# Patient Record
Sex: MALE | Race: WHITE | Employment: OTHER | URBAN - METROPOLITAN AREA
[De-identification: names, ages, dates, MRNs, and addresses within clinical notes are randomized per-mention and may not be internally consistent; named-entity substitution may affect disease eponyms.]

---

## 2019-01-29 ENCOUNTER — TELEPHONE (OUTPATIENT)
Dept: ORTHOPEDIC SURGERY | Age: 54
End: 2019-01-29

## 2019-07-23 RX ORDER — OMEPRAZOLE 40 MG/1
40 CAPSULE, DELAYED RELEASE ORAL DAILY
Qty: 30 CAPSULE | Refills: 2 | Status: SHIPPED | OUTPATIENT
Start: 2019-07-23 | End: 2019-08-23 | Stop reason: SDUPTHER

## 2019-07-23 RX ORDER — OMEPRAZOLE 40 MG/1
CAPSULE, DELAYED RELEASE ORAL
Refills: 0 | COMMUNITY
Start: 2019-06-22 | End: 2019-07-23 | Stop reason: SDUPTHER

## 2019-08-23 RX ORDER — ALBUTEROL SULFATE 90 UG/1
AEROSOL, METERED RESPIRATORY (INHALATION)
COMMUNITY
Start: 2016-12-08 | End: 2019-10-29 | Stop reason: SDUPTHER

## 2019-08-23 RX ORDER — BUDESONIDE AND FORMOTEROL FUMARATE DIHYDRATE 160; 4.5 UG/1; UG/1
AEROSOL RESPIRATORY (INHALATION)
Refills: 0 | COMMUNITY
Start: 2019-05-21 | End: 2019-10-29 | Stop reason: SDUPTHER

## 2019-08-23 RX ORDER — ARIPIPRAZOLE 5 MG/1
TABLET ORAL
Refills: 0 | Status: ON HOLD | COMMUNITY
Start: 2019-07-05

## 2019-08-23 RX ORDER — AMLODIPINE BESYLATE 5 MG/1
5 TABLET ORAL DAILY
Qty: 30 TABLET | Refills: 2 | Status: SHIPPED | OUTPATIENT
Start: 2019-08-23 | End: 2019-11-19 | Stop reason: SDUPTHER

## 2019-08-23 RX ORDER — AMLODIPINE BESYLATE 5 MG/1
TABLET ORAL
COMMUNITY
Start: 2016-12-29 | End: 2019-08-23 | Stop reason: SDUPTHER

## 2019-08-23 RX ORDER — HYDROXYZINE HYDROCHLORIDE 25 MG/1
TABLET, FILM COATED ORAL
Refills: 0 | COMMUNITY
Start: 2019-07-05 | End: 2021-08-06

## 2019-08-23 RX ORDER — DULOXETIN HYDROCHLORIDE 60 MG/1
CAPSULE, DELAYED RELEASE ORAL
Refills: 0 | COMMUNITY
Start: 2019-07-05 | End: 2022-02-02 | Stop reason: ALTCHOICE

## 2019-08-23 RX ORDER — OMEPRAZOLE 40 MG/1
40 CAPSULE, DELAYED RELEASE ORAL DAILY
Qty: 30 CAPSULE | Refills: 2 | Status: SHIPPED
Start: 2019-08-23 | End: 2020-08-27 | Stop reason: SDUPTHER

## 2019-08-23 RX ORDER — CLONIDINE HYDROCHLORIDE 0.1 MG/1
TABLET ORAL
Refills: 0 | COMMUNITY
Start: 2019-07-05 | End: 2021-01-12 | Stop reason: ALTCHOICE

## 2019-08-27 RX ORDER — CYCLOBENZAPRINE HCL 10 MG
10 TABLET ORAL 3 TIMES DAILY PRN
Qty: 90 TABLET | Refills: 0 | Status: SHIPPED | OUTPATIENT
Start: 2019-08-27 | End: 2019-09-26

## 2019-10-29 RX ORDER — ALBUTEROL SULFATE 90 UG/1
2 AEROSOL, METERED RESPIRATORY (INHALATION) EVERY 4 HOURS PRN
Qty: 1 INHALER | Refills: 2 | Status: SHIPPED
Start: 2019-10-29 | End: 2020-03-10 | Stop reason: SDUPTHER

## 2019-10-29 RX ORDER — BUDESONIDE AND FORMOTEROL FUMARATE DIHYDRATE 160; 4.5 UG/1; UG/1
2 AEROSOL RESPIRATORY (INHALATION) 2 TIMES DAILY
Qty: 1 INHALER | Refills: 2 | Status: SHIPPED
Start: 2019-10-29 | End: 2020-03-10 | Stop reason: SDUPTHER

## 2019-11-19 RX ORDER — AMLODIPINE BESYLATE 5 MG/1
5 TABLET ORAL DAILY
Qty: 30 TABLET | Refills: 2 | Status: SHIPPED
Start: 2019-11-19 | End: 2020-03-10 | Stop reason: SDUPTHER

## 2019-11-19 RX ORDER — OMEPRAZOLE 40 MG/1
40 CAPSULE, DELAYED RELEASE ORAL DAILY
Qty: 30 CAPSULE | Refills: 2 | Status: SHIPPED
Start: 2019-11-19 | End: 2020-03-10 | Stop reason: SDUPTHER

## 2019-11-19 RX ORDER — OMEPRAZOLE 40 MG/1
CAPSULE, DELAYED RELEASE ORAL
Refills: 0 | COMMUNITY
Start: 2019-10-18 | End: 2019-11-19 | Stop reason: SDUPTHER

## 2020-03-05 RX ORDER — IPRATROPIUM BROMIDE AND ALBUTEROL SULFATE 2.5; .5 MG/3ML; MG/3ML
1 SOLUTION RESPIRATORY (INHALATION) EVERY 6 HOURS
COMMUNITY
End: 2020-03-09 | Stop reason: SDUPTHER

## 2020-03-09 RX ORDER — IPRATROPIUM BROMIDE AND ALBUTEROL SULFATE 2.5; .5 MG/3ML; MG/3ML
1 SOLUTION RESPIRATORY (INHALATION) EVERY 4 HOURS
COMMUNITY
End: 2020-03-09 | Stop reason: CLARIF

## 2020-03-09 RX ORDER — IPRATROPIUM BROMIDE AND ALBUTEROL SULFATE 2.5; .5 MG/3ML; MG/3ML
1 SOLUTION RESPIRATORY (INHALATION) EVERY 4 HOURS PRN
Qty: 12 VIAL | Refills: 0 | Status: SHIPPED
Start: 2020-03-09 | End: 2020-03-10 | Stop reason: SDUPTHER

## 2020-03-10 ENCOUNTER — OFFICE VISIT (OUTPATIENT)
Dept: PRIMARY CARE CLINIC | Age: 55
End: 2020-03-10
Payer: MEDICAID

## 2020-03-10 VITALS
WEIGHT: 105 LBS | HEIGHT: 64 IN | RESPIRATION RATE: 16 BRPM | SYSTOLIC BLOOD PRESSURE: 132 MMHG | BODY MASS INDEX: 17.93 KG/M2 | DIASTOLIC BLOOD PRESSURE: 80 MMHG

## 2020-03-10 PROCEDURE — 1111F DSCHRG MED/CURRENT MED MERGE: CPT | Performed by: FAMILY MEDICINE

## 2020-03-10 PROCEDURE — 99215 OFFICE O/P EST HI 40 MIN: CPT | Performed by: FAMILY MEDICINE

## 2020-03-10 RX ORDER — OMEPRAZOLE 40 MG/1
40 CAPSULE, DELAYED RELEASE ORAL DAILY
Qty: 30 CAPSULE | Refills: 2 | Status: SHIPPED
Start: 2020-03-10 | End: 2020-06-08 | Stop reason: SDUPTHER

## 2020-03-10 RX ORDER — AMLODIPINE BESYLATE 5 MG/1
5 TABLET ORAL DAILY
Qty: 30 TABLET | Refills: 2 | Status: SHIPPED
Start: 2020-03-10 | End: 2020-06-08 | Stop reason: SDUPTHER

## 2020-03-10 RX ORDER — OMEPRAZOLE 40 MG/1
40 CAPSULE, DELAYED RELEASE ORAL DAILY
Qty: 30 CAPSULE | Refills: 2 | Status: CANCELLED | OUTPATIENT
Start: 2020-03-10 | End: 2020-04-09

## 2020-03-10 RX ORDER — ALBUTEROL SULFATE 90 UG/1
2 AEROSOL, METERED RESPIRATORY (INHALATION) EVERY 4 HOURS PRN
Qty: 1 INHALER | Refills: 2 | Status: SHIPPED
Start: 2020-03-10 | End: 2020-06-08 | Stop reason: SDUPTHER

## 2020-03-10 RX ORDER — BUDESONIDE AND FORMOTEROL FUMARATE DIHYDRATE 160; 4.5 UG/1; UG/1
2 AEROSOL RESPIRATORY (INHALATION) 2 TIMES DAILY
Qty: 1 INHALER | Refills: 2 | Status: SHIPPED
Start: 2020-03-10 | End: 2020-06-04 | Stop reason: SDUPTHER

## 2020-03-10 RX ORDER — IPRATROPIUM BROMIDE AND ALBUTEROL SULFATE 2.5; .5 MG/3ML; MG/3ML
1 SOLUTION RESPIRATORY (INHALATION) EVERY 4 HOURS PRN
Qty: 12 VIAL | Refills: 0 | Status: ON HOLD | OUTPATIENT
Start: 2020-03-10 | End: 2022-10-15

## 2020-03-10 ASSESSMENT — PATIENT HEALTH QUESTIONNAIRE - PHQ9
2. FEELING DOWN, DEPRESSED OR HOPELESS: 0
1. LITTLE INTEREST OR PLEASURE IN DOING THINGS: 0
SUM OF ALL RESPONSES TO PHQ QUESTIONS 1-9: 0
SUM OF ALL RESPONSES TO PHQ9 QUESTIONS 1 & 2: 0
SUM OF ALL RESPONSES TO PHQ QUESTIONS 1-9: 0

## 2020-03-10 NOTE — PROGRESS NOTES
Post-Discharge Transitional Care Management Services or Hospital Follow Up      Yadira Cano   YOB: 1965    Date of Office Visit:  3/10/2020  Date of Hospital Admission: February 26, 2020  Date of Hospital Discharge: March 6, 2020    Care management risk score Rising risk (score 2-5) and Complex Care (Scores >=6): 1     Non face to face  following discharge, date last encounter closed (first attempt may have been earlier): *No documented post hospital discharge outreach found in the last 14 days *No documented post hospital discharge outreach found in the last 14 days    Call initiated 2 business days of discharge: *No response recorded in the last 14 days    There is no problem list on file for this patient.       Allergies   Allergen Reactions    Aspirin     Levofloxacin Other (See Comments)    Lisinopril     Motrin [Ibuprofen]     Other     Sulfa Antibiotics     Tramadol        Medications listed as ordered at the time of discharge from hospital      Medications marked \"taking\" at this time  Outpatient Medications Marked as Taking for the 3/10/20 encounter (Office Visit) with Herberth Cruz MD   Medication Sig Dispense Refill    albuterol sulfate HFA (PROAIR HFA) 108 (90 Base) MCG/ACT inhaler Inhale 2 puffs into the lungs every 4 hours as needed for Wheezing 1 Inhaler 2    amLODIPine (NORVASC) 5 MG tablet Take 1 tablet by mouth daily 30 tablet 2    ipratropium-albuterol (DUONEB) 0.5-2.5 (3) MG/3ML SOLN nebulizer solution Inhale 3 mLs into the lungs every 4 hours as needed for Shortness of Breath 12 vial 0    omeprazole (PRILOSEC) 40 MG delayed release capsule Take 1 capsule by mouth daily 30 capsule 2    SYMBICORT 160-4.5 MCG/ACT AERO Inhale 2 puffs into the lungs 2 times daily 1 Inhaler 2    ARIPiprazole (ABILIFY) 5 MG tablet take 1 tablet by mouth once daily  0    DULoxetine (CYMBALTA) 60 MG extended release capsule take 1 capsule by mouth at bedtime  0    hydrOXYzine symptoms other than stated above. Denies mouth or throat symptoms. CV:  Denies chest pain, dyspnea on exertion, orthopnea, palpitations and PND  Resp: Denies chest pain, cough, SOB and wheezing. GI: Denies abdominal pain, constipation, diarrhea, heartburn, indigestion, nausea and vomiting. : Denies dysuria, frequency, hematuria, nocturia and urgency. Musculo: Denies arthralgias and myalgia  Skin:  Denies lesions, pruritus and rash. Neuro: Denies dizziness, lightheadedness, numbness, tingling and weakness. Psych:  Denies anxiety and depression  Endocrine: Denies anxiety and depression. Hema/Lymph: Denies hematologic symptoms  Allergy/Immuno:  Denies allergic/immunologic symptoms.   Pertinent positives reviewed and noted      Current Outpatient Medications:     albuterol sulfate HFA (PROAIR HFA) 108 (90 Base) MCG/ACT inhaler, Inhale 2 puffs into the lungs every 4 hours as needed for Wheezing, Disp: 1 Inhaler, Rfl: 2    amLODIPine (NORVASC) 5 MG tablet, Take 1 tablet by mouth daily, Disp: 30 tablet, Rfl: 2    ipratropium-albuterol (DUONEB) 0.5-2.5 (3) MG/3ML SOLN nebulizer solution, Inhale 3 mLs into the lungs every 4 hours as needed for Shortness of Breath, Disp: 12 vial, Rfl: 0    omeprazole (PRILOSEC) 40 MG delayed release capsule, Take 1 capsule by mouth daily, Disp: 30 capsule, Rfl: 2    SYMBICORT 160-4.5 MCG/ACT AERO, Inhale 2 puffs into the lungs 2 times daily, Disp: 1 Inhaler, Rfl: 2    ARIPiprazole (ABILIFY) 5 MG tablet, take 1 tablet by mouth once daily, Disp: , Rfl: 0    DULoxetine (CYMBALTA) 60 MG extended release capsule, take 1 capsule by mouth at bedtime, Disp: , Rfl: 0    hydrOXYzine (ATARAX) 25 MG tablet, take 1-2 tablets by mouth once daily if needed, Disp: , Rfl: 0    cloNIDine (CATAPRES) 0.1 MG tablet, take 1 tablet by mouth every morning, Disp: , Rfl: 0    omeprazole (PRILOSEC) 40 MG delayed release capsule, Take 1 capsule by mouth daily, Disp: 30 capsule, Rfl: 2   132/80   Resp: 16   Weight: 105 lb (47.6 kg)   Height: 5' 4\" (1.626 m)        Exam: Const: Appears healthy and well developed. No signs of acute distress present. Eyes: PERRL  ENMT: Tympanic membranes are intact. Nasal mucosa intact without noted erythema Septum is in the midline. Posterior pharynx shows no exudate, irritation or redness. Neck:  Supple without adenopathy. Adequate range of motion   Resp: No rales, rhonchi, wheezes appreciated over the lungs bilaterally. CV: S1, S2 within normal limits. Regular rate and rhythm noted. Without murmur, gallop or rub. Extremities:  Pulses intact. Without noted edema. Abdomen: Positive bowel sounds. Palpation reveals softness, with no distension, organomegaly or tenderness. No abdominal masses palpable. Skin: Skin is warm and dry. Musculo: Unchanged upon examination  Neuro: Alert and oriented X3. Cranial nerves grossly intact. Psych: Mood is normal.  Affect is normal.   Vital signs reviewed. Controlled Substances Monitoring:     No flowsheet data found. Plan Per Assessment:  Belén Brock was seen today for follow-up from hospital.    Diagnoses and all orders for this visit:    Pneumonia due to infectious organism, unspecified laterality, unspecified part of lung  -     MS DISCHARGE MEDS RECONCILED W/ CURRENT OUTPATIENT MED LIST    Other orders  -     albuterol sulfate HFA (PROAIR HFA) 108 (90 Base) MCG/ACT inhaler; Inhale 2 puffs into the lungs every 4 hours as needed for Wheezing  -     amLODIPine (NORVASC) 5 MG tablet; Take 1 tablet by mouth daily  -     ipratropium-albuterol (DUONEB) 0.5-2.5 (3) MG/3ML SOLN nebulizer solution; Inhale 3 mLs into the lungs every 4 hours as needed for Shortness of Breath  -     omeprazole (PRILOSEC) 40 MG delayed release capsule; Take 1 capsule by mouth daily  -     SYMBICORT 160-4.5 MCG/ACT AERO; Inhale 2 puffs into the lungs 2 times daily      Request and review discharge summary.   Return in 3 months (on 6/10/2020). Ruchi Rob MD    Note was generated with the assistance of voice recognition software. Document was reviewed however may contain grammatical errors.           Medical Decision Making: moderate complexity

## 2020-03-23 ENCOUNTER — TELEPHONE (OUTPATIENT)
Dept: PRIMARY CARE CLINIC | Age: 55
End: 2020-03-23

## 2020-03-23 NOTE — TELEPHONE ENCOUNTER
Pt said that he was supposed to have a referral to see Dr Enoc Garcia.   If you agree, please add the referral.

## 2020-03-24 RX ORDER — LACTULOSE 10 G/15ML
15 SOLUTION ORAL 2 TIMES DAILY
Qty: 473 ML | Refills: 2 | Status: SHIPPED
Start: 2020-03-24 | End: 2020-05-20 | Stop reason: SDUPTHER

## 2020-03-24 NOTE — TELEPHONE ENCOUNTER
Pt is out of his lactulose and I am not aware of the how much he was given in th nursing home and they don't know either. They can't get into the specialist so he needs more to get him by since this pandemic.

## 2020-05-19 ENCOUNTER — TELEPHONE (OUTPATIENT)
Dept: PRIMARY CARE CLINIC | Age: 55
End: 2020-05-19

## 2020-05-19 NOTE — TELEPHONE ENCOUNTER
Spoke to [place], the medications that Natacha Guerin is out of are not in the chart and are not prescribed by Dr. Danny Vasquez

## 2020-05-19 NOTE — TELEPHONE ENCOUNTER
Pt's sister Carson Goel called to report Pt is out of some med refills. Pt has appt sched 6/10 for 3 month check. Carson Goel can be reached at [phone].

## 2020-05-20 RX ORDER — BUMETANIDE 1 MG/1
1 TABLET ORAL DAILY
Qty: 30 TABLET | Refills: 3 | Status: SHIPPED
Start: 2020-05-20 | End: 2020-06-08 | Stop reason: SDUPTHER

## 2020-05-20 RX ORDER — LACTULOSE 10 G/15ML
15 SOLUTION ORAL 2 TIMES DAILY
Qty: 473 ML | Refills: 2 | Status: SHIPPED
Start: 2020-05-20 | End: 2021-01-12 | Stop reason: ALTCHOICE

## 2020-06-04 RX ORDER — BUDESONIDE AND FORMOTEROL FUMARATE DIHYDRATE 160; 4.5 UG/1; UG/1
2 AEROSOL RESPIRATORY (INHALATION) 2 TIMES DAILY
Qty: 1 INHALER | Refills: 2 | Status: SHIPPED
Start: 2020-06-04 | End: 2020-06-10 | Stop reason: SDUPTHER

## 2020-06-08 RX ORDER — OMEPRAZOLE 40 MG/1
40 CAPSULE, DELAYED RELEASE ORAL DAILY
Qty: 30 CAPSULE | Refills: 2 | Status: SHIPPED
Start: 2020-06-08 | End: 2020-08-14 | Stop reason: SDUPTHER

## 2020-06-08 RX ORDER — AMLODIPINE BESYLATE 5 MG/1
5 TABLET ORAL DAILY
Qty: 30 TABLET | Refills: 2 | Status: SHIPPED
Start: 2020-06-08 | End: 2020-09-14 | Stop reason: SDUPTHER

## 2020-06-08 RX ORDER — OMEPRAZOLE 40 MG/1
40 CAPSULE, DELAYED RELEASE ORAL DAILY
Qty: 30 CAPSULE | Refills: 2 | Status: SHIPPED
Start: 2020-06-08 | End: 2020-06-08 | Stop reason: SDUPTHER

## 2020-06-08 RX ORDER — ALBUTEROL SULFATE 90 UG/1
2 AEROSOL, METERED RESPIRATORY (INHALATION) EVERY 4 HOURS PRN
Qty: 1 INHALER | Refills: 2 | Status: SHIPPED
Start: 2020-06-08 | End: 2020-08-27 | Stop reason: SDUPTHER

## 2020-06-08 RX ORDER — BUMETANIDE 1 MG/1
1 TABLET ORAL DAILY
Qty: 30 TABLET | Refills: 3 | Status: SHIPPED
Start: 2020-06-08 | End: 2021-01-12 | Stop reason: ALTCHOICE

## 2020-06-10 ENCOUNTER — VIRTUAL VISIT (OUTPATIENT)
Dept: PRIMARY CARE CLINIC | Age: 55
End: 2020-06-10
Payer: MEDICAID

## 2020-06-10 PROBLEM — I10 ESSENTIAL HYPERTENSION: Status: ACTIVE | Noted: 2020-06-10

## 2020-06-10 PROCEDURE — 99213 OFFICE O/P EST LOW 20 MIN: CPT | Performed by: FAMILY MEDICINE

## 2020-06-10 RX ORDER — METHYLPREDNISOLONE 4 MG/1
TABLET ORAL
Qty: 21 TABLET | Refills: 0 | Status: SHIPPED
Start: 2020-06-10 | End: 2021-01-12 | Stop reason: ALTCHOICE

## 2020-06-10 RX ORDER — BUDESONIDE AND FORMOTEROL FUMARATE DIHYDRATE 160; 4.5 UG/1; UG/1
2 AEROSOL RESPIRATORY (INHALATION) 2 TIMES DAILY
Qty: 1 INHALER | Refills: 2 | Status: SHIPPED
Start: 2020-06-10 | End: 2020-08-27 | Stop reason: SDUPTHER

## 2020-06-10 RX ORDER — CEFDINIR 300 MG/1
300 CAPSULE ORAL 2 TIMES DAILY
Qty: 20 CAPSULE | Refills: 0 | Status: SHIPPED | OUTPATIENT
Start: 2020-06-10 | End: 2020-06-20

## 2020-06-10 NOTE — PROGRESS NOTES
[x] No significant exanthematous lesions or discoloration noted on facial skin         [] Abnormal-            Psychiatric:       [x] Normal Affect [x] No Hallucinations        [] Abnormal-     Other pertinent observable physical exam findings-     ASSESSMENT/PLAN: #1 hypertension #2 COPD plan #1 Omnicef 300 twice daily for 10 days #2 Medrol Dosepak No. 3 refill medications as requested #4 FIT package #5 schedule office visit follow-up in 3 months. Return in about 3 months (around 9/10/2020) for MEDICATION CHECK, FOLLOW  Kyle Cody is a 47 y.o. male being evaluated by a Virtual Visit (video visit) encounter to address concerns as mentioned above. A caregiver was present when appropriate. Due to this being a TeleHealth encounter (During [facility]-07 public health emergency), evaluation of the following organ systems was limited: Vitals/Constitutional/EENT/Resp/CV/GI//MS/Neuro/Skin/Heme-Lymph-Imm. Pursuant to the emergency declaration under the [address] [place] [address] and the [employer] and [employer] Act, this Virtual Visit was conducted with patient's (and/or legal guardian's) consent, to reduce the patient's risk of exposure to COVID-19 and provide necessary medical care. The patient (and/or legal guardian) has also been advised to contact this office for worsening conditions or problems, and seek emergency medical treatment and/or call 911 if deemed necessary. Encounter conducted via [employer]. Patient identification was verified at the start of the visit: Yes. Total time spent on this encounter: 15 minutes. Services were provided through a video synchronous discussion virtually to substitute for in-person clinic visit. Patient and provider were located at their individual homes.     --Anand Graham MD on 6/10/2020 at 1:22 PM    An electronic signature was used to authenticate

## 2020-08-10 ENCOUNTER — TELEPHONE (OUTPATIENT)
Dept: PRIMARY CARE CLINIC | Age: 55
End: 2020-08-10

## 2020-08-10 NOTE — TELEPHONE ENCOUNTER
Last Appointment:  6/10/2020  Future Appointments   Date Time Provider Mik Rosario   8/14/2020  8:10 AM Gustavo Cox MD [facility]      Patient's family reports patient has restless legs. Pain clinic doctor advised her to update PCP. Appointment scheduled for Friday.     Electronically signed by Marin Ruby LPN on 0/35/8754 at 2:07 PM

## 2020-08-14 ENCOUNTER — VIRTUAL VISIT (OUTPATIENT)
Dept: PRIMARY CARE CLINIC | Age: 55
End: 2020-08-14
Payer: MEDICAID

## 2020-08-14 PROCEDURE — 99214 OFFICE O/P EST MOD 30 MIN: CPT | Performed by: FAMILY MEDICINE

## 2020-08-14 RX ORDER — ROPINIROLE 0.25 MG/1
0.25 TABLET, FILM COATED ORAL 3 TIMES DAILY
Qty: 90 TABLET | Refills: 3 | Status: SHIPPED
Start: 2020-08-14 | End: 2020-10-20 | Stop reason: DRUGHIGH

## 2020-08-14 RX ORDER — OMEPRAZOLE 40 MG/1
40 CAPSULE, DELAYED RELEASE ORAL DAILY
Qty: 30 CAPSULE | Refills: 2 | Status: SHIPPED
Start: 2020-08-14 | End: 2021-01-26 | Stop reason: SDUPTHER

## 2020-08-14 NOTE — PROGRESS NOTES
2020    TELEHEALTH EVALUATION -- Audio/Visual (During [facility]-56 public health emergency)    HPI: This 51-year-old male presents today for a follow-up evaluation and management of chronic medical problems. The patient does present with symptoms consistent with restless leg syndrome. Current medication list reviewed. The patient is tolerating all medications well without adverse events or known side effects. The patient is not up-to-date on all age-appropriate wellness issues. The patient is having increased shortness of breath and does have a follow-up appoint with his pulmonologist next week. Shun Davalos (:  1965) has requested an audio/video evaluation for the following concern(s): Evaluation management of chronic medical problems. Review of Systems negative unless otherwise noted in HPI. Prior to Visit Medications    Medication Sig Taking? Authorizing Provider   omeprazole (PRILOSEC) 40 MG delayed release capsule Take 1 capsule by mouth daily Yes Lesley Boyd MD   rOPINIRole (REQUIP) 0.25 MG tablet Take 1 tablet by mouth 3 times daily Yes Lesley Boyd MD   SYMBICORT 160-4.5 MCG/ACT AERO Inhale 2 puffs into the lungs 2 times daily  SHREYA Hooker MD   methylPREDNISolone (MEDROL DOSEPACK) 4 MG tablet Take by mouth.   Lesley Boyd MD   bumetanide (BUMEX) 1 MG tablet Take 1 tablet by mouth daily  Lesley Boyd MD   albuterol sulfate HFA (PROAIR HFA) 108 (90 Base) MCG/ACT inhaler Inhale 2 puffs into the lungs every 4 hours as needed for Wheezing  SHREYA Hooker MD   amLODIPine (NORVASC) 5 MG tablet Take 1 tablet by mouth daily  Lesley Boyd MD   lactulose (CHRONULAC) 10 GM/15ML solution Take 15 mLs by mouth 2 times daily  Lesley Boyd MD   ipratropium-albuterol (DUONEB) 0.5-2.5 (3) MG/3ML SOLN nebulizer solution Inhale 3 mLs into the lungs every 4 hours as needed for Shortness of Breath  SHREYA Hooker MD   ARIPiprazole (ABILIFY) 5 MG tablet take 1 tablet by mouth once daily  Historical Provider, MD   cloNIDine (CATAPRES) 0.1 MG tablet take 1 tablet by mouth every morning  Historical Provider, MD   DULoxetine (CYMBALTA) 60 MG extended release capsule take 1 capsule by mouth at bedtime  Historical Provider, MD   hydrOXYzine (ATARAX) 25 MG tablet take 1-2 tablets by mouth once daily if needed  Historical Provider, MD   omeprazole (PRILOSEC) 40 MG delayed release capsule Take 1 capsule by mouth daily  Geraldine Trejo MD   Lansoprazole (PREVACID PO) Take by mouth  Historical Provider, MD       Social History     Tobacco Use    Smoking status: Current Every Day Smoker     Years: 25.00     Start date: 3/10/1995    Smokeless tobacco: Never Used   Substance Use Topics    Alcohol use: No    Drug use: No            PHYSICAL EXAMINATION:  [ INSTRUCTIONS:  \"[x]\" Indicates a positive item  \"[]\" Indicates a negative item  -- DELETE ALL ITEMS NOT EXAMINED]  Vital Signs: (As obtained by patient/caregiver or practitioner observation)    Blood pressure-  Heart rate-    Respiratory rate-    Temperature-  Pulse oximetry-     Constitutional: [x] Appears well-developed and well-nourished [x] No apparent distress      [] Abnormal-   Mental status  [x] Alert and awake  [x] Oriented to person/place/time [x]Able to follow commands      Eyes:  EOM    [x]  Normal  [] Abnormal-  Sclera  [x]  Normal  [] Abnormal -         Discharge [x]  None visible  [] Abnormal -    HENT:   [] Normocephalic, atraumatic.   [] Abnormal   [x] Mouth/Throat: Mucous membranes are moist.     External Ears [x] Normal  [] Abnormal-     Neck: [x] No visualized mass     Pulmonary/Chest: [x] Respiratory effort normal.  [x] No visualized signs of difficulty breathing or respiratory distress        [] Abnormal-      Musculoskeletal:   [] Normal gait with no signs of ataxia         [] Normal range of motion of neck        [] Abnormal-       Neurological:        [x] No Facial Asymmetry (Cranial nerve 7 motor function) (limited exam to video visit)          [x] No gaze palsy        [] Abnormal-         Skin:        [x] No significant exanthematous lesions or discoloration noted on facial skin         [] Abnormal-            Psychiatric:       [x] Normal Affect [x] No Hallucinations        [] Abnormal-     Other pertinent observable physical exam findings-     ASSESSMENT/PLAN: #1 hypertension #2 COPD #3 GERD #4 restless leg syndrome plan #1 refill requested medications #2      Return for MEDICATION CHECK, FOLLOW UP [address] Ector Echavarria is a 47 y.o. male being evaluated by a Virtual Visit (video visit) encounter to address concerns as mentioned above. A caregiver was present when appropriate. Due to this being a TeleHealth encounter (During [facility]-00 public health emergency), evaluation of the following organ systems was limited: Vitals/Constitutional/EENT/Resp/CV/GI//MS/Neuro/Skin/Heme-Lymph-Imm. Pursuant to the emergency declaration under the [address] [place] [address] and the [employer] and [employer] Act, this Virtual Visit was conducted with patient's (and/or legal guardian's) consent, to reduce the patient's risk of exposure to COVID-19 and provide necessary medical care. The patient (and/or legal guardian) has also been advised to contact this office for worsening conditions or problems, and seek emergency medical treatment and/or call 911 if deemed necessary. This encounter was conducted via [employer]. Patient identification was verified at the start of the visit: Yes      Total time spent on this encounter: 25 minutes. Services were provided through a video synchronous discussion virtually to substitute for in-person clinic visit. Patient and provider were located at their individual homes. --Irasema Briceno MD on 8/14/2020 at 8:58 AM    An electronic signature was used to authenticate this note.

## 2020-08-20 ENCOUNTER — TELEPHONE (OUTPATIENT)
Dept: PRIMARY CARE CLINIC | Age: 55
End: 2020-08-20

## 2020-08-27 RX ORDER — ALBUTEROL SULFATE 90 UG/1
2 AEROSOL, METERED RESPIRATORY (INHALATION) EVERY 4 HOURS PRN
Qty: 1 INHALER | Refills: 2 | Status: SHIPPED
Start: 2020-08-27 | End: 2020-09-22 | Stop reason: SDUPTHER

## 2020-08-27 RX ORDER — OMEPRAZOLE 40 MG/1
40 CAPSULE, DELAYED RELEASE ORAL DAILY
Qty: 30 CAPSULE | Refills: 2 | Status: SHIPPED
Start: 2020-08-27 | End: 2021-01-19

## 2020-08-27 RX ORDER — BUDESONIDE AND FORMOTEROL FUMARATE DIHYDRATE 160; 4.5 UG/1; UG/1
2 AEROSOL RESPIRATORY (INHALATION) 2 TIMES DAILY
Qty: 1 INHALER | Refills: 2 | Status: SHIPPED
Start: 2020-08-27 | End: 2020-11-16 | Stop reason: SDUPTHER

## 2020-09-14 RX ORDER — AMLODIPINE BESYLATE 5 MG/1
5 TABLET ORAL DAILY
Qty: 30 TABLET | Refills: 2 | Status: SHIPPED
Start: 2020-09-14 | End: 2020-11-16 | Stop reason: SDUPTHER

## 2020-09-22 RX ORDER — ALBUTEROL SULFATE 90 UG/1
2 AEROSOL, METERED RESPIRATORY (INHALATION) EVERY 4 HOURS PRN
Qty: 1 INHALER | Refills: 2 | Status: SHIPPED
Start: 2020-09-22 | End: 2020-11-16 | Stop reason: SDUPTHER

## 2020-10-13 ENCOUNTER — TELEPHONE (OUTPATIENT)
Dept: PRIMARY CARE CLINIC | Age: 55
End: 2020-10-13

## 2020-10-13 NOTE — TELEPHONE ENCOUNTER
Patients home health nurse Haresh Williamson called and stated that family member voiced concern over patient having side effects from the requip 0.25 mg. Side effects he is having is anxious and confusion and not sleeping.    She would like to know if you would try him on different medication or what you would like to do.      [phone]  Cata sherwood

## 2020-10-13 NOTE — TELEPHONE ENCOUNTER
Spoke to Edie and she states that Hollister will no longer take this requip. He has already stopped taking it.

## 2020-10-20 ENCOUNTER — OFFICE VISIT (OUTPATIENT)
Dept: PRIMARY CARE CLINIC | Age: 55
End: 2020-10-20
Payer: MEDICAID

## 2020-10-20 VITALS
SYSTOLIC BLOOD PRESSURE: 110 MMHG | HEIGHT: 64 IN | RESPIRATION RATE: 16 BRPM | DIASTOLIC BLOOD PRESSURE: 70 MMHG | WEIGHT: 125 LBS | HEART RATE: 98 BPM | BODY MASS INDEX: 21.34 KG/M2 | OXYGEN SATURATION: 99 % | TEMPERATURE: 97.5 F

## 2020-10-20 PROCEDURE — 1111F DSCHRG MED/CURRENT MED MERGE: CPT | Performed by: FAMILY MEDICINE

## 2020-10-20 PROCEDURE — 99215 OFFICE O/P EST HI 40 MIN: CPT | Performed by: FAMILY MEDICINE

## 2020-10-20 RX ORDER — HYDROCODONE BITARTRATE AND ACETAMINOPHEN 7.5; 325 MG/1; MG/1
1 TABLET ORAL 2 TIMES DAILY PRN
Status: ON HOLD | COMMUNITY
Start: 2020-10-06

## 2020-10-20 RX ORDER — ROPINIROLE 0.5 MG/1
0.5 TABLET, FILM COATED ORAL 3 TIMES DAILY
Qty: 90 TABLET | Refills: 3 | Status: SHIPPED
Start: 2020-10-20 | End: 2021-01-12 | Stop reason: ALTCHOICE

## 2020-10-20 RX ORDER — NICOTINE 21 MG/24HR
1 PATCH, TRANSDERMAL 24 HOURS TRANSDERMAL DAILY
Qty: 42 PATCH | Refills: 0 | Status: SHIPPED
Start: 2020-10-20 | End: 2021-01-12 | Stop reason: SDUPTHER

## 2020-10-20 RX ORDER — NICOTINE 21 MG/24HR
1 PATCH, TRANSDERMAL 24 HOURS TRANSDERMAL DAILY
Qty: 42 PATCH | Refills: 0 | Status: SHIPPED
Start: 2020-10-20 | End: 2020-10-20

## 2020-10-20 NOTE — PROGRESS NOTES
Post-Discharge Transitional Care Management Services or Hospital Follow Up      Kory Fernandez   YOB: 1965    Date of Office Visit:  10/20/2020  Date of Hospital Admission: 10/13/2020  Date of Hospital Discharge: 10/16/2020    Care management risk score Rising risk (score 2-5) and Complex Care (Scores >=6): 1     Non face to face  following discharge, date last encounter closed (first attempt may have been earlier): *No documented post hospital discharge outreach found in the last 14 days *No documented post hospital discharge outreach found in the last 14 days    Call initiated 2 business days of discharge: *No response recorded in the last 14 days    Patient Active Problem List   Diagnosis    Essential hypertension       Allergies   Allergen Reactions    Aspirin     Levofloxacin Other (See Comments)    Lisinopril     Other     Tramadol     Ibuprofen Nausea And Vomiting    Sulfa Antibiotics Nausea And Vomiting       Medications listed as ordered at the time of discharge from hospital      Medications marked \"taking\" at this time  Outpatient Medications Marked as Taking for the 10/20/20 encounter (Office Visit) with Arlene Babinski, MD   Medication Sig Dispense Refill    HYDROcodone-acetaminophen ([place]) 7.5-325 MG per tablet TAKE ONE TABLET BY MOUTH EVERY 12 HOURS AS NEEDED FOR PAIN      rOPINIRole (REQUIP) 0.5 MG tablet Take 1 tablet by mouth 3 times daily 90 tablet 3    nicotine (NICODERM CQ) 21 MG/24HR Place 1 patch onto the skin daily 42 patch 0    albuterol sulfate HFA (PROAIR HFA) 108 (90 Base) MCG/ACT inhaler Inhale 2 puffs into the lungs every 4 hours as needed for Wheezing 1 Inhaler 2    amLODIPine (NORVASC) 5 MG tablet Take 1 tablet by mouth daily 30 tablet 2    SYMBICORT 160-4.5 MCG/ACT AERO Inhale 2 puffs into the lungs 2 times daily 1 Inhaler 2    omeprazole (PRILOSEC) 40 MG delayed release capsule Take 1 capsule by mouth daily 30 capsule 2    ipratropium-albuterol (DUONEB) 0.5-2.5 (3) MG/3ML SOLN nebulizer solution Inhale 3 mLs into the lungs every 4 hours as needed for Shortness of Breath 12 vial 0    ARIPiprazole (ABILIFY) 5 MG tablet take 1 tablet by mouth once daily  0    cloNIDine (CATAPRES) 0.1 MG tablet take 1 tablet by mouth every morning  0    DULoxetine (CYMBALTA) 60 MG extended release capsule take 1 capsule by mouth at bedtime  0    hydrOXYzine (ATARAX) 25 MG tablet take 1-2 tablets by mouth once daily if needed  0    Lansoprazole (PREVACID PO) Take by mouth          Medications patient taking as of now reconciled against medications ordered at time of hospital discharge: Yes    Chief Complaint   Patient presents with    Follow-Up from [facility]     october 13 to october 16- [facility]       History of Present illness - Follow up of Hospital diagnosis(es): Pneumonia    Inpatient course: Discharge summary reviewed- see chart. Interval history/Current status: Baseline clinical status. Vitals:    10/20/20 0937   BP: 110/70   Pulse: 98   Resp: 16   Temp: 97.5 °F (36.4 °C)   TempSrc: Temporal   SpO2: 99%   Weight: 125 lb (56.7 kg)   Height: 5' 4\" (1.626 m)     Body mass index is 21.46 kg/m². Wt Readings from Last 3 Encounters:   10/20/20 125 lb (56.7 kg)   03/10/20 105 lb (47.6 kg)   11/19/15 130 lb (59 kg)     BP Readings from Last 3 Encounters:   10/20/20 110/70   03/10/20 132/80   11/19/15 144/64       A comprehensive review of systems was negative except for what was noted in the HPI. Physical Exam:  General Appearance: alert and oriented to person, place and time, well developed and well- nourished, in no acute distress. O2 per nasal cannula noted. Skin: warm and dry, no rash. Erythema of lower extremities noted.    Head: normocephalic and atraumatic  Eyes: pupils equal, round, and reactive to light, extraocular eye movements intact, conjunctivae normal  ENT: tympanic membrane, external ear and ear canal normal bilaterally, nose without deformity, nasal mucosa and turbinates normal without polyps  Neck: supple and non-tender without mass, no thyromegaly or thyroid nodules, no cervical lymphadenopathy  Pulmonary/Chest: clear to auscultation bilaterally- no wheezes, rales or rhonchi, normal air movement, no respiratory distress  Cardiovascular: normal rate, regular rhythm, normal S1 and S2, no murmurs, rubs, clicks, or gallops, distal pulses intact, no carotid bruits  Abdomen: soft, non-tender, non-distended, normal bowel sounds, no masses or organomegaly  Extremities: no cyanosis, clubbing or edema  Musculoskeletal: Unchanged upon examination. Neurologic: reflexes normal and symmetric, no cranial nerve deficit, gait, coordination and speech normal    Assessment/Plan: #1 pneumonia plan #1 continue Levaquin as prescribed. #2 nicotine patch 21 mg daily for 6 weeks. #3 increase Requip to 0.5 mg nightly #4 follow-up in 6 weeks. #5 order placed for wheeled walker due to chronic lower extremity weakness and deformity.         Medical Decision Making: high complexity

## 2020-11-04 ENCOUNTER — TELEPHONE (OUTPATIENT)
Dept: FAMILY MEDICINE CLINIC | Age: 55
End: 2020-11-04

## 2020-11-04 NOTE — TELEPHONE ENCOUNTER
Tom 36         She is calling about the Requip. The dose was doubled after it was not working for him, now it is causing severe leg cramps. They are very tight and hard at times. He wants to stop it and try something else for the RLS. Please advise.

## 2020-11-10 NOTE — TELEPHONE ENCOUNTER
Left detailed message with sister Mirna Brenner, about calling our office to schedule an appointment.

## 2020-11-16 RX ORDER — BUDESONIDE AND FORMOTEROL FUMARATE DIHYDRATE 160; 4.5 UG/1; UG/1
2 AEROSOL RESPIRATORY (INHALATION) 2 TIMES DAILY
Qty: 1 INHALER | Refills: 2 | Status: SHIPPED
Start: 2020-11-16 | End: 2021-01-26 | Stop reason: SDUPTHER

## 2020-11-16 RX ORDER — AMLODIPINE BESYLATE 5 MG/1
5 TABLET ORAL DAILY
Qty: 30 TABLET | Refills: 2 | Status: SHIPPED
Start: 2020-11-16 | End: 2021-01-26 | Stop reason: SDUPTHER

## 2020-11-16 RX ORDER — ALBUTEROL SULFATE 90 UG/1
2 AEROSOL, METERED RESPIRATORY (INHALATION) EVERY 4 HOURS PRN
Qty: 1 INHALER | Refills: 2 | Status: SHIPPED
Start: 2020-11-16 | End: 2021-01-26 | Stop reason: SDUPTHER

## 2020-11-24 ENCOUNTER — TELEPHONE (OUTPATIENT)
Dept: PRIMARY CARE CLINIC | Age: 55
End: 2020-11-24

## 2020-11-24 NOTE — TELEPHONE ENCOUNTER
Stalin Ced[facility] from [facility] called and patient needs new order for standard walker sent [facility] medical. Please advise

## 2021-01-04 ENCOUNTER — TELEPHONE (OUTPATIENT)
Dept: PRIMARY CARE CLINIC | Age: 56
End: 2021-01-04

## 2021-01-04 NOTE — TELEPHONE ENCOUNTER
Wife is calling in to say that while  was being discharged his medication was not called in, she is asking for prednisone to be called in

## 2021-01-05 NOTE — TELEPHONE ENCOUNTER
Patient wife states that this medication was supposed to be called into the pharmacy on discharge, however pharmacy states they never received it.

## 2021-01-12 ENCOUNTER — VIRTUAL VISIT (OUTPATIENT)
Dept: PRIMARY CARE CLINIC | Age: 56
End: 2021-01-12
Payer: MEDICAID

## 2021-01-12 DIAGNOSIS — J18.9 PNEUMONIA DUE TO INFECTIOUS ORGANISM, UNSPECIFIED LATERALITY, UNSPECIFIED PART OF LUNG: Primary | ICD-10-CM

## 2021-01-12 PROCEDURE — 1111F DSCHRG MED/CURRENT MED MERGE: CPT | Performed by: FAMILY MEDICINE

## 2021-01-12 PROCEDURE — 99214 OFFICE O/P EST MOD 30 MIN: CPT | Performed by: FAMILY MEDICINE

## 2021-01-12 RX ORDER — NICOTINE 21 MG/24HR
1 PATCH, TRANSDERMAL 24 HOURS TRANSDERMAL DAILY
Qty: 42 PATCH | Refills: 0 | Status: SHIPPED | OUTPATIENT
Start: 2021-01-12 | End: 2021-08-24

## 2021-01-12 ASSESSMENT — PATIENT HEALTH QUESTIONNAIRE - PHQ9
DEPRESSION UNABLE TO ASSESS: FUNCTIONAL CAPACITY MOTIVATION LIMITS ACCURACY
SUM OF ALL RESPONSES TO PHQ QUESTIONS 1-9: 2
SUM OF ALL RESPONSES TO PHQ9 QUESTIONS 1 & 2: 2

## 2021-01-12 NOTE — PROGRESS NOTES
 ipratropium-albuterol (DUONEB) 0.5-2.5 (3) MG/3ML SOLN nebulizer solution Inhale 3 mLs into the lungs every 4 hours as needed for Shortness of Breath 12 vial 0    ARIPiprazole (ABILIFY) 5 MG tablet take 1 tablet by mouth once daily  0    DULoxetine (CYMBALTA) 60 MG extended release capsule take 1 capsule by mouth at bedtime  0    hydrOXYzine (ATARAX) 25 MG tablet take 1-2 tablets by mouth once daily if needed  0    Lansoprazole (PREVACID PO) Take by mouth          Medications patient taking as of now reconciled against medications ordered at time of hospital discharge: Yes    Chief Complaint   Patient presents with    Follow-Up from Hospital       History of Present illness - Follow up of Hospital diagnosis(es): This 77-year-old male with a known history of COPD presented to the hospital with increasing shortness of breath. The patient was admitted with a diagnosis of pneumonia. The patient does have a pending follow-up appoint with his pulmonologist on the 19th of this month. Patient states he is clinically doing better. The patient remains on O2 therapy at 5 L. Current medication list reviewed. The patient is tolerating all medications well without adverse events or known side effects. The patient is not up-to-date on all age-appropriate wellness issues. Inpatient course: Discharge summary reviewed- see chart. Interval history/Current status: At clinical baseline. There were no vitals filed for this visit. There is no height or weight on file to calculate BMI. Wt Readings from Last 3 Encounters:   10/20/20 125 lb (56.7 kg)   03/10/20 105 lb (47.6 kg)   11/19/15 130 lb (59 kg)     BP Readings from Last 3 Encounters:   10/20/20 110/70   03/10/20 132/80   11/19/15 144/64       A comprehensive review of systems was negative except for what was noted in the HPI. Assessment/Plan: #1 pneumonia plan #1 follow-up with pulmonologist as scheduled #2 follow-up on 1/20/2021 for evaluation management of chronic medical problems. Medical Decision Making: Moderate complexity.

## 2021-01-13 RX ORDER — BUMETANIDE 1 MG/1
1 TABLET ORAL DAILY
Qty: 30 TABLET | Refills: 3 | Status: SHIPPED | OUTPATIENT
Start: 2021-01-13 | End: 2021-08-24

## 2021-01-13 NOTE — TELEPHONE ENCOUNTER
Wife called in wanting to know if he can restart the bumex 1 mg. Legs are swelling left is worse than right. Pt was in er [facility] and they had mentioned possible chf. No swelling yesterday just started today.  Has follow up on 19th

## 2021-01-15 RX ORDER — CEFDINIR 300 MG/1
300 CAPSULE ORAL 2 TIMES DAILY
Qty: 20 CAPSULE | Refills: 0 | Status: SHIPPED | OUTPATIENT
Start: 2021-01-15 | End: 2021-01-25

## 2021-01-15 NOTE — TELEPHONE ENCOUNTER
Wolfgang Harris from [employer] [facility] is calling to say that she feels that patient has cellulitis in his legs. She states that it is seeping, and red. I advised patient that he should go to the nearest urgent/express care for evaluation and treatment. Since patient does not drive, he would rather something be called in to the pharmacy for him.

## 2021-01-16 ENCOUNTER — OFFICE VISIT (OUTPATIENT)
Dept: FAMILY MEDICINE CLINIC | Age: 56
End: 2021-01-16
Payer: MEDICAID

## 2021-01-16 VITALS
TEMPERATURE: 98.7 F | WEIGHT: 115 LBS | DIASTOLIC BLOOD PRESSURE: 76 MMHG | HEART RATE: 104 BPM | BODY MASS INDEX: 21.71 KG/M2 | SYSTOLIC BLOOD PRESSURE: 132 MMHG | OXYGEN SATURATION: 98 % | HEIGHT: 61 IN

## 2021-01-16 DIAGNOSIS — L03.116 CELLULITIS OF LEFT LEG: Primary | ICD-10-CM

## 2021-01-16 PROCEDURE — 99213 OFFICE O/P EST LOW 20 MIN: CPT | Performed by: PHYSICIAN ASSISTANT

## 2021-01-16 RX ORDER — DOXYCYCLINE HYCLATE 100 MG
100 TABLET ORAL 2 TIMES DAILY
Qty: 14 TABLET | Refills: 0 | Status: SHIPPED | OUTPATIENT
Start: 2021-01-16 | End: 2021-01-23

## 2021-01-16 NOTE — PROGRESS NOTES
Chief Complaint   Patient presents with    Swelling     both legs, thinks its cellulutis        HPI: This is a 75-year-old male presents to the office for possible cellulitis of the left leg. It is swelling and reddened. He has a slight. He is had in the past.  Also his right leg is little bit swollen and red but not as bad as the left. He was recently in the hospital December 30. Was discharged on the fifth. PCP is in [facility]. Denies any chest pain or shortness of breath. No fever.         Current Outpatient Medications:     doxycycline hyclate (VIBRA-TABS) 100 MG tablet, Take 1 tablet by mouth 2 times daily for 7 days, Disp: 14 tablet, Rfl: 0    cefdinir (OMNICEF) 300 MG capsule, Take 1 capsule by mouth 2 times daily for 10 days, Disp: 20 capsule, Rfl: 0    bumetanide (BUMEX) 1 MG tablet, Take 1 tablet by mouth daily, Disp: 30 tablet, Rfl: 3    nicotine (NICODERM CQ) 21 MG/24HR, Place 1 patch onto the skin daily, Disp: 42 patch, Rfl: 0    SYMBICORT 160-4.5 MCG/ACT AERO, Inhale 2 puffs into the lungs 2 times daily, Disp: 1 Inhaler, Rfl: 2    albuterol sulfate HFA (PROAIR HFA) 108 (90 Base) MCG/ACT inhaler, Inhale 2 puffs into the lungs every 4 hours as needed for Wheezing, Disp: 1 Inhaler, Rfl: 2    amLODIPine (NORVASC) 5 MG tablet, Take 1 tablet by mouth daily, Disp: 30 tablet, Rfl: 2    HYDROcodone-acetaminophen (NORCO) 7.5-325 MG per tablet, TAKE ONE TABLET BY MOUTH EVERY 12 HOURS AS NEEDED FOR PAIN, Disp: , Rfl:     omeprazole (PRILOSEC) 40 MG delayed release capsule, Take 1 capsule by mouth daily, Disp: 30 capsule, Rfl: 2    ipratropium-albuterol (DUONEB) 0.5-2.5 (3) MG/3ML SOLN nebulizer solution, Inhale 3 mLs into the lungs every 4 hours as needed for Shortness of Breath, Disp: 12 vial, Rfl: 0    ARIPiprazole (ABILIFY) 5 MG tablet, take 1 tablet by mouth once daily, Disp: , Rfl: 0    DULoxetine (CYMBALTA) 60 MG extended release capsule, take 1 capsule by mouth at bedtime, Disp: , Rfl: 0   baseline. Psychiatric:         Mood and Affect: Mood normal.         Behavior: Behavior normal.         Thought Content: Thought content normal.         Judgment: Judgment normal.           Assessment/Plan:  Fran Rico was seen today for swelling. Diagnoses and all orders for this visit:    Cellulitis of left leg  -     doxycycline hyclate (VIBRA-TABS) 100 MG tablet; Take 1 tablet by mouth 2 times daily for 7 days        I explained to the patient and his wife that if this pain,redness or swelling worsens ,  he is to go directly to the ER carotid DVT. We do not have ultrasound here today in the office. He does have a strong history of lower extremity cellulitis in the past.  He feels that this is the case. He really does not want to go to the emergency room. We will see how he does on doxycycline since he is allergic to sulfa drugs. He is to follow-up with his PCP in 1 week sooner if needed. He will return to our walk-in clinics if needed. To limit his activity on his legs and keep his legs elevated while sitting and lying.     Semaj Brush PA-C

## 2021-01-20 ENCOUNTER — OFFICE VISIT (OUTPATIENT)
Dept: FAMILY MEDICINE CLINIC | Age: 56
End: 2021-01-20
Payer: MEDICAID

## 2021-01-20 VITALS
HEART RATE: 110 BPM | SYSTOLIC BLOOD PRESSURE: 138 MMHG | DIASTOLIC BLOOD PRESSURE: 82 MMHG | TEMPERATURE: 98.3 F | OXYGEN SATURATION: 91 %

## 2021-01-20 DIAGNOSIS — L03.116 CELLULITIS OF LEFT LOWER EXTREMITY: Primary | ICD-10-CM

## 2021-01-20 PROCEDURE — 99214 OFFICE O/P EST MOD 30 MIN: CPT | Performed by: PHYSICIAN ASSISTANT

## 2021-01-20 RX ORDER — BACLOFEN 10 MG/1
5 TABLET ORAL 2 TIMES DAILY
COMMUNITY
Start: 2020-12-29 | End: 2022-02-02

## 2021-01-20 NOTE — PROGRESS NOTES
Date: 21     Rigoberto Rios   : 1965 Sex: male  Age: 54 y.o. Subjective:  Chief Complaint   Patient presents with    Cellulitis     left        HPI:  The patient states that they have noticed erythema over the left lower leg for the past week. He was seen in walk in on Saturday and given doxycycline. He was told of his symptoms worsen in any way he was to go to the ED. Patient states on  he did go to [facility] ED as the pain was more painful and red appearing. At the ED he had a negative ultrasound and was given a prescription of Keflex and sent home. Patient states that he has not been taking the Keflex because he did not think he should be taking 2 antibiotics at the same time. He is continue taking the doxycycline. Patient comes in through express today because he states that the redness has worsened to the point where it is now up to his knee. He states it is very painful. Denies any trauma or injury. Denies fever of chills. Denies any drainage. Denies red streaking. Denies nausea, vomiting, malaise and or fatigue. Patient comes for evaluation. ROS:  Positive and pertinent negatives as per HPI. All other systems are reviewed and negative.        Current Outpatient Medications:     baclofen (LIORESAL) 10 MG tablet, Baclofen Baclofen (Lioresal) 10 MG Tablet Active 5 MG PO Twice A Day 2020 8:20pm 2020  [facility] ([id number]), Disp: , Rfl:     doxycycline hyclate (VIBRA-TABS) 100 MG tablet, Take 1 tablet by mouth 2 times daily for 7 days, Disp: 14 tablet, Rfl: 0    cefdinir (OMNICEF) 300 MG capsule, Take 1 capsule by mouth 2 times daily for 10 days, Disp: 20 capsule, Rfl: 0    bumetanide (BUMEX) 1 MG tablet, Take 1 tablet by mouth daily, Disp: 30 tablet, Rfl: 3    nicotine (NICODERM CQ) 21 MG/24HR, Place 1 patch onto the skin daily, Disp: 42 patch, Rfl: 0    SYMBICORT 160-4.5 MCG/ACT AERO, Inhale 2 puffs into the lungs 2 times daily, Disp: 1 Inhaler, Rfl: 2    albuterol sulfate HFA (PROAIR HFA) 108 (90 Base) MCG/ACT inhaler, Inhale 2 puffs into the lungs every 4 hours as needed for Wheezing, Disp: 1 Inhaler, Rfl: 2    amLODIPine (NORVASC) 5 MG tablet, Take 1 tablet by mouth daily, Disp: 30 tablet, Rfl: 2    HYDROcodone-acetaminophen (NORCO) 7.5-325 MG per tablet, TAKE ONE TABLET BY MOUTH EVERY 12 HOURS AS NEEDED FOR PAIN, Disp: , Rfl:     omeprazole (PRILOSEC) 40 MG delayed release capsule, Take 1 capsule by mouth daily, Disp: 30 capsule, Rfl: 2    ipratropium-albuterol (DUONEB) 0.5-2.5 (3) MG/3ML SOLN nebulizer solution, Inhale 3 mLs into the lungs every 4 hours as needed for Shortness of Breath, Disp: 12 vial, Rfl: 0    ARIPiprazole (ABILIFY) 5 MG tablet, take 1 tablet by mouth once daily, Disp: , Rfl: 0    DULoxetine (CYMBALTA) 60 MG extended release capsule, take 1 capsule by mouth at bedtime, Disp: , Rfl: 0    hydrOXYzine (ATARAX) 25 MG tablet, take 1-2 tablets by mouth once daily if needed, Disp: , Rfl: 0    Lansoprazole (PREVACID PO), Take by mouth, Disp: , Rfl:    Allergies   Allergen Reactions    Aspirin     Levofloxacin Other (See Comments)    Lisinopril     Other     Tramadol     Ibuprofen Nausea And Vomiting    Sulfa Antibiotics Nausea And Vomiting        Past Medical History:   Diagnosis Date    COPD [facility]chronic obstructive [facility] disease) [facility] [address]     Hypertension     Multiple gastric ulcers      No family history on file.    Past Surgical History:   Procedure Laterality Date    HERNIA REPAIR      HIP SURGERY      KNEE SURGERY        Social History     Socioeconomic History    Marital status:      Spouse name: Not on file    Number of children: Not on file    Years of education: Not on file    Highest education level: Not on file   Occupational History    Not on file   Social Needs    Financial resource strain: Not on file    Food insecurity     Worry: Not on file     Inability: Not on file  Transportation needs     Medical: Not on file     Non-medical: Not on file   Tobacco Use    Smoking status: Former Smoker     Years: 25.00     Types: Cigarettes     Start date: 3/10/1995     Quit date: 10/13/2020     Years since quittin.2    Smokeless tobacco: Never Used   Substance and Sexual Activity    Alcohol use: No    Drug use: No    Sexual activity: Not on file   Lifestyle    Physical activity     Days per week: Not on file     Minutes per session: Not on file    Stress: Not on file   Relationships    Social connections     Talks on phone: Not on file     Gets together: Not on file     Attends Restorationist service: Not on file     Active member of club or organization: Not on file     Attends meetings of clubs or organizations: Not on file     Relationship status: Not on file    Intimate partner violence     Fear of current or ex partner: Not on file     Emotionally abused: Not on file     Physically abused: Not on file     Forced sexual activity: Not on file   Other Topics Concern    Not on file   Social History Narrative    Not on file        Objective:  Vitals:    21 1025   BP: 138/82   Pulse: 110   Temp: 98.3 °F (36.8 °C)   TempSrc: Temporal   SpO2: 91%        Exam:  Const: Appears older than his stated age. no signs of acute distress present. Vitals reviewed per triage. Head/Face: Normocephalic, atraumatic. Facies is symmetric. ENMT:  Nares are patent. Buccal mucosa is moist.    Neck: Trachea midline. Cardiovascular exam reveals a regular rhythm but he is tachycardic. Resp: No respiratory distress. Lungs clear to auscultation bilaterally all lung fieds. Musculo: Patient moves extremities without pain or limitation. Skin: Skin is warm and dry. Entire left lower leg up to the knee is erythematous and tender to touch. Trace edema is noted. He does have a petechial rash noted over the medial thigh of the left leg without any signs of cellulitis of the thigh itself.   No other lesions are identified. Neuro: Alert and oriented x3. Speech is articulate and fluent. Psych: Mood/Affect: Patient's mood and affect is appropriate to situation. Archana Bernal was seen today for cellulitis. Diagnoses and all orders for this visit:    Cellulitis of left lower extremity      I did review past records of the walk-in visit on Saturday as well as the ER visit on Sunday. It does appear from the ER visit, his lactate was normal as was his ultrasound. At that time it was felt that he did not need to be admitted for IV antibiotics as they wanted to try him on a second antibiotic. However at this time patient has not been taking the 2 antibiotics he has remained on the doxycycline and the cellulitis has gotten significantly worse according to the 2 previous notes. With this I do feel he has failed outpatient therapy and believe that he needs to be reevaluated through the ED as his white count was slightly elevated at 13.4. Also of note patient was just discharged January 2 from the hospital with sepsis secondary to pneumonia. I do believe he needs to have a full evaluation and most likely admission for IV antibiotics. Patient is agreeable and will go by private vehicle to [place] ED. Report has been called to triage nurse.     Seen By:    Monet Sheikh PA-C

## 2021-01-26 ENCOUNTER — VIRTUAL VISIT (OUTPATIENT)
Dept: PRIMARY CARE CLINIC | Age: 56
End: 2021-01-26
Payer: MEDICAID

## 2021-01-26 DIAGNOSIS — L03.116 CELLULITIS OF LEFT LOWER EXTREMITY: Primary | ICD-10-CM

## 2021-01-26 PROBLEM — I73.9 PERIPHERAL VASCULAR DISEASE (HCC): Status: ACTIVE | Noted: 2021-01-26

## 2021-01-26 PROBLEM — I10 HYPERTENSION: Status: ACTIVE | Noted: 2021-01-26

## 2021-01-26 PROBLEM — J44.9 CHRONIC OBSTRUCTIVE PULMONARY DISEASE (HCC): Status: ACTIVE | Noted: 2021-01-26

## 2021-01-26 PROCEDURE — 99214 OFFICE O/P EST MOD 30 MIN: CPT | Performed by: FAMILY MEDICINE

## 2021-01-26 PROCEDURE — 1111F DSCHRG MED/CURRENT MED MERGE: CPT | Performed by: FAMILY MEDICINE

## 2021-01-26 RX ORDER — OMEPRAZOLE 40 MG/1
40 CAPSULE, DELAYED RELEASE ORAL DAILY
Qty: 30 CAPSULE | Refills: 2 | Status: SHIPPED
Start: 2021-01-26 | End: 2021-04-29 | Stop reason: SDUPTHER

## 2021-01-26 RX ORDER — AMLODIPINE BESYLATE 5 MG/1
5 TABLET ORAL DAILY
Qty: 30 TABLET | Refills: 2 | Status: SHIPPED
Start: 2021-01-26 | End: 2021-04-29 | Stop reason: SDUPTHER

## 2021-01-26 RX ORDER — ALBUTEROL SULFATE 90 UG/1
2 AEROSOL, METERED RESPIRATORY (INHALATION) EVERY 4 HOURS PRN
Qty: 1 INHALER | Refills: 2 | Status: SHIPPED | OUTPATIENT
Start: 2021-01-26 | Stop reason: SDUPTHER

## 2021-01-26 RX ORDER — BUDESONIDE AND FORMOTEROL FUMARATE DIHYDRATE 160; 4.5 UG/1; UG/1
2 AEROSOL RESPIRATORY (INHALATION) 2 TIMES DAILY
Qty: 1 INHALER | Refills: 2 | Status: SHIPPED | OUTPATIENT
Start: 2021-01-26 | End: 2021-08-24

## 2021-01-26 RX ORDER — DOXYCYCLINE HYCLATE 100 MG
TABLET ORAL
COMMUNITY
Start: 2021-01-23 | End: 2021-06-14 | Stop reason: ALTCHOICE

## 2021-01-26 RX ORDER — CEPHALEXIN 500 MG/1
CAPSULE ORAL
COMMUNITY
Start: 2021-01-23 | End: 2021-06-14 | Stop reason: ALTCHOICE

## 2021-01-26 NOTE — PROGRESS NOTES
Post-Discharge Transitional Care Management Services or Hospital Follow Up      Shree Joyce   YOB: 1965    Date of Office Visit:  1/26/2021  Date of Hospital Admission: 1/20/2021  Date of Hospital Discharge: 1/23/2021    Care management risk score Rising risk (score 2-5) and Complex Care (Scores >=6): 1     Non face to face  following discharge, date last encounter closed (first attempt may have been earlier): *No documented post hospital discharge outreach found in the last 14 days *No documented post hospital discharge outreach found in the last 14 days    Call initiated 2 business days of discharge: *No response recorded in the last 14 days    Patient Active Problem List   Diagnosis    Essential hypertension    Hypertension    Peripheral vascular disease ([facility] [address]    Chronic obstructive pulmonary disease (HCC)       Allergies   Allergen Reactions    Aspirin     Levofloxacin Other (See Comments)    Lisinopril     Other     Tramadol     Ibuprofen Nausea And Vomiting    Sulfa Antibiotics Nausea And Vomiting       Medications listed as ordered at the time of discharge from hospital      Medications marked \"taking\" at this time  Outpatient Medications Marked as Taking for the 1/26/21 encounter (Virtual Visit) with Anna West MD   Medication Sig Dispense Refill    doxycycline hyclate (VIBRA-TABS) 100 MG tablet Twice A Day      cephALEXin (KEFLEX) 500 MG capsule Every 6 Hours      albuterol sulfate HFA (PROAIR HFA) 108 (90 Base) MCG/ACT inhaler Inhale 2 puffs into the lungs every 4 hours as needed for Wheezing 1 Inhaler 2    SYMBICORT 160-4.5 MCG/ACT AERO Inhale 2 puffs into the lungs 2 times daily 1 Inhaler 2    omeprazole (PRILOSEC) 40 MG delayed release capsule Take 1 capsule by mouth daily 30 capsule 2    amLODIPine (NORVASC) 5 MG tablet Take 1 tablet by mouth daily 30 tablet 2 Medications patient taking as of now reconciled against medications ordered at time of hospital discharge: Yes    Chief Complaint   Patient presents with   [address] from Hospital       History of Present illness - Follow up of Hospital diagnosis(es): This 51-year-old male presents today for follow-up evaluation from a recent hospitalization. The patient was admitted to the hospital with left lower extremity cellulitis. The patient was seen in consultation by infectious disease specialist.  The patient was discharged on Keflex and doxycycline antibiotic therapy. The patient states the erythema has resolved. Current medication list reviewed. The patient is tolerating all medications well without adverse events or known side effects. The patient is not up-to-date on all age-appropriate wellness issues. Inpatient course: Discharge summary reviewed- see chart. Interval history/Current status clinical baseline. There were no vitals filed for this visit. There is no height or weight on file to calculate BMI. Wt Readings from Last 3 Encounters:   01/19/21 115 lb (52.2 kg)   01/16/21 115 lb (52.2 kg)   10/20/20 125 lb (56.7 kg)     BP Readings from Last 3 Encounters:   01/20/21 138/82   01/19/21 136/78   01/16/21 132/76       A comprehensive review of systems was negative except for what was noted in the HPI. Assessment/Plan: #1 cellulitis left lower extremity plan #1 continue antibiotic therapy as prescribed #2 follow-up in 1 month.         Medical Decision Making: moderate complexity

## 2021-03-09 ENCOUNTER — TELEPHONE (OUTPATIENT)
Dept: PRIMARY CARE CLINIC | Age: 56
End: 2021-03-09

## 2021-03-09 NOTE — TELEPHONE ENCOUNTER
The nurse from [facility] is requesting something for toenail fungus. Patient states that it is painful, one toe nail looks like it will fall off soon.  She is requesting pills as they already have the cream.         Please advise

## 2021-04-19 ENCOUNTER — TELEPHONE (OUTPATIENT)
Dept: PRIMARY CARE CLINIC | Age: 56
End: 2021-04-19

## 2021-04-19 RX ORDER — BUDESONIDE AND FORMOTEROL FUMARATE DIHYDRATE 160; 4.5 UG/1; UG/1
2 AEROSOL RESPIRATORY (INHALATION) 2 TIMES DAILY
Qty: 1 INHALER | Refills: 5 | Status: SHIPPED | OUTPATIENT
Start: 2021-04-19 | End: 2021-08-24

## 2021-04-19 NOTE — TELEPHONE ENCOUNTER
Last Appointment:  1/26/2021  Future Appointments   Date Time Provider Mik Rosario   6/16/2021  2:15 PM ARGELIA Vides - CNP [facility]      Insurance does not cover Pulmicort. They will cover Symbicort, Dulera or Flovent. Please advise.     Electronically signed by Andres Angulo LPN on 0/57/0655 at 5:96 PM

## 2021-04-29 ENCOUNTER — VIRTUAL VISIT (OUTPATIENT)
Dept: PRIMARY CARE CLINIC | Age: 56
End: 2021-04-29
Payer: MEDICAID

## 2021-04-29 DIAGNOSIS — I10 ESSENTIAL HYPERTENSION: Primary | ICD-10-CM

## 2021-04-29 DIAGNOSIS — I73.9 PERIPHERAL VASCULAR DISEASE (HCC): ICD-10-CM

## 2021-04-29 DIAGNOSIS — K21.9 GASTROESOPHAGEAL REFLUX DISEASE, UNSPECIFIED WHETHER ESOPHAGITIS PRESENT: ICD-10-CM

## 2021-04-29 PROBLEM — Z72.0 TOBACCO USE: Status: ACTIVE | Noted: 2021-04-29

## 2021-04-29 PROCEDURE — 99214 OFFICE O/P EST MOD 30 MIN: CPT | Performed by: FAMILY MEDICINE

## 2021-04-29 RX ORDER — OMEPRAZOLE 40 MG/1
40 CAPSULE, DELAYED RELEASE ORAL DAILY
Qty: 30 CAPSULE | Refills: 2 | Status: SHIPPED
Start: 2021-04-29 | End: 2021-06-14 | Stop reason: SDUPTHER

## 2021-04-29 RX ORDER — AMLODIPINE BESYLATE 5 MG/1
5 TABLET ORAL DAILY
Qty: 30 TABLET | Refills: 2 | Status: ON HOLD | OUTPATIENT
Start: 2021-04-29 | End: 2021-09-11 | Stop reason: HOSPADM

## 2021-04-29 NOTE — PROGRESS NOTES
2021    TELEHEALTH EVALUATION -- Audio/Visual (During  public health emergency)    HPI: This 26-year-old male presents today for follow-up. Current medication list reviewed. The patient is tolerating all medications well without adverse events or known side effects. The patient is not up-to-date on all age-appropriate wellness issues. The patient presents today with a complaint of color changes of the lower extremities with associated pain and swelling. Patient denies any current tobacco use. Nathan Corona (:  1965) has requested an audio/video evaluation for the following concern(s): Follow-up evaluation. Review of Systems negative unless otherwise noted in HPI. Prior to Visit Medications    Medication Sig Taking?  Authorizing Provider   amLODIPine (NORVASC) 5 MG tablet Take 1 tablet by mouth daily Yes Ai Figueroa MD   omeprazole (PRILOSEC) 40 MG delayed release capsule Take 1 capsule by mouth daily Yes Ai Figueroa MD   budesonide-formoterol (SYMBICORT) 160-4.5 MCG/ACT AERO Inhale 2 puffs into the lungs 2 times daily  Ai Figueroa MD   Revefenacin (YUPELRI) 175 MCG/3ML SOLN Inhale 1 vial into the lungs daily  ARGELIA Vides CNP   formoterol (PERFOROMIST) 20 MCG/2ML nebulizer solution Take 2 mLs by nebulization every 12 hours  ARGELIA Vides CNP   budesonide (PULMICORT) 0.5 MG/2ML nebulizer suspension Take 2 mLs by nebulization 2 times daily  ARGELIA Vides CNP   doxycycline hyclate (VIBRA-TABS) 100 MG tablet Twice A Day  Historical Provider, MD   cephALEXin (KEFLEX) 500 MG capsule Every 6 Hours  Historical Provider, MD   albuterol sulfate HFA (PROAIR HFA) 108 (90 Base) MCG/ACT inhaler Inhale 2 puffs into the lungs every 4 hours as needed for Wheezing  Ai Figueroa MD   SYMBICORT 160-4.5 MCG/ACT AERO Inhale 2 puffs into the lungs 2 times daily  Ai Figueroa MD   baclofen (LIORESAL) 10 MG tablet Baclofen Baclofen (Lioresal) 10 MG Tablet Active 5 MG PO Twice A Day 2020 8:20pm 2020  [facility] ([id number])  Historical Provider, MD   bumetanide (BUMEX) 1 MG tablet Take 1 tablet by mouth daily  Mariano Puente MD   nicotine (NICODERM CQ) 21 MG/24HR Place 1 patch onto the skin daily  Mariano Puente MD   HYDROcodone-acetaminophen (Dillan Alli) 7.5-325 MG per tablet TAKE ONE TABLET BY MOUTH EVERY 12 HOURS AS NEEDED FOR PAIN  Historical Provider, MD   ipratropium-albuterol (Sherryle Long) 0.5-2.5 (3) MG/3ML SOLN nebulizer solution Inhale 3 mLs into the lungs every 4 hours as needed for Shortness of Breath  Mariano Puente MD   ARIPiprazole (ABILIFY) 5 MG tablet take 1 tablet by mouth once daily  Historical Provider, MD   DULoxetine (CYMBALTA) 60 MG extended release capsule take 1 capsule by mouth at bedtime  Historical Provider, MD   hydrOXYzine (ATARAX) 25 MG tablet take 1-2 tablets by mouth once daily if needed  Historical Provider, MD   Lansoprazole (PREVACID PO) Take by mouth  Historical Provider, MD       Social History     Tobacco Use    Smoking status: Former Smoker     Packs/day: 4.00     Years: 25.00     Pack years: 100.00     Types: Cigarettes     Start date: 3/10/1995     Quit date: 10/13/2020     Years since quittin.5    Smokeless tobacco: Never Used   Substance Use Topics    Alcohol use: No    Drug use:  No            PHYSICAL EXAMINATION:  [ INSTRUCTIONS:  \"[x]\" Indicates a positive item  \"[]\" Indicates a negative item  -- DELETE ALL ITEMS NOT EXAMINED]  Vital Signs: (As obtained by patient/caregiver or practitioner observation)    Blood pressure-  Heart rate-    Respiratory rate-    Temperature-  Pulse oximetry-     Constitutional: [x] Appears well-developed and well-nourished [x] No apparent distress      [] Abnormal-   Mental status  [x] Alert and awake  [x] Oriented to person/place/time [x]Able to follow commands      Eyes:  EOM    [x]  Normal  [] Abnormal-  Sclera  [x]  Normal  [] Abnormal - 1:06 PM    An electronic signature was used to authenticate this note.

## 2021-05-06 ENCOUNTER — OFFICE VISIT (OUTPATIENT)
Dept: PRIMARY CARE CLINIC | Age: 56
End: 2021-05-06
Payer: MEDICAID

## 2021-05-06 VITALS — DIASTOLIC BLOOD PRESSURE: 86 MMHG | SYSTOLIC BLOOD PRESSURE: 130 MMHG

## 2021-05-06 DIAGNOSIS — I73.9 PERIPHERAL VASCULAR DISEASE (HCC): ICD-10-CM

## 2021-05-06 DIAGNOSIS — M79.89 SWELLING OF LEFT LOWER EXTREMITY: Primary | ICD-10-CM

## 2021-05-06 DIAGNOSIS — I10 ESSENTIAL HYPERTENSION: ICD-10-CM

## 2021-05-06 PROCEDURE — 99214 OFFICE O/P EST MOD 30 MIN: CPT | Performed by: FAMILY MEDICINE

## 2021-05-06 NOTE — PROGRESS NOTES
2021     Lidia Rogers    : 1965 Sex: male   Age: 54 y.o. Chief Complaint   Patient presents with    Foot Swelling     patient is noticing that he is having weight gain and legs and feet are turning purple    Leg Swelling       HPI: This 54y.o. -year-old male  presents today for evaluation and management of his  chronic medical problems. Current medication list reviewed. The patient is tolerating all medications well without adverse events or known side effects. The patient does understand the risk and benefits of the prescribed medications. The patient is not up-to-date on all age-appropriate wellness issues. Patient states he had a recent checkup with his pulmonologist and was prescribed 2 new medications. The patient presents today with swelling of the left lower extremity to the gluteal region. The patient also has vascular changes including discoloration. Patient with a known history of PAD. ROS:   Const: Denies changes in appetite, chills, fever, night sweats and weight loss. Eyes:  Denies discharge, a recent change in visual acuity, blurred vision and double vision. ENMT: Denies discharge of the ears, hearing loss, pain of the ears. Denies nasal or sinus symptoms other than stated above. Denies mouth or throat symptoms. CV:  Denies chest pain, dyspnea on exertion, orthopnea, palpitations and PND  Resp: Denies chest pain, cough, SOB and wheezing. GI: Denies abdominal pain, constipation, diarrhea, heartburn, indigestion, nausea and vomiting. : Denies dysuria, frequency, hematuria, nocturia and urgency. Musculo: Denies arthralgias and myalgia  Skin:  Denies lesions, pruritus and rash. Neuro: Denies dizziness, lightheadedness, numbness, tingling and weakness. Psych:  Denies anxiety and depression  Endocrine: Denies polyuria, polydipsia, polyphagia, weight gain, dry skin, constipation, fatigue, cold intolerance, heat intolerance or tremors.   Hema/Lymph: Denies hematologic symptoms  Allergy/Immuno:  Denies allergic/immunologic symptoms.   Pertinent positives reviewed and noted      Current Outpatient Medications:     amLODIPine (NORVASC) 5 MG tablet, Take 1 tablet by mouth daily, Disp: 30 tablet, Rfl: 2    omeprazole (PRILOSEC) 40 MG delayed release capsule, Take 1 capsule by mouth daily, Disp: 30 capsule, Rfl: 2    budesonide-formoterol (SYMBICORT) 160-4.5 MCG/ACT AERO, Inhale 2 puffs into the lungs 2 times daily, Disp: 1 Inhaler, Rfl: 5    Revefenacin (YUPELRI) 175 MCG/3ML SOLN, Inhale 1 vial into the lungs daily, Disp: 30 vial, Rfl: 5    formoterol (PERFOROMIST) 20 MCG/2ML nebulizer solution, Take 2 mLs by nebulization every 12 hours, Disp: 120 mL, Rfl: 5    budesonide (PULMICORT) 0.5 MG/2ML nebulizer suspension, Take 2 mLs by nebulization 2 times daily, Disp: 60 ampule, Rfl: 5    doxycycline hyclate (VIBRA-TABS) 100 MG tablet, Twice A Day, Disp: , Rfl:     cephALEXin (KEFLEX) 500 MG capsule, Every 6 Hours, Disp: , Rfl:     albuterol sulfate HFA (PROAIR HFA) 108 (90 Base) MCG/ACT inhaler, Inhale 2 puffs into the lungs every 4 hours as needed for Wheezing, Disp: 1 Inhaler, Rfl: 2    SYMBICORT 160-4.5 MCG/ACT AERO, Inhale 2 puffs into the lungs 2 times daily, Disp: 1 Inhaler, Rfl: 2    baclofen (LIORESAL) 10 MG tablet, Baclofen Baclofen (Lioresal) 10 MG Tablet Active 5 MG PO Twice A Day December 29th, 2020 8:20pm 12-  [facility] ([id number]), Disp: , Rfl:     bumetanide (BUMEX) 1 MG tablet, Take 1 tablet by mouth daily, Disp: 30 tablet, Rfl: 3    HYDROcodone-acetaminophen (NORCO) 7.5-325 MG per tablet, TAKE ONE TABLET BY MOUTH EVERY 12 HOURS AS NEEDED FOR PAIN, Disp: , Rfl:     ipratropium-albuterol (DUONEB) 0.5-2.5 (3) MG/3ML SOLN nebulizer solution, Inhale 3 mLs into the lungs every 4 hours as needed for Shortness of Breath, Disp: 12 vial, Rfl: 0    ARIPiprazole (ABILIFY) 5 MG tablet, take 1 tablet by mouth once daily, Disp: , Rfl: 0    DULoxetine (CYMBALTA) 60 MG extended release capsule, take 1 capsule by mouth at bedtime, Disp: , Rfl: 0    hydrOXYzine (ATARAX) 25 MG tablet, take 1-2 tablets by mouth once daily if needed, Disp: , Rfl: 0    Lansoprazole (PREVACID PO), Take by mouth, Disp: , Rfl:     nicotine (NICODERM CQ) 21 MG/24HR, Place 1 patch onto the skin daily, Disp: 42 patch, Rfl: 0    Allergies   Allergen Reactions    Aspirin     Levofloxacin Other (See Comments)    Lisinopril     Other      Other reaction(s): ABD PAIN    Tramadol     Ibuprofen Nausea And Vomiting    Sulfa Antibiotics Nausea And Vomiting       Past Medical History:   Diagnosis Date    COPD [facility]ch[facility] obstructive [facility] disease) ([facility]     Hypertension     Multiple gastric ulcers      Social History     Socioeconomic History    Marital status:      Spouse name: Not on file    Number of children: Not on file    Years of education: Not on file    Highest education level: Not on file   Occupational History    Not on file   Social Needs    Financial resource strain: Not on file    Food insecurity     Worry: Not on file     Inability: Not on file    Transportation needs     Medical: Not on file     Non-medical: Not on file   Tobacco Use    Smoking status: Former Smoker     Packs/day: 4.00     Years: 25.00     Pack years: 100.00     Types: Cigarettes     Start date: 3/10/1995     Quit date: 10/13/2020     Years since quittin.5    Smokeless tobacco: Never Used   Substance and Sexual Activity    Alcohol use: No    Drug use: No    Sexual activity: Not on file   Lifestyle    Physical activity     Days per week: Not on file     Minutes per session: Not on file    Stress: Not on file   Relationships    Social connections     Talks on phone: Not on file     Gets together: Not on file     Attends Alevism service: Not on file     Active member of club or organization: Not on file     Attends meetings of clubs or organizations: Not on file Relationship status: Not on file    Intimate partner violence     Fear of current or ex partner: Not on file     Emotionally abused: Not on file     Physically abused: Not on file     Forced sexual activity: Not on file   Other Topics Concern    Not on file   Social History Narrative    Not on file     Past Surgical History:   Procedure Laterality Date    [address]        History reviewed. No pertinent family history. Vitals:    05/06/21 0953   BP: 130/86        Exam: Const: Appears healthy and well developed. No signs of acute distress present. Eyes: PERRL  ENMT: Tympanic membranes are intact. Nasal mucosa intact without noted erythema Septum is in the midline. Posterior pharynx shows no exudate, irritation or redness. Neck:  Supple without adenopathy. Adequate range of motion   Resp: No rales, rhonchi, wheezes appreciated over the lungs bilaterally. CV: S1, S2 within normal limits. Regular rate and rhythm noted. Without murmur, gallop or rub. Extremities:  Pulses intact. With noted edema. Noted edema of left lower extremity to the gluteal region. Abdomen: Positive bowel sounds. Palpation reveals softness, with no distension, organomegaly or tenderness. No abdominal masses palpable. Skin: Skin is warm and dry. Musculo: Unchanged upon examination. Neuro: Alert and oriented X3. Cranial nerves grossly intact. Psych: Mood is normal.  Affect is normal.   Vital signs reviewed. Controlled Substances Monitoring:     No flowsheet data found. Plan Per Assessment:  Ozzie Saldivar was seen today for foot swelling and leg swelling. Diagnoses and all orders for this visit:    Swelling of left lower extremity  -     US DUP LOWER EXTREMITY LEFT LIBBY; Future    Essential hypertension    Peripheral vascular disease ([facility] [address]      Follow-up as scheduled. Return for [address] [place] [address].     Hanna Quiñones MD    Note was generated with the assistance of voice recognition software. Document was reviewed however may contain grammatical errors.

## 2021-06-14 ENCOUNTER — OFFICE VISIT (OUTPATIENT)
Dept: PRIMARY CARE CLINIC | Age: 56
End: 2021-06-14
Payer: MEDICAID

## 2021-06-14 VITALS
BODY MASS INDEX: 22.2 KG/M2 | WEIGHT: 130 LBS | TEMPERATURE: 97.8 F | OXYGEN SATURATION: 89 % | HEIGHT: 64 IN | DIASTOLIC BLOOD PRESSURE: 80 MMHG | SYSTOLIC BLOOD PRESSURE: 130 MMHG | RESPIRATION RATE: 18 BRPM | HEART RATE: 81 BPM

## 2021-06-14 DIAGNOSIS — Z11.59 NEED FOR HEPATITIS C SCREENING TEST: ICD-10-CM

## 2021-06-14 DIAGNOSIS — J44.9 CHRONIC OBSTRUCTIVE PULMONARY DISEASE, UNSPECIFIED COPD TYPE (HCC): Primary | ICD-10-CM

## 2021-06-14 DIAGNOSIS — R09.89 RESPIRATORY CRACKLES AT LEFT LUNG BASE: ICD-10-CM

## 2021-06-14 DIAGNOSIS — Z99.81 OXYGEN DEPENDENT: ICD-10-CM

## 2021-06-14 DIAGNOSIS — B35.1 ONYCHOMYCOSIS: ICD-10-CM

## 2021-06-14 DIAGNOSIS — I87.2 VENOUS INSUFFICIENCY OF BOTH LOWER EXTREMITIES: ICD-10-CM

## 2021-06-14 DIAGNOSIS — Z98.890 S/P GIRDLESTONE PROCEDURE: ICD-10-CM

## 2021-06-14 DIAGNOSIS — I10 ESSENTIAL HYPERTENSION: ICD-10-CM

## 2021-06-14 DIAGNOSIS — F32.A DEPRESSION, UNSPECIFIED DEPRESSION TYPE: ICD-10-CM

## 2021-06-14 DIAGNOSIS — K21.9 GASTROESOPHAGEAL REFLUX DISEASE, UNSPECIFIED WHETHER ESOPHAGITIS PRESENT: ICD-10-CM

## 2021-06-14 DIAGNOSIS — Z89.622: ICD-10-CM

## 2021-06-14 DIAGNOSIS — D53.9 MACROCYTIC ANEMIA: Primary | ICD-10-CM

## 2021-06-14 LAB
ALBUMIN SERPL-MCNC: 4.4 G/DL (ref 3.5–5.2)
ALP BLD-CCNC: 48 U/L (ref 40–129)
ALT SERPL-CCNC: 24 U/L (ref 0–40)
ANION GAP SERPL CALCULATED.3IONS-SCNC: 11 MMOL/L (ref 7–16)
AST SERPL-CCNC: 25 U/L (ref 0–39)
BASOPHILS ABSOLUTE: 0.04 E9/L (ref 0–0.2)
BASOPHILS RELATIVE PERCENT: 0.6 % (ref 0–2)
BILIRUB SERPL-MCNC: 0.2 MG/DL (ref 0–1.2)
BUN BLDV-MCNC: 9 MG/DL (ref 6–20)
CALCIUM SERPL-MCNC: 9.2 MG/DL (ref 8.6–10.2)
CHLORIDE BLD-SCNC: 94 MMOL/L (ref 98–107)
CHOLESTEROL, TOTAL: 178 MG/DL (ref 0–199)
CO2: 32 MMOL/L (ref 22–29)
CREAT SERPL-MCNC: 0.6 MG/DL (ref 0.7–1.2)
EOSINOPHILS ABSOLUTE: 0.19 E9/L (ref 0.05–0.5)
EOSINOPHILS RELATIVE PERCENT: 2.6 % (ref 0–6)
GFR AFRICAN AMERICAN: >60
GFR NON-AFRICAN AMERICAN: >60 ML/MIN/1.73
GLUCOSE BLD-MCNC: 97 MG/DL (ref 74–99)
HCT VFR BLD CALC: 38.4 % (ref 37–54)
HDLC SERPL-MCNC: 59 MG/DL
HEMOGLOBIN: 11.4 G/DL (ref 12.5–16.5)
IMMATURE GRANULOCYTES #: 0.02 E9/L
IMMATURE GRANULOCYTES %: 0.3 % (ref 0–5)
LDL CHOLESTEROL CALCULATED: 102 MG/DL (ref 0–99)
LYMPHOCYTES ABSOLUTE: 1.6 E9/L (ref 1.5–4)
LYMPHOCYTES RELATIVE PERCENT: 22.1 % (ref 20–42)
MCH RBC QN AUTO: 30.2 PG (ref 26–35)
MCHC RBC AUTO-ENTMCNC: 29.7 % (ref 32–34.5)
MCV RBC AUTO: 101.9 FL (ref 80–99.9)
MONOCYTES ABSOLUTE: 0.49 E9/L (ref 0.1–0.95)
MONOCYTES RELATIVE PERCENT: 6.8 % (ref 2–12)
NEUTROPHILS ABSOLUTE: 4.91 E9/L (ref 1.8–7.3)
NEUTROPHILS RELATIVE PERCENT: 67.6 % (ref 43–80)
PDW BLD-RTO: 13.9 FL (ref 11.5–15)
PLATELET # BLD: 184 E9/L (ref 130–450)
PMV BLD AUTO: 10.8 FL (ref 7–12)
POTASSIUM SERPL-SCNC: 4.3 MMOL/L (ref 3.5–5)
RBC # BLD: 3.77 E12/L (ref 3.8–5.8)
SODIUM BLD-SCNC: 137 MMOL/L (ref 132–146)
TOTAL PROTEIN: 8.1 G/DL (ref 6.4–8.3)
TRIGL SERPL-MCNC: 87 MG/DL (ref 0–149)
TSH SERPL DL<=0.05 MIU/L-ACNC: 0.61 UIU/ML (ref 0.27–4.2)
VITAMIN D 25-HYDROXY: 30 NG/ML (ref 30–100)
VLDLC SERPL CALC-MCNC: 17 MG/DL
WBC # BLD: 7.3 E9/L (ref 4.5–11.5)

## 2021-06-14 PROCEDURE — 99214 OFFICE O/P EST MOD 30 MIN: CPT | Performed by: NURSE PRACTITIONER

## 2021-06-14 RX ORDER — PREDNISONE 20 MG/1
40 TABLET ORAL DAILY
Qty: 10 TABLET | Refills: 0 | Status: SHIPPED | OUTPATIENT
Start: 2021-06-14 | End: 2021-06-19

## 2021-06-14 RX ORDER — OMEPRAZOLE 40 MG/1
40 CAPSULE, DELAYED RELEASE ORAL DAILY
Qty: 30 CAPSULE | Refills: 2 | Status: SHIPPED
Start: 2021-06-14 | End: 2021-09-29 | Stop reason: SDUPTHER

## 2021-06-14 RX ORDER — AMOXICILLIN AND CLAVULANATE POTASSIUM 875; 125 MG/1; MG/1
1 TABLET, FILM COATED ORAL 2 TIMES DAILY WITH MEALS
Qty: 14 TABLET | Refills: 0 | Status: SHIPPED | OUTPATIENT
Start: 2021-06-14 | End: 2021-06-21

## 2021-06-14 SDOH — ECONOMIC STABILITY: FOOD INSECURITY: WITHIN THE PAST 12 MONTHS, YOU WORRIED THAT YOUR FOOD WOULD RUN OUT BEFORE YOU GOT MONEY TO BUY MORE.: SOMETIMES TRUE

## 2021-06-14 SDOH — ECONOMIC STABILITY: FOOD INSECURITY: WITHIN THE PAST 12 MONTHS, THE FOOD YOU BOUGHT JUST DIDN'T LAST AND YOU DIDN'T HAVE MONEY TO GET MORE.: SOMETIMES TRUE

## 2021-06-14 ASSESSMENT — ENCOUNTER SYMPTOMS
COLOR CHANGE: 1
EYE REDNESS: 0
RECTAL PAIN: 0
BLOOD IN STOOL: 0
BACK PAIN: 0
WHEEZING: 0
TROUBLE SWALLOWING: 0
RHINORRHEA: 0
NAUSEA: 0
APNEA: 0
SHORTNESS OF BREATH: 1
CHEST TIGHTNESS: 0
DIARRHEA: 0
ABDOMINAL DISTENTION: 0
VOMITING: 0
ABDOMINAL PAIN: 0
CONSTIPATION: 0
SORE THROAT: 0
COUGH: 1

## 2021-06-14 ASSESSMENT — SOCIAL DETERMINANTS OF HEALTH (SDOH): HOW HARD IS IT FOR YOU TO PAY FOR THE VERY BASICS LIKE FOOD, HOUSING, MEDICAL CARE, AND HEATING?: NOT HARD AT ALL

## 2021-06-14 NOTE — PROGRESS NOTES
2021     Tona Chen 54 y.o. male    : 1965    Chief Complaint:   Establish Care and Other (toenail fungus)       History of Present Illness:   Tona Chen is a 54 y.o. male who presents to the office to establish care and follow-up on his chronic problems. He goes by [facility]One. \". Patient is currently being seen by Dr. Lanette Perez for bilateral lower extremity venous insufficiency. Arterial studies were normal.  He is getting a procedure done on  on the left lower extremity, he states a \"catheter placed. \"  He complains of onychomycosis. He has tried nothing OTC for symptomatic relief, he does not follow with podiatry. Patient has a pertinent past medical history of COPD and he follows with Dr. Ja Peterson every 3 months he is scheduled to see him this Wednesday. He currently is on 6 L nasal cannula in office but wears 8 L at home. He does have a hospital bed and sleeps with his head of the bed at least 30 degrees elevated. He complains of orthopnea, wheezing, and shortness of breath with minimal exertion. He is taking all of his inhalers as prescribed. He does use his albuterol inhaler at least 3 times a day for shortness of breath/wheezing. He reports a productive cough for yellow sputum. Hypertension is currently controlled on current medication. Blood pressure in office is 130/80. He does have visiting nurses come 2 days a week and blood pressures have been stable. He attempts to follow a low-salt diet. He does not get a lot of exercise status post Girdlestone procedure to his left hip. He states that he had a hunting injury which required him to have 3 hip replacements that his body rejected. He then had this procedure approximately 10 years ago, by Dr. Hettie Carrel. He does not follow-up with orthopedics. 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 He ambulates with a walker. Denies any falls.   He follows with [facility] on a monthly basis for bilateral lower extremity pain. Mood is stable on current medication. He follows with Eliazar Hitchcock on a monthly basis for depression. Denies any suicidal or homicidal ideation. He reports he had a colonoscopy at [facility], we will request records. He is not up to a date on his health maintenance screenings. No other complaints at this time. Past Medical History:     Past Medical History:   Diagnosis Date    COPD (chronic obstructive pulmonary disease) ([address]     Hypertension     Multiple gastric ulcers        Past Surgical History:   Procedure Laterality Date    HERNIA REPAIR      HIP SURGERY      KNEE SURGERY         History reviewed. No pertinent family history.     Social History     Tobacco Use    Smoking status: Former Smoker     Packs/day: 4.00     Years: 25.00     Pack years: 100.00     Types: Cigarettes     Start date: 3/10/1995     Quit date: 10/13/2020     Years since quittin.6    Smokeless tobacco: Never Used   Substance Use Topics    Alcohol use: No    Drug use: No       Medications:     Current Outpatient Medications:     omeprazole (PRILOSEC) 40 MG delayed release capsule, Take 1 capsule by mouth daily, Disp: 30 capsule, Rfl: 2    amoxicillin-clavulanate (AUGMENTIN) 875-125 MG per tablet, Take 1 tablet by mouth 2 times daily (with meals) for 7 days, Disp: 14 tablet, Rfl: 0    predniSONE (DELTASONE) 20 MG tablet, Take 2 tablets by mouth daily for 5 days, Disp: 10 tablet, Rfl: 0    amLODIPine (NORVASC) 5 MG tablet, Take 1 tablet by mouth daily, Disp: 30 tablet, Rfl: 2    budesonide-formoterol (SYMBICORT) 160-4.5 MCG/ACT AERO, Inhale 2 puffs into the lungs 2 times daily, Disp: 1 Inhaler, Rfl: 5    Revefenacin (YUPELRI) 175 MCG/3ML SOLN, Inhale 1 vial into the lungs daily, Disp: 30 vial, Rfl: 5    formoterol (PERFOROMIST) 20 MCG/2ML nebulizer solution, Take 2 mLs by nebulization every 12 hours, Disp: 120 mL, Rfl: 5    budesonide (PULMICORT) 0.5 MG/2ML nebulizer suspension, Take 2 mLs by nebulization 2 times daily, Disp: 60 ampule, Rfl: 5    albuterol sulfate HFA (PROAIR HFA) 108 (90 Base) MCG/ACT inhaler, Inhale 2 puffs into the lungs every 4 hours as needed for Wheezing, Disp: 1 Inhaler, Rfl: 2    SYMBICORT 160-4.5 MCG/ACT AERO, Inhale 2 puffs into the lungs 2 times daily, Disp: 1 Inhaler, Rfl: 2    baclofen (LIORESAL) 10 MG tablet, Baclofen Baclofen (Lioresal) 10 MG Tablet Active 5 MG PO Twice A Day December 29th, 2020 8:20pm 12-  [facility] ([id number]), Disp: , Rfl:     bumetanide (BUMEX) 1 MG tablet, Take 1 tablet by mouth daily, Disp: 30 tablet, Rfl: 3    HYDROcodone-acetaminophen (NORCO) 7.5-325 MG per tablet, TAKE ONE TABLET BY MOUTH EVERY 12 HOURS AS NEEDED FOR PAIN, Disp: , Rfl:     ipratropium-albuterol (DUONEB) 0.5-2.5 (3) MG/3ML SOLN nebulizer solution, Inhale 3 mLs into the lungs every 4 hours as needed for Shortness of Breath, Disp: 12 vial, Rfl: 0    ARIPiprazole (ABILIFY) 5 MG tablet, take 1 tablet by mouth once daily, Disp: , Rfl: 0    DULoxetine (CYMBALTA) 60 MG extended release capsule, take 1 capsule by mouth at bedtime, Disp: , Rfl: 0    hydrOXYzine (ATARAX) 25 MG tablet, take 1-2 tablets by mouth once daily if needed, Disp: , Rfl: 0    doxycycline hyclate (VIBRA-TABS) 100 MG tablet, Twice A Day, Disp: , Rfl:     cephALEXin (KEFLEX) 500 MG capsule, Every 6 Hours (Patient not taking: Reported on 6/14/2021), Disp: , Rfl:     nicotine (NICODERM CQ) 21 MG/24HR, Place 1 patch onto the skin daily, Disp: 42 patch, Rfl: 0    Allergies   Allergen Reactions    Aspirin     Levofloxacin Other (See Comments)    Lisinopril     Other      Other reaction(s): ABD PAIN    Tramadol     Ibuprofen Nausea And Vomiting    Sulfa Antibiotics Nausea And Vomiting       Review of Systems:   Review of Systems   Constitutional: Negative for activity change, appetite change, chills, diaphoresis, fatigue, fever and normal.      Left Ear: Tympanic membrane, ear canal and external ear normal.      Nose: Nose normal.      Mouth/Throat:      Mouth: Mucous membranes are moist.      Pharynx: Oropharynx is clear. Eyes:      Extraocular Movements: Extraocular movements intact. Conjunctiva/sclera: Conjunctivae normal.      Pupils: Pupils are equal, round, and reactive to light. Neck:      Thyroid: No thyroid mass, thyromegaly or thyroid tenderness. Trachea: Trachea normal.   Cardiovascular:      Rate and Rhythm: Normal rate and regular rhythm. Pulses:           Popliteal pulses are 2+ on the right side and 2+ on the left side. Dorsalis pedis pulses are 1+ on the right side and 1+ on the left side. Heart sounds: Normal heart sounds. Pulmonary:      Effort: Pulmonary effort is normal.      Breath sounds: Rales present. Comments: LLB  Abdominal:      General: Bowel sounds are normal.      Palpations: Abdomen is soft. Comments: scaring   Musculoskeletal:         General: Deformity present. Normal range of motion. Cervical back: Normal range of motion and neck supple. Right lower leg: No edema. Left lower leg: No edema. Comments: Left hip +1 edema, nonpitting. Left knee fully extended. No ROM. Lymphadenopathy:      Cervical: No cervical adenopathy. Skin:     General: Skin is warm and dry. Capillary Refill: Capillary refill takes less than 2 seconds. Comments: Vascular discoloration to BLE   Neurological:      General: No focal deficit present. Mental Status: He is alert and oriented to person, place, and time. Sensory: Sensation is intact. Motor: Motor function is intact. Coordination: Coordination is intact. Gait: Gait is intact.       Deep Tendon Reflexes: Reflexes normal.   Psychiatric:         Attention and Perception: Attention and perception normal.         Mood and Affect: Mood normal.         Speech: Speech normal.         Behavior: Behavior normal.         Thought Content: Thought content normal.         Cognition and Memory: Cognition and memory normal.         Judgment: Judgment normal.         Health Maintenance:     Health Maintenance   Topic Date Due    Hepatitis C screen  Never done    DTaP/Tdap/Td vaccine (1 - Tdap) Never done    Lipid screen  Never done    Shingles Vaccine (1 of 2) Never done    Colon cancer screen colonoscopy  Never done    Low dose CT lung screening  02/28/2021    Flu vaccine (Season Ended) 09/01/2021    Potassium monitoring  01/23/2022    Creatinine monitoring  01/23/2022    Pneumococcal 0-64 years Vaccine (2 of 2) 11/15/2030    COVID-19 Vaccine  Completed    HIV screen  Completed    Hepatitis A vaccine  Aged Out    Hepatitis B vaccine  Aged Out    Hib vaccine  Aged Out    Meningococcal (ACWY) vaccine  Aged Out          There is no immunization history on file for this patient. Testing: All laboratory and radiology results have been personally reviewed by myself. Labs:  No results found for this visit on 06/14/21. Imaging: All Radiology results interpreted by Radiologist unless otherwise noted. No results found. Assessment/Plan:   I personally reviewed the patient's allergies, past medical history, medications, and vitals sign. Roshni Law was seen today for establish care and other. Diagnoses and all orders for this visit:    Chronic obstructive pulmonary disease, unspecified COPD type ([facility]  -     amoxicillin-clavulanate (AUGMENTIN) 875-125 MG per tablet; Take 1 tablet by mouth 2 times daily (with meals) for 7 days  -     predniSONE (DELTASONE) 20 MG tablet; Take 2 tablets by mouth daily for 5 days  -     XR CHEST STANDARD (2 VW); Future    Essential hypertension  -     LIPID PANEL; Future  -     COMPREHENSIVE METABOLIC PANEL; Future  -     TSH; Future  -     CBC WITH AUTO DIFFERENTIAL; Future  -     Vitamin D 25 Hydroxy;  Future    Respiratory crackles at left lung base  - Cancel: XR CHEST STANDARD (2 VW); Future  -     XR CHEST STANDARD (2 VW); Future    Oxygen dependent    Venous insufficiency of both lower extremities    Depression, unspecified depression type    Onychomycosis    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 Prescription written for chest x-ray, keeping call will update with results. We will start Augmentin and prednisone for COPD exacerbation and rales noted to the left lung base. Patient does not tolerate fluoroquinolones. Keep follow-up appointment with pulmonology on Wednesday. Encouraged deep breathing exercises. Referral placed to orthopedics for follow-up status post Girdlestone procedure. Continue all other medications as prescribed. Discussed treatment options of onychomycosis, will hold off at this time. Patient declined podiatry referral.  Stefany Crane records from [facility] for colonoscopy. Call or go to ED immediately if symptoms worsen or persist.  Return in about 3 months for follow-up. Sooner if necessary. At that time we will further discuss the need for health maintenance screenings. Counseled regarding above diagnosis, including possible risks and complications,especially if left uncontrolled. Counseled regarding the possible side effects, risks, benefits and alternatives to treatment; patient and/or guardian verbalizes understanding. Advised patient to call with any new medication issues. All questions answered.   Reviewed age and gender appropriate health screening exams and vaccinations. Advised patient regarding importance of keeping up with recommended health maintenance and to schedule as soon as possible if overdue, as this is important in assessing for undiagnosed pathology, especially cancer. Patient verbalizes understanding and agrees.      SIGNATURE: Electronically signed by ARGELIA Mccarthy NP on 6/14/2021 at 9:45 AM

## 2021-06-14 NOTE — Clinical Note
Can we get the colonoscopy report from [facility] [place], upload it into media and update the care gap? Thank you.

## 2021-06-14 NOTE — RESULT ENCOUNTER NOTE
XR is negative. Keep the patient on the same medication as discussed in office. F/U with pulm as scheduled. Verbalized understanding and agreeable to the above plan.

## 2021-06-15 ENCOUNTER — TELEPHONE (OUTPATIENT)
Dept: PRIMARY CARE CLINIC | Age: 56
End: 2021-06-15

## 2021-06-15 DIAGNOSIS — J44.9 CHRONIC OBSTRUCTIVE PULMONARY DISEASE, UNSPECIFIED COPD TYPE (HCC): Primary | ICD-10-CM

## 2021-06-15 LAB
FOLATE: 15.6 NG/ML (ref 4.8–24.2)
HEPATITIS C ANTIBODY INTERPRETATION: NORMAL
VITAMIN B-12: 836 PG/ML (ref 211–946)

## 2021-06-15 RX ORDER — AZITHROMYCIN 250 MG/1
250 TABLET, FILM COATED ORAL SEE ADMIN INSTRUCTIONS
Qty: 6 TABLET | Refills: 0 | Status: SHIPPED | OUTPATIENT
Start: 2021-06-15 | End: 2021-06-20

## 2021-06-15 NOTE — TELEPHONE ENCOUNTER
I called and spoke to Edie. If you can call the patient and update him. I called in an additional script for azithromycin to  and start taking. If his breathing worsens or he has progressive SOB he needs to go to the ER for further evaluation. Keep his appointment with pulm tomorrow.

## 2021-07-14 DIAGNOSIS — J43.9 PULMONARY EMPHYSEMA, UNSPECIFIED EMPHYSEMA TYPE (HCC): Primary | ICD-10-CM

## 2021-07-14 RX ORDER — ALBUTEROL SULFATE 90 UG/1
2 AEROSOL, METERED RESPIRATORY (INHALATION) EVERY 4 HOURS PRN
Qty: 1 INHALER | Refills: 2 | Status: SHIPPED
Start: 2021-07-14 | End: 2021-11-15

## 2021-07-19 ENCOUNTER — OFFICE VISIT (OUTPATIENT)
Dept: PODIATRY | Age: 56
End: 2021-07-19
Payer: MEDICAID

## 2021-07-19 VITALS
TEMPERATURE: 98.2 F | SYSTOLIC BLOOD PRESSURE: 160 MMHG | DIASTOLIC BLOOD PRESSURE: 72 MMHG | BODY MASS INDEX: 22.2 KG/M2 | WEIGHT: 130 LBS | HEIGHT: 64 IN

## 2021-07-19 DIAGNOSIS — M79.674 PAIN IN TOE OF RIGHT FOOT: ICD-10-CM

## 2021-07-19 DIAGNOSIS — M79.675 PAIN IN LEFT TOE(S): ICD-10-CM

## 2021-07-19 DIAGNOSIS — B35.1 TINEA UNGUIUM: Primary | ICD-10-CM

## 2021-07-19 DIAGNOSIS — R26.2 DIFFICULTY WALKING: ICD-10-CM

## 2021-07-19 DIAGNOSIS — I73.9 PERIPHERAL VASCULAR DISEASE, UNSPECIFIED (HCC): ICD-10-CM

## 2021-07-19 PROCEDURE — 99203 OFFICE O/P NEW LOW 30 MIN: CPT | Performed by: PODIATRIST

## 2021-07-19 PROCEDURE — 11721 DEBRIDE NAIL 6 OR MORE: CPT | Performed by: PODIATRIST

## 2021-07-19 NOTE — PROGRESS NOTES
Paulo Rose  New  Patient    Chief Complaint   Patient presents with   Ana Cristina Jorgensen Doctor     saw pcp Dr. Dayan Montalvo on 7/13/2021     Nail Problem     referred by Dr. Jose Hector Toe Pain       Subjective: This Kasia Cerna comes to office for foot and nail care. Pt currently has complaint of thickened, painful, elongated nails that he/she cannot manage by themselves. Pt. Relates pain to nails with shoe gear. Pt's primary care physician is ARGELIA Mazariegos NP. Past Medical History:   Diagnosis Date    COPD (chronic obstructive pulmonary disease) (HCC)     Hypertension     Multiple gastric ulcers        Allergies   Allergen Reactions    Aspirin     Levofloxacin Other (See Comments)    Lisinopril     Other      Other reaction(s): ABD PAIN    Tramadol     Ibuprofen Nausea And Vomiting    Sulfa Antibiotics Nausea And Vomiting     Current Outpatient Medications on File Prior to Visit   Medication Sig Dispense Refill    albuterol sulfate HFA (PROAIR HFA) 108 (90 Base) MCG/ACT inhaler Inhale 2 puffs into the lungs every 4 hours as needed for Wheezing 1 Inhaler 2    predniSONE (DELTASONE) 10 MG tablet Take 4 tablets by mouth daily for 3 days, THEN 3 tablets daily for 3 days, THEN 2 tablets daily for 3 days, THEN 1 tablet daily for 3 days.  30 tablet 0    predniSONE (DELTASONE) 10 MG tablet Take 2 tablets by mouth daily 60 tablet 0    ipratropium-albuterol (DUONEB) 0.5-2.5 (3) MG/3ML SOLN nebulizer solution Inhale 3 mLs into the lungs every 4 hours 120 vial 5    budesonide (PULMICORT) 0.5 MG/2ML nebulizer suspension Take 2 mLs by nebulization 2 times daily 60 ampule 5    formoterol (PERFOROMIST) 20 MCG/2ML nebulizer solution Take 2 mLs by nebulization 2 times daily 60 mL 5    Revefenacin (YUPELRI) 175 MCG/3ML SOLN Inhale 1 ampule into the lungs daily 30 vial 5    omeprazole (PRILOSEC) 40 MG delayed release capsule Take 1 capsule by mouth daily 30 capsule 2    amLODIPine (NORVASC) 5 MG tablet Take 1 tablet by mouth daily 30 tablet 2    budesonide-formoterol (SYMBICORT) 160-4.5 MCG/ACT AERO Inhale 2 puffs into the lungs 2 times daily 1 Inhaler 5    Revefenacin (YUPELRI) 175 MCG/3ML SOLN Inhale 1 vial into the lungs daily 30 vial 5    formoterol (PERFOROMIST) 20 MCG/2ML nebulizer solution Take 2 mLs by nebulization every 12 hours 120 mL 5    budesonide (PULMICORT) 0.5 MG/2ML nebulizer suspension Take 2 mLs by nebulization 2 times daily 60 ampule 5    SYMBICORT 160-4.5 MCG/ACT AERO Inhale 2 puffs into the lungs 2 times daily 1 Inhaler 2    baclofen (LIORESAL) 10 MG tablet Baclofen Baclofen (Lioresal) 10 MG Tablet Active 5 MG PO Twice A Day December 29th, 2020 8:20pm 12-  [facility] ([id number])      bumetanide (BUMEX) 1 MG tablet Take 1 tablet by mouth daily 30 tablet 3    HYDROcodone-acetaminophen (NORCO) 7.5-325 MG per tablet TAKE ONE TABLET BY MOUTH EVERY 12 HOURS AS NEEDED FOR PAIN      ipratropium-albuterol (DUONEB) 0.5-2.5 (3) MG/3ML SOLN nebulizer solution Inhale 3 mLs into the lungs every 4 hours as needed for Shortness of Breath 12 vial 0    ARIPiprazole (ABILIFY) 5 MG tablet take 1 tablet by mouth once daily  0    DULoxetine (CYMBALTA) 60 MG extended release capsule take 1 capsule by mouth at bedtime  0    hydrOXYzine (ATARAX) 25 MG tablet take 1-2 tablets by mouth once daily if needed  0    [DISCONTINUED] albuterol sulfate HFA (PROAIR HFA) 108 (90 Base) MCG/ACT inhaler Inhale 2 puffs into the lungs every 4 hours as needed for Wheezing 1 Inhaler 2    nicotine (NICODERM CQ) 21 MG/24HR Place 1 patch onto the skin daily (Patient not taking: Reported on 6/16/2021) 42 patch 0     No current facility-administered medications on file prior to visit. Review of Systems  Objective:  General: AAO x 3 in NAD.     Derm  Toenail Description  Sites of Onychomycosis Involvement (Check affected area)  [x] [x] [x] [x] [x] [x] [x] [x] [x] [x]  5 4 3 2 1 1 2 3 4 5                          Right                                        Left    Thickness  [x] [x] [x] [x] [x] [x] [x] [x] [x] [x]  5 4 3 2 1 1 2 3 4 5                         Right                                        Left    Dystrophic Changes   [x] [x] [x] [x] [x] [x] [x] [x] [x] [x]  5 4 3 2 1 1 2 3 4 5                         Right                                        Left    Color  [x] [x] [x] [x] [x] [x] [x] [x] [x] [x]  5 4 3 2 1 1 2 3 4 5                          Right                                        Left    Incurvation/Ingrowin   [x] [x] [x] [x] [x] [x] [x] [x] [x] [x]  5 4 3 2 1 1 2 3 4 5                         Right                                        Left    Inflammation/Pain   [x] [x] [x] [x] [x] [x] [x] [x] [x] [x]  5 4 3  2 1 1 2 3 4 5                         Right                                        Left      Nails that are described above are all elongated thickened pitting mycotic yellowish incurvated causing pain with both shoe gear. Palpation nails greater then 3 mm thick painful       Dermatologic Exam:hair loss noted  lower extremity    Skin lesion/ulceration   Skin   Callus neg  Musculoskeletal:     1st MPJ ROM normal, Bilateral.  Muscle strength 5/5, Bilateral.  Pain present upon palpation of toenails 1-5    Bilateral., Bilateral.  Ankle ROM normal,Bilateral.    Dorsally contracted digits , Bilateral.  Bilateral hallux nails are loose raised thick pitting mycotic mild odor noted. Vascular:  Pulses   bilateral DP absent    PT absent    Neurological:  Sensation present to light touch to level of digits, Bilateral.  Decreased    Foot Exam     Ortho Exam  Q7   []Yes    []No                Q8   [x]Yes    []No                     Q9   []Yes    []No  Assessment:  54 y.o. male with:   1. Tinea unguium    2. Pain in left toe(s)    3. Pain in toe of right foot    4.  Peripheral vascular disease, unspecified ([facility] [address] 5. Difficulty walking     No orders of the defined types were placed in this encounter. Plan: Bilateral hallux nails were completely debrided back nailbed were healthy bacitracin and a dressing daily x7 days. Pt was evaluated and examined. Patient was given personalized discharge instructions. Nails 1-10 were debrided in length and thickness sharply with a nail nipper and  without incident. Pt will follow up in 9 weeks or sooner if any problems arise. Diagnosis was discussed with the pt and all of their questions were answered in detail. Proper foot hygiene and care was discussed with the pt. Patient to check feet daily and contact the office with any questions/problems/concerns. Other comorbidity noted and will be managed by PCP. Pain waiver discussed with patient and confirmed.    7/19/2021      Electronically signed by Cass Page DPM on 7/19/2021 at 3:43 PM  7/19/2021

## 2021-08-02 ENCOUNTER — TELEPHONE (OUTPATIENT)
Dept: PRIMARY CARE CLINIC | Age: 56
End: 2021-08-02

## 2021-08-02 NOTE — TELEPHONE ENCOUNTER
----- Message from [address] sent at 8/2/2021 12:25 PM EDT -----  Subject: Message to Provider    QUESTIONS  Information for Provider? pt is getting discharged from the hospital and   wondering if he can come in any sooner than what he has scheduled and if   so if you can give him a call back and let him know   ---------------------------------------------------------------------------  --------------  [address]  What is the best way for the office to contact you? OK to leave message on   voicemail  Preferred Call Back Phone Number? [phone]  ---------------------------------------------------------------------------  --------------  SCRIPT ANSWERS  Relationship to Patient?  Third Party  Representative Name? Mortimer Rich

## 2021-08-06 ENCOUNTER — VIRTUAL VISIT (OUTPATIENT)
Dept: PRIMARY CARE CLINIC | Age: 56
End: 2021-08-06
Payer: MEDICAID

## 2021-08-06 DIAGNOSIS — J44.1 COPD EXACERBATION (HCC): ICD-10-CM

## 2021-08-06 DIAGNOSIS — F41.9 ANXIETY: ICD-10-CM

## 2021-08-06 DIAGNOSIS — J18.9 COMMUNITY ACQUIRED PNEUMONIA, UNSPECIFIED LATERALITY: Primary | ICD-10-CM

## 2021-08-06 DIAGNOSIS — T17.500A MUCUS PLUGGING OF BRONCHI: ICD-10-CM

## 2021-08-06 PROCEDURE — 99214 OFFICE O/P EST MOD 30 MIN: CPT | Performed by: NURSE PRACTITIONER

## 2021-08-06 PROCEDURE — 1111F DSCHRG MED/CURRENT MED MERGE: CPT | Performed by: NURSE PRACTITIONER

## 2021-08-06 RX ORDER — HYDROXYZINE HYDROCHLORIDE 25 MG/1
25 TABLET, FILM COATED ORAL 2 TIMES DAILY PRN
Qty: 30 TABLET | Refills: 0 | Status: ON HOLD
Start: 2021-08-06 | End: 2021-09-21 | Stop reason: HOSPADM

## 2021-08-06 RX ORDER — DOXYCYCLINE HYCLATE 100 MG/1
100 CAPSULE ORAL 2 TIMES DAILY
Status: ON HOLD | COMMUNITY
Start: 2021-08-14 | End: 2021-08-26 | Stop reason: HOSPADM

## 2021-08-06 NOTE — PROGRESS NOTES
2021   TELEHEALTH EVALUATION -- Audio/Visual (During [facility] public health emergency)    Justina Hawk 54 y.o. male    : 1965    TeleMedicine Patient Consent    This visit was performed as a virtual video visit using a synchronous, two-way, audio-video telehealth technology platform. Patient identification was verified at the start of the visit, including the patient's telephone number and physical location. I discussed with the patient the nature of our telehealth visits, that:     1. Due to the nature of an audio- video modality, the only components of a physical exam that could be done are the elements supported by direct observation. 2. I would evaluate the patient and recommend diagnostics and treatments based on my assessment. 3. If it was felt that the patient should be evaluated in clinic or an emergency room setting, then they would be directed there. 4. Our sessions are not being recorded and that personal health information is protected. 5. Our team would provide follow up care in person if/when the patient needs it. Patient does agree to proceed with telemedicine consultation. Patient's location: home address in [facility]    Physician location: home office site  Other people involved in call: none    This visit was completed virtually using Doxy. me        Chief Complaint:   Follow-Up from Hospital ([facility] for breathing ) and Pneumonia       History of Present Illness:   Justina Hawk is a 54 y.o. male who has requested an audio/visual telehealth evaluation for hospital follow up. Patient was admitted to [facility] for community-acquired pneumonia, dyspnea, COPD exacerbation. COVID-19 testing was negative at that time. Patient was discharged on nebulizer treatments and doxycycline. He states that overall he is feeling much better.   He does check his pulse ox at home and he is currently on 5 to 7 L nasal cannula with his oxygen saturation between 95 to 96%.  He does follow with pulmonology and has an appointment with Pradeep Barber NP. Repeat chest x-ray was improving. Denies any fever, chills, productive sputum, wheezing, shortness of breath, nausea, vomiting, diarrhea or lethargy. .   Anxiety and insomnia-poorly controlled, will start start hydroxyzine. The patient is not up-to-date with health maintenance screenings. Previous lab work was reviewed. Past Medical History:     Past Medical History:   Diagnosis Date    COPD (chronic obstructive pulmonary disease) ([facility] [address]     Hypertension     Multiple gastric ulcers        Past Surgical History:   Procedure Laterality Date    HERNIA REPAIR      HIP SURGERY      KNEE SURGERY         History reviewed. No pertinent family history.     Social History     Tobacco Use    Smoking status: Former Smoker     Packs/day: 4.00     Years: 25.00     Pack years: 100.00     Types: Cigarettes     Start date: 3/10/1995     Quit date: 10/13/2020     Years since quittin.8    Smokeless tobacco: Never Used   Substance Use Topics    Alcohol use: No    Drug use: No       Medications:     Current Outpatient Medications:     budesonide (PULMICORT) 0.5 MG/2ML nebulizer suspension, Take 1 ampule by nebulization 2 times daily, Disp: , Rfl:     budesonide (PULMICORT) 0.5 MG/2ML nebulizer suspension, Take 2 mLs by nebulization 2 times daily, Disp: 60 ampule, Rfl: 5    doxycycline hyclate (VIBRAMYCIN) 100 MG capsule, Every 12 hours, Disp: , Rfl:     hydrOXYzine (ATARAX) 25 MG tablet, Take 1 tablet by mouth 2 times daily as needed for Anxiety, Disp: 30 tablet, Rfl: 0    albuterol sulfate HFA (PROAIR HFA) 108 (90 Base) MCG/ACT inhaler, Inhale 2 puffs into the lungs every 4 hours as needed for Wheezing, Disp: 1 Inhaler, Rfl: 2    predniSONE (DELTASONE) 10 MG tablet, Take 2 tablets by mouth daily, Disp: 60 tablet, Rfl: 0    ipratropium-albuterol (DUONEB) 0.5-2.5 (3) MG/3ML SOLN nebulizer solution, Inhale 3 mLs into the lungs every 4 hours, Disp: 120 vial, Rfl: 5    budesonide (PULMICORT) 0.5 MG/2ML nebulizer suspension, Take 2 mLs by nebulization 2 times daily, Disp: 60 ampule, Rfl: 5    formoterol (PERFOROMIST) 20 MCG/2ML nebulizer solution, Take 2 mLs by nebulization 2 times daily, Disp: 60 mL, Rfl: 5    omeprazole (PRILOSEC) 40 MG delayed release capsule, Take 1 capsule by mouth daily, Disp: 30 capsule, Rfl: 2    amLODIPine (NORVASC) 5 MG tablet, Take 1 tablet by mouth daily, Disp: 30 tablet, Rfl: 2    budesonide-formoterol (SYMBICORT) 160-4.5 MCG/ACT AERO, Inhale 2 puffs into the lungs 2 times daily, Disp: 1 Inhaler, Rfl: 5    Revefenacin (YUPELRI) 175 MCG/3ML SOLN, Inhale 1 vial into the lungs daily, Disp: 30 vial, Rfl: 5    formoterol (PERFOROMIST) 20 MCG/2ML nebulizer solution, Take 2 mLs by nebulization every 12 hours, Disp: 120 mL, Rfl: 5    SYMBICORT 160-4.5 MCG/ACT AERO, Inhale 2 puffs into the lungs 2 times daily, Disp: 1 Inhaler, Rfl: 2    baclofen (LIORESAL) 10 MG tablet, Baclofen Baclofen (Lioresal) 10 MG Tablet Active 5 MG PO Twice A Day December 29th, 2020 8:20pm 12-  [facility] ([id number]), Disp: , Rfl:     HYDROcodone-acetaminophen (NORCO) 7.5-325 MG per tablet, TAKE ONE TABLET BY MOUTH EVERY 12 HOURS AS NEEDED FOR PAIN, Disp: , Rfl:     ipratropium-albuterol (DUONEB) 0.5-2.5 (3) MG/3ML SOLN nebulizer solution, Inhale 3 mLs into the lungs every 4 hours as needed for Shortness of Breath, Disp: 12 vial, Rfl: 0    DULoxetine (CYMBALTA) 60 MG extended release capsule, take 1 capsule by mouth at bedtime, Disp: , Rfl: 0    Revefenacin (YUPELRI) 175 MCG/3ML SOLN, Inhale 1 ampule into the lungs daily, Disp: 30 vial, Rfl: 5    bumetanide (BUMEX) 1 MG tablet, Take 1 tablet by mouth daily (Patient not taking: Reported on 8/6/2021), Disp: 30 tablet, Rfl: 3    nicotine (NICODERM CQ) 21 MG/24HR, Place 1 patch onto the skin daily (Patient not taking: Reported on 6/16/2021), Disp: 42 patch, Rfl: 0    ARIPiprazole (ABILIFY) 5 MG tablet, take 1 tablet by mouth once daily, Disp: , Rfl: 0    Allergies   Allergen Reactions    Aspirin     Levofloxacin Other (See Comments)    Lisinopril     Other      Other reaction(s): ABD PAIN    Tramadol     Ibuprofen Nausea And Vomiting    Sulfa Antibiotics Nausea And Vomiting       Review of Systems:   Review of Systems   Constitutional: Negative for activity change, appetite change, chills, diaphoresis, fatigue, fever and unexpected weight change. HENT: Negative for congestion, ear pain, hearing loss, postnasal drip, rhinorrhea, sore throat, tinnitus and trouble swallowing. Eyes: Negative for redness and visual disturbance. Respiratory: Positive for shortness of breath. Negative for apnea, cough, chest tightness and wheezing. Cardiovascular: Negative for chest pain, palpitations and leg swelling. Gastrointestinal: Negative for abdominal distention, abdominal pain, blood in stool, constipation, diarrhea, nausea, rectal pain and vomiting. Endocrine: Negative for cold intolerance and heat intolerance. Genitourinary: Negative for decreased urine volume, difficulty urinating, dysuria, flank pain, frequency, hematuria and urgency. Musculoskeletal: Negative for arthralgias, back pain, gait problem, joint swelling, myalgias, neck pain and neck stiffness. Skin: Negative for color change, pallor, rash and wound. Allergic/Immunologic: Negative for environmental allergies, food allergies and immunocompromised state. Neurological: Negative for dizziness, tremors, seizures, syncope, facial asymmetry, speech difficulty, weakness, light-headedness, numbness and headaches. Hematological: Negative for adenopathy. Does not bruise/bleed easily. Psychiatric/Behavioral: Negative for agitation, behavioral problems, confusion, decreased concentration, sleep disturbance and suicidal ideas. The patient is not nervous/anxious.         Physical Exam:   PHYSICAL EXAMINATION:  [ INSTRUCTIONS:  \"[x]\" Indicates a positive item  \"[]\" Indicates a negative item  -- DELETE ALL ITEMS NOT EXAMINED]  Vital Signs: (As obtained by patient/caregiver or practitioner observation)    Blood pressure-  Heart rate-    Respiratory rate-    Temperature-  Pulse oximetry-     Constitutional: [x] Appears well-developed and well-nourished [x] No apparent distress      [] Abnormal-   Mental status  [x] Alert and awake  [x] Oriented to person/place/time [x]Able to follow commands      Eyes:  EOM    [x]  Normal  [] Abnormal-  Sclera  [x]  Normal  [] Abnormal -         Discharge [x]  None visible  [] Abnormal -    HENT:   [x] Normocephalic, atraumatic.   [] Abnormal   [x] Mouth/Throat: Mucous membranes are moist.     External Ears [x] Normal  [] Abnormal-     Neck: [x] No visualized mass     Pulmonary/Chest: [x] Respiratory effort normal.  [x] No visualized signs of difficulty breathing or respiratory distress        [] Abnormal-      Musculoskeletal:   [x] Normal gait with no signs of ataxia         [x] Normal range of motion of neck        [] Abnormal-       Neurological:        [x] No Facial Asymmetry (Cranial nerve 7 motor function) (limited exam to video visit)          [x] No gaze palsy        [] Abnormal-         Skin:        [x] No significant exanthematous lesions or discoloration noted on facial skin         [] Abnormal-            Psychiatric:       [x] Normal Affect [x] No Hallucinations        [] Abnormal-     Other pertinent observable physical exam findings-     Health Maintenance:     Health Maintenance   Topic Date Due    DTaP/Tdap/Td vaccine (1 - Tdap) Never done    Shingles Vaccine (1 of 2) Never done    Low dose CT lung screening  02/28/2021    Flu vaccine (1) 09/01/2021    Potassium monitoring  08/05/2022    Creatinine monitoring  08/05/2022    Lipid screen  06/14/2026    Colon cancer screen colonoscopy  04/07/2027    Pneumococcal 0-64 years Vaccine (2 of 2 - PPSV23) infiltrative changes in the lung bases. Dictated ByPrateek Caputo MD   DD/DT: 08/05/21 0617               Signed By: Jacquelin Salcedo MD 08/05/21 0617            : VIOLETTE      Assessment/Plan:   I personally reviewed the patient's allergies, past medical history, medications, and vitals sign. Dc Priest was seen today for follow-up from hospital and pneumonia. Diagnoses and all orders for this visit:    Community acquired pneumonia, unspecified laterality  -     [place] DISCHARGE MEDS RECONCILED W/ CURRENT OUTPATIENT MED LIST    COPD exacerbation ([facility] [address]  -     [place] DISCHARGE MEDS RECONCILED W/ CURRENT OUTPATIENT MED LIST    Mucus plugging of bronchi    Anxiety  -     hydrOXYzine (ATARAX) 25 MG tablet; Take 1 tablet by mouth 2 times daily as needed for Anxiety      Aliya Copper, was evaluated through a synchronous (real-time) audio-video encounter. The patient (or guardian if applicable) is aware that this is a billable service. Verbal consent to proceed has been obtained within the past 12 months. The visit was conducted pursuant to the emergency declaration under the [address] [place] [address], [address] [place] [address] authority and the [employer] and [employer] Act. Patient identification was verified, and a caregiver was present when appropriate. The patient was located in a state where the provider was credentialed to provide care. Continue medications as they were prescribed. Keep follow-up appointment with pulmonology. Goal oxygen saturation is between 88 to 92%. Red flag symptoms were discussed. Call or go to ED immediately if symptoms worsen or persist.  Return in 1 month (on 9/6/2021) for 3 month f/u . Sooner if necessary. Counseled regarding above diagnosis, including possible risks and complications,especially if left uncontrolled.   Counseled regarding the possible side effects, risks, benefits and alternatives to treatment; patient and/or guardian verbalizes understanding. Advised patient to call with any new medication issues. All questions answered. Reviewed age and gender appropriate health screening exams and vaccinations. Advised patient regarding importance of keeping up with recommended health maintenance and to schedule as soon as possible if overdue, as this is important in assessing for undiagnosed pathology, especially cancer. Patient verbalizes understanding and agrees. Total time spent on this encounter: 30 minutes    --ARGELIA Beltre NP on 8/9/2021 at 4:38 PM    An electronic signature was used to authenticate this note. **This report was transcribed using voice recognition software. Every effort was made to ensure accuracy; however, inadvertent computerized transcription errors may be present.

## 2021-08-09 ASSESSMENT — ENCOUNTER SYMPTOMS
SORE THROAT: 0
APNEA: 0
CONSTIPATION: 0
RECTAL PAIN: 0
EYE REDNESS: 0
TROUBLE SWALLOWING: 0
BACK PAIN: 0
ABDOMINAL PAIN: 0
SHORTNESS OF BREATH: 1
DIARRHEA: 0
CHEST TIGHTNESS: 0
BLOOD IN STOOL: 0
COLOR CHANGE: 0
WHEEZING: 0
ABDOMINAL DISTENTION: 0
NAUSEA: 0
VOMITING: 0
COUGH: 0
RHINORRHEA: 0

## 2021-08-10 ENCOUNTER — TELEPHONE (OUTPATIENT)
Dept: PRIMARY CARE CLINIC | Age: 56
End: 2021-08-10

## 2021-08-10 NOTE — TELEPHONE ENCOUNTER
Last Appointment:  8/6/2021  Future Appointments   Date Time Provider Mik Rosario   8/18/2021  1:45 PM ARGELIA Doss - CNP [facility]   9/15/2021 10:00 AM ARGELIA Perry NP [facility]   9/22/2021 10:00 AM Dearbilly Gerber DPM [facility]      Patients needs a PA on Brovana 15 mcg mini neb every 12 hours

## 2021-08-11 ENCOUNTER — NURSE TRIAGE (OUTPATIENT)
Dept: OTHER | Facility: CLINIC | Age: 56
End: 2021-08-11

## 2021-08-11 ENCOUNTER — TELEPHONE (OUTPATIENT)
Dept: PRIMARY CARE CLINIC | Age: 56
End: 2021-08-11

## 2021-08-11 NOTE — TELEPHONE ENCOUNTER
Reason for Disposition   SEVERE swelling (e.g., swelling extends above knee, entire leg is swollen, weeping fluid)    Answer Assessment - Initial Assessment Questions  1. ONSET: \"When did the swelling start? \" (e.g., minutes, hours, days)      Onset was yesterday    2. LOCATION: \"What part of the leg is swollen? \"  \"Are both legs swollen or just one leg? \"      Left leg from the thigh to the foot    3. SEVERITY: \"How bad is the swelling? \" (e.g., localized; mild, moderate, severe)   - Localized - small area of swelling localized to one leg   - MILD pedal edema - swelling limited to foot and ankle, pitting edema < 1/4 inch (6 mm) deep, rest and elevation eliminate most or all swelling   - MODERATE edema - swelling of lower leg to knee, pitting edema > 1/4 inch (6 mm) deep, rest and elevation only partially reduce swelling   - SEVERE edema - swelling extends above knee, facial or hand swelling present       Severe swelling - no leaking fluids but skin is tight    4. REDNESS: \"Does the swelling look red or infected? \"      Red and purple and warm to the touch    5. PAIN: \"Is the swelling painful to touch? \" If so, ask: \"How painful is it? \"   (Scale 1-10; mild, moderate or severe)      Current pain level is 9/10 with Norco 5 mg    6. FEVER: \"Do you have a fever? \" If so, ask: \"What is it, how was it measured, and when did it start? \"       Fever yesterday of 100.9    7. CAUSE: \"What do you think is causing the leg swelling? \"      They think it is cellulitis    8. MEDICAL HISTORY: \"Do you have a history of heart failure, kidney disease, liver failure, or cancer? \"      No    9. RECURRENT SYMPTOM: \"Have you had leg swelling before? \" If so, ask: \"When was the last time? \" \"What happened that time? \"      He has had this before - it was Cellulitis - took an antibiotic to resolve    10. OTHER SYMPTOMS: \"Do you have any other symptoms? \" (e.g., chest pain, difficulty breathing)        He has COPD and always has trouble breathing - no chest pain    11. PREGNANCY: \"Is there any chance you are pregnant? \" \"When was your last menstrual period? \"        No    Protocols used: LEG SWELLING AND EDEMA-ADULT-OH    Received call from [facility] at [facility] with [employer]. Brief description of triage: Patient was released from the hospital lat Wednesday after a 4-5 days stay with pneumonia. Yesterday he woke with swelling and redness in his left leg. The swelling begins at the thigh and goes down into the foot. The redness begins just below the knee and goes into his foot. He has a history of cellulitis in this leg. No fever today but did have fever yesterday (100.9). Pain is reported at 9/10. Triage indicates for patient to be seen today in the [facility] [address] or PCP with approval.  Reached out to his provider's office for a 2nd level triage with no success. 2nd level triage completed with Candace Enciso NP; provider recommends patient be seen today in his provider's office or urgent care if no appts are available with the provider. Care advice provided: Elevation and cold compresses every 4 hours. Patient verbalizes understanding; denies any other questions or concerns; instructed to call back for any new or worsening symptoms. Writer provided warm transfer to [address] at [facility] for appointment scheduling. Attention Provider: Thank you for allowing me to participate in the care of your patient. The patient was connected to triage in response to information provided to the [facility]. Please do not respond through this encounter as the response is not directed to a shared pool.

## 2021-08-11 NOTE — TELEPHONE ENCOUNTER
Patient is having swelling from groin to foot, he wanted to know what to do       Patient was advised to go right to the emergency room. Wife stated he was already headed to hospital for a ct scan. I suggested he be seen as soon as possible by an emergency physician.

## 2021-08-11 NOTE — TELEPHONE ENCOUNTER
It looks like this order was prescribed on discharge from the hospital by Dr. Joelle Tidwell.   Is this something we can get PA for?

## 2021-08-18 ENCOUNTER — NURSE TRIAGE (OUTPATIENT)
Dept: OTHER | Facility: CLINIC | Age: 56
End: 2021-08-18

## 2021-08-18 NOTE — TELEPHONE ENCOUNTER
Received call from [address] [place] [address] at [employer] with Red Flag Complaint. Brief description of triage: Left leg swelling, was in the hospital last week for cellulitis. Swelling is above knee now, feeling more SOB       Triage indicates for patient to the ER      Care advice provided, patient verbalizes understanding; denies any other questions or concerns; instructed to call back for any new or worsening symptoms. Attention Provider: Thank you for allowing me to participate in the care of your patient. The patient was connected to triage in response to information provided to the [facility]. Please do not respond through this encounter as the response is not directed to a shared pool. Reason for Disposition   SEVERE swelling (e.g., swelling extends above knee, entire leg is swollen, weeping fluid)    Answer Assessment - Initial Assessment Questions  1. ONSET: \"When did the swelling start? \" (e.g., minutes, hours, days)      Yesterday     2. LOCATION: \"What part of the leg is swollen? \"  \"Are both legs swollen or just one leg? \"      Left leg from toes to above the knee     3. SEVERITY: \"How bad is the swelling? \" (e.g., localized; mild, moderate, severe)   - Localized - small area of swelling localized to one leg   - MILD pedal edema - swelling limited to foot and ankle, pitting edema < 1/4 inch (6 mm) deep, rest and elevation eliminate most or all swelling   - MODERATE edema - swelling of lower leg to knee, pitting edema > 1/4 inch (6 mm) deep, rest and elevation only partially reduce swelling   - SEVERE edema - swelling extends above knee, facial or hand swelling present       Severe     4. REDNESS: \"Does the swelling look red or infected? \"      Red and purple     5. PAIN: \"Is the swelling painful to touch? \" If so, ask: \"How painful is it? \"   (Scale 1-10; mild, moderate or severe)      9/10    6. FEVER: \"Do you have a fever? \" If so, ask: \"What is it, how was it measured, and when did it start? \" 99.1    7. CAUSE: \"What do you think is causing the leg swelling? \"      Cellulitis     8. MEDICAL HISTORY: \"Do you have a history of heart failure, kidney disease, liver failure, or cancer? \"      Denies     9. RECURRENT SYMPTOM: \"Have you had leg swelling before? \" If so, ask: \"When was the last time? \" \"What happened that time? \"      Yes, last week     10. OTHER SYMPTOMS: \"Do you have any other symptoms? \" (e.g., chest pain, difficulty breathing)        Has COPD on 6LNC all of the time     11. PREGNANCY: \"Is there any chance you are pregnant? \" \"When was your last menstrual period? \"        N/a    Protocols used: LEG SWELLING AND EDEMA-ADULT-OH

## 2021-08-23 ENCOUNTER — APPOINTMENT (OUTPATIENT)
Dept: GENERAL RADIOLOGY | Age: 56
DRG: 383 | End: 2021-08-23
Payer: MEDICAID

## 2021-08-23 ENCOUNTER — APPOINTMENT (OUTPATIENT)
Dept: ULTRASOUND IMAGING | Age: 56
DRG: 383 | End: 2021-08-23
Payer: MEDICAID

## 2021-08-23 ENCOUNTER — HOSPITAL ENCOUNTER (INPATIENT)
Age: 56
LOS: 6 days | Discharge: HOME HEALTH | DRG: 383 | End: 2021-08-29
Attending: EMERGENCY MEDICINE | Admitting: INTERNAL MEDICINE
Payer: MEDICAID

## 2021-08-23 DIAGNOSIS — L03.119 CELLULITIS OF LOWER EXTREMITY, UNSPECIFIED LATERALITY: Primary | ICD-10-CM

## 2021-08-23 DIAGNOSIS — J44.1 CHRONIC OBSTRUCTIVE PULMONARY DISEASE WITH ACUTE EXACERBATION (HCC): ICD-10-CM

## 2021-08-23 PROBLEM — L03.90 CELLULITIS: Status: ACTIVE | Noted: 2021-08-23

## 2021-08-23 LAB
ALBUMIN SERPL-MCNC: 3.4 G/DL (ref 3.5–5.2)
ALP BLD-CCNC: 57 U/L (ref 40–129)
ALT SERPL-CCNC: 20 U/L (ref 0–40)
ANION GAP SERPL CALCULATED.3IONS-SCNC: 12 MMOL/L (ref 7–16)
AST SERPL-CCNC: 24 U/L (ref 0–39)
BASOPHILS ABSOLUTE: 0.02 E9/L (ref 0–0.2)
BASOPHILS RELATIVE PERCENT: 0.4 % (ref 0–2)
BILIRUB SERPL-MCNC: 0.3 MG/DL (ref 0–1.2)
BUN BLDV-MCNC: 8 MG/DL (ref 6–20)
C-REACTIVE PROTEIN: 14.8 MG/DL (ref 0–0.4)
CALCIUM SERPL-MCNC: 7 MG/DL (ref 8.6–10.2)
CHLORIDE BLD-SCNC: 94 MMOL/L (ref 98–107)
CO2: 33 MMOL/L (ref 22–29)
CREAT SERPL-MCNC: 0.7 MG/DL (ref 0.7–1.2)
EOSINOPHILS ABSOLUTE: 0.15 E9/L (ref 0.05–0.5)
EOSINOPHILS RELATIVE PERCENT: 2.8 % (ref 0–6)
GFR AFRICAN AMERICAN: >60
GFR NON-AFRICAN AMERICAN: >60 ML/MIN/1.73
GLUCOSE BLD-MCNC: 131 MG/DL (ref 74–99)
HCT VFR BLD CALC: 29.7 % (ref 37–54)
HEMOGLOBIN: 9.4 G/DL (ref 12.5–16.5)
IMMATURE GRANULOCYTES #: 0.02 E9/L
IMMATURE GRANULOCYTES %: 0.4 % (ref 0–5)
LACTIC ACID: 0.9 MMOL/L (ref 0.5–2.2)
LYMPHOCYTES ABSOLUTE: 1.01 E9/L (ref 1.5–4)
LYMPHOCYTES RELATIVE PERCENT: 19.1 % (ref 20–42)
MCH RBC QN AUTO: 30.3 PG (ref 26–35)
MCHC RBC AUTO-ENTMCNC: 31.6 % (ref 32–34.5)
MCV RBC AUTO: 95.8 FL (ref 80–99.9)
MONOCYTES ABSOLUTE: 0.63 E9/L (ref 0.1–0.95)
MONOCYTES RELATIVE PERCENT: 11.9 % (ref 2–12)
NEUTROPHILS ABSOLUTE: 3.47 E9/L (ref 1.8–7.3)
NEUTROPHILS RELATIVE PERCENT: 65.4 % (ref 43–80)
PDW BLD-RTO: 13.1 FL (ref 11.5–15)
PLATELET # BLD: 177 E9/L (ref 130–450)
PMV BLD AUTO: 9.8 FL (ref 7–12)
POTASSIUM REFLEX MAGNESIUM: 3.6 MMOL/L (ref 3.5–5)
PRO-BNP: 346 PG/ML (ref 0–125)
RBC # BLD: 3.1 E12/L (ref 3.8–5.8)
SEDIMENTATION RATE, ERYTHROCYTE: 103 MM/HR (ref 0–15)
SODIUM BLD-SCNC: 139 MMOL/L (ref 132–146)
TOTAL PROTEIN: 7.3 G/DL (ref 6.4–8.3)
TROPONIN, HIGH SENSITIVITY: 41 NG/L (ref 0–11)
WBC # BLD: 5.3 E9/L (ref 4.5–11.5)

## 2021-08-23 PROCEDURE — 80053 COMPREHEN METABOLIC PANEL: CPT

## 2021-08-23 PROCEDURE — 6370000000 HC RX 637 (ALT 250 FOR IP): Performed by: EMERGENCY MEDICINE

## 2021-08-23 PROCEDURE — 2700000000 HC OXYGEN THERAPY PER DAY

## 2021-08-23 PROCEDURE — 85651 RBC SED RATE NONAUTOMATED: CPT

## 2021-08-23 PROCEDURE — 6360000002 HC RX W HCPCS: Performed by: EMERGENCY MEDICINE

## 2021-08-23 PROCEDURE — 96374 THER/PROPH/DIAG INJ IV PUSH: CPT

## 2021-08-23 PROCEDURE — 94664 DEMO&/EVAL PT USE INHALER: CPT

## 2021-08-23 PROCEDURE — 94640 AIRWAY INHALATION TREATMENT: CPT

## 2021-08-23 PROCEDURE — 86140 C-REACTIVE PROTEIN: CPT

## 2021-08-23 PROCEDURE — 96376 TX/PRO/DX INJ SAME DRUG ADON: CPT

## 2021-08-23 PROCEDURE — 2060000000 HC ICU INTERMEDIATE R&B

## 2021-08-23 PROCEDURE — 73630 X-RAY EXAM OF FOOT: CPT

## 2021-08-23 PROCEDURE — 73590 X-RAY EXAM OF LOWER LEG: CPT

## 2021-08-23 PROCEDURE — 71046 X-RAY EXAM CHEST 2 VIEWS: CPT

## 2021-08-23 PROCEDURE — 83605 ASSAY OF LACTIC ACID: CPT

## 2021-08-23 PROCEDURE — 96375 TX/PRO/DX INJ NEW DRUG ADDON: CPT

## 2021-08-23 PROCEDURE — 93970 EXTREMITY STUDY: CPT

## 2021-08-23 PROCEDURE — 99284 EMERGENCY DEPT VISIT MOD MDM: CPT

## 2021-08-23 PROCEDURE — 99223 1ST HOSP IP/OBS HIGH 75: CPT | Performed by: INTERNAL MEDICINE

## 2021-08-23 PROCEDURE — 87040 BLOOD CULTURE FOR BACTERIA: CPT

## 2021-08-23 PROCEDURE — 85025 COMPLETE CBC W/AUTO DIFF WBC: CPT

## 2021-08-23 PROCEDURE — 84484 ASSAY OF TROPONIN QUANT: CPT

## 2021-08-23 PROCEDURE — 93005 ELECTROCARDIOGRAM TRACING: CPT | Performed by: PHYSICIAN ASSISTANT

## 2021-08-23 PROCEDURE — 96372 THER/PROPH/DIAG INJ SC/IM: CPT

## 2021-08-23 PROCEDURE — 83880 ASSAY OF NATRIURETIC PEPTIDE: CPT

## 2021-08-23 RX ORDER — DULOXETIN HYDROCHLORIDE 60 MG/1
60 CAPSULE, DELAYED RELEASE ORAL NIGHTLY
Status: DISCONTINUED | OUTPATIENT
Start: 2021-08-23 | End: 2021-08-24 | Stop reason: SDUPTHER

## 2021-08-23 RX ORDER — FENTANYL CITRATE 50 UG/ML
25 INJECTION, SOLUTION INTRAMUSCULAR; INTRAVENOUS ONCE
Status: COMPLETED | OUTPATIENT
Start: 2021-08-23 | End: 2021-08-23

## 2021-08-23 RX ORDER — HYDROCODONE BITARTRATE AND ACETAMINOPHEN 5; 325 MG/1; MG/1
1 TABLET ORAL ONCE
Status: COMPLETED | OUTPATIENT
Start: 2021-08-23 | End: 2021-08-23

## 2021-08-23 RX ORDER — HYDROXYZINE HYDROCHLORIDE 50 MG/ML
50 INJECTION, SOLUTION INTRAMUSCULAR ONCE
Status: COMPLETED | OUTPATIENT
Start: 2021-08-23 | End: 2021-08-23

## 2021-08-23 RX ORDER — BACLOFEN 10 MG/1
5 TABLET ORAL 2 TIMES DAILY
Status: DISCONTINUED | OUTPATIENT
Start: 2021-08-23 | End: 2021-08-29 | Stop reason: HOSPADM

## 2021-08-23 RX ORDER — IPRATROPIUM BROMIDE AND ALBUTEROL SULFATE 2.5; .5 MG/3ML; MG/3ML
1 SOLUTION RESPIRATORY (INHALATION)
Status: COMPLETED | OUTPATIENT
Start: 2021-08-23 | End: 2021-08-23

## 2021-08-23 RX ADMIN — HYDROCODONE BITARTRATE AND ACETAMINOPHEN 1 TABLET: 5; 325 TABLET ORAL at 21:21

## 2021-08-23 RX ADMIN — Medication 2000 MG: at 17:11

## 2021-08-23 RX ADMIN — HYDROXYZINE HYDROCHLORIDE 50 MG: 50 INJECTION, SOLUTION INTRAMUSCULAR at 18:10

## 2021-08-23 RX ADMIN — FENTANYL CITRATE 25 MCG: 50 INJECTION, SOLUTION INTRAMUSCULAR; INTRAVENOUS at 19:33

## 2021-08-23 RX ADMIN — IPRATROPIUM BROMIDE AND ALBUTEROL SULFATE 1 AMPULE: .5; 3 SOLUTION RESPIRATORY (INHALATION) at 16:39

## 2021-08-23 RX ADMIN — IPRATROPIUM BROMIDE AND ALBUTEROL SULFATE 1 AMPULE: .5; 3 SOLUTION RESPIRATORY (INHALATION) at 16:40

## 2021-08-23 RX ADMIN — FENTANYL CITRATE 25 MCG: 50 INJECTION, SOLUTION INTRAMUSCULAR; INTRAVENOUS at 16:33

## 2021-08-23 RX ADMIN — IPRATROPIUM BROMIDE AND ALBUTEROL SULFATE 1 AMPULE: .5; 3 SOLUTION RESPIRATORY (INHALATION) at 16:38

## 2021-08-23 ASSESSMENT — PAIN SCALES - GENERAL
PAINLEVEL_OUTOF10: 7
PAINLEVEL_OUTOF10: 5
PAINLEVEL_OUTOF10: 7
PAINLEVEL_OUTOF10: 10
PAINLEVEL_OUTOF10: 7
PAINLEVEL_OUTOF10: 0

## 2021-08-23 ASSESSMENT — PAIN DESCRIPTION - PAIN TYPE: TYPE: ACUTE PAIN

## 2021-08-23 NOTE — ED NOTES
Pt very anxious after getting up to use the urinal. Pulse ox 93-95% on 6L NC. Dr. Shira Pierre notified and medications ordered.      Rianna Rodriguez RN  08/23/21 [id number]

## 2021-08-23 NOTE — PROGRESS NOTES
Peak Flow Results    Predicted  Peak Flow 515 lpm    Peak Flow before bronchodilators 100 lpm, which is 19% predicted    Peak Flow after bronchodilators 150 lpm, which is 29 % predicted. Results from pts best and multiple attempts.      Performed by Tyrone Delgado RCP

## 2021-08-23 NOTE — ED NOTES
FIRST PROVIDER CONTACT ASSESSMENT NOTE      Department of Emergency Medicine   8/23/21  12:40 PM EDT    Chief Complaint: No chief complaint on file. History of Present Illness:   Rodri Walker is a 54 y.o. male who presents to the ED for lateral leg swelling and redness. Was recently in the hospital for the same and had IV antibiotics and was discharged home with oral antibiotics. States that the redness is now to his right leg. Patient always has some shortness of breath and is on oxygen. No worsening in his shortness of breath. Medical History:  has a past medical history of COPD (chronic obstructive pulmonary disease) ([facility] [address] Hypertension, and Multiple gastric ulcers. Surgical History:  has a past surgical history that includes knee surgery; hip surgery; and hernia repair. Social History:  reports that he quit smoking about 10 months ago. His smoking use included cigarettes. He started smoking about 26 years ago. He has a 100.00 pack-year smoking history. He has never used smokeless tobacco. He reports that he does not drink alcohol and does not use drugs. Family History: family history is not on file. *ALLERGIES*     Aspirin, Levofloxacin, Lisinopril, Other, Tramadol, Ibuprofen, and Sulfa antibiotics     Physical Exam:      VS:  /73   Pulse 68   Temp 97.6 °F (36.4 °C) (Temporal)   Resp 14   Ht 5' 4\" (1.626 m)   Wt 130 lb (59 kg)   SpO2 99%   BMI 22.31 kg/m²      Initial Plan of Care:  Initiate Treatment-Testing, Proceed toTreatment Area When Bed Available for ED Attending/MLP to Continue Care    DP and posterior tibialis Pulses identified with Doppler.   No compartment syndrome on exam  Tenderness to bilateral calves  Pitting edema to bilateral lower extremities with erythema to bilateral lower extremities  Normal cap refill    Patient notified to tell the nurse for new or worsening symptoms    -----------------END OF FIRST PROVIDER CONTACT ASSESSMENT NOTE--------------  Electronically signed by Rxe Holbrook PA-C   DD: 8/23/21             Renetta Rene PA-C  08/23/21 [id number]

## 2021-08-23 NOTE — ED NOTES
Pt eating dinner tray. Call light in reach, will continue to monitor.       Wendie Johnson RN  08/23/21 [id number]

## 2021-08-24 LAB
ANION GAP SERPL CALCULATED.3IONS-SCNC: 12 MMOL/L (ref 7–16)
ANTISTREPTOLYSIN-O: 68 IU/ML (ref 0–200)
BASOPHILS ABSOLUTE: 0.02 E9/L (ref 0–0.2)
BASOPHILS RELATIVE PERCENT: 0.5 % (ref 0–2)
BUN BLDV-MCNC: 6 MG/DL (ref 6–20)
CALCIUM SERPL-MCNC: 6.6 MG/DL (ref 8.6–10.2)
CHLORIDE BLD-SCNC: 97 MMOL/L (ref 98–107)
CO2: 35 MMOL/L (ref 22–29)
CREAT SERPL-MCNC: 0.7 MG/DL (ref 0.7–1.2)
EKG ATRIAL RATE: 97 BPM
EKG P AXIS: 75 DEGREES
EKG P-R INTERVAL: 114 MS
EKG Q-T INTERVAL: 376 MS
EKG QRS DURATION: 76 MS
EKG QTC CALCULATION (BAZETT): 477 MS
EKG R AXIS: 74 DEGREES
EKG T AXIS: 63 DEGREES
EKG VENTRICULAR RATE: 97 BPM
EOSINOPHILS ABSOLUTE: 0.17 E9/L (ref 0.05–0.5)
EOSINOPHILS RELATIVE PERCENT: 3.9 % (ref 0–6)
FERRITIN: 148 NG/ML
GFR AFRICAN AMERICAN: >60
GFR NON-AFRICAN AMERICAN: >60 ML/MIN/1.73
GLUCOSE BLD-MCNC: 96 MG/DL (ref 74–99)
HCT VFR BLD CALC: 28.1 % (ref 37–54)
HEMOGLOBIN: 8.7 G/DL (ref 12.5–16.5)
IMMATURE GRANULOCYTES #: 0.01 E9/L
IMMATURE GRANULOCYTES %: 0.2 % (ref 0–5)
IRON SATURATION: 16 % (ref 20–55)
IRON: 24 MCG/DL (ref 59–158)
LYMPHOCYTES ABSOLUTE: 0.69 E9/L (ref 1.5–4)
LYMPHOCYTES RELATIVE PERCENT: 15.6 % (ref 20–42)
MCH RBC QN AUTO: 30.3 PG (ref 26–35)
MCHC RBC AUTO-ENTMCNC: 31 % (ref 32–34.5)
MCV RBC AUTO: 97.9 FL (ref 80–99.9)
MONOCYTES ABSOLUTE: 0.56 E9/L (ref 0.1–0.95)
MONOCYTES RELATIVE PERCENT: 12.7 % (ref 2–12)
NEUTROPHILS ABSOLUTE: 2.96 E9/L (ref 1.8–7.3)
NEUTROPHILS RELATIVE PERCENT: 67.1 % (ref 43–80)
PDW BLD-RTO: 13.2 FL (ref 11.5–15)
PLATELET # BLD: 160 E9/L (ref 130–450)
PMV BLD AUTO: 10.6 FL (ref 7–12)
POTASSIUM REFLEX MAGNESIUM: 3.9 MMOL/L (ref 3.5–5)
RBC # BLD: 2.87 E12/L (ref 3.8–5.8)
SODIUM BLD-SCNC: 144 MMOL/L (ref 132–146)
TOTAL IRON BINDING CAPACITY: 154 MCG/DL (ref 250–450)
TROPONIN, HIGH SENSITIVITY: 40 NG/L (ref 0–11)
VITAMIN D 25-HYDROXY: 16 NG/ML (ref 30–100)
WBC # BLD: 4.4 E9/L (ref 4.5–11.5)

## 2021-08-24 PROCEDURE — 86060 ANTISTREPTOLYSIN O TITER: CPT

## 2021-08-24 PROCEDURE — 6370000000 HC RX 637 (ALT 250 FOR IP): Performed by: SPECIALIST

## 2021-08-24 PROCEDURE — 85025 COMPLETE CBC W/AUTO DIFF WBC: CPT

## 2021-08-24 PROCEDURE — 94640 AIRWAY INHALATION TREATMENT: CPT

## 2021-08-24 PROCEDURE — 6370000000 HC RX 637 (ALT 250 FOR IP): Performed by: EMERGENCY MEDICINE

## 2021-08-24 PROCEDURE — 84484 ASSAY OF TROPONIN QUANT: CPT

## 2021-08-24 PROCEDURE — 83540 ASSAY OF IRON: CPT

## 2021-08-24 PROCEDURE — 83550 IRON BINDING TEST: CPT

## 2021-08-24 PROCEDURE — 82728 ASSAY OF FERRITIN: CPT

## 2021-08-24 PROCEDURE — 2580000003 HC RX 258: Performed by: INTERNAL MEDICINE

## 2021-08-24 PROCEDURE — 6360000002 HC RX W HCPCS: Performed by: INTERNAL MEDICINE

## 2021-08-24 PROCEDURE — 2700000000 HC OXYGEN THERAPY PER DAY

## 2021-08-24 PROCEDURE — 6370000000 HC RX 637 (ALT 250 FOR IP): Performed by: INTERNAL MEDICINE

## 2021-08-24 PROCEDURE — 99232 SBSQ HOSP IP/OBS MODERATE 35: CPT | Performed by: INTERNAL MEDICINE

## 2021-08-24 PROCEDURE — 82306 VITAMIN D 25 HYDROXY: CPT

## 2021-08-24 PROCEDURE — 80048 BASIC METABOLIC PNL TOTAL CA: CPT

## 2021-08-24 PROCEDURE — 2060000000 HC ICU INTERMEDIATE R&B

## 2021-08-24 RX ORDER — LORAZEPAM 0.5 MG/1
0.5 TABLET ORAL ONCE
Status: DISCONTINUED | OUTPATIENT
Start: 2021-08-24 | End: 2021-08-29 | Stop reason: HOSPADM

## 2021-08-24 RX ORDER — CEPHALEXIN 500 MG/1
1000 CAPSULE ORAL EVERY 8 HOURS SCHEDULED
Status: DISCONTINUED | OUTPATIENT
Start: 2021-08-24 | End: 2021-08-29 | Stop reason: HOSPADM

## 2021-08-24 RX ORDER — BUDESONIDE AND FORMOTEROL FUMARATE DIHYDRATE 160; 4.5 UG/1; UG/1
2 AEROSOL RESPIRATORY (INHALATION) 2 TIMES DAILY
Status: DISCONTINUED | OUTPATIENT
Start: 2021-08-24 | End: 2021-08-24 | Stop reason: ALTCHOICE

## 2021-08-24 RX ORDER — SODIUM CHLORIDE 0.9 % (FLUSH) 0.9 %
10 SYRINGE (ML) INJECTION PRN
Status: DISCONTINUED | OUTPATIENT
Start: 2021-08-24 | End: 2021-08-29 | Stop reason: HOSPADM

## 2021-08-24 RX ORDER — BACLOFEN 10 MG/1
5 TABLET ORAL 2 TIMES DAILY
Status: DISCONTINUED | OUTPATIENT
Start: 2021-08-24 | End: 2021-08-24

## 2021-08-24 RX ORDER — CEPHALEXIN 500 MG/1
500 CAPSULE ORAL 4 TIMES DAILY
Status: ON HOLD | COMMUNITY
Start: 2021-08-11 | End: 2021-08-26 | Stop reason: HOSPADM

## 2021-08-24 RX ORDER — BUMETANIDE 1 MG/1
1 TABLET ORAL DAILY
Status: DISCONTINUED | OUTPATIENT
Start: 2021-08-25 | End: 2021-08-29 | Stop reason: HOSPADM

## 2021-08-24 RX ORDER — DULOXETIN HYDROCHLORIDE 60 MG/1
60 CAPSULE, DELAYED RELEASE ORAL NIGHTLY
Status: DISCONTINUED | OUTPATIENT
Start: 2021-08-24 | End: 2021-08-29 | Stop reason: HOSPADM

## 2021-08-24 RX ORDER — POLYETHYLENE GLYCOL 3350 17 G/17G
17 POWDER, FOR SOLUTION ORAL DAILY PRN
Status: DISCONTINUED | OUTPATIENT
Start: 2021-08-24 | End: 2021-08-29 | Stop reason: HOSPADM

## 2021-08-24 RX ORDER — IPRATROPIUM BROMIDE AND ALBUTEROL SULFATE 2.5; .5 MG/3ML; MG/3ML
1 SOLUTION RESPIRATORY (INHALATION)
Status: COMPLETED | OUTPATIENT
Start: 2021-08-24 | End: 2021-08-24

## 2021-08-24 RX ORDER — IPRATROPIUM BROMIDE AND ALBUTEROL SULFATE 2.5; .5 MG/3ML; MG/3ML
1 SOLUTION RESPIRATORY (INHALATION) EVERY 4 HOURS PRN
Status: DISCONTINUED | OUTPATIENT
Start: 2021-08-24 | End: 2021-08-29 | Stop reason: HOSPADM

## 2021-08-24 RX ORDER — ACETAMINOPHEN 650 MG/1
650 SUPPOSITORY RECTAL EVERY 6 HOURS PRN
Status: DISCONTINUED | OUTPATIENT
Start: 2021-08-24 | End: 2021-08-29 | Stop reason: HOSPADM

## 2021-08-24 RX ORDER — ERGOCALCIFEROL 1.25 MG/1
50000 CAPSULE ORAL ONCE
Status: COMPLETED | OUTPATIENT
Start: 2021-08-24 | End: 2021-08-24

## 2021-08-24 RX ORDER — SULFAMETHOXAZOLE AND TRIMETHOPRIM 800; 160 MG/1; MG/1
1 TABLET ORAL EVERY 12 HOURS SCHEDULED
Status: DISCONTINUED | OUTPATIENT
Start: 2021-08-24 | End: 2021-08-24

## 2021-08-24 RX ORDER — LORAZEPAM 0.5 MG/1
0.5 TABLET ORAL ONCE
Status: COMPLETED | OUTPATIENT
Start: 2021-08-24 | End: 2021-08-24

## 2021-08-24 RX ORDER — SODIUM CHLORIDE 9 MG/ML
25 INJECTION, SOLUTION INTRAVENOUS PRN
Status: DISCONTINUED | OUTPATIENT
Start: 2021-08-24 | End: 2021-08-29 | Stop reason: HOSPADM

## 2021-08-24 RX ORDER — AMLODIPINE BESYLATE 5 MG/1
5 TABLET ORAL DAILY
Status: DISCONTINUED | OUTPATIENT
Start: 2021-08-24 | End: 2021-08-29 | Stop reason: HOSPADM

## 2021-08-24 RX ORDER — BUDESONIDE 0.5 MG/2ML
0.5 INHALANT ORAL 2 TIMES DAILY
Status: DISCONTINUED | OUTPATIENT
Start: 2021-08-24 | End: 2021-08-29 | Stop reason: HOSPADM

## 2021-08-24 RX ORDER — OYSTER SHELL CALCIUM WITH VITAMIN D 500; 200 MG/1; [IU]/1
1 TABLET, FILM COATED ORAL 2 TIMES DAILY
Status: DISCONTINUED | OUTPATIENT
Start: 2021-08-24 | End: 2021-08-29 | Stop reason: HOSPADM

## 2021-08-24 RX ORDER — SODIUM CHLORIDE 0.9 % (FLUSH) 0.9 %
10 SYRINGE (ML) INJECTION EVERY 12 HOURS SCHEDULED
Status: DISCONTINUED | OUTPATIENT
Start: 2021-08-24 | End: 2021-08-29 | Stop reason: HOSPADM

## 2021-08-24 RX ORDER — BUDESONIDE 0.5 MG/2ML
500 INHALANT ORAL 2 TIMES DAILY
Status: DISCONTINUED | OUTPATIENT
Start: 2021-08-24 | End: 2021-08-24 | Stop reason: CLARIF

## 2021-08-24 RX ORDER — ARFORMOTEROL TARTRATE 15 UG/2ML
15 SOLUTION RESPIRATORY (INHALATION) 2 TIMES DAILY
Status: DISCONTINUED | OUTPATIENT
Start: 2021-08-24 | End: 2021-08-29 | Stop reason: HOSPADM

## 2021-08-24 RX ORDER — LORAZEPAM 0.5 MG/1
0.5 TABLET ORAL EVERY 4 HOURS PRN
Status: DISCONTINUED | OUTPATIENT
Start: 2021-08-24 | End: 2021-08-24

## 2021-08-24 RX ORDER — ACETAMINOPHEN 325 MG/1
650 TABLET ORAL EVERY 6 HOURS PRN
Status: DISCONTINUED | OUTPATIENT
Start: 2021-08-24 | End: 2021-08-29 | Stop reason: HOSPADM

## 2021-08-24 RX ORDER — HYDROCODONE BITARTRATE AND ACETAMINOPHEN 7.5; 325 MG/1; MG/1
1 TABLET ORAL EVERY 6 HOURS PRN
Status: DISCONTINUED | OUTPATIENT
Start: 2021-08-24 | End: 2021-08-29 | Stop reason: HOSPADM

## 2021-08-24 RX ORDER — DOXYCYCLINE HYCLATE 100 MG/1
100 CAPSULE ORAL EVERY 12 HOURS SCHEDULED
Status: DISCONTINUED | OUTPATIENT
Start: 2021-08-24 | End: 2021-08-29 | Stop reason: HOSPADM

## 2021-08-24 RX ORDER — ARIPIPRAZOLE 5 MG/1
5 TABLET ORAL DAILY
Status: DISCONTINUED | OUTPATIENT
Start: 2021-08-24 | End: 2021-08-29 | Stop reason: HOSPADM

## 2021-08-24 RX ADMIN — HYDROCODONE BITARTRATE AND ACETAMINOPHEN 1 TABLET: 7.5; 325 TABLET ORAL at 16:53

## 2021-08-24 RX ADMIN — ARIPIPRAZOLE 5 MG: 5 TABLET ORAL at 08:42

## 2021-08-24 RX ADMIN — SODIUM CHLORIDE, PRESERVATIVE FREE 10 ML: 5 INJECTION INTRAVENOUS at 21:41

## 2021-08-24 RX ADMIN — HYDROCODONE BITARTRATE AND ACETAMINOPHEN 1 TABLET: 7.5; 325 TABLET ORAL at 03:18

## 2021-08-24 RX ADMIN — IPRATROPIUM BROMIDE AND ALBUTEROL SULFATE 1 AMPULE: .5; 3 SOLUTION RESPIRATORY (INHALATION) at 16:12

## 2021-08-24 RX ADMIN — BUDESONIDE 500 MCG: 0.5 SUSPENSION RESPIRATORY (INHALATION) at 07:54

## 2021-08-24 RX ADMIN — DULOXETINE HYDROCHLORIDE 60 MG: 60 CAPSULE, DELAYED RELEASE ORAL at 00:12

## 2021-08-24 RX ADMIN — DULOXETINE HYDROCHLORIDE 60 MG: 60 CAPSULE, DELAYED RELEASE ORAL at 21:31

## 2021-08-24 RX ADMIN — ACETAMINOPHEN 650 MG: 325 TABLET ORAL at 21:31

## 2021-08-24 RX ADMIN — AMLODIPINE BESYLATE 5 MG: 5 TABLET ORAL at 08:42

## 2021-08-24 RX ADMIN — DOXYCYCLINE HYCLATE 100 MG: 100 CAPSULE ORAL at 21:31

## 2021-08-24 RX ADMIN — BACLOFEN 5 MG: 10 TABLET ORAL at 08:42

## 2021-08-24 RX ADMIN — DOXYCYCLINE HYCLATE 100 MG: 100 CAPSULE ORAL at 12:29

## 2021-08-24 RX ADMIN — BUDESONIDE 500 MCG: 0.5 SUSPENSION RESPIRATORY (INHALATION) at 21:17

## 2021-08-24 RX ADMIN — BACLOFEN 5 MG: 10 TABLET ORAL at 21:31

## 2021-08-24 RX ADMIN — BACLOFEN 5 MG: 10 TABLET ORAL at 00:11

## 2021-08-24 RX ADMIN — HYDROCODONE BITARTRATE AND ACETAMINOPHEN 1 TABLET: 7.5; 325 TABLET ORAL at 10:38

## 2021-08-24 RX ADMIN — CALCIUM GLUCONATE 2000 MG: 98 INJECTION, SOLUTION INTRAVENOUS at 12:25

## 2021-08-24 RX ADMIN — IPRATROPIUM BROMIDE AND ALBUTEROL SULFATE 1 AMPULE: .5; 3 SOLUTION RESPIRATORY (INHALATION) at 12:45

## 2021-08-24 RX ADMIN — ARFORMOTEROL TARTRATE 15 MCG: 15 SOLUTION RESPIRATORY (INHALATION) at 07:54

## 2021-08-24 RX ADMIN — SULFAMETHOXAZOLE AND TRIMETHOPRIM 1 TABLET: 800; 160 TABLET ORAL at 08:42

## 2021-08-24 RX ADMIN — ERGOCALCIFEROL 50000 UNITS: 1.25 CAPSULE ORAL at 22:42

## 2021-08-24 RX ADMIN — CALCIUM CARBONATE-VITAMIN D TAB 500 MG-200 UNIT 1 TABLET: 500-200 TAB at 21:31

## 2021-08-24 RX ADMIN — CEPHALEXIN 1000 MG: 500 CAPSULE ORAL at 21:31

## 2021-08-24 RX ADMIN — IPRATROPIUM BROMIDE AND ALBUTEROL SULFATE 1 AMPULE: .5; 3 SOLUTION RESPIRATORY (INHALATION) at 21:17

## 2021-08-24 RX ADMIN — ENOXAPARIN SODIUM 40 MG: 40 INJECTION SUBCUTANEOUS at 08:42

## 2021-08-24 RX ADMIN — CEFTRIAXONE 1000 MG: 1 INJECTION, POWDER, FOR SOLUTION INTRAMUSCULAR; INTRAVENOUS at 03:18

## 2021-08-24 RX ADMIN — LORAZEPAM 0.5 MG: 0.5 TABLET ORAL at 22:42

## 2021-08-24 RX ADMIN — ARFORMOTEROL TARTRATE 15 MCG: 15 SOLUTION RESPIRATORY (INHALATION) at 21:17

## 2021-08-24 RX ADMIN — CALCIUM CARBONATE-VITAMIN D TAB 500 MG-200 UNIT 1 TABLET: 500-200 TAB at 12:25

## 2021-08-24 ASSESSMENT — PAIN DESCRIPTION - LOCATION
LOCATION: LEG
LOCATION: LEG

## 2021-08-24 ASSESSMENT — PAIN DESCRIPTION - PAIN TYPE
TYPE: CHRONIC PAIN
TYPE: ACUTE PAIN

## 2021-08-24 ASSESSMENT — PAIN DESCRIPTION - FREQUENCY
FREQUENCY: CONTINUOUS
FREQUENCY: CONTINUOUS

## 2021-08-24 ASSESSMENT — PAIN DESCRIPTION - DESCRIPTORS
DESCRIPTORS: BURNING;SHARP
DESCRIPTORS: ACHING;BURNING

## 2021-08-24 ASSESSMENT — PAIN DESCRIPTION - ONSET
ONSET: ON-GOING
ONSET: ON-GOING

## 2021-08-24 ASSESSMENT — PAIN SCALES - GENERAL
PAINLEVEL_OUTOF10: 3
PAINLEVEL_OUTOF10: 9
PAINLEVEL_OUTOF10: 10
PAINLEVEL_OUTOF10: 7
PAINLEVEL_OUTOF10: 8
PAINLEVEL_OUTOF10: 9

## 2021-08-24 NOTE — ED NOTES
Pt stood at side of bed for urinal, complaining of shortness of breath and labored breathing. Pulse ox 85% on 6L. RT called to bedside. Oxygen increased per RT. Will continue to monitor. Dr. Donna Dent aware.       Beverly Fuller RN  08/23/21 [id number]

## 2021-08-24 NOTE — CONSULTS
[address] [place] [address] [facility]  Consultation Note     Admit Date: 8/23/2021  3:24 PM    Reason for Consult:   Left lower extremity cellulitis. Failure of previous treatment    Attending Physician:  Judy Renteria MD    HISTORY OF PRESENT ILLNESS:             The history is obtained from extensive review of available past medical records. The patient is a 54 y.o. male who is not previously known to the ID service. The patient was seen at [facility] on 8/12/2021 for cellulitis and was discharged on Ceftin. His wife does not think that he has improved. He thinks that he has some swelling in the right leg as well. He has not had any fevers. He does still smoke. 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     Past Medical History:        Diagnosis Date    COPD (chronic obstructive pulmonary disease) ([address]     Hypertension     Multiple gastric ulcers      Past Surgical History:        Procedure Laterality Date    HERNIA REPAIR      HIP SURGERY      KNEE SURGERY       Current Medications:   Scheduled Meds:   amLODIPine  5 mg Oral Daily    ARIPiprazole  5 mg Oral Daily    DULoxetine  60 mg Oral Nightly    sodium chloride flush  10 mL Intravenous 2 times per day    enoxaparin  40 mg Subcutaneous Daily    cefTRIAXone (ROCEPHIN) IV  1,000 mg Intravenous Q24H    sulfamethoxazole-trimethoprim  1 tablet Oral 2 times per day    calcium-vitamin D  1 tablet Oral BID    Arformoterol Tartrate  15 mcg Nebulization BID    budesonide  0.5 mg Nebulization BID    calcium gluconate IVPB  2,000 mg Intravenous Once    ipratropium-albuterol  1 ampule Inhalation Q4H WA    [START ON 8/25/2021] bumetanide  1 mg Oral Daily    baclofen  5 mg Oral BID     Continuous Infusions:   sodium chloride       PRN Meds:HYDROcodone-acetaminophen, ipratropium-albuterol, sodium chloride flush, sodium chloride, polyethylene glycol, acetaminophen **OR** acetaminophen    Allergies:  Aspirin, Levofloxacin, Lisinopril, Other, Tramadol, Ibuprofen, and Sulfa antibiotics    Social History:   Social History     Socioeconomic History    Marital status:      Spouse name: None    Number of children: None    Years of education: None    Highest education level: None   Occupational History    None   Tobacco Use    Smoking status: Former Smoker     Packs/day: 4.00     Years: 25.00     Pack years: 100.00     Types: Cigarettes     Start date: 3/10/1995     Quit date: 10/13/2020     Years since quittin.8    Smokeless tobacco: Never Used   Substance and Sexual Activity    Alcohol use: No    Drug use: No    Sexual activity: None   Other Topics Concern    None   Social History Narrative    None     Social Determinants of Health     Financial Resource Strain: Low Risk     Difficulty of Paying Living Expenses: Not hard at all   Food Insecurity: Food Insecurity Present    Worried About [address] in the Last Year: Sometimes true    Moustapha of Food in the Last Year: Sometimes true   Transportation Needs:     Lack of Transportation (Medical):  Lack of Transportation (Non-Medical):    Physical Activity:     Days of Exercise per Week:     Minutes of Exercise per Session:    Stress:     Feeling of Stress :    Social Connections:     Frequency of Communication with Friends and Family:     Frequency of Social Gatherings with Friends and Family:     Attends Yazdanism Services:     Active Member of Clubs or Organizations:     Attends Club or Organization Meetings:     Marital Status:    Intimate Partner Violence:     Fear of Current or Ex-Partner:     Emotionally Abused:     Physically Abused:     Sexually Abused:       Pets: Dog  Patient lives at home with his wife  Travel: No    Family History:   History reviewed. No pertinent family history. . Otherwise non-pertinent to the chief complaint.     REVIEW OF SYSTEMS:    Constitutional: Negative for fevers, chills, diaphoresis  Neurologic: Negative Psychiatric: Negative  Rheumatologic: Negative   Endocrine: Negative  Hematologic: Negative  Immunologic: SARS-CoV-2 vaccine up-to-date  ENT: Negative  Respiratory: Negative   Cardiovascular: Negative  GI: Negative  : Negative  Musculoskeletal: As in the HPI  Skin: No rashes. PHYSICAL EXAM:    Vitals:   /69   Pulse 103   Temp 97.9 °F (36.6 °C)   Resp 20   Ht 5' 4\" (1.626 m)   Wt 130 lb (59 kg)   SpO2 97%   BMI 22.31 kg/m²   Constitutional: The patient is awake, alert, and oriented. Skin: Warm and dry. No rashes were noted. HEENT: Eyes show round, and reactive pupils. No jaundice. Moist mucous membranes, no ulcerations, no thrush. Neck: Supple to movements. No lymphadenopathy. Chest: No use of accessory muscles to breathe. Symmetrical expansion. Auscultation reveals no wheezing, crackles, or rhonchi. Cardiovascular: S1 and S2 are rhythmic and regular. No murmurs appreciated. Abdomen: Positive bowel sounds to auscultation. Benign to palpation. No masses felt. No hepatosplenomegaly. Extremities: The left knee is fused. No open wounds or drainage. Minimal erythema with desquamation, left greater than right. Venous stasis dermatitis noted. This does not appear to be cellulitis. Lines: peripheral      CBC+dif:  Recent Labs     08/23/21  1558 08/23/21  1558 08/24/21  0608   WBC 5.3  --  4.4*   HGB 9.4*   < > 8.7*   HCT 29.7*   < > 28.1*   MCV 95.8   < > 97.9      < > 160   NEUTROABS 3.47   < > 2.96    < > = values in this interval not displayed.      Lab Results   Component Value Date    CRP 14.8 (H) 08/23/2021    .3 (H) 08/12/2021    CRP 49.4 (H) 08/11/2021      No results found for: CRPHS  Lab Results   Component Value Date    SEDRATE 103 (H) 08/23/2021    SEDRATE 51 (H) 08/12/2021    SEDRATE 52 (H) 08/11/2021     Lab Results   Component Value Date    ALT 20 08/23/2021    AST 24 08/23/2021    ALKPHOS 57 08/23/2021    BILITOT 0.3 08/23/2021     Lab Results   Component Value Date     08/24/2021    K 3.9 08/24/2021    CL 97 08/24/2021    CO2 35 08/24/2021    BUN 6 08/24/2021    CREATININE 0.7 08/24/2021    GFRAA >60 08/24/2021    AGRATIO 0.9 08/19/2021    LABGLOM >60 08/24/2021    GLUCOSE 96 08/24/2021    PROT 7.3 08/23/2021    LABALBU 3.4 08/23/2021    CALCIUM 6.6 08/24/2021    BILITOT 0.3 08/23/2021    ALKPHOS 57 08/23/2021    AST 24 08/23/2021    ALT 20 08/23/2021       Lab Results   Component Value Date    PROTIME 11.9 08/12/2021    INR 1.08 08/12/2021       Lab Results   Component Value Date    TSH 0.612 06/14/2021       Lab Results   Component Value Date    COLORU YELLOW 10/13/2020    PHUR 5.0 10/13/2020    [address] NEGATIVE 10/13/2020    RBCUA MODERATE 10/13/2020    MUCUS SMALL 10/13/2020    UROBILINOGEN NEGATIVE 10/13/2020    BILIRUBINUR NEGATIVE 10/13/2020    GLUCOSEU NEGATIVE 10/13/2020       No results found for: HCO3, BE, O2SAT, PH, THGB, PCO2, PO2, TCO2  Radiology:  Noted    Microbiology:  Pending  No results for input(s): BC in the last 72 hours. No results for input(s): ORG in the last 72 hours. No results for input(s): [place] in the last 72 hours. No results for input(s): STREPNEUMAGU in the last 72 hours. No results for input(s): LP1UAG in the last 72 hours. No results for input(s): ASO in the last 72 hours. No results for input(s): CULTRESP in the last 72 hours. No results for input(s): PROCAL in the last 72 hours. Assessment:  · Left lower extremity cellulitis, treated with Ceftin. Apparently he did get Doxycycline at [place]. At this point I am completely unimpressed with the way the legs are looking. He does have venous stasis dermatitis. · Leukopenia    Plan:    · I do not think the patient warrants intravenous antibiotics  · We can try oral Cephalexin and oral Doxycycline    Thank you for having us see this patient in consultation. Case will be discussed with his treating physicians.     Kyle Webster MD  11:15 AM  8/24/2021

## 2021-08-24 NOTE — PROGRESS NOTES
Database complete. Medications reconciled. Care plans and education initiated. [place] states \"my right hip is empty\" referring to infected hardware that was removed. Pt does use a walker/wheelchair for mobility. Wears 4-6L 02 continuous. Pureed diet with thin liquids. Abrasion to left posterior shoulder. Clarified with [facility] patient was discharged from [facility] on Doxycycline and Keflex 8/11/21. Pulmonologist is Dr. Marcos Barnes.

## 2021-08-24 NOTE — PROGRESS NOTES
[facility] Progress Note    Admitting Date and Time: 8/23/2021  3:24 PM  Admit Dx: Cellulitis [L03.90]    Subjective:  Patient was seen and examined this a.m. Awake, alert and oriented X3.  -Still complains of BLE swelling and pain that is worse on the left  -Otherwise no new complaints or acute overnight events    ROS: denies fever, chills, cp, sob, n/v, HA unless stated above.       amLODIPine  5 mg Oral Daily    ARIPiprazole  5 mg Oral Daily    DULoxetine  60 mg Oral Nightly    sodium chloride flush  10 mL Intravenous 2 times per day    enoxaparin  40 mg Subcutaneous Daily    cefTRIAXone (ROCEPHIN) IV  1,000 mg Intravenous Q24H    sulfamethoxazole-trimethoprim  1 tablet Oral 2 times per day    calcium-vitamin D  1 tablet Oral BID    Arformoterol Tartrate  15 mcg Nebulization BID    budesonide  0.5 mg Nebulization BID    calcium gluconate IVPB  2,000 mg Intravenous Once    ipratropium-albuterol  1 ampule Inhalation Q4H WA    [START ON 8/25/2021] bumetanide  1 mg Oral Daily    baclofen  5 mg Oral BID     HYDROcodone-acetaminophen, 1 tablet, Q6H PRN  ipratropium-albuterol, 1 vial, Q4H PRN  sodium chloride flush, 10 mL, PRN  sodium chloride, 25 mL, PRN  polyethylene glycol, 17 g, Daily PRN  acetaminophen, 650 mg, Q6H PRN   Or  acetaminophen, 650 mg, Q6H PRN         Objective:    /69   Pulse 103   Temp 97.9 °F (36.6 °C)   Resp 20   Ht 5' 4\" (1.626 m)   Wt 130 lb (59 kg)   SpO2 97%   BMI 22.31 kg/m²     General Appearance: alert and oriented to person, place and time and in no acute distress  Skin: warm and dry  Head: normocephalic and atraumatic  Eyes: pupils equal, round, and reactive to light, extraocular eye movements intact, conjunctivae normal  Neck: neck supple and non tender without mass   Pulmonary/Chest: clear to auscultation bilaterally- no wheezes, rales or rhonchi, normal air movement, no respiratory distress  Cardiovascular: normal rate, normal S1 and S2 and no carotid bruits  Abdomen: soft, non-tender, non-distended, normal bowel sounds, no masses or organomegaly  Extremities: Noted with LLE erythema and swelling with tenderness  Neurologic: no cranial nerve deficit and speech normal        Recent Labs     08/23/21  1558 08/24/21  0608    144   K 3.6 3.9   CL 94* 97*   CO2 33* 35*   BUN 8 6   CREATININE 0.7 0.7   GLUCOSE 131* 96   CALCIUM 7.0* 6.6*       Recent Labs     08/23/21  1558 08/24/21  0608   WBC 5.3 4.4*   RBC 3.10* 2.87*   HGB 9.4* 8.7*   HCT 29.7* 28.1*   MCV 95.8 97.9   MCH 30.3 30.3   MCHC 31.6* 31.0*   RDW 13.1 13.2    160   MPV 9.8 10.6       Radiology:   US DUP LOWER EXTREMITIES BILATERAL VENOUS   Impression:        No evidence of DVT in either lower extremity       Assessment:  1. Cellulitis on the background of venous insufficiency   -Previously treated at [facility] with no improvement per the patient   -US negative for DVT   -LLE xray noted with soft tissue swelling  2. Elevated troponin -no EKG changes, no CP  3. Hypocalcemia  4. Chronic normocytic anemia  5. Essential hypertension  6. COPD, on 4 LNC-not in exacerbation    Plan:  -ID consulted, input appreciated. Switch to p.o. cephalexin and doxycycline  -Hold Bumex 1 mg for now, monitor BP. Consider switching to HCTZ if remains hypocalcemic  -Trend troponin  -Follow vitamin D level, continue on supplementations  -Follow iron studies  -Continue breathing treatment, wean oxygen as able with SPO2 above 88%      NOTE: This report was transcribed using voice recognition software. Every effort was made to ensure accuracy; however, inadvertent computerized transcription errors may be present.   Electronically signed by Linn Elizabeth MD on 8/24/2021 at 10:56 AM

## 2021-08-24 NOTE — H&P
[address] [facility] History and Physical      CHIEF COMPLAINT:  erythema    History of Present Illness: 20-year-old male with a history of prior fusion of his left knee joint, essential hypertension, COPD, tobacco abuse but quit recently, on Bumex but does not report a history of CHF. Reports for the past couple weeks he has noticed increased swelling and erythema in his left leg as well as pain. He was admitted to [facility], was given IV antibiotics and discharged on Bactrim that he states he is taking. Yesterday he noticed erythema in his right leg and chills. Today at a pain clinic appointment there was concern for cellulitis by the nurse practitioner therefore he was sent to the ED for further evaluation. Informant(s) for H&P: Patient    REVIEW OF SYSTEMS:  A comprehensive 14 point review of systems was negative except for: what is in the HPI    PMH:  Past Medical History:   Diagnosis Date    COPD (chronic obstructive pulmonary disease) ([facility] [address]     Hypertension     Multiple gastric ulcers        Surgical History:  Past Surgical History:   Procedure Laterality Date    HERNIA REPAIR      HIP SURGERY      KNEE SURGERY         Medications Prior to Admission:    Prior to Admission medications    Medication Sig Start Date End Date Taking?  Authorizing Provider   hydrOXYzine (ATARAX) 25 MG tablet Take 1 tablet by mouth 2 times daily as needed for Anxiety 8/6/21 10/5/21 Yes ARGELIA Beltre NP   albuterol sulfate HFA (PROAIR HFA) 108 (90 Base) MCG/ACT inhaler Inhale 2 puffs into the lungs every 4 hours as needed for Wheezing 7/14/21  Yes ARGELIA Beltre NP   Revefenacin [facility]) 175 MCG/3ML SOLN Inhale 1 ampule into the lungs daily 6/16/21  Yes Sallyann Goldmann, APRN - CNP   omeprazole (PRILOSEC) 40 MG delayed release capsule Take 1 capsule by mouth daily 6/14/21  Yes ARGELIA Beltre NP   amLODIPine (NORVASC) 5 MG tablet Take 1 tablet by mouth daily 4/29/21  Yes Aniket Aguirre MD HYDROcodone-acetaminophen (NORCO) 7.5-325 MG per tablet TAKE ONE TABLET BY MOUTH EVERY 12 HOURS AS NEEDED FOR PAIN 10/6/20  Yes Historical Provider, MD   ARIPiprazole (ABILIFY) 5 MG tablet take 1 tablet by mouth once daily 7/5/19  Yes Historical Provider, MD   DULoxetine (CYMBALTA) 60 MG extended release capsule take 1 capsule by mouth at bedtime 7/5/19  Yes Historical Provider, MD   budesonide (PULMICORT) 0.5 MG/2ML nebulizer suspension Take 1 ampule by nebulization 2 times daily    Historical Provider, MD   budesonide (PULMICORT) 0.5 MG/2ML nebulizer suspension Take 2 mLs by nebulization 2 times daily 8/6/21   Kelsey Gerardo MD   doxycycline hyclate (VIBRAMYCIN) 100 MG capsule Every 12 hours 7/14/21   Historical Provider, MD   ipratropium-albuterol (Elio Oiler) 0.5-2.5 (3) MG/3ML SOLN nebulizer solution Inhale 3 mLs into the lungs every 4 hours 6/16/21   Roylene Curet, APRN - CNP   budesonide (PULMICORT) 0.5 MG/2ML nebulizer suspension Take 2 mLs by nebulization 2 times daily 6/16/21   Roylene Curet, APRN - CNP   formoterol (PERFOROMIST) 20 MCG/2ML nebulizer solution Take 2 mLs by nebulization 2 times daily 6/16/21   Roylene Curet, APRN - CNP   budesonide-formoterol [facility]) 160-4.5 MCG/ACT AERO Inhale 2 puffs into the lungs 2 times daily 4/19/21   Ana Pena MD   Revefenacin [facility]) 175 MCG/3ML SOLN Inhale 1 vial into the lungs daily 4/14/21   Roylene Curet, APRN - CNP   formoterol (PERFOROMIST) 20 MCG/2ML nebulizer solution Take 2 mLs by nebulization every 12 hours 4/7/21   Roylene Curet, APRN - CNP   SYMBICORT 160-4.5 MCG/ACT AERO Inhale 2 puffs into the lungs 2 times daily 1/26/21   SHREYA Ayala MD   albuterol sulfate HFA (PROAIR HFA) 108 (90 Base) MCG/ACT inhaler Inhale 2 puffs into the lungs every 4 hours as needed for Wheezing 1/26/21   Ana Pena MD   baclofen (LIORESAL) 10 MG tablet Baclofen Baclofen (Lioresal) 10 MG Tablet Active 5 MG PO Twice A Day December 29th, 2020 8:20pm 12-  [facility] ([id number]) 12/29/20   Historical Provider, MD   bumetanide (BUMEX) 1 MG tablet Take 1 tablet by mouth daily  Patient not taking: Reported on 8/6/2021 1/13/21   Delaney Barlow MD   nicotine (NICODERM CQ) 21 MG/24HR Place 1 patch onto the skin daily  Patient not taking: Reported on 6/16/2021 1/12/21 2/23/21  Delaney Barlow MD   ipratropium-albuterol (DUONEB) 0.5-2.5 (3) MG/3ML SOLN nebulizer solution Inhale 3 mLs into the lungs every 4 hours as needed for Shortness of Breath 3/10/20   R Isaias Foley MD       Allergies:    Aspirin, Levofloxacin, Lisinopril, Other, Tramadol, Ibuprofen, and Sulfa antibiotics    Social History:    reports that he quit smoking about 10 months ago. His smoking use included cigarettes. He started smoking about 26 years ago. He has a 100.00 pack-year smoking history. He has never used smokeless tobacco. He reports that he does not drink alcohol and does not use drugs. Family History:   Family History    Medical History Relation Name Comments   Diabetes Biological Father       Cancer Biological Mother   colon ca         PHYSICAL EXAM:  Vitals:  BP (!) 119/53   Pulse 105   Temp 97.6 °F (36.4 °C) (Temporal)   Resp 19   Ht 5' 4\" (1.626 m)   Wt 130 lb (59 kg)   SpO2 98%   BMI 22.31 kg/m²   General Appearance: alert and oriented to person, place and time and in no acute distress  Skin: warm and dry, turgor not diminished  Head: normocephalic and atraumatic  Eyes: pupils equal, round, and reactive to light, extraocular eye movements intact, conjunctivae normal  Neck: neck supple and non tender without mass   Pulmonary/Chest: clear to auscultation bilaterally- no wheezes, rales or rhonchi, normal air movement, no respiratory distress  Cardiovascular: normal rate, normal S1 and S2 and no M/R/R  Abdomen: soft, non-tender, non-distended, normal bowel sounds, no masses or organomegaly  Extremities: left leg fused so is unable to bend knee. Has swelling and light erythema not warm. Contained within a prior demarcation. Right leg with distal hyperpigmentation. Has surrounding erythema. Neurologic: no cranial nerve deficit and speech normal    LABS:  Recent Labs     08/23/21  1558      K 3.6   CL 94*   CO2 33*   BUN 8   CREATININE 0.7   GLUCOSE 131*   CALCIUM 7.0*       Recent Labs     08/23/21  1558   WBC 5.3   RBC 3.10*   HGB 9.4*   HCT 29.7*   MCV 95.8   MCH 30.3   MCHC 31.6*   RDW 13.1      MPV 9.8       No results for input(s): POCGLU in the last 72 hours. Radiology:   US DUP LOWER EXTREMITIES BILATERAL VENOUS   Final Result   No evidence of DVT in either lower extremity. XR CHEST (2 VW)   Final Result   Nonspecific pleural thickening bilaterally. XR TIBIA FIBULA RIGHT (2 VIEWS)   Final Result   No acute osseous abnormality         XR TIBIA FIBULA LEFT (2 VIEWS)   Final Result   1. There is no osseous abnormality   2. No soft tissue foreign bodies noted. 3. Soft tissue swelling   4. Previous fusion of the left knee. XR FOOT RIGHT (MIN 3 VIEWS)   Final Result   No acute osseous abnormality         XR FOOT LEFT (MIN 3 VIEWS)   Final Result   1. There is no fracture dislocation of the left foot   2. No soft tissue foreign Body is noted   3. Soft tissue swelling. EKG: no acute abnl    ASSESSMENT:    Cellulitis  COPD: Not in acute exacerbation  Essential hypertension  Nonzero troponin  Microcytic anemia  Hypocalcemia  History of tobacco abuse    PLAN:  Cellulitis: elevated inflammatory markers so possible cellulitis on top of venous insufficiency  -check doppler of right leg. Recent left leg neg  -add rocephin to bactrim. Elevate legs. Check procalcitonin.   -demarcate area of erythema in right leg    Copd: continue bronchodilators. Not in exacerbation  Nonzero troponin: No chest pain.   Trend  Microcytic anemia: Iron panel  Hypocalcemia: Start supplements and check vitamin D level    Code Status: Full  DVT prophylaxis: Lovenox      NOTE: This report was transcribed using voice recognition software. Every effort was made to ensure accuracy; however, inadvertent computerized transcription errors may be present.   Electronically signed by Marry Allen MD on 8/23/2021 at 9:01 PM

## 2021-08-25 LAB
ANION GAP SERPL CALCULATED.3IONS-SCNC: 10 MMOL/L (ref 7–16)
BASOPHILS ABSOLUTE: 0.02 E9/L (ref 0–0.2)
BASOPHILS RELATIVE PERCENT: 0.5 % (ref 0–2)
BUN BLDV-MCNC: 6 MG/DL (ref 6–20)
CALCIUM SERPL-MCNC: 8.1 MG/DL (ref 8.6–10.2)
CHLORIDE BLD-SCNC: 92 MMOL/L (ref 98–107)
CO2: 38 MMOL/L (ref 22–29)
CREAT SERPL-MCNC: 0.7 MG/DL (ref 0.7–1.2)
EOSINOPHILS ABSOLUTE: 0.14 E9/L (ref 0.05–0.5)
EOSINOPHILS RELATIVE PERCENT: 3.7 % (ref 0–6)
GFR AFRICAN AMERICAN: >60
GFR NON-AFRICAN AMERICAN: >60 ML/MIN/1.73
GLUCOSE BLD-MCNC: 98 MG/DL (ref 74–99)
HCT VFR BLD CALC: 30.7 % (ref 37–54)
HEMOGLOBIN: 9.4 G/DL (ref 12.5–16.5)
IMMATURE GRANULOCYTES #: 0.02 E9/L
IMMATURE GRANULOCYTES %: 0.5 % (ref 0–5)
LYMPHOCYTES ABSOLUTE: 0.79 E9/L (ref 1.5–4)
LYMPHOCYTES RELATIVE PERCENT: 21.1 % (ref 20–42)
MAGNESIUM: 1 MG/DL (ref 1.6–2.6)
MAGNESIUM: 2.4 MG/DL (ref 1.6–2.6)
MCH RBC QN AUTO: 29.7 PG (ref 26–35)
MCHC RBC AUTO-ENTMCNC: 30.6 % (ref 32–34.5)
MCV RBC AUTO: 97.2 FL (ref 80–99.9)
MONOCYTES ABSOLUTE: 0.35 E9/L (ref 0.1–0.95)
MONOCYTES RELATIVE PERCENT: 9.3 % (ref 2–12)
NEUTROPHILS ABSOLUTE: 2.43 E9/L (ref 1.8–7.3)
NEUTROPHILS RELATIVE PERCENT: 64.9 % (ref 43–80)
PDW BLD-RTO: 13.5 FL (ref 11.5–15)
PLATELET # BLD: 163 E9/L (ref 130–450)
PMV BLD AUTO: 10.2 FL (ref 7–12)
POTASSIUM REFLEX MAGNESIUM: 3.5 MMOL/L (ref 3.5–5)
RBC # BLD: 3.16 E12/L (ref 3.8–5.8)
SODIUM BLD-SCNC: 140 MMOL/L (ref 132–146)
TSH SERPL DL<=0.05 MIU/L-ACNC: 0.3 UIU/ML (ref 0.27–4.2)
WBC # BLD: 3.8 E9/L (ref 4.5–11.5)

## 2021-08-25 PROCEDURE — 84443 ASSAY THYROID STIM HORMONE: CPT

## 2021-08-25 PROCEDURE — 6360000002 HC RX W HCPCS: Performed by: INTERNAL MEDICINE

## 2021-08-25 PROCEDURE — 2700000000 HC OXYGEN THERAPY PER DAY

## 2021-08-25 PROCEDURE — 2580000003 HC RX 258: Performed by: INTERNAL MEDICINE

## 2021-08-25 PROCEDURE — 2060000000 HC ICU INTERMEDIATE R&B

## 2021-08-25 PROCEDURE — 99233 SBSQ HOSP IP/OBS HIGH 50: CPT | Performed by: FAMILY MEDICINE

## 2021-08-25 PROCEDURE — 6370000000 HC RX 637 (ALT 250 FOR IP): Performed by: SPECIALIST

## 2021-08-25 PROCEDURE — 6370000000 HC RX 637 (ALT 250 FOR IP): Performed by: INTERNAL MEDICINE

## 2021-08-25 PROCEDURE — 83735 ASSAY OF MAGNESIUM: CPT

## 2021-08-25 PROCEDURE — 6360000002 HC RX W HCPCS: Performed by: FAMILY MEDICINE

## 2021-08-25 PROCEDURE — 94640 AIRWAY INHALATION TREATMENT: CPT

## 2021-08-25 PROCEDURE — 36415 COLL VENOUS BLD VENIPUNCTURE: CPT

## 2021-08-25 PROCEDURE — 6370000000 HC RX 637 (ALT 250 FOR IP): Performed by: EMERGENCY MEDICINE

## 2021-08-25 PROCEDURE — 85025 COMPLETE CBC W/AUTO DIFF WBC: CPT

## 2021-08-25 PROCEDURE — 80048 BASIC METABOLIC PNL TOTAL CA: CPT

## 2021-08-25 RX ORDER — MAGNESIUM SULFATE IN WATER 40 MG/ML
4000 INJECTION, SOLUTION INTRAVENOUS ONCE
Status: COMPLETED | OUTPATIENT
Start: 2021-08-25 | End: 2021-08-25

## 2021-08-25 RX ORDER — HYDROXYZINE PAMOATE 25 MG/1
25 CAPSULE ORAL 2 TIMES DAILY PRN
Status: DISCONTINUED | OUTPATIENT
Start: 2021-08-25 | End: 2021-08-29 | Stop reason: HOSPADM

## 2021-08-25 RX ADMIN — CEPHALEXIN 1000 MG: 500 CAPSULE ORAL at 21:57

## 2021-08-25 RX ADMIN — MAGNESIUM SULFATE 4000 MG: 4 INJECTION INTRAVENOUS at 12:08

## 2021-08-25 RX ADMIN — CALCIUM CARBONATE-VITAMIN D TAB 500 MG-200 UNIT 1 TABLET: 500-200 TAB at 09:36

## 2021-08-25 RX ADMIN — DOXYCYCLINE HYCLATE 100 MG: 100 CAPSULE ORAL at 21:58

## 2021-08-25 RX ADMIN — SODIUM CHLORIDE, PRESERVATIVE FREE 10 ML: 5 INJECTION INTRAVENOUS at 09:36

## 2021-08-25 RX ADMIN — HYDROCODONE BITARTRATE AND ACETAMINOPHEN 1 TABLET: 7.5; 325 TABLET ORAL at 06:36

## 2021-08-25 RX ADMIN — HYDROXYZINE PAMOATE 25 MG: 25 CAPSULE ORAL at 21:57

## 2021-08-25 RX ADMIN — IPRATROPIUM BROMIDE AND ALBUTEROL SULFATE 3 ML: .5; 3 SOLUTION RESPIRATORY (INHALATION) at 20:45

## 2021-08-25 RX ADMIN — BUDESONIDE 500 MCG: 0.5 SUSPENSION RESPIRATORY (INHALATION) at 08:47

## 2021-08-25 RX ADMIN — AMLODIPINE BESYLATE 5 MG: 5 TABLET ORAL at 09:36

## 2021-08-25 RX ADMIN — IPRATROPIUM BROMIDE AND ALBUTEROL SULFATE 3 ML: .5; 3 SOLUTION RESPIRATORY (INHALATION) at 08:47

## 2021-08-25 RX ADMIN — ARFORMOTEROL TARTRATE 15 MCG: 15 SOLUTION RESPIRATORY (INHALATION) at 20:45

## 2021-08-25 RX ADMIN — ARFORMOTEROL TARTRATE 15 MCG: 15 SOLUTION RESPIRATORY (INHALATION) at 08:47

## 2021-08-25 RX ADMIN — HYDROCODONE BITARTRATE AND ACETAMINOPHEN 1 TABLET: 7.5; 325 TABLET ORAL at 16:10

## 2021-08-25 RX ADMIN — SODIUM CHLORIDE, PRESERVATIVE FREE 10 ML: 5 INJECTION INTRAVENOUS at 21:57

## 2021-08-25 RX ADMIN — BACLOFEN 5 MG: 10 TABLET ORAL at 21:58

## 2021-08-25 RX ADMIN — HYDROCODONE BITARTRATE AND ACETAMINOPHEN 1 TABLET: 7.5; 325 TABLET ORAL at 00:24

## 2021-08-25 RX ADMIN — ENOXAPARIN SODIUM 40 MG: 40 INJECTION SUBCUTANEOUS at 09:35

## 2021-08-25 RX ADMIN — BACLOFEN 5 MG: 10 TABLET ORAL at 09:36

## 2021-08-25 RX ADMIN — CEPHALEXIN 1000 MG: 500 CAPSULE ORAL at 05:42

## 2021-08-25 RX ADMIN — CALCIUM CARBONATE-VITAMIN D TAB 500 MG-200 UNIT 1 TABLET: 500-200 TAB at 21:58

## 2021-08-25 RX ADMIN — IPRATROPIUM BROMIDE AND ALBUTEROL SULFATE 3 ML: .5; 3 SOLUTION RESPIRATORY (INHALATION) at 14:38

## 2021-08-25 RX ADMIN — CEPHALEXIN 1000 MG: 500 CAPSULE ORAL at 14:16

## 2021-08-25 RX ADMIN — BUDESONIDE 500 MCG: 0.5 SUSPENSION RESPIRATORY (INHALATION) at 20:45

## 2021-08-25 RX ADMIN — DULOXETINE HYDROCHLORIDE 60 MG: 60 CAPSULE, DELAYED RELEASE ORAL at 21:57

## 2021-08-25 RX ADMIN — HYDROCODONE BITARTRATE AND ACETAMINOPHEN 1 TABLET: 7.5; 325 TABLET ORAL at 22:10

## 2021-08-25 RX ADMIN — DOXYCYCLINE HYCLATE 100 MG: 100 CAPSULE ORAL at 09:36

## 2021-08-25 RX ADMIN — ARIPIPRAZOLE 5 MG: 5 TABLET ORAL at 09:36

## 2021-08-25 ASSESSMENT — PAIN SCALES - GENERAL
PAINLEVEL_OUTOF10: 10
PAINLEVEL_OUTOF10: 0
PAINLEVEL_OUTOF10: 8
PAINLEVEL_OUTOF10: 0
PAINLEVEL_OUTOF10: 9
PAINLEVEL_OUTOF10: 0
PAINLEVEL_OUTOF10: 6
PAINLEVEL_OUTOF10: 8
PAINLEVEL_OUTOF10: 4

## 2021-08-25 ASSESSMENT — PAIN DESCRIPTION - PAIN TYPE: TYPE: CHRONIC PAIN

## 2021-08-25 ASSESSMENT — PAIN DESCRIPTION - LOCATION: LOCATION: LEG

## 2021-08-25 NOTE — PROGRESS NOTES
[address] [place] [address] [facility]  Progress Note    SUBJECTIVE:  CC: cellulitis    Patient reports some upset stomach with medications overnight. Took this morning with food & so far feeling ok    No fevers, vomiting, diarrhea. Review of systems:  As stated above in the chief complaint, otherwise negative. Medications:  Scheduled Meds:   amLODIPine  5 mg Oral Daily    ARIPiprazole  5 mg Oral Daily    DULoxetine  60 mg Oral Nightly    sodium chloride flush  10 mL IntraVENous 2 times per day    enoxaparin  40 mg Subcutaneous Daily    calcium-vitamin D  1 tablet Oral BID    Arformoterol Tartrate  15 mcg Nebulization BID    budesonide  0.5 mg Nebulization BID    [Held by provider] bumetanide  1 mg Oral Daily    doxycycline hyclate  100 mg Oral 2 times per day    cephALEXin  1,000 mg Oral 3 times per day    LORazepam  0.5 mg Oral Once    baclofen  5 mg Oral BID     Continuous Infusions:   sodium chloride       PRN Meds:HYDROcodone-acetaminophen, ipratropium-albuterol, sodium chloride flush, sodium chloride, polyethylene glycol, acetaminophen **OR** acetaminophen    OBJECTIVE:  BP (!) 146/66   Pulse 100   Temp 98.4 °F (36.9 °C) (Oral)   Resp 20   Ht 5' 4\" (1.626 m)   Wt 135 lb 8 oz (61.5 kg)   SpO2 95%   BMI 23.26 kg/m²   Temp  Av.3 °F (36.8 °C)  Min: 97.8 °F (36.6 °C)  Max: 99.3 °F (37.4 °C)  Constitutional: The patient is awake, alert, and oriented. Lying in bed. In NAD  Skin: Warm and dry. No rashes were noted. HEENT: Round and reactive pupils. Moist mucous membranes. No ulcerations or thrush. Neck: Supple to movements. Chest: No respiratory distress. Symmetrical expansion. Diminished in the bases. Cardiovascular: S1 and S2 are rhythmic and regular. No murmurs appreciated. Abdomen: Positive bowel sounds to auscultation. Benign to palpation. No masses felt. Extremities: venous stasis dermatitis bilateral lower ext. No erythema, purplish in color.  No open appropriate. My exam and plan include: The patient is doing well with current antibiotics. Instructed to take Doxycycline after food. I think he will need about a week more of this combination. He can be discharged from ID standpoint.     Kriss Bey MD  8/25/2021  2:28 PM

## 2021-08-25 NOTE — CARE COORDINATION
Social Work discharge planning   Sw called [facility] CARE PHONE [phone]; [facility] [phone]. JOSE WAS ONLY ABLE TO LEAVE MESSAGE TO CALL SW BACK RE: SERVICES PT HAS IN THE HOME AND IF THEY WILL NEED A RESUME ORDER AT [address] NOT. AWAIT CALL BACK.   Electronically signed by Chito Best on 8/25/2021 at 2:06 PM

## 2021-08-25 NOTE — CARE COORDINATION
Pt lives in [place] with cousin; currently on [place] ; has home 02 4lnc through [facility]; uses  [facility]; transport chair at home. ID following for cellulitis? ?pt states has caregivers come 2x a week; arranged through insurance. plan is home at discharge with cousin. Cousin can transport home. Mee Galvan.

## 2021-08-25 NOTE — PROGRESS NOTES
and reactive to light, extraocular eye movements intact, conjunctivae normal  Neck: neck supple and non tender without mass   Pulmonary/Chest: clear to auscultation bilaterally- no wheezes, rales or rhonchi, normal air movement, no respiratory distress  Cardiovascular: normal rate, normal S1 and S2 and no carotid bruits  Abdomen: soft, non-tender, non-distended, normal bowel sounds, no masses or organomegaly  Extremities: no cyanosis, no clubbing and no edema  Neurologic: no cranial nerve deficit and speech normal        Recent Labs     08/23/21  1558 08/24/21  0608 08/25/21  0600    144 140   K 3.6 3.9 3.5   CL 94* 97* 92*   CO2 33* 35* 38*   BUN 8 6 6   CREATININE 0.7 0.7 0.7   GLUCOSE 131* 96 98   CALCIUM 7.0* 6.6* 8.1*       Recent Labs     08/23/21  1558 08/24/21  0608 08/25/21  0600   WBC 5.3 4.4* 3.8*   RBC 3.10* 2.87* 3.16*   HGB 9.4* 8.7* 9.4*   HCT 29.7* 28.1* 30.7*   MCV 95.8 97.9 97.2   MCH 30.3 30.3 29.7   MCHC 31.6* 31.0* 30.6*   RDW 13.1 13.2 13.5    160 163   MPV 9.8 10.6 10.2       Radiology:  US DUP LOWER EXTREMITIES BILATERAL VENOUS    Result Date: 8/23/2021  EXAMINATION: DUPLEX VENOUS ULTRASOUND OF THE BILATERAL LOWER EXTREMITIES, 8/23/2021 2:11 pm TECHNIQUE: Duplex ultrasound using B-mode/gray scaled imaging and Doppler spectral analysis and color flow was obtained of the bilateral lower extremities. COMPARISON: None. HISTORY: ORDERING SYSTEM PROVIDED HISTORY: dvt TECHNOLOGIST PROVIDED HISTORY: Reason for exam:->dvt What reading provider will be dictating this exam?->CRC FINDINGS: The visualized veins of the bilateral lower extremities are patent and free of echogenic thrombus. The veins demonstrate good compressibility with normal color flow study and spectral analysis. Soft tissue swelling involving left calf. No evidence of DVT in either lower extremity. Assessment:    Active Problems:    Cellulitis  Resolved Problems:    * No resolved hospital problems. *    Plan:  1. Venous stasis dermatitis/Cellulitis - on oral doxycycline and cephalexin. ID involved. Continue wound care. 2.  Hypomagnesemia - 4 gms IV Magnesium. Trend labs and treat accordingly. 3.  Cachexia - check TSH. HgbA1C normal at 5.6 in December 2020. Recheck this admission. 4. HTN - continue meds. 5.  COPD - duonebs. Supportive care. [place] CT of the chest done 8/11/21 with spiculated 5 mm nodule in the RUL, mediatinal adenopathy and moderate emphysema in apices. Pulmonary consult     NOTE: This report was transcribed using voice recognition software. Every effort was made to ensure accuracy; however, inadvertent computerized transcription errors may be present.     Electronically signed by Gibran Schwartz MD on 8/25/2021 at 12:58 PM

## 2021-08-26 LAB
ANION GAP SERPL CALCULATED.3IONS-SCNC: 4 MMOL/L (ref 7–16)
B.E.: 9.7 MMOL/L (ref -3–3)
BASOPHILS ABSOLUTE: 0.01 E9/L (ref 0–0.2)
BASOPHILS RELATIVE PERCENT: 0.2 % (ref 0–2)
BUN BLDV-MCNC: 5 MG/DL (ref 6–20)
CALCIUM SERPL-MCNC: 8.1 MG/DL (ref 8.6–10.2)
CHLORIDE BLD-SCNC: 90 MMOL/L (ref 98–107)
CO2: 43 MMOL/L (ref 22–29)
COHB: 0.7 % (ref 0–1.5)
CREAT SERPL-MCNC: 0.7 MG/DL (ref 0.7–1.2)
CRITICAL: ABNORMAL
DATE ANALYZED: ABNORMAL
DATE OF COLLECTION: ABNORMAL
EOSINOPHILS ABSOLUTE: 0.24 E9/L (ref 0.05–0.5)
EOSINOPHILS RELATIVE PERCENT: 5.4 % (ref 0–6)
GFR AFRICAN AMERICAN: >60
GFR NON-AFRICAN AMERICAN: >60 ML/MIN/1.73
GLUCOSE BLD-MCNC: 104 MG/DL (ref 74–99)
HCO3: 35 MMOL/L (ref 22–26)
HCT VFR BLD CALC: 31.5 % (ref 37–54)
HEMOGLOBIN: 9.6 G/DL (ref 12.5–16.5)
HHB: 12.9 % (ref 0–5)
IMMATURE GRANULOCYTES #: 0.02 E9/L
IMMATURE GRANULOCYTES %: 0.4 % (ref 0–5)
LAB: ABNORMAL
LYMPHOCYTES ABSOLUTE: 1.05 E9/L (ref 1.5–4)
LYMPHOCYTES RELATIVE PERCENT: 23.5 % (ref 20–42)
Lab: ABNORMAL
MAGNESIUM: 2.2 MG/DL (ref 1.6–2.6)
MCH RBC QN AUTO: 29.9 PG (ref 26–35)
MCHC RBC AUTO-ENTMCNC: 30.5 % (ref 32–34.5)
MCV RBC AUTO: 98.1 FL (ref 80–99.9)
METHB: 0.1 % (ref 0–1.5)
MODE: ABNORMAL
MONOCYTES ABSOLUTE: 0.47 E9/L (ref 0.1–0.95)
MONOCYTES RELATIVE PERCENT: 10.5 % (ref 2–12)
NEUTROPHILS ABSOLUTE: 2.68 E9/L (ref 1.8–7.3)
NEUTROPHILS RELATIVE PERCENT: 60 % (ref 43–80)
O2 CONTENT: 13 ML/DL
O2 SATURATION: 87 % (ref 92–98.5)
O2HB: 86.3 % (ref 94–97)
OPERATOR ID: 8213
PATIENT TEMP: 37 C
PCO2: 51 MMHG (ref 35–45)
PDW BLD-RTO: 13.4 FL (ref 11.5–15)
PH BLOOD GAS: 7.45 (ref 7.35–7.45)
PLATELET # BLD: 187 E9/L (ref 130–450)
PMV BLD AUTO: 10.2 FL (ref 7–12)
PO2: 53.4 MMHG (ref 75–100)
POTASSIUM REFLEX MAGNESIUM: 3.5 MMOL/L (ref 3.5–5)
RBC # BLD: 3.21 E12/L (ref 3.8–5.8)
SODIUM BLD-SCNC: 137 MMOL/L (ref 132–146)
SOURCE, BLOOD GAS: ABNORMAL
THB: 10.7 G/DL (ref 11.5–16.5)
TIME ANALYZED: 2243
WBC # BLD: 4.5 E9/L (ref 4.5–11.5)

## 2021-08-26 PROCEDURE — 2060000000 HC ICU INTERMEDIATE R&B

## 2021-08-26 PROCEDURE — 6370000000 HC RX 637 (ALT 250 FOR IP): Performed by: INTERNAL MEDICINE

## 2021-08-26 PROCEDURE — 2700000000 HC OXYGEN THERAPY PER DAY

## 2021-08-26 PROCEDURE — 36415 COLL VENOUS BLD VENIPUNCTURE: CPT

## 2021-08-26 PROCEDURE — 6360000002 HC RX W HCPCS: Performed by: INTERNAL MEDICINE

## 2021-08-26 PROCEDURE — 99233 SBSQ HOSP IP/OBS HIGH 50: CPT | Performed by: FAMILY MEDICINE

## 2021-08-26 PROCEDURE — 6370000000 HC RX 637 (ALT 250 FOR IP): Performed by: SPECIALIST

## 2021-08-26 PROCEDURE — 6370000000 HC RX 637 (ALT 250 FOR IP): Performed by: EMERGENCY MEDICINE

## 2021-08-26 PROCEDURE — 6360000002 HC RX W HCPCS: Performed by: FAMILY MEDICINE

## 2021-08-26 PROCEDURE — 83735 ASSAY OF MAGNESIUM: CPT

## 2021-08-26 PROCEDURE — 2580000003 HC RX 258: Performed by: INTERNAL MEDICINE

## 2021-08-26 PROCEDURE — 82805 BLOOD GASES W/O2 SATURATION: CPT

## 2021-08-26 PROCEDURE — 94640 AIRWAY INHALATION TREATMENT: CPT

## 2021-08-26 PROCEDURE — 80048 BASIC METABOLIC PNL TOTAL CA: CPT

## 2021-08-26 PROCEDURE — 6370000000 HC RX 637 (ALT 250 FOR IP): Performed by: FAMILY MEDICINE

## 2021-08-26 PROCEDURE — 36600 WITHDRAWAL OF ARTERIAL BLOOD: CPT

## 2021-08-26 PROCEDURE — 85025 COMPLETE CBC W/AUTO DIFF WBC: CPT

## 2021-08-26 RX ORDER — GUAIFENESIN 400 MG/1
400 TABLET ORAL 4 TIMES DAILY PRN
Status: DISCONTINUED | OUTPATIENT
Start: 2021-08-26 | End: 2021-08-29 | Stop reason: HOSPADM

## 2021-08-26 RX ORDER — FLUTICASONE PROPIONATE 50 MCG
2 SPRAY, SUSPENSION (ML) NASAL DAILY
Status: DISCONTINUED | OUTPATIENT
Start: 2021-08-26 | End: 2021-08-29 | Stop reason: HOSPADM

## 2021-08-26 RX ORDER — METHYLPREDNISOLONE SODIUM SUCCINATE 125 MG/2ML
125 INJECTION, POWDER, LYOPHILIZED, FOR SOLUTION INTRAMUSCULAR; INTRAVENOUS ONCE
Status: COMPLETED | OUTPATIENT
Start: 2021-08-26 | End: 2021-08-26

## 2021-08-26 RX ORDER — DOXYCYCLINE HYCLATE 100 MG/1
100 CAPSULE ORAL EVERY 12 HOURS SCHEDULED
Qty: 14 CAPSULE | Refills: 0 | Status: SHIPPED | OUTPATIENT
Start: 2021-08-26 | End: 2021-08-29 | Stop reason: HOSPADM

## 2021-08-26 RX ORDER — CEPHALEXIN 500 MG/1
1000 CAPSULE ORAL EVERY 8 HOURS SCHEDULED
Qty: 14 CAPSULE | Refills: 0 | Status: SHIPPED | OUTPATIENT
Start: 2021-08-26 | End: 2021-08-29 | Stop reason: HOSPADM

## 2021-08-26 RX ADMIN — AMLODIPINE BESYLATE 5 MG: 5 TABLET ORAL at 09:09

## 2021-08-26 RX ADMIN — CEPHALEXIN 1000 MG: 500 CAPSULE ORAL at 21:43

## 2021-08-26 RX ADMIN — BUDESONIDE 500 MCG: 0.5 SUSPENSION RESPIRATORY (INHALATION) at 09:31

## 2021-08-26 RX ADMIN — BUDESONIDE 500 MCG: 0.5 SUSPENSION RESPIRATORY (INHALATION) at 21:12

## 2021-08-26 RX ADMIN — ARFORMOTEROL TARTRATE 15 MCG: 15 SOLUTION RESPIRATORY (INHALATION) at 09:31

## 2021-08-26 RX ADMIN — ARIPIPRAZOLE 5 MG: 5 TABLET ORAL at 09:09

## 2021-08-26 RX ADMIN — DOXYCYCLINE HYCLATE 100 MG: 100 CAPSULE ORAL at 21:42

## 2021-08-26 RX ADMIN — HYDROCODONE BITARTRATE AND ACETAMINOPHEN 1 TABLET: 7.5; 325 TABLET ORAL at 21:42

## 2021-08-26 RX ADMIN — DULOXETINE HYDROCHLORIDE 60 MG: 60 CAPSULE, DELAYED RELEASE ORAL at 21:42

## 2021-08-26 RX ADMIN — ENOXAPARIN SODIUM 40 MG: 40 INJECTION SUBCUTANEOUS at 09:10

## 2021-08-26 RX ADMIN — CALCIUM CARBONATE-VITAMIN D TAB 500 MG-200 UNIT 1 TABLET: 500-200 TAB at 21:42

## 2021-08-26 RX ADMIN — HYDROXYZINE PAMOATE 25 MG: 25 CAPSULE ORAL at 09:09

## 2021-08-26 RX ADMIN — BACLOFEN 5 MG: 10 TABLET ORAL at 09:09

## 2021-08-26 RX ADMIN — HYDROCODONE BITARTRATE AND ACETAMINOPHEN 1 TABLET: 7.5; 325 TABLET ORAL at 04:49

## 2021-08-26 RX ADMIN — METHYLPREDNISOLONE SODIUM SUCCINATE 125 MG: 125 INJECTION, POWDER, LYOPHILIZED, FOR SOLUTION INTRAMUSCULAR; INTRAVENOUS at 11:23

## 2021-08-26 RX ADMIN — ARFORMOTEROL TARTRATE 15 MCG: 15 SOLUTION RESPIRATORY (INHALATION) at 21:11

## 2021-08-26 RX ADMIN — SODIUM CHLORIDE, PRESERVATIVE FREE 10 ML: 5 INJECTION INTRAVENOUS at 21:43

## 2021-08-26 RX ADMIN — BACLOFEN 5 MG: 10 TABLET ORAL at 21:42

## 2021-08-26 RX ADMIN — SODIUM CHLORIDE, PRESERVATIVE FREE 10 ML: 5 INJECTION INTRAVENOUS at 09:10

## 2021-08-26 RX ADMIN — CEPHALEXIN 1000 MG: 500 CAPSULE ORAL at 14:03

## 2021-08-26 RX ADMIN — CALCIUM CARBONATE-VITAMIN D TAB 500 MG-200 UNIT 1 TABLET: 500-200 TAB at 09:09

## 2021-08-26 RX ADMIN — DOXYCYCLINE HYCLATE 100 MG: 100 CAPSULE ORAL at 09:11

## 2021-08-26 RX ADMIN — HYDROCODONE BITARTRATE AND ACETAMINOPHEN 1 TABLET: 7.5; 325 TABLET ORAL at 14:03

## 2021-08-26 RX ADMIN — CEPHALEXIN 1000 MG: 500 CAPSULE ORAL at 06:35

## 2021-08-26 RX ADMIN — FLUTICASONE PROPIONATE 2 SPRAY: 50 SPRAY, METERED NASAL at 11:23

## 2021-08-26 ASSESSMENT — PAIN SCALES - GENERAL
PAINLEVEL_OUTOF10: 8
PAINLEVEL_OUTOF10: 7
PAINLEVEL_OUTOF10: 3
PAINLEVEL_OUTOF10: 8
PAINLEVEL_OUTOF10: 8
PAINLEVEL_OUTOF10: 0

## 2021-08-26 ASSESSMENT — PAIN DESCRIPTION - PAIN TYPE
TYPE: CHRONIC PAIN

## 2021-08-26 ASSESSMENT — PAIN - FUNCTIONAL ASSESSMENT: PAIN_FUNCTIONAL_ASSESSMENT: PREVENTS OR INTERFERES SOME ACTIVE ACTIVITIES AND ADLS

## 2021-08-26 ASSESSMENT — PAIN DESCRIPTION - LOCATION
LOCATION: LEG

## 2021-08-26 ASSESSMENT — PAIN DESCRIPTION - PROGRESSION: CLINICAL_PROGRESSION: NOT CHANGED

## 2021-08-26 ASSESSMENT — PAIN DESCRIPTION - ONSET: ONSET: ON-GOING

## 2021-08-26 ASSESSMENT — PAIN DESCRIPTION - DESCRIPTORS: DESCRIPTORS: CONSTANT;ACHING

## 2021-08-26 ASSESSMENT — PAIN DESCRIPTION - FREQUENCY: FREQUENCY: CONTINUOUS

## 2021-08-26 NOTE — PROGRESS NOTES
[address] [place] [address] [facility]  Progress Note    SUBJECTIVE:  CC: cellulitis    Tolerating doxy with food  No fevers, vomiting, diarrhea. Review of systems:  As stated above in the chief complaint, otherwise negative. Medications:  Scheduled Meds:   fluticasone  2 spray Each Nostril Daily    amLODIPine  5 mg Oral Daily    ARIPiprazole  5 mg Oral Daily    DULoxetine  60 mg Oral Nightly    sodium chloride flush  10 mL IntraVENous 2 times per day    enoxaparin  40 mg Subcutaneous Daily    calcium-vitamin D  1 tablet Oral BID    Arformoterol Tartrate  15 mcg Nebulization BID    budesonide  0.5 mg Nebulization BID    [Held by provider] bumetanide  1 mg Oral Daily    doxycycline hyclate  100 mg Oral 2 times per day    cephALEXin  1,000 mg Oral 3 times per day    LORazepam  0.5 mg Oral Once    baclofen  5 mg Oral BID     Continuous Infusions:   sodium chloride       PRN Meds:guaiFENesin, hydrOXYzine, HYDROcodone-acetaminophen, ipratropium-albuterol, sodium chloride flush, sodium chloride, polyethylene glycol, acetaminophen **OR** acetaminophen    OBJECTIVE:  /72   Pulse 93   Temp 98.1 °F (36.7 °C) (Oral)   Resp 20   Ht 5' 4\" (1.626 m)   Wt 135 lb 8 oz (61.5 kg)   SpO2 96%   BMI 23.26 kg/m²   Temp  Av °F (37.2 °C)  Min: 98.1 °F (36.7 °C)  Max: 100.5 °F (38.1 °C)  Constitutional: The patient is awake, alert, and oriented. Lying in bed. In NAD  Skin: Warm and dry. No rashes were noted. HEENT: Round and reactive pupils. Moist mucous membranes. No ulcerations or thrush. Neck: Supple to movements. Chest: No respiratory distress. Symmetrical expansion. Diminished in the bases. scatterd  Cardiovascular: S1 and S2 are rhythmic and regular. No murmurs appreciated. Abdomen: Positive bowel sounds to auscultation. Benign to palpation. No masses felt. Extremities: venous stasis dermatitis bilateral lower ext. No erythema, purplish in color. No open wounds.    Lines: peripheral    Laboratory and Tests Review:  Lab Results   Component Value Date    WBC 4.5 08/26/2021    WBC 3.8 (L) 08/25/2021    WBC 4.4 (L) 08/24/2021    HGB 9.6 (L) 08/26/2021    HCT 31.5 (L) 08/26/2021    MCV 98.1 08/26/2021     08/26/2021     Lab Results   Component Value Date    NEUTROABS 2.68 08/26/2021    NEUTROABS 2.43 08/25/2021    NEUTROABS 2.96 08/24/2021     No results found for: [facility] [[email or web address]]  Lab Results   Component Value Date    ALT 20 08/23/2021    AST 24 08/23/2021    ALKPHOS 57 08/23/2021    BILITOT 0.3 08/23/2021     Lab Results   Component Value Date     08/26/2021    K 3.5 08/26/2021    CL 90 08/26/2021    CO2 43 08/26/2021    BUN 5 08/26/2021    CREATININE 0.7 08/26/2021    CREATININE 0.7 08/25/2021    CREATININE 0.7 08/24/2021    GFRAA >60 08/26/2021    AGRATIO 0.9 08/19/2021    LABGLOM >60 08/26/2021    GLUCOSE 104 08/26/2021    PROT 7.3 08/23/2021    LABALBU 3.4 08/23/2021    CALCIUM 8.1 08/26/2021    BILITOT 0.3 08/23/2021    ALKPHOS 57 08/23/2021    AST 24 08/23/2021    ALT 20 08/23/2021     Lab Results   Component Value Date    CRP 14.8 (H) 08/23/2021    .3 (H) 08/12/2021    CRP 49.4 (H) 08/11/2021     Lab Results   Component Value Date    SEDRATE 103 (H) 08/23/2021    SEDRATE 51 (H) 08/12/2021    SEDRATE 52 (H) 08/11/2021     Radiology:  Reviewed     Microbiology:   Blood cultures: negative so far    ASO 68    ASSESSMENT:  · Left lower extremity cellulitis, treated with Ceftin. · Venous stasis dermatitis  · Leukopenia    PLAN:  · Continue PO Cephalexin & PO Doxycycline for 6 more days  · Family at bedside reports he has about 4 days worth of antibiotics at home  · tolerating with food  · Monitor labs    Jennifer Hopkins, APRN - CNP  11:33 AM  8/26/2021     Patient seen and examined. I had a face to face encounter with the patient. Agree with exam.  Assessment and plan as outlined above and directed by me. Addition and corrections were done as deemed appropriate.  My exam and

## 2021-08-26 NOTE — PROGRESS NOTES
chloride, 25 mL, PRN  polyethylene glycol, 17 g, Daily PRN  acetaminophen, 650 mg, Q6H PRN   Or  acetaminophen, 650 mg, Q6H PRN       Objective:    /72   Pulse 93   Temp 98.1 °F (36.7 °C) (Oral)   Resp 20   Ht 5' 4\" (1.626 m)   Wt 135 lb 8 oz (61.5 kg)   SpO2 96%   BMI 23.26 kg/m²     General Appearance: alert and oriented to person, place and time and in no acute distress. No retractions. No nasal flaring. No shortness of breath with conversation. Skin: warm and dry  Head: normocephalic and atraumatic  Eyes: pupils equal, round, and reactive to light, extraocular eye movements intact, conjunctivae normal  Neck: neck supple and non tender without mass   Pulmonary/Chest: clear to auscultation bilaterally- no wheezes, rales or rhonchi, normal air movement, no respiratory distress  Cardiovascular: normal rate, normal S1 and S2 and no carotid bruits  Abdomen: soft, non-tender, non-distended, normal bowel sounds, no masses or organomegaly  Extremities: no cyanosis, no clubbing and no edema  Neurologic: no cranial nerve deficit and speech normal    Recent Labs     08/24/21  0608 08/25/21  0600 08/26/21  0400    140 137   K 3.9 3.5 3.5   CL 97* 92* 90*   CO2 35* 38* 43*   BUN 6 6 5*   CREATININE 0.7 0.7 0.7   GLUCOSE 96 98 104*   CALCIUM 6.6* 8.1* 8.1*       Recent Labs     08/24/21  0608 08/25/21  0600 08/26/21  0400   WBC 4.4* 3.8* 4.5   RBC 2.87* 3.16* 3.21*   HGB 8.7* 9.4* 9.6*   HCT 28.1* 30.7* 31.5*   MCV 97.9 97.2 98.1   MCH 30.3 29.7 29.9   MCHC 31.0* 30.6* 30.5*   RDW 13.2 13.5 13.4    163 187   MPV 10.6 10.2 10.2       Radiology:   No results found. Assessment:    Active Problems:    Cellulitis  Resolved Problems:    * No resolved hospital problems. *      Plan:  1. Venous stasis dermatitis/Cellulitis - on oral doxycycline and cephalexin. ID signed off. Continue wound care. 2.  Hypomagnesemia - Magnesium 2.2 this a.m. Trend labs and treat accordingly.     3.  Cachexia - check TSH, normal at 0.297. HgbA1C normal at 5.6 in December 2020. Hemoglobin A1c pending this admission. 4. HTN - continue meds. 5.  COPD - duonebs. Supportive care. [place] CT of the chest done 8/11/21 with spiculated 5 mm nodule in the RUL, mediatinal adenopathy and moderate emphysema in apices. Pulmonary consult     NOTE: This report was transcribed using voice recognition software. Every effort was made to ensure accuracy; however, inadvertent computerized transcription errors may be present.     Electronically signed by Humberto Tariq MD on 8/26/2021 at 10:08 AM

## 2021-08-26 NOTE — PROGRESS NOTES
Entered room upon hearing patient state difficulty breathing, with a pulse ox reading 79% on 8L HFNC. He then says, \"watch this\" placed the cannula in his mouth and the reading increased steadily to 93%. States that he is very congested and agreed to attempt oxygenation through a face mask. Venturi mask applied with 50% and SpO2 measured 99%. RN notified, will continue to monitor.

## 2021-08-26 NOTE — PROGRESS NOTES
Patient refusing to be turned and repositioned during this nurse's shift. Educated on the importance of turning and repositioning in order to prevent skin breakdown. Patient refusing and states he is comfortable in the position that he is.

## 2021-08-26 NOTE — CONSULTS
Pulmonary Consultation    Admit Date: 8/23/2021  Requesting Physician: ARGELIA Landrum NP    Reason for consultation:  · COPD  HPI:  · The patient is a 68-year-old white male with end-stage chronic obstructive pulmonary disease and chronic hypercapnic respiratory failure. He quit smoking a short time ago after a lifetime. He presents to this hospital with swelling and erythema lower extremity. Seen by infectious disease, it was deemed not to be infected. Because of the swelling, the patient was started on diuretics. His swelling has decreased substantially. His breathing is at its baseline which is to say O2 dependent. PMH:    Past Medical History:   Diagnosis Date    COPD (chronic obstructive pulmonary disease) ([facility] [address]     Hypertension     Multiple gastric ulcers      PSH:   Past Surgical History:   Procedure Laterality Date    HERNIA REPAIR      HIP SURGERY      KNEE SURGERY         Review of Systems:   · Constitutional: As noted in the HPI. · Eyes: No visual changes or diplopia. No scleral icterus. · ENT: No headaches, hearing loss or vertigo. No nasal congestion, or sore throat. · Cardiovascular: No chest pain, dyspnea on exertion, or palpitations. · Respiratory: See above  · Gastrointestinal: No abdominal pain, nausea or emesis. No diarrhea or rectal bleeding or melena. No change in bowel habits. · Genitourinary: No dysuria, urinary frequency, or incontinence. No hematuria. · Musculoskeletal: No gait disturbance, weakness or joint complaints. YANIV. · Integumentary: No rash or pruritis. No abnormal pigmentation, hair or nail changes. · Neurological: No headache, diplopia, dizziness, tremor, change in muscle strength, numbness or tingling. No change in gait, balance, coordination, mood, affect, memory, mentation, behavior. · Psychiatric: No anxiety or depression.   · Endocrine: No temperature intolerance, excessive thirst, fluid intake, urinary frequency, excessive appetite, or recent weight change. · Hematologic/Lymphatic: No abnormal bruising or bleeding, blood clots or swollen lymph nodes. No anemia, fever, chills, night sweats, or swollen glands. · Allergic/Immunologic: No seasonal or perenial allergies. No history of hives or atopic dermatitis. Social History:  · Alcohol:  no  · Tobacco:   past  · Employment:  no silica or asbestos exposure  · Family:  No family history of lung disease    Medications:   sodium chloride        fluticasone  2 spray Each Nostril Daily    amLODIPine  5 mg Oral Daily    ARIPiprazole  5 mg Oral Daily    DULoxetine  60 mg Oral Nightly    sodium chloride flush  10 mL IntraVENous 2 times per day    enoxaparin  40 mg Subcutaneous Daily    calcium-vitamin D  1 tablet Oral BID    Arformoterol Tartrate  15 mcg Nebulization BID    budesonide  0.5 mg Nebulization BID    [Held by provider] bumetanide  1 mg Oral Daily    doxycycline hyclate  100 mg Oral 2 times per day    cephALEXin  1,000 mg Oral 3 times per day    LORazepam  0.5 mg Oral Once    baclofen  5 mg Oral BID       Vitals:  Tmax:  VITALS:  /64   Pulse 90   Temp 98.4 °F (36.9 °C) (Oral)   Resp 20   Ht 5' 4\" (1.626 m)   Wt 135 lb 8 oz (61.5 kg)   SpO2 94%   BMI 23.26 kg/m²   24HR INTAKE/OUTPUT:      Intake/Output Summary (Last 24 hours) at 2021  Last data filed at 2021 1405  Gross per 24 hour   Intake --   Output 1250 ml   Net -1250 ml     CURRENT PULSE OXIMETRY:  SpO2: 94 %  24HR PULSE OXIMETRY RANGE:  SpO2  Av.8 %  Min: 81 %  Max: 99 %    EXAM:  General: No distress. Alert. Eyes: PERRL. No sclera icterus. No conjunctival injection. ENT: No discharge. Pharynx clear. Neck: Trachea midline. Normal thyroid. No jvd, no hjr. Resp: no wheezing. No accessory muscle use. no rales. no rhonchi. CV: Regular rate. Regular rhythm. No murmur No rub. Abd: Non-tender. Non-distended. No masses. No organmegaly. Normal bowel sounds.    Skin: Warm and dry. No nodule on exposed extremities. No rash on exposed extremities. Lymph: No cervical LAD. No supraclavicular LAD. Ext: No joint deformity. No clubbing. No cyanosis. No edema  Neuro: Awake. Follows commands. Positive pupils/gag/corneals. Normal pain response. Lab Results:  CBC:   Recent Labs     08/24/21  0608 08/25/21  0600 08/26/21  0400   WBC 4.4* 3.8* 4.5   HGB 8.7* 9.4* 9.6*   HCT 28.1* 30.7* 31.5*   MCV 97.9 97.2 98.1    163 187       BMP:  Recent Labs     08/24/21  0608 08/25/21  0600 08/26/21  0400    140 137   K 3.9 3.5 3.5   CL 97* 92* 90*   CO2 35* 38* 43*   BUN 6 6 5*   CREATININE 0.7 0.7 0.7    ALB:3,BILIDIR:3,BILITOT:3,ALKPHOS:3)@    PT/INR: No results for input(s): PROTIME, INR in the last 72 hours. Cultures:  Sputum: not available  Blood: not available    ABG:   No results for input(s): PH, PO2, PCO2, HCO3, BE, O2SAT, METHB, O2HB, COHB, O2CON, HHB, THB in the last 72 hours. FiO2 : 50 %       Films:     US DUP LOWER EXTREMITIES BILATERAL VENOUS   Final Result   No evidence of DVT in either lower extremity. XR CHEST (2 VW)   Final Result   Nonspecific pleural thickening bilaterally. XR TIBIA FIBULA RIGHT (2 VIEWS)   Final Result   No acute osseous abnormality         XR TIBIA FIBULA LEFT (2 VIEWS)   Final Result   1. There is no osseous abnormality   2. No soft tissue foreign bodies noted. 3. Soft tissue swelling   4. Previous fusion of the left knee. XR FOOT RIGHT (MIN 3 VIEWS)   Final Result   No acute osseous abnormality         XR FOOT LEFT (MIN 3 VIEWS)   Final Result   1. There is no fracture dislocation of the left foot   2. No soft tissue foreign Body is noted   3. Soft tissue swelling. US DUP LOWER EXTREMITY RIGHT LIBBY    (Results Pending)   . Assessment:  1. End-stage chronic obstructive pulmonary disease with chronic hypercapnic respiratory failure.   2. Compensatory metabolic alkalosis from chronic respiratory acidosis  3. New primary metabolic alkalosis secondary to loop diuretics      Plan:  1. Check arterial blood gases. The patient may very well need noninvasive ventilation upon discharge. Thanks for letting us see this patient in consultation. Total time in reviewing the previous admissions and records, reviewing the current x-rays, labs, and discussing with clinical staff including nursing and physicians, exceeded 50 minutes. Please contact us with any questions. Office [phone] or after hours through [employer], x [phone]. Please note that voice recognition technology was used (while wearing a Covid mandated mask) in the preparation of this note and make therefore it may contain inadvertent transcription errors. If the patient is a COVID 19 isolation patient, the above physical exam reflects that of the examining physician for the day. Rk Loaiza MD,  M.D., F.C.C.P.     [facility], [address] [place] [address], [address] [place] [address]

## 2021-08-26 NOTE — CARE COORDINATION
Await pulm consult;increased 02 requirement ; on 8l today. Home 02 through [address]; has moonlight [facility]; SUNDAY orders on chart. Fax [phone]. Ph [phone]. Plan is home per pt; with cousin. will follow. Janell Triplett.

## 2021-08-27 LAB
ANION GAP SERPL CALCULATED.3IONS-SCNC: 7 MMOL/L (ref 7–16)
BASOPHILS ABSOLUTE: 0 E9/L (ref 0–0.2)
BASOPHILS RELATIVE PERCENT: 0 % (ref 0–2)
BUN BLDV-MCNC: 8 MG/DL (ref 6–20)
CALCIUM SERPL-MCNC: 8.5 MG/DL (ref 8.6–10.2)
CHLORIDE BLD-SCNC: 93 MMOL/L (ref 98–107)
CO2: 37 MMOL/L (ref 22–29)
CREAT SERPL-MCNC: 0.5 MG/DL (ref 0.7–1.2)
EOSINOPHILS ABSOLUTE: 0 E9/L (ref 0.05–0.5)
EOSINOPHILS RELATIVE PERCENT: 0 % (ref 0–6)
GFR AFRICAN AMERICAN: >60
GFR NON-AFRICAN AMERICAN: >60 ML/MIN/1.73
GLUCOSE BLD-MCNC: 159 MG/DL (ref 74–99)
HCT VFR BLD CALC: 32 % (ref 37–54)
HEMOGLOBIN: 9.9 G/DL (ref 12.5–16.5)
IMMATURE GRANULOCYTES #: 0.01 E9/L
IMMATURE GRANULOCYTES %: 0.3 % (ref 0–5)
LYMPHOCYTES ABSOLUTE: 0.36 E9/L (ref 1.5–4)
LYMPHOCYTES RELATIVE PERCENT: 12.3 % (ref 20–42)
MCH RBC QN AUTO: 29.8 PG (ref 26–35)
MCHC RBC AUTO-ENTMCNC: 30.9 % (ref 32–34.5)
MCV RBC AUTO: 96.4 FL (ref 80–99.9)
MONOCYTES ABSOLUTE: 0.18 E9/L (ref 0.1–0.95)
MONOCYTES RELATIVE PERCENT: 6.2 % (ref 2–12)
NEUTROPHILS ABSOLUTE: 2.37 E9/L (ref 1.8–7.3)
NEUTROPHILS RELATIVE PERCENT: 81.2 % (ref 43–80)
OVALOCYTES: ABNORMAL
PDW BLD-RTO: 13.1 FL (ref 11.5–15)
PLATELET # BLD: 198 E9/L (ref 130–450)
PMV BLD AUTO: 11 FL (ref 7–12)
POIKILOCYTES: ABNORMAL
POLYCHROMASIA: ABNORMAL
POTASSIUM REFLEX MAGNESIUM: 4.2 MMOL/L (ref 3.5–5)
RBC # BLD: 3.32 E12/L (ref 3.8–5.8)
SODIUM BLD-SCNC: 137 MMOL/L (ref 132–146)
WBC # BLD: 2.9 E9/L (ref 4.5–11.5)

## 2021-08-27 PROCEDURE — 6370000000 HC RX 637 (ALT 250 FOR IP): Performed by: EMERGENCY MEDICINE

## 2021-08-27 PROCEDURE — 36415 COLL VENOUS BLD VENIPUNCTURE: CPT

## 2021-08-27 PROCEDURE — 6370000000 HC RX 637 (ALT 250 FOR IP): Performed by: SPECIALIST

## 2021-08-27 PROCEDURE — 6370000000 HC RX 637 (ALT 250 FOR IP): Performed by: FAMILY MEDICINE

## 2021-08-27 PROCEDURE — 99233 SBSQ HOSP IP/OBS HIGH 50: CPT | Performed by: FAMILY MEDICINE

## 2021-08-27 PROCEDURE — 2700000000 HC OXYGEN THERAPY PER DAY

## 2021-08-27 PROCEDURE — 94640 AIRWAY INHALATION TREATMENT: CPT

## 2021-08-27 PROCEDURE — 6370000000 HC RX 637 (ALT 250 FOR IP): Performed by: INTERNAL MEDICINE

## 2021-08-27 PROCEDURE — 6360000002 HC RX W HCPCS: Performed by: INTERNAL MEDICINE

## 2021-08-27 PROCEDURE — 2060000000 HC ICU INTERMEDIATE R&B

## 2021-08-27 PROCEDURE — 85025 COMPLETE CBC W/AUTO DIFF WBC: CPT

## 2021-08-27 PROCEDURE — 2580000003 HC RX 258: Performed by: INTERNAL MEDICINE

## 2021-08-27 PROCEDURE — 80048 BASIC METABOLIC PNL TOTAL CA: CPT

## 2021-08-27 RX ORDER — PREDNISONE 20 MG/1
40 TABLET ORAL DAILY
Status: DISCONTINUED | OUTPATIENT
Start: 2021-08-27 | End: 2021-08-29 | Stop reason: HOSPADM

## 2021-08-27 RX ADMIN — BACLOFEN 5 MG: 10 TABLET ORAL at 21:34

## 2021-08-27 RX ADMIN — CALCIUM CARBONATE-VITAMIN D TAB 500 MG-200 UNIT 1 TABLET: 500-200 TAB at 21:35

## 2021-08-27 RX ADMIN — AMLODIPINE BESYLATE 5 MG: 5 TABLET ORAL at 08:42

## 2021-08-27 RX ADMIN — HYDROCODONE BITARTRATE AND ACETAMINOPHEN 1 TABLET: 7.5; 325 TABLET ORAL at 18:06

## 2021-08-27 RX ADMIN — ARIPIPRAZOLE 5 MG: 5 TABLET ORAL at 08:46

## 2021-08-27 RX ADMIN — ENOXAPARIN SODIUM 40 MG: 40 INJECTION SUBCUTANEOUS at 08:42

## 2021-08-27 RX ADMIN — DOXYCYCLINE HYCLATE 100 MG: 100 CAPSULE ORAL at 08:42

## 2021-08-27 RX ADMIN — HYDROXYZINE PAMOATE 25 MG: 25 CAPSULE ORAL at 08:46

## 2021-08-27 RX ADMIN — CEPHALEXIN 1000 MG: 500 CAPSULE ORAL at 15:02

## 2021-08-27 RX ADMIN — HYDROCODONE BITARTRATE AND ACETAMINOPHEN 1 TABLET: 7.5; 325 TABLET ORAL at 05:50

## 2021-08-27 RX ADMIN — ARFORMOTEROL TARTRATE 15 MCG: 15 SOLUTION RESPIRATORY (INHALATION) at 09:13

## 2021-08-27 RX ADMIN — DOXYCYCLINE HYCLATE 100 MG: 100 CAPSULE ORAL at 21:35

## 2021-08-27 RX ADMIN — HYDROXYZINE PAMOATE 25 MG: 25 CAPSULE ORAL at 21:44

## 2021-08-27 RX ADMIN — PREDNISONE 40 MG: 20 TABLET ORAL at 15:05

## 2021-08-27 RX ADMIN — SODIUM CHLORIDE, PRESERVATIVE FREE 10 ML: 5 INJECTION INTRAVENOUS at 21:35

## 2021-08-27 RX ADMIN — FLUTICASONE PROPIONATE 2 SPRAY: 50 SPRAY, METERED NASAL at 10:53

## 2021-08-27 RX ADMIN — BUDESONIDE 500 MCG: 0.5 SUSPENSION RESPIRATORY (INHALATION) at 20:43

## 2021-08-27 RX ADMIN — DULOXETINE HYDROCHLORIDE 60 MG: 60 CAPSULE, DELAYED RELEASE ORAL at 21:35

## 2021-08-27 RX ADMIN — SODIUM CHLORIDE, PRESERVATIVE FREE 10 ML: 5 INJECTION INTRAVENOUS at 08:42

## 2021-08-27 RX ADMIN — CEPHALEXIN 1000 MG: 500 CAPSULE ORAL at 21:44

## 2021-08-27 RX ADMIN — CEPHALEXIN 1000 MG: 500 CAPSULE ORAL at 05:50

## 2021-08-27 RX ADMIN — BACLOFEN 5 MG: 10 TABLET ORAL at 08:42

## 2021-08-27 RX ADMIN — CALCIUM CARBONATE-VITAMIN D TAB 500 MG-200 UNIT 1 TABLET: 500-200 TAB at 08:42

## 2021-08-27 RX ADMIN — HYDROCODONE BITARTRATE AND ACETAMINOPHEN 1 TABLET: 7.5; 325 TABLET ORAL at 12:06

## 2021-08-27 RX ADMIN — ARFORMOTEROL TARTRATE 15 MCG: 15 SOLUTION RESPIRATORY (INHALATION) at 20:43

## 2021-08-27 RX ADMIN — BUDESONIDE 500 MCG: 0.5 SUSPENSION RESPIRATORY (INHALATION) at 09:13

## 2021-08-27 ASSESSMENT — PAIN SCALES - GENERAL
PAINLEVEL_OUTOF10: 7
PAINLEVEL_OUTOF10: 0
PAINLEVEL_OUTOF10: 9
PAINLEVEL_OUTOF10: 8
PAINLEVEL_OUTOF10: 0
PAINLEVEL_OUTOF10: 8
PAINLEVEL_OUTOF10: 4
PAINLEVEL_OUTOF10: 0
PAINLEVEL_OUTOF10: 0
PAINLEVEL_OUTOF10: 7

## 2021-08-27 ASSESSMENT — PAIN DESCRIPTION - PAIN TYPE
TYPE: CHRONIC PAIN
TYPE: CHRONIC PAIN

## 2021-08-27 ASSESSMENT — PAIN DESCRIPTION - LOCATION
LOCATION: LEG
LOCATION: LEG

## 2021-08-27 ASSESSMENT — PAIN DESCRIPTION - PROGRESSION: CLINICAL_PROGRESSION: NOT CHANGED

## 2021-08-27 NOTE — PROGRESS NOTES
[facility] Progress Note    Admitting Date and Time: 8/23/2021  3:24 PM  Admit Dx: Cellulitis [L03.90]  Chronic obstructive pulmonary disease with acute exacerbation (HCC) [J44.1]  Cellulitis of lower extremity, unspecified laterality [L03.119]    Subjective:  Patient is being followed for Cellulitis [L03.90]  Chronic obstructive pulmonary disease with acute exacerbation ([address] [J44.1]  Cellulitis of lower extremity, unspecified laterality [O12.516]     Pt feels breathing better with oxygen. Per RN: Venous duplex negative. ABG shows hypercapnic and hypoxic respiratory failure of a chronic nature. ROS: denies fever, chills, cp, sob, n/v, HA unless stated above.       predniSONE  40 mg Oral Daily    fluticasone  2 spray Each Nostril Daily    amLODIPine  5 mg Oral Daily    ARIPiprazole  5 mg Oral Daily    DULoxetine  60 mg Oral Nightly    sodium chloride flush  10 mL IntraVENous 2 times per day    enoxaparin  40 mg Subcutaneous Daily    calcium-vitamin D  1 tablet Oral BID    Arformoterol Tartrate  15 mcg Nebulization BID    budesonide  0.5 mg Nebulization BID    [Held by provider] bumetanide  1 mg Oral Daily    doxycycline hyclate  100 mg Oral 2 times per day    cephALEXin  1,000 mg Oral 3 times per day    LORazepam  0.5 mg Oral Once    baclofen  5 mg Oral BID     guaiFENesin, 400 mg, 4x Daily PRN  hydrOXYzine, 25 mg, BID PRN  HYDROcodone-acetaminophen, 1 tablet, Q6H PRN  ipratropium-albuterol, 1 vial, Q4H PRN  sodium chloride flush, 10 mL, PRN  sodium chloride, 25 mL, PRN  polyethylene glycol, 17 g, Daily PRN  acetaminophen, 650 mg, Q6H PRN   Or  acetaminophen, 650 mg, Q6H PRN         Objective:    /73   Pulse 81   Temp 97.6 °F (36.4 °C) (Oral)   Resp 18   Ht 5' 4\" (1.626 m)   Wt 135 lb 8 oz (61.5 kg)   SpO2 98%   BMI 23.26 kg/m²     General Appearance: alert and oriented to person, place and time and in no acute distress  Skin: warm and dry  Head: normocephalic and atraumatic  Eyes: pupils equal, round, and reactive to light, extraocular eye movements intact, conjunctivae normal  Neck: neck supple and non tender without mass   Pulmonary/Chest: clear to auscultation bilaterally- no wheezes, rales or rhonchi, normal air movement, no respiratory distress  Cardiovascular: normal rate, normal S1 and S2 and no carotid bruits  Abdomen: soft, non-tender, non-distended, normal bowel sounds, no masses or organomegaly  Extremities: no cyanosis, no clubbing and no edema  Neurologic: no cranial nerve deficit and speech normal        Recent Labs     08/25/21  0600 08/26/21  0400 08/27/21  0700    137 137   K 3.5 3.5 4.2   CL 92* 90* 93*   CO2 38* 43* 37*   BUN 6 5* 8   CREATININE 0.7 0.7 0.5*   GLUCOSE 98 104* 159*   CALCIUM 8.1* 8.1* 8.5*       Recent Labs     08/25/21  0600 08/26/21  0400 08/27/21  0700   WBC 3.8* 4.5 2.9*   RBC 3.16* 3.21* 3.32*   HGB 9.4* 9.6* 9.9*   HCT 30.7* 31.5* 32.0*   MCV 97.2 98.1 96.4   MCH 29.7 29.9 29.8   MCHC 30.6* 30.5* 30.9*   RDW 13.5 13.4 13.1    187 198   MPV 10.2 10.2 11.0       Radiology:   No results found. Assessment:    Active Problems:    Cellulitis  Resolved Problems:    * No resolved hospital problems. *      Plan:  1. Venous stasis dermatitis/Cellulitis - on oral doxycycline and cephalexin.  ID signed off.  Continue wound care. 2.  Hypomagnesemia -  Trend labs and treat accordingly.    3.  Cachexia - check TSH, normal at 0.297.  HgbA1C normal at 5.6 in December 2020.    Hemoglobin A1c pending this admission. 4.  HTN - continue meds. 5.  COPD - duonebs.  Supportive care.  [place] CT of the chest done 8/11/21 with spiculated 5 mm nodule in the RUL, mediatinal adenopathy and moderate emphysema in apices.  Pulmonary consult . Continue nebs. Added on oral prednisone for four additional days. NOTE: This report was transcribed using voice recognition software.  Every effort was made to ensure accuracy; however, inadvertent computerized transcription errors may be present.     Electronically signed by Maxien Trevizo MD on 8/27/2021 at 12:17 PM

## 2021-08-27 NOTE — PROGRESS NOTES
Call placed to Dr. Maribell Andrade re: discharge. Per Dr. Maribell Andrade pt to be weaned from O2 for discharge. Spoke with pt, per pt he uses 6L of O2 at home. Call placed to CM to confirm previous Home O2 order, per previous CM note pt on 4L of O2 at home, will work to wean O2 to baseline.

## 2021-08-27 NOTE — PROGRESS NOTES
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 ABGs reviewed. Vitals:  VITALS:  /68   Pulse 78   Temp 98 °F (36.7 °C) (Oral)   Resp 18   Ht 5' 4\" (1.626 m)   Wt 135 lb 8 oz (61.5 kg)   SpO2 98%   BMI 23.26 kg/m²     24HR INTAKE/OUTPUT:      Intake/Output Summary (Last 24 hours) at 2021 1635  Last data filed at 2021 1329  Gross per 24 hour   Intake --   Output 1550 ml   Net -1550 ml       24HR PULSE OXIMETRY RANGE:    SpO2  Av.8 %  Min: 94 %  Max: 100 %    Medications:  IV:   sodium chloride         Scheduled Meds:   predniSONE  40 mg Oral Daily    fluticasone  2 spray Each Nostril Daily    amLODIPine  5 mg Oral Daily    ARIPiprazole  5 mg Oral Daily    DULoxetine  60 mg Oral Nightly    sodium chloride flush  10 mL IntraVENous 2 times per day    enoxaparin  40 mg Subcutaneous Daily    calcium-vitamin D  1 tablet Oral BID    Arformoterol Tartrate  15 mcg Nebulization BID    budesonide  0.5 mg Nebulization BID    [Held by provider] bumetanide  1 mg Oral Daily    doxycycline hyclate  100 mg Oral 2 times per day    cephALEXin  1,000 mg Oral 3 times per day    LORazepam  0.5 mg Oral Once    baclofen  5 mg Oral BID       Diet:   ADULT DIET; Dysphagia - Pureed     EXAM:  General: No distress. Sleeping soundly  Eyes: PERRL. No sclera icterus. No conjunctival injection.   ENT: No discharge. Pharynx clear. Neck: Trachea midline. Normal thyroid. Resp: No accessory muscle use. No rales. No wheezing. Decreased diffusely. No rhonchi. CV: Regular rate. Regular rhythm. No murmur or rub. Abd: Non-tender. Non-distended. No masses. No organomegaly. Normal bowel sounds. Skin: Warm and dry. No nodule on exposed extremities. No rash on exposed extremities. Ext: No cyanosis, clubbing, edema  Lymph: No cervical LAD. No supraclavicular LAD. M/S: No cyanosis. No joint deformity. No clubbing. Neuro: Positive pupils/gag/corneals. Normal pain response. Results:  CBC:   Recent Labs     08/25/21  0600 08/26/21  0400 08/27/21  0700   WBC 3.8* 4.5 2.9*   HGB 9.4* 9.6* 9.9*   HCT 30.7* 31.5* 32.0*   MCV 97.2 98.1 96.4    187 198     BMP:   Recent Labs     08/25/21  0600 08/26/21  0400 08/27/21  0700    137 137   K 3.5 3.5 4.2   CL 92* 90* 93*   CO2 38* 43* 37*   BUN 6 5* 8   CREATININE 0.7 0.7 0.5*     LIVER PROFILE: No results for input(s): AST, ALT, LIPASE, BILIDIR, BILITOT, ALKPHOS in the last 72 hours. Invalid input(s): AMYLASE,  ALB  PT/INR: No results for input(s): PROTIME, INR in the last 72 hours. APTT: No results for input(s): APTT in the last 72 hours. Pathology:  1. N/A      Microbiology:  1. None    Recent ABG:   Recent Labs     08/26/21  2243   PH 7.454*   PO2 53.4*   PCO2 51.0*   HCO3 35.0*   BE 9.7*   O2SAT 87.0*   METHB 0.1   O2HB 86.3*   COHB 0.7   O2CON 13.0   HHB 12.9*   THB 10.7*     FiO2 : 50 %       Recent Films:  US DUP LOWER EXTREMITIES BILATERAL VENOUS   Final Result   No evidence of DVT in either lower extremity. XR CHEST (2 VW)   Final Result   Nonspecific pleural thickening bilaterally. XR TIBIA FIBULA RIGHT (2 VIEWS)   Final Result   No acute osseous abnormality         XR TIBIA FIBULA LEFT (2 VIEWS)   Final Result   1. There is no osseous abnormality   2. No soft tissue foreign bodies noted. 3. Soft tissue swelling   4. Previous fusion of the left knee. XR FOOT RIGHT (MIN 3 VIEWS)   Final Result   No acute osseous abnormality         XR FOOT LEFT (MIN 3 VIEWS)   Final Result   1. There is no fracture dislocation of the left foot   2. No soft tissue foreign Body is noted   3. Soft tissue swelling. US DUP LOWER EXTREMITY RIGHT LIBBY    (Results Pending)     Assessment:  1. End-stage chronic obstructive pulmonary disease with chronic hypercapnic respiratory failure. 2. Compensatory metabolic alkalosis from chronic respiratory acidosis  3. New primary metabolic alkalosis secondary to loop diuretics        Plan:  1. FiO2 as tolerated. The patient is likely been on too much oxygen since admission, a common problem when everyone has access to the flow meter on the wall. Saturations need to be between 88 and 92%.       Time at the bedside, reviewing labs and radiographs, reviewing updated notes and consultations, discussing with staff and family was more than 35 minutes. Please note that voice recognition technology was used in the preparation of this note and make therefore it may contain inadvertent transcription errors. If the patient is a COVID 19 isolation patient, the above physical exam reflects that of the examining physician for the day. Rk Loaiza MD,  M.D., F.C.C.P.     [facility], [address] [facility], [address] [place] [address]

## 2021-08-27 NOTE — PROGRESS NOTES
[address] [place] [address] [facility]  Progress Note    SUBJECTIVE:  CC: cellulitis    Patient is tolerating antibiotics. In no distress. Denies nausea, vomiting, diarrhea. Edema in legs has improved. Review of systems:  As stated above in the chief complaint, otherwise negative. Medications:  Scheduled Meds:   fluticasone  2 spray Each Nostril Daily    amLODIPine  5 mg Oral Daily    ARIPiprazole  5 mg Oral Daily    DULoxetine  60 mg Oral Nightly    sodium chloride flush  10 mL IntraVENous 2 times per day    enoxaparin  40 mg Subcutaneous Daily    calcium-vitamin D  1 tablet Oral BID    Arformoterol Tartrate  15 mcg Nebulization BID    budesonide  0.5 mg Nebulization BID    [Held by provider] bumetanide  1 mg Oral Daily    doxycycline hyclate  100 mg Oral 2 times per day    cephALEXin  1,000 mg Oral 3 times per day    LORazepam  0.5 mg Oral Once    baclofen  5 mg Oral BID     Continuous Infusions:   sodium chloride       PRN Meds:guaiFENesin, hydrOXYzine, HYDROcodone-acetaminophen, ipratropium-albuterol, sodium chloride flush, sodium chloride, polyethylene glycol, acetaminophen **OR** acetaminophen    OBJECTIVE:  /73   Pulse 81   Temp 97.6 °F (36.4 °C) (Oral)   Resp 18   Ht 5' 4\" (1.626 m)   Wt 135 lb 8 oz (61.5 kg)   SpO2 98%   BMI 23.26 kg/m²   Temp  Av °F (36.7 °C)  Min: 97.6 °F (36.4 °C)  Max: 98.4 °F (36.9 °C)  Constitutional: The patient is lying in bed, in no distress, awake alert. Skin: Warm and dry. No rashes were noted. HEENT: Round and reactive pupils. Moist mucous membranes. No ulcerations or thrush. Neck: Supple to movements. Chest: No respiratory distress. Symmetrical expansion. Diminished in the bases. 5LNC today   Cardiovascular: S1 and S2 are rhythmic and regular. No murmurs appreciated. Abdomen: Positive bowel sounds to auscultation. Benign to palpation. No masses felt.    Extremities: venous stasis dermatitis bilateral lower ext. No erythema, purplish in color. No open wounds. Lines: peripheral    Laboratory and Tests Review:  Lab Results   Component Value Date    WBC 2.9 (L) 08/27/2021    WBC 4.5 08/26/2021    WBC 3.8 (L) 08/25/2021    HGB 9.9 (L) 08/27/2021    HCT 32.0 (L) 08/27/2021    MCV 96.4 08/27/2021     08/27/2021     Lab Results   Component Value Date    NEUTROABS 2.68 08/26/2021    NEUTROABS 2.43 08/25/2021    NEUTROABS 2.96 08/24/2021     No results found for: CRPHS  Lab Results   Component Value Date    ALT 20 08/23/2021    AST 24 08/23/2021    ALKPHOS 57 08/23/2021    BILITOT 0.3 08/23/2021     Lab Results   Component Value Date     08/27/2021    K 4.2 08/27/2021    CL 93 08/27/2021    CO2 37 08/27/2021    BUN 8 08/27/2021    CREATININE 0.5 08/27/2021    CREATININE 0.7 08/26/2021    CREATININE 0.7 08/25/2021    GFRAA >60 08/27/2021    AGRATIO 0.9 08/19/2021    LABGLOM >60 08/27/2021    GLUCOSE 159 08/27/2021    PROT 7.3 08/23/2021    LABALBU 3.4 08/23/2021    CALCIUM 8.5 08/27/2021    BILITOT 0.3 08/23/2021    ALKPHOS 57 08/23/2021    AST 24 08/23/2021    ALT 20 08/23/2021     Lab Results   Component Value Date    CRP 14.8 (H) 08/23/2021    .3 (H) 08/12/2021    CRP 49.4 (H) 08/11/2021     Lab Results   Component Value Date    SEDRATE 103 (H) 08/23/2021    SEDRATE 51 (H) 08/12/2021    SEDRATE 52 (H) 08/11/2021     Radiology:  Reviewed     Microbiology:   Blood cultures: negative so far    ASO 68    ASSESSMENT:  · Left lower extremity cellulitis, treated with Ceftin. · Venous stasis dermatitis  · Leukopenia    PLAN:  · Continue PO Cephalexin & PO Doxycycline for 5 more days  · Ok to discharge from ID standpoint - med rec complete      ARGELIA Denson - CNP  9:27 AM  8/27/2021     Patient seen and examined. I had a face to face encounter with the patient. Agree with exam.  Assessment and plan as outlined above and directed by me. Addition and corrections were done as deemed appropriate.  My exam and plan include: The patient legs are feeling better but he is still having pain. I think this is mostly because of peripheral arterial disease. Continue oral antibiotics for 5 more days. He can be discharged from my standpoint.     Nawaf Lo MD  8/27/2021  3:37 PM

## 2021-08-28 LAB
ANION GAP SERPL CALCULATED.3IONS-SCNC: 7 MMOL/L (ref 7–16)
BASOPHILS ABSOLUTE: 0 E9/L (ref 0–0.2)
BASOPHILS RELATIVE PERCENT: 0 % (ref 0–2)
BLOOD CULTURE, ROUTINE: NORMAL
BUN BLDV-MCNC: 10 MG/DL (ref 6–20)
CALCIUM SERPL-MCNC: 9 MG/DL (ref 8.6–10.2)
CHLORIDE BLD-SCNC: 94 MMOL/L (ref 98–107)
CO2: 37 MMOL/L (ref 22–29)
CREAT SERPL-MCNC: 0.6 MG/DL (ref 0.7–1.2)
CULTURE, BLOOD 2: NORMAL
EOSINOPHILS ABSOLUTE: 0 E9/L (ref 0.05–0.5)
EOSINOPHILS RELATIVE PERCENT: 0 % (ref 0–6)
GFR AFRICAN AMERICAN: >60
GFR NON-AFRICAN AMERICAN: >60 ML/MIN/1.73
GLUCOSE BLD-MCNC: 126 MG/DL (ref 74–99)
HCT VFR BLD CALC: 32.8 % (ref 37–54)
HEMOGLOBIN: 9.7 G/DL (ref 12.5–16.5)
HYPOCHROMIA: ABNORMAL
IMMATURE GRANULOCYTES #: 0.02 E9/L
IMMATURE GRANULOCYTES %: 0.5 % (ref 0–5)
LYMPHOCYTES ABSOLUTE: 0.58 E9/L (ref 1.5–4)
LYMPHOCYTES RELATIVE PERCENT: 15.1 % (ref 20–42)
MCH RBC QN AUTO: 29.3 PG (ref 26–35)
MCHC RBC AUTO-ENTMCNC: 29.6 % (ref 32–34.5)
MCV RBC AUTO: 99.1 FL (ref 80–99.9)
MONOCYTES ABSOLUTE: 0.28 E9/L (ref 0.1–0.95)
MONOCYTES RELATIVE PERCENT: 7.3 % (ref 2–12)
NEUTROPHILS ABSOLUTE: 2.96 E9/L (ref 1.8–7.3)
NEUTROPHILS RELATIVE PERCENT: 77.1 % (ref 43–80)
PDW BLD-RTO: 13.2 FL (ref 11.5–15)
PLATELET # BLD: 222 E9/L (ref 130–450)
PMV BLD AUTO: 10.2 FL (ref 7–12)
POLYCHROMASIA: ABNORMAL
POTASSIUM REFLEX MAGNESIUM: 5 MMOL/L (ref 3.5–5)
RBC # BLD: 3.31 E12/L (ref 3.8–5.8)
SODIUM BLD-SCNC: 138 MMOL/L (ref 132–146)
WBC # BLD: 3.8 E9/L (ref 4.5–11.5)

## 2021-08-28 PROCEDURE — 6360000002 HC RX W HCPCS: Performed by: INTERNAL MEDICINE

## 2021-08-28 PROCEDURE — 2580000003 HC RX 258: Performed by: INTERNAL MEDICINE

## 2021-08-28 PROCEDURE — 6370000000 HC RX 637 (ALT 250 FOR IP): Performed by: INTERNAL MEDICINE

## 2021-08-28 PROCEDURE — 99233 SBSQ HOSP IP/OBS HIGH 50: CPT | Performed by: FAMILY MEDICINE

## 2021-08-28 PROCEDURE — 6370000000 HC RX 637 (ALT 250 FOR IP): Performed by: EMERGENCY MEDICINE

## 2021-08-28 PROCEDURE — 6370000000 HC RX 637 (ALT 250 FOR IP): Performed by: FAMILY MEDICINE

## 2021-08-28 PROCEDURE — 6370000000 HC RX 637 (ALT 250 FOR IP): Performed by: SPECIALIST

## 2021-08-28 PROCEDURE — 85025 COMPLETE CBC W/AUTO DIFF WBC: CPT

## 2021-08-28 PROCEDURE — 2700000000 HC OXYGEN THERAPY PER DAY

## 2021-08-28 PROCEDURE — 2060000000 HC ICU INTERMEDIATE R&B

## 2021-08-28 PROCEDURE — 94640 AIRWAY INHALATION TREATMENT: CPT

## 2021-08-28 PROCEDURE — 80048 BASIC METABOLIC PNL TOTAL CA: CPT

## 2021-08-28 PROCEDURE — 36415 COLL VENOUS BLD VENIPUNCTURE: CPT

## 2021-08-28 RX ORDER — IPRATROPIUM BROMIDE AND ALBUTEROL SULFATE 2.5; .5 MG/3ML; MG/3ML
1 SOLUTION RESPIRATORY (INHALATION) 4 TIMES DAILY
Status: DISCONTINUED | OUTPATIENT
Start: 2021-08-28 | End: 2021-08-29 | Stop reason: HOSPADM

## 2021-08-28 RX ADMIN — PREDNISONE 40 MG: 20 TABLET ORAL at 09:18

## 2021-08-28 RX ADMIN — DOXYCYCLINE HYCLATE 100 MG: 100 CAPSULE ORAL at 09:18

## 2021-08-28 RX ADMIN — AMLODIPINE BESYLATE 5 MG: 5 TABLET ORAL at 09:18

## 2021-08-28 RX ADMIN — HYDROXYZINE PAMOATE 25 MG: 25 CAPSULE ORAL at 21:17

## 2021-08-28 RX ADMIN — HYDROCODONE BITARTRATE AND ACETAMINOPHEN 1 TABLET: 7.5; 325 TABLET ORAL at 15:13

## 2021-08-28 RX ADMIN — CEPHALEXIN 1000 MG: 500 CAPSULE ORAL at 20:28

## 2021-08-28 RX ADMIN — BACLOFEN 5 MG: 10 TABLET ORAL at 09:18

## 2021-08-28 RX ADMIN — ENOXAPARIN SODIUM 40 MG: 40 INJECTION SUBCUTANEOUS at 09:18

## 2021-08-28 RX ADMIN — HYDROCODONE BITARTRATE AND ACETAMINOPHEN 1 TABLET: 7.5; 325 TABLET ORAL at 00:16

## 2021-08-28 RX ADMIN — SODIUM CHLORIDE, PRESERVATIVE FREE 10 ML: 5 INJECTION INTRAVENOUS at 09:18

## 2021-08-28 RX ADMIN — CEPHALEXIN 1000 MG: 500 CAPSULE ORAL at 05:41

## 2021-08-28 RX ADMIN — FLUTICASONE PROPIONATE 2 SPRAY: 50 SPRAY, METERED NASAL at 09:25

## 2021-08-28 RX ADMIN — HYDROXYZINE PAMOATE 25 MG: 25 CAPSULE ORAL at 09:24

## 2021-08-28 RX ADMIN — ARFORMOTEROL TARTRATE 15 MCG: 15 SOLUTION RESPIRATORY (INHALATION) at 21:11

## 2021-08-28 RX ADMIN — HYDROCODONE BITARTRATE AND ACETAMINOPHEN 1 TABLET: 7.5; 325 TABLET ORAL at 21:17

## 2021-08-28 RX ADMIN — ARFORMOTEROL TARTRATE 15 MCG: 15 SOLUTION RESPIRATORY (INHALATION) at 09:57

## 2021-08-28 RX ADMIN — BUDESONIDE 500 MCG: 0.5 SUSPENSION RESPIRATORY (INHALATION) at 21:11

## 2021-08-28 RX ADMIN — CALCIUM CARBONATE-VITAMIN D TAB 500 MG-200 UNIT 1 TABLET: 500-200 TAB at 09:18

## 2021-08-28 RX ADMIN — HYDROCODONE BITARTRATE AND ACETAMINOPHEN 1 TABLET: 7.5; 325 TABLET ORAL at 06:05

## 2021-08-28 RX ADMIN — DOXYCYCLINE HYCLATE 100 MG: 100 CAPSULE ORAL at 20:26

## 2021-08-28 RX ADMIN — CEPHALEXIN 1000 MG: 500 CAPSULE ORAL at 14:00

## 2021-08-28 RX ADMIN — DULOXETINE HYDROCHLORIDE 60 MG: 60 CAPSULE, DELAYED RELEASE ORAL at 20:27

## 2021-08-28 RX ADMIN — CALCIUM CARBONATE-VITAMIN D TAB 500 MG-200 UNIT 1 TABLET: 500-200 TAB at 20:27

## 2021-08-28 RX ADMIN — BUDESONIDE 500 MCG: 0.5 SUSPENSION RESPIRATORY (INHALATION) at 09:57

## 2021-08-28 RX ADMIN — ARIPIPRAZOLE 5 MG: 5 TABLET ORAL at 09:17

## 2021-08-28 RX ADMIN — SODIUM CHLORIDE, PRESERVATIVE FREE 10 ML: 5 INJECTION INTRAVENOUS at 20:30

## 2021-08-28 RX ADMIN — BACLOFEN 5 MG: 10 TABLET ORAL at 20:26

## 2021-08-28 RX ADMIN — IPRATROPIUM BROMIDE AND ALBUTEROL SULFATE 1 AMPULE: .5; 3 SOLUTION RESPIRATORY (INHALATION) at 21:11

## 2021-08-28 RX ADMIN — IPRATROPIUM BROMIDE AND ALBUTEROL SULFATE 1 AMPULE: .5; 3 SOLUTION RESPIRATORY (INHALATION) at 16:30

## 2021-08-28 ASSESSMENT — PAIN SCALES - GENERAL
PAINLEVEL_OUTOF10: 7
PAINLEVEL_OUTOF10: 3
PAINLEVEL_OUTOF10: 8
PAINLEVEL_OUTOF10: 0
PAINLEVEL_OUTOF10: 8

## 2021-08-28 ASSESSMENT — PAIN DESCRIPTION - FREQUENCY
FREQUENCY: CONTINUOUS
FREQUENCY: CONTINUOUS

## 2021-08-28 ASSESSMENT — PAIN DESCRIPTION - PROGRESSION: CLINICAL_PROGRESSION: NOT CHANGED

## 2021-08-28 ASSESSMENT — PAIN DESCRIPTION - LOCATION
LOCATION: LEG
LOCATION: LEG

## 2021-08-28 ASSESSMENT — PAIN DESCRIPTION - PAIN TYPE
TYPE: CHRONIC PAIN
TYPE: CHRONIC PAIN

## 2021-08-28 ASSESSMENT — PAIN - FUNCTIONAL ASSESSMENT: PAIN_FUNCTIONAL_ASSESSMENT: PREVENTS OR INTERFERES WITH ALL ACTIVE AND SOME PASSIVE ACTIVITIES

## 2021-08-28 ASSESSMENT — PAIN DESCRIPTION - ONSET: ONSET: ON-GOING

## 2021-08-28 ASSESSMENT — PAIN DESCRIPTION - DESCRIPTORS: DESCRIPTORS: CONSTANT;SHARP;BURNING

## 2021-08-28 NOTE — PROGRESS NOTES
[facility]  [address], [address]  [facility], [address] [place]       Pulmonary Progress Note      SUBJECTIVE:  Claims worse with breathing, ronchorous coughing but not much sputum production, on 4 li NC, sitting up in bed eating lunch.     OBJECTIVE    Medications    Continuous Infusions:   sodium chloride         Scheduled Meds:   predniSONE  40 mg Oral Daily    fluticasone  2 spray Each Nostril Daily    amLODIPine  5 mg Oral Daily    ARIPiprazole  5 mg Oral Daily    DULoxetine  60 mg Oral Nightly    sodium chloride flush  10 mL IntraVENous 2 times per day    enoxaparin  40 mg Subcutaneous Daily    calcium-vitamin D  1 tablet Oral BID    Arformoterol Tartrate  15 mcg Nebulization BID    budesonide  0.5 mg Nebulization BID    [Held by provider] bumetanide  1 mg Oral Daily    doxycycline hyclate  100 mg Oral 2 times per day    cephALEXin  1,000 mg Oral 3 times per day    LORazepam  0.5 mg Oral Once    baclofen  5 mg Oral BID       PRN Meds:guaiFENesin, hydrOXYzine, HYDROcodone-acetaminophen, ipratropium-albuterol, sodium chloride flush, sodium chloride, polyethylene glycol, acetaminophen **OR** acetaminophen    Physical    VITALS:  /85   Pulse 84   Temp 98.2 °F (36.8 °C) (Oral)   Resp 20   Ht 5' 4\" (1.626 m)   Wt 135 lb 8 oz (61.5 kg)   SpO2 100%   BMI 23.26 kg/m²     24HR INTAKE/OUTPUT:      Intake/Output Summary (Last 24 hours) at 2021 1209  Last data filed at 2021 0615  Gross per 24 hour   Intake --   Output 2525 ml   Net -2525 ml       24HR PULSE OXIMETRY RANGE:    SpO2  Av.6 %  Min: 96 %  Max: 100 %    General appearance: alert, appears stated age and cooperative  Lungs: diminished breath sounds bilaterally and rhonchi bilaterally  Heart: regular rate and rhythm, S1, S2 normal, no murmur, click, rub or gallop  Abdomen: soft, non-tender; bowel sounds normal; no masses,  no organomegaly  Extremities: extremities normal, atraumatic, no cyanosis or edema  Neurologic: Mental status: Alert, oriented, thought content appropriate    Data    CBC:   Recent Labs     08/26/21  0400 08/27/21  0700 08/28/21  0335   WBC 4.5 2.9* 3.8*   HGB 9.6* 9.9* 9.7*   HCT 31.5* 32.0* 32.8*   MCV 98.1 96.4 99.1    198 222       BMP:  Recent Labs     08/26/21  0400 08/27/21  0700 08/28/21  0335    137 138   K 3.5 4.2 5.0   CL 90* 93* 94*   CO2 43* 37* 37*   BUN 5* 8 10   CREATININE 0.7 0.5* 0.6*    ALB:3,BILIDIR:3,BILITOT:3,ALKPHOS:3)@    PT/INR: No results for input(s): PROTIME, INR in the last 72 hours. ABG:   Recent Labs     08/26/21  2243   PH 7.454*   PO2 53.4*   PCO2 51.0*   HCO3 35.0*   BE 9.7*   O2SAT 87.0*   METHB 0.1   O2HB 86.3*   COHB 0.7   O2CON 13.0   HHB 12.9*   THB 10.7*     FiO2 : 50 %       Radiology/Other tests reviewed: none    Assessment:     Active Problems:    Cellulitis  Resolved Problems:    * No resolved hospital problems. *  COPD      Plan:       1. Cont with steroids, taper as tolerated  2. Will add duonebs and IPPB and see if helps, observe respiratory function and cough if improves or not  3. Cont with oxygen, taper as tolerated  4. Will see if can discharge tomorrow depending on symptoms  5. Antibiotics as per ID  6. OOB to chair, PT/OT      Time at the bedside, reviewing labs and radiographs, reviewing notes and consultations, discussing with staff and family was more than 35 minutes. Thanks for letting us see this patient in consultation. Please contact us with any questions. Office [phone] or after hours through [employer], x [phone].

## 2021-08-28 NOTE — PROGRESS NOTES
[facility] Progress Note    Admitting Date and Time: 8/23/2021  3:24 PM  Admit Dx: Cellulitis [L03.90]  Chronic obstructive pulmonary disease with acute exacerbation (HCC) [J44.1]  Cellulitis of lower extremity, unspecified laterality [L03.119]    Subjective:  Patient is being followed for Cellulitis [L03.90]  Chronic obstructive pulmonary disease with acute exacerbation ([address] [J44.1]  Cellulitis of lower extremity, unspecified laterality [E39.669]     Pt feels breathing do not right. Currently on 4 L of oxygen. On oral steroids. Await pulmonary recommendations regarding discharge. Per RN: No new concerns    ROS: denies fever, chills, cp, sob, n/v, HA unless stated above.  predniSONE  40 mg Oral Daily    fluticasone  2 spray Each Nostril Daily    amLODIPine  5 mg Oral Daily    ARIPiprazole  5 mg Oral Daily    DULoxetine  60 mg Oral Nightly    sodium chloride flush  10 mL IntraVENous 2 times per day    enoxaparin  40 mg Subcutaneous Daily    calcium-vitamin D  1 tablet Oral BID    Arformoterol Tartrate  15 mcg Nebulization BID    budesonide  0.5 mg Nebulization BID    [Held by provider] bumetanide  1 mg Oral Daily    doxycycline hyclate  100 mg Oral 2 times per day    cephALEXin  1,000 mg Oral 3 times per day    LORazepam  0.5 mg Oral Once    baclofen  5 mg Oral BID     guaiFENesin, 400 mg, 4x Daily PRN  hydrOXYzine, 25 mg, BID PRN  HYDROcodone-acetaminophen, 1 tablet, Q6H PRN  ipratropium-albuterol, 1 vial, Q4H PRN  sodium chloride flush, 10 mL, PRN  sodium chloride, 25 mL, PRN  polyethylene glycol, 17 g, Daily PRN  acetaminophen, 650 mg, Q6H PRN   Or  acetaminophen, 650 mg, Q6H PRN    Objective:    /85   Pulse 84   Temp 98.2 °F (36.8 °C) (Oral)   Resp 18   Ht 5' 4\" (1.626 m)   Wt 135 lb 8 oz (61.5 kg)   SpO2 96%   BMI 23.26 kg/m²     General Appearance: alert and oriented to person, place and time and in no acute distress. Resting comfortably.   No retractions. Skin: warm and dry  Head: normocephalic and atraumatic  Eyes: pupils equal, round, and reactive to light, extraocular eye movements intact, conjunctivae normal  Neck: neck supple and non tender without mass   Pulmonary/Chest: clear to auscultation bilaterally- no wheezes, rales or rhonchi, normal air movement, no respiratory distress  Cardiovascular: normal rate, normal S1 and S2 and no carotid bruits  Abdomen: soft, non-tender, non-distended, normal bowel sounds, no masses or organomegaly  Extremities: no cyanosis, no clubbing and no edema  Neurologic: no cranial nerve deficit and speech normal    Recent Labs     08/26/21  0400 08/27/21  0700 08/28/21  0335    137 138   K 3.5 4.2 5.0   CL 90* 93* 94*   CO2 43* 37* 37*   BUN 5* 8 10   CREATININE 0.7 0.5* 0.6*   GLUCOSE 104* 159* 126*   CALCIUM 8.1* 8.5* 9.0       Recent Labs     08/26/21  0400 08/27/21  0700 08/28/21  0335   WBC 4.5 2.9* 3.8*   RBC 3.21* 3.32* 3.31*   HGB 9.6* 9.9* 9.7*   HCT 31.5* 32.0* 32.8*   MCV 98.1 96.4 99.1   MCH 29.9 29.8 29.3   MCHC 30.5* 30.9* 29.6*   RDW 13.4 13.1 13.2    198 222   MPV 10.2 11.0 10.2       Radiology: No results found. Assessment:    Active Problems:    Cellulitis  Resolved Problems:    * No resolved hospital problems. *    Plan:  1. Venous stasis dermatitis/Cellulitis - on oral doxycycline and cephalexin.  ID signed off.  Continue wound care. 2.  Hypomagnesemia, resolved -  Trend labs and treat accordingly.    3.  Cachexia - check TSH, normal at 0.297.  HgbA1C normal at 5.6 in December 2020.    Hemoglobin A1c pending this admission. 4.  HTN - continue meds. 5.  COPD - duonebs.  Supportive care.  [place] CT of the chest done 8/11/21 with spiculated 5 mm nodule in the RUL, mediatinal adenopathy and moderate emphysema in apices.  Pulmonary consult appreciated. Continue nebs. Added on oral prednisone for four additional days. Wean oxygen.   Home when cleared by Pulmonary.     NOTE: This report was transcribed using voice recognition software. Every effort was made to ensure accuracy; however, inadvertent computerized transcription errors may be present.     Electronically signed by Maury Cardenas MD on 8/28/2021 at 9:48 AM

## 2021-08-29 VITALS
WEIGHT: 135.5 LBS | TEMPERATURE: 98.1 F | SYSTOLIC BLOOD PRESSURE: 139 MMHG | RESPIRATION RATE: 18 BRPM | HEIGHT: 64 IN | BODY MASS INDEX: 23.13 KG/M2 | OXYGEN SATURATION: 93 % | HEART RATE: 91 BPM | DIASTOLIC BLOOD PRESSURE: 71 MMHG

## 2021-08-29 PROCEDURE — 6370000000 HC RX 637 (ALT 250 FOR IP): Performed by: EMERGENCY MEDICINE

## 2021-08-29 PROCEDURE — 6360000002 HC RX W HCPCS: Performed by: INTERNAL MEDICINE

## 2021-08-29 PROCEDURE — 6370000000 HC RX 637 (ALT 250 FOR IP): Performed by: INTERNAL MEDICINE

## 2021-08-29 PROCEDURE — 6370000000 HC RX 637 (ALT 250 FOR IP): Performed by: SPECIALIST

## 2021-08-29 PROCEDURE — 94640 AIRWAY INHALATION TREATMENT: CPT

## 2021-08-29 PROCEDURE — 99239 HOSP IP/OBS DSCHRG MGMT >30: CPT | Performed by: FAMILY MEDICINE

## 2021-08-29 PROCEDURE — 2700000000 HC OXYGEN THERAPY PER DAY

## 2021-08-29 PROCEDURE — 6370000000 HC RX 637 (ALT 250 FOR IP): Performed by: FAMILY MEDICINE

## 2021-08-29 PROCEDURE — 2580000003 HC RX 258: Performed by: INTERNAL MEDICINE

## 2021-08-29 RX ORDER — PREDNISONE 10 MG/1
TABLET ORAL
Qty: 8 TABLET | Refills: 0 | Status: SHIPPED | OUTPATIENT
Start: 2021-08-29 | End: 2021-09-09

## 2021-08-29 RX ORDER — OYSTER SHELL CALCIUM WITH VITAMIN D 500; 200 MG/1; [IU]/1
1 TABLET, FILM COATED ORAL 2 TIMES DAILY
Qty: 30 TABLET | Refills: 0 | Status: SHIPPED | OUTPATIENT
Start: 2021-08-29 | End: 2022-02-02

## 2021-08-29 RX ORDER — GREEN TEA/HOODIA GORDONII 315-12.5MG
1 CAPSULE ORAL DAILY
Qty: 30 TABLET | Refills: 0 | Status: SHIPPED | OUTPATIENT
Start: 2021-08-29 | End: 2021-09-28

## 2021-08-29 RX ADMIN — BACLOFEN 5 MG: 10 TABLET ORAL at 10:22

## 2021-08-29 RX ADMIN — SODIUM CHLORIDE, PRESERVATIVE FREE 10 ML: 5 INJECTION INTRAVENOUS at 10:22

## 2021-08-29 RX ADMIN — AMLODIPINE BESYLATE 5 MG: 5 TABLET ORAL at 10:22

## 2021-08-29 RX ADMIN — CEPHALEXIN 1000 MG: 500 CAPSULE ORAL at 14:01

## 2021-08-29 RX ADMIN — IPRATROPIUM BROMIDE AND ALBUTEROL SULFATE 1 AMPULE: .5; 3 SOLUTION RESPIRATORY (INHALATION) at 16:56

## 2021-08-29 RX ADMIN — CEPHALEXIN 1000 MG: 500 CAPSULE ORAL at 05:55

## 2021-08-29 RX ADMIN — HYDROCODONE BITARTRATE AND ACETAMINOPHEN 1 TABLET: 7.5; 325 TABLET ORAL at 10:22

## 2021-08-29 RX ADMIN — ARIPIPRAZOLE 5 MG: 5 TABLET ORAL at 10:22

## 2021-08-29 RX ADMIN — FLUTICASONE PROPIONATE 2 SPRAY: 50 SPRAY, METERED NASAL at 10:31

## 2021-08-29 RX ADMIN — CALCIUM CARBONATE-VITAMIN D TAB 500 MG-200 UNIT 1 TABLET: 500-200 TAB at 10:22

## 2021-08-29 RX ADMIN — IPRATROPIUM BROMIDE AND ALBUTEROL SULFATE 1 AMPULE: .5; 3 SOLUTION RESPIRATORY (INHALATION) at 13:08

## 2021-08-29 RX ADMIN — HYDROXYZINE PAMOATE 25 MG: 25 CAPSULE ORAL at 14:43

## 2021-08-29 RX ADMIN — PREDNISONE 40 MG: 20 TABLET ORAL at 10:21

## 2021-08-29 RX ADMIN — ENOXAPARIN SODIUM 40 MG: 40 INJECTION SUBCUTANEOUS at 10:23

## 2021-08-29 RX ADMIN — HYDROCODONE BITARTRATE AND ACETAMINOPHEN 1 TABLET: 7.5; 325 TABLET ORAL at 03:21

## 2021-08-29 RX ADMIN — DOXYCYCLINE HYCLATE 100 MG: 100 CAPSULE ORAL at 10:21

## 2021-08-29 RX ADMIN — HYDROCODONE BITARTRATE AND ACETAMINOPHEN 1 TABLET: 7.5; 325 TABLET ORAL at 16:30

## 2021-08-29 RX ADMIN — IPRATROPIUM BROMIDE AND ALBUTEROL SULFATE 1 AMPULE: .5; 3 SOLUTION RESPIRATORY (INHALATION) at 09:35

## 2021-08-29 RX ADMIN — BUDESONIDE 500 MCG: 0.5 SUSPENSION RESPIRATORY (INHALATION) at 09:36

## 2021-08-29 RX ADMIN — ARFORMOTEROL TARTRATE 15 MCG: 15 SOLUTION RESPIRATORY (INHALATION) at 09:35

## 2021-08-29 ASSESSMENT — PAIN SCALES - GENERAL
PAINLEVEL_OUTOF10: 9
PAINLEVEL_OUTOF10: 7
PAINLEVEL_OUTOF10: 9
PAINLEVEL_OUTOF10: 7

## 2021-08-29 ASSESSMENT — PAIN DESCRIPTION - DESCRIPTORS
DESCRIPTORS: CONSTANT
DESCRIPTORS: CONSTANT

## 2021-08-29 ASSESSMENT — PAIN DESCRIPTION - FREQUENCY
FREQUENCY: CONTINUOUS
FREQUENCY: CONTINUOUS

## 2021-08-29 ASSESSMENT — PAIN DESCRIPTION - ORIENTATION: ORIENTATION: RIGHT;LEFT

## 2021-08-29 ASSESSMENT — PAIN DESCRIPTION - PAIN TYPE
TYPE: CHRONIC PAIN
TYPE: CHRONIC PAIN

## 2021-08-29 ASSESSMENT — PAIN DESCRIPTION - LOCATION
LOCATION: LEG

## 2021-08-29 ASSESSMENT — PAIN DESCRIPTION - ONSET: ONSET: ON-GOING

## 2021-08-29 NOTE — PROGRESS NOTES
Discharge instructions reviewed with patient. Discussed when to call 911, making and keeping follow up appointments and discharge medications. Telemetry monitor removed.

## 2021-08-29 NOTE — PROGRESS NOTES
0730 Obtained nurse to nurse report and assumed care of patient at this time. Observed awake in bed in semi-fowlers position with call light and bedside table within reach. Alert and oriented X 3 . Complaints of pain 9/10 on pain scale. Will medicate for pain with morning medications.

## 2021-08-29 NOTE — DISCHARGE SUMMARY
[facility] Physician Discharge Summary       No follow-up provider specified. Activity level: As tolerated      Dispo: Home    Condition on discharge: Stable     Patient ID:  Jaki Robert  [id number]  53 y.o.  1965    Admit date: 8/23/2021    Discharge date and time:  8/29/2021  2:03 PM    Admission Diagnoses: Active Problems:    Cellulitis  Resolved Problems:    * No resolved hospital problems. *      Discharge Diagnoses: Active Problems:    Cellulitis  Resolved Problems:    * No resolved hospital problems. *      Consults:  IP CONSULT TO INFECTIOUS DISEASES  IP CONSULT TO PULMONOLOGY  IP CONSULT TO HOME CARE NEEDS    Procedures:   n/a    Hospital Course:   Patient Jaki Robert is a 54 y.o. presented with Cellulitis [L03.90]  Chronic obstructive pulmonary disease with acute exacerbation ([facility] [J44.1]  Cellulitis of lower extremity, unspecified laterality [Z99.477]. Patient was noted evaluation and treatment. He was seen by infectious disease who felt IV antibiotics were not needed. Patient was given oral cephalexin and doxycycline. He was seen by pulmonary felt his oxygen needs too high. He reduced his oxygen requirements to maintain an O2 saturation between 88 and 92%. He required consistently 4L of oxygen per nasal cannula majority of the time. Patient was stable for discharge to home. He is to complete 3 more days of his oral antibiotics. A prednisone taper was prescribed. He will follow-up with his PCP. He will have home care resumed with [facility] services.     Discharge Exam:    General Appearance: alert and oriented to person, place and time and in no acute distress  Skin: warm and dry  Head: normocephalic and atraumatic  Eyes: pupils equal, round, and reactive to light, extraocular eye movements intact, conjunctivae normal  Neck: neck supple and non tender without mass   Pulmonary/Chest: clear to auscultation bilaterally- no wheezes, rales or rhonchi, normal air movement, no respiratory distress  Cardiovascular: normal rate, normal S1 and S2 and no carotid bruits  Abdomen: soft, non-tender, non-distended, normal bowel sounds, no masses or organomegaly  Extremities: no cyanosis, no clubbing and no edema  Neurologic: no cranial nerve deficit and speech normal    LABS:  Recent Labs     08/27/21  0700 08/28/21  0335    138   K 4.2 5.0   CL 93* 94*   CO2 37* 37*   BUN 8 10   CREATININE 0.5* 0.6*   GLUCOSE 159* 126*   CALCIUM 8.5* 9.0       Recent Labs     08/27/21  0700 08/28/21  0335   WBC 2.9* 3.8*   RBC 3.32* 3.31*   HGB 9.9* 9.7*   HCT 32.0* 32.8*   MCV 96.4 99.1   MCH 29.8 29.3   MCHC 30.9* 29.6*   RDW 13.1 13.2    222   MPV 11.0 10.2     Imaging:  XR CHEST (2 VW)    Result Date: 8/23/2021  EXAMINATION: TWO XRAY VIEWS OF THE CHEST 8/23/2021 12:22 pm COMPARISON: None. HISTORY: ORDERING SYSTEM PROVIDED HISTORY: cardiac work-up TECHNOLOGIST PROVIDED HISTORY: Reason for exam:->cardiac work-up FINDINGS: Heart and pulmonary vascularity are normal.  There is some nonspecific pleural thickening bilaterally. Nothing else active. Nonspecific pleural thickening bilaterally. XR TIBIA FIBULA LEFT (2 VIEWS)    Result Date: 8/23/2021  EXAMINATION: XRAY VIEWS OF THE LEFT TIBIA AND FIBULA 8/23/2021 1:22 pm COMPARISON: None. HISTORY: ORDERING SYSTEM PROVIDED HISTORY: redness; swelling; get ankle please TECHNOLOGIST PROVIDED HISTORY: Reason for exam:->redness; swelling; get ankle please FINDINGS: There is no fracture of the left tibia fibula. There is soft tissue swelling of the left lower extremity. There are 2 fixating screws traversing the medial and lateral femoral condyles and tibial plateaus. 1. There is no osseous abnormality 2. No soft tissue foreign bodies noted. 3. Soft tissue swelling 4. Previous fusion of the left knee.      XR TIBIA FIBULA RIGHT (2 VIEWS)    Result Date: 8/23/2021  EXAMINATION: AP and lateral XRAY VIEWS OF THE RIGHT TIBIA AND FIBULA; THREE XRAY VIEWS OF THE RIGHT FOOT 8/23/2021 12:22 pm COMPARISON: None. HISTORY: ORDERING SYSTEM PROVIDED HISTORY: redness and swelling; get ankle in imaging please TECHNOLOGIST PROVIDED HISTORY: Reason for exam:->redness and swelling; get ankle in imaging please FINDINGS: No fracture or dislocation. No focal osseous lesions. No radiopaque foreign body. There is degenerative tibiotalar joint space narrowing. No cortical erosion or periosteal reaction. No acute osseous abnormality     XR FOOT LEFT (MIN 3 VIEWS)    Result Date: 8/23/2021  EXAMINATION: THREE XRAY VIEWS OF THE LEFT FOOT 8/23/2021 1:22 pm COMPARISON: None. HISTORY: ORDERING SYSTEM PROVIDED HISTORY: red; swollen TECHNOLOGIST PROVIDED HISTORY: Reason for exam:->red; swollen FINDINGS: There is no fracture or dislocation of the left foot. There is soft tissue swelling. No soft tissue foreign bodies noted. There is a hammertoe deformity. 1. There is no fracture dislocation of the left foot 2. No soft tissue foreign Body is noted 3. Soft tissue swelling. XR FOOT RIGHT (MIN 3 VIEWS)    Result Date: 8/23/2021  EXAMINATION: AP and lateral XRAY VIEWS OF THE RIGHT TIBIA AND FIBULA; THREE XRAY VIEWS OF THE RIGHT FOOT 8/23/2021 12:22 pm COMPARISON: None. HISTORY: ORDERING SYSTEM PROVIDED HISTORY: redness and swelling; get ankle in imaging please TECHNOLOGIST PROVIDED HISTORY: Reason for exam:->redness and swelling; get ankle in imaging please FINDINGS: No fracture or dislocation. No focal osseous lesions. No radiopaque foreign body. There is degenerative tibiotalar joint space narrowing. No cortical erosion or periosteal reaction.      No acute osseous abnormality     US DUP LOWER EXTREMITIES BILATERAL VENOUS    Result Date: 8/23/2021  EXAMINATION: DUPLEX VENOUS ULTRASOUND OF THE BILATERAL LOWER EXTREMITIES, 8/23/2021 2:11 pm TECHNIQUE: Duplex ultrasound using B-mode/gray scaled imaging and Doppler spectral analysis and color flow was obtained of the bilateral lower extremities. COMPARISON: None. HISTORY: ORDERING SYSTEM PROVIDED HISTORY: dvt TECHNOLOGIST PROVIDED HISTORY: Reason for exam:->dvt What reading provider will be dictating this exam?->CRC FINDINGS: The visualized veins of the bilateral lower extremities are patent and free of echogenic thrombus. The veins demonstrate good compressibility with normal color flow study and spectral analysis. Soft tissue swelling involving left calf. No evidence of DVT in either lower extremity. Patient Instructions:      Medication List      START taking these medications    calcium-vitamin D 500-200 MG-UNIT per tablet  Commonly known as: OSCAL-500  Take 1 tablet by mouth 2 times daily     predniSONE 10 MG tablet  Commonly known as: DELTASONE  4 tabs daily x3 days, 3 tabs daily x3 days, 2 tabs daily x3 days, 1 tab daily x3 days     Probiotic Acidophilus Tabs  Take 1 tablet by mouth daily        CHANGE how you take these medications    budesonide 0.5 MG/2ML nebulizer suspension  Commonly known as: PULMICORT  Take 2 mLs by nebulization 2 times daily  What changed: Another medication with the same name was removed. Continue taking this medication, and follow the directions you see here.      cephALEXin 500 MG capsule  Commonly known as: KEFLEX  Take 2 capsules by mouth every 8 hours for 7 doses  What changed:   · how much to take  · when to take this  · additional instructions     doxycycline hyclate 100 MG capsule  Commonly known as: VIBRAMYCIN  Take 1 capsule by mouth every 12 hours for 7 days  What changed:   · when to take this  · additional instructions     formoterol 20 MCG/2ML nebulizer solution  Commonly known as: PERFOROMIST  Take 2 mLs by nebulization every 12 hours  What changed: additional instructions        CONTINUE taking these medications    albuterol sulfate  (90 Base) MCG/ACT inhaler  Commonly known as: ProAir HFA  Inhale 2 puffs into the lungs every 4 hours as needed for Wheezing     amLODIPine 5 MG tablet  Commonly known as: Norvasc  Take 1 tablet by mouth daily     ARIPiprazole 5 MG tablet  Commonly known as: ABILIFY     baclofen 10 MG tablet  Commonly known as: LIORESAL     DULoxetine 60 MG extended release capsule  Commonly known as: CYMBALTA     HYDROcodone-acetaminophen 7.5-325 MG per tablet  Commonly known as: NORCO     hydrOXYzine 25 MG tablet  Commonly known as: ATARAX  Take 1 tablet by mouth 2 times daily as needed for Anxiety     ipratropium-albuterol 0.5-2.5 (3) MG/3ML Soln nebulizer solution  Commonly known as: DUONEB  Inhale 3 mLs into the lungs every 4 hours as needed for Shortness of Breath     omeprazole 40 MG delayed release capsule  Commonly known as: PRILOSEC  Take 1 capsule by mouth daily     Yupelri 175 MCG/3ML Soln  Generic drug: Revefenacin  Inhale 1 ampule into the lungs daily           Where to Get Your Medications      These medications were sent to [address] [phone] [facility] [phone]  [address] [place] [address]    Phone: [phone]   · calcium-vitamin D 500-200 MG-UNIT per tablet  · predniSONE 10 MG tablet  · Probiotic Acidophilus Tabs     These medications were sent to [address] [place] [address] [address] [phone] [facility] [phone]  [place] [phone]    Phone: [phone]   · cephALEXin 500 MG capsule  · doxycycline hyclate 100 MG capsule           Note that more than 35 minutes was spent in preparing discharge papers, discussing discharge with patient, medication review, etc.    Signed:  Electronically signed by Ernesto Medley MD on 8/29/2021 at 2:03 PM

## 2021-08-29 NOTE — PROGRESS NOTES
[address] [place] [address] [facility]  Progress Note    SUBJECTIVE:  CC: cellulitis    Sitting up in bed. Tolerating antibiotics when taking with food. Feeling good. No fevers    Review of systems:  As stated above in the chief complaint, otherwise negative. Medications:  Scheduled Meds:   ipratropium-albuterol  1 ampule Inhalation 4x daily    predniSONE  40 mg Oral Daily    fluticasone  2 spray Each Nostril Daily    amLODIPine  5 mg Oral Daily    ARIPiprazole  5 mg Oral Daily    DULoxetine  60 mg Oral Nightly    sodium chloride flush  10 mL IntraVENous 2 times per day    enoxaparin  40 mg Subcutaneous Daily    calcium-vitamin D  1 tablet Oral BID    Arformoterol Tartrate  15 mcg Nebulization BID    budesonide  0.5 mg Nebulization BID    [Held by provider] bumetanide  1 mg Oral Daily    doxycycline hyclate  100 mg Oral 2 times per day    cephALEXin  1,000 mg Oral 3 times per day    LORazepam  0.5 mg Oral Once    baclofen  5 mg Oral BID     Continuous Infusions:   sodium chloride       PRN Meds:guaiFENesin, hydrOXYzine, HYDROcodone-acetaminophen, ipratropium-albuterol, sodium chloride flush, sodium chloride, polyethylene glycol, acetaminophen **OR** acetaminophen    OBJECTIVE:  BP (!) 147/71   Pulse 82   Temp 97.4 °F (36.3 °C) (Oral)   Resp 20   Ht 5' 4\" (1.626 m)   Wt 135 lb 8 oz (61.5 kg)   SpO2 97%   BMI 23.26 kg/m²   Temp  Av.7 °F (36.5 °C)  Min: 97.4 °F (36.3 °C)  Max: 97.9 °F (36.6 °C)  Constitutional: The patient is lying in bed, in NAD. 4LNC  Skin: Warm and dry. No rashes were noted. HEENT: Round and reactive pupils. Moist mucous membranes. No ulcerations or thrush. Neck: Supple to movements. Chest: No respiratory distress. Symmetrical expansion. Diminished in the bases. Cardiovascular: S1 and S2 are rhythmic and regular. No murmurs appreciated. Abdomen: Positive bowel sounds to auscultation. Benign to palpation. No masses felt.    Extremities: venous stasis dermatitis bilateral lower ext. No erythema, purplish in color. No open wounds. No edema   Lines: peripheral    Laboratory and Tests Review:  Lab Results   Component Value Date    WBC 3.8 (L) 08/28/2021    WBC 2.9 (L) 08/27/2021    WBC 4.5 08/26/2021    HGB 9.7 (L) 08/28/2021    HCT 32.8 (L) 08/28/2021    MCV 99.1 08/28/2021     08/28/2021     Lab Results   Component Value Date    NEUTROABS 2.96 08/28/2021    NEUTROABS 2.37 08/27/2021    NEUTROABS 2.68 08/26/2021     No results found for: CRPHS  Lab Results   Component Value Date    ALT 20 08/23/2021    AST 24 08/23/2021    ALKPHOS 57 08/23/2021    BILITOT 0.3 08/23/2021     Lab Results   Component Value Date     08/28/2021    K 5.0 08/28/2021    CL 94 08/28/2021    CO2 37 08/28/2021    BUN 10 08/28/2021    CREATININE 0.6 08/28/2021    CREATININE 0.5 08/27/2021    CREATININE 0.7 08/26/2021    GFRAA >60 08/28/2021    AGRATIO 0.9 08/19/2021    LABGLOM >60 08/28/2021    GLUCOSE 126 08/28/2021    PROT 7.3 08/23/2021    LABALBU 3.4 08/23/2021    CALCIUM 9.0 08/28/2021    BILITOT 0.3 08/23/2021    ALKPHOS 57 08/23/2021    AST 24 08/23/2021    ALT 20 08/23/2021     Lab Results   Component Value Date    CRP 14.8 (H) 08/23/2021    .3 (H) 08/12/2021    CRP 49.4 (H) 08/11/2021     Lab Results   Component Value Date    SEDRATE 103 (H) 08/23/2021    SEDRATE 51 (H) 08/12/2021    SEDRATE 52 (H) 08/11/2021     Radiology:  Reviewed     Microbiology:   Blood cultures: negative so far    ASO 68    ASSESSMENT:  · Left lower extremity cellulitis, treated with Ceftin. · Venous stasis dermatitis  · Leukopenia    PLAN:  · Continue PO Cephalexin & PO Doxycycline for 3 more days  · Ok to discharge from ID standpoint - med rec complete      ARGELIA Gregory - CNP  10:52 AM  8/29/2021     Patient seen and examined. I had a face to face encounter with the patient. Agree with exam.  Assessment and plan as outlined above and directed by me.  Addition and corrections were done as deemed appropriate. My exam and plan include: The patient is doing well. The cellulitis has resolved. He will not need any further antibiotics upon discharge. ID will sign off.     Aleksander Naranjo MD  8/29/2021  3:06 PM

## 2021-08-29 NOTE — PROGRESS NOTES
[facility]  [address] [facility], [address]  [facility], [facility]      Pulmonary Progress Note      SUBJECTIVE:  Claims better with breathing, sitting up in bed.  When asked if respiratory function is close to baseline, he says no, feels still sob more than usual because he is usually on a higher amount of oxygen at home than here (no mention of desaturations here)    OBJECTIVE    Medications    Continuous Infusions:   sodium chloride         Scheduled Meds:   ipratropium-albuterol  1 ampule Inhalation 4x daily    predniSONE  40 mg Oral Daily    fluticasone  2 spray Each Nostril Daily    amLODIPine  5 mg Oral Daily    ARIPiprazole  5 mg Oral Daily    DULoxetine  60 mg Oral Nightly    sodium chloride flush  10 mL IntraVENous 2 times per day    enoxaparin  40 mg Subcutaneous Daily    calcium-vitamin D  1 tablet Oral BID    Arformoterol Tartrate  15 mcg Nebulization BID    budesonide  0.5 mg Nebulization BID    [Held by provider] bumetanide  1 mg Oral Daily    doxycycline hyclate  100 mg Oral 2 times per day    cephALEXin  1,000 mg Oral 3 times per day    LORazepam  0.5 mg Oral Once    baclofen  5 mg Oral BID       PRN Meds:guaiFENesin, hydrOXYzine, HYDROcodone-acetaminophen, ipratropium-albuterol, sodium chloride flush, sodium chloride, polyethylene glycol, acetaminophen **OR** acetaminophen    Physical    VITALS:  BP (!) 147/71   Pulse 82   Temp 97.4 °F (36.3 °C) (Oral)   Resp 20   Ht 5' 4\" (1.626 m)   Wt 135 lb 8 oz (61.5 kg)   SpO2 97%   BMI 23.26 kg/m²     24HR INTAKE/OUTPUT:      Intake/Output Summary (Last 24 hours) at 2021 0944  Last data filed at 2021 0557  Gross per 24 hour   Intake 450 ml   Output 1100 ml   Net -650 ml       24HR PULSE OXIMETRY RANGE:    SpO2  Av %  Min: 95 %  Max: 100 %    General appearance: alert, appears stated age and cooperative  Lungs: diminished breath sounds bilaterally and rhonchi

## 2021-09-09 ENCOUNTER — TELEPHONE (OUTPATIENT)
Dept: OTHER | Facility: CLINIC | Age: 56
End: 2021-09-09

## 2021-09-09 ENCOUNTER — OFFICE VISIT (OUTPATIENT)
Dept: FAMILY MEDICINE CLINIC | Age: 56
End: 2021-09-09
Payer: MEDICAID

## 2021-09-09 ENCOUNTER — APPOINTMENT (OUTPATIENT)
Dept: GENERAL RADIOLOGY | Age: 56
End: 2021-09-09
Payer: MEDICAID

## 2021-09-09 ENCOUNTER — APPOINTMENT (OUTPATIENT)
Dept: ULTRASOUND IMAGING | Age: 56
End: 2021-09-09
Payer: MEDICAID

## 2021-09-09 ENCOUNTER — HOSPITAL ENCOUNTER (OUTPATIENT)
Age: 56
Setting detail: OBSERVATION
Discharge: HOME | End: 2021-09-11
Attending: STUDENT IN AN ORGANIZED HEALTH CARE EDUCATION/TRAINING PROGRAM | Admitting: INTERNAL MEDICINE
Payer: MEDICAID

## 2021-09-09 VITALS
HEART RATE: 94 BPM | DIASTOLIC BLOOD PRESSURE: 70 MMHG | SYSTOLIC BLOOD PRESSURE: 115 MMHG | TEMPERATURE: 98.8 F | WEIGHT: 132 LBS | BODY MASS INDEX: 22.66 KG/M2 | OXYGEN SATURATION: 93 %

## 2021-09-09 DIAGNOSIS — Z92.89 HISTORY OF RECENT HOSPITALIZATION: ICD-10-CM

## 2021-09-09 DIAGNOSIS — I87.2 VENOUS INSUFFICIENCY OF BOTH LOWER EXTREMITIES: ICD-10-CM

## 2021-09-09 DIAGNOSIS — M79.89 LEG SWELLING: Primary | ICD-10-CM

## 2021-09-09 DIAGNOSIS — J44.9 CHRONIC OBSTRUCTIVE PULMONARY DISEASE, UNSPECIFIED COPD TYPE (HCC): ICD-10-CM

## 2021-09-09 DIAGNOSIS — R60.0 LEG EDEMA: ICD-10-CM

## 2021-09-09 DIAGNOSIS — I87.8 CHRONIC VENOUS STASIS: ICD-10-CM

## 2021-09-09 DIAGNOSIS — Z99.81 OXYGEN DEPENDENT: ICD-10-CM

## 2021-09-09 DIAGNOSIS — R60.0 BILATERAL LOWER EXTREMITY EDEMA: Primary | ICD-10-CM

## 2021-09-09 LAB
ANION GAP SERPL CALCULATED.3IONS-SCNC: 9 MMOL/L (ref 7–16)
BASOPHILS ABSOLUTE: 0.04 E9/L (ref 0–0.2)
BASOPHILS RELATIVE PERCENT: 0.4 % (ref 0–2)
BUN BLDV-MCNC: 13 MG/DL (ref 6–20)
CALCIUM SERPL-MCNC: 7.4 MG/DL (ref 8.6–10.2)
CHLORIDE BLD-SCNC: 98 MMOL/L (ref 98–107)
CO2: 33 MMOL/L (ref 22–29)
CREAT SERPL-MCNC: 0.8 MG/DL (ref 0.7–1.2)
CREATININE URINE: 86 MG/DL (ref 40–278)
EOSINOPHILS ABSOLUTE: 0.15 E9/L (ref 0.05–0.5)
EOSINOPHILS RELATIVE PERCENT: 1.5 % (ref 0–6)
GFR AFRICAN AMERICAN: >60
GFR NON-AFRICAN AMERICAN: >60 ML/MIN/1.73
GLUCOSE BLD-MCNC: 109 MG/DL (ref 74–99)
HCT VFR BLD CALC: 33.4 % (ref 37–54)
HEMOGLOBIN: 10.1 G/DL (ref 12.5–16.5)
IMMATURE GRANULOCYTES #: 0.03 E9/L
IMMATURE GRANULOCYTES %: 0.3 % (ref 0–5)
LYMPHOCYTES ABSOLUTE: 1.21 E9/L (ref 1.5–4)
LYMPHOCYTES RELATIVE PERCENT: 11.8 % (ref 20–42)
MCH RBC QN AUTO: 29.7 PG (ref 26–35)
MCHC RBC AUTO-ENTMCNC: 30.2 % (ref 32–34.5)
MCV RBC AUTO: 98.2 FL (ref 80–99.9)
MONOCYTES ABSOLUTE: 0.88 E9/L (ref 0.1–0.95)
MONOCYTES RELATIVE PERCENT: 8.6 % (ref 2–12)
NEUTROPHILS ABSOLUTE: 7.94 E9/L (ref 1.8–7.3)
NEUTROPHILS RELATIVE PERCENT: 77.4 % (ref 43–80)
PDW BLD-RTO: 14.2 FL (ref 11.5–15)
PLATELET # BLD: 283 E9/L (ref 130–450)
PMV BLD AUTO: 9.8 FL (ref 7–12)
POTASSIUM REFLEX MAGNESIUM: 4.2 MMOL/L (ref 3.5–5)
PRO-BNP: 215 PG/ML (ref 0–125)
PROTEIN PROTEIN: 15 MG/DL (ref 0–12)
PROTEIN/CREAT RATIO: 0.2
PROTEIN/CREAT RATIO: 0.2 (ref 0–0.2)
RBC # BLD: 3.4 E12/L (ref 3.8–5.8)
SARS-COV-2, NAAT: NOT DETECTED
SODIUM BLD-SCNC: 140 MMOL/L (ref 132–146)
TROPONIN, HIGH SENSITIVITY: 29 NG/L (ref 0–11)
WBC # BLD: 10.3 E9/L (ref 4.5–11.5)

## 2021-09-09 PROCEDURE — 96374 THER/PROPH/DIAG INJ IV PUSH: CPT

## 2021-09-09 PROCEDURE — 93970 EXTREMITY STUDY: CPT

## 2021-09-09 PROCEDURE — 99214 OFFICE O/P EST MOD 30 MIN: CPT | Performed by: PHYSICIAN ASSISTANT

## 2021-09-09 PROCEDURE — 71045 X-RAY EXAM CHEST 1 VIEW: CPT

## 2021-09-09 PROCEDURE — 84156 ASSAY OF PROTEIN URINE: CPT

## 2021-09-09 PROCEDURE — G0378 HOSPITAL OBSERVATION PER HR: HCPCS

## 2021-09-09 PROCEDURE — 96375 TX/PRO/DX INJ NEW DRUG ADDON: CPT

## 2021-09-09 PROCEDURE — 6370000000 HC RX 637 (ALT 250 FOR IP): Performed by: INTERNAL MEDICINE

## 2021-09-09 PROCEDURE — 99283 EMERGENCY DEPT VISIT LOW MDM: CPT

## 2021-09-09 PROCEDURE — 6360000002 HC RX W HCPCS: Performed by: STUDENT IN AN ORGANIZED HEALTH CARE EDUCATION/TRAINING PROGRAM

## 2021-09-09 PROCEDURE — 93005 ELECTROCARDIOGRAM TRACING: CPT | Performed by: NURSE PRACTITIONER

## 2021-09-09 PROCEDURE — 94664 DEMO&/EVAL PT USE INHALER: CPT

## 2021-09-09 PROCEDURE — 6360000002 HC RX W HCPCS

## 2021-09-09 PROCEDURE — 99220 PR INITIAL OBSERVATION CARE/DAY 70 MINUTES: CPT | Performed by: INTERNAL MEDICINE

## 2021-09-09 PROCEDURE — 94640 AIRWAY INHALATION TREATMENT: CPT

## 2021-09-09 PROCEDURE — 84484 ASSAY OF TROPONIN QUANT: CPT

## 2021-09-09 PROCEDURE — 87635 SARS-COV-2 COVID-19 AMP PRB: CPT

## 2021-09-09 PROCEDURE — 85025 COMPLETE CBC W/AUTO DIFF WBC: CPT

## 2021-09-09 PROCEDURE — 82570 ASSAY OF URINE CREATININE: CPT

## 2021-09-09 PROCEDURE — 83880 ASSAY OF NATRIURETIC PEPTIDE: CPT

## 2021-09-09 PROCEDURE — 80048 BASIC METABOLIC PNL TOTAL CA: CPT

## 2021-09-09 PROCEDURE — 6370000000 HC RX 637 (ALT 250 FOR IP)

## 2021-09-09 PROCEDURE — 6360000002 HC RX W HCPCS: Performed by: INTERNAL MEDICINE

## 2021-09-09 RX ORDER — FUROSEMIDE 10 MG/ML
INJECTION INTRAMUSCULAR; INTRAVENOUS
Status: DISPENSED
Start: 2021-09-09 | End: 2021-09-10

## 2021-09-09 RX ORDER — KETOROLAC TROMETHAMINE 30 MG/ML
15 INJECTION, SOLUTION INTRAMUSCULAR; INTRAVENOUS ONCE
Status: COMPLETED | OUTPATIENT
Start: 2021-09-09 | End: 2021-09-09

## 2021-09-09 RX ORDER — HYDROCODONE BITARTRATE AND ACETAMINOPHEN 7.5; 325 MG/1; MG/1
1 TABLET ORAL EVERY 6 HOURS PRN
Status: DISCONTINUED | OUTPATIENT
Start: 2021-09-09 | End: 2021-09-11 | Stop reason: HOSPADM

## 2021-09-09 RX ORDER — ACETAMINOPHEN 650 MG/1
650 SUPPOSITORY RECTAL EVERY 6 HOURS PRN
Status: DISCONTINUED | OUTPATIENT
Start: 2021-09-09 | End: 2021-09-11 | Stop reason: HOSPADM

## 2021-09-09 RX ORDER — HYDROCODONE BITARTRATE AND ACETAMINOPHEN 5; 325 MG/1; MG/1
2 TABLET ORAL ONCE
Status: COMPLETED | OUTPATIENT
Start: 2021-09-09 | End: 2021-09-09

## 2021-09-09 RX ORDER — PREDNISONE 20 MG/1
40 TABLET ORAL DAILY
Status: DISCONTINUED | OUTPATIENT
Start: 2021-09-11 | End: 2021-09-11 | Stop reason: HOSPADM

## 2021-09-09 RX ORDER — ONDANSETRON 4 MG/1
4 TABLET, ORALLY DISINTEGRATING ORAL EVERY 8 HOURS PRN
Status: DISCONTINUED | OUTPATIENT
Start: 2021-09-09 | End: 2021-09-11 | Stop reason: HOSPADM

## 2021-09-09 RX ORDER — SODIUM CHLORIDE 9 MG/ML
25 INJECTION, SOLUTION INTRAVENOUS PRN
Status: DISCONTINUED | OUTPATIENT
Start: 2021-09-09 | End: 2021-09-11 | Stop reason: HOSPADM

## 2021-09-09 RX ORDER — SODIUM CHLORIDE 0.9 % (FLUSH) 0.9 %
5-40 SYRINGE (ML) INJECTION EVERY 12 HOURS SCHEDULED
Status: DISCONTINUED | OUTPATIENT
Start: 2021-09-09 | End: 2021-09-11 | Stop reason: HOSPADM

## 2021-09-09 RX ORDER — SODIUM CHLORIDE 0.9 % (FLUSH) 0.9 %
5-40 SYRINGE (ML) INJECTION PRN
Status: DISCONTINUED | OUTPATIENT
Start: 2021-09-09 | End: 2021-09-11 | Stop reason: HOSPADM

## 2021-09-09 RX ORDER — FUROSEMIDE 10 MG/ML
40 INJECTION INTRAMUSCULAR; INTRAVENOUS DAILY
Status: DISCONTINUED | OUTPATIENT
Start: 2021-09-10 | End: 2021-09-09

## 2021-09-09 RX ORDER — ARIPIPRAZOLE 5 MG/1
5 TABLET ORAL DAILY
Status: DISCONTINUED | OUTPATIENT
Start: 2021-09-10 | End: 2021-09-11 | Stop reason: HOSPADM

## 2021-09-09 RX ORDER — FUROSEMIDE 10 MG/ML
40 INJECTION INTRAMUSCULAR; INTRAVENOUS DAILY
Status: DISCONTINUED | OUTPATIENT
Start: 2021-09-10 | End: 2021-09-10

## 2021-09-09 RX ORDER — FUROSEMIDE 10 MG/ML
20 INJECTION INTRAMUSCULAR; INTRAVENOUS ONCE
Status: COMPLETED | OUTPATIENT
Start: 2021-09-09 | End: 2021-09-09

## 2021-09-09 RX ORDER — ONDANSETRON 2 MG/ML
4 INJECTION INTRAMUSCULAR; INTRAVENOUS EVERY 6 HOURS PRN
Status: DISCONTINUED | OUTPATIENT
Start: 2021-09-09 | End: 2021-09-11 | Stop reason: HOSPADM

## 2021-09-09 RX ORDER — HYDROCHLOROTHIAZIDE 12.5 MG/1
12.5 TABLET ORAL DAILY
Status: DISCONTINUED | OUTPATIENT
Start: 2021-09-12 | End: 2021-09-11 | Stop reason: HOSPADM

## 2021-09-09 RX ORDER — PANTOPRAZOLE SODIUM 40 MG/1
40 TABLET, DELAYED RELEASE ORAL
Status: DISCONTINUED | OUTPATIENT
Start: 2021-09-10 | End: 2021-09-11 | Stop reason: HOSPADM

## 2021-09-09 RX ORDER — HYDROXYZINE HYDROCHLORIDE 10 MG/1
25 TABLET, FILM COATED ORAL 2 TIMES DAILY PRN
Status: DISCONTINUED | OUTPATIENT
Start: 2021-09-09 | End: 2021-09-11 | Stop reason: HOSPADM

## 2021-09-09 RX ORDER — KETOROLAC TROMETHAMINE 30 MG/ML
INJECTION, SOLUTION INTRAMUSCULAR; INTRAVENOUS
Status: DISPENSED
Start: 2021-09-09 | End: 2021-09-10

## 2021-09-09 RX ORDER — ACETAMINOPHEN 325 MG/1
650 TABLET ORAL EVERY 6 HOURS PRN
Status: DISCONTINUED | OUTPATIENT
Start: 2021-09-09 | End: 2021-09-11 | Stop reason: HOSPADM

## 2021-09-09 RX ORDER — BUDESONIDE 0.5 MG/2ML
500 INHALANT ORAL 2 TIMES DAILY
Status: DISCONTINUED | OUTPATIENT
Start: 2021-09-09 | End: 2021-09-11 | Stop reason: HOSPADM

## 2021-09-09 RX ORDER — POLYETHYLENE GLYCOL 3350 17 G/17G
17 POWDER, FOR SOLUTION ORAL DAILY PRN
Status: DISCONTINUED | OUTPATIENT
Start: 2021-09-09 | End: 2021-09-11 | Stop reason: HOSPADM

## 2021-09-09 RX ORDER — DULOXETIN HYDROCHLORIDE 60 MG/1
60 CAPSULE, DELAYED RELEASE ORAL DAILY
Status: DISCONTINUED | OUTPATIENT
Start: 2021-09-10 | End: 2021-09-11 | Stop reason: HOSPADM

## 2021-09-09 RX ORDER — BACLOFEN 10 MG/1
5 TABLET ORAL 2 TIMES DAILY
Status: DISCONTINUED | OUTPATIENT
Start: 2021-09-10 | End: 2021-09-11 | Stop reason: HOSPADM

## 2021-09-09 RX ORDER — METHYLPREDNISOLONE SODIUM SUCCINATE 40 MG/ML
40 INJECTION, POWDER, LYOPHILIZED, FOR SOLUTION INTRAMUSCULAR; INTRAVENOUS EVERY 12 HOURS
Status: COMPLETED | OUTPATIENT
Start: 2021-09-09 | End: 2021-09-10

## 2021-09-09 RX ORDER — ARFORMOTEROL TARTRATE 15 UG/2ML
15 SOLUTION RESPIRATORY (INHALATION) 2 TIMES DAILY
Refills: 5 | Status: DISCONTINUED | OUTPATIENT
Start: 2021-09-09 | End: 2021-09-11 | Stop reason: HOSPADM

## 2021-09-09 RX ORDER — IPRATROPIUM BROMIDE AND ALBUTEROL SULFATE 2.5; .5 MG/3ML; MG/3ML
1 SOLUTION RESPIRATORY (INHALATION)
Status: DISCONTINUED | OUTPATIENT
Start: 2021-09-09 | End: 2021-09-11 | Stop reason: HOSPADM

## 2021-09-09 RX ADMIN — FUROSEMIDE 20 MG: 10 INJECTION, SOLUTION INTRAVENOUS at 20:27

## 2021-09-09 RX ADMIN — HYDROCODONE BITARTRATE AND ACETAMINOPHEN 2 TABLET: 5; 325 TABLET ORAL at 17:55

## 2021-09-09 RX ADMIN — KETOROLAC TROMETHAMINE 15 MG: 30 INJECTION, SOLUTION INTRAMUSCULAR at 20:28

## 2021-09-09 RX ADMIN — ARFORMOTEROL TARTRATE 15 MCG: 15 SOLUTION RESPIRATORY (INHALATION) at 20:04

## 2021-09-09 RX ADMIN — IPRATROPIUM BROMIDE AND ALBUTEROL SULFATE 1 AMPULE: .5; 3 SOLUTION RESPIRATORY (INHALATION) at 20:04

## 2021-09-09 RX ADMIN — BUDESONIDE 500 MCG: 0.5 SUSPENSION RESPIRATORY (INHALATION) at 20:04

## 2021-09-09 ASSESSMENT — ENCOUNTER SYMPTOMS
EYE REDNESS: 0
BLOOD IN STOOL: 0
CONSTIPATION: 0
SINUS PAIN: 0
EYE DISCHARGE: 0
DIARRHEA: 0
SORE THROAT: 0
NAUSEA: 0
SHORTNESS OF BREATH: 1
ABDOMINAL PAIN: 0
EYE PAIN: 0
VOMITING: 0
BACK PAIN: 0
CHOKING: 0

## 2021-09-09 ASSESSMENT — PAIN SCALES - GENERAL
PAINLEVEL_OUTOF10: 10

## 2021-09-09 ASSESSMENT — PAIN DESCRIPTION - FREQUENCY: FREQUENCY: CONTINUOUS

## 2021-09-09 ASSESSMENT — PAIN DESCRIPTION - PAIN TYPE: TYPE: ACUTE PAIN

## 2021-09-09 ASSESSMENT — PAIN DESCRIPTION - PROGRESSION: CLINICAL_PROGRESSION: GRADUALLY WORSENING

## 2021-09-09 ASSESSMENT — PAIN DESCRIPTION - DESCRIPTORS: DESCRIPTORS: DISCOMFORT

## 2021-09-09 ASSESSMENT — PAIN DESCRIPTION - ONSET: ONSET: ON-GOING

## 2021-09-09 ASSESSMENT — PAIN DESCRIPTION - ORIENTATION: ORIENTATION: LEFT;RIGHT

## 2021-09-09 NOTE — ED PROVIDER NOTES
worsening. Review of Systems:   A complete review of systems was performed and pertinent positives and negatives are stated within HPI, all other systems reviewed and are negative.    --------------------------------------------- PAST HISTORY ---------------------------------------------  Past Medical History:  has a past medical history of COPD (chronic obstructive pulmonary disease) ([address] Hypertension, and Multiple gastric ulcers. Past Surgical History:  has a past surgical history that includes knee surgery; hip surgery; and hernia repair. Social History:  reports that he quit smoking about 10 months ago. His smoking use included cigarettes. He started smoking about 26 years ago. He has a 100.00 pack-year smoking history. He has never used smokeless tobacco. He reports that he does not drink alcohol and does not use drugs. Family History: family history includes Colon Cancer in his mother; No Known Problems in his brother, brother, sister, and sister; Other in his father. Unless otherwise noted, family history is non contributory    The patients home medications have been reviewed. Allergies: Aspirin, Levofloxacin, Lisinopril, Other, Tramadol, Ibuprofen, and Sulfa antibiotics    Physical Exam:  Constitutional: Appears in no distress  Head: Normocephalic, atraumatic  Eyes: Non-icteric slcera, no conjunctival injection  ENT: Moist mucous membranes,  Neck: Trachea midline, no JVD  Respiratory: Nonlabored respirations. Lungs clear to auscultation bilaterally, no wheezes, rales, or rhonchi. Cardiovascular: Regular rate. Regular rhythm. No murmurs, no gallops, no rubs. Gastrointestinal: Abdomen Soft, Non tender, Non distended. No rebound tenderness, guarding, or rigidity.   Extremities: No lower extremity edema  Genitourinary: No CVA tenderness, no suprapubic tenderness  Musculoskeletal: 2+ bilateral lower extremity pitting edema to the knee, chronic skin changes are noted, there is erythema but it is within the outlined margin that was drawn on the patient's legs for his prior cellulitis, 2 out of 4 dorsalis pedis as well as posterior tibial pulses are noted, sensory is intact light touch in the bilateral lower extremities. Skin: Lower extremity skin changes, chronic as noted prior. Neurologic: Alert, symmetric facies, no aphasia    My Medical Decision Making:   Patient presents emergency department lower extremity edema. Vital signs are reviewed and interpreted by myself as within normal acceptable limits. History and physical examination findings consistent with multiple differential diagnosis considered mainly a DVT of the bilateral lower extremity versus possible heart failure. I am not concerned clinically at this time for any necrotizing fasciitis, worsening of the patient's cellulitis or superficial thrombophlebitis. I am reassured that he has palpable dorsalis pedis and posterior tibial pulses. Will take a bilateral lower extremity venous duplex. I did compare the patient's prior examination to that of the infectious disease physician and the patient was not noted to have lower extremity edema prior but was noted to have chronic venous stasis changes. Date of EKG: September 9, 2021  Time: 1713 p.m. Rhythm: Sinus  Rate: 94 bpm  Axis: Normal  Conduction: QRS duration within normal limits at 80 ms  ST Segments: No ST segment elevation or depressions  T Waves: Normal    Clinical Impression: Sinus rhythm no ischemia or infarct  Comparison to prior EKG: None    Labs: Elevated BNP at 215. Leukocytosis reveals elevated ANC count at 7.94. Imaging: Plain film of the chest reveals perihilar peripheral infiltrates as well as pleural effusions consistent with pneumonia versus CHF. However, the patient hasrecent prior imaging and did not show this at that time. DVT scan of the bilateral lower extremities negative.     Given the patient's questionable worsening shortness of breath as well as 2+ edema to the knee. I did perform chart review and patient has not had a recent echocardiogram and it was no diuresis that he takes on regular basis. Nor does the patient have any history of heart failure he describes. Feels a prudent to admit him for heart failure work-up subsequently. IMPRESSION:   1. Leg swelling    2. Leg edema    3. Chronic venous stasis        I, Dr. Jackson Kendall, am the primary provider of record. I directly supervised any procedures performed by the resident and was present for the procedure including all critical portions of the procedure. Jackson Kendall DO  Emergency Medicine    NOTE: This report was transcribed using voice recognition software.  Every effort was made to ensure accuracy; however, inadvertent computerized transcription errors may be present       Rani Torres DO  09/09/21 2171

## 2021-09-09 NOTE — ED PROVIDER NOTES
FIRST PROVIDER CONTACT ASSESSMENT NOTE                                                                                                Department of Emergency Medicine                                                      First Provider Note  21  12:38 PM EDT  NAME: Aliya Crook  : 1965  MRN: [id number]    Chief Complaint: Leg Swelling (sent by PCP to r/o CHF or DVT)      History of Present Illness:   Aliya Crook is a 54 y.o. male who presents to the ED for complaints of bilateral lower leg pain and swelling and states he was recently discharged from the hospital 2021 for cellulitis. He does complain of chronic shortness of breath he is on oxygen 4 L nasal cannula. The last time he has been on antibiotics was 2021. Patient denies any chest pain, fever or chills. Focused Physical Exam:  VS:    ED Triage Vitals [21 1237]   BP Temp Temp src Pulse Resp SpO2 Height Weight   -- 97.6 °F (36.4 °C) -- 93 20 100 % 5' 4\" (1.626 m) 132 lb (59.9 kg)        General: Alert and in no apparent distress. Medical History:  has a past medical history of COPD (chronic obstructive pulmonary disease) ([address] Hypertension, and Multiple gastric ulcers. Surgical History:  has a past surgical history that includes knee surgery; hip surgery; and hernia repair. Social History:  reports that he quit smoking about 10 months ago. His smoking use included cigarettes. He started smoking about 26 years ago. He has a 100.00 pack-year smoking history. He has never used smokeless tobacco. He reports that he does not drink alcohol and does not use drugs. Family History: family history includes Colon Cancer in his mother; No Known Problems in his brother, brother, sister, and sister; Other in his father.     Allergies: Aspirin, Levofloxacin, Lisinopril, Other, Tramadol, Ibuprofen, and Sulfa antibiotics     Initial Plan of Care:  Initiate Treatment-Testing, Proceed toTreatment Area When Bed Available for ED Attending/MLP to Continue Care    -------------------------------------------------END OF FIRST PROVIDER CONTACT ASSESSMENT NOTE--------------------------------------------------------  Electronically signed by ARGELIA Abraham - CNP   DD: 9/9/21        ARGELIA Abraham CNP  09/09/21 1428

## 2021-09-09 NOTE — ED PROVIDER NOTES
54-year-old male presenting to the emergency department with leg swelling x2 days, sent by PCP. Patient reports that 2 weeks ago he was seen at this hospital, and admitted for bilateral leg swelling, diagnosed with cellulitis. Per hospital note, infectious disease did not prescribe IV antibiotics at that time, and he was treated with oral cephalexin and doxycycline. Patient reports that leg swelling had achieved complete resolution. About a day and a half ago, patient reports that leg swelling began developing. He went into see his PCP, who told him that he was unable to help him in the office and that he would need to come into the ED for further management. Patient reports that pain with swelling, 10 out of 10, localized to areas of edema, described as burning, constant, worsens with palpation. Patient denies any diuretics, and reports no previous history of echocardiogram.           Review of Systems   Constitutional: Negative for diaphoresis and fever. HENT: Negative for ear pain, sinus pain and sore throat. Eyes: Negative for pain, discharge and redness. Respiratory: Positive for shortness of breath. Negative for choking. Cardiovascular: Positive for leg swelling. Negative for chest pain. Gastrointestinal: Negative for abdominal pain, blood in stool, constipation, diarrhea, nausea and vomiting. Genitourinary: Negative for dysuria, flank pain, hematuria and testicular pain. Musculoskeletal: Negative for back pain and neck pain. Skin: Negative for wound. Neurological: Negative for dizziness, syncope, weakness, light-headedness, numbness and headaches. Psychiatric/Behavioral: Negative for confusion and hallucinations. Physical Exam  Vitals reviewed. Constitutional:       General: He is not in acute distress. Appearance: He is not ill-appearing. HENT:      Head: Normocephalic.       Right Ear: External ear normal.      Left Ear: External ear normal.      Nose: Nose normal.      Mouth/Throat:      Mouth: Mucous membranes are moist.      Pharynx: Oropharynx is clear. No posterior oropharyngeal erythema. Eyes:      General:         Right eye: No discharge. Left eye: No discharge. Conjunctiva/sclera: Conjunctivae normal.      Pupils: Pupils are equal, round, and reactive to light. Cardiovascular:      Rate and Rhythm: Normal rate and regular rhythm. Pulses: Normal pulses. Pulmonary:      Breath sounds: Wheezing and rhonchi present. Abdominal:      General: A surgical scar is present. Palpations: Abdomen is soft. Tenderness: There is no abdominal tenderness. There is no guarding or rebound. Musculoskeletal:      Cervical back: Normal range of motion. No rigidity. Skin:     General: Skin is warm. Neurological:      Mental Status: He is alert and oriented to person, place, and time. Psychiatric:         Mood and Affect: Mood normal.         Behavior: Behavior normal.          Procedures     MDM  Number of Diagnoses or Management Options  Chronic venous stasis  Leg edema  Leg swelling  Diagnosis management comments: 59-year-old male presenting to the emergency department with bilateral leg swelling x2 days. Ultrasound of the lower extremity demonstrated no evidence of DVT in either lower extremity, x-ray portable showed a hazy and patchy perihilar and peripheral infiltrates and pleural effusions. Patient has a troponin of 29, proBNP was 215, hemoglobin was 10.1. Due to patient's worsening shortness of breath, and elevated BNP along with bilateral swelling legs, patient will be admitted for further evaluation.   Spoke to Dr. Wil Haley who agreed to admit on patient                --------------------------------------------- PAST HISTORY ---------------------------------------------  Past Medical History:  has a past medical history of COPD (chronic obstructive pulmonary disease) ([address] Hypertension, and Multiple gastric ulcers. Past Surgical History:  has a past surgical history that includes knee surgery; hip surgery; and hernia repair. Social History:  reports that he quit smoking about 10 months ago. His smoking use included cigarettes. He started smoking about 26 years ago. He has a 100.00 pack-year smoking history. He has never used smokeless tobacco. He reports that he does not drink alcohol and does not use drugs. Family History: family history includes Colon Cancer in his mother; No Known Problems in his brother, brother, sister, and sister; Other in his father. The patients home medications have been reviewed.     Allergies: Aspirin, Levofloxacin, Lisinopril, Other, Tramadol, Ibuprofen, and Sulfa antibiotics    -------------------------------------------------- RESULTS -------------------------------------------------    LABS:  Results for orders placed or performed during the hospital encounter of 09/09/21   COVID-19, Rapid    Specimen: Nasopharyngeal Swab   Result Value Ref Range    SARS-CoV-2, NAAT Not Detected Not Detected   Basic Metabolic Panel w/ Reflex to MG   Result Value Ref Range    Sodium 140 132 - 146 mmol/L    Potassium reflex Magnesium 4.2 3.5 - 5.0 mmol/L    Chloride 98 98 - 107 mmol/L    CO2 33 (H) 22 - 29 mmol/L    Anion Gap 9 7 - 16 mmol/L    Glucose 109 (H) 74 - 99 mg/dL    BUN 13 6 - 20 mg/dL    CREATININE 0.8 0.7 - 1.2 mg/dL    GFR Non-African American >60 >=60 mL/min/1.73    GFR African American >60     Calcium 7.4 (L) 8.6 - 10.2 mg/dL   CBC Auto Differential   Result Value Ref Range    WBC 10.3 4.5 - 11.5 E9/L    RBC 3.40 (L) 3.80 - 5.80 E12/L    Hemoglobin 10.1 (L) 12.5 - 16.5 g/dL    Hematocrit 33.4 (L) 37.0 - 54.0 %    MCV 98.2 80.0 - 99.9 fL    MCH 29.7 26.0 - 35.0 pg    MCHC 30.2 (L) 32.0 - 34.5 %    RDW 14.2 11.5 - 15.0 fL    Platelets 247 387 - 397 E9/L    MPV 9.8 7.0 - 12.0 fL    Neutrophils % 77.4 43.0 - 80.0 %    Immature Granulocytes % 0.3 0.0 - 5.0 %    Lymphocytes % 11.8 (L) 20.0 - 42.0 %    Monocytes % 8.6 2.0 - 12.0 %    Eosinophils % 1.5 0.0 - 6.0 %    Basophils % 0.4 0.0 - 2.0 %    Neutrophils Absolute 7.94 (H) 1.80 - 7.30 E9/L    Immature Granulocytes # 0.03 E9/L    Lymphocytes Absolute 1.21 (L) 1.50 - 4.00 E9/L    Monocytes Absolute 0.88 0.10 - 0.95 E9/L    Eosinophils Absolute 0.15 0.05 - 0.50 E9/L    Basophils Absolute 0.04 0.00 - 0.20 E9/L   Brain Natriuretic Peptide   Result Value Ref Range    Pro- (H) 0 - 125 pg/mL   Troponin   Result Value Ref Range    Troponin, High Sensitivity 29 (H) 0 - 11 ng/L   PROTEIN / CREATININE RATIO, URINE   Result Value Ref Range    Protein, Ur 15 (H) 0 - 12 mg/dL    Creatinine, Ur 86 40 - 278 mg/dL    Protein/Creat Ratio 0.2 0.0 - 0.2    Protein/Creat Ratio 0.2    EKG 12 Lead   Result Value Ref Range    Ventricular Rate 94 BPM    Atrial Rate 94 BPM    P-R Interval 116 ms    QRS Duration 80 ms    Q-T Interval 374 ms    QTc Calculation (Bazett) 467 ms    P Axis 65 degrees    R Axis 76 degrees    T Axis 63 degrees       RADIOLOGY:  US DUP LOWER EXTREMITIES BILATERAL VENOUS   Final Result   No evidence of DVT in either lower extremity. XR CHEST PORTABLE   Final Result   Hazy and patchy perihilar and peripheral infiltrates and pleural effusion   likely pneumonia and or CHF. Viral infection is considered. XR CHEST PORTABLE    (Results Pending)           ------------------------- NURSING NOTES AND VITALS REVIEWED ---------------------------  Date / Time Roomed:  9/9/2021  4:19 PM  ED Bed Assignment:  19/19    The nursing notes within the ED encounter and vital signs as below have been reviewed.      Patient Vitals for the past 24 hrs:   BP Temp Pulse Resp SpO2 Height Weight   09/09/21 2004 -- -- -- 20 97 % -- --   09/09/21 1238 132/66 -- -- -- -- -- --   09/09/21 1237 -- 97.6 °F (36.4 °C) 93 20 100 % 5' 4\" (1.626 m) 132 lb (59.9 kg)       Oxygen Saturation Interpretation: Normal    ------------------------------------------ PROGRESS NOTES ------------------------------------------  Re-evaluation(s):  Time: 22:00  Patients symptoms show no change  Repeat physical examination is not changed    Counseling:  I have spoken with the patient and discussed todays results, in addition to providing specific details for the plan of care and counseling regarding the diagnosis and prognosis. Their questions are answered at this time and they are agreeable with the plan of admission.    --------------------------------- ADDITIONAL PROVIDER NOTES ---------------------------------  Consultations:  Time: 2000. Spoke with  .  Discussed case. They will admit the patient. This patient's ED course included: a personal history and physicial examination, re-evaluation prior to disposition, multiple bedside re-evaluations, IV medications, cardiac monitoring and continuous pulse oximetry    This patient has remained hemodynamically stable during their ED course. Diagnosis:  1. Leg swelling    2. Leg edema    3. Chronic venous stasis        Disposition:  Patient's disposition: Admit   Patient's condition is stable.                Odalys Renee DO  Resident  09/09/21 7667

## 2021-09-09 NOTE — H&P
[facility] History and Physical        Chief Complaint:  LE edema R>L  History of Present Illness   The patient is a 54 y.o. male    Chronic baseline copd, no new change in cough,  Non productive, no fever  +DIAS moderate which he reports is at baseline  Is is taking nebulizer steroid bid +rescue inhaler  He was given steroids last admission which he finished up shortly after admission 1.5 weeks ago     Chronic LE edama, but recently worse R>L LE edema, pain moderaee and swelling, which isn't going down even when props leg up at night, he doesn't use LE stockinettes  No hx of chf, or recent echo, no orthopenia  On chronic o2 home was at [facility] but has been on 4LNC o2 since DC 2 weeks ago - without any acute sob. No change in urine output, no bph, no diuretics in past  On amlodpine for HTN vs raynauds by PCP< but he has been on this for several months. Reportedly LE \"cellulitis\" vs b/l LE venous stasis dermatitis- treated with abx- which he finished    He is short of breath at baseline given his COPD. However, his shortness of breath is not worse than usual.  Denies any fevers chills or myalgias. Denies rapid progression of his bilateral lower extremity redness. Dark purple color changes in LE are chronic and unchanged from his recent discharge from the hospital.     Sent in by  PCPs office and had lower extremity edema which was \"new vs worse\"- US in ER done did NOT show DVT  bnp only 250 but per ER cxr shows \"pulmonary edema\" they are requesting we admit for \"chf\"    - hx taken from the patient  REVIEW OF SYSTEMS:  no fevers, chills, cp, sob, n/v, ha, vision/hearing changes, wt changes, hot/cold flashes, other open skin lesions, diarrhea, constipation, dysuria/hematuria unless noted in HPI. Complete ROS performed with the patient and is otherwise negative.     Past Medical History:      Diagnosis Date    COPD (chronic obstructive pulmonary disease) ([address] [address]     Hypertension     Multiple gastric ulcers        Past Surgical History:        Procedure Laterality Date    HERNIA REPAIR      HIP SURGERY      KNEE SURGERY         Home Medications:  Prior to Admission medications    Medication Sig Start Date End Date Taking? Authorizing Provider   calcium-vitamin D (OSCAL-500) 500-200 MG-UNIT per tablet Take 1 tablet by mouth 2 times daily 8/29/21   Mario Smith MD   Probiotic Acidophilus [facility]) TABS Take 1 tablet by mouth daily 8/29/21 9/28/21  Mario Smith MD   hydrOXYzine (ATARAX) 25 MG tablet Take 1 tablet by mouth 2 times daily as needed for Anxiety 8/6/21 10/5/21  ARGELIA Lyman NP   albuterol sulfate HFA (PROAIR HFA) 108 (90 Base) MCG/ACT inhaler Inhale 2 puffs into the lungs every 4 hours as needed for Wheezing 7/14/21   ARGELIA Lyman NP   budesonide (PULMICORT) 0.5 MG/2ML nebulizer suspension Take 2 mLs by nebulization 2 times daily 6/16/21   ARGELIA Lao CNP   Revefenacin (YUPELRI) 175 MCG/3ML SOLN Inhale 1 ampule into the lungs daily 6/16/21   ARGELIA Lao CNP   omeprazole (PRILOSEC) 40 MG delayed release capsule Take 1 capsule by mouth daily 6/14/21   ARGELIA Lyman NP   amLODIPine (NORVASC) 5 MG tablet Take 1 tablet by mouth daily 4/29/21   SHREYA Nazario MD   formoterol (PERFOROMIST) 20 MCG/2ML nebulizer solution Take 2 mLs by nebulization every 12 hours  Patient taking differently: Take 20 mcg by nebulization every 12 hours NEW HAS NOT PICKED UP FROM PHARMACY 4/7/21   ARGELIA Lao CNP   albuterol sulfate HFA (PROAIR HFA) 108 (90 Base) MCG/ACT inhaler Inhale 2 puffs into the lungs every 4 hours as needed for Wheezing 1/26/21   Harpreet Lowery MD   baclofen (LIORESAL) 10 MG tablet Take 5 mg by mouth 2 times daily  12/29/20   Historical Provider, MD   HYDROcodone-acetaminophen (NORCO) 7.5-325 MG per tablet Take 1 tablet by mouth 2 times daily as needed.  Takes religiously twice daily for leg pain per pt 10/6/20 Historical Provider, MD   ipratropium-albuterol (DUONEB) 0.5-2.5 (3) MG/3ML SOLN nebulizer solution Inhale 3 mLs into the lungs every 4 hours as needed for Shortness of Breath 3/10/20   SHREYA Buchanan MD   ARIPiprazole (ABILIFY) 5 MG tablet take 1 tablet by mouth once daily 7/5/19   Historical Provider, MD   DULoxetine (CYMBALTA) 60 MG extended release capsule take 1 capsule by mouth at bedtime 7/5/19   Historical Provider, MD       Allergies:  Aspirin, Levofloxacin, Lisinopril, Other, Tramadol, Ibuprofen, and Sulfa antibiotics    Social History:   TOBACCO:   reports that he quit smoking about 10 months ago. His smoking use included cigarettes. He started smoking about 26 years ago. He has a 100.00 pack-year smoking history. He has never used smokeless tobacco.  ETOH:   reports no history of alcohol use. Family History:       Problem Relation Age of Onset    Colon Cancer Mother     Other Father         house fire    No Known Problems Sister     No Known Problems Brother     No Known Problems Sister     No Known Problems Brother       Or deferred/otherwise considered non contributory to current admission  PHYSICAL EXAM:    VS: /66   Pulse 93   Temp 97.6 °F (36.4 °C)   Resp 20   Ht 5' 4\" (1.626 m)   Wt 132 lb (59.9 kg)   SpO2 100%   BMI 22.66 kg/m²     General Appearance:     no acute distress. Psych:  HEENT:    A.O. As per HPI details  NC/AT, PERRL, no pallor no icterus, lips/ext mucous membrane grossly N\  NC o2      Neck:   Supple, trachea midline   Resp:     + wheezes,+diffuse  b.l rhonchi  No obvious crackles   Chest wall:    No tenderness or deformity   Heart:    Regular rate and rhythm, S1 and S2 normal, no rub or gallop. Abdomen:     Soft, non-tender, bowel sounds active    no suspicious obvious masses/organomegaly   Genitalia & Rectal:    Deferred.    Extremities x4:   Extremities normal, atraumatic, no cyanosis, no clubbing   Musculoskeletal:      NO active synovitis or swollen b/l wrists, 2-5 MCPs examined   Skin:   Skin color, texture, dilateral venous stasis changes  Doubt acute infection  R>L LE edema noted   Lymph nodes:   Cervical nodes grossly normal   Neurologic:  .Grossly symmetric  strength in UEs and LEs with symmetric grossly intact to light touch sensation     LABS:  CBC:   Lab Results   Component Value Date    WBC 10.3 09/09/2021    RBC 3.40 09/09/2021    HGB 10.1 09/09/2021    HCT 33.4 09/09/2021     09/09/2021    MCV 98.2 09/09/2021     BMP:    Lab Results   Component Value Date     09/09/2021    K 4.2 09/09/2021    CL 98 09/09/2021    CO2 33 09/09/2021    BUN 13 09/09/2021    CREATININE 0.8 09/09/2021    GLUCOSE 109 09/09/2021    CALCIUM 7.4 09/09/2021     Hepatic Function Panel:    Lab Results   Component Value Date    ALKPHOS 57 08/23/2021    AST 24 08/23/2021    ALT 20 08/23/2021    PROT 7.3 08/23/2021    LABALBU 3.4 08/23/2021    BILITOT 0.3 08/23/2021     Magnesium:    Lab Results   Component Value Date    MG 2.2 08/26/2021       PT/INR:    Lab Results   Component Value Date    PROTIME 11.9 08/12/2021    INR 1.08 08/12/2021     U/A:   Lab Results   Component Value Date    PHUR 5.0 10/13/2020    WBCUA NEGATIVE 10/13/2020    RBCUA MODERATE 10/13/2020    GLUCOSEU NEGATIVE 10/13/2020     ABG:    Lab Results   Component Value Date    PHART 7.29 10/14/2020    VVK9KGW 71 10/14/2020    PO2ART 66 10/14/2020    Z8OVJPXZ 90 10/14/2020    VRS8MZX 34.1 10/14/2020    BEART 5.4 10/14/2020     TSH:    Lab Results   Component Value Date    TSH 0.297 08/25/2021     Cardiac Enzymes:   Lab Results   Component Value Date    CKTOTAL 408 (H) 02/24/2020    CKTOTAL 725 (H) 02/22/2020    CKTOTAL 1,416 (H) 02/21/2020    CKMB 14.8 (HH) 02/22/2020    CKMB 22.2 (HH) 02/21/2020    TROPONINI < 0.03 08/05/2021    TROPONINI < 0.03 07/14/2021    TROPONINI < 0.03 12/29/2020       Radiology: XR CHEST PORTABLE    Result Date: 9/9/2021  EXAMINATION: ONE XRAY VIEW OF THE CHEST 9/9/2021 5:27 pm COMPARISON: August 23, 2021. HISTORY: ORDERING SYSTEM PROVIDED HISTORY: Leg Swelling TECHNOLOGIST PROVIDED HISTORY: Reason for exam:->Leg Swelling FINDINGS: There is mild cardiomegaly. There is mild vascular congestion. Hazy and the patchy infiltrates are identified in the lungs more on the periphery and lung bases. Pleural effusions are suspected. There is COPD. Hazy and patchy perihilar and peripheral infiltrates and pleural effusion likely pneumonia and or CHF. Viral infection is considered. US DUP LOWER EXTREMITIES BILATERAL VENOUS    Result Date: 9/9/2021  EXAMINATION: DUPLEX VENOUS ULTRASOUND OF THE BILATERAL LOWER EXTREMITIES, 9/9/2021 6:07 pm TECHNIQUE: Duplex ultrasound using B-mode/gray scaled imaging and Doppler spectral analysis and color flow was obtained of the bilateral lower extremities. COMPARISON: August 23, 2021 HISTORY: ORDERING SYSTEM PROVIDED HISTORY: LEG SWELLING, PAIN, DVT SUSPECTED TECHNOLOGIST PROVIDED HISTORY: Reason for exam:->bilat LE edema, new What reading provider will be dictating this exam?->CRC FINDINGS: The visualized veins of the bilateral lower extremities are patent and free of echogenic thrombus. The veins demonstrate good compressibility with normal color flow study and spectral analysis. No evidence of DVT in either lower extremity. EKG:  Normal sinus rhythm   Normal ECG   When compared with ECG of 23-AUG-2021 16:18,   No significant change was found   Assessment & Plan   ACTIVE hospital problems being addressed/reassessed for this admission:  Principal Problem:    Swelling of both lower extremities  Active Problems:    Essential hypertension    Hypertension    Chronic obstructive pulmonary disease (HCC)    Venous insufficiency of both lower extremities    Oxygen dependent  Resolved Problems:    * No resolved hospital problems.  *    Code status/DVT prophylaxis and PLAN --see orders   Note extensive time spent coordinating care between ER docs, ER and floor nurses, and transitioning care over to day providers  Plan of care/ clinical impressions/communication specifics detailed below:     54 y.o. male    Chronic baseline copd, no new change in cough,  Non productive, no fever  +DIAS moderate which he reports is at baseline  Is is taking nebulizer steroid bid +rescue inhaler  He was given steroids last admission which he finished up shortly after admission 1.5 weeks ago     Chronic LE edama, but recently worse R>L LE edema, pain moderaee and swelling, which isn't going down even when props leg up at night, he doesn't use LE stockinettes  No hx of chf, or recent echo, no orthopenia  On chronic o2 home was at [facility] but has been on 4LNC o2 since DC 2 weeks ago - without any acute sob. No change in urine output, no bph, no diuretics in past  On amlodpine for HTN vs raynauds by PCP< but he has been on this for several months. Reportedly LE \"cellulitis\" vs b/l LE venous stasis dermatitis- treated with abx- which he finished    He is short of breath at baseline given his COPD. However, his shortness of breath is not worse than usual.  Denies any fevers chills or myalgias. Denies rapid progression of his bilateral lower extremity redness.    Dark purple color changes in LE are chronic and unchanged from his recent discharge from the hospital.     Sent in by  PCPs office and had lower extremity edema which was \"new vs worse\"- US in ER done did NOT show DVT  bnp only 250 but per ER cxr shows \"pulmonary edema\" they are requesting we admit for \"chf\"    Baseline copd  ?cor pulmonale    + wheezes,+diffuse  b.l rhonchi  No obvious crackles   - cristal give steroids  No productive cough, will defer abx  +aersols      Doubt cellulitis- likely venous stasis changes   Skin color, texture, dilateral venous stasis changes  Doubt acute infection  R>L LE edema     Lasix for now  ?hold norvasc  Start hctz, supportive stockinettes   Repeat cxr  Check respiratory panel - rule out viral pneumonia intiltrates  Check echo- chf vs cor pulmonale    Pain meds as per his request for LE edema  On chronic opirods, muscle relaxers etc.       Magalys Lamb MD   Night Officer, overnight admitting doctor at [facility] call day time doctor   for questions after 7:30am    Covering for [address] [facility]  If Qs please call [phone]  Electronically signed by Ekta Cardenas MD on 9/9/2021 at 7:54 PM

## 2021-09-09 NOTE — TELEPHONE ENCOUNTER
Writer contacted ED provider to inform of 30 day readmission risk. Pt waiting to be seen.     Call Back: If you need to call back to inform of disposition you can contact me at [phone]

## 2021-09-09 NOTE — PROGRESS NOTES
Chief Complaint       Leg Swelling      History of Present Illness   Source of history provided by:  patient and relative. Connor Neely is a 54 y.o. old male presenting to the walk in clinic for evaluation of bilateral lower extremity swelling which is present chronically but has been worsening over the past few days. Symptoms are more pronounced on the right. Of note, patient was just discharged from [facility] on 8/29/2021 for cellulitis. He was admitted for a total of 6 nights. Patient was discharged home on Keflex and doxycycline. He has been taking both of these medications as prescribed. He is also on chronic supplemental oxygen for severe COPD. Patient reports intermittent shortness of breath which he attributes to his COPD. Denies any N/V, diaphoresis, HA, fever, cough, or recent illness. No dizziness, syncope, or palpitations. Patient is a former smoker. Pt denies any recent sick exposures. Denies history of previous DVT/PE, recent travel/surgery, hormone use, tobacco use, or history of cancer. ROS    Unless otherwise stated in this report or unable to obtain because of the patient's clinical or mental status as evidenced by the medical record, this patients's positive and negative responses for Review of Systems, constitutional, psych, eyes, ENT, cardiovascular, respiratory, gastrointestinal, neurological, genitourinary, musculoskeletal, integument systems and systems related to the presenting problem are either stated in the preceding or were not pertinent or were negative for the symptoms and/or complaints related to the medical problem. Past Medical History:  has a past medical history of COPD (chronic obstructive pulmonary disease) ([address] Hypertension, and Multiple gastric ulcers. Past Surgical History:  has a past surgical history that includes knee surgery; hip surgery; and hernia repair.   Social History:  reports that he quit smoking about 10 months ago. His smoking use included cigarettes. He started smoking about 26 years ago. He has a 100.00 pack-year smoking history. He has never used smokeless tobacco. He reports that he does not drink alcohol and does not use drugs. Family History: family history includes Colon Cancer in his mother; No Known Problems in his brother, brother, sister, and sister; Other in his father. Allergies: Aspirin, Levofloxacin, Lisinopril, Other, Tramadol, Ibuprofen, and Sulfa antibiotics    Physical Exam         VS:  /70   Pulse 94   Temp 98.8 °F (37.1 °C) (Temporal)   Wt 132 lb (59.9 kg)   SpO2 93%   BMI 22.66 kg/m²    Oxygen Saturation Interpretation: Normal.    General Appearance/Constitutional:  Alert, development consistent with age, NAD. HEENT:  NC/NT. Airway patent. Neck:  Normal ROM. Supple. Lungs: Breath sounds are diminished throughout with coarse rhonchi bilaterally. No rales or wheezes appreciated. Heart:  Regular rate and rhythm, normal heart sounds, without pathological murmurs, ectopy, gallops, or rubs. Lower extremities with swelling bilaterally. 2+ pitting edema noted on the right. 1+ pitting edema noted on the left. Distal pulses are faint but palpable. Moderate TTP noted over the right posterior calf. Positive Zenia's sign on the right. Chronic venous stasis changes noted bilaterally. Chest:  Symmetrical without visible rash or tenderness. Back:  No costovertebral tenderness. Skin:  Normal turgor. Warm, dry, without visible rash, unless noted elsewhere. Neurological:  Oriented. Motor functions intact. Psychiatric:  Pleasant and appropriate. Mood and affect are normal.    Lab / Imaging Results   (All laboratory and radiology results have been personally reviewed by myself)  Labs:  No results found for this visit on 09/09/21. Imaging: All Radiology results interpreted by Radiologist unless otherwise noted.     Assessment / Plan     Impression(s):  Elidia Ling was seen today for leg swelling. Diagnoses and all orders for this visit:    Bilateral lower extremity edema    Venous insufficiency of both lower extremities    Chronic obstructive pulmonary disease, unspecified COPD type ([facility] [address]    Oxygen dependent    History of recent hospitalization        Disposition:  Disposition: Discharge to ER. Patient with worsening swelling of the bilateral lower extremities (significantly more pronounced on the right) over the past few days. High level of suspicion for DVT given recent hospitalization. Differential diagnosis included but not limited to acute COPD exacerbation versus acute CHF versus recurrent cellulitis. Patient advised that I feel he needs a comprehensive cardiopulmonary workup including STAT labs and imaging that is above the scope of our walk in clinic. Pt advised to go straight to the ED for further evaluation and management. Pt agreed with this care plan and agreed to go immediately. He is with a relative who agrees to drive him now by private vehicle. Pt left our office in stable condition. Further disposition to follow. All questions answered. Nikhil Buenrostro PA-C    **This report was transcribed using voice recognition software. Every effort was made to ensure accuracy; however, inadvertent computerized transcription errors may be present.

## 2021-09-10 ENCOUNTER — APPOINTMENT (OUTPATIENT)
Dept: GENERAL RADIOLOGY | Age: 56
End: 2021-09-10
Payer: MEDICAID

## 2021-09-10 LAB
ALBUMIN SERPL-MCNC: 3.8 G/DL (ref 3.5–5.2)
ALP BLD-CCNC: 53 U/L (ref 40–129)
ALT SERPL-CCNC: 14 U/L (ref 0–40)
ANION GAP SERPL CALCULATED.3IONS-SCNC: 9 MMOL/L (ref 7–16)
AST SERPL-CCNC: 16 U/L (ref 0–39)
BASOPHILS ABSOLUTE: 0.01 E9/L (ref 0–0.2)
BASOPHILS RELATIVE PERCENT: 0.1 % (ref 0–2)
BILIRUB SERPL-MCNC: 0.3 MG/DL (ref 0–1.2)
BUN BLDV-MCNC: 13 MG/DL (ref 6–20)
CALCIUM SERPL-MCNC: 7.1 MG/DL (ref 8.6–10.2)
CHLORIDE BLD-SCNC: 97 MMOL/L (ref 98–107)
CO2: 34 MMOL/L (ref 22–29)
CREAT SERPL-MCNC: 0.7 MG/DL (ref 0.7–1.2)
EKG ATRIAL RATE: 94 BPM
EKG P AXIS: 65 DEGREES
EKG P-R INTERVAL: 116 MS
EKG Q-T INTERVAL: 374 MS
EKG QRS DURATION: 80 MS
EKG QTC CALCULATION (BAZETT): 467 MS
EKG R AXIS: 76 DEGREES
EKG T AXIS: 63 DEGREES
EKG VENTRICULAR RATE: 94 BPM
EOSINOPHILS ABSOLUTE: 0.02 E9/L (ref 0.05–0.5)
EOSINOPHILS RELATIVE PERCENT: 0.3 % (ref 0–6)
GFR AFRICAN AMERICAN: >60
GFR NON-AFRICAN AMERICAN: >60 ML/MIN/1.73
GLUCOSE BLD-MCNC: 168 MG/DL (ref 74–99)
HCT VFR BLD CALC: 34.5 % (ref 37–54)
HEMOGLOBIN: 10.3 G/DL (ref 12.5–16.5)
HYPOCHROMIA: ABNORMAL
IMMATURE GRANULOCYTES #: 0.04 E9/L
IMMATURE GRANULOCYTES %: 0.6 % (ref 0–5)
LV EF: 55 %
LVEF MODALITY: NORMAL
LYMPHOCYTES ABSOLUTE: 0.48 E9/L (ref 1.5–4)
LYMPHOCYTES RELATIVE PERCENT: 6.9 % (ref 20–42)
MCH RBC QN AUTO: 29.9 PG (ref 26–35)
MCHC RBC AUTO-ENTMCNC: 29.9 % (ref 32–34.5)
MCV RBC AUTO: 100 FL (ref 80–99.9)
MONOCYTES ABSOLUTE: 0.14 E9/L (ref 0.1–0.95)
MONOCYTES RELATIVE PERCENT: 2 % (ref 2–12)
NEUTROPHILS ABSOLUTE: 6.25 E9/L (ref 1.8–7.3)
NEUTROPHILS RELATIVE PERCENT: 90.1 % (ref 43–80)
PDW BLD-RTO: 14.4 FL (ref 11.5–15)
PLATELET # BLD: 253 E9/L (ref 130–450)
PMV BLD AUTO: 9.8 FL (ref 7–12)
POTASSIUM REFLEX MAGNESIUM: 4.4 MMOL/L (ref 3.5–5)
PROCALCITONIN: 0.06 NG/ML (ref 0–0.08)
RBC # BLD: 3.45 E12/L (ref 3.8–5.8)
SODIUM BLD-SCNC: 140 MMOL/L (ref 132–146)
TOTAL PROTEIN: 6.5 G/DL (ref 6.4–8.3)
TSH SERPL DL<=0.05 MIU/L-ACNC: 0.27 UIU/ML (ref 0.27–4.2)
WBC # BLD: 6.9 E9/L (ref 4.5–11.5)

## 2021-09-10 PROCEDURE — 96375 TX/PRO/DX INJ NEW DRUG ADDON: CPT

## 2021-09-10 PROCEDURE — 6360000002 HC RX W HCPCS

## 2021-09-10 PROCEDURE — 99225 PR SBSQ OBSERVATION CARE/DAY 25 MINUTES: CPT | Performed by: INTERNAL MEDICINE

## 2021-09-10 PROCEDURE — 93010 ELECTROCARDIOGRAM REPORT: CPT | Performed by: INTERNAL MEDICINE

## 2021-09-10 PROCEDURE — 2580000003 HC RX 258

## 2021-09-10 PROCEDURE — 6370000000 HC RX 637 (ALT 250 FOR IP): Performed by: INTERNAL MEDICINE

## 2021-09-10 PROCEDURE — 80053 COMPREHEN METABOLIC PANEL: CPT

## 2021-09-10 PROCEDURE — 96376 TX/PRO/DX INJ SAME DRUG ADON: CPT

## 2021-09-10 PROCEDURE — 6370000000 HC RX 637 (ALT 250 FOR IP)

## 2021-09-10 PROCEDURE — 96372 THER/PROPH/DIAG INJ SC/IM: CPT

## 2021-09-10 PROCEDURE — G0378 HOSPITAL OBSERVATION PER HR: HCPCS

## 2021-09-10 PROCEDURE — 71045 X-RAY EXAM CHEST 1 VIEW: CPT

## 2021-09-10 PROCEDURE — 85025 COMPLETE CBC W/AUTO DIFF WBC: CPT

## 2021-09-10 PROCEDURE — 36415 COLL VENOUS BLD VENIPUNCTURE: CPT

## 2021-09-10 PROCEDURE — 93306 TTE W/DOPPLER COMPLETE: CPT

## 2021-09-10 PROCEDURE — 94640 AIRWAY INHALATION TREATMENT: CPT

## 2021-09-10 PROCEDURE — 6360000002 HC RX W HCPCS: Performed by: INTERNAL MEDICINE

## 2021-09-10 PROCEDURE — APPSS30 APP SPLIT SHARED TIME 16-30 MINUTES: Performed by: NURSE PRACTITIONER

## 2021-09-10 PROCEDURE — 84145 PROCALCITONIN (PCT): CPT

## 2021-09-10 PROCEDURE — 84443 ASSAY THYROID STIM HORMONE: CPT

## 2021-09-10 PROCEDURE — 2700000000 HC OXYGEN THERAPY PER DAY

## 2021-09-10 PROCEDURE — 2580000003 HC RX 258: Performed by: INTERNAL MEDICINE

## 2021-09-10 PROCEDURE — 6370000000 HC RX 637 (ALT 250 FOR IP): Performed by: NURSE PRACTITIONER

## 2021-09-10 RX ORDER — FUROSEMIDE 20 MG/1
20 TABLET ORAL DAILY
Status: DISCONTINUED | OUTPATIENT
Start: 2021-09-10 | End: 2021-09-11 | Stop reason: HOSPADM

## 2021-09-10 RX ORDER — HYDROCODONE BITARTRATE AND ACETAMINOPHEN 7.5; 325 MG/1; MG/1
TABLET ORAL
Status: COMPLETED
Start: 2021-09-10 | End: 2021-09-10

## 2021-09-10 RX ORDER — CYCLOBENZAPRINE HCL 10 MG
25 TABLET ORAL 2 TIMES DAILY
Status: ON HOLD | COMMUNITY
End: 2021-09-10

## 2021-09-10 RX ORDER — PANTOPRAZOLE SODIUM 40 MG/1
TABLET, DELAYED RELEASE ORAL
Status: COMPLETED
Start: 2021-09-10 | End: 2021-09-10

## 2021-09-10 RX ORDER — METHYLPREDNISOLONE SODIUM SUCCINATE 40 MG/ML
INJECTION, POWDER, LYOPHILIZED, FOR SOLUTION INTRAMUSCULAR; INTRAVENOUS
Status: COMPLETED
Start: 2021-09-10 | End: 2021-09-10

## 2021-09-10 RX ORDER — FUROSEMIDE 10 MG/ML
INJECTION INTRAMUSCULAR; INTRAVENOUS
Status: COMPLETED
Start: 2021-09-10 | End: 2021-09-10

## 2021-09-10 RX ORDER — BACLOFEN 10 MG/1
TABLET ORAL
Status: COMPLETED
Start: 2021-09-10 | End: 2021-09-10

## 2021-09-10 RX ORDER — HYDROXYZINE HYDROCHLORIDE 10 MG/1
TABLET, FILM COATED ORAL
Status: COMPLETED
Start: 2021-09-10 | End: 2021-09-10

## 2021-09-10 RX ADMIN — BUDESONIDE 500 MCG: 0.5 SUSPENSION RESPIRATORY (INHALATION) at 21:06

## 2021-09-10 RX ADMIN — METHYLPREDNISOLONE SODIUM SUCCINATE 40 MG: 40 INJECTION, POWDER, LYOPHILIZED, FOR SOLUTION INTRAMUSCULAR; INTRAVENOUS at 01:41

## 2021-09-10 RX ADMIN — ARIPIPRAZOLE 5 MG: 5 TABLET ORAL at 09:45

## 2021-09-10 RX ADMIN — PANTOPRAZOLE SODIUM 40 MG: 40 TABLET, DELAYED RELEASE ORAL at 07:05

## 2021-09-10 RX ADMIN — METHYLPREDNISOLONE SODIUM SUCCINATE 40 MG: 40 INJECTION, POWDER, LYOPHILIZED, FOR SOLUTION INTRAMUSCULAR; INTRAVENOUS at 21:40

## 2021-09-10 RX ADMIN — IPRATROPIUM BROMIDE AND ALBUTEROL SULFATE 1 AMPULE: .5; 3 SOLUTION RESPIRATORY (INHALATION) at 21:06

## 2021-09-10 RX ADMIN — DULOXETINE HYDROCHLORIDE 60 MG: 60 CAPSULE, DELAYED RELEASE ORAL at 09:45

## 2021-09-10 RX ADMIN — IPRATROPIUM BROMIDE AND ALBUTEROL SULFATE 1 AMPULE: .5; 3 SOLUTION RESPIRATORY (INHALATION) at 12:46

## 2021-09-10 RX ADMIN — ENOXAPARIN SODIUM 40 MG: 40 INJECTION SUBCUTANEOUS at 01:40

## 2021-09-10 RX ADMIN — BACLOFEN 5 MG: 10 TABLET ORAL at 01:40

## 2021-09-10 RX ADMIN — METHYLPREDNISOLONE SODIUM SUCCINATE 40 MG: 40 INJECTION, POWDER, LYOPHILIZED, FOR SOLUTION INTRAMUSCULAR; INTRAVENOUS at 09:46

## 2021-09-10 RX ADMIN — HYDROCODONE BITARTRATE AND ACETAMINOPHEN 1 TABLET: 7.5; 325 TABLET ORAL at 22:29

## 2021-09-10 RX ADMIN — SODIUM CHLORIDE, PRESERVATIVE FREE 10 ML: 5 INJECTION INTRAVENOUS at 21:40

## 2021-09-10 RX ADMIN — FUROSEMIDE 40 MG: 10 INJECTION INTRAMUSCULAR; INTRAVENOUS at 07:06

## 2021-09-10 RX ADMIN — BACLOFEN 5 MG: 10 TABLET ORAL at 21:40

## 2021-09-10 RX ADMIN — BACLOFEN 5 MG: 10 TABLET ORAL at 09:44

## 2021-09-10 RX ADMIN — FUROSEMIDE 20 MG: 20 TABLET ORAL at 16:08

## 2021-09-10 RX ADMIN — HYDROCODONE BITARTRATE AND ACETAMINOPHEN 1 TABLET: 7.5; 325 TABLET ORAL at 09:46

## 2021-09-10 RX ADMIN — HYDROXYZINE HYDROCHLORIDE 25 MG: 10 TABLET, FILM COATED ORAL at 01:40

## 2021-09-10 RX ADMIN — ENOXAPARIN SODIUM 40 MG: 40 INJECTION SUBCUTANEOUS at 09:50

## 2021-09-10 RX ADMIN — IPRATROPIUM BROMIDE AND ALBUTEROL SULFATE 1 AMPULE: .5; 3 SOLUTION RESPIRATORY (INHALATION) at 16:47

## 2021-09-10 RX ADMIN — SODIUM CHLORIDE, PRESERVATIVE FREE 10 ML: 5 INJECTION INTRAVENOUS at 09:46

## 2021-09-10 RX ADMIN — HYDROCODONE BITARTRATE AND ACETAMINOPHEN 1 TABLET: 7.5; 325 TABLET ORAL at 01:40

## 2021-09-10 RX ADMIN — HYDROXYZINE HYDROCHLORIDE 25 MG: 10 TABLET ORAL at 01:40

## 2021-09-10 RX ADMIN — FUROSEMIDE 40 MG: 10 INJECTION, SOLUTION INTRAMUSCULAR; INTRAVENOUS at 07:06

## 2021-09-10 RX ADMIN — HYDROCODONE BITARTRATE AND ACETAMINOPHEN 1 TABLET: 7.5; 325 TABLET ORAL at 16:22

## 2021-09-10 RX ADMIN — WATER 10 ML: 1 INJECTION INTRAMUSCULAR; INTRAVENOUS; SUBCUTANEOUS at 21:40

## 2021-09-10 RX ADMIN — ARFORMOTEROL TARTRATE 15 MCG: 15 SOLUTION RESPIRATORY (INHALATION) at 21:06

## 2021-09-10 RX ADMIN — HYDROXYZINE HYDROCHLORIDE 25 MG: 10 TABLET ORAL at 21:46

## 2021-09-10 RX ADMIN — SODIUM CHLORIDE, PRESERVATIVE FREE 10 ML: 5 INJECTION INTRAVENOUS at 01:42

## 2021-09-10 ASSESSMENT — PAIN DESCRIPTION - PROGRESSION
CLINICAL_PROGRESSION: NOT CHANGED

## 2021-09-10 ASSESSMENT — PAIN - FUNCTIONAL ASSESSMENT
PAIN_FUNCTIONAL_ASSESSMENT: PREVENTS OR INTERFERES SOME ACTIVE ACTIVITIES AND ADLS
PAIN_FUNCTIONAL_ASSESSMENT: PREVENTS OR INTERFERES SOME ACTIVE ACTIVITIES AND ADLS

## 2021-09-10 ASSESSMENT — PAIN DESCRIPTION - DESCRIPTORS
DESCRIPTORS: ACHING;CONSTANT
DESCRIPTORS: ACHING;BURNING

## 2021-09-10 ASSESSMENT — PAIN DESCRIPTION - FREQUENCY
FREQUENCY: CONTINUOUS
FREQUENCY: CONTINUOUS

## 2021-09-10 ASSESSMENT — PAIN SCALES - GENERAL
PAINLEVEL_OUTOF10: 8
PAINLEVEL_OUTOF10: 7
PAINLEVEL_OUTOF10: 8
PAINLEVEL_OUTOF10: 10
PAINLEVEL_OUTOF10: 3
PAINLEVEL_OUTOF10: 0
PAINLEVEL_OUTOF10: 4

## 2021-09-10 ASSESSMENT — PAIN DESCRIPTION - ONSET
ONSET: ON-GOING
ONSET: ON-GOING

## 2021-09-10 ASSESSMENT — PAIN SCALES - WONG BAKER
WONGBAKER_NUMERICALRESPONSE: 0
WONGBAKER_NUMERICALRESPONSE: 0

## 2021-09-10 ASSESSMENT — PAIN DESCRIPTION - ORIENTATION
ORIENTATION: RIGHT;LEFT;LOWER
ORIENTATION: RIGHT;LEFT;ANTERIOR;POSTERIOR;UPPER;LOWER

## 2021-09-10 ASSESSMENT — PAIN DESCRIPTION - LOCATION
LOCATION: LEG
LOCATION: LEG

## 2021-09-10 ASSESSMENT — PAIN DESCRIPTION - PAIN TYPE
TYPE: ACUTE PAIN
TYPE: CHRONIC PAIN

## 2021-09-10 NOTE — CARE COORDINATION
Social work:    Social service met with Jessi Rivers, advised him about social service role, and discussed discharge planning. Jessi Rivers resides in a first floor apartment along with his cousin. He has a transport chair, wheeled walker, home 02, & 3-1 commode from [facility] and is active with [facility]. Jessi Rivers has a  but did not have her information available. He expects to return home with continued care from [facility] and will have a ride with portable 02. Social service called the [facility] [employer] and learned that Jessi Rivers is on [place] and his  is Bienvenido King ([phone] or cell [phone]). Social work left a voice message with Carmine Perez advising her about Dg's readmission. Social service also called Kole at [facility] at [phone] and confirmed that they are active for nursing only. Kole advised that they do not have a chaz therapy department available at this time and he requested RN fax discharge papers to them at fax [phone].      Electronically signed by FEDERICO Rangel on 9/10/2021 at 12:50 PM

## 2021-09-10 NOTE — PROGRESS NOTES
[facility] Hospitalist   Progress Note    Admitting Date and Time: 9/9/2021  4:19 PM  Admit Dx: Leg edema [R60.0]  Leg swelling [M79.89]  Swelling of both lower extremities [M79.89]  Chronic venous stasis [I87.8]    Subjective:    Pt feels better today. Patient states \"I am ready to go\" returning from ECHO. Pending results. Denies any new concerns at this time. Per RN: No new concerns at this time. ECHO pending. Currently on 5L NC wears 4L NC at baseline     ROS: denies fever, chills, cp, sob, n/v, HA unless stated above.      sodium chloride flush  5-40 mL IntraVENous 2 times per day    enoxaparin  40 mg SubCUTAneous Daily    methylPREDNISolone  40 mg IntraVENous Q12H    Followed by   Xenia Christopher ON 9/11/2021] predniSONE  40 mg Oral Daily    ipratropium-albuterol  1 ampule Inhalation Q4H WA    ARIPiprazole  5 mg Oral Daily    baclofen  5 mg Oral BID    budesonide  500 mcg Nebulization BID    DULoxetine  60 mg Oral Daily    Arformoterol Tartrate  15 mcg Nebulization BID    pantoprazole  40 mg Oral QAM AC    [START ON 9/12/2021] hydroCHLOROthiazide  12.5 mg Oral Daily    furosemide  40 mg IntraVENous Daily     HYDROcodone-acetaminophen, 1 tablet, Q6H PRN  sodium chloride flush, 5-40 mL, PRN  sodium chloride, 25 mL, PRN  ondansetron, 4 mg, Q8H PRN   Or  ondansetron, 4 mg, Q6H PRN  polyethylene glycol, 17 g, Daily PRN  acetaminophen, 650 mg, Q6H PRN   Or  acetaminophen, 650 mg, Q6H PRN  hydrOXYzine, 25 mg, BID PRN  perflutren lipid microspheres, 1.5 mL, ONCE PRN         Objective:    /86   Pulse 100   Temp 98.2 °F (36.8 °C) (Oral)   Resp 20   Ht 5' 4\" (1.626 m)   Wt 132 lb (59.9 kg)   SpO2 99%   BMI 22.66 kg/m²   General Appearance: alert and oriented to person, place and time and in no acute distress  Skin: warm and dry  Head: normocephalic and atraumatic  Eyes: pupils equal, round, and reactive to light, extraocular eye movements intact, conjunctivae normal  Neck: neck supple and non tender without mass   Pulmonary/Chest: Diminished to auscultation bilaterally- no wheezes, rales or rhonchi, normal air movement, no respiratory distress  Cardiovascular: normal rate, normal S1 and S2 and no rubs, gallops, murmur noted. Abdomen: soft, non-tender, non-distended, normal bowel sounds,  Extremities: no cyanosis, no clubbing and no edema. Vascular discoloration BLE. Neurologic: no cranial nerve deficit and speech normal      Recent Labs     09/09/21  1746 09/10/21  0700    140   K 4.2 4.4   CL 98 97*   CO2 33* 34*   BUN 13 13   CREATININE 0.8 0.7   GLUCOSE 109* 168*   CALCIUM 7.4* 7.1*       Recent Labs     09/10/21  0700   ALKPHOS 53   PROT 6.5   LABALBU 3.8   BILITOT 0.3   AST 16   ALT 14       Recent Labs     09/09/21  1746 09/10/21  0700   WBC 10.3 6.9   RBC 3.40* 3.45*   HGB 10.1* 10.3*   HCT 33.4* 34.5*   MCV 98.2 100.0*   MCH 29.7 29.9   MCHC 30.2* 29.9*   RDW 14.2 14.4    253   MPV 9.8 9.8           Radiology:   XR CHEST PORTABLE   Final Result   Peripheral airspace disease bilaterally suggesting pneumonia. Pleural   thickening and or fluid bilaterally. US DUP LOWER EXTREMITIES BILATERAL VENOUS   Final Result   No evidence of DVT in either lower extremity. XR CHEST PORTABLE   Final Result   Hazy and patchy perihilar and peripheral infiltrates and pleural effusion   likely pneumonia and or CHF. Viral infection is considered. Assessment:  Principal Problem:    Swelling of both lower extremities  Active Problems:    Essential hypertension    Hypertension    Chronic obstructive pulmonary disease (HCC)    Venous insufficiency of both lower extremities    Oxygen dependent  Resolved Problems:    * No resolved hospital problems. *      Plan:  1. Chronic BLE edema (R>L)- improving today. Transition to oral furosemide 20mg daily. Follow I/O weigh daily restricted salt intake. 2. DIAS- Improving. Currently on 5L NC. Wears 4L NC at baseline. Follow I/Os and volume status. Will wean as tolerated maintain SPO2>92%. ECHO EF 55%. 3. ?Pneumonia- Repeat CXR Peripheral airspace disease bilaterally suggesting pneumonia. Check procalcitonin/Respiratory panel. Afebrile WBC 6.9   4. COPD- Continue brovana/pulmicort/duonebs/steroids. Currently on 5l nc Continue to wean to baseline 4L NC. Follow  5. HTN- Continue HCTZ furosemide. Follow adjust as needed. NOTE: This report was transcribed using voice recognition software. Every effort was made to ensure accuracy; however, inadvertent computerized transcription errors may be present. Electronically signed by Lillie Tate CNP on 9/10/2021 at 10:51 AM

## 2021-09-11 VITALS
RESPIRATION RATE: 16 BRPM | HEIGHT: 64 IN | TEMPERATURE: 97.9 F | SYSTOLIC BLOOD PRESSURE: 158 MMHG | OXYGEN SATURATION: 98 % | DIASTOLIC BLOOD PRESSURE: 86 MMHG | WEIGHT: 132 LBS | HEART RATE: 83 BPM | BODY MASS INDEX: 22.53 KG/M2

## 2021-09-11 LAB
ANION GAP SERPL CALCULATED.3IONS-SCNC: 8 MMOL/L (ref 7–16)
BUN BLDV-MCNC: 17 MG/DL (ref 6–20)
CALCIUM SERPL-MCNC: 7.3 MG/DL (ref 8.6–10.2)
CHLORIDE BLD-SCNC: 96 MMOL/L (ref 98–107)
CO2: 36 MMOL/L (ref 22–29)
CREAT SERPL-MCNC: 0.7 MG/DL (ref 0.7–1.2)
GFR AFRICAN AMERICAN: >60
GFR NON-AFRICAN AMERICAN: >60 ML/MIN/1.73
GLUCOSE BLD-MCNC: 156 MG/DL (ref 74–99)
POTASSIUM SERPL-SCNC: 4.9 MMOL/L (ref 3.5–5)
PROCALCITONIN: 0.1 NG/ML (ref 0–0.08)
SODIUM BLD-SCNC: 140 MMOL/L (ref 132–146)

## 2021-09-11 PROCEDURE — 6370000000 HC RX 637 (ALT 250 FOR IP): Performed by: INTERNAL MEDICINE

## 2021-09-11 PROCEDURE — G0378 HOSPITAL OBSERVATION PER HR: HCPCS

## 2021-09-11 PROCEDURE — 2700000000 HC OXYGEN THERAPY PER DAY

## 2021-09-11 PROCEDURE — 99217 PR OBSERVATION CARE DISCHARGE MANAGEMENT: CPT | Performed by: INTERNAL MEDICINE

## 2021-09-11 PROCEDURE — 2580000003 HC RX 258: Performed by: INTERNAL MEDICINE

## 2021-09-11 PROCEDURE — 6360000002 HC RX W HCPCS: Performed by: INTERNAL MEDICINE

## 2021-09-11 PROCEDURE — 94640 AIRWAY INHALATION TREATMENT: CPT

## 2021-09-11 PROCEDURE — APPSS45 APP SPLIT SHARED TIME 31-45 MINUTES: Performed by: NURSE PRACTITIONER

## 2021-09-11 PROCEDURE — 80048 BASIC METABOLIC PNL TOTAL CA: CPT

## 2021-09-11 PROCEDURE — 36415 COLL VENOUS BLD VENIPUNCTURE: CPT

## 2021-09-11 PROCEDURE — 6370000000 HC RX 637 (ALT 250 FOR IP): Performed by: NURSE PRACTITIONER

## 2021-09-11 RX ORDER — HYDROCHLOROTHIAZIDE 12.5 MG/1
12.5 TABLET ORAL DAILY
Qty: 30 TABLET | Refills: 3 | Status: SHIPPED | OUTPATIENT
Start: 2021-09-12 | End: 2021-12-29 | Stop reason: SDUPTHER

## 2021-09-11 RX ORDER — FUROSEMIDE 20 MG/1
20 TABLET ORAL DAILY
Qty: 60 TABLET | Refills: 3 | Status: CANCELLED | OUTPATIENT
Start: 2021-09-12

## 2021-09-11 RX ORDER — FUROSEMIDE 20 MG/1
20 TABLET ORAL EVERY OTHER DAY
Qty: 15 TABLET | Refills: 0 | Status: ON HOLD
Start: 2021-09-11 | End: 2021-09-21 | Stop reason: HOSPADM

## 2021-09-11 RX ORDER — PREDNISONE 20 MG/1
40 TABLET ORAL DAILY
Qty: 12 TABLET | Refills: 0 | Status: ON HOLD
Start: 2021-09-12 | End: 2021-09-21 | Stop reason: HOSPADM

## 2021-09-11 RX ADMIN — HYDROXYZINE HYDROCHLORIDE 25 MG: 10 TABLET ORAL at 09:18

## 2021-09-11 RX ADMIN — BACLOFEN 5 MG: 10 TABLET ORAL at 09:13

## 2021-09-11 RX ADMIN — SODIUM CHLORIDE, PRESERVATIVE FREE 10 ML: 5 INJECTION INTRAVENOUS at 09:13

## 2021-09-11 RX ADMIN — FUROSEMIDE 20 MG: 20 TABLET ORAL at 09:13

## 2021-09-11 RX ADMIN — IPRATROPIUM BROMIDE AND ALBUTEROL SULFATE 1 AMPULE: .5; 3 SOLUTION RESPIRATORY (INHALATION) at 14:18

## 2021-09-11 RX ADMIN — DULOXETINE HYDROCHLORIDE 60 MG: 60 CAPSULE, DELAYED RELEASE ORAL at 09:13

## 2021-09-11 RX ADMIN — BUDESONIDE 500 MCG: 0.5 SUSPENSION RESPIRATORY (INHALATION) at 10:00

## 2021-09-11 RX ADMIN — IPRATROPIUM BROMIDE AND ALBUTEROL SULFATE 1 AMPULE: .5; 3 SOLUTION RESPIRATORY (INHALATION) at 10:00

## 2021-09-11 RX ADMIN — PREDNISONE 40 MG: 20 TABLET ORAL at 09:13

## 2021-09-11 RX ADMIN — HYDROCODONE BITARTRATE AND ACETAMINOPHEN 1 TABLET: 7.5; 325 TABLET ORAL at 05:06

## 2021-09-11 RX ADMIN — ARFORMOTEROL TARTRATE 15 MCG: 15 SOLUTION RESPIRATORY (INHALATION) at 10:01

## 2021-09-11 RX ADMIN — ARIPIPRAZOLE 5 MG: 5 TABLET ORAL at 09:13

## 2021-09-11 RX ADMIN — PANTOPRAZOLE SODIUM 40 MG: 40 TABLET, DELAYED RELEASE ORAL at 05:12

## 2021-09-11 RX ADMIN — HYDROCODONE BITARTRATE AND ACETAMINOPHEN 1 TABLET: 7.5; 325 TABLET ORAL at 12:30

## 2021-09-11 RX ADMIN — IPRATROPIUM BROMIDE AND ALBUTEROL SULFATE 1 AMPULE: .5; 3 SOLUTION RESPIRATORY (INHALATION) at 16:30

## 2021-09-11 ASSESSMENT — PAIN SCALES - GENERAL
PAINLEVEL_OUTOF10: 9
PAINLEVEL_OUTOF10: 8
PAINLEVEL_OUTOF10: 0
PAINLEVEL_OUTOF10: 5

## 2021-09-11 ASSESSMENT — PAIN SCALES - WONG BAKER: WONGBAKER_NUMERICALRESPONSE: 0

## 2021-09-11 NOTE — DISCHARGE SUMMARY
[facility] Physician Discharge Summary       ARGELIA Randolph NP  [address] [facility]  [phone]    Schedule an appointment as soon as possible for a visit in 1 week  Please call for follow up post hospital stay appt. Activity level: AS tolerated    Diet: ADULT DIET; Dysphagia - Minced and Moist      Condition at discharge: Stable    Dispo:Home    Continue supplemental oxygen via nasal canula @ 2 LPM round-the-clock. Continue CPAP / BiPAP during sleep as prior to admission. Patient ID:  Alessia Marcus  [id number]  54 y.o.  1965    Admit date: 9/9/2021    Discharge date and time:  9/11/2021  12:17 PM    Admission Diagnoses: Principal Problem:    Swelling of both lower extremities  Active Problems:    Essential hypertension    Hypertension    Chronic obstructive pulmonary disease (HCC)    Venous insufficiency of both lower extremities    Oxygen dependent    Leg swelling  Resolved Problems:    * No resolved hospital problems. *      Discharge Diagnoses: Principal Problem:    Swelling of both lower extremities  Active Problems:    Essential hypertension    Hypertension    Chronic obstructive pulmonary disease (HCC)    Venous insufficiency of both lower extremities    Oxygen dependent    Leg swelling  Resolved Problems:    * No resolved hospital problems. *      Consults:  IP CONSULT TO SOCIAL WORK    Procedures: None    Hospital Course:   59/2021 51-year-old male PMH COPD, HTN and gastric ulcers presented to [address] [place] [address] ED with complaints of bilateral lower extremity swelling and pain. Also endorses increased shortness of breath. Patient has history of COPD and wears 4LNC at baseline. US negative DVT. Oxygen garment 5L NC slightly above patient baseline. Patient admitted for further evaluation. 1. Chronic BLE edema (R>L)- improving today. Transition to oral furosemide 20mg daily. Follow I/O weigh daily restricted salt intake.    2. DIAS- Improving. Currently on 5L NC. Wears 4L NC at baseline. Follow I/Os and volume status. Will wean as tolerated maintain SPO2>92%. ECHO EF 55%. 3. ?Pneumonia- Repeat CXR Peripheral airspace disease bilaterally suggesting pneumonia. Check procalcitonin/Respiratory panel. Afebrile WBC 6.9   4. COPD- Continue brovana/pulmicort/duonebs/steroids. Currently on 5l nc Continue to wean to baseline 4L NC. Follow  5. HTN- Continue HCTZ furosemide. Follow adjust as     Patient received IV diuresis and transition to oral.  BLE edema improved significantly. Patient returned to 4 LNC which is baseline oxygen supplementation. Patient remained hemodynamically stable and will be discharged at this time. Patient will be instructed to follow-up with PCP upon DC. Discharge Exam:  Vitals:    09/10/21 2330 09/11/21 0506 09/11/21 0830 09/11/21 1001   BP: (!) 142/72 139/78 (!) 148/74    Pulse: 85  106    Resp: 16  18 18   Temp: 97.9 °F (36.6 °C)  98 °F (36.7 °C)    TempSrc: Oral  Oral    SpO2: 100% 97% 98% 99%   Weight:       Height:           General Appearance: alert and oriented to person, place and time and in no acute distress  Skin: warm and dry  Head: normocephalic and atraumatic  Eyes: pupils equal, round, and reactive to light, extraocular eye movements intact, conjunctivae normal  Neck: neck supple and non tender without mass   Pulmonary/Chest: Diminished to auscultation bilaterally- no wheezes, rales or rhonchi, normal air movement, no respiratory distress  Cardiovascular: normal rate, normal S1 and S2 and no rubs, gallops, murmur noted. Abdomen: soft, non-tender, non-distended, normal bowel sounds,  Extremities: no cyanosis, no clubbing and no edema. Vascular discoloration BLE. Neurologic: no cranial nerve deficit and speech normal     I/O last 3 completed shifts: In: 480 [P.O.:480]  Out: 1850 [Urine:1850]  No intake/output data recorded.       LABS:  Recent Labs     09/09/21  1746 09/10/21  0700 09/11/21  0500   NA Atrium  Left atrial volume index of 31 ml per meters squared BSA. Right Atrium  Normal right atrium. Mitral Valve  Structurally normal mitral valve. No evidence of mitral valve stenosis. Physiologic and/or trace mitral regurgitation is present. Tricuspid Valve  The tricuspid valve appears structurally normal.  Physiologic and/or trace tricuspid regurgitation. Aortic Valve  The aortic valve is trileaflet. No hemodynamically significant aortic  stenosis is present. No evidence of aortic valve regurgitation. Pulmonic Valve  The pulmonic valve was not well visualized. Pericardial Effusion  No evidence for hemodynamically significant pericardial effusion. Aorta  Normal aortic root and ascending aorta. Conclusions   Summary  Ejection fraction is visually estimated at 55%. No regional wall motion abnormalities seen. Normal right ventricle structure and function. Left atrial volume index of 31 ml per meters squared BSA. Physiologic and/or trace mitral regurgitation is present. Physiologic and/or trace tricuspid regurgitation.    Signature   ----------------------------------------------------------------  Electronically signed by Dianna Brar DO(Interpreting  physician) on 09/10/2021 01:14 PM  ----------------------------------------------------------------  M-Mode/2D Measurements & Calculations   LV Diastolic     LV Systolic Dimension: 4 cm     LA Dimension: 2.9 cmAO  Dimension: 5.2   LV Volume Diastolic: 417.4 ml   Root Dimension: 3.1 cm  cm               LV Volume Systolic: 69 ml  LV NE:81.4 %     LV EDV/LV EDV Index: 022.9  LV PW Diastolic: ZY/97 YX/K^0ZM ESV/LV ESV  0.9 cm           Index: 69 ml/42ml/ m^2  LV PW Systolic:  EF Calculated: 47.2 %           LA/Aorta: 0.94  1 cm             LV Mass Index: 89 l/min*m^2  Septum                                           LA volume/Index: 56.2 ml  Diastolic: 0.7                                   /30.79ml/m^2  cm               LVOT: 2 cm RA Area: 15 cm^2  Septum Systolic:  0.8 cm                                           IVC Expiration: 1.3 cm  CO: 6.79 l/min  CI: 4.14 l/m*m^2  LV Mass: 145.22  g  Doppler Measurements & Calculations   MV Peak E-Wave: 0.63 AV Peak Velocity: 1.37    LVOT Peak Velocity: 1.08  m/s                  m/s                       m/s  MV Peak A-Wave: 0.81 AV Peak Gradient: 7.46    LVOT Mean Velocity: 0.71  m/s                  mmHg                      m/s  MV E/A Ratio: 0.78   AV Mean Velocity: 0.88    LVOT Peak Gradient: 4.6  MV Peak Gradient:    m/s                       mmHgLVOT Mean Gradient: 2.3  2.4 mmHg             AV Mean Gradient: 3.7     mmHg  MV Mean Gradient:    mmHg  1.1 mmHg             AV VTI: 23.5 cm  MV Mean Velocity:    AV Area (Continuity):2.83  0.5 m/s              cm^2  MV Deceleration  Time: 169.5 msec     LVOT VTI: 21.2 cm         PV Peak Velocity: 0.99 m/s  MV P1/2t: 41.7 msec  IVRT: 87.7 msec           PV Peak Gradient: 3.89 mmHg  MVA by PHT:5.28 cm^2                           PV Mean Velocity: 0.72 m/s  MV Area              Pulm. Vein A Reversal     PV Mean Gradient: 2.3 mmHg  (continuity): 5.2    Duration:96.9 msec  cm^2                 Pulm. Vein A Reversal  MV E' Septal         Velocity:0.25 m/s  Velocity: 0.08 m/s  MV E' Lateral  Velocity: 9 m/s  [email or web address] [id number]    XR CHEST PORTABLE    Result Date: 9/10/2021  EXAMINATION: ONE XRAY VIEW OF THE CHEST 9/10/2021 6:22 am COMPARISON: 9 September 2021 HISTORY: ORDERING SYSTEM PROVIDED HISTORY: fu ?pulm edema vs pneu;mine TECHNOLOGIST PROVIDED HISTORY: Reason for exam:->fu ?pulm edema vs pneu;monia FINDINGS: Peripheral airspace disease is again noted and is more suggestive of pneumonia than pulmonary edema. Heart and pulmonary vascularity are normal. There is pleural thickening and or fluid bilaterally.      Peripheral airspace disease bilaterally suggesting pneumonia. Pleural thickening and or fluid bilaterally. XR CHEST PORTABLE    Result Date: 9/9/2021  EXAMINATION: ONE XRAY VIEW OF THE CHEST 9/9/2021 5:27 pm COMPARISON: August 23, 2021. HISTORY: ORDERING SYSTEM PROVIDED HISTORY: Leg Swelling TECHNOLOGIST PROVIDED HISTORY: Reason for exam:->Leg Swelling FINDINGS: There is mild cardiomegaly. There is mild vascular congestion. Hazy and the patchy infiltrates are identified in the lungs more on the periphery and lung bases. Pleural effusions are suspected. There is COPD. Hazy and patchy perihilar and peripheral infiltrates and pleural effusion likely pneumonia and or CHF. Viral infection is considered. US DUP LOWER EXTREMITIES BILATERAL VENOUS    Result Date: 9/9/2021  EXAMINATION: DUPLEX VENOUS ULTRASOUND OF THE BILATERAL LOWER EXTREMITIES, 9/9/2021 6:07 pm TECHNIQUE: Duplex ultrasound using B-mode/gray scaled imaging and Doppler spectral analysis and color flow was obtained of the bilateral lower extremities. COMPARISON: August 23, 2021 HISTORY: ORDERING SYSTEM PROVIDED HISTORY: LEG SWELLING, PAIN, DVT SUSPECTED TECHNOLOGIST PROVIDED HISTORY: Reason for exam:->bilat LE edema, new What reading provider will be dictating this exam?->CRC FINDINGS: The visualized veins of the bilateral lower extremities are patent and free of echogenic thrombus. The veins demonstrate good compressibility with normal color flow study and spectral analysis. No evidence of DVT in either lower extremity.          Patient Instructions:   Current Discharge Medication List      START taking these medications    Details   predniSONE (DELTASONE) 20 MG tablet Take 2 tablets by mouth daily for 6 days  Qty: 12 tablet, Refills: 0      hydroCHLOROthiazide (HYDRODIURIL) 12.5 MG tablet Take 1 tablet by mouth daily  Qty: 30 tablet, Refills: 3      furosemide (LASIX) 20 MG tablet Take 1 tablet by mouth every other day  Qty: 15 tablet, Refills: 0 CONTINUE these medications which have NOT CHANGED    Details   calcium-vitamin D (OSCAL-500) 500-200 MG-UNIT per tablet Take 1 tablet by mouth 2 times daily  Qty: 30 tablet, Refills: 0      Probiotic Acidophilus (FLORANEX) TABS Take 1 tablet by mouth daily  Qty: 30 tablet, Refills: 0      hydrOXYzine (ATARAX) 25 MG tablet Take 1 tablet by mouth 2 times daily as needed for Anxiety  Qty: 30 tablet, Refills: 0    Associated Diagnoses: Anxiety      albuterol sulfate HFA (PROAIR HFA) 108 (90 Base) MCG/ACT inhaler Inhale 2 puffs into the lungs every 4 hours as needed for Wheezing  Qty: 1 Inhaler, Refills: 2      budesonide (PULMICORT) 0.5 MG/2ML nebulizer suspension Take 2 mLs by nebulization 2 times daily  Qty: 60 ampule, Refills: 5      Revefenacin (YUPELRI) 175 MCG/3ML SOLN Inhale 1 ampule into the lungs daily  Qty: 30 vial, Refills: 5      omeprazole (PRILOSEC) 40 MG delayed release capsule Take 1 capsule by mouth daily  Qty: 30 capsule, Refills: 2    Associated Diagnoses: Gastroesophageal reflux disease, unspecified whether esophagitis present      formoterol (PERFOROMIST) 20 MCG/2ML nebulizer solution Take 2 mLs by nebulization every 12 hours  Qty: 120 mL, Refills: 5    Associated Diagnoses: Chronic obstructive pulmonary disease, unspecified COPD type ([facility])      baclofen (LIORESAL) 10 MG tablet Take 5 mg by mouth 2 times daily       HYDROcodone-acetaminophen (NORCO) 7.5-325 MG per tablet Take 1 tablet by mouth 2 times daily as needed.  Takes religiously twice daily for leg pain per pt      ipratropium-albuterol (DUONEB) 0.5-2.5 (3) MG/3ML SOLN nebulizer solution Inhale 3 mLs into the lungs every 4 hours as needed for Shortness of Breath  Qty: 12 vial, Refills: 0      ARIPiprazole (ABILIFY) 5 MG tablet take 1 tablet by mouth once daily  Refills: 0      DULoxetine (CYMBALTA) 60 MG extended release capsule take 1 capsule by mouth at bedtime  Refills: 0         STOP taking these medications       cyclobenzaprine (FLEXERIL) 10 MG tablet Comments:   Reason for Stopping:         amLODIPine (NORVASC) 5 MG tablet Comments:   Reason for Stopping:                 Note that more than 30 minutes was spent in preparing discharge papers, discussing discharge with patient, medication review, etc.    NOTE: This report was transcribed using voice recognition software. Every effort was made to ensure accuracy; however, inadvertent computerized transcription errors may be present. Signed:  Electronically signed by Kit Tate CNP on 9/11/2021 at 12:17 PM

## 2021-09-11 NOTE — PROGRESS NOTES
Removed IV, no bleeding or redness noted. Band aid in place. Reviewed discharge instructions with patient, answered all questions to his satisfaction. Patients sister here to pick him up with his home O2, escorted downstairs via wheelchair with all belongings. Contacted CMR to let them know he is being discharged.  Cleaned heart monitor and placed it back in the closet

## 2021-09-13 ENCOUNTER — TELEPHONE (OUTPATIENT)
Dept: PRIMARY CARE CLINIC | Age: 56
End: 2021-09-13

## 2021-09-13 NOTE — TELEPHONE ENCOUNTER
Ulises the nurse is calling in to discuss the patient condition and hospice care        Heather Costello [phone]

## 2021-09-16 ENCOUNTER — APPOINTMENT (OUTPATIENT)
Dept: GENERAL RADIOLOGY | Age: 56
DRG: 720 | End: 2021-09-16
Payer: MEDICAID

## 2021-09-16 ENCOUNTER — TELEPHONE (OUTPATIENT)
Dept: OTHER | Facility: CLINIC | Age: 56
End: 2021-09-16

## 2021-09-16 ENCOUNTER — APPOINTMENT (OUTPATIENT)
Dept: CT IMAGING | Age: 56
DRG: 720 | End: 2021-09-16
Payer: MEDICAID

## 2021-09-16 ENCOUNTER — HOSPITAL ENCOUNTER (INPATIENT)
Age: 56
LOS: 4 days | Discharge: HOME | DRG: 720 | End: 2021-09-21
Attending: EMERGENCY MEDICINE | Admitting: INTERNAL MEDICINE
Payer: MEDICAID

## 2021-09-16 DIAGNOSIS — U07.1 PNEUMONIA DUE TO COVID-19 VIRUS: Primary | ICD-10-CM

## 2021-09-16 DIAGNOSIS — J44.1 COPD EXACERBATION (HCC): ICD-10-CM

## 2021-09-16 DIAGNOSIS — J12.82 PNEUMONIA DUE TO COVID-19 VIRUS: Primary | ICD-10-CM

## 2021-09-16 DIAGNOSIS — J96.21 ACUTE ON CHRONIC RESPIRATORY FAILURE WITH HYPOXIA (HCC): ICD-10-CM

## 2021-09-16 LAB
ALBUMIN SERPL-MCNC: 4.2 G/DL (ref 3.5–5.2)
ALP BLD-CCNC: 50 U/L (ref 40–129)
ALT SERPL-CCNC: 12 U/L (ref 0–40)
ANION GAP SERPL CALCULATED.3IONS-SCNC: 10 MMOL/L (ref 7–16)
AST SERPL-CCNC: 14 U/L (ref 0–39)
BASOPHILS ABSOLUTE: 0.02 E9/L (ref 0–0.2)
BASOPHILS RELATIVE PERCENT: 0.1 % (ref 0–2)
BILIRUB SERPL-MCNC: 0.5 MG/DL (ref 0–1.2)
BUN BLDV-MCNC: 18 MG/DL (ref 6–20)
CALCIUM SERPL-MCNC: 8.2 MG/DL (ref 8.6–10.2)
CHLORIDE BLD-SCNC: 90 MMOL/L (ref 98–107)
CO2: 36 MMOL/L (ref 22–29)
CREAT SERPL-MCNC: 0.8 MG/DL (ref 0.7–1.2)
EOSINOPHILS ABSOLUTE: 0.01 E9/L (ref 0.05–0.5)
EOSINOPHILS RELATIVE PERCENT: 0.1 % (ref 0–6)
GFR AFRICAN AMERICAN: >60
GFR NON-AFRICAN AMERICAN: >60 ML/MIN/1.73
GLUCOSE BLD-MCNC: 105 MG/DL (ref 74–99)
HCT VFR BLD CALC: 34.1 % (ref 37–54)
HEMOGLOBIN: 10.7 G/DL (ref 12.5–16.5)
IMMATURE GRANULOCYTES #: 0.11 E9/L
IMMATURE GRANULOCYTES %: 0.6 % (ref 0–5)
LYMPHOCYTES ABSOLUTE: 0.8 E9/L (ref 1.5–4)
LYMPHOCYTES RELATIVE PERCENT: 4 % (ref 20–42)
MCH RBC QN AUTO: 29.8 PG (ref 26–35)
MCHC RBC AUTO-ENTMCNC: 31.4 % (ref 32–34.5)
MCV RBC AUTO: 95 FL (ref 80–99.9)
MONOCYTES ABSOLUTE: 0.89 E9/L (ref 0.1–0.95)
MONOCYTES RELATIVE PERCENT: 4.5 % (ref 2–12)
NEUTROPHILS ABSOLUTE: 17.95 E9/L (ref 1.8–7.3)
NEUTROPHILS RELATIVE PERCENT: 90.7 % (ref 43–80)
PDW BLD-RTO: 14.1 FL (ref 11.5–15)
PLATELET # BLD: 232 E9/L (ref 130–450)
PMV BLD AUTO: 9.6 FL (ref 7–12)
POTASSIUM REFLEX MAGNESIUM: 3.7 MMOL/L (ref 3.5–5)
PRO-BNP: 415 PG/ML (ref 0–125)
RBC # BLD: 3.59 E12/L (ref 3.8–5.8)
SODIUM BLD-SCNC: 136 MMOL/L (ref 132–146)
TOTAL PROTEIN: 7.4 G/DL (ref 6.4–8.3)
TROPONIN, HIGH SENSITIVITY: 28 NG/L (ref 0–11)
WBC # BLD: 19.8 E9/L (ref 4.5–11.5)

## 2021-09-16 PROCEDURE — 87635 SARS-COV-2 COVID-19 AMP PRB: CPT

## 2021-09-16 PROCEDURE — 80053 COMPREHEN METABOLIC PANEL: CPT

## 2021-09-16 PROCEDURE — 94664 DEMO&/EVAL PT USE INHALER: CPT

## 2021-09-16 PROCEDURE — 84484 ASSAY OF TROPONIN QUANT: CPT

## 2021-09-16 PROCEDURE — 36415 COLL VENOUS BLD VENIPUNCTURE: CPT

## 2021-09-16 PROCEDURE — 71250 CT THORAX DX C-: CPT

## 2021-09-16 PROCEDURE — XW033E5 INTRODUCTION OF REMDESIVIR ANTI-INFECTIVE INTO PERIPHERAL VEIN, PERCUTANEOUS APPROACH, NEW TECHNOLOGY GROUP 5: ICD-10-PCS | Performed by: FAMILY MEDICINE

## 2021-09-16 PROCEDURE — 83880 ASSAY OF NATRIURETIC PEPTIDE: CPT

## 2021-09-16 PROCEDURE — 71045 X-RAY EXAM CHEST 1 VIEW: CPT

## 2021-09-16 PROCEDURE — 6370000000 HC RX 637 (ALT 250 FOR IP): Performed by: EMERGENCY MEDICINE

## 2021-09-16 PROCEDURE — 6370000000 HC RX 637 (ALT 250 FOR IP): Performed by: STUDENT IN AN ORGANIZED HEALTH CARE EDUCATION/TRAINING PROGRAM

## 2021-09-16 PROCEDURE — 99285 EMERGENCY DEPT VISIT HI MDM: CPT

## 2021-09-16 PROCEDURE — 93005 ELECTROCARDIOGRAM TRACING: CPT | Performed by: STUDENT IN AN ORGANIZED HEALTH CARE EDUCATION/TRAINING PROGRAM

## 2021-09-16 PROCEDURE — 85025 COMPLETE CBC W/AUTO DIFF WBC: CPT

## 2021-09-16 RX ORDER — 0.9 % SODIUM CHLORIDE 0.9 %
1000 INTRAVENOUS SOLUTION INTRAVENOUS ONCE
Status: COMPLETED | OUTPATIENT
Start: 2021-09-16 | End: 2021-09-17

## 2021-09-16 RX ORDER — IPRATROPIUM BROMIDE AND ALBUTEROL SULFATE 2.5; .5 MG/3ML; MG/3ML
1 SOLUTION RESPIRATORY (INHALATION)
Status: DISCONTINUED | OUTPATIENT
Start: 2021-09-17 | End: 2021-09-17

## 2021-09-16 RX ORDER — ACETAMINOPHEN 500 MG
1000 TABLET ORAL ONCE
Status: COMPLETED | OUTPATIENT
Start: 2021-09-16 | End: 2021-09-16

## 2021-09-16 RX ORDER — METHYLPREDNISOLONE SODIUM SUCCINATE 125 MG/2ML
60 INJECTION, POWDER, LYOPHILIZED, FOR SOLUTION INTRAMUSCULAR; INTRAVENOUS DAILY
Status: DISCONTINUED | OUTPATIENT
Start: 2021-09-17 | End: 2021-09-16

## 2021-09-16 RX ORDER — METHYLPREDNISOLONE SODIUM SUCCINATE 125 MG/2ML
125 INJECTION, POWDER, LYOPHILIZED, FOR SOLUTION INTRAMUSCULAR; INTRAVENOUS DAILY
Status: DISCONTINUED | OUTPATIENT
Start: 2021-09-16 | End: 2021-09-16

## 2021-09-16 RX ORDER — METHYLPREDNISOLONE SODIUM SUCCINATE 125 MG/2ML
60 INJECTION, POWDER, LYOPHILIZED, FOR SOLUTION INTRAMUSCULAR; INTRAVENOUS ONCE
Status: COMPLETED | OUTPATIENT
Start: 2021-09-16 | End: 2021-09-17

## 2021-09-16 RX ADMIN — ACETAMINOPHEN 1000 MG: 500 TABLET ORAL at 22:00

## 2021-09-16 RX ADMIN — IPRATROPIUM BROMIDE AND ALBUTEROL SULFATE 1 AMPULE: .5; 3 SOLUTION RESPIRATORY (INHALATION) at 21:18

## 2021-09-16 ASSESSMENT — ENCOUNTER SYMPTOMS
NAUSEA: 0
BACK PAIN: 0
COUGH: 1
WHEEZING: 0
SORE THROAT: 0
SHORTNESS OF BREATH: 1
CONSTIPATION: 0
DIARRHEA: 0
FACIAL SWELLING: 0
ABDOMINAL PAIN: 0
STRIDOR: 0

## 2021-09-16 ASSESSMENT — PAIN SCALES - GENERAL: PAINLEVEL_OUTOF10: 0

## 2021-09-17 PROBLEM — J12.82 PNEUMONIA DUE TO COVID-19 VIRUS: Status: ACTIVE | Noted: 2021-09-17

## 2021-09-17 PROBLEM — U07.1 PNEUMONIA DUE TO COVID-19 VIRUS: Status: ACTIVE | Noted: 2021-09-17

## 2021-09-17 LAB
ALBUMIN SERPL-MCNC: 4.1 G/DL (ref 3.5–5.2)
ALP BLD-CCNC: 49 U/L (ref 40–129)
ALT SERPL-CCNC: 14 U/L (ref 0–40)
ANION GAP SERPL CALCULATED.3IONS-SCNC: 15 MMOL/L (ref 7–16)
AST SERPL-CCNC: 16 U/L (ref 0–39)
BASOPHILS ABSOLUTE: 0 E9/L (ref 0–0.2)
BASOPHILS RELATIVE PERCENT: 0 % (ref 0–2)
BILIRUB SERPL-MCNC: 0.5 MG/DL (ref 0–1.2)
BUN BLDV-MCNC: 18 MG/DL (ref 6–20)
C-REACTIVE PROTEIN: 19 MG/DL (ref 0–0.4)
CALCIUM SERPL-MCNC: 8.3 MG/DL (ref 8.6–10.2)
CHLORIDE BLD-SCNC: 89 MMOL/L (ref 98–107)
CO2: 33 MMOL/L (ref 22–29)
CREAT SERPL-MCNC: 0.9 MG/DL (ref 0.7–1.2)
D DIMER: 328 NG/ML DDU
EKG ATRIAL RATE: 123 BPM
EKG P AXIS: 67 DEGREES
EKG P-R INTERVAL: 122 MS
EKG Q-T INTERVAL: 312 MS
EKG QRS DURATION: 68 MS
EKG QTC CALCULATION (BAZETT): 446 MS
EKG R AXIS: 74 DEGREES
EKG T AXIS: 58 DEGREES
EKG VENTRICULAR RATE: 123 BPM
EOSINOPHILS ABSOLUTE: 0 E9/L (ref 0.05–0.5)
EOSINOPHILS RELATIVE PERCENT: 0 % (ref 0–6)
GFR AFRICAN AMERICAN: >60
GFR NON-AFRICAN AMERICAN: >60 ML/MIN/1.73
GLUCOSE BLD-MCNC: 139 MG/DL (ref 74–99)
HCT VFR BLD CALC: 35.9 % (ref 37–54)
HEMOGLOBIN: 11.1 G/DL (ref 12.5–16.5)
HYPOCHROMIA: ABNORMAL
LACTATE DEHYDROGENASE: 200 U/L (ref 135–225)
LACTIC ACID: 3.3 MMOL/L (ref 0.5–2.2)
LYMPHOCYTES ABSOLUTE: 0.53 E9/L (ref 1.5–4)
LYMPHOCYTES RELATIVE PERCENT: 2 % (ref 20–42)
MCH RBC QN AUTO: 30.2 PG (ref 26–35)
MCHC RBC AUTO-ENTMCNC: 30.9 % (ref 32–34.5)
MCV RBC AUTO: 97.6 FL (ref 80–99.9)
MONOCYTES ABSOLUTE: 0.53 E9/L (ref 0.1–0.95)
MONOCYTES RELATIVE PERCENT: 2 % (ref 2–12)
MYELOCYTE PERCENT: 0 % (ref 0–0)
NEUTROPHILS ABSOLUTE: 25.54 E9/L (ref 1.8–7.3)
NEUTROPHILS RELATIVE PERCENT: 96 % (ref 43–80)
PDW BLD-RTO: 14.3 FL (ref 11.5–15)
PLATELET # BLD: 206 E9/L (ref 130–450)
PMV BLD AUTO: 9.9 FL (ref 7–12)
POIKILOCYTES: ABNORMAL
POLYCHROMASIA: ABNORMAL
POTASSIUM REFLEX MAGNESIUM: 3.9 MMOL/L (ref 3.5–5)
PROCALCITONIN: 2.35 NG/ML (ref 0–0.08)
RBC # BLD: 3.68 E12/L (ref 3.8–5.8)
SARS-COV-2, NAAT: DETECTED
SODIUM BLD-SCNC: 137 MMOL/L (ref 132–146)
STOMATOCYTES: ABNORMAL
TOTAL PROTEIN: 7.8 G/DL (ref 6.4–8.3)
TROPONIN, HIGH SENSITIVITY: 22 NG/L (ref 0–11)
TROPONIN, HIGH SENSITIVITY: 29 NG/L (ref 0–11)
WBC # BLD: 26.6 E9/L (ref 4.5–11.5)

## 2021-09-17 PROCEDURE — 2060000000 HC ICU INTERMEDIATE R&B

## 2021-09-17 PROCEDURE — 36415 COLL VENOUS BLD VENIPUNCTURE: CPT

## 2021-09-17 PROCEDURE — 84145 PROCALCITONIN (PCT): CPT

## 2021-09-17 PROCEDURE — 2580000003 HC RX 258: Performed by: INTERNAL MEDICINE

## 2021-09-17 PROCEDURE — 6360000002 HC RX W HCPCS: Performed by: INTERNAL MEDICINE

## 2021-09-17 PROCEDURE — 93010 ELECTROCARDIOGRAM REPORT: CPT | Performed by: INTERNAL MEDICINE

## 2021-09-17 PROCEDURE — 84484 ASSAY OF TROPONIN QUANT: CPT

## 2021-09-17 PROCEDURE — 85378 FIBRIN DEGRADE SEMIQUANT: CPT

## 2021-09-17 PROCEDURE — 83615 LACTATE (LD) (LDH) ENZYME: CPT

## 2021-09-17 PROCEDURE — 6370000000 HC RX 637 (ALT 250 FOR IP): Performed by: INTERNAL MEDICINE

## 2021-09-17 PROCEDURE — 99233 SBSQ HOSP IP/OBS HIGH 50: CPT | Performed by: FAMILY MEDICINE

## 2021-09-17 PROCEDURE — 99223 1ST HOSP IP/OBS HIGH 75: CPT | Performed by: INTERNAL MEDICINE

## 2021-09-17 PROCEDURE — 85025 COMPLETE CBC W/AUTO DIFF WBC: CPT

## 2021-09-17 PROCEDURE — 2500000003 HC RX 250 WO HCPCS: Performed by: INTERNAL MEDICINE

## 2021-09-17 PROCEDURE — 2700000000 HC OXYGEN THERAPY PER DAY

## 2021-09-17 PROCEDURE — 80053 COMPREHEN METABOLIC PANEL: CPT

## 2021-09-17 PROCEDURE — 86140 C-REACTIVE PROTEIN: CPT

## 2021-09-17 PROCEDURE — 87040 BLOOD CULTURE FOR BACTERIA: CPT

## 2021-09-17 PROCEDURE — 6370000000 HC RX 637 (ALT 250 FOR IP): Performed by: EMERGENCY MEDICINE

## 2021-09-17 PROCEDURE — 2580000003 HC RX 258: Performed by: EMERGENCY MEDICINE

## 2021-09-17 PROCEDURE — 99221 1ST HOSP IP/OBS SF/LOW 40: CPT | Performed by: NURSE PRACTITIONER

## 2021-09-17 PROCEDURE — 83605 ASSAY OF LACTIC ACID: CPT

## 2021-09-17 PROCEDURE — 6360000002 HC RX W HCPCS: Performed by: EMERGENCY MEDICINE

## 2021-09-17 RX ORDER — ACETAMINOPHEN 650 MG/1
650 SUPPOSITORY RECTAL EVERY 6 HOURS PRN
Status: DISCONTINUED | OUTPATIENT
Start: 2021-09-17 | End: 2021-09-21 | Stop reason: HOSPADM

## 2021-09-17 RX ORDER — PANTOPRAZOLE SODIUM 40 MG/1
40 TABLET, DELAYED RELEASE ORAL
Status: DISCONTINUED | OUTPATIENT
Start: 2021-09-17 | End: 2021-09-21 | Stop reason: HOSPADM

## 2021-09-17 RX ORDER — IPRATROPIUM BROMIDE AND ALBUTEROL SULFATE 2.5; .5 MG/3ML; MG/3ML
1 SOLUTION RESPIRATORY (INHALATION) EVERY 4 HOURS PRN
Status: DISCONTINUED | OUTPATIENT
Start: 2021-09-17 | End: 2021-09-17 | Stop reason: CLARIF

## 2021-09-17 RX ORDER — HYDROCHLOROTHIAZIDE 12.5 MG/1
12.5 TABLET ORAL DAILY
Status: DISCONTINUED | OUTPATIENT
Start: 2021-09-18 | End: 2021-09-21 | Stop reason: HOSPADM

## 2021-09-17 RX ORDER — BACLOFEN 10 MG/1
5 TABLET ORAL 2 TIMES DAILY
Status: DISCONTINUED | OUTPATIENT
Start: 2021-09-17 | End: 2021-09-21 | Stop reason: HOSPADM

## 2021-09-17 RX ORDER — ONDANSETRON 2 MG/ML
4 INJECTION INTRAMUSCULAR; INTRAVENOUS EVERY 6 HOURS PRN
Status: DISCONTINUED | OUTPATIENT
Start: 2021-09-17 | End: 2021-09-21 | Stop reason: HOSPADM

## 2021-09-17 RX ORDER — SODIUM CHLORIDE 9 MG/ML
25 INJECTION, SOLUTION INTRAVENOUS PRN
Status: DISCONTINUED | OUTPATIENT
Start: 2021-09-17 | End: 2021-09-21 | Stop reason: HOSPADM

## 2021-09-17 RX ORDER — DULOXETIN HYDROCHLORIDE 60 MG/1
60 CAPSULE, DELAYED RELEASE ORAL DAILY
Status: DISCONTINUED | OUTPATIENT
Start: 2021-09-17 | End: 2021-09-21 | Stop reason: HOSPADM

## 2021-09-17 RX ORDER — IPRATROPIUM BROMIDE AND ALBUTEROL SULFATE 2.5; .5 MG/3ML; MG/3ML
1 SOLUTION RESPIRATORY (INHALATION)
Status: DISCONTINUED | OUTPATIENT
Start: 2021-09-17 | End: 2021-09-17 | Stop reason: CLARIF

## 2021-09-17 RX ORDER — SODIUM CHLORIDE 9 MG/ML
25 INJECTION, SOLUTION INTRAVENOUS PRN
Status: DISCONTINUED | OUTPATIENT
Start: 2021-09-17 | End: 2021-09-17 | Stop reason: SDUPTHER

## 2021-09-17 RX ORDER — SODIUM CHLORIDE 0.9 % (FLUSH) 0.9 %
5-40 SYRINGE (ML) INJECTION PRN
Status: DISCONTINUED | OUTPATIENT
Start: 2021-09-17 | End: 2021-09-17 | Stop reason: SDUPTHER

## 2021-09-17 RX ORDER — ONDANSETRON 2 MG/ML
4 INJECTION INTRAMUSCULAR; INTRAVENOUS EVERY 6 HOURS PRN
Status: DISCONTINUED | OUTPATIENT
Start: 2021-09-17 | End: 2021-09-17 | Stop reason: SDUPTHER

## 2021-09-17 RX ORDER — 0.9 % SODIUM CHLORIDE 0.9 %
30 INTRAVENOUS SOLUTION INTRAVENOUS PRN
Status: DISCONTINUED | OUTPATIENT
Start: 2021-09-17 | End: 2021-09-21 | Stop reason: HOSPADM

## 2021-09-17 RX ORDER — HYDROCODONE BITARTRATE AND ACETAMINOPHEN 7.5; 325 MG/1; MG/1
1 TABLET ORAL EVERY 4 HOURS PRN
Status: DISCONTINUED | OUTPATIENT
Start: 2021-09-17 | End: 2021-09-21 | Stop reason: HOSPADM

## 2021-09-17 RX ORDER — FLUTICASONE PROPIONATE 110 UG/1
1 AEROSOL, METERED RESPIRATORY (INHALATION) 2 TIMES DAILY
Status: DISCONTINUED | OUTPATIENT
Start: 2021-09-17 | End: 2021-09-21 | Stop reason: HOSPADM

## 2021-09-17 RX ORDER — CHOLECALCIFEROL (VITAMIN D3) 50 MCG
2000 TABLET ORAL DAILY
Status: DISCONTINUED | OUTPATIENT
Start: 2021-09-24 | End: 2021-09-21 | Stop reason: HOSPADM

## 2021-09-17 RX ORDER — ACETAMINOPHEN 325 MG/1
650 TABLET ORAL EVERY 4 HOURS PRN
Status: DISCONTINUED | OUTPATIENT
Start: 2021-09-17 | End: 2021-09-21 | Stop reason: HOSPADM

## 2021-09-17 RX ORDER — ARIPIPRAZOLE 5 MG/1
5 TABLET ORAL DAILY
Status: DISCONTINUED | OUTPATIENT
Start: 2021-09-17 | End: 2021-09-21 | Stop reason: HOSPADM

## 2021-09-17 RX ORDER — SODIUM CHLORIDE 0.9 % (FLUSH) 0.9 %
5-40 SYRINGE (ML) INJECTION EVERY 12 HOURS SCHEDULED
Status: DISCONTINUED | OUTPATIENT
Start: 2021-09-17 | End: 2021-09-17 | Stop reason: SDUPTHER

## 2021-09-17 RX ORDER — ONDANSETRON 4 MG/1
4 TABLET, ORALLY DISINTEGRATING ORAL EVERY 8 HOURS PRN
Status: DISCONTINUED | OUTPATIENT
Start: 2021-09-17 | End: 2021-09-17 | Stop reason: SDUPTHER

## 2021-09-17 RX ORDER — BUDESONIDE 0.5 MG/2ML
500 INHALANT ORAL 2 TIMES DAILY
Status: DISCONTINUED | OUTPATIENT
Start: 2021-09-17 | End: 2021-09-17 | Stop reason: CLARIF

## 2021-09-17 RX ORDER — ONDANSETRON 4 MG/1
4 TABLET, ORALLY DISINTEGRATING ORAL EVERY 8 HOURS PRN
Status: DISCONTINUED | OUTPATIENT
Start: 2021-09-17 | End: 2021-09-21 | Stop reason: HOSPADM

## 2021-09-17 RX ORDER — ACETAMINOPHEN 325 MG/1
650 TABLET ORAL EVERY 6 HOURS PRN
Status: DISCONTINUED | OUTPATIENT
Start: 2021-09-17 | End: 2021-09-21 | Stop reason: HOSPADM

## 2021-09-17 RX ORDER — HYDROXYZINE HYDROCHLORIDE 10 MG/1
25 TABLET, FILM COATED ORAL 2 TIMES DAILY PRN
Status: DISCONTINUED | OUTPATIENT
Start: 2021-09-17 | End: 2021-09-17 | Stop reason: CLARIF

## 2021-09-17 RX ORDER — GUAIFENESIN/DEXTROMETHORPHAN 100-10MG/5
5 SYRUP ORAL EVERY 4 HOURS PRN
Status: DISCONTINUED | OUTPATIENT
Start: 2021-09-17 | End: 2021-09-21 | Stop reason: HOSPADM

## 2021-09-17 RX ORDER — SODIUM CHLORIDE 0.9 % (FLUSH) 0.9 %
5-40 SYRINGE (ML) INJECTION PRN
Status: DISCONTINUED | OUTPATIENT
Start: 2021-09-17 | End: 2021-09-21 | Stop reason: HOSPADM

## 2021-09-17 RX ORDER — DEXAMETHASONE SODIUM PHOSPHATE 10 MG/ML
6 INJECTION, SOLUTION INTRAMUSCULAR; INTRAVENOUS EVERY 24 HOURS
Status: DISCONTINUED | OUTPATIENT
Start: 2021-09-17 | End: 2021-09-21 | Stop reason: HOSPADM

## 2021-09-17 RX ORDER — POLYETHYLENE GLYCOL 3350 17 G/17G
17 POWDER, FOR SOLUTION ORAL DAILY PRN
Status: DISCONTINUED | OUTPATIENT
Start: 2021-09-17 | End: 2021-09-21 | Stop reason: HOSPADM

## 2021-09-17 RX ORDER — SODIUM CHLORIDE 0.9 % (FLUSH) 0.9 %
5-40 SYRINGE (ML) INJECTION EVERY 12 HOURS SCHEDULED
Status: DISCONTINUED | OUTPATIENT
Start: 2021-09-17 | End: 2021-09-21 | Stop reason: HOSPADM

## 2021-09-17 RX ORDER — HYDROXYZINE PAMOATE 25 MG/1
25 CAPSULE ORAL 2 TIMES DAILY PRN
Status: DISCONTINUED | OUTPATIENT
Start: 2021-09-17 | End: 2021-09-21 | Stop reason: HOSPADM

## 2021-09-17 RX ORDER — CHOLECALCIFEROL (VITAMIN D3) 50 MCG
6000 TABLET ORAL DAILY
Status: DISCONTINUED | OUTPATIENT
Start: 2021-09-17 | End: 2021-09-21 | Stop reason: HOSPADM

## 2021-09-17 RX ADMIN — CEFEPIME HYDROCHLORIDE 2000 MG: 2 INJECTION, POWDER, FOR SOLUTION INTRAVENOUS at 16:21

## 2021-09-17 RX ADMIN — IPRATROPIUM BROMIDE AND ALBUTEROL 1 PUFF: 20; 100 SPRAY, METERED RESPIRATORY (INHALATION) at 11:58

## 2021-09-17 RX ADMIN — DULOXETINE HYDROCHLORIDE 60 MG: 60 CAPSULE, DELAYED RELEASE ORAL at 09:09

## 2021-09-17 RX ADMIN — HYDROCODONE BITARTRATE AND ACETAMINOPHEN 1 TABLET: 7.5; 325 TABLET ORAL at 22:09

## 2021-09-17 RX ADMIN — SODIUM CHLORIDE 1000 ML: 9 INJECTION, SOLUTION INTRAVENOUS at 06:26

## 2021-09-17 RX ADMIN — METHYLPREDNISOLONE SODIUM SUCCINATE 60 MG: 125 INJECTION, POWDER, FOR SOLUTION INTRAMUSCULAR; INTRAVENOUS at 00:58

## 2021-09-17 RX ADMIN — HYDROXYZINE PAMOATE 25 MG: 25 CAPSULE ORAL at 16:21

## 2021-09-17 RX ADMIN — ENOXAPARIN SODIUM 30 MG: 30 INJECTION SUBCUTANEOUS at 07:43

## 2021-09-17 RX ADMIN — IPRATROPIUM BROMIDE AND ALBUTEROL 1 PUFF: 20; 100 SPRAY, METERED RESPIRATORY (INHALATION) at 15:08

## 2021-09-17 RX ADMIN — ENOXAPARIN SODIUM 30 MG: 30 INJECTION SUBCUTANEOUS at 20:08

## 2021-09-17 RX ADMIN — REMDESIVIR 200 MG: 100 INJECTION, POWDER, LYOPHILIZED, FOR SOLUTION INTRAVENOUS at 06:23

## 2021-09-17 RX ADMIN — HYDROCODONE BITARTRATE AND ACETAMINOPHEN 1 TABLET: 7.5; 325 TABLET ORAL at 07:42

## 2021-09-17 RX ADMIN — CEFEPIME 2000 MG: 2 INJECTION, POWDER, FOR SOLUTION INTRAMUSCULAR; INTRAVENOUS at 01:04

## 2021-09-17 RX ADMIN — DEXAMETHASONE SODIUM PHOSPHATE 6 MG: 10 INJECTION INTRAMUSCULAR; INTRAVENOUS at 07:42

## 2021-09-17 RX ADMIN — BACLOFEN 5 MG: 10 TABLET ORAL at 09:10

## 2021-09-17 RX ADMIN — SODIUM CHLORIDE, PRESERVATIVE FREE 10 ML: 5 INJECTION INTRAVENOUS at 20:08

## 2021-09-17 RX ADMIN — ARIPIPRAZOLE 5 MG: 5 TABLET ORAL at 09:11

## 2021-09-17 RX ADMIN — FLUTICASONE PROPIONATE 1 PUFF: 110 AEROSOL, METERED RESPIRATORY (INHALATION) at 11:58

## 2021-09-17 RX ADMIN — Medication 6000 UNITS: at 09:10

## 2021-09-17 RX ADMIN — PANTOPRAZOLE SODIUM 40 MG: 40 TABLET, DELAYED RELEASE ORAL at 15:07

## 2021-09-17 RX ADMIN — IPRATROPIUM BROMIDE AND ALBUTEROL 1 PUFF: 20; 100 SPRAY, METERED RESPIRATORY (INHALATION) at 20:30

## 2021-09-17 RX ADMIN — FLUTICASONE PROPIONATE 1 PUFF: 110 AEROSOL, METERED RESPIRATORY (INHALATION) at 20:00

## 2021-09-17 RX ADMIN — BACLOFEN 5 MG: 10 TABLET ORAL at 20:08

## 2021-09-17 RX ADMIN — HYDROCODONE BITARTRATE AND ACETAMINOPHEN 1 TABLET: 7.5; 325 TABLET ORAL at 16:21

## 2021-09-17 ASSESSMENT — PAIN DESCRIPTION - PROGRESSION: CLINICAL_PROGRESSION: NOT CHANGED

## 2021-09-17 ASSESSMENT — PAIN SCALES - GENERAL
PAINLEVEL_OUTOF10: 8
PAINLEVEL_OUTOF10: 10
PAINLEVEL_OUTOF10: 8
PAINLEVEL_OUTOF10: 10
PAINLEVEL_OUTOF10: 9

## 2021-09-17 ASSESSMENT — PAIN DESCRIPTION - ONSET: ONSET: ON-GOING

## 2021-09-17 ASSESSMENT — PAIN DESCRIPTION - DESCRIPTORS: DESCRIPTORS: ACHING;CONSTANT

## 2021-09-17 ASSESSMENT — PAIN DESCRIPTION - ORIENTATION: ORIENTATION: RIGHT;LEFT

## 2021-09-17 ASSESSMENT — PAIN DESCRIPTION - FREQUENCY: FREQUENCY: CONTINUOUS

## 2021-09-17 ASSESSMENT — PAIN DESCRIPTION - PAIN TYPE
TYPE: CHRONIC PAIN
TYPE: CHRONIC PAIN

## 2021-09-17 ASSESSMENT — PAIN - FUNCTIONAL ASSESSMENT: PAIN_FUNCTIONAL_ASSESSMENT: PREVENTS OR INTERFERES WITH MANY ACTIVE NOT PASSIVE ACTIVITIES

## 2021-09-17 ASSESSMENT — PAIN DESCRIPTION - LOCATION: LOCATION: LEG

## 2021-09-17 ASSESSMENT — PAIN SCALES - WONG BAKER: WONGBAKER_NUMERICALRESPONSE: 0

## 2021-09-17 NOTE — TELEPHONE ENCOUNTER
Writer contacted Dr. Sophie Landry to inform of 30 day readmission risk. Dr. Sophie Landry informed writer there is no decision on disposition at this time.

## 2021-09-17 NOTE — PROGRESS NOTES
[facility] Progress Note    Admitting Date and Time: 9/16/2021  8:19 PM  Admit Dx: Pneumonia due to COVID-19 virus [U07.1, J12.82]    Subjective:  Patient is being followed for Pneumonia due to COVID-19 virus [U07.1, J12.82]     Pt feels hungry. No loss of taste or smell. Reports diarrhea. Had J & J vaccine ~ March 2021. Per RN:  No new concerns    ROS: denies fever, chills, cp, sob, n/v, HA unless stated above.       ARIPiprazole  5 mg Oral Daily    baclofen  5 mg Oral BID    DULoxetine  60 mg Oral Daily    [START ON 9/18/2021] hydroCHLOROthiazide  12.5 mg Oral Daily    pantoprazole  40 mg Oral QAM AC    sodium chloride flush  5-40 mL IntraVENous 2 times per day    enoxaparin  30 mg SubCUTAneous BID    dexamethasone  6 mg IntraVENous Q24H    vitamin D  6,000 Units Oral Daily    Followed by   Sanjuana Waters ON 9/24/2021] vitamin D  2,000 Units Oral Daily    albuterol-ipratropium  1 puff Inhalation Q4H WA    fluticasone  1 puff Inhalation BID    [START ON 9/18/2021] remdesivir IVPB  100 mg IntraVENous Q24H     acetaminophen, 650 mg, Q4H PRN  HYDROcodone-acetaminophen, 1 tablet, Q4H PRN  sodium chloride flush, 5-40 mL, PRN  sodium chloride, 25 mL, PRN  ondansetron, 4 mg, Q8H PRN   Or  ondansetron, 4 mg, Q6H PRN  polyethylene glycol, 17 g, Daily PRN  acetaminophen, 650 mg, Q6H PRN   Or  acetaminophen, 650 mg, Q6H PRN  guaiFENesin-dextromethorphan, 5 mL, Q4H PRN  sodium chloride, 30 mL, PRN  hydrOXYzine, 25 mg, BID PRN  albuterol-ipratropium, 1 puff, Q4H PRN         Objective:    BP (!) 144/76   Pulse 93   Temp 98.3 °F (36.8 °C) (Oral)   Resp 22   Ht 5' 4\" (1.626 m)   Wt 130 lb (59 kg)   SpO2 98%   BMI 22.31 kg/m²     General Appearance: alert and oriented to person, place and time and in no acute distress  Skin: warm and dry  Head: normocephalic and atraumatic  Eyes: pupils equal, round, and reactive to light, extraocular eye movements intact, conjunctivae normal  Neck: neck supple and non tender without mass   Pulmonary/Chest: clear to auscultation bilaterally- no wheezes, rales or rhonchi, normal air movement, no respiratory distress  Cardiovascular: normal rate, normal S1 and S2 and no carotid bruits  Abdomen: soft, non-tender, non-distended, normal bowel sounds, no masses or organomegaly  Extremities: no cyanosis, no clubbing and no edema  Neurologic: no cranial nerve deficit and speech normal        Recent Labs     09/16/21 2232 09/17/21  0310    137   K 3.7 3.9   CL 90* 89*   CO2 36* 33*   BUN 18 18   CREATININE 0.8 0.9   GLUCOSE 105* 139*   CALCIUM 8.2* 8.3*       Recent Labs     09/16/21 2232 09/17/21  0310   WBC 19.8* 26.6*   RBC 3.59* 3.68*   HGB 10.7* 11.1*   HCT 34.1* 35.9*   MCV 95.0 97.6   MCH 29.8 30.2   MCHC 31.4* 30.9*   RDW 14.1 14.3    206   MPV 9.6 9.9       Radiology:   CT CHEST WO CONTRAST    Result Date: 9/16/2021  EXAMINATION: CT OF THE CHEST WITHOUT CONTRAST 9/16/2021 10:02 pm TECHNIQUE: CT of the chest was performed without the administration of intravenous contrast. Multiplanar reformatted images are provided for review. Dose modulation, iterative reconstruction, and/or weight based adjustment of the mA/kV was utilized to reduce the radiation dose to as low as reasonably achievable. COMPARISON: Correlation made with prior plain film radiographs HISTORY: ORDERING SYSTEM PROVIDED HISTORY: fever, cough, eval for pneumonia TECHNOLOGIST PROVIDED HISTORY: Reason for exam:->fever, cough, eval for pneumonia Decision Support Exception - unselect if not a suspected or confirmed emergency medical condition->Emergency Medical Condition (MA) FINDINGS: Mediastinum: There is no evidence of hilar or mediastinal adenopathy. Atherosclerotic calcification along the thoracic aorta with no evidence of aneurysmal dilatation. No evidence of pericardial effusion. Coronary atherosclerotic calcifications.  Lungs/pleura: Pleuroparenchymal scarring in the lung apices computerized transcription errors may be present.     Electronically signed by Severiano Ching MD on 9/17/2021 at 8:19 AM

## 2021-09-17 NOTE — PROGRESS NOTES
Remdesivir Initiation Note    This patient meets criteria for initiation of remdesivir based on the following:  Proven COVID-19  Moderate disease (Requiring supplemental O2)  Acceptable hepatic function (ALT within 5 times ULN)    Exclusion Criteria:  Severe disease requiring invasive or non-invasive mechanical ventilation (includes HFNC & BiPAP)  Could consider use in patients requiring high flow if early on in the disease course (based on symptom duration)  Use of more than 1 vasopressor prior to remdesivir initiation  Already improving on supportive treatment and/or impending discharge  Patients in whom the clinical team think death is in the immediate short-term where remdesivir is unlikely to change the clinical outcome     Liver function tests will be monitored daily while on remdesivir.   Sebastian 11

## 2021-09-17 NOTE — ED NOTES
Bed: 07  Expected date:   Expected time:   Means of arrival:   Comments:  EMS     Lucretia Marcial RN  77/53/35 2019

## 2021-09-17 NOTE — H&P
[facility] History and Physical        Chief Complaint:  sob  History of Present Illness   The patient is a 54 y.o. male  - vague historian  Chronic 4LNC  Chronic baseline copd, chronic +?worse cough,  Non productive, r  +DIAS moderate which he reports is at baseline  Is is taking nebulizer steroid bid +rescue inhaler  He was given steroids last admission- still taking 20mg daily      Chronic LE edama,, pain moderaee and swelling, which isn't going down even when props leg up at night, he doesn't use LE stockinettes  No hx of chf,  no orthopenia  But last admission last week -- sent in bc of ?new vs chronic LE edema --and DC 6 days ago:      Chronic bipedal edema, chronic venous stasis changes, right more than left-was treated with IV Lasix  initially & now transition to p.o. Dietary restrictions continued  Ultrasound  negative for DVT.  Not in CHF . No pneumonia ,procal on lower side and covid 19 neg.  maintaining good sats on  4 L NC & this is her baseline. Norvasc changed to hydrochlorothiazide - may be this will benefit in reducing bipedal edema. He will follow with PCP as out pt.     Dyspnea on exertion-at home on 4 L nasal cannula, has history of COPD.  Currently requiring 5 L.   Echo reviewed LVEF of 55%. As above after diuresis now transitioned to po lasix ,has improving hypoxia & bipedal edema. Now O2 requirement at baseline. On chronic o2 home on 4LNC o2 since DC 2 weeks ago - without any acute sob. He is on chronic prednisone 20 mg daily??, and he states he has been taking his medications as prescribed. Today he came in bc acutely sob  --While at home today smoking and became more acutely SOB  home via EMS, was given 2 duoneb treatments enroute. Pt states his SOB is a little better after this   He was febrile with an initial temperature of 101.4 F. Had markedly diminished breath sounds with scattered wheezes in all lung fields. dec leg edema ? chronci   In ER . Saturating in low 90s on baseline 4 L nasal cannula      - hx taken from the   REVIEW OF SYSTEMS:  no fevers, chills, cp, sob, n/v, ha, vision/hearing changes, wt changes, hot/cold flashes, other open skin lesions, diarrhea, constipation, dysuria/hematuria unless noted in HPI. Complete ROS performed with the patient and is otherwise negative. Past Medical History:      Diagnosis Date    COPD (chronic obstructive pulmonary disease) ([facility] [address]     Hypertension     Multiple gastric ulcers        Past Surgical History:        Procedure Laterality Date    HERNIA REPAIR      HIP SURGERY      KNEE SURGERY         Home Medications:  Prior to Admission medications    Medication Sig Start Date End Date Taking?  Authorizing Provider   predniSONE (DELTASONE) 20 MG tablet Take 2 tablets by mouth daily for 6 days 9/12/21 9/18/21 Yes ARGELIA Palafox CNP   hydroCHLOROthiazide (HYDRODIURIL) 12.5 MG tablet Take 1 tablet by mouth daily 9/12/21  Yes ARGELIA Palafox CNP   furosemide (LASIX) 20 MG tablet Take 1 tablet by mouth every other day 9/11/21  Yes ARGELIA Palafox CNP   calcium-vitamin D ([facility]) 500-200 MG-UNIT per tablet Take 1 tablet by mouth 2 times daily 8/29/21  Yes Mario Smith MD   Probiotic Acidophilus [facility]) TABS Take 1 tablet by mouth daily 8/29/21 9/28/21 Yes Mario Smith MD   hydrOXYzine (ATARAX) 25 MG tablet Take 1 tablet by mouth 2 times daily as needed for Anxiety 8/6/21 10/5/21 Yes ARGELIA Lyman NP   albuterol sulfate HFA (PROAIR HFA) 108 (90 Base) MCG/ACT inhaler Inhale 2 puffs into the lungs every 4 hours as needed for Wheezing 7/14/21  Yes ARGELIA Lyman NP   Revefenacin (YUPELRI) 175 MCG/3ML SOLN Inhale 1 ampule into the lungs daily 6/16/21  Yes ARGELIA Lao CNP   omeprazole (PRILOSEC) 40 MG delayed release capsule Take 1 capsule by mouth daily 6/14/21  Yes ARGELIA Lyman NP   formoterol (PERFOROMIST) 20 MCG/2ML nebulizer solution Take 2 mLs by nebulization every 12 hours  Patient taking differently: Take 20 mcg by nebulization every 12 hours NEW HAS NOT PICKED UP FROM PHARMACY 4/7/21  Yes ARGELIA Sanon CNP   baclofen (LIORESAL) 10 MG tablet Take 5 mg by mouth 2 times daily  12/29/20  Yes Historical Provider, MD   HYDROcodone-acetaminophen (NORCO) 7.5-325 MG per tablet Take 1 tablet by mouth 2 times daily as needed. Takes religiously twice daily for leg pain per pt 10/6/20  Yes Historical Provider, MD   ipratropium-albuterol (DUONEB) 0.5-2.5 (3) MG/3ML SOLN nebulizer solution Inhale 3 mLs into the lungs every 4 hours as needed for Shortness of Breath 3/10/20  Yes Constantino King MD   ARIPiprazole (ABILIFY) 5 MG tablet take 1 tablet by mouth once daily 7/5/19  Yes Historical Provider, MD   DULoxetine (CYMBALTA) 60 MG extended release capsule take 1 capsule by mouth at bedtime 7/5/19  Yes Historical Provider, MD   budesonide (PULMICORT) 0.5 MG/2ML nebulizer suspension Take 2 mLs by nebulization 2 times daily 6/16/21   ARGELIA Sanon CNP   albuterol sulfate HFA (PROAIR HFA) 108 (90 Base) MCG/ACT inhaler Inhale 2 puffs into the lungs every 4 hours as needed for Wheezing 1/26/21   R Gurdeep Erwin MD       Allergies:  Aspirin, Levofloxacin, Lisinopril, Other, Tramadol, Ibuprofen, and Sulfa antibiotics    Social History:   TOBACCO:   reports that he quit smoking about 11 months ago. His smoking use included cigarettes. He started smoking about 26 years ago. He has a 100.00 pack-year smoking history. He has never used smokeless tobacco.  ETOH:   reports no history of alcohol use.     Family History:       Problem Relation Age of Onset    Colon Cancer Mother     Other Father         house fire    No Known Problems Sister     No Known Problems Brother     No Known Problems Sister     No Known Problems Brother       Or deferred/otherwise considered non contributory to current admission  PHYSICAL EXAM:    VS: /73   Pulse 95 Temp 101.4 °F (38.6 °C) (Oral)   Resp 16   Ht 5' 4\" (1.626 m)   Wt 130 lb (59 kg)   SpO2 91%   BMI 22.31 kg/m²     General Appearance:     no acute distress. +confused   Psych:  HEENT:    A.O. As per HPI details  NC/AT, PERRL, no pallor no icterus, lips/ext mucous membrane grossly NC o2    Neck:   Supple, trachea midline, no obvious JVD   Resp:     + wheezes, + rhonchi   Chest wall:    No tenderness or deformity   Heart:    Regular rate and rhythm, S1 and S2 normal, no rub or gallop. Abdomen:     Soft, non-tender, bowel sounds active    no suspicious obvious masses/organomegaly   Genitalia & Rectal:    Deferred.    Extremities x4:   Extremities  imrpoved LE edema   atraumatic, no cyanosis, no clubbing   Musculoskeletal:      NO active synovitis or swollen b/l wrists, 2-5 MCPs examined   Skin:   Skin color,   vneous stasis LE changes  chronci rashesin lower legs    Lymph nodes:   Cervical nodes grossly normal   Neurologic:  .Grossly symmetric  strength in UEs and LEs with symmetric grossly intact to light touch sensation     LABS:  CBC:   Lab Results   Component Value Date    WBC 19.8 09/16/2021    RBC 3.59 09/16/2021    HGB 10.7 09/16/2021    HCT 34.1 09/16/2021     09/16/2021    MCV 95.0 09/16/2021     BMP:    Lab Results   Component Value Date     09/16/2021    K 3.7 09/16/2021    CL 90 09/16/2021    CO2 36 09/16/2021    BUN 18 09/16/2021    CREATININE 0.8 09/16/2021    GLUCOSE 105 09/16/2021    CALCIUM 8.2 09/16/2021     Hepatic Function Panel:    Lab Results   Component Value Date    ALKPHOS 50 09/16/2021    AST 14 09/16/2021    ALT 12 09/16/2021    PROT 7.4 09/16/2021    LABALBU 4.2 09/16/2021    BILITOT 0.5 09/16/2021     Magnesium:    Lab Results   Component Value Date    MG 2.2 08/26/2021       PT/INR:    Lab Results   Component Value Date    PROTIME 11.9 08/12/2021    INR 1.08 08/12/2021     U/A:   Lab Results   Component Value Date    PHUR 5.0 10/13/2020    [address] Susan Abrams NEGATIVE 10/13/2020 RBCUA MODERATE 10/13/2020    GLUCOSEU NEGATIVE 10/13/2020     ABG:    Lab Results   Component Value Date    PHART 7.29 10/14/2020    MCP5WGC 71 10/14/2020    PO2ART 66 10/14/2020    Z3JIYCGO 90 10/14/2020    OUN6AXP 34.1 10/14/2020    BEART 5.4 10/14/2020     TSH:    Lab Results   Component Value Date    TSH 0.274 09/10/2021     Cardiac Enzymes:   Lab Results   Component Value Date    CKTOTAL 408 (H) 02/24/2020    CKTOTAL 725 (H) 02/22/2020    CKTOTAL 1,416 (H) 02/21/2020    CKMB 14.8 (HH) 02/22/2020    CKMB 22.2 (HH) 02/21/2020    TROPONINI < 0.03 08/05/2021    TROPONINI < 0.03 07/14/2021    TROPONINI < 0.03 12/29/2020       Radiology: CT CHEST WO CONTRAST    Result Date: 9/16/2021  EXAMINATION: CT OF THE CHEST WITHOUT CONTRAST 9/16/2021 10:02 pm TECHNIQUE: CT of the chest was performed without the administration of intravenous contrast. Multiplanar reformatted images are provided for review. Dose modulation, iterative reconstruction, and/or weight based adjustment of the mA/kV was utilized to reduce the radiation dose to as low as reasonably achievable. COMPARISON: Correlation made with prior plain film radiographs HISTORY: ORDERING SYSTEM PROVIDED HISTORY: fever, cough, eval for pneumonia TECHNOLOGIST PROVIDED HISTORY: Reason for exam:->fever, cough, eval for pneumonia Decision Support Exception - unselect if not a suspected or confirmed emergency medical condition->Emergency Medical Condition (MA) FINDINGS: Mediastinum: There is no evidence of hilar or mediastinal adenopathy. Atherosclerotic calcification along the thoracic aorta with no evidence of aneurysmal dilatation. No evidence of pericardial effusion. Coronary atherosclerotic calcifications. Lungs/pleura: Pleuroparenchymal scarring in the lung apices bilaterally with bulla. Emphysematous changes in the lungs. Multifocal patchy and confluent airspace disease most extensively involving the lingula, consistent with multifocal pneumonia.  Confluent airspace disease in the posterobasal segment of the lower lobes bilaterally. Small bilateral pleural effusions. Upper Abdomen: Distended gallbladder with small gallstones. Soft Tissues/Bones: No acute bony pathology. Multifocal patchy and confluent airspace disease consistent with multifocal pneumonia. Small bilateral pleural effusions. Cholelithiasis. XR CHEST PORTABLE    Result Date: 9/16/2021  EXAMINATION: ONE XRAY VIEW OF THE CHEST 9/16/2021 9:14 pm COMPARISON: 09/10/2021 HISTORY: ORDERING SYSTEM PROVIDED HISTORY: short of breath TECHNOLOGIST PROVIDED HISTORY: Reason for exam:->short of breath FINDINGS: There is some mild patchy density at periphery of right lower lung which is unchanged. There is some new patchy density in the mid left lung. Aeration at the left base has improved. There is unchanged interstitial prominence. There is no pneumothorax or pleural effusion. Unchanged right and shifting left sided infiltrates. EKG:  Rate: 123  Rhythm: Sinus  Interpretation: Sinus tachycardia, PACs, normal LA interval, normal QRS, normal QT interval, no acute ST or T wave changes  Comparison: changes compared to previous EKG-sinus tachycardia now present   Assessment & Plan   ACTIVE hospital problems being addressed/reassessed for this admission:  Principal Problem:    Pneumonia due to COVID-19 virus  Active Problems:    Essential hypertension    Peripheral vascular disease ([address]    Chronic obstructive pulmonary disease (HCC)    Oxygen dependent    Swelling of both lower extremities  Resolved Problems:    * No resolved hospital problems.  *    Code status/DVT prophylaxis and PLAN --see orders   Note extensive time spent coordinating care between ER docs, ER and floor nurses, and transitioning care over to day providers  Plan of care/ clinical impressions/communication specifics detailed below:  54 y.o. male  - vague historian  Chronic 4LNC  Chronic baseline copd, chronic +?worse cough,  Non productive, complicated by extensive infiltrates and inc Neutrophils--  pulm consult- procalcitoini/sputum/urinary pneumonia workup-- ?abx  ?baricitinib? EF 55% recently started on diuretics for LE b/l - which seems improved  norvasc was stopped and hctz started last admission  Lasix QOD - will hold  Hold diuretics  - for now, bc ill      Noted LE edema b/l - LE edema workup:  Chronic bipedal edema, chronic venous stasis changes, right more than left-was treated with IV Lasix  initially & now transition to p.o. Dietary restrictions continued  Ultrasound  negative for DVT.  Not in CHF . No pneumonia ,procal on lower side and covid 19 neg.  maintaining good sats on  4 L NC & this is her baseline. Norvasc changed to hydrochlorothiazide - may be this will benefit in reducing bipedal edema.         Magalys Lamb MD   Night Officer, overnight admitting doctor at [facility] call day time doctor   for questions after 7:30am    Covering for [facility] CHILDREN Service  If Qs please call [phone]  Electronically signed by Ekta Cardenas MD on 9/17/2021 at 12:36 AM

## 2021-09-17 NOTE — CONSULTS
Palliative Care Department  Palliative Care Initial Consult  Provider: Otoniel Camacho, ARGELIA - [facility]  [phone]    Hospital Day: 2  Date of Initial Consult: 9/17/2021  Referring Provider: Dr. Kal Mariscal was consulted for assistance with: Code status Discussion, Assist with goals of care and Family Support    Chief Complaint: Renetta Ramos is a 54 y.o. male with chief complaint of SOB    HPI:   Renetta Ramos is a 54 y.o. male with significant medical history of advanced COPD, on home O2 of 4L/min, recently hospitalized for cellulitis, who was admitted on 9/16/2021 for SOB after smoking a cigarette. He test + COVID, hx of vaccination in March 2021, and is being admitted for further treatment. ASSESSMENT/PLAN:     Pertinent Hospital Diagnoses:  Current medical issues leading to Palliative Medicine involvement include   Active Hospital Problems    Diagnosis Date Noted    Pneumonia due to COVID-19 virus [U07.1, J12.82] 09/17/2021    Swelling of both lower extremities [M79.89] 09/09/2021    Oxygen dependent [Z99.81] 06/14/2021    Chronic obstructive pulmonary disease ([facility] [address] [J44.9] 01/26/2021    Peripheral vascular disease ([facility] [address] [I73.9] 01/26/2021    Essential hypertension [I10] 06/10/2020     Palliative Care Encounter / Counseling Regarding Goals of Care:  Please see detailed goals of care discussion as below.  At this time, Renetta Ramos, Does have capacity for medical decision-making. Capacity is time limited and situation/question specific.  During encounter the patient's sister Yung Torres would be his surrogate medical decision-maker if needed.  Outcome of goals of care meeting: live longer and continue current management   Code status: Full Code  o I reviewed with the Patient that given multiple medical co-morbidities and advanced disease process, in the event of cardiac arrest, the chances of surviving may likely be low.  It was also reviewed that if they survived a cardiac arrest, a return to prior level of function also may be low.  Advanced Directives: None   Surrogate/Legal NOK:   Natalya Allen () is his sister and who he would want as his decision maker.  There are no further PM needs at this time. PM will now sign off. If new PM needs arise, please re-consult. Thank you. Referrals: none today    SUBJECTIVE:   Events/Discussions:  9/17/2021:  Patient seen at the bedside, alert, oriented and able to voice his needs and concerns well. He states he is SOB but feels this is better since when he presented. He also has a cough. We discussed his goals of care and code status. He states he wishes to remain a full code and wants full supportive care. If a surrogate decision maker is needed he would would want his sister Zamzam Daly to be his decision maker. He states he does not wish to discuss these topics further and just wants to be treated so he can get better and go home. Past Medical History:   Diagnosis Date    COPD (chronic obstructive pulmonary disease) ([facility] [address]     Hypertension     Multiple gastric ulcers        Past Surgical History:   Procedure Laterality Date    HERNIA REPAIR      HIP SURGERY      KNEE SURGERY         Family History   Problem Relation Age of Onset    Colon Cancer Mother     Other Father         house fire    No Known Problems Sister     No Known Problems Brother     No Known Problems Sister     No Known Problems Brother        Allergies   Allergen Reactions    Aspirin     Levofloxacin Other (See Comments)    Lisinopril     Other      Other reaction(s): ABD PAIN    Tramadol     Ibuprofen Nausea And Vomiting    Sulfa Antibiotics Nausea And Vomiting       ROS: UNLESS STATED ABOVE PATIENT DENIES:  CONSTITUTIONAL:  fever, chill, rigors, nausea, vomiting, fatigue.   HEENT: blurry vision, double vision, hearing problem, tinnitus, hoarseness, dysphagia, odynophagia  RESPIRATORY: cough, shortness of breath, sputum

## 2021-09-17 NOTE — ED PROVIDER NOTES
ATTENDING PROVIDER ATTESTATION:     Dyana Hays presented to the emergency department for evaluation of Shortness of Breath   and was initially evaluated by the Medical Resident. See Original ED Note for H&P and ED course above. I have reviewed and discussed the case, including pertinent history (medical, surgical, family and social) and exam findings with the Medical Resident assigned to Dyana Hays. I have personally performed and/or participated in the history, exam, medical decision making, and procedures and agree with all pertinent clinical information. I, Dr. Alyson Keenan MD, am the primary provider of this record. I have reviewed my findings and recommendations with the assigned Medical Resident, Dyana Hays and members of family present at the time of disposition. My findings/plan: The primary encounter diagnosis was Pneumonia due to COVID-19 virus. Diagnoses of COPD exacerbation ([facility] [address] and Acute on chronic respiratory failure with hypoxia ([address] were also pertinent to this visit. New Prescriptions    No medications on file     Alyson Keenan MD       Name: Dyana Hays   MRN: [id number]     --------------------------------------------- History of Present Illness ---------------------------------------------  9/16/21, Time: 8:43 PM EDT   Chief Complaint   Patient presents with    Shortness of Breath      HPI    Dyana Hays is a 54 y.o. male, with hx of COPD, HTN, anxiety, who presents to the ED today for shortness of breath, which began yesterday after the pt smoked a few cigarettes. Pt arrived from home via EMS, was given 2 duoneb treatments enroute. Pt states his SOB is a little better after this. The patient describes his SOB as constant and moderate in severity. Smoking has made it worse and the duonebs have helped some. The pt denies any associated HA, n/v, chest pain, abd pain, GI or  complaints.  Allg: Aspirin, Levofloxacin, Lisinopril, Other, Tramadol, Ibuprofen, and Sulfa antibiotics PCP: ARGELIA Prado NP.     Meds:   Current Facility-Administered Medications:     acetaminophen (TYLENOL) tablet 650 mg, 650 mg, Oral, Q4H PRN, Ashkan Warner DO    ARIPiprazole (ABILIFY) tablet 5 mg, 5 mg, Oral, Daily, Chari Felix MD    baclofen (LIORESAL) tablet 5 mg, 5 mg, Oral, BID, Chari Felix MD    DULoxetine (CYMBALTA) extended release capsule 60 mg, 60 mg, Oral, Daily, MD Yuli Zamora ON 9/18/2021] hydroCHLOROthiazide (HYDRODIURIL) tablet 12.5 mg, 12.5 mg, Oral, Daily, Chari Felix MD    HYDROcodone-acetaminophen (NORCO) 7.5-325 MG per tablet 1 tablet, 1 tablet, Oral, Q4H PRN, Chari Felix MD    pantoprazole (PROTONIX) tablet 40 mg, 40 mg, Oral, QAM AC, Chari Felix MD    sodium chloride flush 0.9 % injection 5-40 mL, 5-40 mL, IntraVENous, 2 times per day, Chari Felix MD    sodium chloride flush 0.9 % injection 5-40 mL, 5-40 mL, IntraVENous, PRN, Chari Felix MD    0.9 % sodium chloride infusion, 25 mL, IntraVENous, PRN, Chari Felix MD    enoxaparin (LOVENOX) injection 30 mg, 30 mg, SubCUTAneous, BID, Chari Felix MD    ondansetron (ZOFRAN-ODT) disintegrating tablet 4 mg, 4 mg, Oral, Q8H PRN **OR** ondansetron (ZOFRAN) injection 4 mg, 4 mg, IntraVENous, Q6H PRN, Chari Felix MD    polyethylene glycol [facility]) packet 17 g, 17 g, Oral, Daily PRN, Chari Felix MD    acetaminophen (TYLENOL) tablet 650 mg, 650 mg, Oral, Q6H PRN **OR** acetaminophen (TYLENOL) suppository 650 mg, 650 mg, Rectal, Q6H PRN, Chari Felix MD    guaiFENesin-dextromethorphan (ROBITUSSIN DM) 100-10 MG/5ML syrup 5 mL, 5 mL, Oral, Q4H PRN, Chari Felix MD    dexamethasone (PF) (DECADRON) injection 6 mg, 6 mg, IntraVENous, Q24H, Chari Felix MD    Vitamin D (CHOLECALCIFEROL) tablet 6,000 Units, 6,000 Units, Oral, Daily **FOLLOWED BY** [START ON 9/24/2021] Vitamin D (CHOLECALCIFEROL) tablet 2,000 Units, 2,000 Units, Oral, Daily, Kelsey Brice MD    0.9 % sodium chloride bolus, 30 mL, IntraVENous, PRN, Kelsey Brice MD    hydrOXYzine (VISTARIL) capsule 25 mg, 25 mg, Oral, BID PRN, Kelsey Brice MD    albuterol-ipratropium (COMBIVENT RESPIMAT)  MCG/ACT inhaler 1 puff, 1 puff, Inhalation, Q4H WA, Marah Breaux MD    albuterol-ipratropium (COMBIVENT RESPIMAT)  MCG/ACT inhaler 1 puff, 1 puff, Inhalation, Q4H PRN, Kelsey Brice MD    fluticasone (FLOVENT HFA) 110 MCG/ACT inhaler 1 puff, 1 puff, Inhalation, BID, Kelsey Brice MD    [START ON 9/18/2021] remdesivir 100 mg in sodium chloride 0.9 % 250 mL IVPB, 100 mg, IntraVENous, Q24H, Kelsey Brice MD    0.9 % sodium chloride bolus, 1,000 mL, IntraVENous, Once, Marah Breaux MD, Last Rate: 1,000 mL/hr at 09/17/21 0626, 1,000 mL at 09/17/21 5314    Current Outpatient Medications:     predniSONE (DELTASONE) 20 MG tablet, Take 2 tablets by mouth daily for 6 days, Disp: 12 tablet, Rfl: 0    hydroCHLOROthiazide (HYDRODIURIL) 12.5 MG tablet, Take 1 tablet by mouth daily, Disp: 30 tablet, Rfl: 3    furosemide (LASIX) 20 MG tablet, Take 1 tablet by mouth every other day, Disp: 15 tablet, Rfl: 0    calcium-vitamin D (OSCAL-500) 500-200 MG-UNIT per tablet, Take 1 tablet by mouth 2 times daily, Disp: 30 tablet, Rfl: 0    Probiotic Acidophilus (FLORANEX) TABS, Take 1 tablet by mouth daily, Disp: 30 tablet, Rfl: 0    hydrOXYzine (ATARAX) 25 MG tablet, Take 1 tablet by mouth 2 times daily as needed for Anxiety, Disp: 30 tablet, Rfl: 0    albuterol sulfate HFA (PROAIR HFA) 108 (90 Base) MCG/ACT inhaler, Inhale 2 puffs into the lungs every 4 hours as needed for Wheezing, Disp: 1 Inhaler, Rfl: 2    Revefenacin (YUPELRI) 175 MCG/3ML SOLN, Inhale 1 ampule into the lungs daily, Disp: 30 vial, Rfl: 5    omeprazole (PRILOSEC) 40 MG delayed release capsule, Take 1 capsule by mouth daily, Disp: 30 capsule, Rfl: 2    formoterol (PERFOROMIST) 20 Appearance: He is not ill-appearing or toxic-appearing. HENT:      Head: Normocephalic and atraumatic. Right Ear: External ear normal.      Left Ear: External ear normal.      Nose: Nose normal.      Mouth/Throat:      Mouth: Mucous membranes are moist.   Eyes:      General: No scleral icterus. Pupils: Pupils are equal, round, and reactive to light. Cardiovascular:      Rate and Rhythm: Normal rate and regular rhythm. Pulses: Normal pulses. Pulmonary:      Effort: No respiratory distress. Breath sounds: Wheezing and rhonchi (bilaterally in all lung fields) present. Abdominal:      Palpations: Abdomen is soft. Tenderness: There is no abdominal tenderness. There is no guarding or rebound. Musculoskeletal:         General: No tenderness or deformity. Normal range of motion. Cervical back: Normal range of motion. No tenderness. Skin:     General: Skin is warm and dry. Capillary Refill: Capillary refill takes less than 2 seconds. Findings: No erythema. Neurological:      General: No focal deficit present. Mental Status: He is alert and oriented to person, place, and time. Cranial Nerves: No cranial nerve deficit. Motor: No weakness.    Psychiatric:         Mood and Affect: Mood normal.         Behavior: Behavior normal.          Procedures     MDM  Number of Diagnoses or Management Options     Amount and/or Complexity of Data Reviewed  Decide to obtain previous medical records or to obtain history from someone other than the patient: yes         ------------------------------ ED COURSE/MEDICAL DECISION MAKING----------------------  Medications   0.9 % sodium chloride bolus (1,000 mLs IntraVENous New Bag 9/17/21 0626)   acetaminophen (TYLENOL) tablet 650 mg (has no administration in time range)   ARIPiprazole (ABILIFY) tablet 5 mg (has no administration in time range)   baclofen (LIORESAL) tablet 5 mg (has no administration in time range)   DULoxetine (CYMBALTA) extended release capsule 60 mg (has no administration in time range)   hydroCHLOROthiazide (HYDRODIURIL) tablet 12.5 mg (has no administration in time range)   HYDROcodone-acetaminophen (NORCO) 7.5-325 MG per tablet 1 tablet (has no administration in time range)   pantoprazole (PROTONIX) tablet 40 mg (has no administration in time range)   sodium chloride flush 0.9 % injection 5-40 mL (has no administration in time range)   sodium chloride flush 0.9 % injection 5-40 mL (has no administration in time range)   0.9 % sodium chloride infusion (has no administration in time range)   enoxaparin (LOVENOX) injection 30 mg (has no administration in time range)   ondansetron (ZOFRAN-ODT) disintegrating tablet 4 mg (has no administration in time range)     Or   ondansetron (ZOFRAN) injection 4 mg (has no administration in time range)   polyethylene glycol (GLYCOLAX) packet 17 g (has no administration in time range)   acetaminophen (TYLENOL) tablet 650 mg (has no administration in time range)     Or   acetaminophen (TYLENOL) suppository 650 mg (has no administration in time range)   guaiFENesin-dextromethorphan (ROBITUSSIN DM) 100-10 MG/5ML syrup 5 mL (has no administration in time range)   dexamethasone (PF) (DECADRON) injection 6 mg (has no administration in time range)   Vitamin D (CHOLECALCIFEROL) tablet 6,000 Units (has no administration in time range)     Followed by   Vitamin D (CHOLECALCIFEROL) tablet 2,000 Units (has no administration in time range)   0.9 % sodium chloride bolus (has no administration in time range)   hydrOXYzine (VISTARIL) capsule 25 mg (has no administration in time range)   albuterol-ipratropium (COMBIVENT RESPIMAT)  MCG/ACT inhaler 1 puff (has no administration in time range)   albuterol-ipratropium (COMBIVENT RESPIMAT)  MCG/ACT inhaler 1 puff (has no administration in time range)   fluticasone (FLOVENT HFA) 110 MCG/ACT inhaler 1 puff (has no administration in time range) remdesivir 100 mg in sodium chloride 0.9 % 250 mL IVPB (has no administration in time range)   acetaminophen (TYLENOL) tablet 1,000 mg (1,000 mg Oral Given 9/16/21 2200)   methylPREDNISolone sodium (SOLU-MEDROL) injection 60 mg (60 mg IntraVENous Given 9/17/21 0058)   remdesivir 200 mg in sodium chloride 0.9 % 250 mL IVPB (200 mg IntraVENous New Bag 9/17/21 8033)       Medical Decision Making/ED COURSE:   Mr. Sveta Sorenson 59-year-old male patient with hx of COPD, HTN, Anxiety who presents from home via EMS for shortness of breath which started yesterday after smoking some cigarettes. Patient denies all other review of systems to states he feels short of breath. Pt on 5 L O2 NC at home. EMS gave him 2 DuoNeb treatments in route which the patient stated helped some. On physical exam, patient has disheveled appearance and hoarse voice, WATSON x3 sitting upright. Temp 101.4, SPO2 93% on 5 L nasal cannula, /73, HR 93.  Lung sounds were coarse and rhonchi was appreciated in all lung velasquez. Heart RRR. Abd soft, nontender, nondistended. No edema noted in LE. Remainder the physical exam was benign. CBC, BMP, BNP, troponin, EKG, chest x-ray, rapid COVID, CTA chest were ordered. Single DuoNeb treatment and IV steroids were given. Tylenol given for fever. CT showed multifocal patchy airspace disease consistent w/ multifocal pneumonia as well as cholelithiasis. WBC 19.8. Trop 28, second trop ordered. . SpO2 91% on 5L NC. COVID test pending. Plan is to admit pt for acute COPD exacerbation and for further treatment of his pneumonia due to COVID-19. Patient remained hemodynamically stable throughout ED course. ED Course as of Sep 17 0705   Thu Sep 16, 2021   2121 Patient is a 59-year-old male with history of COPD on 4 L of oxygen at baseline presenting with shortness of breath and fever. Patient states his symptoms began yesterday after smoking a cigarette. He states he has had a nonproductive cough.   He follows with Dr. Yonathan Greene pulmonology. He is on chronic prednisone 20 mg daily, and he states he has been taking his medications as prescribed. In the ED, the patient was hemodynamically stable. He was febrile with an initial temperature of 101.4 F. Had markedly diminished breath sounds with scattered wheezes in all lung fields. No leg edema or calf tenderness. Saturating in low 90s on baseline 4 L nasal cannula. X-ray and labs have been obtained. COVID-19 testing has been obtained. Patient is vaccinated for COVID-19. He has been given IV steroids, duo nebs, and Tylenol. Will reevaluate. [JA]   2135 Pt states breathing treatment has helped a bit. [PW]   2216 Pt states he is feeling much better. [PW]   4713 EKG: This EKG is signed and interpreted by me. Rate: 123  Rhythm: Sinus  Interpretation: Sinus tachycardia, PACs, normal FL interval, normal QRS, normal QT interval, no acute ST or T wave changes  Comparison: changes compared to previous EKG-sinus tachycardia now present        [JA]   Fri Sep 17, 2021   0036 Hospitalist was consulted. Dr. Mayito Barajas accepted the patient for admission under Dr. Rubén Marr. [JA]      ED Course User Index  [JA] Justice Hess MD  [PW] Aletha Sweet,            Counseling: The emergency provider has spoken with the patient and discussed todays results, in addition to providing specific details for the plan of care and counseling regarding the diagnosis and prognosis. Questions are answered at this time and they are agreeable with the plan.      --------------------------------- IMPRESSION AND DISPOSITION ---------------------------------    IMPRESSION  1. Pneumonia due to COVID-19 virus    2. COPD exacerbation ([address]    3. Acute on chronic respiratory failure with hypoxia ([facility])        DISPOSITION  Disposition: Admit to telemetry  Patient condition is stable      NOTE: This report was transcribed using voice recognition software.  Every effort was made

## 2021-09-17 NOTE — CONSULTS
Pulmonary Consultation    Admit Date: 9/16/2021  Requesting Physician: ARGELIA Arceo NP    Reason for consultation:  · Shortness of breath  HPI:  · Jess Arvizu is a 45-year-old white male with end-stage chronic obstructive pulmonary disease, bronchiectasis, mucous plugging who presents to the emergency room now testing positive for COVID-19 as well as having an elevated procalcitonin level. The patient has been started on a combination of dexamethasone and remdesivir as well as his normal aerosol therapies. He is awake, alert and breathing somewhat better. Note that the patient is vaccinated. PMH:    Past Medical History:   Diagnosis Date    COPD [facility]chronic obstructive pulmonary disease) ([facility]     Hypertension     Multiple gastric ulcers      PSH:   Past Surgical History:   Procedure Laterality Date    HERNIA REPAIR      HIP SURGERY      KNEE SURGERY         Review of Systems:   · Constitutional: As noted in the HPI. · Eyes: No visual changes or diplopia. No scleral icterus. · ENT: No headaches, hearing loss or vertigo. No nasal congestion, or sore throat. · Cardiovascular: No chest pain, dyspnea on exertion, or palpitations. · Respiratory: See above  · Gastrointestinal: No abdominal pain, nausea or emesis. No diarrhea or rectal bleeding or melena. No change in bowel habits. · Genitourinary: No dysuria, urinary frequency, or incontinence. No hematuria. · Musculoskeletal: No gait disturbance, weakness or joint complaints. · Integumentary: No rash or pruritis. No abnormal pigmentation, hair or nail changes. · Neurological: No headache, diplopia, dizziness, tremor, change in muscle strength, numbness or tingling. No change in gait, balance, coordination, mood, affect, memory, mentation, behavior. · Psychiatric: No anxiety or depression. · Endocrine: No temperature intolerance, excessive thirst, fluid intake, urinary frequency, excessive appetite, or recent weight change. · Hematologic/Lymphatic: No abnormal bruising or bleeding, blood clots or swollen lymph nodes. No anemia, fever, chills, night sweats, or swollen glands. · Allergic/Immunologic: No seasonal or perenial allergies. No history of hives or atopic dermatitis. Social History:  · Alcohol:  No  · Tobacco:   Past  · Employment:  no silica or asbestos exposure  · Family:  No family history of lung disease    Medications:   sodium chloride        ARIPiprazole  5 mg Oral Daily    baclofen  5 mg Oral BID    DULoxetine  60 mg Oral Daily    [START ON 2021] hydroCHLOROthiazide  12.5 mg Oral Daily    pantoprazole  40 mg Oral QAM AC    sodium chloride flush  5-40 mL IntraVENous 2 times per day    enoxaparin  30 mg SubCUTAneous BID    dexamethasone  6 mg IntraVENous Q24H    vitamin D  6,000 Units Oral Daily    Followed by   Joseph Ames ON 2021] vitamin D  2,000 Units Oral Daily    albuterol-ipratropium  1 puff Inhalation Q4H WA    fluticasone  1 puff Inhalation BID    [START ON 2021] remdesivir IVPB  100 mg IntraVENous Q24H       Vitals:  Tmax:  VITALS:  BP (!) 144/76   Pulse 87   Temp 98.3 °F (36.8 °C) (Oral)   Resp 22   Ht 5' 4\" (1.626 m)   Wt 130 lb (59 kg)   SpO2 98%   BMI 22.31 kg/m²   24HR INTAKE/OUTPUT:  No intake or output data in the 24 hours ending 21 1320  CURRENT PULSE OXIMETRY:  SpO2: 98 %  24HR PULSE OXIMETRY RANGE:  SpO2  Av.1 %  Min: 91 %  Max: 100 %    EXAM:  General: No distress. Alert. Eyes: PERRL. No sclera icterus. No conjunctival injection. ENT: No discharge. Pharynx clear. Neck: Trachea midline. Normal thyroid. No jvd, no hjr. Resp: Diffuse wheezing. No accessory muscle use. Bilateral rales. Few rhonchi. CV: Regular rate. Regular rhythm. No murmur No rub. Abd: Non-tender. Non-distended. No masses. No organmegaly. Normal bowel sounds. Skin: Warm and dry. No nodule on exposed extremities. No rash on exposed extremities. Lymph: No cervical LAD.  No supraclavicular LAD. Ext: No joint deformity. No clubbing. No cyanosis. No edema  Neuro: Awake. Follows commands. Positive pupils/gag/corneals. Normal pain response. Lab Results:  CBC:   Recent Labs     09/16/21 2232 09/17/21  0310   WBC 19.8* 26.6*   HGB 10.7* 11.1*   HCT 34.1* 35.9*   MCV 95.0 97.6    206       BMP:  Recent Labs     09/16/21 2232 09/17/21  0310    137   K 3.7 3.9   CL 90* 89*   CO2 36* 33*   BUN 18 18   CREATININE 0.8 0.9    ALB:3,BILIDIR:3,BILITOT:3,ALKPHOS:3)@    PT/INR: No results for input(s): PROTIME, INR in the last 72 hours. Cultures:  Sputum: not available  Blood: not available    ABG:   No results for input(s): PH, PO2, PCO2, HCO3, BE, O2SAT, METHB, O2HB, COHB, O2CON, HHB, THB in the last 72 hours. Films:     CT CHEST WO CONTRAST   Final Result   Multifocal patchy and confluent airspace disease consistent with multifocal   pneumonia. Small bilateral pleural effusions. Cholelithiasis. XR CHEST PORTABLE   Final Result   Unchanged right and shifting left sided infiltrates. .        Assessment:  1. COVID-19 pneumonia superimposed upon a bacterial pneumonia  2. COPD, GOLD class 4      Plan:  1. Continue treatment for COPD: Unfortunately I cannot include aerosol treatments  2. Continue remdesivir/dexamethasone  3. Antimicrobials: Nosocomial pathogens need to be covered. 4. Supplemental oxygen      Thanks for letting us see this patient in consultation. Total time in reviewing the previous admissions and records, reviewing the current x-rays, labs, and discussing with clinical staff including nursing and physicians, exceeded 50 minutes. Please contact us with any questions. Office [phone] or after hours through [employer], x [phone]. Please note that voice recognition technology was used (while wearing a Covid mandated mask) in the preparation of this note and make therefore it may contain inadvertent transcription errors.   If the patient is a COVID 19 isolation patient, the above physical exam reflects that of the examining physician for the day. Ashlee Fraga MD,  M.D., F.C.C.P.     [facility], [address] [place] [address], [address] [place] [address]

## 2021-09-18 LAB
ALBUMIN SERPL-MCNC: 3.5 G/DL (ref 3.5–5.2)
ALP BLD-CCNC: 42 U/L (ref 40–129)
ALT SERPL-CCNC: 17 U/L (ref 0–40)
ANION GAP SERPL CALCULATED.3IONS-SCNC: 8 MMOL/L (ref 7–16)
ANISOCYTOSIS: ABNORMAL
AST SERPL-CCNC: 20 U/L (ref 0–39)
BASOPHILIC STIPPLING: ABNORMAL
BASOPHILS ABSOLUTE: 0.01 E9/L (ref 0–0.2)
BASOPHILS RELATIVE PERCENT: 0.1 % (ref 0–2)
BILIRUB SERPL-MCNC: <0.2 MG/DL (ref 0–1.2)
BUN BLDV-MCNC: 22 MG/DL (ref 6–20)
C-REACTIVE PROTEIN: 22.8 MG/DL (ref 0–0.4)
CALCIUM SERPL-MCNC: 7.7 MG/DL (ref 8.6–10.2)
CHLORIDE BLD-SCNC: 100 MMOL/L (ref 98–107)
CO2: 32 MMOL/L (ref 22–29)
CREAT SERPL-MCNC: 0.7 MG/DL (ref 0.7–1.2)
D DIMER: 448 NG/ML DDU
EOSINOPHILS ABSOLUTE: 0 E9/L (ref 0.05–0.5)
EOSINOPHILS RELATIVE PERCENT: 0 % (ref 0–6)
GFR AFRICAN AMERICAN: >60
GFR NON-AFRICAN AMERICAN: >60 ML/MIN/1.73
GLUCOSE BLD-MCNC: 121 MG/DL (ref 74–99)
HCT VFR BLD CALC: 31.7 % (ref 37–54)
HEMOGLOBIN: 9.7 G/DL (ref 12.5–16.5)
HYPOCHROMIA: ABNORMAL
IMMATURE GRANULOCYTES #: 0.03 E9/L
IMMATURE GRANULOCYTES %: 0.3 % (ref 0–5)
INR BLD: 1.3
L. PNEUMOPHILA SEROGP 1 UR AG: NORMAL
LACTIC ACID: 0.9 MMOL/L (ref 0.5–2.2)
LYMPHOCYTES ABSOLUTE: 0.23 E9/L (ref 1.5–4)
LYMPHOCYTES RELATIVE PERCENT: 2.3 % (ref 20–42)
MCH RBC QN AUTO: 29.8 PG (ref 26–35)
MCHC RBC AUTO-ENTMCNC: 30.6 % (ref 32–34.5)
MCV RBC AUTO: 97.5 FL (ref 80–99.9)
MONOCYTES ABSOLUTE: 0.27 E9/L (ref 0.1–0.95)
MONOCYTES RELATIVE PERCENT: 2.7 % (ref 2–12)
NEUTROPHILS ABSOLUTE: 9.41 E9/L (ref 1.8–7.3)
NEUTROPHILS RELATIVE PERCENT: 94.6 % (ref 43–80)
PDW BLD-RTO: 14 FL (ref 11.5–15)
PLATELET # BLD: 151 E9/L (ref 130–450)
PMV BLD AUTO: 10.4 FL (ref 7–12)
POLYCHROMASIA: ABNORMAL
POTASSIUM REFLEX MAGNESIUM: 3.9 MMOL/L (ref 3.5–5)
PROTHROMBIN TIME: 13.6 SEC (ref 9.3–12.4)
RBC # BLD: 3.25 E12/L (ref 3.8–5.8)
SCHISTOCYTES: ABNORMAL
SODIUM BLD-SCNC: 140 MMOL/L (ref 132–146)
STOMATOCYTES: ABNORMAL
STREP PNEUMONIAE ANTIGEN, URINE: NORMAL
TEAR DROP CELLS: ABNORMAL
TOTAL PROTEIN: 6.5 G/DL (ref 6.4–8.3)
VACUOLATED NEUTROPHILS: ABNORMAL
WBC # BLD: 10 E9/L (ref 4.5–11.5)

## 2021-09-18 PROCEDURE — 85025 COMPLETE CBC W/AUTO DIFF WBC: CPT

## 2021-09-18 PROCEDURE — 87206 SMEAR FLUORESCENT/ACID STAI: CPT

## 2021-09-18 PROCEDURE — 83605 ASSAY OF LACTIC ACID: CPT

## 2021-09-18 PROCEDURE — 6370000000 HC RX 637 (ALT 250 FOR IP): Performed by: INTERNAL MEDICINE

## 2021-09-18 PROCEDURE — 36415 COLL VENOUS BLD VENIPUNCTURE: CPT

## 2021-09-18 PROCEDURE — 6370000000 HC RX 637 (ALT 250 FOR IP): Performed by: FAMILY MEDICINE

## 2021-09-18 PROCEDURE — 80053 COMPREHEN METABOLIC PANEL: CPT

## 2021-09-18 PROCEDURE — 85378 FIBRIN DEGRADE SEMIQUANT: CPT

## 2021-09-18 PROCEDURE — 87449 NOS EACH ORGANISM AG IA: CPT

## 2021-09-18 PROCEDURE — 2580000003 HC RX 258: Performed by: INTERNAL MEDICINE

## 2021-09-18 PROCEDURE — 86140 C-REACTIVE PROTEIN: CPT

## 2021-09-18 PROCEDURE — 6360000002 HC RX W HCPCS: Performed by: INTERNAL MEDICINE

## 2021-09-18 PROCEDURE — 2700000000 HC OXYGEN THERAPY PER DAY

## 2021-09-18 PROCEDURE — 99233 SBSQ HOSP IP/OBS HIGH 50: CPT | Performed by: FAMILY MEDICINE

## 2021-09-18 PROCEDURE — 2500000003 HC RX 250 WO HCPCS: Performed by: INTERNAL MEDICINE

## 2021-09-18 PROCEDURE — 2060000000 HC ICU INTERMEDIATE R&B

## 2021-09-18 PROCEDURE — 85610 PROTHROMBIN TIME: CPT

## 2021-09-18 PROCEDURE — 87070 CULTURE OTHR SPECIMN AEROBIC: CPT

## 2021-09-18 RX ORDER — BUSPIRONE HYDROCHLORIDE 5 MG/1
5 TABLET ORAL 3 TIMES DAILY PRN
Status: DISCONTINUED | OUTPATIENT
Start: 2021-09-18 | End: 2021-09-21 | Stop reason: HOSPADM

## 2021-09-18 RX ADMIN — CEFEPIME HYDROCHLORIDE 2000 MG: 2 INJECTION, POWDER, FOR SOLUTION INTRAVENOUS at 03:27

## 2021-09-18 RX ADMIN — HYDROCODONE BITARTRATE AND ACETAMINOPHEN 1 TABLET: 7.5; 325 TABLET ORAL at 16:21

## 2021-09-18 RX ADMIN — IPRATROPIUM BROMIDE AND ALBUTEROL 1 PUFF: 20; 100 SPRAY, METERED RESPIRATORY (INHALATION) at 09:30

## 2021-09-18 RX ADMIN — SODIUM CHLORIDE, PRESERVATIVE FREE 10 ML: 5 INJECTION INTRAVENOUS at 08:15

## 2021-09-18 RX ADMIN — HYDROCODONE BITARTRATE AND ACETAMINOPHEN 1 TABLET: 7.5; 325 TABLET ORAL at 08:19

## 2021-09-18 RX ADMIN — IPRATROPIUM BROMIDE AND ALBUTEROL 1 PUFF: 20; 100 SPRAY, METERED RESPIRATORY (INHALATION) at 17:10

## 2021-09-18 RX ADMIN — BACLOFEN 5 MG: 10 TABLET ORAL at 08:20

## 2021-09-18 RX ADMIN — ENOXAPARIN SODIUM 30 MG: 30 INJECTION SUBCUTANEOUS at 08:18

## 2021-09-18 RX ADMIN — BUSPIRONE HYDROCHLORIDE 5 MG: 5 TABLET ORAL at 21:18

## 2021-09-18 RX ADMIN — PANTOPRAZOLE SODIUM 40 MG: 40 TABLET, DELAYED RELEASE ORAL at 05:39

## 2021-09-18 RX ADMIN — DULOXETINE HYDROCHLORIDE 60 MG: 60 CAPSULE, DELAYED RELEASE ORAL at 08:19

## 2021-09-18 RX ADMIN — FLUTICASONE PROPIONATE 1 PUFF: 110 AEROSOL, METERED RESPIRATORY (INHALATION) at 21:20

## 2021-09-18 RX ADMIN — HYDROCODONE BITARTRATE AND ACETAMINOPHEN 1 TABLET: 7.5; 325 TABLET ORAL at 21:18

## 2021-09-18 RX ADMIN — HYDROCODONE BITARTRATE AND ACETAMINOPHEN 1 TABLET: 7.5; 325 TABLET ORAL at 03:31

## 2021-09-18 RX ADMIN — IPRATROPIUM BROMIDE AND ALBUTEROL 1 PUFF: 20; 100 SPRAY, METERED RESPIRATORY (INHALATION) at 21:20

## 2021-09-18 RX ADMIN — Medication 6000 UNITS: at 08:20

## 2021-09-18 RX ADMIN — BACLOFEN 5 MG: 10 TABLET ORAL at 21:17

## 2021-09-18 RX ADMIN — HYDROCHLOROTHIAZIDE 12.5 MG: 12.5 TABLET ORAL at 08:19

## 2021-09-18 RX ADMIN — IPRATROPIUM BROMIDE AND ALBUTEROL 1 PUFF: 20; 100 SPRAY, METERED RESPIRATORY (INHALATION) at 14:15

## 2021-09-18 RX ADMIN — SODIUM CHLORIDE, PRESERVATIVE FREE 10 ML: 5 INJECTION INTRAVENOUS at 21:18

## 2021-09-18 RX ADMIN — FLUTICASONE PROPIONATE 1 PUFF: 110 AEROSOL, METERED RESPIRATORY (INHALATION) at 08:22

## 2021-09-18 RX ADMIN — ENOXAPARIN SODIUM 30 MG: 30 INJECTION SUBCUTANEOUS at 21:18

## 2021-09-18 RX ADMIN — CEFEPIME HYDROCHLORIDE 2000 MG: 2 INJECTION, POWDER, FOR SOLUTION INTRAVENOUS at 16:22

## 2021-09-18 RX ADMIN — DEXAMETHASONE SODIUM PHOSPHATE 6 MG: 10 INJECTION INTRAMUSCULAR; INTRAVENOUS at 05:39

## 2021-09-18 RX ADMIN — ARIPIPRAZOLE 5 MG: 5 TABLET ORAL at 08:19

## 2021-09-18 RX ADMIN — REMDESIVIR 100 MG: 100 INJECTION, POWDER, LYOPHILIZED, FOR SOLUTION INTRAVENOUS at 08:18

## 2021-09-18 ASSESSMENT — PAIN DESCRIPTION - ONSET: ONSET: ON-GOING

## 2021-09-18 ASSESSMENT — PAIN SCALES - GENERAL
PAINLEVEL_OUTOF10: 6
PAINLEVEL_OUTOF10: 9
PAINLEVEL_OUTOF10: 9
PAINLEVEL_OUTOF10: 7
PAINLEVEL_OUTOF10: 7
PAINLEVEL_OUTOF10: 6
PAINLEVEL_OUTOF10: 9
PAINLEVEL_OUTOF10: 8
PAINLEVEL_OUTOF10: 9

## 2021-09-18 ASSESSMENT — PAIN DESCRIPTION - LOCATION
LOCATION: LEG
LOCATION: LEG

## 2021-09-18 ASSESSMENT — PAIN DESCRIPTION - ORIENTATION: ORIENTATION: RIGHT;LEFT

## 2021-09-18 ASSESSMENT — PAIN DESCRIPTION - FREQUENCY: FREQUENCY: CONTINUOUS

## 2021-09-18 ASSESSMENT — PAIN DESCRIPTION - DESCRIPTORS: DESCRIPTORS: ACHING;BURNING;THROBBING

## 2021-09-18 ASSESSMENT — PAIN - FUNCTIONAL ASSESSMENT: PAIN_FUNCTIONAL_ASSESSMENT: PREVENTS OR INTERFERES SOME ACTIVE ACTIVITIES AND ADLS

## 2021-09-18 ASSESSMENT — PAIN DESCRIPTION - PAIN TYPE: TYPE: CHRONIC PAIN

## 2021-09-18 ASSESSMENT — PAIN DESCRIPTION - PROGRESSION: CLINICAL_PROGRESSION: NOT CHANGED

## 2021-09-18 NOTE — PROGRESS NOTES
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 Vitals:  VITALS:  BP (!) 142/81   Pulse 83   Temp 98.5 °F (36.9 °C) (Oral)   Resp 24   Ht 5' 4\" (1.626 m)   Wt 140 lb 1.6 oz (63.5 kg)   SpO2 96%   BMI 24.05 kg/m²     24HR INTAKE/OUTPUT:    No intake or output data in the 24 hours ending 21 1805    24HR PULSE OXIMETRY RANGE:    SpO2  Av.8 %  Min: 96 %  Max: 97 %    Medications:  IV:   sodium chloride         Scheduled Meds:   ARIPiprazole  5 mg Oral Daily    baclofen  5 mg Oral BID    DULoxetine  60 mg Oral Daily    hydroCHLOROthiazide  12.5 mg Oral Daily    pantoprazole  40 mg Oral QAM AC    sodium chloride flush  5-40 mL IntraVENous 2 times per day    enoxaparin  30 mg SubCUTAneous BID    dexamethasone  6 mg IntraVENous Q24H    vitamin D  6,000 Units Oral Daily    Followed by   Vanessa Gonzalez ON 2021] vitamin D  2,000 Units Oral Daily    albuterol-ipratropium  1 puff Inhalation Q4H WA    fluticasone  1 puff Inhalation BID    remdesivir IVPB  100 mg IntraVENous Q24H    cefepime  2,000 mg IntraVENous Q12H       Diet:   ADULT DIET; Dysphagia - Minced and Moist     EXAM:  General: No distress. Alert. Eyes: PERRL. No sclera icterus. No conjunctival injection.   ENT: No discharge. Pharynx clear. Neck: Trachea midline. Normal thyroid. Resp: No accessory muscle use. Left sided rales. No wheezing. No rhonchi. CV: Regular rate. Regular rhythm. No murmur or rub. Abd: Non-tender. Non-distended. No masses. No organomegaly. Normal bowel sounds. Skin: Warm and dry. No nodule on exposed extremities. No rash on exposed extremities. Ext: No cyanosis, clubbing, edema  Lymph: No cervical LAD. No supraclavicular LAD. M/S: No cyanosis. No joint deformity. No clubbing. Neuro: Awake. Follows commands. Positive pupils/gag/corneals. Normal pain response. Results:  CBC:   Recent Labs     09/16/21 2232 09/17/21 0310 09/18/21  1050   WBC 19.8* 26.6* 10.0   HGB 10.7* 11.1* 9.7*   HCT 34.1* 35.9* 31.7*   MCV 95.0 97.6 97.5    206 151     BMP:   Recent Labs     09/16/21 2232 09/17/21 0310 09/18/21  1050    137 140   K 3.7 3.9 3.9   CL 90* 89* 100   CO2 36* 33* 32*   BUN 18 18 22*   CREATININE 0.8 0.9 0.7     LIVER PROFILE:   Recent Labs     09/16/21 2232 09/17/21 0310 09/18/21  1050   AST 14 16 20   ALT 12 14 17   BILITOT 0.5 0.5 <0.2   ALKPHOS 50 49 42     PT/INR:   Recent Labs     09/18/21  0550   PROTIME 13.6*   INR 1.3     APTT: No results for input(s): APTT in the last 72 hours. Pathology:  1. N/A      Microbiology:  1. None    Recent ABG:   No results for input(s): PH, PO2, PCO2, HCO3, BE, O2SAT, METHB, O2HB, COHB, O2CON, HHB, THB in the last 72 hours. Recent Films:  CT CHEST WO CONTRAST   Final Result   Multifocal patchy and confluent airspace disease consistent with multifocal   pneumonia. Small bilateral pleural effusions. Cholelithiasis. XR CHEST PORTABLE   Final Result   Unchanged right and shifting left sided infiltrates.                      Assessment:  1. COVID-19 pneumonia superimposed upon a bacterial pneumonia  2. COPD, GOLD class 4  3.  Positive blood cultures: Diphtheroid rods, likely a contaminant.        Plan:  1. Continue treatment for COPD: Unfortunately I cannot include aerosol treatments  2. Continue remdesivir/dexamethasone  3. Antimicrobials: Nosocomial pathogens need to be covered. 4. Supplemental oxygen       Time at the bedside, reviewing labs and radiographs, reviewing updated notes and consultations, discussing with staff and family was more than 35 minutes. Please note that voice recognition technology was used in the preparation of this note and make therefore it may contain inadvertent transcription errors. If the patient is a COVID 19 isolation patient, the above physical exam reflects that of the examining physician for the day. Alicia Valdez MD,  M.D., F.C.C.P.     [facility], [address] [place] [address], [address] [place] [address]

## 2021-09-18 NOTE — PROGRESS NOTES
Physician Progress Note      PATIENTVerdel Eye  CSN #:                  [id number]  :                       1965  ADMIT DATE:       2021 8:19 PM  [address] [place] [place] DATE:  RESPONDING  PROVIDER #:        Fran Hahn MD          QUERY TEXT:    Dear Attending Physician,    Pt admitted with COVID Pneumonia ,Bacterial Pneumonia and Ac Resp Failure. Pt   noted to have elevated  WBC 19.8 26.6 , Lactic 3.3 , CRP 19.0-22.8, Proc 2.35    Vitals 101.4 95 16 128/73  . If possible, please document in the progress   notes and discharge summary if you are evaluating and /or treating any of the   following: The medical record reflects the following:  Risk Factors: COVID Pneumonia and bacterial Pneumonia  Clinical Indicators: Per PCP notes,\". . Pneumonia due to COVID-19 Acute   respiratory failure with Hypoxia. .\"  Per Pulmo ,\". COVID-19 pneumonia   superimposed upon a bacterial pneumonia. .. '  Per Labs  WBC 19.8 26.6 , Lactic   3.3 , CRP 19.0-22.8, Proc 2.35  Vitals 101.4 95 16 128/73  Treatment: Remdesivir, Dexamethasone, Vitamins, IV ATB    Thank you,  Cheryle Long RN CCDS  Clinical Documentation Improvement Specialist  [phone]  Options provided:  -- Sepsis, present on admission  -- Sepsis was ruled out  -- Other - I will add my own diagnosis  -- Disagree - Not applicable / Not valid  -- Disagree - Clinically unable to determine / Unknown  -- Refer to Clinical Documentation Reviewer    PROVIDER RESPONSE TEXT:    This patient has Sepsis which was present on admission.     Query created by: Alyse Argueta on 2021 10:34 AM      Electronically signed by:  Fran Hahn MD 2021 11:07 AM

## 2021-09-18 NOTE — PROGRESS NOTES
[facility] Progress Note    Admitting Date and Time: 9/16/2021  8:19 PM  Admit Dx: [facility] exacerbation ([facility] [J44.1]  Acute on chronic respiratory failure with hypoxia ([facility] [J96.21]  Pneumonia due to COVID-19 virus [U07.1, J12.82]    Subjective:  Patient is being followed for COPD exacerbation ([facility] [J44.1]  Acute on chronic respiratory failure with hypoxia ([facility] [J96.21]  Pneumonia due to COVID-19 virus [U07.1, J12.82]     Pt okay. Drifts off to sleep. O2 sat:  97% on  7 L     Fever:  98.3    Per RN: anxious    ROS: molina cp, n/v, HA unless stated above.       ARIPiprazole  5 mg Oral Daily    baclofen  5 mg Oral BID    DULoxetine  60 mg Oral Daily    hydroCHLOROthiazide  12.5 mg Oral Daily    pantoprazole  40 mg Oral QAM AC    sodium chloride flush  5-40 mL IntraVENous 2 times per day    enoxaparin  30 mg SubCUTAneous BID    dexamethasone  6 mg IntraVENous Q24H    vitamin D  6,000 Units Oral Daily    Followed by   Oj Caballero ON 9/24/2021] vitamin D  2,000 Units Oral Daily    albuterol-ipratropium  1 puff Inhalation Q4H WA    fluticasone  1 puff Inhalation BID    remdesivir IVPB  100 mg IntraVENous Q24H    cefepime  2,000 mg IntraVENous Q12H     acetaminophen, 650 mg, Q4H PRN  HYDROcodone-acetaminophen, 1 tablet, Q4H PRN  sodium chloride flush, 5-40 mL, PRN  sodium chloride, 25 mL, PRN  ondansetron, 4 mg, Q8H PRN   Or  ondansetron, 4 mg, Q6H PRN  polyethylene glycol, 17 g, Daily PRN  acetaminophen, 650 mg, Q6H PRN   Or  acetaminophen, 650 mg, Q6H PRN  guaiFENesin-dextromethorphan, 5 mL, Q4H PRN  sodium chloride, 30 mL, PRN  hydrOXYzine, 25 mg, BID PRN  albuterol-ipratropium, 1 puff, Q4H PRN         Objective:    /79   Pulse 92   Temp 98.3 °F (36.8 °C) (Oral)   Resp 24   Ht 5' 4\" (1.626 m)   Wt 140 lb 1.6 oz (63.5 kg)   SpO2 97%   BMI 24.05 kg/m²     General Appearance: awakens to name and then drifts off to sleep  Skin: warm and dry  Head: normocephalic and atraumatic  Eyes: pupils equal, round, and reactive to light, extraocular eye movements intact, conjunctivae normal  Neck: neck supple and non tender without mass   Pulmonary/Chest: crackles bilaterally- no wheezes, no respiratory distress  Cardiovascular: normal rate, normal S1 and S2 and no carotid bruits  Abdomen: soft, non-tender, non-distended, normal bowel sounds, no masses or organomegaly  Extremities: no cyanosis, no clubbing and no edema  Neurologic: no cranial nerve deficit and speech normal        Recent Labs     09/16/21 2232 09/17/21  0310    137   K 3.7 3.9   CL 90* 89*   CO2 36* 33*   BUN 18 18   CREATININE 0.8 0.9   GLUCOSE 105* 139*   CALCIUM 8.2* 8.3*       Recent Labs     09/16/21 2232 09/17/21  0310   WBC 19.8* 26.6*   RBC 3.59* 3.68*   HGB 10.7* 11.1*   HCT 34.1* 35.9*   MCV 95.0 97.6   MCH 29.8 30.2   MCHC 31.4* 30.9*   RDW 14.1 14.3    206   MPV 9.6 9.9       Radiology:   CT CHEST WO CONTRAST    Result Date: 9/16/2021  EXAMINATION: CT OF THE CHEST WITHOUT CONTRAST 9/16/2021 10:02 pm TECHNIQUE: CT of the chest was performed without the administration of intravenous contrast. Multiplanar reformatted images are provided for review. Dose modulation, iterative reconstruction, and/or weight based adjustment of the mA/kV was utilized to reduce the radiation dose to as low as reasonably achievable. COMPARISON: Correlation made with prior plain film radiographs HISTORY: ORDERING SYSTEM PROVIDED HISTORY: fever, cough, eval for pneumonia TECHNOLOGIST PROVIDED HISTORY: Reason for exam:->fever, cough, eval for pneumonia Decision Support Exception - unselect if not a suspected or confirmed emergency medical condition->Emergency Medical Condition (MA) FINDINGS: Mediastinum: There is no evidence of hilar or mediastinal adenopathy. Atherosclerotic calcification along the thoracic aorta with no evidence of aneurysmal dilatation. No evidence of pericardial effusion.  Coronary atherosclerotic calcifications. Lungs/pleura: Pleuroparenchymal scarring in the lung apices bilaterally with bulla. Emphysematous changes in the lungs. Multifocal patchy and confluent airspace disease most extensively involving the lingula, consistent with multifocal pneumonia. Confluent airspace disease in the posterobasal segment of the lower lobes bilaterally. Small bilateral pleural effusions. Upper Abdomen: Distended gallbladder with small gallstones. Soft Tissues/Bones: No acute bony pathology. Multifocal patchy and confluent airspace disease consistent with multifocal pneumonia. Small bilateral pleural effusions. Cholelithiasis. XR CHEST PORTABLE    Result Date: 9/16/2021  EXAMINATION: ONE XRAY VIEW OF THE CHEST 9/16/2021 9:14 pm COMPARISON: 09/10/2021 HISTORY: ORDERING SYSTEM PROVIDED HISTORY: short of breath TECHNOLOGIST PROVIDED HISTORY: Reason for exam:->short of breath FINDINGS: There is some mild patchy density at periphery of right lower lung which is unchanged. There is some new patchy density in the mid left lung. Aeration at the left base has improved. There is unchanged interstitial prominence. There is no pneumothorax or pleural effusion. Unchanged right and shifting left sided infiltrates. Assessment:    Principal Problem:    Pneumonia due to COVID-19 virus  Active Problems:    Essential hypertension    Peripheral vascular disease (HCC)    Chronic obstructive pulmonary disease (HCC)    Oxygen dependent    Swelling of both lower extremities  Resolved Problems:    * No resolved hospital problems. *      Plan:  1. Acute hypoxic respiratory failure, most likely due to viral pneumonia, O2 sat was below 90% on room air, requiring 7L nasal cannula and oxygen saturation above 90%. Treating the underlying process.   2.  Acute viral pneumonia, rapid influenza a and B were negative, respiratory panel was not done, procalcitonin was 2.35 , CAT scan of the chest showed bilateral patchy groundglass opacities, sent for COVID-19, result was positive. 3.  COPD - continue current respiratory support, steroids and IV antibiotics. IS.  4.  HTN - continue meds and monitor vitals and adjust as needed. 5. PVD - continue meds, supportive care and Lovenox. 6.  Anxiety - Buspar 5 m po tid ordered. Patient still requiring high dose oxygen therapy, needs supportive care, encouragement to eat and treat his anxiety. NOTE: This report was transcribed using voice recognition software. Every effort was made to ensure accuracy; however, inadvertent computerized transcription errors may be present.     Electronically signed by Alaina Spurling, MD on 9/18/2021 at 10:12 AM

## 2021-09-19 LAB
ALBUMIN SERPL-MCNC: 3.4 G/DL (ref 3.5–5.2)
ALP BLD-CCNC: 40 U/L (ref 40–129)
ALT SERPL-CCNC: 21 U/L (ref 0–40)
ANION GAP SERPL CALCULATED.3IONS-SCNC: 9 MMOL/L (ref 7–16)
AST SERPL-CCNC: 25 U/L (ref 0–39)
BILIRUB SERPL-MCNC: 0.2 MG/DL (ref 0–1.2)
BUN BLDV-MCNC: 19 MG/DL (ref 6–20)
C-REACTIVE PROTEIN: 13.8 MG/DL (ref 0–0.4)
CALCIUM SERPL-MCNC: 7.9 MG/DL (ref 8.6–10.2)
CHLORIDE BLD-SCNC: 97 MMOL/L (ref 98–107)
CO2: 34 MMOL/L (ref 22–29)
CREAT SERPL-MCNC: 0.7 MG/DL (ref 0.7–1.2)
D DIMER: 334 NG/ML DDU
GFR AFRICAN AMERICAN: >60
GFR NON-AFRICAN AMERICAN: >60 ML/MIN/1.73
GLUCOSE BLD-MCNC: 83 MG/DL (ref 74–99)
L. PNEUMOPHILA SEROGP 1 UR AG: NORMAL
POTASSIUM REFLEX MAGNESIUM: 4.2 MMOL/L (ref 3.5–5)
PROCALCITONIN: 1.03 NG/ML (ref 0–0.08)
SODIUM BLD-SCNC: 140 MMOL/L (ref 132–146)
TOTAL PROTEIN: 6.6 G/DL (ref 6.4–8.3)

## 2021-09-19 PROCEDURE — 6360000002 HC RX W HCPCS: Performed by: INTERNAL MEDICINE

## 2021-09-19 PROCEDURE — 85378 FIBRIN DEGRADE SEMIQUANT: CPT

## 2021-09-19 PROCEDURE — 2060000000 HC ICU INTERMEDIATE R&B

## 2021-09-19 PROCEDURE — 86140 C-REACTIVE PROTEIN: CPT

## 2021-09-19 PROCEDURE — 99233 SBSQ HOSP IP/OBS HIGH 50: CPT | Performed by: FAMILY MEDICINE

## 2021-09-19 PROCEDURE — 6370000000 HC RX 637 (ALT 250 FOR IP): Performed by: INTERNAL MEDICINE

## 2021-09-19 PROCEDURE — 80053 COMPREHEN METABOLIC PANEL: CPT

## 2021-09-19 PROCEDURE — 2580000003 HC RX 258: Performed by: INTERNAL MEDICINE

## 2021-09-19 PROCEDURE — 84145 PROCALCITONIN (PCT): CPT

## 2021-09-19 PROCEDURE — 36415 COLL VENOUS BLD VENIPUNCTURE: CPT

## 2021-09-19 PROCEDURE — 2700000000 HC OXYGEN THERAPY PER DAY

## 2021-09-19 PROCEDURE — 2500000003 HC RX 250 WO HCPCS: Performed by: INTERNAL MEDICINE

## 2021-09-19 PROCEDURE — 6370000000 HC RX 637 (ALT 250 FOR IP): Performed by: FAMILY MEDICINE

## 2021-09-19 RX ORDER — BENZONATATE 100 MG/1
100 CAPSULE ORAL 3 TIMES DAILY PRN
Status: DISCONTINUED | OUTPATIENT
Start: 2021-09-19 | End: 2021-09-21 | Stop reason: HOSPADM

## 2021-09-19 RX ADMIN — IPRATROPIUM BROMIDE AND ALBUTEROL 1 PUFF: 20; 100 SPRAY, METERED RESPIRATORY (INHALATION) at 16:33

## 2021-09-19 RX ADMIN — BENZONATATE 100 MG: 100 CAPSULE ORAL at 11:02

## 2021-09-19 RX ADMIN — DEXAMETHASONE SODIUM PHOSPHATE 6 MG: 10 INJECTION INTRAMUSCULAR; INTRAVENOUS at 06:22

## 2021-09-19 RX ADMIN — CEFEPIME HYDROCHLORIDE 2000 MG: 2 INJECTION, POWDER, FOR SOLUTION INTRAVENOUS at 16:33

## 2021-09-19 RX ADMIN — Medication 6000 UNITS: at 08:34

## 2021-09-19 RX ADMIN — IPRATROPIUM BROMIDE AND ALBUTEROL 1 PUFF: 20; 100 SPRAY, METERED RESPIRATORY (INHALATION) at 08:33

## 2021-09-19 RX ADMIN — HYDROXYZINE PAMOATE 25 MG: 25 CAPSULE ORAL at 01:28

## 2021-09-19 RX ADMIN — IPRATROPIUM BROMIDE AND ALBUTEROL 1 PUFF: 20; 100 SPRAY, METERED RESPIRATORY (INHALATION) at 12:38

## 2021-09-19 RX ADMIN — BENZONATATE 100 MG: 100 CAPSULE ORAL at 21:03

## 2021-09-19 RX ADMIN — REMDESIVIR 100 MG: 100 INJECTION, POWDER, LYOPHILIZED, FOR SOLUTION INTRAVENOUS at 11:02

## 2021-09-19 RX ADMIN — IPRATROPIUM BROMIDE AND ALBUTEROL 1 PUFF: 20; 100 SPRAY, METERED RESPIRATORY (INHALATION) at 21:04

## 2021-09-19 RX ADMIN — DULOXETINE HYDROCHLORIDE 60 MG: 60 CAPSULE, DELAYED RELEASE ORAL at 08:34

## 2021-09-19 RX ADMIN — CEFEPIME HYDROCHLORIDE 2000 MG: 2 INJECTION, POWDER, FOR SOLUTION INTRAVENOUS at 05:39

## 2021-09-19 RX ADMIN — GUAIFENESIN AND DEXTROMETHORPHAN 5 ML: 100; 10 SYRUP ORAL at 11:02

## 2021-09-19 RX ADMIN — HYDROCHLOROTHIAZIDE 12.5 MG: 12.5 TABLET ORAL at 08:34

## 2021-09-19 RX ADMIN — SODIUM CHLORIDE, PRESERVATIVE FREE 10 ML: 5 INJECTION INTRAVENOUS at 06:22

## 2021-09-19 RX ADMIN — BUSPIRONE HYDROCHLORIDE 5 MG: 5 TABLET ORAL at 09:02

## 2021-09-19 RX ADMIN — FLUTICASONE PROPIONATE 1 PUFF: 110 AEROSOL, METERED RESPIRATORY (INHALATION) at 08:33

## 2021-09-19 RX ADMIN — FLUTICASONE PROPIONATE 1 PUFF: 110 AEROSOL, METERED RESPIRATORY (INHALATION) at 21:04

## 2021-09-19 RX ADMIN — BUSPIRONE HYDROCHLORIDE 5 MG: 5 TABLET ORAL at 21:04

## 2021-09-19 RX ADMIN — ARIPIPRAZOLE 5 MG: 5 TABLET ORAL at 08:34

## 2021-09-19 RX ADMIN — HYDROCODONE BITARTRATE AND ACETAMINOPHEN 1 TABLET: 7.5; 325 TABLET ORAL at 06:22

## 2021-09-19 RX ADMIN — ENOXAPARIN SODIUM 30 MG: 30 INJECTION SUBCUTANEOUS at 21:03

## 2021-09-19 RX ADMIN — BACLOFEN 5 MG: 10 TABLET ORAL at 08:34

## 2021-09-19 RX ADMIN — HYDROCODONE BITARTRATE AND ACETAMINOPHEN 1 TABLET: 7.5; 325 TABLET ORAL at 21:04

## 2021-09-19 RX ADMIN — SODIUM CHLORIDE, PRESERVATIVE FREE 10 ML: 5 INJECTION INTRAVENOUS at 21:04

## 2021-09-19 RX ADMIN — BACLOFEN 5 MG: 10 TABLET ORAL at 21:04

## 2021-09-19 RX ADMIN — ENOXAPARIN SODIUM 30 MG: 30 INJECTION SUBCUTANEOUS at 08:32

## 2021-09-19 RX ADMIN — GUAIFENESIN AND DEXTROMETHORPHAN 5 ML: 100; 10 SYRUP ORAL at 21:04

## 2021-09-19 RX ADMIN — SODIUM CHLORIDE, PRESERVATIVE FREE 10 ML: 5 INJECTION INTRAVENOUS at 08:35

## 2021-09-19 RX ADMIN — HYDROCODONE BITARTRATE AND ACETAMINOPHEN 1 TABLET: 7.5; 325 TABLET ORAL at 12:41

## 2021-09-19 RX ADMIN — HYDROCODONE BITARTRATE AND ACETAMINOPHEN 1 TABLET: 7.5; 325 TABLET ORAL at 01:28

## 2021-09-19 RX ADMIN — PANTOPRAZOLE SODIUM 40 MG: 40 TABLET, DELAYED RELEASE ORAL at 06:22

## 2021-09-19 ASSESSMENT — PAIN DESCRIPTION - ONSET
ONSET: GRADUAL
ONSET: GRADUAL

## 2021-09-19 ASSESSMENT — PAIN SCALES - GENERAL
PAINLEVEL_OUTOF10: 8
PAINLEVEL_OUTOF10: 7
PAINLEVEL_OUTOF10: 10
PAINLEVEL_OUTOF10: 5
PAINLEVEL_OUTOF10: 8
PAINLEVEL_OUTOF10: 6
PAINLEVEL_OUTOF10: 10

## 2021-09-19 ASSESSMENT — PAIN DESCRIPTION - PAIN TYPE: TYPE: ACUTE PAIN

## 2021-09-19 ASSESSMENT — PAIN DESCRIPTION - PROGRESSION
CLINICAL_PROGRESSION: GRADUALLY WORSENING
CLINICAL_PROGRESSION: GRADUALLY WORSENING

## 2021-09-19 ASSESSMENT — PAIN DESCRIPTION - DESCRIPTORS
DESCRIPTORS: ACHING;DISCOMFORT
DESCRIPTORS: ACHING;DISCOMFORT

## 2021-09-19 ASSESSMENT — PAIN DESCRIPTION - LOCATION
LOCATION: BACK
LOCATION: GENERALIZED

## 2021-09-19 ASSESSMENT — PAIN DESCRIPTION - FREQUENCY
FREQUENCY: INTERMITTENT
FREQUENCY: INTERMITTENT

## 2021-09-19 ASSESSMENT — PAIN DESCRIPTION - ORIENTATION: ORIENTATION: LOWER

## 2021-09-19 NOTE — PROGRESS NOTES
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 Oximetry is adequate. Both CRP and procalcitonin are trending downward nicely.     Vitals:  VITALS:  /89   Pulse 81   Temp 98.2 °F (36.8 °C) (Oral)   Resp 20   Ht 5' 4\" (1.626 m)   Wt 137 lb 3.2 oz (62.2 kg)   SpO2 97%   BMI 23.55 kg/m²     24HR INTAKE/OUTPUT:      Intake/Output Summary (Last 24 hours) at 2021 1354  Last data filed at 2021 3167  Gross per 24 hour   Intake --   Output 550 ml   Net -550 ml       24HR PULSE OXIMETRY RANGE:    SpO2  Av.3 %  Min: 95 %  Max: 97 %    Medications:  IV:   sodium chloride         Scheduled Meds:   ARIPiprazole  5 mg Oral Daily    baclofen  5 mg Oral BID    DULoxetine  60 mg Oral Daily    hydroCHLOROthiazide  12.5 mg Oral Daily    pantoprazole  40 mg Oral QAM AC    sodium chloride flush  5-40 mL IntraVENous 2 times per day    enoxaparin  30 mg SubCUTAneous BID    dexamethasone  6 mg IntraVENous Q24H    vitamin D  6,000 Units Oral Daily    Followed by   Joseph Ames ON 2021] vitamin D  2,000 Units Oral Daily    albuterol-ipratropium  1 puff Inhalation Q4H WA    fluticasone  1 puff Inhalation BID    remdesivir IVPB  100 mg IntraVENous Q24H    cefepime  2,000 mg IntraVENous Q12H       Diet: ADULT DIET; Dysphagia - Minced and Moist     EXAM:  General: No distress. Alert. Eyes: PERRL. No sclera icterus. No conjunctival injection. ENT: No discharge. Pharynx clear. Neck: Trachea midline. Normal thyroid. Resp: No accessory muscle use. Left sided rales. No wheezing. No rhonchi. CV: Regular rate. Regular rhythm. No murmur or rub. Abd: Non-tender. Non-distended. No masses. No organomegaly. Normal bowel sounds. Skin: Warm and dry. No nodule on exposed extremities. No rash on exposed extremities. Ext: No cyanosis, clubbing, edema  Lymph: No cervical LAD. No supraclavicular LAD. M/S: No cyanosis. No joint deformity. No clubbing. Neuro: Awake. Follows commands. Positive pupils/gag/corneals. Normal pain response. Results:  CBC:   Recent Labs     09/16/21  2232 09/17/21  0310 09/18/21  1050   WBC 19.8* 26.6* 10.0   HGB 10.7* 11.1* 9.7*   HCT 34.1* 35.9* 31.7*   MCV 95.0 97.6 97.5    206 151     BMP:   Recent Labs     09/17/21  0310 09/18/21  1050 09/19/21  0550    140 140   K 3.9 3.9 4.2   CL 89* 100 97*   CO2 33* 32* 34*   BUN 18 22* 19   CREATININE 0.9 0.7 0.7     LIVER PROFILE:   Recent Labs     09/17/21  0310 09/18/21  1050 09/19/21  0550   AST 16 20 25   ALT 14 17 21   BILITOT 0.5 <0.2 0.2   ALKPHOS 49 42 40     PT/INR:   Recent Labs     09/18/21  0550   PROTIME 13.6*   INR 1.3     APTT: No results for input(s): APTT in the last 72 hours. Pathology:  1. N/A      Microbiology:  1. None    Recent ABG:   No results for input(s): PH, PO2, PCO2, HCO3, BE, O2SAT, METHB, O2HB, COHB, O2CON, HHB, THB in the last 72 hours. Recent Films:  CT CHEST WO CONTRAST   Final Result   Multifocal patchy and confluent airspace disease consistent with multifocal   pneumonia. Small bilateral pleural effusions. Cholelithiasis. XR CHEST PORTABLE   Final Result   Unchanged right and shifting left sided infiltrates.                      Assessment:  1.  COVID-19 pneumonia superimposed upon a bacterial pneumonia  2. COPD, GOLD class 4  3. Positive blood cultures: Diphtheroid rods, likely a contaminant.        Plan:  1. Continue treatment for COPD: Unfortunately I cannot include aerosol treatments  2. Continue remdesivir/dexamethasone  3. Antimicrobials: Nosocomial pathogens need to be covered. 4. Supplemental oxygen       Time at the bedside, reviewing labs and radiographs, reviewing updated notes and consultations, discussing with staff and family was more than 35 minutes. Please note that voice recognition technology was used in the preparation of this note and make therefore it may contain inadvertent transcription errors. If the patient is a COVID 19 isolation patient, the above physical exam reflects that of the examining physician for the day. Kelsey Gerardo MD,  M.D., F.C.C.P.     [facility], [facility], [address] [place] [address] Street, [facility]

## 2021-09-19 NOTE — PROGRESS NOTES
[facility] Progress Note    Admitting Date and Time: 9/16/2021  8:19 PM  Admit Dx: COPD exacerbation ([facility] [J44.1]  Acute on chronic respiratory failure with hypoxia ([facility] [J96.21]  Pneumonia due to COVID-19 virus [U07.1, J12.82]    Subjective:  Patient is being followed for COPD exacerbation ([facility] [J44.1]  Acute on chronic respiratory failure with hypoxia ([facility] [J96.21]  Pneumonia due to COVID-19 virus [U07.1, J12.82]     Pt feels cough not well controlled. Coughed all night. Did not realize he has cough medicine on board. Added Tessalon Perles. O2 sat: 97% on 6 L    Fever: 98    Per RN: No new concerns. ROS: denies cp, n/v, HA unless stated above.       ARIPiprazole  5 mg Oral Daily    baclofen  5 mg Oral BID    DULoxetine  60 mg Oral Daily    hydroCHLOROthiazide  12.5 mg Oral Daily    pantoprazole  40 mg Oral QAM AC    sodium chloride flush  5-40 mL IntraVENous 2 times per day    enoxaparin  30 mg SubCUTAneous BID    dexamethasone  6 mg IntraVENous Q24H    vitamin D  6,000 Units Oral Daily    Followed by   Ludivina Wahl ON 9/24/2021] vitamin D  2,000 Units Oral Daily    albuterol-ipratropium  1 puff Inhalation Q4H WA    fluticasone  1 puff Inhalation BID    remdesivir IVPB  100 mg IntraVENous Q24H    cefepime  2,000 mg IntraVENous Q12H     busPIRone, 5 mg, TID PRN  acetaminophen, 650 mg, Q4H PRN  HYDROcodone-acetaminophen, 1 tablet, Q4H PRN  sodium chloride flush, 5-40 mL, PRN  sodium chloride, 25 mL, PRN  ondansetron, 4 mg, Q8H PRN   Or  ondansetron, 4 mg, Q6H PRN  polyethylene glycol, 17 g, Daily PRN  acetaminophen, 650 mg, Q6H PRN   Or  acetaminophen, 650 mg, Q6H PRN  guaiFENesin-dextromethorphan, 5 mL, Q4H PRN  sodium chloride, 30 mL, PRN  hydrOXYzine, 25 mg, BID PRN  albuterol-ipratropium, 1 puff, Q4H PRN         Objective:    BP (!) 146/73   Pulse 82   Temp 98 °F (36.7 °C) (Oral)   Resp 24   Ht 5' 4\" (1.626 m)   Wt 137 lb 3.2 oz (62.2 kg)   SpO2 97%   BMI 23.55 kg/m²     General Appearance: alert and oriented to person, place and time and in no acute distress  Skin: warm and dry  Head: normocephalic and atraumatic  Eyes: pupils equal, round, and reactive to light, extraocular eye movements intact, conjunctivae normal  Neck: neck supple and non tender without mass   Pulmonary/Chest: crackles bilaterally- no wheezes, no respiratory distress  Cardiovascular: normal rate, normal S1 and S2 and no carotid bruits  Abdomen: soft, non-tender, non-distended, normal bowel sounds, no masses or organomegaly  Extremities: no cyanosis, no clubbing and no edema  Neurologic: no cranial nerve deficit and speech normal        Recent Labs     09/17/21  0310 09/18/21  1050 09/19/21  0550    140 140   K 3.9 3.9 4.2   CL 89* 100 97*   CO2 33* 32* 34*   BUN 18 22* 19   CREATININE 0.9 0.7 0.7   GLUCOSE 139* 121* 83   CALCIUM 8.3* 7.7* 7.9*       Recent Labs     09/16/21  2232 09/17/21  0310 09/18/21  1050   WBC 19.8* 26.6* 10.0   RBC 3.59* 3.68* 3.25*   HGB 10.7* 11.1* 9.7*   HCT 34.1* 35.9* 31.7*   MCV 95.0 97.6 97.5   MCH 29.8 30.2 29.8   MCHC 31.4* 30.9* 30.6*   RDW 14.1 14.3 14.0    206 151   MPV 9.6 9.9 10.4       Radiology:   No results found. Assessment:    Principal Problem:    Pneumonia due to COVID-19 virus  Active Problems:    Essential hypertension    Peripheral vascular disease (HCC)    Chronic obstructive pulmonary disease (HCC)    Oxygen dependent    Swelling of both lower extremities  Resolved Problems:    * No resolved hospital problems. *      Plan:  1. Acute hypoxic respiratory failure, most likely due to viral pneumonia, O2 sat was below 90% on room air, requiring 6L nasal cannula and oxygen saturation above 90%. Treating the underlying process. Tessalon Perles added. Encourage incentive spirometry. Encourage from positioning or side pain. Patient looks slightly better.   Requires hospitalization for continued respiratory support and supplementation with oxygen. 2.  Acute viral pneumonia, rapid influenza a and B were negative, respiratory panel was , procalcitonin was 1.03, CAT scan of the chest showed bilateral patchy groundglass opacities, sent for COVID-19, result positive. 3.  COPD - continue current respiratory support, steroids and IV antibiotics. IS.  4.  HTN - continue meds and monitor vitals and adjust as needed. 5. PVD - continue meds, supportive care and Lovenox. 6.  Anxiety - Buspar 5 m po tid ordered. Patient still requiring high dose oxygen therapy, needs supportive care, encouragement to eat and treat his anxiety. NOTE: This report was transcribed using voice recognition software. Every effort was made to ensure accuracy; however, inadvertent computerized transcription errors may be present.     Electronically signed by Amrit Banuelos MD on 9/19/2021 at 10:17 AM

## 2021-09-20 LAB
ALBUMIN SERPL-MCNC: 3.6 G/DL (ref 3.5–5.2)
ALP BLD-CCNC: 42 U/L (ref 40–129)
ALT SERPL-CCNC: 19 U/L (ref 0–40)
ANION GAP SERPL CALCULATED.3IONS-SCNC: 9 MMOL/L (ref 7–16)
AST SERPL-CCNC: 19 U/L (ref 0–39)
BASOPHILS ABSOLUTE: 0.01 E9/L (ref 0–0.2)
BASOPHILS RELATIVE PERCENT: 0.3 % (ref 0–2)
BILIRUB SERPL-MCNC: 0.2 MG/DL (ref 0–1.2)
BUN BLDV-MCNC: 16 MG/DL (ref 6–20)
CALCIUM SERPL-MCNC: 8 MG/DL (ref 8.6–10.2)
CHLORIDE BLD-SCNC: 91 MMOL/L (ref 98–107)
CO2: 36 MMOL/L (ref 22–29)
CREAT SERPL-MCNC: 0.7 MG/DL (ref 0.7–1.2)
CULTURE, RESPIRATORY: NORMAL
EOSINOPHILS ABSOLUTE: 0.01 E9/L (ref 0.05–0.5)
EOSINOPHILS RELATIVE PERCENT: 0.3 % (ref 0–6)
GFR AFRICAN AMERICAN: >60
GFR NON-AFRICAN AMERICAN: >60 ML/MIN/1.73
GLUCOSE BLD-MCNC: 87 MG/DL (ref 74–99)
HCT VFR BLD CALC: 33.5 % (ref 37–54)
HEMOGLOBIN: 10.1 G/DL (ref 12.5–16.5)
IMMATURE GRANULOCYTES #: 0.01 E9/L
IMMATURE GRANULOCYTES %: 0.3 % (ref 0–5)
LYMPHOCYTES ABSOLUTE: 0.76 E9/L (ref 1.5–4)
LYMPHOCYTES RELATIVE PERCENT: 23.2 % (ref 20–42)
MCH RBC QN AUTO: 29 PG (ref 26–35)
MCHC RBC AUTO-ENTMCNC: 30.1 % (ref 32–34.5)
MCV RBC AUTO: 96.3 FL (ref 80–99.9)
MONOCYTES ABSOLUTE: 0.34 E9/L (ref 0.1–0.95)
MONOCYTES RELATIVE PERCENT: 10.4 % (ref 2–12)
NEUTROPHILS ABSOLUTE: 2.15 E9/L (ref 1.8–7.3)
NEUTROPHILS RELATIVE PERCENT: 65.5 % (ref 43–80)
PDW BLD-RTO: 13.9 FL (ref 11.5–15)
PLATELET # BLD: 176 E9/L (ref 130–450)
PMV BLD AUTO: 10.4 FL (ref 7–12)
POTASSIUM REFLEX MAGNESIUM: 3.9 MMOL/L (ref 3.5–5)
RBC # BLD: 3.48 E12/L (ref 3.8–5.8)
SMEAR, RESPIRATORY: NORMAL
SODIUM BLD-SCNC: 136 MMOL/L (ref 132–146)
TOTAL PROTEIN: 6.7 G/DL (ref 6.4–8.3)
WBC # BLD: 3.3 E9/L (ref 4.5–11.5)

## 2021-09-20 PROCEDURE — 6360000002 HC RX W HCPCS: Performed by: INTERNAL MEDICINE

## 2021-09-20 PROCEDURE — 6370000000 HC RX 637 (ALT 250 FOR IP): Performed by: FAMILY MEDICINE

## 2021-09-20 PROCEDURE — 2580000003 HC RX 258: Performed by: INTERNAL MEDICINE

## 2021-09-20 PROCEDURE — 85025 COMPLETE CBC W/AUTO DIFF WBC: CPT

## 2021-09-20 PROCEDURE — 2500000003 HC RX 250 WO HCPCS: Performed by: INTERNAL MEDICINE

## 2021-09-20 PROCEDURE — 6370000000 HC RX 637 (ALT 250 FOR IP): Performed by: INTERNAL MEDICINE

## 2021-09-20 PROCEDURE — 80053 COMPREHEN METABOLIC PANEL: CPT

## 2021-09-20 PROCEDURE — 36415 COLL VENOUS BLD VENIPUNCTURE: CPT

## 2021-09-20 PROCEDURE — 2700000000 HC OXYGEN THERAPY PER DAY

## 2021-09-20 PROCEDURE — 2060000000 HC ICU INTERMEDIATE R&B

## 2021-09-20 RX ADMIN — HYDROCODONE BITARTRATE AND ACETAMINOPHEN 1 TABLET: 7.5; 325 TABLET ORAL at 10:05

## 2021-09-20 RX ADMIN — SODIUM CHLORIDE, PRESERVATIVE FREE 10 ML: 5 INJECTION INTRAVENOUS at 05:18

## 2021-09-20 RX ADMIN — BUSPIRONE HYDROCHLORIDE 5 MG: 5 TABLET ORAL at 22:28

## 2021-09-20 RX ADMIN — GUAIFENESIN AND DEXTROMETHORPHAN 5 ML: 100; 10 SYRUP ORAL at 10:05

## 2021-09-20 RX ADMIN — HYDROCODONE BITARTRATE AND ACETAMINOPHEN 1 TABLET: 7.5; 325 TABLET ORAL at 14:38

## 2021-09-20 RX ADMIN — GUAIFENESIN AND DEXTROMETHORPHAN 5 ML: 100; 10 SYRUP ORAL at 22:27

## 2021-09-20 RX ADMIN — CEFEPIME HYDROCHLORIDE 2000 MG: 2 INJECTION, POWDER, FOR SOLUTION INTRAVENOUS at 05:17

## 2021-09-20 RX ADMIN — SODIUM CHLORIDE, PRESERVATIVE FREE 10 ML: 5 INJECTION INTRAVENOUS at 22:11

## 2021-09-20 RX ADMIN — HYDROXYZINE PAMOATE 25 MG: 25 CAPSULE ORAL at 20:09

## 2021-09-20 RX ADMIN — FLUTICASONE PROPIONATE 1 PUFF: 110 AEROSOL, METERED RESPIRATORY (INHALATION) at 10:08

## 2021-09-20 RX ADMIN — DEXAMETHASONE SODIUM PHOSPHATE 6 MG: 10 INJECTION INTRAMUSCULAR; INTRAVENOUS at 05:17

## 2021-09-20 RX ADMIN — HYDROCODONE BITARTRATE AND ACETAMINOPHEN 1 TABLET: 7.5; 325 TABLET ORAL at 01:08

## 2021-09-20 RX ADMIN — PANTOPRAZOLE SODIUM 40 MG: 40 TABLET, DELAYED RELEASE ORAL at 05:18

## 2021-09-20 RX ADMIN — ARIPIPRAZOLE 5 MG: 5 TABLET ORAL at 10:05

## 2021-09-20 RX ADMIN — FLUTICASONE PROPIONATE 1 PUFF: 110 AEROSOL, METERED RESPIRATORY (INHALATION) at 20:10

## 2021-09-20 RX ADMIN — HYDROXYZINE PAMOATE 25 MG: 25 CAPSULE ORAL at 01:08

## 2021-09-20 RX ADMIN — BENZONATATE 100 MG: 100 CAPSULE ORAL at 05:18

## 2021-09-20 RX ADMIN — HYDROCHLOROTHIAZIDE 12.5 MG: 12.5 TABLET ORAL at 10:05

## 2021-09-20 RX ADMIN — IPRATROPIUM BROMIDE AND ALBUTEROL 1 PUFF: 20; 100 SPRAY, METERED RESPIRATORY (INHALATION) at 10:08

## 2021-09-20 RX ADMIN — HYDROCODONE BITARTRATE AND ACETAMINOPHEN 1 TABLET: 7.5; 325 TABLET ORAL at 05:30

## 2021-09-20 RX ADMIN — ENOXAPARIN SODIUM 30 MG: 30 INJECTION SUBCUTANEOUS at 20:09

## 2021-09-20 RX ADMIN — BACLOFEN 5 MG: 10 TABLET ORAL at 10:05

## 2021-09-20 RX ADMIN — GUAIFENESIN AND DEXTROMETHORPHAN 5 ML: 100; 10 SYRUP ORAL at 05:17

## 2021-09-20 RX ADMIN — REMDESIVIR 100 MG: 100 INJECTION, POWDER, LYOPHILIZED, FOR SOLUTION INTRAVENOUS at 10:00

## 2021-09-20 RX ADMIN — ENOXAPARIN SODIUM 30 MG: 30 INJECTION SUBCUTANEOUS at 10:04

## 2021-09-20 RX ADMIN — CEFEPIME HYDROCHLORIDE 2000 MG: 2 INJECTION, POWDER, FOR SOLUTION INTRAVENOUS at 14:38

## 2021-09-20 RX ADMIN — SODIUM CHLORIDE, PRESERVATIVE FREE 10 ML: 5 INJECTION INTRAVENOUS at 10:06

## 2021-09-20 RX ADMIN — HYDROCODONE BITARTRATE AND ACETAMINOPHEN 1 TABLET: 7.5; 325 TABLET ORAL at 20:09

## 2021-09-20 RX ADMIN — IPRATROPIUM BROMIDE AND ALBUTEROL 1 PUFF: 20; 100 SPRAY, METERED RESPIRATORY (INHALATION) at 20:10

## 2021-09-20 RX ADMIN — Medication 6000 UNITS: at 10:05

## 2021-09-20 RX ADMIN — DULOXETINE HYDROCHLORIDE 60 MG: 60 CAPSULE, DELAYED RELEASE ORAL at 10:04

## 2021-09-20 RX ADMIN — BUSPIRONE HYDROCHLORIDE 5 MG: 5 TABLET ORAL at 14:38

## 2021-09-20 RX ADMIN — BACLOFEN 5 MG: 10 TABLET ORAL at 20:09

## 2021-09-20 RX ADMIN — BENZONATATE 100 MG: 100 CAPSULE ORAL at 20:10

## 2021-09-20 RX ADMIN — BENZONATATE 100 MG: 100 CAPSULE ORAL at 10:05

## 2021-09-20 ASSESSMENT — PAIN SCALES - GENERAL
PAINLEVEL_OUTOF10: 5
PAINLEVEL_OUTOF10: 7
PAINLEVEL_OUTOF10: 5
PAINLEVEL_OUTOF10: 8
PAINLEVEL_OUTOF10: 0
PAINLEVEL_OUTOF10: 7
PAINLEVEL_OUTOF10: 7
PAINLEVEL_OUTOF10: 4

## 2021-09-20 ASSESSMENT — PAIN DESCRIPTION - LOCATION
LOCATION: LEG
LOCATION: LEG

## 2021-09-20 ASSESSMENT — PAIN DESCRIPTION - PROGRESSION
CLINICAL_PROGRESSION: GRADUALLY WORSENING
CLINICAL_PROGRESSION: NOT CHANGED

## 2021-09-20 ASSESSMENT — PAIN DESCRIPTION - ONSET
ONSET: ON-GOING
ONSET: ON-GOING

## 2021-09-20 ASSESSMENT — PAIN DESCRIPTION - ORIENTATION
ORIENTATION: RIGHT;LEFT
ORIENTATION: RIGHT;LEFT

## 2021-09-20 ASSESSMENT — PAIN DESCRIPTION - FREQUENCY
FREQUENCY: INTERMITTENT
FREQUENCY: INTERMITTENT

## 2021-09-20 ASSESSMENT — PAIN DESCRIPTION - PAIN TYPE
TYPE: CHRONIC PAIN
TYPE: CHRONIC PAIN

## 2021-09-20 ASSESSMENT — PAIN DESCRIPTION - DESCRIPTORS
DESCRIPTORS: BURNING;SHARP
DESCRIPTORS: SHARP;BURNING

## 2021-09-20 NOTE — PROGRESS NOTES
no acute distress  Skin: warm and dry  Head: normocephalic and atraumatic  Eyes: pupils equal, round, and reactive to light, extraocular eye movements intact, conjunctivae normal  Neck: neck supple and non tender without mass   Pulmonary/Chest: crackles bilaterally- no wheezes, no respiratory distress  Cardiovascular: normal rate, normal S1 and S2 and no carotid bruits  Abdomen: soft, non-tender, non-distended, normal bowel sounds, no masses or organomegaly  Extremities: no cyanosis, no clubbing and no edema  Neurologic: no cranial nerve deficit and speech normal        Recent Labs     09/18/21  1050 09/19/21  0550 09/20/21  0300    140 136   K 3.9 4.2 3.9    97* 91*   CO2 32* 34* 36*   BUN 22* 19 16   CREATININE 0.7 0.7 0.7   GLUCOSE 121* 83 87   CALCIUM 7.7* 7.9* 8.0*       Recent Labs     09/18/21  1050 09/20/21  0300   WBC 10.0 3.3*   RBC 3.25* 3.48*   HGB 9.7* 10.1*   HCT 31.7* 33.5*   MCV 97.5 96.3   MCH 29.8 29.0   MCHC 30.6* 30.1*   RDW 14.0 13.9    176   MPV 10.4 10.4       Radiology:   No results found. Assessment:    Principal Problem:    Pneumonia due to COVID-19 virus  Active Problems:    Essential hypertension    Peripheral vascular disease (HCC)    Chronic obstructive pulmonary disease (HCC)    Oxygen dependent    Swelling of both lower extremities  Resolved Problems:    * No resolved hospital problems. *      Plan:  1. Acute hypoxic respiratory failure, most likely due to viral pneumonia, O2 sat was below 90% on room air, requiring  5L nasal cannula and oxygen saturation above 90%. Treating the underlying process. 2.  Acute viral pneumonia, rapid influenza a and B were negative, respiratory panel was not done, procalcitonin was 2.35, CAT scan of the chest showed bilateral patchy groundglass opacities, sent for COVID-19,  result was positive. 3.  COPD - continue current respiratory support, steroids and IV antibiotics.   IS.  4.  HTN - continue meds and monitor vitals and adjust as needed. 5. PVD - continue meds, supportive care and Lovenox. 6.  Anxiety - Buspar 5 m po tid ordered. Patient still requiring high dose oxygen therapy, needs supportive care, encouragement to eat and treat his anxiety. NOTE: This report was transcribed using voice recognition software. Every effort was made to ensure accuracy; however, inadvertent computerized transcription errors may be present.     Electronically signed by Allie Elias MD on 9/20/2021 at 11:33 AM

## 2021-09-20 NOTE — PROGRESS NOTES
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 Vitals:  VITALS:  BP (!) 146/81   Pulse 86   Temp 98.3 °F (36.8 °C) (Oral)   Resp 20   Ht 5' 4\" (1.626 m)   Wt 136 lb 14.4 oz (62.1 kg)   SpO2 97%   BMI 23.50 kg/m²     24HR INTAKE/OUTPUT:      Intake/Output Summary (Last 24 hours) at 2021 1844  Last data filed at 2021 1716  Gross per 24 hour   Intake 540 ml   Output 1350 ml   Net -810 ml       24HR PULSE OXIMETRY RANGE:    SpO2  Av %  Min: 94 %  Max: 97 %    Medications:  IV:   sodium chloride         Scheduled Meds:   ARIPiprazole  5 mg Oral Daily    baclofen  5 mg Oral BID    DULoxetine  60 mg Oral Daily    hydroCHLOROthiazide  12.5 mg Oral Daily    pantoprazole  40 mg Oral QAM AC    sodium chloride flush  5-40 mL IntraVENous 2 times per day    enoxaparin  30 mg SubCUTAneous BID    dexamethasone  6 mg IntraVENous Q24H    vitamin D  6,000 Units Oral Daily    Followed by   Herb Addison ON 2021] vitamin D  2,000 Units Oral Daily    albuterol-ipratropium  1 puff Inhalation Q4H WA    fluticasone  1 puff Inhalation BID    remdesivir IVPB  100 mg IntraVENous Q24H    cefepime  2,000 mg IntraVENous Q12H       Diet:   ADULT DIET;  Dysphagia - Minced and Moist  Adult Oral Nutrition Supplement; Clear Liquid Oral Supplement  Adult Oral Nutrition Supplement; Frozen Oral Supplement     EXAM:  General: No distress. Alert. Eyes: PERRL. No sclera icterus. No conjunctival injection. ENT: No discharge. Pharynx clear. Neck: Trachea midline. Normal thyroid. Resp: No accessory muscle use. Bilateral rales. No wheezing. No rhonchi. CV: Regular rate. Regular rhythm. No murmur or rub. Abd: Non-tender. Non-distended. No masses. No organomegaly. Normal bowel sounds. Skin: Warm and dry. No nodule on exposed extremities. No rash on exposed extremities. Ext: No cyanosis, clubbing, edema  Lymph: No cervical LAD. No supraclavicular LAD. M/S: No cyanosis. No joint deformity. No clubbing. Neuro: Awake. Follows commands. Positive pupils/gag/corneals. Normal pain response. Results:  CBC:   Recent Labs     09/18/21  1050 09/20/21  0300   WBC 10.0 3.3*   HGB 9.7* 10.1*   HCT 31.7* 33.5*   MCV 97.5 96.3    176     BMP:   Recent Labs     09/18/21  1050 09/19/21  0550 09/20/21  0300    140 136   K 3.9 4.2 3.9    97* 91*   CO2 32* 34* 36*   BUN 22* 19 16   CREATININE 0.7 0.7 0.7     LIVER PROFILE:   Recent Labs     09/18/21  1050 09/19/21  0550 09/20/21  0300   AST 20 25 19   ALT 17 21 19   BILITOT <0.2 0.2 0.2   ALKPHOS 42 40 42     PT/INR:   Recent Labs     09/18/21  0550   PROTIME 13.6*   INR 1.3     APTT: No results for input(s): APTT in the last 72 hours. Pathology:  1. N/A      Microbiology:  1. None    Recent ABG:   No results for input(s): PH, PO2, PCO2, HCO3, BE, O2SAT, METHB, O2HB, COHB, O2CON, HHB, THB in the last 72 hours. Recent Films:  CT CHEST WO CONTRAST   Final Result   Multifocal patchy and confluent airspace disease consistent with multifocal   pneumonia. Small bilateral pleural effusions. Cholelithiasis. XR CHEST PORTABLE   Final Result   Unchanged right and shifting left sided infiltrates.                      Assessment:  1.  COVID-19 pneumonia superimposed upon a bacterial pneumonia  2. COPD, GOLD class 4  3. Positive blood cultures: Diphtheroid rods, likely a contaminant.        Plan:  1. Continue treatment for COPD: Unfortunately I cannot include aerosol treatments  2. Continue remdesivir/dexamethasone  3. Antimicrobials: Nosocomial pathogens need to be covered. 4. Supplemental oxygen  5. Repeat chest film       Time at the bedside, reviewing labs and radiographs, reviewing updated notes and consultations, discussing with staff and family was more than 35 minutes. Please note that voice recognition technology was used in the preparation of this note and make therefore it may contain inadvertent transcription errors. If the patient is a COVID 19 isolation patient, the above physical exam reflects that of the examining physician for the day. Susy Haines MD,  M.D., F.C.C.P.     [facility], [address], [address], [facility]

## 2021-09-21 ENCOUNTER — APPOINTMENT (OUTPATIENT)
Dept: GENERAL RADIOLOGY | Age: 56
DRG: 720 | End: 2021-09-21
Payer: MEDICAID

## 2021-09-21 VITALS
DIASTOLIC BLOOD PRESSURE: 85 MMHG | RESPIRATION RATE: 18 BRPM | SYSTOLIC BLOOD PRESSURE: 147 MMHG | TEMPERATURE: 97.8 F | HEIGHT: 64 IN | OXYGEN SATURATION: 98 % | BODY MASS INDEX: 23.01 KG/M2 | WEIGHT: 134.8 LBS | HEART RATE: 80 BPM

## 2021-09-21 LAB
ALBUMIN SERPL-MCNC: 3.9 G/DL (ref 3.5–5.2)
ALP BLD-CCNC: 47 U/L (ref 40–129)
ALT SERPL-CCNC: 30 U/L (ref 0–40)
ANION GAP SERPL CALCULATED.3IONS-SCNC: 12 MMOL/L (ref 7–16)
AST SERPL-CCNC: 27 U/L (ref 0–39)
BASOPHILS ABSOLUTE: 0.01 E9/L (ref 0–0.2)
BASOPHILS RELATIVE PERCENT: 0.2 % (ref 0–2)
BILIRUB SERPL-MCNC: 0.3 MG/DL (ref 0–1.2)
BUN BLDV-MCNC: 16 MG/DL (ref 6–20)
CALCIUM SERPL-MCNC: 8.7 MG/DL (ref 8.6–10.2)
CHLORIDE BLD-SCNC: 90 MMOL/L (ref 98–107)
CO2: 35 MMOL/L (ref 22–29)
CREAT SERPL-MCNC: 0.7 MG/DL (ref 0.7–1.2)
EOSINOPHILS ABSOLUTE: 0.02 E9/L (ref 0.05–0.5)
EOSINOPHILS RELATIVE PERCENT: 0.4 % (ref 0–6)
GFR AFRICAN AMERICAN: >60
GFR NON-AFRICAN AMERICAN: >60 ML/MIN/1.73
GLUCOSE BLD-MCNC: 79 MG/DL (ref 74–99)
HCT VFR BLD CALC: 38.7 % (ref 37–54)
HEMOGLOBIN: 11.8 G/DL (ref 12.5–16.5)
IMMATURE GRANULOCYTES #: 0.02 E9/L
IMMATURE GRANULOCYTES %: 0.4 % (ref 0–5)
LYMPHOCYTES ABSOLUTE: 1.35 E9/L (ref 1.5–4)
LYMPHOCYTES RELATIVE PERCENT: 29.5 % (ref 20–42)
MCH RBC QN AUTO: 29 PG (ref 26–35)
MCHC RBC AUTO-ENTMCNC: 30.5 % (ref 32–34.5)
MCV RBC AUTO: 95.1 FL (ref 80–99.9)
MONOCYTES ABSOLUTE: 0.34 E9/L (ref 0.1–0.95)
MONOCYTES RELATIVE PERCENT: 7.4 % (ref 2–12)
NEUTROPHILS ABSOLUTE: 2.83 E9/L (ref 1.8–7.3)
NEUTROPHILS RELATIVE PERCENT: 62.1 % (ref 43–80)
PDW BLD-RTO: 13.7 FL (ref 11.5–15)
PLATELET # BLD: 205 E9/L (ref 130–450)
PMV BLD AUTO: 10.3 FL (ref 7–12)
POTASSIUM REFLEX MAGNESIUM: 4.3 MMOL/L (ref 3.5–5)
RBC # BLD: 4.07 E12/L (ref 3.8–5.8)
SODIUM BLD-SCNC: 137 MMOL/L (ref 132–146)
TOTAL PROTEIN: 7.5 G/DL (ref 6.4–8.3)
WBC # BLD: 4.6 E9/L (ref 4.5–11.5)

## 2021-09-21 PROCEDURE — 2580000003 HC RX 258: Performed by: INTERNAL MEDICINE

## 2021-09-21 PROCEDURE — 80053 COMPREHEN METABOLIC PANEL: CPT

## 2021-09-21 PROCEDURE — 6370000000 HC RX 637 (ALT 250 FOR IP): Performed by: INTERNAL MEDICINE

## 2021-09-21 PROCEDURE — 92610 EVALUATE SWALLOWING FUNCTION: CPT | Performed by: SPEECH-LANGUAGE PATHOLOGIST

## 2021-09-21 PROCEDURE — 6370000000 HC RX 637 (ALT 250 FOR IP): Performed by: FAMILY MEDICINE

## 2021-09-21 PROCEDURE — 2700000000 HC OXYGEN THERAPY PER DAY

## 2021-09-21 PROCEDURE — 99239 HOSP IP/OBS DSCHRG MGMT >30: CPT | Performed by: FAMILY MEDICINE

## 2021-09-21 PROCEDURE — 71045 X-RAY EXAM CHEST 1 VIEW: CPT

## 2021-09-21 PROCEDURE — 85025 COMPLETE CBC W/AUTO DIFF WBC: CPT

## 2021-09-21 PROCEDURE — 97165 OT EVAL LOW COMPLEX 30 MIN: CPT

## 2021-09-21 PROCEDURE — 6360000002 HC RX W HCPCS: Performed by: INTERNAL MEDICINE

## 2021-09-21 PROCEDURE — 92526 ORAL FUNCTION THERAPY: CPT | Performed by: SPEECH-LANGUAGE PATHOLOGIST

## 2021-09-21 PROCEDURE — 2500000003 HC RX 250 WO HCPCS: Performed by: INTERNAL MEDICINE

## 2021-09-21 PROCEDURE — 36415 COLL VENOUS BLD VENIPUNCTURE: CPT

## 2021-09-21 PROCEDURE — 97161 PT EVAL LOW COMPLEX 20 MIN: CPT

## 2021-09-21 RX ORDER — BENZONATATE 100 MG/1
100 CAPSULE ORAL 3 TIMES DAILY PRN
Qty: 21 CAPSULE | Refills: 0 | Status: SHIPPED | OUTPATIENT
Start: 2021-09-21 | End: 2021-09-28

## 2021-09-21 RX ORDER — ASCORBIC ACID 250 MG
250 TABLET ORAL DAILY
Qty: 30 TABLET | Refills: 0 | Status: SHIPPED | OUTPATIENT
Start: 2021-09-21 | End: 2022-02-02 | Stop reason: ALTCHOICE

## 2021-09-21 RX ORDER — CHOLECALCIFEROL (VITAMIN D3) 50 MCG
2000 TABLET ORAL DAILY
Qty: 30 TABLET | Refills: 0 | Status: SHIPPED | OUTPATIENT
Start: 2021-09-24 | End: 2022-02-02 | Stop reason: ALTCHOICE

## 2021-09-21 RX ORDER — GUAIFENESIN/DEXTROMETHORPHAN 100-10MG/5
5 SYRUP ORAL EVERY 4 HOURS PRN
Qty: 120 ML | Refills: 0 | COMMUNITY
Start: 2021-09-21 | End: 2021-10-01

## 2021-09-21 RX ORDER — DOCUSATE SODIUM 100 MG/1
100 CAPSULE, LIQUID FILLED ORAL DAILY
Status: DISCONTINUED | OUTPATIENT
Start: 2021-09-21 | End: 2021-09-21 | Stop reason: HOSPADM

## 2021-09-21 RX ORDER — ZINC SULFATE 50(220)MG
220 CAPSULE ORAL DAILY
Qty: 30 CAPSULE | Refills: 0 | Status: SHIPPED | OUTPATIENT
Start: 2021-09-21 | End: 2022-02-02 | Stop reason: ALTCHOICE

## 2021-09-21 RX ORDER — DEXAMETHASONE 6 MG/1
TABLET ORAL
Qty: 6 TABLET | Refills: 0 | Status: SHIPPED | OUTPATIENT
Start: 2021-09-21 | End: 2021-09-30

## 2021-09-21 RX ORDER — BUSPIRONE HYDROCHLORIDE 5 MG/1
5 TABLET ORAL 3 TIMES DAILY PRN
Qty: 21 TABLET | Refills: 0 | Status: SHIPPED | OUTPATIENT
Start: 2021-09-21 | End: 2021-09-28

## 2021-09-21 RX ADMIN — BENZONATATE 100 MG: 100 CAPSULE ORAL at 09:44

## 2021-09-21 RX ADMIN — BACLOFEN 5 MG: 10 TABLET ORAL at 09:43

## 2021-09-21 RX ADMIN — HYDROCODONE BITARTRATE AND ACETAMINOPHEN 1 TABLET: 7.5; 325 TABLET ORAL at 02:46

## 2021-09-21 RX ADMIN — CEFEPIME HYDROCHLORIDE 2000 MG: 2 INJECTION, POWDER, FOR SOLUTION INTRAVENOUS at 03:30

## 2021-09-21 RX ADMIN — DULOXETINE HYDROCHLORIDE 60 MG: 60 CAPSULE, DELAYED RELEASE ORAL at 09:44

## 2021-09-21 RX ADMIN — PANTOPRAZOLE SODIUM 40 MG: 40 TABLET, DELAYED RELEASE ORAL at 06:17

## 2021-09-21 RX ADMIN — ARIPIPRAZOLE 5 MG: 5 TABLET ORAL at 09:44

## 2021-09-21 RX ADMIN — HYDROCODONE BITARTRATE AND ACETAMINOPHEN 1 TABLET: 7.5; 325 TABLET ORAL at 09:44

## 2021-09-21 RX ADMIN — REMDESIVIR 100 MG: 100 INJECTION, POWDER, LYOPHILIZED, FOR SOLUTION INTRAVENOUS at 09:41

## 2021-09-21 RX ADMIN — Medication 6000 UNITS: at 09:44

## 2021-09-21 RX ADMIN — FLUTICASONE PROPIONATE 1 PUFF: 110 AEROSOL, METERED RESPIRATORY (INHALATION) at 09:45

## 2021-09-21 RX ADMIN — IPRATROPIUM BROMIDE AND ALBUTEROL 1 PUFF: 20; 100 SPRAY, METERED RESPIRATORY (INHALATION) at 09:45

## 2021-09-21 RX ADMIN — SODIUM CHLORIDE, PRESERVATIVE FREE 10 ML: 5 INJECTION INTRAVENOUS at 06:16

## 2021-09-21 RX ADMIN — HYDROCHLOROTHIAZIDE 12.5 MG: 12.5 TABLET ORAL at 09:44

## 2021-09-21 RX ADMIN — GUAIFENESIN AND DEXTROMETHORPHAN 5 ML: 100; 10 SYRUP ORAL at 03:30

## 2021-09-21 RX ADMIN — ENOXAPARIN SODIUM 30 MG: 30 INJECTION SUBCUTANEOUS at 09:45

## 2021-09-21 RX ADMIN — DEXAMETHASONE SODIUM PHOSPHATE 6 MG: 10 INJECTION INTRAMUSCULAR; INTRAVENOUS at 06:16

## 2021-09-21 RX ADMIN — SODIUM CHLORIDE, PRESERVATIVE FREE 10 ML: 5 INJECTION INTRAVENOUS at 09:46

## 2021-09-21 RX ADMIN — GUAIFENESIN AND DEXTROMETHORPHAN 5 ML: 100; 10 SYRUP ORAL at 09:43

## 2021-09-21 RX ADMIN — SODIUM CHLORIDE, PRESERVATIVE FREE 10 ML: 5 INJECTION INTRAVENOUS at 03:30

## 2021-09-21 RX ADMIN — BUSPIRONE HYDROCHLORIDE 5 MG: 5 TABLET ORAL at 06:17

## 2021-09-21 RX ADMIN — DOCUSATE SODIUM 100 MG: 100 CAPSULE ORAL at 09:44

## 2021-09-21 ASSESSMENT — PAIN SCALES - GENERAL
PAINLEVEL_OUTOF10: 9
PAINLEVEL_OUTOF10: 7

## 2021-09-21 NOTE — PROGRESS NOTES
SPEECH/LANGUAGE PATHOLOGY  CLINICAL ASSESSMENT OF SWALLOWING FUNCTION   and PLAN OF CARE    PATIENT NAME:  Renea Mora  (male)     MRN:  [id number]    :  1965  (54 y.o.)  STATUS:  Inpatient: Room 7958/6661-F    TODAY'S DATE:  2021  REFERRING PROVIDER: 21   SLP eval and treat Start: 21, End: 21, ONE TIME, Standing Count: 1 Occurrences, R    Valerie Major MD  REASON FOR REFERRAL: r/o aspiration   EVALUATING THERAPIST: LELA Clemente                 RESULTS:    Chart reviewed including chest radiographs. Noted per chart that aspiration was suspected 3/2021 by treating pulmonologist and a video swallow eval was ordered. Pt reported this was completed at [facility] therefore SLP made contact and was able to receive a copy of the results (placed in soft chart). Impression was \"normal swallow study. No laryngeal penetration or tracheal aspiration was identified\". Pt does complain of food getting stuck when swallowing and stated he consumes a soft/puree diet at home. Reports he is able to swallow his current diet of minced and moist well, however does complain that he is not getting enough food. DYSPHAGIA DIAGNOSIS:   Clinical indicators of normal swallow function, pt refused solid cracker during this evaluation stating that it \"would get stuck\"      DIET RECOMMENDATIONS:  Minced and moist consistency solids (dysphagia 2) with  thin liquids as tolerated    Pt was noted to take very large bites and sips and consume at a fast rate. Pt trained on compensatory strategies for safe swallow and encouraged to use at all times. If silent aspiration of recommended diet is suspected, recommend video swallow eval to further assess.       FEEDING RECOMMENDATIONS:     Assistance level:  No assistance needed      Compensatory strategies recommended: Small bites/sips and Alternate solids and liquids      Discussed recommendations with nursing and/or faxed report to referring provider: Yes    SPEECH THERAPY  PLAN OF CARE   The dysphagia POC is established based on physician order, dysphagia diagnosis and results of clinical assessment     Meal time assessment for 1-2 sessions to provide diet modification and compensatory strategy implementation due to patient report of dysphagia symptoms during meals    Conditions Requiring Skilled Therapeutic Intervention for dysphagia:    Sensation of food sticking in throat     Specific dysphagia interventions to include:     Meal time assessment for 1-2 sessions to provide diet modification and compensatory strategy implementation due to patient report of dysphagia symptoms during meals    Specific instructions for next treatment:  initiate instruction of compensatory strategies  Patient Treatment Goals:    Short Term Goals:  Pt will participate in meal time assessment for 1-2 sessions to provide diet modification and compensatory strategy implementation due to patient report of dysphagia symptoms during meals    Long Term Goals:   Pt will maintain adequate nutrition/hydration via PO intake of the least restrictive oral diet with implementation of safe swallow/ compensatory strategies and decrease signs/symptoms of aspiration to less than 1 x/day.       Patient/family Goal:    To be able to eat regular foods and drink regular liquids    Plan of care discussed with Patient   The Patient understand(s) the diagnosis, prognosis and plan of care     Rehabilitation Potential/Prognosis: fair                    ADMITTING DIAGNOSIS: COPD exacerbation ([address] [J44.1]  Acute on chronic respiratory failure with hypoxia ([address] [J96.21]  Pneumonia due to COVID-19 virus [U07.1, J12.82]    VISIT DIAGNOSIS:   Visit Diagnoses       Codes    COPD exacerbation ([address]     J44.1    Acute on chronic respiratory failure with hypoxia ([address]     J96.21           PATIENT REPORT/COMPLAINT: see above    RN cleared patient for participation in assessment yes     PRIOR LEVEL OF SWALLOW FUNCTION:    PAST HISTORY OF DYSPHAGIA?: yes    Home diet: Pureed consistency solids (dysphagia 1) with  thin liquids  Diet during hospital admission: Minced and moist consistency solids (dysphagia 2) with thin liquids    PROCEDURE:  Consistencies Administered During the Evaluation   Liquids: thin liquid   Solids:  pureed foods      Method of Intake:   cup, straw, spoon  Self fed      Position:   Seated, upright    CLINICAL ASSESSMENT:  Oral Stage: The oral stage of swallowing was within functional limits for consistencies accepted. Pt noted to take large bites and sips and consume at a fast rate. Pharyngeal Stage:    No signs of aspiration were noted during this evaluation however, silent aspiration cannot be ruled out at bedside. If silent aspiration is suspected, a Videofluoroscopic Study of Swallowing (MBS) is recommended and requires a physician order. Cognition:   Within functional limits for this exam    Oral Peripheral Examination   Adequate lingual/labial strength     Current Respiratory Status    5 liters nasal cannula (per pt report)     Parameters of Speech Production  Respiration:  Adequate for speech production  Quality:   Within functional limits  Intensity: Within functional limits    Volitional Swallow: present     Volitional Cough:   present     Pain: No pain reported. EDUCATION:   The Speech Language Pathologist (SLP) completed education regarding results of evaluation and that intervention is warranted at this time. Learner: Patient  Education: Reviewed results and recommendations of this evaluation  Evaluation of Education:  Cathryne Curling understanding    This plan may be re-evaluated and revised as warranted.       Evaluation Time includes thorough review of current medical information, gathering information on past medical history/social history and prior level of function, completion of standardized testing/informal observation of tasks, assessment of data and education on plan of care and goals. [x]The admitting diagnosis and active problem list, have been reviewed prior to initiation of this evaluation. INTERVENTION    Pt/family educated on above results and plan of care. Pt/family trained on compensatory strategies for safe swallow and signs and symptoms of aspiration (throat clearing, coughing/choking, wet vocal quality. , etc.) and encouraged to notify staff if observed. Handouts provided as warranted. Pt/family encouraged to engage in question/answer session. All questions answered and pt/family verbalized understanding of above. 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

## 2021-09-21 NOTE — DISCHARGE SUMMARY
[facility] Physician Discharge Summary       No follow-up provider specified. Activity level: As tolerated     Dispo: Home    Condition on discharge: Stable     Patient ID:  Mariam Deutsch  [id number]  54 y.o.  1965    Admit date: 9/16/2021    Discharge date and time:  9/21/2021  8:17 AM    Admission Diagnoses: Principal Problem:    Pneumonia due to COVID-19 virus  Active Problems:    Essential hypertension    Peripheral vascular disease ([facility]    Chronic obstructive pulmonary disease (HCC)    Oxygen dependent    Swelling of both lower extremities  Resolved Problems:    * No resolved hospital problems. *      Discharge Diagnoses: Principal Problem:    Pneumonia due to COVID-19 virus  Active Problems:    Essential hypertension    Peripheral vascular disease ([facility]    Chronic obstructive pulmonary disease (HCC)    Oxygen dependent    Swelling of both lower extremities  Resolved Problems:    * No resolved hospital problems. *      Consults:  IP CONSULT TO PALLIATIVE CARE  IP CONSULT TO PHARMACY  IP CONSULT TO PULMONOLOGY  IP CONSULT TO CASE MANAGEMENT    Procedures:   n/a    Hospital Course:   Patient Mariam Deutsch is a 54 y.o. presented with COPD exacerbation ([facility] [J44.1]  Acute on chronic respiratory failure with hypoxia ([facility] [J96.21]  Pneumonia due to COVID-19 virus [U07.1, J12.82] . Patient was admitted for further evaluation and treatment. He required 7 L of oxygen. He required IV dexamethasone and remdesivir. He was treated with IV cefepime to cover for bacterial pneumonia. He was seen by pulmonary who agreed with management. He finished a course of remdesivir. He is stable for discharge. He was seen by speech therapy to make sure he was on the right diet. He was given a diet of moist bed still needs. Physical therapy and Occupational Therapy evaluated him and he was safe to go home. He will finish a tapering dose of Decadron.   He is to continue quarantine x 2 weeks, safe distance, frequent handwashing. He is eligible for the vaccination after December 21, 2021. He required 4 L of oxygen via nasal cannula which was his baseline. Follow-up with PCP when the quarantine is done or if further problems develop. Discharge Exam:    General Appearance: alert and oriented to person, place and time and in no acute distress  Skin: warm and dry  Head: normocephalic and atraumatic  Eyes: pupils equal, round, and reactive to light, extraocular eye movements intact, conjunctivae normal  Neck: neck supple and non tender without mass   Pulmonary/Chest: clear to auscultation bilaterally- no wheezes, rales or rhonchi, normal air movement, no respiratory distress  Cardiovascular: normal rate, normal S1 and S2 and no carotid bruits  Abdomen: soft, non-tender, non-distended, normal bowel sounds, no masses or organomegaly  Extremities: no cyanosis, no clubbing and no edema  Neurologic: no cranial nerve deficit and speech normal    LABS:  Recent Labs     09/19/21  0550 09/20/21  0300 09/21/21  0240    136 137   K 4.2 3.9 4.3   CL 97* 91* 90*   CO2 34* 36* 35*   BUN 19 16 16   CREATININE 0.7 0.7 0.7   GLUCOSE 83 87 79   CALCIUM 7.9* 8.0* 8.7       Recent Labs     09/18/21  1050 09/20/21  0300 09/21/21  0240   WBC 10.0 3.3* 4.6   RBC 3.25* 3.48* 4.07   HGB 9.7* 10.1* 11.8*   HCT 31.7* 33.5* 38.7   MCV 97.5 96.3 95.1   MCH 29.8 29.0 29.0   MCHC 30.6* 30.1* 30.5*   RDW 14.0 13.9 13.7    176 205   MPV 10.4 10.4 10.3     Imaging:  CT CHEST WO CONTRAST    Result Date: 9/16/2021  EXAMINATION: CT OF THE CHEST WITHOUT CONTRAST 9/16/2021 10:02 pm TECHNIQUE: CT of the chest was performed without the administration of intravenous contrast. Multiplanar reformatted images are provided for review. Dose modulation, iterative reconstruction, and/or weight based adjustment of the mA/kV was utilized to reduce the radiation dose to as low as reasonably achievable.  COMPARISON: Correlation made with prior plain film radiographs HISTORY: ORDERING SYSTEM PROVIDED HISTORY: fever, cough, eval for pneumonia TECHNOLOGIST PROVIDED HISTORY: Reason for exam:->fever, cough, eval for pneumonia Decision Support Exception - unselect if not a suspected or confirmed emergency medical condition->Emergency Medical Condition (MA) FINDINGS: Mediastinum: There is no evidence of hilar or mediastinal adenopathy. Atherosclerotic calcification along the thoracic aorta with no evidence of aneurysmal dilatation. No evidence of pericardial effusion. Coronary atherosclerotic calcifications. Lungs/pleura: Pleuroparenchymal scarring in the lung apices bilaterally with bulla. Emphysematous changes in the lungs. Multifocal patchy and confluent airspace disease most extensively involving the lingula, consistent with multifocal pneumonia. Confluent airspace disease in the posterobasal segment of the lower lobes bilaterally. Small bilateral pleural effusions. Upper Abdomen: Distended gallbladder with small gallstones. Soft Tissues/Bones: No acute bony pathology. Multifocal patchy and confluent airspace disease consistent with multifocal pneumonia. Small bilateral pleural effusions. Cholelithiasis. XR CHEST PORTABLE    Result Date: 9/16/2021  EXAMINATION: ONE XRAY VIEW OF THE CHEST 9/16/2021 9:14 pm COMPARISON: 09/10/2021 HISTORY: ORDERING SYSTEM PROVIDED HISTORY: short of breath TECHNOLOGIST PROVIDED HISTORY: Reason for exam:->short of breath FINDINGS: There is some mild patchy density at periphery of right lower lung which is unchanged. There is some new patchy density in the mid left lung. Aeration at the left base has improved. There is unchanged interstitial prominence. There is no pneumothorax or pleural effusion. Unchanged right and shifting left sided infiltrates.        Patient Instructions:      Medication List      START taking these medications    ascorbic acid 250 MG tablet  Commonly known as: V-R VITAMIN C  Take 1 tablet by mouth daily     benzonatate 100 MG capsule  Commonly known as: TESSALON  Take 1 capsule by mouth 3 times daily as needed for Cough     busPIRone 5 MG tablet  Commonly known as: BUSPAR  Take 1 tablet by mouth 3 times daily as needed (anxiety)     dexamethasone 6 MG tablet  Commonly known as: DECADRON  Take 1 tablet by mouth daily (with breakfast) for 3 days, THEN 0.5 tablets daily (with breakfast) for 3 days, THEN 0.5 tablets every other day for 3 days.   Start taking on: September 21, 2021     guaiFENesin-dextromethorphan 100-10 MG/5ML syrup  Commonly known as: ROBITUSSIN DM  Take 5 mLs by mouth every 4 hours as needed for Cough     vitamin D 50 MCG (2000 UT) Tabs tablet  Commonly known as: CHOLECALCIFEROL  Take 1 tablet by mouth daily  Start taking on: September 24, 2021     zinc sulfate 220 (50 Zn) MG capsule  Commonly known as: Orazinc  Take 4 capsules by mouth daily        CHANGE how you take these medications    formoterol 20 MCG/2ML nebulizer solution  Commonly known as: PERFOROMIST  Take 2 mLs by nebulization every 12 hours  What changed: additional instructions        CONTINUE taking these medications    albuterol sulfate  (90 Base) MCG/ACT inhaler  Commonly known as: ProAir HFA  Inhale 2 puffs into the lungs every 4 hours as needed for Wheezing     ARIPiprazole 5 MG tablet  Commonly known as: ABILIFY     baclofen 10 MG tablet  Commonly known as: LIORESAL     budesonide 0.5 MG/2ML nebulizer suspension  Commonly known as: PULMICORT  Take 2 mLs by nebulization 2 times daily     calcium-vitamin D 500-200 MG-UNIT per tablet  Commonly known as: OSCAL-500  Take 1 tablet by mouth 2 times daily     DULoxetine 60 MG extended release capsule  Commonly known as: CYMBALTA     hydroCHLOROthiazide 12.5 MG tablet  Commonly known as: HYDRODIURIL  Take 1 tablet by mouth daily     HYDROcodone-acetaminophen 7.5-325 MG per tablet  Commonly known as: NORCO     ipratropium-albuterol 0.5-2.5 (3) MG/3ML Soln nebulizer solution  Commonly known as: DUONEB  Inhale 3 mLs into the lungs every 4 hours as needed for Shortness of Breath     omeprazole 40 MG delayed release capsule  Commonly known as: PRILOSEC  Take 1 capsule by mouth daily     Probiotic Acidophilus Tabs  Take 1 tablet by mouth daily     Yupelri 175 MCG/3ML Soln  Generic drug: Revefenacin  Inhale 1 ampule into the lungs daily        STOP taking these medications    furosemide 20 MG tablet  Commonly known as: LASIX     hydrOXYzine 25 MG tablet  Commonly known as: ATARAX     predniSONE 20 MG tablet  Commonly known as: DELTASONE           Where to Get Your Medications      These medications were sent to [address] [phone] - F [phone]  [address] [facility], [address] [address]    Phone: [phone]   · ascorbic acid 250 MG tablet  · benzonatate 100 MG capsule  · busPIRone 5 MG tablet  · dexamethasone 6 MG tablet  · vitamin D 50 MCG (2000 UT) Tabs tablet  · zinc sulfate 220 (50 Zn) MG capsule     You can get these medications from any pharmacy    You don't need a prescription for these medications  · guaiFENesin-dextromethorphan 100-10 MG/5ML syrup           Note that more than 35 minutes was spent in preparing discharge papers, discussing discharge with patient, medication review, etc.    Signed:  Electronically signed by Ernesto Medley MD on 9/21/2021 at 8:17 AM

## 2021-09-21 NOTE — CARE COORDINATION
Social Work/Discharge Planning:  Chart reviewed. COVID positive 9/17. Patient is a readmit, discharged from hospital on 9/11. Called patient and completed initial assessment. Explained Social Work role and discussed transition of care/discharge planning. Patient son lives with him in an apartment with one step to enter. PTA he uses a walker. He has a bedside commode, transport chair, nebulizer, emergency response button and wears four liters of oxygen through [facility].  Patient is seen by LENARD Arceo and pharmacy is [facility]. He states he is active with [facility]. Patient plans to return home at discharge. Called Marva with [facility] and confirmed patient oxygen order is for 5 liters continuous with 6 liters at night. [address] [place] [address],[address] (ph: [phone], fax: [phone]) and confirmed patient is active for nursing only and will need a resume home care order at discharge. Will continue to follow and assist with discharge planning.   Electronically signed by FEDERICO Hodge on 9/21/2021 at 8:50 AM

## 2021-09-21 NOTE — PLAN OF CARE
Problem: Pain:  Goal: Pain level will decrease  Description: Pain level will decrease  Outcome: Met This Shift     Problem: Airway Clearance - Ineffective  Goal: Achieve or maintain patent airway  Outcome: Met This Shift     Problem: Gas Exchange - Impaired  Goal: Absence of hypoxia  Outcome: Met This Shift     Problem: Breathing Pattern - Ineffective  Goal: Ability to achieve and maintain a regular respiratory rate  Outcome: Met This Shift     Problem:  Body Temperature -  Risk of, Imbalanced  Goal: Ability to maintain a body temperature within defined limits  Outcome: Met This Shift  Goal: Will regain or maintain usual level of consciousness  Outcome: Met This Shift  Goal: Complications related to the disease process, condition or treatment will be avoided or minimized  Outcome: Met This Shift     Problem: Isolation Precautions - Risk of Spread of Infection  Goal: Prevent transmission of infection  Outcome: Met This Shift     Problem: Risk for Fluid Volume Deficit  Goal: Maintain normal heart rhythm  Outcome: Met This Shift     Problem: Loneliness or Risk for Loneliness  Goal: Demonstrate positive use of time alone when socialization is not possible  Outcome: Met This Shift     Problem: Fatigue  Goal: Verbalize increase energy and improved vitality  Outcome: Met This Shift     Problem: Skin Integrity:  Goal: Will show no infection signs and symptoms  Description: Will show no infection signs and symptoms  Outcome: Met This Shift  Goal: Absence of new skin breakdown  Description: Absence of new skin breakdown  Outcome: Met This Shift

## 2021-09-21 NOTE — PROGRESS NOTES
CLINICAL PHARMACY NOTE: MEDS TO BEDS    Total # of Prescriptions Filled: 6   The following medications were delivered to the patient:  · Ascorbic acid 500 mg  · Buspirone 5 mg  · Vitamin D3  · Benzonatate 100 mg  · Dexamethasone 6 mg  · Zinc 220    Additional Documentation:
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
## 2021-09-21 NOTE — ACP (ADVANCE CARE PLANNING)
Advance Care Planning     Patient refused to complete Advance Care Planning questions states \"I don't want to talk about that\".       Electronically signed by FEDERICO Fuentes on 9/21/2021 at 8:57 AM
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
## 2021-09-22 ENCOUNTER — CARE COORDINATION (OUTPATIENT)
Dept: CASE MANAGEMENT | Age: 56
End: 2021-09-22

## 2021-09-22 LAB
BLOOD CULTURE, ROUTINE: NORMAL
CULTURE, BLOOD 2: ABNORMAL
ORGANISM: ABNORMAL

## 2021-09-23 NOTE — SIGNIFICANT EVENT
9/23/21 - Contacted [phone] and spoke to patient's sister who takes care of Mr. Mumtaz Farris medications. Regarding his Zinc capsule - the label should read one capsule (220 mg) daily not four daily. He is to take one capsule daily. His sister voiced understanding of the new instructions.

## 2021-09-29 ENCOUNTER — VIRTUAL VISIT (OUTPATIENT)
Dept: PRIMARY CARE CLINIC | Age: 56
End: 2021-09-29
Payer: MEDICAID

## 2021-09-29 DIAGNOSIS — I10 ESSENTIAL HYPERTENSION: ICD-10-CM

## 2021-09-29 DIAGNOSIS — F41.9 ANXIETY: ICD-10-CM

## 2021-09-29 DIAGNOSIS — I87.2 VENOUS INSUFFICIENCY OF BOTH LOWER EXTREMITIES: ICD-10-CM

## 2021-09-29 DIAGNOSIS — Z99.81 OXYGEN DEPENDENT: ICD-10-CM

## 2021-09-29 DIAGNOSIS — J44.9 CHRONIC OBSTRUCTIVE PULMONARY DISEASE, UNSPECIFIED COPD TYPE (HCC): Primary | ICD-10-CM

## 2021-09-29 DIAGNOSIS — K21.9 GASTROESOPHAGEAL REFLUX DISEASE, UNSPECIFIED WHETHER ESOPHAGITIS PRESENT: ICD-10-CM

## 2021-09-29 DIAGNOSIS — F32.A DEPRESSION, UNSPECIFIED DEPRESSION TYPE: ICD-10-CM

## 2021-09-29 PROCEDURE — 99214 OFFICE O/P EST MOD 30 MIN: CPT | Performed by: NURSE PRACTITIONER

## 2021-09-29 RX ORDER — OMEPRAZOLE 40 MG/1
40 CAPSULE, DELAYED RELEASE ORAL DAILY
Qty: 30 CAPSULE | Refills: 2 | Status: SHIPPED
Start: 2021-09-29 | End: 2021-12-29 | Stop reason: SDUPTHER

## 2021-09-29 ASSESSMENT — ENCOUNTER SYMPTOMS
ABDOMINAL DISTENTION: 0
BACK PAIN: 0
DIARRHEA: 0
EYE REDNESS: 0
WHEEZING: 0
RHINORRHEA: 0
SORE THROAT: 0
COLOR CHANGE: 0
NAUSEA: 0
RECTAL PAIN: 0
TROUBLE SWALLOWING: 0
CHEST TIGHTNESS: 0
BLOOD IN STOOL: 0
SHORTNESS OF BREATH: 1
ABDOMINAL PAIN: 0
CONSTIPATION: 0
APNEA: 0
COUGH: 0
VOMITING: 0

## 2021-09-29 NOTE — PROGRESS NOTES
2021   TELEHEALTH EVALUATION -- Audio/Visual (During PYBQS-82 public health emergency)    Mariam Deutsch 54 y.o. male    : 1965    TeleMedicine Patient Consent    This visit was performed as a virtual video visit using a synchronous, two-way, audio-video telehealth technology platform. Patient identification was verified at the start of the visit, including the patient's telephone number and physical location. I discussed with the patient the nature of our telehealth visits, that:     1. Due to the nature of an audio- video modality, the only components of a physical exam that could be done are the elements supported by direct observation. 2. I would evaluate the patient and recommend diagnostics and treatments based on my assessment. 3. If it was felt that the patient should be evaluated in clinic or an emergency room setting, then they would be directed there. 4. Our sessions are not being recorded and that personal health information is protected. 5. Our team would provide follow up care in person if/when the patient needs it. Patient does agree to proceed with telemedicine consultation. Patient's location: home address in [facility]    Physician location: home office site  Other people involved in call: none    This visit was completed virtually using Doxy. me        Chief Complaint:   Hypertension       History of Present Illness:   Mariam Deutsch is a 54 y.o. male who has requested an audio/visual telehealth evaluation for follow up on chronic problems. He has an extensive PMH including COPD with oxygen dependence. He wears 5L N/C continuously. He follows with Dr. Kayy Castillo. He was recently discharged from the hospital on 2021 due to COVID-19 pneumonia. His breathing is back to baseline. He is accompanied today by his home health nurse who states his lungs are diminished with rhonchi in posterior lung bases. He is taking all his medications as prescribed.  BP today 127/100. Denies any dizziness, SOB, fever, chills or lethargy. He follows with psychiatry, mood is stable. Denies any SI/HI. The patient is not up-to-date with health maintenance screenings. Previous lab work was reviewed. Past Medical History:     Past Medical History:   Diagnosis Date    [facility] obstructive [facility] disease) [facility]     Hypertension     Multiple gastric ulcers        Past Surgical History:   Procedure Laterality Date    HERNIA REPAIR      HIP SURGERY      KNEE SURGERY         Family History   Problem Relation Age of Onset    Colon Cancer Mother     Other Father         house fire    No Known Problems Sister     No Known Problems Brother     No Known Problems Sister     No Known Problems Brother        Social History     Tobacco Use    Smoking status: Former Smoker     Packs/day: 4.00     Years: 25.00     Pack years: 100.00     Types: Cigarettes     Start date: 3/10/1995     Quit date: 10/13/2020     Years since quittin.9    Smokeless tobacco: Never Used   Vaping Use    Vaping Use: Never used   Substance Use Topics    Alcohol use: No    Drug use: No       Medications:     Current Outpatient Medications:     omeprazole (PRILOSEC) 40 MG delayed release capsule, Take 1 capsule by mouth daily, Disp: 30 capsule, Rfl: 2    dexamethasone (DECADRON) 6 MG tablet, Take 1 tablet by mouth daily (with breakfast) for 3 days, THEN 0.5 tablets daily (with breakfast) for 3 days, THEN 0.5 tablets every other day for 3 days. , Disp: 6 tablet, Rfl: 0    guaiFENesin-dextromethorphan (ROBITUSSIN DM) 100-10 MG/5ML syrup, Take 5 mLs by mouth every 4 hours as needed for Cough, Disp: 120 mL, Rfl: 0    vitamin D (CHOLECALCIFEROL) 50 MCG (2000 UT) TABS tablet, Take 1 tablet by mouth daily, Disp: 30 tablet, Rfl: 0    zinc sulfate (ORAZINC) 220 (50 Zn) MG capsule, Take 4 capsules by mouth daily, Disp: 30 capsule, Rfl: 0    ascorbic acid (V-R VITAMIN C) 250 MG tablet, Take 1 tablet by mouth daily, Disp: 30 tablet, Rfl: 0    hydroCHLOROthiazide (HYDRODIURIL) 12.5 MG tablet, Take 1 tablet by mouth daily, Disp: 30 tablet, Rfl: 3    calcium-vitamin D (OSCAL-500) 500-200 MG-UNIT per tablet, Take 1 tablet by mouth 2 times daily, Disp: 30 tablet, Rfl: 0    albuterol sulfate HFA (PROAIR HFA) 108 (90 Base) MCG/ACT inhaler, Inhale 2 puffs into the lungs every 4 hours as needed for Wheezing, Disp: 1 Inhaler, Rfl: 2    budesonide (PULMICORT) 0.5 MG/2ML nebulizer suspension, Take 2 mLs by nebulization 2 times daily, Disp: 60 ampule, Rfl: 5    Revefenacin (YUPELRI) 175 MCG/3ML SOLN, Inhale 1 ampule into the lungs daily, Disp: 30 vial, Rfl: 5    formoterol (PERFOROMIST) 20 MCG/2ML nebulizer solution, Take 2 mLs by nebulization every 12 hours (Patient taking differently: Take 20 mcg by nebulization every 12 hours NEW HAS NOT PICKED UP FROM PHARMACY), Disp: 120 mL, Rfl: 5    baclofen (LIORESAL) 10 MG tablet, Take 5 mg by mouth 2 times daily , Disp: , Rfl:     HYDROcodone-acetaminophen (NORCO) 7.5-325 MG per tablet, Take 1 tablet by mouth 2 times daily as needed. Takes religiously twice daily for leg pain per pt, Disp: , Rfl:     ipratropium-albuterol (DUONEB) 0.5-2.5 (3) MG/3ML SOLN nebulizer solution, Inhale 3 mLs into the lungs every 4 hours as needed for Shortness of Breath, Disp: 12 vial, Rfl: 0    ARIPiprazole (ABILIFY) 5 MG tablet, take 1 tablet by mouth once daily, Disp: , Rfl: 0    DULoxetine (CYMBALTA) 60 MG extended release capsule, take 1 capsule by mouth at bedtime, Disp: , Rfl: 0    Allergies   Allergen Reactions    Aspirin     Levofloxacin Other (See Comments)    Lisinopril     Other      Other reaction(s): ABD PAIN    Tramadol     Ibuprofen Nausea And Vomiting    Sulfa Antibiotics Nausea And Vomiting       Review of Systems:   Review of Systems   Constitutional: Negative for activity change, appetite change, chills, diaphoresis, fatigue, fever and unexpected weight change.    HENT: Negative for congestion, ear pain, hearing loss, postnasal drip, rhinorrhea, sore throat, tinnitus and trouble swallowing. Eyes: Negative for redness and visual disturbance. Respiratory: Positive for shortness of breath. Negative for apnea, cough, chest tightness and wheezing. Cardiovascular: Negative for chest pain, palpitations and leg swelling. Gastrointestinal: Negative for abdominal distention, abdominal pain, blood in stool, constipation, diarrhea, nausea, rectal pain and vomiting. Endocrine: Negative for cold intolerance and heat intolerance. Genitourinary: Negative for decreased urine volume, difficulty urinating, dysuria, flank pain, frequency, hematuria and urgency. Musculoskeletal: Negative for arthralgias, back pain, gait problem, joint swelling, myalgias, neck pain and neck stiffness. Skin: Negative for color change, pallor, rash and wound. Allergic/Immunologic: Negative for environmental allergies, food allergies and immunocompromised state. Neurological: Negative for dizziness, tremors, seizures, syncope, facial asymmetry, speech difficulty, weakness, light-headedness, numbness and headaches. Hematological: Negative for adenopathy. Does not bruise/bleed easily. Psychiatric/Behavioral: Negative for agitation, behavioral problems, confusion, decreased concentration, sleep disturbance and suicidal ideas. The patient is not nervous/anxious.         Physical Exam:   PHYSICAL EXAMINATION:  [ INSTRUCTIONS:  \"[x]\" Indicates a positive item  \"[]\" Indicates a negative item  -- DELETE ALL ITEMS NOT EXAMINED]  Vital Signs: (As obtained by patient/caregiver or practitioner observation)    Blood pressure-  Heart rate-    Respiratory rate-    Temperature-  Pulse oximetry-     Constitutional: [x] Appears well-developed and well-nourished [x] No apparent distress      [] Abnormal-   Mental status  [x] Alert and awake  [x] Oriented to person/place/time [x]Able to follow commands      Eyes:  EOM [x]  Normal  [] Abnormal-  Sclera  [x]  Normal  [] Abnormal -         Discharge [x]  None visible  [] Abnormal -    HENT:   [x] Normocephalic, atraumatic.   [] Abnormal   [x] Mouth/Throat: Mucous membranes are moist.     External Ears [x] Normal  [] Abnormal-     Neck: [x] No visualized mass     Pulmonary/Chest: [x] Respiratory effort normal.  [x] No visualized signs of difficulty breathing or respiratory distress        [] Abnormal-      Musculoskeletal:   [x] Normal gait with no signs of ataxia         [x] Normal range of motion of neck        [] Abnormal-       Neurological:        [x] No Facial Asymmetry (Cranial nerve 7 motor function) (limited exam to video visit)          [x] No gaze palsy        [] Abnormal-         Skin:        [x] No significant exanthematous lesions or discoloration noted on facial skin         [] Abnormal-            Psychiatric:       [x] Normal Affect [x] No Hallucinations        [] Abnormal-     Other pertinent observable physical exam findings-     Health Maintenance:     Health Maintenance   Topic Date Due    DTaP/Tdap/Td vaccine (1 - Tdap) Never done    Shingles Vaccine (1 of 2) Never done    Flu vaccine (1) 09/01/2021    Low dose CT lung screening  09/16/2022    Potassium monitoring  09/21/2022    Creatinine monitoring  09/21/2022    Lipid screen  06/14/2026    Colon cancer screen colonoscopy  04/07/2027    Pneumococcal 0-64 years Vaccine (2 of 2 - PPSV23) 11/15/2030    COVID-19 Vaccine  Completed    Hepatitis C screen  Completed    HIV screen  Completed    Hepatitis A vaccine  Aged Out    Hepatitis B vaccine  Aged Out    Hib vaccine  Aged Out    Meningococcal (ACWY) vaccine  Aged [employer] History   Administered Date(s) Administered    COVID-19, J&J, PF, 0.5 mL 03/30/2021    Influenza, Quadv, IM, PF (6 mo and older Fluzone, Flulaval, Fluarix, and 3 yrs and older Afluria) 10/14/2015    Pneumococcal Polysaccharide ([id number]) 10/14/2015 possible side effects, risks, benefits and alternatives to treatment; patient and/or guardian verbalizes understanding. Advised patient to call with any new medication issues. All questions answered. Reviewed age and gender appropriate health screening exams and vaccinations. Advised patient regarding importance of keeping up with recommended health maintenance and to schedule as soon as possible if overdue, as this is important in assessing for undiagnosed pathology, especially cancer. Patient verbalizes understanding and agrees. Total time spent on this encounter: 30 minutes    --ARGELIA Way NP on 9/29/2021 at 7:22 PM    An electronic signature was used to authenticate this note. **This report was transcribed using voice recognition software. Every effort was made to ensure accuracy; however, inadvertent computerized transcription errors may be present.

## 2021-09-30 ENCOUNTER — CARE COORDINATION (OUTPATIENT)
Dept: CASE MANAGEMENT | Age: 56
End: 2021-09-30

## 2021-09-30 NOTE — CARE COORDINATION
[facility] Transitions Follow Up Call    2021    Patient: Dyana Hays  Patient : 1965   MRN: [id number]  Reason for Admission: PNA d/t COVID-19+  Discharge Date: 21 RARS: Readmission Risk Score: 22    Attempted to contact patient sister/ Elijah Lo) for hospital discharge follow up sub call. Left message requesting a return call back to CTN and provided contact information.       Dave Gan, APRN

## 2021-10-06 RX ORDER — DEXAMETHASONE 1 MG
TABLET ORAL
Qty: 36 TABLET | Refills: 0 | Status: SHIPPED
Start: 2021-10-06 | End: 2021-12-29 | Stop reason: ALTCHOICE

## 2021-10-06 RX ORDER — BUDESONIDE AND FORMOTEROL FUMARATE DIHYDRATE 160; 4.5 UG/1; UG/1
2 AEROSOL RESPIRATORY (INHALATION) 2 TIMES DAILY
Qty: 1 EACH | Refills: 5 | Status: SHIPPED
Start: 2021-10-06 | End: 2022-04-13 | Stop reason: SDUPTHER

## 2021-10-06 NOTE — TELEPHONE ENCOUNTER
Patient was recently discharged from [facility] from [facility]. He was prescribed decadron 6mg  there and they told him that he would likely need to have a second script for this and would need to get it from his pcp. His home care nurse called and said that his breathing has become more difficult since he ran out of the first script for this. She is also asking for a refill of his symbicort.

## 2021-10-08 ENCOUNTER — CARE COORDINATION (OUTPATIENT)
Dept: CASE MANAGEMENT | Age: 56
End: 2021-10-08

## 2021-10-08 NOTE — CARE COORDINATION
[facility] Transitions Follow Up Call    10/8/2021    Patient: Bethany Villarreal  Patient : 1965   MRN: [id number]  Reason for Admission: PNA d/t COVID-19+  Discharge Date: 21 RARS: Readmission Risk Score: 22    Care Transitions Follow Up Call    Needs to be reviewed by the provider   Additional needs identified to be addressed with provider: No  none             Method of communication with provider : none      Care Transition Nurse (CTN) contacted the family by telephone to follow up after admission on 21. Verified name and  with family as identifiers. Addressed changes since last contact: none  Discussed follow-up appointments. If no appointment was previously scheduled, appointment scheduling offered: Yes. Is follow up appointment scheduled within 7 days of discharge? Yes. Advance Care Planning:   Does patient have an Advance Directive: reviewed and current. CTN reviewed discharge instructions, medical action plan and red flags with family and discussed any barriers to care and/or understanding of plan of care after discharge. Discussed appropriate site of care based on symptoms and resources available to patient including: PCP, Urgent care clinics, Home health, When to call 911 and Condition related references. The family agrees to contact the PCP office for questions related to their healthcare. Patients top risk factors for readmission: medical condition-COPD, PNA and acute respiratory failure and polypharmacy    Non-[facility] follow up appointment(s): CTN confirmed with patient sister Tara Garces that patent completed vv visit with Alok Coronado NP at [facility] on 21. No further follow-up call indicated based on severity of symptoms and risk factors.     Care Transitions Subsequent and Final Call    Subsequent and Final Calls  Do you have any ongoing symptoms?: Yes  Onset of Patient-reported symptoms: Other  Patient-reported symptoms: Cough, Shortness of Breath  Have your medications changed?: Yes  Do you have any questions related to your medications?: Yes  Patient Reports: CTN confirmed with patient sister Wallace Martinez) that PCP prescribed another round of Decadron for 6 more days. Do you currently have any active services?: Yes  Are you currently active with any services?: Home Health  Do you have any needs or concerns that I can assist you with?: No  Identified Barriers: None  Care Transitions Interventions  No Identified Needs  Other Interventions:         Spoke with patient sister Wallace Martinez) today 10/8/21 for hospital discharge/COVID-19+ final follow up call. Larue Duane \[facility] [place] [facility]\"" patient continues with shortness of breath with exertion which is not worse and chronic d/t underlying COPD. [facility] patient continues wearing home oxygen between 4 lpm-8lpm. [facility] patient turns oxygen flow rate up at night while sleeping which helps. [facility] patient finished first round of Decadron that was ordered on discharge but PCP ordered another 6 day round at [facility] app on 9/29/21. CTN reiterated importance for patient to take Decadron as directed until completely finished. Denies patient having any chest pain, chest discomfort, nausea, vomiting, diarrhea, chills or fever. [facility] patient continues with [facility] and admits nurse comes twice weekly and other sister Inderjit Arreola) is his [facility] who attends to him every other day. CTN reiterated PNA/COPD zone tools and knowing when to seek medical attention. Denies any other complaints or further needs. CTN signing off. Follow Up  No future appointments.     ARGELIA Chavira

## 2021-10-11 ENCOUNTER — TELEPHONE (OUTPATIENT)
Dept: PRIMARY CARE CLINIC | Age: 56
End: 2021-10-11

## 2021-10-11 DIAGNOSIS — R09.81 SINUS CONGESTION: Primary | ICD-10-CM

## 2021-10-11 RX ORDER — DEXTROMETHORPHAN HYDROBROMIDE AND PROMETHAZINE HYDROCHLORIDE 15; 6.25 MG/5ML; MG/5ML
5 SYRUP ORAL 4 TIMES DAILY PRN
Qty: 140 ML | Refills: 0 | Status: SHIPPED | OUTPATIENT
Start: 2021-10-11 | End: 2021-10-18

## 2021-10-27 DIAGNOSIS — F41.9 ANXIETY: Primary | ICD-10-CM

## 2021-10-27 RX ORDER — LORAZEPAM 0.5 MG/1
0.5 TABLET ORAL 2 TIMES DAILY
Qty: 60 TABLET | Refills: 0 | Status: SHIPPED
Start: 2021-10-27 | End: 2021-12-01 | Stop reason: SDUPTHER

## 2021-10-27 RX ORDER — DOXYCYCLINE HYCLATE 100 MG/1
100 CAPSULE ORAL 2 TIMES DAILY
COMMUNITY
End: 2021-10-27 | Stop reason: SDUPTHER

## 2021-10-27 RX ORDER — L.ACIDOPH/B.ANIMALIS/B.LONGUM 15B CELL
CAPSULE ORAL
Qty: 30 CAPSULE | Refills: 3 | Status: ON HOLD | OUTPATIENT
Start: 2021-10-27

## 2021-10-27 RX ORDER — L.ACIDOPH/B.ANIMALIS/B.LONGUM 15B CELL
CAPSULE ORAL
COMMUNITY
End: 2021-10-27 | Stop reason: SDUPTHER

## 2021-10-27 RX ORDER — LORAZEPAM 0.5 MG/1
0.5 TABLET ORAL EVERY 6 HOURS PRN
COMMUNITY
End: 2021-10-27 | Stop reason: SDUPTHER

## 2021-10-27 RX ORDER — DOXYCYCLINE HYCLATE 100 MG/1
100 CAPSULE ORAL 2 TIMES DAILY
Qty: 20 CAPSULE | Refills: 0 | Status: SHIPPED | OUTPATIENT
Start: 2021-10-27 | End: 2021-11-06

## 2021-10-27 NOTE — TELEPHONE ENCOUNTER
Patient was discharged from hospital on Sunday with these prescriptions, however pharmacy states they did not receive them. [facility] is requesting them be filled.          Please advise

## 2021-11-15 RX ORDER — ALBUTEROL SULFATE 90 UG/1
AEROSOL, METERED RESPIRATORY (INHALATION)
Qty: 18 G | Refills: 2 | Status: SHIPPED
Start: 2021-11-15 | End: 2022-03-16 | Stop reason: SDUPTHER

## 2021-11-30 ENCOUNTER — OFFICE VISIT (OUTPATIENT)
Dept: FAMILY MEDICINE CLINIC | Age: 56
End: 2021-11-30
Payer: MEDICAID

## 2021-11-30 VITALS
HEIGHT: 64 IN | BODY MASS INDEX: 23.22 KG/M2 | DIASTOLIC BLOOD PRESSURE: 74 MMHG | WEIGHT: 136 LBS | SYSTOLIC BLOOD PRESSURE: 136 MMHG | OXYGEN SATURATION: 99 % | TEMPERATURE: 97.2 F | HEART RATE: 94 BPM | RESPIRATION RATE: 22 BRPM

## 2021-11-30 DIAGNOSIS — R05.9 COUGH: Primary | ICD-10-CM

## 2021-11-30 DIAGNOSIS — J40 BRONCHITIS: ICD-10-CM

## 2021-11-30 PROCEDURE — 99214 OFFICE O/P EST MOD 30 MIN: CPT | Performed by: PHYSICIAN ASSISTANT

## 2021-11-30 RX ORDER — KETOCONAZOLE 20 MG/G
CREAM TOPICAL
Qty: 30 G | Refills: 1 | Status: ON HOLD
Start: 2021-11-30 | End: 2022-07-08

## 2021-11-30 RX ORDER — PREDNISONE 20 MG/1
40 TABLET ORAL DAILY
Qty: 10 TABLET | Refills: 0 | Status: SHIPPED | OUTPATIENT
Start: 2021-11-30 | End: 2021-12-05

## 2021-11-30 RX ORDER — AZITHROMYCIN 250 MG/1
250 TABLET, FILM COATED ORAL SEE ADMIN INSTRUCTIONS
Qty: 6 TABLET | Refills: 0 | Status: SHIPPED | OUTPATIENT
Start: 2021-11-30 | End: 2021-12-05

## 2021-11-30 ASSESSMENT — ENCOUNTER SYMPTOMS
PHOTOPHOBIA: 0
SORE THROAT: 0
WHEEZING: 1
SHORTNESS OF BREATH: 1
VOMITING: 0
NAUSEA: 0
ABDOMINAL PAIN: 0
DIARRHEA: 0
BACK PAIN: 0
COUGH: 1

## 2021-11-30 NOTE — PROGRESS NOTES
Skin: Positive for rash. Neurological: Negative for dizziness, syncope, weakness, light-headedness and headaches. Hematological: Negative for adenopathy. Does not bruise/bleed easily. Psychiatric/Behavioral: Negative for agitation and confusion. All other systems reviewed and are negative. PMH:     Past Medical History:   Diagnosis Date    COPD [facility]chronic obstructive pulmonary disease) ([facility] [address]     Hypertension     Multiple gastric ulcers        Past Surgical History:   Procedure Laterality Date    HERNIA REPAIR      HIP SURGERY      KNEE SURGERY         Family History   Problem Relation Age of Onset    Colon Cancer Mother     Other Father         house fire    No Known Problems Sister     No Known Problems Brother     No Known Problems Sister     No Known Problems Brother        Medications:     Current Outpatient Medications:     predniSONE (DELTASONE) 20 MG tablet, Take 2 tablets by mouth daily for 5 days, Disp: 10 tablet, Rfl: 0    azithromycin (ZITHROMAX) 250 MG tablet, Take 1 tablet by mouth See Admin Instructions for 5 days 500mg on day 1 followed by 250mg on days 2 - 5, Disp: 6 tablet, Rfl: 0    ketoconazole (NIZORAL) 2 % cream, Apply topically daily. , Disp: 30 g, Rfl: 1    albuterol sulfate  (90 Base) MCG/ACT inhaler, INHALE TWO (2) PUFFS INTO THE LUNGS EVERY FOUR (4) HOURS AS NEEDED FOR WHEEZING, Disp: 18 g, Rfl: 2    Probiotic Product [facility]) CAPS, Patient is to take one tab bid.  Patient has been on antibiotics for the last 3 months, Disp: 30 capsule, Rfl: 3    SYMBICORT 160-4.5 MCG/ACT AERO, Inhale 2 puffs into the lungs 2 times daily, Disp: 1 each, Rfl: 5    dexamethasone (DECADRON) 1 MG tablet, Take 3 tablets by mouth twice daily X 6 days, Disp: 36 tablet, Rfl: 0    omeprazole (PRILOSEC) 40 MG delayed release capsule, Take 1 capsule by mouth daily, Disp: 30 capsule, Rfl: 2    vitamin D (CHOLECALCIFEROL) 50 MCG (2000 UT) TABS tablet, Take 1 tablet by mouth daily, Disp: 30 tablet, Rfl: 0    zinc sulfate (ORAZINC) 220 (50 Zn) MG capsule, Take 4 capsules by mouth daily, Disp: 30 capsule, Rfl: 0    ascorbic acid (V-R VITAMIN C) 250 MG tablet, Take 1 tablet by mouth daily, Disp: 30 tablet, Rfl: 0    hydroCHLOROthiazide (HYDRODIURIL) 12.5 MG tablet, Take 1 tablet by mouth daily, Disp: 30 tablet, Rfl: 3    calcium-vitamin D (OSCAL-500) 500-200 MG-UNIT per tablet, Take 1 tablet by mouth 2 times daily, Disp: 30 tablet, Rfl: 0    budesonide (PULMICORT) 0.5 MG/2ML nebulizer suspension, Take 2 mLs by nebulization 2 times daily, Disp: 60 ampule, Rfl: 5    Revefenacin (YUPELRI) 175 MCG/3ML SOLN, Inhale 1 ampule into the lungs daily, Disp: 30 vial, Rfl: 5    formoterol (PERFOROMIST) 20 MCG/2ML nebulizer solution, Take 2 mLs by nebulization every 12 hours (Patient taking differently: Take 20 mcg by nebulization every 12 hours NEW HAS NOT PICKED UP FROM PHARMACY), Disp: 120 mL, Rfl: 5    baclofen (LIORESAL) 10 MG tablet, Take 5 mg by mouth 2 times daily , Disp: , Rfl:     HYDROcodone-acetaminophen (NORCO) 7.5-325 MG per tablet, Take 1 tablet by mouth 2 times daily as needed. Takes religiously twice daily for leg pain per pt, Disp: , Rfl:     ipratropium-albuterol (DUONEB) 0.5-2.5 (3) MG/3ML SOLN nebulizer solution, Inhale 3 mLs into the lungs every 4 hours as needed for Shortness of Breath, Disp: 12 vial, Rfl: 0    ARIPiprazole (ABILIFY) 5 MG tablet, take 1 tablet by mouth once daily, Disp: , Rfl: 0    DULoxetine (CYMBALTA) 60 MG extended release capsule, take 1 capsule by mouth at bedtime, Disp: , Rfl: 0    Allergies:      Allergies   Allergen Reactions    Aspirin     Levofloxacin Other (See Comments)    Lisinopril     Other      Other reaction(s): ABD PAIN    Tramadol     Ibuprofen Nausea And Vomiting    Sulfa Antibiotics Nausea And Vomiting       Social History:     Social History     Tobacco Use    Smoking status: Former Smoker     Packs/day: 4.00     Years: 25.00     Pack years: 100.00     Types: Cigarettes     Start date: 3/10/1995     Quit date: 10/13/2020     Years since quittin.1    Smokeless tobacco: Never Used   Vaping Use    Vaping Use: Never used   Substance Use Topics    Alcohol use: No    Drug use: No       Patient lives at home. Physical Exam:     Vitals:    21 0910   BP: 136/74   Pulse: 94   Resp: 22   Temp: 97.2 °F (36.2 °C)   TempSrc: Temporal   SpO2: 99%   Weight: 136 lb (61.7 kg)   Height: 5' 4\" (1.626 m)       Exam:  Physical Exam  Vitals and nursing note reviewed. Constitutional:       General: He is not in acute distress. Appearance: He is well-developed. HENT:      Head: Normocephalic and atraumatic. Right Ear: Tympanic membrane normal.      Left Ear: Tympanic membrane normal.      Nose: Nose normal.      Mouth/Throat:      Mouth: Mucous membranes are moist.   Eyes:      Conjunctiva/sclera: Conjunctivae normal.      Pupils: Pupils are equal, round, and reactive to light. Cardiovascular:      Rate and Rhythm: Normal rate and regular rhythm. Pulmonary:      Effort: Pulmonary effort is normal. No respiratory distress. Breath sounds: Wheezing present. Comments: Decreased breath sounds bilaterally  Abdominal:      General: Bowel sounds are normal.      Palpations: Abdomen is soft. Tenderness: There is no abdominal tenderness. Musculoskeletal:         General: No swelling. Normal range of motion. Cervical back: Normal range of motion. No rigidity. Lymphadenopathy:      Cervical: No cervical adenopathy. Skin:     General: Skin is warm and dry. Comments: Patient refuses me to look at the rash on his inner thighs. Neurological:      General: No focal deficit present. Mental Status: He is alert and oriented to person, place, and time. Psychiatric:         Mood and Affect: Mood normal.         Behavior: Behavior normal.         Thought Content:  Thought content normal.         Judgment: Judgment normal.           Testing:     FINDINGS:             The heart size is normal.             Lung fields are hyperinflated with suspected emphysematous changes in the upper lobes.             Prominent interstitial markings in the lung bases with chronic pleural thickening most likely      pulmonary fibrosis similar to CT of 08/11/2021.             No new infiltrate or effusion.             Heart size is normal.  Aorta is tortuous.             Old right-sided rib fractures.             Impression             Chest, two-view:      1.  Emphysematous changes with suspected pulmonary fibrosis and chronic changes in the lung bases.      Findings are similar to 10/19/2021.      2.  Lung fields are otherwise clear.                                 Dictated ByStalin Block MD   DD/DT: 11/30/21 1102                  Signed Amish Block MD 11/30/21 1102             Medical Decision Making:     Patient upon arrival did not appear toxic or lethargic. Vital signs were reviewed. Past medical history reviewed. Allergies reviewed. Medications reviewed. Patient is presenting with the above complaint of cough and wheezing. Pulse ox is 99% on 6 L of oxygen. Patient will be sent for keep and call chest x-ray. Chest x-ray results as described above. There is no evidence of new infiltrate or effusion. There is emphysematous changes with suspected pulmonary fibrosis and chronic changes similar to the x-ray finding on 10/19/2021. Lung fields are otherwise clear. I did speak to the patient regarding these results. He will be placed on azithromycin and prednisone. For the rash I will prescribe ketoconazole cream.  He was instructed that he will need follow-up to evaluate the rash if symptoms persist.  He will not allow me to look at it. He is to continue his albuterol and breathing treatments like previous.   He was educated extensively on signs and symptoms that would warrant emergent

## 2021-12-01 DIAGNOSIS — F41.9 ANXIETY: ICD-10-CM

## 2021-12-01 RX ORDER — LORAZEPAM 0.5 MG/1
0.5 TABLET ORAL 2 TIMES DAILY
Qty: 60 TABLET | Refills: 0 | Status: SHIPPED
Start: 2021-12-01 | End: 2021-12-29 | Stop reason: SDUPTHER

## 2021-12-01 NOTE — TELEPHONE ENCOUNTER
Last Appointment:  9/29/2021  Future Appointments   Date Time Provider Mik Rosario   12/21/2021  3:30 PM Marciano Mario

## 2021-12-21 ENCOUNTER — OFFICE VISIT (OUTPATIENT)
Dept: FAMILY MEDICINE CLINIC | Age: 56
End: 2021-12-21
Payer: MEDICAID

## 2021-12-21 VITALS
TEMPERATURE: 97.4 F | HEART RATE: 77 BPM | OXYGEN SATURATION: 100 % | SYSTOLIC BLOOD PRESSURE: 115 MMHG | RESPIRATION RATE: 20 BRPM | BODY MASS INDEX: 21.34 KG/M2 | WEIGHT: 125 LBS | HEIGHT: 64 IN | DIASTOLIC BLOOD PRESSURE: 67 MMHG

## 2021-12-21 DIAGNOSIS — L03.116 CELLULITIS OF LEFT LOWER EXTREMITY: ICD-10-CM

## 2021-12-21 DIAGNOSIS — L03.115 CELLULITIS OF RIGHT LOWER EXTREMITY: Primary | ICD-10-CM

## 2021-12-21 PROCEDURE — 99213 OFFICE O/P EST LOW 20 MIN: CPT | Performed by: PHYSICIAN ASSISTANT

## 2021-12-21 RX ORDER — CEFDINIR 300 MG/1
300 CAPSULE ORAL 2 TIMES DAILY
Qty: 20 CAPSULE | Refills: 0 | Status: SHIPPED | OUTPATIENT
Start: 2021-12-21 | End: 2021-12-31

## 2021-12-21 RX ORDER — DOXYCYCLINE HYCLATE 100 MG
100 TABLET ORAL 2 TIMES DAILY
Qty: 20 TABLET | Refills: 0 | Status: SHIPPED | OUTPATIENT
Start: 2021-12-21 | End: 2021-12-31

## 2021-12-21 ASSESSMENT — ENCOUNTER SYMPTOMS
PHOTOPHOBIA: 0
ABDOMINAL PAIN: 0
BACK PAIN: 0
SORE THROAT: 0
COLOR CHANGE: 1
VOMITING: 0
DIARRHEA: 0
NAUSEA: 0
SHORTNESS OF BREATH: 0
COUGH: 0

## 2021-12-21 NOTE — PROGRESS NOTES
21  Yuan Yeager : 1965 Sex: male  Age 64 y.o. Subjective:  Chief Complaint   Patient presents with    Leg Pain     patient is complaining of legs feeling hot and painful         59-year-old male with a history of hypertension, peripheral vascular disease, COPD and cellulitis presents to the walk-in clinic with his wife for evaluation of possible cellulitis of his bilateral lower extremities. Patient began complaining last Thursday that his legs felt hot and tender. He has had cellulitis in the past and describes feeling similar symptoms now. He denies any calf tenderness or leg swelling. He is on prednisone from his pulmonologist.  He denies any fever or chills. No nausea or vomiting. He is eating and drinking. He denies any shortness of breath or chest pain. Review of Systems   Constitutional: Negative for chills and fever. HENT: Negative for congestion, ear pain and sore throat. Eyes: Negative for photophobia and visual disturbance. Respiratory: Negative for cough and shortness of breath. Cardiovascular: Negative for chest pain. Gastrointestinal: Negative for abdominal pain, diarrhea, nausea and vomiting. Genitourinary: Negative for difficulty urinating, dysuria, frequency and urgency. Musculoskeletal: Negative for back pain, neck pain and neck stiffness. Skin: Positive for color change. Negative for rash. Neurological: Negative for dizziness, syncope, weakness, light-headedness and headaches. Hematological: Negative for adenopathy. Does not bruise/bleed easily. Psychiatric/Behavioral: Negative for agitation and confusion. All other systems reviewed and are negative.         PMH:     Past Medical History:   Diagnosis Date    COPD [facility]chronic obstructive pulmonary disease) ([facility] [address]     Hypertension     Multiple gastric ulcers        Past Surgical History:   Procedure Laterality Date    HERNIA REPAIR      HIP SURGERY      KNEE SURGERY Family History   Problem Relation Age of Onset    Colon Cancer Mother     Other Father         house fire    No Known Problems Sister     No Known Problems Brother     No Known Problems Sister     No Known Problems Brother        Medications:     Current Outpatient Medications:     cefdinir (OMNICEF) 300 MG capsule, Take 1 capsule by mouth 2 times daily for 10 days, Disp: 20 capsule, Rfl: 0    doxycycline hyclate (VIBRA-TABS) 100 MG tablet, Take 1 tablet by mouth 2 times daily for 10 days, Disp: 20 tablet, Rfl: 0    predniSONE (DELTASONE) 10 MG tablet, Take 4 tabs x 3 days, 3 tabs x 3 days,2 tabs x 3 days then 1 tab for 90 days, Disp: 120 tablet, Rfl: 0    LORazepam (ATIVAN) 0.5 MG tablet, Take 1 tablet by mouth 2 times daily for 30 days. , Disp: 60 tablet, Rfl: 0    ketoconazole (NIZORAL) 2 % cream, Apply topically daily. , Disp: 30 g, Rfl: 1    albuterol sulfate  (90 Base) MCG/ACT inhaler, INHALE TWO (2) PUFFS INTO THE LUNGS EVERY FOUR (4) HOURS AS NEEDED FOR WHEEZING, Disp: 18 g, Rfl: 2    Probiotic Product [facility]) CAPS, Patient is to take one tab bid.  Patient has been on antibiotics for the last 3 months, Disp: 30 capsule, Rfl: 3    SYMBICORT 160-4.5 MCG/ACT AERO, Inhale 2 puffs into the lungs 2 times daily, Disp: 1 each, Rfl: 5    dexamethasone (DECADRON) 1 MG tablet, Take 3 tablets by mouth twice daily X 6 days, Disp: 36 tablet, Rfl: 0    omeprazole (PRILOSEC) 40 MG delayed release capsule, Take 1 capsule by mouth daily, Disp: 30 capsule, Rfl: 2    vitamin D (CHOLECALCIFEROL) 50 MCG (2000 UT) TABS tablet, Take 1 tablet by mouth daily, Disp: 30 tablet, Rfl: 0    zinc sulfate (ORAZINC) 220 (50 Zn) MG capsule, Take 4 capsules by mouth daily, Disp: 30 capsule, Rfl: 0    ascorbic acid (V-R VITAMIN C) 250 MG tablet, Take 1 tablet by mouth daily, Disp: 30 tablet, Rfl: 0    hydroCHLOROthiazide (HYDRODIURIL) 12.5 MG tablet, Take 1 tablet by mouth daily, Disp: 30 tablet, Rfl: 3   calcium-vitamin D (OSCAL-500) 500-200 MG-UNIT per tablet, Take 1 tablet by mouth 2 times daily, Disp: 30 tablet, Rfl: 0    budesonide (PULMICORT) 0.5 MG/2ML nebulizer suspension, Take 2 mLs by nebulization 2 times daily, Disp: 60 ampule, Rfl: 5    Revefenacin (YUPELRI) 175 MCG/3ML SOLN, Inhale 1 ampule into the lungs daily, Disp: 30 vial, Rfl: 5    formoterol (PERFOROMIST) 20 MCG/2ML nebulizer solution, Take 2 mLs by nebulization every 12 hours (Patient taking differently: Take 20 mcg by nebulization every 12 hours NEW HAS NOT PICKED UP FROM PHARMACY), Disp: 120 mL, Rfl: 5    baclofen (LIORESAL) 10 MG tablet, Take 5 mg by mouth 2 times daily , Disp: , Rfl:     HYDROcodone-acetaminophen (NORCO) 7.5-325 MG per tablet, Take 1 tablet by mouth 2 times daily as needed. Takes religiously twice daily for leg pain per pt, Disp: , Rfl:     ipratropium-albuterol (DUONEB) 0.5-2.5 (3) MG/3ML SOLN nebulizer solution, Inhale 3 mLs into the lungs every 4 hours as needed for Shortness of Breath, Disp: 12 vial, Rfl: 0    ARIPiprazole (ABILIFY) 5 MG tablet, take 1 tablet by mouth once daily, Disp: , Rfl: 0    DULoxetine (CYMBALTA) 60 MG extended release capsule, take 1 capsule by mouth at bedtime, Disp: , Rfl: 0    Allergies: Allergies   Allergen Reactions    Aspirin     Levofloxacin Other (See Comments)    Lisinopril     Other      Other reaction(s): ABD PAIN    Tramadol     Ibuprofen Nausea And Vomiting    Sulfa Antibiotics Nausea And Vomiting       Social History:     Social History     Tobacco Use    Smoking status: Former Smoker     Packs/day: 4.00     Years: 25.00     Pack years: 100.00     Types: Cigarettes     Start date: 3/10/1995     Quit date: 10/13/2020     Years since quittin.1    Smokeless tobacco: Never Used   Vaping Use    Vaping Use: Never used   Substance Use Topics    Alcohol use: No    Drug use: No       Patient lives at home.     Physical Exam:     Vitals:    21 0902   BP: 115/67 Site: Right Upper Arm   Position: Sitting   Cuff Size: Large Adult   Pulse: 77   Resp: 20   Temp: 97.4 °F (36.3 °C)   TempSrc: Temporal   SpO2: 100%   Weight: 125 lb (56.7 kg)   Height: 5' 4\" (1.626 m)       Exam:  Physical Exam  Vitals and nursing note reviewed. Constitutional:       General: He is not in acute distress. Appearance: He is well-developed. HENT:      Head: Normocephalic and atraumatic. Eyes:      Conjunctiva/sclera: Conjunctivae normal.      Pupils: Pupils are equal, round, and reactive to light. Cardiovascular:      Rate and Rhythm: Normal rate and regular rhythm. Pulmonary:      Effort: Pulmonary effort is normal. No respiratory distress. Breath sounds: Normal breath sounds. Abdominal:      General: Bowel sounds are normal.      Palpations: Abdomen is soft. Tenderness: There is no abdominal tenderness. Musculoskeletal:         General: Normal range of motion. Cervical back: Normal range of motion. Right lower leg: No edema. Left lower leg: No edema. Comments: Patient has evidence of erythema and warmth starting at the distal bilateral lower legs extending to the dorsum of the feet bilaterally. There is no leg swelling. No cyanosis or mottling. There is no calf tenderness. Negative Homans' sign bilaterally. Pulses are intact bilaterally at dorsalis pedis and posterior tibialis. He has no bony tenderness of the bilateral lower extremities. Skin:     General: Skin is warm and dry. Neurological:      General: No focal deficit present. Mental Status: He is alert and oriented to person, place, and time. Psychiatric:         Mood and Affect: Mood normal.         Behavior: Behavior normal.         Thought Content: Thought content normal.         Judgment: Judgment normal.           Testing:           Medical Decision Making:       Patient upon arrival did not appear toxic or lethargic. Vital signs were reviewed.      Past medical history reviewed. Allergies reviewed. Medications reviewed. Patient is presenting with the above complaint of cellulitis. Differential diagnosis was discussed with the patient although his signs and symptoms as well as his history is consistent with cellulitis of the bilateral lower extremities. Patient will be treated with cefdinir and doxycycline. He is to monitor his symptoms closely. We discussed signs and symptoms that would warrant emergent evaluation in the emergency department. Patient has an appointment to follow-up with his PCP on 12/29/2021. Clinical Impression:   Kg Rand was seen today for leg pain. Diagnoses and all orders for this visit:    Cellulitis of right lower extremity    Cellulitis of left lower extremity    Other orders  -     cefdinir (OMNICEF) 300 MG capsule; Take 1 capsule by mouth 2 times daily for 10 days  -     doxycycline hyclate (VIBRA-TABS) 100 MG tablet; Take 1 tablet by mouth 2 times daily for 10 days        The patient is to call for any concerns or return if any of the signs or symptoms worsen. The patient is to follow-up with PCP in the next 2-3 days for repeat evaluation repeat assessment or go directly to the emergency department. SIGNATURE: Mey Chaudhary PA-C

## 2021-12-29 ENCOUNTER — OFFICE VISIT (OUTPATIENT)
Dept: PRIMARY CARE CLINIC | Age: 56
End: 2021-12-29
Payer: MEDICAID

## 2021-12-29 VITALS
HEIGHT: 64 IN | DIASTOLIC BLOOD PRESSURE: 80 MMHG | BODY MASS INDEX: 21.34 KG/M2 | HEART RATE: 96 BPM | OXYGEN SATURATION: 100 % | SYSTOLIC BLOOD PRESSURE: 146 MMHG | WEIGHT: 125 LBS | TEMPERATURE: 97.5 F | RESPIRATION RATE: 18 BRPM

## 2021-12-29 DIAGNOSIS — J44.1 COPD EXACERBATION (HCC): Primary | ICD-10-CM

## 2021-12-29 DIAGNOSIS — I10 ESSENTIAL HYPERTENSION: ICD-10-CM

## 2021-12-29 DIAGNOSIS — K21.9 GASTROESOPHAGEAL REFLUX DISEASE, UNSPECIFIED WHETHER ESOPHAGITIS PRESENT: ICD-10-CM

## 2021-12-29 DIAGNOSIS — F41.9 ANXIETY: ICD-10-CM

## 2021-12-29 DIAGNOSIS — J44.9 CHRONIC OBSTRUCTIVE PULMONARY DISEASE, UNSPECIFIED COPD TYPE (HCC): ICD-10-CM

## 2021-12-29 PROCEDURE — 99214 OFFICE O/P EST MOD 30 MIN: CPT | Performed by: FAMILY MEDICINE

## 2021-12-29 RX ORDER — BUDESONIDE 0.5 MG/2ML
1 INHALANT ORAL 2 TIMES DAILY
COMMUNITY
End: 2021-12-29 | Stop reason: ALTCHOICE

## 2021-12-29 RX ORDER — LORAZEPAM 0.5 MG/1
0.5 TABLET ORAL 2 TIMES DAILY PRN
Qty: 60 TABLET | Refills: 0 | Status: SHIPPED | OUTPATIENT
Start: 2021-12-29 | End: 2022-01-28

## 2021-12-29 RX ORDER — METHYLPREDNISOLONE SODIUM SUCCINATE 125 MG/2ML
125 INJECTION, POWDER, LYOPHILIZED, FOR SOLUTION INTRAMUSCULAR; INTRAVENOUS ONCE
Status: COMPLETED | OUTPATIENT
Start: 2021-12-29 | End: 2021-12-29

## 2021-12-29 RX ORDER — BUDESONIDE 0.5 MG/2ML
1 INHALANT ORAL 2 TIMES DAILY
COMMUNITY
End: 2022-02-02

## 2021-12-29 RX ORDER — FORMOTEROL FUMARATE 20 UG/2ML
20 SOLUTION RESPIRATORY (INHALATION) EVERY 12 HOURS SCHEDULED
Qty: 120 ML | Refills: 5
Start: 2021-12-29 | End: 2022-04-13 | Stop reason: ALTCHOICE

## 2021-12-29 RX ORDER — OMEPRAZOLE 40 MG/1
40 CAPSULE, DELAYED RELEASE ORAL DAILY
Qty: 30 CAPSULE | Refills: 2 | Status: SHIPPED
Start: 2021-12-29 | End: 2022-04-13 | Stop reason: SDUPTHER

## 2021-12-29 RX ORDER — HYDROCHLOROTHIAZIDE 25 MG/1
25 TABLET ORAL DAILY
Qty: 90 TABLET | Refills: 1 | Status: SHIPPED
Start: 2021-12-29 | End: 2022-02-02 | Stop reason: ALTCHOICE

## 2021-12-29 RX ADMIN — METHYLPREDNISOLONE SODIUM SUCCINATE 125 MG: 125 INJECTION, POWDER, LYOPHILIZED, FOR SOLUTION INTRAMUSCULAR; INTRAVENOUS at 15:09
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
## 2021-12-29 NOTE — TELEPHONE ENCOUNTER
Name of Medication(s) Requested:  lorazepam    Pharmacy Requested:   [facility]    Medication(s) pended? [x] Yes  [] No    Last Appointment:  9/29/2021    Future appts:  Future Appointments   Date Time Provider Mik Rosario   12/29/2021  2:30 PM Kameron Chandra MD [facility]          Does patient need call back?   [] Yes  [x] No

## 2022-01-21 LAB
LEFT VENTRICULAR EJECTION FRACTION HIGH VALUE: 50 %
LEFT VENTRICULAR EJECTION FRACTION MODE: NORMAL
LV EF: 50 %

## 2022-02-02 ENCOUNTER — OFFICE VISIT (OUTPATIENT)
Dept: PRIMARY CARE CLINIC | Age: 57
End: 2022-02-02
Payer: MEDICAID

## 2022-02-02 VITALS
HEART RATE: 114 BPM | TEMPERATURE: 97.3 F | RESPIRATION RATE: 18 BRPM | OXYGEN SATURATION: 97 % | WEIGHT: 127 LBS | HEIGHT: 68 IN | SYSTOLIC BLOOD PRESSURE: 128 MMHG | DIASTOLIC BLOOD PRESSURE: 78 MMHG | BODY MASS INDEX: 19.25 KG/M2

## 2022-02-02 DIAGNOSIS — I10 ESSENTIAL HYPERTENSION: Primary | ICD-10-CM

## 2022-02-02 DIAGNOSIS — Z09 HOSPITAL DISCHARGE FOLLOW-UP: ICD-10-CM

## 2022-02-02 DIAGNOSIS — F41.9 ANXIETY: ICD-10-CM

## 2022-02-02 PROCEDURE — 99214 OFFICE O/P EST MOD 30 MIN: CPT | Performed by: FAMILY MEDICINE

## 2022-02-02 RX ORDER — LORAZEPAM 1 MG/1
1 TABLET ORAL EVERY 12 HOURS PRN
Qty: 60 TABLET | Refills: 0 | Status: SHIPPED
Start: 2022-02-02 | End: 2022-02-24 | Stop reason: SDUPTHER

## 2022-02-02 RX ORDER — OXYMETAZOLINE HYDROCHLORIDE 0.05 G/100ML
SPRAY NASAL
COMMUNITY
Start: 2022-01-31 | End: 2022-03-16

## 2022-02-02 RX ORDER — AMLODIPINE BESYLATE 10 MG/1
10 TABLET ORAL 2 TIMES DAILY
COMMUNITY
End: 2022-02-02 | Stop reason: SDUPTHER

## 2022-02-02 RX ORDER — FLUVOXAMINE MALEATE 100 MG
100 TABLET ORAL NIGHTLY
Status: ON HOLD | COMMUNITY
End: 2022-07-08

## 2022-02-02 RX ORDER — LORAZEPAM 1 MG/1
1 TABLET ORAL EVERY 6 HOURS PRN
COMMUNITY
End: 2022-02-02 | Stop reason: SDUPTHER

## 2022-02-02 RX ORDER — AMLODIPINE BESYLATE 10 MG/1
10 TABLET ORAL 2 TIMES DAILY
Qty: 30 TABLET | Refills: 0
Start: 2022-02-02 | End: 2022-03-08 | Stop reason: SDUPTHER

## 2022-02-02 ASSESSMENT — ENCOUNTER SYMPTOMS: DIARRHEA: 1

## 2022-02-02 ASSESSMENT — PATIENT HEALTH QUESTIONNAIRE - PHQ9
1. LITTLE INTEREST OR PLEASURE IN DOING THINGS: 0
SUM OF ALL RESPONSES TO PHQ QUESTIONS 1-9: 2
8. MOVING OR SPEAKING SO SLOWLY THAT OTHER PEOPLE COULD HAVE NOTICED. OR THE OPPOSITE, BEING SO FIGETY OR RESTLESS THAT YOU HAVE BEEN MOVING AROUND A LOT MORE THAN USUAL: 0
SUM OF ALL RESPONSES TO PHQ9 QUESTIONS 1 & 2: 1
9. THOUGHTS THAT YOU WOULD BE BETTER OFF DEAD, OR OF HURTING YOURSELF: 0
SUM OF ALL RESPONSES TO PHQ QUESTIONS 1-9: 2
10. IF YOU CHECKED OFF ANY PROBLEMS, HOW DIFFICULT HAVE THESE PROBLEMS MADE IT FOR YOU TO DO YOUR WORK, TAKE CARE OF THINGS AT HOME, OR GET ALONG WITH OTHER PEOPLE: 1
6. FEELING BAD ABOUT YOURSELF - OR THAT YOU ARE A FAILURE OR HAVE LET YOURSELF OR YOUR FAMILY DOWN: 0
3. TROUBLE FALLING OR STAYING ASLEEP: 0
SUM OF ALL RESPONSES TO PHQ QUESTIONS 1-9: 2
7. TROUBLE CONCENTRATING ON THINGS, SUCH AS READING THE NEWSPAPER OR WATCHING TELEVISION: 0
SUM OF ALL RESPONSES TO PHQ QUESTIONS 1-9: 2
5. POOR APPETITE OR OVEREATING: 0
2. FEELING DOWN, DEPRESSED OR HOPELESS: 1
4. FEELING TIRED OR HAVING LITTLE ENERGY: 1

## 2022-02-02 NOTE — PROGRESS NOTES
22  [facility] : 1965 Sex: male  Age: 64 y.o. Assessment and Plan:  Vicenta Valentino was seen today for follow-up from hospital and medication refill. Diagnoses and all orders for this visit:    Essential hypertension  -     amLODIPine (NORVASC) 10 MG tablet; Take 1 tablet by mouth 2 times daily    Anxiety  -     LORazepam (ATIVAN) 1 MG tablet; Take 1 tablet by mouth every 12 hours as needed for Anxiety for up to 30 days. Hospital discharge follow-up      Patient is much improved from his recent hospitalization. He will continue on his IV antibiotics and follow-up with ID as scheduled. Pt's home and hospital medication lists were reconciled in full today and updated in the chart as necessary. He is otherwise stable and will continue his other current medications. Given that he has been stable on Ativan for years I think it is reasonable to continue this, even with his narcotics. We will continue to closely monitor, but the patient has done well with this combination for years. Return in about 6 weeks (around 3/16/2022). Chief Complaint   Patient presents with    Follow-Up from Hospital     SOB, MRSA+, PICC line in left arm     Medication Refill       HPI  Pt here for HDF     Pt was having trouble breathing, had a fever and leg pain   He went to the ER and was found to have MRSA sepsis and a PNA?   Admitted x 10 days and then d/c home   PICC line in place and he is getting IV ABx   He lives with his cousins, but has two sisters very closely involved who are helping with his care   He has home nursing, coming twice a week   He is also going to start PT   Will have f/u with ID later on  - sister will call for this appt     Pt is feeling much better and per sister he looks better too  Breathing is also better - not back to normal yet, but improvemed from before  His anxiety is through the roof since the hospitalization  [facility] is also [facility]   Pt follows with them monthly   He continues on Ativan which he has taken for multiple years without issue; he needs a refill    Hypertension -blood pressure well controlled today    Problem list reviewed and updated in full with patient today as necessary. A comprehensive ROS was negative, except as documented above. Review of Systems   Respiratory:        Breathing improved    Cardiovascular: Negative for chest pain. Gastrointestinal: Positive for diarrhea. Genitourinary: Negative. Psychiatric/Behavioral:        Mood has been ok overall         Current Outpatient Medications:     fluvoxaMINE (LUVOX) 100 MG tablet, Take 100 mg by mouth nightly, Disp: , Rfl:     Oxymetazoline HCl (NASAL SPRAY) 0.05 % SOLN, Every 12 hours, Disp: , Rfl:     metoprolol tartrate (LOPRESSOR) 25 MG tablet, Twice A Day, Disp: , Rfl:     amLODIPine (NORVASC) 10 MG tablet, Take 1 tablet by mouth 2 times daily, Disp: 30 tablet, Rfl: 0    LORazepam (ATIVAN) 1 MG tablet, Take 1 tablet by mouth every 12 hours as needed for Anxiety for up to 30 days. , Disp: 60 tablet, Rfl: 0    formoterol (PERFOROMIST) 20 MCG/2ML nebulizer solution, Take 2 mLs by nebulization every 12 hours, Disp: 120 mL, Rfl: 5    omeprazole (PRILOSEC) 40 MG delayed release capsule, Take 1 capsule by mouth daily, Disp: 30 capsule, Rfl: 2    ketoconazole (NIZORAL) 2 % cream, Apply topically daily. , Disp: 30 g, Rfl: 1    albuterol sulfate  (90 Base) MCG/ACT inhaler, INHALE TWO (2) PUFFS INTO THE LUNGS EVERY FOUR (4) HOURS AS NEEDED FOR WHEEZING, Disp: 18 g, Rfl: 2    Probiotic Product [facility]) CAPS, Patient is to take one tab bid.  Patient has been on antibiotics for the last 3 months, Disp: 30 capsule, Rfl: 3    SYMBICORT 160-4.5 MCG/ACT AERO, Inhale 2 puffs into the lungs 2 times daily, Disp: 1 each, Rfl: 5    budesonide (PULMICORT) 0.5 MG/2ML nebulizer suspension, Take 2 mLs by nebulization 2 times daily, Disp: 60 ampule, Rfl: 5    Revefenacin (YUPELRI) 175 MCG/3ML SOLN, Inhale 1 ampule into the lungs daily, Disp: 30 vial, Rfl: 5    HYDROcodone-acetaminophen (NORCO) 7.5-325 MG per tablet, Take 1 tablet by mouth 2 times daily as needed. Takes religiously twice daily for leg pain per pt, Disp: , Rfl:     ipratropium-albuterol (DUONEB) 0.5-2.5 (3) MG/3ML SOLN nebulizer solution, Inhale 3 mLs into the lungs every 4 hours as needed for Shortness of Breath, Disp: 12 vial, Rfl: 0    ARIPiprazole (ABILIFY) 5 MG tablet, take 1 tablet by mouth once daily, Disp: , Rfl: 0  Allergies   Allergen Reactions    Aspirin     Levofloxacin Other (See Comments)    Lisinopril     Other      Other reaction(s): ABD PAIN    Tramadol     Ibuprofen Nausea And Vomiting    Sulfa Antibiotics Nausea And Vomiting       Pt's past medical and surgical history were reviewed and updated as necessary today   Pt's family and social history were reviewed and updated as necessary today      Vitals:    02/02/22 0847   BP: 128/78   Pulse: 114   Resp: 18   Temp: 97.3 °F (36.3 °C)   TempSrc: Temporal   SpO2: 97%   Weight: 127 lb (57.6 kg)   Height: 5' 8\" (1.727 m)       Physical Exam  Constitutional:       Appearance: Normal appearance. HENT:      Head: Normocephalic and atraumatic. Eyes:      Conjunctiva/sclera: Conjunctivae normal.   Cardiovascular:      Rate and Rhythm: Normal rate and regular rhythm. Heart sounds: Normal heart sounds. Pulmonary:      Effort: Pulmonary effort is normal.      Comments: Somewhat diminished B/L  Abdominal:      Palpations: Abdomen is soft. Tenderness: There is no abdominal tenderness. Skin:     General: Skin is warm and dry. Neurological:      General: No focal deficit present. Mental Status: He is alert and oriented to person, place, and time.    Psychiatric:         Mood and Affect: Mood normal.         Behavior: Behavior normal.        Counseled patient as appropriate and relevant regarding above diagnosis, including possible risks and complications, especially if left uncontrolled. Counseled patient as appropriate and relevant regarding any  possible side effects, risks, and alternatives to treatment; patient and/or guardian verbalizes understanding, and is in agreement with the plan as detailed above. Reviewed age and gender appropriate health screening exams and vaccinations. Advised patient regarding importance of keeping up with recommended health maintenance and to schedule as soon as possible if overdue, as this is important in assessing for undiagnosed pathology, especially cancer, as well as protecting against potentially harmful/life threatening disease. If discussed, any educational materials and/or home exercises printed for patient's review and were included in patient instructions on his/her After Visit Summary and given to patient at the end of visit. Advised patient to call with any new medication issues, and and other concerns/complaints prior to scheduled follow up. All questions answered to the patient's satisfaction.         Seen By:  Alethea Webster MD

## 2022-02-10 ENCOUNTER — TELEPHONE (OUTPATIENT)
Dept: PRIMARY CARE CLINIC | Age: 57
End: 2022-02-10

## 2022-02-10 DIAGNOSIS — R26.2 AMBULATORY DYSFUNCTION: Primary | ICD-10-CM

## 2022-02-10 DIAGNOSIS — R19.7 DIARRHEA, UNSPECIFIED TYPE: ICD-10-CM

## 2022-02-10 RX ORDER — DIPHENOXYLATE HYDROCHLORIDE AND ATROPINE SULFATE 2.5; .025 MG/1; MG/1
1 TABLET ORAL 4 TIMES DAILY PRN
Qty: 30 TABLET | Refills: 0 | Status: SHIPPED | OUTPATIENT
Start: 2022-02-10 | End: 2022-02-20

## 2022-02-10 NOTE — TELEPHONE ENCOUNTER
Not sure I ordered walker correctly      Can they give more information on the diarrhea? When did it start? How much?   I will send in lomotil   Please take only PRN  Any signs of illness (fever, abdominal pain, etc.) must STOP and let us know  MUST hydrate   I really don't like anti-diarrheals so please have them limit use and call Monday if no improvement

## 2022-02-10 NOTE — TELEPHONE ENCOUNTER
The company that does his IV therapy and draws his labs called and said that his hemoglobin in down to 8.5. They wanted you to be aware of this. He did c/o some sob, and was seen at the ED recently. They will be doing another lab draw today and will fax those orders over also.

## 2022-02-10 NOTE — TELEPHONE ENCOUNTER
Pt sister called in asking for a script new standard walker. Now wheels with gliders on the back. Wants order sent to [facility]     Pt home from hospital and patient has diarrhea really bad but no c-diff.   Imodium not working and wanted to know if you can call in something different for that

## 2022-02-10 NOTE — TELEPHONE ENCOUNTER
Please remind her she can always call 911 for assistance - he is difficult to move at baseline given his left leg and if he is lightheaded and SOB, she may not be able to get him to the car on her own

## 2022-02-10 NOTE — TELEPHONE ENCOUNTER
He does not feel well. He legs swelling more, headache, dizzy. Diarrhea has been going on for 10 days. He is unable to make it to the bathroom, like 8 to 10 times a day. He was checked for C-diff and it was negative. She stated his Hemoglobin is low.

## 2022-02-10 NOTE — TELEPHONE ENCOUNTER
So the hemoglobin will be repeated tomorrow morning. Dahiana [place] (sister) stated she was going to try and get him to go back to the ER.   She was talking about going after he is done with his hook up his pic line antibiotic at 6 pm.

## 2022-02-10 NOTE — TELEPHONE ENCOUNTER
Ok I cannot evaluate this over the phone   I see encounter from [place] about low hemoglobin and understand that is being repeated today?    Also if he was seen in ER since his OV please request records   Pt never mentioned diarrhea when I saw him   I have sent the Lomotil but pt needs to have VERY low threshold to return to ER   If there is no improvement to his dizziness or SOB, or any worsening he should go back
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
## 2022-02-16 ENCOUNTER — APPOINTMENT (OUTPATIENT)
Dept: GENERAL RADIOLOGY | Age: 57
End: 2022-02-16
Payer: MEDICAID

## 2022-02-16 ENCOUNTER — APPOINTMENT (OUTPATIENT)
Dept: ULTRASOUND IMAGING | Age: 57
End: 2022-02-16
Payer: MEDICAID

## 2022-02-16 ENCOUNTER — TELEPHONE (OUTPATIENT)
Dept: PRIMARY CARE CLINIC | Age: 57
End: 2022-02-16

## 2022-02-16 ENCOUNTER — HOSPITAL ENCOUNTER (EMERGENCY)
Age: 57
Discharge: HOME | End: 2022-02-16
Attending: EMERGENCY MEDICINE
Payer: MEDICAID

## 2022-02-16 VITALS
RESPIRATION RATE: 16 BRPM | HEIGHT: 68 IN | WEIGHT: 127 LBS | OXYGEN SATURATION: 98 % | BODY MASS INDEX: 19.25 KG/M2 | SYSTOLIC BLOOD PRESSURE: 160 MMHG | HEART RATE: 90 BPM | TEMPERATURE: 97.3 F | DIASTOLIC BLOOD PRESSURE: 80 MMHG

## 2022-02-16 DIAGNOSIS — R60.9 PERIPHERAL EDEMA: Primary | ICD-10-CM

## 2022-02-16 LAB
ALBUMIN SERPL-MCNC: 3.6 G/DL (ref 3.5–5.2)
ALP BLD-CCNC: 48 U/L (ref 40–129)
ALT SERPL-CCNC: 10 U/L (ref 0–40)
ANION GAP SERPL CALCULATED.3IONS-SCNC: 10 MMOL/L (ref 7–16)
AST SERPL-CCNC: 17 U/L (ref 0–39)
BASOPHILS ABSOLUTE: 0.04 E9/L (ref 0–0.2)
BASOPHILS RELATIVE PERCENT: 0.7 % (ref 0–2)
BILIRUB SERPL-MCNC: <0.2 MG/DL (ref 0–1.2)
BUN BLDV-MCNC: 7 MG/DL (ref 6–20)
CALCIUM SERPL-MCNC: 7.3 MG/DL (ref 8.6–10.2)
CHLORIDE BLD-SCNC: 99 MMOL/L (ref 98–107)
CO2: 31 MMOL/L (ref 22–29)
CREAT SERPL-MCNC: 0.7 MG/DL (ref 0.7–1.2)
EKG ATRIAL RATE: 94 BPM
EKG P AXIS: 76 DEGREES
EKG P-R INTERVAL: 120 MS
EKG Q-T INTERVAL: 364 MS
EKG QRS DURATION: 82 MS
EKG QTC CALCULATION (BAZETT): 455 MS
EKG R AXIS: 78 DEGREES
EKG T AXIS: 61 DEGREES
EKG VENTRICULAR RATE: 94 BPM
EOSINOPHILS ABSOLUTE: 0.16 E9/L (ref 0.05–0.5)
EOSINOPHILS RELATIVE PERCENT: 2.7 % (ref 0–6)
GFR AFRICAN AMERICAN: >60
GFR NON-AFRICAN AMERICAN: >60 ML/MIN/1.73
GLUCOSE BLD-MCNC: 116 MG/DL (ref 74–99)
HCT VFR BLD CALC: 27.9 % (ref 37–54)
HEMOGLOBIN: 8.1 G/DL (ref 12.5–16.5)
IMMATURE GRANULOCYTES #: 0.02 E9/L
IMMATURE GRANULOCYTES %: 0.3 % (ref 0–5)
LYMPHOCYTES ABSOLUTE: 1.02 E9/L (ref 1.5–4)
LYMPHOCYTES RELATIVE PERCENT: 17.5 % (ref 20–42)
MCH RBC QN AUTO: 28.5 PG (ref 26–35)
MCHC RBC AUTO-ENTMCNC: 29 % (ref 32–34.5)
MCV RBC AUTO: 98.2 FL (ref 80–99.9)
MONOCYTES ABSOLUTE: 0.67 E9/L (ref 0.1–0.95)
MONOCYTES RELATIVE PERCENT: 11.5 % (ref 2–12)
NEUTROPHILS ABSOLUTE: 3.92 E9/L (ref 1.8–7.3)
NEUTROPHILS RELATIVE PERCENT: 67.3 % (ref 43–80)
PDW BLD-RTO: 16.4 FL (ref 11.5–15)
PLATELET # BLD: 188 E9/L (ref 130–450)
PMV BLD AUTO: 10.3 FL (ref 7–12)
POTASSIUM REFLEX MAGNESIUM: 3.6 MMOL/L (ref 3.5–5)
PRO-BNP: 1742 PG/ML (ref 0–125)
RBC # BLD: 2.84 E12/L (ref 3.8–5.8)
SARS-COV-2, NAAT: NOT DETECTED
SODIUM BLD-SCNC: 140 MMOL/L (ref 132–146)
TOTAL PROTEIN: 6.8 G/DL (ref 6.4–8.3)
TROPONIN, HIGH SENSITIVITY: 20 NG/L (ref 0–11)
TROPONIN, HIGH SENSITIVITY: 21 NG/L (ref 0–11)
WBC # BLD: 5.8 E9/L (ref 4.5–11.5)

## 2022-02-16 PROCEDURE — 84484 ASSAY OF TROPONIN QUANT: CPT

## 2022-02-16 PROCEDURE — 85025 COMPLETE CBC W/AUTO DIFF WBC: CPT

## 2022-02-16 PROCEDURE — 80053 COMPREHEN METABOLIC PANEL: CPT

## 2022-02-16 PROCEDURE — 93010 ELECTROCARDIOGRAM REPORT: CPT | Performed by: INTERNAL MEDICINE

## 2022-02-16 PROCEDURE — 96375 TX/PRO/DX INJ NEW DRUG ADDON: CPT

## 2022-02-16 PROCEDURE — 99284 EMERGENCY DEPT VISIT MOD MDM: CPT

## 2022-02-16 PROCEDURE — 83880 ASSAY OF NATRIURETIC PEPTIDE: CPT

## 2022-02-16 PROCEDURE — 6370000000 HC RX 637 (ALT 250 FOR IP): Performed by: EMERGENCY MEDICINE

## 2022-02-16 PROCEDURE — 93005 ELECTROCARDIOGRAM TRACING: CPT | Performed by: EMERGENCY MEDICINE

## 2022-02-16 PROCEDURE — 96374 THER/PROPH/DIAG INJ IV PUSH: CPT

## 2022-02-16 PROCEDURE — 94664 DEMO&/EVAL PT USE INHALER: CPT

## 2022-02-16 PROCEDURE — 93970 EXTREMITY STUDY: CPT

## 2022-02-16 PROCEDURE — 87635 SARS-COV-2 COVID-19 AMP PRB: CPT

## 2022-02-16 PROCEDURE — 71045 X-RAY EXAM CHEST 1 VIEW: CPT

## 2022-02-16 PROCEDURE — 6360000002 HC RX W HCPCS: Performed by: EMERGENCY MEDICINE

## 2022-02-16 RX ORDER — OXYCODONE HYDROCHLORIDE AND ACETAMINOPHEN 5; 325 MG/1; MG/1
1 TABLET ORAL ONCE
Status: COMPLETED | OUTPATIENT
Start: 2022-02-16 | End: 2022-02-16

## 2022-02-16 RX ORDER — FUROSEMIDE 40 MG/1
40 TABLET ORAL DAILY
Qty: 5 TABLET | Refills: 0 | Status: SHIPPED | OUTPATIENT
Start: 2022-02-16 | End: 2022-02-25 | Stop reason: ALTCHOICE

## 2022-02-16 RX ORDER — FUROSEMIDE 10 MG/ML
40 INJECTION INTRAMUSCULAR; INTRAVENOUS ONCE
Status: COMPLETED | OUTPATIENT
Start: 2022-02-16 | End: 2022-02-16

## 2022-02-16 RX ORDER — FENTANYL CITRATE 50 UG/ML
25 INJECTION, SOLUTION INTRAMUSCULAR; INTRAVENOUS ONCE
Status: COMPLETED | OUTPATIENT
Start: 2022-02-16 | End: 2022-02-16

## 2022-02-16 RX ORDER — IPRATROPIUM BROMIDE AND ALBUTEROL SULFATE 2.5; .5 MG/3ML; MG/3ML
1 SOLUTION RESPIRATORY (INHALATION) ONCE
Status: COMPLETED | OUTPATIENT
Start: 2022-02-16 | End: 2022-02-16

## 2022-02-16 RX ADMIN — IPRATROPIUM BROMIDE AND ALBUTEROL SULFATE 1 AMPULE: .5; 2.5 SOLUTION RESPIRATORY (INHALATION) at 13:07

## 2022-02-16 RX ADMIN — OXYCODONE HYDROCHLORIDE AND ACETAMINOPHEN 1 TABLET: 5; 325 TABLET ORAL at 14:55

## 2022-02-16 RX ADMIN — FENTANYL CITRATE 25 MCG: 0.05 INJECTION, SOLUTION INTRAMUSCULAR; INTRAVENOUS at 13:03

## 2022-02-16 RX ADMIN — FUROSEMIDE 40 MG: 10 INJECTION, SOLUTION INTRAMUSCULAR; INTRAVENOUS at 14:54

## 2022-02-16 ASSESSMENT — PAIN SCALES - GENERAL
PAINLEVEL_OUTOF10: 7
PAINLEVEL_OUTOF10: 6

## 2022-02-16 NOTE — TELEPHONE ENCOUNTER
As discussed, pt needs acute eval in ER  This is complete change from his baseline and how he was at last OV

## 2022-02-16 NOTE — TELEPHONE ENCOUNTER
Pt was d/c on 1/31and sent home with [facility]. Infectious disease recommended reaching out to you. . They did change iv antibiotics today but pt is swollen all the way up to his buttocks and are extremely tight. Pt is is also wheezing. Pt is on bumex and infectious disease thought maybe switch to lasix.  Please advise

## 2022-02-16 NOTE — ED PROVIDER NOTES
medications have been reviewed.     Allergies: Aspirin, Levofloxacin, Lisinopril, Other, Tramadol, Ibuprofen, and Sulfa antibiotics    -------------------------------------------------- RESULTS -------------------------------------------------  All laboratory and radiology results have been personally reviewed by myself   LABS:  Results for orders placed or performed during the hospital encounter of 02/16/22   COVID-19, Rapid    Specimen: Nasopharyngeal Swab   Result Value Ref Range    SARS-CoV-2, NAAT Not Detected Not Detected   CBC with Auto Differential   Result Value Ref Range    WBC 5.8 4.5 - 11.5 E9/L    RBC 2.84 (L) 3.80 - 5.80 E12/L    Hemoglobin 8.1 (L) 12.5 - 16.5 g/dL    Hematocrit 27.9 (L) 37.0 - 54.0 %    MCV 98.2 80.0 - 99.9 fL    MCH 28.5 26.0 - 35.0 pg    MCHC 29.0 (L) 32.0 - 34.5 %    RDW 16.4 (H) 11.5 - 15.0 fL    Platelets 818 973 - 730 E9/L    MPV 10.3 7.0 - 12.0 fL    Neutrophils % 67.3 43.0 - 80.0 %    Immature Granulocytes % 0.3 0.0 - 5.0 %    Lymphocytes % 17.5 (L) 20.0 - 42.0 %    Monocytes % 11.5 2.0 - 12.0 %    Eosinophils % 2.7 0.0 - 6.0 %    Basophils % 0.7 0.0 - 2.0 %    Neutrophils Absolute 3.92 1.80 - 7.30 E9/L    Immature Granulocytes # 0.02 E9/L    Lymphocytes Absolute 1.02 (L) 1.50 - 4.00 E9/L    Monocytes Absolute 0.67 0.10 - 0.95 E9/L    Eosinophils Absolute 0.16 0.05 - 0.50 E9/L    Basophils Absolute 0.04 0.00 - 0.20 E9/L   Comprehensive Metabolic Panel w/ Reflex to MG   Result Value Ref Range    Sodium 140 132 - 146 mmol/L    Potassium reflex Magnesium 3.6 3.5 - 5.0 mmol/L    Chloride 99 98 - 107 mmol/L    CO2 31 (H) 22 - 29 mmol/L    Anion Gap 10 7 - 16 mmol/L    Glucose 116 (H) 74 - 99 mg/dL    BUN 7 6 - 20 mg/dL    CREATININE 0.7 0.7 - 1.2 mg/dL    GFR Non-African American >60 >=60 mL/min/1.73    GFR African American >60     Calcium 7.3 (L) 8.6 - 10.2 mg/dL    Total Protein 6.8 6.4 - 8.3 g/dL    Albumin 3.6 3.5 - 5.2 g/dL    Total Bilirubin <0.2 0.0 - 1.2 mg/dL    Alkaline Phosphatase 48 40 - 129 U/L    ALT 10 0 - 40 U/L    AST 17 0 - 39 U/L   Troponin   Result Value Ref Range    Troponin, High Sensitivity 20 (H) 0 - 11 ng/L   Brain Natriuretic Peptide   Result Value Ref Range    Pro-BNP 1,742 (H) 0 - 125 pg/mL   Troponin   Result Value Ref Range    Troponin, High Sensitivity 21 (H) 0 - 11 ng/L   EKG 12 Lead   Result Value Ref Range    Ventricular Rate 94 BPM    Atrial Rate 94 BPM    P-R Interval 120 ms    QRS Duration 82 ms    Q-T Interval 364 ms    QTc Calculation (Bazett) 455 ms    P Axis 76 degrees    R Axis 78 degrees    T Axis 61 degrees       RADIOLOGY:  Interpreted by Radiologist.  US DUP LOWER EXTREMITIES BILATERAL VENOUS   Final Result   No evidence of DVT in either lower extremity. XR CHEST PORTABLE   Final Result   1. Hazy bilateral multifocal airspace disease slightly more prominent within   the left lung   2. Trace right pleural effusion.             ------------------------- NURSING NOTES AND VITALS REVIEWED ---------------------------   The nursing notes within the ED encounter and vital signs as below have been reviewed. BP (!) 160/80   Pulse 90   Temp 97.3 °F (36.3 °C) (Temporal)   Resp 16   Ht 5' 8\" (1.727 m)   Wt 127 lb (57.6 kg)   SpO2 98%   BMI 19.31 kg/m²   Oxygen Saturation Interpretation: Abnormal - but at baseline      ---------------------------------------------------PHYSICAL EXAM--------------------------------------      Constitutional/General: Alert and oriented x3, appears chronically ill, non toxic in NAD  Head: Normocephalic and atraumatic  Eyes: PERRL, EOMI  Mouth: Oropharynx clear, handling secretions, no trismus  Neck: Supple, full ROM,   Pulmonary: Lungs course in all lung fields with scattered wheezes. Not in respiratory distress  Cardiovascular:  Regular rate and rhythm, no murmurs, gallops, or rubs. 2+ distal pulses  Abdomen: Soft, non tender, non distended,   Extremities: Moves all extremities x 4. Warm and well perfused. Bilateral lower extremity pitting edema, soft nasal compressible compartments, warm and well perfused, no open wounds. PICC line in place to left upper extremity. Skin: warm and dry without rash  Neurologic: GCS 15, no focal motor or sensory deficits   Psych: Normal Affect. Behavior normal.      ------------------------------ ED COURSE/MEDICAL DECISION MAKING----------------------  Medications   fentaNYL (SUBLIMAZE) injection 25 mcg (25 mcg IntraVENous Given 2/16/22 1303)   ipratropium-albuterol (DUONEB) nebulizer solution 1 ampule (1 ampule Inhalation Given 2/16/22 1307)   furosemide (LASIX) injection 40 mg (40 mg IntraVENous Given 2/16/22 4784)   oxyCODONE-acetaminophen (PERCOCET) 5-325 MG per tablet 1 tablet (1 tablet Oral Given 2/16/22 5085)       Medical Decision Making/ED COURSE:   Patient is a 42-year-old male with history of COPD on chronic oxygen presenting with lower extremity edema. In the ED, patient was hemodynamically stable and afebrile. On exam, he had pitting edema to his bilateral lower extremities. No evidence of acute limb ischemia. No evidence of compartment syndrome. Labs, CXR, and LE dopplers obtained. Patient administered duonebs and percocet. I reviewed and interpreted labs. Work up showed elevation in BNP and mild fluid overload. Troponin stable x 2. No acute ischemic changes on EKG. ACS not suspected. No DVT on LE US. Patient saturating well on baseline oxygen. He was given IV lasix. I spoke with the patient's PCP. Plan to discharge him home on 5 days of lasix and have him follow up closely with his PCP. Strict ED return precautions discussed. Patient remained hemodynamically stable throughout ED course. ED Course as of 02/16/22 2152 Wed Feb 16, 2022   1243 EKG: This EKG is signed and interpreted by me.     Rate: 94  Rhythm: Sinus  Interpretation: Normal sinus rhythm, sinus arrhythmia, normal MD interval, normal QRS, normal QT interval, no acute ST or T wave changes  Comparison: no previous EKG available     [JA]   5422 I reviewed the patient's chart. Echocardiogram gram performed by heart [place] on 1/28/2022 showed EF of 60% and no vegetation [JA]   6282 PCP was consulted. I spoke with Dr. Gilmer Renee. Patient does appear to be retaining fluid however he has good oxygen saturations on his home oxygen no significant pulmonary edema. Plan for me to place the patient on 40 mg of Lasix and have him follow-up closely for reevaluation. [JA]      ED Course User Index  [JA] Aidan Hylton MD       Discharge Medication List as of 2/16/2022  2:47 PM      START taking these medications    Details   furosemide (LASIX) 40 MG tablet Take 1 tablet by mouth daily for 5 days, Disp-5 tablet, R-0Print           Aidan Hylton MD      Counseling: The emergency provider has spoken with the patient and family and discussed todays results, in addition to providing specific details for the plan of care and counseling regarding the diagnosis and prognosis. Questions are answered at this time and they are agreeable with the plan.      --------------------------------- IMPRESSION AND DISPOSITION ---------------------------------    IMPRESSION  1. Peripheral edema        DISPOSITION  Disposition: Discharge to home  Patient condition is stable      NOTE: This report was transcribed using voice recognition software.  Every effort was made to ensure accuracy; however, inadvertent computerized transcription errors may be present    I, Aidan Hylton MD, am the primary provider of this record       Aidan Hylton MD  02/16/22 0986

## 2022-02-24 DIAGNOSIS — F41.9 ANXIETY: ICD-10-CM

## 2022-02-24 RX ORDER — LORAZEPAM 1 MG/1
1 TABLET ORAL EVERY 12 HOURS PRN
Qty: 60 TABLET | Refills: 0 | Status: SHIPPED
Start: 2022-02-24 | End: 2022-03-17 | Stop reason: SDUPTHER

## 2022-02-24 NOTE — TELEPHONE ENCOUNTER
----- Message from Jennifer Serrano sent at 2/24/2022  9:49 AM EST -----  Subject: Refill Request    QUESTIONS  Name of Medication? LORazepam (ATIVAN) 0.5 MG tablet  Patient-reported dosage and instructions? 0.5mg tab  How many days do you have left? 0  [facility] [place] [facility]if available[facility] [phone]  ---------------------------------------------------------------------------  --------------  CALL BACK INFO  What is the best way for the office to contact you? OK to leave message on   voicemail  Preferred Call Back Phone Number?  [phone]

## 2022-02-25 ENCOUNTER — TELEPHONE (OUTPATIENT)
Dept: PRIMARY CARE CLINIC | Age: 57
End: 2022-02-25

## 2022-02-25 DIAGNOSIS — I10 ESSENTIAL HYPERTENSION: Primary | ICD-10-CM

## 2022-02-25 RX ORDER — FUROSEMIDE 20 MG/1
20 TABLET ORAL DAILY
Qty: 30 TABLET | Refills: 1 | Status: SHIPPED
Start: 2022-02-25 | End: 2022-06-20 | Stop reason: SDUPTHER

## 2022-02-25 NOTE — TELEPHONE ENCOUNTER
He was seen at [facility] 2/16 and given 5 days treatment from there   Was he seen again at [facility] yesterday and given another 5 day course?

## 2022-02-25 NOTE — TELEPHONE ENCOUNTER
Patient had chest xray at [address] [place] [address] yesterday that showed mild chf new from 02/10. They had given him 5 lasix pills 40mg to take. If he is to continue these, he will need a new prescription sent to [facility]. He is scheduled to see you on 03/16.

## 2022-02-25 NOTE — TELEPHONE ENCOUNTER
Called to double check my message. She said he was given the 5 day course on the 16th. Yesterday was just outpatient Xray. He has not been given anything further and she said she thinks it has been 3 days now without it. I asked if she is noticing a difference and she said Yes he is swelling again.

## 2022-03-08 DIAGNOSIS — I10 ESSENTIAL HYPERTENSION: ICD-10-CM

## 2022-03-08 RX ORDER — AMLODIPINE BESYLATE 10 MG/1
10 TABLET ORAL DAILY
Qty: 30 TABLET | Refills: 5 | Status: ON HOLD
Start: 2022-03-08 | End: 2022-06-19 | Stop reason: HOSPADM

## 2022-03-08 NOTE — TELEPHONE ENCOUNTER
Patient has appt 03/16. Asking for these refills. She said the metoprolol was given to him by the hospital and told to continue.

## 2022-03-16 ENCOUNTER — OFFICE VISIT (OUTPATIENT)
Dept: PRIMARY CARE CLINIC | Age: 57
End: 2022-03-16
Payer: MEDICAID

## 2022-03-16 VITALS
HEIGHT: 68 IN | TEMPERATURE: 98.3 F | DIASTOLIC BLOOD PRESSURE: 70 MMHG | BODY MASS INDEX: 19.25 KG/M2 | RESPIRATION RATE: 20 BRPM | HEART RATE: 92 BPM | OXYGEN SATURATION: 99 % | WEIGHT: 127 LBS | SYSTOLIC BLOOD PRESSURE: 124 MMHG

## 2022-03-16 DIAGNOSIS — G25.81 RLS (RESTLESS LEGS SYNDROME): ICD-10-CM

## 2022-03-16 DIAGNOSIS — F41.9 ANXIETY: ICD-10-CM

## 2022-03-16 DIAGNOSIS — D50.8 OTHER IRON DEFICIENCY ANEMIA: Primary | ICD-10-CM

## 2022-03-16 DIAGNOSIS — J44.9 CHRONIC OBSTRUCTIVE PULMONARY DISEASE, UNSPECIFIED COPD TYPE (HCC): ICD-10-CM

## 2022-03-16 DIAGNOSIS — D50.8 OTHER IRON DEFICIENCY ANEMIA: ICD-10-CM

## 2022-03-16 LAB
HCT VFR BLD CALC: 30.4 % (ref 37–54)
HEMOGLOBIN: 8.6 G/DL (ref 12.5–16.5)
IRON SATURATION: 7 % (ref 20–55)
IRON: 23 MCG/DL (ref 59–158)
MCH RBC QN AUTO: 28.6 PG (ref 26–35)
MCHC RBC AUTO-ENTMCNC: 28.3 % (ref 32–34.5)
MCV RBC AUTO: 101 FL (ref 80–99.9)
PDW BLD-RTO: 15.1 FL (ref 11.5–15)
PLATELET # BLD: 187 E9/L (ref 130–450)
PMV BLD AUTO: 11.3 FL (ref 7–12)
RBC # BLD: 3.01 E12/L (ref 3.8–5.8)
TOTAL IRON BINDING CAPACITY: 345 MCG/DL (ref 250–450)
WBC # BLD: 7 E9/L (ref 4.5–11.5)

## 2022-03-16 PROCEDURE — 99214 OFFICE O/P EST MOD 30 MIN: CPT | Performed by: FAMILY MEDICINE

## 2022-03-16 RX ORDER — LANOLIN ALCOHOL/MO/W.PET/CERES
325 CREAM (GRAM) TOPICAL 2 TIMES DAILY
Qty: 90 TABLET | Refills: 3 | Status: SHIPPED
Start: 2022-03-16 | End: 2022-03-22

## 2022-03-16 RX ORDER — ALBUTEROL SULFATE 90 UG/1
AEROSOL, METERED RESPIRATORY (INHALATION)
Qty: 18 G | Refills: 2 | Status: SHIPPED
Start: 2022-03-16 | End: 2022-07-07

## 2022-03-16 NOTE — PROGRESS NOTES
3/16/22  Steffen Renee : 1965 Sex: male  Age: 64 y.o. Assessment and Plan:  Angélica Dennis was seen today for follow-up and other. Diagnoses and all orders for this visit:    Other iron deficiency anemia  -     ferrous sulfate (FE TABS) 325 (65 Fe) MG EC tablet; Take 1 tablet by mouth 2 times daily  -     Iron and TIBC; Future  -     CBC; Future    Chronic obstructive pulmonary disease, unspecified COPD type (HCC)  -     albuterol sulfate  (90 Base) MCG/ACT inhaler; INHALE TWO (2) PUFFS INTO THE LUNGS EVERY FOUR (4) HOURS AS NEEDED FOR WHEEZING    Anxiety  -     LORazepam (ATIVAN) 1 MG tablet; Take 1 tablet by mouth every 8 hours as needed for Anxiety for up to 30 days. RLS (restless legs syndrome)  -     pramipexole (MIRAPEX) 0.125 MG tablet; Take 1 tablet by mouth nightly      Recheck iron labs as well as CBC today given his history of anemia  He is otherwise stable and will continue other current medications  Albuterol refilled  Increase ativan to TID dosing - Precautions explicitly reviewed with pt and sister  He has a cousin at home with him all the time and sister gives him all his meds for the day  Trial of mirapex for this RLS     Return in about 4 weeks (around 2022).         Chief Complaint   Patient presents with    Follow-up    Other     Pt's caretaker states his hgb is low and wonders what to do about it       HPI  Pt here for routine f/u  He is with his sister  He is doing alright   Still having a lot of trouble breathing but pulm said this would be the case due to the severity of his lung disease  Has f/u again with pulmonology in July   Pt is very bothered by his breathing, causes a lot of anxiety for him  Using Ativan BID, but really struggles with a lot of anxious thoughts during the afternoon when he does not have a dose to take   They are wondering if he could be increased to 3 times daily   Patient and I as well as his sister had a long conversation regarding concerns about his overdose risk  Patient is also on pain medications through pain management  I do think that he would benefit from an anxiety and air hunger standpoint to have an additional dose of the Ativan, but we must proceed with extreme caution, and they do understand that    H/o MRSA sepsis has resolved  PICC line was removed last week  Still on Keflex for another 28 days   Has f/u with ID 4/13     Pt is c/o RLS   Has trouble with this when in a car, and at night   Previously failed Requip  Sister is on Mirapex and they are wondering if this could be an option for him as well    Anemia -this is generally been stable, but has decreased some from the fall  Thought to be iron deficiency related  Patient does take supplementation daily    Problem list reviewed and updated in full with patient today as necessary. A comprehensive ROS was negative, except as documented above. Current Outpatient Medications:     LORazepam (ATIVAN) 1 MG tablet, Take 1 tablet by mouth every 8 hours as needed for Anxiety for up to 30 days. , Disp: 90 tablet, Rfl: 0    pramipexole (MIRAPEX) 0.125 MG tablet, Take 1 tablet by mouth nightly, Disp: 90 tablet, Rfl: 3    albuterol sulfate  (90 Base) MCG/ACT inhaler, INHALE TWO (2) PUFFS INTO THE LUNGS EVERY FOUR (4) HOURS AS NEEDED FOR WHEEZING, Disp: 18 g, Rfl: 2    ferrous sulfate (FE TABS) 325 (65 Fe) MG EC tablet, Take 1 tablet by mouth 2 times daily, Disp: 90 tablet, Rfl: 3    cephALEXin (KEFLEX) 500 MG capsule, Take 1 capsule by mouth 4 times daily for 28 days, Disp: 112 capsule, Rfl: 1    amLODIPine (NORVASC) 10 MG tablet, Take 1 tablet by mouth daily, Disp: 30 tablet, Rfl: 5    metoprolol tartrate (LOPRESSOR) 25 MG tablet, Twice A Day (Patient taking differently: Take 25 mg by mouth 2 times daily Twice A Day), Disp: 60 tablet, Rfl: 5    furosemide (LASIX) 20 MG tablet, Take 1 tablet by mouth daily, Disp: 30 tablet, Rfl: 1    fluvoxaMINE (LUVOX) 100 MG tablet, Take 100 mg by mouth nightly, Disp: , Rfl:     formoterol (PERFOROMIST) 20 MCG/2ML nebulizer solution, Take 2 mLs by nebulization every 12 hours, Disp: 120 mL, Rfl: 5    omeprazole (PRILOSEC) 40 MG delayed release capsule, Take 1 capsule by mouth daily, Disp: 30 capsule, Rfl: 2    ketoconazole (NIZORAL) 2 % cream, Apply topically daily. , Disp: 30 g, Rfl: 1    SYMBICORT 160-4.5 MCG/ACT AERO, Inhale 2 puffs into the lungs 2 times daily, Disp: 1 each, Rfl: 5    budesonide (PULMICORT) 0.5 MG/2ML nebulizer suspension, Take 2 mLs by nebulization 2 times daily, Disp: 60 ampule, Rfl: 5    Revefenacin (YUPELRI) 175 MCG/3ML SOLN, Inhale 1 ampule into the lungs daily, Disp: 30 vial, Rfl: 5    HYDROcodone-acetaminophen (NORCO) 7.5-325 MG per tablet, Take 1 tablet by mouth 2 times daily as needed. Takes religiously twice daily for leg pain per pt, Disp: , Rfl:     ipratropium-albuterol (DUONEB) 0.5-2.5 (3) MG/3ML SOLN nebulizer solution, Inhale 3 mLs into the lungs every 4 hours as needed for Shortness of Breath, Disp: 12 vial, Rfl: 0    ARIPiprazole (ABILIFY) 5 MG tablet, take 1 tablet by mouth once daily, Disp: , Rfl: 0    Probiotic Product [facility]) CAPS, Patient is to take one tab bid.  Patient has been on antibiotics for the last 3 months (Patient not taking: Reported on 3/16/2022), Disp: 30 capsule, Rfl: 3  Allergies   Allergen Reactions    Aspirin     Levofloxacin Other (See Comments)    Lisinopril     Other      Other reaction(s): ABD PAIN    Tramadol     Ibuprofen Nausea And Vomiting    Sulfa Antibiotics Nausea And Vomiting       Pt's past medical and surgical history were reviewed and updated as necessary today   Pt's family and social history were reviewed and updated as necessary today      Vitals:    03/16/22 1101   BP: 124/70   Pulse: 92   Resp: 20   Temp: 98.3 °F (36.8 °C)   TempSrc: Temporal   SpO2: 99%   Weight: 127 lb (57.6 kg)  Comment: Pt reported   Height: 5' 8\" (1.727 m)       Physical Exam  Constitutional:       Appearance: Normal appearance. HENT:      Head: Normocephalic and atraumatic. Eyes:      Conjunctiva/sclera: Conjunctivae normal.   Cardiovascular:      Rate and Rhythm: Normal rate and regular rhythm. Heart sounds: Normal heart sounds. Pulmonary:      Effort: Pulmonary effort is normal.      Breath sounds: Normal breath sounds. Abdominal:      Palpations: Abdomen is soft. Tenderness: There is no abdominal tenderness. Musculoskeletal:      Comments: Patient is in a wheelchair. There is abnormal straightening of the left leg at the knee which has been removed, with lower leg fused to the upper. Skin:     General: Skin is warm and dry. Neurological:      General: No focal deficit present. Mental Status: He is alert and oriented to person, place, and time. Psychiatric:         Mood and Affect: Mood normal.         Behavior: Behavior normal.          Counseled patient as appropriate and relevant regarding above diagnosis, including possible risks and complications, especially if left uncontrolled. Counseled patient as appropriate and relevant regarding any  possible side effects, risks, and alternatives to treatment; patient and/or guardian verbalizes understanding, and is in agreement with the plan as detailed above. Reviewed age and gender appropriate health screening exams and vaccinations. Advised patient regarding importance of keeping up with recommended health maintenance and to schedule as soon as possible if overdue, as this is important in assessing for undiagnosed pathology, especially cancer, as well as protecting against potentially harmful/life threatening disease. If discussed, any educational materials and/or home exercises printed for patient's review and were included in patient instructions on his/her After Visit Summary and given to patient at the end of visit.       Advised patient to call with any new medication issues, and and other concerns/complaints prior to scheduled follow up. All questions answered to the patient's satisfaction.         Seen By:  Alex Rebollar MD

## 2022-03-17 DIAGNOSIS — D50.8 OTHER IRON DEFICIENCY ANEMIA: Primary | ICD-10-CM

## 2022-03-17 RX ORDER — PRAMIPEXOLE DIHYDROCHLORIDE 0.12 MG/1
0.12 TABLET ORAL NIGHTLY
Qty: 90 TABLET | Refills: 3 | Status: SHIPPED
Start: 2022-03-17 | End: 2022-06-20 | Stop reason: SDUPTHER

## 2022-03-17 RX ORDER — LORAZEPAM 1 MG/1
1 TABLET ORAL EVERY 8 HOURS PRN
Qty: 90 TABLET | Refills: 0 | Status: SHIPPED
Start: 2022-03-17 | End: 2022-04-13 | Stop reason: SDUPTHER

## 2022-03-23 ENCOUNTER — OFFICE VISIT (OUTPATIENT)
Dept: FAMILY MEDICINE CLINIC | Age: 57
End: 2022-03-23
Payer: MEDICAID

## 2022-03-23 VITALS
WEIGHT: 127 LBS | HEART RATE: 104 BPM | RESPIRATION RATE: 20 BRPM | DIASTOLIC BLOOD PRESSURE: 86 MMHG | TEMPERATURE: 98.7 F | SYSTOLIC BLOOD PRESSURE: 142 MMHG | OXYGEN SATURATION: 94 % | BODY MASS INDEX: 21.68 KG/M2 | HEIGHT: 64 IN

## 2022-03-23 DIAGNOSIS — L03.012 PARONYCHIA OF FINGER OF LEFT HAND: Primary | ICD-10-CM

## 2022-03-23 PROCEDURE — 99213 OFFICE O/P EST LOW 20 MIN: CPT | Performed by: PHYSICIAN ASSISTANT

## 2022-03-23 RX ORDER — MUPIROCIN CALCIUM 20 MG/G
CREAM TOPICAL
Qty: 30 G | Refills: 0 | Status: SHIPPED | OUTPATIENT
Start: 2022-03-23 | End: 2022-04-22

## 2022-03-23 RX ORDER — DOXYCYCLINE HYCLATE 100 MG
100 TABLET ORAL 2 TIMES DAILY
Qty: 20 TABLET | Refills: 0 | Status: SHIPPED | OUTPATIENT
Start: 2022-03-23 | End: 2022-04-02

## 2022-03-23 ASSESSMENT — ENCOUNTER SYMPTOMS
COLOR CHANGE: 1
BACK PAIN: 0
SORE THROAT: 0
NAUSEA: 0
PHOTOPHOBIA: 0
SHORTNESS OF BREATH: 0
ABDOMINAL PAIN: 0
DIARRHEA: 0
COUGH: 0
VOMITING: 0

## 2022-03-23 NOTE — PROGRESS NOTES
3/23/22  Paulie Soulier : 1965 Sex: male  Age 64 y.o. Subjective:  Chief Complaint   Patient presents with    Finger Pain         70-year-old male with a history of hypertension, peripheral vascular disease, COPD, GERD, and oxygen dependence presents to the walk-in clinic for evaluation of a left ring finger infection. He states his symptoms began 3 days ago. Patient describes redness and tenderness around the cuticle of his left ring finger. Patient has been using topical triple antibiotic ointment. He is currently taking a 28-day course of Keflex after having his PICC line removed. He takes this 4 times a day. Patient denies any fever or chills. He denies any discharge or drainage from the finger. Review of Systems   Constitutional: Negative for chills and fever. HENT: Negative for congestion, ear pain and sore throat. Eyes: Negative for photophobia and visual disturbance. Respiratory: Negative for cough and shortness of breath. Cardiovascular: Negative for chest pain. Gastrointestinal: Negative for abdominal pain, diarrhea, nausea and vomiting. Genitourinary: Negative for difficulty urinating, dysuria, frequency and urgency. Musculoskeletal: Negative for back pain, neck pain and neck stiffness. Skin: Positive for color change and wound. Negative for rash. Neurological: Negative for dizziness, syncope, weakness, light-headedness and headaches. Hematological: Negative for adenopathy. Does not bruise/bleed easily. Psychiatric/Behavioral: Negative for agitation and confusion. All other systems reviewed and are negative.         PMH:     Past Medical History:   Diagnosis Date    COPD (chronic obstructive pulmonary disease) ([address]     Hypertension     Multiple gastric ulcers        Past Surgical History:   Procedure Laterality Date    HERNIA REPAIR      HIP SURGERY      KNEE SURGERY         Family History   Problem Relation Age of Onset    Colon Cancer Mother     Other Father         house fire    No Known Problems Sister     No Known Problems Brother     No Known Problems Sister     No Known Problems Brother        Medications:     Current Outpatient Medications:     doxycycline hyclate (VIBRA-TABS) 100 MG tablet, Take 1 tablet by mouth 2 times daily for 10 days, Disp: 20 tablet, Rfl: 0    mupirocin (BACTROBAN) 2 % cream, Apply 3 times daily. , Disp: 30 g, Rfl: 0    ferrous sulfate (IRON 325) 325 (65 Fe) MG tablet, Take 1 tablet by mouth 2 times daily, Disp: 60 tablet, Rfl: 5    LORazepam (ATIVAN) 1 MG tablet, Take 1 tablet by mouth every 8 hours as needed for Anxiety for up to 30 days. , Disp: 90 tablet, Rfl: 0    pramipexole (MIRAPEX) 0.125 MG tablet, Take 1 tablet by mouth nightly, Disp: 90 tablet, Rfl: 3    albuterol sulfate  (90 Base) MCG/ACT inhaler, INHALE TWO (2) PUFFS INTO THE LUNGS EVERY FOUR (4) HOURS AS NEEDED FOR WHEEZING, Disp: 18 g, Rfl: 2    cephALEXin (KEFLEX) 500 MG capsule, Take 1 capsule by mouth 4 times daily for 28 days, Disp: 112 capsule, Rfl: 1    amLODIPine (NORVASC) 10 MG tablet, Take 1 tablet by mouth daily, Disp: 30 tablet, Rfl: 5    metoprolol tartrate (LOPRESSOR) 25 MG tablet, Twice A Day (Patient taking differently: Take 25 mg by mouth 2 times daily Twice A Day), Disp: 60 tablet, Rfl: 5    furosemide (LASIX) 20 MG tablet, Take 1 tablet by mouth daily, Disp: 30 tablet, Rfl: 1    fluvoxaMINE (LUVOX) 100 MG tablet, Take 100 mg by mouth nightly, Disp: , Rfl:     formoterol (PERFOROMIST) 20 MCG/2ML nebulizer solution, Take 2 mLs by nebulization every 12 hours, Disp: 120 mL, Rfl: 5    omeprazole (PRILOSEC) 40 MG delayed release capsule, Take 1 capsule by mouth daily, Disp: 30 capsule, Rfl: 2    ketoconazole (NIZORAL) 2 % cream, Apply topically daily. , Disp: 30 g, Rfl: 1    Probiotic Product [facility]) CAPS, Patient is to take one tab bid.  Patient has been on antibiotics for the last 3 months, Disp: 30 capsule, Rfl: 3    SYMBICORT 160-4.5 MCG/ACT AERO, Inhale 2 puffs into the lungs 2 times daily, Disp: 1 each, Rfl: 5    budesonide (PULMICORT) 0.5 MG/2ML nebulizer suspension, Take 2 mLs by nebulization 2 times daily, Disp: 60 ampule, Rfl: 5    Revefenacin (YUPELRI) 175 MCG/3ML SOLN, Inhale 1 ampule into the lungs daily, Disp: 30 vial, Rfl: 5    HYDROcodone-acetaminophen (NORCO) 7.5-325 MG per tablet, Take 1 tablet by mouth 2 times daily as needed. Takes religiously twice daily for leg pain per pt, Disp: , Rfl:     ipratropium-albuterol (DUONEB) 0.5-2.5 (3) MG/3ML SOLN nebulizer solution, Inhale 3 mLs into the lungs every 4 hours as needed for Shortness of Breath, Disp: 12 vial, Rfl: 0    ARIPiprazole (ABILIFY) 5 MG tablet, take 1 tablet by mouth once daily, Disp: , Rfl: 0    Allergies: Allergies   Allergen Reactions    Aspirin     Levofloxacin Other (See Comments)    Lisinopril     Other      Other reaction(s): ABD PAIN    Tramadol     Ibuprofen Nausea And Vomiting    Sulfa Antibiotics Nausea And Vomiting       Social History:     Social History     Tobacco Use    Smoking status: Former Smoker     Packs/day: 4.00     Years: 25.00     Pack years: 100.00     Types: Cigarettes     Start date: 3/10/1995     Quit date: 10/13/2020     Years since quittin.4    Smokeless tobacco: Never Used   Vaping Use    Vaping Use: Never used   Substance Use Topics    Alcohol use: No    Drug use: No       Patient lives at home. Physical Exam:     Vitals:    22 1023 22 1025   BP: (!) 142/86 (!) 142/86   Pulse: 104    Resp: 20    Temp: 98.7 °F (37.1 °C)    TempSrc: Temporal    SpO2: 94%  Comment: 7 L    Weight: 127 lb (57.6 kg)    Height: 5' 4\" (1.626 m)        Exam:  Physical Exam  Vitals and nursing note reviewed. Constitutional:       General: He is not in acute distress. Appearance: He is well-developed. HENT:      Head: Normocephalic and atraumatic.    Eyes:      Conjunctiva/sclera: Conjunctivae normal.      Pupils: Pupils are equal, round, and reactive to light. Cardiovascular:      Rate and Rhythm: Normal rate and regular rhythm. Pulmonary:      Effort: Pulmonary effort is normal. No respiratory distress. Breath sounds: Normal breath sounds. Abdominal:      General: Bowel sounds are normal.      Palpations: Abdomen is soft. Tenderness: There is no abdominal tenderness. Musculoskeletal:         General: Normal range of motion. Cervical back: Normal range of motion. Skin:     General: Skin is warm and dry. Findings: Erythema present. Comments: Patient has evidence of paronychia to the left ring finger cuticle edge. There is redness. There is no significant fluctuance. There is no red streaking. No spontaneous discharge or drainage. Neurological:      General: No focal deficit present. Mental Status: He is alert and oriented to person, place, and time. Psychiatric:         Mood and Affect: Mood normal.         Behavior: Behavior normal.         Thought Content: Thought content normal.         Judgment: Judgment normal.           Testing:           Medical Decision Making:       Patient upon arrival did not appear toxic or lethargic. Vital signs were reviewed. Past medical history reviewed. Allergies reviewed. Medications reviewed. Patient is presenting with the above complaint of paronychia. Patient was educated on the diagnosis. Patient denies biting his nails. He is already on a 28-day course of Keflex. Patient will be placed on doxycycline as well. He was given a prescription for topical Bactroban. Patient's is to soak his finger in warm Epson salt several times a day. He was educated on signs and symptoms that would warrant emergent evaluation in the emergency department. Patient is to follow-up with his PCP within the next 7 days for reevaluation and management. Patient understands the plan and is agreeable.     Clinical Impression:   Blessing Barker was seen today for finger pain. Diagnoses and all orders for this visit:    Paronychia of finger of left hand    Other orders  -     doxycycline hyclate (VIBRA-TABS) 100 MG tablet; Take 1 tablet by mouth 2 times daily for 10 days  -     mupirocin (BACTROBAN) 2 % cream; Apply 3 times daily. The patient is to call for any concerns or return if any of the signs or symptoms worsen. The patient is to follow-up with PCP in the next 2-3 days for repeat evaluation repeat assessment or go directly to the emergency department. SIGNATURE: Mey Nava PA-C

## 2022-04-13 ENCOUNTER — OFFICE VISIT (OUTPATIENT)
Dept: PRIMARY CARE CLINIC | Age: 57
End: 2022-04-13
Payer: MEDICAID

## 2022-04-13 VITALS
BODY MASS INDEX: 21.68 KG/M2 | HEIGHT: 64 IN | SYSTOLIC BLOOD PRESSURE: 138 MMHG | HEART RATE: 82 BPM | RESPIRATION RATE: 18 BRPM | WEIGHT: 127 LBS | TEMPERATURE: 96.8 F | DIASTOLIC BLOOD PRESSURE: 80 MMHG | OXYGEN SATURATION: 97 %

## 2022-04-13 DIAGNOSIS — J44.9 CHRONIC OBSTRUCTIVE PULMONARY DISEASE, UNSPECIFIED COPD TYPE (HCC): ICD-10-CM

## 2022-04-13 DIAGNOSIS — K21.9 GASTROESOPHAGEAL REFLUX DISEASE, UNSPECIFIED WHETHER ESOPHAGITIS PRESENT: ICD-10-CM

## 2022-04-13 DIAGNOSIS — M79.2 NEUROPATHIC PAIN: Primary | ICD-10-CM

## 2022-04-13 DIAGNOSIS — F41.9 ANXIETY: ICD-10-CM

## 2022-04-13 PROCEDURE — 99214 OFFICE O/P EST MOD 30 MIN: CPT | Performed by: FAMILY MEDICINE

## 2022-04-13 RX ORDER — GABAPENTIN 100 MG/1
CAPSULE ORAL
Qty: 60 CAPSULE | Refills: 2 | Status: ON HOLD
Start: 2022-04-13 | End: 2022-07-08

## 2022-04-13 RX ORDER — LORAZEPAM 1 MG/1
1 TABLET ORAL EVERY 8 HOURS PRN
Qty: 90 TABLET | Refills: 0 | Status: SHIPPED | OUTPATIENT
Start: 2022-04-13 | End: 2022-05-13

## 2022-04-13 RX ORDER — BUDESONIDE AND FORMOTEROL FUMARATE DIHYDRATE 160; 4.5 UG/1; UG/1
2 AEROSOL RESPIRATORY (INHALATION) 2 TIMES DAILY
Qty: 1 EACH | Refills: 5 | Status: ON HOLD
Start: 2022-04-13 | End: 2022-07-11 | Stop reason: HOSPADM

## 2022-04-13 RX ORDER — OMEPRAZOLE 40 MG/1
40 CAPSULE, DELAYED RELEASE ORAL DAILY
Qty: 30 CAPSULE | Refills: 2 | Status: SHIPPED
Start: 2022-04-13 | End: 2022-06-20 | Stop reason: SDUPTHER

## 2022-04-13 NOTE — PROGRESS NOTES
22  Evonnie Agent : 1965 Sex: male  Age: 64 y.o. Assessment and Plan:  Romel Mortensen was seen today for follow-up, medication refill and discuss medications. Diagnoses and all orders for this visit:    Neuropathic pain  -     gabapentin (NEURONTIN) 100 MG capsule; Take one tab nightly; if tolerating, can increase to 200mg at night    Gastroesophageal reflux disease, unspecified whether esophagitis present  -     omeprazole (PRILOSEC) 40 MG delayed release capsule; Take 1 capsule by mouth daily    Anxiety  -     LORazepam (ATIVAN) 1 MG tablet; Take 1 tablet by mouth every 8 hours as needed for Anxiety for up to 30 days. Chronic obstructive pulmonary disease, unspecified COPD type ([facility] [address]  -     SYMBICORT 160-4.5 MCG/ACT AERO; Inhale 2 puffs into the lungs 2 times daily    Trial of gabapentin for what sounds like neuropathic pain  Precautions explicitly reviewed; we will start low and go very slowly  Patient will continue his other current medications as updated in his chart  He is otherwise stable and will continue his other current medications  Follow-up with ID as scheduled    Return in about 4 weeks (around 2022).         Chief Complaint   Patient presents with    Follow-up    Medication Refill    Discuss Medications     Pt prefers albuterol Proair over Ventolin        HPI  Pt here for routine f/u  He is with his sister  He is generally doing well  Saw infectious disease this morning  He will have blood work next week and then one last follow-up  Presuming his blood work is reassuring, he will be discharged from their care    He was changed from [address] [place] [address] to Linda Pandya because the pharmacy couldn't get the Performist   After three doses, feels much improved  No more wheezing  Even visiting nurses have noted    Patient is also complaining that his feet feel like it would bilaterally  They hurt  They are somewhat hot and somewhat red, but they are not swollen  He is wondering what he can do to alleviate some of his pain as it is affecting his sleep and other ADLs      Problem list reviewed and updated in full with patient today as necessary. A comprehensive ROS was negative, except as documented above. Current Outpatient Medications:     Arformoterol Tartrate (BROVANA IN), Inhale into the lungs, Disp: , Rfl:     omeprazole (PRILOSEC) 40 MG delayed release capsule, Take 1 capsule by mouth daily, Disp: 30 capsule, Rfl: 2    LORazepam (ATIVAN) 1 MG tablet, Take 1 tablet by mouth every 8 hours as needed for Anxiety for up to 30 days. , Disp: 90 tablet, Rfl: 0    SYMBICORT 160-4.5 MCG/ACT AERO, Inhale 2 puffs into the lungs 2 times daily, Disp: 1 each, Rfl: 5    gabapentin (NEURONTIN) 100 MG capsule, Take one tab nightly; if tolerating, can increase to 200mg at night, Disp: 60 capsule, Rfl: 2    mupirocin (BACTROBAN) 2 % cream, Apply 3 times daily. , Disp: 30 g, Rfl: 0    ferrous sulfate (IRON 325) 325 (65 Fe) MG tablet, Take 1 tablet by mouth 2 times daily, Disp: 60 tablet, Rfl: 5    pramipexole (MIRAPEX) 0.125 MG tablet, Take 1 tablet by mouth nightly, Disp: 90 tablet, Rfl: 3    albuterol sulfate  (90 Base) MCG/ACT inhaler, INHALE TWO (2) PUFFS INTO THE LUNGS EVERY FOUR (4) HOURS AS NEEDED FOR WHEEZING, Disp: 18 g, Rfl: 2    amLODIPine (NORVASC) 10 MG tablet, Take 1 tablet by mouth daily, Disp: 30 tablet, Rfl: 5    metoprolol tartrate (LOPRESSOR) 25 MG tablet, Twice A Day (Patient taking differently: Take 25 mg by mouth 2 times daily Twice A Day), Disp: 60 tablet, Rfl: 5    furosemide (LASIX) 20 MG tablet, Take 1 tablet by mouth daily, Disp: 30 tablet, Rfl: 1    fluvoxaMINE (LUVOX) 100 MG tablet, Take 100 mg by mouth nightly, Disp: , Rfl:     ketoconazole (NIZORAL) 2 % cream, Apply topically daily. , Disp: 30 g, Rfl: 1    Probiotic Product [facility]) CAPS, Patient is to take one tab bid.  Patient has been on antibiotics for the last 3 months, Disp: 30 capsule, Rfl: 3   budesonide (PULMICORT) 0.5 MG/2ML nebulizer suspension, Take 2 mLs by nebulization 2 times daily, Disp: 60 ampule, Rfl: 5    Revefenacin (YUPELRI) 175 MCG/3ML SOLN, Inhale 1 ampule into the lungs daily, Disp: 30 vial, Rfl: 5    HYDROcodone-acetaminophen (NORCO) 7.5-325 MG per tablet, Take 1 tablet by mouth 2 times daily as needed. Takes religiously twice daily for leg pain per pt, Disp: , Rfl:     ipratropium-albuterol (DUONEB) 0.5-2.5 (3) MG/3ML SOLN nebulizer solution, Inhale 3 mLs into the lungs every 4 hours as needed for Shortness of Breath, Disp: 12 vial, Rfl: 0    ARIPiprazole (ABILIFY) 5 MG tablet, take 1 tablet by mouth once daily, Disp: , Rfl: 0  Allergies   Allergen Reactions    Aspirin     Levofloxacin Other (See Comments)    Lisinopril     Other      Other reaction(s): ABD PAIN    Tramadol     Ibuprofen Nausea And Vomiting    Sulfa Antibiotics Nausea And Vomiting       Pt's past medical and surgical history were reviewed and updated as necessary today   Pt's family and social history were reviewed and updated as necessary today        Vitals:    04/13/22 1236   BP: 138/80   Pulse: 82   Resp: 18   Temp: 96.8 °F (36 °C)   TempSrc: Temporal   SpO2: 97%   Weight: 127 lb (57.6 kg)  Comment: Pt reported   Height: 5' 4\" (1.626 m)       Physical Exam  Constitutional:       Appearance: Normal appearance. HENT:      Head: Normocephalic and atraumatic. Eyes:      Conjunctiva/sclera: Conjunctivae normal.   Cardiovascular:      Rate and Rhythm: Normal rate and regular rhythm. Heart sounds: Normal heart sounds. Pulmonary:      Effort: Pulmonary effort is normal.   Abdominal:      Palpations: Abdomen is soft. Tenderness: There is no abdominal tenderness. Musculoskeletal:      Comments: Patient is in wheelchair. He does have some faint erythema noted in his bilateral feet and they are warm, but they are not hot to the touch. Skin:     General: Skin is warm and dry.    Neurological: General: No focal deficit present. Mental Status: He is alert and oriented to person, place, and time. Psychiatric:         Mood and Affect: Mood normal.         Behavior: Behavior normal.       Counseled patient as appropriate and relevant regarding above diagnosis, including possible risks and complications, especially if left uncontrolled. Counseled patient as appropriate and relevant regarding any  possible side effects, risks, and alternatives to treatment; patient and/or guardian verbalizes understanding, and is in agreement with the plan as detailed above. Reviewed age and gender appropriate health screening exams and vaccinations. Advised patient regarding importance of keeping up with recommended health maintenance and to schedule as soon as possible if overdue, as this is important in assessing for undiagnosed pathology, especially cancer, as well as protecting against potentially harmful/life threatening disease. If discussed, any educational materials and/or home exercises printed for patient's review and were included in patient instructions on his/her After Visit Summary and given to patient at the end of visit. Advised patient to call with any new medication issues, and and other concerns/complaints prior to scheduled follow up. All questions answered to the patient's satisfaction.         Seen By:  Nena Mejia MD

## 2022-05-16 DIAGNOSIS — F41.9 ANXIETY: Primary | ICD-10-CM

## 2022-05-16 LAB — TROPONIN I: < 0.03 NG/ML

## 2022-05-16 RX ORDER — LORAZEPAM 1 MG/1
1 TABLET ORAL EVERY 8 HOURS PRN
COMMUNITY
End: 2022-05-16 | Stop reason: SDUPTHER

## 2022-05-16 RX ORDER — LORAZEPAM 1 MG/1
1 TABLET ORAL EVERY 8 HOURS PRN
Qty: 90 TABLET | Refills: 0 | Status: ON HOLD
Start: 2022-05-16 | End: 2022-06-05 | Stop reason: HOSPADM

## 2022-05-17 LAB
A/G RATIO: 0.8 RATIO (ref 1.1–2.2)
ALBUMIN SERPL-MCNC: 3.4 G/DL (ref 3.4–4.8)
ALP BLD-CCNC: 43 U/L (ref 42–121)
ALT SERPL-CCNC: 16 U/L (ref 10–63)
ANION GAP SERPL CALCULATED.3IONS-SCNC: 14 MEQ/L (ref 3–11)
AST SERPL-CCNC: 27 U/L (ref 10–41)
BASOPHILS ABSOLUTE: 0 K/UL (ref 0–0.2)
BASOPHILS RELATIVE PERCENT: 0.3 % (ref 0–1.5)
BILIRUB SERPL-MCNC: 0.7 MG/DL (ref 0.3–1.5)
BUN BLDV-MCNC: 9 MG/DL (ref 6–20)
CALCIUM SERPL-MCNC: 7.6 MG/DL (ref 8.5–10.5)
CHLORIDE BLD-SCNC: 96 MEQ/L (ref 98–107)
CO2: 28 MEQ/L (ref 21–31)
CREAT SERPL-MCNC: 0.8 MG/DL (ref 0.6–1.3)
CREATININE + EGFR PANEL: 121 ML/MIN
EOSINOPHILS ABSOLUTE: 0 K/UL (ref 0–0.33)
EOSINOPHILS RELATIVE PERCENT: 0.1 % (ref 0–3)
GFR NON-AFRICAN AMERICAN: 100 ML/MIN
GLOBULIN: 4.5 G/DL (ref 1.9–3.9)
GLUCOSE BLD-MCNC: 142 MG/DL (ref 70–99)
HCT VFR BLD CALC: 32.7 % (ref 41–50)
HEMOGLOBIN: 10.8 G/DL (ref 13.5–16.5)
LYMPHOCYTES ABSOLUTE: 0.3 K/UL (ref 1.1–4.8)
LYMPHOCYTES RELATIVE PERCENT: 7.9 % (ref 24–44)
MAGNESIUM: 1.9 MEQ/L (ref 1.6–2.6)
MCH RBC QN AUTO: 30 PG (ref 28–34)
MCHC RBC AUTO-ENTMCNC: 32.9 G/DL (ref 33–37)
MCV RBC AUTO: 91 FL (ref 80–100)
MONOCYTES ABSOLUTE: 0 K/UL (ref 0.2–0.7)
MONOCYTES RELATIVE PERCENT: 1 % (ref 3.4–9)
NEUTROPHILS ABSOLUTE: 3 K/UL (ref 1.83–8.7)
PDW BLD-RTO: 15.2 % (ref 10.9–14.3)
PLATELET # BLD: 161 K/UL (ref 150–450)
PMV BLD AUTO: 9.2 FL (ref 7.4–10.4)
POTASSIUM SERPL-SCNC: 5 MEQ/L (ref 3.6–5)
RBC # BLD: 3.59 M/UL (ref 4.5–5.5)
SEGMENTED NEUTROPHILS RELATIVE PERCENT: 90.7 % (ref 40–74)
SODIUM BLD-SCNC: 138 MEQ/L (ref 135–145)
TOTAL PROTEIN: 7.9 G/DL (ref 5.9–7.8)
TROPONIN I: < 0.03 NG/ML
TSH SERPL DL<=0.05 MIU/L-ACNC: 0.14 UIU/ML (ref 0.34–5.6)
WBC # BLD: 3.3 K/UL (ref 4.5–11)

## 2022-05-18 ENCOUNTER — TELEPHONE (OUTPATIENT)
Dept: PRIMARY CARE CLINIC | Age: 57
End: 2022-05-18

## 2022-05-18 LAB
A/G RATIO: 0.8 RATIO (ref 1.1–2.2)
ALBUMIN SERPL-MCNC: 3.4 G/DL (ref 3.4–4.8)
ALP BLD-CCNC: 39 U/L (ref 42–121)
ALT SERPL-CCNC: 15 U/L (ref 10–63)
ANION GAP SERPL CALCULATED.3IONS-SCNC: 12 MEQ/L (ref 3–11)
AST SERPL-CCNC: 19 U/L (ref 10–41)
BASOPHILS ABSOLUTE: 0 K/UL (ref 0–0.2)
BASOPHILS RELATIVE PERCENT: 0.2 % (ref 0–1.5)
BILIRUB SERPL-MCNC: 0.4 MG/DL (ref 0.3–1.5)
BUN BLDV-MCNC: 14 MG/DL (ref 6–20)
CALCIUM SERPL-MCNC: 8 MG/DL (ref 8.5–10.5)
CHLORIDE BLD-SCNC: 97 MEQ/L (ref 98–107)
CO2: 28 MEQ/L (ref 21–31)
CREAT SERPL-MCNC: 0.7 MG/DL (ref 0.6–1.3)
CREATININE + EGFR PANEL: 141 ML/MIN
EOSINOPHILS ABSOLUTE: 0 K/UL (ref 0–0.33)
EOSINOPHILS RELATIVE PERCENT: 0 % (ref 0–3)
GFR NON-AFRICAN AMERICAN: 117 ML/MIN
GLOBULIN: 4.3 G/DL (ref 1.9–3.9)
GLUCOSE BLD-MCNC: 130 MG/DL (ref 70–99)
HCT VFR BLD CALC: 34.4 % (ref 41–50)
HEMOGLOBIN: 11.2 G/DL (ref 13.5–16.5)
LYMPHOCYTES ABSOLUTE: 0.3 K/UL (ref 1.1–4.8)
LYMPHOCYTES RELATIVE PERCENT: 4.9 % (ref 24–44)
MCH RBC QN AUTO: 30.1 PG (ref 28–34)
MCHC RBC AUTO-ENTMCNC: 32.7 G/DL (ref 33–37)
MCV RBC AUTO: 92.2 FL (ref 80–100)
MONOCYTES ABSOLUTE: 0.2 K/UL (ref 0.2–0.7)
MONOCYTES RELATIVE PERCENT: 2.9 % (ref 3.4–9)
NEUTROPHILS ABSOLUTE: 6.1 K/UL (ref 1.83–8.7)
PDW BLD-RTO: 15.4 % (ref 10.9–14.3)
PLATELET # BLD: 158 K/UL (ref 150–450)
PMV BLD AUTO: 8.5 FL (ref 7.4–10.4)
POTASSIUM SERPL-SCNC: 4.6 MEQ/L (ref 3.6–5)
RBC # BLD: 3.73 M/UL (ref 4.5–5.5)
SEGMENTED NEUTROPHILS RELATIVE PERCENT: 92 % (ref 40–74)
SODIUM BLD-SCNC: 137 MEQ/L (ref 135–145)
TOTAL PROTEIN: 7.7 G/DL (ref 5.9–7.8)
WBC # BLD: 6.6 K/UL (ref 4.5–11)

## 2022-05-18 NOTE — TELEPHONE ENCOUNTER
[facility] called asking about getting pt into see you within next two weeks. Your schedule is full and would like to know where you would like to squeeze him in? He does prefer morning apts. [facility] discharge advocate [phone]. Call her if answered today.   Call one of his sisters if it is after today to schedule  Mariana Harris [phone] or Ace Huff [phone]

## 2022-05-19 LAB
A/G RATIO: 0.8 RATIO (ref 1.1–2.2)
ALBUMIN SERPL-MCNC: 3.3 G/DL (ref 3.4–4.8)
ALP BLD-CCNC: 35 U/L (ref 42–121)
ALT SERPL-CCNC: 15 U/L (ref 10–63)
ANION GAP SERPL CALCULATED.3IONS-SCNC: 11 MEQ/L (ref 3–11)
AST SERPL-CCNC: 16 U/L (ref 10–41)
BASOPHILS ABSOLUTE: 0.1 K/UL (ref 0–0.2)
BASOPHILS RELATIVE PERCENT: 0.8 % (ref 0–1.5)
BILIRUB SERPL-MCNC: 0.4 MG/DL (ref 0.3–1.5)
BUN BLDV-MCNC: 20 MG/DL (ref 6–20)
CALCIUM SERPL-MCNC: 8.2 MG/DL (ref 8.5–10.5)
CHLORIDE BLD-SCNC: 95 MEQ/L (ref 98–107)
CO2: 29 MEQ/L (ref 21–31)
CREAT SERPL-MCNC: 0.7 MG/DL (ref 0.6–1.3)
CREATININE + EGFR PANEL: 141 ML/MIN
EOSINOPHILS ABSOLUTE: 0 K/UL (ref 0–0.33)
EOSINOPHILS RELATIVE PERCENT: 0 % (ref 0–3)
GFR NON-AFRICAN AMERICAN: 117 ML/MIN
GLOBULIN: 4 G/DL (ref 1.9–3.9)
GLUCOSE BLD-MCNC: 135 MG/DL (ref 70–99)
HCT VFR BLD CALC: 33.7 % (ref 41–50)
HEMOGLOBIN: 10.6 G/DL (ref 13.5–16.5)
LYMPHOCYTES ABSOLUTE: 0.2 K/UL (ref 1.1–4.8)
LYMPHOCYTES RELATIVE PERCENT: 2 % (ref 24–44)
MCH RBC QN AUTO: 28.9 PG (ref 28–34)
MCHC RBC AUTO-ENTMCNC: 31.6 G/DL (ref 33–37)
MCV RBC AUTO: 91.6 FL (ref 80–100)
MONOCYTES ABSOLUTE: 0.2 K/UL (ref 0.2–0.7)
MONOCYTES RELATIVE PERCENT: 1.7 % (ref 3.4–9)
NEUTROPHILS ABSOLUTE: 11.7 K/UL (ref 1.83–8.7)
PDW BLD-RTO: 15 % (ref 10.9–14.3)
PLATELET # BLD: 169 K/UL (ref 150–450)
PMV BLD AUTO: 8.1 FL (ref 7.4–10.4)
POTASSIUM SERPL-SCNC: 4.8 MEQ/L (ref 3.6–5)
RBC # BLD: 3.68 M/UL (ref 4.5–5.5)
SEGMENTED NEUTROPHILS RELATIVE PERCENT: 95.5 % (ref 40–74)
SODIUM BLD-SCNC: 135 MEQ/L (ref 135–145)
TOTAL PROTEIN: 7.3 G/DL (ref 5.9–7.8)
WBC # BLD: 12.2 K/UL (ref 4.5–11)

## 2022-05-19 NOTE — TELEPHONE ENCOUNTER
Pt will be discharged this Sunday, per his sister Jose Herbert. Scheduled 6/6/22 at [facility]. Strong peripheral pulses

## 2022-05-19 NOTE — TELEPHONE ENCOUNTER
Is there any chance he can come to [facility]?   Otherwise next Thursday, please double book at 11am

## 2022-05-20 LAB
A/G RATIO: 0.8 RATIO (ref 1.1–2.2)
ALBUMIN SERPL-MCNC: 3.4 G/DL (ref 3.4–4.8)
ALP BLD-CCNC: 36 U/L (ref 42–121)
ALT SERPL-CCNC: 14 U/L (ref 10–63)
ANION GAP SERPL CALCULATED.3IONS-SCNC: 11 MEQ/L (ref 3–11)
AST SERPL-CCNC: 15 U/L (ref 10–41)
BASOPHILS ABSOLUTE: 0 K/UL (ref 0–0.2)
BASOPHILS RELATIVE PERCENT: 0.3 % (ref 0–1.5)
BILIRUB SERPL-MCNC: 0.5 MG/DL (ref 0.3–1.5)
BUN BLDV-MCNC: 23 MG/DL (ref 6–20)
CALCIUM SERPL-MCNC: 8.2 MG/DL (ref 8.5–10.5)
CHLORIDE BLD-SCNC: 93 MEQ/L (ref 98–107)
CO2: 30 MEQ/L (ref 21–31)
CREAT SERPL-MCNC: 0.6 MG/DL (ref 0.6–1.3)
CREATININE + EGFR PANEL: 169 ML/MIN
EOSINOPHILS ABSOLUTE: 0 K/UL (ref 0–0.33)
EOSINOPHILS RELATIVE PERCENT: 0 % (ref 0–3)
GFR NON-AFRICAN AMERICAN: 139 ML/MIN
GLOBULIN: 4.3 G/DL (ref 1.9–3.9)
GLUCOSE BLD-MCNC: 125 MG/DL (ref 70–99)
HCT VFR BLD CALC: 35.9 % (ref 41–50)
HEMOGLOBIN: 11.7 G/DL (ref 13.5–16.5)
LYMPHOCYTES ABSOLUTE: 0.3 K/UL (ref 1.1–4.8)
LYMPHOCYTES RELATIVE PERCENT: 4.3 % (ref 24–44)
MCH RBC QN AUTO: 29.8 PG (ref 28–34)
MCHC RBC AUTO-ENTMCNC: 32.7 G/DL (ref 33–37)
MCV RBC AUTO: 91.1 FL (ref 80–100)
MONOCYTES ABSOLUTE: 0.2 K/UL (ref 0.2–0.7)
MONOCYTES RELATIVE PERCENT: 4 % (ref 3.4–9)
NEUTROPHILS ABSOLUTE: 5.6 K/UL (ref 1.83–8.7)
PDW BLD-RTO: 15 % (ref 10.9–14.3)
PLATELET # BLD: 160 K/UL (ref 150–450)
PMV BLD AUTO: 8.3 FL (ref 7.4–10.4)
POTASSIUM SERPL-SCNC: 4.6 MEQ/L (ref 3.6–5)
RBC # BLD: 3.94 M/UL (ref 4.5–5.5)
SEGMENTED NEUTROPHILS RELATIVE PERCENT: 91.4 % (ref 40–74)
SODIUM BLD-SCNC: 134 MEQ/L (ref 135–145)
TOTAL PROTEIN: 7.7 G/DL (ref 5.9–7.8)
WBC # BLD: 6.2 K/UL (ref 4.5–11)

## 2022-05-21 LAB
A/G RATIO: 0.8 RATIO (ref 1.1–2.2)
ALBUMIN SERPL-MCNC: 3.3 G/DL (ref 3.4–4.8)
ALP BLD-CCNC: 37 U/L (ref 42–121)
ALT SERPL-CCNC: 26 U/L (ref 10–63)
ANION GAP SERPL CALCULATED.3IONS-SCNC: 11 MEQ/L (ref 3–11)
AST SERPL-CCNC: 18 U/L (ref 10–41)
BASOPHILS ABSOLUTE: 0 K/UL (ref 0–0.2)
BASOPHILS RELATIVE PERCENT: 0.5 % (ref 0–1.5)
BILIRUB SERPL-MCNC: 0.3 MG/DL (ref 0.3–1.5)
BUN BLDV-MCNC: 19 MG/DL (ref 6–20)
CALCIUM SERPL-MCNC: 7.9 MG/DL (ref 8.5–10.5)
CHLORIDE BLD-SCNC: 93 MEQ/L (ref 98–107)
CO2: 30 MEQ/L (ref 21–31)
CREAT SERPL-MCNC: 0.7 MG/DL (ref 0.6–1.3)
CREATININE + EGFR PANEL: 141 ML/MIN
DIFFERENTIAL, MANUAL: ABNORMAL
EOSINOPHILS ABSOLUTE: 0 K/UL (ref 0–0.33)
EOSINOPHILS RELATIVE PERCENT: 0 % (ref 0–3)
GFR NON-AFRICAN AMERICAN: 117 ML/MIN
GLOBULIN: 4 G/DL (ref 1.9–3.9)
GLUCOSE BLD-MCNC: 118 MG/DL (ref 70–99)
HCT VFR BLD CALC: 35.8 % (ref 41–50)
HEMOGLOBIN: 11.4 G/DL (ref 13.5–16.5)
LYMPHOCYTES ABSOLUTE: 0.3 K/UL (ref 1.1–4.8)
LYMPHOCYTES RELATIVE PERCENT: 4.4 % (ref 24–44)
MCH RBC QN AUTO: 29.1 PG (ref 28–34)
MCHC RBC AUTO-ENTMCNC: 31.9 G/DL (ref 33–37)
MCV RBC AUTO: 91.3 FL (ref 80–100)
MONOCYTES ABSOLUTE: 0.2 K/UL (ref 0.2–0.7)
MONOCYTES RELATIVE PERCENT: 4 % (ref 3.4–9)
NEUTROPHILS ABSOLUTE: 5.4 K/UL (ref 1.83–8.7)
PDW BLD-RTO: 15 % (ref 10.9–14.3)
PLATELET # BLD: 168 K/UL (ref 150–450)
PMV BLD AUTO: 8.7 FL (ref 7.4–10.4)
POTASSIUM SERPL-SCNC: 4.5 MEQ/L (ref 3.6–5)
RBC # BLD: 3.92 M/UL (ref 4.5–5.5)
SEGMENTED NEUTROPHILS RELATIVE PERCENT: 91.1 % (ref 40–74)
SODIUM BLD-SCNC: 134 MEQ/L (ref 135–145)
TOTAL PROTEIN: 7.3 G/DL (ref 5.9–7.8)
WBC # BLD: 5.9 K/UL (ref 4.5–11)

## 2022-05-23 LAB — POTASSIUM SERPL-SCNC: 4.6 MEQ/L (ref 3.6–5)

## 2022-06-01 ENCOUNTER — TELEPHONE (OUTPATIENT)
Dept: PRIMARY CARE CLINIC | Age: 57
End: 2022-06-01

## 2022-06-01 RX ORDER — PREDNISONE 20 MG/1
20 TABLET ORAL DAILY
Qty: 10 TABLET | Refills: 0 | Status: ON HOLD
Start: 2022-06-01 | End: 2022-06-05 | Stop reason: HOSPADM

## 2022-06-01 RX ORDER — AMOXICILLIN AND CLAVULANATE POTASSIUM 875; 125 MG/1; MG/1
1 TABLET, FILM COATED ORAL 2 TIMES DAILY
Qty: 20 TABLET | Refills: 0 | Status: ON HOLD
Start: 2022-06-01 | End: 2022-06-05 | Stop reason: HOSPADM

## 2022-06-01 RX ORDER — DOXYCYCLINE HYCLATE 100 MG
100 TABLET ORAL 2 TIMES DAILY
Qty: 20 TABLET | Refills: 0 | Status: ON HOLD
Start: 2022-06-01 | End: 2022-06-05 | Stop reason: HOSPADM

## 2022-06-01 NOTE — TELEPHONE ENCOUNTER
Two antibiotics and steroids sent  Sister needs to keep close eye on him  If he shows any sign of worsening, will need to go back to ER

## 2022-06-01 NOTE — TELEPHONE ENCOUNTER
Sister Sara Marshall notified. She stated that his pulse ox this morning was 95. Yesterday it was 87, 88, and \"kept bouncing around. \"

## 2022-06-03 ENCOUNTER — APPOINTMENT (OUTPATIENT)
Dept: GENERAL RADIOLOGY | Age: 57
DRG: 140 | End: 2022-06-03
Payer: MEDICAID

## 2022-06-03 ENCOUNTER — HOSPITAL ENCOUNTER (INPATIENT)
Age: 57
LOS: 4 days | Discharge: HOME | DRG: 140 | End: 2022-06-07
Attending: STUDENT IN AN ORGANIZED HEALTH CARE EDUCATION/TRAINING PROGRAM | Admitting: INTERNAL MEDICINE
Payer: MEDICAID

## 2022-06-03 ENCOUNTER — APPOINTMENT (OUTPATIENT)
Dept: CT IMAGING | Age: 57
DRG: 140 | End: 2022-06-03
Payer: MEDICAID

## 2022-06-03 ENCOUNTER — APPOINTMENT (OUTPATIENT)
Dept: ULTRASOUND IMAGING | Age: 57
DRG: 140 | End: 2022-06-03
Payer: MEDICAID

## 2022-06-03 DIAGNOSIS — I50.9 ACUTE ON CHRONIC CONGESTIVE HEART FAILURE, UNSPECIFIED HEART FAILURE TYPE (HCC): Primary | ICD-10-CM

## 2022-06-03 PROBLEM — R60.9 FLUID RETENTION: Status: ACTIVE | Noted: 2022-06-03

## 2022-06-03 LAB
ALBUMIN SERPL-MCNC: 4 G/DL (ref 3.5–5.2)
ALP BLD-CCNC: 67 U/L (ref 40–129)
ALT SERPL-CCNC: 29 U/L (ref 0–40)
ANION GAP SERPL CALCULATED.3IONS-SCNC: 12 MMOL/L (ref 7–16)
AST SERPL-CCNC: 18 U/L (ref 0–39)
BASOPHILS ABSOLUTE: 0 E9/L (ref 0–0.2)
BASOPHILS RELATIVE PERCENT: 0 % (ref 0–2)
BILIRUB SERPL-MCNC: 0.2 MG/DL (ref 0–1.2)
BILIRUBIN URINE: NEGATIVE
BLOOD, URINE: NEGATIVE
BUN BLDV-MCNC: 16 MG/DL (ref 6–20)
C-REACTIVE PROTEIN: 0.3 MG/DL (ref 0–0.4)
CALCIUM SERPL-MCNC: 7.1 MG/DL (ref 8.6–10.2)
CHLORIDE BLD-SCNC: 94 MMOL/L (ref 98–107)
CLARITY: CLEAR
CO2: 30 MMOL/L (ref 22–29)
COLOR: NORMAL
CREAT SERPL-MCNC: 0.8 MG/DL (ref 0.7–1.2)
EOSINOPHILS ABSOLUTE: 0.02 E9/L (ref 0.05–0.5)
EOSINOPHILS RELATIVE PERCENT: 0.2 % (ref 0–6)
GFR AFRICAN AMERICAN: >60
GFR NON-AFRICAN AMERICAN: >60 ML/MIN/1.73
GLUCOSE BLD-MCNC: 96 MG/DL (ref 74–99)
GLUCOSE URINE: NEGATIVE MG/DL
HCT VFR BLD CALC: 35.7 % (ref 37–54)
HEMOGLOBIN: 10.6 G/DL (ref 12.5–16.5)
IMMATURE GRANULOCYTES #: 0.07 E9/L
IMMATURE GRANULOCYTES %: 0.7 % (ref 0–5)
KETONES, URINE: NEGATIVE MG/DL
LACTIC ACID: 2 MMOL/L (ref 0.5–2.2)
LEUKOCYTE ESTERASE, URINE: NEGATIVE
LYMPHOCYTES ABSOLUTE: 0.81 E9/L (ref 1.5–4)
LYMPHOCYTES RELATIVE PERCENT: 8.4 % (ref 20–42)
MCH RBC QN AUTO: 29.9 PG (ref 26–35)
MCHC RBC AUTO-ENTMCNC: 29.7 % (ref 32–34.5)
MCV RBC AUTO: 100.8 FL (ref 80–99.9)
MONOCYTES ABSOLUTE: 0.51 E9/L (ref 0.1–0.95)
MONOCYTES RELATIVE PERCENT: 5.3 % (ref 2–12)
NEUTROPHILS ABSOLUTE: 8.18 E9/L (ref 1.8–7.3)
NEUTROPHILS RELATIVE PERCENT: 85.4 % (ref 43–80)
NITRITE, URINE: NEGATIVE
PDW BLD-RTO: 15.4 FL (ref 11.5–15)
PH UA: 7 (ref 5–9)
PLATELET # BLD: 151 E9/L (ref 130–450)
PMV BLD AUTO: 9.8 FL (ref 7–12)
POTASSIUM REFLEX MAGNESIUM: 3.9 MMOL/L (ref 3.5–5)
PRO-BNP: 800 PG/ML (ref 0–125)
PROTEIN UA: NEGATIVE MG/DL
RBC # BLD: 3.54 E12/L (ref 3.8–5.8)
SEDIMENTATION RATE, ERYTHROCYTE: 9 MM/HR (ref 0–15)
SODIUM BLD-SCNC: 136 MMOL/L (ref 132–146)
SPECIFIC GRAVITY UA: 1.01 (ref 1–1.03)
TOTAL PROTEIN: 6.6 G/DL (ref 6.4–8.3)
TROPONIN, HIGH SENSITIVITY: 18 NG/L (ref 0–11)
TROPONIN, HIGH SENSITIVITY: 19 NG/L (ref 0–11)
UROBILINOGEN, URINE: 0.2 E.U./DL
WBC # BLD: 9.6 E9/L (ref 4.5–11.5)

## 2022-06-03 PROCEDURE — 87040 BLOOD CULTURE FOR BACTERIA: CPT

## 2022-06-03 PROCEDURE — 6360000002 HC RX W HCPCS: Performed by: STUDENT IN AN ORGANIZED HEALTH CARE EDUCATION/TRAINING PROGRAM

## 2022-06-03 PROCEDURE — 83880 ASSAY OF NATRIURETIC PEPTIDE: CPT

## 2022-06-03 PROCEDURE — 86140 C-REACTIVE PROTEIN: CPT

## 2022-06-03 PROCEDURE — 87206 SMEAR FLUORESCENT/ACID STAI: CPT

## 2022-06-03 PROCEDURE — 6370000000 HC RX 637 (ALT 250 FOR IP): Performed by: STUDENT IN AN ORGANIZED HEALTH CARE EDUCATION/TRAINING PROGRAM

## 2022-06-03 PROCEDURE — 6360000004 HC RX CONTRAST MEDICATION: Performed by: RADIOLOGY

## 2022-06-03 PROCEDURE — 2700000000 HC OXYGEN THERAPY PER DAY

## 2022-06-03 PROCEDURE — 93005 ELECTROCARDIOGRAM TRACING: CPT | Performed by: PHYSICIAN ASSISTANT

## 2022-06-03 PROCEDURE — 85025 COMPLETE CBC W/AUTO DIFF WBC: CPT

## 2022-06-03 PROCEDURE — 87070 CULTURE OTHR SPECIMN AEROBIC: CPT

## 2022-06-03 PROCEDURE — 71275 CT ANGIOGRAPHY CHEST: CPT

## 2022-06-03 PROCEDURE — 87077 CULTURE AEROBIC IDENTIFY: CPT

## 2022-06-03 PROCEDURE — 81003 URINALYSIS AUTO W/O SCOPE: CPT

## 2022-06-03 PROCEDURE — 84484 ASSAY OF TROPONIN QUANT: CPT

## 2022-06-03 PROCEDURE — APPSS45 APP SPLIT SHARED TIME 31-45 MINUTES: Performed by: NURSE PRACTITIONER

## 2022-06-03 PROCEDURE — 87186 SC STD MICRODIL/AGAR DIL: CPT

## 2022-06-03 PROCEDURE — 94664 DEMO&/EVAL PT USE INHALER: CPT

## 2022-06-03 PROCEDURE — 2580000003 HC RX 258: Performed by: NURSE PRACTITIONER

## 2022-06-03 PROCEDURE — 96374 THER/PROPH/DIAG INJ IV PUSH: CPT

## 2022-06-03 PROCEDURE — 83605 ASSAY OF LACTIC ACID: CPT

## 2022-06-03 PROCEDURE — 6370000000 HC RX 637 (ALT 250 FOR IP): Performed by: NURSE PRACTITIONER

## 2022-06-03 PROCEDURE — 85651 RBC SED RATE NONAUTOMATED: CPT

## 2022-06-03 PROCEDURE — 6360000002 HC RX W HCPCS: Performed by: NURSE PRACTITIONER

## 2022-06-03 PROCEDURE — 99285 EMERGENCY DEPT VISIT HI MDM: CPT

## 2022-06-03 PROCEDURE — 0202U NFCT DS 22 TRGT SARS-COV-2: CPT

## 2022-06-03 PROCEDURE — 36415 COLL VENOUS BLD VENIPUNCTURE: CPT

## 2022-06-03 PROCEDURE — 84145 PROCALCITONIN (PCT): CPT

## 2022-06-03 PROCEDURE — 71046 X-RAY EXAM CHEST 2 VIEWS: CPT

## 2022-06-03 PROCEDURE — 2060000000 HC ICU INTERMEDIATE R&B

## 2022-06-03 PROCEDURE — 87449 NOS EACH ORGANISM AG IA: CPT

## 2022-06-03 PROCEDURE — 93970 EXTREMITY STUDY: CPT

## 2022-06-03 PROCEDURE — 80053 COMPREHEN METABOLIC PANEL: CPT

## 2022-06-03 RX ORDER — ACETAMINOPHEN 325 MG/1
650 TABLET ORAL EVERY 6 HOURS PRN
Status: DISCONTINUED | OUTPATIENT
Start: 2022-06-03 | End: 2022-06-07 | Stop reason: HOSPADM

## 2022-06-03 RX ORDER — SODIUM CHLORIDE 0.9 % (FLUSH) 0.9 %
5-40 SYRINGE (ML) INJECTION EVERY 12 HOURS SCHEDULED
Status: DISCONTINUED | OUTPATIENT
Start: 2022-06-03 | End: 2022-06-07 | Stop reason: HOSPADM

## 2022-06-03 RX ORDER — HYDROCODONE BITARTRATE AND ACETAMINOPHEN 7.5; 325 MG/1; MG/1
1 TABLET ORAL EVERY 6 HOURS PRN
Status: DISCONTINUED | OUTPATIENT
Start: 2022-06-03 | End: 2022-06-07 | Stop reason: HOSPADM

## 2022-06-03 RX ORDER — POLYETHYLENE GLYCOL 3350 17 G/17G
17 POWDER, FOR SOLUTION ORAL DAILY PRN
Status: DISCONTINUED | OUTPATIENT
Start: 2022-06-03 | End: 2022-06-07 | Stop reason: HOSPADM

## 2022-06-03 RX ORDER — ACETAMINOPHEN 650 MG/1
650 SUPPOSITORY RECTAL EVERY 6 HOURS PRN
Status: DISCONTINUED | OUTPATIENT
Start: 2022-06-03 | End: 2022-06-07 | Stop reason: HOSPADM

## 2022-06-03 RX ORDER — ENOXAPARIN SODIUM 100 MG/ML
40 INJECTION SUBCUTANEOUS DAILY
Status: DISCONTINUED | OUTPATIENT
Start: 2022-06-03 | End: 2022-06-07 | Stop reason: HOSPADM

## 2022-06-03 RX ORDER — SODIUM CHLORIDE 0.9 % (FLUSH) 0.9 %
5-40 SYRINGE (ML) INJECTION PRN
Status: DISCONTINUED | OUTPATIENT
Start: 2022-06-03 | End: 2022-06-07 | Stop reason: HOSPADM

## 2022-06-03 RX ORDER — ONDANSETRON 4 MG/1
4 TABLET, ORALLY DISINTEGRATING ORAL EVERY 8 HOURS PRN
Status: DISCONTINUED | OUTPATIENT
Start: 2022-06-03 | End: 2022-06-07 | Stop reason: HOSPADM

## 2022-06-03 RX ORDER — AMLODIPINE BESYLATE 10 MG/1
10 TABLET ORAL DAILY
Status: DISCONTINUED | OUTPATIENT
Start: 2022-06-04 | End: 2022-06-07 | Stop reason: HOSPADM

## 2022-06-03 RX ORDER — PRAMIPEXOLE DIHYDROCHLORIDE 0.12 MG/1
0.12 TABLET ORAL NIGHTLY
Status: DISCONTINUED | OUTPATIENT
Start: 2022-06-03 | End: 2022-06-07 | Stop reason: HOSPADM

## 2022-06-03 RX ORDER — FLUOXETINE HYDROCHLORIDE 20 MG/1
10 CAPSULE ORAL DAILY
Status: ON HOLD | COMMUNITY
End: 2022-10-15

## 2022-06-03 RX ORDER — FUROSEMIDE 10 MG/ML
40 INJECTION INTRAMUSCULAR; INTRAVENOUS ONCE
Status: COMPLETED | OUTPATIENT
Start: 2022-06-03 | End: 2022-06-03

## 2022-06-03 RX ORDER — LORAZEPAM 1 MG/1
1 TABLET ORAL EVERY 8 HOURS PRN
Status: DISCONTINUED | OUTPATIENT
Start: 2022-06-03 | End: 2022-06-07 | Stop reason: HOSPADM

## 2022-06-03 RX ORDER — SODIUM CHLORIDE 9 MG/ML
INJECTION, SOLUTION INTRAVENOUS PRN
Status: DISCONTINUED | OUTPATIENT
Start: 2022-06-03 | End: 2022-06-07 | Stop reason: HOSPADM

## 2022-06-03 RX ORDER — SENNA AND DOCUSATE SODIUM 50; 8.6 MG/1; MG/1
1 TABLET, FILM COATED ORAL 2 TIMES DAILY
Status: DISCONTINUED | OUTPATIENT
Start: 2022-06-03 | End: 2022-06-07 | Stop reason: HOSPADM

## 2022-06-03 RX ORDER — HYDROCODONE BITARTRATE AND ACETAMINOPHEN 5; 325 MG/1; MG/1
1 TABLET ORAL ONCE
Status: COMPLETED | OUTPATIENT
Start: 2022-06-03 | End: 2022-06-03

## 2022-06-03 RX ORDER — DOXYCYCLINE HYCLATE 100 MG/1
100 CAPSULE ORAL EVERY 12 HOURS
Status: DISCONTINUED | OUTPATIENT
Start: 2022-06-03 | End: 2022-06-05

## 2022-06-03 RX ORDER — PREDNISONE 20 MG/1
40 TABLET ORAL DAILY
Status: DISCONTINUED | OUTPATIENT
Start: 2022-06-06 | End: 2022-06-07 | Stop reason: HOSPADM

## 2022-06-03 RX ORDER — FERROUS SULFATE 325(65) MG
325 TABLET ORAL 2 TIMES DAILY WITH MEALS
Status: DISCONTINUED | OUTPATIENT
Start: 2022-06-04 | End: 2022-06-07 | Stop reason: HOSPADM

## 2022-06-03 RX ORDER — METHYLPREDNISOLONE SODIUM SUCCINATE 40 MG/ML
40 INJECTION, POWDER, LYOPHILIZED, FOR SOLUTION INTRAMUSCULAR; INTRAVENOUS EVERY 6 HOURS
Status: COMPLETED | OUTPATIENT
Start: 2022-06-03 | End: 2022-06-05

## 2022-06-03 RX ORDER — ONDANSETRON 2 MG/ML
4 INJECTION INTRAMUSCULAR; INTRAVENOUS EVERY 6 HOURS PRN
Status: DISCONTINUED | OUTPATIENT
Start: 2022-06-03 | End: 2022-06-07 | Stop reason: HOSPADM

## 2022-06-03 RX ORDER — IPRATROPIUM BROMIDE AND ALBUTEROL SULFATE 2.5; .5 MG/3ML; MG/3ML
1 SOLUTION RESPIRATORY (INHALATION) EVERY 4 HOURS
Status: DISCONTINUED | OUTPATIENT
Start: 2022-06-03 | End: 2022-06-04

## 2022-06-03 RX ORDER — PANTOPRAZOLE SODIUM 40 MG/1
40 TABLET, DELAYED RELEASE ORAL
Status: DISCONTINUED | OUTPATIENT
Start: 2022-06-04 | End: 2022-06-07 | Stop reason: HOSPADM

## 2022-06-03 RX ORDER — ARIPIPRAZOLE 5 MG/1
5 TABLET ORAL DAILY
Status: DISCONTINUED | OUTPATIENT
Start: 2022-06-04 | End: 2022-06-07 | Stop reason: HOSPADM

## 2022-06-03 RX ORDER — GABAPENTIN 100 MG/1
100 CAPSULE ORAL 3 TIMES DAILY
Status: DISCONTINUED | OUTPATIENT
Start: 2022-06-03 | End: 2022-06-07 | Stop reason: HOSPADM

## 2022-06-03 RX ADMIN — IOPAMIDOL 75 ML: 755 INJECTION, SOLUTION INTRAVENOUS at 16:43

## 2022-06-03 RX ADMIN — METHYLPREDNISOLONE SODIUM SUCCINATE 40 MG: 40 INJECTION, POWDER, LYOPHILIZED, FOR SOLUTION INTRAMUSCULAR; INTRAVENOUS at 21:00

## 2022-06-03 RX ADMIN — PRAMIPEXOLE DIHYDROCHLORIDE 0.12 MG: 0.12 TABLET ORAL at 23:13

## 2022-06-03 RX ADMIN — CEFTRIAXONE 1000 MG: 1 INJECTION, POWDER, FOR SOLUTION INTRAMUSCULAR; INTRAVENOUS at 21:17

## 2022-06-03 RX ADMIN — DOXYCYCLINE HYCLATE 100 MG: 100 CAPSULE ORAL at 21:17

## 2022-06-03 RX ADMIN — LORAZEPAM 1 MG: 1 TABLET ORAL at 22:49

## 2022-06-03 RX ADMIN — IPRATROPIUM BROMIDE AND ALBUTEROL SULFATE 1 AMPULE: .5; 2.5 SOLUTION RESPIRATORY (INHALATION) at 20:42

## 2022-06-03 RX ADMIN — HYDROCODONE BITARTRATE AND ACETAMINOPHEN 1 TABLET: 7.5; 325 TABLET ORAL at 20:55

## 2022-06-03 RX ADMIN — METOPROLOL TARTRATE 25 MG: 25 TABLET, FILM COATED ORAL at 23:13

## 2022-06-03 RX ADMIN — GABAPENTIN 100 MG: 100 CAPSULE ORAL at 23:13

## 2022-06-03 RX ADMIN — HYDROCODONE BITARTRATE AND ACETAMINOPHEN 1 TABLET: 5; 325 TABLET ORAL at 16:05

## 2022-06-03 RX ADMIN — ENOXAPARIN SODIUM 40 MG: 100 INJECTION SUBCUTANEOUS at 21:00

## 2022-06-03 RX ADMIN — FUROSEMIDE 40 MG: 10 INJECTION, SOLUTION INTRAMUSCULAR; INTRAVENOUS at 14:26

## 2022-06-03 ASSESSMENT — ENCOUNTER SYMPTOMS
WHEEZING: 1
EYES NEGATIVE: 1
SHORTNESS OF BREATH: 1
GASTROINTESTINAL NEGATIVE: 1
ALLERGIC/IMMUNOLOGIC NEGATIVE: 1

## 2022-06-03 ASSESSMENT — PAIN DESCRIPTION - ORIENTATION
ORIENTATION: RIGHT;LEFT
ORIENTATION: LEFT;RIGHT

## 2022-06-03 ASSESSMENT — PAIN - FUNCTIONAL ASSESSMENT: PAIN_FUNCTIONAL_ASSESSMENT: 0-10

## 2022-06-03 ASSESSMENT — PAIN SCALES - GENERAL
PAINLEVEL_OUTOF10: 4
PAINLEVEL_OUTOF10: 9
PAINLEVEL_OUTOF10: 10

## 2022-06-03 ASSESSMENT — PAIN DESCRIPTION - LOCATION
LOCATION: LEG
LOCATION: LEG

## 2022-06-03 NOTE — ED PROVIDER NOTES
FIRST PROVIDER CONTACT ASSESSMENT NOTE           Department of Emergency Medicine                 First Provider Note            6/3/22  12:52 PM EDT    Date of Encounter: No admission date for patient encounter. Patient Name: Nina Tinsley  : 1965  MRN: [id number]    Chief Complaint: Leg Swelling (x 1 week, L>R,  HX of CHF), Leg Pain, and Shortness of Breath (Chronic 6L O2, feels more than normal )      History of Present Illness:   Nina Tinsley is a 64 y.o. male who presents to the ED for bilateral LE swelling. SOB. Symptoms chronic but worse of late. Pt is chronically on Oxygen. States he had US last week negative for blood clots. Focused Physical Exam:  VS:    ED Triage Vitals [22 1251]   BP Temp Temp src Heart Rate Resp SpO2 Height Weight   (!) 160/77 98.7 °F (37.1 °C) -- 85 20 100 % 5' 4\" (1.626 m) 127 lb (57.6 kg)        Physical Ex: Constitutional: Alert and non-toxic. Medical History:  has a past medical history of COPD (chronic obstructive pulmonary disease) ([address] Hypertension, and Multiple gastric ulcers. Surgical History:  has a past surgical history that includes knee surgery; hip surgery; and hernia repair. Social History:  reports that he quit smoking about 19 months ago. His smoking use included cigarettes. He started smoking about 27 years ago. He has a 100.00 pack-year smoking history. He has never used smokeless tobacco. He reports that he does not drink alcohol and does not use drugs. Family History: family history includes Colon Cancer in his mother; No Known Problems in his brother, brother, sister, and sister; Other in his father.     Allergies: Aspirin, Levofloxacin, Lisinopril, Other, Tramadol, Ibuprofen, and Sulfa antibiotics     Initial Plan of Care: Initiate Treatment-Testing, Proceed toTreatment Area When Bed Available for ED Attending/MLP to Continue Care      ---END OF FIRST PROVIDER CONTACT ASSESSMENT NOTE---  Electronically signed by Gage Greene PA-C   DD: 6/3/22       Gage Greene PA-C  06/03/22 1255
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
## 2022-06-03 NOTE — PROGRESS NOTES
Anticipated admission. Admission database completed to best of this RN's ability. Care plan and education initiated. Pt from home, his cousin lives with him. Uses a walker with ambulation; has a toilet riser and nebulizer at home. Wears 6L n/c via O2 concentrator continuously through [address]  services come in M-F for 6-7 hours everyday also has nursing services every Monday and Wednesday through [facility].
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
## 2022-06-04 ENCOUNTER — APPOINTMENT (OUTPATIENT)
Dept: CT IMAGING | Age: 57
DRG: 140 | End: 2022-06-04
Payer: MEDICAID

## 2022-06-04 ENCOUNTER — APPOINTMENT (OUTPATIENT)
Dept: GENERAL RADIOLOGY | Age: 57
DRG: 140 | End: 2022-06-04
Payer: MEDICAID

## 2022-06-04 LAB
ADENOVIRUS BY PCR: NOT DETECTED
ALBUMIN SERPL-MCNC: 4.1 G/DL (ref 3.5–5.2)
ALP BLD-CCNC: 54 U/L (ref 40–129)
ALT SERPL-CCNC: 20 U/L (ref 0–40)
ANION GAP SERPL CALCULATED.3IONS-SCNC: 12 MMOL/L (ref 7–16)
ANISOCYTOSIS: ABNORMAL
AST SERPL-CCNC: 17 U/L (ref 0–39)
BASOPHILIC STIPPLING: ABNORMAL
BASOPHILS ABSOLUTE: 0.01 E9/L (ref 0–0.2)
BASOPHILS RELATIVE PERCENT: 0.1 % (ref 0–2)
BILIRUB SERPL-MCNC: 1.2 MG/DL (ref 0–1.2)
BORDETELLA PARAPERTUSSIS BY PCR: NOT DETECTED
BORDETELLA PERTUSSIS BY PCR: NOT DETECTED
BUN BLDV-MCNC: 17 MG/DL (ref 6–20)
CALCIUM SERPL-MCNC: 7.2 MG/DL (ref 8.6–10.2)
CHLAMYDOPHILIA PNEUMONIAE BY PCR: NOT DETECTED
CHLORIDE BLD-SCNC: 92 MMOL/L (ref 98–107)
CO2: 34 MMOL/L (ref 22–29)
CORONAVIRUS 229E BY PCR: NOT DETECTED
CORONAVIRUS HKU1 BY PCR: NOT DETECTED
CORONAVIRUS NL63 BY PCR: NOT DETECTED
CORONAVIRUS OC43 BY PCR: NOT DETECTED
CREAT SERPL-MCNC: 0.9 MG/DL (ref 0.7–1.2)
EOSINOPHILS ABSOLUTE: 0.01 E9/L (ref 0.05–0.5)
EOSINOPHILS RELATIVE PERCENT: 0.1 % (ref 0–6)
GFR AFRICAN AMERICAN: >60
GFR NON-AFRICAN AMERICAN: >60 ML/MIN/1.73
GLUCOSE BLD-MCNC: 138 MG/DL (ref 74–99)
HCT VFR BLD CALC: 35.9 % (ref 37–54)
HEMOGLOBIN: 10.9 G/DL (ref 12.5–16.5)
HUMAN METAPNEUMOVIRUS BY PCR: NOT DETECTED
HUMAN RHINOVIRUS/ENTEROVIRUS BY PCR: NOT DETECTED
IMMATURE GRANULOCYTES #: 0.06 E9/L
IMMATURE GRANULOCYTES %: 0.7 % (ref 0–5)
INFLUENZA A BY PCR: NOT DETECTED
INFLUENZA B BY PCR: NOT DETECTED
L. PNEUMOPHILA SEROGP 1 UR AG: NORMAL
LYMPHOCYTES ABSOLUTE: 0.42 E9/L (ref 1.5–4)
LYMPHOCYTES RELATIVE PERCENT: 5.2 % (ref 20–42)
MCH RBC QN AUTO: 29.9 PG (ref 26–35)
MCHC RBC AUTO-ENTMCNC: 30.4 % (ref 32–34.5)
MCV RBC AUTO: 98.6 FL (ref 80–99.9)
MONOCYTES ABSOLUTE: 0.16 E9/L (ref 0.1–0.95)
MONOCYTES RELATIVE PERCENT: 2 % (ref 2–12)
MYCOPLASMA PNEUMONIAE BY PCR: NOT DETECTED
NEUTROPHILS ABSOLUTE: 7.39 E9/L (ref 1.8–7.3)
NEUTROPHILS RELATIVE PERCENT: 91.9 % (ref 43–80)
PARAINFLUENZA VIRUS 1 BY PCR: NOT DETECTED
PARAINFLUENZA VIRUS 2 BY PCR: NOT DETECTED
PARAINFLUENZA VIRUS 3 BY PCR: NOT DETECTED
PARAINFLUENZA VIRUS 4 BY PCR: NOT DETECTED
PDW BLD-RTO: 15.7 FL (ref 11.5–15)
PLATELET # BLD: 145 E9/L (ref 130–450)
PMV BLD AUTO: 9.9 FL (ref 7–12)
POLYCHROMASIA: ABNORMAL
POTASSIUM REFLEX MAGNESIUM: 4.9 MMOL/L (ref 3.5–5)
PROCALCITONIN: 0.05 NG/ML (ref 0–0.08)
RBC # BLD: 3.64 E12/L (ref 3.8–5.8)
RESPIRATORY SYNCYTIAL VIRUS BY PCR: NOT DETECTED
SARS-COV-2, PCR: NOT DETECTED
SODIUM BLD-SCNC: 138 MMOL/L (ref 132–146)
STREP PNEUMONIAE ANTIGEN, URINE: NORMAL
TOTAL PROTEIN: 6.5 G/DL (ref 6.4–8.3)
WBC # BLD: 8.1 E9/L (ref 4.5–11.5)

## 2022-06-04 PROCEDURE — 6370000000 HC RX 637 (ALT 250 FOR IP): Performed by: NURSE PRACTITIONER

## 2022-06-04 PROCEDURE — 73552 X-RAY EXAM OF FEMUR 2/>: CPT

## 2022-06-04 PROCEDURE — 6360000002 HC RX W HCPCS: Performed by: NURSE PRACTITIONER

## 2022-06-04 PROCEDURE — 2580000003 HC RX 258: Performed by: NURSE PRACTITIONER

## 2022-06-04 PROCEDURE — 85025 COMPLETE CBC W/AUTO DIFF WBC: CPT

## 2022-06-04 PROCEDURE — 73590 X-RAY EXAM OF LOWER LEG: CPT

## 2022-06-04 PROCEDURE — 73700 CT LOWER EXTREMITY W/O DYE: CPT

## 2022-06-04 PROCEDURE — 99233 SBSQ HOSP IP/OBS HIGH 50: CPT | Performed by: INTERNAL MEDICINE

## 2022-06-04 PROCEDURE — 73502 X-RAY EXAM HIP UNI 2-3 VIEWS: CPT

## 2022-06-04 PROCEDURE — 97161 PT EVAL LOW COMPLEX 20 MIN: CPT

## 2022-06-04 PROCEDURE — 2060000000 HC ICU INTERMEDIATE R&B

## 2022-06-04 PROCEDURE — 6360000002 HC RX W HCPCS: Performed by: INTERNAL MEDICINE

## 2022-06-04 PROCEDURE — 36415 COLL VENOUS BLD VENIPUNCTURE: CPT

## 2022-06-04 PROCEDURE — 99254 IP/OBS CNSLTJ NEW/EST MOD 60: CPT | Performed by: ORTHOPAEDIC SURGERY

## 2022-06-04 PROCEDURE — 2580000003 HC RX 258

## 2022-06-04 PROCEDURE — 94640 AIRWAY INHALATION TREATMENT: CPT

## 2022-06-04 PROCEDURE — 6370000000 HC RX 637 (ALT 250 FOR IP): Performed by: INTERNAL MEDICINE

## 2022-06-04 PROCEDURE — 87205 SMEAR GRAM STAIN: CPT

## 2022-06-04 PROCEDURE — 73564 X-RAY EXAM KNEE 4 OR MORE: CPT

## 2022-06-04 PROCEDURE — 80053 COMPREHEN METABOLIC PANEL: CPT

## 2022-06-04 RX ORDER — ARFORMOTEROL TARTRATE 15 UG/2ML
15 SOLUTION RESPIRATORY (INHALATION) 2 TIMES DAILY
Status: DISCONTINUED | OUTPATIENT
Start: 2022-06-04 | End: 2022-06-07 | Stop reason: HOSPADM

## 2022-06-04 RX ORDER — BUDESONIDE 0.5 MG/2ML
0.5 INHALANT ORAL 2 TIMES DAILY
Status: DISCONTINUED | OUTPATIENT
Start: 2022-06-04 | End: 2022-06-07 | Stop reason: HOSPADM

## 2022-06-04 RX ORDER — IPRATROPIUM BROMIDE AND ALBUTEROL SULFATE 2.5; .5 MG/3ML; MG/3ML
1 SOLUTION RESPIRATORY (INHALATION)
Status: DISCONTINUED | OUTPATIENT
Start: 2022-06-04 | End: 2022-06-07 | Stop reason: HOSPADM

## 2022-06-04 RX ADMIN — HYDROCODONE BITARTRATE AND ACETAMINOPHEN 1 TABLET: 7.5; 325 TABLET ORAL at 18:37

## 2022-06-04 RX ADMIN — IPRATROPIUM BROMIDE AND ALBUTEROL SULFATE 1 AMPULE: .5; 2.5 SOLUTION RESPIRATORY (INHALATION) at 04:25

## 2022-06-04 RX ADMIN — BUDESONIDE 500 MCG: 0.5 SUSPENSION RESPIRATORY (INHALATION) at 20:17

## 2022-06-04 RX ADMIN — METHYLPREDNISOLONE SODIUM SUCCINATE 40 MG: 40 INJECTION, POWDER, LYOPHILIZED, FOR SOLUTION INTRAMUSCULAR; INTRAVENOUS at 14:38

## 2022-06-04 RX ADMIN — FERROUS SULFATE TAB 325 MG (65 MG ELEMENTAL FE) 325 MG: 325 (65 FE) TAB at 08:11

## 2022-06-04 RX ADMIN — DOXYCYCLINE HYCLATE 100 MG: 100 CAPSULE ORAL at 20:54

## 2022-06-04 RX ADMIN — GABAPENTIN 100 MG: 100 CAPSULE ORAL at 20:54

## 2022-06-04 RX ADMIN — SODIUM CHLORIDE, PRESERVATIVE FREE 10 ML: 5 INJECTION INTRAVENOUS at 08:12

## 2022-06-04 RX ADMIN — ARFORMOTEROL TARTRATE 15 MCG: 15 SOLUTION RESPIRATORY (INHALATION) at 20:17

## 2022-06-04 RX ADMIN — ENOXAPARIN SODIUM 40 MG: 100 INJECTION SUBCUTANEOUS at 20:54

## 2022-06-04 RX ADMIN — SODIUM CHLORIDE, PRESERVATIVE FREE 10 ML: 5 INJECTION INTRAVENOUS at 20:54

## 2022-06-04 RX ADMIN — HYDROCODONE BITARTRATE AND ACETAMINOPHEN 1 TABLET: 7.5; 325 TABLET ORAL at 12:20

## 2022-06-04 RX ADMIN — LORAZEPAM 1 MG: 1 TABLET ORAL at 19:18

## 2022-06-04 RX ADMIN — IPRATROPIUM BROMIDE AND ALBUTEROL SULFATE 1 AMPULE: .5; 2.5 SOLUTION RESPIRATORY (INHALATION) at 08:32

## 2022-06-04 RX ADMIN — METHYLPREDNISOLONE SODIUM SUCCINATE 40 MG: 40 INJECTION, POWDER, LYOPHILIZED, FOR SOLUTION INTRAMUSCULAR; INTRAVENOUS at 03:22

## 2022-06-04 RX ADMIN — FERROUS SULFATE TAB 325 MG (65 MG ELEMENTAL FE) 325 MG: 325 (65 FE) TAB at 17:00

## 2022-06-04 RX ADMIN — IPRATROPIUM BROMIDE AND ALBUTEROL SULFATE 1 AMPULE: .5; 2.5 SOLUTION RESPIRATORY (INHALATION) at 12:15

## 2022-06-04 RX ADMIN — AMLODIPINE BESYLATE 10 MG: 10 TABLET ORAL at 08:11

## 2022-06-04 RX ADMIN — IPRATROPIUM BROMIDE AND ALBUTEROL SULFATE 1 AMPULE: 2.5; .5 SOLUTION RESPIRATORY (INHALATION) at 16:16

## 2022-06-04 RX ADMIN — IPRATROPIUM BROMIDE AND ALBUTEROL SULFATE 1 AMPULE: 2.5; .5 SOLUTION RESPIRATORY (INHALATION) at 20:17

## 2022-06-04 RX ADMIN — LORAZEPAM 1 MG: 1 TABLET ORAL at 10:42

## 2022-06-04 RX ADMIN — ARIPIPRAZOLE 5 MG: 5 TABLET ORAL at 08:11

## 2022-06-04 RX ADMIN — HYDROCODONE BITARTRATE AND ACETAMINOPHEN 1 TABLET: 7.5; 325 TABLET ORAL at 06:08

## 2022-06-04 RX ADMIN — METHYLPREDNISOLONE SODIUM SUCCINATE 40 MG: 40 INJECTION, POWDER, LYOPHILIZED, FOR SOLUTION INTRAMUSCULAR; INTRAVENOUS at 20:54

## 2022-06-04 RX ADMIN — METOPROLOL TARTRATE 25 MG: 25 TABLET, FILM COATED ORAL at 20:54

## 2022-06-04 RX ADMIN — PRAMIPEXOLE DIHYDROCHLORIDE 0.12 MG: 0.12 TABLET ORAL at 20:54

## 2022-06-04 RX ADMIN — PANTOPRAZOLE SODIUM 40 MG: 40 TABLET, DELAYED RELEASE ORAL at 06:01

## 2022-06-04 RX ADMIN — WATER 10 ML: 1 INJECTION INTRAMUSCULAR; INTRAVENOUS; SUBCUTANEOUS at 08:11

## 2022-06-04 RX ADMIN — IPRATROPIUM BROMIDE AND ALBUTEROL SULFATE 1 AMPULE: .5; 2.5 SOLUTION RESPIRATORY (INHALATION) at 00:30

## 2022-06-04 RX ADMIN — DOXYCYCLINE HYCLATE 100 MG: 100 CAPSULE ORAL at 08:11

## 2022-06-04 RX ADMIN — METOPROLOL TARTRATE 25 MG: 25 TABLET, FILM COATED ORAL at 08:11

## 2022-06-04 RX ADMIN — METHYLPREDNISOLONE SODIUM SUCCINATE 40 MG: 40 INJECTION, POWDER, LYOPHILIZED, FOR SOLUTION INTRAMUSCULAR; INTRAVENOUS at 08:11

## 2022-06-04 RX ADMIN — CEFTRIAXONE 1000 MG: 1 INJECTION, POWDER, FOR SOLUTION INTRAMUSCULAR; INTRAVENOUS at 20:54

## 2022-06-04 RX ADMIN — GABAPENTIN 100 MG: 100 CAPSULE ORAL at 14:38

## 2022-06-04 RX ADMIN — GABAPENTIN 100 MG: 100 CAPSULE ORAL at 08:11

## 2022-06-04 ASSESSMENT — PAIN SCALES - GENERAL
PAINLEVEL_OUTOF10: 0
PAINLEVEL_OUTOF10: 0
PAINLEVEL_OUTOF10: 8
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
## 2022-06-04 NOTE — PROGRESS NOTES
Initial Evaluation      Attending Provider:  Vicky Russo DO    Evaluating PT:  Keon Doe PT, RRT, CEAS    Room #:  8715/8161-K  Diagnosis:  Dyspnea likely 2/2 COPD vs CHF exacerbation  Pertinent PMHx/PSHx:  See PMH  Procedure/Surgery:  NA  Precautions:  General,   Equipment Needs:  WW, shoe lift LT/will not and does not use 3\" lifts     SUBJECTIVE:    Pt lives with alone in a Apt setting story home with Ramp entarnce. Pt ambulated with [facility] and NC02 AAT PTA. Has LT shoe lifts 3\" but has not and willnot wear 2/2 high LBP with use. OBJECTIVE:   Initial Evaluation  Date: 6/4/22 Treatment Short Term/ Long Term   Goals   Was pt agreeable to Eval/treatment? Yes     Does pt have pain? Back      Bed Mobility  Rolling: Indep  Supine to sit: Indep  Sit to supine: Indep  Scooting: Indep     Transfers Sit to stand: S/Indep  Stand to sit: Indep  Stand pivot:    Indep all surfaces   Ambulation   5 feet with [facility] Indep/S, gait dist limited 2/2 NC high flow tubing length and reports SOB  50 feet with [facility] and 6L NC/baseline Indep   Stair negotiation: ascended and descended  NA   Ramped entrance home setting   ROM Limited LT hip/knee, WFL RTLE     MMT RTLE WFL     AM-PAC 6 Clicks 51/10, Steps NA       Pt is A & O x 3   Edema:  NA  Balance: sitting:Indep and standing: requiring [facility] but Indep/S  Endurance: Poor, NC high flow 6L in use and baseline 6L    ASSESSMENT:    Comments: In bed on arrival in no distress. Marked LTLE LLD with LT hip 45* ER positioning. Has 3\" shoe lifts @ home since 1985 but will not use 2/2 severe LBP experienced with use. On 6L high flow O2 baseline levels with inc SOB/dyspnea conversational following transfer to EOB and short 5' walk from/to bed. Quick pulmonary recovery once seated back in bed @ short walk. Indep with return to supine position. Call light and wall phone as well as cell on bed. Tray to RT side.      Treatment:  Patient practiced and was instructed in the following treatment:  Eval       Pt's/ family goals   1. Not adressed    Patient and or family understand(s) diagnosis, prognosis, and plan of care. PLAN:    PT care will be provided in accordance with the objectives noted above. Exercises and functional mobility practice will be used as well as appropriate assistive devices or modalities to obtain goals. Patient and family education will also be administered as needed. Frequency of treatments: 2-5x/week x 1-2 weeks. Total Treatment Time  0 minutes     Evaluation Time includes thorough review of current medical information, gathering information on past medical history/social history and prior level of function, completion of standardized testing/informal observation of tasks, assessment of data and education on plan of care and goals.     CPT codes:  [x] Low Complexity PT evaluation 81196  [] Moderate Complexity PT evaluation 08233  [] High Complexity PT evaluation 53163  [] PT Re-evaluation 52874  [] Gait training 90947 ** minutes  [] Manual therapy 88216 ** minutes  [] Therapeutic activities 75497 ** minutes  [] Therapeutic exercises 64781 ** minutes  [] Neuromuscular reeducation 21178 ** minutes    Lucas Saldaña PT, RRT, CEAS

## 2022-06-04 NOTE — PROGRESS NOTES
Nutrition Note  Type and Reason for Visit: Consult (false consult)    Nutrition Assessment:  Consult received for Low Cristofer. Chart reviewed. Pt currently w/ no noted significant non-healing open wounds and no other significant nutritional issues noted at this time. Will follow-up per policy. Please consult if RD needed.     Demi Villarreal RD, LD  Contact: ext [phone]

## 2022-06-04 NOTE — PROGRESS NOTES
Ortho consult placed to Dr Ofelia Hogan through [facility]. Dr Dmitri Raymundo covering.
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
## 2022-06-04 NOTE — CONSULTS
[facility]  [address], [address]  [facility], [address] [place] [address]    Pulmonary Consultation      Reason for Consult:  sob    Requesting Physician:  Alexandre House MD    CHIEF COMPLAINT:  sob    History Obtained From:  patient    HISTORY OF PRESENT ILLNESS:                The patient is a 64 y.o. male with significant past medical history of COPD oxygen dependent who presents with increased sob for the past week. Was recently in [facility] for COVID, not sure when discharged, was apparently at [facility] ER on 31st for sob but sent home, given antibiotics and steroids by PCP, supposedly got worse with both breathing and leg swelling.  Currently feels similar with respiratory function since here, on 5 li HFNC, minimal cough/sputum production, sitting up in bed looking comfortable with breathing and no cough during exam.    Past Medical History:        Diagnosis Date    COPD (chronic obstructive pulmonary disease) ([address]     Hypertension     Multiple gastric ulcers        Past Surgical History:        Procedure Laterality Date    HERNIA REPAIR      HIP SURGERY      KNEE SURGERY         Current Medications:    Current Facility-Administered Medications: ipratropium-albuterol (DUONEB) nebulizer solution 1 ampule, 1 ampule, Inhalation, Q4H WA  sodium chloride flush 0.9 % injection 5-40 mL, 5-40 mL, IntraVENous, 2 times per day  sodium chloride flush 0.9 % injection 5-40 mL, 5-40 mL, IntraVENous, PRN  0.9 % sodium chloride infusion, , IntraVENous, PRN  ondansetron (ZOFRAN-ODT) disintegrating tablet 4 mg, 4 mg, Oral, Q8H PRN **OR** ondansetron (ZOFRAN) injection 4 mg, 4 mg, IntraVENous, Q6H PRN  polyethylene glycol (GLYCOLAX) packet 17 g, 17 g, Oral, Daily PRN  enoxaparin (LOVENOX) injection 40 mg, 40 mg, SubCUTAneous, Daily  acetaminophen (TYLENOL) tablet 650 mg, 650 mg, Oral, Q6H PRN **OR** acetaminophen (TYLENOL) suppository 650 mg, 650 mg, Rectal, Q6H PRN  sennosides-docusate sodium (SENOKOT-S) 8.6-50 MG tablet 1 tablet, 1 tablet, Oral, BID  methylPREDNISolone sodium (SOLU-MEDROL) injection 40 mg, 40 mg, IntraVENous, Q6H **FOLLOWED BY** [START ON 6/6/2022] predniSONE (DELTASONE) tablet 40 mg, 40 mg, Oral, Daily  cefTRIAXone (ROCEPHIN) 1,000 mg in sterile water 10 mL IV syringe, 1,000 mg, IntraVENous, Q24H  doxycycline hyclate (VIBRAMYCIN) capsule 100 mg, 100 mg, Oral, Q12H  HYDROcodone-acetaminophen (NORCO) 7.5-325 MG per tablet 1 tablet, 1 tablet, Oral, Q6H PRN  perflutren lipid microspheres (DEFINITY) injection 1.65 mg, 1.5 mL, IntraVENous, ONCE PRN  LORazepam (ATIVAN) tablet 1 mg, 1 mg, Oral, Q8H PRN  amLODIPine (NORVASC) tablet 10 mg, 10 mg, Oral, Daily  ferrous sulfate (IRON 325) tablet 325 mg, 325 mg, Oral, BID WC  metoprolol tartrate (LOPRESSOR) tablet 25 mg, 25 mg, Oral, BID  pantoprazole (PROTONIX) tablet 40 mg, 40 mg, Oral, QAM AC  pramipexole (MIRAPEX) tablet 0.125 mg, 0.125 mg, Oral, Nightly  gabapentin (NEURONTIN) capsule 100 mg, 100 mg, Oral, TID  ARIPiprazole (ABILIFY) tablet 5 mg, 5 mg, Oral, Daily    Allergies:  Aspirin, Levofloxacin, Lisinopril, Other, Tramadol, Ibuprofen, and Sulfa antibiotics    Social History:    TOBACCO:   reports that he quit smoking about 19 months ago. His smoking use included cigarettes. He started smoking about 27 years ago. He has a 100.00 pack-year smoking history. He has never used smokeless tobacco.    Family History:       Problem Relation Age of Onset    Colon Cancer Mother     Other Father         house fire    No Known Problems Sister     No Known Problems Brother     No Known Problems Sister     No Known Problems Brother        REVIEW OF SYSTEMS:    RESPIRATORY:  Sob and cough  MUSCULOSKELETAL: left leg swollen  Remainder of complete ROS is negative.     PHYSICAL EXAM:      Vitals:    BP (!) 144/72   Pulse 81   Temp 97.9 °F (36.6 °C) (Oral)   Resp 18   Ht 5' 4\" (1.626 m)   Wt 129 lb 9.6 oz (58.8 kg) SpO2 99%   BMI 22.25 kg/m²     EYES:  Lids and lashes normal, pupils equal, round and reactive to light, extra ocular muscles intact, sclera clear, conjunctiva normal  ENT:  Normocephalic, without obvious abnormality, atraumatic, sinuses nontender on palpation, external ears without lesions, oral pharynx with moist mucus membranes, tonsils without erythema or exudates, gums normal and good dentition. NECK:  Supple, symmetrical, trachea midline, no adenopathy, thyroid symmetric, not enlarged and no tenderness, skin normal  LUNGS:  Bilateral ronchi with cough  CARDIOVASCULAR:  Normal apical impulse, regular rate and rhythm, normal S1 and S2, no S3 or S4, and no murmur noted  ABDOMEN:  No scars, normal bowel sounds, soft, non-distended, non-tender, no masses palpated, no hepatosplenomegally  MUSCULOSKELETAL:  There is no redness, warmth, or swelling of the joints. Full range of motion noted. NEUROLOGIC:  Awake, alert, oriented to name, place and time. Cranial nerves II-XII are grossly intact. DATA:    CBC: Recent Labs     06/03/22  1404 06/04/22  0325   WBC 9.6 8.1   HGB 10.6* 10.9*   HCT 35.7* 35.9*   .8* 98.6    145       BMP:  Recent Labs     06/03/22  1404 06/04/22  0325    138   K 3.9 4.9   CL 94* 92*   CO2 30* 34*   BUN 16 17   CREATININE 0.8 0.9    ALB:3,BILIDIR:3,BILITOT:3,ALKPHOS:3)@    PT/INR: No results for input(s): PROTIME, INR in the last 72 hours. ABG:   No results for input(s): PH, PO2, PCO2, HCO3, BE, O2SAT, METHB, O2HB, COHB, O2CON, HHB, THB in the last 72 hours. Radiology Review:  CTA chest reviewed with (-) PE, improved aeration lingula and both lower lobes compared to previous    IMPRESSION/RECOMMENDATIONS:      COPD  Hypoxia  Hx of COVID    1. Cont with steroids, taper as tolerated  2. Cont with nebs, will add pulmicort and brovana bid  3.  Started on antibiotics, procalcitonin normal, CTA chest looking better than before, may be able to cut short quickly  4. Cont with oxygen, taper as tolerated  5. Will try to see records from [facility] as to when he was there and what was done  6. OOB to chair, ambulate with assistance  7. Watch fluid balance, diurese as per PCP      Time at the bedside, reviewing labs and radiographs, reviewing notes and consultations, discussing with staff and family was more than 55 minutes. Thanks for letting us see this patient in consultation. Please contact us with any questions. Office [phone] or after hours through [employer], x [phone].

## 2022-06-05 LAB
ALBUMIN SERPL-MCNC: 3.7 G/DL (ref 3.5–5.2)
ALP BLD-CCNC: 57 U/L (ref 40–129)
ALT SERPL-CCNC: 18 U/L (ref 0–40)
ANION GAP SERPL CALCULATED.3IONS-SCNC: 15 MMOL/L (ref 7–16)
AST SERPL-CCNC: 13 U/L (ref 0–39)
BASOPHILIC STIPPLING: ABNORMAL
BASOPHILS ABSOLUTE: 0.01 E9/L (ref 0–0.2)
BASOPHILS RELATIVE PERCENT: 0.1 % (ref 0–2)
BILIRUB SERPL-MCNC: <0.2 MG/DL (ref 0–1.2)
BUN BLDV-MCNC: 18 MG/DL (ref 6–20)
CALCIUM SERPL-MCNC: 7.2 MG/DL (ref 8.6–10.2)
CHLORIDE BLD-SCNC: 92 MMOL/L (ref 98–107)
CO2: 28 MMOL/L (ref 22–29)
CREAT SERPL-MCNC: 0.7 MG/DL (ref 0.7–1.2)
EKG ATRIAL RATE: 78 BPM
EKG P AXIS: 62 DEGREES
EKG P-R INTERVAL: 116 MS
EKG Q-T INTERVAL: 386 MS
EKG QRS DURATION: 80 MS
EKG QTC CALCULATION (BAZETT): 440 MS
EKG R AXIS: 75 DEGREES
EKG T AXIS: 69 DEGREES
EKG VENTRICULAR RATE: 78 BPM
EOSINOPHILS ABSOLUTE: 0 E9/L (ref 0.05–0.5)
EOSINOPHILS RELATIVE PERCENT: 0 % (ref 0–6)
GFR AFRICAN AMERICAN: >60
GFR NON-AFRICAN AMERICAN: >60 ML/MIN/1.73
GLUCOSE BLD-MCNC: 157 MG/DL (ref 74–99)
GRAM STAIN ORDERABLE: NORMAL
HCT VFR BLD CALC: 34.6 % (ref 37–54)
HEMOGLOBIN: 10.6 G/DL (ref 12.5–16.5)
IMMATURE GRANULOCYTES #: 0.07 E9/L
IMMATURE GRANULOCYTES %: 0.7 % (ref 0–5)
LV EF: 58 %
LVEF MODALITY: NORMAL
LYMPHOCYTES ABSOLUTE: 0.4 E9/L (ref 1.5–4)
LYMPHOCYTES RELATIVE PERCENT: 3.9 % (ref 20–42)
MCH RBC QN AUTO: 30 PG (ref 26–35)
MCHC RBC AUTO-ENTMCNC: 30.6 % (ref 32–34.5)
MCV RBC AUTO: 98 FL (ref 80–99.9)
MONOCYTES ABSOLUTE: 0.31 E9/L (ref 0.1–0.95)
MONOCYTES RELATIVE PERCENT: 3 % (ref 2–12)
NEUTROPHILS ABSOLUTE: 9.38 E9/L (ref 1.8–7.3)
NEUTROPHILS RELATIVE PERCENT: 92.3 % (ref 43–80)
PDW BLD-RTO: 15.8 FL (ref 11.5–15)
PLATELET # BLD: 148 E9/L (ref 130–450)
PMV BLD AUTO: 10 FL (ref 7–12)
POLYCHROMASIA: ABNORMAL
POTASSIUM REFLEX MAGNESIUM: 4.4 MMOL/L (ref 3.5–5)
RBC # BLD: 3.53 E12/L (ref 3.8–5.8)
SODIUM BLD-SCNC: 135 MMOL/L (ref 132–146)
TOTAL PROTEIN: 6.3 G/DL (ref 6.4–8.3)
WBC # BLD: 10.2 E9/L (ref 4.5–11.5)

## 2022-06-05 PROCEDURE — 2580000003 HC RX 258: Performed by: INTERNAL MEDICINE

## 2022-06-05 PROCEDURE — 6360000002 HC RX W HCPCS: Performed by: INTERNAL MEDICINE

## 2022-06-05 PROCEDURE — 6360000002 HC RX W HCPCS: Performed by: NURSE PRACTITIONER

## 2022-06-05 PROCEDURE — 93306 TTE W/DOPPLER COMPLETE: CPT

## 2022-06-05 PROCEDURE — 6370000000 HC RX 637 (ALT 250 FOR IP): Performed by: INTERNAL MEDICINE

## 2022-06-05 PROCEDURE — 94640 AIRWAY INHALATION TREATMENT: CPT

## 2022-06-05 PROCEDURE — 36415 COLL VENOUS BLD VENIPUNCTURE: CPT

## 2022-06-05 PROCEDURE — 2580000003 HC RX 258: Performed by: NURSE PRACTITIONER

## 2022-06-05 PROCEDURE — 2700000000 HC OXYGEN THERAPY PER DAY

## 2022-06-05 PROCEDURE — 85025 COMPLETE CBC W/AUTO DIFF WBC: CPT

## 2022-06-05 PROCEDURE — 2060000000 HC ICU INTERMEDIATE R&B

## 2022-06-05 PROCEDURE — 99233 SBSQ HOSP IP/OBS HIGH 50: CPT | Performed by: INTERNAL MEDICINE

## 2022-06-05 PROCEDURE — 80053 COMPREHEN METABOLIC PANEL: CPT

## 2022-06-05 PROCEDURE — APPSS45 APP SPLIT SHARED TIME 31-45 MINUTES: Performed by: NURSE PRACTITIONER

## 2022-06-05 PROCEDURE — 6370000000 HC RX 637 (ALT 250 FOR IP): Performed by: NURSE PRACTITIONER

## 2022-06-05 RX ORDER — PREDNISONE 10 MG/1
TABLET ORAL
Qty: 10 TABLET | Refills: 0 | Status: SHIPPED | OUTPATIENT
Start: 2022-06-05 | End: 2022-06-13

## 2022-06-05 RX ADMIN — FERROUS SULFATE TAB 325 MG (65 MG ELEMENTAL FE) 325 MG: 325 (65 FE) TAB at 16:54

## 2022-06-05 RX ADMIN — IPRATROPIUM BROMIDE AND ALBUTEROL SULFATE 1 AMPULE: 2.5; .5 SOLUTION RESPIRATORY (INHALATION) at 09:09

## 2022-06-05 RX ADMIN — ARFORMOTEROL TARTRATE 15 MCG: 15 SOLUTION RESPIRATORY (INHALATION) at 20:52

## 2022-06-05 RX ADMIN — PIPERACILLIN AND TAZOBACTAM 3375 MG: 3; .375 INJECTION, POWDER, LYOPHILIZED, FOR SOLUTION INTRAVENOUS at 17:06

## 2022-06-05 RX ADMIN — HYDROCODONE BITARTRATE AND ACETAMINOPHEN 1 TABLET: 7.5; 325 TABLET ORAL at 06:32

## 2022-06-05 RX ADMIN — ARFORMOTEROL TARTRATE 15 MCG: 15 SOLUTION RESPIRATORY (INHALATION) at 09:09

## 2022-06-05 RX ADMIN — FERROUS SULFATE TAB 325 MG (65 MG ELEMENTAL FE) 325 MG: 325 (65 FE) TAB at 08:53

## 2022-06-05 RX ADMIN — PANTOPRAZOLE SODIUM 40 MG: 40 TABLET, DELAYED RELEASE ORAL at 05:25

## 2022-06-05 RX ADMIN — METHYLPREDNISOLONE SODIUM SUCCINATE 40 MG: 40 INJECTION, POWDER, LYOPHILIZED, FOR SOLUTION INTRAMUSCULAR; INTRAVENOUS at 08:53

## 2022-06-05 RX ADMIN — GABAPENTIN 100 MG: 100 CAPSULE ORAL at 08:53

## 2022-06-05 RX ADMIN — HYDROCODONE BITARTRATE AND ACETAMINOPHEN 1 TABLET: 7.5; 325 TABLET ORAL at 00:32

## 2022-06-05 RX ADMIN — LORAZEPAM 1 MG: 1 TABLET ORAL at 17:13

## 2022-06-05 RX ADMIN — DOCUSATE SODIUM 50 MG AND SENNOSIDES 8.6 MG 1 TABLET: 8.6; 5 TABLET, FILM COATED ORAL at 08:53

## 2022-06-05 RX ADMIN — HYDROCODONE BITARTRATE AND ACETAMINOPHEN 1 TABLET: 7.5; 325 TABLET ORAL at 12:36

## 2022-06-05 RX ADMIN — PRAMIPEXOLE DIHYDROCHLORIDE 0.12 MG: 0.12 TABLET ORAL at 21:06

## 2022-06-05 RX ADMIN — GABAPENTIN 100 MG: 100 CAPSULE ORAL at 14:54

## 2022-06-05 RX ADMIN — DOXYCYCLINE HYCLATE 100 MG: 100 CAPSULE ORAL at 08:53

## 2022-06-05 RX ADMIN — GABAPENTIN 100 MG: 100 CAPSULE ORAL at 21:06

## 2022-06-05 RX ADMIN — SODIUM CHLORIDE: 9 INJECTION, SOLUTION INTRAVENOUS at 17:11

## 2022-06-05 RX ADMIN — HYDROCODONE BITARTRATE AND ACETAMINOPHEN 1 TABLET: 7.5; 325 TABLET ORAL at 18:39

## 2022-06-05 RX ADMIN — BUDESONIDE 500 MCG: 0.5 SUSPENSION RESPIRATORY (INHALATION) at 09:09

## 2022-06-05 RX ADMIN — IPRATROPIUM BROMIDE AND ALBUTEROL SULFATE 1 AMPULE: 2.5; .5 SOLUTION RESPIRATORY (INHALATION) at 16:08

## 2022-06-05 RX ADMIN — LORAZEPAM 1 MG: 1 TABLET ORAL at 08:53

## 2022-06-05 RX ADMIN — BUDESONIDE 500 MCG: 0.5 SUSPENSION RESPIRATORY (INHALATION) at 20:52

## 2022-06-05 RX ADMIN — METHYLPREDNISOLONE SODIUM SUCCINATE 40 MG: 40 INJECTION, POWDER, LYOPHILIZED, FOR SOLUTION INTRAMUSCULAR; INTRAVENOUS at 02:00

## 2022-06-05 RX ADMIN — ARIPIPRAZOLE 5 MG: 5 TABLET ORAL at 08:53

## 2022-06-05 RX ADMIN — SODIUM CHLORIDE, PRESERVATIVE FREE 10 ML: 5 INJECTION INTRAVENOUS at 08:54

## 2022-06-05 RX ADMIN — METOPROLOL TARTRATE 25 MG: 25 TABLET, FILM COATED ORAL at 21:07

## 2022-06-05 RX ADMIN — IPRATROPIUM BROMIDE AND ALBUTEROL SULFATE 1 AMPULE: 2.5; .5 SOLUTION RESPIRATORY (INHALATION) at 11:50

## 2022-06-05 RX ADMIN — METHYLPREDNISOLONE SODIUM SUCCINATE 40 MG: 40 INJECTION, POWDER, LYOPHILIZED, FOR SOLUTION INTRAMUSCULAR; INTRAVENOUS at 14:54

## 2022-06-05 RX ADMIN — ENOXAPARIN SODIUM 40 MG: 100 INJECTION SUBCUTANEOUS at 21:06

## 2022-06-05 RX ADMIN — METOPROLOL TARTRATE 25 MG: 25 TABLET, FILM COATED ORAL at 08:53

## 2022-06-05 RX ADMIN — SODIUM CHLORIDE, PRESERVATIVE FREE 10 ML: 5 INJECTION INTRAVENOUS at 21:07

## 2022-06-05 RX ADMIN — IPRATROPIUM BROMIDE AND ALBUTEROL SULFATE 1 AMPULE: 2.5; .5 SOLUTION RESPIRATORY (INHALATION) at 20:52

## 2022-06-05 RX ADMIN — AMLODIPINE BESYLATE 10 MG: 10 TABLET ORAL at 08:53

## 2022-06-05 ASSESSMENT — PAIN DESCRIPTION - PAIN TYPE
TYPE: CHRONIC PAIN
TYPE: CHRONIC PAIN

## 2022-06-05 ASSESSMENT — PAIN DESCRIPTION - ONSET
ONSET: ON-GOING
ONSET: ON-GOING

## 2022-06-05 ASSESSMENT — PAIN SCALES - GENERAL
PAINLEVEL_OUTOF10: 7
PAINLEVEL_OUTOF10: 0
PAINLEVEL_OUTOF10: 8
PAINLEVEL_OUTOF10: 8
PAINLEVEL_OUTOF10: 7
PAINLEVEL_OUTOF10: 0
PAINLEVEL_OUTOF10: 8

## 2022-06-05 ASSESSMENT — PAIN DESCRIPTION - DESCRIPTORS
DESCRIPTORS: ACHING;DISCOMFORT
DESCRIPTORS: DISCOMFORT;SHARP
DESCRIPTORS: DISCOMFORT;SHARP

## 2022-06-05 ASSESSMENT — PAIN DESCRIPTION - ORIENTATION
ORIENTATION: RIGHT;LEFT

## 2022-06-05 ASSESSMENT — PAIN DESCRIPTION - FREQUENCY
FREQUENCY: CONTINUOUS
FREQUENCY: CONTINUOUS

## 2022-06-05 ASSESSMENT — PAIN DESCRIPTION - LOCATION
LOCATION: LEG
LOCATION: LEG;FOOT
LOCATION: LEG

## 2022-06-05 NOTE — PLAN OF CARE
Problem: ABCDS Injury Assessment  Goal: Absence of physical injury  Outcome: Progressing  Flowsheets (Taken 6/4/2022 1950)  Absence of Physical Injury: Implement safety measures based on patient assessment     Problem: Safety - Adult  Goal: Free from fall injury  Outcome: Progressing  Flowsheets (Taken 6/4/2022 1950)  Free From Fall Injury: Instruct family/caregiver on patient safety     Problem: Pain  Goal: Verbalizes/displays adequate comfort level or baseline comfort level  6/4/2022 2201 by Uday Stubbs RN  Outcome: Progressing  Flowsheets (Taken 6/4/2022 1950)  Verbalizes/displays adequate comfort level or baseline comfort level: Encourage patient to monitor pain and request assistance  6/4/2022 0942 by Christy Loza RN  Outcome: Progressing     Problem: Skin/Tissue Integrity  Goal: Absence of new skin breakdown  Description: 1. Monitor for areas of redness and/or skin breakdown  2. Assess vascular access sites hourly  3. Every 4-6 hours minimum:  Change oxygen saturation probe site  4. Every 4-6 hours:  If on nasal continuous positive airway pressure, respiratory therapy assess nares and determine need for appliance change or resting period.   Outcome: Progressing

## 2022-06-05 NOTE — PROGRESS NOTES
Ambulated pt from bed to door. Pulse Ox on 6L @ rest was 95%. Pulse Ox while ambulating with O2 on  6 liters via nasal cannula was 87%. After ambulation @ rest for recovery on liters of 6 liters of O2 recovered quickly to 95%.

## 2022-06-05 NOTE — PROGRESS NOTES
Ambulated pt from bed to door   Pulse Ox on 5L @ rest: 95%    Pulse Ox ambulating with O2 on 5 liters  Via nasal cannula is 84%.      After ambulation @ rest for recovery on 7 liters of O2 recovered quickly to 94%
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
## 2022-06-05 NOTE — PLAN OF CARE
Problem: ABCDS Injury Assessment  Goal: Absence of physical injury  6/5/2022 1054 by Dayna Haas RN  Outcome: Progressing  Flowsheets (Taken 6/5/2022 0800)  Absence of Physical Injury: Implement safety measures based on patient assessment  6/4/2022 2201 by Uday Stubbs RN  Outcome: Progressing  Flowsheets (Taken 6/4/2022 1950)  Absence of Physical Injury: Implement safety measures based on patient assessment     Problem: Safety - Adult  Goal: Free from fall injury  6/5/2022 1054 by Dayna Haas RN  Outcome: Progressing  Flowsheets (Taken 6/5/2022 0800)  Free From Fall Injury: Instruct family/caregiver on patient safety  6/4/2022 2201 by Uday Stubbs RN  Outcome: Progressing  [address] (Taken 6/4/2022 1950)  Free From Fall Injury: Instruct family/caregiver on patient safety     Problem: Pain  Goal: Verbalizes/displays adequate comfort level or baseline comfort level  6/5/2022 1054 by Dayna Haas RN  Outcome: Progressing  6/4/2022 2201 by Uday Stubbs RN  Outcome: Progressing  Flowsheets (Taken 6/4/2022 1950)  Verbalizes/displays adequate comfort level or baseline comfort level: Encourage patient to monitor pain and request assistance     Problem: Skin/Tissue Integrity  Goal: Absence of new skin breakdown  Description: 1. Monitor for areas of redness and/or skin breakdown  2. Assess vascular access sites hourly  3. Every 4-6 hours minimum:  Change oxygen saturation probe site  4. Every 4-6 hours:  If on nasal continuous positive airway pressure, respiratory therapy assess nares and determine need for appliance change or resting period.   6/5/2022 1054 by Dayna Haas RN  Outcome: Progressing  6/4/2022 2201 by Uday Stubbs RN  Outcome: Progressing

## 2022-06-05 NOTE — PROGRESS NOTES
[facility]  [address], [address]  [facility], [facility]      Pulmonary Progress Note      SUBJECTIVE:  Claims feeling similar with respiratory function, cough with minimal sputum production. On 6 li HFNC, sitting up in bed, ambulated and noted desaturation at current supplementation.     OBJECTIVE    Medications    Continuous Infusions:   sodium chloride         Scheduled Meds:   ipratropium-albuterol  1 ampule Inhalation Q4H WA    budesonide  0.5 mg Nebulization BID    Arformoterol Tartrate  15 mcg Nebulization BID    sodium chloride flush  5-40 mL IntraVENous 2 times per day    enoxaparin  40 mg SubCUTAneous Daily    sennosides-docusate sodium  1 tablet Oral BID    [START ON 2022] predniSONE  40 mg Oral Daily    amLODIPine  10 mg Oral Daily    ferrous sulfate  325 mg Oral BID WC    metoprolol tartrate  25 mg Oral BID    pantoprazole  40 mg Oral QAM AC    pramipexole  0.125 mg Oral Nightly    gabapentin  100 mg Oral TID    ARIPiprazole  5 mg Oral Daily       PRN Meds:sodium chloride flush, sodium chloride, ondansetron **OR** ondansetron, polyethylene glycol, acetaminophen **OR** acetaminophen, HYDROcodone-acetaminophen, perflutren lipid microspheres, LORazepam    Physical    VITALS:  BP (!) 156/83   Pulse 82   Temp 98 °F (36.7 °C) (Oral)   Resp 18   Ht 5' 4\" (1.626 m)   Wt 130 lb 3.2 oz (59.1 kg)   SpO2 97%   BMI 22.35 kg/m²     24HR INTAKE/OUTPUT:      Intake/Output Summary (Last 24 hours) at 2022 1531  Last data filed at 2022 0901  Gross per 24 hour   Intake --   Output 925 ml   Net -925 ml       24HR PULSE OXIMETRY RANGE:    SpO2  Av.8 %  Min: 96 %  Max: 99 %    General appearance: alert, appears stated age and cooperative  Lungs: rhonchi bilaterally  Heart: regular rate and rhythm, S1, S2 normal, no murmur, click, rub or gallop  Abdomen: soft, non-tender; bowel sounds normal; no masses,  no organomegaly  Extremities: extremities normal, atraumatic, no cyanosis or edema  Neurologic: Mental status: Alert, oriented, thought content appropriate    Data    CBC:   Recent Labs     06/03/22  1404 06/04/22  0325 06/05/22  0210   WBC 9.6 8.1 10.2   HGB 10.6* 10.9* 10.6*   HCT 35.7* 35.9* 34.6*   .8* 98.6 98.0    145 148       BMP:  Recent Labs     06/03/22  1404 06/04/22  0325 06/05/22  0210    138 135   K 3.9 4.9 4.4   CL 94* 92* 92*   CO2 30* 34* 28   BUN 16 17 18   CREATININE 0.8 0.9 0.7    ALB:3,BILIDIR:3,BILITOT:3,ALKPHOS:3)@    PT/INR: No results for input(s): PROTIME, INR in the last 72 hours. ABG:   No results for input(s): PH, PO2, PCO2, HCO3, BE, O2SAT, METHB, O2HB, COHB, O2CON, HHB, THB in the last 72 hours. Radiology/Other tests reviewed: none    Assessment:     Principal Problem:    Fluid retention  Active Problems:    Lower extremity pain, left    Acute on chronic congestive heart failure (HCC)  Resolved Problems:    * No resolved hospital problems. *      Plan:       1. Cont with steroids, taper as tolerated  2. Cont with nebs, observe respiratory function  3. Cont with oxygen, taper as tolerated  4. Change antibiotics to zosyn for now, await ID and sensitivities of sputum culture  5. OOB to chair as tolerated      Time at the bedside, reviewing labs and radiographs, reviewing notes and consultations, discussing with staff and family was more than 35 minutes. Thanks for letting us see this patient in consultation. Please contact us with any questions. Office [phone] or after hours through [employer], x [phone].
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
## 2022-06-06 ENCOUNTER — APPOINTMENT (OUTPATIENT)
Dept: GENERAL RADIOLOGY | Age: 57
DRG: 140 | End: 2022-06-06
Payer: MEDICAID

## 2022-06-06 LAB
ALBUMIN SERPL-MCNC: 4.1 G/DL (ref 3.5–5.2)
ALP BLD-CCNC: 56 U/L (ref 40–129)
ALT SERPL-CCNC: 18 U/L (ref 0–40)
ANION GAP SERPL CALCULATED.3IONS-SCNC: 10 MMOL/L (ref 7–16)
AST SERPL-CCNC: 15 U/L (ref 0–39)
BASOPHILS ABSOLUTE: 0.01 E9/L (ref 0–0.2)
BASOPHILS RELATIVE PERCENT: 0.1 % (ref 0–2)
BILIRUB SERPL-MCNC: 0.3 MG/DL (ref 0–1.2)
BUN BLDV-MCNC: 21 MG/DL (ref 6–20)
CALCIUM SERPL-MCNC: 7.9 MG/DL (ref 8.6–10.2)
CHLORIDE BLD-SCNC: 95 MMOL/L (ref 98–107)
CO2: 33 MMOL/L (ref 22–29)
CREAT SERPL-MCNC: 0.8 MG/DL (ref 0.7–1.2)
CULTURE, RESPIRATORY: ABNORMAL
CULTURE, RESPIRATORY: ABNORMAL
EOSINOPHILS ABSOLUTE: 0.01 E9/L (ref 0.05–0.5)
EOSINOPHILS RELATIVE PERCENT: 0.1 % (ref 0–6)
GFR AFRICAN AMERICAN: >60
GFR NON-AFRICAN AMERICAN: >60 ML/MIN/1.73
GLUCOSE BLD-MCNC: 77 MG/DL (ref 74–99)
HCT VFR BLD CALC: 37.3 % (ref 37–54)
HEMOGLOBIN: 11.3 G/DL (ref 12.5–16.5)
IMMATURE GRANULOCYTES #: 0.06 E9/L
IMMATURE GRANULOCYTES %: 0.6 % (ref 0–5)
LYMPHOCYTES ABSOLUTE: 0.76 E9/L (ref 1.5–4)
LYMPHOCYTES RELATIVE PERCENT: 7.8 % (ref 20–42)
MCH RBC QN AUTO: 30.1 PG (ref 26–35)
MCHC RBC AUTO-ENTMCNC: 30.3 % (ref 32–34.5)
MCV RBC AUTO: 99.2 FL (ref 80–99.9)
MONOCYTES ABSOLUTE: 0.54 E9/L (ref 0.1–0.95)
MONOCYTES RELATIVE PERCENT: 5.6 % (ref 2–12)
NEUTROPHILS ABSOLUTE: 8.33 E9/L (ref 1.8–7.3)
NEUTROPHILS RELATIVE PERCENT: 85.8 % (ref 43–80)
ORGANISM: ABNORMAL
PDW BLD-RTO: 15.9 FL (ref 11.5–15)
PLATELET # BLD: 145 E9/L (ref 130–450)
PMV BLD AUTO: 10 FL (ref 7–12)
POTASSIUM REFLEX MAGNESIUM: 5 MMOL/L (ref 3.5–5)
RBC # BLD: 3.76 E12/L (ref 3.8–5.8)
SMEAR, RESPIRATORY: ABNORMAL
SODIUM BLD-SCNC: 138 MMOL/L (ref 132–146)
TOTAL PROTEIN: 6.9 G/DL (ref 6.4–8.3)
WBC # BLD: 9.7 E9/L (ref 4.5–11.5)

## 2022-06-06 PROCEDURE — 85025 COMPLETE CBC W/AUTO DIFF WBC: CPT

## 2022-06-06 PROCEDURE — 80053 COMPREHEN METABOLIC PANEL: CPT

## 2022-06-06 PROCEDURE — 2580000003 HC RX 258: Performed by: NURSE PRACTITIONER

## 2022-06-06 PROCEDURE — 2500000003 HC RX 250 WO HCPCS: Performed by: RADIOLOGY

## 2022-06-06 PROCEDURE — 6370000000 HC RX 637 (ALT 250 FOR IP): Performed by: INTERNAL MEDICINE

## 2022-06-06 PROCEDURE — 92526 ORAL FUNCTION THERAPY: CPT | Performed by: SPEECH-LANGUAGE PATHOLOGIST

## 2022-06-06 PROCEDURE — 74230 X-RAY XM SWLNG FUNCJ C+: CPT

## 2022-06-06 PROCEDURE — 94640 AIRWAY INHALATION TREATMENT: CPT

## 2022-06-06 PROCEDURE — 36415 COLL VENOUS BLD VENIPUNCTURE: CPT

## 2022-06-06 PROCEDURE — 6360000002 HC RX W HCPCS: Performed by: NURSE PRACTITIONER

## 2022-06-06 PROCEDURE — 2700000000 HC OXYGEN THERAPY PER DAY

## 2022-06-06 PROCEDURE — 6360000002 HC RX W HCPCS: Performed by: INTERNAL MEDICINE

## 2022-06-06 PROCEDURE — 6370000000 HC RX 637 (ALT 250 FOR IP): Performed by: NURSE PRACTITIONER

## 2022-06-06 PROCEDURE — 92611 MOTION FLUOROSCOPY/SWALLOW: CPT | Performed by: SPEECH-LANGUAGE PATHOLOGIST

## 2022-06-06 PROCEDURE — 99239 HOSP IP/OBS DSCHRG MGMT >30: CPT | Performed by: INTERNAL MEDICINE

## 2022-06-06 PROCEDURE — 2060000000 HC ICU INTERMEDIATE R&B

## 2022-06-06 PROCEDURE — APPSS45 APP SPLIT SHARED TIME 31-45 MINUTES: Performed by: NURSE PRACTITIONER

## 2022-06-06 PROCEDURE — 70360 X-RAY EXAM OF NECK: CPT

## 2022-06-06 PROCEDURE — 92610 EVALUATE SWALLOWING FUNCTION: CPT | Performed by: SPEECH-LANGUAGE PATHOLOGIST

## 2022-06-06 PROCEDURE — 97165 OT EVAL LOW COMPLEX 30 MIN: CPT

## 2022-06-06 PROCEDURE — 99221 1ST HOSP IP/OBS SF/LOW 40: CPT | Performed by: OTOLARYNGOLOGY

## 2022-06-06 PROCEDURE — 2580000003 HC RX 258: Performed by: INTERNAL MEDICINE

## 2022-06-06 RX ORDER — LEVOFLOXACIN 750 MG/1
750 TABLET ORAL DAILY
Qty: 5 TABLET | Refills: 0 | Status: SHIPPED | OUTPATIENT
Start: 2022-06-06 | End: 2022-06-11

## 2022-06-06 RX ORDER — LEVOFLOXACIN 500 MG/1
500 TABLET, FILM COATED ORAL DAILY
Status: DISCONTINUED | OUTPATIENT
Start: 2022-06-06 | End: 2022-06-07 | Stop reason: HOSPADM

## 2022-06-06 RX ADMIN — GABAPENTIN 100 MG: 100 CAPSULE ORAL at 13:34

## 2022-06-06 RX ADMIN — ENOXAPARIN SODIUM 40 MG: 100 INJECTION SUBCUTANEOUS at 20:11

## 2022-06-06 RX ADMIN — GABAPENTIN 100 MG: 100 CAPSULE ORAL at 20:10

## 2022-06-06 RX ADMIN — IPRATROPIUM BROMIDE AND ALBUTEROL SULFATE 1 AMPULE: 2.5; .5 SOLUTION RESPIRATORY (INHALATION) at 23:34

## 2022-06-06 RX ADMIN — ARFORMOTEROL TARTRATE 15 MCG: 15 SOLUTION RESPIRATORY (INHALATION) at 06:38

## 2022-06-06 RX ADMIN — AMLODIPINE BESYLATE 10 MG: 10 TABLET ORAL at 09:07

## 2022-06-06 RX ADMIN — BUDESONIDE 500 MCG: 0.5 SUSPENSION RESPIRATORY (INHALATION) at 06:40

## 2022-06-06 RX ADMIN — SODIUM CHLORIDE, PRESERVATIVE FREE 10 ML: 5 INJECTION INTRAVENOUS at 20:17

## 2022-06-06 RX ADMIN — BARIUM SULFATE 10 ML: 400 PASTE ORAL at 10:39

## 2022-06-06 RX ADMIN — ARIPIPRAZOLE 5 MG: 5 TABLET ORAL at 09:06

## 2022-06-06 RX ADMIN — PANTOPRAZOLE SODIUM 40 MG: 40 TABLET, DELAYED RELEASE ORAL at 05:45

## 2022-06-06 RX ADMIN — PREDNISONE 40 MG: 20 TABLET ORAL at 09:07

## 2022-06-06 RX ADMIN — IPRATROPIUM BROMIDE AND ALBUTEROL SULFATE 1 AMPULE: 2.5; .5 SOLUTION RESPIRATORY (INHALATION) at 12:31

## 2022-06-06 RX ADMIN — SODIUM CHLORIDE 25 ML: 9 INJECTION, SOLUTION INTRAVENOUS at 09:05

## 2022-06-06 RX ADMIN — GABAPENTIN 100 MG: 100 CAPSULE ORAL at 09:07

## 2022-06-06 RX ADMIN — SODIUM CHLORIDE, PRESERVATIVE FREE 10 ML: 5 INJECTION INTRAVENOUS at 12:04

## 2022-06-06 RX ADMIN — IPRATROPIUM BROMIDE AND ALBUTEROL SULFATE 1 AMPULE: 2.5; .5 SOLUTION RESPIRATORY (INHALATION) at 16:03

## 2022-06-06 RX ADMIN — LORAZEPAM 1 MG: 1 TABLET ORAL at 14:20

## 2022-06-06 RX ADMIN — LEVOFLOXACIN 500 MG: 500 TABLET, FILM COATED ORAL at 12:04

## 2022-06-06 RX ADMIN — LORAZEPAM 1 MG: 1 TABLET ORAL at 22:00

## 2022-06-06 RX ADMIN — BARIUM SULFATE 70 G: 0.81 POWDER, FOR SUSPENSION ORAL at 10:40

## 2022-06-06 RX ADMIN — BARIUM SULFATE 120 ML: 400 SUSPENSION ORAL at 10:40

## 2022-06-06 RX ADMIN — FERROUS SULFATE TAB 325 MG (65 MG ELEMENTAL FE) 325 MG: 325 (65 FE) TAB at 16:58

## 2022-06-06 RX ADMIN — ARFORMOTEROL TARTRATE 15 MCG: 15 SOLUTION RESPIRATORY (INHALATION) at 19:28

## 2022-06-06 RX ADMIN — PIPERACILLIN SODIUM AND TAZOBACTAM SODIUM 4500 MG: 4; .5 INJECTION, POWDER, LYOPHILIZED, FOR SOLUTION INTRAVENOUS at 09:06

## 2022-06-06 RX ADMIN — DOCUSATE SODIUM 50 MG AND SENNOSIDES 8.6 MG 1 TABLET: 8.6; 5 TABLET, FILM COATED ORAL at 09:07

## 2022-06-06 RX ADMIN — HYDROCODONE BITARTRATE AND ACETAMINOPHEN 1 TABLET: 7.5; 325 TABLET ORAL at 06:50

## 2022-06-06 RX ADMIN — HYDROCODONE BITARTRATE AND ACETAMINOPHEN 1 TABLET: 7.5; 325 TABLET ORAL at 12:58

## 2022-06-06 RX ADMIN — IPRATROPIUM BROMIDE AND ALBUTEROL SULFATE 1 AMPULE: 2.5; .5 SOLUTION RESPIRATORY (INHALATION) at 06:39

## 2022-06-06 RX ADMIN — HYDROCODONE BITARTRATE AND ACETAMINOPHEN 1 TABLET: 7.5; 325 TABLET ORAL at 00:50

## 2022-06-06 RX ADMIN — LORAZEPAM 1 MG: 1 TABLET ORAL at 05:45

## 2022-06-06 RX ADMIN — METOPROLOL TARTRATE 25 MG: 25 TABLET, FILM COATED ORAL at 20:16

## 2022-06-06 RX ADMIN — IPRATROPIUM BROMIDE AND ALBUTEROL SULFATE 1 AMPULE: 2.5; .5 SOLUTION RESPIRATORY (INHALATION) at 19:29

## 2022-06-06 RX ADMIN — BUDESONIDE 500 MCG: 0.5 SUSPENSION RESPIRATORY (INHALATION) at 19:28

## 2022-06-06 RX ADMIN — SODIUM CHLORIDE, PRESERVATIVE FREE 10 ML: 5 INJECTION INTRAVENOUS at 09:04

## 2022-06-06 RX ADMIN — METOPROLOL TARTRATE 25 MG: 25 TABLET, FILM COATED ORAL at 09:07

## 2022-06-06 RX ADMIN — FERROUS SULFATE TAB 325 MG (65 MG ELEMENTAL FE) 325 MG: 325 (65 FE) TAB at 09:07

## 2022-06-06 RX ADMIN — PIPERACILLIN AND TAZOBACTAM 3375 MG: 3; .375 INJECTION, POWDER, LYOPHILIZED, FOR SOLUTION INTRAVENOUS at 00:30

## 2022-06-06 RX ADMIN — PRAMIPEXOLE DIHYDROCHLORIDE 0.12 MG: 0.12 TABLET ORAL at 20:10

## 2022-06-06 RX ADMIN — HYDROCODONE BITARTRATE AND ACETAMINOPHEN 1 TABLET: 7.5; 325 TABLET ORAL at 20:10

## 2022-06-06 ASSESSMENT — ENCOUNTER SYMPTOMS
TROUBLE SWALLOWING: 1
GASTROINTESTINAL NEGATIVE: 1
COUGH: 0
SINUS PAIN: 0
SINUS PRESSURE: 0
VOICE CHANGE: 1
WHEEZING: 0
STRIDOR: 0
SORE THROAT: 0
COLOR CHANGE: 0
CHOKING: 0
RHINORRHEA: 0

## 2022-06-06 ASSESSMENT — PAIN DESCRIPTION - LOCATION
LOCATION: LEG
LOCATION: KNEE
LOCATION: KNEE

## 2022-06-06 ASSESSMENT — PAIN DESCRIPTION - DESCRIPTORS
DESCRIPTORS: ACHING;SORE;DISCOMFORT
DESCRIPTORS: ACHING;DISCOMFORT;SORE
DESCRIPTORS: ACHING;DISCOMFORT;SORE

## 2022-06-06 ASSESSMENT — PAIN SCALES - GENERAL
PAINLEVEL_OUTOF10: 3
PAINLEVEL_OUTOF10: 9
PAINLEVEL_OUTOF10: 9
PAINLEVEL_OUTOF10: 10
PAINLEVEL_OUTOF10: 7

## 2022-06-06 ASSESSMENT — PAIN DESCRIPTION - PAIN TYPE
TYPE: ACUTE PAIN;CHRONIC PAIN
TYPE: ACUTE PAIN;CHRONIC PAIN

## 2022-06-06 ASSESSMENT — PAIN DESCRIPTION - ORIENTATION
ORIENTATION: LEFT
ORIENTATION: LEFT;RIGHT
ORIENTATION: LEFT

## 2022-06-06 ASSESSMENT — PAIN DESCRIPTION - FREQUENCY
FREQUENCY: INTERMITTENT
FREQUENCY: INTERMITTENT

## 2022-06-06 ASSESSMENT — PAIN DESCRIPTION - ONSET
ONSET: ON-GOING
ONSET: ON-GOING
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
## 2022-06-06 NOTE — PROGRESS NOTES
SPEECH/LANGUAGE PATHOLOGY  VIDEOFLUOROSCOPIC STUDY OF SWALLOWING (MBS)   and PLAN OF CARE    PATIENT NAME:  Dyana Hays  (male)     MRN:  [id number]    :  1965  (64 y.o.)  STATUS:  Inpatient: Room -A    TODAY'S DATE:  2022  REFERRING PROVIDER:   Red Manning DO  SPECIFIC PROVIDER ORDER: FL modified barium swallow with video  Date of order:  22  REASON FOR REFERRAL: further assess swallow   EVALUATING THERAPIST: LELA Brown      RESULTS:      DYSPHAGIA DIAGNOSIS:  moderate oropharyngeal phase dysphagia including minimal aspiration with thin liquids    Possible abnormality viewed during study, discussed with radiologist. Difficult to assess from lateral view if abnormality or artifact. Pt may benefit from xray/CT of soft tissue neck to further assess. Also pt presents with a very hoarse voice quality with inconsistent pitch breaks. Pt reports this is not new. Pt may benefit from an ENT consult at some point to further assess. DIET RECOMMENDATIONS:  Pureed consistency solids (IDDSI level 4) with small sips thin liquids (IDDSI level 0) as tolerated     If recurrent pneumonia occurs, pt may benefit from use of liquid thickener.      FEEDING RECOMMENDATIONS:    Assistance level:  No assistance needed     Compensatory strategies recommended: Double swallow, Small bites/sips and Throat clear     Discussed recommendations with nursing and/or faxed report to referring provider: Yes    Laryngeal Penetration and Aspiration:  Penetration WITH aspiration was observed in today's study with  thin liquid    SPEECH THERAPY  PLAN OF CARE   The dysphagia POC is established based on physician order and dysphagia diagnosis    Skilled SLP intervention for dysphagia management on acute care 3-5 x per week until goals met, pt plateaus in function and/or discharged from hospital      Conditions Requiring Skilled Therapeutic Intervention for dysphagia:    Reduced laryngeal closure resulting in penetration  Reduced laryngeal closure resulting in aspiration   Sensation of food sticking in throat     SPECIFIC DYSPHAGIA INTERVENTIONS TO INCLUDE:     Compensatory strategy training   Therapeutic exercises    Specific instructions for next treatment:  initiate instruction of therapeutic exercises  and initiate instruction of compensatory strategies  Treatment Goals:    Short Term Goals:  Pt will implement identified compensatory swallowing strategies on 90% of opportunities or greater to improve airway protection and swallow function. Pt will complete BOTR strength/ ROM exercises to reduce pharyngeal residuals and improve epiglottic inversion minimal verbal prompts  Pt will complete laryngeal strength/ ROM therapeutic exercises to improve airway protection for the least restrictive PO diet minimal verbal prompts    Long Term Goals:   Pt will maintain adequate nutrition/hydration via PO intake of the least restrictive oral diet with implementation of safe swallow/ compensatory strategies and decrease signs/symptoms of aspiration to less than 1 x/day. Pt will improve oropharyngeal swallow function to ensure airway protection during PO intake to maintain adequate nutrition/hydration and decrease signs/symptoms of aspiration to less than 1 x/day. Patient/family Goal:    Did not state. Will further assess during treatment.     Plan of care discussed with Patient   The Patient understand(s) the diagnosis, prognosis and plan of care     Rehabilitation Potential/Prognosis: fair                      ADMITTING DIAGNOSIS: Fluid retention [R60.9]  Acute on chronic congestive heart failure, unspecified heart failure type ([facility] [address] [I50.9]     VISIT DIAGNOSIS:         PATIENT REPORT/COMPLAINT: food sticking in the throat    PRIOR LEVEL OF SWALLOW FUNCTION:    Past History of Dysphagia?:  yes    Home diet: Pureed consistency solids (IDDSI level 4) with  thin liquids (IDDSI level 0)  Current Diet Order:  ADULT DIET; Dysphagia - Pureed; Low Fat/Low Chol/High Fiber/2 gm Na    PROCEDURE:  Consistencies Administered During the Evaluation   Liquids: thin liquid and nectar thick liquid   Solids:  pureed foods, solid foods and OTHER:  Barium tablet      Method of Intake:   cup, straw, spoon  Self fed      Position:   Seated, upright, Lateral plane    INSTRUMENTAL ASSESSMENT:    ORAL PREP/ ORAL PHASE:    Decreased mastication due to:  poor/missing dentition       PHARYNGEAL PHASE:     ONSET TIME       Onset time of the pharyngeal swallow was adequate       PHARYNGEAL RESIDUALS        Vallecula/Pharyngeal Wall           Reduced tongue base retraction resulting in residuals in vallecula and/or posterior pharyngeal wall for all consistencies administered which mostly cleared with spontaneous double swallow       Pyriform Sinuses      No significant residuals were noted in the pyriform sinuses     LARYNGEAL PENETRATION   Laryngeal penetration occurred in the absence of aspiration AFTER the swallow for thin liquid due to pharyngeal residuals which penetrated deep into the laryngeal vestibule (to the level of the true vocal folds). Laryngeal penetration was mild and occurred inconsistently. an absent cough/throat clear was noted    ASPIRATION  Aspiration occurred AFTER the swallow for thin liquid due to residuals in the laryngeal vestibule . Aspiration was minimal and occurred inconsistently .   a delayed cough was noted    PENETRATION-ASPIRATION SCALE (PAS):  THIN 6 = Material enters the airway, passes below the vocal folds, and is ejected into the larynx or out of the airway   MILDLY THICK 1 = Material does not enter the airway  MODERATELY THICK item not administered  PUREE 1 = Material does not enter the airway  HARD SOLID 1 = Material does not enter the airway       COMPENSATORY STRATEGIES    Compensatory strategies that were beneficial included Small bites/sips      STRUCTURAL/FUNCTIONAL ANOMALIES   See above    CERVICAL ESOPHAGEAL STAGE :     The cervical esophagus appeared adequate          ___________    Cognition:   Within functional limits for this exam    Oral Peripheral Examination   Adequate lingual/labial strength     Current Respiratory Status   6 liters nasal cannula     Parameters of Speech Production  Respiration:  Adequate for speech production  Quality:   Hoarse  Intensity: Within functional limits    Pain: No pain reported. EDUCATION:   The Speech Language Pathologist (SLP) completed education regarding results of evaluation and that intervention is warranted at this time. Learner: Patient  Education: Reviewed results and recommendations of this evaluation  Evaluation of Education:  Yolie Aguirre understanding    This plan may be re-evaluated and revised as warranted. Evaluation Time includes thorough review of current medical information, gathering information on past medical history/social history and prior level of function, completion of standardized testing/informal observation of tasks, assessment of data and education on plan of care and goals. [x]The admitting diagnosis and active problem list, have been reviewed prior to initiation of this evaluation. INTERVENTION    Pt/family educated on above results and plan of care. Pt/family trained on compensatory strategies for safe swallow and signs and symptoms of aspiration (throat clearing, coughing/choking, wet vocal quality. , etc.) and encouraged to notify staff if observed. Handouts provided as warranted. Pt/family encouraged to engage in question/answer session. All questions answered and pt/family verbalized understanding of above.        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 [address]       Romain MARTINES CCC/SLP [id number]  Speech-Language Pathologist

## 2022-06-06 NOTE — PROGRESS NOTES
Dr. Rodríguez Phan, DO,    Your patient is on a medication that requires a renal dose adjustment. Renal Function Assessment:    Date Body Weight IBW  Adjusted BW SCr  CrCl Dialysis status   6/6/2022 131 lb 11.2 oz (59.7 kg)  Ideal body weight: 59.2 kg (130 lb 8.2 oz)  Adjusted ideal body weight: 59.4 kg (130 lb 15.8 oz) Serum creatinine: 0.7 mg/dL 06/05/22 0210  Estimated creatinine clearance: 99 mL/min N/a       Pharmacy has renally dose-adjusted the following medication(s):    Date Original Order Renally Adjusted Order   6/6/2022 Zosyn 3375mg q8H Zosyn 4500 mg q8h       These changes were made per protocol according to the [facility] Renal Function-Based Dose Adjustments Policy    *Please note this dose may need readjusted if your patient's renal function significantly improves. Please contact pharmacy with any questions regarding these changes.     Nancy Alvarenga, [address] [place] [address]  6/6/2022  6:56 AM

## 2022-06-06 NOTE — PLAN OF CARE
Patient's chart updated to reflect:      . - HF care plan, HF education points and HF discharge instructions.  -Orders: 2 gram sodium diet, daily weights, I/O.  -PCP and/or Cardiologist appointment to be scheduled within 7 days of hospital discharge.  -History of HF, not primary admission Dx.   Patient admitted for treatment of fluid retention     Radha Lincoln RN BSN  Heart Failure Navigator
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
## 2022-06-06 NOTE — CARE COORDINATION
Social Work / Discharge Planning : Patient admitted with Fluid Retention. Patient therapy am/pac 20/24. SW met with patient and explained role as discharge planner/ transition of care. Patient verified plan at discharge is HOME where his cousin resides with him. Patient has a walker. nebulizer and home 02 through [facility]. Patient verified his 02 liter flow is 6 liters. SW called [facility] [phone] and they verified patient uses 6 liters and 7 with sleep. Patient is also active with [facility] ( Phone : [phone] Fax: [phone]). SW called [facility] and spoke to Romain. She did verify patient is active., They do NOT have epic access. On day of discharge, please fax SUNDAY orders and call and also fax AVS. Patient PCP is [facility]. Patient denies any additional HOME needs. Patient elaine shave video swallow ordered. AWait plan. JOSE o follow. Electronically signed by FEDERICO Valenzuela on 6/6/22 at 9:42 AM EDT      Addendum: Discharge order noted: Discharge summary  and Orders faxed to [address] [place] [address]. SW to follow.  Electronically signed by FEDERICO Valenzuela on 6/6/22 at 3:06 PM EDT Self
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
## 2022-06-06 NOTE — PROGRESS NOTES
P Quality Flow/Interdisciplinary Rounds Progress Note        Quality Flow Rounds held on June 6, 2022    Disciplines Attending:  Bedside Nurse, ,  and Nursing Unit Leadership    Mariam Deutsch was admitted on 6/3/2022  1:35 PM    Anticipated Discharge Date:       Disposition:    Cristofer Score:  Cristofer Scale Score: 18    Readmission Risk              Risk of Unplanned Readmission:  26           Discussed patient goal for the day, patient clinical progression, and barriers to discharge.   The following Goal(s) of the Day/Commitment(s) have been identified:  video swallow, walking O2 test      Rosenda Mosher RN  June 6, 2022

## 2022-06-06 NOTE — PROGRESS NOTES
SPEECH/LANGUAGE PATHOLOGY  CLINICAL ASSESSMENT OF SWALLOWING FUNCTION   and PLAN OF CARE    PATIENT NAME:  Wendy Malagon  (male)     MRN:  [id number]    :  1965  (64 y.o.)  STATUS:  Inpatient: Room 0626-A    TODAY'S DATE:  2022  REFERRING PROVIDER:  22   Speech language pathology evaluation Start: 22, End: 22, ONE TIME, Standing Count: 1 Occurrences, R    Wili Ramon, APRN - CNP  REASON FOR REFERRAL: assess for dysphagia, pt c/o difficulty swallowing solids   EVALUATING THERAPIST: Karen Pettit, LELA                 RESULTS:    DYSPHAGIA DIAGNOSIS:   Clinical indicators of oropharyngeal phase dysphagia     Pt c/o hx of difficulty swallowing solids that includes getting stuck and coughing/choking; pt stated he could not masticate cracker SLP provided, however opened Doritos observed on bedside table. Pt agreed to attempt a Dorito that resulted in significant coughing/choking. Pt also observed to consume presentations reclined which is how he stated he eats all meals. Pt reports he is not able to breath when upright. Recommend video swallow eval at this time to further assess. DIET RECOMMENDATIONS: continue current diet until video swallow evaluation completed     FEEDING RECOMMENDATIONS:     Assistance level:  No assistance needed      Compensatory strategies recommended: Small bites/sips and Alternate solids and liquids, upright for all intake      Discussed recommendations with nursing and/or faxed report to referring provider: Yes    SPEECH THERAPY  PLAN OF CARE   The dysphagia POC is established based on physician order, dysphagia diagnosis and results of clinical assessment     Will establish POC once MBSS is completed.     Conditions Requiring Skilled Therapeutic Intervention for dysphagia:    Impaired mastication  Coughing during PO intake      Specific dysphagia interventions to include:     not applicable    Specific instructions for next treatment:  MBSS to be completed  Patient Treatment Goals:    Short Term Goals:  Pt will participate in MBSS to fully assess oropharyngeal swallow function and to assist in determining the least restrictive PO diet to maintain adequate nutrition/hydration     Long Term Goals: A Video Swallow Study (MBSS) is recommended and requires a physician order      Patient/family Goal:    To eat/drink without coughing or choking    Plan of care discussed with Patient   The Patient understand(s) the diagnosis, prognosis and plan of care     Rehabilitation Potential/Prognosis: fair                    ADMITTING DIAGNOSIS: Fluid retention [R60.9]  Acute on chronic congestive heart failure, unspecified heart failure type ([facility] [I50.9]    VISIT DIAGNOSIS:      PATIENT REPORT/COMPLAINT: food sticking in the throat  coughing with all consistencies  RN cleared patient for participation in assessment     yes     PRIOR LEVEL OF SWALLOW FUNCTION:    PAST HISTORY OF DYSPHAGIA?: yes    Home diet: Pureed consistency solids (IDDSI level 4) with  thin liquids (IDDSI level 0) per pt report  Current Diet Order:  ADULT DIET; Dysphagia - Pureed; Low Fat/Low Chol/High Fiber/2 gm Na    PROCEDURE:  Consistencies Administered During the Evaluation   Liquids: thin liquid   Solids:  pureed foods and solid foods      Method of Intake:   cup, straw, spoon  Self fed      Position:   Seated, upright    CLINICAL ASSESSMENT:  Oral Stage:       Decreased mastication due to:  poor/missing dentition        Pharyngeal Stage:    Immediate cough/choke was noted after presentation of solid foods  No other signs of aspiration were noted during this evaluation however, silent aspiration cannot be ruled out at bedside. If silent aspiration is suspected, a Videofluoroscopic Study of Swallowing (MBS) is recommended and requires a physician order.     Cognition:   Within functional limits for this exam    Oral Peripheral Examination   Adequate lingual/labial strength     Current Respiratory Status    6 liters nasal cannula     Parameters of Speech Production  Respiration:  Adequate for speech production  Quality:   Within functional limits  Intensity: Within functional limits    Volitional Swallow: present     Volitional Cough:   present     Pain: No pain reported. EDUCATION:   The Speech Language Pathologist (SLP) completed education regarding results of evaluation and that intervention is warranted at this time. Learner: Patient  Education: Reviewed results and recommendations of this evaluation  Evaluation of Education:  Lonnie Altman understanding    This plan may be re-evaluated and revised as warranted. Evaluation Time includes thorough review of current medical information, gathering information on past medical history/social history and prior level of function, completion of standardized testing/informal observation of tasks, assessment of data and education on plan of care and goals. INTERVENTION    Pt/family educated on above results and plan of care. Pt/family trained on compensatory strategies for safe swallow and signs and symptoms of aspiration (throat clearing, coughing/choking, wet vocal quality. , etc.) and encouraged to notify staff if observed. Handouts provided as warranted. Pt/family encouraged to engage in question/answer session. All questions answered and pt/family verbalized understanding of above. [x]The admitting diagnosis and active problem list, have been reviewed prior to initiation of this evaluation.         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

## 2022-06-07 VITALS
HEART RATE: 83 BPM | TEMPERATURE: 97.7 F | OXYGEN SATURATION: 99 % | BODY MASS INDEX: 22.43 KG/M2 | SYSTOLIC BLOOD PRESSURE: 162 MMHG | RESPIRATION RATE: 20 BRPM | DIASTOLIC BLOOD PRESSURE: 75 MMHG | WEIGHT: 131.4 LBS | HEIGHT: 64 IN

## 2022-06-07 LAB
ALBUMIN SERPL-MCNC: 4.2 G/DL (ref 3.5–5.2)
ALP BLD-CCNC: 49 U/L (ref 40–129)
ALT SERPL-CCNC: 20 U/L (ref 0–40)
ANION GAP SERPL CALCULATED.3IONS-SCNC: 10 MMOL/L (ref 7–16)
AST SERPL-CCNC: 16 U/L (ref 0–39)
BASOPHILS ABSOLUTE: 0.01 E9/L (ref 0–0.2)
BASOPHILS RELATIVE PERCENT: 0.2 % (ref 0–2)
BILIRUB SERPL-MCNC: 0.3 MG/DL (ref 0–1.2)
BUN BLDV-MCNC: 21 MG/DL (ref 6–20)
CALCIUM SERPL-MCNC: 7.8 MG/DL (ref 8.6–10.2)
CHLORIDE BLD-SCNC: 96 MMOL/L (ref 98–107)
CO2: 31 MMOL/L (ref 22–29)
CREAT SERPL-MCNC: 0.8 MG/DL (ref 0.7–1.2)
EOSINOPHILS ABSOLUTE: 0.02 E9/L (ref 0.05–0.5)
EOSINOPHILS RELATIVE PERCENT: 0.3 % (ref 0–6)
GFR AFRICAN AMERICAN: >60
GFR NON-AFRICAN AMERICAN: >60 ML/MIN/1.73
GLUCOSE BLD-MCNC: 79 MG/DL (ref 74–99)
HCT VFR BLD CALC: 38.6 % (ref 37–54)
HEMOGLOBIN: 11.6 G/DL (ref 12.5–16.5)
IMMATURE GRANULOCYTES #: 0.04 E9/L
IMMATURE GRANULOCYTES %: 0.6 % (ref 0–5)
LYMPHOCYTES ABSOLUTE: 1.06 E9/L (ref 1.5–4)
LYMPHOCYTES RELATIVE PERCENT: 16.5 % (ref 20–42)
MCH RBC QN AUTO: 29.9 PG (ref 26–35)
MCHC RBC AUTO-ENTMCNC: 30.1 % (ref 32–34.5)
MCV RBC AUTO: 99.5 FL (ref 80–99.9)
MONOCYTES ABSOLUTE: 0.45 E9/L (ref 0.1–0.95)
MONOCYTES RELATIVE PERCENT: 7 % (ref 2–12)
NEUTROPHILS ABSOLUTE: 4.83 E9/L (ref 1.8–7.3)
NEUTROPHILS RELATIVE PERCENT: 75.4 % (ref 43–80)
PDW BLD-RTO: 15.8 FL (ref 11.5–15)
PLATELET # BLD: 110 E9/L (ref 130–450)
PMV BLD AUTO: 10.6 FL (ref 7–12)
POTASSIUM REFLEX MAGNESIUM: 4.2 MMOL/L (ref 3.5–5)
RBC # BLD: 3.88 E12/L (ref 3.8–5.8)
SODIUM BLD-SCNC: 137 MMOL/L (ref 132–146)
TOTAL PROTEIN: 7 G/DL (ref 6.4–8.3)
WBC # BLD: 6.4 E9/L (ref 4.5–11.5)

## 2022-06-07 PROCEDURE — 99239 HOSP IP/OBS DSCHRG MGMT >30: CPT | Performed by: INTERNAL MEDICINE

## 2022-06-07 PROCEDURE — 94640 AIRWAY INHALATION TREATMENT: CPT

## 2022-06-07 PROCEDURE — APPSS45 APP SPLIT SHARED TIME 31-45 MINUTES: Performed by: NURSE PRACTITIONER

## 2022-06-07 PROCEDURE — 2700000000 HC OXYGEN THERAPY PER DAY

## 2022-06-07 PROCEDURE — 85025 COMPLETE CBC W/AUTO DIFF WBC: CPT

## 2022-06-07 PROCEDURE — 36415 COLL VENOUS BLD VENIPUNCTURE: CPT

## 2022-06-07 PROCEDURE — 6370000000 HC RX 637 (ALT 250 FOR IP): Performed by: NURSE PRACTITIONER

## 2022-06-07 PROCEDURE — 80053 COMPREHEN METABOLIC PANEL: CPT

## 2022-06-07 PROCEDURE — 2580000003 HC RX 258: Performed by: NURSE PRACTITIONER

## 2022-06-07 PROCEDURE — 6370000000 HC RX 637 (ALT 250 FOR IP): Performed by: INTERNAL MEDICINE

## 2022-06-07 PROCEDURE — 6360000002 HC RX W HCPCS: Performed by: INTERNAL MEDICINE

## 2022-06-07 RX ADMIN — PANTOPRAZOLE SODIUM 40 MG: 40 TABLET, DELAYED RELEASE ORAL at 05:49

## 2022-06-07 RX ADMIN — LEVOFLOXACIN 500 MG: 500 TABLET, FILM COATED ORAL at 07:57

## 2022-06-07 RX ADMIN — LORAZEPAM 1 MG: 1 TABLET ORAL at 08:38

## 2022-06-07 RX ADMIN — GABAPENTIN 100 MG: 100 CAPSULE ORAL at 07:50

## 2022-06-07 RX ADMIN — SODIUM CHLORIDE, PRESERVATIVE FREE 10 ML: 5 INJECTION INTRAVENOUS at 07:52

## 2022-06-07 RX ADMIN — FERROUS SULFATE TAB 325 MG (65 MG ELEMENTAL FE) 325 MG: 325 (65 FE) TAB at 07:50

## 2022-06-07 RX ADMIN — IPRATROPIUM BROMIDE AND ALBUTEROL SULFATE 1 AMPULE: 2.5; .5 SOLUTION RESPIRATORY (INHALATION) at 12:36

## 2022-06-07 RX ADMIN — BUDESONIDE 500 MCG: 0.5 SUSPENSION RESPIRATORY (INHALATION) at 05:56

## 2022-06-07 RX ADMIN — PREDNISONE 40 MG: 20 TABLET ORAL at 07:50

## 2022-06-07 RX ADMIN — AMLODIPINE BESYLATE 10 MG: 10 TABLET ORAL at 07:50

## 2022-06-07 RX ADMIN — HYDROCODONE BITARTRATE AND ACETAMINOPHEN 1 TABLET: 7.5; 325 TABLET ORAL at 01:51

## 2022-06-07 RX ADMIN — HYDROCODONE BITARTRATE AND ACETAMINOPHEN 1 TABLET: 7.5; 325 TABLET ORAL at 07:57

## 2022-06-07 RX ADMIN — ARIPIPRAZOLE 5 MG: 5 TABLET ORAL at 07:51

## 2022-06-07 RX ADMIN — IPRATROPIUM BROMIDE AND ALBUTEROL SULFATE 1 AMPULE: 2.5; .5 SOLUTION RESPIRATORY (INHALATION) at 05:56

## 2022-06-07 RX ADMIN — ARFORMOTEROL TARTRATE 15 MCG: 15 SOLUTION RESPIRATORY (INHALATION) at 05:55

## 2022-06-07 RX ADMIN — METOPROLOL TARTRATE 25 MG: 25 TABLET, FILM COATED ORAL at 07:50

## 2022-06-07 ASSESSMENT — PAIN DESCRIPTION - PAIN TYPE: TYPE: CHRONIC PAIN

## 2022-06-07 ASSESSMENT — PAIN DESCRIPTION - DESCRIPTORS: DESCRIPTORS: ACHING

## 2022-06-07 ASSESSMENT — PAIN DESCRIPTION - LOCATION: LOCATION: LEG

## 2022-06-07 ASSESSMENT — PULMONARY FUNCTION TESTS: PEFR_L/MIN: 0

## 2022-06-07 ASSESSMENT — PAIN SCALES - GENERAL: PAINLEVEL_OUTOF10: 9

## 2022-06-07 ASSESSMENT — PAIN DESCRIPTION - ORIENTATION: ORIENTATION: RIGHT;LEFT

## 2022-06-07 NOTE — PROGRESS NOTES
D/c papers given/discussed with patient. All questions answered. Patient aware of new medication changes and awaiting delivery of medication from outpatient pharmacy. IV and tele removed. CMR notified. Patient states that his sister is coming to get him for d/c home.

## 2022-06-07 NOTE — PROGRESS NOTES
CLINICAL PHARMACY NOTE: MEDS TO BEDS    Total # of Prescriptions Filled: 2   The following medications were delivered to the patient:  · levofloxacin 750 mg  · Prednisone 10 mg    Additional Documentation:
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
## 2022-06-07 NOTE — PROGRESS NOTES
Patients sister here to take patient home for d/c. States that the patients portable oxygen is in her car. meds to beds delivered new medications to patients room. Transport via wheelchair ordered for patient.

## 2022-06-07 NOTE — PROGRESS NOTES
P Quality Flow/Interdisciplinary Rounds Progress Note        Quality Flow Rounds held on June 7, 2022    Disciplines Attending:  Bedside Nurse, ,  and Nursing Unit Leadership    Alessia Marcus was admitted on 6/3/2022  1:35 PM    Anticipated Discharge Date:       Disposition:    Cristofer Score:  Cristofer Scale Score: 20    Readmission Risk              Risk of Unplanned Readmission:  27           Discussed patient goal for the day, patient clinical progression, and barriers to discharge. The following Goal(s) of the Day/Commitment(s) have been identified:  possible dc later today.       Maggie Carrillo RN  June 7, 2022

## 2022-06-08 ENCOUNTER — TELEPHONE (OUTPATIENT)
Dept: ADMINISTRATIVE | Age: 57
End: 2022-06-08

## 2022-06-08 ENCOUNTER — TELEPHONE (OUTPATIENT)
Dept: ORTHOPEDIC SURGERY | Age: 57
End: 2022-06-08

## 2022-06-08 NOTE — TELEPHONE ENCOUNTER
Pt's caregiver Baldo Parker called to schedule a HFU [facility] [place] 06/07. Fluid retention.  on call. Please contact.

## 2022-06-08 NOTE — TELEPHONE ENCOUNTER
Call placed to patient at this time. Patient's sister, Zamzam Daly answered and appointment scheduled with her at this time.      Future Appointments   Date Time Provider Mik Peresi   6/9/2022  2:15 PM ARGELIA Ojeda - CNP [facility]   6/15/2022  9:15 AM Jb Naidu MD [facility]   6/20/2022 10:30 AM Snow Simpson MD [facility]   7/7/2022  9:15 AM DO SE Haris Prado [facility]

## 2022-06-08 NOTE — TELEPHONE ENCOUNTER
Spoke to Dr Tammy Siu, resident Tyler Shaikh saw patient for hosp consult. F/u PRN. Dr Tammy Siu agreed. Patients caregiver notified. Patients caregiver aware ENT does not see for fluid retention either-different speciality.

## 2022-06-08 NOTE — TELEPHONE ENCOUNTER
Patient phoned requesting f/u appointment for ED visit 6/3/22. Informed per Dr. Yajaira Knapp consult note (6/4/22) patient is to follow up with Ortho [facility] as outpatient. Patient verbalized understanding. Number provided.
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
## 2022-06-09 ENCOUNTER — TELEPHONE (OUTPATIENT)
Dept: PRIMARY CARE CLINIC | Age: 57
End: 2022-06-09

## 2022-06-09 LAB
BLOOD CULTURE, ROUTINE: NORMAL
CULTURE, BLOOD 2: NORMAL

## 2022-06-09 NOTE — TELEPHONE ENCOUNTER
[facility] Transitions Initial Follow Up Call    Outreach made within 2 business days of discharge: Yes    Patient: Justina Hawk Patient : 1965   MRN: [id number]  Reason for Admission: Fluid retention  Discharge Date: 22       Spoke with: Karina    Discharge department/facility: [facility] Interactive Patient Contact:  Was patient able to fill all prescriptions: Yes  Was patient instructed to bring all medications to the follow-up visit: Yes  Is patient taking all medications as directed in the discharge summary?  Yes  Does patient understand their discharge instructions: Yes  Does patient have questions or concerns that need addressed prior to 7-14 day follow up office visit: no    Scheduled appointment with PCP within 7-14 days    Follow Up  Future Appointments   Date Time Provider Mik Rosario   2022  2:15 PM ARGELIA Rojas - CNP [facility]   6/15/2022  9:15 AM Yung Pineda MD [facility]   2022 10:30 AM Susu Bhakta MD [facility]   2022  9:15 AM [address] [place] [address]

## 2022-06-16 ENCOUNTER — TELEPHONE (OUTPATIENT)
Dept: OTHER | Facility: CLINIC | Age: 57
End: 2022-06-16

## 2022-06-16 ENCOUNTER — HOSPITAL ENCOUNTER (INPATIENT)
Age: 57
LOS: 3 days | Discharge: HOME | DRG: 194 | End: 2022-06-19
Attending: STUDENT IN AN ORGANIZED HEALTH CARE EDUCATION/TRAINING PROGRAM | Admitting: INTERNAL MEDICINE
Payer: MEDICAID

## 2022-06-16 ENCOUNTER — APPOINTMENT (OUTPATIENT)
Dept: GENERAL RADIOLOGY | Age: 57
DRG: 194 | End: 2022-06-16
Payer: MEDICAID

## 2022-06-16 DIAGNOSIS — J44.1 COPD EXACERBATION (HCC): Primary | ICD-10-CM

## 2022-06-16 DIAGNOSIS — R06.00 DYSPNEA, UNSPECIFIED TYPE: ICD-10-CM

## 2022-06-16 LAB
ANION GAP SERPL CALCULATED.3IONS-SCNC: 16 MMOL/L (ref 7–16)
BACTERIA: ABNORMAL /HPF
BASOPHILS ABSOLUTE: 0.01 E9/L (ref 0–0.2)
BASOPHILS RELATIVE PERCENT: 0.1 % (ref 0–2)
BILIRUBIN URINE: NEGATIVE
BLOOD, URINE: NEGATIVE
BUN BLDV-MCNC: 10 MG/DL (ref 6–20)
CALCIUM SERPL-MCNC: 7.1 MG/DL (ref 8.6–10.2)
CHLORIDE BLD-SCNC: 85 MMOL/L (ref 98–107)
CLARITY: CLEAR
CO2: 34 MMOL/L (ref 22–29)
COLOR: YELLOW
CREAT SERPL-MCNC: 0.8 MG/DL (ref 0.7–1.2)
EOSINOPHILS ABSOLUTE: 0.03 E9/L (ref 0.05–0.5)
EOSINOPHILS RELATIVE PERCENT: 0.4 % (ref 0–6)
GFR AFRICAN AMERICAN: >60
GFR NON-AFRICAN AMERICAN: >60 ML/MIN/1.73
GLUCOSE BLD-MCNC: 128 MG/DL (ref 74–99)
GLUCOSE URINE: NEGATIVE MG/DL
HCT VFR BLD CALC: 31.7 % (ref 37–54)
HEMOGLOBIN: 10.1 G/DL (ref 12.5–16.5)
IMMATURE GRANULOCYTES #: 0.05 E9/L
IMMATURE GRANULOCYTES %: 0.6 % (ref 0–5)
KETONES, URINE: NEGATIVE MG/DL
LEUKOCYTE ESTERASE, URINE: NEGATIVE
LYMPHOCYTES ABSOLUTE: 0.6 E9/L (ref 1.5–4)
LYMPHOCYTES RELATIVE PERCENT: 7.6 % (ref 20–42)
MCH RBC QN AUTO: 30.7 PG (ref 26–35)
MCHC RBC AUTO-ENTMCNC: 31.9 % (ref 32–34.5)
MCV RBC AUTO: 96.4 FL (ref 80–99.9)
MONOCYTES ABSOLUTE: 0.54 E9/L (ref 0.1–0.95)
MONOCYTES RELATIVE PERCENT: 6.8 % (ref 2–12)
NEUTROPHILS ABSOLUTE: 6.67 E9/L (ref 1.8–7.3)
NEUTROPHILS RELATIVE PERCENT: 84.5 % (ref 43–80)
NITRITE, URINE: NEGATIVE
PDW BLD-RTO: 15.9 FL (ref 11.5–15)
PH UA: 8.5 (ref 5–9)
PLATELET # BLD: 174 E9/L (ref 130–450)
PMV BLD AUTO: 10.6 FL (ref 7–12)
POTASSIUM SERPL-SCNC: 4.3 MMOL/L (ref 3.5–5)
PRO-BNP: 309 PG/ML (ref 0–125)
PROTEIN UA: NEGATIVE MG/DL
RBC # BLD: 3.29 E12/L (ref 3.8–5.8)
RBC UA: ABNORMAL /HPF (ref 0–2)
SARS-COV-2, NAAT: NOT DETECTED
SODIUM BLD-SCNC: 135 MMOL/L (ref 132–146)
SPECIFIC GRAVITY UA: 1.01 (ref 1–1.03)
TROPONIN, HIGH SENSITIVITY: 20 NG/L (ref 0–11)
UROBILINOGEN, URINE: 0.2 E.U./DL
WBC # BLD: 7.9 E9/L (ref 4.5–11.5)
WBC UA: ABNORMAL /HPF (ref 0–5)

## 2022-06-16 PROCEDURE — 96375 TX/PRO/DX INJ NEW DRUG ADDON: CPT

## 2022-06-16 PROCEDURE — 99285 EMERGENCY DEPT VISIT HI MDM: CPT

## 2022-06-16 PROCEDURE — 1200000000 HC SEMI PRIVATE

## 2022-06-16 PROCEDURE — 94640 AIRWAY INHALATION TREATMENT: CPT

## 2022-06-16 PROCEDURE — 94664 DEMO&/EVAL PT USE INHALER: CPT

## 2022-06-16 PROCEDURE — 6370000000 HC RX 637 (ALT 250 FOR IP): Performed by: STUDENT IN AN ORGANIZED HEALTH CARE EDUCATION/TRAINING PROGRAM

## 2022-06-16 PROCEDURE — 2580000003 HC RX 258: Performed by: INTERNAL MEDICINE

## 2022-06-16 PROCEDURE — 83880 ASSAY OF NATRIURETIC PEPTIDE: CPT

## 2022-06-16 PROCEDURE — 99223 1ST HOSP IP/OBS HIGH 75: CPT | Performed by: INTERNAL MEDICINE

## 2022-06-16 PROCEDURE — 0202U NFCT DS 22 TRGT SARS-COV-2: CPT

## 2022-06-16 PROCEDURE — 85025 COMPLETE CBC W/AUTO DIFF WBC: CPT

## 2022-06-16 PROCEDURE — 87449 NOS EACH ORGANISM AG IA: CPT

## 2022-06-16 PROCEDURE — 36415 COLL VENOUS BLD VENIPUNCTURE: CPT

## 2022-06-16 PROCEDURE — 96365 THER/PROPH/DIAG IV INF INIT: CPT

## 2022-06-16 PROCEDURE — 87635 SARS-COV-2 COVID-19 AMP PRB: CPT

## 2022-06-16 PROCEDURE — 71045 X-RAY EXAM CHEST 1 VIEW: CPT

## 2022-06-16 PROCEDURE — 81001 URINALYSIS AUTO W/SCOPE: CPT

## 2022-06-16 PROCEDURE — 80048 BASIC METABOLIC PNL TOTAL CA: CPT

## 2022-06-16 PROCEDURE — 93005 ELECTROCARDIOGRAM TRACING: CPT | Performed by: STUDENT IN AN ORGANIZED HEALTH CARE EDUCATION/TRAINING PROGRAM

## 2022-06-16 PROCEDURE — 6360000002 HC RX W HCPCS: Performed by: STUDENT IN AN ORGANIZED HEALTH CARE EDUCATION/TRAINING PROGRAM

## 2022-06-16 PROCEDURE — 87040 BLOOD CULTURE FOR BACTERIA: CPT

## 2022-06-16 PROCEDURE — 84484 ASSAY OF TROPONIN QUANT: CPT

## 2022-06-16 PROCEDURE — APPSS45 APP SPLIT SHARED TIME 31-45 MINUTES: Performed by: NURSE PRACTITIONER

## 2022-06-16 RX ORDER — LORAZEPAM 1 MG/1
1 TABLET ORAL 3 TIMES DAILY
Status: DISCONTINUED | OUTPATIENT
Start: 2022-06-17 | End: 2022-06-19 | Stop reason: HOSPADM

## 2022-06-16 RX ORDER — ALBUTEROL SULFATE 2.5 MG/3ML
2.5 SOLUTION RESPIRATORY (INHALATION)
Status: DISCONTINUED | OUTPATIENT
Start: 2022-06-16 | End: 2022-06-19 | Stop reason: HOSPADM

## 2022-06-16 RX ORDER — ONDANSETRON 4 MG/1
4 TABLET, ORALLY DISINTEGRATING ORAL EVERY 8 HOURS PRN
Status: DISCONTINUED | OUTPATIENT
Start: 2022-06-16 | End: 2022-06-19 | Stop reason: HOSPADM

## 2022-06-16 RX ORDER — FUROSEMIDE 10 MG/ML
20 INJECTION INTRAMUSCULAR; INTRAVENOUS ONCE
Status: COMPLETED | OUTPATIENT
Start: 2022-06-16 | End: 2022-06-16

## 2022-06-16 RX ORDER — METHYLPREDNISOLONE SODIUM SUCCINATE 40 MG/ML
40 INJECTION, POWDER, LYOPHILIZED, FOR SOLUTION INTRAMUSCULAR; INTRAVENOUS EVERY 6 HOURS
Status: COMPLETED | OUTPATIENT
Start: 2022-06-17 | End: 2022-06-18

## 2022-06-16 RX ORDER — LORAZEPAM 1 MG/1
1 TABLET ORAL 3 TIMES DAILY
COMMUNITY
End: 2022-07-21 | Stop reason: SDUPTHER

## 2022-06-16 RX ORDER — HYDROCODONE BITARTRATE AND ACETAMINOPHEN 5; 325 MG/1; MG/1
2 TABLET ORAL ONCE
Status: COMPLETED | OUTPATIENT
Start: 2022-06-16 | End: 2022-06-16

## 2022-06-16 RX ORDER — ALBUTEROL SULFATE 2.5 MG/3ML
2.5 SOLUTION RESPIRATORY (INHALATION)
Status: DISCONTINUED | OUTPATIENT
Start: 2022-06-17 | End: 2022-06-17

## 2022-06-16 RX ORDER — ONDANSETRON 2 MG/ML
4 INJECTION INTRAMUSCULAR; INTRAVENOUS EVERY 6 HOURS PRN
Status: DISCONTINUED | OUTPATIENT
Start: 2022-06-16 | End: 2022-06-19 | Stop reason: HOSPADM

## 2022-06-16 RX ORDER — AMLODIPINE BESYLATE 10 MG/1
10 TABLET ORAL DAILY
Status: DISCONTINUED | OUTPATIENT
Start: 2022-06-17 | End: 2022-06-18

## 2022-06-16 RX ORDER — ACETAMINOPHEN 325 MG/1
650 TABLET ORAL EVERY 4 HOURS PRN
Status: DISCONTINUED | OUTPATIENT
Start: 2022-06-16 | End: 2022-06-19 | Stop reason: HOSPADM

## 2022-06-16 RX ORDER — PREDNISONE 20 MG/1
40 TABLET ORAL DAILY
Status: DISCONTINUED | OUTPATIENT
Start: 2022-06-19 | End: 2022-06-19 | Stop reason: HOSPADM

## 2022-06-16 RX ORDER — POLYETHYLENE GLYCOL 3350 17 G/17G
17 POWDER, FOR SOLUTION ORAL DAILY PRN
Status: DISCONTINUED | OUTPATIENT
Start: 2022-06-16 | End: 2022-06-19 | Stop reason: HOSPADM

## 2022-06-16 RX ORDER — FUROSEMIDE 10 MG/ML
20 INJECTION INTRAMUSCULAR; INTRAVENOUS DAILY
Status: DISCONTINUED | OUTPATIENT
Start: 2022-06-17 | End: 2022-06-18

## 2022-06-16 RX ORDER — ARIPIPRAZOLE 5 MG/1
5 TABLET ORAL DAILY
Status: DISCONTINUED | OUTPATIENT
Start: 2022-06-17 | End: 2022-06-19 | Stop reason: HOSPADM

## 2022-06-16 RX ORDER — SODIUM CHLORIDE 0.9 % (FLUSH) 0.9 %
5-40 SYRINGE (ML) INJECTION PRN
Status: DISCONTINUED | OUTPATIENT
Start: 2022-06-16 | End: 2022-06-19 | Stop reason: HOSPADM

## 2022-06-16 RX ORDER — PANTOPRAZOLE SODIUM 40 MG/1
40 TABLET, DELAYED RELEASE ORAL
Status: DISCONTINUED | OUTPATIENT
Start: 2022-06-17 | End: 2022-06-19 | Stop reason: HOSPADM

## 2022-06-16 RX ORDER — FERROUS SULFATE 325(65) MG
325 TABLET ORAL 2 TIMES DAILY
Status: DISCONTINUED | OUTPATIENT
Start: 2022-06-17 | End: 2022-06-19 | Stop reason: HOSPADM

## 2022-06-16 RX ORDER — FLUOXETINE HYDROCHLORIDE 20 MG/1
20 CAPSULE ORAL DAILY
Status: DISCONTINUED | OUTPATIENT
Start: 2022-06-17 | End: 2022-06-19 | Stop reason: HOSPADM

## 2022-06-16 RX ORDER — IPRATROPIUM BROMIDE AND ALBUTEROL SULFATE 2.5; .5 MG/3ML; MG/3ML
3 SOLUTION RESPIRATORY (INHALATION) ONCE
Status: COMPLETED | OUTPATIENT
Start: 2022-06-16 | End: 2022-06-16

## 2022-06-16 RX ORDER — PRAMIPEXOLE DIHYDROCHLORIDE 0.12 MG/1
0.12 TABLET ORAL NIGHTLY
Status: DISCONTINUED | OUTPATIENT
Start: 2022-06-17 | End: 2022-06-19 | Stop reason: HOSPADM

## 2022-06-16 RX ORDER — MAGNESIUM SULFATE IN WATER 40 MG/ML
2000 INJECTION, SOLUTION INTRAVENOUS ONCE
Status: COMPLETED | OUTPATIENT
Start: 2022-06-16 | End: 2022-06-16

## 2022-06-16 RX ORDER — METHYLPREDNISOLONE SODIUM SUCCINATE 125 MG/2ML
125 INJECTION, POWDER, LYOPHILIZED, FOR SOLUTION INTRAMUSCULAR; INTRAVENOUS ONCE
Status: COMPLETED | OUTPATIENT
Start: 2022-06-16 | End: 2022-06-16

## 2022-06-16 RX ORDER — HYDROCODONE BITARTRATE AND ACETAMINOPHEN 7.5; 325 MG/1; MG/1
1 TABLET ORAL 2 TIMES DAILY PRN
Status: DISCONTINUED | OUTPATIENT
Start: 2022-06-16 | End: 2022-06-19 | Stop reason: HOSPADM

## 2022-06-16 RX ORDER — ARFORMOTEROL TARTRATE 15 UG/2ML
15 SOLUTION RESPIRATORY (INHALATION) 2 TIMES DAILY
Status: CANCELLED | OUTPATIENT
Start: 2022-06-16

## 2022-06-16 RX ORDER — BUDESONIDE AND FORMOTEROL FUMARATE DIHYDRATE 160; 4.5 UG/1; UG/1
2 AEROSOL RESPIRATORY (INHALATION) 2 TIMES DAILY
Status: DISCONTINUED | OUTPATIENT
Start: 2022-06-17 | End: 2022-06-17

## 2022-06-16 RX ORDER — SODIUM CHLORIDE 9 MG/ML
INJECTION, SOLUTION INTRAVENOUS PRN
Status: DISCONTINUED | OUTPATIENT
Start: 2022-06-16 | End: 2022-06-19 | Stop reason: HOSPADM

## 2022-06-16 RX ORDER — ENOXAPARIN SODIUM 100 MG/ML
40 INJECTION SUBCUTANEOUS DAILY
Status: DISCONTINUED | OUTPATIENT
Start: 2022-06-17 | End: 2022-06-19 | Stop reason: HOSPADM

## 2022-06-16 RX ORDER — BUDESONIDE 0.5 MG/2ML
500 INHALANT ORAL 2 TIMES DAILY
Status: CANCELLED | OUTPATIENT
Start: 2022-06-16

## 2022-06-16 RX ORDER — SODIUM CHLORIDE 0.9 % (FLUSH) 0.9 %
5-40 SYRINGE (ML) INJECTION EVERY 12 HOURS SCHEDULED
Status: DISCONTINUED | OUTPATIENT
Start: 2022-06-16 | End: 2022-06-19 | Stop reason: HOSPADM

## 2022-06-16 RX ORDER — KETOCONAZOLE 20 MG/G
CREAM TOPICAL DAILY
Status: DISCONTINUED | OUTPATIENT
Start: 2022-06-17 | End: 2022-06-19 | Stop reason: HOSPADM

## 2022-06-16 RX ADMIN — FUROSEMIDE 20 MG: 10 INJECTION, SOLUTION INTRAMUSCULAR; INTRAVENOUS at 18:40

## 2022-06-16 RX ADMIN — IPRATROPIUM BROMIDE AND ALBUTEROL SULFATE 3 AMPULE: .5; 2.5 SOLUTION RESPIRATORY (INHALATION) at 19:18

## 2022-06-16 RX ADMIN — METHYLPREDNISOLONE SODIUM SUCCINATE 125 MG: 125 INJECTION, POWDER, LYOPHILIZED, FOR SOLUTION INTRAMUSCULAR; INTRAVENOUS at 18:40

## 2022-06-16 RX ADMIN — HYDROCODONE BITARTRATE AND ACETAMINOPHEN 2 TABLET: 5; 325 TABLET ORAL at 19:22

## 2022-06-16 RX ADMIN — MAGNESIUM SULFATE HEPTAHYDRATE 2000 MG: 40 INJECTION, SOLUTION INTRAVENOUS at 18:46

## 2022-06-16 RX ADMIN — Medication 10 ML: at 23:31

## 2022-06-16 ASSESSMENT — LIFESTYLE VARIABLES: HOW OFTEN DO YOU HAVE A DRINK CONTAINING ALCOHOL: NEVER

## 2022-06-16 ASSESSMENT — PAIN DESCRIPTION - DESCRIPTORS
DESCRIPTORS: DISCOMFORT
DESCRIPTORS: BURNING;TIGHTNESS

## 2022-06-16 ASSESSMENT — PAIN DESCRIPTION - ORIENTATION
ORIENTATION: LEFT;RIGHT
ORIENTATION: RIGHT;LEFT

## 2022-06-16 ASSESSMENT — PAIN SCALES - GENERAL
PAINLEVEL_OUTOF10: 8
PAINLEVEL_OUTOF10: 8
PAINLEVEL_OUTOF10: 0

## 2022-06-16 ASSESSMENT — PAIN DESCRIPTION - LOCATION: LOCATION: LEG

## 2022-06-16 ASSESSMENT — PAIN DESCRIPTION - FREQUENCY: FREQUENCY: CONTINUOUS

## 2022-06-16 ASSESSMENT — PAIN DESCRIPTION - ONSET: ONSET: ON-GOING

## 2022-06-16 ASSESSMENT — PAIN - FUNCTIONAL ASSESSMENT: PAIN_FUNCTIONAL_ASSESSMENT: 0-10

## 2022-06-16 NOTE — TELEPHONE ENCOUNTER
Writer contacted Dr. Danis Mark to inform of 30 day readmission risk. [facility]'s attempt to contact ED provider was unsuccessful. With a patient.     Call Back: If you need to call back to inform of disposition you can contact me at [phone]

## 2022-06-16 NOTE — ED NOTES
Per lab, BMP and troponin need reordered/redrawn due to hemolyzes.       Latisha Romero RN  06/16/22 1920

## 2022-06-16 NOTE — ED PROVIDER NOTES
Department of Emergency Medicine   ED  Provider Note  Admit Date/RoomTime: 6/16/2022  5:00 PM  ED Room: 13/13          History of Present Illness:  6/16/22, Time: 5:48 PM EDT  Chief Complaint   Patient presents with    Shortness of Breath     history of CHF + bilat leg swelling an pain, home 02 6L      Alessia Marcus is a 64 y.o. male presenting to the ED for Shortness of breath. Patient reports a history of heart failure as well as COPD. He reports he has been wheezing for the past couple days. Denies any fevers chills or myalgias. Other makes his symptoms better or worse. He does report paroxysmal nocturnal dyspnea as well as orthopnea. He sleeps in a hospital bed at home and always props himself up to sleep. He does have bilateral lower extremity pitting edema that is worsening per his report. He has bilateral lower extremity venous stasis ulcers as well as redness. He was recently treated and completed a course of antibiotics for MSSA infection. Of note, the patient did have a LESLYE performed recently which showed EF of 55 to 60% with cor pulmonale no diastolic dysfunction. Symptoms are moderate in severity. He has been compliant with his Lasix per his report. Review of Systems:  Review of systems obtained and negative unless stated otherwise above in the HPI.    --------------------------------------------- PAST HISTORY ---------------------------------------------  Past Medical History:  has a past medical history of COPD (chronic obstructive pulmonary disease) ([address] Hypertension, and Multiple gastric ulcers. Past Surgical History:  has a past surgical history that includes knee surgery; hip surgery; and hernia repair. Social History:  reports that he quit smoking about 20 months ago. His smoking use included cigarettes. He started smoking about 27 years ago. He has a 100.00 pack-year smoking history.  He has never used smokeless tobacco. He reports that he does not drink alcohol and does not use drugs. Family History: family history includes Colon Cancer in his mother; No Known Problems in his brother, brother, sister, and sister; Other in his father. . Unless otherwise noted, family history is non contributory    The patients home medications have been reviewed. Allergies: Aspirin, Lisinopril, Other, Tramadol, Ibuprofen, and Sulfa antibiotics    I have reviewed the past medical history, past surgical history, social history, and family history    ---------------------------------------------------PHYSICAL EXAM--------------------------------------    Constitutional: [Appears in no distress]  Head: [Normocephalic, atraumatic]   Eyes: [Non-icteric slcera, no conjunctival injection]  ENT: [Moist mucous membranes,]  Neck: [Trachea midline, no JVD]  Respiratory: Diffuse expiratory wheeze with prolonged expiratory phase, no rales or rhonchi is appreciated  Cardiovascular: Regular rhythm, tachycardic no S3 or S4 no rub or murmur  Gastrointestinal:  [Abdomen Soft, Non tender, Non distended. No rebound tenderness, guarding, or rigidity.]  Extremities: Edema is noted 2+ to the knee. Genitourinary: [No CVA tenderness, no suprapubic tenderness]  Musculoskeletal: [Moves all extremities, no deformity]  Skin: Bilateral lower extremity venous stasis changes with redness bilaterally to the mid tibial region, does extend more proximally on the left lower extremity  Neurologic: [Alert, symmetric facies, no aphasia]    -------------------------------------------------- RESULTS -------------------------------------------------  I have personally reviewed all laboratory and imaging results for this patient. Results are listed below.      LABS: (Lab results interpreted by me)  Results for orders placed or performed during the hospital encounter of 06/16/22   COVID-19, Rapid    Specimen: Nasopharyngeal Swab   Result Value Ref Range    SARS-CoV-2, NAAT Not Detected Not Detected   CBC with Auto Differential Result Value Ref Range    WBC 7.9 4.5 - 11.5 E9/L    RBC 3.29 (L) 3.80 - 5.80 E12/L    Hemoglobin 10.1 (L) 12.5 - 16.5 g/dL    Hematocrit 31.7 (L) 37.0 - 54.0 %    MCV 96.4 80.0 - 99.9 fL    MCH 30.7 26.0 - 35.0 pg    MCHC 31.9 (L) 32.0 - 34.5 %    RDW 15.9 (H) 11.5 - 15.0 fL    Platelets 158 831 - 365 E9/L    MPV 10.6 7.0 - 12.0 fL    Neutrophils % 84.5 (H) 43.0 - 80.0 %    Immature Granulocytes % 0.6 0.0 - 5.0 %    Lymphocytes % 7.6 (L) 20.0 - 42.0 %    Monocytes % 6.8 2.0 - 12.0 %    Eosinophils % 0.4 0.0 - 6.0 %    Basophils % 0.1 0.0 - 2.0 %    Neutrophils Absolute 6.67 1.80 - 7.30 E9/L    Immature Granulocytes # 0.05 E9/L    Lymphocytes Absolute 0.60 (L) 1.50 - 4.00 E9/L    Monocytes Absolute 0.54 0.10 - 0.95 E9/L    Eosinophils Absolute 0.03 (L) 0.05 - 0.50 E9/L    Basophils Absolute 0.01 0.00 - 0.20 E9/L   Brain Natriuretic Peptide   Result Value Ref Range    Pro- (H) 0 - 125 pg/mL   Urinalysis with Microscopic   Result Value Ref Range    Color, UA Yellow Straw/Yellow    Clarity, UA Clear Clear    Glucose, Ur Negative Negative mg/dL    Bilirubin Urine Negative Negative    Ketones, Urine Negative Negative mg/dL    Specific Gravity, UA 1.010 1.005 - 1.030    Blood, Urine Negative Negative    pH, UA 8.5 5.0 - 9.0    Protein, UA Negative Negative mg/dL    Urobilinogen, Urine 0.2 <2.0 E.U./dL    Nitrite, Urine Negative Negative    Leukocyte Esterase, Urine Negative Negative    WBC, UA 1-3 0 - 5 /HPF    RBC, UA 0-1 0 - 2 /HPF    Bacteria, UA FEW (A) None Seen /HPF   Basic Metabolic Panel   Result Value Ref Range    Sodium 135 132 - 146 mmol/L    Potassium 4.3 3.5 - 5.0 mmol/L    Chloride 85 (L) 98 - 107 mmol/L    CO2 34 (H) 22 - 29 mmol/L    Anion Gap 16 7 - 16 mmol/L    Glucose 128 (H) 74 - 99 mg/dL    BUN 10 6 - 20 mg/dL    CREATININE 0.8 0.7 - 1.2 mg/dL    GFR Non-African American >60 >=60 mL/min/1.73    GFR African American >60     Calcium 7.1 (L) 8.6 - 10.2 mg/dL   Troponin   Result Value Ref Range Troponin, High Sensitivity 20 (H) 0 - 11 ng/L   EKG 12 Lead   Result Value Ref Range    Ventricular Rate 109 BPM    Atrial Rate 109 BPM    P-R Interval 130 ms    QRS Duration 78 ms    Q-T Interval 332 ms    QTc Calculation (Bazett) 447 ms    P Axis 59 degrees    R Axis 70 degrees    T Axis 54 degrees   ,       RADIOLOGY:  Interpreted by Radiologist unless otherwise specified  XR CHEST PORTABLE   Final Result   Patchy opacities/infiltrates in the right mid lung and lung bases bilaterally. ------------------------- NURSING NOTES AND VITALS REVIEWED ---------------------------   The nursing notes within the ED encounter and vital signs as below have been reviewed by myself  BP (!) 128/59   Pulse (!) 110   Temp 98.9 °F (37.2 °C) (Oral)   Resp 20   Ht 5' 4\" (1.626 m)   Wt 130 lb (59 kg)   SpO2 100%   BMI 22.31 kg/m²     Oxygen Saturation Interpretation: [Improved after treatment]    The patients available past medical records and past encounters were reviewed.         ------------------------------ ED COURSE/MEDICAL DECISION MAKING----------------------  Medications   sodium chloride flush 0.9 % injection 5-40 mL (has no administration in time range)   sodium chloride flush 0.9 % injection 5-40 mL (has no administration in time range)   0.9 % sodium chloride infusion (has no administration in time range)   enoxaparin (LOVENOX) injection 40 mg (has no administration in time range)   acetaminophen (TYLENOL) tablet 650 mg (has no administration in time range)   ondansetron (ZOFRAN-ODT) disintegrating tablet 4 mg (has no administration in time range)     Or   ondansetron (ZOFRAN) injection 4 mg (has no administration in time range)   furosemide (LASIX) injection 20 mg (20 mg IntraVENous Given 6/16/22 1840)   ipratropium-albuterol (DUONEB) nebulizer solution 3 ampule (3 ampules Inhalation Given 6/16/22 1918)   magnesium sulfate 2000 mg in 50 mL IVPB premix (0 mg IntraVENous Stopped 6/16/22 2016) methylPREDNISolone sodium (SOLU-MEDROL) injection 125 mg (125 mg IntraVENous Given 6/16/22 1840)   HYDROcodone-acetaminophen (NORCO) 5-325 MG per tablet 2 tablet (2 tablets Oral Given 6/16/22 1922)        Re-Evaluations: This patient's ED course included: [a personal history and physicial examination, re-evaluation prior to disposition, multiple bedside re-evaluations, IV medications, cardiac monitoring, continuous pulse oximetry and complex medical decision making and emergency management]    This patient has [remained hemodynamically stable] during their ED course. Consultations:  [Internal medicine]    Medical Decision Making:   Patient presents emerged part with likely COPD exacerbation. Patient presents with evidence of COPD exacerbation versus possible viral syndrome. Labs and imaging reviewed patient has no leukocytosis. Chronic anemia is noted. EKG:  EKG interpreted myself as ventricular rate of 109 beats per minute. There is a P wave before every cures Compass cures durations normal to QT/QTc is normal.  No ST segment elevation or depression no T wave inversions. He does interpreted myself as mild sinus tachycardia no ischemia or infarct evident. On repeat evaluation patient is requiring 2 L nasal cannula oxygen. Patient will be admitted for COPD exacerbation. Also is left lower extremity does appear slightly more red than usual on reevaluation and prior pictures. Could have a progressively worsening cellulitis. Critical Care:  Upon my evaluation, this patient had a high probability of imminent or life-threatening deterioration due to respiratory failure with hypoxia requiring multiple breathing treatments magnesium and steroids, which required my direct attention, intervention, and personal management. I have personally provided 35 minutes of critical care time excluding time spent on separately billable procedures.  Time includes review of laboratory data, radiology results, discussion with consultants, and monitoring for potential decompensation. Interventions were performed as documented above. Counseling: The emergency provider has spoken with the patient and discussed todays results, in addition to providing specific details for the plan of care and counseling regarding the diagnosis and prognosis. Questions are answered at this time and they are agreeable with the plan.       --------------------------------- IMPRESSION AND DISPOSITION ---------------------------------    IMPRESSION  1. COPD exacerbation ([facility] [address]    2. Dyspnea, unspecified type        DISPOSITION  Disposition: [Admit to telemetry]  Patient condition is [stable]    IDr. Alessandro, am the primary provider of record    Alessandro Bernard DO  Emergency Medicine    NOTE: This report was transcribed using voice recognition software.  Every effort was made to ensure accuracy; however, inadvertent computerized transcription errors may be present         Aimee Porras DO  06/16/22 212

## 2022-06-17 LAB
ADENOVIRUS BY PCR: NOT DETECTED
ALBUMIN SERPL-MCNC: 3.7 G/DL (ref 3.5–5.2)
ALP BLD-CCNC: 55 U/L (ref 40–129)
ALT SERPL-CCNC: 13 U/L (ref 0–40)
AMPHETAMINE SCREEN, URINE: NOT DETECTED
ANION GAP SERPL CALCULATED.3IONS-SCNC: 15 MMOL/L (ref 7–16)
ANISOCYTOSIS: ABNORMAL
AST SERPL-CCNC: 17 U/L (ref 0–39)
BARBITURATE SCREEN URINE: NOT DETECTED
BASOPHILS ABSOLUTE: 0.01 E9/L (ref 0–0.2)
BASOPHILS RELATIVE PERCENT: 0.2 % (ref 0–2)
BENZODIAZEPINE SCREEN, URINE: NOT DETECTED
BILIRUB SERPL-MCNC: 0.5 MG/DL (ref 0–1.2)
BORDETELLA PARAPERTUSSIS BY PCR: NOT DETECTED
BORDETELLA PERTUSSIS BY PCR: NOT DETECTED
BUN BLDV-MCNC: 11 MG/DL (ref 6–20)
C-REACTIVE PROTEIN: 20.9 MG/DL (ref 0–0.4)
CALCIUM SERPL-MCNC: 6.9 MG/DL (ref 8.6–10.2)
CANNABINOID SCREEN URINE: NOT DETECTED
CHLAMYDOPHILIA PNEUMONIAE BY PCR: NOT DETECTED
CHLORIDE BLD-SCNC: 84 MMOL/L (ref 98–107)
CO2: 32 MMOL/L (ref 22–29)
COCAINE METABOLITE SCREEN URINE: NOT DETECTED
CORONAVIRUS 229E BY PCR: NOT DETECTED
CORONAVIRUS HKU1 BY PCR: NOT DETECTED
CORONAVIRUS NL63 BY PCR: NOT DETECTED
CORONAVIRUS OC43 BY PCR: NOT DETECTED
CREAT SERPL-MCNC: 0.8 MG/DL (ref 0.7–1.2)
EKG ATRIAL RATE: 109 BPM
EKG P AXIS: 59 DEGREES
EKG P-R INTERVAL: 130 MS
EKG Q-T INTERVAL: 332 MS
EKG QRS DURATION: 78 MS
EKG QTC CALCULATION (BAZETT): 447 MS
EKG R AXIS: 70 DEGREES
EKG T AXIS: 54 DEGREES
EKG VENTRICULAR RATE: 109 BPM
EOSINOPHILS ABSOLUTE: 0 E9/L (ref 0.05–0.5)
EOSINOPHILS RELATIVE PERCENT: 0 % (ref 0–6)
FENTANYL SCREEN, URINE: NOT DETECTED
GFR AFRICAN AMERICAN: >60
GFR NON-AFRICAN AMERICAN: >60 ML/MIN/1.73
GLUCOSE BLD-MCNC: 316 MG/DL (ref 74–99)
HCT VFR BLD CALC: 32.5 % (ref 37–54)
HEMOGLOBIN: 10.2 G/DL (ref 12.5–16.5)
HUMAN METAPNEUMOVIRUS BY PCR: NOT DETECTED
HUMAN RHINOVIRUS/ENTEROVIRUS BY PCR: NOT DETECTED
IMMATURE GRANULOCYTES #: 0.06 E9/L
IMMATURE GRANULOCYTES %: 1 % (ref 0–5)
INFLUENZA A BY PCR: NOT DETECTED
INFLUENZA B BY PCR: NOT DETECTED
L. PNEUMOPHILA SEROGP 1 UR AG: NORMAL
LYMPHOCYTES ABSOLUTE: 0.19 E9/L (ref 1.5–4)
LYMPHOCYTES RELATIVE PERCENT: 3 % (ref 20–42)
Lab: ABNORMAL
MCH RBC QN AUTO: 30.4 PG (ref 26–35)
MCHC RBC AUTO-ENTMCNC: 31.4 % (ref 32–34.5)
MCV RBC AUTO: 96.7 FL (ref 80–99.9)
METHADONE SCREEN, URINE: NOT DETECTED
MONOCYTES ABSOLUTE: 0.06 E9/L (ref 0.1–0.95)
MONOCYTES RELATIVE PERCENT: 1 % (ref 2–12)
MYCOPLASMA PNEUMONIAE BY PCR: NOT DETECTED
NEUTROPHILS ABSOLUTE: 5.92 E9/L (ref 1.8–7.3)
NEUTROPHILS RELATIVE PERCENT: 94.8 % (ref 43–80)
OPIATE SCREEN URINE: POSITIVE
OXYCODONE URINE: NOT DETECTED
PARAINFLUENZA VIRUS 1 BY PCR: NOT DETECTED
PARAINFLUENZA VIRUS 2 BY PCR: NOT DETECTED
PARAINFLUENZA VIRUS 3 BY PCR: NOT DETECTED
PARAINFLUENZA VIRUS 4 BY PCR: NOT DETECTED
PDW BLD-RTO: 15.5 FL (ref 11.5–15)
PHENCYCLIDINE SCREEN URINE: NOT DETECTED
PLATELET # BLD: 137 E9/L (ref 130–450)
PMV BLD AUTO: 10 FL (ref 7–12)
POIKILOCYTES: ABNORMAL
POTASSIUM REFLEX MAGNESIUM: 3.9 MMOL/L (ref 3.5–5)
PROCALCITONIN: 0.11 NG/ML (ref 0–0.08)
RBC # BLD: 3.36 E12/L (ref 3.8–5.8)
RESPIRATORY SYNCYTIAL VIRUS BY PCR: NOT DETECTED
SARS-COV-2, PCR: NOT DETECTED
SEDIMENTATION RATE, ERYTHROCYTE: 105 MM/HR (ref 0–15)
SODIUM BLD-SCNC: 131 MMOL/L (ref 132–146)
STOMATOCYTES: ABNORMAL
STREP PNEUMONIAE ANTIGEN, URINE: NORMAL
TOTAL PROTEIN: 7.2 G/DL (ref 6.4–8.3)
WBC # BLD: 6.2 E9/L (ref 4.5–11.5)

## 2022-06-17 PROCEDURE — 87186 SC STD MICRODIL/AGAR DIL: CPT

## 2022-06-17 PROCEDURE — 94667 MNPJ CHEST WALL 1ST: CPT

## 2022-06-17 PROCEDURE — 2700000000 HC OXYGEN THERAPY PER DAY

## 2022-06-17 PROCEDURE — 6370000000 HC RX 637 (ALT 250 FOR IP): Performed by: NURSE PRACTITIONER

## 2022-06-17 PROCEDURE — 99232 SBSQ HOSP IP/OBS MODERATE 35: CPT | Performed by: INTERNAL MEDICINE

## 2022-06-17 PROCEDURE — 85651 RBC SED RATE NONAUTOMATED: CPT

## 2022-06-17 PROCEDURE — 2580000003 HC RX 258: Performed by: INTERNAL MEDICINE

## 2022-06-17 PROCEDURE — 6360000002 HC RX W HCPCS: Performed by: NURSE PRACTITIONER

## 2022-06-17 PROCEDURE — 36415 COLL VENOUS BLD VENIPUNCTURE: CPT

## 2022-06-17 PROCEDURE — 6370000000 HC RX 637 (ALT 250 FOR IP): Performed by: INTERNAL MEDICINE

## 2022-06-17 PROCEDURE — 84145 PROCALCITONIN (PCT): CPT

## 2022-06-17 PROCEDURE — 80307 DRUG TEST PRSMV CHEM ANLYZR: CPT

## 2022-06-17 PROCEDURE — 87206 SMEAR FLUORESCENT/ACID STAI: CPT

## 2022-06-17 PROCEDURE — 85025 COMPLETE CBC W/AUTO DIFF WBC: CPT

## 2022-06-17 PROCEDURE — 6360000002 HC RX W HCPCS: Performed by: INTERNAL MEDICINE

## 2022-06-17 PROCEDURE — 87070 CULTURE OTHR SPECIMN AEROBIC: CPT

## 2022-06-17 PROCEDURE — APPSS30 APP SPLIT SHARED TIME 16-30 MINUTES: Performed by: NURSE PRACTITIONER

## 2022-06-17 PROCEDURE — 87077 CULTURE AEROBIC IDENTIFY: CPT

## 2022-06-17 PROCEDURE — 94640 AIRWAY INHALATION TREATMENT: CPT

## 2022-06-17 PROCEDURE — 1200000000 HC SEMI PRIVATE

## 2022-06-17 PROCEDURE — 86140 C-REACTIVE PROTEIN: CPT

## 2022-06-17 PROCEDURE — 80053 COMPREHEN METABOLIC PANEL: CPT

## 2022-06-17 PROCEDURE — 2580000003 HC RX 258: Performed by: NURSE PRACTITIONER

## 2022-06-17 RX ORDER — BUDESONIDE 0.5 MG/2ML
1000 INHALANT ORAL 2 TIMES DAILY
Status: DISCONTINUED | OUTPATIENT
Start: 2022-06-17 | End: 2022-06-19 | Stop reason: HOSPADM

## 2022-06-17 RX ORDER — IPRATROPIUM BROMIDE AND ALBUTEROL SULFATE 2.5; .5 MG/3ML; MG/3ML
1 SOLUTION RESPIRATORY (INHALATION) EVERY 4 HOURS
Status: DISCONTINUED | OUTPATIENT
Start: 2022-06-17 | End: 2022-06-19 | Stop reason: HOSPADM

## 2022-06-17 RX ADMIN — Medication 10 ML: at 20:59

## 2022-06-17 RX ADMIN — AMLODIPINE BESYLATE 10 MG: 10 TABLET ORAL at 08:20

## 2022-06-17 RX ADMIN — PANTOPRAZOLE SODIUM 40 MG: 40 TABLET, DELAYED RELEASE ORAL at 06:05

## 2022-06-17 RX ADMIN — FLUOXETINE 20 MG: 20 CAPSULE ORAL at 08:20

## 2022-06-17 RX ADMIN — BUDESONIDE AND FORMOTEROL FUMARATE DIHYDRATE 2 PUFF: 160; 4.5 AEROSOL RESPIRATORY (INHALATION) at 08:21

## 2022-06-17 RX ADMIN — LORAZEPAM 1 MG: 1 TABLET ORAL at 08:19

## 2022-06-17 RX ADMIN — METOPROLOL TARTRATE 25 MG: 25 TABLET, FILM COATED ORAL at 20:59

## 2022-06-17 RX ADMIN — LORAZEPAM 1 MG: 1 TABLET ORAL at 20:59

## 2022-06-17 RX ADMIN — BUDESONIDE 1000 MCG: 0.5 SUSPENSION RESPIRATORY (INHALATION) at 21:17

## 2022-06-17 RX ADMIN — HYDROCODONE BITARTRATE AND ACETAMINOPHEN 1 TABLET: 7.5; 325 TABLET ORAL at 11:56

## 2022-06-17 RX ADMIN — Medication 5 ML: at 08:23

## 2022-06-17 RX ADMIN — FUROSEMIDE 20 MG: 10 INJECTION, SOLUTION INTRAVENOUS at 08:20

## 2022-06-17 RX ADMIN — LORAZEPAM 1 MG: 1 TABLET ORAL at 00:13

## 2022-06-17 RX ADMIN — METHYLPREDNISOLONE SODIUM SUCCINATE 40 MG: 40 INJECTION, POWDER, LYOPHILIZED, FOR SOLUTION INTRAMUSCULAR; INTRAVENOUS at 11:57

## 2022-06-17 RX ADMIN — IPRATROPIUM BROMIDE AND ALBUTEROL SULFATE 1 AMPULE: 2.5; .5 SOLUTION RESPIRATORY (INHALATION) at 21:17

## 2022-06-17 RX ADMIN — AZITHROMYCIN MONOHYDRATE 500 MG: 500 INJECTION, POWDER, LYOPHILIZED, FOR SOLUTION INTRAVENOUS at 00:04

## 2022-06-17 RX ADMIN — ALBUTEROL SULFATE 2.5 MG: 2.5 SOLUTION RESPIRATORY (INHALATION) at 12:30

## 2022-06-17 RX ADMIN — IPRATROPIUM BROMIDE AND ALBUTEROL SULFATE 1 AMPULE: 2.5; .5 SOLUTION RESPIRATORY (INHALATION) at 16:06

## 2022-06-17 RX ADMIN — METOPROLOL TARTRATE 25 MG: 25 TABLET, FILM COATED ORAL at 08:20

## 2022-06-17 RX ADMIN — PRAMIPEXOLE DIHYDROCHLORIDE 0.12 MG: 0.12 TABLET ORAL at 20:59

## 2022-06-17 RX ADMIN — METHYLPREDNISOLONE SODIUM SUCCINATE 40 MG: 40 INJECTION, POWDER, LYOPHILIZED, FOR SOLUTION INTRAMUSCULAR; INTRAVENOUS at 00:13

## 2022-06-17 RX ADMIN — MICONAZOLE NITRATE: 2 OINTMENT TOPICAL at 20:54

## 2022-06-17 RX ADMIN — KETOCONAZOLE: 20 CREAM TOPICAL at 08:22

## 2022-06-17 RX ADMIN — METOPROLOL TARTRATE 25 MG: 25 TABLET, FILM COATED ORAL at 00:13

## 2022-06-17 RX ADMIN — METHYLPREDNISOLONE SODIUM SUCCINATE 40 MG: 40 INJECTION, POWDER, LYOPHILIZED, FOR SOLUTION INTRAMUSCULAR; INTRAVENOUS at 06:05

## 2022-06-17 RX ADMIN — METHYLPREDNISOLONE SODIUM SUCCINATE 40 MG: 40 INJECTION, POWDER, LYOPHILIZED, FOR SOLUTION INTRAMUSCULAR; INTRAVENOUS at 18:03

## 2022-06-17 RX ADMIN — ARIPIPRAZOLE 5 MG: 5 TABLET ORAL at 08:20

## 2022-06-17 RX ADMIN — ALBUTEROL SULFATE 2.5 MG: 2.5 SOLUTION RESPIRATORY (INHALATION) at 08:57

## 2022-06-17 RX ADMIN — ENOXAPARIN SODIUM 40 MG: 100 INJECTION SUBCUTANEOUS at 08:21

## 2022-06-17 RX ADMIN — ACETAMINOPHEN 650 MG: 325 TABLET ORAL at 08:19

## 2022-06-17 RX ADMIN — PRAMIPEXOLE DIHYDROCHLORIDE 0.12 MG: 0.12 TABLET ORAL at 00:06

## 2022-06-17 RX ADMIN — LORAZEPAM 1 MG: 1 TABLET ORAL at 13:26

## 2022-06-17 RX ADMIN — HYDROCODONE BITARTRATE AND ACETAMINOPHEN 1 TABLET: 7.5; 325 TABLET ORAL at 00:13

## 2022-06-17 ASSESSMENT — PAIN DESCRIPTION - ONSET
ONSET: ON-GOING
ONSET: ON-GOING

## 2022-06-17 ASSESSMENT — PAIN DESCRIPTION - DESCRIPTORS
DESCRIPTORS: THROBBING;SORE
DESCRIPTORS: ACHING
DESCRIPTORS: ACHING;SORE
DESCRIPTORS: ACHING;SORE

## 2022-06-17 ASSESSMENT — PAIN DESCRIPTION - LOCATION
LOCATION: LEG

## 2022-06-17 ASSESSMENT — PAIN DESCRIPTION - ORIENTATION
ORIENTATION: LEFT;RIGHT
ORIENTATION: RIGHT;LEFT

## 2022-06-17 ASSESSMENT — PAIN SCALES - WONG BAKER
WONGBAKER_NUMERICALRESPONSE: 0
WONGBAKER_NUMERICALRESPONSE: 0

## 2022-06-17 ASSESSMENT — PAIN SCALES - GENERAL
PAINLEVEL_OUTOF10: 1
PAINLEVEL_OUTOF10: 0
PAINLEVEL_OUTOF10: 9
PAINLEVEL_OUTOF10: 7
PAINLEVEL_OUTOF10: 2
PAINLEVEL_OUTOF10: 8
PAINLEVEL_OUTOF10: 7

## 2022-06-17 ASSESSMENT — PAIN DESCRIPTION - PAIN TYPE
TYPE: CHRONIC PAIN
TYPE: ACUTE PAIN

## 2022-06-17 ASSESSMENT — PAIN - FUNCTIONAL ASSESSMENT
PAIN_FUNCTIONAL_ASSESSMENT: ACTIVITIES ARE NOT PREVENTED
PAIN_FUNCTIONAL_ASSESSMENT: PREVENTS OR INTERFERES SOME ACTIVE ACTIVITIES AND ADLS
PAIN_FUNCTIONAL_ASSESSMENT: PREVENTS OR INTERFERES SOME ACTIVE ACTIVITIES AND ADLS
PAIN_FUNCTIONAL_ASSESSMENT: ACTIVITIES ARE NOT PREVENTED

## 2022-06-17 ASSESSMENT — PAIN DESCRIPTION - FREQUENCY
FREQUENCY: CONTINUOUS
FREQUENCY: CONTINUOUS

## 2022-06-17 NOTE — PROGRESS NOTES
Consult reddened area to coccyx and ble red and edema some pus filled areas   Awake and verbally appropriate  Inner buttocks red/moist      Left hip     Scarred, upper left side purple bruise          Rt leg edema, discoloration  hemosiderin staining  Antifungal cream per inner buttocks  Judie Novak.  Aspen Riddle, CNS, Wound Care

## 2022-06-17 NOTE — CONSULTS
Pulmonary Consultation    Admit Date: 6/16/2022  Requesting Physician: Cleo Bamberger, MD    Reason for consultation:  · COPD exacerbation  HPI:  · The patient is a 49-year-old male with known end-stage chronic obstructive pulmonary disease with recurrent hospitalizations. Most recently, the patient was seen as an outpatient in our office he was noted to be bronchospastic with bronchorrhea. He underwent a bronchoscopy at [facility] which revealed scant mucus throughout the tracheobronchial tree. There was nothing there really to culture. · Presents now with a several day history of increasing shortness of breath and peripheral edema. There is no associated fever, chills, night sweats or hemoptysis. The patient notes that after given some Lasix, his edema has resolve  The patient was seen by the infectious disease service. Antibiotics were not recommended. PMH:    Past Medical History:   Diagnosis Date    COPD (chronic obstructive pulmonary disease) ([facility] [address]     Hypertension     Multiple gastric ulcers      PSH:   Past Surgical History:   Procedure Laterality Date    HERNIA REPAIR      HIP SURGERY      KNEE SURGERY         Review of Systems:   · Constitutional: As noted in the HPI. · Eyes: No visual changes or diplopia. No scleral icterus. · ENT: No headaches, hearing loss or vertigo. No nasal congestion, or sore throat. · Cardiovascular: No chest pain, dyspnea on exertion, or palpitations. · Respiratory: See above  · Gastrointestinal: No abdominal pain, nausea or emesis. No diarrhea or rectal bleeding or melena. No change in bowel habits. · Genitourinary: No dysuria, urinary frequency, or incontinence. No hematuria. · Musculoskeletal: No gait disturbance, weakness or joint complaints. · Integumentary: No rash or pruritis. No abnormal pigmentation, hair or nail changes.   · Neurological: No headache, diplopia, dizziness, tremor, change in muscle strength, numbness or tingling. No change in gait, balance, coordination, mood, affect, memory, mentation, behavior. · Psychiatric: No anxiety or depression. · Endocrine: No temperature intolerance, excessive thirst, fluid intake, urinary frequency, excessive appetite, or recent weight change. · Hematologic/Lymphatic: No abnormal bruising or bleeding, blood clots or swollen lymph nodes. No anemia, fever, chills, night sweats, or swollen glands. · Allergic/Immunologic: No seasonal or perenial allergies. No history of hives or atopic dermatitis. Social History:  · Alcohol: No  · Tobacco:   Denied  · Employment:  no silica or asbestos exposure  · Family:  No family history of lung disease    Medications:   sodium chloride        sodium chloride flush  5-40 mL IntraVENous 2 times per day    enoxaparin  40 mg SubCUTAneous Daily    albuterol  2.5 mg Nebulization Q4H WA    methylPREDNISolone  40 mg IntraVENous Q6H    Followed by   [place] Mayte ON 2022] predniSONE  40 mg Oral Daily    azithromycin  500 mg IntraVENous Q24H    furosemide  20 mg IntraVENous Daily    amLODIPine  10 mg Oral Daily    ARIPiprazole  5 mg Oral Daily    ferrous sulfate  325 mg Oral BID    FLUoxetine  20 mg Oral Daily    ketoconazole   Topical Daily    LORazepam  1 mg Oral TID    metoprolol tartrate  25 mg Oral BID    pantoprazole  40 mg Oral QAM AC    pramipexole  0.125 mg Oral Nightly    budesonide-formoterol  2 puff Inhalation BID       Vitals:  Tmax:  VITALS:  /72   Pulse 84   Temp 97 °F (36.1 °C) (Oral)   Resp 16   Ht 5' 4\" (1.626 m)   Wt 136 lb 3.2 oz (61.8 kg)   SpO2 96%   BMI 23.38 kg/m²   24HR INTAKE/OUTPUT:      Intake/Output Summary (Last 24 hours) at 2022 1504  Last data filed at 2022 0830  Gross per 24 hour   Intake 415 ml   Output 1700 ml   Net -1285 ml     CURRENT PULSE OXIMETRY:  SpO2: 96 %  24HR PULSE OXIMETRY RANGE:  SpO2  Av %  Min: 96 %  Max: 100 %    EXAM:  General: No distress. Alert.  Eyes: PERRL. No sclera icterus. No conjunctival injection. ENT: No discharge. Pharynx clear. Neck: Trachea midline. Normal thyroid. No jvd, no hjr. Resp: Diffuse wheezing. No accessory muscle use. No rales. Scattered rhonchi. CV: Regular rate. Regular rhythm. No murmur No rub. Abd: Non-tender. Non-distended. No masses. No organmegaly. Normal bowel sounds. Skin: Warm and dry. No nodule on exposed extremities. No rash on exposed extremities. Lymph: No cervical LAD. No supraclavicular LAD. Ext: No joint deformity. No clubbing. No cyanosis. No edema  Neuro: Awake. Follows commands. Positive pupils/gag/corneals. Normal pain response. Lab Results:  CBC:   Recent Labs     06/16/22  1831 06/17/22  0004   WBC 7.9 6.2   HGB 10.1* 10.2*   HCT 31.7* 32.5*   MCV 96.4 96.7    137       BMP:  Recent Labs     06/16/22  1950 06/17/22  0004    131*   K 4.3 3.9   CL 85* 84*   CO2 34* 32*   BUN 10 11   CREATININE 0.8 0.8    ALB:3,BILIDIR:3,BILITOT:3,ALKPHOS:3)@    PT/INR: No results for input(s): PROTIME, INR in the last 72 hours. Cultures:  Sputum: not available  Blood: not available    ABG:   No results for input(s): PH, PO2, PCO2, HCO3, BE, O2SAT, METHB, O2HB, COHB, O2CON, HHB, THB in the last 72 hours. Films:     XR CHEST PORTABLE   Final Result   Patchy opacities/infiltrates in the right mid lung and lung bases bilaterally. .        Assessment:  1. Acute exacerbation of COPD. Chest radiograph showing patchy opacifications bilaterally with enthusiastic annotation however simple review of her more recent film shows that it actually has improved as seen above. Plan:  1. Treat for bronchospasm. Thanks for letting us see this patient in consultation. Total time in reviewing the previous admissions and records, reviewing the current x-rays, labs, and discussing with clinical staff including nursing and physicians, exceeded 50 minutes.       Please contact us with any questions. Office [phone] or after hours through [employer], x [phone]. Please note that voice recognition technology was used (while wearing a Covid mandated mask) in the preparation of this note and make therefore it may contain inadvertent transcription errors. If the patient is a COVID 19 isolation patient, the above physical exam reflects that of the examining physician for the day. Archana Khanna MD,  M.D., F.C.C.P.     [facility] [address], [address] [place] [address]

## 2022-06-17 NOTE — PROGRESS NOTES
[facility] Hospitalist   Progress Note    Admitting Date and Time: 6/16/2022  5:00 PM  Admit Dx: COPD exacerbation ([address] [J44.1]  Dyspnea, unspecified type [R06.00]    Subjective:    Pt feels slightly better. Lying in bed in no acute distress. Remains on 6 LNC tolerating well. Denies any new concerns at this time. Per RN: No new concerns noted at this time. ROS: denies fever, chills, cp, n/v, HA unless stated above.  sodium chloride flush  5-40 mL IntraVENous 2 times per day    enoxaparin  40 mg SubCUTAneous Daily    albuterol  2.5 mg Nebulization Q4H WA    methylPREDNISolone  40 mg IntraVENous Q6H    Followed by   Janessa Sorto ON 6/19/2022] predniSONE  40 mg Oral Daily    azithromycin  500 mg IntraVENous Q24H    furosemide  20 mg IntraVENous Daily    amLODIPine  10 mg Oral Daily    ARIPiprazole  5 mg Oral Daily    ferrous sulfate  325 mg Oral BID    FLUoxetine  20 mg Oral Daily    ketoconazole   Topical Daily    LORazepam  1 mg Oral TID    metoprolol tartrate  25 mg Oral BID    pantoprazole  40 mg Oral QAM AC    pramipexole  0.125 mg Oral Nightly    budesonide-formoterol  2 puff Inhalation BID     sodium chloride flush, 5-40 mL, PRN  sodium chloride, , PRN  acetaminophen, 650 mg, Q4H PRN  ondansetron, 4 mg, Q8H PRN   Or  ondansetron, 4 mg, Q6H PRN  polyethylene glycol, 17 g, Daily PRN  albuterol, 2.5 mg, Q2H PRN  HYDROcodone-acetaminophen, 1 tablet, BID PRN         Objective:    /72   Pulse 84   Temp 97 °F (36.1 °C) (Oral)   Resp 16   Ht 5' 4\" (1.626 m)   Wt 136 lb 8 oz (61.9 kg)   SpO2 96%   BMI 23.43 kg/m²   General Appearance: alert and oriented to person, place and time and in no acute distress  Skin: warm and dry.   Venous stasis/dermatitis  Head: normocephalic and atraumatic  Eyes: pupils equal, round, and reactive to light, extraocular eye movements intact, conjunctivae normal  Neck: neck supple and non tender without mass   Pulmonary/Chest: clear to auscultation bilaterally- no wheezes, rales or rhonchi, normal air movement, no respiratory distress  Cardiovascular: normal rate, normal S1 and S2 and no RGM  Abdomen: soft, non-tender, non-distended, normal bowel sounds  Extremities: no cyanosis, no clubbing and trace BLE edema. Venous stasis/dermatitis. Neurologic: no cranial nerve deficit and speech normal      Recent Labs     06/16/22  1950 06/17/22  0004    131*   K 4.3 3.9   CL 85* 84*   CO2 34* 32*   BUN 10 11   CREATININE 0.8 0.8   GLUCOSE 128* 316*   CALCIUM 7.1* 6.9*       Recent Labs     06/17/22  0004   ALKPHOS 55   PROT 7.2   LABALBU 3.7   BILITOT 0.5   AST 17   ALT 13       Recent Labs     06/16/22  1831 06/17/22  0004   WBC 7.9 6.2   RBC 3.29* 3.36*   HGB 10.1* 10.2*   HCT 31.7* 32.5*   MCV 96.4 96.7   MCH 30.7 30.4   MCHC 31.9* 31.4*   RDW 15.9* 15.5*    137   MPV 10.6 10.0           Radiology:   XR CHEST PORTABLE   Final Result   Patchy opacities/infiltrates in the right mid lung and lung bases bilaterally. Assessment:  Principal Problem:    COPD exacerbation ([address]  Active Problems:    Hypertension    Leg swelling  Resolved Problems:    * No resolved hospital problems. *      Plan:  1. Chronic respiratory failure- recent inpatient stay at [facility] with noted COVID-19 infection.  Treated with ABX/steroids x5 days. Admitted 6/3-6/7 with the same complaints. Diuresed/received IV Zosyn. Chronically wears 6L NC at home.  6/5/2022 ECHO LVEF 55-60%.  CXR patchy opacities/infiltrates in the right mid lung and lung bases bilaterally. Azithromycin in ED course we will continue. Check CRP/ESR. Procalcitonin. Pulmonology input appreciated. 2. COPD exacerbation- presented [facility] ED with complaints of increased SOB. Chronically wears 5L NC at home.  Changed to Zosyn per pulmonology. Steven Dominguez albuterol/Symbicort duo nebs/Solu-Medrol.  Pulmonology input appreciated. 3. LLE pain/Swelling-  Left hip and knee fusion at [facility].  4306 removal of infected plate.  8/6 CT l left tibia-fibula generalized subcutaneous edema leg and ankle.  No evidence of abscess.  No soft tissue gas.  Thickening and calcification/ossification within distal Achilles tendon.  Generalized osteopenia.  Orthopedic surgery consulted with last admission. Further definitive care and evaluation to be done as outpatient. Received Furaosemid 20mg IV daily. 4. HTN- continue amlodipine/metoprolol.  Follow adjust as needed  5. Hx gastric ulcers/GERD-continue pantoprazole. 6. Hx anxiety/depression- continue lorazepam/Abilify. NOTE: This report was transcribed using voice recognition software. Every effort was made to ensure accuracy; however, inadvertent computerized transcription errors may be present. Electronically signed by Mike Tate CNP on 6/17/2022 at 10:09 AM  HOSPITALIST ATTENDING PHYSICIAN NOTE 6/17/2022 1758PM:    Details of the evaluation - subjective assessment (including medication profile, past medical, family and social history when applicable), examination, review of lab and test data, diagnostic impressions and medical decision making - performed by Jimmy Serra. Valentino Tate CNP, were discussed with me on the date of service and I agree with clinical information herein unless otherwise noted. The patient has been evaluated by me personally earlier today. Pt reports no fevers, chills,n/v.    Exam: heart reg at rate of 88,lungs cta, abd pos bs soft nt, ext neg for le edema    I agree with the assessment and plan of Jimmy Serra. ARGELIA Flor CNP    Copd exacerbation  Chronic respiratory failure with hypoxia  htn  gerd  Depression/anxiety      Electronically signed by Remedios Mckeon D.O.   Hospitalist  [facility] Hospitalist Service at [facility]

## 2022-06-17 NOTE — H&P
[facility] History and Physical      CHIEF COMPLAINT:  Shortness of breath and increase swelling in legs     History of Present Illness: This is a 64year old male with PMH of COPD, HTN, gastric ulcers, CHF, anxiety/depression, left hip and knee fusion at [facility], 2017 removal of infected plate and recent [id number] admission with antibiotics/steroids for [facility] followed by recent admission 6/3/2022-6/7/22 for acute on chronic respiratory failure with hypoxia, COPD exacerbation, and left lower extremity pain/swelling. Noted at that time no chronic interstitial opacities bilaterally. Sputum at that time growing gram-negative rods/GNR oxidase positive. Treated with Zosyn. Echo done 6/5/2022 showing LVEF 55 to 60%. Patient to ED with increased shortness of breath and leg swelling. Shortness of breath and swelling started several days ago and progressively worsened. Patient does not weigh self daily. Positive PND and orthopnea. Admits to increased sputum production, cough, and wheezing. Does admit to taking Lasix 20 mg p.o. daily. Denies anything worsening or improving of shortness of breath at home. Does wear 6 L O2 via nasal cannula at home. Positive for low-grade temp in ED 99.8 °F.  Denies chills, chest pain, abdominal pain, and changes in bowel/bladder habits. Informant(s) for H&P: Patient and chart review     REVIEW OF SYSTEMS:  A comprehensive review of systems was negative except for: what is in the HPI      PMH:  Past Medical History:   Diagnosis Date    COPD (chronic obstructive pulmonary disease) ([address]     Hypertension     Multiple gastric ulcers        Surgical History:  Past Surgical History:   Procedure Laterality Date    HERNIA REPAIR      HIP SURGERY      KNEE SURGERY         Medications Prior to Admission:    Prior to Admission medications    Medication Sig Start Date End Date Taking?  Authorizing Provider   FLUoxetine (PROZAC) 20 MG capsule Take 20 mg by mouth daily    Historical Provider, MD   Arformoterol Tartrate (BROVANA IN) Inhale into the lungs    Historical Provider, MD   omeprazole (PRILOSEC) 40 MG delayed release capsule Take 1 capsule by mouth daily 4/13/22   Robert José MD   SYMBICORT 160-4.5 MCG/ACT AERO Inhale 2 puffs into the lungs 2 times daily 4/13/22   Robert José MD   gabapentin (NEURONTIN) 100 MG capsule Take one tab nightly; if tolerating, can increase to 200mg at night  Patient taking differently: Take 100 mg by mouth 3 times daily. 4/13/22 5/13/22  Robert José MD   ferrous sulfate (IRON 325) 325 (65 Fe) MG tablet Take 1 tablet by mouth 2 times daily 3/22/22   Robert José MD   pramipexole (MIRAPEX) 0.125 MG tablet Take 1 tablet by mouth nightly 3/17/22   Robert José MD   albuterol sulfate  (90 Base) MCG/ACT inhaler INHALE TWO (2) PUFFS INTO THE LUNGS EVERY FOUR (4) HOURS AS NEEDED FOR WHEEZING 3/16/22   Robert José MD   amLODIPine (NORVASC) 10 MG tablet Take 1 tablet by mouth daily 3/8/22   Robert José MD   metoprolol tartrate (LOPRESSOR) 25 MG tablet Twice A Day  Patient taking differently: Take 25 mg by mouth 2 times daily Twice A Day 3/8/22   Robert José MD   furosemide (LASIX) 20 MG tablet Take 1 tablet by mouth daily 2/25/22   Robert José MD   fluvoxaMINE (LUVOX) 100 MG tablet Take 100 mg by mouth nightly  Patient not taking: Reported on 6/3/2022    Historical Provider, MD   ketoconazole (NIZORAL) 2 % cream Apply topically daily. 11/30/21   NURYS Flowers   Probiotic Product [facility]) CAPS Patient is to take one tab bid.  Patient has been on antibiotics for the last 3 months 10/27/21   ARGELIA Dutta - NP   budesonide (PULMICORT) 0.5 MG/2ML nebulizer suspension Take 2 mLs by nebulization 2 times daily 6/16/21   ARGELIA Rubin - CNP   Revefenacin [facility]) 175 MCG/3ML SOLN Inhale 1 ampule into the lungs daily 6/16/21   ARGELIA Rubin - MICA   albuterol sulfate HFA (PROAIR HFA) 108 (90 Base) MCG/ACT inhaler Inhale 2 puffs into the lungs every 4 hours as needed for Wheezing 1/26/21   Joshua Eid MD   HYDROcodone-acetaminophen (NORCO) 7.5-325 MG per tablet Take 1 tablet by mouth 2 times daily as needed. Takes religiously twice daily for leg pain per pt 10/6/20   Historical Provider, MD   ipratropium-albuterol (DUONEB) 0.5-2.5 (3) MG/3ML SOLN nebulizer solution Inhale 3 mLs into the lungs every 4 hours as needed for Shortness of Breath 3/10/20   SHREYA Hdez MD   ARIPiprazole (ABILIFY) 5 MG tablet take 1 tablet by mouth once daily 7/5/19   Historical Provider, MD       Allergies:    Aspirin, Lisinopril, Other, Tramadol, Ibuprofen, and Sulfa antibiotics    Social History:    reports that he quit smoking about 20 months ago. His smoking use included cigarettes. He started smoking about 27 years ago. He has a 100.00 pack-year smoking history. He has never used smokeless tobacco. He reports that he does not drink alcohol and does not use drugs. Family History:   family history includes Colon Cancer in his mother; No Known Problems in his brother, brother, sister, and sister; Other in his father. PHYSICAL EXAM:  Vitals:  BP (!) 128/59   Pulse (!) 110   Temp 98.9 °F (37.2 °C) (Oral)   Resp 20   Ht 5' 4\" (1.626 m)   Wt 136 lb 8 oz (61.9 kg)   SpO2 100%   BMI 23.43 kg/m²     General Appearance: unkempt. alert and oriented to person, place and time and in no acute distress  Skin: warm and dry. Head: normocephalic and atraumatic  Eyes: pupils equal, round, and reactive to light, conjunctivae normal, amblyopia noted  Pulmonary/Chest: Lungs diminished with expiratory wheezes throughout and rhonchi. Cardiovascular: normal rate, normal S1 and S2   Abdomen: soft, non-tender, non-distended, normal bowel sounds, no masses or organomegaly  Extremities: no cyanosis, no clubbing. BLE edema 1-2+ pitting. Left > Right.  LLE reddened, tender to palpation and warm to touch   Neurologic: speech normal        LABS:  Recent Labs     06/16/22  1950      K 4.3   CL 85*   CO2 34*   BUN 10   CREATININE 0.8   GLUCOSE 128*   CALCIUM 7.1*       Recent Labs     06/16/22  1831   WBC 7.9   RBC 3.29*   HGB 10.1*   HCT 31.7*   MCV 96.4   MCH 30.7   MCHC 31.9*   RDW 15.9*      MPV 10.6       No results for input(s): POCGLU in the last 72 hours. CBC with Differential:    Lab Results   Component Value Date    WBC 7.9 06/16/2022    RBC 3.29 06/16/2022    HGB 10.1 06/16/2022    HCT 31.7 06/16/2022     06/16/2022    MCV 96.4 06/16/2022    MCH 30.7 06/16/2022    MCHC 31.9 06/16/2022    RDW 15.9 06/16/2022    SEGSPCT 79.2 05/31/2022    BANDSPCT 0.0 02/10/2022    METASPCT 4.0 02/21/2020    LYMPHOPCT 7.6 06/16/2022    MONOPCT 6.8 06/16/2022    MYELOPCT 0.0 09/17/2021    BASOPCT 0.1 06/16/2022    MONOSABS 0.54 06/16/2022    LYMPHSABS 0.60 06/16/2022    EOSABS 0.03 06/16/2022    BASOSABS 0.01 06/16/2022     BMP:    Lab Results   Component Value Date     06/16/2022    K 4.3 06/16/2022    K 4.2 06/07/2022    CL 85 06/16/2022    CO2 34 06/16/2022    BUN 10 06/16/2022    LABALBU 4.2 06/07/2022    CREATININE 0.8 06/16/2022    CALCIUM 7.1 06/16/2022    GFRAA >60 06/16/2022    LABGLOM >60 06/16/2022    GLUCOSE 128 06/16/2022       Radiology:   XR CHEST PORTABLE   Final Result   Patchy opacities/infiltrates in the right mid lung and lung bases bilaterally. EKG:       ASSESSMENT:      Principal Problem:    COPD exacerbation (HCC)  Active Problems:    Hypertension    Leg swelling  Resolved Problems:    * No resolved hospital problems. *      PLAN:    1. COPD exacerbation- Oxygen at baseline. Duo neb treatments QID and PRN. Continue home medication. Solumedrol given in ED and continued. Azithromycin started. 2.  Bilateral LE swelling- Given lasix 20 mg IV in ED. Will continue lasix IV. 3.  ?LLE cellulitis- verses chronic discoloration. Blood cultures ordered.  Consult ID for input. Check sed rate and CRP. 4.  Infiltrates- right mid lung and bilateral bases. Chronic verses new. Consult pulmonary. Input appreciated. Chest PT ordered. Check urine antigens for strep and Legionella. Check procalcitonin. 4. HTN- Monitor BP and continue home medication. Code Status: Full code  DVT prophylaxis: Lovenox       NOTE: This report was transcribed using voice recognition software. Every effort was made to ensure accuracy; however, inadvertent computerized transcription errors may be present.   Electronically signed by ARGELIA Alba CNP on 6/16/2022 at 10:39 PM

## 2022-06-17 NOTE — H&P
Attending Physician Statement:  Bryan Aguilar M.D., F.A.C.P. I have discussed the case, including pertinent history and exam findings with the resident/NP. I have seen and examined the patient and the key elements of the encounter have been performed by me. I agree with the resident ROS, PMHx, PSHx, meds reviewed and assessment, plan and orders as documented by the resident/NP      Hospital charts reviewed, including other providers notes, relevant labs and imaging. chornic issues - chronic discoloration   LLE pain/Swelling. (recent US negative DVT). Noted  Left hip and knee fusion at [address] removal of infected plate.  -chronic left tibia-fibula generalized subcutaneous edema leg and ankle    However has been on abx for ?cellulitis/infected hardware hx  Consult ID    Frequently admitted various hospitals, - left here June 7th, he claims at home finished steroids and abx given, takes his puffers,  Used rescue 4x day before admission  worsenign preductive cough and congestion. No fevers/chills-- however he was told by ER he had borderline temp. -- +PND/orthopnea- chronic  -- duonebs, steroids again, Azithro for productive cough  No WBC elevation, likely infitrates are chronic change from covid last month at [facility]   --compare to imaging last admission- though ? new R mid lung change? ?? Defer to pulmonary - abx, sputum, viral panel, procal etc..    Frequently readmitted  ? decompensation vs poor ability to live alone at home-- rule out allergen exposure at home- drug screen ordered, etc..  Looks thin,  Unkempt, failure at home--debilitated, poor muscle mass-- likely needs permanent placement. Also noted hx:  6/5/2022 ECHO LVEF 55-60%.  proBNP 800 COVID-19 negative last admission   [facility] with barium concerning for enlarged styloid process of the temporal bone.   per ENT -  No ENT surgical interventions at this time    May 2020 --recent inpatient stay at [facility]

## 2022-06-17 NOTE — CONSULTS
[address] [place] [address] [facility]  Consultation Note     Admit Date: 6/16/2022  5:00 PM    Reason for Consult:   ?  Cellulitis versus chronic condition    Attending Physician:  Brian Retana DO    HISTORY OF PRESENT ILLNESS:             The history is obtained from extensive review of available past medical records. The patient is a 64 y.o. male who is previously known to the ID service. Patient was admitted to [facility] in February 2022 with MSSA bacteremia. Seen by Dr. Heather Cervantes. Treated with Vancomycin, followed by Nafcillin and discharged on Cefazolin x6 weeks. Seen in follow-up in the office and completed 6 weeks of IV antibiotics. He was followed by 4 weeks of Cephalexin. Last seen in the office on 6/15/2022 and was doing well. He is known to have venous stasis dermatitis and chronic discoloration to his legs. He also is known to have a left knee effusion. The patient has been admitted to [facility] on 6/3/2022 because of fluid retention. He was discharged on 6/7/2022. Comes to the ED at [facility] on 6/16/2022 with dyspnea and bilateral lower extremity edema. He was admitted with a diagnosis of COPD exacerbation. Past Medical History:        Diagnosis Date    COPD (chronic obstructive pulmonary disease) ([address]     Hypertension     Multiple gastric ulcers      February 2022. Admitted to [facility] with MSSA bacteremia. Seen by Dr. Alejandro Bell. Treated with Vancomycin. Repeat cultures were negative. LESLYE did not show vegetations. He was treated with Nafcillin. He was discharged on Cefazolin. Seen in follow-up in the office by Dr. Heather Cervantes. Cefazolin was stopped and he was placed on Cephalexin 500 mg p.o. 4 times daily x4 weeks. Repeat blood cultures off antibiotics were negative. Seen in the office in follow-up in 6/15/2022. He was doing well. August 2021. Admitted to [facility]. .  Previously been had [address] [place] [address] with cellulitis and discharged on Ceftin. Seen by ID for left lower extremity cellulitis and failure of previous treatment. He was noted to have venous stasis dermatitis and possible cellulitis, which was not impressive. He was treated with Cephalexin and Doxycycline. January 2021. Admitted to [facility] for left knee effusion with redness and pain. He had previously been treated with oral Doxycycline. Treated with Vancomycin and Zosyn. Seen by Dr. Fabiola Bradford for cellulitis of the right leg. Vancomycin was continued    October 2020. Admitted to [facility] with pneumonia. Seen by Dr. Fabiola Bradford. Treated with Ceftriaxone and Azithromycin.     Past Surgical History:        Procedure Laterality Date    HERNIA REPAIR      HIP SURGERY      KNEE SURGERY       Current Medications:   Scheduled Meds:   sodium chloride flush  5-40 mL IntraVENous 2 times per day    enoxaparin  40 mg SubCUTAneous Daily    albuterol  2.5 mg Nebulization Q4H WA    methylPREDNISolone  40 mg IntraVENous Q6H    Followed by   Shayna Sanon ON 6/19/2022] predniSONE  40 mg Oral Daily    azithromycin  500 mg IntraVENous Q24H    furosemide  20 mg IntraVENous Daily    amLODIPine  10 mg Oral Daily    ARIPiprazole  5 mg Oral Daily    ferrous sulfate  325 mg Oral BID    FLUoxetine  20 mg Oral Daily    ketoconazole   Topical Daily    LORazepam  1 mg Oral TID    metoprolol tartrate  25 mg Oral BID    pantoprazole  40 mg Oral QAM AC    pramipexole  0.125 mg Oral Nightly    budesonide-formoterol  2 puff Inhalation BID     Continuous Infusions:   sodium chloride       PRN Meds:sodium chloride flush, sodium chloride, acetaminophen, ondansetron **OR** ondansetron, polyethylene glycol, albuterol, HYDROcodone-acetaminophen    Allergies:  Aspirin, Lisinopril, Other, Tramadol, Ibuprofen, and Sulfa antibiotics    Social History:   Social History     Socioeconomic History    Marital status:      Spouse name: Not on file    Number of children: Not on file    Years of education: Not on file    Highest education level: Not on file   Occupational History    Not on file   Tobacco Use    Smoking status: Former Smoker     Packs/day: 4.00     Years: 25.00     Pack years: 100.00     Types: Cigarettes     Start date: 3/10/1995     Quit date: 10/13/2020     Years since quittin.6    Smokeless tobacco: Never Used   Vaping Use    Vaping Use: Never used   Substance and Sexual Activity    Alcohol use: No    Drug use: No    Sexual activity: Not Currently   Other Topics Concern    Not on file   Social History Narrative    Not on file     Social Determinants of Health     Financial Resource Strain:     Difficulty of Paying Living Expenses: Not on file   Food Insecurity:     Worried About Running Out of Food in the Last Year: Not on file    Moustapha of Food in the Last Year: Not on file   Transportation Needs:     Lack of Transportation (Medical): Not on file    Lack of Transportation (Non-Medical):  Not on file   Physical Activity:     Days of Exercise per Week: Not on file    Minutes of Exercise per Session: Not on file   Stress:     Feeling of Stress : Not on file   Social Connections:     Frequency of Communication with Friends and Family: Not on file    Frequency of Social Gatherings with Friends and Family: Not on file    Attends Faith Services: Not on file    Active Member of [address] [place] [address] or Organizations: Not on file    Attends Club or Organization Meetings: Not on file    Marital Status: Not on file   Intimate Partner Violence:     Fear of Current or Ex-Partner: Not on file    Emotionally Abused: Not on file    Physically Abused: Not on file    Sexually Abused: Not on file   Housing Stability:     Unable to Pay for Housing in the Last Year: Not on file    Number of [place] in the Last Year: Not on file    Unstable Housing in the Last Year: Not on file      Pets: Dog  Patient lives at home with his wife  Travel: No    Family History:       Problem Relation Age of Onset    Colon Cancer Mother     Other Father         house fire    No Known Problems Sister     No Known Problems Brother     No Known Problems Sister     No Known Problems Brother    . Otherwise non-pertinent to the chief complaint. REVIEW OF SYSTEMS:    Constitutional: Negative for fevers, chills, diaphoresis  Neurologic: Negative   Psychiatric: Negative  Rheumatologic: Negative   Endocrine: Negative  Hematologic: Negative  Immunologic: Negative  ENT: Negative  Respiratory: Negative   Cardiovascular: Negative  GI: Negative  : Negative  Musculoskeletal: As in the HPI  Skin: No rashes. PHYSICAL EXAM:    Vitals:   /72   Pulse 84   Temp 97 °F (36.1 °C) (Oral)   Resp 16   Ht 5' 4\" (1.626 m)   Wt 136 lb 8 oz (61.9 kg)   SpO2 96%   BMI 23.43 kg/m²   Constitutional: The patient is awake, alert, and oriented. Skin: Warm and dry. No rashes were noted. HEENT: Eyes show round, and reactive pupils. No jaundice. Moist mucous membranes, no ulcerations, no thrush. Neck: Supple to movements. No lymphadenopathy. Chest: No use of accessory muscles to breathe. Symmetrical expansion. Auscultation reveals no wheezing, crackles, or rhonchi. Cardiovascular: S1 and S2 are rhythmic and regular. No murmurs appreciated. Abdomen: Positive bowel sounds to auscultation. Benign to palpation. No masses felt. No hepatosplenomegaly. Extremities: Minimal bilateral lower extremity edema. Venous stasis dermatitis bilaterally. Left knee is fused. Lines: Peripheral.      CBC+dif:  Recent Labs     06/16/22  1831 06/16/22  1831 06/17/22  0004   WBC 7.9  --  6.2   HGB 10.1*   < > 10.2*   HCT 31.7*   < > 32.5*   MCV 96.4   < > 96.7      < > 137   NEUTROABS 6.67   < > 5.92    < > = values in this interval not displayed.      Lab Results   Component Value Date    CRP 20.9 (H) 06/17/2022    CRP 0.3 06/03/2022    CRP 11.1 (H) 05/09/2022      Lab Results   Component Value Date    CRPHS 123.8 (H) 01/24/2022     Lab Results   Component Value Date    SEDRATE 9 06/03/2022    SEDRATE 46 (H) 05/09/2022    SEDRATE 116 (H) 04/28/2022     Lab Results   Component Value Date    ALT 13 06/17/2022    AST 17 06/17/2022    ALKPHOS 55 06/17/2022    BILITOT 0.5 06/17/2022     Lab Results   Component Value Date     06/17/2022    K 3.9 06/17/2022    CL 84 06/17/2022    CO2 32 06/17/2022    BUN 11 06/17/2022    CREATININE 0.8 06/17/2022    GFRAA >60 06/17/2022    AGRATIO 0.9 05/22/2022    LABGLOM >60 06/17/2022    GLUCOSE 316 06/17/2022    PROT 7.2 06/17/2022    LABALBU 3.7 06/17/2022    CALCIUM 6.9 06/17/2022    BILITOT 0.5 06/17/2022    ALKPHOS 55 06/17/2022    AST 17 06/17/2022    ALT 13 06/17/2022       Lab Results   Component Value Date    PROTIME 11.9 01/28/2022    INR 1.08 01/28/2022       Lab Results   Component Value Date    TSH 0.14 05/17/2022       Lab Results   Component Value Date    COLORU Yellow 06/16/2022    PHUR 8.5 06/16/2022    WBCUA 1-3 06/16/2022    WBCUA NEGATIVE 01/25/2022    RBCUA 0-1 06/16/2022    RBCUA SMALL 01/25/2022    MUCUS SMALL 10/19/2021    BACTERIA FEW 06/16/2022    CLARITYU Clear 06/16/2022    SPECGRAV 1.010 06/16/2022    LEUKOCYTESUR Negative 06/16/2022    UROBILINOGEN 0.2 06/16/2022    BILIRUBINUR Negative 06/16/2022    BLOODU Negative 06/16/2022    GLUCOSEU Negative 06/16/2022       Lab Results   Component Value Date    HCO3 35.0 08/26/2021    BE 9.7 08/26/2021    O2SAT 87.0 08/26/2021    PH 7.454 08/26/2021    PCO2 51.0 08/26/2021    PO2 53.4 08/26/2021     Radiology:  Noted    Microbiology:  Pending  No results for input(s): BC in the last 72 hours. No results for input(s): ORG in the last 72 hours. No results for input(s): Elizabeth Patient in the last 72 hours. No results for input(s): STREPNEUMAGU in the last 72 hours. No results for input(s): LP1UAG in the last 72 hours. No results for input(s): ASO in the last 72 hours.   No results for input(s): CULTRESP in the last 72 hours. Recent Labs     06/17/22  0004   PROCAL 0.11*       Assessment:  · Exacerbation COPD  · Bilateral chronic lower extremity venous stasis dermatitis  · History of MSSA high-grade bacteremia in February 2022. Treated with 6 weeks of Cefazolin, followed by 4 weeks of Cephalexin. Repeat blood cultures were negative. LESLYE during that admission did not show vegetations    Plan:    · Antibiotics are not warranted  · No further recommendations    Thank you for having us see this patient in consultation. ID will sign off.     Harry Alegria MD  9:20 AM  6/17/2022

## 2022-06-17 NOTE — PLAN OF CARE
Problem: Pain  Goal: Verbalizes/displays adequate comfort level or baseline comfort level  Outcome: Progressing  Flowsheets (Taken 6/17/2022 0815)  Verbalizes/displays adequate comfort level or baseline comfort level: Encourage patient to monitor pain and request assistance

## 2022-06-17 NOTE — CARE COORDINATION
Pt from home, cousin lives with him. Fairly independent states use cane prn. Has home 02 through [facility] ([phone], verified. Has 6lnc during day; 7liters at [place]. Also active with [facility] [phone] fax [phone] (verfied) requests SUNDAY orders /discharge summary be faxed to them at discharge to aforementioned number as they do not have access to Epic. SUDNAY orders on chart. Current on IV zithromax/lasix/solumedrol . Per pt; plan is home at d/c with resumption of services. Calin Jama.

## 2022-06-18 LAB
ALBUMIN SERPL-MCNC: 3.7 G/DL (ref 3.5–5.2)
ALP BLD-CCNC: 64 U/L (ref 40–129)
ALT SERPL-CCNC: 13 U/L (ref 0–40)
ANION GAP SERPL CALCULATED.3IONS-SCNC: 12 MMOL/L (ref 7–16)
AST SERPL-CCNC: 12 U/L (ref 0–39)
BASOPHILS ABSOLUTE: 0 E9/L (ref 0–0.2)
BASOPHILS RELATIVE PERCENT: 0 % (ref 0–2)
BILIRUB SERPL-MCNC: <0.2 MG/DL (ref 0–1.2)
BUN BLDV-MCNC: 15 MG/DL (ref 6–20)
CALCIUM SERPL-MCNC: 6.9 MG/DL (ref 8.6–10.2)
CHLORIDE BLD-SCNC: 93 MMOL/L (ref 98–107)
CO2: 34 MMOL/L (ref 22–29)
CREAT SERPL-MCNC: 0.8 MG/DL (ref 0.7–1.2)
EOSINOPHILS ABSOLUTE: 0 E9/L (ref 0.05–0.5)
EOSINOPHILS RELATIVE PERCENT: 0 % (ref 0–6)
GFR AFRICAN AMERICAN: >60
GFR NON-AFRICAN AMERICAN: >60 ML/MIN/1.73
GLUCOSE BLD-MCNC: 258 MG/DL (ref 74–99)
HBA1C MFR BLD: 6.1 % (ref 4–5.6)
HCT VFR BLD CALC: 31.5 % (ref 37–54)
HEMOGLOBIN: 9.4 G/DL (ref 12.5–16.5)
IMMATURE GRANULOCYTES #: 0.03 E9/L
IMMATURE GRANULOCYTES %: 0.4 % (ref 0–5)
LYMPHOCYTES ABSOLUTE: 0.24 E9/L (ref 1.5–4)
LYMPHOCYTES RELATIVE PERCENT: 3.6 % (ref 20–42)
MCH RBC QN AUTO: 29.9 PG (ref 26–35)
MCHC RBC AUTO-ENTMCNC: 29.8 % (ref 32–34.5)
MCV RBC AUTO: 100.3 FL (ref 80–99.9)
MONOCYTES ABSOLUTE: 0.24 E9/L (ref 0.1–0.95)
MONOCYTES RELATIVE PERCENT: 3.6 % (ref 2–12)
NEUTROPHILS ABSOLUTE: 6.18 E9/L (ref 1.8–7.3)
NEUTROPHILS RELATIVE PERCENT: 92.4 % (ref 43–80)
PDW BLD-RTO: 15.3 FL (ref 11.5–15)
PLATELET # BLD: 153 E9/L (ref 130–450)
PMV BLD AUTO: 9.9 FL (ref 7–12)
POLYCHROMASIA: ABNORMAL
POTASSIUM SERPL-SCNC: 4.9 MMOL/L (ref 3.5–5)
RBC # BLD: 3.14 E12/L (ref 3.8–5.8)
SODIUM BLD-SCNC: 139 MMOL/L (ref 132–146)
TOTAL PROTEIN: 7 G/DL (ref 6.4–8.3)
WBC # BLD: 6.7 E9/L (ref 4.5–11.5)

## 2022-06-18 PROCEDURE — 83036 HEMOGLOBIN GLYCOSYLATED A1C: CPT

## 2022-06-18 PROCEDURE — 2580000003 HC RX 258: Performed by: NURSE PRACTITIONER

## 2022-06-18 PROCEDURE — 85025 COMPLETE CBC W/AUTO DIFF WBC: CPT

## 2022-06-18 PROCEDURE — 6360000002 HC RX W HCPCS: Performed by: NURSE PRACTITIONER

## 2022-06-18 PROCEDURE — APPSS30 APP SPLIT SHARED TIME 16-30 MINUTES: Performed by: NURSE PRACTITIONER

## 2022-06-18 PROCEDURE — 2580000003 HC RX 258: Performed by: INTERNAL MEDICINE

## 2022-06-18 PROCEDURE — 36415 COLL VENOUS BLD VENIPUNCTURE: CPT

## 2022-06-18 PROCEDURE — 94669 MECHANICAL CHEST WALL OSCILL: CPT

## 2022-06-18 PROCEDURE — 6360000002 HC RX W HCPCS: Performed by: INTERNAL MEDICINE

## 2022-06-18 PROCEDURE — 80053 COMPREHEN METABOLIC PANEL: CPT

## 2022-06-18 PROCEDURE — 99232 SBSQ HOSP IP/OBS MODERATE 35: CPT | Performed by: INTERNAL MEDICINE

## 2022-06-18 PROCEDURE — 94640 AIRWAY INHALATION TREATMENT: CPT

## 2022-06-18 PROCEDURE — 99254 IP/OBS CNSLTJ NEW/EST MOD 60: CPT | Performed by: INTERNAL MEDICINE

## 2022-06-18 PROCEDURE — 6370000000 HC RX 637 (ALT 250 FOR IP): Performed by: INTERNAL MEDICINE

## 2022-06-18 PROCEDURE — 6370000000 HC RX 637 (ALT 250 FOR IP): Performed by: NURSE PRACTITIONER

## 2022-06-18 PROCEDURE — 1200000000 HC SEMI PRIVATE

## 2022-06-18 PROCEDURE — 2700000000 HC OXYGEN THERAPY PER DAY

## 2022-06-18 RX ORDER — FUROSEMIDE 20 MG/1
20 TABLET ORAL DAILY
Status: DISCONTINUED | OUTPATIENT
Start: 2022-06-19 | End: 2022-06-19 | Stop reason: HOSPADM

## 2022-06-18 RX ADMIN — IPRATROPIUM BROMIDE AND ALBUTEROL SULFATE 1 AMPULE: 2.5; .5 SOLUTION RESPIRATORY (INHALATION) at 00:34

## 2022-06-18 RX ADMIN — HYDROCODONE BITARTRATE AND ACETAMINOPHEN 1 TABLET: 7.5; 325 TABLET ORAL at 00:19

## 2022-06-18 RX ADMIN — FERROUS SULFATE TAB 325 MG (65 MG ELEMENTAL FE) 325 MG: 325 (65 FE) TAB at 08:57

## 2022-06-18 RX ADMIN — FLUOXETINE 20 MG: 20 CAPSULE ORAL at 08:57

## 2022-06-18 RX ADMIN — ARIPIPRAZOLE 5 MG: 5 TABLET ORAL at 08:57

## 2022-06-18 RX ADMIN — METHYLPREDNISOLONE SODIUM SUCCINATE 40 MG: 40 INJECTION, POWDER, LYOPHILIZED, FOR SOLUTION INTRAMUSCULAR; INTRAVENOUS at 18:32

## 2022-06-18 RX ADMIN — IPRATROPIUM BROMIDE AND ALBUTEROL SULFATE 1 AMPULE: 2.5; .5 SOLUTION RESPIRATORY (INHALATION) at 16:58

## 2022-06-18 RX ADMIN — LORAZEPAM 1 MG: 1 TABLET ORAL at 08:57

## 2022-06-18 RX ADMIN — LORAZEPAM 1 MG: 1 TABLET ORAL at 22:20

## 2022-06-18 RX ADMIN — AZITHROMYCIN MONOHYDRATE 500 MG: 500 INJECTION, POWDER, LYOPHILIZED, FOR SOLUTION INTRAVENOUS at 00:28

## 2022-06-18 RX ADMIN — BUDESONIDE 1000 MCG: 0.5 SUSPENSION RESPIRATORY (INHALATION) at 20:19

## 2022-06-18 RX ADMIN — LORAZEPAM 1 MG: 1 TABLET ORAL at 13:00

## 2022-06-18 RX ADMIN — PRAMIPEXOLE DIHYDROCHLORIDE 0.12 MG: 0.12 TABLET ORAL at 22:20

## 2022-06-18 RX ADMIN — PANTOPRAZOLE SODIUM 40 MG: 40 TABLET, DELAYED RELEASE ORAL at 06:44

## 2022-06-18 RX ADMIN — Medication 10 ML: at 10:25

## 2022-06-18 RX ADMIN — METHYLPREDNISOLONE SODIUM SUCCINATE 40 MG: 40 INJECTION, POWDER, LYOPHILIZED, FOR SOLUTION INTRAMUSCULAR; INTRAVENOUS at 00:20

## 2022-06-18 RX ADMIN — METOPROLOL TARTRATE 25 MG: 25 TABLET, FILM COATED ORAL at 22:20

## 2022-06-18 RX ADMIN — AMLODIPINE BESYLATE 10 MG: 10 TABLET ORAL at 08:57

## 2022-06-18 RX ADMIN — Medication 10 ML: at 08:56

## 2022-06-18 RX ADMIN — HYDROCODONE BITARTRATE AND ACETAMINOPHEN 1 TABLET: 7.5; 325 TABLET ORAL at 13:00

## 2022-06-18 RX ADMIN — BUDESONIDE 1000 MCG: 0.5 SUSPENSION RESPIRATORY (INHALATION) at 08:33

## 2022-06-18 RX ADMIN — MICONAZOLE NITRATE: 2 OINTMENT TOPICAL at 09:25

## 2022-06-18 RX ADMIN — IPRATROPIUM BROMIDE AND ALBUTEROL SULFATE 1 AMPULE: 2.5; .5 SOLUTION RESPIRATORY (INHALATION) at 20:18

## 2022-06-18 RX ADMIN — IPRATROPIUM BROMIDE AND ALBUTEROL SULFATE 1 AMPULE: 2.5; .5 SOLUTION RESPIRATORY (INHALATION) at 12:44

## 2022-06-18 RX ADMIN — METOPROLOL TARTRATE 25 MG: 25 TABLET, FILM COATED ORAL at 08:57

## 2022-06-18 RX ADMIN — IPRATROPIUM BROMIDE AND ALBUTEROL SULFATE 1 AMPULE: 2.5; .5 SOLUTION RESPIRATORY (INHALATION) at 06:09

## 2022-06-18 RX ADMIN — MICONAZOLE NITRATE: 2 OINTMENT TOPICAL at 22:22

## 2022-06-18 RX ADMIN — FUROSEMIDE 20 MG: 10 INJECTION, SOLUTION INTRAVENOUS at 08:56

## 2022-06-18 RX ADMIN — METHYLPREDNISOLONE SODIUM SUCCINATE 40 MG: 40 INJECTION, POWDER, LYOPHILIZED, FOR SOLUTION INTRAMUSCULAR; INTRAVENOUS at 13:00

## 2022-06-18 RX ADMIN — ENOXAPARIN SODIUM 40 MG: 100 INJECTION SUBCUTANEOUS at 08:56

## 2022-06-18 RX ADMIN — IPRATROPIUM BROMIDE AND ALBUTEROL SULFATE 1 AMPULE: 2.5; .5 SOLUTION RESPIRATORY (INHALATION) at 08:33

## 2022-06-18 RX ADMIN — METHYLPREDNISOLONE SODIUM SUCCINATE 40 MG: 40 INJECTION, POWDER, LYOPHILIZED, FOR SOLUTION INTRAMUSCULAR; INTRAVENOUS at 06:44

## 2022-06-18 RX ADMIN — SODIUM CHLORIDE, PRESERVATIVE FREE 10 ML: 5 INJECTION INTRAVENOUS at 00:20

## 2022-06-18 RX ADMIN — Medication 10 ML: at 22:23

## 2022-06-18 RX ADMIN — KETOCONAZOLE: 20 CREAM TOPICAL at 08:57

## 2022-06-18 ASSESSMENT — PAIN DESCRIPTION - ORIENTATION: ORIENTATION: LEFT;RIGHT

## 2022-06-18 ASSESSMENT — PAIN DESCRIPTION - PAIN TYPE: TYPE: ACUTE PAIN;CHRONIC PAIN

## 2022-06-18 ASSESSMENT — PAIN SCALES - GENERAL
PAINLEVEL_OUTOF10: 9
PAINLEVEL_OUTOF10: 9

## 2022-06-18 ASSESSMENT — PAIN - FUNCTIONAL ASSESSMENT: PAIN_FUNCTIONAL_ASSESSMENT: ACTIVITIES ARE NOT PREVENTED

## 2022-06-18 ASSESSMENT — PAIN DESCRIPTION - DESCRIPTORS: DESCRIPTORS: THROBBING;SORE

## 2022-06-18 ASSESSMENT — PAIN DESCRIPTION - FREQUENCY: FREQUENCY: CONTINUOUS

## 2022-06-18 ASSESSMENT — PAIN DESCRIPTION - LOCATION: LOCATION: LEG

## 2022-06-18 ASSESSMENT — PAIN DESCRIPTION - ONSET: ONSET: ON-GOING

## 2022-06-18 NOTE — PLAN OF CARE
Problem: Discharge Planning  Goal: Discharge to home or other facility with appropriate resources  6/18/2022 1152 by Teresa Trinh RN  Outcome: Progressing  Flowsheets (Taken 6/18/2022 0730)  Discharge to home or other facility with appropriate resources: Identify barriers to discharge with patient and caregiver  6/18/2022 0626 by Audra Oakley RN  Outcome: Progressing     Problem: Pain  Goal: Verbalizes/displays adequate comfort level or baseline comfort level  6/18/2022 1152 by Teresa Trinh RN  Outcome: Progressing  6/18/2022 0626 by Audra Oakley RN  Outcome: Progressing     Problem: Safety - Adult  Goal: Free from fall injury  6/18/2022 1152 by Teresa Trinh RN  Outcome: Progressing  Flowsheets (Taken 6/18/2022 0730)  Free From Fall Injury: Instruct family/caregiver on patient safety  6/18/2022 0626 by Audra Oakley RN  Outcome: Progressing     Problem: ABCDS Injury Assessment  Goal: Absence of physical injury  6/18/2022 1152 by Teresa Trinh RN  Outcome: Progressing  Flowsheets (Taken 6/18/2022 0730)  Absence of Physical Injury: Implement safety measures based on patient assessment  6/18/2022 0626 by Audra Oakley RN  Outcome: Progressing     Problem: Chronic Conditions and Co-morbidities  Goal: Patient's chronic conditions and co-morbidity symptoms are monitored and maintained or improved  6/18/2022 1152 by Teresa Trinh RN  Outcome: Progressing  Flowsheets (Taken 6/18/2022 0730)  Care Plan - Patient's Chronic Conditions and Co-Morbidity Symptoms are Monitored and Maintained or Improved: Monitor and assess patient's chronic conditions and comorbid symptoms for stability, deterioration, or improvement  6/18/2022 0626 by Audra Oakley RN  Outcome: Progressing

## 2022-06-18 NOTE — PROGRESS NOTES
[facility]  [address] [facility], [address], [address] [facility]      Pulmonary Progress Note      SUBJECTIVE:  Lying down in bed on 6 li HFNC, claims overall doing some better with breathing. Claims cough is less but supposedly chest vest helping with expectoration.     OBJECTIVE    Medications    Continuous Infusions:   sodium chloride         Scheduled Meds:   [START ON 2022] furosemide  20 mg Oral Daily    ipratropium-albuterol  1 ampule Inhalation Q4H    budesonide  1,000 mcg Nebulization BID    miconazole nitrate   Topical BID    sodium chloride flush  5-40 mL IntraVENous 2 times per day    enoxaparin  40 mg SubCUTAneous Daily    methylPREDNISolone  40 mg IntraVENous Q6H    Followed by   Tinnie Free ON 2022] predniSONE  40 mg Oral Daily    azithromycin  500 mg IntraVENous Q24H    ARIPiprazole  5 mg Oral Daily    ferrous sulfate  325 mg Oral BID    FLUoxetine  20 mg Oral Daily    ketoconazole   Topical Daily    LORazepam  1 mg Oral TID    metoprolol tartrate  25 mg Oral BID    pantoprazole  40 mg Oral QAM AC    pramipexole  0.125 mg Oral Nightly       PRN Meds:sodium chloride flush, sodium chloride, acetaminophen, ondansetron **OR** ondansetron, polyethylene glycol, albuterol, HYDROcodone-acetaminophen    Physical    VITALS:  /75   Pulse (!) 101   Temp 97.7 °F (36.5 °C) (Oral)   Resp 18   Ht 5' 4\" (1.626 m)   Wt 135 lb 14.4 oz (61.6 kg)   SpO2 97%   BMI 23.33 kg/m²     24HR INTAKE/OUTPUT:      Intake/Output Summary (Last 24 hours) at 2022 1557  Last data filed at 2022 1528  Gross per 24 hour   Intake 1195 ml   Output 1150 ml   Net 45 ml       24HR PULSE OXIMETRY RANGE:    SpO2  Av %  Min: 95 %  Max: 97 %    General appearance: alert, appears stated age and cooperative  Lungs: rhonchi bibasilar worse with cough  Heart: regular rate and rhythm, S1, S2 normal, no murmur, click, rub or gallop  Abdomen: soft, non-tender; bowel sounds normal; no masses,  no organomegaly  Extremities: extremities normal, atraumatic, no cyanosis or edema  Neurologic: Mental status: Alert, oriented, thought content appropriate    Data    CBC:   Recent Labs     06/16/22  1831 06/17/22  0004 06/18/22  0258   WBC 7.9 6.2 6.7   HGB 10.1* 10.2* 9.4*   HCT 31.7* 32.5* 31.5*   MCV 96.4 96.7 100.3*    137 153       BMP:  Recent Labs     06/16/22  1950 06/17/22  0004 06/18/22  0258    131* 139   K 4.3 3.9 4.9   CL 85* 84* 93*   CO2 34* 32* 34*   BUN 10 11 15   CREATININE 0.8 0.8 0.8    ALB:3,BILIDIR:3,BILITOT:3,ALKPHOS:3)@    PT/INR: No results for input(s): PROTIME, INR in the last 72 hours. ABG:   No results for input(s): PH, PO2, PCO2, HCO3, BE, O2SAT, METHB, O2HB, COHB, O2CON, HHB, THB in the last 72 hours. Radiology/Other tests reviewed: none    Assessment:     Principal Problem:    COPD exacerbation (HCC)  Active Problems:    Chronic respiratory failure with hypoxia (HCC)    Hypertension    Leg swelling  Resolved Problems:    * No resolved hospital problems. *      Plan:       1. Cont with steroids, taper as tolerated  2. Cont with nebs  3. Cont with oxygen, taper as tolerated  4. Watch fluid balance, diurese as per Cardiology and PCP  5. OOB to chair      Time at the bedside, reviewing labs and radiographs, reviewing notes and consultations, discussing with staff and family was more than 35 minutes. Thanks for letting us see this patient in consultation. Please contact us with any questions. Office [phone] or after hours through [facility], x [phone].

## 2022-06-18 NOTE — PLAN OF CARE
Problem: Discharge Planning  Goal: Discharge to home or other facility with appropriate resources  6/18/2022 0626 by Guanakito Lopez RN  Outcome: Progressing  6/17/2022 1914 by Nisa Alvarenga RN  Outcome: Progressing     Problem: Discharge Planning  Goal: Discharge to home or other facility with appropriate resources  6/18/2022 0626 by Guanakito Lopez RN  Outcome: Progressing  6/17/2022 1914 by Nisa Alvarenga RN  Outcome: Progressing     Problem: Discharge Planning  Goal: Discharge to home or other facility with appropriate resources  6/18/2022 0626 by Guanakito Lopez RN  Outcome: Progressing  6/17/2022 1914 by Nisa Alvarenga RN  Outcome: Progressing     Problem: Pain  Goal: Verbalizes/displays adequate comfort level or baseline comfort level  6/18/2022 0626 by Guanakito Lopez RN  Outcome: Progressing  6/17/2022 1914 by Nisa Alvarenga RN  Outcome: Progressing  Flowsheets (Taken 6/17/2022 0815)  Verbalizes/displays adequate comfort level or baseline comfort level: Encourage patient to monitor pain and request assistance     Problem: Safety - Adult  Goal: Free from fall injury  6/18/2022 0626 by Guanakito Lopez RN  Outcome: Progressing  6/17/2022 1914 by Nisa Alvarenga RN  Outcome: Progressing  Flowsheets (Taken 6/17/2022 1024)  Free From Fall Injury: Instruct family/caregiver on patient safety     Problem: ABCDS Injury Assessment  Goal: Absence of physical injury  6/18/2022 0626 by Guanakito Lopez RN  Outcome: Progressing  6/17/2022 1914 by Nisa Alvarenga RN  Outcome: Progressing  Flowsheets (Taken 6/17/2022 1024)  Absence of Physical Injury: Implement safety measures based on patient assessment     Problem: Chronic Conditions and Co-morbidities  Goal: Patient's chronic conditions and co-morbidity symptoms are monitored and maintained or improved  6/18/2022 0626 by Guanakito Lopez RN  Outcome: Progressing  6/17/2022 1914 by Nisa Alvarenga RN  Outcome: Progressing
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
## 2022-06-18 NOTE — PROGRESS NOTES
[id number] Hospitalist   Progress Note    Admitting Date and Time: 6/16/2022  5:00 PM  Admit Dx: COPD exacerbation ([address] [J44.1]  Dyspnea, unspecified type [R06.00]    Subjective:    Pt lying in bed in no acute distress. Pt states he feels better today. Breathing has improved. Remains on 6L NC. Per RN: No new concerns noted. ROS: denies fever, chills, cp, n/v, HA unless stated above.  ipratropium-albuterol  1 ampule Inhalation Q4H    budesonide  1,000 mcg Nebulization BID    miconazole nitrate   Topical BID    sodium chloride flush  5-40 mL IntraVENous 2 times per day    enoxaparin  40 mg SubCUTAneous Daily    methylPREDNISolone  40 mg IntraVENous Q6H    Followed by   Sue Lynch ON 6/19/2022] predniSONE  40 mg Oral Daily    azithromycin  500 mg IntraVENous Q24H    furosemide  20 mg IntraVENous Daily    amLODIPine  10 mg Oral Daily    ARIPiprazole  5 mg Oral Daily    ferrous sulfate  325 mg Oral BID    FLUoxetine  20 mg Oral Daily    ketoconazole   Topical Daily    LORazepam  1 mg Oral TID    metoprolol tartrate  25 mg Oral BID    pantoprazole  40 mg Oral QAM AC    pramipexole  0.125 mg Oral Nightly     sodium chloride flush, 5-40 mL, PRN  sodium chloride, , PRN  acetaminophen, 650 mg, Q4H PRN  ondansetron, 4 mg, Q8H PRN   Or  ondansetron, 4 mg, Q6H PRN  polyethylene glycol, 17 g, Daily PRN  albuterol, 2.5 mg, Q2H PRN  HYDROcodone-acetaminophen, 1 tablet, BID PRN         Objective:    /64   Pulse 82   Temp 97.7 °F (36.5 °C) (Oral)   Resp 18   Ht 5' 4\" (1.626 m)   Wt 135 lb 14.4 oz (61.6 kg)   SpO2 95%   BMI 23.33 kg/m²   General Appearance: alert and oriented to person, place and time and in no acute distress  Skin: warm and dry.   Venous stasis/dermatitis  Head: normocephalic and atraumatic  Eyes: pupils equal, round, and reactive to light, extraocular eye movements intact, conjunctivae normal  Neck: neck supple and non tender without mass   Pulmonary/Chest: Exp wheeze to auscultation ANDRÉS Diminished bilaterally- no wheezes, rales or rhonchi, normal air movement, no respiratory distress  Cardiovascular: normal rate, normal S1 and S2 and no RGM  Abdomen: soft, non-tender, non-distended, normal bowel sounds  Extremities: no cyanosis, no clubbing and trace BLE edema. Venous stasis/dermatitis. Neurologic: no cranial nerve deficit and speech normal      Recent Labs     06/16/22  1950 06/17/22  0004 06/18/22  0258    131* 139   K 4.3 3.9 4.9   CL 85* 84* 93*   CO2 34* 32* 34*   BUN 10 11 15   CREATININE 0.8 0.8 0.8   GLUCOSE 128* 316* 258*   CALCIUM 7.1* 6.9* 6.9*       Recent Labs     06/17/22  0004 06/18/22  0258   ALKPHOS 55 64   PROT 7.2 7.0   LABALBU 3.7 3.7   BILITOT 0.5 <0.2   AST 17 12   ALT 13 13       Recent Labs     06/16/22  1831 06/17/22  0004 06/18/22  0258   WBC 7.9 6.2 6.7   RBC 3.29* 3.36* 3.14*   HGB 10.1* 10.2* 9.4*   HCT 31.7* 32.5* 31.5*   MCV 96.4 96.7 100.3*   MCH 30.7 30.4 29.9   MCHC 31.9* 31.4* 29.8*   RDW 15.9* 15.5* 15.3*    137 153   MPV 10.6 10.0 9.9           Radiology:   XR CHEST PORTABLE   Final Result   Patchy opacities/infiltrates in the right mid lung and lung bases bilaterally. Assessment:  Principal Problem:    COPD exacerbation (HCC)  Active Problems:    Chronic respiratory failure with hypoxia (HCC)    Hypertension    Leg swelling  Resolved Problems:    * No resolved hospital problems. *      Plan:  1. Chronic respiratory failure- recent inpatient stay at [facility] with noted COVID-19 infection.  Treated with ABX/steroids x5 days. Admitted 6/3-6/7 with the same complaints. Diuresed/received IV Zosyn. Chronically wears 6L NC at home.  6/5/2022 ECHO LVEF 55-60%.  CXR patchy opacities/infiltrates in the right mid lung and lung bases bilaterally. Azithromycin in ED course we will continue. CRP 20.9    Procalcitonin 0.11. Pulmonology input appreciated.   2. COPD exacerbation- presented [facility] ED with complaints of increased SOB. Chronically wears 5L NC at home.  Changed to Zosyn per pulmonology. Monet Desai albuterol/Symbicort duo nebs/Solu-Medrol.  Pulmonology input appreciated. 3. LLE pain/Swelling-  Left hip and knee fusion at [address] removal of infected plate.  9/9 CT l left tibia-fibula generalized subcutaneous edema leg and ankle.  No evidence of abscess.  No soft tissue gas.  Thickening and calcification/ossification within distal Achilles tendon.  Generalized osteopenia.  Orthopedic surgery consulted with last admission. Further definitive care and evaluation to be done as outpatient. Received Furaosemide 20mg IV daily. Follow  4. HTN- continue amlodipine/metoprolol.  Follow adjust as needed  5. Hx gastric ulcers/GERD-continue pantoprazole. 6. Hx anxiety/depression- continue lorazepam/Abilify. NOTE: This report was transcribed using voice recognition software. Every effort was made to ensure accuracy; however, inadvertent computerized transcription errors may be present. Electronically signed by Heron Tate CNP on 6/18/2022 at 7:58 AM   HOSPITALIST ATTENDING PHYSICIAN NOTE 6/18/2022 1945PM:    Details of the evaluation - subjective assessment (including medication profile, past medical, family and social history when applicable), examination, review of lab and test data, diagnostic impressions and medical decision making - performed by Mary Weeks. Marlon Tate CNP, were discussed with me on the date of service and I agree with clinical information herein unless otherwise noted. The patient has been evaluated by me personally earlier today. Pt reports no fevers, chills,n/v.     Exam: heart reg at rate of 84,lungs cta, abd pos bs soft nt, ext neg for le edema    I agree with the assessment and plan of Mary Weeks.  ARGELIA Montana CNP    Copd exacerbation  Acute on chronic diastolic chf  Chronic respiratory failure with hypoxia  htn  gerd  Depression/anxiety    D/w  Scrolivier    Electronically signed by Kristen Hernandez D.O.   Hospitalist  [facility] at [facility]

## 2022-06-19 VITALS
RESPIRATION RATE: 20 BRPM | DIASTOLIC BLOOD PRESSURE: 70 MMHG | HEIGHT: 64 IN | BODY MASS INDEX: 23.2 KG/M2 | HEART RATE: 86 BPM | SYSTOLIC BLOOD PRESSURE: 130 MMHG | TEMPERATURE: 98 F | OXYGEN SATURATION: 96 % | WEIGHT: 135.9 LBS

## 2022-06-19 LAB
ALBUMIN SERPL-MCNC: 3.4 G/DL (ref 3.5–5.2)
ALP BLD-CCNC: 46 U/L (ref 40–129)
ALT SERPL-CCNC: 11 U/L (ref 0–40)
ANION GAP SERPL CALCULATED.3IONS-SCNC: 11 MMOL/L (ref 7–16)
AST SERPL-CCNC: 11 U/L (ref 0–39)
BASOPHILS ABSOLUTE: 0 E9/L (ref 0–0.2)
BASOPHILS RELATIVE PERCENT: 0 % (ref 0–2)
BILIRUB SERPL-MCNC: <0.2 MG/DL (ref 0–1.2)
BUN BLDV-MCNC: 19 MG/DL (ref 6–20)
CALCIUM SERPL-MCNC: 6.9 MG/DL (ref 8.6–10.2)
CHLORIDE BLD-SCNC: 93 MMOL/L (ref 98–107)
CHOLESTEROL, TOTAL: 214 MG/DL (ref 0–199)
CO2: 31 MMOL/L (ref 22–29)
CREAT SERPL-MCNC: 0.7 MG/DL (ref 0.7–1.2)
EOSINOPHILS ABSOLUTE: 0 E9/L (ref 0.05–0.5)
EOSINOPHILS RELATIVE PERCENT: 0 % (ref 0–6)
GFR AFRICAN AMERICAN: >60
GFR NON-AFRICAN AMERICAN: >60 ML/MIN/1.73
GLUCOSE BLD-MCNC: 133 MG/DL (ref 74–99)
HCT VFR BLD CALC: 31.5 % (ref 37–54)
HDLC SERPL-MCNC: 77 MG/DL
HEMOGLOBIN: 9.6 G/DL (ref 12.5–16.5)
LDL CHOLESTEROL CALCULATED: 121 MG/DL (ref 0–99)
LYMPHOCYTES ABSOLUTE: 0.54 E9/L (ref 1.5–4)
LYMPHOCYTES RELATIVE PERCENT: 7.8 % (ref 20–42)
MCH RBC QN AUTO: 30.2 PG (ref 26–35)
MCHC RBC AUTO-ENTMCNC: 30.5 % (ref 32–34.5)
MCV RBC AUTO: 99.1 FL (ref 80–99.9)
MONOCYTES ABSOLUTE: 0.07 E9/L (ref 0.1–0.95)
MONOCYTES RELATIVE PERCENT: 0.9 % (ref 2–12)
NEUTROPHILS ABSOLUTE: 6.1 E9/L (ref 1.8–7.3)
NEUTROPHILS RELATIVE PERCENT: 91.3 % (ref 43–80)
NUCLEATED RED BLOOD CELLS: 0 /100 WBC
PDW BLD-RTO: 15.7 FL (ref 11.5–15)
PLATELET # BLD: 162 E9/L (ref 130–450)
PMV BLD AUTO: 9.9 FL (ref 7–12)
POIKILOCYTES: ABNORMAL
POTASSIUM REFLEX MAGNESIUM: 4.9 MMOL/L (ref 3.5–5)
POTASSIUM SERPL-SCNC: 4.9 MMOL/L (ref 3.5–5)
RBC # BLD: 3.18 E12/L (ref 3.8–5.8)
SODIUM BLD-SCNC: 135 MMOL/L (ref 132–146)
STOMATOCYTES: ABNORMAL
TOTAL PROTEIN: 6.6 G/DL (ref 6.4–8.3)
TRIGL SERPL-MCNC: 81 MG/DL (ref 0–149)
VLDLC SERPL CALC-MCNC: 16 MG/DL
WBC # BLD: 6.7 E9/L (ref 4.5–11.5)

## 2022-06-19 PROCEDURE — 99239 HOSP IP/OBS DSCHRG MGMT >30: CPT | Performed by: INTERNAL MEDICINE

## 2022-06-19 PROCEDURE — 36415 COLL VENOUS BLD VENIPUNCTURE: CPT

## 2022-06-19 PROCEDURE — 80053 COMPREHEN METABOLIC PANEL: CPT

## 2022-06-19 PROCEDURE — 6360000002 HC RX W HCPCS: Performed by: NURSE PRACTITIONER

## 2022-06-19 PROCEDURE — 6370000000 HC RX 637 (ALT 250 FOR IP): Performed by: CLINICAL NURSE SPECIALIST

## 2022-06-19 PROCEDURE — 2580000003 HC RX 258: Performed by: NURSE PRACTITIONER

## 2022-06-19 PROCEDURE — 2700000000 HC OXYGEN THERAPY PER DAY

## 2022-06-19 PROCEDURE — 85025 COMPLETE CBC W/AUTO DIFF WBC: CPT

## 2022-06-19 PROCEDURE — 94640 AIRWAY INHALATION TREATMENT: CPT

## 2022-06-19 PROCEDURE — 6360000002 HC RX W HCPCS: Performed by: INTERNAL MEDICINE

## 2022-06-19 PROCEDURE — 6370000000 HC RX 637 (ALT 250 FOR IP): Performed by: NURSE PRACTITIONER

## 2022-06-19 PROCEDURE — 80048 BASIC METABOLIC PNL TOTAL CA: CPT

## 2022-06-19 PROCEDURE — 99233 SBSQ HOSP IP/OBS HIGH 50: CPT | Performed by: INTERNAL MEDICINE

## 2022-06-19 PROCEDURE — 2580000003 HC RX 258: Performed by: INTERNAL MEDICINE

## 2022-06-19 PROCEDURE — 80061 LIPID PANEL: CPT

## 2022-06-19 PROCEDURE — 6370000000 HC RX 637 (ALT 250 FOR IP): Performed by: INTERNAL MEDICINE

## 2022-06-19 RX ORDER — PREDNISONE 10 MG/1
TABLET ORAL
Qty: 30 TABLET | Refills: 0 | Status: SHIPPED | OUTPATIENT
Start: 2022-06-20 | End: 2022-07-02

## 2022-06-19 RX ADMIN — BUDESONIDE 1000 MCG: 0.5 SUSPENSION RESPIRATORY (INHALATION) at 07:47

## 2022-06-19 RX ADMIN — ENOXAPARIN SODIUM 40 MG: 100 INJECTION SUBCUTANEOUS at 09:17

## 2022-06-19 RX ADMIN — HYDROCODONE BITARTRATE AND ACETAMINOPHEN 1 TABLET: 7.5; 325 TABLET ORAL at 01:40

## 2022-06-19 RX ADMIN — IPRATROPIUM BROMIDE AND ALBUTEROL SULFATE 1 AMPULE: 2.5; .5 SOLUTION RESPIRATORY (INHALATION) at 07:47

## 2022-06-19 RX ADMIN — LORAZEPAM 1 MG: 1 TABLET ORAL at 13:25

## 2022-06-19 RX ADMIN — PANTOPRAZOLE SODIUM 40 MG: 40 TABLET, DELAYED RELEASE ORAL at 06:38

## 2022-06-19 RX ADMIN — Medication 10 ML: at 09:21

## 2022-06-19 RX ADMIN — IPRATROPIUM BROMIDE AND ALBUTEROL SULFATE 1 AMPULE: 2.5; .5 SOLUTION RESPIRATORY (INHALATION) at 15:28

## 2022-06-19 RX ADMIN — AZITHROMYCIN MONOHYDRATE 500 MG: 500 INJECTION, POWDER, LYOPHILIZED, FOR SOLUTION INTRAVENOUS at 00:45

## 2022-06-19 RX ADMIN — LORAZEPAM 1 MG: 1 TABLET ORAL at 09:16

## 2022-06-19 RX ADMIN — IPRATROPIUM BROMIDE AND ALBUTEROL SULFATE 1 AMPULE: 2.5; .5 SOLUTION RESPIRATORY (INHALATION) at 11:28

## 2022-06-19 RX ADMIN — HYDROCODONE BITARTRATE AND ACETAMINOPHEN 1 TABLET: 7.5; 325 TABLET ORAL at 13:25

## 2022-06-19 RX ADMIN — METOPROLOL TARTRATE 25 MG: 25 TABLET, FILM COATED ORAL at 09:16

## 2022-06-19 RX ADMIN — PREDNISONE 40 MG: 20 TABLET ORAL at 09:16

## 2022-06-19 RX ADMIN — FLUOXETINE 20 MG: 20 CAPSULE ORAL at 09:16

## 2022-06-19 RX ADMIN — MICONAZOLE NITRATE: 2 OINTMENT TOPICAL at 09:18

## 2022-06-19 RX ADMIN — KETOCONAZOLE: 20 CREAM TOPICAL at 09:17

## 2022-06-19 RX ADMIN — FUROSEMIDE 20 MG: 20 TABLET ORAL at 09:16

## 2022-06-19 RX ADMIN — ARIPIPRAZOLE 5 MG: 5 TABLET ORAL at 09:17

## 2022-06-19 ASSESSMENT — PAIN SCALES - GENERAL: PAINLEVEL_OUTOF10: 9

## 2022-06-19 NOTE — DISCHARGE SUMMARY
[facility], [facility] Physician Discharge Summary       Jeevan Steven MD  Evaristo Tristan [address] [facility]  [phone]    Schedule an appointment as soon as possible for a visit in 1 week  Please call for follow up post hospital stay appt. Severiano Smaller, MD  [address] [facility]  [phone]    Schedule an appointment as soon as possible for a visit in 1 week  Please call for follow up post hospital stay appt. Activity level: As Tolerated    Diet: ADULT DIET; Easy to Chew; Low Fat/Low Chol/High Fiber/STACI; 1500 ml        Condition at discharge: Stable    Dispo:Home    Continue supplemental oxygen via nasal canula @ 2 LPM round-the-clock. Continue CPAP / BiPAP during sleep as prior to admission. Patient ID:  Dyana Hays  [id number]  64 y.o.  1965    Admit date: 6/16/2022    Discharge date and time:  6/19/2022  3:05 PM    Admission Diagnoses: Principal Problem:    COPD exacerbation ([address]  Active Problems:    Acute on chronic heart failure with preserved ejection fraction (HFpEF) (HCC)    Chronic respiratory failure with hypoxia ([address]    History of COVID-19    Hypertension    Leg swelling  Resolved Problems:    * No resolved hospital problems. *      Discharge Diagnoses: Principal Problem:    COPD exacerbation ([address]  Active Problems:    Acute on chronic heart failure with preserved ejection fraction (HFpEF) (HCC)    Chronic respiratory failure with hypoxia (HCC)    History of COVID-19    Hypertension    Leg swelling  Resolved Problems:    * No resolved hospital problems. *      Consults:  IP CONSULT TO PULMONOLOGY  IP CONSULT TO INFECTIOUS DISEASES  IP CONSULT TO HOME CARE NEEDS  IP CONSULT TO IV TEAM  IP CONSULT TO CARDIOLOGY    Procedures: None    Hospital Course:   1016/2022 49-year-old male presented to [facility] ED with complaints of increased shortness of breath. Reports paroxysmal nocturnal dyspnea as well as orthopnea. Increased swelling to BLE with redness. Concern for COPD exacerbation versus viral syndrome. CXR patchy opacities/infiltrates in the right midlung and lung bases bilaterally. Decision to admit patient for further evaluation and treatment. Pulmonology and cardiology consulted on the case. 1. Chronic respiratory failure- recent inpatient stay at [facility] with noted COVID-19 infection.  Treated with ABX/steroids x5 days. Admitted 6/3-6/7 with the same complaints. Diuresed/received IV Zosyn. Chronically wears 6L NC at home.  6/5/2022 ECHO LVEF 55-60%.  CXR patchy opacities/infiltrates in the right mid lung and lung bases bilaterally. Azithromycin in ED course we will continue. CRP 20.9    Procalcitonin 0.11. Pulmonology input appreciated. 2. COPD exacerbation- presented [facility] ED with complaints of increased SOB. Chronically wears 5L NC at home.  Changed to Zosyn per pulmonology. Shereen Abreu albuterol/Symbicort duo nebs/Solu-Medrol.  Pulmonology input appreciated. 3. LLE pain/Swelling-  Left hip and knee fusion at [address] removal of infected plate.  6/7 CT l left tibia-fibula generalized subcutaneous edema leg and ankle.  No evidence of abscess.  No soft tissue gas.  Thickening and calcification/ossification within distal Achilles tendon.  Generalized osteopenia.  Orthopedic surgery consulted with last admission. Further definitive care and evaluation to be done as outpatient. Received Furaosemide 20mg IV daily. Follow  4. HTN- continue amlodipine/metoprolol.  Follow adjust as needed  5. Hx gastric ulcers/GERD-continue pantoprazole. 6. Hx anxiety/depression- continue lorazepam/Abilify    Patient made hemodynamic stable and can be discharged at this time. Patient will be instructed to follow-up with cardiology/pulmonology/PCP upon DC.     Discharge Exam:  Vitals:    06/19/22 0615 06/19/22 0748 06/19/22 1129 06/19/22 1355   BP: 130/70      Pulse: 80 90 88    Resp: 18 18 18 14   Temp: 98 °F (36.7 °C)      TempSrc: Oral      SpO2: 97% 96% 95%    Weight:       Height:           General Appearance: alert and oriented to person, place and time and in no acute distress  Skin: warm and dry. Venous stasis/dermatitis  Head: normocephalic and atraumatic  Eyes: pupils equal, round, and reactive to light, extraocular eye movements intact, conjunctivae normal  Neck: neck supple and non tender without mass   Pulmonary/Chest: Exp wheeze to auscultation ANDRÉS Diminished bilaterally- no wheezes, rales or rhonchi, normal air movement, no respiratory distress  Cardiovascular: normal rate, normal S1 and S2 and no RGM  Abdomen: soft, non-tender, non-distended, normal bowel sounds  Extremities: no cyanosis, no clubbing and trace BLE edema. Venous stasis/dermatitis. Neurologic: no cranial nerve deficit and speech normal  I/O last 3 completed shifts: In: 9713 [P.O.:1415]  Out: 1750 [LYY:6101]  I/O this shift: In: 480 [P.O.:480]  Out: 1400 [Urine:1400]      LABS:  Recent Labs     06/16/22  1950 06/17/22  0004 06/18/22 0258 06/19/22 0412   NA  --  131* 139 135   K   < > 3.9 4.9 4.9  4.9   CL  --  84* 93* 93*   CO2  --  32* 34* 31*   BUN  --  11 15 19   CREATININE  --  0.8 0.8 0.7   GLUCOSE  --  316* 258* 133*   CALCIUM  --  6.9* 6.9* 6.9*    < > = values in this interval not displayed. Recent Labs     06/17/22  0004 06/18/22  0258 06/19/22 0412   WBC 6.2 6.7 6.7   RBC 3.36* 3.14* 3.18*   HGB 10.2* 9.4* 9.6*   HCT 32.5* 31.5* 31.5*   MCV 96.7 100.3* 99.1   MCH 30.4 29.9 30.2   MCHC 31.4* 29.8* 30.5*   RDW 15.5* 15.3* 15.7*    153 162   MPV 10.0 9.9 9.9       No results for input(s): POCGLU in the last 72 hours. Imaging:  XR CHEST PORTABLE    Result Date: 6/16/2022  EXAMINATION: ONE XRAY VIEW OF THE CHEST 6/16/2022 5:52 pm COMPARISON: None.  HISTORY: ORDERING SYSTEM PROVIDED HISTORY: cough/sob while lying flat TECHNOLOGIST PROVIDED HISTORY: Reason for exam:->cough/sob while lying flat FINDINGS: The EVERY FOUR (4) HOURS AS NEEDED FOR WHEEZING  Qty: 18 g, Refills: 2    Associated Diagnoses: Chronic obstructive pulmonary disease, unspecified COPD type (HCC)      metoprolol tartrate (LOPRESSOR) 25 MG tablet Twice A Day  Qty: 60 tablet, Refills: 5      furosemide (LASIX) 20 MG tablet Take 1 tablet by mouth daily  Qty: 30 tablet, Refills: 1      fluvoxaMINE (LUVOX) 100 MG tablet Take 100 mg by mouth nightly      ketoconazole (NIZORAL) 2 % cream Apply topically daily. Qty: 30 g, Refills: 1      Probiotic Product [facility]) CAPS Patient is to take one tab bid. Patient has been on antibiotics for the last 3 months  Qty: 30 capsule, Refills: 3      budesonide (PULMICORT) 0.5 MG/2ML nebulizer suspension Take 2 mLs by nebulization 2 times daily  Qty: 60 ampule, Refills: 5      Revefenacin (YUPELRI) 175 MCG/3ML SOLN Inhale 1 ampule into the lungs daily  Qty: 30 vial, Refills: 5      HYDROcodone-acetaminophen (NORCO) 7.5-325 MG per tablet Take 1 tablet by mouth 2 times daily as needed. Takes religiously twice daily for leg pain per pt      ipratropium-albuterol (DUONEB) 0.5-2.5 (3) MG/3ML SOLN nebulizer solution Inhale 3 mLs into the lungs every 4 hours as needed for Shortness of Breath  Qty: 12 vial, Refills: 0      ARIPiprazole (ABILIFY) 5 MG tablet take 1 tablet by mouth once daily  Refills: 0         STOP taking these medications       amLODIPine (NORVASC) 10 MG tablet Comments:   Reason for Stopping:                 Note that more than 30 minutes was spent in preparing discharge papers, discussing discharge with patient, medication review, etc.    NOTE: This report was transcribed using voice recognition software. Every effort was made to ensure accuracy; however, inadvertent computerized transcription errors may be present. Signed:  Electronically signed by Marina Tate CNP on 6/19/2022 at 3:05 PM   HOSPITALIST ATTENDING PHYSICIAN NOTE 6/19/2022 1645PM:    Details of the evaluation - subjective
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
## 2022-06-19 NOTE — PROGRESS NOTES
[facility] [address] [facility]  [address] [facility], [address] [facility] [address] [facility] [address]  [facility], [address] [facility]      Pulmonary Progress Note      SUBJECTIVE:  Lying down in bed on 6 li HFNC, claims overall doing some better with breathing, wondering about going home. Still with cough and sputum production though less than when admitted.     OBJECTIVE    Medications    Continuous Infusions:   sodium chloride         Scheduled Meds:   furosemide  20 mg Oral Daily    ipratropium-albuterol  1 ampule Inhalation Q4H    budesonide  1,000 mcg Nebulization BID    miconazole nitrate   Topical BID    sodium chloride flush  5-40 mL IntraVENous 2 times per day    enoxaparin  40 mg SubCUTAneous Daily    predniSONE  40 mg Oral Daily    ARIPiprazole  5 mg Oral Daily    ferrous sulfate  325 mg Oral BID    FLUoxetine  20 mg Oral Daily    ketoconazole   Topical Daily    LORazepam  1 mg Oral TID    metoprolol tartrate  25 mg Oral BID    pantoprazole  40 mg Oral QAM AC    pramipexole  0.125 mg Oral Nightly       PRN Meds:sodium chloride flush, sodium chloride, acetaminophen, ondansetron **OR** ondansetron, polyethylene glycol, albuterol, HYDROcodone-acetaminophen    Physical    VITALS:  /70   Pulse 88   Temp 98 °F (36.7 °C) (Oral)   Resp 18   Ht 5' 4\" (1.626 m)   Wt 135 lb 14.4 oz (61.6 kg)   SpO2 95%   BMI 23.33 kg/m²     24HR INTAKE/OUTPUT:      Intake/Output Summary (Last 24 hours) at 2022 1340  Last data filed at 2022 1333  Gross per 24 hour   Intake 1325 ml   Output 2200 ml   Net -875 ml       24HR PULSE OXIMETRY RANGE:    SpO2  Av.8 %  Min: 94 %  Max: 97 %    General appearance: alert, appears stated age and cooperative  Lungs: rhonchi bibasilar worse with cough  Heart: regular rate and rhythm, S1, S2 normal, no murmur, click, rub or gallop  Abdomen: soft, non-tender; bowel sounds normal; no masses,  no organomegaly  Extremities: extremities normal, atraumatic, no cyanosis or edema  Neurologic: Mental status: Alert, oriented, thought content appropriate    Data    CBC:   Recent Labs     06/17/22  0004 06/18/22  0258 06/19/22 0412   WBC 6.2 6.7 6.7   HGB 10.2* 9.4* 9.6*   HCT 32.5* 31.5* 31.5*   MCV 96.7 100.3* 99.1    153 162       BMP:  Recent Labs     06/16/22  1950 06/17/22  0004 06/18/22  0258 06/19/22 0412   NA  --  131* 139 135   K   < > 3.9 4.9 4.9  4.9   CL  --  84* 93* 93*   CO2  --  32* 34* 31*   BUN  --  11 15 19   CREATININE  --  0.8 0.8 0.7    < > = values in this interval not displayed. ALB:3,BILIDIR:3,BILITOT:3,ALKPHOS:3)@    PT/INR: No results for input(s): PROTIME, INR in the last 72 hours. ABG:   No results for input(s): PH, PO2, PCO2, HCO3, BE, O2SAT, METHB, O2HB, COHB, O2CON, HHB, THB in the last 72 hours. Radiology/Other tests reviewed: none    Assessment:     Principal Problem:    COPD exacerbation (HCC)  Active Problems:    Acute on chronic heart failure with preserved ejection fraction (HFpEF) (HCC)    Chronic respiratory failure with hypoxia (HCC)    History of COVID-19    Hypertension    Leg swelling  Resolved Problems:    * No resolved hospital problems. *      Plan:       1. Cont with steroids, taper as tolerated, started on prednisone today  2. Cont with nebs, can resume usual medications as out-pt  3. Cont with oxygen, taper as tolerated  4. Watch fluid balance, diurese as per Cardiology and PCP  5. (?) stress test here or as out-pt, as per Cardiology      Time at the bedside, reviewing labs and radiographs, reviewing notes and consultations, discussing with staff and family was more than 35 minutes. Thanks for letting us see this patient in consultation. Please contact us with any questions. Office [phone] or after hours through [employer], [phone].

## 2022-06-20 ENCOUNTER — OFFICE VISIT (OUTPATIENT)
Dept: PRIMARY CARE CLINIC | Age: 57
End: 2022-06-20
Payer: MEDICAID

## 2022-06-20 VITALS
BODY MASS INDEX: 23.05 KG/M2 | WEIGHT: 135 LBS | OXYGEN SATURATION: 100 % | HEIGHT: 64 IN | DIASTOLIC BLOOD PRESSURE: 70 MMHG | RESPIRATION RATE: 17 BRPM | TEMPERATURE: 97.1 F | HEART RATE: 62 BPM | SYSTOLIC BLOOD PRESSURE: 118 MMHG

## 2022-06-20 DIAGNOSIS — J44.9 CHRONIC OBSTRUCTIVE PULMONARY DISEASE, UNSPECIFIED COPD TYPE (HCC): Primary | ICD-10-CM

## 2022-06-20 DIAGNOSIS — R60.0 BILATERAL LOWER EXTREMITY EDEMA: ICD-10-CM

## 2022-06-20 DIAGNOSIS — Z09 HOSPITAL DISCHARGE FOLLOW-UP: ICD-10-CM

## 2022-06-20 DIAGNOSIS — I50.33 ACUTE ON CHRONIC HEART FAILURE WITH PRESERVED EJECTION FRACTION (HFPEF) (HCC): ICD-10-CM

## 2022-06-20 LAB
CULTURE, RESPIRATORY: ABNORMAL
CULTURE, RESPIRATORY: ABNORMAL
ORGANISM: ABNORMAL
SMEAR, RESPIRATORY: ABNORMAL

## 2022-06-20 PROCEDURE — 99214 OFFICE O/P EST MOD 30 MIN: CPT | Performed by: FAMILY MEDICINE

## 2022-06-20 PROCEDURE — 1111F DSCHRG MED/CURRENT MED MERGE: CPT | Performed by: FAMILY MEDICINE

## 2022-06-20 RX ORDER — PRAMIPEXOLE DIHYDROCHLORIDE 0.12 MG/1
0.12 TABLET ORAL NIGHTLY
Qty: 90 TABLET | Refills: 3 | Status: SHIPPED
Start: 2022-06-20 | End: 2022-09-15 | Stop reason: SDUPTHER

## 2022-06-20 RX ORDER — FUROSEMIDE 20 MG/1
20 TABLET ORAL DAILY
Qty: 90 TABLET | Refills: 1 | Status: ON HOLD
Start: 2022-06-20 | End: 2022-07-11 | Stop reason: HOSPADM

## 2022-06-20 RX ORDER — OMEPRAZOLE 40 MG/1
40 CAPSULE, DELAYED RELEASE ORAL DAILY
Qty: 90 CAPSULE | Refills: 1 | Status: SHIPPED
Start: 2022-06-20 | End: 2022-09-23 | Stop reason: SDUPTHER

## 2022-06-20 RX ORDER — POTASSIUM CHLORIDE 750 MG/1
10 TABLET, EXTENDED RELEASE ORAL DAILY
Qty: 90 TABLET | Refills: 1 | Status: SHIPPED
Start: 2022-06-20 | End: 2022-07-18 | Stop reason: SDUPTHER

## 2022-06-20 NOTE — PROGRESS NOTES
Post-Discharge Transitional Care  Follow Up      Natahlie Maldonado   YOB: 1965    Date of Office Visit:  6/20/2022  Date of Hospital Admission: 6/16/22  Date of Hospital Discharge: 6/19/22  Risk of hospital readmission (high >=14%. Medium >=10%) :Readmission Risk Score: 24.2 ( )      Care management risk score Rising risk (score 2-5) and Complex Care (Scores >=6): 2     Non face to face  following discharge, date last encounter closed (first attempt may have been earlier): 6/9/2022  9:01 AM     Call initiated 2 business days of discharge: Yes    ASSESSMENT/PLAN:   Chronic obstructive pulmonary disease, unspecified COPD type [facility])  Hospital discharge follow-up  -     Lo Ward MD, Cardiology, [place]  Acute on chronic heart failure with preserved ejection fraction (HFpEF) ([facility]  -     Cece Naranjo MD, Cardiology, [place]  Bilateral lower extremity edema      Medical Decision Making: moderate complexity  No follow-ups on file. On this date 6/20/2022 I have spent 35 minutes reviewing previous notes, test results and face to face with the patient discussing the diagnosis and importance of compliance with the treatment plan as well as documenting on the day of the visit. Subjective:   HPI:  Follow up of Hospital problems/diagnosis(es):   Patient had recent COVID-19 infection, treated through [facility].  He subsequently presented to [facility] in early June for worsening shortness of breath and swelling to his left lower extremity. He was admitted that time for COPD exacerbation, acute on chronic respiratory failure with hypoxia, and left lower extremity pain and swelling. He was treated with IV antibiotics, steroids and furosemide. These were ultimately able to be changed to orals and the patient was discharged when it was felt he returned to baseline.   He was discharged on both tapering prednisone as well as Levaquin. Patient was seen in follow-up by his pulmonologist on June 9. They opted for a bronchoscopy on June 14 that per patient's sister was not too significant in terms of sputum. Patient was actually seen by infectious disease on the 15th where it was determined that he would monitor him off antibiotics for the time being. He returned to [facility] on the 16th, again for shortness of breath and worsening lower extremity edema. He was diagnosed with a COPD exacerbation, chronic respiratory failure with hypoxia, acute on chronic heart failure with preserved EF, and leg swelling. Again, he was treated with antibiotics, and Solu-Medrol. He was diuresed with IV furosemide daily. His amlodipine was discontinued after consult to cardiology. Hospitalization goals were met, and the patient was discharged yesterday. Inpatient course: Discharge summary reviewed- see chart. Interval history/Current status:   Patient states that he is doing much better since his hospitalization. He breathing is much improved, but he still notes some shortness of breath. His legs are profoundly better though. Basically back to his baseline, and not nearly hurting as much as they were before. Patient continues on his home taper of prednisone. He also has a cream to use over some pressure spots on his buttocks. The sister denies any ulcer formation, and states that the redness is much improved. Home nursing is also following this. Patient's home blood pressure this morning was good, and it is again good here today, despite the lack of amlodipine. Will have f/u with with Dr. Laurent Capellan   Per discharge summary, follow-up with cardiology was recommended    [facility] - coming twice a week  We agree he would benefit from visits three times a week     A/P:   Diagnosis Orders   1. Chronic obstructive pulmonary disease, unspecified COPD type ([facility]     2.  Hospital discharge follow-up  LA DISCHARGE MEDS RECONCILED W/ CURRENT OUTPATIENT MED LIST    Randolph Honeycutt MD, Cardiology, [place]   3. Acute on chronic heart failure with preserved ejection fraction (HFpEF) ([facility]  Randolph Honeycutt MD, Cardiology, [place]   4. Bilateral lower extremity edema       Continue Lasix 20 mg every day; low threshold to increase as necessary based on swelling in his legs  We will add potassium 10 mEq to take daily with the Lasix  Patient will finish his prednisone taper and follow-up with pulmonology; needs to schedule  Referral will be placed to cardiology, and Dr. Vinita Marti can decide if and when patient should be seen    Plan to reassess the patient in 2 to 3 weeks. We will reassess electrolytes and kidney function at that time. 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, left    S/P Girdlestone procedure    Onychomycosis    Venous insufficiency of both lower extremities    Depression    Oxygen dependent    Anxiety    Cellulitis    Swelling of both lower extremities    Leg swelling    Pneumonia due to COVID-19 virus    Gastroesophageal reflux disease    Fluid retention    Lower extremity pain, left    Acute on chronic heart failure with preserved ejection fraction (HFpEF) (HCC)    Oropharyngeal dysphagia    COPD exacerbation (HCC)    Chronic respiratory failure with hypoxia (HCC)    History of COVID-19    Acute on chronic diastolic (congestive) heart failure (HCC)       Medications listed as ordered at the time of discharge from hospital     Medication List          Accurate as of June 20, 2022 10:41 AM. If you have any questions, ask your nurse or doctor.             CHANGE how you take these medications    gabapentin 100 MG capsule  Commonly known as: NEURONTIN  Take one tab nightly; if tolerating, can increase to 200mg at night  What changed:   · how much to take  · how to take this  · when to take this  · additional instructions     metoprolol tartrate 25 MG tablet  Commonly known as: LOPRESSOR  Twice A Day  What changed:   · how much to take  · how to take this  · when to take this        CONTINUE taking these medications    albuterol sulfate  (90 Base) MCG/ACT inhaler  Commonly known as: PROVENTIL;VENTOLIN;PROAIR  INHALE TWO (2) PUFFS INTO THE LUNGS EVERY FOUR (4) HOURS AS NEEDED FOR WHEEZING     ARIPiprazole 5 MG tablet  Commonly known as: ABILIFY     BROVANA IN     budesonide 0.5 MG/2ML nebulizer suspension  Commonly known as: PULMICORT  Take 2 mLs by nebulization 2 times daily     ferrous sulfate 325 (65 Fe) MG tablet  Commonly known as: IRON 325  Take 1 tablet by mouth 2 times daily     Florajen3 Caps  Patient is to take one tab bid. Patient has been on antibiotics for the last 3 months     FLUoxetine 20 MG capsule  Commonly known as: PROZAC     fluvoxaMINE 100 MG tablet  Commonly known as: LUVOX     furosemide 20 MG tablet  Commonly known as: Lasix  Take 1 tablet by mouth daily     HYDROcodone-acetaminophen 7.5-325 MG per tablet  Commonly known as: NORCO     ipratropium-albuterol 0.5-2.5 (3) MG/3ML Soln nebulizer solution  Commonly known as: DUONEB  Inhale 3 mLs into the lungs every 4 hours as needed for Shortness of Breath     ketoconazole 2 % cream  Commonly known as: NIZORAL  Apply topically daily.      LORazepam 1 MG tablet  Commonly known as: ATIVAN     miconazole nitrate 2 % Oint  Apply topically 2 times daily     omeprazole 40 MG delayed release capsule  Commonly known as: PRILOSEC  Take 1 capsule by mouth daily     pramipexole 0.125 MG tablet  Commonly known as: Mirapex  Take 1 tablet by mouth nightly     predniSONE 10 MG tablet  Commonly known as: DELTASONE  Take 4 tablets by mouth daily for 3 days, THEN 3 tablets daily for 3 days, THEN 2 tablets daily for 3 days, THEN 1 tablet daily for 3 days. Start taking on: June 20, 2022     Symbicort 160-4.5 MCG/ACT Aero  Generic drug: budesonide-formoterol  Inhale 2 puffs into the lungs 2 times daily     Yupelri 175 MCG/3ML Soln  Generic drug: Revefenacin  Inhale 1 ampule into the lungs daily              Medications marked \"taking\" at this time  Outpatient Medications Marked as Taking for the 6/20/22 encounter (Office Visit) with Tommie Andrade MD   Medication Sig Dispense Refill    miconazole nitrate 2 % OINT Apply topically 2 times daily 1 each 0    predniSONE (DELTASONE) 10 MG tablet Take 4 tablets by mouth daily for 3 days, THEN 3 tablets daily for 3 days, THEN 2 tablets daily for 3 days, THEN 1 tablet daily for 3 days. 30 tablet 0    LORazepam (ATIVAN) 1 MG tablet Take 1 mg by mouth 3 times daily.  Take one 3 times a day      FLUoxetine (PROZAC) 20 MG capsule Take 20 mg by mouth daily      Arformoterol Tartrate (BROVANA IN) Inhale into the lungs      omeprazole (PRILOSEC) 40 MG delayed release capsule Take 1 capsule by mouth daily 30 capsule 2    SYMBICORT 160-4.5 MCG/ACT AERO Inhale 2 puffs into the lungs 2 times daily 1 each 5    gabapentin (NEURONTIN) 100 MG capsule Take one tab nightly; if tolerating, can increase to 200mg at night (Patient taking differently: Take 100 mg by mouth 3 times daily. ) 60 capsule 2    ferrous sulfate (IRON 325) 325 (65 Fe) MG tablet Take 1 tablet by mouth 2 times daily 60 tablet 5    pramipexole (MIRAPEX) 0.125 MG tablet Take 1 tablet by mouth nightly 90 tablet 3    albuterol sulfate  (90 Base) MCG/ACT inhaler INHALE TWO (2) PUFFS INTO THE LUNGS EVERY FOUR (4) HOURS AS NEEDED FOR WHEEZING 18 g 2    metoprolol tartrate (LOPRESSOR) 25 MG tablet Twice A Day (Patient taking differently: Take 25 mg by mouth 2 times daily Twice A Day) 60 tablet 5    furosemide (LASIX) 20 MG tablet Take 1 tablet by mouth daily 30 tablet 1    fluvoxaMINE (LUVOX) 100 MG tablet Take 100 mg by mouth nightly       ketoconazole (NIZORAL) 2 % cream Apply topically daily. 30 g 1    Probiotic Product [facility]) CAPS Patient is to take one tab bid. Patient has been on antibiotics for the last 3 months 30 capsule 3    budesonide (PULMICORT) 0.5 MG/2ML nebulizer suspension Take 2 mLs by nebulization 2 times daily 60 ampule 5    Revefenacin (YUPELRI) 175 MCG/3ML SOLN Inhale 1 ampule into the lungs daily 30 vial 5    HYDROcodone-acetaminophen (NORCO) 7.5-325 MG per tablet Take 1 tablet by mouth 2 times daily as needed. Takes religiously twice daily for leg pain per pt      ipratropium-albuterol (DUONEB) 0.5-2.5 (3) MG/3ML SOLN nebulizer solution Inhale 3 mLs into the lungs every 4 hours as needed for Shortness of Breath 12 vial 0    ARIPiprazole (ABILIFY) 5 MG tablet take 1 tablet by mouth once daily  0        Medications patient taking as of now reconciled against medications ordered at time of hospital discharge: Yes    A comprehensive review of systems was negative except for what was noted in the HPI. Objective:    /70   Pulse 62   Temp 97.1 °F (36.2 °C) (Infrared)   Resp 17   Ht 5' 4\" (1.626 m)   Wt 135 lb (61.2 kg)   SpO2 100%   BMI 23.17 kg/m²   General Appearance: alert and oriented to person, place and time  Head: normocephalic and atraumatic  Eyes: conjunctivae normal  Pulmonary/Chest: diffuse inspiratory and expiratory wheezing and ronchi  Cardiovascular: normal rate and very distant  Abdomen: soft, non-tender  Extremities: no edema  Musculoskeletal: left leg extended straight out      An electronic signature was used to authenticate this note.   --Kameron Chandra MD

## 2022-06-21 ENCOUNTER — TELEPHONE (OUTPATIENT)
Dept: PRIMARY CARE CLINIC | Age: 57
End: 2022-06-21

## 2022-06-21 NOTE — TELEPHONE ENCOUNTER
Last Appointment:  6/20/2022  Future Appointments   Date Time Provider Mik Rosario   7/7/2022  9:15 AM DO Alethea Nuno [facility]   7/11/2022 11:00 AM MD Breanna Raphael [facility]   8/8/2022 10:15 AM Krystal Tovar MD [address] from [facility] called. Teodora Baker will need a new skilled nursing order to increase visits from 2 x week to 3 x week.     Electronically signed by Melody Colvin LPN on 2/54/4490 at 7:13 PM

## 2022-06-22 LAB
BLOOD CULTURE, ROUTINE: NORMAL
CULTURE, BLOOD 2: NORMAL

## 2022-06-22 NOTE — TELEPHONE ENCOUNTER
Spoke with someone from home care, and she stated that you would just need to send over a new order stating he needs care 3 times per week.

## 2022-06-27 ENCOUNTER — TELEPHONE (OUTPATIENT)
Dept: PRIMARY CARE CLINIC | Age: 57
End: 2022-06-27

## 2022-06-27 NOTE — TELEPHONE ENCOUNTER
Last Appointment:  6/20/2022  Future Appointments   Date Time Provider Mik Rosario   7/7/2022  9:15 AM DO Alethea Nuno [facility]   7/11/2022 11:00 AM MD Max Talbot [facility]   8/8/2022 10:15 AM Zena Ortega MD [address] [facility]      Family called patient's left leg is still swollen. Asking if they should increase lasix? Please advise.     Electronically signed by Meli Paul LPN on 5/18/2545 at 9:27 PM

## 2022-06-27 NOTE — TELEPHONE ENCOUNTER
Please increase to 40 mg of Lasix, and increase the potassium to 20 mEq daily x 3 days  Please give an update on Thursday or Friday

## 2022-06-27 NOTE — TELEPHONE ENCOUNTER
Informed family of med changes. Agreeable to lasix 40 mg and Potassium 20 meq daily x 3 days and call with update. Was able to repeat dosages appropriately.      Electronically signed by Rony Ang LPN on 2/52/9496 at 8:40 PM

## 2022-07-07 ENCOUNTER — TELEPHONE (OUTPATIENT)
Dept: OTHER | Facility: CLINIC | Age: 57
End: 2022-07-07

## 2022-07-07 ENCOUNTER — HOSPITAL ENCOUNTER (INPATIENT)
Age: 57
LOS: 3 days | Discharge: HOME HEALTH | DRG: 194 | End: 2022-07-11
Attending: EMERGENCY MEDICINE | Admitting: INTERNAL MEDICINE
Payer: MEDICAID

## 2022-07-07 ENCOUNTER — APPOINTMENT (OUTPATIENT)
Dept: GENERAL RADIOLOGY | Age: 57
DRG: 194 | End: 2022-07-07
Payer: MEDICAID

## 2022-07-07 ENCOUNTER — OFFICE VISIT (OUTPATIENT)
Dept: ORTHOPEDIC SURGERY | Age: 57
End: 2022-07-07
Payer: MEDICAID

## 2022-07-07 ENCOUNTER — APPOINTMENT (OUTPATIENT)
Dept: ULTRASOUND IMAGING | Age: 57
DRG: 194 | End: 2022-07-07
Payer: MEDICAID

## 2022-07-07 ENCOUNTER — APPOINTMENT (OUTPATIENT)
Dept: CT IMAGING | Age: 57
DRG: 194 | End: 2022-07-07
Payer: MEDICAID

## 2022-07-07 VITALS — HEIGHT: 64 IN | BODY MASS INDEX: 23.73 KG/M2 | WEIGHT: 139 LBS

## 2022-07-07 DIAGNOSIS — M25.552 LEFT HIP PAIN: ICD-10-CM

## 2022-07-07 DIAGNOSIS — J44.1 COPD EXACERBATION (HCC): Primary | ICD-10-CM

## 2022-07-07 DIAGNOSIS — I50.9 CONGESTIVE HEART FAILURE, UNSPECIFIED HF CHRONICITY, UNSPECIFIED HEART FAILURE TYPE (HCC): ICD-10-CM

## 2022-07-07 DIAGNOSIS — R91.1 LUNG NODULE: ICD-10-CM

## 2022-07-07 DIAGNOSIS — M79.89 SWELLING OF BOTH LOWER EXTREMITIES: Primary | ICD-10-CM

## 2022-07-07 DIAGNOSIS — M79.89 LEG SWELLING: ICD-10-CM

## 2022-07-07 DIAGNOSIS — M21.952 DEFORMITY OF LEFT HIP JOINT: ICD-10-CM

## 2022-07-07 DIAGNOSIS — Z98.890 HISTORY OF REMOVAL OF JOINT PROSTHESIS OF LEFT HIP DUE TO INFECTION: ICD-10-CM

## 2022-07-07 DIAGNOSIS — J44.9 CHRONIC OBSTRUCTIVE PULMONARY DISEASE, UNSPECIFIED COPD TYPE (HCC): ICD-10-CM

## 2022-07-07 DIAGNOSIS — Z86.19 HISTORY OF REMOVAL OF JOINT PROSTHESIS OF LEFT HIP DUE TO INFECTION: ICD-10-CM

## 2022-07-07 LAB
ANION GAP SERPL CALCULATED.3IONS-SCNC: 15 MMOL/L (ref 7–16)
BASOPHILS ABSOLUTE: 0.04 E9/L (ref 0–0.2)
BASOPHILS RELATIVE PERCENT: 0.3 % (ref 0–2)
BUN BLDV-MCNC: 11 MG/DL (ref 6–20)
CALCIUM SERPL-MCNC: 7.2 MG/DL (ref 8.6–10.2)
CHLORIDE BLD-SCNC: 89 MMOL/L (ref 98–107)
CO2: 32 MMOL/L (ref 22–29)
CREAT SERPL-MCNC: 0.8 MG/DL (ref 0.7–1.2)
EOSINOPHILS ABSOLUTE: 0.02 E9/L (ref 0.05–0.5)
EOSINOPHILS RELATIVE PERCENT: 0.2 % (ref 0–6)
GFR AFRICAN AMERICAN: >60
GFR NON-AFRICAN AMERICAN: >60 ML/MIN/1.73
GLUCOSE BLD-MCNC: 108 MG/DL (ref 74–99)
HCT VFR BLD CALC: 31.4 % (ref 37–54)
HEMOGLOBIN: 9.8 G/DL (ref 12.5–16.5)
IMMATURE GRANULOCYTES #: 0.08 E9/L
IMMATURE GRANULOCYTES %: 0.6 % (ref 0–5)
LACTIC ACID: 1.2 MMOL/L (ref 0.5–2.2)
LYMPHOCYTES ABSOLUTE: 0.7 E9/L (ref 1.5–4)
LYMPHOCYTES RELATIVE PERCENT: 5.6 % (ref 20–42)
MCH RBC QN AUTO: 31.5 PG (ref 26–35)
MCHC RBC AUTO-ENTMCNC: 31.2 % (ref 32–34.5)
MCV RBC AUTO: 101 FL (ref 80–99.9)
MONOCYTES ABSOLUTE: 0.59 E9/L (ref 0.1–0.95)
MONOCYTES RELATIVE PERCENT: 4.8 % (ref 2–12)
NEUTROPHILS ABSOLUTE: 10.96 E9/L (ref 1.8–7.3)
NEUTROPHILS RELATIVE PERCENT: 88.5 % (ref 43–80)
PDW BLD-RTO: 14.6 FL (ref 11.5–15)
PLATELET # BLD: 143 E9/L (ref 130–450)
PMV BLD AUTO: 10.4 FL (ref 7–12)
POTASSIUM REFLEX MAGNESIUM: 4.4 MMOL/L (ref 3.5–5)
PRO-BNP: 1298 PG/ML (ref 0–125)
RBC # BLD: 3.11 E12/L (ref 3.8–5.8)
SARS-COV-2, NAAT: NOT DETECTED
SODIUM BLD-SCNC: 136 MMOL/L (ref 132–146)
TROPONIN, HIGH SENSITIVITY: 18 NG/L (ref 0–11)
TROPONIN, HIGH SENSITIVITY: 20 NG/L (ref 0–11)
WBC # BLD: 12.4 E9/L (ref 4.5–11.5)

## 2022-07-07 PROCEDURE — 84484 ASSAY OF TROPONIN QUANT: CPT

## 2022-07-07 PROCEDURE — 99285 EMERGENCY DEPT VISIT HI MDM: CPT

## 2022-07-07 PROCEDURE — 84145 PROCALCITONIN (PCT): CPT

## 2022-07-07 PROCEDURE — 2580000003 HC RX 258: Performed by: RADIOLOGY

## 2022-07-07 PROCEDURE — 6370000000 HC RX 637 (ALT 250 FOR IP): Performed by: EMERGENCY MEDICINE

## 2022-07-07 PROCEDURE — 96374 THER/PROPH/DIAG INJ IV PUSH: CPT

## 2022-07-07 PROCEDURE — 83605 ASSAY OF LACTIC ACID: CPT

## 2022-07-07 PROCEDURE — 94640 AIRWAY INHALATION TREATMENT: CPT

## 2022-07-07 PROCEDURE — 6360000002 HC RX W HCPCS: Performed by: EMERGENCY MEDICINE

## 2022-07-07 PROCEDURE — 87635 SARS-COV-2 COVID-19 AMP PRB: CPT

## 2022-07-07 PROCEDURE — 6360000004 HC RX CONTRAST MEDICATION: Performed by: RADIOLOGY

## 2022-07-07 PROCEDURE — 83880 ASSAY OF NATRIURETIC PEPTIDE: CPT

## 2022-07-07 PROCEDURE — 99204 OFFICE O/P NEW MOD 45 MIN: CPT | Performed by: PHYSICIAN ASSISTANT

## 2022-07-07 PROCEDURE — 96375 TX/PRO/DX INJ NEW DRUG ADDON: CPT

## 2022-07-07 PROCEDURE — 71045 X-RAY EXAM CHEST 1 VIEW: CPT

## 2022-07-07 PROCEDURE — 99202 OFFICE O/P NEW SF 15 MIN: CPT | Performed by: PHYSICIAN ASSISTANT

## 2022-07-07 PROCEDURE — 2700000000 HC OXYGEN THERAPY PER DAY

## 2022-07-07 PROCEDURE — 85025 COMPLETE CBC W/AUTO DIFF WBC: CPT

## 2022-07-07 PROCEDURE — 80048 BASIC METABOLIC PNL TOTAL CA: CPT

## 2022-07-07 PROCEDURE — 94664 DEMO&/EVAL PT USE INHALER: CPT

## 2022-07-07 PROCEDURE — 36415 COLL VENOUS BLD VENIPUNCTURE: CPT

## 2022-07-07 PROCEDURE — 93005 ELECTROCARDIOGRAM TRACING: CPT | Performed by: EMERGENCY MEDICINE

## 2022-07-07 PROCEDURE — 71275 CT ANGIOGRAPHY CHEST: CPT

## 2022-07-07 PROCEDURE — 93970 EXTREMITY STUDY: CPT

## 2022-07-07 RX ORDER — ALBUTEROL SULFATE 90 UG/1
AEROSOL, METERED RESPIRATORY (INHALATION)
Qty: 1 EACH | Refills: 2 | Status: SHIPPED
Start: 2022-07-07 | End: 2022-09-23 | Stop reason: SDUPTHER

## 2022-07-07 RX ORDER — METHYLPREDNISOLONE SODIUM SUCCINATE 125 MG/2ML
125 INJECTION, POWDER, LYOPHILIZED, FOR SOLUTION INTRAMUSCULAR; INTRAVENOUS ONCE
Status: COMPLETED | OUTPATIENT
Start: 2022-07-07 | End: 2022-07-07

## 2022-07-07 RX ORDER — KETOROLAC TROMETHAMINE 30 MG/ML
15 INJECTION, SOLUTION INTRAMUSCULAR; INTRAVENOUS ONCE
Status: COMPLETED | OUTPATIENT
Start: 2022-07-07 | End: 2022-07-07

## 2022-07-07 RX ORDER — ACETAMINOPHEN 325 MG/1
650 TABLET ORAL ONCE
Status: COMPLETED | OUTPATIENT
Start: 2022-07-07 | End: 2022-07-07

## 2022-07-07 RX ORDER — IPRATROPIUM BROMIDE AND ALBUTEROL SULFATE 2.5; .5 MG/3ML; MG/3ML
3 SOLUTION RESPIRATORY (INHALATION) ONCE
Status: COMPLETED | OUTPATIENT
Start: 2022-07-07 | End: 2022-07-07

## 2022-07-07 RX ORDER — SODIUM CHLORIDE 0.9 % (FLUSH) 0.9 %
10 SYRINGE (ML) INJECTION PRN
Status: COMPLETED | OUTPATIENT
Start: 2022-07-07 | End: 2022-07-07

## 2022-07-07 RX ADMIN — METHYLPREDNISOLONE SODIUM SUCCINATE 125 MG: 125 INJECTION, POWDER, FOR SOLUTION INTRAMUSCULAR; INTRAVENOUS at 20:01

## 2022-07-07 RX ADMIN — IPRATROPIUM BROMIDE AND ALBUTEROL SULFATE 3 AMPULE: 2.5; .5 SOLUTION RESPIRATORY (INHALATION) at 18:36

## 2022-07-07 RX ADMIN — KETOROLAC TROMETHAMINE 15 MG: 30 INJECTION, SOLUTION INTRAMUSCULAR; INTRAVENOUS at 20:01

## 2022-07-07 RX ADMIN — IOPAMIDOL 60 ML: 755 INJECTION, SOLUTION INTRAVENOUS at 23:16

## 2022-07-07 RX ADMIN — ACETAMINOPHEN 650 MG: 325 TABLET ORAL at 20:00

## 2022-07-07 RX ADMIN — SODIUM CHLORIDE, PRESERVATIVE FREE 10 ML: 5 INJECTION INTRAVENOUS at 21:56

## 2022-07-07 ASSESSMENT — PAIN SCALES - GENERAL
PAINLEVEL_OUTOF10: 0
PAINLEVEL_OUTOF10: 10

## 2022-07-07 ASSESSMENT — PAIN DESCRIPTION - ORIENTATION: ORIENTATION: RIGHT;LEFT

## 2022-07-07 ASSESSMENT — PAIN - FUNCTIONAL ASSESSMENT: PAIN_FUNCTIONAL_ASSESSMENT: 0-10

## 2022-07-07 ASSESSMENT — PAIN DESCRIPTION - LOCATION: LOCATION: LEG

## 2022-07-07 NOTE — ED PROVIDER NOTES
Chief Complaint   Patient presents with    Shortness of Breath       80-year-old gentleman with past medical history of COPD and hypertension and CHF presenting today with concern for increasing pain and swelling in his lower extremities as well as worsening shortness of breath and cough. He is chronically on 6 to 7 L supplemental nasal cannula oxygen. The swelling in his legs and shortness of breath has been worsening over the past 3 to 4 days, nothing makes it better, exertion makes it worse, not associated with chest pain. He has been admitted for COPD exacerbations in the past.  When EMS got to him he was found to be 85% on his baseline, on arrival to emergency department his saturations were within normal limits. No other acute complaints. Review of Systems   Constitutional: Negative for chills and fever. HENT: Negative for ear pain, sinus pain and sore throat. Eyes: Negative for pain and redness. Respiratory: Positive for cough and shortness of breath. Negative for wheezing. Cardiovascular: Positive for leg swelling. Negative for chest pain. Gastrointestinal: Negative for abdominal pain, diarrhea, nausea and vomiting. Genitourinary: Negative for dysuria and flank pain. Musculoskeletal: Negative for back pain and neck pain. Skin: Negative for rash. Neurological: Negative for seizures and headaches. Hematological: Negative for adenopathy. All other systems reviewed and are negative. Physical Exam  Vitals and nursing note reviewed. Constitutional:       General: He is not in acute distress. Appearance: He is well-developed. He is ill-appearing. Comments: Chronically ill-appearing   HENT:      Head: Normocephalic and atraumatic. Right Ear: External ear normal.      Left Ear: External ear normal.      Mouth/Throat:      Mouth: Mucous membranes are moist.      Pharynx: Oropharynx is clear.    Eyes:      Pupils: Pupils are equal, round, and reactive to light. Cardiovascular:      Rate and Rhythm: Normal rate and regular rhythm. Heart sounds: Normal heart sounds. No murmur heard. Pulmonary:      Effort: Pulmonary effort is normal.      Breath sounds: Examination of the right-upper field reveals wheezing. Examination of the left-upper field reveals wheezing. Examination of the right-middle field reveals wheezing. Examination of the left-middle field reveals wheezing. Examination of the right-lower field reveals wheezing. Examination of the left-lower field reveals wheezing. Wheezing and rhonchi present. Comments: Chronically on 6 to 7 L nasal cannula  Abdominal:      General: Bowel sounds are normal.      Palpations: Abdomen is soft. Tenderness: There is no abdominal tenderness. There is no guarding or rebound. Musculoskeletal:         General: No tenderness. Cervical back: Normal range of motion and neck supple. Right lower leg: Edema present. Left lower leg: Edema present. Comments: Erythematous lower extremities, pitting edema bilaterally   Skin:     General: Skin is warm and dry. Neurological:      General: No focal deficit present. Mental Status: He is alert and oriented to person, place, and time. Procedures     EKG: This EKG is signed and interpreted by me. Rate: 96  Rhythm: Sinus  Interpretation: no acute changes, no ST elevations, normal normal, normal axis  Comparison: stable as compared to patient's most recent EKG      MDM  Number of Diagnoses or Management Options  Congestive heart failure, unspecified HF chronicity, unspecified heart failure type (HCC)  COPD exacerbation (HCC)  Leg swelling  Lung nodule  Diagnosis management comments: 80-year-old gentleman with past medical history of COPD and hypertension and CHF presenting with concern for increasing pain and swelling in his lower extremities as well as worsening shortness of breath and cough.   On arrival to emergency department he was pulmonary disease) ([facility] Hypertension, and Multiple gastric ulcers. Past Surgical History:  has a past surgical history that includes knee surgery; hip surgery; and hernia repair. Social History:  reports that he quit smoking about 20 months ago. His smoking use included cigarettes. He started smoking about 27 years ago. He has a 100.00 pack-year smoking history. He has never used smokeless tobacco. He reports that he does not drink alcohol and does not use drugs. Family History: family history includes Colon Cancer in his mother; No Known Problems in his brother, brother, sister, and sister; Other in his father. The patients home medications have been reviewed.     Allergies: Aspirin, Lisinopril, Other, Tramadol, Ibuprofen, and Sulfa antibiotics    -------------------------------------------------- RESULTS -------------------------------------------------    LABS:  Results for orders placed or performed during the hospital encounter of 07/07/22   COVID-19, Rapid    Specimen: Nasopharyngeal Swab   Result Value Ref Range    SARS-CoV-2, NAAT Not Detected Not Detected   CBC with Auto Differential   Result Value Ref Range    WBC 12.4 (H) 4.5 - 11.5 E9/L    RBC 3.11 (L) 3.80 - 5.80 E12/L    Hemoglobin 9.8 (L) 12.5 - 16.5 g/dL    Hematocrit 31.4 (L) 37.0 - 54.0 %    .0 (H) 80.0 - 99.9 fL    MCH 31.5 26.0 - 35.0 pg    MCHC 31.2 (L) 32.0 - 34.5 %    RDW 14.6 11.5 - 15.0 fL    Platelets 129 617 - 153 E9/L    MPV 10.4 7.0 - 12.0 fL    Neutrophils % 88.5 (H) 43.0 - 80.0 %    Immature Granulocytes % 0.6 0.0 - 5.0 %    Lymphocytes % 5.6 (L) 20.0 - 42.0 %    Monocytes % 4.8 2.0 - 12.0 %    Eosinophils % 0.2 0.0 - 6.0 %    Basophils % 0.3 0.0 - 2.0 %    Neutrophils Absolute 10.96 (H) 1.80 - 7.30 E9/L    Immature Granulocytes # 0.08 E9/L    Lymphocytes Absolute 0.70 (L) 1.50 - 4.00 E9/L    Monocytes Absolute 0.59 0.10 - 0.95 E9/L    Eosinophils Absolute 0.02 (L) 0.05 - 0.50 E9/L    Basophils Absolute 0.04 0.00 - 0.20 O1/Y   Basic Metabolic Panel w/ Reflex to MG   Result Value Ref Range    Sodium 136 132 - 146 mmol/L    Potassium reflex Magnesium 4.4 3.5 - 5.0 mmol/L    Chloride 89 (L) 98 - 107 mmol/L    CO2 32 (H) 22 - 29 mmol/L    Anion Gap 15 7 - 16 mmol/L    Glucose 108 (H) 74 - 99 mg/dL    BUN 11 6 - 20 mg/dL    CREATININE 0.8 0.7 - 1.2 mg/dL    GFR Non-African American >60 >=60 mL/min/1.73    GFR African American >60     Calcium 7.2 (L) 8.6 - 10.2 mg/dL   Troponin   Result Value Ref Range    Troponin, High Sensitivity 18 (H) 0 - 11 ng/L   Brain Natriuretic Peptide   Result Value Ref Range    Pro-BNP 1,298 (H) 0 - 125 pg/mL   Lactic Acid   Result Value Ref Range    Lactic Acid 1.2 0.5 - 2.2 mmol/L   Troponin   Result Value Ref Range    Troponin, High Sensitivity 20 (H) 0 - 11 ng/L   EKG 12 Lead   Result Value Ref Range    Ventricular Rate 96 BPM    Atrial Rate 96 BPM    P-R Interval 110 ms    QRS Duration 70 ms    Q-T Interval 350 ms    QTc Calculation (Bazett) 442 ms    P Axis 59 degrees    R Axis 71 degrees    T Axis 58 degrees       RADIOLOGY:  CTA PULMONARY W CONTRAST   Final Result   1. No pulmonary embolism. 2. New 1.4 cm subsolid nodular opacity in the left upper lobe, which has   developed in the interim since 06/03/2022. Per the recommendations of the   [employer] of Radiology, imaging follow-up is recommended with a CT of   the chest in 3-6 months. 3. Stable pleural thickening and subpleural scarring in the lower hemithorax. 4. Moderate emphysematous changes. RECOMMENDATIONS:   Unavailable         US DUP LOWER EXTREMITIES BILATERAL VENOUS   Final Result   No evidence of DVT in either lower extremity. RECOMMENDATIONS:   Unavailable         XR CHEST PORTABLE   Final Result   1. Cardiomegaly and probable vascular congestion   2.  Basilar densities suggestive of atelectasis and scar with pleural   thickening but nonspecific           ------------------------- NURSING NOTES AND VITALS REVIEWED ---------------------------  Date / Time Roomed:  7/7/2022  6:08 PM  ED Bed Assignment:  06/06    The nursing notes within the ED encounter and vital signs as below have been reviewed. Patient Vitals for the past 24 hrs:   BP Temp Pulse Resp SpO2 Height Weight   07/07/22 2240 (!) 103/59 -- 90 18 95 % -- --   07/07/22 2117 (!) 104/59 99.8 °F (37.7 °C) 92 16 94 % -- --   07/07/22 2030 125/68 -- 90 16 99 % -- --   07/07/22 1838 -- -- (!) 101 -- 97 % -- --   07/07/22 1837 -- -- 95 -- 97 % -- --   07/07/22 1836 -- -- 94 -- 95 % -- --   07/07/22 1818 (!) 148/85 (!) 100.7 °F (38.2 °C) 99 20 99 % 5' 4\" (1.626 m) 136 lb (61.7 kg)       Oxygen Saturation Interpretation: Abnormal - but at baseline    ------------------------------------------ PROGRESS NOTES ------------------------------------------  Re-evaluation(s):  Time: 2200  Patients symptoms show no change  Repeat physical examination is not changed    Counseling:  I have spoken with the patient and discussed todays results, in addition to providing specific details for the plan of care and counseling regarding the diagnosis and prognosis. Their questions are answered at this time and they are agreeable with the plan of admission.    --------------------------------- ADDITIONAL PROVIDER NOTES ---------------------------------  Consultations:  Time: 2229. Spoke with Dr. Arturo Todd. Discussed case. They will admit the patient. This patient's ED course included: a personal history and physicial examination, re-evaluation prior to disposition, multiple bedside re-evaluations, IV medications, cardiac monitoring, continuous pulse oximetry and complex medical decision making and emergency management    This patient has remained hemodynamically stable during their ED course. Diagnosis:  1. COPD exacerbation ([facility]    2. Congestive heart failure, unspecified HF chronicity, unspecified heart failure type (HCC)    3. Lung nodule    4.  Leg swelling Disposition:  Patient's disposition: Admit to telemetry  Patient's condition is stable.            Benja Brannon DO  Resident  07/08/22 [id number]

## 2022-07-07 NOTE — TELEPHONE ENCOUNTER
Last Appointment:  6/20/2022  Future Appointments   Date Time Provider Mik Rosario   7/11/2022 11:00 AM MD Rip Merchant [facility]   8/8/2022 10:15 AM Zehra Rocha MD [address] [facility]

## 2022-07-07 NOTE — PROGRESS NOTES
Patient here for an ED follow up from 06/03/2022 for left hip pain. Patient states that he is not having problems with his hips, he is having problems with bilateral swelling in the legs. Patient recently seen Dr. Fox Soto on 06/17/2022 for questionable cellulitis vs chronic condition of the legs. Patient states that his pain level is 10/10 everyday, claims that he can barely touched the skin without severe pain. Patient is taking Norco and Gabapentin for pain. Patient states that he has gone through three hip replacement surgeries, the last surgery was around early 2000s. Patient states that each time he was having the hip replacement surgery his body kept rejecting the treatments.        Electronically signed by Mario Gonzales MA on 7/7/2022 at 8:56 AM
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
## 2022-07-07 NOTE — PATIENT INSTRUCTIONS
Continue weightbearing as tolerated left lower extremity using assistive devices. No orthopedic surgical intervention recommended at this time. Patient will be set up for vascular evaluation at [facility] regarding bilateral leg swelling. Continue pain management at [facility]. Follow-up as needed. Call if any questions or concerns.

## 2022-07-07 NOTE — TELEPHONE ENCOUNTER
Writer contacted Dr. Drucie Bosworth to inform of 30 day readmission risk. Writer's attempt to contact Dr. Drucie Bosworth was unsuccessful.      Call Back: If you need to call back to inform of disposition you can contact me at [phone]

## 2022-07-08 PROBLEM — R50.9 FEVER OF UNKNOWN ORIGIN: Status: ACTIVE | Noted: 2022-07-08

## 2022-07-08 LAB
ALBUMIN SERPL-MCNC: 3.5 G/DL (ref 3.5–5.2)
ALP BLD-CCNC: 50 U/L (ref 40–129)
ALT SERPL-CCNC: 13 U/L (ref 0–40)
ANION GAP SERPL CALCULATED.3IONS-SCNC: 15 MMOL/L (ref 7–16)
AST SERPL-CCNC: 21 U/L (ref 0–39)
BILIRUB SERPL-MCNC: 0.2 MG/DL (ref 0–1.2)
BUN BLDV-MCNC: 15 MG/DL (ref 6–20)
CALCIUM SERPL-MCNC: 7 MG/DL (ref 8.6–10.2)
CHLORIDE BLD-SCNC: 91 MMOL/L (ref 98–107)
CO2: 29 MMOL/L (ref 22–29)
CREAT SERPL-MCNC: 0.9 MG/DL (ref 0.7–1.2)
EKG ATRIAL RATE: 96 BPM
EKG P AXIS: 59 DEGREES
EKG P-R INTERVAL: 110 MS
EKG Q-T INTERVAL: 350 MS
EKG QRS DURATION: 70 MS
EKG QTC CALCULATION (BAZETT): 442 MS
EKG R AXIS: 71 DEGREES
EKG T AXIS: 58 DEGREES
EKG VENTRICULAR RATE: 96 BPM
GFR AFRICAN AMERICAN: >60
GFR NON-AFRICAN AMERICAN: >60 ML/MIN/1.73
GLUCOSE BLD-MCNC: 280 MG/DL (ref 74–99)
LACTIC ACID, SEPSIS: 1 MMOL/L (ref 0.5–1.9)
LACTIC ACID, SEPSIS: 1.3 MMOL/L (ref 0.5–1.9)
METER GLUCOSE: 131 MG/DL (ref 74–99)
METER GLUCOSE: 165 MG/DL (ref 74–99)
METER GLUCOSE: 174 MG/DL (ref 74–99)
POTASSIUM REFLEX MAGNESIUM: 4.5 MMOL/L (ref 3.5–5)
PROCALCITONIN: 0.22 NG/ML (ref 0–0.08)
SODIUM BLD-SCNC: 135 MMOL/L (ref 132–146)
TOTAL PROTEIN: 6.7 G/DL (ref 6.4–8.3)
TROPONIN, HIGH SENSITIVITY: 15 NG/L (ref 0–11)

## 2022-07-08 PROCEDURE — 2060000000 HC ICU INTERMEDIATE R&B

## 2022-07-08 PROCEDURE — 87077 CULTURE AEROBIC IDENTIFY: CPT

## 2022-07-08 PROCEDURE — 80053 COMPREHEN METABOLIC PANEL: CPT

## 2022-07-08 PROCEDURE — 87449 NOS EACH ORGANISM AG IA: CPT

## 2022-07-08 PROCEDURE — 36415 COLL VENOUS BLD VENIPUNCTURE: CPT

## 2022-07-08 PROCEDURE — 6370000000 HC RX 637 (ALT 250 FOR IP): Performed by: NURSE PRACTITIONER

## 2022-07-08 PROCEDURE — 87070 CULTURE OTHR SPECIMN AEROBIC: CPT

## 2022-07-08 PROCEDURE — 2580000003 HC RX 258: Performed by: NURSE PRACTITIONER

## 2022-07-08 PROCEDURE — 84484 ASSAY OF TROPONIN QUANT: CPT

## 2022-07-08 PROCEDURE — 83605 ASSAY OF LACTIC ACID: CPT

## 2022-07-08 PROCEDURE — 82962 GLUCOSE BLOOD TEST: CPT

## 2022-07-08 PROCEDURE — 94667 MNPJ CHEST WALL 1ST: CPT

## 2022-07-08 PROCEDURE — 87206 SMEAR FLUORESCENT/ACID STAI: CPT

## 2022-07-08 PROCEDURE — 6360000002 HC RX W HCPCS: Performed by: NURSE PRACTITIONER

## 2022-07-08 PROCEDURE — 2700000000 HC OXYGEN THERAPY PER DAY

## 2022-07-08 PROCEDURE — APPSS45 APP SPLIT SHARED TIME 31-45 MINUTES: Performed by: NURSE PRACTITIONER

## 2022-07-08 PROCEDURE — 87040 BLOOD CULTURE FOR BACTERIA: CPT

## 2022-07-08 PROCEDURE — 94640 AIRWAY INHALATION TREATMENT: CPT

## 2022-07-08 PROCEDURE — 87186 SC STD MICRODIL/AGAR DIL: CPT

## 2022-07-08 PROCEDURE — 6370000000 HC RX 637 (ALT 250 FOR IP): Performed by: INTERNAL MEDICINE

## 2022-07-08 PROCEDURE — APPSS30 APP SPLIT SHARED TIME 16-30 MINUTES: Performed by: NURSE PRACTITIONER

## 2022-07-08 RX ORDER — BUDESONIDE 0.5 MG/2ML
0.5 INHALANT ORAL 2 TIMES DAILY
Status: DISCONTINUED | OUTPATIENT
Start: 2022-07-08 | End: 2022-07-11 | Stop reason: HOSPADM

## 2022-07-08 RX ORDER — FLUOXETINE HYDROCHLORIDE 20 MG/1
20 CAPSULE ORAL DAILY
Status: DISCONTINUED | OUTPATIENT
Start: 2022-07-08 | End: 2022-07-11 | Stop reason: HOSPADM

## 2022-07-08 RX ORDER — IPRATROPIUM BROMIDE AND ALBUTEROL SULFATE 2.5; .5 MG/3ML; MG/3ML
1 SOLUTION RESPIRATORY (INHALATION) EVERY 4 HOURS PRN
Status: DISCONTINUED | OUTPATIENT
Start: 2022-07-08 | End: 2022-07-11 | Stop reason: HOSPADM

## 2022-07-08 RX ORDER — ARIPIPRAZOLE 5 MG/1
5 TABLET ORAL DAILY
Status: DISCONTINUED | OUTPATIENT
Start: 2022-07-08 | End: 2022-07-11 | Stop reason: HOSPADM

## 2022-07-08 RX ORDER — HYDROCODONE BITARTRATE AND ACETAMINOPHEN 7.5; 325 MG/1; MG/1
1 TABLET ORAL 2 TIMES DAILY PRN
Status: DISCONTINUED | OUTPATIENT
Start: 2022-07-08 | End: 2022-07-11 | Stop reason: HOSPADM

## 2022-07-08 RX ORDER — SODIUM CHLORIDE 0.9 % (FLUSH) 0.9 %
5-40 SYRINGE (ML) INJECTION EVERY 12 HOURS SCHEDULED
Status: DISCONTINUED | OUTPATIENT
Start: 2022-07-08 | End: 2022-07-11 | Stop reason: HOSPADM

## 2022-07-08 RX ORDER — INSULIN LISPRO 100 [IU]/ML
0-6 INJECTION, SOLUTION INTRAVENOUS; SUBCUTANEOUS
Status: DISCONTINUED | OUTPATIENT
Start: 2022-07-08 | End: 2022-07-11 | Stop reason: HOSPADM

## 2022-07-08 RX ORDER — ARFORMOTEROL TARTRATE 15 UG/2ML
15 SOLUTION RESPIRATORY (INHALATION) 2 TIMES DAILY
Status: DISCONTINUED | OUTPATIENT
Start: 2022-07-08 | End: 2022-07-11 | Stop reason: HOSPADM

## 2022-07-08 RX ORDER — METHYLPREDNISOLONE SODIUM SUCCINATE 40 MG/ML
40 INJECTION, POWDER, LYOPHILIZED, FOR SOLUTION INTRAMUSCULAR; INTRAVENOUS EVERY 8 HOURS
Status: DISCONTINUED | OUTPATIENT
Start: 2022-07-08 | End: 2022-07-09

## 2022-07-08 RX ORDER — SODIUM CHLORIDE 9 MG/ML
INJECTION, SOLUTION INTRAVENOUS PRN
Status: DISCONTINUED | OUTPATIENT
Start: 2022-07-08 | End: 2022-07-11 | Stop reason: HOSPADM

## 2022-07-08 RX ORDER — ENOXAPARIN SODIUM 100 MG/ML
40 INJECTION SUBCUTANEOUS DAILY
Status: DISCONTINUED | OUTPATIENT
Start: 2022-07-08 | End: 2022-07-11 | Stop reason: HOSPADM

## 2022-07-08 RX ORDER — PANTOPRAZOLE SODIUM 40 MG/1
40 TABLET, DELAYED RELEASE ORAL
Status: DISCONTINUED | OUTPATIENT
Start: 2022-07-08 | End: 2022-07-11 | Stop reason: HOSPADM

## 2022-07-08 RX ORDER — POTASSIUM CHLORIDE 750 MG/1
10 TABLET, EXTENDED RELEASE ORAL DAILY
Status: DISCONTINUED | OUTPATIENT
Start: 2022-07-08 | End: 2022-07-11 | Stop reason: HOSPADM

## 2022-07-08 RX ORDER — INSULIN LISPRO 100 [IU]/ML
0-3 INJECTION, SOLUTION INTRAVENOUS; SUBCUTANEOUS NIGHTLY
Status: DISCONTINUED | OUTPATIENT
Start: 2022-07-08 | End: 2022-07-11 | Stop reason: HOSPADM

## 2022-07-08 RX ORDER — GABAPENTIN 100 MG/1
100 CAPSULE ORAL 3 TIMES DAILY
Status: DISCONTINUED | OUTPATIENT
Start: 2022-07-08 | End: 2022-07-11 | Stop reason: HOSPADM

## 2022-07-08 RX ORDER — LORAZEPAM 1 MG/1
1 TABLET ORAL 3 TIMES DAILY
Status: DISCONTINUED | OUTPATIENT
Start: 2022-07-08 | End: 2022-07-11 | Stop reason: HOSPADM

## 2022-07-08 RX ORDER — FUROSEMIDE 10 MG/ML
40 INJECTION INTRAMUSCULAR; INTRAVENOUS 2 TIMES DAILY
Status: DISCONTINUED | OUTPATIENT
Start: 2022-07-08 | End: 2022-07-09

## 2022-07-08 RX ORDER — ACETAMINOPHEN 325 MG/1
650 TABLET ORAL EVERY 6 HOURS PRN
Status: DISCONTINUED | OUTPATIENT
Start: 2022-07-08 | End: 2022-07-11 | Stop reason: HOSPADM

## 2022-07-08 RX ORDER — SODIUM CHLORIDE 0.9 % (FLUSH) 0.9 %
5-40 SYRINGE (ML) INJECTION PRN
Status: DISCONTINUED | OUTPATIENT
Start: 2022-07-08 | End: 2022-07-11 | Stop reason: HOSPADM

## 2022-07-08 RX ORDER — ACETAMINOPHEN 650 MG/1
650 SUPPOSITORY RECTAL EVERY 6 HOURS PRN
Status: DISCONTINUED | OUTPATIENT
Start: 2022-07-08 | End: 2022-07-11 | Stop reason: HOSPADM

## 2022-07-08 RX ORDER — BUDESONIDE AND FORMOTEROL FUMARATE DIHYDRATE 160; 4.5 UG/1; UG/1
2 AEROSOL RESPIRATORY (INHALATION) 2 TIMES DAILY
Status: DISCONTINUED | OUTPATIENT
Start: 2022-07-08 | End: 2022-07-08 | Stop reason: CLARIF

## 2022-07-08 RX ORDER — POLYETHYLENE GLYCOL 3350 17 G/17G
17 POWDER, FOR SOLUTION ORAL DAILY PRN
Status: DISCONTINUED | OUTPATIENT
Start: 2022-07-08 | End: 2022-07-11 | Stop reason: HOSPADM

## 2022-07-08 RX ORDER — PRAMIPEXOLE DIHYDROCHLORIDE 0.12 MG/1
0.12 TABLET ORAL NIGHTLY
Status: DISCONTINUED | OUTPATIENT
Start: 2022-07-08 | End: 2022-07-11 | Stop reason: HOSPADM

## 2022-07-08 RX ORDER — IPRATROPIUM BROMIDE AND ALBUTEROL SULFATE 2.5; .5 MG/3ML; MG/3ML
1 SOLUTION RESPIRATORY (INHALATION) 4 TIMES DAILY
Status: DISCONTINUED | OUTPATIENT
Start: 2022-07-08 | End: 2022-07-11 | Stop reason: HOSPADM

## 2022-07-08 RX ORDER — DEXTROSE MONOHYDRATE 50 MG/ML
100 INJECTION, SOLUTION INTRAVENOUS PRN
Status: DISCONTINUED | OUTPATIENT
Start: 2022-07-08 | End: 2022-07-11 | Stop reason: HOSPADM

## 2022-07-08 RX ORDER — ONDANSETRON 4 MG/1
4 TABLET, ORALLY DISINTEGRATING ORAL EVERY 8 HOURS PRN
Status: DISCONTINUED | OUTPATIENT
Start: 2022-07-08 | End: 2022-07-11 | Stop reason: HOSPADM

## 2022-07-08 RX ORDER — ONDANSETRON 2 MG/ML
4 INJECTION INTRAMUSCULAR; INTRAVENOUS EVERY 6 HOURS PRN
Status: DISCONTINUED | OUTPATIENT
Start: 2022-07-08 | End: 2022-07-11 | Stop reason: HOSPADM

## 2022-07-08 RX ORDER — BUDESONIDE 0.5 MG/2ML
500 INHALANT ORAL 2 TIMES DAILY
Status: DISCONTINUED | OUTPATIENT
Start: 2022-07-08 | End: 2022-07-08

## 2022-07-08 RX ADMIN — PRAMIPEXOLE DIHYDROCHLORIDE 0.12 MG: 0.12 TABLET ORAL at 20:05

## 2022-07-08 RX ADMIN — FUROSEMIDE 40 MG: 10 INJECTION, SOLUTION INTRAMUSCULAR; INTRAVENOUS at 20:04

## 2022-07-08 RX ADMIN — IPRATROPIUM BROMIDE AND ALBUTEROL SULFATE 3 ML: .5; 2.5 SOLUTION RESPIRATORY (INHALATION) at 12:58

## 2022-07-08 RX ADMIN — WATER 1000 MG: 1 INJECTION INTRAMUSCULAR; INTRAVENOUS; SUBCUTANEOUS at 02:56

## 2022-07-08 RX ADMIN — METHYLPREDNISOLONE SODIUM SUCCINATE 40 MG: 40 INJECTION, POWDER, LYOPHILIZED, FOR SOLUTION INTRAMUSCULAR; INTRAVENOUS at 10:37

## 2022-07-08 RX ADMIN — Medication 10 ML: at 20:05

## 2022-07-08 RX ADMIN — POTASSIUM CHLORIDE 10 MEQ: 750 TABLET, EXTENDED RELEASE ORAL at 08:38

## 2022-07-08 RX ADMIN — ARIPIPRAZOLE 5 MG: 5 TABLET ORAL at 08:38

## 2022-07-08 RX ADMIN — LORAZEPAM 1 MG: 1 TABLET ORAL at 20:04

## 2022-07-08 RX ADMIN — FLUOXETINE 20 MG: 20 CAPSULE ORAL at 08:38

## 2022-07-08 RX ADMIN — LORAZEPAM 1 MG: 1 TABLET ORAL at 14:42

## 2022-07-08 RX ADMIN — METOPROLOL TARTRATE 25 MG: 25 TABLET, FILM COATED ORAL at 08:38

## 2022-07-08 RX ADMIN — ARFORMOTEROL TARTRATE 15 MCG: 15 SOLUTION RESPIRATORY (INHALATION) at 19:24

## 2022-07-08 RX ADMIN — HYDROCODONE BITARTRATE AND ACETAMINOPHEN 1 TABLET: 7.5; 325 TABLET ORAL at 08:38

## 2022-07-08 RX ADMIN — ARFORMOTEROL TARTRATE 15 MCG: 15 SOLUTION RESPIRATORY (INHALATION) at 07:45

## 2022-07-08 RX ADMIN — BUDESONIDE 500 MCG: 0.5 SUSPENSION RESPIRATORY (INHALATION) at 07:45

## 2022-07-08 RX ADMIN — Medication 10 ML: at 08:39

## 2022-07-08 RX ADMIN — INSULIN LISPRO 1 UNITS: 100 INJECTION, SOLUTION INTRAVENOUS; SUBCUTANEOUS at 17:22

## 2022-07-08 RX ADMIN — INSULIN LISPRO 1 UNITS: 100 INJECTION, SOLUTION INTRAVENOUS; SUBCUTANEOUS at 20:05

## 2022-07-08 RX ADMIN — LORAZEPAM 1 MG: 1 TABLET ORAL at 08:38

## 2022-07-08 RX ADMIN — HYDROCODONE BITARTRATE AND ACETAMINOPHEN 1 TABLET: 7.5; 325 TABLET ORAL at 20:04

## 2022-07-08 RX ADMIN — GABAPENTIN 100 MG: 100 CAPSULE ORAL at 08:38

## 2022-07-08 RX ADMIN — BUDESONIDE 500 MCG: 0.5 SUSPENSION RESPIRATORY (INHALATION) at 19:24

## 2022-07-08 RX ADMIN — GABAPENTIN 100 MG: 100 CAPSULE ORAL at 20:04

## 2022-07-08 RX ADMIN — FUROSEMIDE 40 MG: 10 INJECTION, SOLUTION INTRAMUSCULAR; INTRAVENOUS at 08:38

## 2022-07-08 RX ADMIN — PANTOPRAZOLE SODIUM 40 MG: 40 TABLET, DELAYED RELEASE ORAL at 06:21

## 2022-07-08 RX ADMIN — METOPROLOL TARTRATE 25 MG: 25 TABLET, FILM COATED ORAL at 20:05

## 2022-07-08 RX ADMIN — ENOXAPARIN SODIUM 40 MG: 100 INJECTION SUBCUTANEOUS at 08:38

## 2022-07-08 RX ADMIN — INSULIN LISPRO 3 UNITS: 100 INJECTION, SOLUTION INTRAVENOUS; SUBCUTANEOUS at 08:39

## 2022-07-08 RX ADMIN — IPRATROPIUM BROMIDE AND ALBUTEROL SULFATE 1 AMPULE: 2.5; .5 SOLUTION RESPIRATORY (INHALATION) at 15:35

## 2022-07-08 RX ADMIN — GABAPENTIN 100 MG: 100 CAPSULE ORAL at 14:42

## 2022-07-08 RX ADMIN — IPRATROPIUM BROMIDE AND ALBUTEROL SULFATE 1 AMPULE: 2.5; .5 SOLUTION RESPIRATORY (INHALATION) at 19:24

## 2022-07-08 RX ADMIN — METHYLPREDNISOLONE SODIUM SUCCINATE 40 MG: 40 INJECTION, POWDER, LYOPHILIZED, FOR SOLUTION INTRAMUSCULAR; INTRAVENOUS at 02:56

## 2022-07-08 RX ADMIN — HYDROCODONE BITARTRATE AND ACETAMINOPHEN 1 TABLET: 7.5; 325 TABLET ORAL at 02:56

## 2022-07-08 RX ADMIN — METHYLPREDNISOLONE SODIUM SUCCINATE 40 MG: 40 INJECTION, POWDER, LYOPHILIZED, FOR SOLUTION INTRAMUSCULAR; INTRAVENOUS at 20:05

## 2022-07-08 ASSESSMENT — ENCOUNTER SYMPTOMS
EYE PAIN: 0
SHORTNESS OF BREATH: 1
WHEEZING: 0
NAUSEA: 0
EYE REDNESS: 0
COUGH: 1
SINUS PAIN: 0
BACK PAIN: 0
VOMITING: 0
DIARRHEA: 0
ABDOMINAL PAIN: 0
SORE THROAT: 0

## 2022-07-08 ASSESSMENT — PAIN DESCRIPTION - LOCATION
LOCATION: LEG
LOCATION: LEG

## 2022-07-08 ASSESSMENT — PAIN SCALES - GENERAL
PAINLEVEL_OUTOF10: 5
PAINLEVEL_OUTOF10: 5

## 2022-07-08 ASSESSMENT — PAIN DESCRIPTION - ORIENTATION: ORIENTATION: RIGHT;LEFT

## 2022-07-08 NOTE — CARE COORDINATION
Met w/ patient. Explained role of  and plan of care. Pt's cousin lives w/ him in an apartment- ramp entrance. Has walker, cane, nebulizer. Currently requiring O2 4lNC- wears home O2 [address] provided by [facility] ph [phone], fax [phone]. Active w/ [facility] ph [phone], fax [phone]- will need resume [facility] order on discharge- fax order to [facility]- notify them when pt is discharging. PCP is Dr. Teja Downs and pharmacy is [facility]. Continues on Lasix iv bid, iv steroid, iv abx. PT/OT evals pending. Per pt, plan is to return home w/ his cousin and [facility] on discharge- states his sister Jose Bennett will provide transportation. Anticipating no needs. Will follow.  SW updated Juliana Dean, RN case manager

## 2022-07-08 NOTE — H&P
EVERY FOUR (4) HOURS AS NEEDED FOR WHEEZING 7/7/22   Benjie De La Rosa MD   LORazepam (ATIVAN) 1 MG tablet Take 1 tablet by mouth 3 times daily as needed for Anxiety for up to 30 days. 6/22/22 7/22/22  Benjie De La Rosa MD   furosemide (LASIX) 20 MG tablet Take 1 tablet by mouth daily 6/20/22   Benjie De La Rosa MD   potassium chloride (KLOR-CON M) 10 MEQ extended release tablet Take 1 tablet by mouth daily With lasix 6/20/22   Benjie De La Rosa MD   omeprazole (PRILOSEC) 40 MG delayed release capsule Take 1 capsule by mouth daily 6/20/22   Benjie De La Rosa MD   metoprolol tartrate (LOPRESSOR) 25 MG tablet Take 1 tablet by mouth 2 times daily Twice A Day 6/20/22   Benjie De La Rosa MD   pramipexole (MIRAPEX) 0.125 MG tablet Take 1 tablet by mouth nightly 6/20/22   Benjie De La Rosa MD   miconazole nitrate 2 % OINT Apply topically 2 times daily 6/19/22   ARGELIA Barjaas - CNP   LORazepam (ATIVAN) 1 MG tablet Take 1 mg by mouth 3 times daily. Take one 3 times a day    Historical Provider, MD   FLUoxetine (PROZAC) 20 MG capsule Take 20 mg by mouth daily    Historical Provider, MD   Arformoterol Tartrate (BROVANA IN) Inhale into the lungs    Historical Provider, MD   SYMBICORT 160-4.5 MCG/ACT AERO Inhale 2 puffs into the lungs 2 times daily 4/13/22   Benjie De La Rosa MD   gabapentin (NEURONTIN) 100 MG capsule Take one tab nightly; if tolerating, can increase to 200mg at night  Patient taking differently: Take 100 mg by mouth 3 times daily. 4/13/22 6/20/22  Benjie De La Rosa MD   ferrous sulfate (IRON 325) 325 (65 Fe) MG tablet Take 1 tablet by mouth 2 times daily 3/22/22   Benjie De La Rosa MD   fluvoxaMINE (LUVOX) 100 MG tablet Take 100 mg by mouth nightly     Historical Provider, MD   ketoconazole (NIZORAL) 2 % cream Apply topically daily. 11/30/21   NURYS Conklin   Probiotic Product [facility]) CAPS Patient is to take one tab bid.  Patient has been on antibiotics for the last 3 months 10/27/21   Frederick Uribe APRN - NP   budesonide (PULMICORT) 0.5 MG/2ML nebulizer suspension Take 2 mLs by nebulization 2 times daily 6/16/21   ARGELIA Hannah - CNP   Revefenacin [facility]) 175 MCG/3ML SOLN Inhale 1 ampule into the lungs daily 6/16/21   ARGELIA Hannah - CNP   albuterol sulfate HFA (PROAIR HFA) 108 (90 Base) MCG/ACT inhaler Inhale 2 puffs into the lungs every 4 hours as needed for Wheezing 1/26/21   Sharmaine Eaton MD   HYDROcodone-acetaminophen (Angelika Hakeem) 7.5-325 MG per tablet Take 1 tablet by mouth 2 times daily as needed. Takes religiously twice daily for leg pain per pt 10/6/20   Historical Provider, MD   ipratropium-albuterol (DUONEB) 0.5-2.5 (3) MG/3ML SOLN nebulizer solution Inhale 3 mLs into the lungs every 4 hours as needed for Shortness of Breath 3/10/20   SHREYA Sinclair MD   ARIPiprazole (ABILIFY) 5 MG tablet take 1 tablet by mouth once daily 7/5/19   Historical Provider, MD       Allergies:    Aspirin, Lisinopril, Other, Tramadol, Ibuprofen, and Sulfa antibiotics    Social History:    reports that he quit smoking about 20 months ago. His smoking use included cigarettes. He started smoking about 27 years ago. He has a 100.00 pack-year smoking history. He has never used smokeless tobacco. He reports that he does not drink alcohol and does not use drugs. Family History:   family history includes Colon Cancer in his mother; No Known Problems in his brother, brother, sister, and sister; Other in his father. PHYSICAL EXAM:  Vitals:  /62   Pulse 87   Temp 99.8 °F (37.7 °C)   Resp 18   Ht 5' 4\" (1.626 m)   Wt 136 lb (61.7 kg)   SpO2 95%   BMI 23.34 kg/m²     General Appearance: alert and oriented to person, place and time and in no acute distress  Skin: warm and dry  Head: normocephalic and atraumatic  Eyes: pupils equal, round, and reactive to light, conjunctivae normal  Pulmonary/Chest: Lungs with rhonchi throughout and expiratory wheezes. O2 8 L on via NC.   Cardiovascular: normal rate, normal S1 and S2   Abdomen: soft, non-tender, non-distended, normal bowel sounds, no masses or organomegaly  Extremities: no cyanosis, no clubbing. BLE reddened, tender/warm to touch with 2 + pitting edema. Neurologic: speech normal        LABS:  Recent Labs     07/07/22 1908      K 4.4   CL 89*   CO2 32*   BUN 11   CREATININE 0.8   GLUCOSE 108*   CALCIUM 7.2*       Recent Labs     07/07/22 1908   WBC 12.4*   RBC 3.11*   HGB 9.8*   HCT 31.4*   .0*   MCH 31.5   MCHC 31.2*   RDW 14.6      MPV 10.4       No results for input(s): POCGLU in the last 72 hours. CBC with Differential:    Lab Results   Component Value Date/Time    WBC 12.4 07/07/2022 07:08 PM    RBC 3.11 07/07/2022 07:08 PM    HGB 9.8 07/07/2022 07:08 PM    HCT 31.4 07/07/2022 07:08 PM     07/07/2022 07:08 PM    .0 07/07/2022 07:08 PM    MCH 31.5 07/07/2022 07:08 PM    MCHC 31.2 07/07/2022 07:08 PM    RDW 14.6 07/07/2022 07:08 PM    NRBC 0.0 06/19/2022 04:12 AM    SEGSPCT 79.2 05/31/2022 08:06 PM    BANDSPCT 0.0 02/10/2022 07:41 PM    METASPCT 4.0 02/21/2020 07:05 PM    LYMPHOPCT 5.6 07/07/2022 07:08 PM    MONOPCT 4.8 07/07/2022 07:08 PM    MYELOPCT 0.0 09/17/2021 03:10 AM    BASOPCT 0.3 07/07/2022 07:08 PM    MONOSABS 0.59 07/07/2022 07:08 PM    LYMPHSABS 0.70 07/07/2022 07:08 PM    EOSABS 0.02 07/07/2022 07:08 PM    BASOSABS 0.04 07/07/2022 07:08 PM     BMP:    Lab Results   Component Value Date/Time     07/07/2022 07:08 PM    K 4.4 07/07/2022 07:08 PM    CL 89 07/07/2022 07:08 PM    CO2 32 07/07/2022 07:08 PM    BUN 11 07/07/2022 07:08 PM    LABALBU 3.4 06/19/2022 04:12 AM    CREATININE 0.8 07/07/2022 07:08 PM    CALCIUM 7.2 07/07/2022 07:08 PM    GFRAA >60 07/07/2022 07:08 PM    LABGLOM >60 07/07/2022 07:08 PM    GLUCOSE 108 07/07/2022 07:08 PM       Radiology:   CTA PULMONARY W CONTRAST   Final Result   1. No pulmonary embolism.    2. New 1.4 cm subsolid nodular opacity in the left upper lobe, which has developed in the interim since 06/03/2022. Per the recommendations of the   [employer] of Radiology, imaging follow-up is recommended with a CT of   the chest in 3-6 months. 3. Stable pleural thickening and subpleural scarring in the lower hemithorax. 4. Moderate emphysematous changes. RECOMMENDATIONS:   Unavailable         US DUP LOWER EXTREMITIES BILATERAL VENOUS   Final Result   No evidence of DVT in either lower extremity. RECOMMENDATIONS:   Unavailable         XR CHEST PORTABLE   Final Result   1. Cardiomegaly and probable vascular congestion   2. Basilar densities suggestive of atelectasis and scar with pleural   thickening but nonspecific             EKG:       ASSESSMENT:      Principal Problem:    Fever of unknown origin  Active Problems:    COPD exacerbation (HCC)  Resolved Problems:    * No resolved hospital problems. *      PLAN:    1. Fever of unknown origin- temperature 100.7. WBC sl. Elevated at 12.4. BC X 2 ordered. Procalcitonin pending. Likely due to #2.   2.   COPD exacerbation- presented to [address] [place] [address] ED with complaints of increased SOB. Chronically wears 6L NC at home. Continue home medication. Given solumedrol in ED and continued. Pulmonology input appreciated. Started on Rocephin. 3.  Chronic respiratory failure-recent inpatient stay at [facility] with noted COVID-19 infection. Treated with ABX/steroids x5 days. Admitted 6/3-6/7 and 6/16-6/19 with chronic respiratory failure and COPD exacerbation. Diuresed/received IV Zosyn and Azithromycin last admit. Chronically wears 6L NC at home. Currently on 8 L O2 via NC. CXR showing vascular congestion and basilar densities suggestive of atelectasis. CTA negative for PE. New 1.4 cm nodular opacity noted ANDRÉS. Moderate  Emphysematous changes noted. Pulmonology input appreciated. 4.   LLE pain/swelling- [facility] 2017 removal of infected plate.   6/4 CT left tibia-fibula generalized subcutaneous edema leg

## 2022-07-08 NOTE — PROGRESS NOTES
Occupational Therapy    OT eval and treat orders received and chart reviewed. Attempt made but pt declined stating that both of his legs were too painful to move. Pt declined OOB activity this date. Will check back at another time/date as able/appropriate.     Murali Marie, OTR/L

## 2022-07-08 NOTE — PROGRESS NOTES
[facility] Follow Up Progress Note- Patient admitted after midnight    Admitting Date and Time: 7/7/2022  6:08 PM  Admit Dx: Fever of unknown origin [R50.9]  Leg swelling [M79.89]  Lung nodule [R91.1]  COPD exacerbation (HCC) [J44.1]  Congestive heart failure, unspecified HF chronicity, unspecified heart failure type ([address] [I50.9]    Subjective:  Patient is being followed for Fever of unknown origin [R50.9]  Leg swelling [M79.89]  Lung nodule [R91.1]  COPD exacerbation (HCC) [J44.1]  Congestive heart failure, unspecified HF chronicity, unspecified heart failure type ([address] [I50.9]     Patient sitting up in bed in no acute distress  Reporting he feels much better  On 4 L NC  Normally wears 6L  Ate all of his lunch        ROS: denies fever, chills, cp, sob, n/v, HA unless stated above. Assessment:    Principal Problem:    Fever of unknown origin  Active Problems:    COPD exacerbation (HCC)  Resolved Problems:    * No resolved hospital problems. *      Plan:  1. Acute exacerbation of COPD: pt presented to the ER with progressive sob and leg swelling. + orthopnea. Reporting increased sputum production, cough and wheezing. Temp in .7-Rapid covid neg. Can check viral panel./ CTA lungs: no PE: new 1.4 cm nodular opacity in the left upper lobe which has developed since 6/3. Recent admission 6/16/ to 6/19 for similar symptoms. Prior to that he was admitted at [facility]. IV solumedrol. IV nebulizers. Consult pulmonology. 2.  Chronic respiratory failure: pt wears 6 L at baseline. Currently on 6 L    3. Fever of unknown origin: T 100.7. Leukocytosis 12. Procal 0.11- Blood cultures sent. Check UA. No obvious s/ s infection/ ? Viral. Check resp panel. 4. LLE chronic pain and swelling[de-identified] pt had multiple surgeries in pat. [facility] 2017- removal of infected plate. 6/4 CT left tibia/ fibula - generalized subq edema leg/ ankle. No evidence of abscess.  Thickening and calcification/ossificaition with distal achilles tendon. Orthopedic surgery consulted prior admission- and planning for further eval outpt. Pain appears to be chronic in nature. Pt takes lasix 20mg po daily for swelling. Ultrasound negative for DVT 7/7.    5. Acute on Chronic CHF: 6/5/22 Echo EF 55-60%. CXR showing  Vascular congestion. Lasix IV BID    6. Elevated troponin: trend    7. HTN: continue home meds    8. History of gastric ulcers/ GERD; continue PPI    9. History of anxiety/ depression: continue lorazepam and abilify    10.deconditioninig: PT/OT        Time spent reviewing chart, clinical exam, discussing case and answering questions with staff/consultants/patient/family =30 minutes     NOTE: This report was transcribed using voice recognition software. Every effort was made to ensure accuracy; however, inadvertent computerized transcription errors may be present.   Electronically signed by KIM Pedraza on 7/8/2022 at 8:04 AM

## 2022-07-08 NOTE — CONSULTS
[facility]  [address], [address]  [facility], [address] [place] [address]    Pulmonary Consultation      Reason for Consult:  sob    Requesting Physician:  Festus Fraser MD    CHIEF COMPLAINT:  sob    History Obtained From:  patient    HISTORY OF PRESENT ILLNESS:                The patient is a 64 y.o. male with significant past medical history of COPD who presents with increased swelling of legs about 3-4 days ago and increased sob about 1-2 days later, gradually getting worse. Claims still off smoking though around people who smoke, using oxygen continuously, compliant with medications, (?) got worse shortly after finished up steroids. Noted to be hypoxic with low grade temp when seen at ER. Currently on 4 li NC, claims breathing better, cough with minimal greenish sputum production and swelling of legs similar to admission.     Past Medical History:        Diagnosis Date    COPD [facility]chronic obstructive [facility] disease) [facility]     Hypertension     Multiple gastric ulcers        Past Surgical History:        Procedure Laterality Date    HERNIA REPAIR      HIP SURGERY      KNEE SURGERY         Current Medications:    Current Facility-Administered Medications: sodium chloride flush 0.9 % injection 5-40 mL, 5-40 mL, IntraVENous, 2 times per day  sodium chloride flush 0.9 % injection 5-40 mL, 5-40 mL, IntraVENous, PRN  0.9 % sodium chloride infusion, , IntraVENous, PRN  ondansetron (ZOFRAN-ODT) disintegrating tablet 4 mg, 4 mg, Oral, Q8H PRN **OR** ondansetron (ZOFRAN) injection 4 mg, 4 mg, IntraVENous, Q6H PRN  polyethylene glycol (GLYCOLAX) packet 17 g, 17 g, Oral, Daily PRN  enoxaparin (LOVENOX) injection 40 mg, 40 mg, SubCUTAneous, Daily  acetaminophen (TYLENOL) tablet 650 mg, 650 mg, Oral, Q6H PRN **OR** acetaminophen (TYLENOL) suppository 650 mg, 650 mg, Rectal, Q6H PRN  cefTRIAXone (ROCEPHIN) 1,000 mg in sterile water 10 mL IV syringe, 1,000 mg, IntraVENous, Q24H  furosemide (LASIX) injection 40 mg, 40 mg, IntraVENous, BID  Arformoterol Tartrate (BROVANA) nebulizer solution 15 mcg, 15 mcg, Nebulization, BID  ARIPiprazole (ABILIFY) tablet 5 mg, 5 mg, Oral, Daily  FLUoxetine (PROZAC) capsule 20 mg, 20 mg, Oral, Daily  gabapentin (NEURONTIN) capsule 100 mg, 100 mg, Oral, TID  HYDROcodone-acetaminophen (NORCO) 7.5-325 MG per tablet 1 tablet, 1 tablet, Oral, BID PRN  ipratropium-albuterol (DUONEB) nebulizer solution 3 mL, 1 vial, Inhalation, Q4H PRN  LORazepam (ATIVAN) tablet 1 mg, 1 mg, Oral, TID  metoprolol tartrate (LOPRESSOR) tablet 25 mg, 25 mg, Oral, BID  pantoprazole (PROTONIX) tablet 40 mg, 40 mg, Oral, QAM AC  potassium chloride (KLOR-CON M) extended release tablet 10 mEq, 10 mEq, Oral, Daily  pramipexole (MIRAPEX) tablet 0.125 mg, 0.125 mg, Oral, Nightly  methylPREDNISolone sodium (SOLU-MEDROL) injection 40 mg, 40 mg, IntraVENous, Q8H  budesonide (PULMICORT) nebulizer suspension 500 mcg, 0.5 mg, Nebulization, BID  insulin lispro (HUMALOG) injection vial 0-6 Units, 0-6 Units, SubCUTAneous, TID WC  insulin lispro (HUMALOG) injection vial 0-3 Units, 0-3 Units, SubCUTAneous, Nightly  glucose chewable tablet 16 g, 4 tablet, Oral, PRN  dextrose bolus 10% 125 mL, 125 mL, IntraVENous, PRN **OR** dextrose bolus 10% 250 mL, 250 mL, IntraVENous, PRN  glucagon (rDNA) injection 1 mg, 1 mg, IntraMUSCular, PRN  dextrose 5 % solution, 100 mL/hr, IntraVENous, PRN    Allergies:  Aspirin, Lisinopril, Other, Tramadol, Ibuprofen, and Sulfa antibiotics    Social History:    TOBACCO:   reports that he quit smoking about 20 months ago. His smoking use included cigarettes. He started smoking about 27 years ago. He has a 100.00 pack-year smoking history.  He has never used smokeless tobacco.    Family History:       Problem Relation Age of Onset    Colon Cancer Mother     Other Father         house fire    No Known Problems Sister     No Known Problems Brother     No Known Problems Sister     No Known Problems Brother        REVIEW OF SYSTEMS:    RESPIRATORY:  Sob and cough  CARDIOVASCULAR:  Swelling of legs with pain  Remainder of complete ROS is negative. PHYSICAL EXAM:      Vitals:    /71   Pulse 78   Temp 97.5 °F (36.4 °C) (Oral)   Resp 18   Ht 5' 4\" (1.626 m)   Wt 136 lb 11 oz (62 kg)   SpO2 96%   BMI 23.46 kg/m²     EYES:  Lids and lashes normal, pupils equal, round and reactive to light, extra ocular muscles intact, sclera clear, conjunctiva normal  ENT:  Normocephalic, without obvious abnormality, atraumatic, sinuses nontender on palpation, external ears without lesions, oral pharynx with moist mucus membranes, tonsils without erythema or exudates, gums normal and good dentition. NECK:  Supple, symmetrical, trachea midline, no adenopathy, thyroid symmetric, not enlarged and no tenderness, skin normal  LUNGS:  Bilateral ronchi worse with cough  CARDIOVASCULAR:  Normal apical impulse, regular rate and rhythm, normal S1 and S2, no S3 or S4, and no murmur noted  ABDOMEN:  No scars, normal bowel sounds, soft, non-distended, non-tender, no masses palpated, no hepatosplenomegally  MUSCULOSKELETAL:  Minimal bipedal edema with tenderness with palpation  NEUROLOGIC:  Awake, alert, oriented to name, place and time. Cranial nerves II-XII are grossly intact. DATA:    CBC: Recent Labs     07/07/22 1908   WBC 12.4*   HGB 9.8*   HCT 31.4*   .0*          BMP:  Recent Labs     07/07/22 1908 07/08/22  0510    135   K 4.4 4.5   CL 89* 91*   CO2 32* 29   BUN 11 15   CREATININE 0.8 0.9    ALB:3,BILIDIR:3,BILITOT:3,ALKPHOS:3)@    PT/INR: No results for input(s): PROTIME, INR in the last 72 hours. ABG:   No results for input(s): PH, PO2, PCO2, HCO3, BE, O2SAT, METHB, O2HB, COHB, O2CON, HHB, THB in the last 72 hours.           Radiology Review:  CTA chest reviewed with (-) PE, small GG interstitial nodule left upper lobe and slightly increased patchy opacity/congestion both lower lobes compared to previous    IMPRESSION/RECOMMENDATIONS:      COPD  Hypoxia  CHF    1. Cont with nebs, can get chest vest and observe respiratory function and cough  2. Cont with oxygen, taper as tolerated  3. Watch fluid balance, diurese as per PCP  4. Sputum culture and infectious titers  5. Cont with steroids, taper as tolerated  6. OOB to chair as tolerated      Time at the bedside, reviewing labs and radiographs, reviewing notes and consultations, discussing with staff and family was more than 55 minutes. Thanks for letting us see this patient in consultation. Please contact us with any questions. Office [phone] or after hours through [employer], x [phone].

## 2022-07-08 NOTE — ED NOTES
Patient refuse to ambulate for walking pox. States \"my legs are too bad to walk\".      [employer], RN  07/07/22 2665

## 2022-07-08 NOTE — PROGRESS NOTES
Physician Progress Note      Maria Isabel Oliver  CSN #:                  [id number]  :                       1965  ADMIT DATE:       2022 6:08 PM  [address] DATE:  RESPONDING  PROVIDER #:        Meghna Hardy DO          QUERY TEXT:    Dear Attending Physician,    Pt admitted with COPD Ex and has Acute on Chronic CHF documented. If possible,   please document in progress notes and discharge summary further specificity   regarding the type of CHF:    The medical record reflects the following:  Risk Factors: HTN, COPD Ex, ch resp failure  Clinical Indicators: Per PCP  ,\". .. Acute on Chronic CHF: 22 Echo EF   55-60%. CXR showing  Vascular congestion. Lasix IV BID. Kristina Gallery Kristina Gallery \"  BNP 1298  Treatment: IV Lasix    Thank you,  Aida Watkins RN [facility]  Clinical Documentation Improvement Specialist  [phone]  Options provided:  -- Acute on Chronic Systolic CHF/HFrEF  -- Acute on Chronic Diastolic CHF/HFpEF  -- Acute on Chronic Systolic and Diastolic CHF  -- Other - I will add my own diagnosis  -- Disagree - Not applicable / Not valid  -- Disagree - Clinically unable to determine / Unknown  -- Refer to Clinical Documentation Reviewer    PROVIDER RESPONSE TEXT:    This patient is in acute on chronic diastolic CHF/HFpEF.     Query created by: Luis Eduardo Justice on 2022 1:12 PM      Electronically signed by:  Meghna Hardy DO 2022 2:55 PM

## 2022-07-09 LAB
ADENOVIRUS BY PCR: NOT DETECTED
ANION GAP SERPL CALCULATED.3IONS-SCNC: 13 MMOL/L (ref 7–16)
BASOPHILIC STIPPLING: ABNORMAL
BASOPHILS ABSOLUTE: 0 E9/L (ref 0–0.2)
BASOPHILS RELATIVE PERCENT: 0 % (ref 0–2)
BORDETELLA PARAPERTUSSIS BY PCR: NOT DETECTED
BORDETELLA PERTUSSIS BY PCR: NOT DETECTED
BUN BLDV-MCNC: 20 MG/DL (ref 6–20)
CALCIUM SERPL-MCNC: 7.5 MG/DL (ref 8.6–10.2)
CHLAMYDOPHILIA PNEUMONIAE BY PCR: NOT DETECTED
CHLORIDE BLD-SCNC: 91 MMOL/L (ref 98–107)
CO2: 33 MMOL/L (ref 22–29)
CORONAVIRUS 229E BY PCR: NOT DETECTED
CORONAVIRUS HKU1 BY PCR: NOT DETECTED
CORONAVIRUS NL63 BY PCR: NOT DETECTED
CORONAVIRUS OC43 BY PCR: NOT DETECTED
CREAT SERPL-MCNC: 0.9 MG/DL (ref 0.7–1.2)
EOSINOPHILS ABSOLUTE: 0 E9/L (ref 0.05–0.5)
EOSINOPHILS RELATIVE PERCENT: 0 % (ref 0–6)
GFR AFRICAN AMERICAN: >60
GFR NON-AFRICAN AMERICAN: >60 ML/MIN/1.73
GLUCOSE BLD-MCNC: 132 MG/DL (ref 74–99)
HCT VFR BLD CALC: 29.7 % (ref 37–54)
HEMOGLOBIN: 9.3 G/DL (ref 12.5–16.5)
HUMAN METAPNEUMOVIRUS BY PCR: NOT DETECTED
HUMAN RHINOVIRUS/ENTEROVIRUS BY PCR: NOT DETECTED
IMMATURE GRANULOCYTES #: 0.05 E9/L
IMMATURE GRANULOCYTES %: 0.5 % (ref 0–5)
INFLUENZA A BY PCR: NOT DETECTED
INFLUENZA B BY PCR: NOT DETECTED
L. PNEUMOPHILA SEROGP 1 UR AG: NORMAL
LYMPHOCYTES ABSOLUTE: 0.39 E9/L (ref 1.5–4)
LYMPHOCYTES RELATIVE PERCENT: 4.2 % (ref 20–42)
MCH RBC QN AUTO: 31.5 PG (ref 26–35)
MCHC RBC AUTO-ENTMCNC: 31.3 % (ref 32–34.5)
MCV RBC AUTO: 100.7 FL (ref 80–99.9)
METER GLUCOSE: 136 MG/DL (ref 74–99)
METER GLUCOSE: 147 MG/DL (ref 74–99)
METER GLUCOSE: 152 MG/DL (ref 74–99)
METER GLUCOSE: 161 MG/DL (ref 74–99)
MONOCYTES ABSOLUTE: 0.28 E9/L (ref 0.1–0.95)
MONOCYTES RELATIVE PERCENT: 3 % (ref 2–12)
MYCOPLASMA PNEUMONIAE BY PCR: NOT DETECTED
NEUTROPHILS ABSOLUTE: 8.64 E9/L (ref 1.8–7.3)
NEUTROPHILS RELATIVE PERCENT: 92.3 % (ref 43–80)
PARAINFLUENZA VIRUS 1 BY PCR: NOT DETECTED
PARAINFLUENZA VIRUS 2 BY PCR: NOT DETECTED
PARAINFLUENZA VIRUS 3 BY PCR: NOT DETECTED
PARAINFLUENZA VIRUS 4 BY PCR: NOT DETECTED
PDW BLD-RTO: 14.8 FL (ref 11.5–15)
PLATELET # BLD: 146 E9/L (ref 130–450)
PMV BLD AUTO: 10.7 FL (ref 7–12)
POLYCHROMASIA: ABNORMAL
POTASSIUM SERPL-SCNC: 4.1 MMOL/L (ref 3.5–5)
RBC # BLD: 2.95 E12/L (ref 3.8–5.8)
RESPIRATORY SYNCYTIAL VIRUS BY PCR: NOT DETECTED
SARS-COV-2, PCR: NOT DETECTED
SODIUM BLD-SCNC: 137 MMOL/L (ref 132–146)
STREP PNEUMONIAE ANTIGEN, URINE: NORMAL
WBC # BLD: 9.4 E9/L (ref 4.5–11.5)

## 2022-07-09 PROCEDURE — 36415 COLL VENOUS BLD VENIPUNCTURE: CPT

## 2022-07-09 PROCEDURE — 6370000000 HC RX 637 (ALT 250 FOR IP): Performed by: NURSE PRACTITIONER

## 2022-07-09 PROCEDURE — 97161 PT EVAL LOW COMPLEX 20 MIN: CPT

## 2022-07-09 PROCEDURE — 2060000000 HC ICU INTERMEDIATE R&B

## 2022-07-09 PROCEDURE — 99232 SBSQ HOSP IP/OBS MODERATE 35: CPT | Performed by: INTERNAL MEDICINE

## 2022-07-09 PROCEDURE — 6360000002 HC RX W HCPCS: Performed by: NURSE PRACTITIONER

## 2022-07-09 PROCEDURE — 6370000000 HC RX 637 (ALT 250 FOR IP): Performed by: INTERNAL MEDICINE

## 2022-07-09 PROCEDURE — 82962 GLUCOSE BLOOD TEST: CPT

## 2022-07-09 PROCEDURE — APPSS30 APP SPLIT SHARED TIME 16-30 MINUTES: Performed by: NURSE PRACTITIONER

## 2022-07-09 PROCEDURE — 2580000003 HC RX 258: Performed by: NURSE PRACTITIONER

## 2022-07-09 PROCEDURE — 85025 COMPLETE CBC W/AUTO DIFF WBC: CPT

## 2022-07-09 PROCEDURE — 94668 MNPJ CHEST WALL SBSQ: CPT

## 2022-07-09 PROCEDURE — 94669 MECHANICAL CHEST WALL OSCILL: CPT

## 2022-07-09 PROCEDURE — 0202U NFCT DS 22 TRGT SARS-COV-2: CPT

## 2022-07-09 PROCEDURE — 80048 BASIC METABOLIC PNL TOTAL CA: CPT

## 2022-07-09 PROCEDURE — 2700000000 HC OXYGEN THERAPY PER DAY

## 2022-07-09 PROCEDURE — 94640 AIRWAY INHALATION TREATMENT: CPT

## 2022-07-09 RX ORDER — FUROSEMIDE 40 MG/1
40 TABLET ORAL DAILY
Status: DISCONTINUED | OUTPATIENT
Start: 2022-07-10 | End: 2022-07-11 | Stop reason: HOSPADM

## 2022-07-09 RX ORDER — METHYLPREDNISOLONE SODIUM SUCCINATE 40 MG/ML
40 INJECTION, POWDER, LYOPHILIZED, FOR SOLUTION INTRAMUSCULAR; INTRAVENOUS EVERY 12 HOURS
Status: DISCONTINUED | OUTPATIENT
Start: 2022-07-09 | End: 2022-07-11 | Stop reason: HOSPADM

## 2022-07-09 RX ADMIN — BUDESONIDE 500 MCG: 0.5 SUSPENSION RESPIRATORY (INHALATION) at 21:17

## 2022-07-09 RX ADMIN — ACETAMINOPHEN 650 MG: 325 TABLET ORAL at 03:51

## 2022-07-09 RX ADMIN — ARFORMOTEROL TARTRATE 15 MCG: 15 SOLUTION RESPIRATORY (INHALATION) at 08:17

## 2022-07-09 RX ADMIN — IPRATROPIUM BROMIDE AND ALBUTEROL SULFATE 1 AMPULE: 2.5; .5 SOLUTION RESPIRATORY (INHALATION) at 12:00

## 2022-07-09 RX ADMIN — BUDESONIDE 500 MCG: 0.5 SUSPENSION RESPIRATORY (INHALATION) at 08:17

## 2022-07-09 RX ADMIN — INSULIN LISPRO 1 UNITS: 100 INJECTION, SOLUTION INTRAVENOUS; SUBCUTANEOUS at 20:59

## 2022-07-09 RX ADMIN — LORAZEPAM 1 MG: 1 TABLET ORAL at 20:52

## 2022-07-09 RX ADMIN — INSULIN LISPRO 1 UNITS: 100 INJECTION, SOLUTION INTRAVENOUS; SUBCUTANEOUS at 06:07

## 2022-07-09 RX ADMIN — GABAPENTIN 100 MG: 100 CAPSULE ORAL at 09:28

## 2022-07-09 RX ADMIN — ARIPIPRAZOLE 5 MG: 5 TABLET ORAL at 09:28

## 2022-07-09 RX ADMIN — PRAMIPEXOLE DIHYDROCHLORIDE 0.12 MG: 0.12 TABLET ORAL at 20:52

## 2022-07-09 RX ADMIN — PANTOPRAZOLE SODIUM 40 MG: 40 TABLET, DELAYED RELEASE ORAL at 06:07

## 2022-07-09 RX ADMIN — METHYLPREDNISOLONE SODIUM SUCCINATE 40 MG: 40 INJECTION, POWDER, LYOPHILIZED, FOR SOLUTION INTRAMUSCULAR; INTRAVENOUS at 03:50

## 2022-07-09 RX ADMIN — FLUOXETINE 20 MG: 20 CAPSULE ORAL at 09:28

## 2022-07-09 RX ADMIN — IPRATROPIUM BROMIDE AND ALBUTEROL SULFATE 1 AMPULE: 2.5; .5 SOLUTION RESPIRATORY (INHALATION) at 15:51

## 2022-07-09 RX ADMIN — HYDROCODONE BITARTRATE AND ACETAMINOPHEN 1 TABLET: 7.5; 325 TABLET ORAL at 18:10

## 2022-07-09 RX ADMIN — LORAZEPAM 1 MG: 1 TABLET ORAL at 15:38

## 2022-07-09 RX ADMIN — FUROSEMIDE 40 MG: 10 INJECTION, SOLUTION INTRAMUSCULAR; INTRAVENOUS at 09:29

## 2022-07-09 RX ADMIN — GABAPENTIN 100 MG: 100 CAPSULE ORAL at 20:51

## 2022-07-09 RX ADMIN — METOPROLOL TARTRATE 25 MG: 25 TABLET, FILM COATED ORAL at 20:51

## 2022-07-09 RX ADMIN — ENOXAPARIN SODIUM 40 MG: 100 INJECTION SUBCUTANEOUS at 09:29

## 2022-07-09 RX ADMIN — GABAPENTIN 100 MG: 100 CAPSULE ORAL at 15:38

## 2022-07-09 RX ADMIN — LORAZEPAM 1 MG: 1 TABLET ORAL at 09:27

## 2022-07-09 RX ADMIN — Medication 10 ML: at 09:37

## 2022-07-09 RX ADMIN — Medication 10 ML: at 20:52

## 2022-07-09 RX ADMIN — METHYLPREDNISOLONE SODIUM SUCCINATE 40 MG: 40 INJECTION, POWDER, LYOPHILIZED, FOR SOLUTION INTRAMUSCULAR; INTRAVENOUS at 17:27

## 2022-07-09 RX ADMIN — IPRATROPIUM BROMIDE AND ALBUTEROL SULFATE 1 AMPULE: 2.5; .5 SOLUTION RESPIRATORY (INHALATION) at 21:13

## 2022-07-09 RX ADMIN — INSULIN LISPRO 1 UNITS: 100 INJECTION, SOLUTION INTRAVENOUS; SUBCUTANEOUS at 12:08

## 2022-07-09 RX ADMIN — ARFORMOTEROL TARTRATE 15 MCG: 15 SOLUTION RESPIRATORY (INHALATION) at 21:13

## 2022-07-09 RX ADMIN — IPRATROPIUM BROMIDE AND ALBUTEROL SULFATE 1 AMPULE: 2.5; .5 SOLUTION RESPIRATORY (INHALATION) at 06:41

## 2022-07-09 RX ADMIN — HYDROCODONE BITARTRATE AND ACETAMINOPHEN 1 TABLET: 7.5; 325 TABLET ORAL at 06:10

## 2022-07-09 RX ADMIN — POTASSIUM CHLORIDE 10 MEQ: 750 TABLET, EXTENDED RELEASE ORAL at 09:00

## 2022-07-09 RX ADMIN — METOPROLOL TARTRATE 25 MG: 25 TABLET, FILM COATED ORAL at 09:28

## 2022-07-09 ASSESSMENT — PAIN DESCRIPTION - DESCRIPTORS
DESCRIPTORS: THROBBING
DESCRIPTORS: SHARP
DESCRIPTORS: DULL;SHARP

## 2022-07-09 ASSESSMENT — PAIN SCALES - GENERAL
PAINLEVEL_OUTOF10: 8
PAINLEVEL_OUTOF10: 5
PAINLEVEL_OUTOF10: 8
PAINLEVEL_OUTOF10: 5
PAINLEVEL_OUTOF10: 7

## 2022-07-09 ASSESSMENT — PAIN DESCRIPTION - ORIENTATION
ORIENTATION: RIGHT;LEFT

## 2022-07-09 ASSESSMENT — PAIN DESCRIPTION - LOCATION
LOCATION: LEG

## 2022-07-09 ASSESSMENT — PAIN DESCRIPTION - ONSET: ONSET: ON-GOING

## 2022-07-09 ASSESSMENT — PAIN - FUNCTIONAL ASSESSMENT: PAIN_FUNCTIONAL_ASSESSMENT: ACTIVITIES ARE NOT PREVENTED

## 2022-07-09 ASSESSMENT — PAIN DESCRIPTION - FREQUENCY: FREQUENCY: CONTINUOUS

## 2022-07-09 NOTE — PROGRESS NOTES
Physical Therapy  Facility/Department: [address] INTERNAL MEDICINE 2  Physical Therapy Initial Assessment    Name: Mandeep Hernandez  : 1965  MRN: [id number]  Date of Service: 2022        Patient Diagnosis(es): The primary encounter diagnosis was COPD exacerbation ([facility] Diagnoses of Congestive heart failure, unspecified HF chronicity, unspecified heart failure type ([facility] Lung nodule, and Leg swelling were also pertinent to this visit. Past Medical History:  has a past medical history of COPD (chronic obstructive pulmonary disease) ([facility] Hypertension, and Multiple gastric ulcers. Past Surgical History:  has a past surgical history that includes knee surgery; hip surgery; and hernia repair. Requires PT Follow-Up: Yes     Evaluating Therapist: Gasper Hernandez PT     Referring Provider:  KIM Crawford    PT order : PT eval and treat     Room #: 5   DIAGNOSIS:  COPD   PRECAUTIONS: falls, O2     Social:  Pt lives with  Cousin  in a  1 floor plan  With  Ramp  to enter. Prior to admission pt walked with  Ww. Has cane. Uses w/c in community      Initial Evaluation  Date:    Treatment      Short Term/ Long Term   Goals   Was pt agreeable to Eval/treatment?  with encouragement      Does pt have pain? L hip      Bed Mobility  Rolling: NT   Supine to sit: Independent   Sit to supine:  Independent   Scooting:  Independent    independent    Transfers Sit to stand:  S/SBA   Stand to sit:  S/SBA   Stand pivot:  NT    independent    Ambulation    15  feet with ww  with  S/SBA  100  feet with  ww  with  independent       Stair negotiation: ascended and descended NT  N/A   LE ROM  R LE WFL,   Impaired L LE hip ext, knee  Flex , and ankle df      LE strength  R LE 4/ 5   L LE NT      AM- PAC RAW score   18/ 24            Pt is alert and Oriented      Balance: S/SBA .  Fall risk due to  Decreased LE ROM, decreased balance   Endurance: decreased, self limiting        ASSESSMENT  Pt displays functional ability as noted in the objective portion of this evaluation. Conditions Requiring Skilled Therapeutic Intervention:    [x]Decreased strength     [x]Decreased ROM  [x]Decreased functional mobility  [x]Decreased balance   [x]Decreased endurance   []Decreased posture  []Decreased sensation  []Decreased coordination   []Decreased vision  []Decreased safety awareness   [x]Increased pain         Treatment/Education:    Pt in  upon arrival . Pt required encouragement to participate with PT. Refused in AM.  Pt with long standing L LE  ROM deficits: hip in flex and ER, knee limited in flex, and ankle with decreased df . Mobility as above. O2@ 5 LNC. Pulse ox at rest 94%, decreased to 89% with limited mobility, and recovered to 91%. Cues given for proper breathing technique. Pt did not want to walk further for fear of getting SOB. Pt educated on fall risk, safety with mobility        Patient response to education:   Pt verbalized understanding Pt demonstrated skill Pt requires further education in this area   x  x       Comments:  Pt left  In bed per pt request after session, with call light in reach. Rehab potential is Good for reaching above PT goals. Pts/ family goals   1. None stated     Patient and or family understand(s) diagnosis, prognosis, and plan of care. -  Yes     PLAN  PT care will be provided in accordance with the objectives noted above. Whenever appropriate, clear delegation orders will be provided for nursing staff. Exercises and functional mobility practice will be used as well as appropriate assistive devices or modalities to obtain goals. Patient and family education will also be administered as needed.         PLAN OF CARE:    Current Treatment Recommendations     [x] Strengthening to improve independence with functional mobility   [x] ROM to improve independence with functional mobility   [x] Balance Training to improve static/dynamic balance and to reduce fall risk  [x] Endurance Training to improve activity tolerance during functional mobility   [x] Transfer Training to improve safety and independence with all functional transfers   [x] Gait Training to improve gait mechanics, endurance and assess need for appropriate assistive device  [] Stair Training in preparation for safe discharge home and/or into the community   [x] Positioning to prevent skin breakdown and contractures  [x] Safety and Education Training   [x] Patient/Caregiver Education   [] HEP  [] Other     Frequency of treatments will be 2-5x/week x  7 -10 days. Time in: 1250   Time out:  1305      Evaluation Time includes thorough review of current medical information, gathering information on past medical history/social history and prior level of function, completion of standardized testing/informal observation of tasks, assessment of data and education on plan of care and goals.     CPT codes:  [x] Low Complexity PT evaluation 52841  [] Moderate Complexity PT evaluation 25027  [] High Complexity PT evaluation 87757  [] PT Re-evaluation 20037  [] Gait training 14502  minutes  [] Therapeutic activities 67482  minutes  [] Therapeutic exercises 24168  minutes  [] Neuromuscular reeducation 04620  minutes       Erich 18 number:  PT 1989

## 2022-07-09 NOTE — PROGRESS NOTES
[facility] [address] [facility]  [address] [facility], [address], [address]       Pulmonary Progress Note      SUBJECTIVE:  On 5 li NC (usually on 5-6 li as out-pt), claims slightly better with breathing with similar cough and similar sputum production (yellowish). Lying down in bed, claims swelling of lower legs looking better.      OBJECTIVE    Medications    Continuous Infusions:   sodium chloride      dextrose         Scheduled Meds:   methylPREDNISolone  40 mg IntraVENous Q12H    [START ON 7/10/2022] furosemide  40 mg Oral Daily    sodium chloride flush  5-40 mL IntraVENous 2 times per day    enoxaparin  40 mg SubCUTAneous Daily    Arformoterol Tartrate  15 mcg Nebulization BID    ARIPiprazole  5 mg Oral Daily    FLUoxetine  20 mg Oral Daily    gabapentin  100 mg Oral TID    LORazepam  1 mg Oral TID    metoprolol tartrate  25 mg Oral BID    pantoprazole  40 mg Oral QAM AC    potassium chloride  10 mEq Oral Daily    pramipexole  0.125 mg Oral Nightly    budesonide  0.5 mg Nebulization BID    insulin lispro  0-6 Units SubCUTAneous TID WC    insulin lispro  0-3 Units SubCUTAneous Nightly    ipratropium-albuterol  1 ampule Inhalation 4x daily       PRN Meds:sodium chloride flush, sodium chloride, ondansetron **OR** ondansetron, polyethylene glycol, acetaminophen **OR** acetaminophen, HYDROcodone-acetaminophen, ipratropium-albuterol, glucose, dextrose bolus **OR** dextrose bolus, glucagon (rDNA), dextrose    Physical    VITALS:  BP (!) 141/80   Pulse 80   Temp 98 °F (36.7 °C) (Oral)   Resp 18   Ht 5' 4\" (1.626 m)   Wt 136 lb 11 oz (62 kg)   SpO2 98%   BMI 23.46 kg/m²     24HR INTAKE/OUTPUT:      Intake/Output Summary (Last 24 hours) at 2022 1036  Last data filed at 2022 1033  Gross per 24 hour   Intake --   Output 2375 ml   Net -2375 ml       24HR PULSE OXIMETRY RANGE:    SpO2  Av %  Min: 96 %  Max: 100 %    General appearance:

## 2022-07-09 NOTE — PROGRESS NOTES
[facility] Progress Note    Admitting Date and Time: 7/7/2022  6:08 PM  Admit Dx: Fever of unknown origin [R50.9]  Leg swelling [M79.89]  Lung nodule [R91.1]  COPD exacerbation (HCC) [J44.1]  Congestive heart failure, unspecified HF chronicity, unspecified heart failure type ([address] [I50.9]    Subjective:  Patient is being followed for Fever of unknown origin [R50.9]  Leg swelling [M79.89]  Lung nodule [R91.1]  COPD exacerbation (HCC) [J44.1]  Congestive heart failure, unspecified HF chronicity, unspecified heart failure type ([address] [I50.9]       Patient awake and alert resting in bed in no acute distress  Reporting he feels much better today  Currently on 5 L NC  Reporting breathing is still coarse  Ate most of his breakfast- swelling in his legs is better   Wanting to sit up in chair today     ROS: denies fever, chills, cp, sob, n/v, HA unless stated above.       sodium chloride flush  5-40 mL IntraVENous 2 times per day    enoxaparin  40 mg SubCUTAneous Daily    furosemide  40 mg IntraVENous BID    Arformoterol Tartrate  15 mcg Nebulization BID    ARIPiprazole  5 mg Oral Daily    FLUoxetine  20 mg Oral Daily    gabapentin  100 mg Oral TID    LORazepam  1 mg Oral TID    metoprolol tartrate  25 mg Oral BID    pantoprazole  40 mg Oral QAM AC    potassium chloride  10 mEq Oral Daily    pramipexole  0.125 mg Oral Nightly    methylPREDNISolone  40 mg IntraVENous Q8H    budesonide  0.5 mg Nebulization BID    insulin lispro  0-6 Units SubCUTAneous TID WC    insulin lispro  0-3 Units SubCUTAneous Nightly    ipratropium-albuterol  1 ampule Inhalation 4x daily     sodium chloride flush, 5-40 mL, PRN  sodium chloride, , PRN  ondansetron, 4 mg, Q8H PRN   Or  ondansetron, 4 mg, Q6H PRN  polyethylene glycol, 17 g, Daily PRN  acetaminophen, 650 mg, Q6H PRN   Or  acetaminophen, 650 mg, Q6H PRN  HYDROcodone-acetaminophen, 1 tablet, BID PRN  ipratropium-albuterol, 1 vial, Q4H PRN  glucose, 4 tablet, PRN  dextrose bolus, 125 mL, PRN   Or  dextrose bolus, 250 mL, PRN  glucagon (rDNA), 1 mg, PRN  dextrose, 100 mL/hr, PRN         Objective:    /88   Pulse 83   Temp 98.5 °F (36.9 °C) (Oral)   Resp 18   Ht 5' 4\" (1.626 m)   Wt 136 lb 11 oz (62 kg)   SpO2 98%   BMI 23.46 kg/m²   General Appearance: alert and oriented to person, place and time and in no acute distress  Skin: warm and dry  Head: normocephalic and atraumatic  Neck: neck supple and non tender without mass   Pulmonary/Chest: coarse rhonchi throughout   Cardiovascular: normal rate, normal S1 and S2 and no carotid bruits  Abdomen: soft, non-tender, non-distended, normal bowel sounds, no masses or organomegaly  Extremities: no cyanosis, no clubbing and no edema  Neurologic: speech normal         Recent Labs     07/07/22 1908 07/08/22  0510    135   K 4.4 4.5   CL 89* 91*   CO2 32* 29   BUN 11 15   CREATININE 0.8 0.9   GLUCOSE 108* 280*   CALCIUM 7.2* 7.0*       Recent Labs     07/07/22 1908 07/09/22  0217   WBC 12.4* 9.4   RBC 3.11* 2.95*   HGB 9.8* 9.3*   HCT 31.4* 29.7*   .0* 100.7*   MCH 31.5 31.5   MCHC 31.2* 31.3*   RDW 14.6 14.8    146   MPV 10.4 10.7         Assessment:    Principal Problem:    Fever of unknown origin  Active Problems:    COPD exacerbation (HCC)  Resolved Problems:    * No resolved hospital problems. *      Plan:  1. Acute exacerbation of COPD: pt presented to the ER with progressive sob and leg swelling. + orthopnea. Reporting increased sputum production, cough and wheezing. Temp in .7-Rapid covid neg. Can check viral panel./ CTA lungs: no PE: new 1.4 cm nodular opacity in the left upper lobe which has developed since 6/3. Recent admission 6/16/ to 6/19 for similar symptoms. Prior to that he was admitted at [facility]. IV solumedrol. IV nebulizers. Pulmonology consulted- appreciate input. Continues on IV steroids- wean to IV q12     2.   Chronic respiratory failure: pt wears 6 L at baseline. Currently on 5 L     3. Fever of unknown origin: T 100.7. Leukocytosis 12. Procal 0.11- Blood cultures sent. Check UA. No obvious s/ s infection/ ? Viral. Viral panel ordered- does not appear that it was sent.      4. LLE chronic pain and swelling[de-identified] pt had multiple surgeries in pat. Left hip/ knee fusion at [facility]. 2017- removal of infected plate. 6/4 CT left tibia/ fibula - generalized subq edema leg/ ankle. No evidence of abscess. Thickening and calcification/ossificaition with distal achilles tendon. Orthopedic surgery consulted prior admission- and planning for further eval outpt. Pain appears to be chronic in nature. Pt takes lasix 20mg po daily for swelling. Ultrasound negative for DVT 7/7.     5. Acute on Chronic CHF: 6/5/22 Echo EF 55-60%. CXR showing  Vascular congestion. Lasix IV BID- BMP pending. Overall swelling is resolving. Change IV lasix to PO.      6. Elevated troponin: trend     7. HTN: continue home meds     8. History of gastric ulcers/ GERD; continue PPI     9. History of anxiety/ depression: continue lorazepam and abilify     10.deconditioninig: PT/OT- up to chair today         Dispo: possible dc in 24 hours.        Time spent reviewing chart, clinical exam, discussing case and answering questions with staff/consultants/patient/family =25 min      NOTE: This report was transcribed using voice recognition software. Every effort was made to ensure accuracy; however, inadvertent computerized transcription errors may be present.   Electronically signed by KIM Brush on 7/9/2022 at 8:44 AM

## 2022-07-09 NOTE — PLAN OF CARE
Problem: Discharge Planning  Goal: Discharge to home or other facility with appropriate resources  Outcome: Progressing     Problem: Safety - Adult  Goal: Free from fall injury  Outcome: Progressing     Problem: Pain  Goal: Verbalizes/displays adequate comfort level or baseline comfort level  Outcome: Progressing     Problem: Chronic Conditions and Co-morbidities  Goal: Patient's chronic conditions and co-morbidity symptoms are monitored and maintained or improved  Outcome: Progressing

## 2022-07-10 LAB
ANION GAP SERPL CALCULATED.3IONS-SCNC: 13 MMOL/L (ref 7–16)
BUN BLDV-MCNC: 24 MG/DL (ref 6–20)
CALCIUM SERPL-MCNC: 7 MG/DL (ref 8.6–10.2)
CHLORIDE BLD-SCNC: 93 MMOL/L (ref 98–107)
CO2: 30 MMOL/L (ref 22–29)
CREAT SERPL-MCNC: 0.8 MG/DL (ref 0.7–1.2)
CULTURE, RESPIRATORY: ABNORMAL
CULTURE, RESPIRATORY: ABNORMAL
GFR AFRICAN AMERICAN: >60
GFR NON-AFRICAN AMERICAN: >60 ML/MIN/1.73
GLUCOSE BLD-MCNC: 131 MG/DL (ref 74–99)
METER GLUCOSE: 122 MG/DL (ref 74–99)
METER GLUCOSE: 141 MG/DL (ref 74–99)
METER GLUCOSE: 218 MG/DL (ref 74–99)
METER GLUCOSE: 98 MG/DL (ref 74–99)
ORGANISM: ABNORMAL
POTASSIUM SERPL-SCNC: 4.2 MMOL/L (ref 3.5–5)
PROCALCITONIN: 0.06 NG/ML (ref 0–0.08)
SMEAR, RESPIRATORY: ABNORMAL
SODIUM BLD-SCNC: 136 MMOL/L (ref 132–146)

## 2022-07-10 PROCEDURE — 2580000003 HC RX 258: Performed by: NURSE PRACTITIONER

## 2022-07-10 PROCEDURE — 6370000000 HC RX 637 (ALT 250 FOR IP): Performed by: NURSE PRACTITIONER

## 2022-07-10 PROCEDURE — 2060000000 HC ICU INTERMEDIATE R&B

## 2022-07-10 PROCEDURE — 6360000002 HC RX W HCPCS: Performed by: NURSE PRACTITIONER

## 2022-07-10 PROCEDURE — 94669 MECHANICAL CHEST WALL OSCILL: CPT

## 2022-07-10 PROCEDURE — 2700000000 HC OXYGEN THERAPY PER DAY

## 2022-07-10 PROCEDURE — 84145 PROCALCITONIN (PCT): CPT

## 2022-07-10 PROCEDURE — 82962 GLUCOSE BLOOD TEST: CPT

## 2022-07-10 PROCEDURE — APPSS30 APP SPLIT SHARED TIME 16-30 MINUTES: Performed by: NURSE PRACTITIONER

## 2022-07-10 PROCEDURE — 80048 BASIC METABOLIC PNL TOTAL CA: CPT

## 2022-07-10 PROCEDURE — 6370000000 HC RX 637 (ALT 250 FOR IP): Performed by: INTERNAL MEDICINE

## 2022-07-10 PROCEDURE — 36415 COLL VENOUS BLD VENIPUNCTURE: CPT

## 2022-07-10 PROCEDURE — 94640 AIRWAY INHALATION TREATMENT: CPT

## 2022-07-10 PROCEDURE — 99233 SBSQ HOSP IP/OBS HIGH 50: CPT | Performed by: INTERNAL MEDICINE

## 2022-07-10 RX ADMIN — BUDESONIDE 500 MCG: 0.5 SUSPENSION RESPIRATORY (INHALATION) at 07:52

## 2022-07-10 RX ADMIN — PANTOPRAZOLE SODIUM 40 MG: 40 TABLET, DELAYED RELEASE ORAL at 06:09

## 2022-07-10 RX ADMIN — Medication 10 ML: at 20:33

## 2022-07-10 RX ADMIN — IPRATROPIUM BROMIDE AND ALBUTEROL SULFATE 1 AMPULE: 2.5; .5 SOLUTION RESPIRATORY (INHALATION) at 07:52

## 2022-07-10 RX ADMIN — GABAPENTIN 100 MG: 100 CAPSULE ORAL at 20:33

## 2022-07-10 RX ADMIN — METOPROLOL TARTRATE 25 MG: 25 TABLET, FILM COATED ORAL at 09:32

## 2022-07-10 RX ADMIN — GABAPENTIN 100 MG: 100 CAPSULE ORAL at 14:52

## 2022-07-10 RX ADMIN — BUDESONIDE 500 MCG: 0.5 SUSPENSION RESPIRATORY (INHALATION) at 19:08

## 2022-07-10 RX ADMIN — GABAPENTIN 100 MG: 100 CAPSULE ORAL at 09:32

## 2022-07-10 RX ADMIN — FLUOXETINE 20 MG: 20 CAPSULE ORAL at 09:32

## 2022-07-10 RX ADMIN — HYDROCODONE BITARTRATE AND ACETAMINOPHEN 1 TABLET: 7.5; 325 TABLET ORAL at 18:10

## 2022-07-10 RX ADMIN — ARFORMOTEROL TARTRATE 15 MCG: 15 SOLUTION RESPIRATORY (INHALATION) at 19:08

## 2022-07-10 RX ADMIN — IPRATROPIUM BROMIDE AND ALBUTEROL SULFATE 1 AMPULE: 2.5; .5 SOLUTION RESPIRATORY (INHALATION) at 12:51

## 2022-07-10 RX ADMIN — INSULIN LISPRO 1 UNITS: 100 INJECTION, SOLUTION INTRAVENOUS; SUBCUTANEOUS at 16:58

## 2022-07-10 RX ADMIN — METHYLPREDNISOLONE SODIUM SUCCINATE 40 MG: 40 INJECTION, POWDER, LYOPHILIZED, FOR SOLUTION INTRAMUSCULAR; INTRAVENOUS at 16:58

## 2022-07-10 RX ADMIN — IPRATROPIUM BROMIDE AND ALBUTEROL SULFATE 3 ML: .5; 2.5 SOLUTION RESPIRATORY (INHALATION) at 01:56

## 2022-07-10 RX ADMIN — FUROSEMIDE 40 MG: 40 TABLET ORAL at 09:41

## 2022-07-10 RX ADMIN — ARIPIPRAZOLE 5 MG: 5 TABLET ORAL at 09:32

## 2022-07-10 RX ADMIN — HYDROCODONE BITARTRATE AND ACETAMINOPHEN 1 TABLET: 7.5; 325 TABLET ORAL at 05:56

## 2022-07-10 RX ADMIN — ACETAMINOPHEN 650 MG: 325 TABLET ORAL at 00:39

## 2022-07-10 RX ADMIN — LORAZEPAM 1 MG: 1 TABLET ORAL at 20:33

## 2022-07-10 RX ADMIN — METHYLPREDNISOLONE SODIUM SUCCINATE 40 MG: 40 INJECTION, POWDER, LYOPHILIZED, FOR SOLUTION INTRAMUSCULAR; INTRAVENOUS at 05:57

## 2022-07-10 RX ADMIN — Medication 20 ML: at 09:00

## 2022-07-10 RX ADMIN — ENOXAPARIN SODIUM 40 MG: 100 INJECTION SUBCUTANEOUS at 09:32

## 2022-07-10 RX ADMIN — METOPROLOL TARTRATE 25 MG: 25 TABLET, FILM COATED ORAL at 20:33

## 2022-07-10 RX ADMIN — IPRATROPIUM BROMIDE AND ALBUTEROL SULFATE 1 AMPULE: 2.5; .5 SOLUTION RESPIRATORY (INHALATION) at 23:47

## 2022-07-10 RX ADMIN — IPRATROPIUM BROMIDE AND ALBUTEROL SULFATE 1 AMPULE: 2.5; .5 SOLUTION RESPIRATORY (INHALATION) at 19:08

## 2022-07-10 RX ADMIN — INSULIN LISPRO 1 UNITS: 100 INJECTION, SOLUTION INTRAVENOUS; SUBCUTANEOUS at 20:36

## 2022-07-10 RX ADMIN — ARFORMOTEROL TARTRATE 15 MCG: 15 SOLUTION RESPIRATORY (INHALATION) at 07:52

## 2022-07-10 RX ADMIN — LORAZEPAM 1 MG: 1 TABLET ORAL at 09:32

## 2022-07-10 RX ADMIN — PRAMIPEXOLE DIHYDROCHLORIDE 0.12 MG: 0.12 TABLET ORAL at 20:41

## 2022-07-10 RX ADMIN — POTASSIUM CHLORIDE 10 MEQ: 750 TABLET, EXTENDED RELEASE ORAL at 09:41

## 2022-07-10 RX ADMIN — LORAZEPAM 1 MG: 1 TABLET ORAL at 14:52

## 2022-07-10 ASSESSMENT — PAIN DESCRIPTION - ORIENTATION
ORIENTATION: RIGHT;LEFT

## 2022-07-10 ASSESSMENT — PAIN SCALES - GENERAL
PAINLEVEL_OUTOF10: 0
PAINLEVEL_OUTOF10: 8
PAINLEVEL_OUTOF10: 6
PAINLEVEL_OUTOF10: 0
PAINLEVEL_OUTOF10: 9

## 2022-07-10 ASSESSMENT — PAIN DESCRIPTION - DESCRIPTORS
DESCRIPTORS: ACHING;DISCOMFORT;CRAMPING
DESCRIPTORS: SHARP
DESCRIPTORS: SHARP
DESCRIPTORS: ACHING;CRAMPING

## 2022-07-10 ASSESSMENT — PAIN DESCRIPTION - LOCATION
LOCATION: LEG

## 2022-07-10 NOTE — PLAN OF CARE
Problem: Discharge Planning  Goal: Discharge to home or other facility with appropriate resources  Outcome: Progressing     Problem: Safety - Adult  Goal: Free from fall injury  Outcome: Progressing  Flowsheets (Taken 7/9/2022 1643 by Deb Miller RN)  Free From Fall Injury: Instruct family/caregiver on patient safety     Problem: Chronic Conditions and Co-morbidities  Goal: Patient's chronic conditions and co-morbidity symptoms are monitored and maintained or improved  Outcome: Progressing

## 2022-07-10 NOTE — PROGRESS NOTES
[facility]  [address] [facility], [address], [address]       Pulmonary Progress Note      SUBJECTIVE:  On 5 li NC (usually on 5-6 li as out-pt), claims similar with sob and cough compared to yesterday, not feeling at baseline yet, feels chest vest helping with expectoration. Lying down in bed, claims swelling of lower legs looking better.      OBJECTIVE    Medications    Continuous Infusions:   sodium chloride      dextrose         Scheduled Meds:   methylPREDNISolone  40 mg IntraVENous Q12H    furosemide  40 mg Oral Daily    sodium chloride flush  5-40 mL IntraVENous 2 times per day    enoxaparin  40 mg SubCUTAneous Daily    Arformoterol Tartrate  15 mcg Nebulization BID    ARIPiprazole  5 mg Oral Daily    FLUoxetine  20 mg Oral Daily    gabapentin  100 mg Oral TID    LORazepam  1 mg Oral TID    metoprolol tartrate  25 mg Oral BID    pantoprazole  40 mg Oral QAM AC    potassium chloride  10 mEq Oral Daily    pramipexole  0.125 mg Oral Nightly    budesonide  0.5 mg Nebulization BID    insulin lispro  0-6 Units SubCUTAneous TID WC    insulin lispro  0-3 Units SubCUTAneous Nightly    ipratropium-albuterol  1 ampule Inhalation 4x daily       PRN Meds:sodium chloride flush, sodium chloride, ondansetron **OR** ondansetron, polyethylene glycol, acetaminophen **OR** acetaminophen, HYDROcodone-acetaminophen, ipratropium-albuterol, glucose, dextrose bolus **OR** dextrose bolus, glucagon (rDNA), dextrose    Physical    VITALS:  BP (!) 163/72   Pulse 77   Temp 98.6 °F (37 °C) (Oral)   Resp 18   Ht 5' 4\" (1.626 m)   Wt 136 lb 11 oz (62 kg)   SpO2 98%   BMI 23.46 kg/m²     24HR INTAKE/OUTPUT:      Intake/Output Summary (Last 24 hours) at 7/10/2022 1333  Last data filed at 7/10/2022 1239  Gross per 24 hour   Intake --   Output 875 ml   Net -875 ml       24HR PULSE OXIMETRY RANGE:    SpO2  Av %  Min: 96 %  Max: 98 %    General appearance: alert, appears stated age and cooperative  Lungs: rhonchi bilaterally worse with cough  Heart: regular rate and rhythm, S1, S2 normal, no murmur, click, rub or gallop  Abdomen: soft, non-tender; bowel sounds normal; no masses,  no organomegaly  Extremities: edema bipedal minimal  Neurologic: Mental status: Alert, oriented, thought content appropriate    Data    CBC:   Recent Labs     07/07/22  1908 07/09/22  0217   WBC 12.4* 9.4   HGB 9.8* 9.3*   HCT 31.4* 29.7*   .0* 100.7*    146       BMP:  Recent Labs     07/08/22  0510 07/09/22  1315 07/10/22  0320    137 136   K 4.5 4.1 4.2   CL 91* 91* 93*   CO2 29 33* 30*   BUN 15 20 24*   CREATININE 0.9 0.9 0.8    ALB:3,BILIDIR:3,BILITOT:3,ALKPHOS:3)@    PT/INR: No results for input(s): PROTIME, INR in the last 72 hours. ABG:   No results for input(s): PH, PO2, PCO2, HCO3, BE, O2SAT, METHB, O2HB, COHB, O2CON, HHB, THB in the last 72 hours. Radiology/Other tests reviewed: none    Assessment:     Principal Problem:    Fever of unknown origin  Active Problems:    COPD exacerbation (HCC)  Resolved Problems:    * No resolved hospital problems. *      Plan:       1. Cont with nebs and chest vest, observe respiratory function and cough/sputum production  2. Cont with steroids, taper as tolerated, maintain current dose today and tomorrow for now  3. Cont with oxygen, taper as tolerated  4. procalcitonin normal, infectious titers (-), sputum growing pseudomonas which has been present for past few admissions, would observe for now and see what sensitivities are  5. OOB to chair, ambulate with assistance    Time at the bedside, reviewing labs and radiographs, reviewing notes and consultations, discussing with staff and family was more than 35 minutes. Thanks for letting us see this patient in consultation. Please contact us with any questions. Office [phone] or after hours through Drive, x [phone].

## 2022-07-10 NOTE — PROGRESS NOTES
[facility] Progress Note    Admitting Date and Time: 7/7/2022  6:08 PM  Admit Dx: Fever of unknown origin [R50.9]  Leg swelling [M79.89]  Lung nodule [R91.1]  COPD exacerbation (HCC) [J44.1]  Congestive heart failure, unspecified HF chronicity, unspecified heart failure type ([address] [I50.9]    Subjective:  Patient is being followed for Fever of unknown origin [R50.9]  Leg swelling [M79.89]  Lung nodule [R91.1]  COPD exacerbation (HCC) [J44.1]  Congestive heart failure, unspecified HF chronicity, unspecified heart failure type ([address] [I50.9]     Patient reporting he feels miserable today  Nursing attempted to get him out of bed and walk him on his 6L  Per nursing pt became very sob/ then anxious. His pulse ox maintained in the 60s  Pt reporting he is upset about having \" bad lungs\"  Per pt he is normally able to walk that distance at home with no issue      ROS: denies fever, chills, cp, sob, n/v, HA unless stated above.       methylPREDNISolone  40 mg IntraVENous Q12H    furosemide  40 mg Oral Daily    sodium chloride flush  5-40 mL IntraVENous 2 times per day    enoxaparin  40 mg SubCUTAneous Daily    Arformoterol Tartrate  15 mcg Nebulization BID    ARIPiprazole  5 mg Oral Daily    FLUoxetine  20 mg Oral Daily    gabapentin  100 mg Oral TID    LORazepam  1 mg Oral TID    metoprolol tartrate  25 mg Oral BID    pantoprazole  40 mg Oral QAM AC    potassium chloride  10 mEq Oral Daily    pramipexole  0.125 mg Oral Nightly    budesonide  0.5 mg Nebulization BID    insulin lispro  0-6 Units SubCUTAneous TID WC    insulin lispro  0-3 Units SubCUTAneous Nightly    ipratropium-albuterol  1 ampule Inhalation 4x daily     sodium chloride flush, 5-40 mL, PRN  sodium chloride, , PRN  ondansetron, 4 mg, Q8H PRN   Or  ondansetron, 4 mg, Q6H PRN  polyethylene glycol, 17 g, Daily PRN  acetaminophen, 650 mg, Q6H PRN   Or  acetaminophen, 650 mg, Q6H PRN  HYDROcodone-acetaminophen, 1 tablet, BID PRN  ipratropium-albuterol, 1 vial, Q4H PRN  glucose, 4 tablet, PRN  dextrose bolus, 125 mL, PRN   Or  dextrose bolus, 250 mL, PRN  glucagon (rDNA), 1 mg, PRN  dextrose, 100 mL/hr, PRN         Objective:    BP (!) 156/88   Pulse 84   Temp 97.8 °F (36.6 °C) (Oral)   Resp 18   Ht 5' 4\" (1.626 m)   Wt 136 lb 11 oz (62 kg)   SpO2 98%   BMI 23.46 kg/m²   General Appearance: alert and oriented to person, place and time and in no acute distress  Skin: warm and dry  Head: normocephalic and atraumatic  Neck: neck supple and non tender without mass   Pulmonary/Chest: coarse rhonchi throughout   Cardiovascular: normal rate, normal S1 and S2 and no carotid bruits  Abdomen: soft, non-tender, non-distended, normal bowel sounds, no masses or organomegaly  Extremities: no cyanosis, no clubbing and no edema  Neurologic: speech normal            Recent Labs     07/08/22  0510 07/09/22  1315 07/10/22  0320    137 136   K 4.5 4.1 4.2   CL 91* 91* 93*   CO2 29 33* 30*   BUN 15 20 24*   CREATININE 0.9 0.9 0.8   GLUCOSE 280* 132* 131*   CALCIUM 7.0* 7.5* 7.0*       Recent Labs     07/07/22  1908 07/09/22  0217   WBC 12.4* 9.4   RBC 3.11* 2.95*   HGB 9.8* 9.3*   HCT 31.4* 29.7*   .0* 100.7*   MCH 31.5 31.5   MCHC 31.2* 31.3*   RDW 14.6 14.8    146   MPV 10.4 10.7         Assessment:    Principal Problem:    Fever of unknown origin  Active Problems:    COPD exacerbation (HCC)  Resolved Problems:    * No resolved hospital problems. *      Plan:  1.  Acute exacerbation of COPD: pt presented to the ER with progressive sob and leg swelling. + orthopnea. Reporting increased sputum production, cough and wheezing. Temp in .7-Rapid covid neg. Can check viral panel./ CTA lungs: no PE: new 1.4 cm nodular opacity in the left upper lobe which has developed since 6/3. Recent admission 6/16/ to 6/19 for similar symptoms. Prior to that he was admitted at [facility]. IV solumedrol. IV nebulizers.  Pulmonology consulted- appreciate input. IV steroids weaned to q12. Nursing attempted to walk pt today and he became very dyspneic with exertion and anxious. Noted that his pulse ox did not drop- stayed in the 90s. Now pt reporting he does not fee well. Noted that sputum culture has pseudomonas. Has had pseudomonas in the past. Will get pulmonology input.     2.  Chronic respiratory failure: pt wears 6 L at baseline. Currently on 5 L     3. Fever of unknown origin: T 100.7. Leukocytosis 12. Procal 0.11- Blood cultures sent. Check UA. No obvious s/ s infection/ ? Viral. Viral panel ordered- does not appear that it was sent.      4. LLE chronic pain and swelling[de-identified] pt had multiple surgeries in pat. Left hip/ knee fusion at [facility]. 2017- removal of infected plate. 6/4 CT left tibia/ fibula - generalized subq edema leg/ ankle. No evidence of abscess. Thickening and calcification/ossificaition with distal achilles tendon. Orthopedic surgery consulted prior admission- and planning for further eval outpt. Pain appears to be chronic in nature. Pt takes lasix 20mg po daily for swelling. Ultrasound negative for DVT 7/7.     5. Acute on Chronic CHF: 6/5/22 Echo EF 55-60%. CXR showing  Vascular congestion. Lasix IV BID- BMP pending. Overall swelling is resolving. Change IV lasix to PO.      6. Elevated troponin: trend     7. HTN: continue home meds     8. History of gastric ulcers/ GERD; continue PPI     9. History of anxiety/ depression: continue lorazepam and abilify     10.deconditioninig: PT/OT- up to chair today            Dispo: possible dc in 24 hours. Time spent reviewing chart, clinical exam, discussing case and answering questions with staff/consultants/patient/family =25 minutes       NOTE: This report was transcribed using voice recognition software. Every effort was made to ensure accuracy; however, inadvertent computerized transcription errors may be present.   Electronically signed by KIM Cardenas on 7/10/2022 at 8:10 AM

## 2022-07-10 NOTE — PROGRESS NOTES
Patient tolerated ambulation poorly. Attempted to walk around the room on baseline 6L O2 and patient was unable to take 3 steps without feeling SOB and complaining that he couldn't breathe. Pulse sat was in the 90s on the 6L. Patient seemed anxious and we sat back in bed. Instructed patient to relax and focus on his breathing. He recovered well on the 6L and pulse ox is now in the high 90s.

## 2022-07-11 ENCOUNTER — APPOINTMENT (OUTPATIENT)
Dept: GENERAL RADIOLOGY | Age: 57
DRG: 194 | End: 2022-07-11
Payer: MEDICAID

## 2022-07-11 VITALS
HEIGHT: 64 IN | RESPIRATION RATE: 18 BRPM | HEART RATE: 71 BPM | DIASTOLIC BLOOD PRESSURE: 74 MMHG | BODY MASS INDEX: 23.34 KG/M2 | SYSTOLIC BLOOD PRESSURE: 160 MMHG | TEMPERATURE: 98.6 F | OXYGEN SATURATION: 100 % | WEIGHT: 136.69 LBS

## 2022-07-11 LAB
ANION GAP SERPL CALCULATED.3IONS-SCNC: 17 MMOL/L (ref 7–16)
BUN BLDV-MCNC: 22 MG/DL (ref 6–20)
CALCIUM SERPL-MCNC: 7.4 MG/DL (ref 8.6–10.2)
CHLORIDE BLD-SCNC: 94 MMOL/L (ref 98–107)
CO2: 28 MMOL/L (ref 22–29)
CREAT SERPL-MCNC: 0.8 MG/DL (ref 0.7–1.2)
GFR AFRICAN AMERICAN: >60
GFR NON-AFRICAN AMERICAN: >60 ML/MIN/1.73
GLUCOSE BLD-MCNC: 107 MG/DL (ref 74–99)
METER GLUCOSE: 111 MG/DL (ref 74–99)
METER GLUCOSE: 125 MG/DL (ref 74–99)
POTASSIUM SERPL-SCNC: 4.8 MMOL/L (ref 3.5–5)
PRO-BNP: 1506 PG/ML (ref 0–125)
SODIUM BLD-SCNC: 139 MMOL/L (ref 132–146)

## 2022-07-11 PROCEDURE — 99239 HOSP IP/OBS DSCHRG MGMT >30: CPT | Performed by: INTERNAL MEDICINE

## 2022-07-11 PROCEDURE — 6360000002 HC RX W HCPCS: Performed by: NURSE PRACTITIONER

## 2022-07-11 PROCEDURE — 80048 BASIC METABOLIC PNL TOTAL CA: CPT

## 2022-07-11 PROCEDURE — 6370000000 HC RX 637 (ALT 250 FOR IP): Performed by: NURSE PRACTITIONER

## 2022-07-11 PROCEDURE — 83880 ASSAY OF NATRIURETIC PEPTIDE: CPT

## 2022-07-11 PROCEDURE — 94640 AIRWAY INHALATION TREATMENT: CPT

## 2022-07-11 PROCEDURE — 2580000003 HC RX 258: Performed by: NURSE PRACTITIONER

## 2022-07-11 PROCEDURE — 97165 OT EVAL LOW COMPLEX 30 MIN: CPT

## 2022-07-11 PROCEDURE — 71045 X-RAY EXAM CHEST 1 VIEW: CPT

## 2022-07-11 PROCEDURE — APPSS45 APP SPLIT SHARED TIME 31-45 MINUTES: Performed by: NURSE PRACTITIONER

## 2022-07-11 PROCEDURE — 2700000000 HC OXYGEN THERAPY PER DAY

## 2022-07-11 PROCEDURE — 36415 COLL VENOUS BLD VENIPUNCTURE: CPT

## 2022-07-11 PROCEDURE — 82962 GLUCOSE BLOOD TEST: CPT

## 2022-07-11 PROCEDURE — 6370000000 HC RX 637 (ALT 250 FOR IP): Performed by: INTERNAL MEDICINE

## 2022-07-11 RX ORDER — GABAPENTIN 100 MG/1
100 CAPSULE ORAL 3 TIMES DAILY
Qty: 90 CAPSULE | Refills: 0 | Status: ON HOLD | OUTPATIENT
Start: 2022-07-11 | End: 2022-10-15

## 2022-07-11 RX ORDER — REVEFENACIN 175 UG/3ML
1 SOLUTION RESPIRATORY (INHALATION) DAILY
Qty: 30 EACH | Refills: 3 | Status: ON HOLD | OUTPATIENT
Start: 2022-07-11

## 2022-07-11 RX ORDER — PREDNISONE 10 MG/1
TABLET ORAL
Qty: 18 TABLET | Refills: 0 | Status: SHIPPED | OUTPATIENT
Start: 2022-07-11 | End: 2022-09-23 | Stop reason: ALTCHOICE

## 2022-07-11 RX ORDER — LEVOFLOXACIN 500 MG/1
500 TABLET, FILM COATED ORAL DAILY
Status: DISCONTINUED | OUTPATIENT
Start: 2022-07-11 | End: 2022-07-11

## 2022-07-11 RX ORDER — FUROSEMIDE 40 MG/1
40 TABLET ORAL DAILY
Qty: 30 TABLET | Refills: 0 | Status: SHIPPED | OUTPATIENT
Start: 2022-07-12 | End: 2022-08-09 | Stop reason: SDUPTHER

## 2022-07-11 RX ADMIN — FLUOXETINE 20 MG: 20 CAPSULE ORAL at 08:05

## 2022-07-11 RX ADMIN — LORAZEPAM 1 MG: 1 TABLET ORAL at 08:05

## 2022-07-11 RX ADMIN — IPRATROPIUM BROMIDE AND ALBUTEROL SULFATE 1 AMPULE: 2.5; .5 SOLUTION RESPIRATORY (INHALATION) at 12:04

## 2022-07-11 RX ADMIN — GABAPENTIN 100 MG: 100 CAPSULE ORAL at 08:05

## 2022-07-11 RX ADMIN — METOPROLOL TARTRATE 25 MG: 25 TABLET, FILM COATED ORAL at 08:05

## 2022-07-11 RX ADMIN — POTASSIUM CHLORIDE 10 MEQ: 750 TABLET, EXTENDED RELEASE ORAL at 08:05

## 2022-07-11 RX ADMIN — BUDESONIDE 500 MCG: 0.5 SUSPENSION RESPIRATORY (INHALATION) at 07:47

## 2022-07-11 RX ADMIN — ARFORMOTEROL TARTRATE 15 MCG: 15 SOLUTION RESPIRATORY (INHALATION) at 07:48

## 2022-07-11 RX ADMIN — PANTOPRAZOLE SODIUM 40 MG: 40 TABLET, DELAYED RELEASE ORAL at 06:03

## 2022-07-11 RX ADMIN — IPRATROPIUM BROMIDE AND ALBUTEROL SULFATE 1 AMPULE: 2.5; .5 SOLUTION RESPIRATORY (INHALATION) at 07:48

## 2022-07-11 RX ADMIN — HYDROCODONE BITARTRATE AND ACETAMINOPHEN 1 TABLET: 7.5; 325 TABLET ORAL at 06:03

## 2022-07-11 RX ADMIN — Medication 10 ML: at 08:06

## 2022-07-11 RX ADMIN — ARIPIPRAZOLE 5 MG: 5 TABLET ORAL at 08:06

## 2022-07-11 RX ADMIN — IPRATROPIUM BROMIDE AND ALBUTEROL SULFATE 3 ML: .5; 2.5 SOLUTION RESPIRATORY (INHALATION) at 04:23

## 2022-07-11 RX ADMIN — FUROSEMIDE 40 MG: 40 TABLET ORAL at 08:06

## 2022-07-11 RX ADMIN — METHYLPREDNISOLONE SODIUM SUCCINATE 40 MG: 40 INJECTION, POWDER, LYOPHILIZED, FOR SOLUTION INTRAMUSCULAR; INTRAVENOUS at 04:36

## 2022-07-11 RX ADMIN — ENOXAPARIN SODIUM 40 MG: 100 INJECTION SUBCUTANEOUS at 08:05

## 2022-07-11 ASSESSMENT — PAIN SCALES - GENERAL
PAINLEVEL_OUTOF10: 10
PAINLEVEL_OUTOF10: 7

## 2022-07-11 ASSESSMENT — PAIN DESCRIPTION - PAIN TYPE: TYPE: CHRONIC PAIN

## 2022-07-11 ASSESSMENT — PAIN - FUNCTIONAL ASSESSMENT: PAIN_FUNCTIONAL_ASSESSMENT: PREVENTS OR INTERFERES SOME ACTIVE ACTIVITIES AND ADLS

## 2022-07-11 ASSESSMENT — PAIN DESCRIPTION - DESCRIPTORS
DESCRIPTORS: ACHING;DISCOMFORT
DESCRIPTORS: ACHING;DISCOMFORT

## 2022-07-11 ASSESSMENT — PAIN DESCRIPTION - LOCATION
LOCATION: LEG
LOCATION: LEG

## 2022-07-11 ASSESSMENT — PAIN DESCRIPTION - ORIENTATION
ORIENTATION: RIGHT;LEFT
ORIENTATION: RIGHT;LEFT

## 2022-07-11 ASSESSMENT — PAIN DESCRIPTION - ONSET: ONSET: ON-GOING

## 2022-07-11 ASSESSMENT — PAIN DESCRIPTION - FREQUENCY: FREQUENCY: INTERMITTENT

## 2022-07-11 NOTE — DISCHARGE SUMMARY
admission 6/16/ to 6/19 for similar symptoms. Prior to that he was admitted at [facility]. IV solumedrol. IV nebulizers. Pulmonology followed- appreciate input. Pt feeling much better today and feels that breathing is close to baseline. He is on his baseline 02. Patient is asking to go home. Ok to [employer] per pulmonology. Steroid taper ordered for dc and inhalers/ nebs reconciled per pulmonology.     2.  Chronic respiratory failure: pt wears 6 L at baseline. Currently on 5 L- on baseline oxygen.     3. Fever of unknown origin- resolved. : T 100.7. Leukocytosis 12. Procal 0.11- Blood cultures sent and negative. . Check UA. No obvious s/ s infection/ ? Viral. Viral panel ordered- negative. No further fevers. + pseudomonas in sputum- concern may be colonized.      4. LLE chronic pain and swelling[de-identified] pt had multiple surgeries in past. [facility]- removal of infected plate. 6/4 CT left tibia/ fibula - generalized subq edema leg/ ankle. No evidence of abscess. Thickening and calcification/ossificaition with distal achilles tendon. Orthopedic surgery consulted prior admission- and planning for further eval outpt. Pain appears to be chronic in nature. Marv Khan Ultrasound negative for DVT 7/7. Outpt follow up with ortho. Swelling resolved and pain is at baseline.     5. Acute on Chronic CHF: 6/5/22 Echo EF 55-60%. CXR showing  Vascular congestion. Initially on Lasix IV BID-  Overall swelling resolved. Change IV lasix to PO. Lasix increased to 40mg po daily for discharge. Neg 4.1 L off.       6. Elevated troponin: trend- no chest pain/ sob     7. HTN: continue home meds     8. History of gastric ulcers/ GERD; continue PPI     9. History of anxiety/ depression: continue lorazepam and abilify     10.deconditioninig: PT/OT- am pac 18. Resume HHC on discharge.      Patient feeling much better today and asking to go home.  Patient discharged in stable condition with the following medications, instructions and follow images are provided for review. Automated exposure control, iterative reconstruction, and/or weight based adjustment of the mA/kV was utilized to reduce the radiation dose to as low as reasonably achievable. COMPARISON: CTA of the chest, 06/03/2022. CONTRAST: 60 cc Isovue-370 injected intravenously. The HISTORY: ORDERING SYSTEM PROVIDED HISTORY: r/o PE TECHNOLOGIST PROVIDED HISTORY: Reason for exam:->r/o PE Decision Support Exception - unselect if not a suspected or confirmed emergency medical condition->Emergency Medical Condition (MA) FINDINGS: Pulmonary Arteries: Pulmonary arteries are adequately opacified for evaluation. No evidence of intraluminal filling defect to suggest pulmonary embolism. Main pulmonary artery is normal in caliber. Mediastinum: The heart is normal in size. Mild calcified atherosclerosis is seen in the aorta. No aneurysm. No lymphadenopathy seen in the chest. Lungs/pleura: Stable pleural thickening and subpleural scarring is seen in the lower lungs, inferior aspects of the middle lobe and lingula as well as the bilateral lower lobes. 1.4 cm subsolid nodular opacity in the left upper lobe has developed in the interim since 06/03/2022 (axial sequence image 32). Moderate emphysematous changes. The central airway is clear. No pneumothorax or pleural effusion is seen. Upper Abdomen: Limited images of the upper abdomen are unremarkable. Soft Tissues/Bones: No acute bone or soft tissue abnormality. 1. No pulmonary embolism. 2. New 1.4 cm subsolid nodular opacity in the left upper lobe, which has developed in the interim since 06/03/2022. Per the recommendations of the [employer] of Radiology, imaging follow-up is recommended with a CT of the chest in 3-6 months. 3. Stable pleural thickening and subpleural scarring in the lower hemithorax. 4. Moderate emphysematous changes.  RECOMMENDATIONS: Unavailable     US DUP LOWER EXTREMITIES BILATERAL VENOUS    Result Date: 7/7/2022  EXAMINATION: DUPLEX VENOUS ULTRASOUND OF THE BILATERAL LOWER EXTREMITIES7/7/2022 7:31 pm TECHNIQUE: Duplex ultrasound using B-mode/gray scaled imaging, Doppler spectral analysis and color flow Doppler was obtained of the deep venous structures of the lower bilateral extremities. COMPARISON: None. HISTORY: ORDERING SYSTEM PROVIDED HISTORY: r/o DVT TECHNOLOGIST PROVIDED HISTORY: Reason for exam:->r/o DVT What reading provider will be dictating this exam?->CRC FINDINGS: The visualized veins of the bilateral lower extremities are patent and free of echogenic thrombus. The veins demonstrate good compressibility with normal color flow study and spectral analysis. Please note the left anterior tibial vein is suboptimally visualized. No evidence of DVT in either lower extremity. RECOMMENDATIONS: Unavailable       Patient Instructions:      Medication List      START taking these medications    predniSONE 10 MG tablet  Commonly known as: DELTASONE  3 tabs daily x3 days, 2 tabs daily x3 days, 1 tab daily x3 days        CHANGE how you take these medications    furosemide 40 MG tablet  Commonly known as: LASIX  Take 1 tablet by mouth daily  Start taking on: July 12, 2022  What changed:   · medication strength  · how much to take     LORazepam 1 MG tablet  Commonly known as: ATIVAN  What changed: Another medication with the same name was removed. Continue taking this medication, and follow the directions you see here. CONTINUE taking these medications    albuterol sulfate  (90 Base) MCG/ACT inhaler  Commonly known as: Ventolin HFA  INHALE TWO (2) PUFFS INTO THE LUNGS EVERY FOUR (4) HOURS AS NEEDED FOR WHEEZING     ARIPiprazole 5 MG tablet  Commonly known as: ABILIFY     BROVANA IN     budesonide 0.5 MG/2ML nebulizer suspension  Commonly known as: PULMICORT  Take 2 mLs by nebulization 2 times daily     Florajen3 Caps  Patient is to take one tab bid.  Patient has been on antibiotics for the last 3 months     FLUoxetine 20 MG capsule  Commonly known as: PROZAC     gabapentin 100 MG capsule  Commonly known as: NEURONTIN  Take 1 capsule by mouth 3 times daily for 30 days. HYDROcodone-acetaminophen 7.5-325 MG per tablet  Commonly known as: NORCO     ipratropium-albuterol 0.5-2.5 (3) MG/3ML Soln nebulizer solution  Commonly known as: DUONEB  Inhale 3 mLs into the lungs every 4 hours as needed for Shortness of Breath     metoprolol tartrate 25 MG tablet  Commonly known as: LOPRESSOR  Take 1 tablet by mouth 2 times daily Twice A Day     omeprazole 40 MG delayed release capsule  Commonly known as: PRILOSEC  Take 1 capsule by mouth daily     potassium chloride 10 MEQ extended release tablet  Commonly known as: KLOR-CON M  Take 1 tablet by mouth daily With lasix     pramipexole 0.125 MG tablet  Commonly known as: Mirapex  Take 1 tablet by mouth nightly     Yupelri 175 MCG/3ML Soln  Generic drug: Revefenacin  Inhale 1 ampule into the lungs daily        STOP taking these medications    Symbicort 160-4.5 MCG/ACT Aero  Generic drug: budesonide-formoterol           Where to Get Your Medications      These medications were sent to [facility], [address] [facility] [address] [address] [address] [address]    Phone: [phone]   · Noris Din 175 MCG/3ML Soln     These medications were sent to [address] [place] [address] [phone]  [address] [place], [place] [address], [address] [place] [address]    Phone: [phone]   · furosemide 40 MG tablet  · gabapentin 100 MG capsule  · predniSONE 10 MG tablet           Note that more than 30 minutes was spent in preparing discharge papers, discussing discharge with patient, medication review, etc.    Signed:  Electronically signed by KIM Cardenas on 7/11/2022 at 11:01 AM

## 2022-07-11 NOTE — PLAN OF CARE
Problem: Discharge Planning  Goal: Discharge to home or other facility with appropriate resources  7/10/2022 2231 by Rachael Tam RN  Outcome: Progressing  7/10/2022 1849 by Jose Orozco RN  Outcome: Progressing     Problem: Safety - Adult  Goal: Free from fall injury  7/10/2022 2231 by Rachael Tam RN  Outcome: Progressing  7/10/2022 1849 by Jose Orozco RN  Outcome: Progressing     Problem: Pain  Goal: Verbalizes/displays adequate comfort level or baseline comfort level  Outcome: Progressing

## 2022-07-11 NOTE — PROGRESS NOTES
Occupational Therapy  OCCUPATIONAL THERAPY INITIAL EVALUATION     Genny Cooper [place], [place]        SIEI:                                                  Patient Name: Mat Vo    MRN: [id number]    : 1965    Room: [address] [place] [address]      Evaluating OT: Shaun De Paz OTR/L [id number]      Referring Provider: KIM Lawrence    Specific Provider Orders/Date: OT eval and treat 22      Diagnosis:  Fever of unknown origin [R50.9]  Leg swelling [M79.89]  Lung nodule [R91.1]  COPD exacerbation ([facility] [address] [J44.1]  Congestive heart failure, unspecified HF chronicity, unspecified heart failure type ([facility] [address] [I50.9]      Pertinent Medical History: COPD, HTN       Precautions:  Fall Risk, alarm, O2     Assessment of current deficits    [x] Functional mobility  [x]ADLs  [x] Strength               []Cognition    [x] Functional transfers   [x] IADLs         [x] Safety Awareness   [x]Endurance    [] Fine Coordination              [x] Balance      [] Vision/perception   []Sensation     []Gross Motor Coordination  [] ROM  [] Delirium                   [] Motor Control     OT PLAN OF CARE   OT POC based on physician orders, patient diagnosis and results of clinical assessment    Frequency/Duration 1-3 days/wk for 2 weeks PRN   Specific OT Treatment Interventions to include:   * Instruction/training on adapted ADL techniques and AE recommendations to increase functional independence within precautions       * Training on energy conservation strategies, correct breathing pattern and techniques to improve independence/tolerance for self-care routine  * Functional transfer/mobility training/DME recommendations for increased independence, safety, and fall prevention  * Patient/Family education to increase follow through with safety techniques and functional independence  * Recommendation of environmental modifications for increased safety with functional transfers/mobility and ADLs  * Therapeutic exercise to improve motor endurance, ROM, and functional strength for ADLs/functional transfers  * Therapeutic activities to facilitate/challenge dynamic balance, stand tolerance for increased safety and independence with ADLs        Recommended Adaptive Equipment: TBD     Home Living: Pt lives with cousin in 1st floor apartment with [address] apartment and ramp entry at building . Bathroom setup: tub/shower with grab bars, BSC over standard height commode   Equipment owned: cane, wheeled walker, O2 6L    Prior Level of Function: assist from aide with ADLs , assist from aide with IADLs (pt has aide 7 days a week for a few hours a day); functional mobility: wheeled walker  Driving: no (sister assists)    Pain Level: pt reported pain in BLE; pt agreeable to therapy  Cognition: A&O: 4/4; WFL command follow demonstrated. Memory:  Fair+   Sequencing:  Fair+   Problem solving:  Fair    Judgement/safety:  Fair      Functional Assessment:  AM-PAC Daily Activity Raw Score: 19/24   Initial Eval Status  Date: 7/11/22 Treatment Status  Date: STGs = LTGs  Time frame: 10-14 days   Feeding Independent      Grooming Sup  Mod I/I   UB Dressing Sup  Mod I/I   LB Dressing Sup  To don/doff slippers   Mod I/I-with use of AD as appropriate/needed   Bathing SBA/Sup  Mod I/I -with use of AD as appropriate/needed   Toileting Sup  For clothing management and to stand at commode to urinate  Mod I/I    Bed Mobility  Supine to sit: independent   Sit to supine: independent   Independent    Functional Transfers Sup with wheeled walker  Sit<>stand from EOB  Independent     Functional Mobility SBA/Sup with wheeled walker  To and from bathroom and short distance in room  Pt with some SOB noted during mobility. On 5L O2 and SpO2 was 97% following mobility.   Independent  -with device as needed to maximize independence with ADLs and functional task completion   Balance Sitting:     Static: Independent     Dynamic: Sup  Standing: Sup with wheeled walker  I for dynamic sitting balance to maximize independence with ADLs and functional task completion    I for standing balance to maximize independence with ADLs and functional task completion   Activity Tolerance Fair with light activity. Pt limited by respiratory status and pain. Good with ADL activity. Pt will demonstrate good understanding of education provided on EC/WS techniques    Visual/  Perceptual Glasses: none at bedside                Additional long-term goal: Pt will increase functional independence to PLOF to allow pt to live in least restrictive environment. Hand Dominance L   AROM (PROM) Strength Additional Info:    RUE  WFL WFL good  and wfl FMC/dexterity noted during ADL tasks       LUE WFL WFL good  and wfl FMC/dexterity noted during ADL tasks       Hearing: WFL   Sensation:  No c/o numbness or tingling   Tone: WFL   Edema: BLE    Comments: Upon arrival patient lying in bed. At end of session, patient returned to bed with call light and phone within reach, all lines and tubes intact, and alarm set. Overall patient demonstrated decreased independence and safety during completion of ADL/functional transfer/mobility tasks. Pt would benefit from continued skilled OT to increase safety and independence with completion of ADL/IADL tasks for functional independence and quality of life. Rehab Potential: Good for established goals     Patient / Family Goal: to return home    Patient and/or family were instructed on functional diagnosis, prognosis/goals and OT plan of care. Demonstrated fair understanding.      Eval Complexity: Low    Time In: 1029  Time Out: 1045    Min Units   OT Eval Low 97165  x  1   OT Eval Medium [facility] High [id number]      OT Re-Eval [id number]       Therapeutic Ex 18840       Therapeutic Activities 70879       ADL/Self Care 89031       Orthotic Management 94378       Manual [id number]     Neuro Re-Ed [id number]

## 2022-07-11 NOTE — PROGRESS NOTES
CLINICAL PHARMACY NOTE: MEDS TO BEDS    Total # of Prescriptions Filled: 2   The following medications were delivered to the patient:  · Prednisone 10 mg  · Furosemide 40 mg    Additional Documentation:

## 2022-07-11 NOTE — PLAN OF CARE
Patient's chart updated to reflect:      . - HF care plan, HF education points and HF discharge instructions.  -Orders: 2 gram sodium diet, daily weights, I/O.  -PCP and cardiology follow up appointments to be scheduled within 7 days of hospital discharge. -CHF education session will be provided to the patient prior to hospital discharge.     Asif Lewis RN BSN  Heart Failure Navigator

## 2022-07-11 NOTE — PROGRESS NOTES
[facility]  [address] [facility], [address], [facility]      Pulmonary Progress Note      SUBJECTIVE:  On 5 li NC (usually on 5-6 li as out-pt), claims feeling slightly better with sob and cough compared to yesterday, feels chest vest helping with expectoration, wondering about going home today    OBJECTIVE    Medications    Continuous Infusions:   sodium chloride      dextrose         Scheduled Meds:   methylPREDNISolone  40 mg IntraVENous Q12H    furosemide  40 mg Oral Daily    sodium chloride flush  5-40 mL IntraVENous 2 times per day    enoxaparin  40 mg SubCUTAneous Daily    Arformoterol Tartrate  15 mcg Nebulization BID    ARIPiprazole  5 mg Oral Daily    FLUoxetine  20 mg Oral Daily    gabapentin  100 mg Oral TID    LORazepam  1 mg Oral TID    metoprolol tartrate  25 mg Oral BID    pantoprazole  40 mg Oral QAM AC    potassium chloride  10 mEq Oral Daily    pramipexole  0.125 mg Oral Nightly    budesonide  0.5 mg Nebulization BID    insulin lispro  0-6 Units SubCUTAneous TID WC    insulin lispro  0-3 Units SubCUTAneous Nightly    ipratropium-albuterol  1 ampule Inhalation 4x daily       PRN Meds:sodium chloride flush, sodium chloride, ondansetron **OR** ondansetron, polyethylene glycol, acetaminophen **OR** acetaminophen, HYDROcodone-acetaminophen, ipratropium-albuterol, glucose, dextrose bolus **OR** dextrose bolus, glucagon (rDNA), dextrose    Physical    VITALS:  BP (!) 160/74   Pulse 71   Temp 98.6 °F (37 °C) (Oral)   Resp 18   Ht 5' 4\" (1.626 m)   Wt 136 lb 11 oz (62 kg)   SpO2 100%   BMI 23.46 kg/m²     24HR INTAKE/OUTPUT:      Intake/Output Summary (Last 24 hours) at 2022 0843  Last data filed at 2022 0332  Gross per 24 hour   Intake --   Output 575 ml   Net -575 ml       24HR PULSE OXIMETRY RANGE:    SpO2  Av.8 %  Min: 96 %  Max: 100 %    General appearance: alert, appears stated age and cooperative  Lungs: rhonchi bilaterally worse with cough  Heart: regular rate and rhythm, S1, S2 normal, no murmur, click, rub or gallop  Abdomen: soft, non-tender; bowel sounds normal; no masses,  no organomegaly  Extremities: edema bipedal minimal  Neurologic: Mental status: Alert, oriented, thought content appropriate    Data    CBC:   Recent Labs     07/09/22  0217   WBC 9.4   HGB 9.3*   HCT 29.7*   .7*          BMP:  Recent Labs     07/09/22  1315 07/10/22  0320 07/11/22  0311    136 139   K 4.1 4.2 4.8   CL 91* 93* 94*   CO2 33* 30* 28   BUN 20 24* 22*   CREATININE 0.9 0.8 0.8    ALB:3,BILIDIR:3,BILITOT:3,ALKPHOS:3)@    PT/INR: No results for input(s): PROTIME, INR in the last 72 hours. ABG:   No results for input(s): PH, PO2, PCO2, HCO3, BE, O2SAT, METHB, O2HB, COHB, O2CON, HHB, THB in the last 72 hours. Radiology/Other tests reviewed: none    Assessment:     Principal Problem:    Fever of unknown origin  Active Problems:    COPD exacerbation (HCC)  Resolved Problems:    * No resolved hospital problems. *      Plan:       1. Resume pulmicort/brovana/yupelri upon discharge, doesn't need chest vest upon discharge  2. Cont with steroids, finish up IV steroid today, can start po prednisone tomorrow  3. Cont with oxygen, taper as tolerated  4. OOB to chair, ambulate with assistance  5. Can be discharged from pulmonary pov today    Time at the bedside, reviewing labs and radiographs, reviewing notes and consultations, discussing with staff and family was more than 35 minutes. Thanks for letting us see this patient in consultation. Please contact us with any questions. Office [phone] or after hours through [employer], x [phone].

## 2022-07-11 NOTE — CARE COORDINATION
Discharge to home w/ his cousin. [facility] notified of discharge- resume order faxed to [facility] @ [phone].   Sam Dave RN case manager

## 2022-07-13 LAB
BLOOD CULTURE, ROUTINE: NORMAL
CULTURE, BLOOD 2: NORMAL

## 2022-07-18 ENCOUNTER — OFFICE VISIT (OUTPATIENT)
Dept: PRIMARY CARE CLINIC | Age: 57
End: 2022-07-18
Payer: MEDICAID

## 2022-07-18 VITALS
DIASTOLIC BLOOD PRESSURE: 70 MMHG | SYSTOLIC BLOOD PRESSURE: 132 MMHG | RESPIRATION RATE: 17 BRPM | OXYGEN SATURATION: 99 % | WEIGHT: 136 LBS | HEIGHT: 64 IN | BODY MASS INDEX: 23.22 KG/M2 | HEART RATE: 79 BPM | TEMPERATURE: 98.6 F

## 2022-07-18 DIAGNOSIS — R05.9 COUGH: ICD-10-CM

## 2022-07-18 DIAGNOSIS — I50.9 ACUTE ON CHRONIC CONGESTIVE HEART FAILURE, UNSPECIFIED HEART FAILURE TYPE (HCC): ICD-10-CM

## 2022-07-18 DIAGNOSIS — R60.0 BILATERAL LOWER EXTREMITY EDEMA: Primary | ICD-10-CM

## 2022-07-18 DIAGNOSIS — J44.9 CHRONIC OBSTRUCTIVE PULMONARY DISEASE, UNSPECIFIED COPD TYPE (HCC): ICD-10-CM

## 2022-07-18 DIAGNOSIS — R60.0 BILATERAL LOWER EXTREMITY EDEMA: ICD-10-CM

## 2022-07-18 DIAGNOSIS — Z09 HOSPITAL DISCHARGE FOLLOW-UP: ICD-10-CM

## 2022-07-18 LAB
ANION GAP SERPL CALCULATED.3IONS-SCNC: 14 MMOL/L (ref 7–16)
BUN BLDV-MCNC: 20 MG/DL (ref 6–20)
CALCIUM SERPL-MCNC: 7 MG/DL (ref 8.6–10.2)
CHLORIDE BLD-SCNC: 97 MMOL/L (ref 98–107)
CO2: 35 MMOL/L (ref 22–29)
CREAT SERPL-MCNC: 0.9 MG/DL (ref 0.7–1.2)
GFR AFRICAN AMERICAN: >60
GFR NON-AFRICAN AMERICAN: >60 ML/MIN/1.73
GLUCOSE BLD-MCNC: 118 MG/DL (ref 74–99)
POTASSIUM SERPL-SCNC: 3.6 MMOL/L (ref 3.5–5)
SODIUM BLD-SCNC: 146 MMOL/L (ref 132–146)

## 2022-07-18 PROCEDURE — 1111F DSCHRG MED/CURRENT MED MERGE: CPT | Performed by: FAMILY MEDICINE

## 2022-07-18 PROCEDURE — 99214 OFFICE O/P EST MOD 30 MIN: CPT | Performed by: FAMILY MEDICINE

## 2022-07-18 RX ORDER — POTASSIUM CHLORIDE 20 MEQ/1
20 TABLET, EXTENDED RELEASE ORAL DAILY
Qty: 90 TABLET | Refills: 1 | Status: SHIPPED
Start: 2022-07-18 | End: 2022-09-23 | Stop reason: SDUPTHER

## 2022-07-18 RX ORDER — BENZONATATE 100 MG/1
100 CAPSULE ORAL 3 TIMES DAILY PRN
Qty: 30 CAPSULE | Refills: 0 | Status: SHIPPED | OUTPATIENT
Start: 2022-07-18 | End: 2022-07-25

## 2022-07-18 NOTE — PROGRESS NOTES
22  Arthurine Grade : 1965 Sex: male  Age: 64 y.o. Assessment and Plan:  Jeremias Cheatham was seen today for follow-up from hospital.    Diagnoses and all orders for this visit:    Bilateral lower extremity edema  -     potassium chloride (KLOR-CON M) 20 MEQ extended release tablet; Take 1 tablet by mouth in the morning. With lasix. -     Basic Metabolic Panel; Future    Hospital discharge follow-up  -     OR DISCHARGE MEDS RECONCILED W/ CURRENT OUTPATIENT MED LIST    Cough  -     benzonatate (TESSALON) 100 MG capsule; Take 1 capsule by mouth 3 times daily as needed for Cough    Chronic obstructive pulmonary disease, unspecified COPD type ([address]    Acute on chronic congestive heart failure, unspecified heart failure type (HCC)    Cont 40mg lasix for now  Increase potassium to 20meq  Check BMP today    Add tessalon for cough    F/u with specialists as scheduled   Precautions explicitly reviewed    Return in about 4 weeks (around 8/15/2022). Chief Complaint   Patient presents with    Follow-Up from Hospital       HPI  Pt here for recent hospital discharge f/u   Pt's breathing was again poor and his legs swelled up  Admitted for COPD flare and CHF  Sent home on steroid taper   His lasix was increased to 40mg daily  He has been doing well on this; legs remain down   Taking just 10meq K   Has f/u with Dr. Donald Velásquez early      Breathing has been poor  Calls his sister right after she leaves every day   Now has a bad cough   Still on 6L   Visiting nurse coming M/W   They will be calling to schedule f/u with pulm     Problem list reviewed and updated in full with patient today as necessary. A comprehensive ROS was negative, except as documented above.    Pt does c/o some dull pain left chest - not there presently  Not that severe but enough to notice   Lasts minutes to hours       Current Outpatient Medications:     benzonatate (TESSALON) 100 MG capsule, Take 1 capsule by mouth 3 times daily as needed for Cough, Disp: 30 capsule, Rfl: 0    potassium chloride (KLOR-CON M) 20 MEQ extended release tablet, Take 1 tablet by mouth in the morning. With lasix., Disp: 90 tablet, Rfl: 1    Revefenacin (YUPELRI) 175 MCG/3ML SOLN, Inhale 1 ampule into the lungs daily, Disp: 30 each, Rfl: 3    predniSONE (DELTASONE) 10 MG tablet, 3 tabs daily x3 days, 2 tabs daily x3 days, 1 tab daily x3 days, Disp: 18 tablet, Rfl: 0    gabapentin (NEURONTIN) 100 MG capsule, Take 1 capsule by mouth 3 times daily for 30 days. , Disp: 90 capsule, Rfl: 0    BROVANA 15 MCG/2ML NEBU, Take 2 mLs by nebulization 2 times daily, Disp: 120 mL, Rfl: 3    albuterol sulfate HFA (VENTOLIN HFA) 108 (90 Base) MCG/ACT inhaler, INHALE TWO (2) PUFFS INTO THE LUNGS EVERY FOUR (4) HOURS AS NEEDED FOR WHEEZING, Disp: 1 each, Rfl: 2    omeprazole (PRILOSEC) 40 MG delayed release capsule, Take 1 capsule by mouth daily, Disp: 90 capsule, Rfl: 1    metoprolol tartrate (LOPRESSOR) 25 MG tablet, Take 1 tablet by mouth 2 times daily Twice A Day, Disp: 180 tablet, Rfl: 1    pramipexole (MIRAPEX) 0.125 MG tablet, Take 1 tablet by mouth nightly, Disp: 90 tablet, Rfl: 3    LORazepam (ATIVAN) 1 MG tablet, Take 1 mg by mouth 3 times daily. Take one 3 times a day, Disp: , Rfl:     FLUoxetine (PROZAC) 20 MG capsule, Take 20 mg by mouth daily, Disp: , Rfl:     Probiotic Product [facility]) CAPS, Patient is to take one tab bid. Patient has been on antibiotics for the last 3 months, Disp: 30 capsule, Rfl: 3    budesonide (PULMICORT) 0.5 MG/2ML nebulizer suspension, Take 2 mLs by nebulization 2 times daily, Disp: 60 ampule, Rfl: 5    HYDROcodone-acetaminophen (NORCO) 7.5-325 MG per tablet, Take 1 tablet by mouth 2 times daily as needed.  Takes religiously twice daily for leg pain per pt, Disp: , Rfl:     ipratropium-albuterol (DUONEB) 0.5-2.5 (3) MG/3ML SOLN nebulizer solution, Inhale 3 mLs into the lungs every 4 hours as needed for Shortness of Breath, Disp: 12 vial, Rfl: 0 ARIPiprazole (ABILIFY) 5 MG tablet, take 1 tablet by mouth once daily, Disp: , Rfl: 0    furosemide (LASIX) 40 MG tablet, Take 1 tablet by mouth daily, Disp: 30 tablet, Rfl: 0  Allergies   Allergen Reactions    Aspirin     Lisinopril     Other      Other reaction(s): ABD PAIN    Tramadol     Ibuprofen Nausea And Vomiting    Sulfa Antibiotics Nausea And Vomiting       Pt's past medical and surgical history were reviewed and updated as necessary today   Pt's family and social history were reviewed and updated as necessary today      Vitals:    07/18/22 1159   BP: 132/70   Pulse: 79   Resp: 17   Temp: 98.6 °F (37 °C)   TempSrc: Infrared   SpO2: 99%   Weight: 136 lb (61.7 kg)   Height: 5' 4\" (1.626 m)       Physical Exam  Constitutional:       Appearance: Normal appearance. HENT:      Head: Normocephalic and atraumatic. Eyes:      Conjunctiva/sclera: Conjunctivae normal.   Cardiovascular:      Rate and Rhythm: Normal rate and regular rhythm. Heart sounds: Normal heart sounds. Pulmonary:      Effort: Pulmonary effort is normal. No respiratory distress. Breath sounds: Wheezing and rhonchi present. Abdominal:      Palpations: Abdomen is soft. Tenderness: There is no abdominal tenderness. Musculoskeletal:      Comments:  wheelchair   Skin:     General: Skin is warm and dry. Neurological:      General: No focal deficit present. Mental Status: He is alert and oriented to person, place, and time. Psychiatric:         Mood and Affect: Mood normal.         Behavior: Behavior normal.     Counseled patient as appropriate and relevant regarding above diagnosis, including possible risks and complications, especially if left uncontrolled. Counseled patient as appropriate and relevant regarding any  possible side effects, risks, and alternatives to treatment; patient and/or guardian verbalizes understanding, and is in agreement with the plan as detailed above.       Reviewed age and gender appropriate

## 2022-07-20 ENCOUNTER — TELEPHONE (OUTPATIENT)
Dept: PRIMARY CARE CLINIC | Age: 57
End: 2022-07-20

## 2022-07-20 NOTE — TELEPHONE ENCOUNTER
Pt's sister, Maryse Toth (is on HIPAA) returned call regarding lab results. Read to her what Dr Karen Corona noted:    Result Care Coordination        Result Notes     Kasey Bella   7/20/2022 10:12 AM EDT Back to Top        Patient did not answer. LVM to return call to discuss     Raj Jaffe MD   7/19/2022  8:17 AM EDT         Kidney function stable  Start higher dose potassium as prescribed    Maryse Toth verbalized understanding and no further question.

## 2022-07-21 ENCOUNTER — TELEPHONE (OUTPATIENT)
Dept: PRIMARY CARE CLINIC | Age: 57
End: 2022-07-21

## 2022-07-21 DIAGNOSIS — F41.9 ANXIETY: Primary | ICD-10-CM

## 2022-07-21 RX ORDER — LORAZEPAM 1 MG/1
1 TABLET ORAL 3 TIMES DAILY
Qty: 90 TABLET | Refills: 0 | Status: SHIPPED
Start: 2022-07-21 | End: 2022-08-18 | Stop reason: SDUPTHER

## 2022-07-22 ENCOUNTER — PATIENT MESSAGE (OUTPATIENT)
Dept: PRIMARY CARE CLINIC | Age: 57
End: 2022-07-22

## 2022-07-28 ENCOUNTER — TELEPHONE (OUTPATIENT)
Dept: PRIMARY CARE CLINIC | Age: 57
End: 2022-07-28

## 2022-07-28 DIAGNOSIS — R60.0 BILATERAL LOWER EXTREMITY EDEMA: ICD-10-CM

## 2022-07-28 DIAGNOSIS — R60.0 BILATERAL LOWER EXTREMITY EDEMA: Primary | ICD-10-CM

## 2022-07-28 LAB
ANION GAP SERPL CALCULATED.3IONS-SCNC: 14 MMOL/L (ref 7–16)
BUN BLDV-MCNC: 11 MG/DL (ref 6–20)
CALCIUM SERPL-MCNC: 7.9 MG/DL (ref 8.6–10.2)
CHLORIDE BLD-SCNC: 96 MMOL/L (ref 98–107)
CO2: 33 MMOL/L (ref 22–29)
CREAT SERPL-MCNC: 0.9 MG/DL (ref 0.7–1.2)
GFR AFRICAN AMERICAN: >60
GFR NON-AFRICAN AMERICAN: >60 ML/MIN/1.73
GLUCOSE BLD-MCNC: 118 MG/DL (ref 74–99)
POTASSIUM SERPL-SCNC: 4.9 MMOL/L (ref 3.5–5)
SODIUM BLD-SCNC: 143 MMOL/L (ref 132–146)

## 2022-07-28 NOTE — TELEPHONE ENCOUNTER
Patients sister called concerned that [facility] 20 if giving pt low back pain    Patient didn't take pill one day and no back pain, what does Dr suggest or think

## 2022-07-28 NOTE — TELEPHONE ENCOUNTER
Generally speaking it shouldn't   Would go back to the 40mg daily as per previous discharge instructions  Can pt please get BMP done today?  I will order

## 2022-08-08 ENCOUNTER — OFFICE VISIT (OUTPATIENT)
Dept: CARDIOLOGY CLINIC | Age: 57
End: 2022-08-08
Payer: MEDICAID

## 2022-08-08 VITALS
WEIGHT: 138.4 LBS | HEART RATE: 78 BPM | BODY MASS INDEX: 23.63 KG/M2 | DIASTOLIC BLOOD PRESSURE: 66 MMHG | RESPIRATION RATE: 16 BRPM | OXYGEN SATURATION: 99 % | SYSTOLIC BLOOD PRESSURE: 118 MMHG | HEIGHT: 64 IN

## 2022-08-08 DIAGNOSIS — R07.2 PRECORDIAL PAIN: Primary | ICD-10-CM

## 2022-08-08 DIAGNOSIS — I25.84 CORONARY ARTERY CALCIFICATION: ICD-10-CM

## 2022-08-08 DIAGNOSIS — I10 PRIMARY HYPERTENSION: ICD-10-CM

## 2022-08-08 DIAGNOSIS — I25.10 CORONARY ARTERY CALCIFICATION: ICD-10-CM

## 2022-08-08 DIAGNOSIS — I73.9 PERIPHERAL VASCULAR DISEASE (HCC): ICD-10-CM

## 2022-08-08 DIAGNOSIS — I50.33 ACUTE ON CHRONIC HEART FAILURE WITH PRESERVED EJECTION FRACTION (HFPEF) (HCC): ICD-10-CM

## 2022-08-08 PROCEDURE — 93000 ELECTROCARDIOGRAM COMPLETE: CPT | Performed by: INTERNAL MEDICINE

## 2022-08-08 PROCEDURE — 99214 OFFICE O/P EST MOD 30 MIN: CPT | Performed by: INTERNAL MEDICINE

## 2022-08-08 NOTE — TELEPHONE ENCOUNTER
Spoke with sister. Patient is taking 40mg daily and leg is doing much better now. Still mild swelling but a lot better than it was. Sister apologized, she thought she had replied. She also stated that patient is going to run out of Lasix prior to next appointment if you can send in a refill. Thanks!
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
## 2022-08-09 RX ORDER — FUROSEMIDE 40 MG/1
40 TABLET ORAL DAILY
Qty: 90 TABLET | Refills: 1 | Status: SHIPPED
Start: 2022-08-09 | End: 2022-09-23 | Stop reason: SDUPTHER

## 2022-08-10 PROBLEM — R91.1 NODULE OF UPPER LOBE OF LEFT LUNG: Status: ACTIVE | Noted: 2022-08-10

## 2022-08-18 DIAGNOSIS — F41.9 ANXIETY: ICD-10-CM

## 2022-08-18 RX ORDER — LORAZEPAM 1 MG/1
1 TABLET ORAL 3 TIMES DAILY
Qty: 90 TABLET | Refills: 0 | Status: SHIPPED
Start: 2022-08-19 | End: 2022-09-15 | Stop reason: SDUPTHER

## 2022-08-18 NOTE — TELEPHONE ENCOUNTER
Last Appointment:  7/18/2022  Future Appointments   Date Time Provider Mik Peresi   8/22/2022  2:15 PM Jaz Hogue MD [facility]   9/9/2022 10:30 AM MD Praveen Willingham 57

## 2022-08-19 ENCOUNTER — TELEPHONE (OUTPATIENT)
Dept: VASCULAR SURGERY | Age: 57
End: 2022-08-19

## 2022-08-22 ENCOUNTER — OFFICE VISIT (OUTPATIENT)
Dept: VASCULAR SURGERY | Age: 57
End: 2022-08-22
Payer: MEDICAID

## 2022-08-22 VITALS — HEIGHT: 64 IN | WEIGHT: 136 LBS | BODY MASS INDEX: 23.22 KG/M2

## 2022-08-22 DIAGNOSIS — I73.9 PERIPHERAL VASCULAR DISEASE (HCC): ICD-10-CM

## 2022-08-22 DIAGNOSIS — I87.2 VENOUS INSUFFICIENCY OF BOTH LOWER EXTREMITIES: Primary | ICD-10-CM

## 2022-08-22 PROCEDURE — 99205 OFFICE O/P NEW HI 60 MIN: CPT | Performed by: SURGERY
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
## 2022-09-08 ENCOUNTER — TELEPHONE (OUTPATIENT)
Dept: CARDIOLOGY | Age: 57
End: 2022-09-08

## 2022-09-08 NOTE — TELEPHONE ENCOUNTER
Spoke with sister who is unable to bring him due to her recent surgery.  Sister asked to call her after 9/22/2022 to reschedule

## 2022-09-13 DIAGNOSIS — F41.9 ANXIETY: ICD-10-CM

## 2022-09-13 NOTE — TELEPHONE ENCOUNTER
Patients sister called pt needs refill on the below     LORazepam (ATIVAN) 1 MG tablet      pramipexole (MIRAPEX) 0.125 MG tablet

## 2022-09-15 RX ORDER — LORAZEPAM 1 MG/1
1 TABLET ORAL 3 TIMES DAILY
Qty: 90 TABLET | Refills: 0 | Status: SHIPPED
Start: 2022-09-15 | End: 2022-09-23 | Stop reason: SDUPTHER

## 2022-09-15 RX ORDER — PRAMIPEXOLE DIHYDROCHLORIDE 0.12 MG/1
0.12 TABLET ORAL NIGHTLY
Qty: 90 TABLET | Refills: 3 | Status: SHIPPED
Start: 2022-09-15 | End: 2022-09-23 | Stop reason: SDUPTHER

## 2022-09-15 NOTE — TELEPHONE ENCOUNTER
Last Appointment:  7/18/2022  Future Appointments   Date Time Provider Mik Rosario   9/30/2022 10:00 AM MD Praveen Pruitt 57

## 2022-09-23 ENCOUNTER — OFFICE VISIT (OUTPATIENT)
Dept: PRIMARY CARE CLINIC | Age: 57
End: 2022-09-23
Payer: MEDICAID

## 2022-09-23 VITALS
SYSTOLIC BLOOD PRESSURE: 134 MMHG | RESPIRATION RATE: 18 BRPM | DIASTOLIC BLOOD PRESSURE: 72 MMHG | HEART RATE: 78 BPM | OXYGEN SATURATION: 98 % | TEMPERATURE: 97.1 F

## 2022-09-23 DIAGNOSIS — R60.0 BILATERAL LOWER EXTREMITY EDEMA: ICD-10-CM

## 2022-09-23 DIAGNOSIS — G25.81 RLS (RESTLESS LEGS SYNDROME): ICD-10-CM

## 2022-09-23 DIAGNOSIS — Z23 NEED FOR PNEUMOCOCCAL VACCINATION: ICD-10-CM

## 2022-09-23 DIAGNOSIS — F41.9 ANXIETY: ICD-10-CM

## 2022-09-23 DIAGNOSIS — M77.02 MEDIAL EPICONDYLITIS OF LEFT ELBOW: ICD-10-CM

## 2022-09-23 DIAGNOSIS — R91.8 OPACITY OF LUNG ON IMAGING STUDY: ICD-10-CM

## 2022-09-23 DIAGNOSIS — R93.89 ABNORMAL CHEST CT: Primary | ICD-10-CM

## 2022-09-23 DIAGNOSIS — Z23 NEED FOR INFLUENZA VACCINATION: ICD-10-CM

## 2022-09-23 DIAGNOSIS — K21.9 GASTROESOPHAGEAL REFLUX DISEASE, UNSPECIFIED WHETHER ESOPHAGITIS PRESENT: ICD-10-CM

## 2022-09-23 DIAGNOSIS — J44.9 CHRONIC OBSTRUCTIVE PULMONARY DISEASE, UNSPECIFIED COPD TYPE (HCC): ICD-10-CM

## 2022-09-23 LAB
ANION GAP SERPL CALCULATED.3IONS-SCNC: 14 MMOL/L (ref 7–16)
BUN BLDV-MCNC: 12 MG/DL (ref 6–20)
CALCIUM SERPL-MCNC: 7.6 MG/DL (ref 8.6–10.2)
CHLORIDE BLD-SCNC: 92 MMOL/L (ref 98–107)
CO2: 32 MMOL/L (ref 22–29)
CREAT SERPL-MCNC: 0.8 MG/DL (ref 0.7–1.2)
GFR AFRICAN AMERICAN: >60
GFR NON-AFRICAN AMERICAN: >60 ML/MIN/1.73
GLUCOSE BLD-MCNC: 107 MG/DL (ref 74–99)
POTASSIUM SERPL-SCNC: 4.4 MMOL/L (ref 3.5–5)
SODIUM BLD-SCNC: 138 MMOL/L (ref 132–146)

## 2022-09-23 PROCEDURE — 90472 IMMUNIZATION ADMIN EACH ADD: CPT | Performed by: FAMILY MEDICINE

## 2022-09-23 PROCEDURE — 90471 IMMUNIZATION ADMIN: CPT | Performed by: FAMILY MEDICINE

## 2022-09-23 PROCEDURE — 99214 OFFICE O/P EST MOD 30 MIN: CPT | Performed by: FAMILY MEDICINE

## 2022-09-23 PROCEDURE — 90674 CCIIV4 VAC NO PRSV 0.5 ML IM: CPT | Performed by: FAMILY MEDICINE

## 2022-09-23 PROCEDURE — 90677 PCV20 VACCINE IM: CPT | Performed by: FAMILY MEDICINE

## 2022-09-23 RX ORDER — PRAMIPEXOLE DIHYDROCHLORIDE 0.25 MG/1
0.25 TABLET ORAL NIGHTLY
Qty: 90 TABLET | Refills: 1 | Status: ON HOLD | OUTPATIENT
Start: 2022-09-23

## 2022-09-23 RX ORDER — LORAZEPAM 1 MG/1
1 TABLET ORAL 3 TIMES DAILY
Qty: 90 TABLET | Refills: 0 | Status: SHIPPED | OUTPATIENT
Start: 2022-09-23 | End: 2022-10-23

## 2022-09-23 RX ORDER — ALBUTEROL SULFATE 90 UG/1
AEROSOL, METERED RESPIRATORY (INHALATION)
Qty: 1 EACH | Refills: 2 | Status: ON HOLD
Start: 2022-09-23 | End: 2022-10-15

## 2022-09-23 RX ORDER — OMEPRAZOLE 40 MG/1
40 CAPSULE, DELAYED RELEASE ORAL DAILY
Qty: 90 CAPSULE | Refills: 1 | Status: ON HOLD | OUTPATIENT
Start: 2022-09-23

## 2022-09-23 RX ORDER — FUROSEMIDE 40 MG/1
40 TABLET ORAL DAILY
Qty: 90 TABLET | Refills: 1 | Status: SHIPPED
Start: 2022-09-23 | End: 2022-09-26 | Stop reason: SDUPTHER

## 2022-09-23 RX ORDER — PREDNISONE 10 MG/1
TABLET ORAL
Qty: 30 TABLET | Refills: 0 | Status: ON HOLD
Start: 2022-09-23 | End: 2022-10-15

## 2022-09-23 RX ORDER — POTASSIUM CHLORIDE 20 MEQ/1
20 TABLET, EXTENDED RELEASE ORAL DAILY
Qty: 90 TABLET | Refills: 1 | Status: SHIPPED
Start: 2022-09-23 | End: 2022-09-26 | Stop reason: SDUPTHER

## 2022-09-23 NOTE — PROGRESS NOTES
22  Zach Wilder : 1965 Sex: male  Age: 64 y.o. Assessment and Plan:  Akin Has was seen today for joint pain. Diagnoses and all orders for this visit:    Abnormal chest CT  -     CT CHEST WO CONTRAST; Future    Chronic obstructive pulmonary disease, unspecified COPD type (HCC)  -     albuterol sulfate HFA (VENTOLIN HFA) 108 (90 Base) MCG/ACT inhaler; INHALE TWO (2) PUFFS INTO THE LUNGS EVERY FOUR (4) HOURS AS NEEDED FOR WHEEZING    Bilateral lower extremity edema  -     Discontinue: furosemide (LASIX) 40 MG tablet; Take 1 tablet by mouth daily  -     Discontinue: potassium chloride (KLOR-CON M) 20 MEQ extended release tablet; Take 1 tablet by mouth daily With lasix  -     Basic Metabolic Panel; Future    Anxiety  -     LORazepam (ATIVAN) 1 MG tablet; Take 1 tablet by mouth 3 times daily for 30 days. Opacity of lung on imaging study  -     CT CHEST WO CONTRAST; Future    Medial epicondylitis of left elbow  -     predniSONE (DELTASONE) 10 MG tablet; 4 tabs daily x 3 days, then 3 tabs daily x 3 days, then 2 tabs daily x 3 days, then 1 tab daily x 3 days    Need for influenza vaccination  -     Influenza, FLUCELVAX, (age 10 mo+), IM, Preservative Free, 0.5 mL    Need for pneumococcal vaccination  -     Pneumococcal, PCV20, PREVNAR 20, (age 25 yrs+), IM, PF    RLS (restless legs syndrome)  -     pramipexole (MIRAPEX) 0.25 MG tablet; Take 1 tablet by mouth nightly    Gastroesophageal reflux disease, unspecified whether esophagitis present  -     omeprazole (PRILOSEC) 40 MG delayed release capsule; Take 1 capsule by mouth daily    Other orders  -     metoprolol tartrate (LOPRESSOR) 25 MG tablet; Take 1 tablet by mouth 2 times daily Twice A Day  -     mupirocin (BACTROBAN NASAL) 2 % nasal ointment; by Nasal route 2 times daily Take by Nasal route 2 times daily.     RLS uncontrolled, increase Mirapex   Check BMP given use of lasix/furosemide  Cut back dosing again to half     Steroid taper for medial epicondylitis     Refer for updated CT lung given previously noted opacity     Other refill as requested; generally well controlled   Trial of mupirocin for nasal irritation    Flu/PNA given today     Return in about 3 months (around 12/23/2022). Chief Complaint   Patient presents with    Joint Pain     elbow       HPI  Pt here for routine f/u   He is here with his sister    Overall doing fairly well, especially in comparison to how he was   Breathing has been ok overall  Lets not that swollen    Left 4th finger thickened and discolored   He has no associated pain or discomfort  Seems to just be growing out a new clear nail from below     C/o pain over his left medial epicondyle   Notes pain with some movements   Some TTP     He is also getting some nasal irritation from wearing his O2  Wondering what they can do  Red, somewhat painful     RLS - has been ok, but could be better   They are requesting higher dose mirapex to try     Overall doing well otherwise   He is due for repeat CT lung in October and willing to proceed     Problem list reviewed and updated in full with patient today as necessary. A comprehensive ROS was negative, except as documented above. Current Outpatient Medications:     albuterol sulfate HFA (VENTOLIN HFA) 108 (90 Base) MCG/ACT inhaler, INHALE TWO (2) PUFFS INTO THE LUNGS EVERY FOUR (4) HOURS AS NEEDED FOR WHEEZING, Disp: 1 each, Rfl: 2    pramipexole (MIRAPEX) 0.25 MG tablet, Take 1 tablet by mouth nightly, Disp: 90 tablet, Rfl: 1    omeprazole (PRILOSEC) 40 MG delayed release capsule, Take 1 capsule by mouth daily, Disp: 90 capsule, Rfl: 1    metoprolol tartrate (LOPRESSOR) 25 MG tablet, Take 1 tablet by mouth 2 times daily Twice A Day, Disp: 180 tablet, Rfl: 1    LORazepam (ATIVAN) 1 MG tablet, Take 1 tablet by mouth 3 times daily for 30 days. , Disp: 90 tablet, Rfl: 0    mupirocin (BACTROBAN NASAL) 2 % nasal ointment, by Nasal route 2 times daily Take by Nasal route 2 times daily. , Disp: 1 g, Rfl: 3    predniSONE (DELTASONE) 10 MG tablet, 4 tabs daily x 3 days, then 3 tabs daily x 3 days, then 2 tabs daily x 3 days, then 1 tab daily x 3 days, Disp: 30 tablet, Rfl: 0    budesonide (PULMICORT) 0.5 MG/2ML nebulizer suspension, Take 2 mLs by nebulization in the morning and 2 mLs before bedtime. , Disp: 60 each, Rfl: 5    Revefenacin (YUPELRI) 175 MCG/3ML SOLN, Inhale 1 ampule into the lungs daily, Disp: 30 each, Rfl: 3    BROVANA 15 MCG/2ML NEBU, Take 2 mLs by nebulization 2 times daily, Disp: 120 mL, Rfl: 3    FLUoxetine (PROZAC) 20 MG capsule, Take 20 mg by mouth daily, Disp: , Rfl:     Probiotic Product [facility]) CAPS, Patient is to take one tab bid. Patient has been on antibiotics for the last 3 months, Disp: 30 capsule, Rfl: 3    HYDROcodone-acetaminophen (NORCO) 7.5-325 MG per tablet, Take 1 tablet by mouth 2 times daily as needed. Takes religiously twice daily for leg pain per pt, Disp: , Rfl:     ipratropium-albuterol (DUONEB) 0.5-2.5 (3) MG/3ML SOLN nebulizer solution, Inhale 3 mLs into the lungs every 4 hours as needed for Shortness of Breath, Disp: 12 vial, Rfl: 0    ARIPiprazole (ABILIFY) 5 MG tablet, take 1 tablet by mouth once daily, Disp: , Rfl: 0    furosemide (LASIX) 20 MG tablet, Take 1 tablet by mouth daily, Disp: 90 tablet, Rfl: 1    potassium chloride (KLOR-CON M) 20 MEQ extended release tablet, Take 1 tablet by mouth daily With lasix, Disp: 90 tablet, Rfl: 1    gabapentin (NEURONTIN) 100 MG capsule, Take 1 capsule by mouth 3 times daily for 30 days. , Disp: 90 capsule, Rfl: 0  Allergies   Allergen Reactions    Aspirin     Lisinopril     Other      Other reaction(s): ABD PAIN    Tramadol     Ibuprofen Nausea And Vomiting    Sulfa Antibiotics Nausea And Vomiting       Pt's past medical and surgical history were reviewed and updated as necessary today   Pt's family and social history were reviewed and updated as necessary today      Vitals:    09/23/22 1136 BP: 134/72   Pulse: 78   Resp: 18   Temp: 97.1 °F (36.2 °C)   TempSrc: Temporal   SpO2: 98%  Comment: 7L O2   Weight: Comment: wheelchair       Physical Exam  Constitutional:       Appearance: Normal appearance. HENT:      Head: Normocephalic and atraumatic. Eyes:      Conjunctiva/sclera: Conjunctivae normal.   Cardiovascular:      Rate and Rhythm: Normal rate and regular rhythm. Heart sounds: Normal heart sounds. Pulmonary:      Effort: Pulmonary effort is normal.      Breath sounds: Normal breath sounds. Abdominal:      Palpations: Abdomen is soft. Tenderness: There is no abdominal tenderness. Musculoskeletal:         General: Normal range of motion. Arms:       Comments: Left 4th fingernail discolored and somewhat thickened distally. TTP over the medial epicondyle. Mild pain with resisted supination or pronation. Skin:     General: Skin is warm and dry. Neurological:      General: No focal deficit present. Mental Status: He is alert and oriented to person, place, and time. Psychiatric:         Mood and Affect: Mood normal.         Behavior: Behavior normal.        Counseled patient as appropriate and relevant regarding above diagnosis, including possible risks and complications, especially if left uncontrolled. Counseled patient as appropriate and relevant regarding any  possible side effects, risks, and alternatives to treatment; patient and/or guardian verbalizes understanding, and is in agreement with the plan as detailed above. Reviewed age and gender appropriate health screening exams and vaccinations. Advised patient regarding importance of keeping up with recommended health maintenance and to schedule as soon as possible if overdue, as this is important in assessing for undiagnosed pathology, especially cancer, as well as protecting against potentially harmful/life threatening disease.       If discussed, any educational materials and/or home exercises printed for patient's review and were included in patient instructions on his/her After Visit Summary and given to patient at the end of visit. Advised patient to call with any new medication issues, and and other concerns/complaints prior to scheduled follow up. All questions answered to the patient's satisfaction.         Seen By:  Marium Brand MD

## 2022-09-26 RX ORDER — FUROSEMIDE 20 MG/1
20 TABLET ORAL DAILY
Qty: 90 TABLET | Refills: 1 | Status: ON HOLD
Start: 2022-09-26 | End: 2022-10-22 | Stop reason: HOSPADM

## 2022-09-26 RX ORDER — POTASSIUM CHLORIDE 20 MEQ/1
20 TABLET, EXTENDED RELEASE ORAL DAILY
Qty: 90 TABLET | Refills: 1 | Status: ON HOLD
Start: 2022-09-26 | End: 2022-10-22 | Stop reason: HOSPADM

## 2022-10-06 ENCOUNTER — TELEPHONE (OUTPATIENT)
Dept: PRIMARY CARE CLINIC | Age: 57
End: 2022-10-06

## 2022-10-06 DIAGNOSIS — M25.522 LEFT ELBOW PAIN: Primary | ICD-10-CM

## 2022-10-06 NOTE — TELEPHONE ENCOUNTER
Sister called wanting to follow up with Dr about pts left elbow. Prednisone did not help at all, elbow still hurts.  What is the next treatment         Yosef cut water pill down 20 mg , leg started swelling again so sister had pt start taking 40 mg again

## 2022-10-07 DIAGNOSIS — R60.0 BILATERAL LOWER EXTREMITY EDEMA: Primary | ICD-10-CM

## 2022-10-07 NOTE — TELEPHONE ENCOUNTER
Referral in      Yes to higher lasix  Cont potassium 20mg with this    Check bmp 2 weeks  Will order

## 2022-10-10 ENCOUNTER — OFFICE VISIT (OUTPATIENT)
Dept: ORTHOPEDIC SURGERY | Age: 57
End: 2022-10-10
Payer: MEDICAID

## 2022-10-10 VITALS — HEIGHT: 64 IN | WEIGHT: 136 LBS | BODY MASS INDEX: 23.22 KG/M2

## 2022-10-10 DIAGNOSIS — M77.02 MEDIAL EPICONDYLITIS OF LEFT ELBOW: Primary | ICD-10-CM

## 2022-10-10 DIAGNOSIS — M25.522 LEFT ELBOW PAIN: ICD-10-CM

## 2022-10-10 PROCEDURE — 99203 OFFICE O/P NEW LOW 30 MIN: CPT | Performed by: FAMILY MEDICINE

## 2022-10-10 PROCEDURE — 20551 NJX 1 TENDON ORIGIN/INSJ: CPT | Performed by: FAMILY MEDICINE

## 2022-10-10 PROCEDURE — 76942 ECHO GUIDE FOR BIOPSY: CPT | Performed by: FAMILY MEDICINE

## 2022-10-10 RX ORDER — LIDOCAINE HYDROCHLORIDE 10 MG/ML
1 INJECTION, SOLUTION INFILTRATION; PERINEURAL ONCE
Status: COMPLETED | OUTPATIENT
Start: 2022-10-10 | End: 2022-10-10

## 2022-10-10 RX ORDER — BETAMETHASONE SODIUM PHOSPHATE AND BETAMETHASONE ACETATE 3; 3 MG/ML; MG/ML
6 INJECTION, SUSPENSION INTRA-ARTICULAR; INTRALESIONAL; INTRAMUSCULAR; SOFT TISSUE ONCE
Status: COMPLETED | OUTPATIENT
Start: 2022-10-10 | End: 2022-10-10

## 2022-10-10 RX ADMIN — BETAMETHASONE SODIUM PHOSPHATE AND BETAMETHASONE ACETATE 6 MG: 3; 3 INJECTION, SUSPENSION INTRA-ARTICULAR; INTRALESIONAL; INTRAMUSCULAR; SOFT TISSUE at 14:51

## 2022-10-10 RX ADMIN — LIDOCAINE HYDROCHLORIDE 1 ML: 10 INJECTION, SOLUTION INFILTRATION; PERINEURAL at 14:51

## 2022-10-10 SDOH — HEALTH STABILITY: PHYSICAL HEALTH: ON AVERAGE, HOW MANY DAYS PER WEEK DO YOU ENGAGE IN MODERATE TO STRENUOUS EXERCISE (LIKE A BRISK WALK)?: 0 DAYS

## 2022-10-10 NOTE — PROGRESS NOTES
[facility]  PRIMARY CARE SPORTS MEDICINE  DATE OF VISIT : 10/10/2022    Patient : Preston Bailon  Age : 64 y.o.  : 1965  MRN : [id number]   ______________________________________________________________________    Chief Complaint :   Chief Complaint   Patient presents with    Elbow Pain     (L) elbow pain. Pain has been ongoing for several months time without injury. HPI : Preston Bailon is 64 y.o. male who presented to the clinic today for evaluation of left elbow pain. Onset of the symptoms was several months ago, with no known mechanism of injury. Current symptoms include left medial elbow pain. Pain is aggravated by any repetitive use. Evaluation to date: none. Treatment to date: avoidance of offending activity and OTC analgesics which are not very effective. Past Medical History :  Past Medical History:   Diagnosis Date    COPD (chronic obstructive pulmonary disease) (HCC)     Hypertension     Leg swelling     Multiple gastric ulcers      Past Surgical History:   Procedure Laterality Date    HERNIA REPAIR      HIP SURGERY      KNEE SURGERY         Allergies :    Allergies   Allergen Reactions    Aspirin     Lisinopril     Other      Other reaction(s): ABD PAIN    Tramadol     Ibuprofen Nausea And Vomiting    Sulfa Antibiotics Nausea And Vomiting       Medication List :    Current Outpatient Medications   Medication Sig Dispense Refill    furosemide (LASIX) 20 MG tablet Take 1 tablet by mouth daily 90 tablet 1    potassium chloride (KLOR-CON M) 20 MEQ extended release tablet Take 1 tablet by mouth daily With lasix 90 tablet 1    albuterol sulfate HFA (VENTOLIN HFA) 108 (90 Base) MCG/ACT inhaler INHALE TWO (2) PUFFS INTO THE LUNGS EVERY FOUR (4) HOURS AS NEEDED FOR WHEEZING 1 each 2    pramipexole (MIRAPEX) 0.25 MG tablet Take 1 tablet by mouth nightly 90 tablet 1    omeprazole (PRILOSEC) 40 MG delayed release capsule Take 1 capsule by mouth daily 90 capsule 1    metoprolol tartrate (LOPRESSOR) 25 MG tablet Take 1 tablet by mouth 2 times daily Twice A Day 180 tablet 1    LORazepam (ATIVAN) 1 MG tablet Take 1 tablet by mouth 3 times daily for 30 days. 90 tablet 0    mupirocin (BACTROBAN NASAL) 2 % nasal ointment by Nasal route 2 times daily Take by Nasal route 2 times daily. 1 g 3    predniSONE (DELTASONE) 10 MG tablet 4 tabs daily x 3 days, then 3 tabs daily x 3 days, then 2 tabs daily x 3 days, then 1 tab daily x 3 days 30 tablet 0    budesonide (PULMICORT) 0.5 MG/2ML nebulizer suspension Take 2 mLs by nebulization in the morning and 2 mLs before bedtime. 60 each 5    Revefenacin (YUPELRI) 175 MCG/3ML SOLN Inhale 1 ampule into the lungs daily 30 each 3    BROVANA 15 MCG/2ML NEBU Take 2 mLs by nebulization 2 times daily 120 mL 3    FLUoxetine (PROZAC) 20 MG capsule Take 20 mg by mouth daily      Probiotic Product [facility]) CAPS Patient is to take one tab bid. Patient has been on antibiotics for the last 3 months 30 capsule 3    HYDROcodone-acetaminophen (NORCO) 7.5-325 MG per tablet Take 1 tablet by mouth 2 times daily as needed. Takes religiously twice daily for leg pain per pt      ipratropium-albuterol (DUONEB) 0.5-2.5 (3) MG/3ML SOLN nebulizer solution Inhale 3 mLs into the lungs every 4 hours as needed for Shortness of Breath 12 vial 0    ARIPiprazole (ABILIFY) 5 MG tablet take 1 tablet by mouth once daily  0    gabapentin (NEURONTIN) 100 MG capsule Take 1 capsule by mouth 3 times daily for 30 days.  90 capsule 0     Current Facility-Administered Medications   Medication Dose Route Frequency Provider Last Rate Last Admin    lidocaine 1 % injection 1 mL  1 mL Other Once Sol Zamora MD        betamethasone acetate-betamethasone sodium phosphate (CELESTONE) injection 6 mg  6 mg Other Once Sol Zamora MD          ______________________________________________________________________    Physical Exam :    Vitals: Ht 5' 4\" (1.626 m)   Wt 136 lb (61.7 kg)   BMI 23.34 kg/m² General Appearance: Nontoxic, awake, alert, and in no acute distress. Chest wall/Lung: Respirations regular and unlabored. No cyanosis. Heart: RRR, distal pulses intact. Neurologic: Alert&Oriented x3. Sensation grossly intact. No focal motor deficits detected. Musculokeletal: LEFT Elbow:  ROM - flexion to 140, extension to 0, supination to 90, pronation to 90. Stable to varus and valgus stress. TTP: ( + ) Medial epicondyle, ( - ) Lateral epicondyle, ( - ) olecranon process, ( - ) UCL, ( - ) Pronator mass  Special tests: ( - ) Hook, ( - ) Cozen, ( - ) Mills, ( - ) Chair lift, ( + ) Thinkers, ( - ) Milking maneuver, ( - ) Moving Valgus Stress Test, ( - ) VEO, ( - ) Ulnar Tinel's, ( - ) Ulnar nerve subluxation, ( - ) Lacertus   ______________________________________________________________________    Assessment & Plan :    1. Left elbow pain  2. Medial epicondylitis of left elbow  Patient presents to the office today for evaluation of left elbow pain. History, referring provider note, physical exam and imaging (as interpreted by me) are consistent with medial epicondylitis/common flexor tendinitis. Treatment options discussed with patient in the office today including activity modification, oral anti-inflammatories, physical therapy, injection options, advanced imaging in the form of a MRI and referral to an orthopedic surgeon for discussion of surgical opinion. Patient wishes to proceed with conservative treatment in the form of an ultrasound-guided corticosteroid injection which was performed in the office today, please see separate procedure note for further details. Patient will follow up in 6 weeks for reevaluation of symptoms and consider escalation therapy should symptoms persist.  Patient is agreeable with above plan all questions and concerns were addressed in the office today. - US GUIDED NEEDLE PLACEMENT;  Future  - lidocaine 1 % injection 1 mL  - betamethasone acetate-betamethasone sodium phosphate (CELESTONE) injection 6 mg    Return to Office: Return if symptoms worsen or fail to improve.     Rancho Resendez MD

## 2022-10-10 NOTE — PROGRESS NOTES
PROCEDURE NOTE:    DIAGNOSIS    LEFT elbow pain    PROCEDURE    Ultrasound-guided LEFT common flexor tendon corticosteroid injection. PROCEDURAL PAUSE    Procedural pause conducted to verify: correct patient identity, procedure to be performed, and as applicable, correct side and site, correct patient position, and availability of implants, special equipment, or special requirements. PROCEDURE DETAILS    The procedure was carried out under sterile technique. Patient Position: Supine    Localization Process: The common flexor tendon was evaluated under ultrasound prior to starting the procedure. The skin was prepped with Betadine and Alcohol. Approach: In-plane. Local Anesthesia: Local anesthesia was obtained with vapocoolant cold spray and 1 cc of 1% lidocaine using a 30-gauge 1-1/4-inch needle to create a skin wheal.    Injection/Aspiration: A 25-gauge 2-inch needle was advanced from an in-plane, lateral to medial approach into the common flexor tendon sheath. After negative aspiration for blood, a mixture of 1 cc of 1% lidocaine and 1 cc of betamethasone (6 mg/cc) was injected into the common flexor tendon with excellent sonographic flow. Images of procedure were permanently recorded. Postprocedure Care: The patient will avoid heavy exertion with the elbow and avoid soaking the elbow under water for two days. The patient will contact me with any problems related to the injection. PATIENT EDUCATION    Ready to learn, no apparent learning barriers were identified; learning preferences include listening. Explained diagnosis and treatment plan; patient expressed understanding of the content. INFORMED CONSENT    Discussed the risks, benefits, alternatives, and the necessity of other members of the healthcare team participating in the procedure. All questions answered and consent given.     Following denial of allergy and review of potential side effects and complications including but not

## 2022-10-12 ENCOUNTER — HOSPITAL ENCOUNTER (OUTPATIENT)
Dept: CT IMAGING | Age: 57
Discharge: HOME | DRG: 139 | End: 2022-10-14
Payer: MEDICAID

## 2022-10-12 DIAGNOSIS — R91.8 OPACITY OF LUNG ON IMAGING STUDY: ICD-10-CM

## 2022-10-12 DIAGNOSIS — R93.89 ABNORMAL CHEST CT: ICD-10-CM

## 2022-10-12 PROCEDURE — 71250 CT THORAX DX C-: CPT

## 2022-10-13 ENCOUNTER — TELEPHONE (OUTPATIENT)
Dept: PRIMARY CARE CLINIC | Age: 57
End: 2022-10-13

## 2022-10-14 DIAGNOSIS — R93.89 ABNORMAL CHEST CT: Primary | ICD-10-CM

## 2022-10-14 DIAGNOSIS — R91.8 MULTIPLE LUNG NODULES: ICD-10-CM

## 2022-10-15 ENCOUNTER — APPOINTMENT (OUTPATIENT)
Dept: GENERAL RADIOLOGY | Age: 57
DRG: 139 | End: 2022-10-15
Payer: MEDICAID

## 2022-10-15 ENCOUNTER — HOSPITAL ENCOUNTER (INPATIENT)
Age: 57
LOS: 8 days | Discharge: HOME | DRG: 139 | End: 2022-10-23
Attending: EMERGENCY MEDICINE | Admitting: INTERNAL MEDICINE
Payer: MEDICAID

## 2022-10-15 DIAGNOSIS — J44.1 COPD EXACERBATION (HCC): Primary | ICD-10-CM

## 2022-10-15 DIAGNOSIS — J18.9 PNEUMONIA OF BOTH LUNGS DUE TO INFECTIOUS ORGANISM, UNSPECIFIED PART OF LUNG: ICD-10-CM

## 2022-10-15 LAB
ALBUMIN SERPL-MCNC: 3.9 G/DL (ref 3.5–5.2)
ALP BLD-CCNC: 54 U/L (ref 40–129)
ALT SERPL-CCNC: 10 U/L (ref 0–40)
ANION GAP SERPL CALCULATED.3IONS-SCNC: 11 MMOL/L (ref 7–16)
AST SERPL-CCNC: 15 U/L (ref 0–39)
BASOPHILS ABSOLUTE: 0.03 E9/L (ref 0–0.2)
BASOPHILS RELATIVE PERCENT: 0.3 % (ref 0–2)
BILIRUB SERPL-MCNC: 0.3 MG/DL (ref 0–1.2)
BUN BLDV-MCNC: 13 MG/DL (ref 6–20)
CALCIUM SERPL-MCNC: 7.3 MG/DL (ref 8.6–10.2)
CHLORIDE BLD-SCNC: 92 MMOL/L (ref 98–107)
CO2: 35 MMOL/L (ref 22–29)
CREAT SERPL-MCNC: 1 MG/DL (ref 0.7–1.2)
EKG ATRIAL RATE: 77 BPM
EKG P AXIS: 63 DEGREES
EKG P-R INTERVAL: 114 MS
EKG Q-T INTERVAL: 402 MS
EKG QRS DURATION: 78 MS
EKG QTC CALCULATION (BAZETT): 454 MS
EKG R AXIS: 75 DEGREES
EKG T AXIS: 74 DEGREES
EKG VENTRICULAR RATE: 77 BPM
EOSINOPHILS ABSOLUTE: 0.08 E9/L (ref 0.05–0.5)
EOSINOPHILS RELATIVE PERCENT: 0.9 % (ref 0–6)
GFR AFRICAN AMERICAN: >60
GFR NON-AFRICAN AMERICAN: >60 ML/MIN/1.73
GLUCOSE BLD-MCNC: 124 MG/DL (ref 74–99)
HCT VFR BLD CALC: 34.4 % (ref 37–54)
HEMOGLOBIN: 10.6 G/DL (ref 12.5–16.5)
IMMATURE GRANULOCYTES #: 0.03 E9/L
IMMATURE GRANULOCYTES %: 0.3 % (ref 0–5)
LACTIC ACID, SEPSIS: 1 MMOL/L (ref 0.5–1.9)
LYMPHOCYTES ABSOLUTE: 0.81 E9/L (ref 1.5–4)
LYMPHOCYTES RELATIVE PERCENT: 9.4 % (ref 20–42)
MAGNESIUM: 1.5 MG/DL (ref 1.6–2.6)
MCH RBC QN AUTO: 30.9 PG (ref 26–35)
MCHC RBC AUTO-ENTMCNC: 30.8 % (ref 32–34.5)
MCV RBC AUTO: 100.3 FL (ref 80–99.9)
MONOCYTES ABSOLUTE: 0.56 E9/L (ref 0.1–0.95)
MONOCYTES RELATIVE PERCENT: 6.5 % (ref 2–12)
NEUTROPHILS ABSOLUTE: 7.13 E9/L (ref 1.8–7.3)
NEUTROPHILS RELATIVE PERCENT: 82.6 % (ref 43–80)
PDW BLD-RTO: 13.1 FL (ref 11.5–15)
PLATELET # BLD: 182 E9/L (ref 130–450)
PMV BLD AUTO: 10.3 FL (ref 7–12)
POTASSIUM REFLEX MAGNESIUM: 3.9 MMOL/L (ref 3.5–5)
PRO-BNP: 774 PG/ML (ref 0–125)
PROCALCITONIN: 0.09 NG/ML (ref 0–0.08)
RBC # BLD: 3.43 E12/L (ref 3.8–5.8)
SARS-COV-2, NAAT: NOT DETECTED
SODIUM BLD-SCNC: 138 MMOL/L (ref 132–146)
TOTAL PROTEIN: 7.8 G/DL (ref 6.4–8.3)
TROPONIN, HIGH SENSITIVITY: 53 NG/L (ref 0–11)
TROPONIN, HIGH SENSITIVITY: 56 NG/L (ref 0–11)
WBC # BLD: 8.6 E9/L (ref 4.5–11.5)

## 2022-10-15 PROCEDURE — 96375 TX/PRO/DX INJ NEW DRUG ADDON: CPT

## 2022-10-15 PROCEDURE — 71045 X-RAY EXAM CHEST 1 VIEW: CPT

## 2022-10-15 PROCEDURE — 6370000000 HC RX 637 (ALT 250 FOR IP): Performed by: STUDENT IN AN ORGANIZED HEALTH CARE EDUCATION/TRAINING PROGRAM

## 2022-10-15 PROCEDURE — 6360000002 HC RX W HCPCS: Performed by: NURSE PRACTITIONER

## 2022-10-15 PROCEDURE — 94664 DEMO&/EVAL PT USE INHALER: CPT

## 2022-10-15 PROCEDURE — 6360000002 HC RX W HCPCS: Performed by: STUDENT IN AN ORGANIZED HEALTH CARE EDUCATION/TRAINING PROGRAM

## 2022-10-15 PROCEDURE — 85025 COMPLETE CBC W/AUTO DIFF WBC: CPT

## 2022-10-15 PROCEDURE — 6360000002 HC RX W HCPCS: Performed by: INTERNAL MEDICINE

## 2022-10-15 PROCEDURE — 94640 AIRWAY INHALATION TREATMENT: CPT

## 2022-10-15 PROCEDURE — 83605 ASSAY OF LACTIC ACID: CPT

## 2022-10-15 PROCEDURE — 93005 ELECTROCARDIOGRAM TRACING: CPT | Performed by: STUDENT IN AN ORGANIZED HEALTH CARE EDUCATION/TRAINING PROGRAM

## 2022-10-15 PROCEDURE — 2580000003 HC RX 258: Performed by: NURSE PRACTITIONER

## 2022-10-15 PROCEDURE — 84145 PROCALCITONIN (PCT): CPT

## 2022-10-15 PROCEDURE — 2580000003 HC RX 258: Performed by: STUDENT IN AN ORGANIZED HEALTH CARE EDUCATION/TRAINING PROGRAM

## 2022-10-15 PROCEDURE — 87449 NOS EACH ORGANISM AG IA: CPT

## 2022-10-15 PROCEDURE — 87635 SARS-COV-2 COVID-19 AMP PRB: CPT

## 2022-10-15 PROCEDURE — 2060000000 HC ICU INTERMEDIATE R&B

## 2022-10-15 PROCEDURE — 6370000000 HC RX 637 (ALT 250 FOR IP): Performed by: NURSE PRACTITIONER

## 2022-10-15 PROCEDURE — 2500000003 HC RX 250 WO HCPCS: Performed by: STUDENT IN AN ORGANIZED HEALTH CARE EDUCATION/TRAINING PROGRAM

## 2022-10-15 PROCEDURE — 83735 ASSAY OF MAGNESIUM: CPT

## 2022-10-15 PROCEDURE — 87040 BLOOD CULTURE FOR BACTERIA: CPT

## 2022-10-15 PROCEDURE — 83880 ASSAY OF NATRIURETIC PEPTIDE: CPT

## 2022-10-15 PROCEDURE — 99285 EMERGENCY DEPT VISIT HI MDM: CPT

## 2022-10-15 PROCEDURE — 96374 THER/PROPH/DIAG INJ IV PUSH: CPT

## 2022-10-15 PROCEDURE — 84484 ASSAY OF TROPONIN QUANT: CPT

## 2022-10-15 PROCEDURE — 80053 COMPREHEN METABOLIC PANEL: CPT

## 2022-10-15 PROCEDURE — 99222 1ST HOSP IP/OBS MODERATE 55: CPT | Performed by: NURSE PRACTITIONER

## 2022-10-15 RX ORDER — ACETAMINOPHEN 325 MG/1
650 TABLET ORAL EVERY 6 HOURS PRN
Status: DISCONTINUED | OUTPATIENT
Start: 2022-10-15 | End: 2022-10-23 | Stop reason: HOSPADM

## 2022-10-15 RX ORDER — GABAPENTIN 100 MG/1
100 CAPSULE ORAL 3 TIMES DAILY
Status: DISCONTINUED | OUTPATIENT
Start: 2022-10-15 | End: 2022-10-23 | Stop reason: HOSPADM

## 2022-10-15 RX ORDER — PANTOPRAZOLE SODIUM 40 MG/1
40 TABLET, DELAYED RELEASE ORAL
Status: DISCONTINUED | OUTPATIENT
Start: 2022-10-16 | End: 2022-10-23 | Stop reason: HOSPADM

## 2022-10-15 RX ORDER — IPRATROPIUM BROMIDE AND ALBUTEROL SULFATE 2.5; .5 MG/3ML; MG/3ML
3 SOLUTION RESPIRATORY (INHALATION) ONCE
Status: COMPLETED | OUTPATIENT
Start: 2022-10-15 | End: 2022-10-15

## 2022-10-15 RX ORDER — PRAMIPEXOLE DIHYDROCHLORIDE 0.25 MG/1
0.25 TABLET ORAL NIGHTLY
Status: DISCONTINUED | OUTPATIENT
Start: 2022-10-15 | End: 2022-10-23 | Stop reason: HOSPADM

## 2022-10-15 RX ORDER — FLUOXETINE 10 MG/1
10 TABLET, FILM COATED ORAL DAILY
COMMUNITY

## 2022-10-15 RX ORDER — HYDROCODONE BITARTRATE AND ACETAMINOPHEN 7.5; 325 MG/1; MG/1
1 TABLET ORAL 2 TIMES DAILY PRN
Status: DISCONTINUED | OUTPATIENT
Start: 2022-10-15 | End: 2022-10-23 | Stop reason: HOSPADM

## 2022-10-15 RX ORDER — ONDANSETRON 4 MG/1
4 TABLET, ORALLY DISINTEGRATING ORAL EVERY 8 HOURS PRN
Status: DISCONTINUED | OUTPATIENT
Start: 2022-10-15 | End: 2022-10-23 | Stop reason: HOSPADM

## 2022-10-15 RX ORDER — ENOXAPARIN SODIUM 100 MG/ML
40 INJECTION SUBCUTANEOUS DAILY
Status: DISCONTINUED | OUTPATIENT
Start: 2022-10-15 | End: 2022-10-23 | Stop reason: HOSPADM

## 2022-10-15 RX ORDER — MAGNESIUM SULFATE IN WATER 40 MG/ML
2000 INJECTION, SOLUTION INTRAVENOUS ONCE
Status: COMPLETED | OUTPATIENT
Start: 2022-10-15 | End: 2022-10-15

## 2022-10-15 RX ORDER — SODIUM CHLORIDE 0.9 % (FLUSH) 0.9 %
5-40 SYRINGE (ML) INJECTION EVERY 12 HOURS SCHEDULED
Status: DISCONTINUED | OUTPATIENT
Start: 2022-10-15 | End: 2022-10-23 | Stop reason: HOSPADM

## 2022-10-15 RX ORDER — BUDESONIDE 0.5 MG/2ML
0.5 INHALANT ORAL 2 TIMES DAILY
Status: DISCONTINUED | OUTPATIENT
Start: 2022-10-15 | End: 2022-10-17

## 2022-10-15 RX ORDER — ARIPIPRAZOLE 5 MG/1
5 TABLET ORAL DAILY
Status: DISCONTINUED | OUTPATIENT
Start: 2022-10-16 | End: 2022-10-23 | Stop reason: HOSPADM

## 2022-10-15 RX ORDER — SODIUM CHLORIDE 9 MG/ML
INJECTION, SOLUTION INTRAVENOUS PRN
Status: DISCONTINUED | OUTPATIENT
Start: 2022-10-15 | End: 2022-10-23 | Stop reason: HOSPADM

## 2022-10-15 RX ORDER — METHYLPREDNISOLONE SODIUM SUCCINATE 40 MG/ML
40 INJECTION, POWDER, LYOPHILIZED, FOR SOLUTION INTRAMUSCULAR; INTRAVENOUS EVERY 8 HOURS
Status: DISCONTINUED | OUTPATIENT
Start: 2022-10-15 | End: 2022-10-16

## 2022-10-15 RX ORDER — POLYETHYLENE GLYCOL 3350 17 G/17G
17 POWDER, FOR SOLUTION ORAL DAILY PRN
Status: DISCONTINUED | OUTPATIENT
Start: 2022-10-15 | End: 2022-10-23 | Stop reason: HOSPADM

## 2022-10-15 RX ORDER — ARFORMOTEROL TARTRATE 15 UG/2ML
15 SOLUTION RESPIRATORY (INHALATION) 2 TIMES DAILY
Status: DISCONTINUED | OUTPATIENT
Start: 2022-10-15 | End: 2022-10-23 | Stop reason: HOSPADM

## 2022-10-15 RX ORDER — SODIUM CHLORIDE 0.9 % (FLUSH) 0.9 %
5-40 SYRINGE (ML) INJECTION PRN
Status: DISCONTINUED | OUTPATIENT
Start: 2022-10-15 | End: 2022-10-23 | Stop reason: HOSPADM

## 2022-10-15 RX ORDER — ACETAMINOPHEN 650 MG/1
650 SUPPOSITORY RECTAL EVERY 6 HOURS PRN
Status: DISCONTINUED | OUTPATIENT
Start: 2022-10-15 | End: 2022-10-23 | Stop reason: HOSPADM

## 2022-10-15 RX ORDER — FLUOXETINE 10 MG/1
10 TABLET, FILM COATED ORAL DAILY
Status: DISCONTINUED | OUTPATIENT
Start: 2022-10-16 | End: 2022-10-23 | Stop reason: HOSPADM

## 2022-10-15 RX ORDER — PREDNISONE 20 MG/1
50 TABLET ORAL ONCE
Status: COMPLETED | OUTPATIENT
Start: 2022-10-15 | End: 2022-10-15

## 2022-10-15 RX ORDER — IPRATROPIUM BROMIDE AND ALBUTEROL SULFATE 2.5; .5 MG/3ML; MG/3ML
1 SOLUTION RESPIRATORY (INHALATION)
Status: DISCONTINUED | OUTPATIENT
Start: 2022-10-15 | End: 2022-10-16

## 2022-10-15 RX ORDER — LORAZEPAM 1 MG/1
1 TABLET ORAL 3 TIMES DAILY PRN
Status: DISCONTINUED | OUTPATIENT
Start: 2022-10-15 | End: 2022-10-19

## 2022-10-15 RX ORDER — FLUOXETINE 10 MG/1
10 TABLET, FILM COATED ORAL DAILY
Status: DISCONTINUED | OUTPATIENT
Start: 2022-10-16 | End: 2022-10-16 | Stop reason: SDUPTHER

## 2022-10-15 RX ORDER — ONDANSETRON 2 MG/ML
4 INJECTION INTRAMUSCULAR; INTRAVENOUS EVERY 6 HOURS PRN
Status: DISCONTINUED | OUTPATIENT
Start: 2022-10-15 | End: 2022-10-23 | Stop reason: HOSPADM

## 2022-10-15 RX ADMIN — MAGNESIUM SULFATE HEPTAHYDRATE 2000 MG: 40 INJECTION, SOLUTION INTRAVENOUS at 18:03

## 2022-10-15 RX ADMIN — ACETAMINOPHEN 650 MG: 325 TABLET ORAL at 21:03

## 2022-10-15 RX ADMIN — GABAPENTIN 100 MG: 100 CAPSULE ORAL at 20:55

## 2022-10-15 RX ADMIN — DOXYCYCLINE 100 MG: 100 INJECTION, POWDER, LYOPHILIZED, FOR SOLUTION INTRAVENOUS at 11:50

## 2022-10-15 RX ADMIN — METOPROLOL TARTRATE 25 MG: 25 TABLET, FILM COATED ORAL at 20:55

## 2022-10-15 RX ADMIN — IPRATROPIUM BROMIDE AND ALBUTEROL SULFATE 1 AMPULE: 2.5; .5 SOLUTION RESPIRATORY (INHALATION) at 19:49

## 2022-10-15 RX ADMIN — ENOXAPARIN SODIUM 40 MG: 100 INJECTION SUBCUTANEOUS at 14:24

## 2022-10-15 RX ADMIN — SODIUM CHLORIDE, PRESERVATIVE FREE 10 ML: 5 INJECTION INTRAVENOUS at 18:03

## 2022-10-15 RX ADMIN — LORAZEPAM 1 MG: 1 TABLET ORAL at 14:24

## 2022-10-15 RX ADMIN — LORAZEPAM 1 MG: 1 TABLET ORAL at 23:04

## 2022-10-15 RX ADMIN — BUDESONIDE 500 MCG: 0.5 SUSPENSION RESPIRATORY (INHALATION) at 19:49

## 2022-10-15 RX ADMIN — PREDNISONE 50 MG: 20 TABLET ORAL at 10:39

## 2022-10-15 RX ADMIN — HYDROCODONE BITARTRATE AND ACETAMINOPHEN 1 TABLET: 7.5; 325 TABLET ORAL at 14:24

## 2022-10-15 RX ADMIN — GABAPENTIN 100 MG: 100 CAPSULE ORAL at 14:24

## 2022-10-15 RX ADMIN — IPRATROPIUM BROMIDE AND ALBUTEROL SULFATE 3 AMPULE: 2.5; .5 SOLUTION RESPIRATORY (INHALATION) at 10:27

## 2022-10-15 RX ADMIN — Medication 10 ML: at 20:55

## 2022-10-15 RX ADMIN — IPRATROPIUM BROMIDE AND ALBUTEROL SULFATE 1 AMPULE: 2.5; .5 SOLUTION RESPIRATORY (INHALATION) at 16:00

## 2022-10-15 RX ADMIN — ARFORMOTEROL TARTRATE 15 MCG: 15 SOLUTION RESPIRATORY (INHALATION) at 19:49

## 2022-10-15 RX ADMIN — WATER 1000 MG: 1 INJECTION INTRAMUSCULAR; INTRAVENOUS; SUBCUTANEOUS at 11:42

## 2022-10-15 RX ADMIN — METHYLPREDNISOLONE SODIUM SUCCINATE 40 MG: 40 INJECTION, POWDER, LYOPHILIZED, FOR SOLUTION INTRAMUSCULAR; INTRAVENOUS at 18:02

## 2022-10-15 RX ADMIN — PRAMIPEXOLE DIHYDROCHLORIDE 0.25 MG: 0.25 TABLET ORAL at 20:55

## 2022-10-15 ASSESSMENT — ENCOUNTER SYMPTOMS
DIARRHEA: 0
EYE DISCHARGE: 0
ANAL BLEEDING: 0
SHORTNESS OF BREATH: 1
RHINORRHEA: 0
SINUS PAIN: 0
ABDOMINAL DISTENTION: 0
VOMITING: 0
CONSTIPATION: 0
ABDOMINAL PAIN: 0
COUGH: 0
BACK PAIN: 0
SINUS PRESSURE: 0
CHEST TIGHTNESS: 1
NAUSEA: 0

## 2022-10-15 ASSESSMENT — PAIN SCALES - GENERAL
PAINLEVEL_OUTOF10: 9
PAINLEVEL_OUTOF10: 9
PAINLEVEL_OUTOF10: 0

## 2022-10-15 ASSESSMENT — PAIN DESCRIPTION - LOCATION: LOCATION: LEG

## 2022-10-15 NOTE — H&P
[id number] [facility]   History and Physical    CHIEF COMPLAINT: Shortness of breath    History of Present Illness:  Tammie Moran is a 64 y.o. male with a history of COPD, HTN, & peripheral edema who presents with Shortness of Breath (Hx of copd, cont. 7L O2 at home with a very long tubing.)  Patient states he woke up in the middle of the night very short of breath. Endorses congestion & productive cough, but no HA, sore throat, fever, chills, or night sweats over the last several days. States he had a fever and & 1 day of diarrhea, but that was >1 week ago. Denies chest pain. States he is starting to breathe better since receiving treatment in ER. Pertinent ER findings include hypotension, elevated BNP (774, near/below recent baseline), acute on chronic anemia (10.6/34.4, at/above recent baseline). Also noted acute on chronic hypocalcemia (7.3, at/near recent baseline) in ER results. COVID-negative. CXR identified RLL & L perihilar pneumonia. While in ER patient received duonebs, prednisone 50mg po, Rocephin, & doxycycline.     WORK UP SINCE ARRIVAL:  Results for orders placed or performed during the hospital encounter of 10/15/22   COVID-19, Rapid    Specimen: Nasopharyngeal Swab   Result Value Ref Range    SARS-CoV-2, NAAT Not Detected Not Detected   CBC with Auto Differential   Result Value Ref Range    WBC 8.6 4.5 - 11.5 E9/L    RBC 3.43 (L) 3.80 - 5.80 E12/L    Hemoglobin 10.6 (L) 12.5 - 16.5 g/dL    Hematocrit 34.4 (L) 37.0 - 54.0 %    .3 (H) 80.0 - 99.9 fL    MCH 30.9 26.0 - 35.0 pg    MCHC 30.8 (L) 32.0 - 34.5 %    RDW 13.1 11.5 - 15.0 fL    Platelets 323 193 - 896 E9/L    MPV 10.3 7.0 - 12.0 fL    Neutrophils % 82.6 (H) 43.0 - 80.0 %    Immature Granulocytes % 0.3 0.0 - 5.0 %    Lymphocytes % 9.4 (L) 20.0 - 42.0 %    Monocytes % 6.5 2.0 - 12.0 %    Eosinophils % 0.9 0.0 - 6.0 %    Basophils % 0.3 0.0 - 2.0 %    Neutrophils Absolute 7.13 1.80 - 7.30 E9/L Immature Granulocytes # 0.03 E9/L    Lymphocytes Absolute 0.81 (L) 1.50 - 4.00 E9/L    Monocytes Absolute 0.56 0.10 - 0.95 E9/L    Eosinophils Absolute 0.08 0.05 - 0.50 E9/L    Basophils Absolute 0.03 0.00 - 0.20 E9/L   Comprehensive Metabolic Panel w/ Reflex to MG   Result Value Ref Range    Sodium 138 132 - 146 mmol/L    Potassium reflex Magnesium 3.9 3.5 - 5.0 mmol/L    Chloride 92 (L) 98 - 107 mmol/L    CO2 35 (H) 22 - 29 mmol/L    Anion Gap 11 7 - 16 mmol/L    Glucose 124 (H) 74 - 99 mg/dL    BUN 13 6 - 20 mg/dL    Creatinine 1.0 0.7 - 1.2 mg/dL    GFR Non-African American >60 >=60 mL/min/1.73    GFR African American >60     Calcium 7.3 (L) 8.6 - 10.2 mg/dL    Total Protein 7.8 6.4 - 8.3 g/dL    Albumin 3.9 3.5 - 5.2 g/dL    Total Bilirubin 0.3 0.0 - 1.2 mg/dL    Alkaline Phosphatase 54 40 - 129 U/L    ALT 10 0 - 40 U/L    AST 15 0 - 39 U/L   Troponin   Result Value Ref Range    Troponin, High Sensitivity 56 (H) 0 - 11 ng/L   Brain Natriuretic Peptide   Result Value Ref Range    Pro- (H) 0 - 125 pg/mL   Lactate, Sepsis   Result Value Ref Range    Lactic Acid, Sepsis 1.0 0.5 - 1.9 mmol/L   EKG 12 Lead   Result Value Ref Range    Ventricular Rate 77 BPM    Atrial Rate 77 BPM    P-R Interval 114 ms    QRS Duration 78 ms    Q-T Interval 402 ms    QTc Calculation (Bazett) 454 ms    P Axis 63 degrees    R Axis 75 degrees    T Axis 74 degrees     XR CHEST PORTABLE   Final Result by Breanna Tidwell MD (10/15 2442)   1. Right lower lobe and left perihilar pneumonia             REVIEW OF SYSTEMS:  no fevers, chills, cp, sob, n/v, ha, vision/hearing changes, wt changes, hot/cold flashes, other open skin lesions, diarrhea, constipation, dysuria/hematuria unless noted in HPI. Complete ROS performed with the patient and is otherwise negative.     ALLERGIES:  Aspirin, Lisinopril, Other, Tramadol, Ibuprofen, and Sulfa antibiotics    PAST MEDICAL & SURGICAL HISTORY:  Pt  has a past medical history of COPD (chronic obstructive pulmonary disease) ([facility] [address] Hypertension, Leg swelling, and Multiple gastric ulcers. .   Pt  has a past surgical history that includes knee surgery; hip surgery; and hernia repair. Social History:  Pt  reports that he quit smoking about 2 years ago. His smoking use included cigarettes. He started smoking about 27 years ago. He has a 100.00 pack-year smoking history. He has never used smokeless tobacco. He reports that he does not drink alcohol and does not use drugs. .   Pt lives with family in a/an  apartment/duplex/condo    Family History:  Pt family history includes Colon Cancer in his mother; No Known Problems in his brother, brother, sister, and sister; Other in his father. Informant(s) for H&P: Patient, chart review    Pt  height is 5' 4\" (1.626 m) and weight is 130 lb (59 kg). His temporal temperature is 98.5 °F (36.9 °C). His blood pressure is 105/63 and his pulse is 74. His respiration is 14 and oxygen saturation is 98%. .   Body mass index is 22.31 kg/m². Pt's home meds include ARIPiprazole, Arformoterol Tartrate, FLUoxetine, Florajen3, HYDROcodone-acetaminophen, LORazepam, Revefenacin, albuterol sulfate HFA, budesonide, furosemide, gabapentin, ipratropium-albuterol, metoprolol tartrate, mupirocin, omeprazole, potassium chloride, pramipexole, and predniSONE    PHYSICAL EXAM:  General Appearance: Awake, alert and oriented. In no acute distress. Disheveled  Skin: Warm and dry, no rash or erythema  Head: normocephalic and atraumatic  Eyes: pupils equal, round, and reactive to light, extraocular eye movements intact, conjunctivae normal  ENT: external ear and ear canal normal bilaterally, nose without deformity  Neck: supple and non-tender without mass, no cervical lymphadenopathy  Pulmonary/Chest: Wheezing in all fields. Normal air movement. No respiratory distress. Cardiovascular: normal rate, regular rhythm. Normal S1 and S2.  No murmurs, rubs, clicks, or gallops  Abdomen: soft, non-tender, non-distended. Normal bowel sounds. No masses or organomegaly  Extremities: no cyanosis, clubbing or edema  Musculoskeletal: normal range of motion, no joint swelling, deformity or tenderness  Neurologic: reflexes normal and symmetric, no cranial nerve deficit, gait, coordination and speech normal    /63   Pulse 74   Temp 98.5 °F (36.9 °C) (Temporal)   Resp 14   Ht 5' 4\" (1.626 m)   Wt 130 lb (59 kg)   SpO2 98%   BMI 22.31 kg/m²     Recent Labs     10/15/22  1050      K 3.9   CL 92*   CO2 35*   BUN 13   CREATININE 1.0   GLUCOSE 124*   CALCIUM 7.3*       Recent Labs     10/15/22  1050   WBC 8.6   RBC 3.43*   HGB 10.6*   HCT 34.4*   .3*   MCH 30.9   MCHC 30.8*   RDW 13.1      MPV 10.3       No results for input(s): POCGLU in the last 72 hours. Radiology: CT CHEST WO CONTRAST    Result Date: 10/12/2022  Mild interval large mint of a 7 mm previously 5 mm right upper lobe irregular noncalcified pulmonary nodule along with a right mid lung focus of nodular scarring versus nodular density 6-7 mm noncalcified more evident or developed from prior. Addition noted in the left lower lung posterior periphery 5 mm subpleural noncalcified focus of potential nodule development in the interim. CT chest recommended 3 months at minimum to evaluate for follow-up. XR CHEST PORTABLE    Result Date: 10/15/2022  1. Right lower lobe and left perihilar pneumonia        EKG Narrative & Impression    Normal sinus rhythm  Normal ECG  When compared with ECG of 07-JUL-2022 18:56,  No significant change was found  Confirmed by Leandro Helton ([id number]) on 10/15/2022 12:41:49 PM       Assessment & Plan:    Bilateral pneumonia  -Blood cultures, PCT ordered from ER.  Urine Legionella ordered from ER.  -Received rocephin & doxy in ER  -Cont rocephin & doxy  -Solumedrol 40mg IV q8h  -Urine strep  -resp panel  -Duonebs q4h while awake    COPD  -Pulmicort & Brovana BID  -Oxygen therapy protocol    HTN  -BP's

## 2022-10-15 NOTE — ED NOTES
[facility] faxed to floor spoke with Harika Griggs, copy of fax verificarion received and in chart, pt. Is chimed.      Edd Mejia RN  10/15/22 2903

## 2022-10-15 NOTE — ED PROVIDER NOTES
HPI     Patient is a 64 y.o. male presents with a chief complaint of shortness of breath  This has been occurring for one day. Patient states that it gets better with nothing. Patient states that it gets worse with activity. Patient states that it is moderate in severity. Patient states it was acute in onset. Patient states that he developed shortness of breath last night. States that this feels like his COPD. Patient is chronically on 7 L but has a 30ft cord. Patient states that he has some chest tightness associated with his COPD. Patient stated that he finished prednisone a few weeks ago. Patient denies any fevers, chills, nausea, vomiting, abdominal pain, change in urinary or bowel habits. Patient does take Lasix for his leg swelling. Review of Systems   Constitutional:  Negative for activity change, appetite change, fatigue and fever. HENT:  Negative for congestion, rhinorrhea, sinus pressure and sinus pain. Eyes:  Negative for discharge. Respiratory:  Positive for chest tightness and shortness of breath. Negative for cough. Cardiovascular:  Positive for chest pain. Negative for palpitations. Gastrointestinal:  Negative for abdominal distention, abdominal pain, anal bleeding, constipation, diarrhea, nausea and vomiting. Endocrine: Negative for polydipsia and polyuria. Genitourinary:  Negative for decreased urine volume, difficulty urinating, enuresis, flank pain, frequency and urgency. Musculoskeletal:  Negative for arthralgias, back pain and neck stiffness. Skin:  Negative for rash and wound. Neurological:  Negative for dizziness, weakness, light-headedness and headaches. Psychiatric/Behavioral:  Negative for agitation, behavioral problems and confusion. Physical Exam  Vitals and nursing note reviewed. Constitutional:       Appearance: He is well-developed. HENT:      Head: Normocephalic and atraumatic.    Eyes:      Conjunctiva/sclera: Conjunctivae normal. Cardiovascular:      Rate and Rhythm: Normal rate and regular rhythm. Heart sounds: Normal heart sounds. No murmur heard. Pulmonary:      Effort: Pulmonary effort is normal. No respiratory distress. Breath sounds: Wheezing and rales present. Abdominal:      General: Bowel sounds are normal.      Palpations: Abdomen is soft. Tenderness: There is no abdominal tenderness. There is no guarding or rebound. Musculoskeletal:         General: No tenderness or deformity. Cervical back: Normal range of motion and neck supple. Skin:     General: Skin is warm and dry. Neurological:      Mental Status: He is alert and oriented to person, place, and time. Cranial Nerves: No cranial nerve deficit. Coordination: Coordination normal.        Procedures   EKG read interpreted by me. Rate 77 bpm.  Normal axis. Normal ND interval.  Normal QRS. No ST elevations or depressions. Normal EKG. MDM     Amount and/or Complexity of Data Reviewed  Decide to obtain previous medical records or to obtain history from someone other than the patient: yes             Patient is a 64 y.o. male presenting with Shortness of breath. Patient has a history of COPD. States this feels like his normal COPD exacerbation. Patient denies any fevers or chills. Patient states that he has some chest tightness. Patient is clinically stable. Patient was given oral prednisone and duo nebs. Patient had labs drawn. EKG was benign. Patient's chest x-ray was concerning for pneumonia. Patient had cultures drawn. Patient was given a dose of Rocephin and doxycycline. Patient's  Eitan was 5 6. Repeat  Eitan was drawn. Patient will be admitted for pneumonia. Patient was seen and evaluated by myself and my attending Dr. James You. Assessment and Plan discussed with attending provider, please see attestation for final plan of care.   This note was done using dictation software and there may be some grammatical errors associated with this. Brittney Li MD         --------------------------------------------- PAST HISTORY ---------------------------------------------  Past Medical History:  has a past medical history of COPD (chronic obstructive pulmonary disease) ([address] Hypertension, Leg swelling, and Multiple gastric ulcers. Past Surgical History:  has a past surgical history that includes knee surgery; hip surgery; and hernia repair. Social History:  reports that he quit smoking about 2 years ago. His smoking use included cigarettes. He started smoking about 27 years ago. He has a 100.00 pack-year smoking history. He has never used smokeless tobacco. He reports that he does not drink alcohol and does not use drugs. Family History: family history includes Colon Cancer in his mother; No Known Problems in his brother, brother, sister, and sister; Other in his father. The patients home medications have been reviewed.     Allergies: Aspirin, Lisinopril, Other, Tramadol, Ibuprofen, and Sulfa antibiotics    -------------------------------------------------- RESULTS -------------------------------------------------    LABS:  Results for orders placed or performed during the hospital encounter of 10/15/22   CBC with Auto Differential   Result Value Ref Range    WBC 8.6 4.5 - 11.5 E9/L    RBC 3.43 (L) 3.80 - 5.80 E12/L    Hemoglobin 10.6 (L) 12.5 - 16.5 g/dL    Hematocrit 34.4 (L) 37.0 - 54.0 %    .3 (H) 80.0 - 99.9 fL    MCH 30.9 26.0 - 35.0 pg    MCHC 30.8 (L) 32.0 - 34.5 %    RDW 13.1 11.5 - 15.0 fL    Platelets 655 551 - 702 E9/L    MPV 10.3 7.0 - 12.0 fL    Neutrophils % 82.6 (H) 43.0 - 80.0 %    Immature Granulocytes % 0.3 0.0 - 5.0 %    Lymphocytes % 9.4 (L) 20.0 - 42.0 %    Monocytes % 6.5 2.0 - 12.0 %    Eosinophils % 0.9 0.0 - 6.0 %    Basophils % 0.3 0.0 - 2.0 %    Neutrophils Absolute 7.13 1.80 - 7.30 E9/L    Immature Granulocytes # 0.03 E9/L    Lymphocytes Absolute 0.81 (L) 1.50 - 4.00 E9/L Monocytes Absolute 0.56 0.10 - 0.95 E9/L    Eosinophils Absolute 0.08 0.05 - 0.50 E9/L    Basophils Absolute 0.03 0.00 - 0.20 E9/L   Comprehensive Metabolic Panel w/ Reflex to MG   Result Value Ref Range    Sodium 138 132 - 146 mmol/L    Potassium reflex Magnesium 3.9 3.5 - 5.0 mmol/L    Chloride 92 (L) 98 - 107 mmol/L    CO2 35 (H) 22 - 29 mmol/L    Anion Gap 11 7 - 16 mmol/L    Glucose 124 (H) 74 - 99 mg/dL    BUN 13 6 - 20 mg/dL    Creatinine 1.0 0.7 - 1.2 mg/dL    GFR Non-African American >60 >=60 mL/min/1.73    GFR African American >60     Calcium 7.3 (L) 8.6 - 10.2 mg/dL    Total Protein 7.8 6.4 - 8.3 g/dL    Albumin 3.9 3.5 - 5.2 g/dL    Total Bilirubin 0.3 0.0 - 1.2 mg/dL    Alkaline Phosphatase 54 40 - 129 U/L    ALT 10 0 - 40 U/L    AST 15 0 - 39 U/L   Troponin   Result Value Ref Range    Troponin, High Sensitivity 56 (H) 0 - 11 ng/L   Brain Natriuretic Peptide   Result Value Ref Range    Pro- (H) 0 - 125 pg/mL   EKG 12 Lead   Result Value Ref Range    Ventricular Rate 77 BPM    Atrial Rate 77 BPM    P-R Interval 114 ms    QRS Duration 78 ms    Q-T Interval 402 ms    QTc Calculation (Bazett) 454 ms    P Axis 63 degrees    R Axis 75 degrees    T Axis 74 degrees       RADIOLOGY:  XR CHEST PORTABLE   Final Result   1. Right lower lobe and left perihilar pneumonia                 ------------------------- NURSING NOTES AND VITALS REVIEWED ---------------------------  Date / Time Roomed:  10/15/2022  9:43 AM  ED Bed Assignment:  22/22    The nursing notes within the ED encounter and vital signs as below have been reviewed.      Patient Vitals for the past 24 hrs:   BP Temp Temp src Pulse Resp SpO2 Height Weight   10/15/22 1136 105/63 -- -- 74 14 98 % -- --   10/15/22 1027 -- -- -- 80 18 95 % -- --   10/15/22 1013 (!) 91/50 98.5 °F (36.9 °C) Temporal 96 16 97 % 5' 4\" (1.626 m) 130 lb (59 kg)       Oxygen Saturation Interpretation: Normal    ------------------------------------------ PROGRESS NOTES

## 2022-10-16 LAB
ADENOVIRUS BY PCR: NOT DETECTED
ANION GAP SERPL CALCULATED.3IONS-SCNC: 11 MMOL/L (ref 7–16)
BASOPHILS ABSOLUTE: 0 E9/L (ref 0–0.2)
BASOPHILS RELATIVE PERCENT: 0 % (ref 0–2)
BORDETELLA PARAPERTUSSIS BY PCR: NOT DETECTED
BORDETELLA PERTUSSIS BY PCR: NOT DETECTED
BUN BLDV-MCNC: 14 MG/DL (ref 6–20)
CALCIUM SERPL-MCNC: 7.2 MG/DL (ref 8.6–10.2)
CHLAMYDOPHILIA PNEUMONIAE BY PCR: NOT DETECTED
CHLORIDE BLD-SCNC: 93 MMOL/L (ref 98–107)
CO2: 31 MMOL/L (ref 22–29)
CORONAVIRUS 229E BY PCR: NOT DETECTED
CORONAVIRUS HKU1 BY PCR: NOT DETECTED
CORONAVIRUS NL63 BY PCR: NOT DETECTED
CORONAVIRUS OC43 BY PCR: NOT DETECTED
CREAT SERPL-MCNC: 0.8 MG/DL (ref 0.7–1.2)
EOSINOPHILS ABSOLUTE: 0.01 E9/L (ref 0.05–0.5)
EOSINOPHILS RELATIVE PERCENT: 0.2 % (ref 0–6)
GFR AFRICAN AMERICAN: >60
GFR NON-AFRICAN AMERICAN: >60 ML/MIN/1.73
GLUCOSE BLD-MCNC: 179 MG/DL (ref 74–99)
HCT VFR BLD CALC: 33.6 % (ref 37–54)
HEMOGLOBIN: 10.5 G/DL (ref 12.5–16.5)
HUMAN METAPNEUMOVIRUS BY PCR: NOT DETECTED
HUMAN RHINOVIRUS/ENTEROVIRUS BY PCR: NOT DETECTED
IMMATURE GRANULOCYTES #: 0.03 E9/L
IMMATURE GRANULOCYTES %: 0.7 % (ref 0–5)
INFLUENZA A BY PCR: NOT DETECTED
INFLUENZA B BY PCR: NOT DETECTED
L. PNEUMOPHILA SEROGP 1 UR AG: NORMAL
LYMPHOCYTES ABSOLUTE: 0.36 E9/L (ref 1.5–4)
LYMPHOCYTES RELATIVE PERCENT: 8.7 % (ref 20–42)
MCH RBC QN AUTO: 31 PG (ref 26–35)
MCHC RBC AUTO-ENTMCNC: 31.3 % (ref 32–34.5)
MCV RBC AUTO: 99.1 FL (ref 80–99.9)
MONOCYTES ABSOLUTE: 0.17 E9/L (ref 0.1–0.95)
MONOCYTES RELATIVE PERCENT: 4.1 % (ref 2–12)
MYCOPLASMA PNEUMONIAE BY PCR: NOT DETECTED
NEUTROPHILS ABSOLUTE: 3.55 E9/L (ref 1.8–7.3)
NEUTROPHILS RELATIVE PERCENT: 86.3 % (ref 43–80)
PARAINFLUENZA VIRUS 1 BY PCR: NOT DETECTED
PARAINFLUENZA VIRUS 2 BY PCR: NOT DETECTED
PARAINFLUENZA VIRUS 3 BY PCR: NOT DETECTED
PARAINFLUENZA VIRUS 4 BY PCR: NOT DETECTED
PARATHYROID HORMONE INTACT: 57 PG/ML (ref 15–65)
PDW BLD-RTO: 13.1 FL (ref 11.5–15)
PLATELET # BLD: 183 E9/L (ref 130–450)
PMV BLD AUTO: 10.4 FL (ref 7–12)
POLYCHROMASIA: ABNORMAL
POTASSIUM REFLEX MAGNESIUM: 4.3 MMOL/L (ref 3.5–5)
RBC # BLD: 3.39 E12/L (ref 3.8–5.8)
RESPIRATORY SYNCYTIAL VIRUS BY PCR: DETECTED
SARS-COV-2, PCR: NOT DETECTED
SODIUM BLD-SCNC: 135 MMOL/L (ref 132–146)
STREP PNEUMONIAE ANTIGEN, URINE: NORMAL
VITAMIN D 25-HYDROXY: 13 NG/ML (ref 30–100)
WBC # BLD: 4.1 E9/L (ref 4.5–11.5)

## 2022-10-16 PROCEDURE — 82306 VITAMIN D 25 HYDROXY: CPT

## 2022-10-16 PROCEDURE — 6360000002 HC RX W HCPCS: Performed by: INTERNAL MEDICINE

## 2022-10-16 PROCEDURE — 2580000003 HC RX 258: Performed by: NURSE PRACTITIONER

## 2022-10-16 PROCEDURE — 6370000000 HC RX 637 (ALT 250 FOR IP): Performed by: NURSE PRACTITIONER

## 2022-10-16 PROCEDURE — 83970 ASSAY OF PARATHORMONE: CPT

## 2022-10-16 PROCEDURE — 36415 COLL VENOUS BLD VENIPUNCTURE: CPT

## 2022-10-16 PROCEDURE — 6360000002 HC RX W HCPCS: Performed by: NURSE PRACTITIONER

## 2022-10-16 PROCEDURE — 94640 AIRWAY INHALATION TREATMENT: CPT

## 2022-10-16 PROCEDURE — 6370000000 HC RX 637 (ALT 250 FOR IP): Performed by: INTERNAL MEDICINE

## 2022-10-16 PROCEDURE — 2500000003 HC RX 250 WO HCPCS: Performed by: NURSE PRACTITIONER

## 2022-10-16 PROCEDURE — 2060000000 HC ICU INTERMEDIATE R&B

## 2022-10-16 PROCEDURE — 85025 COMPLETE CBC W/AUTO DIFF WBC: CPT

## 2022-10-16 PROCEDURE — 2700000000 HC OXYGEN THERAPY PER DAY

## 2022-10-16 PROCEDURE — 99233 SBSQ HOSP IP/OBS HIGH 50: CPT | Performed by: INTERNAL MEDICINE

## 2022-10-16 PROCEDURE — 80048 BASIC METABOLIC PNL TOTAL CA: CPT

## 2022-10-16 PROCEDURE — 0202U NFCT DS 22 TRGT SARS-COV-2: CPT

## 2022-10-16 RX ORDER — PREDNISONE 20 MG/1
40 TABLET ORAL DAILY
Status: DISCONTINUED | OUTPATIENT
Start: 2022-10-16 | End: 2022-10-16

## 2022-10-16 RX ORDER — IPRATROPIUM BROMIDE AND ALBUTEROL SULFATE 2.5; .5 MG/3ML; MG/3ML
1 SOLUTION RESPIRATORY (INHALATION) EVERY 4 HOURS PRN
Status: DISCONTINUED | OUTPATIENT
Start: 2022-10-16 | End: 2022-10-23 | Stop reason: HOSPADM

## 2022-10-16 RX ORDER — HYDROCODONE BITARTRATE AND ACETAMINOPHEN 5; 325 MG/1; MG/1
1 TABLET ORAL ONCE
Status: COMPLETED | OUTPATIENT
Start: 2022-10-16 | End: 2022-10-16

## 2022-10-16 RX ORDER — METHYLPREDNISOLONE SODIUM SUCCINATE 40 MG/ML
40 INJECTION, POWDER, LYOPHILIZED, FOR SOLUTION INTRAMUSCULAR; INTRAVENOUS EVERY 8 HOURS
Status: DISCONTINUED | OUTPATIENT
Start: 2022-10-16 | End: 2022-10-17

## 2022-10-16 RX ORDER — FUROSEMIDE 20 MG/1
20 TABLET ORAL DAILY
Status: DISCONTINUED | OUTPATIENT
Start: 2022-10-16 | End: 2022-10-16

## 2022-10-16 RX ORDER — METHYLPREDNISOLONE SODIUM SUCCINATE 40 MG/ML
40 INJECTION, POWDER, LYOPHILIZED, FOR SOLUTION INTRAMUSCULAR; INTRAVENOUS EVERY 8 HOURS
Status: DISCONTINUED | OUTPATIENT
Start: 2022-10-16 | End: 2022-10-16

## 2022-10-16 RX ORDER — IPRATROPIUM BROMIDE AND ALBUTEROL SULFATE 2.5; .5 MG/3ML; MG/3ML
1 SOLUTION RESPIRATORY (INHALATION)
Status: DISCONTINUED | OUTPATIENT
Start: 2022-10-17 | End: 2022-10-17

## 2022-10-16 RX ADMIN — METOPROLOL TARTRATE 25 MG: 25 TABLET, FILM COATED ORAL at 20:17

## 2022-10-16 RX ADMIN — PREDNISONE 40 MG: 20 TABLET ORAL at 08:42

## 2022-10-16 RX ADMIN — METHYLPREDNISOLONE SODIUM SUCCINATE 40 MG: 40 INJECTION, POWDER, LYOPHILIZED, FOR SOLUTION INTRAMUSCULAR; INTRAVENOUS at 15:59

## 2022-10-16 RX ADMIN — LORAZEPAM 1 MG: 1 TABLET ORAL at 20:17

## 2022-10-16 RX ADMIN — HYDROCODONE BITARTRATE AND ACETAMINOPHEN 1 TABLET: 5; 325 TABLET ORAL at 09:34

## 2022-10-16 RX ADMIN — IPRATROPIUM BROMIDE AND ALBUTEROL SULFATE 1 AMPULE: .5; 2.5 SOLUTION RESPIRATORY (INHALATION) at 01:38

## 2022-10-16 RX ADMIN — BUDESONIDE 500 MCG: 0.5 SUSPENSION RESPIRATORY (INHALATION) at 08:04

## 2022-10-16 RX ADMIN — ARFORMOTEROL TARTRATE 15 MCG: 15 SOLUTION RESPIRATORY (INHALATION) at 20:24

## 2022-10-16 RX ADMIN — ARIPIPRAZOLE 5 MG: 5 TABLET ORAL at 08:41

## 2022-10-16 RX ADMIN — METHYLPREDNISOLONE SODIUM SUCCINATE 40 MG: 40 INJECTION, POWDER, LYOPHILIZED, FOR SOLUTION INTRAMUSCULAR; INTRAVENOUS at 02:39

## 2022-10-16 RX ADMIN — Medication 10 ML: at 20:19

## 2022-10-16 RX ADMIN — HYDROCODONE BITARTRATE AND ACETAMINOPHEN 1 TABLET: 7.5; 325 TABLET ORAL at 02:39

## 2022-10-16 RX ADMIN — METHYLPREDNISOLONE SODIUM SUCCINATE 40 MG: 40 INJECTION, POWDER, LYOPHILIZED, FOR SOLUTION INTRAMUSCULAR; INTRAVENOUS at 23:17

## 2022-10-16 RX ADMIN — LORAZEPAM 1 MG: 1 TABLET ORAL at 11:33

## 2022-10-16 RX ADMIN — HYDROCODONE BITARTRATE AND ACETAMINOPHEN 1 TABLET: 7.5; 325 TABLET ORAL at 15:59

## 2022-10-16 RX ADMIN — IPRATROPIUM BROMIDE AND ALBUTEROL SULFATE 1 AMPULE: 2.5; .5 SOLUTION RESPIRATORY (INHALATION) at 15:48

## 2022-10-16 RX ADMIN — ENOXAPARIN SODIUM 40 MG: 100 INJECTION SUBCUTANEOUS at 08:44

## 2022-10-16 RX ADMIN — IPRATROPIUM BROMIDE AND ALBUTEROL SULFATE 1 AMPULE: 2.5; .5 SOLUTION RESPIRATORY (INHALATION) at 20:23

## 2022-10-16 RX ADMIN — BUDESONIDE 500 MCG: 0.5 SUSPENSION RESPIRATORY (INHALATION) at 20:24

## 2022-10-16 RX ADMIN — GABAPENTIN 100 MG: 100 CAPSULE ORAL at 08:42

## 2022-10-16 RX ADMIN — PANTOPRAZOLE SODIUM 40 MG: 40 TABLET, DELAYED RELEASE ORAL at 07:09

## 2022-10-16 RX ADMIN — Medication 10 ML: at 08:44

## 2022-10-16 RX ADMIN — PRAMIPEXOLE DIHYDROCHLORIDE 0.25 MG: 0.25 TABLET ORAL at 20:16

## 2022-10-16 RX ADMIN — DOXYCYCLINE 100 MG: 100 INJECTION, POWDER, LYOPHILIZED, FOR SOLUTION INTRAVENOUS at 00:21

## 2022-10-16 RX ADMIN — ARFORMOTEROL TARTRATE 15 MCG: 15 SOLUTION RESPIRATORY (INHALATION) at 08:04

## 2022-10-16 RX ADMIN — METOPROLOL TARTRATE 25 MG: 25 TABLET, FILM COATED ORAL at 08:42

## 2022-10-16 RX ADMIN — FUROSEMIDE 20 MG: 20 TABLET ORAL at 09:34

## 2022-10-16 RX ADMIN — IPRATROPIUM BROMIDE AND ALBUTEROL SULFATE 1 AMPULE: 2.5; .5 SOLUTION RESPIRATORY (INHALATION) at 08:04

## 2022-10-16 RX ADMIN — WATER 1000 MG: 1 INJECTION INTRAMUSCULAR; INTRAVENOUS; SUBCUTANEOUS at 11:33

## 2022-10-16 RX ADMIN — DOXYCYCLINE 100 MG: 100 INJECTION, POWDER, LYOPHILIZED, FOR SOLUTION INTRAVENOUS at 11:40

## 2022-10-16 RX ADMIN — FLUOXETINE HYDROCHLORIDE 10 MG: 10 TABLET ORAL at 08:41

## 2022-10-16 RX ADMIN — GABAPENTIN 100 MG: 100 CAPSULE ORAL at 14:14

## 2022-10-16 RX ADMIN — IPRATROPIUM BROMIDE AND ALBUTEROL SULFATE 1 AMPULE: .5; 2.5 SOLUTION RESPIRATORY (INHALATION) at 23:29

## 2022-10-16 RX ADMIN — GABAPENTIN 100 MG: 100 CAPSULE ORAL at 20:17

## 2022-10-16 RX ADMIN — IPRATROPIUM BROMIDE AND ALBUTEROL SULFATE 1 AMPULE: 2.5; .5 SOLUTION RESPIRATORY (INHALATION) at 11:55

## 2022-10-16 ASSESSMENT — PAIN SCALES - GENERAL
PAINLEVEL_OUTOF10: 9
PAINLEVEL_OUTOF10: 9
PAINLEVEL_OUTOF10: 8

## 2022-10-16 ASSESSMENT — PAIN DESCRIPTION - ORIENTATION
ORIENTATION: LEFT
ORIENTATION: LEFT

## 2022-10-16 ASSESSMENT — PAIN DESCRIPTION - LOCATION
LOCATION: LEG
LOCATION: LEG

## 2022-10-16 NOTE — PLAN OF CARE
Problem: Discharge Planning  Goal: Discharge to home or other facility with appropriate resources  10/16/2022 1705 by Candace Valdovinos RN  Outcome: Progressing  10/16/2022 0305 by Fatuma Inman RN  Outcome: Progressing  Flowsheets (Taken 10/15/2022 1346 by Shelli Hunt RN)  Discharge to home or other facility with appropriate resources:   Arrange for needed discharge resources and transportation as appropriate   Identify discharge learning needs (meds, wound care, etc)     Problem: Pain  Goal: Verbalizes/displays adequate comfort level or baseline comfort level  10/16/2022 1705 by Candace Valdovinos RN  Outcome: Progressing  10/16/2022 0305 by Fatuma Inman RN  Outcome: Progressing     Problem: Safety - Adult  Goal: Free from fall injury  10/16/2022 1705 by Candace Valdovinos RN  Outcome: Progressing  10/16/2022 0305 by Fatuma Inman RN  Outcome: Progressing

## 2022-10-16 NOTE — PLAN OF CARE
Problem: Discharge Planning  Goal: Discharge to home or other facility with appropriate resources  Outcome: Progressing  Flowsheets (Taken 10/15/2022 1346 by Delilah Castañeda RN)  Discharge to home or other facility with appropriate resources:   Arrange for needed discharge resources and transportation as appropriate   Identify discharge learning needs (meds, wound care, etc)     Problem: Pain  Goal: Verbalizes/displays adequate comfort level or baseline comfort level  10/16/2022 0305 by Jillian Verma RN  Outcome: Progressing     Problem: Safety - Adult  Goal: Free from fall injury  10/16/2022 0305 by Jillian Verma RN  Outcome: Progressing

## 2022-10-16 NOTE — PROGRESS NOTES
[facility] Progress Note    Admitting Date and Time: 10/15/2022  9:43 AM  Admit Dx: Pneumonia due to organism [J18.9]  COPD exacerbation ([address] [J44.1]  Pneumonia of both lungs due to infectious organism, unspecified part of lung [J18.9]    Subjective:  Patient is being followed for Pneumonia due to organism [J18.9]  COPD exacerbation ([facility] [address] [J44.1]  Pneumonia of both lungs due to infectious organism, unspecified part of lung [J18.9]   Pt seen and examined this morning. No acute events overnight. Complaining of bilateral foot pain. VS stable  Stated his breathing still feels \"rough\"     ROS: denies fever, chills, cp, sob, n/v, HA unless stated above.       methylPREDNISolone  40 mg IntraVENous Q8H    furosemide  20 mg Oral Daily    ARIPiprazole  5 mg Oral Daily    budesonide  0.5 mg Nebulization BID    Arformoterol Tartrate  15 mcg Nebulization BID    gabapentin  100 mg Oral TID    ipratropium-albuterol  1 ampule Inhalation Q4H While awake    metoprolol tartrate  25 mg Oral BID    pantoprazole  40 mg Oral QAM AC    pramipexole  0.25 mg Oral Nightly    sodium chloride flush  5-40 mL IntraVENous 2 times per day    enoxaparin  40 mg SubCUTAneous Daily    cefTRIAXone (ROCEPHIN) IV  1,000 mg IntraVENous Q24H    doxycycline (VIBRAMYCIN) IV  100 mg IntraVENous Q12H    FLUoxetine  10 mg Oral Daily     ipratropium-albuterol, 1 ampule, Q4H PRN  sodium chloride flush, 5-40 mL, PRN  sodium chloride, , PRN  ondansetron, 4 mg, Q8H PRN   Or  ondansetron, 4 mg, Q6H PRN  polyethylene glycol, 17 g, Daily PRN  acetaminophen, 650 mg, Q6H PRN   Or  acetaminophen, 650 mg, Q6H PRN  HYDROcodone-acetaminophen, 1 tablet, BID PRN  LORazepam, 1 mg, TID PRN         Objective:    /72   Pulse 78   Temp 97.6 °F (36.4 °C) (Axillary)   Resp 18   Ht 5' 4\" (1.626 m)   Wt 130 lb (59 kg)   SpO2 96%   BMI 22.31 kg/m²   General Appearance: alert and oriented to person, place and time and in no acute distress, cachectic  Skin: warm and dry  Head: normocephalic and atraumatic  Eyes: pupils equal, round, and reactive to light, extraocular eye movements intact, conjunctivae normal  Neck: neck supple and non tender without mass   Pulmonary/Chest: Wheezing bilaterally  Cardiovascular: normal rate, normal S1 and S2 and no carotid bruits  Abdomen: soft, non-tender, non-distended, normal bowel sounds, no masses or organomegaly  Extremities: no cyanosis, no clubbing and no edema  Neurologic: no cranial nerve deficit and speech normal        Recent Labs     10/15/22  1050 10/16/22  0245    135   K 3.9 4.3   CL 92* 93*   CO2 35* 31*   BUN 13 14   CREATININE 1.0 0.8   GLUCOSE 124* 179*   CALCIUM 7.3* 7.2*       Recent Labs     10/15/22  1050 10/16/22  0245   WBC 8.6 4.1*   RBC 3.43* 3.39*   HGB 10.6* 10.5*   HCT 34.4* 33.6*   .3* 99.1   MCH 30.9 31.0   MCHC 30.8* 31.3*   RDW 13.1 13.1    183   MPV 10.3 10.4         Assessment and Plan:     Principal Problem:    Pneumonia due to organism  Resolved Problems:    * No resolved hospital problems. *    COPD exacerbation, 2/2 bilateral pneumonia, procalcitonin 0.09, Solu-Medrol 40 mg IV q8,   Strep pneumo pneumonia, DC Doxy and continue Rocephin   Chronic respiratory failure on 7 L at home, currently saturating well on 6 L  Hypertension, continue home meds with parameters  Acute on chronic hypocalcemia, vitamin D and PTH pending  Anxiety, on home Prozac and Ativan, continue Prozac and Ativan as needed  Restless leg syndrome, on Mirapex  Chronic leg pain, Norco as needed  Peripheral edema, patient is requesting Lasix as he was on it before, patient with no signs of edema at this time and per chart review was discontinued by PCP on 9/23. So we will hold off on resuming at this point        NOTE: This report was transcribed using voice recognition software.  Every effort was made to ensure accuracy; however, inadvertent computerized transcription errors may be present.   Electronically signed by Mono Ash MD on 10/16/2022 at 10:08 AM

## 2022-10-17 LAB
ANION GAP SERPL CALCULATED.3IONS-SCNC: 10 MMOL/L (ref 7–16)
BASOPHILS ABSOLUTE: 0 E9/L (ref 0–0.2)
BASOPHILS RELATIVE PERCENT: 0 % (ref 0–2)
BUN BLDV-MCNC: 15 MG/DL (ref 6–20)
CALCIUM SERPL-MCNC: 7.9 MG/DL (ref 8.6–10.2)
CHLORIDE BLD-SCNC: 94 MMOL/L (ref 98–107)
CO2: 31 MMOL/L (ref 22–29)
CREAT SERPL-MCNC: 0.7 MG/DL (ref 0.7–1.2)
EOSINOPHILS ABSOLUTE: 0 E9/L (ref 0.05–0.5)
EOSINOPHILS RELATIVE PERCENT: 0 % (ref 0–6)
GFR AFRICAN AMERICAN: >60
GFR NON-AFRICAN AMERICAN: >60 ML/MIN/1.73
GLUCOSE BLD-MCNC: 120 MG/DL (ref 74–99)
HCT VFR BLD CALC: 38.2 % (ref 37–54)
HEMOGLOBIN: 11.5 G/DL (ref 12.5–16.5)
HYPOCHROMIA: ABNORMAL
IMMATURE GRANULOCYTES #: 0.03 E9/L
IMMATURE GRANULOCYTES %: 0.4 % (ref 0–5)
LYMPHOCYTES ABSOLUTE: 0.36 E9/L (ref 1.5–4)
LYMPHOCYTES RELATIVE PERCENT: 4.7 % (ref 20–42)
MCH RBC QN AUTO: 30.7 PG (ref 26–35)
MCHC RBC AUTO-ENTMCNC: 30.1 % (ref 32–34.5)
MCV RBC AUTO: 101.9 FL (ref 80–99.9)
MONOCYTES ABSOLUTE: 0.37 E9/L (ref 0.1–0.95)
MONOCYTES RELATIVE PERCENT: 4.9 % (ref 2–12)
NEUTROPHILS ABSOLUTE: 6.86 E9/L (ref 1.8–7.3)
NEUTROPHILS RELATIVE PERCENT: 90 % (ref 43–80)
PDW BLD-RTO: 13.1 FL (ref 11.5–15)
PLATELET # BLD: 240 E9/L (ref 130–450)
PMV BLD AUTO: 10.1 FL (ref 7–12)
POTASSIUM REFLEX MAGNESIUM: 4.9 MMOL/L (ref 3.5–5)
RBC # BLD: 3.75 E12/L (ref 3.8–5.8)
SODIUM BLD-SCNC: 135 MMOL/L (ref 132–146)
WBC # BLD: 7.6 E9/L (ref 4.5–11.5)

## 2022-10-17 PROCEDURE — 6370000000 HC RX 637 (ALT 250 FOR IP): Performed by: FAMILY MEDICINE

## 2022-10-17 PROCEDURE — 6360000002 HC RX W HCPCS: Performed by: INTERNAL MEDICINE

## 2022-10-17 PROCEDURE — 6360000002 HC RX W HCPCS: Performed by: NURSE PRACTITIONER

## 2022-10-17 PROCEDURE — 85025 COMPLETE CBC W/AUTO DIFF WBC: CPT

## 2022-10-17 PROCEDURE — 6370000000 HC RX 637 (ALT 250 FOR IP): Performed by: INTERNAL MEDICINE

## 2022-10-17 PROCEDURE — 2580000003 HC RX 258: Performed by: NURSE PRACTITIONER

## 2022-10-17 PROCEDURE — 6370000000 HC RX 637 (ALT 250 FOR IP): Performed by: NURSE PRACTITIONER

## 2022-10-17 PROCEDURE — 99232 SBSQ HOSP IP/OBS MODERATE 35: CPT | Performed by: NURSE PRACTITIONER

## 2022-10-17 PROCEDURE — 2700000000 HC OXYGEN THERAPY PER DAY

## 2022-10-17 PROCEDURE — 80048 BASIC METABOLIC PNL TOTAL CA: CPT

## 2022-10-17 PROCEDURE — 2060000000 HC ICU INTERMEDIATE R&B

## 2022-10-17 PROCEDURE — 36415 COLL VENOUS BLD VENIPUNCTURE: CPT

## 2022-10-17 PROCEDURE — 94640 AIRWAY INHALATION TREATMENT: CPT

## 2022-10-17 RX ORDER — LANOLIN ALCOHOL/MO/W.PET/CERES
6 CREAM (GRAM) TOPICAL NIGHTLY PRN
Status: DISCONTINUED | OUTPATIENT
Start: 2022-10-17 | End: 2022-10-23 | Stop reason: HOSPADM

## 2022-10-17 RX ORDER — BUDESONIDE 0.5 MG/2ML
1 INHALANT ORAL 2 TIMES DAILY
Status: DISCONTINUED | OUTPATIENT
Start: 2022-10-17 | End: 2022-10-23 | Stop reason: HOSPADM

## 2022-10-17 RX ORDER — GUAIFENESIN/DEXTROMETHORPHAN 100-10MG/5
5 SYRUP ORAL EVERY 4 HOURS PRN
Status: DISCONTINUED | OUTPATIENT
Start: 2022-10-17 | End: 2022-10-18

## 2022-10-17 RX ORDER — IPRATROPIUM BROMIDE AND ALBUTEROL SULFATE 2.5; .5 MG/3ML; MG/3ML
1 SOLUTION RESPIRATORY (INHALATION) EVERY 4 HOURS
Status: DISCONTINUED | OUTPATIENT
Start: 2022-10-17 | End: 2022-10-23 | Stop reason: HOSPADM

## 2022-10-17 RX ORDER — METHYLPREDNISOLONE SODIUM SUCCINATE 125 MG/2ML
60 INJECTION, POWDER, LYOPHILIZED, FOR SOLUTION INTRAMUSCULAR; INTRAVENOUS EVERY 6 HOURS
Status: DISCONTINUED | OUTPATIENT
Start: 2022-10-17 | End: 2022-10-19

## 2022-10-17 RX ORDER — HYDROXYZINE HYDROCHLORIDE 10 MG/1
25 TABLET, FILM COATED ORAL ONCE
Status: COMPLETED | OUTPATIENT
Start: 2022-10-17 | End: 2022-10-17

## 2022-10-17 RX ADMIN — ARFORMOTEROL TARTRATE 15 MCG: 15 SOLUTION RESPIRATORY (INHALATION) at 09:00

## 2022-10-17 RX ADMIN — GUAIFENESIN AND DEXTROMETHORPHAN 5 ML: 100; 10 SYRUP ORAL at 13:12

## 2022-10-17 RX ADMIN — IPRATROPIUM BROMIDE AND ALBUTEROL SULFATE 1 AMPULE: .5; 2.5 SOLUTION RESPIRATORY (INHALATION) at 13:01

## 2022-10-17 RX ADMIN — ENOXAPARIN SODIUM 40 MG: 100 INJECTION SUBCUTANEOUS at 07:56

## 2022-10-17 RX ADMIN — Medication 6 MG: at 20:10

## 2022-10-17 RX ADMIN — GABAPENTIN 100 MG: 100 CAPSULE ORAL at 07:56

## 2022-10-17 RX ADMIN — FLUOXETINE HYDROCHLORIDE 10 MG: 10 TABLET ORAL at 07:55

## 2022-10-17 RX ADMIN — IPRATROPIUM BROMIDE AND ALBUTEROL SULFATE 1 AMPULE: .5; 2.5 SOLUTION RESPIRATORY (INHALATION) at 23:44

## 2022-10-17 RX ADMIN — METHYLPREDNISOLONE SODIUM SUCCINATE 40 MG: 40 INJECTION, POWDER, LYOPHILIZED, FOR SOLUTION INTRAMUSCULAR; INTRAVENOUS at 16:07

## 2022-10-17 RX ADMIN — IPRATROPIUM BROMIDE AND ALBUTEROL SULFATE 1 AMPULE: .5; 2.5 SOLUTION RESPIRATORY (INHALATION) at 09:00

## 2022-10-17 RX ADMIN — HYDROXYZINE HYDROCHLORIDE 25 MG: 10 TABLET ORAL at 00:43

## 2022-10-17 RX ADMIN — METHYLPREDNISOLONE SODIUM SUCCINATE 40 MG: 40 INJECTION, POWDER, LYOPHILIZED, FOR SOLUTION INTRAMUSCULAR; INTRAVENOUS at 07:56

## 2022-10-17 RX ADMIN — BUDESONIDE 1000 MCG: 0.5 SUSPENSION RESPIRATORY (INHALATION) at 20:21

## 2022-10-17 RX ADMIN — LORAZEPAM 1 MG: 1 TABLET ORAL at 04:03

## 2022-10-17 RX ADMIN — ARFORMOTEROL TARTRATE 15 MCG: 15 SOLUTION RESPIRATORY (INHALATION) at 20:21

## 2022-10-17 RX ADMIN — METOPROLOL TARTRATE 25 MG: 25 TABLET, FILM COATED ORAL at 07:56

## 2022-10-17 RX ADMIN — IPRATROPIUM BROMIDE AND ALBUTEROL SULFATE 1 AMPULE: .5; 2.5 SOLUTION RESPIRATORY (INHALATION) at 16:48

## 2022-10-17 RX ADMIN — GABAPENTIN 100 MG: 100 CAPSULE ORAL at 20:10

## 2022-10-17 RX ADMIN — GABAPENTIN 100 MG: 100 CAPSULE ORAL at 14:22

## 2022-10-17 RX ADMIN — Medication 10 ML: at 20:13

## 2022-10-17 RX ADMIN — PANTOPRAZOLE SODIUM 40 MG: 40 TABLET, DELAYED RELEASE ORAL at 06:00

## 2022-10-17 RX ADMIN — GUAIFENESIN AND DEXTROMETHORPHAN 5 ML: 100; 10 SYRUP ORAL at 07:56

## 2022-10-17 RX ADMIN — METHYLPREDNISOLONE SODIUM SUCCINATE 60 MG: 125 INJECTION, POWDER, LYOPHILIZED, FOR SOLUTION INTRAMUSCULAR; INTRAVENOUS at 22:11

## 2022-10-17 RX ADMIN — BUDESONIDE 500 MCG: 0.5 SUSPENSION RESPIRATORY (INHALATION) at 09:00

## 2022-10-17 RX ADMIN — GUAIFENESIN AND DEXTROMETHORPHAN 5 ML: 100; 10 SYRUP ORAL at 20:10

## 2022-10-17 RX ADMIN — HYDROCODONE BITARTRATE AND ACETAMINOPHEN 1 TABLET: 7.5; 325 TABLET ORAL at 02:02

## 2022-10-17 RX ADMIN — WATER 1000 MG: 1 INJECTION INTRAMUSCULAR; INTRAVENOUS; SUBCUTANEOUS at 11:45

## 2022-10-17 RX ADMIN — METOPROLOL TARTRATE 25 MG: 25 TABLET, FILM COATED ORAL at 20:10

## 2022-10-17 RX ADMIN — IPRATROPIUM BROMIDE AND ALBUTEROL SULFATE 1 AMPULE: .5; 2.5 SOLUTION RESPIRATORY (INHALATION) at 20:21

## 2022-10-17 RX ADMIN — LORAZEPAM 1 MG: 1 TABLET ORAL at 20:09

## 2022-10-17 RX ADMIN — Medication 10 ML: at 07:57

## 2022-10-17 RX ADMIN — ARIPIPRAZOLE 5 MG: 5 TABLET ORAL at 07:55

## 2022-10-17 RX ADMIN — HYDROCODONE BITARTRATE AND ACETAMINOPHEN 1 TABLET: 7.5; 325 TABLET ORAL at 14:22

## 2022-10-17 RX ADMIN — PRAMIPEXOLE DIHYDROCHLORIDE 0.25 MG: 0.25 TABLET ORAL at 20:10

## 2022-10-17 RX ADMIN — GUAIFENESIN AND DEXTROMETHORPHAN 5 ML: 100; 10 SYRUP ORAL at 00:28

## 2022-10-17 ASSESSMENT — PAIN SCALES - GENERAL: PAINLEVEL_OUTOF10: 7

## 2022-10-17 ASSESSMENT — PAIN DESCRIPTION - LOCATION: LOCATION: LEG

## 2022-10-17 ASSESSMENT — PAIN DESCRIPTION - ORIENTATION: ORIENTATION: LEFT

## 2022-10-17 NOTE — PLAN OF CARE
Patient's chart updated to reflect:      . - HF care plan, HF education points and HF discharge instructions.  -Orders: 2 gram sodium diet, daily weights, I/O.  -PCP and/or Cardiologist appointment to be scheduled within 7 days of hospital discharge.  -History of HF, not primary admission Dx. Patient admitted for treatment of Pneumonia.     Katt Jones RN BSN  Heart Failure Navigator

## 2022-10-17 NOTE — PROGRESS NOTES
Patient stated he is having trouble breathing and cant take a deep breath. Wheezing noted. Respiratory notified for breathing treatment. NP Elver Carrera notified no new orders at this time.

## 2022-10-17 NOTE — DISCHARGE INSTRUCTIONS
HEART FAILURE  / CONGESTIVE HEART FAILURE  DISCHARGE INSTRUCTIONS:  GUIDELINES TO FOLLOW AT HOME    Self- Managed Care:     MEDICATIONS:  Take your medication as directed. If you are experiencing any side effects, inform your doctor, Do not stop taking any of your medications without letting your doctor know. Check with your doctor before taking any over-the-counter medications / herbal / or dietary supplements. They may interfere with your other medications. Do not take ibuprofen (Advil or Motrin) and naproxen (Aleve) without talking to your doctor first. They could make your heart failure worse. WEIGHT MONITORING:   Weigh yourself everyday (with the same scale) around the same time of the day and write it down. (you can chart them on a calendar or keep track of them on paper. Notify your doctor of a weight gain of 3 pounds or more in 1 day   OR a total of 5 pounds or more in 1 week    Take your weight record to your doctor visits  Also, the same goes if you loose more than 3# in one day, let your heart doctor know. DIET:   Cardiac heart healthy diet- Low saturated / low trans fat, no added salt, caffeine restricted, Low sodium diet-   No more than 2,000mg (2 grams) of salt / sodium per day (which equals to a little less than  a teaspoon of salt)  If your doctor wants you on a fluid restriction. ..it is usually recommended a fluid limit of 2,000cc -  Fluid restriction- 2,000 ml (milliliters) = 64 ounces = you can have 8 glasses of fluid per day (each glass 8 ounces)    Follow a low salt diet - avoid using salt at the table, avoid / limit use of canned soups, processed / packaged foods, salted snacks, olives and pickles. Do not use a salt substitute without checking with your doctor, they may contain a high amount of potassioum. (Mrs. Rubens Fontana is safe to use).     Limit the use of alcohol       CALL YOUR DOCTOR THE FIRST DAY YOU NOTICE ANY OF THESE   SYMPTOMS:  You have a weight gain of 3 pounds or more in 1 day         OR 5 pounds or more in one week  More shortness of breath  More swelling of your stomach, legs, ankles or feet  Feeling more tired, No energy  Dry hacky cough  Dizziness  More chest pain / discomfort       (CALL 911 IF ANY OF THE FOLLOWING OCCURS  Chest pain (not relieved with nitroglycerine, if you have been prescribed this medication)  Severe shortness of breath  Faint / Pass out  Confusion / cannot think clearly  If symptoms get worse           SMOKING - TOBACCO USE:  * IF YOU SMOKE - STOP! Kick the habit. [address] Program is offered at [facility] [address] and [address] [facility]. Call [phone] extension [phone] for more information. ACTIVITY:   (Ask your doctor when you will be able to return to work and before starting any exercise program.  Do not drive unless unless your doctor has given you permission to do so). Start light exercise. Even if you can only do a small amount, exercise will help you get stronger, have more energy, help manage your weight and decrease  stress. Walking is an easy way to get exercise. Start out slowly and  increase the amount you walk as tolerated  If you become short of breath, dizzy or have chest pain; stop, sit down, and rest.  If you feel \"wiped out\" the day after you exercise, walk at a slower pace or for a shorter distance. You can gradually increase the pace or amount of time. (Do not exercise right after a meal or in extreme temperatures, such as above 85 degrees, if the air is really humid, or wind chill is less than 20 degrees)                                             ADDITIONAL INFORMATION:  Avoid getting sick from colds and the flu. Stay away from friends or family that you know may have a contagious illness  Get plenty of rest   Get a flu shot each year. Get a pneumococcal vaccine shot.  If you have had one before, ask your doctor whether you need another dose. My Goal for Self-management of Heart Failure Includes 5 steps :    1. Notice a change in symptoms ( weight gain, short of breath, leg swelling, decreased activity level, bloating. ...)    2. Evaluate the change: (use the Heart Failure Zones )     3. Decide to take action: decide what your options are, such as: (call your doctor for an extra visit, take a prescribed medication, such as your water pill if your doctor has given you directions to do so, [address])    4. Come up with a strategy:  (now you call the doctor for advice / appointment. This is where you take action!!! Do not wait, catch the symptom early and treat it before it worsens. 5. Evaluate the response: The next day, check your Heart Failure Zones: are you in the GREEN ZONE (safe zone)? Worsening symptoms of YELLOW ZONE? Or have you moved to the RED ZONE and need to call 911 or go to the Emergency Room for evaluation? Call your doctor's office to update them on your symptoms of heart failure. Your information:  Name: Yousuf Wilson  : 1965    Your instructions:        What to do after you leave the hospital:    Recommended diet:  easy to chew, low sodium    Recommended activity: activity as tolerated        The following personal items were collected during your admission and were returned to you:    Belongings  Dental Appliances: None  Vision - Corrective Lenses: None  Hearing Aid: None  Clothing: Pants, Shirt, Slippers  Jewelry: Earrings (LEFT EAR)  Body Piercings Removed: N/A  Electronic Devices: Cell Phone, , At bedside  Weapons (Notify Protective Services/Security): None  Other Valuables: At home  Home Medications: None  Valuables Given To: Patient  Provide Name(s) of Who Valuable(s) Were Given To: Ai Keating    Information obtained by:  By signing below, I understand that if any problems occur once I leave the hospital I am to contact my primary doctor.   I understand and acknowledge receipt of the instructions indicated above.

## 2022-10-17 NOTE — PROGRESS NOTES
[facility] Progress Note    Admitting Date and Time: 10/15/2022  9:43 AM  Admit Dx: Pneumonia due to organism [J18.9]  COPD exacerbation ([facility] [J44.1]  Pneumonia of both lungs due to infectious organism, unspecified part of lung [J18.9]      Assessment:    Principal Problem:    Pneumonia due to organism  Resolved Problems:    * No resolved hospital problems. *      Plan:  1. Streptococcal pneumonia  - Ceftriaxone     2. Emphysema/COPD   - CT scan showed emphysema with bullae  Also multiple pulmonary nodules. He was referred by PCP to follow up with Dr Teresa Mcallister re: recent CT chest findings. Continue inhaled bronchodilators and steroids. Dr Teresa Mcallister consulted  With also acute viral bronchitis with bronchospasm. Viral panel positive for RSV. Continue solu-medrol, and nebs as above    3. Acute on chronic hypoxemic respiratory failure  - due to above  Continue supplemental O2 and treatment of underlying causes. Wean o2 back to baseline    4. HTN  - continue lopressor    5. RLS  - continue mirapex    6. GERD  - continue PPI    7. Depression/Anxiety  - continue abilify, gabapentin, prozac    VTE prophylaxis: Lovenox      Subjective:  Patient is being followed for Pneumonia due to organism [J18.9]  COPD exacerbation ([facility] [J44.1]  Pneumonia of both lungs due to infectious organism, unspecified part of lung [J18.9]     Laying in bed, on 8 LPM - states he still feels much more SOB than baseline  +ve Coughing and sputum    ROS: denies fever, chills, n/v, HA unless stated above.       methylPREDNISolone  40 mg IntraVENous Q8H    ipratropium-albuterol  1 ampule Inhalation Q4H WA    ARIPiprazole  5 mg Oral Daily    budesonide  0.5 mg Nebulization BID    Arformoterol Tartrate  15 mcg Nebulization BID    gabapentin  100 mg Oral TID    metoprolol tartrate  25 mg Oral BID    pantoprazole  40 mg Oral QAM AC    pramipexole  0.25 mg Oral Nightly    sodium chloride flush  5-40 mL IntraVENous 2 times per day enoxaparin  40 mg SubCUTAneous Daily    cefTRIAXone (ROCEPHIN) IV  1,000 mg IntraVENous Q24H    FLUoxetine  10 mg Oral Daily     guaiFENesin-dextromethorphan, 5 mL, Q4H PRN  ipratropium-albuterol, 1 ampule, Q4H PRN  sodium chloride flush, 5-40 mL, PRN  sodium chloride, , PRN  ondansetron, 4 mg, Q8H PRN   Or  ondansetron, 4 mg, Q6H PRN  polyethylene glycol, 17 g, Daily PRN  acetaminophen, 650 mg, Q6H PRN   Or  acetaminophen, 650 mg, Q6H PRN  HYDROcodone-acetaminophen, 1 tablet, BID PRN  LORazepam, 1 mg, TID PRN         Objective:    /76   Pulse 85   Temp 97.6 °F (36.4 °C) (Axillary)   Resp 18   Ht 5' 4\" (1.626 m)   Wt 130 lb (59 kg)   SpO2 97%   BMI 22.31 kg/m²     General Appearance: appears chronically ill, SOB at rest  Skin: warm and dry  Head: normocephalic and atraumatic  Eyes: pupils equal, round, and reactive to light, extraocular eye movements intact, conjunctivae normal  Neck: neck supple and non tender without mass   Pulmonary/Chest: diffuse wheezing and rhonchi  Cardiovascular: normal rate, normal S1 and S2 and no carotid bruits  Abdomen: soft, non-tender, non-distended, normal bowel sounds, no masses or organomegaly  Extremities: no cyanosis, no clubbing, no edema currently.  Has stasis dermatitis changes BLE  Neurologic: no cranial nerve deficit and speech normal        Recent Labs     10/15/22  1050 10/16/22  0245 10/17/22  0424    135 135   K 3.9 4.3 4.9   CL 92* 93* 94*   CO2 35* 31* 31*   BUN 13 14 15   CREATININE 1.0 0.8 0.7   GLUCOSE 124* 179* 120*   CALCIUM 7.3* 7.2* 7.9*       Recent Labs     10/15/22  1050 10/16/22  0245 10/17/22  0424   WBC 8.6 4.1* 7.6   RBC 3.43* 3.39* 3.75*   HGB 10.6* 10.5* 11.5*   HCT 34.4* 33.6* 38.2   .3* 99.1 101.9*   MCH 30.9 31.0 30.7   MCHC 30.8* 31.3* 30.1*   RDW 13.1 13.1 13.1    183 240   MPV 10.3 10.4 10.1       Radiology:   Time spent reviewing chart, clinical exam, discussing case and answering questions with staff/consultants/patient/family = 20 minutes      NOTE: This report was transcribed using voice recognition software. Every effort was made to ensure accuracy; however, inadvertent computerized transcription errors may be present.   Electronically signed by ARGELIA Ward CNP on 10/17/2022 at 1:15 PM

## 2022-10-17 NOTE — PROGRESS NOTES
Physician Progress Note      Ivette Bender  CSN #:                  [id number]  :                       1965  ADMIT DATE:       10/15/2022 9:43 AM  DISCH DATE:  RESPONDING  PROVIDER #:        Viridiana Ny MD          QUERY TEXT:    Patient admitted with shortness of breath. Noted documentation of acute   respiratory failure in 10/17 IM note. In order to support the diagnosis of   acute respiratory failure, please include additional clinical indicators in   your documentation. Or please document if the diagnosis of acute respiratory   failure has been ruled out after further study. The medical record reflects the following:  Risk Factors: +RSV, COPD, wears 7L NC at home  Clinical Indicators: respirations 14-20, 95% 5L, unlabored breathing/dyspnea   on exertion per nursing, per IM 10/16 \". [place] Chronic respiratory failure on 7 L   at home, currently saturating well on 6L. [place] \", per IM 10/17 \". [place] Acute on   chronic hypoxemic respiratory failure. [place] \"  Treatment: O2 therapy    Acute Respiratory Failure Clinical Indicators per  MS-DRG Training Guide and   Quick Reference Guide:  pO2 < 60 mmHg  pCO2 > 50 and pH < 7.35  P/F ratio (pO2 / FIO2) < 300  pO2 decrease or pCO2 increase by 10 mmHg from baseline (if known)  Supplemental oxygen of 40% or more  Presence of respiratory distress, tachypnea, wheezing  Unable to speak in complete sentences  Use of accessory muscles to breathe  Extreme anxiety and feeling of impending doom  Tripod position  Confusion/altered mental status/obtunded    Thank you,  Carmela Robbins RN, BSN, CDIS  Clinical Documentation Improvement  [email or web address]  Options provided:  -- Acute Respiratory Failure as evidenced by, Please document evidence.   -- Acute Respiratory Failure ruled out after study and Chronic Respiratory   Failure confirmed  -- Other - I will add my own diagnosis  -- Disagree - Not applicable / Not valid  -- Disagree - Clinically unable to determine / Unknown  -- Refer to Clinical Documentation Reviewer    PROVIDER RESPONSE TEXT:    Acute Respiratory Failure ruled out after study and Chronic Respiratory   Failure confirmed.     Query created by: Peterson Tomlin on 10/17/2022 2:19 PM      Electronically signed by:  Sanya Rivas MD 10/17/2022 2:36 PM

## 2022-10-17 NOTE — CONSULTS
Pulmonary Consultation    Admit Date: 10/15/2022  Requesting Physician: Noah Castillo MD    Reason for consultation:  Acute exacerbation of COPD  HPI:  Indra Holley is a 68-year-old white male with known end-stage chronic obstructive pulmonary disease from years of tobacco abuse. He presents to the hospital with a several day history of increasing shortness of breath and mucus production. Seen and evaluated emergency room, the patient is now admitted to a stepdown area. He notices only minimal improvement in his breathing since admission. Placed on a combination of budesonide ipratropium and albuterol as well as intravenous steroids, response has been modest.  He tested positive for RSV. The patient has a longstanding history of COPD and bronchorrhea with multiple bronchoscopies in the past.    PMH:    Past Medical History:   Diagnosis Date    Anxiety     COPD (chronic obstructive pulmonary disease) (HCC)     Hypertension     Leg swelling     Multiple gastric ulcers     Restless leg syndrome      PSH:   Past Surgical History:   Procedure Laterality Date    HERNIA REPAIR      HIP SURGERY      KNEE SURGERY         Review of Systems:    Constitutional: As noted in the HPI. Eyes: No visual changes or diplopia. No scleral icterus. ENT: No headaches, hearing loss or vertigo. No nasal congestion, or sore throat. Cardiovascular: No chest pain, dyspnea on exertion, or palpitations. Respiratory: See above  Gastrointestinal: No abdominal pain, nausea or emesis. No diarrhea or rectal bleeding or melena. No change in bowel habits. Genitourinary: No dysuria, urinary frequency, or incontinence. No hematuria. Musculoskeletal: No gait disturbance, weakness or joint complaints. Integumentary: No rash or pruritis. No abnormal pigmentation, hair or nail changes. Neurological: No headache, diplopia, dizziness, tremor, change in muscle strength, numbness or tingling.  No change in gait, balance, coordination, mood, affect, memory, mentation, behavior. Psychiatric: No anxiety or depression. Endocrine: No temperature intolerance, excessive thirst, fluid intake, urinary frequency, excessive appetite, or recent weight change. Hematologic/Lymphatic: No abnormal bruising or bleeding, blood clots or swollen lymph nodes. No anemia, fever, chills, night sweats, or swollen glands. Allergic/Immunologic: No seasonal or perenial allergies. No history of hives or atopic dermatitis. Social History:  Alcohol: No  Tobacco:   Possibly ongoing  Employment:  no silica or asbestos exposure  Family:  No family history of lung disease    Medications:   sodium chloride        budesonide  1 mg Nebulization BID    methylPREDNISolone  60 mg IntraVENous Q6H    ipratropium-albuterol  1 ampule Inhalation Q4H    ARIPiprazole  5 mg Oral Daily    Arformoterol Tartrate  15 mcg Nebulization BID    gabapentin  100 mg Oral TID    metoprolol tartrate  25 mg Oral BID    pantoprazole  40 mg Oral QAM AC    pramipexole  0.25 mg Oral Nightly    sodium chloride flush  5-40 mL IntraVENous 2 times per day    enoxaparin  40 mg SubCUTAneous Daily    FLUoxetine  10 mg Oral Daily       Vitals:  Tmax:  VITALS:  /87   Pulse 82   Temp 97.8 °F (36.6 °C) (Oral)   Resp 18   Ht 5' 4\" (1.626 m)   Wt 130 lb (59 kg)   SpO2 95%   BMI 22.31 kg/m²   24HR INTAKE/OUTPUT:    Intake/Output Summary (Last 24 hours) at 10/17/2022 1717  Last data filed at 10/17/2022 1147  Gross per 24 hour   Intake --   Output 950 ml   Net -950 ml     CURRENT PULSE OXIMETRY:  SpO2: 95 %  24HR PULSE OXIMETRY RANGE:  SpO2  Av %  Min: 94 %  Max: 97 %    EXAM:  General: No distress. Alert. Eyes: PERRL. No sclera icterus. No conjunctival injection. Strabismus. ENT: No discharge. Pharynx clear. Neck: Trachea midline. Normal thyroid. No jvd, no hjr. Resp: Diffuse wheezing. No accessory muscle use. No rales. No rhonchi. CV: Regular rate. Regular rhythm.  No murmur No rub. Abd: Non-tender. Non-distended. No masses. No organmegaly. Normal bowel sounds. Skin: Warm and dry. No nodule on exposed extremities. No rash on exposed extremities. Lymph: No cervical LAD. No supraclavicular LAD. Ext: No joint deformity. No clubbing. No cyanosis. No edema  Neuro: Awake. Follows commands. Positive pupils/gag/corneals. Normal pain response. Lab Results:  CBC:   Recent Labs     10/15/22  1050 10/16/22  0245 10/17/22  0424   WBC 8.6 4.1* 7.6   HGB 10.6* 10.5* 11.5*   HCT 34.4* 33.6* 38.2   .3* 99.1 101.9*    183 240       BMP:  Recent Labs     10/15/22  1050 10/16/22  0245 10/17/22  0424    135 135   K 3.9 4.3 4.9   CL 92* 93* 94*   CO2 35* 31* 31*   BUN 13 14 15   CREATININE 1.0 0.8 0.7    ALB:3,BILIDIR:3,BILITOT:3,ALKPHOS:3)@    PT/INR: No results for input(s): PROTIME, INR in the last 72 hours. Cultures:  Sputum: not available  Blood: not available    ABG:   No results for input(s): PH, PO2, PCO2, HCO3, BE, O2SAT, METHB, O2HB, COHB, O2CON, HHB, THB in the last 72 hours. Films:     XR CHEST PORTABLE   Final Result   1. Right lower lobe and left perihilar pneumonia         . Assessment:  Acute exacerbation of chronic obstructive pulmonary disease secondary to RSV      Plan:  Increase steroids adjust aerosols  Adjust aerosols  No need for antibiotics. Thanks for letting us see this patient in consultation. Total time in reviewing the previous admissions and records, reviewing the current x-rays, labs, and discussing with clinical staff including nursing and physicians, exceeded 50 minutes. Please contact us with any questions. Office [phone] or after hours through [employer], x [phone]. Please note that voice recognition technology was used (while wearing a Covid mandated mask) in the preparation of this note and make therefore it may contain inadvertent transcription errors.   If the patient is a COVID 19 isolation patient, the above physical exam reflects that of the examining physician for the day. Fernando Madrid MD,  M.D., F.C.C.P.     [facility] [address] [facility], [address] [place] [address], [address] [place] [address], [facility], [facility]  Office Visits:  [place], [place], [facility]

## 2022-10-18 LAB
ANION GAP SERPL CALCULATED.3IONS-SCNC: 10 MMOL/L (ref 7–16)
BASOPHILS ABSOLUTE: 0 E9/L (ref 0–0.2)
BASOPHILS RELATIVE PERCENT: 0 % (ref 0–2)
BUN BLDV-MCNC: 17 MG/DL (ref 6–20)
CALCIUM SERPL-MCNC: 7.6 MG/DL (ref 8.6–10.2)
CHLORIDE BLD-SCNC: 97 MMOL/L (ref 98–107)
CO2: 30 MMOL/L (ref 22–29)
CREAT SERPL-MCNC: 0.7 MG/DL (ref 0.7–1.2)
EOSINOPHILS ABSOLUTE: 0.01 E9/L (ref 0.05–0.5)
EOSINOPHILS RELATIVE PERCENT: 0.2 % (ref 0–6)
GFR SERPL CREATININE-BSD FRML MDRD: >60 ML/MIN/1.73
GLUCOSE BLD-MCNC: 164 MG/DL (ref 74–99)
HCT VFR BLD CALC: 36.8 % (ref 37–54)
HEMOGLOBIN: 11.1 G/DL (ref 12.5–16.5)
IMMATURE GRANULOCYTES #: 0.02 E9/L
IMMATURE GRANULOCYTES %: 0.3 % (ref 0–5)
LYMPHOCYTES ABSOLUTE: 0.21 E9/L (ref 1.5–4)
LYMPHOCYTES RELATIVE PERCENT: 3.5 % (ref 20–42)
MCH RBC QN AUTO: 30.7 PG (ref 26–35)
MCHC RBC AUTO-ENTMCNC: 30.2 % (ref 32–34.5)
MCV RBC AUTO: 101.9 FL (ref 80–99.9)
MONOCYTES ABSOLUTE: 0.15 E9/L (ref 0.1–0.95)
MONOCYTES RELATIVE PERCENT: 2.5 % (ref 2–12)
NEUTROPHILS ABSOLUTE: 5.58 E9/L (ref 1.8–7.3)
NEUTROPHILS RELATIVE PERCENT: 93.5 % (ref 43–80)
PDW BLD-RTO: 13.2 FL (ref 11.5–15)
PLATELET # BLD: 215 E9/L (ref 130–450)
PMV BLD AUTO: 10.2 FL (ref 7–12)
POTASSIUM REFLEX MAGNESIUM: 5.2 MMOL/L (ref 3.5–5)
RBC # BLD: 3.61 E12/L (ref 3.8–5.8)
RBC # BLD: NORMAL 10*6/UL
SODIUM BLD-SCNC: 137 MMOL/L (ref 132–146)
WBC # BLD: 6 E9/L (ref 4.5–11.5)

## 2022-10-18 PROCEDURE — 2060000000 HC ICU INTERMEDIATE R&B

## 2022-10-18 PROCEDURE — 99232 SBSQ HOSP IP/OBS MODERATE 35: CPT | Performed by: NURSE PRACTITIONER

## 2022-10-18 PROCEDURE — 2700000000 HC OXYGEN THERAPY PER DAY

## 2022-10-18 PROCEDURE — 6360000002 HC RX W HCPCS: Performed by: INTERNAL MEDICINE

## 2022-10-18 PROCEDURE — 6370000000 HC RX 637 (ALT 250 FOR IP): Performed by: NURSE PRACTITIONER

## 2022-10-18 PROCEDURE — 6360000002 HC RX W HCPCS: Performed by: NURSE PRACTITIONER

## 2022-10-18 PROCEDURE — 2580000003 HC RX 258: Performed by: NURSE PRACTITIONER

## 2022-10-18 PROCEDURE — 85025 COMPLETE CBC W/AUTO DIFF WBC: CPT

## 2022-10-18 PROCEDURE — 94640 AIRWAY INHALATION TREATMENT: CPT

## 2022-10-18 PROCEDURE — 36415 COLL VENOUS BLD VENIPUNCTURE: CPT

## 2022-10-18 PROCEDURE — 80048 BASIC METABOLIC PNL TOTAL CA: CPT

## 2022-10-18 PROCEDURE — 6370000000 HC RX 637 (ALT 250 FOR IP): Performed by: INTERNAL MEDICINE

## 2022-10-18 RX ORDER — GUAIFENESIN 400 MG/1
400 TABLET ORAL 4 TIMES DAILY
Status: DISCONTINUED | OUTPATIENT
Start: 2022-10-18 | End: 2022-10-23 | Stop reason: HOSPADM

## 2022-10-18 RX ORDER — CHOLECALCIFEROL (VITAMIN D3) 50 MCG
4000 TABLET ORAL DAILY
Status: DISCONTINUED | OUTPATIENT
Start: 2022-10-18 | End: 2022-10-23 | Stop reason: HOSPADM

## 2022-10-18 RX ORDER — HYDROCODONE BITARTRATE AND HOMATROPINE METHYLBROMIDE ORAL SOLUTION 5; 1.5 MG/5ML; MG/5ML
5 LIQUID ORAL EVERY 4 HOURS PRN
Status: DISCONTINUED | OUTPATIENT
Start: 2022-10-18 | End: 2022-10-23 | Stop reason: HOSPADM

## 2022-10-18 RX ADMIN — ARFORMOTEROL TARTRATE 15 MCG: 15 SOLUTION RESPIRATORY (INHALATION) at 19:58

## 2022-10-18 RX ADMIN — IPRATROPIUM BROMIDE AND ALBUTEROL SULFATE 1 AMPULE: .5; 2.5 SOLUTION RESPIRATORY (INHALATION) at 19:58

## 2022-10-18 RX ADMIN — Medication 6 MG: at 21:08

## 2022-10-18 RX ADMIN — LORAZEPAM 1 MG: 1 TABLET ORAL at 15:29

## 2022-10-18 RX ADMIN — METHYLPREDNISOLONE SODIUM SUCCINATE 60 MG: 125 INJECTION, POWDER, LYOPHILIZED, FOR SOLUTION INTRAMUSCULAR; INTRAVENOUS at 21:08

## 2022-10-18 RX ADMIN — FLUOXETINE HYDROCHLORIDE 10 MG: 10 TABLET ORAL at 08:50

## 2022-10-18 RX ADMIN — IPRATROPIUM BROMIDE AND ALBUTEROL SULFATE 1 AMPULE: .5; 2.5 SOLUTION RESPIRATORY (INHALATION) at 17:04

## 2022-10-18 RX ADMIN — ENOXAPARIN SODIUM 40 MG: 100 INJECTION SUBCUTANEOUS at 08:50

## 2022-10-18 RX ADMIN — GUAIFENESIN 400 MG: 400 TABLET ORAL at 21:08

## 2022-10-18 RX ADMIN — METHYLPREDNISOLONE SODIUM SUCCINATE 60 MG: 125 INJECTION, POWDER, LYOPHILIZED, FOR SOLUTION INTRAMUSCULAR; INTRAVENOUS at 08:50

## 2022-10-18 RX ADMIN — IPRATROPIUM BROMIDE AND ALBUTEROL SULFATE 1 AMPULE: .5; 2.5 SOLUTION RESPIRATORY (INHALATION) at 03:37

## 2022-10-18 RX ADMIN — IPRATROPIUM BROMIDE AND ALBUTEROL SULFATE 1 AMPULE: .5; 2.5 SOLUTION RESPIRATORY (INHALATION) at 12:50

## 2022-10-18 RX ADMIN — METOPROLOL TARTRATE 25 MG: 25 TABLET, FILM COATED ORAL at 08:50

## 2022-10-18 RX ADMIN — ARIPIPRAZOLE 5 MG: 5 TABLET ORAL at 08:50

## 2022-10-18 RX ADMIN — HYDROCODONE BITARTRATE AND ACETAMINOPHEN 1 TABLET: 7.5; 325 TABLET ORAL at 15:29

## 2022-10-18 RX ADMIN — HYDROCODONE BITARTRATE AND HOMATROPINE METHYLBROMIDE 5 ML: 5; 1.5 SOLUTION ORAL at 21:08

## 2022-10-18 RX ADMIN — METOPROLOL TARTRATE 25 MG: 25 TABLET, FILM COATED ORAL at 21:11

## 2022-10-18 RX ADMIN — HYDROCODONE BITARTRATE AND HOMATROPINE METHYLBROMIDE 5 ML: 5; 1.5 SOLUTION ORAL at 12:20

## 2022-10-18 RX ADMIN — Medication 4000 UNITS: at 12:20

## 2022-10-18 RX ADMIN — GUAIFENESIN AND DEXTROMETHORPHAN 5 ML: 100; 10 SYRUP ORAL at 00:00

## 2022-10-18 RX ADMIN — SODIUM CHLORIDE, PRESERVATIVE FREE 10 ML: 5 INJECTION INTRAVENOUS at 03:43

## 2022-10-18 RX ADMIN — GABAPENTIN 100 MG: 100 CAPSULE ORAL at 21:08

## 2022-10-18 RX ADMIN — METHYLPREDNISOLONE SODIUM SUCCINATE 60 MG: 125 INJECTION, POWDER, LYOPHILIZED, FOR SOLUTION INTRAMUSCULAR; INTRAVENOUS at 03:43

## 2022-10-18 RX ADMIN — HYDROCODONE BITARTRATE AND ACETAMINOPHEN 1 TABLET: 7.5; 325 TABLET ORAL at 01:49

## 2022-10-18 RX ADMIN — GABAPENTIN 100 MG: 100 CAPSULE ORAL at 12:20

## 2022-10-18 RX ADMIN — BUDESONIDE 1000 MCG: 0.5 SUSPENSION RESPIRATORY (INHALATION) at 19:58

## 2022-10-18 RX ADMIN — Medication 10 ML: at 21:08

## 2022-10-18 RX ADMIN — GUAIFENESIN AND DEXTROMETHORPHAN 5 ML: 100; 10 SYRUP ORAL at 09:28

## 2022-10-18 RX ADMIN — METHYLPREDNISOLONE SODIUM SUCCINATE 60 MG: 125 INJECTION, POWDER, LYOPHILIZED, FOR SOLUTION INTRAMUSCULAR; INTRAVENOUS at 17:03

## 2022-10-18 RX ADMIN — PANTOPRAZOLE SODIUM 40 MG: 40 TABLET, DELAYED RELEASE ORAL at 06:55

## 2022-10-18 RX ADMIN — GABAPENTIN 100 MG: 100 CAPSULE ORAL at 08:50

## 2022-10-18 RX ADMIN — HYDROCODONE BITARTRATE AND HOMATROPINE METHYLBROMIDE 5 ML: 5; 1.5 SOLUTION ORAL at 17:02

## 2022-10-18 RX ADMIN — PRAMIPEXOLE DIHYDROCHLORIDE 0.25 MG: 0.25 TABLET ORAL at 21:08

## 2022-10-18 RX ADMIN — Medication 10 ML: at 08:50

## 2022-10-18 RX ADMIN — GUAIFENESIN 400 MG: 400 TABLET ORAL at 12:20

## 2022-10-18 RX ADMIN — IPRATROPIUM BROMIDE AND ALBUTEROL SULFATE 1 AMPULE: .5; 2.5 SOLUTION RESPIRATORY (INHALATION) at 10:03

## 2022-10-18 RX ADMIN — LORAZEPAM 1 MG: 1 TABLET ORAL at 00:10

## 2022-10-18 RX ADMIN — BUDESONIDE 1000 MCG: 0.5 SUSPENSION RESPIRATORY (INHALATION) at 10:03

## 2022-10-18 RX ADMIN — ARFORMOTEROL TARTRATE 15 MCG: 15 SOLUTION RESPIRATORY (INHALATION) at 10:03

## 2022-10-18 RX ADMIN — GUAIFENESIN 400 MG: 400 TABLET ORAL at 17:03

## 2022-10-18 RX ADMIN — LORAZEPAM 1 MG: 1 TABLET ORAL at 08:50

## 2022-10-18 ASSESSMENT — PULMONARY FUNCTION TESTS: PEFR_L/MIN: 18

## 2022-10-18 ASSESSMENT — PAIN SCALES - GENERAL: PAINLEVEL_OUTOF10: 0

## 2022-10-18 NOTE — PROGRESS NOTES
Occupational Therapy    OT eval and treat orders received and chart reviewed. Attempt made but pt declined OOB activity, stating his feet were hurting too much. Will check back at another time/date as able/appropriate.      Didi Kim, OTR/L

## 2022-10-18 NOTE — CARE COORDINATION
CASE MANAGEMENT. .. Chart reviewed. Patient in RSV isolation. Spoke with Mr Zuleyka Jean Baptiste over the phone. He voices being independent from home with his cousin, Carmen Narvaez. Has Emergency Response Button. Uses a walker, has nebulizer and home o2 at 7 lnc. Confirmed with [facility] ([phone]) that order from March 2022 reads 6lnc continuous and 7 liters at hs. Mr Zuleyka Jean Baptiste states that he receives skilled nursing care from [facility]. Called [facility] to confirm [phone]. No answer. Left vm. Follows with Dr Chris Bragg. Currently, on iv solumedrol 60mg q6hrs. PT/OT consulted to see. Will Not consider DIONTE. States he will return home with [facility]. Need return [facility] orders once needs finalized. Will follow and assist accordingly.

## 2022-10-18 NOTE — PROGRESS NOTES
[facility] Progress Note    Admitting Date and Time: 10/15/2022  9:43 AM  Admit Dx: Pneumonia due to organism [J18.9]  COPD exacerbation ([facility] [J44.1]  Pneumonia of both lungs due to infectious organism, unspecified part of lung [J18.9]      Assessment:    Principal Problem:    Pneumonia due to organism  Resolved Problems:    * No resolved hospital problems. *      Plan:  1. Pneumonia  - strep pneumoniae AG positive and with right lower lobe and leighton-hilar infiltrate on CXR  But procalcitonin is low and without fevers or leukocytosis  Suspect he may be colonized    - antibiotics not indicated per pulmonology and were DC'd  Follow off antibiotics for now    2. Emphysema/COPD   - CT scan showed emphysema with bullae  Also multiple pulmonary nodules. He was referred by PCP to follow up with Dr Chiquis Pandey re: recent CT chest findings. Continue inhaled bronchodilators and steroids. Dr Chiquis Pandey consulted  With also acute viral bronchitis with bronchospasm. Viral panel positive for RSV. Continue solu-medrol, and nebs    3. Acute on chronic hypoxemic respiratory failure  - due to above -> presented with acute dyspnea, tachypnea and increased flow rate from baseline    Continue supplemental O2 and treatment of underlying causes. Wean o2 back to baseline which is 7 LPM NC    4. HTN  - continue lopressor    5. RLS  - continue mirapex    6. GERD  - continue PPI    7. Depression/Anxiety  - continue abilify, gabapentin, prozac    8. Hyperkalemia  - K of 5.2, ? Hemolyzed. Follow up chemistry    9. Vitamin D deficiency  - start supplement    VTE prophylaxis: Lovenox      Subjective:  Patient is being followed for Pneumonia due to organism [J18.9]  COPD exacerbation ([facility] [J44.1]  Pneumonia of both lungs due to infectious organism, unspecified part of lung [J18.9]     Back to 7 LPM, he still feeling very SOB. Complains of coughing and pleuritic chest pain.   No fever or chills    ROS: denies fever, chills, n/v, HA unless stated above.      guaiFENesin  400 mg Oral 4x Daily    budesonide  1 mg Nebulization BID    methylPREDNISolone  60 mg IntraVENous Q6H    ipratropium-albuterol  1 ampule Inhalation Q4H    ARIPiprazole  5 mg Oral Daily    Arformoterol Tartrate  15 mcg Nebulization BID    gabapentin  100 mg Oral TID    metoprolol tartrate  25 mg Oral BID    pantoprazole  40 mg Oral QAM AC    pramipexole  0.25 mg Oral Nightly    sodium chloride flush  5-40 mL IntraVENous 2 times per day    enoxaparin  40 mg SubCUTAneous Daily    FLUoxetine  10 mg Oral Daily     HYDROcodone homatropine, 5 mL, Q4H PRN  melatonin, 6 mg, Nightly PRN  ipratropium-albuterol, 1 ampule, Q4H PRN  sodium chloride flush, 5-40 mL, PRN  sodium chloride, , PRN  ondansetron, 4 mg, Q8H PRN   Or  ondansetron, 4 mg, Q6H PRN  polyethylene glycol, 17 g, Daily PRN  acetaminophen, 650 mg, Q6H PRN   Or  acetaminophen, 650 mg, Q6H PRN  HYDROcodone-acetaminophen, 1 tablet, BID PRN  LORazepam, 1 mg, TID PRN       Objective:    BP (!) 155/83   Pulse 84   Temp 97.9 °F (36.6 °C) (Oral)   Resp 18   Ht 5' 4\" (1.626 m)   Wt 130 lb (59 kg)   SpO2 100%   BMI 22.31 kg/m²     General Appearance: appears chronically ill, SOB at rest  Skin: warm and dry  Head: normocephalic and atraumatic  Eyes: pupils equal, round, and reactive to light, extraocular eye movements intact, conjunctivae normal  Neck: neck supple and non tender without mass   Pulmonary/Chest: diffuse wheezing and rhonchi  Cardiovascular: normal rate, normal S1 and S2 and no carotid bruits  Abdomen: soft, non-tender, non-distended, normal bowel sounds, no masses or organomegaly  Extremities: no cyanosis, no clubbing, no edema currently.  Has stasis dermatitis changes BLE  Neurologic: no cranial nerve deficit and speech normal        Recent Labs     10/16/22  0245 10/17/22  0424 10/18/22  0255    135 137   K 4.3 4.9 5.2*   CL 93* 94* 97*   CO2 31* 31* 30*   BUN 14 15 17   CREATININE 0.8 0.7 0.7   GLUCOSE 179* 120* 164*   CALCIUM 7.2* 7.9* 7.6*         Recent Labs     10/16/22  0245 10/17/22  0424 10/18/22  0255   WBC 4.1* 7.6 6.0   RBC 3.39* 3.75* 3.61*   HGB 10.5* 11.5* 11.1*   HCT 33.6* 38.2 36.8*   MCV 99.1 101.9* 101.9*   MCH 31.0 30.7 30.7   MCHC 31.3* 30.1* 30.2*   RDW 13.1 13.1 13.2    240 215   MPV 10.4 10.1 10.2         Radiology:   Time spent reviewing chart, clinical exam, discussing case and answering questions with staff/consultants/patient/family = 20 minutes      NOTE: This report was transcribed using voice recognition software. Every effort was made to ensure accuracy; however, inadvertent computerized transcription errors may be present.   Electronically signed by ARGELIA Goldman CNP on 10/18/2022 at 11:11 AM

## 2022-10-18 NOTE — PROGRESS NOTES
Physical Therapy  PT trent attempted. Pt initially agreeable to PT. After gowning for isolation and entering room, pt reports he's not getting OOB. Refused despite encouragement . Will re-attempt at later date.

## 2022-10-18 NOTE — PROGRESS NOTES
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, left    S/P Girdlestone procedure    Onychomycosis    Venous insufficiency of both lower extremities    Depression    Oxygen dependent    Anxiety    Cellulitis    Swelling of both lower extremities    Leg swelling    Pneumonia due to COVID-19 virus    Gastroesophageal reflux disease    Fluid retention    Lower extremity pain, left    Acute on chronic heart failure with preserved ejection fraction (HFpEF) ([facility])    Oropharyngeal dysphagia    COPD exacerbation ([facility])    Chronic respiratory failure with hypoxia ([facility])    History of COVID-19    Acute on chronic diastolic (congestive) heart failure ([facility])    Fever of unknown origin    Nodule of upper lobe of left lung    Pneumonia due to organism       Subjective:  Patient is seen at bedside on 9 L nasal cannula. He does have congested chest with tightness but non-productive.        Vitals:  VITALS:  BP (!) 145/88   Pulse 76   Temp 98.6 °F (37 °C) (Oral)   Resp 18   Ht 5' 4\" (1.626 m)   Wt 130 lb (59 kg)   SpO2 96%   BMI 22.31 kg/m²     24HR INTAKE/OUTPUT:      Intake/Output Summary (Last 24 hours) at 10/18/2022 1555  Last data filed at 10/18/2022 1501  Gross per 24 hour   Intake --   Output 1300 ml   Net -1300 ml         24HR PULSE OXIMETRY RANGE:    SpO2  Av.4 %  Min: 96 %  Max: 100 %    Medications:  IV:   sodium chloride         Scheduled Meds:   guaiFENesin  400 mg Oral 4x Daily    vitamin D  4,000 Units Oral Daily    budesonide  1 mg Nebulization BID    methylPREDNISolone  60 mg IntraVENous Q6H    ipratropium-albuterol  1 ampule Inhalation Q4H    ARIPiprazole  5 mg Oral Daily    Arformoterol Tartrate  15 mcg Nebulization BID    gabapentin  100 mg Oral TID    metoprolol tartrate  25 mg Oral BID    pantoprazole  40 mg Oral QAM AC    pramipexole  0.25 mg Oral Nightly    sodium chloride flush  5-40 mL IntraVENous 2 times per day    enoxaparin  40 mg SubCUTAneous Daily    FLUoxetine  10 mg Oral Daily       Diet:   ADULT DIET; Easy to Chew; Low Sodium (2 gm)     EXAM:  General: No distress. Alert. Eyes: PERRL. No sclera icterus. No conjunctival injection. ENT: No discharge. Pharynx clear. Neck: Trachea midline. Normal thyroid. Resp: No accessory muscle use. no rales. bilateral diffuse wheezing. no rhonchi. CV: Regular rate. Regular rhythm. No murmur or rub. Abd: Non-tender. Non-distended. No masses. No organomegaly. Normal bowel sounds. Skin: Warm and dry. No nodule on exposed extremities. No rash on exposed extremities. Ext: No cyanosis, clubbing, edema  Lymph: No cervical LAD. No supraclavicular LAD. M/S: No cyanosis. No joint deformity. No clubbing. Neuro: Awake. Follows commands. Positive pupils/gag/corneals. Normal pain response. Results:  CBC:   Recent Labs     10/16/22  0245 10/17/22  0424 10/18/22  0255   WBC 4.1* 7.6 6.0   HGB 10.5* 11.5* 11.1*   HCT 33.6* 38.2 36.8*   MCV 99.1 101.9* 101.9*    240 215       BMP:   Recent Labs     10/16/22  0245 10/17/22  0424 10/18/22  0255    135 137   K 4.3 4.9 5.2*   CL 93* 94* 97*   CO2 31* 31* 30*   BUN 14 15 17   CREATININE 0.8 0.7 0.7       LIVER PROFILE: No results for input(s): AST, ALT, LIPASE, BILIDIR, BILITOT, ALKPHOS in the last 72 hours. Invalid input(s): AMYLASE,  ALB  PT/INR: No results for input(s): PROTIME, INR in the last 72 hours. APTT: No results for input(s): APTT in the last 72 hours.       Pathology:  N/A      Microbiology:  None    Recent ABG:   No results for input(s): PH, PO2, PCO2, HCO3, BE, O2SAT, METHB, O2HB, COHB, O2CON, HHB, THB in the last 72 hours. Recent Films:  XR CHEST PORTABLE   Final Result   1. Right lower lobe and left perihilar pneumonia             Assessment:  Acute exacerbation of chronic obstructive pulmonary disease secondary to RSV    Plan:  Continue solumedrol 60 mg Q6H. Continue aerosols. Wean oxygen as tolerated. EZ pap    Time at the bedside, reviewing labs and radiographs, reviewing updated notes and consultations, discussing with staff and family was more than 35 minutes. Please note that voice recognition technology was used in the preparation of this note and make therefore it may contain inadvertent transcription errors. If the patient is a COVID 19 isolation patient, the above physical exam reflects that of the examining physician for the day.         Iwona Busby MD    [facility], [address] [place] [address], [address] [place] [address]  Office visits:  [place], [facility]

## 2022-10-18 NOTE — PROGRESS NOTES
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, left    S/P Girdlestone procedure    Onychomycosis    Venous insufficiency of both lower extremities    Depression    Oxygen dependent    Anxiety    Cellulitis    Swelling of both lower extremities    Leg swelling    Pneumonia due to COVID-19 virus    Gastroesophageal reflux disease    Fluid retention    Lower extremity pain, left    Acute on chronic heart failure with preserved ejection fraction (HFpEF) ([facility])    Oropharyngeal dysphagia    COPD exacerbation ([facility])    Chronic respiratory failure with hypoxia ([facility])    History of COVID-19    Acute on chronic diastolic (congestive) heart failure ([facility])    Fever of unknown origin    Nodule of upper lobe of left lung    Pneumonia due to organism       Subjective:  Patient is seen at bedside on 9 L nasal cannula. He does have congested chest with tightness but non-productive.        Vitals:  VITALS:  BP (!) 155/83   Pulse 84   Temp 97.9 °F (36.6 °C) (Oral)   Resp 18   Ht 5' 4\" (1.626 m)   Wt 130 lb (59 kg)   SpO2 100%   BMI 22.31 kg/m²     24HR INTAKE/OUTPUT:      Intake/Output Summary (Last 24 hours) at 10/18/2022 1105  Last data filed at 10/18/2022 0919  Gross per 24 hour   Intake --   Output 850 ml   Net -850 ml       24HR PULSE OXIMETRY RANGE:    SpO2  Av %  Min: 95 %  Max: 100 %    Medications:  IV:   sodium chloride         Scheduled Meds:   budesonide  1 mg Nebulization BID    methylPREDNISolone  60 mg IntraVENous Q6H    ipratropium-albuterol  1 ampule Inhalation Q4H    ARIPiprazole  5 mg Oral Daily    Arformoterol Tartrate  15 mcg Nebulization BID    gabapentin  100 mg Oral TID    metoprolol tartrate  25 mg Oral BID    pantoprazole  40 mg Oral QAM AC    pramipexole  0.25 mg Oral Nightly    sodium chloride flush  5-40 mL IntraVENous 2 times per day    enoxaparin  40 mg SubCUTAneous Daily    FLUoxetine  10 mg Oral Daily       Diet:   ADULT DIET; Easy to Chew; Low Sodium (2 gm)     EXAM:  General: No distress. Alert. Eyes: PERRL. No sclera icterus. No conjunctival injection. ENT: No discharge. Pharynx clear. Neck: Trachea midline. Normal thyroid. Resp: No accessory muscle use. no rales. bilateral diffuse wheezing. no rhonchi. CV: Regular rate. Regular rhythm. No murmur or rub. Abd: Non-tender. Non-distended. No masses. No organomegaly. Normal bowel sounds. Skin: Warm and dry. No nodule on exposed extremities. No rash on exposed extremities. Ext: No cyanosis, clubbing, edema  Lymph: No cervical LAD. No supraclavicular LAD. M/S: No cyanosis. No joint deformity. No clubbing. Neuro: Awake. Follows commands. Positive pupils/gag/corneals. Normal pain response. Results:  CBC:   Recent Labs     10/16/22  0245 10/17/22  0424 10/18/22  0255   WBC 4.1* 7.6 6.0   HGB 10.5* 11.5* 11.1*   HCT 33.6* 38.2 36.8*   MCV 99.1 101.9* 101.9*    240 215     BMP:   Recent Labs     10/16/22  0245 10/17/22  0424 10/18/22  0255    135 137   K 4.3 4.9 5.2*   CL 93* 94* 97*   CO2 31* 31* 30*   BUN 14 15 17   CREATININE 0.8 0.7 0.7     LIVER PROFILE: No results for input(s): AST, ALT, LIPASE, BILIDIR, BILITOT, ALKPHOS in the last 72 hours. Invalid input(s): AMYLASE,  ALB  PT/INR: No results for input(s): PROTIME, INR in the last 72 hours. APTT: No results for input(s): APTT in the last 72 hours. Pathology:  N/A      Microbiology:  None    Recent ABG:   No results for input(s): PH, PO2, PCO2, HCO3, BE, O2SAT, METHB, O2HB, COHB, O2CON, HHB, THB in the last 72 hours. Recent Films:  XR CHEST PORTABLE   Final Result   1. Right lower lobe and left perihilar pneumonia             Assessment:  Acute exacerbation of chronic obstructive pulmonary disease secondary to RSV    Plan:  Continue solumedrol 60 mg Q6H. Continue aerosols. Wean oxygen as tolerated. EZ pap    Time at the bedside, reviewing labs and radiographs, reviewing updated notes and consultations, discussing with staff and family was more than 35 minutes. Please note that voice recognition technology was used in the preparation of this note and make therefore it may contain inadvertent transcription errors. If the patient is a COVID 19 isolation patient, the above physical exam reflects that of the examining physician for the day.         ARGELIA Medeiros - LOUIE    [facility]    [facility], [address] [place] [address], [address] [place] [address]  Office visits:  [place], [place], [facility]

## 2022-10-19 LAB
ALBUMIN SERPL-MCNC: 3.5 G/DL (ref 3.5–5.2)
ALP BLD-CCNC: 53 U/L (ref 40–129)
ALT SERPL-CCNC: 24 U/L (ref 0–40)
ANION GAP SERPL CALCULATED.3IONS-SCNC: 6 MMOL/L (ref 7–16)
AST SERPL-CCNC: 20 U/L (ref 0–39)
BILIRUB SERPL-MCNC: <0.2 MG/DL (ref 0–1.2)
BUN BLDV-MCNC: 16 MG/DL (ref 6–20)
CALCIUM SERPL-MCNC: 7.8 MG/DL (ref 8.6–10.2)
CHLORIDE BLD-SCNC: 96 MMOL/L (ref 98–107)
CO2: 34 MMOL/L (ref 22–29)
CREAT SERPL-MCNC: 0.6 MG/DL (ref 0.7–1.2)
GFR SERPL CREATININE-BSD FRML MDRD: >60 ML/MIN/1.73
GLUCOSE BLD-MCNC: 144 MG/DL (ref 74–99)
HCT VFR BLD CALC: 37 % (ref 37–54)
HEMOGLOBIN: 11.1 G/DL (ref 12.5–16.5)
MCH RBC QN AUTO: 31 PG (ref 26–35)
MCHC RBC AUTO-ENTMCNC: 30 % (ref 32–34.5)
MCV RBC AUTO: 103.4 FL (ref 80–99.9)
PDW BLD-RTO: 13.1 FL (ref 11.5–15)
PLATELET # BLD: 205 E9/L (ref 130–450)
PMV BLD AUTO: 9.9 FL (ref 7–12)
POTASSIUM SERPL-SCNC: 5 MMOL/L (ref 3.5–5)
RBC # BLD: 3.58 E12/L (ref 3.8–5.8)
SODIUM BLD-SCNC: 136 MMOL/L (ref 132–146)
TOTAL PROTEIN: 7 G/DL (ref 6.4–8.3)
WBC # BLD: 5 E9/L (ref 4.5–11.5)

## 2022-10-19 PROCEDURE — 6360000002 HC RX W HCPCS: Performed by: NURSE PRACTITIONER

## 2022-10-19 PROCEDURE — 94640 AIRWAY INHALATION TREATMENT: CPT

## 2022-10-19 PROCEDURE — 6370000000 HC RX 637 (ALT 250 FOR IP): Performed by: FAMILY MEDICINE

## 2022-10-19 PROCEDURE — 85027 COMPLETE CBC AUTOMATED: CPT

## 2022-10-19 PROCEDURE — 6370000000 HC RX 637 (ALT 250 FOR IP): Performed by: NURSE PRACTITIONER

## 2022-10-19 PROCEDURE — 6370000000 HC RX 637 (ALT 250 FOR IP): Performed by: INTERNAL MEDICINE

## 2022-10-19 PROCEDURE — 99232 SBSQ HOSP IP/OBS MODERATE 35: CPT | Performed by: NURSE PRACTITIONER

## 2022-10-19 PROCEDURE — 36415 COLL VENOUS BLD VENIPUNCTURE: CPT

## 2022-10-19 PROCEDURE — 2060000000 HC ICU INTERMEDIATE R&B

## 2022-10-19 PROCEDURE — 2700000000 HC OXYGEN THERAPY PER DAY

## 2022-10-19 PROCEDURE — 6360000002 HC RX W HCPCS: Performed by: INTERNAL MEDICINE

## 2022-10-19 PROCEDURE — 6360000002 HC RX W HCPCS

## 2022-10-19 PROCEDURE — 80053 COMPREHEN METABOLIC PANEL: CPT

## 2022-10-19 PROCEDURE — 2580000003 HC RX 258: Performed by: NURSE PRACTITIONER

## 2022-10-19 RX ORDER — CALCIUM CARBONATE 500(1250)
500 TABLET ORAL 2 TIMES DAILY WITH MEALS
Status: DISCONTINUED | OUTPATIENT
Start: 2022-10-19 | End: 2022-10-23 | Stop reason: HOSPADM

## 2022-10-19 RX ORDER — LORAZEPAM 1 MG/1
1 TABLET ORAL EVERY 6 HOURS PRN
Status: DISCONTINUED | OUTPATIENT
Start: 2022-10-19 | End: 2022-10-23 | Stop reason: HOSPADM

## 2022-10-19 RX ORDER — METHYLPREDNISOLONE SODIUM SUCCINATE 125 MG/2ML
60 INJECTION, POWDER, LYOPHILIZED, FOR SOLUTION INTRAMUSCULAR; INTRAVENOUS EVERY 12 HOURS
Status: COMPLETED | OUTPATIENT
Start: 2022-10-19 | End: 2022-10-21

## 2022-10-19 RX ADMIN — IPRATROPIUM BROMIDE AND ALBUTEROL SULFATE 1 AMPULE: .5; 2.5 SOLUTION RESPIRATORY (INHALATION) at 08:48

## 2022-10-19 RX ADMIN — Medication 10 ML: at 21:10

## 2022-10-19 RX ADMIN — IPRATROPIUM BROMIDE AND ALBUTEROL SULFATE 1 AMPULE: .5; 2.5 SOLUTION RESPIRATORY (INHALATION) at 03:31

## 2022-10-19 RX ADMIN — IPRATROPIUM BROMIDE AND ALBUTEROL SULFATE 1 AMPULE: .5; 2.5 SOLUTION RESPIRATORY (INHALATION) at 17:27

## 2022-10-19 RX ADMIN — ENOXAPARIN SODIUM 40 MG: 100 INJECTION SUBCUTANEOUS at 09:35

## 2022-10-19 RX ADMIN — METHYLPREDNISOLONE SODIUM SUCCINATE 60 MG: 125 INJECTION, POWDER, LYOPHILIZED, FOR SOLUTION INTRAMUSCULAR; INTRAVENOUS at 09:35

## 2022-10-19 RX ADMIN — METHYLPREDNISOLONE SODIUM SUCCINATE 60 MG: 125 INJECTION, POWDER, LYOPHILIZED, FOR SOLUTION INTRAMUSCULAR; INTRAVENOUS at 21:05

## 2022-10-19 RX ADMIN — ARFORMOTEROL TARTRATE 15 MCG: 15 SOLUTION RESPIRATORY (INHALATION) at 08:48

## 2022-10-19 RX ADMIN — GUAIFENESIN 400 MG: 400 TABLET ORAL at 21:04

## 2022-10-19 RX ADMIN — ARIPIPRAZOLE 5 MG: 5 TABLET ORAL at 09:34

## 2022-10-19 RX ADMIN — LORAZEPAM 1 MG: 1 TABLET ORAL at 00:05

## 2022-10-19 RX ADMIN — Medication 10 ML: at 09:35

## 2022-10-19 RX ADMIN — GABAPENTIN 100 MG: 100 CAPSULE ORAL at 13:10

## 2022-10-19 RX ADMIN — FLUOXETINE HYDROCHLORIDE 10 MG: 10 TABLET ORAL at 09:34

## 2022-10-19 RX ADMIN — HYDROCODONE BITARTRATE AND HOMATROPINE METHYLBROMIDE 5 ML: 5; 1.5 SOLUTION ORAL at 22:46

## 2022-10-19 RX ADMIN — HYDROCODONE BITARTRATE AND ACETAMINOPHEN 1 TABLET: 7.5; 325 TABLET ORAL at 12:02

## 2022-10-19 RX ADMIN — HYDROCODONE BITARTRATE AND HOMATROPINE METHYLBROMIDE 5 ML: 5; 1.5 SOLUTION ORAL at 18:28

## 2022-10-19 RX ADMIN — HYDROCODONE BITARTRATE AND ACETAMINOPHEN 1 TABLET: 7.5; 325 TABLET ORAL at 00:05

## 2022-10-19 RX ADMIN — Medication 4000 UNITS: at 09:35

## 2022-10-19 RX ADMIN — Medication 500 MG: at 18:28

## 2022-10-19 RX ADMIN — BUDESONIDE 1000 MCG: 0.5 SUSPENSION RESPIRATORY (INHALATION) at 08:48

## 2022-10-19 RX ADMIN — HYDROCODONE BITARTRATE AND HOMATROPINE METHYLBROMIDE 5 ML: 5; 1.5 SOLUTION ORAL at 13:11

## 2022-10-19 RX ADMIN — METHYLPREDNISOLONE SODIUM SUCCINATE 60 MG: 125 INJECTION, POWDER, LYOPHILIZED, FOR SOLUTION INTRAMUSCULAR; INTRAVENOUS at 04:18

## 2022-10-19 RX ADMIN — GABAPENTIN 100 MG: 100 CAPSULE ORAL at 09:35

## 2022-10-19 RX ADMIN — METOPROLOL TARTRATE 25 MG: 25 TABLET, FILM COATED ORAL at 21:04

## 2022-10-19 RX ADMIN — PRAMIPEXOLE DIHYDROCHLORIDE 0.25 MG: 0.25 TABLET ORAL at 21:04

## 2022-10-19 RX ADMIN — Medication 6 MG: at 21:09

## 2022-10-19 RX ADMIN — Medication 500 MG: at 09:34

## 2022-10-19 RX ADMIN — GUAIFENESIN 400 MG: 400 TABLET ORAL at 13:10

## 2022-10-19 RX ADMIN — IPRATROPIUM BROMIDE AND ALBUTEROL SULFATE 1 AMPULE: .5; 2.5 SOLUTION RESPIRATORY (INHALATION) at 12:42

## 2022-10-19 RX ADMIN — HYDROCODONE BITARTRATE AND HOMATROPINE METHYLBROMIDE 5 ML: 5; 1.5 SOLUTION ORAL at 04:30

## 2022-10-19 RX ADMIN — METOPROLOL TARTRATE 25 MG: 25 TABLET, FILM COATED ORAL at 09:35

## 2022-10-19 RX ADMIN — ARFORMOTEROL TARTRATE 15 MCG: 15 SOLUTION RESPIRATORY (INHALATION) at 20:30

## 2022-10-19 RX ADMIN — LORAZEPAM 1 MG: 1 TABLET ORAL at 12:02

## 2022-10-19 RX ADMIN — PANTOPRAZOLE SODIUM 40 MG: 40 TABLET, DELAYED RELEASE ORAL at 06:36

## 2022-10-19 RX ADMIN — GUAIFENESIN 400 MG: 400 TABLET ORAL at 18:28

## 2022-10-19 RX ADMIN — GABAPENTIN 100 MG: 100 CAPSULE ORAL at 21:04

## 2022-10-19 RX ADMIN — IPRATROPIUM BROMIDE AND ALBUTEROL SULFATE 1 AMPULE: .5; 2.5 SOLUTION RESPIRATORY (INHALATION) at 20:30

## 2022-10-19 RX ADMIN — BUDESONIDE 1000 MCG: 0.5 SUSPENSION RESPIRATORY (INHALATION) at 20:30

## 2022-10-19 RX ADMIN — LORAZEPAM 1 MG: 1 TABLET ORAL at 18:28

## 2022-10-19 RX ADMIN — LORAZEPAM 1 MG: 1 TABLET ORAL at 04:30

## 2022-10-19 RX ADMIN — GUAIFENESIN 400 MG: 400 TABLET ORAL at 09:34

## 2022-10-19 RX ADMIN — IPRATROPIUM BROMIDE AND ALBUTEROL SULFATE 1 AMPULE: .5; 2.5 SOLUTION RESPIRATORY (INHALATION) at 00:00

## 2022-10-19 ASSESSMENT — PAIN DESCRIPTION - ORIENTATION: ORIENTATION: LEFT

## 2022-10-19 ASSESSMENT — PAIN - FUNCTIONAL ASSESSMENT: PAIN_FUNCTIONAL_ASSESSMENT: PREVENTS OR INTERFERES SOME ACTIVE ACTIVITIES AND ADLS

## 2022-10-19 ASSESSMENT — PAIN DESCRIPTION - ONSET: ONSET: GRADUAL

## 2022-10-19 ASSESSMENT — PAIN DESCRIPTION - DESCRIPTORS: DESCRIPTORS: ACHING;DISCOMFORT;SHOOTING;SORE

## 2022-10-19 ASSESSMENT — PAIN DESCRIPTION - PAIN TYPE: TYPE: CHRONIC PAIN

## 2022-10-19 ASSESSMENT — PAIN DESCRIPTION - FREQUENCY: FREQUENCY: CONTINUOUS

## 2022-10-19 ASSESSMENT — PAIN SCALES - GENERAL
PAINLEVEL_OUTOF10: 0
PAINLEVEL_OUTOF10: 9
PAINLEVEL_OUTOF10: 0

## 2022-10-19 ASSESSMENT — PAIN DESCRIPTION - LOCATION: LOCATION: LEG

## 2022-10-19 NOTE — PLAN OF CARE
Problem: Discharge Planning  Goal: Discharge to home or other facility with appropriate resources  10/19/2022 1502 by Nik Banda RN  Outcome: Progressing  10/19/2022 0922 by Raji Landon RN  Outcome: Progressing     Problem: Pain  Goal: Verbalizes/displays adequate comfort level or baseline comfort level  10/19/2022 1502 by Nik Banda RN  Outcome: Progressing  10/19/2022 0922 by Raji Landon RN  Outcome: Progressing     Problem: Safety - Adult  Goal: Free from fall injury  10/19/2022 1502 by Nik Banda RN  Outcome: Progressing  10/19/2022 0922 by Raji Landon RN  Outcome: Progressing     Problem: Chronic Conditions and Co-morbidities  Goal: Patient's chronic conditions and co-morbidity symptoms are monitored and maintained or improved  Outcome: Progressing

## 2022-10-19 NOTE — PROGRESS NOTES
[facility] Progress Note    Admitting Date and Time: 10/15/2022  9:43 AM  Admit Dx: Pneumonia due to organism [J18.9]  COPD exacerbation ([facility] [J44.1]  Pneumonia of both lungs due to infectious organism, unspecified part of lung [J18.9]      Assessment:    Principal Problem:    Pneumonia due to organism  Resolved Problems:    * No resolved hospital problems. *      Plan:  1. Pneumonia  - strep pneumoniae AG positive and with right lower lobe and leighton-hilar infiltrate on CXR  But procalcitonin is low and without fevers or leukocytosis  Suspect he may be colonized? ?    - antibiotics not indicated per pulmonology and were DC'd  Follow off antibiotics for now    2. Emphysema/COPD   - CT scan showed emphysema with bullae  Also multiple pulmonary nodules. He was referred by PCP to follow up with Dr Argentina Lilly re: recent CT chest findings. Continue inhaled bronchodilators and steroids. Dr Argentina Lilly consulted  With also acute viral bronchitis with bronchospasm. Viral panel positive for RSV. Continue solu-medrol, and nebs  Supportive care, antitussives    3. Acute on chronic hypoxemic respiratory failure  - due to above -> presented with acute dyspnea, tachypnea and increased flow rate from baseline    Continue supplemental O2 and treatment of underlying causes. Wean o2 back to baseline which is 7 LPM NC    4. HTN  - continue lopressor    5. RLS  - continue mirapex    6. GERD  - continue PPI    7. Depression/Anxiety  - continue abilify, gabapentin, prozac    8. Hyperkalemia  - K of 5.2 -> 5.0, ? Hemolyzed. 9. Vitamin D deficiency  - start Vitamin D supplement  Start PO Os-Garry    VTE prophylaxis: Lovenox      Subjective:  Patient is being followed for Pneumonia due to organism [J18.9]  COPD exacerbation ([facility] [address] [J44.1]  Pneumonia of both lungs due to infectious organism, unspecified part of lung [J18.9]     He remains on his usual 7 LPM NC flow rate, saturations stable.   States chest is tight and feels like he can not breathe. No fevers overnight  He is expectorating a lot of sputum  Cough improved with Hycodan    ROS: denies fever, chills, n/v, HA unless stated above. calcium elemental  500 mg Oral BID WC    methylPREDNISolone  60 mg IntraVENous Q12H    guaiFENesin  400 mg Oral 4x Daily    vitamin D  4,000 Units Oral Daily    budesonide  1 mg Nebulization BID    ipratropium-albuterol  1 ampule Inhalation Q4H    ARIPiprazole  5 mg Oral Daily    Arformoterol Tartrate  15 mcg Nebulization BID    gabapentin  100 mg Oral TID    metoprolol tartrate  25 mg Oral BID    pantoprazole  40 mg Oral QAM AC    pramipexole  0.25 mg Oral Nightly    sodium chloride flush  5-40 mL IntraVENous 2 times per day    enoxaparin  40 mg SubCUTAneous Daily    FLUoxetine  10 mg Oral Daily     LORazepam, 1 mg, Q6H PRN  HYDROcodone homatropine, 5 mL, Q4H PRN  melatonin, 6 mg, Nightly PRN  ipratropium-albuterol, 1 ampule, Q4H PRN  sodium chloride flush, 5-40 mL, PRN  sodium chloride, , PRN  ondansetron, 4 mg, Q8H PRN   Or  ondansetron, 4 mg, Q6H PRN  polyethylene glycol, 17 g, Daily PRN  acetaminophen, 650 mg, Q6H PRN   Or  acetaminophen, 650 mg, Q6H PRN  HYDROcodone-acetaminophen, 1 tablet, BID PRN       Objective:    BP (!) 184/90   Pulse 71   Temp 97.6 °F (36.4 °C) (Oral)   Resp 18   Ht 5' 4\" (1.626 m)   Wt 128 lb 6.4 oz (58.2 kg)   SpO2 95%   BMI 22.04 kg/m²     General Appearance: appears chronically ill, SOB at rest  Skin: warm and dry  Head: normocephalic and atraumatic  Eyes: pupils equal, round, and reactive to light, extraocular eye movements intact, conjunctivae normal  Neck: neck supple and non tender without mass   Pulmonary/Chest: diffuse wheezing and rhonchi  Cardiovascular: normal rate, normal S1 and S2 and no carotid bruits  Abdomen: soft, non-tender, non-distended, normal bowel sounds, no masses or organomegaly  Extremities: no cyanosis, no clubbing, no edema currently.  Has stasis dermatitis changes BLE  Neurologic: no cranial nerve deficit and speech normal        Recent Labs     10/17/22  0424 10/18/22  0255 10/19/22  0315    137 136   K 4.9 5.2* 5.0   CL 94* 97* 96*   CO2 31* 30* 34*   BUN 15 17 16   CREATININE 0.7 0.7 0.6*   GLUCOSE 120* 164* 144*   CALCIUM 7.9* 7.6* 7.8*         Recent Labs     10/17/22  0424 10/18/22  0255 10/19/22  0315   WBC 7.6 6.0 5.0   RBC 3.75* 3.61* 3.58*   HGB 11.5* 11.1* 11.1*   HCT 38.2 36.8* 37.0   .9* 101.9* 103.4*   MCH 30.7 30.7 31.0   MCHC 30.1* 30.2* 30.0*   RDW 13.1 13.2 13.1    215 205   MPV 10.1 10.2 9.9         Radiology:   Time spent reviewing chart, clinical exam, discussing case and answering questions with staff/consultants/patient/family = 20 minutes      NOTE: This report was transcribed using voice recognition software. Every effort was made to ensure accuracy; however, inadvertent computerized transcription errors may be present.   Electronically signed by ARGELIA Rhodes CNP on 10/19/2022 at 1:05 PM

## 2022-10-19 NOTE — PROGRESS NOTES
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, left    S/P Girdlestone procedure    Onychomycosis    Venous insufficiency of both lower extremities    Depression    Oxygen dependent    Anxiety    Cellulitis    Swelling of both lower extremities    Leg swelling    Pneumonia due to COVID-19 virus    Gastroesophageal reflux disease    Fluid retention    Lower extremity pain, left    Acute on chronic heart failure with preserved ejection fraction (HFpEF) ([facility])    Oropharyngeal dysphagia    COPD exacerbation (HCC)    Chronic respiratory failure with hypoxia ([facility])    History of COVID-19    Acute on chronic diastolic (congestive) heart failure ([facility])    Fever of unknown origin    Nodule of upper lobe of left lung    Pneumonia due to organism       Subjective:  Patient is seen at bedside on 7 L nasal cannula. He does have congested chest with tightness with now a productive cough. He feels the EZ pap has helped.        Vitals:  VITALS:  BP (!) 184/90   Pulse 71   Temp 97.6 °F (36.4 °C) (Oral)   Resp 18   Ht 5' 4\" (1.626 m)   Wt 128 lb 6.4 oz (58.2 kg)   SpO2 95%   BMI 22.04 kg/m²     24HR INTAKE/OUTPUT:      Intake/Output Summary (Last 24 hours) at 10/19/2022 1139  Last data filed at 10/19/2022 0526  Gross per 24 hour   Intake 10 ml   Output 1950 ml   Net -1940 ml         24HR PULSE OXIMETRY RANGE:    SpO2  Av %  Min: 95 %  Max: 97 %    Medications:  IV:   sodium chloride         Scheduled Meds:   calcium elemental  500 mg Oral BID     guaiFENesin  400 mg Oral 4x Daily    vitamin D  4,000 Units Oral Daily    budesonide  1 mg Nebulization BID    methylPREDNISolone  60 mg IntraVENous Q6H    ipratropium-albuterol  1 ampule Inhalation Q4H    ARIPiprazole  5 mg Oral Daily    Arformoterol Tartrate  15 mcg Nebulization BID    gabapentin  100 mg Oral TID    metoprolol tartrate  25 mg Oral BID    pantoprazole  40 mg Oral QAM AC    pramipexole  0.25 mg Oral Nightly    sodium chloride flush  5-40 mL IntraVENous 2 times per day    enoxaparin  40 mg SubCUTAneous Daily    FLUoxetine  10 mg Oral Daily       Diet:   ADULT DIET; Easy to Chew; Low Sodium (2 gm)     EXAM:  General: No distress. Alert. Eyes: PERRL. No sclera icterus. No conjunctival injection. ENT: No discharge. Pharynx clear. Neck: Trachea midline. Normal thyroid. Resp: No accessory muscle use. no rales. bilateral wheezing. no rhonchi. CV: Regular rate. Regular rhythm. No murmur or rub. Abd: Non-tender. Non-distended. No masses. No organomegaly. Normal bowel sounds. Skin: Warm and dry. No nodule on exposed extremities. No rash on exposed extremities. Ext: No cyanosis, clubbing, edema  Lymph: No cervical LAD. No supraclavicular LAD. M/S: No cyanosis. No joint deformity. No clubbing. Neuro: Awake. Follows commands. Positive pupils/gag/corneals. Normal pain response. Results:  CBC:   Recent Labs     10/17/22  0424 10/18/22  0255 10/19/22  0315   WBC 7.6 6.0 5.0   HGB 11.5* 11.1* 11.1*   HCT 38.2 36.8* 37.0   .9* 101.9* 103.4*    215 205       BMP:   Recent Labs     10/17/22  0424 10/18/22  0255 10/19/22  0315    137 136   K 4.9 5.2* 5.0   CL 94* 97* 96*   CO2 31* 30* 34*   BUN 15 17 16   CREATININE 0.7 0.7 0.6*       LIVER PROFILE:   Recent Labs     10/19/22  0315   AST 20   ALT 24   BILITOT <0.2   ALKPHOS 53     PT/INR: No results for input(s): PROTIME, INR in the last 72 hours. APTT: No results for input(s): APTT in the last 72 hours.       Pathology:  N/A      Microbiology:  None    Recent ABG:   No results for input(s): PH, PO2, PCO2, HCO3, BE, O2SAT, METHB, O2HB, COHB, O2CON, HHB, THB in the last 72 hours. Recent Films:  XR CHEST PORTABLE   Final Result   1. Right lower lobe and left perihilar pneumonia             Assessment:  Acute exacerbation of chronic obstructive pulmonary disease secondary to RSV    Plan:  Wean solumedrol 60 mg to Q12H  Continue aerosols. Wean oxygen as tolerated. EZ pap    Time at the bedside, reviewing labs and radiographs, reviewing updated notes and consultations, discussing with staff and family was more than 35 minutes. Please note that voice recognition technology was used in the preparation of this note and make therefore it may contain inadvertent transcription errors. If the patient is a COVID 19 isolation patient, the above physical exam reflects that of the examining physician for the day.         ARGELIA Chavira - NP    [facility]    [facility], [address] [place] [address], [address] [place] [address]  Office visits:  [facility] CARE [facility]

## 2022-10-19 NOTE — CARE COORDINATION
Pt has home 02 verified with [facility] [phone]. + RSV resides with cousin states active with [facility] ; SUNDAY orders on chart. Left VM message for call back to verify;  [phone] fax [phone]. Plan is home NO [facility]. Abilio Beard.

## 2022-10-19 NOTE — PROGRESS NOTES
Patient reports he \"cannot breathe. \"  RN checked oxygen sat, 95% on 7L. Not due for Ativan he is requesting. Does not want breathing treatment. Was given duoneb, pulmicort and Brovana at 4190. Patient reports he does not use SMI because it \"hurts\" his lungs. Advised he needs to use to help with his breathing. Educated patient on how to use [facility]. Able to return demonstrate. Advised to do at least 10x/hour. NIGHAT Sanford notified.   NIGHAT James  9:50 AM

## 2022-10-19 NOTE — PROGRESS NOTES
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, left    S/P Girdlestone procedure    Onychomycosis    Venous insufficiency of both lower extremities    Depression    Oxygen dependent    Anxiety    Cellulitis    Swelling of both lower extremities    Leg swelling    Pneumonia due to COVID-19 virus    Gastroesophageal reflux disease    Fluid retention    Lower extremity pain, left    Acute on chronic heart failure with preserved ejection fraction (HFpEF) ([facility])    Oropharyngeal dysphagia    COPD exacerbation (HCC)    Chronic respiratory failure with hypoxia ([facility])    History of COVID-19    Acute on chronic diastolic (congestive) heart failure ([facility])    Fever of unknown origin    Nodule of upper lobe of left lung    Pneumonia due to organism       Subjective:  Patient is seen at bedside on 7 L nasal cannula. He does have congested chest with tightness with now a productive cough. He feels the EZ pap has helped. Breathing much better.       Vitals:  VITALS:  /79   Pulse 77   Temp 98.1 °F (36.7 °C) (Oral)   Resp 18   Ht 5' 4\" (1.626 m)   Wt 128 lb 6.4 oz (58.2 kg)   SpO2 99%   BMI 22.04 kg/m²     24HR INTAKE/OUTPUT:      Intake/Output Summary (Last 24 hours) at 10/19/2022 1550  Last data filed at 10/19/2022 0526  Gross per 24 hour   Intake 10 ml   Output 1350 ml   Net -1340 ml         24HR PULSE OXIMETRY RANGE:    SpO2  Av %  Min: 95 %  Max: 99 %    Medications:  IV:   sodium chloride         Scheduled Meds:   calcium elemental  500 mg Oral BID WC    methylPREDNISolone  60 mg IntraVENous Q12H    guaiFENesin  400 mg Oral 4x Daily vitamin D  4,000 Units Oral Daily    budesonide  1 mg Nebulization BID    ipratropium-albuterol  1 ampule Inhalation Q4H    ARIPiprazole  5 mg Oral Daily    Arformoterol Tartrate  15 mcg Nebulization BID    gabapentin  100 mg Oral TID    metoprolol tartrate  25 mg Oral BID    pantoprazole  40 mg Oral QAM AC    pramipexole  0.25 mg Oral Nightly    sodium chloride flush  5-40 mL IntraVENous 2 times per day    enoxaparin  40 mg SubCUTAneous Daily    FLUoxetine  10 mg Oral Daily       Diet:   ADULT DIET; Easy to Chew; Low Sodium (2 gm)     EXAM:  General: No distress. Alert. Eyes: PERRL. No sclera icterus. No conjunctival injection. ENT: No discharge. Pharynx clear. Neck: Trachea midline. Normal thyroid. Resp: No accessory muscle use. no rales. bilateral wheezing. no rhonchi. CV: Regular rate. Regular rhythm. No murmur or rub. Abd: Non-tender. Non-distended. No masses. No organomegaly. Normal bowel sounds. Skin: Warm and dry. No nodule on exposed extremities. No rash on exposed extremities. Ext: No cyanosis, clubbing, edema  Lymph: No cervical LAD. No supraclavicular LAD. M/S: No cyanosis. No joint deformity. No clubbing. Neuro: Awake. Follows commands. Positive pupils/gag/corneals. Normal pain response. Results:  CBC:   Recent Labs     10/17/22  0424 10/18/22  0255 10/19/22  0315   WBC 7.6 6.0 5.0   HGB 11.5* 11.1* 11.1*   HCT 38.2 36.8* 37.0   .9* 101.9* 103.4*    215 205       BMP:   Recent Labs     10/17/22  0424 10/18/22  0255 10/19/22  0315    137 136   K 4.9 5.2* 5.0   CL 94* 97* 96*   CO2 31* 30* 34*   BUN 15 17 16   CREATININE 0.7 0.7 0.6*       LIVER PROFILE:   Recent Labs     10/19/22  0315   AST 20   ALT 24   BILITOT <0.2   ALKPHOS 53       PT/INR: No results for input(s): PROTIME, INR in the last 72 hours. APTT: No results for input(s): APTT in the last 72 hours.       Pathology:  N/A      Microbiology:  None    Recent ABG:   No results for input(s): PH, PO2, PCO2, HCO3, BE, O2SAT, METHB, O2HB, COHB, O2CON, HHB, THB in the last 72 hours. Recent Films:  XR CHEST PORTABLE   Final Result   1. Right lower lobe and left perihilar pneumonia             Assessment:  Acute exacerbation of chronic obstructive pulmonary disease secondary to RSV    Plan:  Wean solumedrol 60 mg to Q12H  Continue aerosols. Wean oxygen as tolerated. EZ pap    Time at the bedside, reviewing labs and radiographs, reviewing updated notes and consultations, discussing with staff and family was more than 35 minutes. Please note that voice recognition technology was used in the preparation of this note and make therefore it may contain inadvertent transcription errors. If the patient is a COVID 19 isolation patient, the above physical exam reflects that of the examining physician for the day.         Aidan Law MD    [facility], [address] [facility], [address], [facility], [facility]  Office visits:  [place], [place], [facility]

## 2022-10-19 NOTE — PROGRESS NOTES
Occupational Therapy    OT eval and treat orders received and chart reviewed. Two attempts made this date. First attempt, pt declined, stating he did not feel up to working with therapy. Second attempt, pt stated his legs were in pain and declined to participate. Will check back at another time/date as able/appropriate.      Conrado Connell, OTR/L

## 2022-10-19 NOTE — PROGRESS NOTES
Physical Therapy  Facility/Department: [facility]  INTERMEDIATE 1      Name: Jena Goldman  : 1965  MRN: [id number]  Date of Service: 10/19/2022    PT evaluation attempted x2. Pt declined both attempts, states his legs hurt.   Tala PT [id number]

## 2022-10-20 LAB
ALBUMIN SERPL-MCNC: 3.8 G/DL (ref 3.5–5.2)
ALP BLD-CCNC: 62 U/L (ref 40–129)
ALT SERPL-CCNC: 39 U/L (ref 0–40)
ANION GAP SERPL CALCULATED.3IONS-SCNC: 6 MMOL/L (ref 7–16)
AST SERPL-CCNC: 25 U/L (ref 0–39)
BILIRUB SERPL-MCNC: <0.2 MG/DL (ref 0–1.2)
BLOOD CULTURE, ROUTINE: NORMAL
BUN BLDV-MCNC: 18 MG/DL (ref 6–20)
CALCIUM SERPL-MCNC: 7.8 MG/DL (ref 8.6–10.2)
CHLORIDE BLD-SCNC: 93 MMOL/L (ref 98–107)
CO2: 35 MMOL/L (ref 22–29)
CREAT SERPL-MCNC: 0.7 MG/DL (ref 0.7–1.2)
CULTURE, BLOOD 2: NORMAL
GFR SERPL CREATININE-BSD FRML MDRD: >60 ML/MIN/1.73
GLUCOSE BLD-MCNC: 153 MG/DL (ref 74–99)
HCT VFR BLD CALC: 37.6 % (ref 37–54)
HEMOGLOBIN: 11.4 G/DL (ref 12.5–16.5)
MCH RBC QN AUTO: 30.9 PG (ref 26–35)
MCHC RBC AUTO-ENTMCNC: 30.3 % (ref 32–34.5)
MCV RBC AUTO: 101.9 FL (ref 80–99.9)
PDW BLD-RTO: 13.1 FL (ref 11.5–15)
PLATELET # BLD: 192 E9/L (ref 130–450)
PMV BLD AUTO: 10.2 FL (ref 7–12)
POTASSIUM SERPL-SCNC: 4.8 MMOL/L (ref 3.5–5)
RBC # BLD: 3.69 E12/L (ref 3.8–5.8)
SODIUM BLD-SCNC: 134 MMOL/L (ref 132–146)
TOTAL PROTEIN: 7.2 G/DL (ref 6.4–8.3)
WBC # BLD: 7.2 E9/L (ref 4.5–11.5)

## 2022-10-20 PROCEDURE — 6370000000 HC RX 637 (ALT 250 FOR IP): Performed by: INTERNAL MEDICINE

## 2022-10-20 PROCEDURE — 6370000000 HC RX 637 (ALT 250 FOR IP): Performed by: NURSE PRACTITIONER

## 2022-10-20 PROCEDURE — 6360000002 HC RX W HCPCS

## 2022-10-20 PROCEDURE — 80053 COMPREHEN METABOLIC PANEL: CPT

## 2022-10-20 PROCEDURE — 2580000003 HC RX 258: Performed by: NURSE PRACTITIONER

## 2022-10-20 PROCEDURE — 2700000000 HC OXYGEN THERAPY PER DAY

## 2022-10-20 PROCEDURE — 94640 AIRWAY INHALATION TREATMENT: CPT

## 2022-10-20 PROCEDURE — 97161 PT EVAL LOW COMPLEX 20 MIN: CPT

## 2022-10-20 PROCEDURE — 85027 COMPLETE CBC AUTOMATED: CPT

## 2022-10-20 PROCEDURE — 99232 SBSQ HOSP IP/OBS MODERATE 35: CPT | Performed by: NURSE PRACTITIONER

## 2022-10-20 PROCEDURE — 6370000000 HC RX 637 (ALT 250 FOR IP): Performed by: FAMILY MEDICINE

## 2022-10-20 PROCEDURE — 2060000000 HC ICU INTERMEDIATE R&B

## 2022-10-20 PROCEDURE — 36415 COLL VENOUS BLD VENIPUNCTURE: CPT

## 2022-10-20 PROCEDURE — 97165 OT EVAL LOW COMPLEX 30 MIN: CPT

## 2022-10-20 PROCEDURE — 6360000002 HC RX W HCPCS: Performed by: NURSE PRACTITIONER

## 2022-10-20 PROCEDURE — 6360000002 HC RX W HCPCS: Performed by: INTERNAL MEDICINE

## 2022-10-20 RX ADMIN — METHYLPREDNISOLONE SODIUM SUCCINATE 60 MG: 125 INJECTION, POWDER, LYOPHILIZED, FOR SOLUTION INTRAMUSCULAR; INTRAVENOUS at 21:19

## 2022-10-20 RX ADMIN — FLUOXETINE HYDROCHLORIDE 10 MG: 10 TABLET ORAL at 07:50

## 2022-10-20 RX ADMIN — PRAMIPEXOLE DIHYDROCHLORIDE 0.25 MG: 0.25 TABLET ORAL at 21:19

## 2022-10-20 RX ADMIN — HYDROCODONE BITARTRATE AND HOMATROPINE METHYLBROMIDE 5 ML: 5; 1.5 SOLUTION ORAL at 07:51

## 2022-10-20 RX ADMIN — GABAPENTIN 100 MG: 100 CAPSULE ORAL at 13:33

## 2022-10-20 RX ADMIN — Medication 4000 UNITS: at 07:49

## 2022-10-20 RX ADMIN — IPRATROPIUM BROMIDE AND ALBUTEROL SULFATE 1 AMPULE: .5; 2.5 SOLUTION RESPIRATORY (INHALATION) at 00:57

## 2022-10-20 RX ADMIN — HYDROCODONE BITARTRATE AND ACETAMINOPHEN 1 TABLET: 7.5; 325 TABLET ORAL at 12:25

## 2022-10-20 RX ADMIN — ARFORMOTEROL TARTRATE 15 MCG: 15 SOLUTION RESPIRATORY (INHALATION) at 08:25

## 2022-10-20 RX ADMIN — ARFORMOTEROL TARTRATE 15 MCG: 15 SOLUTION RESPIRATORY (INHALATION) at 20:09

## 2022-10-20 RX ADMIN — METOPROLOL TARTRATE 25 MG: 25 TABLET, FILM COATED ORAL at 07:50

## 2022-10-20 RX ADMIN — HYDROCODONE BITARTRATE AND HOMATROPINE METHYLBROMIDE 5 ML: 5; 1.5 SOLUTION ORAL at 21:19

## 2022-10-20 RX ADMIN — IPRATROPIUM BROMIDE AND ALBUTEROL SULFATE 1 AMPULE: .5; 2.5 SOLUTION RESPIRATORY (INHALATION) at 08:25

## 2022-10-20 RX ADMIN — METOPROLOL TARTRATE 25 MG: 25 TABLET, FILM COATED ORAL at 21:19

## 2022-10-20 RX ADMIN — GUAIFENESIN 400 MG: 400 TABLET ORAL at 21:19

## 2022-10-20 RX ADMIN — SALINE NASAL SPRAY 1 SPRAY: 1.5 SOLUTION NASAL at 12:25

## 2022-10-20 RX ADMIN — ARIPIPRAZOLE 5 MG: 5 TABLET ORAL at 07:50

## 2022-10-20 RX ADMIN — GABAPENTIN 100 MG: 100 CAPSULE ORAL at 07:50

## 2022-10-20 RX ADMIN — HYDROCODONE BITARTRATE AND ACETAMINOPHEN 1 TABLET: 7.5; 325 TABLET ORAL at 00:05

## 2022-10-20 RX ADMIN — Medication 10 ML: at 07:51

## 2022-10-20 RX ADMIN — BUDESONIDE 1000 MCG: 0.5 SUSPENSION RESPIRATORY (INHALATION) at 08:25

## 2022-10-20 RX ADMIN — HYDROCODONE BITARTRATE AND HOMATROPINE METHYLBROMIDE 5 ML: 5; 1.5 SOLUTION ORAL at 17:08

## 2022-10-20 RX ADMIN — Medication 500 MG: at 07:50

## 2022-10-20 RX ADMIN — LORAZEPAM 1 MG: 1 TABLET ORAL at 04:30

## 2022-10-20 RX ADMIN — LORAZEPAM 1 MG: 1 TABLET ORAL at 17:08

## 2022-10-20 RX ADMIN — ENOXAPARIN SODIUM 40 MG: 100 INJECTION SUBCUTANEOUS at 07:50

## 2022-10-20 RX ADMIN — IPRATROPIUM BROMIDE AND ALBUTEROL SULFATE 1 AMPULE: .5; 2.5 SOLUTION RESPIRATORY (INHALATION) at 04:13

## 2022-10-20 RX ADMIN — BUDESONIDE 1000 MCG: 0.5 SUSPENSION RESPIRATORY (INHALATION) at 20:09

## 2022-10-20 RX ADMIN — HYDROCODONE BITARTRATE AND HOMATROPINE METHYLBROMIDE 5 ML: 5; 1.5 SOLUTION ORAL at 12:25

## 2022-10-20 RX ADMIN — Medication 10 ML: at 21:19

## 2022-10-20 RX ADMIN — IPRATROPIUM BROMIDE AND ALBUTEROL SULFATE 1 AMPULE: .5; 2.5 SOLUTION RESPIRATORY (INHALATION) at 16:54

## 2022-10-20 RX ADMIN — PANTOPRAZOLE SODIUM 40 MG: 40 TABLET, DELAYED RELEASE ORAL at 05:17

## 2022-10-20 RX ADMIN — LORAZEPAM 1 MG: 1 TABLET ORAL at 11:04

## 2022-10-20 RX ADMIN — IPRATROPIUM BROMIDE AND ALBUTEROL SULFATE 1 AMPULE: .5; 2.5 SOLUTION RESPIRATORY (INHALATION) at 20:09

## 2022-10-20 RX ADMIN — METHYLPREDNISOLONE SODIUM SUCCINATE 60 MG: 125 INJECTION, POWDER, LYOPHILIZED, FOR SOLUTION INTRAMUSCULAR; INTRAVENOUS at 07:50

## 2022-10-20 RX ADMIN — GUAIFENESIN 400 MG: 400 TABLET ORAL at 13:32

## 2022-10-20 RX ADMIN — LORAZEPAM 1 MG: 1 TABLET ORAL at 23:37

## 2022-10-20 RX ADMIN — IPRATROPIUM BROMIDE AND ALBUTEROL SULFATE 1 AMPULE: .5; 2.5 SOLUTION RESPIRATORY (INHALATION) at 12:34

## 2022-10-20 RX ADMIN — GABAPENTIN 100 MG: 100 CAPSULE ORAL at 21:19

## 2022-10-20 RX ADMIN — SALINE NASAL SPRAY 1 SPRAY: 1.5 SOLUTION NASAL at 23:51

## 2022-10-20 RX ADMIN — GUAIFENESIN 400 MG: 400 TABLET ORAL at 07:50

## 2022-10-20 ASSESSMENT — PAIN DESCRIPTION - DESCRIPTORS
DESCRIPTORS: ACHING;DISCOMFORT;CRAMPING
DESCRIPTORS: ACHING;DISCOMFORT;SORE
DESCRIPTORS: ACHING;DISCOMFORT;CRAMPING
DESCRIPTORS: ACHING;DISCOMFORT;SORE

## 2022-10-20 ASSESSMENT — PAIN DESCRIPTION - LOCATION
LOCATION: LEG

## 2022-10-20 ASSESSMENT — PAIN DESCRIPTION - PAIN TYPE
TYPE: CHRONIC PAIN

## 2022-10-20 ASSESSMENT — PAIN DESCRIPTION - ORIENTATION
ORIENTATION: LEFT
ORIENTATION: LEFT
ORIENTATION: RIGHT;LEFT
ORIENTATION: LEFT;RIGHT

## 2022-10-20 ASSESSMENT — PAIN SCALES - GENERAL
PAINLEVEL_OUTOF10: 9
PAINLEVEL_OUTOF10: 8
PAINLEVEL_OUTOF10: 7
PAINLEVEL_OUTOF10: 8
PAINLEVEL_OUTOF10: 0
PAINLEVEL_OUTOF10: 9
PAINLEVEL_OUTOF10: 4

## 2022-10-20 ASSESSMENT — PAIN - FUNCTIONAL ASSESSMENT
PAIN_FUNCTIONAL_ASSESSMENT: PREVENTS OR INTERFERES SOME ACTIVE ACTIVITIES AND ADLS

## 2022-10-20 ASSESSMENT — PAIN DESCRIPTION - FREQUENCY: FREQUENCY: CONTINUOUS

## 2022-10-20 ASSESSMENT — PAIN DESCRIPTION - ONSET: ONSET: GRADUAL

## 2022-10-20 NOTE — PROGRESS NOTES
[facility] Progress Note    Admitting Date and Time: 10/15/2022  9:43 AM  Admit Dx: Pneumonia due to organism [J18.9]  COPD exacerbation ([facility] [J44.1]  Pneumonia of both lungs due to infectious organism, unspecified part of lung [J18.9]      Assessment:    Principal Problem:    Pneumonia due to organism  Resolved Problems:    * No resolved hospital problems. *      Plan:  1. Pneumonia  - strep pneumoniae AG positive and with right lower lobe and leighton-hilar infiltrate on CXR  But procalcitonin is low and without fevers or leukocytosis  Suspect he may be colonized? ?    - antibiotics not indicated per pulmonology and were DC'd  Follow off antibiotics for now. He has remained afebrile. 2. Emphysema/COPD   - CT scan showed emphysema with bullae  Also multiple pulmonary nodules. He was referred by PCP to follow up with Dr Christiano Farrell re: recent CT chest findings. Continue inhaled bronchodilators and steroids. Dr Christiano Farrell consulted  With acute viral bronchitis with bronchospasm. Viral panel positive for RSV. Continue solu-medrol, and nebs - Solu-medrol tapering   Supportive care, antitussives    3. Acute on chronic hypoxemic respiratory failure  - due to above -> presented with acute dyspnea, tachypnea and increased flow rate from baseline    Continue supplemental O2 and treatment of underlying causes. O2 has been weaned back to baseline which is 7 LPM NC.    4. HTN  - continue lopressor    5. RLS  - continue mirapex    6. GERD  - continue PPI    7. Depression/Anxiety  - continue abilify, gabapentin, prozac    8. Hyperkalemia  - K of 5.2 -> 5.0 -> 4.8 ? Hemolyzed.     9. Vitamin D deficiency  - start Vitamin D supplement  Start PO Os-Garry    VTE prophylaxis: Lovenox      Subjective:  Patient is being followed for Pneumonia due to organism [J18.9]  COPD exacerbation ([facility] [J44.1]  Pneumonia of both lungs due to infectious organism, unspecified part of lung [J18.9]     Back to baseline 7 LPM  NC, he still feels chest tightness and more SOB than baseline. Expectorating a lot of sputum    ROS: denies fever, chills, n/v, HA unless stated above. calcium elemental  500 mg Oral BID WC    methylPREDNISolone  60 mg IntraVENous Q12H    guaiFENesin  400 mg Oral 4x Daily    vitamin D  4,000 Units Oral Daily    budesonide  1 mg Nebulization BID    ipratropium-albuterol  1 ampule Inhalation Q4H    ARIPiprazole  5 mg Oral Daily    Arformoterol Tartrate  15 mcg Nebulization BID    gabapentin  100 mg Oral TID    metoprolol tartrate  25 mg Oral BID    pantoprazole  40 mg Oral QAM AC    pramipexole  0.25 mg Oral Nightly    sodium chloride flush  5-40 mL IntraVENous 2 times per day    enoxaparin  40 mg SubCUTAneous Daily    FLUoxetine  10 mg Oral Daily     sodium chloride, 1 spray, PRN  LORazepam, 1 mg, Q6H PRN  HYDROcodone homatropine, 5 mL, Q4H PRN  melatonin, 6 mg, Nightly PRN  ipratropium-albuterol, 1 ampule, Q4H PRN  sodium chloride flush, 5-40 mL, PRN  sodium chloride, , PRN  ondansetron, 4 mg, Q8H PRN   Or  ondansetron, 4 mg, Q6H PRN  polyethylene glycol, 17 g, Daily PRN  acetaminophen, 650 mg, Q6H PRN   Or  acetaminophen, 650 mg, Q6H PRN  HYDROcodone-acetaminophen, 1 tablet, BID PRN       Objective:    /76   Pulse 73   Temp 97.9 °F (36.6 °C) (Oral)   Resp 18   Ht 5' 4\" (1.626 m)   Wt 130 lb (59 kg)   SpO2 98%   BMI 22.31 kg/m²     General Appearance: appears chronically ill, SOB at rest  Skin: warm and dry  Head: normocephalic and atraumatic  Eyes: pupils equal, round, and reactive to light, extraocular eye movements intact, conjunctivae normal  Neck: neck supple and non tender without mass   Pulmonary/Chest: diffuse wheezing and rhonchi  Cardiovascular: normal rate, normal S1 and S2 and no carotid bruits  Abdomen: soft, non-tender, non-distended, normal bowel sounds, no masses or organomegaly  Extremities: no cyanosis, no clubbing, no edema currently.  Has stasis dermatitis changes BLE  Neurologic: no cranial nerve deficit and speech normal        Recent Labs     10/18/22  0255 10/19/22  0315 10/20/22  0245    136 134   K 5.2* 5.0 4.8   CL 97* 96* 93*   CO2 30* 34* 35*   BUN 17 16 18   CREATININE 0.7 0.6* 0.7   GLUCOSE 164* 144* 153*   CALCIUM 7.6* 7.8* 7.8*         Recent Labs     10/18/22  0255 10/19/22  0315 10/20/22  0245   WBC 6.0 5.0 7.2   RBC 3.61* 3.58* 3.69*   HGB 11.1* 11.1* 11.4*   HCT 36.8* 37.0 37.6   .9* 103.4* 101.9*   MCH 30.7 31.0 30.9   MCHC 30.2* 30.0* 30.3*   RDW 13.2 13.1 13.1    205 192   MPV 10.2 9.9 10.2         Radiology:   Time spent reviewing chart, clinical exam, discussing case and answering questions with staff/consultants/patient/family = 20 minutes      NOTE: This report was transcribed using voice recognition software. Every effort was made to ensure accuracy; however, inadvertent computerized transcription errors may be present.   Electronically signed by ARGELIA Broussard CNP on 10/20/2022 at 11:18 AM

## 2022-10-20 NOTE — PROGRESS NOTES
functional ability as noted in the objective portion of this evaluation. Patient education  Pt educated on importance of mobility    Patient response to education:   Pt verbalized understanding Pt demonstrated skill Pt requires further education in this area   yes           Comments:  Pt short of breath with minimal activity. SpO2 on 7L 97%    Conditions Requiring Skilled Therapeutic Intervention:    [x]Decreased strength     []Decreased ROM  [x]Decreased functional mobility  [x]Decreased balance   [x]Decreased endurance   []Decreased posture  []Decreased sensation  []Decreased coordination   []Decreased vision  []Decreased safety awareness   []Increased pain       Patient and or family understand(s) diagnosis, prognosis, and plan of care. Prognosis is good for reaching above PT goals    PHYSICAL THERAPY PLAN OF CARE:    PT POC is established based on physician order and patient diagnosis     Referring provider/PT Order: Akil Motta MD/ PT eval and treat      Current Treatment Recommendations:     [x] Strengthening to improve independence with functional mobility   [] ROM to improve independence with functional mobility   [x] Balance Training to improve static/dynamic balance and to reduce fall risk  [x] Endurance Training to improve activity tolerance during functional mobility   [x] Transfer Training to improve safety and independence with all functional transfers   [x] Gait Training to improve gait mechanics, endurance and assess need for appropriate assistive device  [] Stair Training in preparation for safe discharge home and/or into the community   [] Positioning to prevent skin breakdown and contractures  [x] Safety and Education Training   [x] Patient/Caregiver Education   [] HEP  [] Other     PT long term treatment goals are located in above grid    Frequency of treatments: 2-5x/week x 5 days.     Time in  0915  Time out  0930  Evaluation Time includes thorough review of current medical information, gathering information on past medical history/social history and prior level of function, completion of standardized testing/informal observation of tasks, assessment of data and education on plan of care and goals.       CPT codes:  [x] Low Complexity PT evaluation 69957  [] Moderate Complexity PT evaluation 49439  [] High Complexity PT evaluation 15897  [] PT Re-evaluation 71348  [] Gait training 33990 minutes  [] Manual therapy 95580 minutes  [] Therapeutic activities 56421 minutes  [] Therapeutic exercises 08530 minutes  [] Neuromuscular reeducation 31250 minutes     Tala PT [id number]
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
## 2022-10-20 NOTE — PLAN OF CARE
Problem: Safety - Adult  Goal: Free from fall injury  10/20/2022 0038 by Deangelo Zapata RN  Outcome: Progressing  10/19/2022 1502 by Nikko Kaye RN  Outcome: Progressing

## 2022-10-20 NOTE — PROGRESS NOTES
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, left    S/P Girdlestone procedure    Onychomycosis    Venous insufficiency of both lower extremities    Depression    Oxygen dependent    Anxiety    Cellulitis    Swelling of both lower extremities    Leg swelling    Pneumonia due to COVID-19 virus    Gastroesophageal reflux disease    Fluid retention    Lower extremity pain, left    Acute on chronic heart failure with preserved ejection fraction (HFpEF) ([facility])    Oropharyngeal dysphagia    COPD exacerbation ([facility])    Chronic respiratory failure with hypoxia ([facility])    History of COVID-19    Acute on chronic diastolic (congestive) heart failure ([facility])    Fever of unknown origin    Nodule of upper lobe of left lung    Pneumonia due to organism       Subjective:  Patient is seen at bedside on 7 L nasal cannula. He does have congested chest with a productive cough of dark yellow/tan sputum.        Vitals:  VITALS:  /76   Pulse 73   Temp 97.9 °F (36.6 °C) (Oral)   Resp 18   Ht 5' 4\" (1.626 m)   Wt 130 lb (59 kg)   SpO2 98%   BMI 22.31 kg/m²     24HR INTAKE/OUTPUT:      Intake/Output Summary (Last 24 hours) at 10/20/2022 1158  Last data filed at 10/20/2022 0757  Gross per 24 hour   Intake --   Output 600 ml   Net -600 ml         24HR PULSE OXIMETRY RANGE:    SpO2  Av %  Min: 96 %  Max: 100 %    Medications:  IV:   sodium chloride         Scheduled Meds:   calcium elemental  500 mg Oral BID WC    methylPREDNISolone  60 mg IntraVENous Q12H    guaiFENesin  400 mg Oral 4x Daily    vitamin D  4,000 Units Oral Daily    budesonide  1 mg Nebulization BID    ipratropium-albuterol  1 ampule Inhalation Q4H    ARIPiprazole  5 mg Oral Daily    Arformoterol Tartrate  15 mcg Nebulization BID    gabapentin  100 mg Oral TID    metoprolol tartrate  25 mg Oral BID    pantoprazole  40 mg Oral QAM AC    pramipexole  0.25 mg Oral Nightly    sodium chloride flush  5-40 mL IntraVENous 2 times per day    enoxaparin  40 mg SubCUTAneous Daily    FLUoxetine  10 mg Oral Daily       Diet:   ADULT DIET; Easy to Chew; Low Sodium (2 gm)     EXAM:  General: No distress. Alert. Eyes: PERRL. No sclera icterus. No conjunctival injection. ENT: No discharge. Pharynx clear. Neck: Trachea midline. Normal thyroid. Resp: No accessory muscle use. no rales. bilateral wheezing. no rhonchi. CV: Regular rate. Regular rhythm. No murmur or rub. Abd: Non-tender. Non-distended. No masses. No organomegaly. Normal bowel sounds. Skin: Warm and dry. No nodule on exposed extremities. No rash on exposed extremities. Ext: No cyanosis, clubbing, edema  Lymph: No cervical LAD. No supraclavicular LAD. M/S: No cyanosis. No joint deformity. No clubbing. Neuro: Awake. Follows commands. Positive pupils/gag/corneals. Normal pain response. Results:  CBC:   Recent Labs     10/18/22  0255 10/19/22  0315 10/20/22  0245   WBC 6.0 5.0 7.2   HGB 11.1* 11.1* 11.4*   HCT 36.8* 37.0 37.6   .9* 103.4* 101.9*    205 192       BMP:   Recent Labs     10/18/22  0255 10/19/22  0315 10/20/22  0245    136 134   K 5.2* 5.0 4.8   CL 97* 96* 93*   CO2 30* 34* 35*   BUN 17 16 18   CREATININE 0.7 0.6* 0.7       LIVER PROFILE:   Recent Labs     10/19/22  0315 10/20/22  0245   AST 20 25   ALT 24 39   BILITOT <0.2 <0.2   ALKPHOS 53 62       PT/INR: No results for input(s): PROTIME, INR in the last 72 hours. APTT: No results for input(s): APTT in the last 72 hours.       Pathology:  N/A      Microbiology:  None    Recent ABG:   No results for input(s): PH, PO2, PCO2, HCO3, BE, O2SAT, METHB, O2HB, COHB, O2CON, HHB, THB in the last 72 hours. Recent Films:  XR CHEST PORTABLE   Final Result   1. Right lower lobe and left perihilar pneumonia             Assessment:  Acute exacerbation of chronic obstructive pulmonary disease secondary to RSV    Plan:  Continue solumedrol 60 mg to Q12H. Wean tomorrow. Continue aerosols. Wean oxygen as tolerated. EZ pap    Time at the bedside, reviewing labs and radiographs, reviewing updated notes and consultations, discussing with staff and family was more than 35 minutes. Please note that voice recognition technology was used in the preparation of this note and make therefore it may contain inadvertent transcription errors. If the patient is a COVID 19 isolation patient, the above physical exam reflects that of the examining physician for the day.         ARGELIA Rausch - NP    [facility]    [facility], [address] [place] [address], [address], [facility]  Office visits:  [facility], [facility]

## 2022-10-21 PROBLEM — J96.21 ACUTE ON CHRONIC RESPIRATORY FAILURE WITH HYPOXIA (HCC): Status: ACTIVE | Noted: 2022-10-21

## 2022-10-21 PROBLEM — B33.8 RSV (RESPIRATORY SYNCYTIAL VIRUS INFECTION): Status: ACTIVE | Noted: 2022-10-21

## 2022-10-21 LAB
ALBUMIN SERPL-MCNC: 3.6 G/DL (ref 3.5–5.2)
ALP BLD-CCNC: 51 U/L (ref 40–129)
ALT SERPL-CCNC: 32 U/L (ref 0–40)
ANION GAP SERPL CALCULATED.3IONS-SCNC: 9 MMOL/L (ref 7–16)
AST SERPL-CCNC: 18 U/L (ref 0–39)
BILIRUB SERPL-MCNC: <0.2 MG/DL (ref 0–1.2)
BUN BLDV-MCNC: 18 MG/DL (ref 6–20)
CALCIUM SERPL-MCNC: 7.9 MG/DL (ref 8.6–10.2)
CHLORIDE BLD-SCNC: 92 MMOL/L (ref 98–107)
CO2: 34 MMOL/L (ref 22–29)
CREAT SERPL-MCNC: 0.7 MG/DL (ref 0.7–1.2)
GFR SERPL CREATININE-BSD FRML MDRD: >60 ML/MIN/1.73
GLUCOSE BLD-MCNC: 122 MG/DL (ref 74–99)
HCT VFR BLD CALC: 39 % (ref 37–54)
HEMOGLOBIN: 11.9 G/DL (ref 12.5–16.5)
MCH RBC QN AUTO: 31 PG (ref 26–35)
MCHC RBC AUTO-ENTMCNC: 30.5 % (ref 32–34.5)
MCV RBC AUTO: 101.6 FL (ref 80–99.9)
PDW BLD-RTO: 13 FL (ref 11.5–15)
PLATELET # BLD: 215 E9/L (ref 130–450)
PMV BLD AUTO: 9.9 FL (ref 7–12)
POTASSIUM SERPL-SCNC: 4.8 MMOL/L (ref 3.5–5)
POTASSIUM SERPL-SCNC: 5.4 MMOL/L (ref 3.5–5)
RBC # BLD: 3.84 E12/L (ref 3.8–5.8)
SODIUM BLD-SCNC: 135 MMOL/L (ref 132–146)
TOTAL PROTEIN: 6.9 G/DL (ref 6.4–8.3)
WBC # BLD: 9.7 E9/L (ref 4.5–11.5)

## 2022-10-21 PROCEDURE — 6370000000 HC RX 637 (ALT 250 FOR IP): Performed by: FAMILY MEDICINE

## 2022-10-21 PROCEDURE — 2060000000 HC ICU INTERMEDIATE R&B

## 2022-10-21 PROCEDURE — 6360000002 HC RX W HCPCS: Performed by: INTERNAL MEDICINE

## 2022-10-21 PROCEDURE — 99232 SBSQ HOSP IP/OBS MODERATE 35: CPT | Performed by: NURSE PRACTITIONER

## 2022-10-21 PROCEDURE — 6360000002 HC RX W HCPCS: Performed by: NURSE PRACTITIONER

## 2022-10-21 PROCEDURE — 2700000000 HC OXYGEN THERAPY PER DAY

## 2022-10-21 PROCEDURE — 36415 COLL VENOUS BLD VENIPUNCTURE: CPT

## 2022-10-21 PROCEDURE — 85027 COMPLETE CBC AUTOMATED: CPT

## 2022-10-21 PROCEDURE — 6370000000 HC RX 637 (ALT 250 FOR IP): Performed by: NURSE PRACTITIONER

## 2022-10-21 PROCEDURE — 94640 AIRWAY INHALATION TREATMENT: CPT

## 2022-10-21 PROCEDURE — 6360000002 HC RX W HCPCS

## 2022-10-21 PROCEDURE — 2580000003 HC RX 258: Performed by: NURSE PRACTITIONER

## 2022-10-21 PROCEDURE — 80053 COMPREHEN METABOLIC PANEL: CPT

## 2022-10-21 PROCEDURE — 6370000000 HC RX 637 (ALT 250 FOR IP): Performed by: INTERNAL MEDICINE

## 2022-10-21 PROCEDURE — 84132 ASSAY OF SERUM POTASSIUM: CPT

## 2022-10-21 RX ORDER — PREDNISONE 20 MG/1
40 TABLET ORAL DAILY
Status: DISCONTINUED | OUTPATIENT
Start: 2022-10-22 | End: 2022-10-23 | Stop reason: HOSPADM

## 2022-10-21 RX ORDER — SODIUM POLYSTYRENE SULFONATE 15 G/60ML
15 SUSPENSION ORAL; RECTAL ONCE
Status: DISCONTINUED | OUTPATIENT
Start: 2022-10-21 | End: 2022-10-21 | Stop reason: ALTCHOICE

## 2022-10-21 RX ADMIN — FLUOXETINE HYDROCHLORIDE 10 MG: 10 TABLET ORAL at 07:54

## 2022-10-21 RX ADMIN — PRAMIPEXOLE DIHYDROCHLORIDE 0.25 MG: 0.25 TABLET ORAL at 20:44

## 2022-10-21 RX ADMIN — Medication 500 MG: at 07:53

## 2022-10-21 RX ADMIN — METHYLPREDNISOLONE SODIUM SUCCINATE 60 MG: 125 INJECTION, POWDER, LYOPHILIZED, FOR SOLUTION INTRAMUSCULAR; INTRAVENOUS at 20:44

## 2022-10-21 RX ADMIN — Medication 6 MG: at 21:22

## 2022-10-21 RX ADMIN — IPRATROPIUM BROMIDE AND ALBUTEROL SULFATE 1 AMPULE: .5; 2.5 SOLUTION RESPIRATORY (INHALATION) at 08:49

## 2022-10-21 RX ADMIN — Medication 4000 UNITS: at 07:53

## 2022-10-21 RX ADMIN — ARFORMOTEROL TARTRATE 15 MCG: 15 SOLUTION RESPIRATORY (INHALATION) at 08:49

## 2022-10-21 RX ADMIN — HYDROCODONE BITARTRATE AND ACETAMINOPHEN 1 TABLET: 7.5; 325 TABLET ORAL at 12:26

## 2022-10-21 RX ADMIN — ENOXAPARIN SODIUM 40 MG: 100 INJECTION SUBCUTANEOUS at 07:54

## 2022-10-21 RX ADMIN — BUDESONIDE 1000 MCG: 0.5 SUSPENSION RESPIRATORY (INHALATION) at 08:49

## 2022-10-21 RX ADMIN — LORAZEPAM 1 MG: 1 TABLET ORAL at 18:28

## 2022-10-21 RX ADMIN — BUDESONIDE 1000 MCG: 0.5 SUSPENSION RESPIRATORY (INHALATION) at 19:14

## 2022-10-21 RX ADMIN — IPRATROPIUM BROMIDE AND ALBUTEROL SULFATE 1 AMPULE: .5; 2.5 SOLUTION RESPIRATORY (INHALATION) at 00:22

## 2022-10-21 RX ADMIN — ARIPIPRAZOLE 5 MG: 5 TABLET ORAL at 07:54

## 2022-10-21 RX ADMIN — Medication 10 ML: at 20:44

## 2022-10-21 RX ADMIN — GABAPENTIN 100 MG: 100 CAPSULE ORAL at 07:53

## 2022-10-21 RX ADMIN — METOPROLOL TARTRATE 25 MG: 25 TABLET, FILM COATED ORAL at 20:44

## 2022-10-21 RX ADMIN — METOPROLOL TARTRATE 25 MG: 25 TABLET, FILM COATED ORAL at 07:53

## 2022-10-21 RX ADMIN — Medication 10 ML: at 07:54

## 2022-10-21 RX ADMIN — HYDROCODONE BITARTRATE AND HOMATROPINE METHYLBROMIDE 5 ML: 5; 1.5 SOLUTION ORAL at 20:44

## 2022-10-21 RX ADMIN — IPRATROPIUM BROMIDE AND ALBUTEROL SULFATE 1 AMPULE: .5; 2.5 SOLUTION RESPIRATORY (INHALATION) at 19:14

## 2022-10-21 RX ADMIN — LORAZEPAM 1 MG: 1 TABLET ORAL at 06:21

## 2022-10-21 RX ADMIN — IPRATROPIUM BROMIDE AND ALBUTEROL SULFATE 1 AMPULE: .5; 2.5 SOLUTION RESPIRATORY (INHALATION) at 03:58

## 2022-10-21 RX ADMIN — Medication 500 MG: at 16:43

## 2022-10-21 RX ADMIN — GABAPENTIN 100 MG: 100 CAPSULE ORAL at 13:53

## 2022-10-21 RX ADMIN — GABAPENTIN 100 MG: 100 CAPSULE ORAL at 20:44

## 2022-10-21 RX ADMIN — ARFORMOTEROL TARTRATE 15 MCG: 15 SOLUTION RESPIRATORY (INHALATION) at 19:14

## 2022-10-21 RX ADMIN — HYDROCODONE BITARTRATE AND HOMATROPINE METHYLBROMIDE 5 ML: 5; 1.5 SOLUTION ORAL at 13:11

## 2022-10-21 RX ADMIN — GUAIFENESIN 400 MG: 400 TABLET ORAL at 20:44

## 2022-10-21 RX ADMIN — IPRATROPIUM BROMIDE AND ALBUTEROL SULFATE 1 AMPULE: .5; 2.5 SOLUTION RESPIRATORY (INHALATION) at 12:41

## 2022-10-21 RX ADMIN — IPRATROPIUM BROMIDE AND ALBUTEROL SULFATE 1 AMPULE: .5; 2.5 SOLUTION RESPIRATORY (INHALATION) at 16:45

## 2022-10-21 RX ADMIN — HYDROCODONE BITARTRATE AND HOMATROPINE METHYLBROMIDE 5 ML: 5; 1.5 SOLUTION ORAL at 07:57

## 2022-10-21 RX ADMIN — METHYLPREDNISOLONE SODIUM SUCCINATE 60 MG: 125 INJECTION, POWDER, LYOPHILIZED, FOR SOLUTION INTRAMUSCULAR; INTRAVENOUS at 10:13

## 2022-10-21 RX ADMIN — PANTOPRAZOLE SODIUM 40 MG: 40 TABLET, DELAYED RELEASE ORAL at 06:21

## 2022-10-21 RX ADMIN — HYDROCODONE BITARTRATE AND ACETAMINOPHEN 1 TABLET: 7.5; 325 TABLET ORAL at 00:25

## 2022-10-21 ASSESSMENT — PAIN SCALES - GENERAL
PAINLEVEL_OUTOF10: 3
PAINLEVEL_OUTOF10: 0

## 2022-10-21 NOTE — PLAN OF CARE
Problem: Discharge Planning  Goal: Discharge to home or other facility with appropriate resources  10/21/2022 0014 by Kingston Whaley RN  Outcome: Progressing     Problem: Pain  Goal: Verbalizes/displays adequate comfort level or baseline comfort level  10/21/2022 0014 by Kingston Whaley RN  Outcome: Progressing     Problem: Safety - Adult  Goal: Free from fall injury  10/21/2022 0014 by Kingston Whaley RN  Outcome: Progressing     Problem: Chronic Conditions and Co-morbidities  Goal: Patient's chronic conditions and co-morbidity symptoms are monitored and maintained or improved  10/21/2022 0014 by Kingston Whaley RN  Outcome: Progressing     Problem: ABCDS Injury Assessment  Goal: Absence of physical injury  Outcome: Progressing

## 2022-10-21 NOTE — PROGRESS NOTES
as evidenced by lab values, other (comment) (Admits w/ PNA/ Multiple lung nodules)    Nutrition Interventions:   Food and/or Nutrient Delivery: Continue Current Diet, Start Oral Nutrition Supplement  Nutrition Education/Counseling: No recommendation at this time  Coordination of Nutrition Care: Continue to monitor while inpatient       Goals:     Goals: PO intake 50% or greater, Initiate nutrition support       Nutrition Monitoring and Evaluation:      Food/Nutrient Intake Outcomes: Food and Nutrient Intake, Supplement Intake  Physical Signs/Symptoms Outcomes: Biochemical Data, Chewing or Swallowing, Fluid Status or Edema, Nutrition Focused Physical Findings, Skin, Weight    Discharge Planning:     Too soon to determine     Zee Dean RD, LD  Contact: [phone]

## 2022-10-21 NOTE — PROGRESS NOTES
[facility] Progress Note    Admitting Date and Time: 10/15/2022  9:43 AM  Admit Dx: Pneumonia due to organism [J18.9]  COPD exacerbation ([address] [J44.1]  Pneumonia of both lungs due to infectious organism, unspecified part of lung [J18.9]    Subjective:  Patient is being followed for Pneumonia due to organism [J18.9]  COPD exacerbation ([facility] [J44.1]  Pneumonia of both lungs due to infectious organism, unspecified part of lung [J18.9]     Patient awake and alert- sitting up in bed watching TV in no acute distress  Reporting he feels \" iffy\"  Currently on 7L which is his baseline  Reporting ongoing productive cough  Appetite good        ROS: denies fever, chills, cp, sob, n/v, HA unless stated above.       calcium elemental  500 mg Oral BID WC    methylPREDNISolone  60 mg IntraVENous Q12H    guaiFENesin  400 mg Oral 4x Daily    vitamin D  4,000 Units Oral Daily    budesonide  1 mg Nebulization BID    ipratropium-albuterol  1 ampule Inhalation Q4H    ARIPiprazole  5 mg Oral Daily    Arformoterol Tartrate  15 mcg Nebulization BID    gabapentin  100 mg Oral TID    metoprolol tartrate  25 mg Oral BID    pantoprazole  40 mg Oral QAM AC    pramipexole  0.25 mg Oral Nightly    sodium chloride flush  5-40 mL IntraVENous 2 times per day    enoxaparin  40 mg SubCUTAneous Daily    FLUoxetine  10 mg Oral Daily     sodium chloride, 1 spray, PRN  LORazepam, 1 mg, Q6H PRN  HYDROcodone homatropine, 5 mL, Q4H PRN  melatonin, 6 mg, Nightly PRN  ipratropium-albuterol, 1 ampule, Q4H PRN  sodium chloride flush, 5-40 mL, PRN  sodium chloride, , PRN  ondansetron, 4 mg, Q8H PRN   Or  ondansetron, 4 mg, Q6H PRN  polyethylene glycol, 17 g, Daily PRN  acetaminophen, 650 mg, Q6H PRN   Or  acetaminophen, 650 mg, Q6H PRN  HYDROcodone-acetaminophen, 1 tablet, BID PRN         Objective:    BP (!) 153/92   Pulse 76   Temp 98 °F (36.7 °C) (Oral)   Resp 20   Ht 5' 4\" (1.626 m)   Wt 129 lb 4.8 oz (58.7 kg)   SpO2 96%   BMI 22.19 kg/m²     General Appearance: alert and oriented to person, place and time and in no acute distress  Skin: warm and dry  Head: normocephalic and atraumatic  Eyes: pupils equal, round, and reactive to light, extraocular eye movements intact, conjunctivae normal  Neck: neck supple and non tender without mass   Pulmonary/Chest: tight/ exp wheezing throughout   Cardiovascular: normal rate, normal S1 and S2 and no carotid bruits  Abdomen: soft, non-tender, non-distended, normal bowel sounds  Extremities: no cyanosis, no clubbing and no edema  Neurologic: speech normal         Recent Labs     10/19/22  0315 10/20/22  0245 10/21/22  0352    134 135   K 5.0 4.8 5.4*   CL 96* 93* 92*   CO2 34* 35* 34*   BUN 16 18 18   CREATININE 0.6* 0.7 0.7   GLUCOSE 144* 153* 122*   CALCIUM 7.8* 7.8* 7.9*       Recent Labs     10/19/22  0315 10/20/22  0245 10/21/22  0352   WBC 5.0 7.2 9.7   RBC 3.58* 3.69* 3.84   HGB 11.1* 11.4* 11.9*   HCT 37.0 37.6 39.0   .4* 101.9* 101.6*   MCH 31.0 30.9 31.0   MCHC 30.0* 30.3* 30.5*   RDW 13.1 13.1 13.0    192 215   MPV 9.9 10.2 9.9         Assessment:    Principal Problem:    Pneumonia due to organism  Resolved Problems:    * No resolved hospital problems. *      Plan:  1. Streptococcal pneumonia/ RSV: pt presented to the ER with sob. Reporting he woke up in the middle of the night feeling sob. He has a history of chronic respiratory failure/ COPD and is on 7L at baseline. Associated cough, congestion. Imaging reviewed. Procal low. No fevers or leukocytosis. Strep pneumoniae AG positive. Pulmonology following- appreciate input. Recommending to hold off abx- > colonized. Continue steroids-     2. Acute on chronic respiratory failure: due to above: pt wears 7 L at baseline. Continue to wean 02. Pulmonology following - appreciate input. 3. Acute COPD exacerbation: due to RSV; continue IV steroids and nebulizers. 4. RSV: supportive care    5. HTN; lopressor    6.  RLS; mirapex    7. GERD: PPI    8. Depression: anxiety: continue meds: abilify/ gabapentin/ prozac    9. Hyperkalemia: follow K+     10. Vitamin D deficiency: vitamin D supplement    11. Deconditioning: am pac 18          Dispo: possible discharge in 24 hours. Time spent reviewing chart, clinical exam, discussing case and answering questions with staff/consultants/patient/family = 20 minutes         NOTE: This report was transcribed using voice recognition software. Every effort was made to ensure accuracy; however, inadvertent computerized transcription errors may be present.   Electronically signed by KIM Howell on 10/21/2022 at 8:58 AM

## 2022-10-21 NOTE — PROGRESS NOTES
[facility] and [facility]  [address] [facility], [address], [facility]      Pulmonary Progress Note      SUBJECTIVE:  claims doing some better with breathing, coughing up moderate secretions, on 7 li NC, lying down in bed and doesn't appear to be getting up much    OBJECTIVE    Medications    Continuous Infusions:   sodium chloride         Scheduled Meds:   sodium polystyrene  15 g Oral Once    calcium elemental  500 mg Oral BID WC    methylPREDNISolone  60 mg IntraVENous Q12H    guaiFENesin  400 mg Oral 4x Daily    vitamin D  4,000 Units Oral Daily    budesonide  1 mg Nebulization BID    ipratropium-albuterol  1 ampule Inhalation Q4H    ARIPiprazole  5 mg Oral Daily    Arformoterol Tartrate  15 mcg Nebulization BID    gabapentin  100 mg Oral TID    metoprolol tartrate  25 mg Oral BID    pantoprazole  40 mg Oral QAM AC    pramipexole  0.25 mg Oral Nightly    sodium chloride flush  5-40 mL IntraVENous 2 times per day    enoxaparin  40 mg SubCUTAneous Daily    FLUoxetine  10 mg Oral Daily       PRN Meds:sodium chloride, LORazepam, HYDROcodone homatropine, melatonin, ipratropium-albuterol, sodium chloride flush, sodium chloride, ondansetron **OR** ondansetron, polyethylene glycol, acetaminophen **OR** acetaminophen, HYDROcodone-acetaminophen    Physical    VITALS:  BP (!) 153/92   Pulse 76   Temp 98 °F (36.7 °C) (Oral)   Resp 20   Ht 5' 4\" (1.626 m)   Wt 129 lb 4.8 oz (58.7 kg)   SpO2 96%   BMI 22.19 kg/m²     24HR INTAKE/OUTPUT:      Intake/Output Summary (Last 24 hours) at 10/21/2022 1051  Last data filed at 10/21/2022 1015  Gross per 24 hour   Intake --   Output 2800 ml   Net -2800 ml       24HR PULSE OXIMETRY RANGE:    SpO2  Av.5 %  Min: 96 %  Max: 100 %    General appearance: alert, appears stated age, and cooperative  Lungs: rhonchi bilaterally worse with cough  Heart: regular rate and rhythm, S1, S2 normal, no murmur, click, rub or gallop  Abdomen: soft, non-tender; bowel sounds normal; no masses,  no organomegaly  Extremities: extremities normal, atraumatic, no cyanosis or edema  Neurologic: Mental status: Alert, oriented, thought content appropriate    Data    CBC:   Recent Labs     10/19/22  0315 10/20/22  0245 10/21/22  0352   WBC 5.0 7.2 9.7   HGB 11.1* 11.4* 11.9*   HCT 37.0 37.6 39.0   .4* 101.9* 101.6*    192 215       BMP:  Recent Labs     10/19/22  0315 10/20/22  0245 10/21/22  0352 10/21/22  1011    134 135  --    K 5.0 4.8 5.4* 4.8   CL 96* 93* 92*  --    CO2 34* 35* 34*  --    BUN 16 18 18  --    CREATININE 0.6* 0.7 0.7  --     ALB:3,BILIDIR:3,BILITOT:3,ALKPHOS:3)@    PT/INR: No results for input(s): PROTIME, INR in the last 72 hours. ABG:   No results for input(s): PH, PO2, PCO2, HCO3, BE, O2SAT, METHB, O2HB, COHB, O2CON, HHB, THB in the last 72 hours. Radiology/Other tests reviewed: none    Assessment:     Principal Problem:    Pneumonia due to organism  Resolved Problems:    * No resolved hospital problems. *      Plan:       Cont with steroids, taper as tolerated, can switch to po tomorrow  Cont with nebs and EZPAP, observe respiratory function and cough/secretions  Cont with oyxgen, taper as tolerated  OOB to chair, ambulate with assistance      Time at the bedside, reviewing labs and radiographs, reviewing notes and consultations, discussing with staff and family was more than 35 minutes. Thanks for letting us see this patient in consultation. Please contact us with any questions. Office [phone] or after hours through [employer], x [phone].

## 2022-10-21 NOTE — PLAN OF CARE
Problem: Discharge Planning  Goal: Discharge to home or other facility with appropriate resources  Outcome: Progressing     Problem: Pain  Goal: Verbalizes/displays adequate comfort level or baseline comfort level  Outcome: Progressing     Problem: Safety - Adult  Goal: Free from fall injury  Outcome: Progressing     Problem: Chronic Conditions and Co-morbidities  Goal: Patient's chronic conditions and co-morbidity symptoms are monitored and maintained or improved  Outcome: Progressing     Problem: ABCDS Injury Assessment  Goal: Absence of physical injury  Outcome: Progressing     Problem: Nutrition Deficit:  Goal: Optimize nutritional status  Outcome: Progressing

## 2022-10-21 NOTE — CARE COORDINATION
CASE MANAGEMENT. .. PT 18/OT 17. Confirmed with Rayo Carvalho from [facility] [phone] that patient is current with their services for skilled nursing. He is aware of poss dc soon and is accepted back. Resume HHC orders obtained. On day of discharge, please fax  date of dc, resume [facility] orders and AVS to [facility] at [phone]. Continues on on 7 liters nc. His order with Mik Cali reads 7 liters at hs and 6 liters during the day. Nurisng to wean back to baseline as tolerated. If o2 needs change, fax order to [facility] at  [phone]. Await pulm input. Will follow.

## 2022-10-22 LAB
ALBUMIN SERPL-MCNC: 3.8 G/DL (ref 3.5–5.2)
ALP BLD-CCNC: 59 U/L (ref 40–129)
ALT SERPL-CCNC: 30 U/L (ref 0–40)
ANION GAP SERPL CALCULATED.3IONS-SCNC: 5 MMOL/L (ref 7–16)
AST SERPL-CCNC: 14 U/L (ref 0–39)
BILIRUB SERPL-MCNC: <0.2 MG/DL (ref 0–1.2)
BUN BLDV-MCNC: 22 MG/DL (ref 6–20)
CALCIUM SERPL-MCNC: 8.1 MG/DL (ref 8.6–10.2)
CHLORIDE BLD-SCNC: 93 MMOL/L (ref 98–107)
CO2: 37 MMOL/L (ref 22–29)
CREAT SERPL-MCNC: 0.7 MG/DL (ref 0.7–1.2)
GFR SERPL CREATININE-BSD FRML MDRD: >60 ML/MIN/1.73
GLUCOSE BLD-MCNC: 115 MG/DL (ref 74–99)
HCT VFR BLD CALC: 38.6 % (ref 37–54)
HEMOGLOBIN: 11.9 G/DL (ref 12.5–16.5)
MCH RBC QN AUTO: 30.9 PG (ref 26–35)
MCHC RBC AUTO-ENTMCNC: 30.8 % (ref 32–34.5)
MCV RBC AUTO: 100.3 FL (ref 80–99.9)
PDW BLD-RTO: 13.1 FL (ref 11.5–15)
PLATELET # BLD: 209 E9/L (ref 130–450)
PMV BLD AUTO: 9.9 FL (ref 7–12)
POTASSIUM SERPL-SCNC: 5.4 MMOL/L (ref 3.5–5)
POTASSIUM SERPL-SCNC: 5.4 MMOL/L (ref 3.5–5)
RBC # BLD: 3.85 E12/L (ref 3.8–5.8)
SODIUM BLD-SCNC: 135 MMOL/L (ref 132–146)
TOTAL PROTEIN: 7.3 G/DL (ref 6.4–8.3)
WBC # BLD: 10.7 E9/L (ref 4.5–11.5)

## 2022-10-22 PROCEDURE — 6370000000 HC RX 637 (ALT 250 FOR IP): Performed by: NURSE PRACTITIONER

## 2022-10-22 PROCEDURE — 94640 AIRWAY INHALATION TREATMENT: CPT

## 2022-10-22 PROCEDURE — 99232 SBSQ HOSP IP/OBS MODERATE 35: CPT | Performed by: NURSE PRACTITIONER

## 2022-10-22 PROCEDURE — 6360000002 HC RX W HCPCS: Performed by: NURSE PRACTITIONER

## 2022-10-22 PROCEDURE — 36415 COLL VENOUS BLD VENIPUNCTURE: CPT

## 2022-10-22 PROCEDURE — 6370000000 HC RX 637 (ALT 250 FOR IP): Performed by: FAMILY MEDICINE

## 2022-10-22 PROCEDURE — 85027 COMPLETE CBC AUTOMATED: CPT

## 2022-10-22 PROCEDURE — 80053 COMPREHEN METABOLIC PANEL: CPT

## 2022-10-22 PROCEDURE — 2700000000 HC OXYGEN THERAPY PER DAY

## 2022-10-22 PROCEDURE — 84132 ASSAY OF SERUM POTASSIUM: CPT

## 2022-10-22 PROCEDURE — 2060000000 HC ICU INTERMEDIATE R&B

## 2022-10-22 PROCEDURE — 6370000000 HC RX 637 (ALT 250 FOR IP): Performed by: INTERNAL MEDICINE

## 2022-10-22 PROCEDURE — 6360000002 HC RX W HCPCS: Performed by: INTERNAL MEDICINE

## 2022-10-22 PROCEDURE — 2580000003 HC RX 258: Performed by: NURSE PRACTITIONER

## 2022-10-22 RX ORDER — PREDNISONE 10 MG/1
TABLET ORAL
Qty: 30 TABLET | Refills: 0 | Status: ON HOLD | OUTPATIENT
Start: 2022-10-22 | End: 2022-11-06 | Stop reason: HOSPADM

## 2022-10-22 RX ORDER — ERGOCALCIFEROL 1.25 MG/1
50000 CAPSULE ORAL WEEKLY
Qty: 6 CAPSULE | Refills: 0 | Status: SHIPPED | OUTPATIENT
Start: 2022-10-22 | End: 2022-11-27

## 2022-10-22 RX ORDER — IPRATROPIUM BROMIDE AND ALBUTEROL SULFATE 2.5; .5 MG/3ML; MG/3ML
3 SOLUTION RESPIRATORY (INHALATION) EVERY 6 HOURS PRN
Qty: 90 EACH | Refills: 3 | Status: SHIPPED | OUTPATIENT
Start: 2022-10-22

## 2022-10-22 RX ORDER — SODIUM POLYSTYRENE SULFONATE 15 G/60ML
15 SUSPENSION ORAL; RECTAL ONCE
Status: COMPLETED | OUTPATIENT
Start: 2022-10-22 | End: 2022-10-22

## 2022-10-22 RX ADMIN — METOPROLOL TARTRATE 25 MG: 25 TABLET, FILM COATED ORAL at 08:16

## 2022-10-22 RX ADMIN — HYDROCODONE BITARTRATE AND HOMATROPINE METHYLBROMIDE 5 ML: 5; 1.5 SOLUTION ORAL at 08:20

## 2022-10-22 RX ADMIN — BUDESONIDE 1000 MCG: 0.5 SUSPENSION RESPIRATORY (INHALATION) at 19:40

## 2022-10-22 RX ADMIN — BUDESONIDE 1000 MCG: 0.5 SUSPENSION RESPIRATORY (INHALATION) at 08:43

## 2022-10-22 RX ADMIN — HYDROCODONE BITARTRATE AND HOMATROPINE METHYLBROMIDE 5 ML: 5; 1.5 SOLUTION ORAL at 20:54

## 2022-10-22 RX ADMIN — LORAZEPAM 1 MG: 1 TABLET ORAL at 00:51

## 2022-10-22 RX ADMIN — HYDROCODONE BITARTRATE AND HOMATROPINE METHYLBROMIDE 5 ML: 5; 1.5 SOLUTION ORAL at 00:51

## 2022-10-22 RX ADMIN — Medication 500 MG: at 08:16

## 2022-10-22 RX ADMIN — Medication 10 ML: at 20:55

## 2022-10-22 RX ADMIN — IPRATROPIUM BROMIDE AND ALBUTEROL SULFATE 1 AMPULE: .5; 2.5 SOLUTION RESPIRATORY (INHALATION) at 08:43

## 2022-10-22 RX ADMIN — METOPROLOL TARTRATE 25 MG: 25 TABLET, FILM COATED ORAL at 20:55

## 2022-10-22 RX ADMIN — GUAIFENESIN 400 MG: 400 TABLET ORAL at 08:16

## 2022-10-22 RX ADMIN — HYDROCODONE BITARTRATE AND ACETAMINOPHEN 1 TABLET: 7.5; 325 TABLET ORAL at 00:51

## 2022-10-22 RX ADMIN — Medication 4000 UNITS: at 08:16

## 2022-10-22 RX ADMIN — LORAZEPAM 1 MG: 1 TABLET ORAL at 23:55

## 2022-10-22 RX ADMIN — PANTOPRAZOLE SODIUM 40 MG: 40 TABLET, DELAYED RELEASE ORAL at 06:49

## 2022-10-22 RX ADMIN — Medication 10 ML: at 08:17

## 2022-10-22 RX ADMIN — IPRATROPIUM BROMIDE AND ALBUTEROL SULFATE 1 AMPULE: .5; 2.5 SOLUTION RESPIRATORY (INHALATION) at 00:39

## 2022-10-22 RX ADMIN — LORAZEPAM 1 MG: 1 TABLET ORAL at 16:46

## 2022-10-22 RX ADMIN — GABAPENTIN 100 MG: 100 CAPSULE ORAL at 13:05

## 2022-10-22 RX ADMIN — GABAPENTIN 100 MG: 100 CAPSULE ORAL at 08:16

## 2022-10-22 RX ADMIN — PREDNISONE 40 MG: 20 TABLET ORAL at 08:16

## 2022-10-22 RX ADMIN — LORAZEPAM 1 MG: 1 TABLET ORAL at 06:49

## 2022-10-22 RX ADMIN — PRAMIPEXOLE DIHYDROCHLORIDE 0.25 MG: 0.25 TABLET ORAL at 20:55

## 2022-10-22 RX ADMIN — FLUOXETINE HYDROCHLORIDE 10 MG: 10 TABLET ORAL at 08:16

## 2022-10-22 RX ADMIN — SODIUM POLYSTYRENE SULFONATE 15 G: 15 SUSPENSION ORAL; RECTAL at 13:05

## 2022-10-22 RX ADMIN — IPRATROPIUM BROMIDE AND ALBUTEROL SULFATE 1 AMPULE: .5; 2.5 SOLUTION RESPIRATORY (INHALATION) at 19:40

## 2022-10-22 RX ADMIN — ENOXAPARIN SODIUM 40 MG: 100 INJECTION SUBCUTANEOUS at 08:17

## 2022-10-22 RX ADMIN — Medication 500 MG: at 15:57

## 2022-10-22 RX ADMIN — HYDROCODONE BITARTRATE AND ACETAMINOPHEN 1 TABLET: 7.5; 325 TABLET ORAL at 23:55

## 2022-10-22 RX ADMIN — ARFORMOTEROL TARTRATE 15 MCG: 15 SOLUTION RESPIRATORY (INHALATION) at 08:43

## 2022-10-22 RX ADMIN — IPRATROPIUM BROMIDE AND ALBUTEROL SULFATE 1 AMPULE: .5; 2.5 SOLUTION RESPIRATORY (INHALATION) at 15:51

## 2022-10-22 RX ADMIN — GUAIFENESIN 400 MG: 400 TABLET ORAL at 20:55

## 2022-10-22 RX ADMIN — Medication 6 MG: at 20:55

## 2022-10-22 RX ADMIN — HYDROCODONE BITARTRATE AND HOMATROPINE METHYLBROMIDE 5 ML: 5; 1.5 SOLUTION ORAL at 16:48

## 2022-10-22 RX ADMIN — ARFORMOTEROL TARTRATE 15 MCG: 15 SOLUTION RESPIRATORY (INHALATION) at 19:42

## 2022-10-22 RX ADMIN — IPRATROPIUM BROMIDE AND ALBUTEROL SULFATE 1 AMPULE: .5; 2.5 SOLUTION RESPIRATORY (INHALATION) at 12:17

## 2022-10-22 RX ADMIN — IPRATROPIUM BROMIDE AND ALBUTEROL SULFATE 1 AMPULE: .5; 2.5 SOLUTION RESPIRATORY (INHALATION) at 23:13

## 2022-10-22 RX ADMIN — HYDROCODONE BITARTRATE AND ACETAMINOPHEN 1 TABLET: 7.5; 325 TABLET ORAL at 12:45

## 2022-10-22 RX ADMIN — GABAPENTIN 100 MG: 100 CAPSULE ORAL at 20:55

## 2022-10-22 RX ADMIN — ARIPIPRAZOLE 5 MG: 5 TABLET ORAL at 08:16

## 2022-10-22 ASSESSMENT — PAIN SCALES - GENERAL
PAINLEVEL_OUTOF10: 0
PAINLEVEL_OUTOF10: 9
PAINLEVEL_OUTOF10: 8

## 2022-10-22 ASSESSMENT — PAIN DESCRIPTION - FREQUENCY
FREQUENCY: CONTINUOUS
FREQUENCY: CONTINUOUS

## 2022-10-22 ASSESSMENT — PAIN DESCRIPTION - DESCRIPTORS
DESCRIPTORS: ACHING;DISCOMFORT
DESCRIPTORS: ACHING;CRUSHING;DISCOMFORT
DESCRIPTORS: DISCOMFORT;SORE

## 2022-10-22 ASSESSMENT — PAIN DESCRIPTION - ONSET
ONSET: ON-GOING
ONSET: ON-GOING

## 2022-10-22 ASSESSMENT — PAIN DESCRIPTION - PAIN TYPE
TYPE: CHRONIC PAIN

## 2022-10-22 ASSESSMENT — PAIN DESCRIPTION - LOCATION
LOCATION: LEG

## 2022-10-22 ASSESSMENT — PAIN DESCRIPTION - ORIENTATION
ORIENTATION: LEFT;RIGHT
ORIENTATION: LEFT;RIGHT
ORIENTATION: RIGHT;LEFT

## 2022-10-22 NOTE — PROGRESS NOTES
[facility]  [address] [facility], [address], 17 [facility]      Pulmonary Progress Note      SUBJECTIVE:  claims doing some better with breathing, coughing up moderate secretions, still on 7 li NC, lying down in bed and (?) ambulated a little in room and tolerated some    OBJECTIVE    Medications    Continuous Infusions:   sodium chloride         Scheduled Meds:   predniSONE  40 mg Oral Daily    calcium elemental  500 mg Oral BID WC    guaiFENesin  400 mg Oral 4x Daily    vitamin D  4,000 Units Oral Daily    budesonide  1 mg Nebulization BID    ipratropium-albuterol  1 ampule Inhalation Q4H    ARIPiprazole  5 mg Oral Daily    Arformoterol Tartrate  15 mcg Nebulization BID    gabapentin  100 mg Oral TID    metoprolol tartrate  25 mg Oral BID    pantoprazole  40 mg Oral QAM AC    pramipexole  0.25 mg Oral Nightly    sodium chloride flush  5-40 mL IntraVENous 2 times per day    enoxaparin  40 mg SubCUTAneous Daily    FLUoxetine  10 mg Oral Daily       PRN Meds:sodium chloride, LORazepam, HYDROcodone homatropine, melatonin, ipratropium-albuterol, sodium chloride flush, sodium chloride, ondansetron **OR** ondansetron, polyethylene glycol, acetaminophen **OR** acetaminophen, HYDROcodone-acetaminophen    Physical    VITALS:  BP (!) 150/83   Pulse 79   Temp 97.7 °F (36.5 °C) (Oral)   Resp 18   Ht 5' 4\" (1.626 m)   Wt 129 lb 4 oz (58.6 kg)   SpO2 100%   BMI 22.19 kg/m²     24HR INTAKE/OUTPUT:      Intake/Output Summary (Last 24 hours) at 10/22/2022 1314  Last data filed at 10/22/2022 1129  Gross per 24 hour   Intake --   Output 2795 ml   Net -2795 ml         24HR PULSE OXIMETRY RANGE:    SpO2  Av.3 %  Min: 97 %  Max: 100 %    General appearance: alert, appears stated age, and cooperative  Lungs: rhonchi bilaterally worse with cough  Heart: regular rate and rhythm, S1, S2 normal, no murmur, click, rub or gallop  Abdomen: soft, non-tender; bowel sounds normal; no masses,  no organomegaly  Extremities: extremities normal, atraumatic, no cyanosis or edema  Neurologic: Mental status: Alert, oriented, thought content appropriate    Data    CBC:   Recent Labs     10/20/22  0245 10/21/22  0352 10/22/22  0400   WBC 7.2 9.7 10.7   HGB 11.4* 11.9* 11.9*   HCT 37.6 39.0 38.6   .9* 101.6* 100.3*    215 209         BMP:  Recent Labs     10/20/22  0245 10/21/22  0352 10/21/22  1011 10/22/22  0400 10/22/22  0900    135  --  135  --    K 4.8 5.4* 4.8 5.4* 5.4*   CL 93* 92*  --  93*  --    CO2 35* 34*  --  37*  --    BUN 18 18  --  22*  --    CREATININE 0.7 0.7  --  0.7  --       ALB:3,BILIDIR:3,BILITOT:3,ALKPHOS:3)@    PT/INR: No results for input(s): PROTIME, INR in the last 72 hours. ABG:   No results for input(s): PH, PO2, PCO2, HCO3, BE, O2SAT, METHB, O2HB, COHB, O2CON, HHB, THB in the last 72 hours. Radiology/Other tests reviewed: none    Assessment:     Principal Problem:    Pneumonia due to organism  Active Problems:    RSV (respiratory syncytial virus infection)    Acute on chronic respiratory failure with hypoxia (HCC)  Resolved Problems:    * No resolved hospital problems. *      Plan:       Cont with steroids, taper as tolerated  Cont with nebs and EZPAP, observe respiratory function and cough/secretions  Cont with oyxgen, taper as tolerated, has some room to come down on  OOB to chair, ambulate with assistance  Possible discharge home tomorrow if ok with PCP      Time at the bedside, reviewing labs and radiographs, reviewing notes and consultations, discussing with staff and family was more than 35 minutes. Thanks for letting us see this patient in consultation. Please contact us with any questions. Office [phone] or after hours through [employer], x [phone].

## 2022-10-22 NOTE — PLAN OF CARE
Problem: Discharge Planning  Goal: Discharge to home or other facility with appropriate resources  10/22/2022 0009 by Aneesh Kaye RN  Outcome: Progressing     Problem: Pain  Goal: Verbalizes/displays adequate comfort level or baseline comfort level  10/22/2022 0009 by Aneesh Kaye RN  Outcome: Progressing     Problem: Safety - Adult  Goal: Free from fall injury  10/22/2022 0009 by Aneesh Kaye RN  Outcome: Progressing     Problem: Chronic Conditions and Co-morbidities  Goal: Patient's chronic conditions and co-morbidity symptoms are monitored and maintained or improved  10/22/2022 0009 by Aneesh Kaye RN  Outcome: Progressing     Problem: ABCDS Injury Assessment  Goal: Absence of physical injury  10/22/2022 0009 by Aneesh Kaye RN  Outcome: Progressing     Problem: Nutrition Deficit:  Goal: Optimize nutritional status  10/22/2022 0009 by Aneesh Kaye RN  Outcome: Progressing

## 2022-10-22 NOTE — PROGRESS NOTES
Pulse ox was 100% on 7 liters nasal cannula at rest.   Ambulated patient. Oxygen saturation was 95% on 7 liters nasal cannula while ambulating.    Recovery pulse ox was 97% on 7 liters of oxygen at rest.

## 2022-10-22 NOTE — DISCHARGE SUMMARY
[facility] Progress Note    Admitting Date and Time: 10/15/2022  9:43 AM  Admit Dx: Pneumonia due to organism [J18.9]  COPD exacerbation ([address] [J44.1]  Pneumonia of both lungs due to infectious organism, unspecified part of lung [J18.9]    Subjective:  Patient is being followed for Pneumonia due to organism [J18.9]  COPD exacerbation ([facility] [J44.1]  Pneumonia of both lungs due to infectious organism, unspecified part of lung [J18.9]     Patient awake and alert- sitting up in bed in no acute distress  Reporting feeling ok  On 7 L  Wanting to get up and walk with walker         ROS: denies fever, chills, cp, sob, n/v, HA unless stated above.       predniSONE  40 mg Oral Daily    calcium elemental  500 mg Oral BID WC    guaiFENesin  400 mg Oral 4x Daily    vitamin D  4,000 Units Oral Daily    budesonide  1 mg Nebulization BID    ipratropium-albuterol  1 ampule Inhalation Q4H    ARIPiprazole  5 mg Oral Daily    Arformoterol Tartrate  15 mcg Nebulization BID    gabapentin  100 mg Oral TID    metoprolol tartrate  25 mg Oral BID    pantoprazole  40 mg Oral QAM AC    pramipexole  0.25 mg Oral Nightly    sodium chloride flush  5-40 mL IntraVENous 2 times per day    enoxaparin  40 mg SubCUTAneous Daily    FLUoxetine  10 mg Oral Daily     sodium chloride, 1 spray, PRN  LORazepam, 1 mg, Q6H PRN  HYDROcodone homatropine, 5 mL, Q4H PRN  melatonin, 6 mg, Nightly PRN  ipratropium-albuterol, 1 ampule, Q4H PRN  sodium chloride flush, 5-40 mL, PRN  sodium chloride, , PRN  ondansetron, 4 mg, Q8H PRN   Or  ondansetron, 4 mg, Q6H PRN  polyethylene glycol, 17 g, Daily PRN  acetaminophen, 650 mg, Q6H PRN   Or  acetaminophen, 650 mg, Q6H PRN  HYDROcodone-acetaminophen, 1 tablet, BID PRN         Objective:    BP (!) 150/83   Pulse 79   Temp 97.7 °F (36.5 °C) (Oral)   Resp 18   Ht 5' 4\" (1.626 m)   Wt 129 lb 4 oz (58.6 kg)   SpO2 100%   BMI 22.19 kg/m²     General Appearance: alert and oriented to person, place and time and in no acute distress  Skin: warm and dry  Head: normocephalic and atraumatic  Neck: neck supple and non tender without mass   Pulmonary/Chest: diminished throughout exp wheezing   Cardiovascular: normal rate, normal S1 and S2 and no carotid bruits  Abdomen: soft, non-tender, non-distended, normal bowel sound  Extremities: no cyanosis, no clubbing and no edema  Neurologic: speech normal         Recent Labs     10/20/22  0245 10/21/22  0352 10/21/22  1011 10/22/22  0400    135  --  135   K 4.8 5.4* 4.8 5.4*   CL 93* 92*  --  93*   CO2 35* 34*  --  37*   BUN 18 18  --  22*   CREATININE 0.7 0.7  --  0.7   GLUCOSE 153* 122*  --  115*   CALCIUM 7.8* 7.9*  --  8.1*       Recent Labs     10/20/22  0245 10/21/22  0352 10/22/22  0400   WBC 7.2 9.7 10.7   RBC 3.69* 3.84 3.85   HGB 11.4* 11.9* 11.9*   HCT 37.6 39.0 38.6   .9* 101.6* 100.3*   MCH 30.9 31.0 30.9   MCHC 30.3* 30.5* 30.8*   RDW 13.1 13.0 13.1    215 209   MPV 10.2 9.9 9.9           Assessment:    Principal Problem:    Pneumonia due to organism  Active Problems:    RSV (respiratory syncytial virus infection)    Acute on chronic respiratory failure with hypoxia (HCC)  Resolved Problems:    * No resolved hospital problems. *      Plan:  1. Streptococcal pneumonia/ RSV: pt presented to the ER with sob. Reporting he woke up in the middle of the night feeling sob. He has a history of chronic respiratory failure/ COPD and is on 7L at baseline. Associated cough, congestion. Imaging reviewed. Procal low. No fevers or leukocytosis. Strep pneumoniae AG positive. Pulmonology following- appreciate input. Recommending to hold off abx- > colonized. Continue steroids-      2. Acute on chronic respiratory failure: due to above: pt wears 7 L at baseline. Continue to wean 02. Pulmonology following - appreciate input. Ambulate on RA. 3. Acute COPD exacerbation: due to RSV; and nebulizers. IV steroids transitioned to po prednisone.       4. RSV: supportive care     5. HTN; lopressor     6. RLS; mirapex     7. GERD: PPI     8. Depression: anxiety: continue meds: abilify/ gabapentin/ prozac     9. Hyperkalemia: follow K+. Give kayexylate today     10. Vitamin D deficiency: vitamin D supplement     11. Deconditioning: am pac 18              Dispo: Likely dc tomorrow per pulmonology           Time spent reviewing chart, clinical exam, discussing case and answering questions with staff/consultants/patient/family = 20 minutes             NOTE: This report was transcribed using voice recognition software. Every effort was made to ensure accuracy; however, inadvertent computerized transcription errors may be present.   Electronically signed by KIM Burns on 10/22/2022 at 8:10 AM

## 2022-10-23 VITALS
HEART RATE: 81 BPM | WEIGHT: 129.25 LBS | OXYGEN SATURATION: 96 % | RESPIRATION RATE: 22 BRPM | SYSTOLIC BLOOD PRESSURE: 126 MMHG | BODY MASS INDEX: 22.07 KG/M2 | DIASTOLIC BLOOD PRESSURE: 78 MMHG | TEMPERATURE: 98.7 F | HEIGHT: 64 IN

## 2022-10-23 LAB
ANION GAP SERPL CALCULATED.3IONS-SCNC: 8 MMOL/L (ref 7–16)
BUN BLDV-MCNC: 21 MG/DL (ref 6–20)
CALCIUM SERPL-MCNC: 7.8 MG/DL (ref 8.6–10.2)
CHLORIDE BLD-SCNC: 91 MMOL/L (ref 98–107)
CO2: 37 MMOL/L (ref 22–29)
CREAT SERPL-MCNC: 0.7 MG/DL (ref 0.7–1.2)
GFR SERPL CREATININE-BSD FRML MDRD: >60 ML/MIN/1.73
GLUCOSE BLD-MCNC: 100 MG/DL (ref 74–99)
POTASSIUM SERPL-SCNC: 4.2 MMOL/L (ref 3.5–5)
SODIUM BLD-SCNC: 136 MMOL/L (ref 132–146)

## 2022-10-23 PROCEDURE — 80048 BASIC METABOLIC PNL TOTAL CA: CPT

## 2022-10-23 PROCEDURE — 99239 HOSP IP/OBS DSCHRG MGMT >30: CPT | Performed by: NURSE PRACTITIONER

## 2022-10-23 PROCEDURE — 6360000002 HC RX W HCPCS: Performed by: NURSE PRACTITIONER

## 2022-10-23 PROCEDURE — 6370000000 HC RX 637 (ALT 250 FOR IP): Performed by: NURSE PRACTITIONER

## 2022-10-23 PROCEDURE — 6370000000 HC RX 637 (ALT 250 FOR IP): Performed by: FAMILY MEDICINE

## 2022-10-23 PROCEDURE — 2700000000 HC OXYGEN THERAPY PER DAY

## 2022-10-23 PROCEDURE — 6360000002 HC RX W HCPCS: Performed by: INTERNAL MEDICINE

## 2022-10-23 PROCEDURE — 6370000000 HC RX 637 (ALT 250 FOR IP): Performed by: INTERNAL MEDICINE

## 2022-10-23 PROCEDURE — 94640 AIRWAY INHALATION TREATMENT: CPT

## 2022-10-23 PROCEDURE — 2580000003 HC RX 258: Performed by: NURSE PRACTITIONER

## 2022-10-23 PROCEDURE — 36415 COLL VENOUS BLD VENIPUNCTURE: CPT

## 2022-10-23 RX ORDER — CALCIUM CARBONATE 500(1250)
500 TABLET ORAL 2 TIMES DAILY WITH MEALS
Qty: 30 TABLET | Refills: 0 | Status: SHIPPED | OUTPATIENT
Start: 2022-10-23

## 2022-10-23 RX ADMIN — Medication 500 MG: at 09:35

## 2022-10-23 RX ADMIN — IPRATROPIUM BROMIDE AND ALBUTEROL SULFATE 1 AMPULE: .5; 2.5 SOLUTION RESPIRATORY (INHALATION) at 08:59

## 2022-10-23 RX ADMIN — LORAZEPAM 1 MG: 1 TABLET ORAL at 13:12

## 2022-10-23 RX ADMIN — HYDROCODONE BITARTRATE AND HOMATROPINE METHYLBROMIDE 5 ML: 5; 1.5 SOLUTION ORAL at 11:35

## 2022-10-23 RX ADMIN — PANTOPRAZOLE SODIUM 40 MG: 40 TABLET, DELAYED RELEASE ORAL at 06:07

## 2022-10-23 RX ADMIN — HYDROCODONE BITARTRATE AND HOMATROPINE METHYLBROMIDE 5 ML: 5; 1.5 SOLUTION ORAL at 01:41

## 2022-10-23 RX ADMIN — HYDROCODONE BITARTRATE AND HOMATROPINE METHYLBROMIDE 5 ML: 5; 1.5 SOLUTION ORAL at 06:18

## 2022-10-23 RX ADMIN — ARIPIPRAZOLE 5 MG: 5 TABLET ORAL at 09:34

## 2022-10-23 RX ADMIN — Medication 4000 UNITS: at 09:34

## 2022-10-23 RX ADMIN — ENOXAPARIN SODIUM 40 MG: 100 INJECTION SUBCUTANEOUS at 09:34

## 2022-10-23 RX ADMIN — Medication 10 ML: at 09:36

## 2022-10-23 RX ADMIN — FLUOXETINE HYDROCHLORIDE 10 MG: 10 TABLET ORAL at 09:34

## 2022-10-23 RX ADMIN — GABAPENTIN 100 MG: 100 CAPSULE ORAL at 13:12

## 2022-10-23 RX ADMIN — IPRATROPIUM BROMIDE AND ALBUTEROL SULFATE 1 AMPULE: .5; 2.5 SOLUTION RESPIRATORY (INHALATION) at 02:59

## 2022-10-23 RX ADMIN — PREDNISONE 40 MG: 20 TABLET ORAL at 09:34

## 2022-10-23 RX ADMIN — ARFORMOTEROL TARTRATE 15 MCG: 15 SOLUTION RESPIRATORY (INHALATION) at 08:59

## 2022-10-23 RX ADMIN — LORAZEPAM 1 MG: 1 TABLET ORAL at 06:07

## 2022-10-23 RX ADMIN — BUDESONIDE 1000 MCG: 0.5 SUSPENSION RESPIRATORY (INHALATION) at 08:59

## 2022-10-23 RX ADMIN — GABAPENTIN 100 MG: 100 CAPSULE ORAL at 09:35

## 2022-10-23 RX ADMIN — HYDROCODONE BITARTRATE AND ACETAMINOPHEN 1 TABLET: 7.5; 325 TABLET ORAL at 09:35

## 2022-10-23 RX ADMIN — METOPROLOL TARTRATE 25 MG: 25 TABLET, FILM COATED ORAL at 09:35

## 2022-10-23 ASSESSMENT — PAIN SCALES - GENERAL: PAINLEVEL_OUTOF10: 9

## 2022-10-23 NOTE — PROGRESS NOTES
[facility]  [address] [facility], [address], 17 [facility]      Pulmonary Progress Note      SUBJECTIVE:  On 7 li NC, lying down in bed and (?) ambulated a little in room and tolerated some, asking about going home, still with cough and mod sputum production, (?) close to baseline with respiratory function    OBJECTIVE    Medications    Continuous Infusions:   sodium chloride         Scheduled Meds:   predniSONE  40 mg Oral Daily    calcium elemental  500 mg Oral BID WC    guaiFENesin  400 mg Oral 4x Daily    vitamin D  4,000 Units Oral Daily    budesonide  1 mg Nebulization BID    ipratropium-albuterol  1 ampule Inhalation Q4H    ARIPiprazole  5 mg Oral Daily    Arformoterol Tartrate  15 mcg Nebulization BID    gabapentin  100 mg Oral TID    metoprolol tartrate  25 mg Oral BID    pantoprazole  40 mg Oral QAM AC    pramipexole  0.25 mg Oral Nightly    sodium chloride flush  5-40 mL IntraVENous 2 times per day    enoxaparin  40 mg SubCUTAneous Daily    FLUoxetine  10 mg Oral Daily       PRN Meds:sodium chloride, LORazepam, HYDROcodone homatropine, melatonin, ipratropium-albuterol, sodium chloride flush, sodium chloride, ondansetron **OR** ondansetron, polyethylene glycol, acetaminophen **OR** acetaminophen, HYDROcodone-acetaminophen    Physical    VITALS:  /78   Pulse 81   Temp 98.7 °F (37.1 °C) (Oral)   Resp 22   Ht 5' 4\" (1.626 m)   Wt 129 lb 4 oz (58.6 kg)   SpO2 96%   BMI 22.19 kg/m²     24HR INTAKE/OUTPUT:      Intake/Output Summary (Last 24 hours) at 10/23/2022 1354  Last data filed at 10/23/2022 0607  Gross per 24 hour   Intake 800 ml   Output 1600 ml   Net -800 ml         24HR PULSE OXIMETRY RANGE:    SpO2  Av.8 %  Min: 95 %  Max: 98 %    General appearance: alert, appears stated age, and cooperative  Lungs: rhonchi bilaterally worse with cough  Heart: regular rate and rhythm, S1, S2 normal, no murmur, click, rub or gallop  Abdomen: soft, non-tender; bowel sounds normal; no masses,  no organomegaly  Extremities: extremities normal, atraumatic, no cyanosis or edema  Neurologic: Mental status: Alert, oriented, thought content appropriate    Data    CBC:   Recent Labs     10/21/22  0352 10/22/22  0400   WBC 9.7 10.7   HGB 11.9* 11.9*   HCT 39.0 38.6   .6* 100.3*    209         BMP:  Recent Labs     10/21/22  0352 10/21/22  1011 10/22/22  0400 10/22/22  0900 10/23/22  0224     --  135  --  136   K 5.4*   < > 5.4* 5.4* 4.2   CL 92*  --  93*  --  91*   CO2 34*  --  37*  --  37*   BUN 18  --  22*  --  21*   CREATININE 0.7  --  0.7  --  0.7    < > = values in this interval not displayed. ALB:3,BILIDIR:3,BILITOT:3,ALKPHOS:3)@    PT/INR: No results for input(s): PROTIME, INR in the last 72 hours. ABG:   No results for input(s): PH, PO2, PCO2, HCO3, BE, O2SAT, METHB, O2HB, COHB, O2CON, HHB, THB in the last 72 hours. Radiology/Other tests reviewed: none    Assessment:     Principal Problem:    Pneumonia due to organism  Active Problems:    RSV (respiratory syncytial virus infection)    Acute on chronic respiratory failure with hypoxia (HCC)  Resolved Problems:    * No resolved hospital problems. *      Plan:       Cont with steroids, taper as tolerated  Cont with nebs and EZPAP, observe respiratory function and cough/secretions  Cont with oyxgen, taper as tolerated, has some room to come down on  OOB to chair, ambulate with assistance  Can be discharged from pulmonary pov      Time at the bedside, reviewing labs and radiographs, reviewing notes and consultations, discussing with staff and family was more than 35 minutes. Thanks for letting us see this patient in consultation. Please contact us with any questions. Office [phone] or after hours through [employer], x [phone].

## 2022-10-23 NOTE — DISCHARGE SUMMARY
[facility] Physician Discharge Summary       Homero Taveras MD  [address]  [phone]    Schedule an appointment as soon as possible for a visit      Ritu Kang MD  [facility], [address]  [address]  [phone]    Schedule an appointment as soon as possible for a visit  Pulmonology      Activity level: As tolerated     [facility]     Condition on discharge: Stable     Patient ID:  Zack Aguilera  [id number]  64 y.o.  1965    Admit date: 10/15/2022    Discharge date and time:  10/23/2022  2:09 PM    Admission Diagnoses:   Principal Problem:    Pneumonia due to organism  Active Problems:    RSV (respiratory syncytial virus infection)    Acute on chronic respiratory failure with hypoxia ([address]  Resolved Problems:    * No resolved hospital problems. *      Discharge Diagnoses:   Principal Problem:    Pneumonia due to organism  Active Problems:    RSV (respiratory syncytial virus infection)    Acute on chronic respiratory failure with hypoxia (HCC)  Resolved Problems:    * No resolved hospital problems. *      Consults:  IP CONSULT TO PULMONOLOGY  IP CONSULT TO IV TEAM    Hospital Course:   Patient Zack Aguilera is a 64 y.o. presented with Pneumonia due to organism [J18.9]  COPD exacerbation ([address] [J44.1]  Pneumonia of both lungs due to infectious organism, unspecified part of lung [J18.9]  Patient presented to the ER with progressive sob. Reporting he woke up in the middle of the night very sob. Associated congestion and productive cough. Pulmonology was consulted. Patient was followed and treated for;    1.  Streptococcal pneumonia/ RSV: pt presented to the ER with sob. Reporting he woke up in the middle of the night feeling sob. He has a history of chronic respiratory failure/ COPD and is on 7L at baseline. Associated cough, congestion. Imaging reviewed. Procal low. No fevers or leukocytosis. Strep pneumoniae AG positive. Pulmonology following- appreciate input. Recommending to hold off abx- > colonized. Continue steroids- Patient feeling much better and ready to go home today. Reporting sister is able to transport hime. 2. Acute on chronic respiratory failure: due to above: pt wears 7 L at baseline. Continue to wean 02. Pulmonology following - appreciate input. Patient ambulated on baseline 7L with no hypoxia. Outpatient follow up with pulmonology. 3. Acute COPD exacerbation: due to RSV; and nebulizers. IV steroids changed to po steroid taper for discharge. 4. RSV: supportive care     5. HTN; lopressor     6. RLS; mirapex     7. GERD: PPI     8. Depression: anxiety: continue meds: abilify/ gabapentin/ prozac     9. Hyperkalemia: resolved. 10. Vitamin D deficiency: vitamin D supplement ordered for dc     11. Deconditioning: am pac 18- plan for HHC on discharge      Patient reporting feeling better. OK to discharge home from pulmonary standpoint. Patient discharged in stable condition with the following medications, instructions and follow up. Discharge Exam:  General Appearance: alert and oriented to person, place and time and in no acute distress  Skin: warm and dry  Head: normocephalic and atraumatic  Neck: neck supple and non tender without mass   Pulmonary/Chest: exp wheezing throughout   Cardiovascular: normal rate, normal S1 and S2 and no carotid bruits  Abdomen: soft, non-tender, non-distended, normal bowel sounds, no masses or organomegaly  Extremities: no cyanosis, no clubbing and no edema  Neurologic: speech normal     I/O last 3 completed shifts: In: 800 [P.O.:800]  Out: 3639 [Urine:3745]  No intake/output data recorded.       LABS:  Recent Labs     10/21/22  0352 10/21/22  1011 10/22/22  0400 10/22/22  0900 10/23/22  0224     --  135  --  136   K 5.4*   < > 5.4* 5.4* 4.2   CL 92*  --  93*  --  91*   CO2 34*  --  37*  --  37*   BUN 18  --  22*  --  21*   CREATININE 0.7  --  0.7  --  0.7 GLUCOSE 122*  --  115*  --  100*   CALCIUM 7.9*  --  8.1*  --  7.8*    < > = values in this interval not displayed. Recent Labs     10/21/22  0352 10/22/22  0400   WBC 9.7 10.7   RBC 3.84 3.85   HGB 11.9* 11.9*   HCT 39.0 38.6   .6* 100.3*   MCH 31.0 30.9   MCHC 30.5* 30.8*   RDW 13.0 13.1    209   MPV 9.9 9.9       No results for input(s): POCGLU in the last 72 hours. Imaging:  XR CHEST PORTABLE    Result Date: 10/15/2022  EXAMINATION: ONE XRAY VIEW OF THE CHEST 10/15/2022 10:12 am COMPARISON: None. HISTORY: ORDERING SYSTEM PROVIDED HISTORY: shortness of breath, hx of heart failure and COPD TECHNOLOGIST PROVIDED HISTORY: Reason for exam:->shortness of breath, hx of heart failure and COPD FINDINGS: The cardiac silhouette is mildly enlarged. There is an infiltrate seen within the right lower lobe and within the left perihilar region. There is no right or left pleural effusion. 1. Right lower lobe and left perihilar pneumonia       Patient Instructions:      Medication List        START taking these medications      calcium elemental 500 MG Tabs tablet  Commonly known as: OSCAL  Take 1 tablet by mouth 2 times daily (with meals)     ipratropium-albuterol 0.5-2.5 (3) MG/3ML Soln nebulizer solution  Commonly known as: DUONEB  Inhale 3 mLs into the lungs every 6 hours as needed for Shortness of Breath     predniSONE 10 MG tablet  Commonly known as: DELTASONE  4 tabs daily x3 days, 3 tabs daily x3 days, 2 tabs daily x3 days, 1 tab  daily x3 days     vitamin D 1.25 MG (34477 UT) Caps capsule  Commonly known as: ERGOCALCIFEROL  Take 1 capsule by mouth once a week for 6 doses            CONTINUE taking these medications      ARIPiprazole 5 MG tablet  Commonly known as: ABILIFY     Bactroban Nasal 2 % nasal ointment  Generic drug: mupirocin  by Nasal route 2 times daily Take by Nasal route 2 times daily.      [address] 15 MCG/2ML Nebu  Generic drug: Arformoterol Tartrate  Take 2 mLs by nebulization 2 times daily     budesonide 0.5 MG/2ML nebulizer suspension  Commonly known as: PULMICORT  Take 2 mLs by nebulization in the morning and 2 mLs before bedtime. [address]  Patient is to take one tab bid. Patient has been on antibiotics for the last 3 months     FLUoxetine 10 MG tablet  Commonly known as: PROZAC     gabapentin 100 MG capsule  Commonly known as: NEURONTIN  Take 1 capsule by mouth 3 times daily for 30 days. HYDROcodone-acetaminophen 7.5-325 MG per tablet  Commonly known as: NORCO     LORazepam 1 MG tablet  Commonly known as: ATIVAN  Take 1 tablet by mouth 3 times daily for 30 days. metoprolol tartrate 25 MG tablet  Commonly known as: LOPRESSOR  Take 1 tablet by mouth 2 times daily Twice A Day     omeprazole 40 MG delayed release capsule  Commonly known as: PRILOSEC  Take 1 capsule by mouth daily     pramipexole 0.25 MG tablet  Commonly known as: Mirapex  Take 1 tablet by mouth nightly     Yupel[address] 175 MCG/3ML Soln  Generic drug: Revefenacin  Inhale 1 ampule into the lungs daily            STOP taking these medications      furosemide 20 MG tablet  Commonly known as: LASIX     potassium chloride 20 MEQ extended release tablet  Commonly known as: KLOR-CON M               Where to Get Your Medications        These medications were sent to [facility], [facility]. [facility] [address] [address] [address] [place] [address]      Phone: [phone]   calcium elemental 500 MG Tabs tablet  ipratropium-albuterol 0.5-2.5 (3) MG/3ML Soln nebulizer solution  predniSONE 10 MG tablet  vitamin D 1.25 MG (90852 UT) Caps capsule           Note that more than 32 minutes was spent in preparing discharge papers, discussing discharge with patient, medication review, etc.    Signed:  Electronically signed by KIM Garibay Cera on 10/23/2022 at 2:09 PM

## 2022-10-23 NOTE — PROGRESS NOTES
[facility] Progress Note    Admitting Date and Time: 10/15/2022  9:43 AM  Admit Dx: Pneumonia due to organism [J18.9]  COPD exacerbation ([address] [J44.1]  Pneumonia of both lungs due to infectious organism, unspecified part of lung [J18.9]    Subjective:  Patient is being followed for Pneumonia due to organism [J18.9]  COPD exacerbation ([facility] [address] [J44.1]  Pneumonia of both lungs due to infectious organism, unspecified part of lung [J18.9]       Patient awake and alert- sitting up in bed in no acute distress  Reporting feeling ok  On 7 L  Wanting to get up and walk with walker            ROS: denies fever, chills, cp, sob, n/v, HA unless stated above.       predniSONE  40 mg Oral Daily    calcium elemental  500 mg Oral BID WC    guaiFENesin  400 mg Oral 4x Daily    vitamin D  4,000 Units Oral Daily    budesonide  1 mg Nebulization BID    ipratropium-albuterol  1 ampule Inhalation Q4H    ARIPiprazole  5 mg Oral Daily    Arformoterol Tartrate  15 mcg Nebulization BID    gabapentin  100 mg Oral TID    metoprolol tartrate  25 mg Oral BID    pantoprazole  40 mg Oral QAM AC    pramipexole  0.25 mg Oral Nightly    sodium chloride flush  5-40 mL IntraVENous 2 times per day    enoxaparin  40 mg SubCUTAneous Daily    FLUoxetine  10 mg Oral Daily     sodium chloride, 1 spray, PRN  LORazepam, 1 mg, Q6H PRN  HYDROcodone homatropine, 5 mL, Q4H PRN  melatonin, 6 mg, Nightly PRN  ipratropium-albuterol, 1 ampule, Q4H PRN  sodium chloride flush, 5-40 mL, PRN  sodium chloride, , PRN  ondansetron, 4 mg, Q8H PRN   Or  ondansetron, 4 mg, Q6H PRN  polyethylene glycol, 17 g, Daily PRN  acetaminophen, 650 mg, Q6H PRN   Or  acetaminophen, 650 mg, Q6H PRN  HYDROcodone-acetaminophen, 1 tablet, BID PRN         Objective:    /78   Pulse 81   Temp 98.7 °F (37.1 °C) (Oral)   Resp 22   Ht 5' 4\" (1.626 m)   Wt 129 lb 4 oz (58.6 kg)   SpO2 96%   BMI 22.19 kg/m²     General Appearance: alert and oriented to person, place and time and in no acute distress  Skin: warm and dry  Head: normocephalic and atraumatic  Neck: neck supple and non tender without mass   Pulmonary/Chest: diminished throughout exp wheezing   Cardiovascular: normal rate, normal S1 and S2 and no carotid bruits  Abdomen: soft, non-tender, non-distended, normal bowel sound  Extremities: no cyanosis, no clubbing and no edema  Neurologic: speech normal            Recent Labs     10/21/22  0352 10/21/22  1011 10/22/22  0400 10/22/22  0900 10/23/22  0224     --  135  --  136   K 5.4*   < > 5.4* 5.4* 4.2   CL 92*  --  93*  --  91*   CO2 34*  --  37*  --  37*   BUN 18  --  22*  --  21*   CREATININE 0.7  --  0.7  --  0.7   GLUCOSE 122*  --  115*  --  100*   CALCIUM 7.9*  --  8.1*  --  7.8*    < > = values in this interval not displayed. Recent Labs     10/21/22  0352 10/22/22  0400   WBC 9.7 10.7   RBC 3.84 3.85   HGB 11.9* 11.9*   HCT 39.0 38.6   .6* 100.3*   MCH 31.0 30.9   MCHC 30.5* 30.8*   RDW 13.0 13.1    209   MPV 9.9 9.9         Assessment:    Principal Problem:    Pneumonia due to organism  Active Problems:    RSV (respiratory syncytial virus infection)    Acute on chronic respiratory failure with hypoxia (HCC)  Resolved Problems:    * No resolved hospital problems. *      Plan:  1. Streptococcal pneumonia/ RSV: pt presented to the ER with sob. Reporting he woke up in the middle of the night feeling sob. He has a history of chronic respiratory failure/ COPD and is on 7L at baseline. Associated cough, congestion. Imaging reviewed. Procal low. No fevers or leukocytosis. Strep pneumoniae AG positive. Pulmonology following- appreciate input. Recommending to hold off abx- > colonized. Continue steroids-      2. Acute on chronic respiratory failure: due to above: pt wears 7 L at baseline. Continue to wean 02. Pulmonology following - appreciate input. Ambulate on RA. 3. Acute COPD exacerbation: due to RSV; and nebulizers.  IV steroids transitioned to po prednisone. 4. RSV: supportive care     5. HTN; lopressor     6. RLS; mirapex     7. GERD: PPI     8. Depression: anxiety: continue meds: abilify/ gabapentin/ prozac     9. Hyperkalemia: follow K+. Give kayexylate today     10. Vitamin D deficiency: vitamin D supplement     11. Deconditioning: am pac 18              Dispo: Likely dc tomorrow per pulmonology           Time spent reviewing chart, clinical exam, discussing case and answering questions with staff/consultants/patient/family = 20 minutes                NOTE: This report was transcribed using voice recognition software. Every effort was made to ensure accuracy; however, inadvertent computerized transcription errors may be present.   Electronically signed by KIM Duffy on 10/23/2022 at 8:27 AM

## 2022-10-31 ENCOUNTER — TELEPHONE (OUTPATIENT)
Dept: PRIMARY CARE CLINIC | Age: 57
End: 2022-10-31

## 2022-10-31 ENCOUNTER — APPOINTMENT (OUTPATIENT)
Dept: GENERAL RADIOLOGY | Age: 57
DRG: 140 | End: 2022-10-31
Payer: MEDICAID

## 2022-10-31 ENCOUNTER — HOSPITAL ENCOUNTER (INPATIENT)
Age: 57
LOS: 6 days | Discharge: HOME HEALTH | DRG: 140 | End: 2022-11-06
Attending: EMERGENCY MEDICINE | Admitting: INTERNAL MEDICINE
Payer: MEDICAID

## 2022-10-31 ENCOUNTER — TELEPHONE (OUTPATIENT)
Dept: OTHER | Facility: CLINIC | Age: 57
End: 2022-10-31

## 2022-10-31 ENCOUNTER — APPOINTMENT (OUTPATIENT)
Dept: ULTRASOUND IMAGING | Age: 57
DRG: 140 | End: 2022-10-31
Payer: MEDICAID

## 2022-10-31 DIAGNOSIS — B33.8 RESPIRATORY SYNCYTIAL VIRUS (RSV): ICD-10-CM

## 2022-10-31 DIAGNOSIS — R06.02 SHORTNESS OF BREATH: Primary | ICD-10-CM

## 2022-10-31 PROBLEM — I50.9 HEART FAILURE (HCC): Status: ACTIVE | Noted: 2022-10-31

## 2022-10-31 LAB
ALBUMIN SERPL-MCNC: 3.7 G/DL (ref 3.5–5.2)
ALP BLD-CCNC: 52 U/L (ref 40–129)
ALT SERPL-CCNC: 16 U/L (ref 0–40)
ANION GAP SERPL CALCULATED.3IONS-SCNC: 8 MMOL/L (ref 7–16)
AST SERPL-CCNC: 13 U/L (ref 0–39)
B.E.: 12.6 MMOL/L (ref -3–3)
BASOPHILS ABSOLUTE: 0.02 E9/L (ref 0–0.2)
BASOPHILS RELATIVE PERCENT: 0.2 % (ref 0–2)
BILIRUB SERPL-MCNC: 0.3 MG/DL (ref 0–1.2)
BUN BLDV-MCNC: 8 MG/DL (ref 6–20)
CALCIUM SERPL-MCNC: 7.8 MG/DL (ref 8.6–10.2)
CHLORIDE BLD-SCNC: 92 MMOL/L (ref 98–107)
CO2: 40 MMOL/L (ref 22–29)
COHB: 1.1 % (ref 0–1.5)
CREAT SERPL-MCNC: 0.8 MG/DL (ref 0.7–1.2)
CRITICAL: ABNORMAL
DATE ANALYZED: ABNORMAL
DATE OF COLLECTION: ABNORMAL
EKG ATRIAL RATE: 76 BPM
EKG P AXIS: 51 DEGREES
EKG P-R INTERVAL: 118 MS
EKG Q-T INTERVAL: 398 MS
EKG QRS DURATION: 78 MS
EKG QTC CALCULATION (BAZETT): 447 MS
EKG R AXIS: 61 DEGREES
EKG T AXIS: 68 DEGREES
EKG VENTRICULAR RATE: 76 BPM
EOSINOPHILS ABSOLUTE: 0.06 E9/L (ref 0.05–0.5)
EOSINOPHILS RELATIVE PERCENT: 0.7 % (ref 0–6)
GFR SERPL CREATININE-BSD FRML MDRD: >60 ML/MIN/1.73
GLUCOSE BLD-MCNC: 124 MG/DL (ref 74–99)
HCO3: 39.3 MMOL/L (ref 22–26)
HCT VFR BLD CALC: 36.4 % (ref 37–54)
HEMOGLOBIN: 11.1 G/DL (ref 12.5–16.5)
HHB: 4.6 % (ref 0–5)
IMMATURE GRANULOCYTES #: 0.05 E9/L
IMMATURE GRANULOCYTES %: 0.6 % (ref 0–5)
INFLUENZA A BY PCR: NOT DETECTED
INFLUENZA B BY PCR: NOT DETECTED
LAB: ABNORMAL
LYMPHOCYTES ABSOLUTE: 1.51 E9/L (ref 1.5–4)
LYMPHOCYTES RELATIVE PERCENT: 17.3 % (ref 20–42)
Lab: ABNORMAL
MCH RBC QN AUTO: 31.7 PG (ref 26–35)
MCHC RBC AUTO-ENTMCNC: 30.5 % (ref 32–34.5)
MCV RBC AUTO: 104 FL (ref 80–99.9)
METHB: 0.3 % (ref 0–1.5)
MODE: ABNORMAL
MONOCYTES ABSOLUTE: 0.53 E9/L (ref 0.1–0.95)
MONOCYTES RELATIVE PERCENT: 6.1 % (ref 2–12)
NEUTROPHILS ABSOLUTE: 6.57 E9/L (ref 1.8–7.3)
NEUTROPHILS RELATIVE PERCENT: 75.1 % (ref 43–80)
O2 CONTENT: 17.9 ML/DL
O2 SATURATION: 95.3 % (ref 92–98.5)
O2HB: 94 % (ref 94–97)
OPERATOR ID: 1394
PATIENT TEMP: 37 C
PCO2: 59 MMHG (ref 35–45)
PDW BLD-RTO: 13.5 FL (ref 11.5–15)
PH BLOOD GAS: 7.44 (ref 7.35–7.45)
PLATELET # BLD: 194 E9/L (ref 130–450)
PMV BLD AUTO: 9.5 FL (ref 7–12)
PO2: 74 MMHG (ref 75–100)
POTASSIUM REFLEX MAGNESIUM: 4.1 MMOL/L (ref 3.5–5)
PRO-BNP: 1041 PG/ML (ref 0–125)
RBC # BLD: 3.5 E12/L (ref 3.8–5.8)
RSV BY PCR: POSITIVE
SARS-COV-2, NAAT: NOT DETECTED
SODIUM BLD-SCNC: 140 MMOL/L (ref 132–146)
SOURCE, BLOOD GAS: ABNORMAL
THB: 13.5 G/DL (ref 11.5–16.5)
TIME ANALYZED: 1709
TOTAL PROTEIN: 6.5 G/DL (ref 6.4–8.3)
TROPONIN, HIGH SENSITIVITY: 19 NG/L (ref 0–11)
TROPONIN, HIGH SENSITIVITY: 22 NG/L (ref 0–11)
WBC # BLD: 8.7 E9/L (ref 4.5–11.5)

## 2022-10-31 PROCEDURE — 93971 EXTREMITY STUDY: CPT

## 2022-10-31 PROCEDURE — 83880 ASSAY OF NATRIURETIC PEPTIDE: CPT

## 2022-10-31 PROCEDURE — 94640 AIRWAY INHALATION TREATMENT: CPT

## 2022-10-31 PROCEDURE — 6370000000 HC RX 637 (ALT 250 FOR IP)

## 2022-10-31 PROCEDURE — 80053 COMPREHEN METABOLIC PANEL: CPT

## 2022-10-31 PROCEDURE — 84484 ASSAY OF TROPONIN QUANT: CPT

## 2022-10-31 PROCEDURE — 6360000002 HC RX W HCPCS: Performed by: INTERNAL MEDICINE

## 2022-10-31 PROCEDURE — 96365 THER/PROPH/DIAG IV INF INIT: CPT

## 2022-10-31 PROCEDURE — 71045 X-RAY EXAM CHEST 1 VIEW: CPT

## 2022-10-31 PROCEDURE — 6370000000 HC RX 637 (ALT 250 FOR IP): Performed by: STUDENT IN AN ORGANIZED HEALTH CARE EDUCATION/TRAINING PROGRAM

## 2022-10-31 PROCEDURE — 87635 SARS-COV-2 COVID-19 AMP PRB: CPT

## 2022-10-31 PROCEDURE — 2060000000 HC ICU INTERMEDIATE R&B

## 2022-10-31 PROCEDURE — 6370000000 HC RX 637 (ALT 250 FOR IP): Performed by: INTERNAL MEDICINE

## 2022-10-31 PROCEDURE — 85025 COMPLETE CBC W/AUTO DIFF WBC: CPT

## 2022-10-31 PROCEDURE — 94664 DEMO&/EVAL PT USE INHALER: CPT

## 2022-10-31 PROCEDURE — 96375 TX/PRO/DX INJ NEW DRUG ADDON: CPT

## 2022-10-31 PROCEDURE — 82805 BLOOD GASES W/O2 SATURATION: CPT

## 2022-10-31 PROCEDURE — 6360000002 HC RX W HCPCS: Performed by: EMERGENCY MEDICINE

## 2022-10-31 PROCEDURE — 93005 ELECTROCARDIOGRAM TRACING: CPT | Performed by: EMERGENCY MEDICINE

## 2022-10-31 PROCEDURE — 99285 EMERGENCY DEPT VISIT HI MDM: CPT

## 2022-10-31 PROCEDURE — 99222 1ST HOSP IP/OBS MODERATE 55: CPT | Performed by: INTERNAL MEDICINE

## 2022-10-31 PROCEDURE — 87807 RSV ASSAY W/OPTIC: CPT

## 2022-10-31 PROCEDURE — 87502 INFLUENZA DNA AMP PROBE: CPT

## 2022-10-31 PROCEDURE — 6370000000 HC RX 637 (ALT 250 FOR IP): Performed by: EMERGENCY MEDICINE

## 2022-10-31 PROCEDURE — 6360000002 HC RX W HCPCS

## 2022-10-31 PROCEDURE — 2580000003 HC RX 258: Performed by: INTERNAL MEDICINE

## 2022-10-31 RX ORDER — HYDROCODONE BITARTRATE AND ACETAMINOPHEN 7.5; 325 MG/1; MG/1
1 TABLET ORAL 2 TIMES DAILY PRN
Status: DISCONTINUED | OUTPATIENT
Start: 2022-10-31 | End: 2022-11-06 | Stop reason: HOSPADM

## 2022-10-31 RX ORDER — ONDANSETRON 2 MG/ML
4 INJECTION INTRAMUSCULAR; INTRAVENOUS EVERY 6 HOURS PRN
Status: DISCONTINUED | OUTPATIENT
Start: 2022-10-31 | End: 2022-11-06 | Stop reason: HOSPADM

## 2022-10-31 RX ORDER — METHYLPREDNISOLONE SODIUM SUCCINATE 125 MG/2ML
125 INJECTION, POWDER, LYOPHILIZED, FOR SOLUTION INTRAMUSCULAR; INTRAVENOUS ONCE
Status: COMPLETED | OUTPATIENT
Start: 2022-10-31 | End: 2022-10-31

## 2022-10-31 RX ORDER — PANTOPRAZOLE SODIUM 40 MG/1
40 TABLET, DELAYED RELEASE ORAL
Status: DISCONTINUED | OUTPATIENT
Start: 2022-11-01 | End: 2022-11-06 | Stop reason: HOSPADM

## 2022-10-31 RX ORDER — IPRATROPIUM BROMIDE AND ALBUTEROL SULFATE 2.5; .5 MG/3ML; MG/3ML
3 SOLUTION RESPIRATORY (INHALATION) ONCE
Status: COMPLETED | OUTPATIENT
Start: 2022-10-31 | End: 2022-10-31

## 2022-10-31 RX ORDER — IPRATROPIUM BROMIDE AND ALBUTEROL SULFATE 2.5; .5 MG/3ML; MG/3ML
1 SOLUTION RESPIRATORY (INHALATION)
Status: COMPLETED | OUTPATIENT
Start: 2022-10-31 | End: 2022-10-31

## 2022-10-31 RX ORDER — PRAMIPEXOLE DIHYDROCHLORIDE 0.25 MG/1
0.25 TABLET ORAL NIGHTLY
Status: DISCONTINUED | OUTPATIENT
Start: 2022-10-31 | End: 2022-11-06 | Stop reason: HOSPADM

## 2022-10-31 RX ORDER — SODIUM CHLORIDE 0.9 % (FLUSH) 0.9 %
5-40 SYRINGE (ML) INJECTION EVERY 12 HOURS SCHEDULED
Status: DISCONTINUED | OUTPATIENT
Start: 2022-10-31 | End: 2022-11-06 | Stop reason: HOSPADM

## 2022-10-31 RX ORDER — METHYLPREDNISOLONE SODIUM SUCCINATE 40 MG/ML
40 INJECTION, POWDER, LYOPHILIZED, FOR SOLUTION INTRAMUSCULAR; INTRAVENOUS EVERY 8 HOURS
Status: DISCONTINUED | OUTPATIENT
Start: 2022-10-31 | End: 2022-11-03

## 2022-10-31 RX ORDER — ACETAMINOPHEN 325 MG/1
650 TABLET ORAL EVERY 6 HOURS PRN
Status: DISCONTINUED | OUTPATIENT
Start: 2022-10-31 | End: 2022-11-06 | Stop reason: HOSPADM

## 2022-10-31 RX ORDER — ARIPIPRAZOLE 5 MG/1
5 TABLET ORAL ONCE
Status: DISCONTINUED | OUTPATIENT
Start: 2022-10-31 | End: 2022-10-31 | Stop reason: DRUGHIGH

## 2022-10-31 RX ORDER — ONDANSETRON 4 MG/1
4 TABLET, ORALLY DISINTEGRATING ORAL EVERY 8 HOURS PRN
Status: DISCONTINUED | OUTPATIENT
Start: 2022-10-31 | End: 2022-11-06 | Stop reason: HOSPADM

## 2022-10-31 RX ORDER — FUROSEMIDE 10 MG/ML
40 INJECTION INTRAMUSCULAR; INTRAVENOUS ONCE
Status: COMPLETED | OUTPATIENT
Start: 2022-10-31 | End: 2022-10-31

## 2022-10-31 RX ORDER — ACETAMINOPHEN 650 MG/1
650 SUPPOSITORY RECTAL EVERY 6 HOURS PRN
Status: DISCONTINUED | OUTPATIENT
Start: 2022-10-31 | End: 2022-11-06 | Stop reason: HOSPADM

## 2022-10-31 RX ORDER — FLUOXETINE 10 MG/1
10 TABLET, FILM COATED ORAL DAILY
Status: DISCONTINUED | OUTPATIENT
Start: 2022-10-31 | End: 2022-11-06 | Stop reason: HOSPADM

## 2022-10-31 RX ORDER — BUDESONIDE 0.5 MG/2ML
0.5 INHALANT ORAL 2 TIMES DAILY
Status: DISCONTINUED | OUTPATIENT
Start: 2022-10-31 | End: 2022-11-01

## 2022-10-31 RX ORDER — MAGNESIUM SULFATE IN WATER 40 MG/ML
2000 INJECTION, SOLUTION INTRAVENOUS ONCE
Status: COMPLETED | OUTPATIENT
Start: 2022-10-31 | End: 2022-10-31

## 2022-10-31 RX ORDER — FUROSEMIDE 10 MG/ML
40 INJECTION INTRAMUSCULAR; INTRAVENOUS ONCE
Status: COMPLETED | OUTPATIENT
Start: 2022-11-01 | End: 2022-11-01

## 2022-10-31 RX ORDER — ARFORMOTEROL TARTRATE 15 UG/2ML
15 SOLUTION RESPIRATORY (INHALATION) 2 TIMES DAILY
Status: DISCONTINUED | OUTPATIENT
Start: 2022-10-31 | End: 2022-11-06 | Stop reason: HOSPADM

## 2022-10-31 RX ORDER — IPRATROPIUM BROMIDE AND ALBUTEROL SULFATE 2.5; .5 MG/3ML; MG/3ML
1 SOLUTION RESPIRATORY (INHALATION)
Status: DISCONTINUED | OUTPATIENT
Start: 2022-11-01 | End: 2022-11-06 | Stop reason: HOSPADM

## 2022-10-31 RX ORDER — GABAPENTIN 100 MG/1
100 CAPSULE ORAL 3 TIMES DAILY
Status: DISCONTINUED | OUTPATIENT
Start: 2022-10-31 | End: 2022-11-06 | Stop reason: HOSPADM

## 2022-10-31 RX ORDER — SODIUM CHLORIDE 9 MG/ML
INJECTION, SOLUTION INTRAVENOUS PRN
Status: DISCONTINUED | OUTPATIENT
Start: 2022-10-31 | End: 2022-11-06 | Stop reason: HOSPADM

## 2022-10-31 RX ORDER — ENOXAPARIN SODIUM 100 MG/ML
40 INJECTION SUBCUTANEOUS DAILY
Status: DISCONTINUED | OUTPATIENT
Start: 2022-10-31 | End: 2022-11-06 | Stop reason: HOSPADM

## 2022-10-31 RX ORDER — POLYETHYLENE GLYCOL 3350 17 G/17G
17 POWDER, FOR SOLUTION ORAL DAILY PRN
Status: DISCONTINUED | OUTPATIENT
Start: 2022-10-31 | End: 2022-11-06 | Stop reason: HOSPADM

## 2022-10-31 RX ORDER — SODIUM CHLORIDE 0.9 % (FLUSH) 0.9 %
5-40 SYRINGE (ML) INJECTION PRN
Status: DISCONTINUED | OUTPATIENT
Start: 2022-10-31 | End: 2022-11-06 | Stop reason: HOSPADM

## 2022-10-31 RX ORDER — FENTANYL CITRATE 50 UG/ML
25 INJECTION, SOLUTION INTRAMUSCULAR; INTRAVENOUS ONCE
Status: COMPLETED | OUTPATIENT
Start: 2022-10-31 | End: 2022-10-31

## 2022-10-31 RX ORDER — CALCIUM CARBONATE 500(1250)
500 TABLET ORAL 2 TIMES DAILY WITH MEALS
Status: DISCONTINUED | OUTPATIENT
Start: 2022-10-31 | End: 2022-11-06 | Stop reason: HOSPADM

## 2022-10-31 RX ORDER — IPRATROPIUM BROMIDE AND ALBUTEROL SULFATE 2.5; .5 MG/3ML; MG/3ML
3 SOLUTION RESPIRATORY (INHALATION)
Status: DISCONTINUED | OUTPATIENT
Start: 2022-10-31 | End: 2022-10-31

## 2022-10-31 RX ORDER — ARIPIPRAZOLE 5 MG/1
5 TABLET ORAL DAILY
Status: DISCONTINUED | OUTPATIENT
Start: 2022-10-31 | End: 2022-11-06 | Stop reason: HOSPADM

## 2022-10-31 RX ADMIN — FLUOXETINE HYDROCHLORIDE 10 MG: 10 TABLET ORAL at 20:29

## 2022-10-31 RX ADMIN — HYDROCODONE BITARTRATE AND ACETAMINOPHEN 1 TABLET: 7.5; 325 TABLET ORAL at 21:40

## 2022-10-31 RX ADMIN — ARIPIPRAZOLE 5 MG: 5 TABLET ORAL at 21:06

## 2022-10-31 RX ADMIN — MAGNESIUM SULFATE HEPTAHYDRATE 2000 MG: 40 INJECTION, SOLUTION INTRAVENOUS at 16:07

## 2022-10-31 RX ADMIN — FUROSEMIDE 40 MG: 10 INJECTION, SOLUTION INTRAMUSCULAR; INTRAVENOUS at 16:01

## 2022-10-31 RX ADMIN — FENTANYL CITRATE 25 MCG: 0.05 INJECTION, SOLUTION INTRAMUSCULAR; INTRAVENOUS at 16:02

## 2022-10-31 RX ADMIN — METHYLPREDNISOLONE SODIUM SUCCINATE 40 MG: 40 INJECTION, POWDER, LYOPHILIZED, FOR SOLUTION INTRAMUSCULAR; INTRAVENOUS at 20:29

## 2022-10-31 RX ADMIN — IPRATROPIUM BROMIDE AND ALBUTEROL SULFATE 1 AMPULE: .5; 3 SOLUTION RESPIRATORY (INHALATION) at 16:20

## 2022-10-31 RX ADMIN — ENOXAPARIN SODIUM 40 MG: 100 INJECTION SUBCUTANEOUS at 20:30

## 2022-10-31 RX ADMIN — Medication 500 MG: at 20:29

## 2022-10-31 RX ADMIN — METHYLPREDNISOLONE SODIUM SUCCINATE 125 MG: 125 INJECTION, POWDER, FOR SOLUTION INTRAMUSCULAR; INTRAVENOUS at 13:59

## 2022-10-31 RX ADMIN — METOPROLOL TARTRATE 25 MG: 25 TABLET, FILM COATED ORAL at 20:29

## 2022-10-31 RX ADMIN — IPRATROPIUM BROMIDE AND ALBUTEROL SULFATE 1 AMPULE: .5; 2.5 SOLUTION RESPIRATORY (INHALATION) at 20:34

## 2022-10-31 RX ADMIN — IPRATROPIUM BROMIDE AND ALBUTEROL SULFATE 3 AMPULE: .5; 3 SOLUTION RESPIRATORY (INHALATION) at 13:22

## 2022-10-31 RX ADMIN — ARFORMOTEROL TARTRATE 15 MCG: 15 SOLUTION RESPIRATORY (INHALATION) at 20:34

## 2022-10-31 RX ADMIN — IPRATROPIUM BROMIDE AND ALBUTEROL SULFATE 1 AMPULE: .5; 3 SOLUTION RESPIRATORY (INHALATION) at 16:24

## 2022-10-31 RX ADMIN — BUDESONIDE 500 MCG: 0.5 SUSPENSION RESPIRATORY (INHALATION) at 20:34

## 2022-10-31 RX ADMIN — IPRATROPIUM BROMIDE AND ALBUTEROL SULFATE 1 AMPULE: .5; 3 SOLUTION RESPIRATORY (INHALATION) at 16:22

## 2022-10-31 RX ADMIN — Medication 10 ML: at 20:30

## 2022-10-31 RX ADMIN — GABAPENTIN 100 MG: 100 CAPSULE ORAL at 20:29

## 2022-10-31 RX ADMIN — PRAMIPEXOLE DIHYDROCHLORIDE 0.25 MG: 0.25 TABLET ORAL at 20:29

## 2022-10-31 ASSESSMENT — ENCOUNTER SYMPTOMS
ABDOMINAL PAIN: 0
COUGH: 1
VOMITING: 0
VOICE CHANGE: 0
TROUBLE SWALLOWING: 0
PHOTOPHOBIA: 0
CHEST TIGHTNESS: 1
SHORTNESS OF BREATH: 1
NAUSEA: 0
WHEEZING: 1
BACK PAIN: 0
DIARRHEA: 0

## 2022-10-31 ASSESSMENT — PAIN SCALES - GENERAL
PAINLEVEL_OUTOF10: 0
PAINLEVEL_OUTOF10: 9
PAINLEVEL_OUTOF10: 0
PAINLEVEL_OUTOF10: 7
PAINLEVEL_OUTOF10: 9
PAINLEVEL_OUTOF10: 9

## 2022-10-31 ASSESSMENT — PAIN DESCRIPTION - LOCATION
LOCATION: LEG
LOCATION: LEG

## 2022-10-31 ASSESSMENT — PAIN - FUNCTIONAL ASSESSMENT
PAIN_FUNCTIONAL_ASSESSMENT: 0-10

## 2022-10-31 ASSESSMENT — PAIN DESCRIPTION - ORIENTATION
ORIENTATION: RIGHT;LEFT;LOWER
ORIENTATION: LEFT

## 2022-10-31 ASSESSMENT — PAIN DESCRIPTION - DESCRIPTORS: DESCRIPTORS: ACHING;DISCOMFORT

## 2022-10-31 NOTE — Clinical Note
Discharge Plan[de-identified] Other/Oral [facility])   Telemetry/Cardiac Monitoring Required?: Yes

## 2022-10-31 NOTE — TELEPHONE ENCOUNTER
Sister Jory Draper states that he is on his way to the hospital right now. She is not sure where he is going; if his vitals were ok, the ambulance would have taken him to [facility], but if not then they would take him to [facility].

## 2022-10-31 NOTE — H&P
[facility] History and Physical          ASSESSMENT:      Principal Problem:    Heart failure ([facility] [address]  Active Problems:    COPD exacerbation ([facility] [address]  Resolved Problems:    * No resolved hospital problems. *      PLAN:    1. Severe dyspnea and shortness of breath  Patient maintains on his home oxygen amount with a PO2 of 74. It is unlikely patient has a second RSV infection, but will likely need to be isolated due to positive test.  Patient is likely end-stage COPD and is going to be dyspneic under almost any circumstances. I will get a respiratory culture as he had strep pneumonia and need to make sure this is not a resistant strain. Will be started on IV steroids, around-the-clock aerosols, nebulized Brovana and Pulmicort. Pulmonary consult obtained as well. BNP is slightly higher than it was during last admission. Got 1 dose of Lasix in the ER and I will repeat 1 time again tomorrow. I did have a lengthy ana discussion about CODE STATUS. He is adamant that he does not want intubation. He was unsure about whether or not to have cardiac arrest treatment but given the fact that he cannot be intubated agrees that this is likely futile. He has made DNR CCA. Code Status: DNR-CCA - patient does not want to be intubated  DVT prophylaxis: lovenox    CHIEF COMPLAINT:  Severe dyspnea, unable to catch his breath, cough    History of Present Illness:   68-year-old male with a history of hypertension, and end-stage COPD on 7 L of oxygen at home. Patient just recently discharged from this hospital with pneumonia, COPD exacerbation, and mild CHF exacerbation. At that time patient was felt to have streptococcal pneumonia as well as RSV. He was weaned down to oral steroids and according to most records patient stated he was feeling better and ready to go home with his sister.   Patient tells me that he never felt better and had wanted to come back to the hospital every day since being discharged 7 days ago. His cough remains harsh and debilitating and is coughing up rust colored sputum. Nothing relieves his feeling of breathlessness and dyspnea. He has not had any fevers. He has not been able to sleep for days due to cough. In the emergency room RSV panel is positive. ABG with pH 7.441, PCO2 59.0, PO2 74. BNP 1041. COVID and influenza rapid test are negative. Informant(s) for H&P: Patient    REVIEW OF SYSTEMS:  A comprehensive review of systems was negative except for: what is in the HPI    PMH:  Past Medical History:   Diagnosis Date    Anxiety     COPD (chronic obstructive pulmonary disease) ([facility])     Hypertension     Leg swelling     Multiple gastric ulcers     Restless leg syndrome        Surgical History:  Past Surgical History:   Procedure Laterality Date    HERNIA REPAIR      HIP SURGERY      KNEE SURGERY         Medications Prior to Admission:    Prior to Admission medications    Medication Sig Start Date End Date Taking?  Authorizing Provider   calcium elemental (OSCAL) 500 MG TABS tablet Take 1 tablet by mouth 2 times daily (with meals) 10/23/22   Chary Cherry MD   vitamin D (ERGOCALCIFEROL) 1.25 MG (87687 UT) CAPS capsule Take 1 capsule by mouth once a week for 6 doses 10/22/22 11/27/22  KIM Barton   ipratropium-albuterol (DUONEB) 0.5-2.5 (3) MG/3ML SOLN nebulizer solution Inhale 3 mLs into the lungs every 6 hours as needed for Shortness of Breath 10/22/22   Dustin Torres MD   predniSONE (DELTASONE) 10 MG tablet 4 tabs daily x3 days, 3 tabs daily x3 days, 2 tabs daily x3 days, 1 tab  daily x3 days 10/22/22   Dustin Torres MD   FLUoxetine (PROZAC) 10 MG tablet Take 10 mg by mouth daily    Historical Provider, MD   pramipexole (MIRAPEX) 0.25 MG tablet Take 1 tablet by mouth nightly 9/23/22   Marium Brand MD   omeprazole (PRILOSEC) 40 MG delayed release capsule Take 1 capsule by mouth daily 9/23/22   Marium Brand MD   metoprolol tartrate (LOPRESSOR) 25 MG tablet Take 1 tablet by mouth 2 times daily Twice A Day 9/23/22   MD shruti Dailey [facility] NAS[facility]) 2 % nasal ointment by Nasal route 2 times daily Take by Nasal route 2 times daily. 9/23/22   Sam Gutierrez MD   budesonide (PULMICORT) 0.5 MG/2ML nebulizer suspension Take 2 mLs by nebulization in the morning and 2 mLs before bedtime. 7/21/22   ARGELIA Dacosta - NP   Revefenacin [place]) 175 MCG/3ML SOLN Inhale 1 ampule into the lungs daily 7/11/22   Bud Worthington MD   gabapentin (NEURONTIN) 100 MG capsule Take 1 capsule by mouth 3 times daily for 30 days. 7/11/22 10/15/22  KIM Saeed   BROVANA 15 MCG/2ML NEBU Take 2 mLs by nebulization 2 times daily 7/11/22   Lisa Benavidez MD   Probiotic Product [facility]) CAPS Patient is to take one tab bid. Patient has been on antibiotics for the last 3 months 10/27/21   ARGELIA Keating NP   albuterol sulfate HFA (PROAIR HFA) 108 (90 Base) MCG/ACT inhaler Inhale 2 puffs into the lungs every 4 hours as needed for Wheezing 1/26/21   Jose Juan Leigh MD   HYDROcodone-acetaminophen ([address]) 7.5-325 MG per tablet Take 1 tablet by mouth 2 times daily as needed. Takes religiously twice daily for leg pain per pt 10/6/20   Historical Provider, MD   ARIPiprazole (ABILIFY) 5 MG tablet take 1 tablet by mouth once daily 7/5/19   Historical Provider, MD       Allergies:    Aspirin, Lisinopril, Other, Tramadol, Ibuprofen, and Sulfa antibiotics    Social History:    reports that he quit smoking about 2 years ago. His smoking use included cigarettes. He started smoking about 27 years ago. He has a 100.00 pack-year smoking history. He has never used smokeless tobacco. He reports that he does not drink alcohol and does not use drugs. Family History:   family history includes Colon Cancer in his mother; No Known Problems in his brother, brother, sister, and sister; Other in his father.      PHYSICAL EXAM:  Vitals:  BP (!) 178/83   Pulse 78   Temp 98 °F (36.7 °C) (Oral)   Resp 18   Ht 5' 4\" (1.626 m)   Wt 133 lb (60.3 kg)   SpO2 98%   BMI 22.83 kg/m²   General Appearance: alert and oriented to person, place and time and some respiratory distress  Skin: warm and dry  Head: normocephalic and atraumatic  Eyes: pupils equal, round, and reactive to light, extraocular eye movements intact, conjunctivae normal  Neck: neck supple and non tender    Pulmonary/Chest[de-identified] Diminished breath sounds, also sounds are coarse but I hear no real crackles other than perhaps right base with appears more dull than other lobes, bronchospasm with end expiratory wheezing and rhonchi  Cardiovascular: regular, very distant, no murmur appreciated  Abdomen: soft, non-tender, non-distended  Extremities:minimal LE edema  Neurologic: very weak but otherwise grossly non-focal    LABS:  Recent Labs     10/31/22  1347      K 4.1   CL 92*   CO2 40*   BUN 8   CREATININE 0.8   GLUCOSE 124*   CALCIUM 7.8*       Recent Labs     10/31/22  1347   WBC 8.7   RBC 3.50*   HGB 11.1*   HCT 36.4*   .0*   MCH 31.7   MCHC 30.5*   RDW 13.5      MPV 9.5       Radiology:   US DUP LOWER EXTREMITY LEFT LIBBY   Final Result   No evidence of DVT in the left lower extremity. RECOMMENDATIONS:   Unavailable         XR CHEST PORTABLE   Final Result   Suspect mild pulmonary vascular congestion. EKG: NSR, normal appearing EKG    NOTE: This report was transcribed using voice recognition software. Every effort was made to ensure accuracy; however, inadvertent computerized transcription errors may be present.   Electronically signed by Yessi Diggs MD on 10/31/2022 at 6:42 PM

## 2022-10-31 NOTE — TELEPHONE ENCOUNTER
Writer contacted Dr Jean-Claude Garg to inform of 30 day readmission risk. 's attempt to contact ED provider was unsuccessful.     Call Back: If you need to call back to inform of disposition you can contact me at [phone]

## 2022-10-31 NOTE — TELEPHONE ENCOUNTER
SUBJECTIVE:  The patient is a 61 year old  female  here for Annual well woman visit, pap smear.No LMP recorded. Patient is postmenopausal.       Pt had severe covid this year, was on ventilator. Feeling much better now.  Few years ago suspected PMB; at one point with an u/s with 9mm EMS.  Endometrial biopsy benign;  2018; u/s f/u showed minimal thickending of EMS. 2019   4.4mm  Stable small fbiroids.    Last pap 2017 normal.     Pt denies any PMB(but states had one day slight pink after urination about month ago*)  not sexually active.    Had MMG this year already.  Declines breast exam     MEDICATIONS:  Current Outpatient Medications   Medication Sig Dispense Refill   • cholecalciferol (VITAMIN D) 10 mcg (400 units)/mL oral liquid Take by mouth daily.     • Cholecalciferol (vitamin D3) 25 mcg (1,000 units) capsule Take 25 mcg by mouth.     • cetirizine (ZyrTEC) 10 MG tablet TAKE 1 TABLET BY MOUTH EVERY DAY 30 tablet 1   • fluticasone (FLONASE) 50 MCG/ACT nasal spray Spray 2 sprays in each nostril daily. 16 g 0   • hydrochlorothiazide (HYDRODIURIL) 12.5 MG tablet TAKE 1 TABLET BY MOUTH EVERY DAY 90 tablet 0   • [employer] VITAMIN C 500 MG tablet Take 1,000 mg by mouth daily.     • acetaminophen (TYLENOL) 325 MG tablet Take 325 mg by mouth every 4 hours as needed for Pain. Took two tablets     • LORazepam (ATIVAN) 0.5 MG tablet Take 1 tablet by mouth every 12 hours as needed for Anxiety. 6 tablet 0   • meclizine (ANTIVERT) 25 MG tablet Take 1 tablet by mouth 3 times daily as needed for Dizziness. 30 tablet 0   • Multiple Vitamin (DAILY-DEO) Tab Take 1 tablet by mouth daily.       No current facility-administered medications for this visit.        ALLERGIES:  ALLERGIES:   Allergen Reactions   • Apap-Fd&C Yellow #10 [place]-Hydrocodone Palpitations   • Codeine Palpitations   • Morphine Other (See Comments)     Unknown reaction   • Naproxen CARDIAC DISTURBANCES   • Tylenol With Codeine CARDIAC DISTURBANCES   • Vicodin  Would suggest ED    If he refuses of course we'll restart the lasix, but does not sound like we'll make enough improvement with PO lasix before he worsens  Sounds like he needs IV for immediate relief especially given his precarious lungs [Hydrocodone-Acetaminophen] CARDIAC DISTURBANCES         REVIEW OF SYSTEMS:  Constitutional:  Denies fever, chills, weight changes  Respiratory:  Denies cough or shortness of breath  Cardiovascular:  Denies chest pain, palpitations  Gastrointestinal:  Denies abdominal pain, nausea, vomiting, bloody stools or diarrhea, constipation  Genitourinary:  Denies dysuria, frequency, urgency, difficulty voiding, incontinence or hematuria  Breast: No lumps tenderness discharge or rash or pain.    PAST MEDICAL HISTORY:  Past Medical History:   Diagnosis Date   • Chronic kidney disease    • COVID-19 05/2020   • Essential (primary) hypertension    • Essential hypertension 6/19/2019   • Fatty liver 6/19/2019   • Fibroid    • Hernia of abdominal wall 6/19/2019   • Hyperlipidemia    • Morbid obesity (CMS/HCC)    • PORSHA (obstructive sleep apnea)    • Prediabetes    • Vertigo 7/28/2020       Past Surgical History:   Procedure Laterality Date   • Hernia repair  01/29/2020    Robotic Laparoscopic Assisted Ventral Hernia Repair with Mesh by Dr. Pedersen   • Tubal ligation         FAMILY HISTORY:  Family History   Problem Relation Age of Onset   • Hypertension Mother    • Kidney disease Mother    • Cancer Father         pancreatic   • Cancer Sister         esophageal   • Hypertension Brother    • Stroke Brother    • Asthma Paternal Grandfather    • Cancer Sister         lymphoma       SOCIAL HISTORY:  Social History     Socioeconomic History   • Marital status: Single     Spouse name: Not on file   • Number of children: Not on file   • Years of education: Not on file   • Highest education level: Not on file   Occupational History   • Not on file   Social Needs   • Financial resource strain: Not on file   • Food insecurity     Worry: Not on file     Inability: Not on file   • Transportation needs     Medical: Not on file     Non-medical: Not on file   Tobacco Use   • Smoking status: Never Smoker   • Smokeless tobacco: Never Used   Substance  and Sexual Activity   • Alcohol use: No   • Drug use: No   • Sexual activity: Not Currently   Lifestyle   • Physical activity     Days per week: Not on file     Minutes per session: Not on file   • Stress: Not on file   Relationships   • Social connections     Talks on phone: Not on file     Gets together: Not on file     Attends Oriental orthodox service: Not on file     Active member of club or organization: Not on file     Attends meetings of clubs or organizations: Not on file     Relationship status: Not on file   • Intimate partner violence     Fear of current or ex partner: Not on file     Emotionally abused: Not on file     Physically abused: Not on file     Forced sexual activity: Not on file   Other Topics Concern   • Not on file   Social History Narrative   • Not on file         PHYSICAL EXAMINATION:  VITAL SIGNS:    Visit Vitals  /70   Pulse 88   Ht 5' 6\" (1.676 m)   Wt 128.4 kg   SpO2 98%   BMI 45.68 kg/m²     GENERAL:  Well-developed, well-nourished, female in no acute distress.  Alert and oriented x3.  Neck: normal appearance, no masses, normal thyroid size, nontender, no lymphadenopathy  ABDOMEN:  Soft, nontender, nondistended.  No masses  BREASTS:declines.   EXTERNAL GENITALIA:  Within normal limits.  No lesions are noted.  No  erythema is noted. + atrophy  URETHRAL meatus and urethra:  No lesions are noted, nontender.  BLADDER:  No masses and nontender. No cystocele  VAGINA:  Normal with no discharge.  No lesions are noted. +atrophy noted  CERVIX:  Normal, no lesions, no discharge.  Pap smear was collected.    UTERUS: Normal size, contour, position.  Non-tender.    ADNEXA: Normal Size and Non-tender bilaterally.  Anus/perineum: normal appearance.     Nursing Staff present during exam    IMPRESSION :    1. WWE  2. Hx of PMB, except for one spotting episode in past month (atrophic tissue might have been etiology of that).  Otherwise PMB now not in few years.  Last u/s reassuring just barely >4mm EMS.   However, seems cautious to do one more u/s.  woulnt' plan biopsy unless thickened       PLAN:  1. Schedule pelvic u/s.will call with results  2. Pap repeated again  3. Return to clinic in one year

## 2022-10-31 NOTE — TELEPHONE ENCOUNTER
D/c lasix when he was in the hospital last week . Home health nurse called and said he has horrible lung sound. Having a lot of trouble breathing, barely any lung exchange  3+ pitting edema into upper thigh. weight the other day was 129 now today - 133lb. Nurse didn't know if you wanted to start lasix back up or send to ed   [phone] with any concerns.  Ok to call pt with directions

## 2022-11-01 LAB
ALBUMIN SERPL-MCNC: 3.8 G/DL (ref 3.5–5.2)
ALP BLD-CCNC: 56 U/L (ref 40–129)
ALT SERPL-CCNC: 17 U/L (ref 0–40)
ANION GAP SERPL CALCULATED.3IONS-SCNC: 9 MMOL/L (ref 7–16)
AST SERPL-CCNC: 15 U/L (ref 0–39)
BASOPHILS ABSOLUTE: 0.01 E9/L (ref 0–0.2)
BASOPHILS RELATIVE PERCENT: 0.1 % (ref 0–2)
BILIRUB SERPL-MCNC: 0.3 MG/DL (ref 0–1.2)
BUN BLDV-MCNC: 15 MG/DL (ref 6–20)
CALCIUM SERPL-MCNC: 7.7 MG/DL (ref 8.6–10.2)
CHLORIDE BLD-SCNC: 90 MMOL/L (ref 98–107)
CO2: 38 MMOL/L (ref 22–29)
CREAT SERPL-MCNC: 0.7 MG/DL (ref 0.7–1.2)
EOSINOPHILS ABSOLUTE: 0.01 E9/L (ref 0.05–0.5)
EOSINOPHILS RELATIVE PERCENT: 0.1 % (ref 0–6)
GFR SERPL CREATININE-BSD FRML MDRD: >60 ML/MIN/1.73
GLUCOSE BLD-MCNC: 118 MG/DL (ref 74–99)
HCT VFR BLD CALC: 39.6 % (ref 37–54)
HEMOGLOBIN: 12.1 G/DL (ref 12.5–16.5)
IMMATURE GRANULOCYTES #: 0.07 E9/L
IMMATURE GRANULOCYTES %: 0.6 % (ref 0–5)
LYMPHOCYTES ABSOLUTE: 0.43 E9/L (ref 1.5–4)
LYMPHOCYTES RELATIVE PERCENT: 3.8 % (ref 20–42)
MCH RBC QN AUTO: 31.1 PG (ref 26–35)
MCHC RBC AUTO-ENTMCNC: 30.6 % (ref 32–34.5)
MCV RBC AUTO: 101.8 FL (ref 80–99.9)
MONOCYTES ABSOLUTE: 0.18 E9/L (ref 0.1–0.95)
MONOCYTES RELATIVE PERCENT: 1.6 % (ref 2–12)
NEUTROPHILS ABSOLUTE: 10.49 E9/L (ref 1.8–7.3)
NEUTROPHILS RELATIVE PERCENT: 93.8 % (ref 43–80)
PDW BLD-RTO: 13.5 FL (ref 11.5–15)
PLATELET # BLD: 219 E9/L (ref 130–450)
PMV BLD AUTO: 9.4 FL (ref 7–12)
POIKILOCYTES: ABNORMAL
POTASSIUM REFLEX MAGNESIUM: 4.8 MMOL/L (ref 3.5–5)
PROCALCITONIN: 0.07 NG/ML (ref 0–0.08)
RBC # BLD: 3.89 E12/L (ref 3.8–5.8)
SODIUM BLD-SCNC: 137 MMOL/L (ref 132–146)
STOMATOCYTES: ABNORMAL
TARGET CELLS: ABNORMAL
TOTAL PROTEIN: 7 G/DL (ref 6.4–8.3)
WBC # BLD: 11.2 E9/L (ref 4.5–11.5)

## 2022-11-01 PROCEDURE — 6360000002 HC RX W HCPCS: Performed by: INTERNAL MEDICINE

## 2022-11-01 PROCEDURE — 99232 SBSQ HOSP IP/OBS MODERATE 35: CPT | Performed by: INTERNAL MEDICINE

## 2022-11-01 PROCEDURE — 84145 PROCALCITONIN (PCT): CPT

## 2022-11-01 PROCEDURE — 94640 AIRWAY INHALATION TREATMENT: CPT

## 2022-11-01 PROCEDURE — 36415 COLL VENOUS BLD VENIPUNCTURE: CPT

## 2022-11-01 PROCEDURE — 2060000000 HC ICU INTERMEDIATE R&B

## 2022-11-01 PROCEDURE — 2700000000 HC OXYGEN THERAPY PER DAY

## 2022-11-01 PROCEDURE — 87077 CULTURE AEROBIC IDENTIFY: CPT

## 2022-11-01 PROCEDURE — 80053 COMPREHEN METABOLIC PANEL: CPT

## 2022-11-01 PROCEDURE — 85025 COMPLETE CBC W/AUTO DIFF WBC: CPT

## 2022-11-01 PROCEDURE — 6370000000 HC RX 637 (ALT 250 FOR IP): Performed by: INTERNAL MEDICINE

## 2022-11-01 PROCEDURE — 6370000000 HC RX 637 (ALT 250 FOR IP): Performed by: STUDENT IN AN ORGANIZED HEALTH CARE EDUCATION/TRAINING PROGRAM

## 2022-11-01 PROCEDURE — 87070 CULTURE OTHR SPECIMN AEROBIC: CPT

## 2022-11-01 PROCEDURE — 87206 SMEAR FLUORESCENT/ACID STAI: CPT

## 2022-11-01 PROCEDURE — 2580000003 HC RX 258: Performed by: INTERNAL MEDICINE

## 2022-11-01 PROCEDURE — 6360000002 HC RX W HCPCS

## 2022-11-01 PROCEDURE — 87186 SC STD MICRODIL/AGAR DIL: CPT

## 2022-11-01 PROCEDURE — 6370000000 HC RX 637 (ALT 250 FOR IP)

## 2022-11-01 RX ORDER — LANOLIN ALCOHOL/MO/W.PET/CERES
3 CREAM (GRAM) TOPICAL NIGHTLY PRN
Status: DISCONTINUED | OUTPATIENT
Start: 2022-11-01 | End: 2022-11-01

## 2022-11-01 RX ORDER — BUDESONIDE 0.5 MG/2ML
1 INHALANT ORAL 2 TIMES DAILY
Status: DISCONTINUED | OUTPATIENT
Start: 2022-11-01 | End: 2022-11-06 | Stop reason: HOSPADM

## 2022-11-01 RX ORDER — LORAZEPAM 1 MG/1
1 TABLET ORAL 3 TIMES DAILY
Status: DISCONTINUED | OUTPATIENT
Start: 2022-11-01 | End: 2022-11-06 | Stop reason: HOSPADM

## 2022-11-01 RX ORDER — HYDROCODONE BITARTRATE AND HOMATROPINE METHYLBROMIDE ORAL SOLUTION 5; 1.5 MG/5ML; MG/5ML
5 LIQUID ORAL EVERY 4 HOURS PRN
Status: DISCONTINUED | OUTPATIENT
Start: 2022-11-01 | End: 2022-11-06 | Stop reason: HOSPADM

## 2022-11-01 RX ORDER — LANOLIN ALCOHOL/MO/W.PET/CERES
3 CREAM (GRAM) TOPICAL NIGHTLY PRN
Status: DISCONTINUED | OUTPATIENT
Start: 2022-11-01 | End: 2022-11-06 | Stop reason: HOSPADM

## 2022-11-01 RX ADMIN — HYDROCODONE BITARTRATE AND ACETAMINOPHEN 1 TABLET: 7.5; 325 TABLET ORAL at 07:57

## 2022-11-01 RX ADMIN — IPRATROPIUM BROMIDE AND ALBUTEROL SULFATE 1 AMPULE: .5; 2.5 SOLUTION RESPIRATORY (INHALATION) at 20:11

## 2022-11-01 RX ADMIN — Medication 10 ML: at 07:58

## 2022-11-01 RX ADMIN — IPRATROPIUM BROMIDE AND ALBUTEROL SULFATE 1 AMPULE: .5; 2.5 SOLUTION RESPIRATORY (INHALATION) at 15:54

## 2022-11-01 RX ADMIN — Medication 10 ML: at 20:00

## 2022-11-01 RX ADMIN — ARFORMOTEROL TARTRATE 15 MCG: 15 SOLUTION RESPIRATORY (INHALATION) at 20:11

## 2022-11-01 RX ADMIN — BUDESONIDE 500 MCG: 0.5 SUSPENSION RESPIRATORY (INHALATION) at 08:39

## 2022-11-01 RX ADMIN — ENOXAPARIN SODIUM 40 MG: 100 INJECTION SUBCUTANEOUS at 07:58

## 2022-11-01 RX ADMIN — IPRATROPIUM BROMIDE AND ALBUTEROL SULFATE 1 AMPULE: .5; 2.5 SOLUTION RESPIRATORY (INHALATION) at 11:54

## 2022-11-01 RX ADMIN — SODIUM CHLORIDE, PRESERVATIVE FREE 10 ML: 5 INJECTION INTRAVENOUS at 22:45

## 2022-11-01 RX ADMIN — PANTOPRAZOLE SODIUM 40 MG: 40 TABLET, DELAYED RELEASE ORAL at 05:10

## 2022-11-01 RX ADMIN — HYDROCODONE BITARTRATE AND HOMATROPINE METHYLBROMIDE 5 ML: 5; 1.5 SOLUTION ORAL at 14:12

## 2022-11-01 RX ADMIN — PRAMIPEXOLE DIHYDROCHLORIDE 0.25 MG: 0.25 TABLET ORAL at 20:00

## 2022-11-01 RX ADMIN — METHYLPREDNISOLONE SODIUM SUCCINATE 40 MG: 40 INJECTION, POWDER, LYOPHILIZED, FOR SOLUTION INTRAMUSCULAR; INTRAVENOUS at 14:05

## 2022-11-01 RX ADMIN — METOPROLOL TARTRATE 25 MG: 25 TABLET, FILM COATED ORAL at 20:00

## 2022-11-01 RX ADMIN — METHYLPREDNISOLONE SODIUM SUCCINATE 40 MG: 40 INJECTION, POWDER, LYOPHILIZED, FOR SOLUTION INTRAMUSCULAR; INTRAVENOUS at 22:45

## 2022-11-01 RX ADMIN — HYDROCODONE BITARTRATE AND ACETAMINOPHEN 1 TABLET: 7.5; 325 TABLET ORAL at 20:00

## 2022-11-01 RX ADMIN — LORAZEPAM 1 MG: 1 TABLET ORAL at 14:06

## 2022-11-01 RX ADMIN — ARIPIPRAZOLE 5 MG: 5 TABLET ORAL at 07:58

## 2022-11-01 RX ADMIN — GABAPENTIN 100 MG: 100 CAPSULE ORAL at 20:00

## 2022-11-01 RX ADMIN — SODIUM CHLORIDE, PRESERVATIVE FREE 10 ML: 5 INJECTION INTRAVENOUS at 05:11

## 2022-11-01 RX ADMIN — BUDESONIDE 1000 MCG: 0.5 SUSPENSION RESPIRATORY (INHALATION) at 20:11

## 2022-11-01 RX ADMIN — Medication 3 MG: at 00:30

## 2022-11-01 RX ADMIN — FLUOXETINE HYDROCHLORIDE 10 MG: 10 TABLET ORAL at 07:58

## 2022-11-01 RX ADMIN — ARFORMOTEROL TARTRATE 15 MCG: 15 SOLUTION RESPIRATORY (INHALATION) at 08:39

## 2022-11-01 RX ADMIN — LORAZEPAM 1 MG: 1 TABLET ORAL at 09:00

## 2022-11-01 RX ADMIN — METOPROLOL TARTRATE 25 MG: 25 TABLET, FILM COATED ORAL at 07:57

## 2022-11-01 RX ADMIN — HYDROCODONE BITARTRATE AND HOMATROPINE METHYLBROMIDE 5 ML: 5; 1.5 SOLUTION ORAL at 20:00

## 2022-11-01 RX ADMIN — LORAZEPAM 1 MG: 1 TABLET ORAL at 20:00

## 2022-11-01 RX ADMIN — IPRATROPIUM BROMIDE AND ALBUTEROL SULFATE 1 AMPULE: .5; 2.5 SOLUTION RESPIRATORY (INHALATION) at 08:39

## 2022-11-01 RX ADMIN — METHYLPREDNISOLONE SODIUM SUCCINATE 40 MG: 40 INJECTION, POWDER, LYOPHILIZED, FOR SOLUTION INTRAMUSCULAR; INTRAVENOUS at 05:10

## 2022-11-01 RX ADMIN — Medication 500 MG: at 16:30

## 2022-11-01 RX ADMIN — GABAPENTIN 100 MG: 100 CAPSULE ORAL at 07:57

## 2022-11-01 RX ADMIN — FUROSEMIDE 40 MG: 10 INJECTION, SOLUTION INTRAMUSCULAR; INTRAVENOUS at 07:57

## 2022-11-01 RX ADMIN — Medication 500 MG: at 07:57

## 2022-11-01 ASSESSMENT — PAIN SCALES - GENERAL
PAINLEVEL_OUTOF10: 9
PAINLEVEL_OUTOF10: 8
PAINLEVEL_OUTOF10: 0
PAINLEVEL_OUTOF10: 4
PAINLEVEL_OUTOF10: 0

## 2022-11-01 ASSESSMENT — PAIN - FUNCTIONAL ASSESSMENT: PAIN_FUNCTIONAL_ASSESSMENT: PREVENTS OR INTERFERES SOME ACTIVE ACTIVITIES AND ADLS

## 2022-11-01 ASSESSMENT — PAIN DESCRIPTION - ORIENTATION: ORIENTATION: LEFT

## 2022-11-01 ASSESSMENT — PAIN DESCRIPTION - DESCRIPTORS: DESCRIPTORS: ACHING;DISCOMFORT;NAGGING

## 2022-11-01 ASSESSMENT — PAIN DESCRIPTION - LOCATION: LOCATION: LEG

## 2022-11-01 ASSESSMENT — PAIN DESCRIPTION - PAIN TYPE: TYPE: CHRONIC PAIN

## 2022-11-01 ASSESSMENT — PAIN DESCRIPTION - ONSET: ONSET: ON-GOING

## 2022-11-01 ASSESSMENT — PAIN DESCRIPTION - FREQUENCY: FREQUENCY: CONTINUOUS

## 2022-11-01 NOTE — ED PROVIDER NOTES
64y.o. year old male presenting to the emergency room with concerns of shortness of breath. Reports that symptom's onset 2 weeks. Worsen with exertion, coughing. Improves with nothing. Severity of moderate, with no radiation. Symptoms are constant in timing. Symptoms described as shortness of breath which has been progressively worsening since previous discharge. Rowdy Bradford associated symptoms of cough. Patient in  no acute distress. Patient was recently admitted for RSV. Chief Complaint   Patient presents with    Shortness of Breath    Cough     SOB progressively worsening over the last two weeks. Reports he was diagnosed with RSV two weeks ago. Review of Systems   Constitutional:  Positive for fatigue. Negative for chills and fever. HENT:  Negative for trouble swallowing and voice change. Eyes:  Negative for photophobia and visual disturbance. Respiratory:  Positive for cough, chest tightness, shortness of breath and wheezing. Cardiovascular:  Positive for leg swelling. Gastrointestinal:  Negative for abdominal pain, diarrhea, nausea and vomiting. Genitourinary:  Negative for dysuria, hematuria and urgency. Musculoskeletal:  Negative for arthralgias, back pain and neck pain. Skin:  Negative for rash and wound. Neurological:  Negative for dizziness, syncope, weakness, numbness and headaches. Psychiatric/Behavioral:  Negative for behavioral problems and confusion. The patient is nervous/anxious. Physical Exam  Vitals reviewed. Constitutional:       General: He is not in acute distress. Appearance: Normal appearance. He is not ill-appearing or diaphoretic. Interventions: He is not intubated. HENT:      Head: Normocephalic. Right Ear: External ear normal.      Left Ear: External ear normal.      Nose: Nose normal.      Mouth/Throat:      Pharynx: Oropharynx is clear. Eyes:      General:         Right eye: No discharge. Left eye: No discharge. Conjunctiva/sclera: Conjunctivae normal.   Cardiovascular:      Rate and Rhythm: Normal rate and regular rhythm. Heart sounds: No murmur heard. No friction rub. No gallop. Pulmonary:      Effort: Accessory muscle usage present. No bradypnea or respiratory distress. He is not intubated. Breath sounds: No stridor. Decreased breath sounds, wheezing and rales present. Abdominal:      General: There is no distension. Palpations: Abdomen is soft. Tenderness: There is no abdominal tenderness. There is no guarding or rebound. Musculoskeletal:         General: No tenderness or deformity. Cervical back: Normal range of motion and neck supple. No rigidity or tenderness. Skin:     General: Skin is warm. Coloration: Skin is not jaundiced. Neurological:      Mental Status: He is alert and oriented to person, place, and time. Sensory: No sensory deficit. Motor: No weakness. Psychiatric:         Mood and Affect: Mood is anxious. Behavior: Behavior normal.        Procedures     US DUP LOWER EXTREMITY LEFT LIBBY   Final Result   No evidence of DVT in the left lower extremity. RECOMMENDATIONS:   Unavailable         XR CHEST PORTABLE   Final Result   Suspect mild pulmonary vascular congestion. EKG:  This EKG is signed by emergency department physician. Rate: 75  Rhythm: Sinus  AXIS:normal  ST Changes:none  Interpretation: sinus rhythm  Comparison: stable as compared to patient's most recent EKG     MDM  Number of Diagnoses or Management Options  Diagnosis management comments: 59-year-old male presenting to the emergency department complaints of shortness of breath, cough. Patient with recent admission for RSV diagnosis. Patient reports he has worsening cough with shortness of breath worsening. Patient with increased work of breathing noted. Negative COVID-negative influenza. RSV positive. Mild anemia noted. Elevation of BNP from previous. Ultrasound negative. Chest x-ray demonstrated pulmonary vascular congestion. Patient on repeat auscultation continued wheezing despite therapies. Spoke to hospitalist, discussed patient will plan to admit patient at this time       Amount and/or Complexity of Data Reviewed  Decide to obtain previous medical records or to obtain history from someone other than the patient: yes         ED Course as of 22   [facility] Oct 31, 2022   1556 EC  Normal sinus rhythm  Hr 76  Normal intervals [BRIAN]      ED Course User Index  [BRIAN] Bob Helen         ED Course as of 22   Mon Oct 31, 2022   1556 EC  Normal sinus rhythm  Hr 76  Normal intervals [BRIAN]      ED Course User Index  [BRIAN] Bob Helen        --------------------------------------------- PAST HISTORY ---------------------------------------------  Past Medical History:  has a past medical history of Anxiety, COPD (chronic obstructive pulmonary disease) ([address] Hypertension, Leg swelling, Multiple gastric ulcers, and Restless leg syndrome. Past Surgical History:  has a past surgical history that includes knee surgery; hip surgery; and hernia repair. Social History:  reports that he quit smoking about 2 years ago. His smoking use included cigarettes. He started smoking about 27 years ago. He has a 100.00 pack-year smoking history. He has never used smokeless tobacco. He reports that he does not drink alcohol and does not use drugs. Family History: family history includes Colon Cancer in his mother; No Known Problems in his brother, brother, sister, and sister; Other in his father. The patients home medications have been reviewed.     Allergies: Aspirin, Lisinopril, Other, Tramadol, Ibuprofen, and Sulfa antibiotics    -------------------------------------------------- RESULTS -------------------------------------------------    LABS:  Results for orders placed or performed during the hospital encounter of 10/31/22   COVID-19, Rapid    Specimen: Nasopharyngeal Swab   Result Value Ref Range    SARS-CoV-2, NAAT Not Detected Not Detected   RAPID INFLUENZA A/B ANTIGENS    Specimen: Nasopharyngeal   Result Value Ref Range    Influenza A by PCR Not Detected Not Detected    Influenza B by PCR Not Detected Not Detected   Rapid RSV Antigen    Specimen: Nasopharyngeal Swab   Result Value Ref Range    RSV by PCR POSITIVE (A) Negative   CBC with Auto Differential   Result Value Ref Range    WBC 8.7 4.5 - 11.5 E9/L    RBC 3.50 (L) 3.80 - 5.80 E12/L    Hemoglobin 11.1 (L) 12.5 - 16.5 g/dL    Hematocrit 36.4 (L) 37.0 - 54.0 %    .0 (H) 80.0 - 99.9 fL    MCH 31.7 26.0 - 35.0 pg    MCHC 30.5 (L) 32.0 - 34.5 %    RDW 13.5 11.5 - 15.0 fL    Platelets 878 203 - 048 E9/L    MPV 9.5 7.0 - 12.0 fL    Neutrophils % 75.1 43.0 - 80.0 %    Immature Granulocytes % 0.6 0.0 - 5.0 %    Lymphocytes % 17.3 (L) 20.0 - 42.0 %    Monocytes % 6.1 2.0 - 12.0 %    Eosinophils % 0.7 0.0 - 6.0 %    Basophils % 0.2 0.0 - 2.0 %    Neutrophils Absolute 6.57 1.80 - 7.30 E9/L    Immature Granulocytes # 0.05 E9/L    Lymphocytes Absolute 1.51 1.50 - 4.00 E9/L    Monocytes Absolute 0.53 0.10 - 0.95 E9/L    Eosinophils Absolute 0.06 0.05 - 0.50 E9/L    Basophils Absolute 0.02 0.00 - 0.20 E9/L   Comprehensive Metabolic Panel w/ Reflex to MG   Result Value Ref Range    Sodium 140 132 - 146 mmol/L    Potassium reflex Magnesium 4.1 3.5 - 5.0 mmol/L    Chloride 92 (L) 98 - 107 mmol/L    CO2 40 (H) 22 - 29 mmol/L    Anion Gap 8 7 - 16 mmol/L    Glucose 124 (H) 74 - 99 mg/dL    BUN 8 6 - 20 mg/dL    Creatinine 0.8 0.7 - 1.2 mg/dL    Est, Glom Filt Rate >60 >=60 mL/min/1.73    Calcium 7.8 (L) 8.6 - 10.2 mg/dL    Total Protein 6.5 6.4 - 8.3 g/dL    Albumin 3.7 3.5 - 5.2 g/dL    Total Bilirubin 0.3 0.0 - 1.2 mg/dL    Alkaline Phosphatase 52 40 - 129 U/L    ALT 16 0 - 40 U/L    AST 13 0 - 39 U/L   Brain Natriuretic Peptide   Result Value Ref Range    Pro-BNP 1,041 (H) 0 (H) 22 - 29 mmol/L    Anion Gap 9 7 - 16 mmol/L    Glucose 118 (H) 74 - 99 mg/dL    BUN 15 6 - 20 mg/dL    Creatinine 0.7 0.7 - 1.2 mg/dL    Est, Glom Filt Rate >60 >=60 mL/min/1.73    Calcium 7.7 (L) 8.6 - 10.2 mg/dL    Total Protein 7.0 6.4 - 8.3 g/dL    Albumin 3.8 3.5 - 5.2 g/dL    Total Bilirubin 0.3 0.0 - 1.2 mg/dL    Alkaline Phosphatase 56 40 - 129 U/L    ALT 17 0 - 40 U/L    AST 15 0 - 39 U/L   EKG 12 Lead   Result Value Ref Range    Ventricular Rate 76 BPM    Atrial Rate 76 BPM    P-R Interval 118 ms    QRS Duration 78 ms    Q-T Interval 398 ms    QTc Calculation (Bazett) 447 ms    P Axis 51 degrees    R Axis 61 degrees    T Axis 68 degrees       RADIOLOGY:  US DUP LOWER EXTREMITY LEFT LIBBY   Final Result   No evidence of DVT in the left lower extremity. RECOMMENDATIONS:   Unavailable         XR CHEST PORTABLE   Final Result   Suspect mild pulmonary vascular congestion.                 ------------------------- NURSING NOTES AND VITALS REVIEWED ---------------------------  Date / Time Roomed:  10/31/2022 12:55 PM  ED Bed Assignment:  [id number]    The nursing notes within the ED encounter and vital signs as below have been reviewed.      Patient Vitals for the past 24 hrs:   BP Temp Temp src Pulse Resp SpO2 Height Weight   11/01/22 0245 -- -- -- -- -- 100 % -- --   11/01/22 0031 (!) 146/90 97.9 °F (36.6 °C) Oral 60 20 100 % -- 123 lb 8 oz (56 kg)   10/31/22 2140 -- -- -- -- 19 -- -- --   10/31/22 1956 (!) 154/87 98 °F (36.7 °C) Oral 84 18 98 % 5' 4\" (1.626 m) 123 lb 4.8 oz (55.9 kg)   10/31/22 1811 (!) 178/83 98 °F (36.7 °C) Oral 78 18 98 % -- --   10/31/22 1600 (!) 176/82 -- -- 76 -- -- -- --   10/31/22 1515 (!) 173/81 -- -- 78 16 98 % -- --   10/31/22 1324 -- -- -- 80 -- -- -- --   10/31/22 1314 (!) 164/90 98 °F (36.7 °C) Oral 74 20 100 % 5' 4\" (1.626 m) 133 lb (60.3 kg)         ------------------------------------------ PROGRESS NOTES ------------------------------------------  Re-evaluation(s):  Patients symptoms show no change  Repeat physical examination is improved    Counseling:  I have spoken with the patient and discussed todays results, in addition to providing specific details for the plan of care and counseling regarding the diagnosis and prognosis. Their questions are answered at this time and they are agreeable with the plan of admission.    --------------------------------- ADDITIONAL PROVIDER NOTES ---------------------------------  Consultations:  Spoke with Dr. Carl Ahumada. Discussed case. They will admit the patient. This patient's ED course included: a personal history and physicial examination, re-evaluation prior to disposition, multiple bedside re-evaluations, IV medications, cardiac monitoring, and continuous pulse oximetry    This patient has remained hemodynamically stable during their ED course. Diagnosis:  1. Shortness of breath    2. Respiratory syncytial virus (RSV)        Disposition:  Patient's disposition: Admit  Patient's condition is stable. Attending was present and available throughout encounter including all critical portions; See Attending Note/Attestation for Final [address], DO  Resident  10/31/22 7098      ATTENDING PROVIDER ATTESTATION:     Sheila Rincon presented to the emergency department for evaluation of Shortness of Breath and Cough (SOB progressively worsening over the last two weeks. Reports he was diagnosed with RSV two weeks ago.)   and was initially evaluated by the Medical Resident. See Original ED Note for H&P and ED course above. I have reviewed and discussed the case, including pertinent history (medical, surgical, family and social) and exam findings with the Medical Resident assigned to Sheila Rincon.   I have personally performed and/or participated in the history, exam, medical decision making, EKG interpretation and procedures and agree with all pertinent clinical information. I have reviewed my findings and recommendations with the assigned Medical Resident, Jena Goldman and members of family present at the time of disposition. My findings/plan: The primary encounter diagnosis was Shortness of breath. A diagnosis of Respiratory syncytial virus (RSV) was also pertinent to this visit.   Current Discharge Medication List        Loly Aragon, [address] [facility],   11/01/22 9780

## 2022-11-01 NOTE — PROGRESS NOTES
Geovanny  Hospitalist   Progress Note    Admitting Date and Time: 10/31/2022 12:55 PM  Admit Dx: Heart failure ([facility]) [I50.9]  COP[facility] exacerbation ([facility] [J44.1]    Subjective:    Better today but breathing not at baseline. Still having constant cough that is nagging and asking for a cough suppressant. Continues to feel dyspneic. No other complaints. ROS: denies fever, chills, cp, sob, n/v, HA unless stated above.      LORazepam  1 mg Oral TID    Arformoterol Tartrate  15 mcg Nebulization BID    budesonide  0.5 mg Nebulization BID    calcium elemental  500 mg Oral BID WC    FLUoxetine  10 mg Oral Daily    gabapentin  100 mg Oral TID    ipratropium-albuterol  1 ampule Inhalation Q4H WA    metoprolol tartrate  25 mg Oral BID    pantoprazole  40 mg Oral QAM AC    pramipexole  0.25 mg Oral Nightly    methylPREDNISolone  40 mg IntraVENous Q8H    sodium chloride flush  5-40 mL IntraVENous 2 times per day    enoxaparin  40 mg SubCUTAneous Daily    ARIPiprazole  5 mg Oral Daily     melatonin, 3 mg, Nightly PRN  HYDROcodone-acetaminophen, 1 tablet, BID PRN  sodium chloride flush, 5-40 mL, PRN  sodium chloride, , PRN  ondansetron, 4 mg, Q8H PRN   Or  ondansetron, 4 mg, Q6H PRN  polyethylene glycol, 17 g, Daily PRN  acetaminophen, 650 mg, Q6H PRN   Or  acetaminophen, 650 mg, Q6H PRN         Objective:    General Appearance: alert and oriented to person, place and time and some respiratory distress  Skin: warm and dry  Neck: neck supple and non tender    Pulmonary/Chest[de-identified] Better but still diminished breath sounds, also sounds are coarse but I hear no real crackles other than perhaps right base with appears more dull than other lobes, bronchospasm with end expiratory wheezing and rhonchi  Cardiovascular: regular, very distant, no murmur appreciated  Abdomen: soft, non-tender, non-distended  Extremities:minimal LE edema    Recent Labs     10/31/22  1347 11/01/22  0300    137   K 4.1 4.8   CL 92* 90* CO2 40* 38*   BUN 8 15   CREATININE 0.8 0.7   GLUCOSE 124* 118*   CALCIUM 7.8* 7.7*       Recent Labs     10/31/22  1347 11/01/22  0300   WBC 8.7 11.2   RBC 3.50* 3.89   HGB 11.1* 12.1*   HCT 36.4* 39.6   .0* 101.8*   MCH 31.7 31.1   MCHC 30.5* 30.6*   RDW 13.5 13.5    219   MPV 9.5 9.4       Radiology:   US DUP LOWER EXTREMITY LEFT LIBBY   Final Result   No evidence of DVT in the left lower extremity. RECOMMENDATIONS:   Unavailable         XR CHEST PORTABLE   Final Result   Suspect mild pulmonary vascular congestion. Assessment:    Principal Problem:    Heart failure (HCC)  Active Problems:    COPD exacerbation (HCC)  Resolved Problems:    * No resolved hospital problems. *      Plan:    1. Severe dyspnea and shortness of breath  Likely COPD exacerbation and bronchospasm on top of end-stage COPD with very little reserve. Not sure repeat positive RSV has any relevance. He is still coughing with what patient describes as large amount of rust colored sputum. We will try and check a respiratory culture as he was diagnosed with strep pneumonia and this could be ceftriaxone resistant. On IV steroids, around-the-clock aerosols, nebulized Brovana and Pulmicort. Also slightly higher BNP and received 2 doses of Lasix. We will watch creatinine and decide to redose Lasix daily. Pulmonary consult pending.     Kenny Edgar MD    Electronically signed by Betty Hooper MD on 11/1/2022 at 11:35 AM

## 2022-11-01 NOTE — CONSULTS
Pulmonary Consultation    Admit Date: 10/31/2022  Requesting Physician: Promise Singer MD    Reason for consultation:  End stage COPD, short of breath  HPI:  Patient is a 64year old male who presents to emergency room with increased shortness of breath for two weeks. He was recently admitted and treated for RSV and COPD exacerbation. He was treated in emergency room with intravenous steroids, diuretics and aerosols. He is a lifelong smoker, 4 ppd. He does have 7 L oxygen at home that he wears continuous. Patient seen at bedside still short of breath on 7 L oxygen. He does have a productive cough of tan/brown sputum. He does hear occasional wheezing. PMH:    Past Medical History:   Diagnosis Date    Anxiety     COPD (chronic obstructive pulmonary disease) (HCC)     Hypertension     Leg swelling     Multiple gastric ulcers     Restless leg syndrome      PSH:   Past Surgical History:   Procedure Laterality Date    HERNIA REPAIR      HIP SURGERY      KNEE SURGERY         Review of Systems:   Constitutional: As noted in the HPI. Eyes: No visual changes or diplopia. No scleral icterus. ENT: No headaches, hearing loss or vertigo. No nasal congestion, or sore throat. Cardiovascular: No chest pain, dyspnea on exertion, or palpitations. Respiratory: See above  Gastrointestinal: No abdominal pain, nausea or emesis. No diarrhea or rectal bleeding or melena. No change in bowel habits. Genitourinary: No dysuria, urinary frequency, or incontinence. No hematuria. Musculoskeletal: No gait disturbance, weakness or joint complaints. Integumentary: No rash or pruritis. No abnormal pigmentation, hair or nail changes. Neurological: No headache, diplopia, dizziness, tremor, change in muscle strength, numbness or tingling. No change in gait, balance, coordination, mood, affect, memory, mentation, behavior. Psychiatric: No anxiety or depression.   Endocrine: No temperature intolerance, excessive thirst, fluid intake, urinary frequency, excessive appetite, or recent weight change. Hematologic/Lymphatic: No abnormal bruising or bleeding, blood clots or swollen lymph nodes. No anemia, fever, chills, night sweats, or swollen glands. Allergic/Immunologic: No seasonal or perenial allergies. No history of hives or atopic dermatitis. Social History:  Alcohol:  denies  Tobacco:   Quit 2020, 100 pack years. Employment:  no silica or asbestos exposure  Family:  No family history of lung disease    Medications:   sodium chloride        LORazepam  1 mg Oral TID    Arformoterol Tartrate  15 mcg Nebulization BID    budesonide  0.5 mg Nebulization BID    calcium elemental  500 mg Oral BID WC    FLUoxetine  10 mg Oral Daily    gabapentin  100 mg Oral TID    ipratropium-albuterol  1 ampule Inhalation Q4H WA    metoprolol tartrate  25 mg Oral BID    pantoprazole  40 mg Oral QAM AC    pramipexole  0.25 mg Oral Nightly    methylPREDNISolone  40 mg IntraVENous Q8H    sodium chloride flush  5-40 mL IntraVENous 2 times per day    enoxaparin  40 mg SubCUTAneous Daily    ARIPiprazole  5 mg Oral Daily       Vitals:  Tmax:  VITALS:  /74   Pulse 53   Temp 97.5 °F (36.4 °C) (Oral)   Resp 20   Ht 5' 4\" (1.626 m)   Wt 123 lb 8 oz (56 kg)   SpO2 99%   BMI 21.20 kg/m²   24HR INTAKE/OUTPUT:    Intake/Output Summary (Last 24 hours) at 2022 1149  Last data filed at 2022 0903  Gross per 24 hour   Intake 1180 ml   Output 1375 ml   Net -195 ml     CURRENT PULSE OXIMETRY:  SpO2: 99 %  24HR PULSE OXIMETRY RANGE:  SpO2  Av %  Min: 98 %  Max: 100 %    EXAM:  General: No distress. Alert. Eyes: PERRL. No sclera icterus. No conjunctival injection. ENT: No discharge. Pharynx clear. Neck: Trachea midline. Normal thyroid. No jvd, no hjr. Resp: bilateral expiratory wheezing. No accessory muscle use. no rales. no rhonchi. Very Diminished. CV: Regular rate. Regular rhythm. No murmur No rub. Abd: Non-tender. Non-distended. No masses. No organmegaly. Normal bowel sounds. Skin: Warm and dry. No nodule on exposed extremities. No rash on exposed extremities. Lymph: No cervical LAD. No supraclavicular LAD. Ext: No joint deformity. No clubbing. No cyanosis. No edema  Neuro: Awake. Follows commands. Positive pupils/gag/corneals. Normal pain response. Lab Results:  CBC:   Recent Labs     10/31/22  1347 11/01/22  0300   WBC 8.7 11.2   HGB 11.1* 12.1*   HCT 36.4* 39.6   .0* 101.8*    219       BMP:  Recent Labs     10/31/22  1347 11/01/22  0300    137   K 4.1 4.8   CL 92* 90*   CO2 40* 38*   BUN 8 15   CREATININE 0.8 0.7    ALB:3,BILIDIR:3,BILITOT:3,ALKPHOS:3)@    PT/INR: No results for input(s): PROTIME, INR in the last 72 hours. Cultures:  Sputum: not available  Blood: not available    ABG:   Recent Labs     10/31/22  1709   PH 7.441   PO2 74.0*   PCO2 59.0*   HCO3 39.3*   BE 12.6*   O2SAT 95.3   METHB 0.3   O2HB 94.0   COHB 1.1   O2CON 17.9   HHB 4.6   THB 13.5             Films:     US DUP LOWER EXTREMITY LEFT LIBBY   Final Result   No evidence of DVT in the left lower extremity. RECOMMENDATIONS:   Unavailable         XR CHEST PORTABLE   Final Result   Suspect mild pulmonary vascular congestion. .        Assessment:  COPD. End stage. Current exacerbation. Recent RSV two weeks ago. PCR continues to be positive, its unclear how long that can stay positive but it may still be causing some airway irritation. Elevated BNP: Chest radiograph actually shows improvement, the old film was never looked at by radiology, with clearing of both the right and left bases. Plan:  Continue steroids Q8H. Continue aerosols. Respiratory culture. Thanks for letting us see this patient in consultation. Total time in reviewing the previous admissions and records, reviewing the current x-rays, labs, and discussing with clinical staff including nursing and physicians, exceeded 50 minutes. Please contact us with any questions. Office [phone] or after hours through [employer], x [phone]. Please note that voice recognition technology was used (while wearing a Covid mandated mask) in the preparation of this note and make therefore it may contain inadvertent transcription errors. If the patient is a COVID 19 isolation patient, the above physical exam reflects that of the examining physician for the day. ARGELIA Larios - NP    Attestation    The patient was seen and personally examined. Patient appears comfortable this p.m. Radiographs are reviewed and compared to previous. There is no evidence of vascular congestion as stated above. Overall, the patient is doing pretty well compared to his previous admission. I have discussed the case, including pertinent history and exam findings with the nurse practitioner. I have reviewed meds and pertinent labs and the key elements of the encounter have been performed by me. I agree with the assessment, plan and orders as documented by the nurse practitioner. Additions and corrections are reflected in the signed note. Rica Cardenas MD,  M.D., F.C.C.P.    [facility], [address] [place] [address], [address] [place] [address]  Office Visits:  [facility]

## 2022-11-01 NOTE — PROGRESS NOTES
Pt continuously asking RN for IV ativan. Ativan is not ordered or on home medication list. NP notified.  Melatonin ordered for sleep aide

## 2022-11-01 NOTE — PLAN OF CARE
Problem: Discharge Planning  Goal: Discharge to home or other facility with appropriate resources  Outcome: Progressing  Flowsheets (Taken 10/31/2022 2221)  Discharge to home or other facility with appropriate resources:   Arrange for needed discharge resources and transportation as appropriate   Identify barriers to discharge with patient and caregiver     Problem: Pain  Goal: Verbalizes/displays adequate comfort level or baseline comfort level  Outcome: Progressing  Flowsheets (Taken 10/31/2022 1956)  Verbalizes/displays adequate comfort level or baseline comfort level:   Encourage patient to monitor pain and request assistance   Assess pain using appropriate pain scale     Problem: Safety - Adult  Goal: Free from fall injury  Outcome: Progressing     Problem: ABCDS Injury Assessment  Goal: Absence of physical injury  Outcome: Progressing  Flowsheets (Taken 10/31/2022 2217)  Absence of Physical Injury: Implement safety measures based on patient assessment

## 2022-11-02 LAB
ANION GAP SERPL CALCULATED.3IONS-SCNC: 9 MMOL/L (ref 7–16)
ANISOCYTOSIS: ABNORMAL
BASOPHILS ABSOLUTE: 0.05 E9/L (ref 0–0.2)
BASOPHILS RELATIVE PERCENT: 0.2 % (ref 0–2)
BUN BLDV-MCNC: 15 MG/DL (ref 6–20)
CALCIUM SERPL-MCNC: 7.9 MG/DL (ref 8.6–10.2)
CHLORIDE BLD-SCNC: 92 MMOL/L (ref 98–107)
CO2: 34 MMOL/L (ref 22–29)
CREAT SERPL-MCNC: 0.7 MG/DL (ref 0.7–1.2)
EOSINOPHILS ABSOLUTE: 0 E9/L (ref 0.05–0.5)
EOSINOPHILS RELATIVE PERCENT: 0 % (ref 0–6)
GFR SERPL CREATININE-BSD FRML MDRD: >60 ML/MIN/1.73
GLUCOSE BLD-MCNC: 143 MG/DL (ref 74–99)
HCT VFR BLD CALC: 39.8 % (ref 37–54)
HEMOGLOBIN: 12.2 G/DL (ref 12.5–16.5)
IMMATURE GRANULOCYTES #: 0.58 E9/L
IMMATURE GRANULOCYTES %: 1.9 % (ref 0–5)
LYMPHOCYTES ABSOLUTE: 0.45 E9/L (ref 1.5–4)
LYMPHOCYTES RELATIVE PERCENT: 1.5 % (ref 20–42)
MCH RBC QN AUTO: 31.5 PG (ref 26–35)
MCHC RBC AUTO-ENTMCNC: 30.7 % (ref 32–34.5)
MCV RBC AUTO: 102.8 FL (ref 80–99.9)
MONOCYTES ABSOLUTE: 0.5 E9/L (ref 0.1–0.95)
MONOCYTES RELATIVE PERCENT: 1.7 % (ref 2–12)
NEUTROPHILS ABSOLUTE: 28.53 E9/L (ref 1.8–7.3)
NEUTROPHILS RELATIVE PERCENT: 94.7 % (ref 43–80)
PDW BLD-RTO: 13.9 FL (ref 11.5–15)
PLATELET # BLD: 196 E9/L (ref 130–450)
PMV BLD AUTO: 10.1 FL (ref 7–12)
POTASSIUM REFLEX MAGNESIUM: 4.9 MMOL/L (ref 3.5–5)
RBC # BLD: 3.87 E12/L (ref 3.8–5.8)
SODIUM BLD-SCNC: 135 MMOL/L (ref 132–146)
WBC # BLD: 30.1 E9/L (ref 4.5–11.5)

## 2022-11-02 PROCEDURE — 2060000000 HC ICU INTERMEDIATE R&B

## 2022-11-02 PROCEDURE — 97165 OT EVAL LOW COMPLEX 30 MIN: CPT

## 2022-11-02 PROCEDURE — 80048 BASIC METABOLIC PNL TOTAL CA: CPT

## 2022-11-02 PROCEDURE — 6370000000 HC RX 637 (ALT 250 FOR IP): Performed by: INTERNAL MEDICINE

## 2022-11-02 PROCEDURE — 6360000002 HC RX W HCPCS: Performed by: INTERNAL MEDICINE

## 2022-11-02 PROCEDURE — 6370000000 HC RX 637 (ALT 250 FOR IP): Performed by: STUDENT IN AN ORGANIZED HEALTH CARE EDUCATION/TRAINING PROGRAM

## 2022-11-02 PROCEDURE — 85025 COMPLETE CBC W/AUTO DIFF WBC: CPT

## 2022-11-02 PROCEDURE — 94640 AIRWAY INHALATION TREATMENT: CPT

## 2022-11-02 PROCEDURE — 6370000000 HC RX 637 (ALT 250 FOR IP)

## 2022-11-02 PROCEDURE — 99232 SBSQ HOSP IP/OBS MODERATE 35: CPT | Performed by: INTERNAL MEDICINE

## 2022-11-02 PROCEDURE — 92610 EVALUATE SWALLOWING FUNCTION: CPT

## 2022-11-02 PROCEDURE — 6360000002 HC RX W HCPCS

## 2022-11-02 PROCEDURE — 2700000000 HC OXYGEN THERAPY PER DAY

## 2022-11-02 PROCEDURE — 2580000003 HC RX 258: Performed by: INTERNAL MEDICINE

## 2022-11-02 PROCEDURE — 36415 COLL VENOUS BLD VENIPUNCTURE: CPT

## 2022-11-02 PROCEDURE — 97161 PT EVAL LOW COMPLEX 20 MIN: CPT

## 2022-11-02 PROCEDURE — 92526 ORAL FUNCTION THERAPY: CPT

## 2022-11-02 RX ADMIN — IPRATROPIUM BROMIDE AND ALBUTEROL SULFATE 1 AMPULE: .5; 2.5 SOLUTION RESPIRATORY (INHALATION) at 08:38

## 2022-11-02 RX ADMIN — BUDESONIDE 1000 MCG: 0.5 SUSPENSION RESPIRATORY (INHALATION) at 21:33

## 2022-11-02 RX ADMIN — GABAPENTIN 100 MG: 100 CAPSULE ORAL at 08:36

## 2022-11-02 RX ADMIN — PANTOPRAZOLE SODIUM 40 MG: 40 TABLET, DELAYED RELEASE ORAL at 06:00

## 2022-11-02 RX ADMIN — Medication 500 MG: at 08:36

## 2022-11-02 RX ADMIN — ARFORMOTEROL TARTRATE 15 MCG: 15 SOLUTION RESPIRATORY (INHALATION) at 21:33

## 2022-11-02 RX ADMIN — FLUOXETINE HYDROCHLORIDE 10 MG: 10 TABLET ORAL at 08:36

## 2022-11-02 RX ADMIN — HYDROCODONE BITARTRATE AND ACETAMINOPHEN 1 TABLET: 7.5; 325 TABLET ORAL at 20:17

## 2022-11-02 RX ADMIN — METHYLPREDNISOLONE SODIUM SUCCINATE 40 MG: 40 INJECTION, POWDER, LYOPHILIZED, FOR SOLUTION INTRAMUSCULAR; INTRAVENOUS at 06:00

## 2022-11-02 RX ADMIN — METOPROLOL TARTRATE 25 MG: 25 TABLET, FILM COATED ORAL at 10:26

## 2022-11-02 RX ADMIN — LORAZEPAM 1 MG: 1 TABLET ORAL at 20:17

## 2022-11-02 RX ADMIN — METOPROLOL TARTRATE 25 MG: 25 TABLET, FILM COATED ORAL at 20:17

## 2022-11-02 RX ADMIN — GABAPENTIN 100 MG: 100 CAPSULE ORAL at 13:13

## 2022-11-02 RX ADMIN — LORAZEPAM 1 MG: 1 TABLET ORAL at 08:36

## 2022-11-02 RX ADMIN — BUDESONIDE 1000 MCG: 0.5 SUSPENSION RESPIRATORY (INHALATION) at 08:38

## 2022-11-02 RX ADMIN — PRAMIPEXOLE DIHYDROCHLORIDE 0.25 MG: 0.25 TABLET ORAL at 20:16

## 2022-11-02 RX ADMIN — SODIUM CHLORIDE, PRESERVATIVE FREE 10 ML: 5 INJECTION INTRAVENOUS at 06:00

## 2022-11-02 RX ADMIN — IPRATROPIUM BROMIDE AND ALBUTEROL SULFATE 1 AMPULE: .5; 2.5 SOLUTION RESPIRATORY (INHALATION) at 12:10

## 2022-11-02 RX ADMIN — HYDROCODONE BITARTRATE AND HOMATROPINE METHYLBROMIDE 5 ML: 5; 1.5 SOLUTION ORAL at 08:36

## 2022-11-02 RX ADMIN — LORAZEPAM 1 MG: 1 TABLET ORAL at 13:13

## 2022-11-02 RX ADMIN — Medication 3 MG: at 20:17

## 2022-11-02 RX ADMIN — METHYLPREDNISOLONE SODIUM SUCCINATE 40 MG: 40 INJECTION, POWDER, LYOPHILIZED, FOR SOLUTION INTRAMUSCULAR; INTRAVENOUS at 20:19

## 2022-11-02 RX ADMIN — HYDROCODONE BITARTRATE AND HOMATROPINE METHYLBROMIDE 5 ML: 5; 1.5 SOLUTION ORAL at 00:34

## 2022-11-02 RX ADMIN — Medication 10 ML: at 08:37

## 2022-11-02 RX ADMIN — IPRATROPIUM BROMIDE AND ALBUTEROL SULFATE 1 AMPULE: .5; 2.5 SOLUTION RESPIRATORY (INHALATION) at 21:33

## 2022-11-02 RX ADMIN — ARIPIPRAZOLE 5 MG: 5 TABLET ORAL at 08:36

## 2022-11-02 RX ADMIN — HYDROCODONE BITARTRATE AND HOMATROPINE METHYLBROMIDE 5 ML: 5; 1.5 SOLUTION ORAL at 20:17

## 2022-11-02 RX ADMIN — Medication 10 ML: at 20:19

## 2022-11-02 RX ADMIN — IPRATROPIUM BROMIDE AND ALBUTEROL SULFATE 1 AMPULE: .5; 2.5 SOLUTION RESPIRATORY (INHALATION) at 16:39

## 2022-11-02 RX ADMIN — HYDROCODONE BITARTRATE AND ACETAMINOPHEN 1 TABLET: 7.5; 325 TABLET ORAL at 08:36

## 2022-11-02 RX ADMIN — ENOXAPARIN SODIUM 40 MG: 100 INJECTION SUBCUTANEOUS at 08:36

## 2022-11-02 RX ADMIN — METHYLPREDNISOLONE SODIUM SUCCINATE 40 MG: 40 INJECTION, POWDER, LYOPHILIZED, FOR SOLUTION INTRAMUSCULAR; INTRAVENOUS at 13:13

## 2022-11-02 RX ADMIN — ARFORMOTEROL TARTRATE 15 MCG: 15 SOLUTION RESPIRATORY (INHALATION) at 08:38

## 2022-11-02 RX ADMIN — HYDROCODONE BITARTRATE AND HOMATROPINE METHYLBROMIDE 5 ML: 5; 1.5 SOLUTION ORAL at 13:13

## 2022-11-02 RX ADMIN — GABAPENTIN 100 MG: 100 CAPSULE ORAL at 20:17

## 2022-11-02 RX ADMIN — Medication 500 MG: at 18:08

## 2022-11-02 ASSESSMENT — PAIN DESCRIPTION - LOCATION
LOCATION: LEG
LOCATION: LEG

## 2022-11-02 ASSESSMENT — PAIN SCALES - GENERAL
PAINLEVEL_OUTOF10: 6
PAINLEVEL_OUTOF10: 9
PAINLEVEL_OUTOF10: 8

## 2022-11-02 ASSESSMENT — PAIN DESCRIPTION - ONSET: ONSET: ON-GOING

## 2022-11-02 ASSESSMENT — PAIN DESCRIPTION - DESCRIPTORS
DESCRIPTORS: ACHING;CRAMPING;SORE
DESCRIPTORS: ACHING;CRAMPING;DISCOMFORT

## 2022-11-02 ASSESSMENT — PAIN DESCRIPTION - ORIENTATION
ORIENTATION: LEFT
ORIENTATION: LEFT

## 2022-11-02 ASSESSMENT — PAIN DESCRIPTION - PAIN TYPE: TYPE: CHRONIC PAIN

## 2022-11-02 ASSESSMENT — PAIN DESCRIPTION - FREQUENCY: FREQUENCY: CONTINUOUS

## 2022-11-02 ASSESSMENT — PAIN - FUNCTIONAL ASSESSMENT: PAIN_FUNCTIONAL_ASSESSMENT: PREVENTS OR INTERFERES SOME ACTIVE ACTIVITIES AND ADLS

## 2022-11-02 NOTE — PROGRESS NOTES
[facility] CARE AT [place] [facility]   Progress Note    Admitting Date and Time: 10/31/2022 12:55 PM  Admit Dx: Heart failure (HCC) [I50.9]  COPD exacerbation (HCC) [J44.1]    Subjective:    Patient feels cough is better with Hycodan. Still feels quite dyspneic. He was observed doing physical therapy and walking around bed with walker patient was able to talk and not become too dyspneic. He tells me that he no longer wants to be DNR CCA but wants to be full code and would tolerate intubation if this were necessary. ROS: denies fever, chills, cp, n/v, HA unless stated above.      LORazepam  1 mg Oral TID    budesonide  1 mg Nebulization BID    Arformoterol Tartrate  15 mcg Nebulization BID    calcium elemental  500 mg Oral BID WC    FLUoxetine  10 mg Oral Daily    gabapentin  100 mg Oral TID    ipratropium-albuterol  1 ampule Inhalation Q4H WA    metoprolol tartrate  25 mg Oral BID    pantoprazole  40 mg Oral QAM AC    pramipexole  0.25 mg Oral Nightly    methylPREDNISolone  40 mg IntraVENous Q8H    sodium chloride flush  5-40 mL IntraVENous 2 times per day    enoxaparin  40 mg SubCUTAneous Daily    ARIPiprazole  5 mg Oral Daily     melatonin, 3 mg, Nightly PRN  HYDROcodone homatropine, 5 mL, Q4H PRN  HYDROcodone-acetaminophen, 1 tablet, BID PRN  sodium chloride flush, 5-40 mL, PRN  sodium chloride, , PRN  ondansetron, 4 mg, Q8H PRN   Or  ondansetron, 4 mg, Q6H PRN  polyethylene glycol, 17 g, Daily PRN  acetaminophen, 650 mg, Q6H PRN   Or  acetaminophen, 650 mg, Q6H PRN       Objective:    General Appearance: alert and oriented to person, place and time and some respiratory distress  Skin: warm and dry  Neck: neck supple and non tender    Pulmonary/Chest[de-identified] Diminished breath sounds, also sounds are coarse but I hear no real crackles other than perhaps right base with appears more dull than other lobes, bronchospasm with end expiratory wheezing and rhonchi  Cardiovascular: regular, very distant, no murmur appreciated  Abdomen: soft, non-tender, non-distended  Extremities:minimal LE edema    Recent Labs     10/31/22  1347 11/01/22  0300 11/02/22  0245    137 135   K 4.1 4.8 4.9   CL 92* 90* 92*   CO2 40* 38* 34*   BUN 8 15 15   CREATININE 0.8 0.7 0.7   GLUCOSE 124* 118* 143*   CALCIUM 7.8* 7.7* 7.9*         Recent Labs     10/31/22  1347 11/01/22  0300 11/02/22  0245   WBC 8.7 11.2 30.1*   RBC 3.50* 3.89 3.87   HGB 11.1* 12.1* 12.2*   HCT 36.4* 39.6 39.8   .0* 101.8* 102.8*   MCH 31.7 31.1 31.5   MCHC 30.5* 30.6* 30.7*   RDW 13.5 13.5 13.9    219 196   MPV 9.5 9.4 10.1         Radiology:   US DUP LOWER EXTREMITY LEFT LIBBY   Final Result   No evidence of DVT in the left lower extremity. RECOMMENDATIONS:   Unavailable         XR CHEST PORTABLE   Final Result   Suspect mild pulmonary vascular congestion. Assessment:    Principal Problem:    Heart failure (HCC)  Active Problems:    COPD exacerbation (HCC)  Resolved Problems:    * No resolved hospital problems. *      Plan:    1. Severe dyspnea and shortness of breath  Likely COPD exacerbation and bronchospasm on top of end-stage COPD with very little reserve. Not sure repeat positive RSV has any relevance. He is still coughing with what patient describes as large amount of rust colored sputum. We will try and check a respiratory culture as he was diagnosed with strep pneumonia and this could be ceftriaxone resistant. On IV steroids, around-the-clock aerosols, nebulized Brovana and Pulmicort. Also slightly higher BNP and received 2 doses of Lasix. We will watch creatinine and decide to redose Lasix daily. Pulmonary consult pending.     Lyn Pandey MD    Electronically signed by Selena Zaidi MD on 11/2/2022 at 12:01 PM

## 2022-11-02 NOTE — PROGRESS NOTES
SPEECH/LANGUAGE PATHOLOGY  CLINICAL ASSESSMENT OF SWALLOWING FUNCTION   and PLAN OF CARE    PATIENT NAME:  Federico Morris  (male)     MRN:  [id number]    :  1965  (64 y.o.)  STATUS:  Inpatient: Room 0/440-A    TODAY'S DATE:  2022  REFERRING PROVIDER:   Reyes Parrish MD  SPECIFIC PROVIDER ORDER: speech language pathology (SLP) eval and treat Date of order:  22  REASON FOR REFERRAL: To assess oropharyngeal swallow fx s/p recent hospital admission. EVALUATING THERAPIST: LELA Zhang                 RESULTS:    DYSPHAGIA DIAGNOSIS:   Clinical indicators of mild-moderate oral phase dysphagia  and suspected mild-moderate pharyngeal phase dysphagia       DIET RECOMMENDATIONS:  Pureed consistency solids (IDDSI level 4) with  thin liquids (IDDSI level 0)-no straws, as tolerated, MEDICATION ADMINISTRATION, and Administer medication crushed, as able, with pudding/applesauce    Pt initially stated he did not want medications crushed-SLP reviewed rationale for such at this time-unsure if pt will follow this recommendation. Nurse informed. FEEDING RECOMMENDATIONS:     Assistance level:  Supervision is needed during all oral intake      Compensatory strategies recommended: Small bites/sips, Alternate solids and liquids, and No straw      Discussed recommendations with nursing and/or faxed report to referring provider: Yes; informed pt's nurse re: rec for puree, thin-no straws, of rec for cues during PO intake and pertinent info as detailed in report.       SPEECH THERAPY  PLAN OF CARE   The dysphagia POC is established based on physician order, dysphagia diagnosis and results of clinical assessment     Skilled SLP intervention for dysphagia management on acute care 3-5 x per week until goals met, pt plateaus in function and/or discharged from hospital    Conditions Requiring Skilled Therapeutic Intervention for dysphagia:    Patient is performing below functional baseline d/t  current acute condition, Multiple diagnoses, multiple medications, and increased dependency upon caregivers. Specific dysphagia interventions to include:     Compensatory strategy training   Therapeutic exercises  Meal time assessment for 1-2 sessions to provide diet modification and compensatory strategy implementation due to need to ensure proper implementation of compensatory strategies during PO intake     Specific instructions for next treatment:  development and training of compensatory swallow strategies to improve airway protection and swallow function  Patient Treatment Goals:    Short Term Goals:  Pt will implement identified compensatory swallowing strategies on 90% of opportunities or greater to improve airway protection and swallow function. Pt will participate in ongoing mealtime assessment to provide diet modification and compensatory strategy implementation to minimize risk of aspiration associated with PO intake  Pt will participate in meal time assessment for 1-2 sessions to provide diet modification and compensatory strategy implementation due to need to ensure proper implementation of compensatory strategies during PO intake   Pt will complete BOTR strength/ ROM exercises to reduce pharyngeal residuals and improve epiglottic inversion moderate verbal prompts  Pt will complete laryngeal strength/ ROM therapeutic exercises to improve airway protection for the least restrictive PO diet moderate verbal prompts    Long Term Goals:   Pt will maintain adequate nutrition/hydration via PO intake of the least restrictive oral diet with implementation of safe swallow/ compensatory strategies and decrease signs/symptoms of aspiration to less than 1 x/day. Pt will improve oropharyngeal swallow function to ensure airway protection during PO intake to maintain adequate nutrition/hydration and decrease signs/symptoms of aspiration to less than 1 x/day.    OTHER RECOMMENDATIONS:  A Video Swallow Study (MBSS) is recommended and requires a physician order IF aspiration/silent aspiration is suspected based on medical presentation. Nurse informed of this. Patient/family Goal:    Did not state. Will further assess during treatment. Plan of care discussed with Patient   The Patient guardedly understand(s) the diagnosis, prognosis and plan of care     Rehabilitation Potential/Prognosis: fair                    ADMITTING DIAGNOSIS: Heart failure ([facility] [I50.9]  COPD exacerbation ([facility] [J44.1]    VISIT DIAGNOSIS:   Visit Diagnoses         Codes    Shortness of breath    -  Primary R06.02    Respiratory syncytial virus (RSV)     B33.8             PATIENT REPORT/COMPLAINT: Pt reported coughing/choking with solid intake at times. Pt denied swallow difficulty with liquids. RN cleared patient for participation in assessment     yes; RN reported pt coughing with medication administration but difficult to tell if related to swallow or other respiratory diagnoses at this time. PRIOR LEVEL OF SWALLOW FUNCTION:    PAST HISTORY OF DYSPHAGIA?: yes; most recently MBSS 6/2022:   \"Possible abnormality viewed during study, discussed with radiologist. Difficult to assess from lateral view if abnormality or artifact. Pt may benefit from xray/CT of soft tissue neck to further assess. Also pt presents with a very hoarse voice quality with inconsistent pitch breaks. Pt reports this is not new. Pt may benefit from an ENT consult at some point to further assess. DIET RECOMMENDATIONS:  Pureed consistency solids (IDDSI level 4) with small sips thin liquids (IDDSI level 0) as tolerated\"     Per chart review, it does not appear that follow up imaging was completed- SLP reviewed these MBSS results w/ pt's nurse and informed that this may be indicated per physician discretion re: pt reports of choking with solid intake and that given vocal qualities noted, that ENT consult may be indicated at this time or as outpt.      Home diet: Diet information not available     Current Diet Order:  ADULT DIET; Regular    PROCEDURE:  Consistencies Administered During the Evaluation   Liquids: thin liquid   Solids:  pureed foods      Method of Intake:   cup, straw, spoon  Self fed      Position:   Seated, upright    CLINICAL ASSESSMENT:  Oral Stage:       Dentition:  edentulous      Mildly prolonged oral stage w/ puree w/ min to no oral residue noted. Pt refused solid (cracker) trial, pt stating that he would \"choke. \"     Pharyngeal Stage:    No signs of aspiration were noted during this evaluation (with thin from cup, straw, and puree); however, silent aspiration cannot be ruled out at bedside. If silent aspiration is suspected, a Videofluoroscopic Study of Swallowing (MBS) is recommended and requires a physician order. Cognition:   Questionable Confusion noted    Oral Peripheral Examination   Left labiobuccal weakness-mild noted and inconsistent lingual tremor noted-SLP informed pt's nurse of these. Current Respiratory Status     high flow nasal cannula     Parameters of Speech Production  Respiration:  Adequate for speech production  Quality:   Strained and Breathy; pitch breaks at times. Diplophonic quality also noted at times. Intensity: Within functional limits    Volitional Swallow: present     Volitional Cough:   DNT    Pain: No pain reported. EDUCATION:   The Speech Language Pathologist (SLP) completed education regarding results of evaluation and that intervention is warranted at this time. Learner: Patient  Education: Reviewed results and recommendations of this evaluation, Reviewed diet and strategies, and Reviewed signs, symptoms and risks of aspiration  Evaluation of Education:  Verbalizes understanding and Needs further instruction    This plan may be re-evaluated and revised as warranted.       Evaluation Time includes thorough review of current medical information, gathering information on past medical history/social history and prior level of function, completion of standardized testing/informal observation of tasks, assessment of data and education on plan of care and goals. [x]The admitting diagnosis and active problem list, have been reviewed prior to initiation of this evaluation. 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, left    S/P Girdlestone procedure    Onychomycosis    Venous insufficiency of both lower extremities    Depression    Oxygen dependent    Anxiety    Cellulitis    Swelling of both lower extremities    Leg swelling    Pneumonia due to COVID-19 virus    Gastroesophageal reflux disease    Fluid retention    Lower extremity pain, left    Acute on chronic heart failure with preserved ejection fraction (HFpEF) ([facility])    Oropharyngeal dysphagia    COPD exacerbation ([facility])    Chronic respiratory failure with hypoxia ([facility])    History of COVID-19    Acute on chronic diastolic (congestive) heart failure ([facility])    Fever of unknown origin    Nodule of upper lobe of left lung    Pneumonia due to organism    RSV (respiratory syncytial virus infection)    Acute on chronic respiratory failure with hypoxia ([facility]    Heart failure ([facility])         CPT code:  91085  bedside swallow eval    Tx note (06336): SLP educated pt re: overt s/s of aspiration in detail and current diet recs-pt agreeable for such. SLP reviewed comp swallow strategies for use w/ PO intake (small bites/sips, alt solids and liquids, assuring oral clearance prior to taking next bite, etc); questions answered to this time. Pt reported noticing vocal changes and SLP rec'd possible ENT consult for such as physician deems appropriate. Materials discarded following and pt left in room w/ callbell w/I reach.

## 2022-11-02 NOTE — CARE COORDINATION
Social Work/Discharge Planning:  Met with patient and completed initial assessment. Explained Social Work role and discussed transition of care/discharge planning. Patient states his cousin lives with him in an apartment with a ramp to enter. PTA he uses a walker. He has a nebulizer, wheelchair and wears 7 liters oxygen through [facility].  He is currently on his oxygen baseline. He has a home health aide daily but can not recall amount of hours. He has a visiting nurse twice a week. His PCP is Dr. Laura Parada and he uses [address] [place] [address] in [place]. Patient states plan is home at discharge. [address] [place] [address],[address] (ph: [phone]) and left a message in regards to whether patient is active with agency. Will continue to follow and assist with discharge planning.   Electronically signed by FEDERICO Moya on 11/2/2022 at 12:22 PM

## 2022-11-02 NOTE — PROGRESS NOTES
Occupational Therapy  OCCUPATIONAL THERAPY INITIAL EVALUATION     [facility]                                                  Patient Name: Governor Ramirez    MRN: [id number]    : 1965    Room: [address] [place] [address]      Evaluating OT: Jae Choi OTR/L [id number]      Referring Provider: Kelvin Mckinnon MD    Specific Provider Orders/Date:OT eval and treat 22      Diagnosis:  Heart failure ([facility] [I50.9]  COPD exacerbation ([facility] [J44.1]      Pertinent Medical History: COPD, HTN       Precautions:  Fall Risk, contact, droplet, O2     Assessment of current deficits    [x] Functional mobility  [x]ADLs  [x] Strength               [x]Cognition    [x] Functional transfers   [x] IADLs         [x] Safety Awareness   [x]Endurance    [] Fine Coordination              [x] Balance      [] Vision/perception   []Sensation     []Gross Motor Coordination  [] ROM  [] Delirium                   [] Motor Control     OT PLAN OF CARE   OT POC based on physician orders, patient diagnosis and results of clinical assessment    Frequency/Duration 2-4 days/wk for 2 weeks PRN   Specific OT Treatment Interventions to include:   * Instruction/training on adapted ADL techniques and AE recommendations to increase functional independence within precautions       * Training on energy conservation strategies, correct breathing pattern and techniques to improve independence/tolerance for self-care routine  * Functional transfer/mobility training/DME recommendations for increased independence, safety, and fall prevention  * Patient/Family education to increase follow through with safety techniques and functional independence  * Recommendation of environmental modifications for increased safety with functional transfers/mobility and ADLs  * Cognitive retraining/development of therapeutic activities to improve problem solving, judgement, memory, and attention for increased safety/participation in ADL/IADL tasks  * Therapeutic exercise to improve motor endurance, ROM, and functional strength for ADLs/functional transfers  * Therapeutic activities to facilitate/challenge dynamic balance, stand tolerance for increased safety and independence with ADLs  * Neuro-muscular re-education: facilitation of righting/equilibrium reactions, midline orientation, scapular stability/mobility, normalization of muscle tone, and facilitation of volitional active controled movement  * Positioning to improve skin integrity, interaction with environment and functional independence        Recommended Adaptive Equipment: TBD     Home Living: Pt lives with cousin in 1 story house with ramp entry. Bathroom setup: tub/shower   Equipment owned: wheeled walker, w/c    Prior Level of Function: assist from aide with ADLs , assist from aide with IADLs; pt has aide 7 days a week. functional mobility: wheeled walker    Pain Level: pt reported pain in R foot; pt agreeable to therapy  Cognition: A&O: pt alert, conversing, and following commands.     Memory:  Fair   Sequencing:  Fair   Problem solving:  Fair   Judgement/safety:  Fair      Functional Assessment:  AM-PAC Daily Activity Raw Score: 17/24   Initial Eval Status  Date: 11/2/22 Treatment Status  Date: STGs = LTGs  Time frame: 10-14 days   Feeding Independent      Grooming Sup  Mod I/I   UB Dressing Sup/Set up  Mod I/I   LB Dressing Mod A  To don socks    Sup-with use of AD as appropriate/needed   Bathing Mod A/Min A  Sup -with use of AD as appropriate/needed   Toileting Min A  Mod I/I    Bed Mobility  Supine to sit: independent   Sit to supine: independent   Independent    Functional Transfers Sup with wheeled walker  Sit<>stand from EOB  Independent    Functional Mobility Sup with wheeled walker  Short distance in room  Assist to manage lines  Independent  -with device as needed to maximize independence with ADLs and functional task completion   Balance Sitting:     Static:  independent    Dynamic:Sup  Standing: Sup with wheeled walker  I for dynamic sitting balance to maximize independence with ADLs and functional task completion    I for standing balance to maximize independence with ADLs and functional task completion   Activity Tolerance Fair with light activity. Pt limited by respiratory status and decreased endurance.   good with ADL activity. Pt will demonstrate good understanding of education provided on EC/WS techniques    Visual/  Perceptual Glasses: yes                Additional long-term goal: Pt will increase functional independence to PLOF to allow pt to live in least restrictive environment. Hand Dominance R   AROM (PROM) Strength Additional Info:    [facility] WFL WFL  and wfl FMC/dexterity noted during ADL tasks and functional bed mobility     [facility]  and wfl FMC/dexterity noted during ADL tasks and functional bed mobility     Hearing: WFL   Sensation:  No c/o numbness or tingling   Tone: WFL   Edema: none noted    Comments: Upon arrival patient lying in bed. At end of session, patient returned to bed with call light and phone within reach, all lines and tubes intact. Overall patient demonstrated decreased independence and safety during completion of ADL/functional transfer/mobility tasks. Pt would benefit from continued skilled OT to increase safety and independence with completion of ADL/IADL tasks for functional independence and quality of life. Rehab Potential: Good  for established goals     Patient / Family Goal: none stated    Patient and/or family were instructed on functional diagnosis, prognosis/goals and OT plan of care. Demonstrated fair understanding.      Eval Complexity: Low    Time In: 1012  Time Out: 1036    Min Units   OT Eval Low 97165  x  1   OT Eval Medium 14983      OT Eval High 71796      OT Re-Eval [id number]       Therapeutic Ex 80625       Therapeutic Activities 66965 ADL/Self Care 57624       Orthotic Management 79588       Manual [id number]     Neuro Re-Ed 08840       Non-Billable Time          Evaluation Time additionally includes thorough review of current medical information, gathering information on past medical history/social history and prior level of function, interpretation of standardized testing/informal observation of tasks, assessment of data and development of plan of care and goals.             Rosita Amor, OTR/L, [id number]

## 2022-11-02 NOTE — PROGRESS NOTES
Physical Therapy  Facility/Department: [address] 1  Physical Therapy Initial Assessment    Name: Ally Mckeon  : 1965  MRN: [id number]  Date of Service: 2022    Patient Diagnosis(es): The primary encounter diagnosis was Shortness of breath. A diagnosis of Respiratory syncytial virus (RSV) was also pertinent to this visit. Past Medical History:  has a past medical history of Anxiety, COPD (chronic obstructive pulmonary disease) ([facility] Hypertension, Leg swelling, Multiple gastric ulcers, and Restless leg syndrome. Past Surgical History:  has a past surgical history that includes knee surgery; hip surgery; and hernia repair. Referring provider:  Paulina Grider MD    PT Order:  PT eval and treat     Evaluating PT:  Asha Mccabe PT, DPT PT [id number]    Room #:  8366/0082-D  Diagnosis:  Heart failure ([facility] [I50.9]  COPD exacerbation ([facility] [J44.1]  Precautions:  droplet and contract isolation, O2, fall risk  Equipment Needs:  none. Pt owns a walker and w/c.    SUBJECTIVE:    Pt lives with his cousin in a 1 story home with ramp to enter. Bed and bath is on first floor. Pt ambulated with walker PTA. OBJECTIVE:   Initial Evaluation  Date:  Treatment Short Term/ Long Term   Goals   Was pt agreeable to Eval/treatment? yes     Does pt have pain?  None reported     Bed Mobility  Rolling: independent  Supine to sit: independent  Sit to supine: independent  Scooting: independent to sitting EOB  independent   Transfers Sit to stand: Supervision  Stand to sit: supervision  Stand pivot: NT  independent   Ambulation    25 feet with w/w Supervision   100 feet with w/w modified independent    Stair negotiation: ascended and descended  NT      ROM BLE:  WFL except left knee fused in extension     Strength BLE:  grossly 4/5 except left knee      Balance Sitting EOB:  independent  Dynamic Standing:  Supervision with w/w  Sitting EOB:  independent  Dynamic Standing:  modified independent with w/w AM-PAC 6 Clicks 24/07       Pt is A & O x 3  Sensation:  Pt denies numbness and tingling to extremities    Patient education  Pt educated on PT objectives during eval and while in the hospital    Patient response to education:   Pt verbalized understanding Pt demonstrated skill Pt requires further education in this area       yes     ASSESSMENT:    Conditions Requiring Skilled Therapeutic Intervention:    [x]Decreased strength     [x]Decreased ROM  [x]Decreased functional mobility  [x]Decreased balance   [x]Decreased endurance   []Decreased posture  []Decreased sensation  []Decreased coordination   []Decreased vision  []Decreased safety awareness   []Increased pain       Comments:  Pt found in bed. No report of dizziness during functional mobility. No LOB during ambulation. Pt ambulates with slow gait speed. SPO2 after ambulation 99% on 8L. Pt usually wears 7L, but O2 tank needed to be used for ambulation around the room. At end of eval, pt left in bed with call light in reach. Pt's/ family goals   1. None stated    Prognosis is good for reaching above PT goals. Patient and or family understand(s) diagnosis, prognosis, and plan of care.   yes    PHYSICAL THERAPY PLAN OF CARE:    PT POC is established based on physician order and patient diagnosis     Diagnosis:  Heart failure ([facility] [I50.9]  COPD exacerbation ([facility] [J44.1]    Current Treatment Recommendations:     [x] Strengthening to improve independence with functional mobility   [] ROM to improve independence with functional mobility   [x] Balance Training to improve static/dynamic balance and to reduce fall risk  [x] Endurance Training to improve activity tolerance during functional mobility   [x] Transfer Training to improve safety and independence with all functional transfers   [x] Gait Training to improve gait mechanics, endurance and assess need for appropriate assistive device  [] Stair Training in preparation for safe discharge home and/or into the community   [] Positioning to prevent skin breakdown and contractures  [x] Safety and Education Training   [x] Patient/Caregiver Education   [] HEP  [] Other     PT long term treatment goals are located in above grid    Frequency of treatments: 2-5x/week x 1-2 weeks. Time in  1021  Time out  1036    Evaluation Time includes thorough review of current medical information, gathering information on past medical history/social history and prior level of function, completion of standardized testing/informal observation of tasks, assessment of data and education on plan of care and goals.     CPT codes:  [x] Low Complexity PT evaluation 90459  [] Moderate Complexity PT evaluation 45936  [] High Complexity PT evaluation 99351  [] PT Re-evaluation 85835  [] Therapeutic activities 80152 __minutes  [] Therapeutic exercises 40663 __ minutes      Columba Helton, PT, DPT  PT [id number]

## 2022-11-02 NOTE — PROGRESS NOTES
[facility]  [address], [address], [address]      Pulmonary Progress Note      SUBJECTIVE:  claims feeling some better with breathing, similar with cough, still with mod sputum production though looking much thinner    OBJECTIVE    Medications    Continuous Infusions:   sodium chloride         Scheduled Meds:   LORazepam  1 mg Oral TID    budesonide  1 mg Nebulization BID    Arformoterol Tartrate  15 mcg Nebulization BID    calcium elemental  500 mg Oral BID WC    FLUoxetine  10 mg Oral Daily    gabapentin  100 mg Oral TID    ipratropium-albuterol  1 ampule Inhalation Q4H WA    metoprolol tartrate  25 mg Oral BID    pantoprazole  40 mg Oral QAM AC    pramipexole  0.25 mg Oral Nightly    methylPREDNISolone  40 mg IntraVENous Q8H    sodium chloride flush  5-40 mL IntraVENous 2 times per day    enoxaparin  40 mg SubCUTAneous Daily    ARIPiprazole  5 mg Oral Daily       PRN Meds:melatonin, HYDROcodone homatropine, HYDROcodone-acetaminophen, sodium chloride flush, sodium chloride, ondansetron **OR** ondansetron, polyethylene glycol, acetaminophen **OR** acetaminophen    Physical    VITALS:  /74   Pulse 67   Temp 98 °F (36.7 °C) (Oral)   Resp 18   Ht 5' 4\" (1.626 m)   Wt 128 lb 4.8 oz (58.2 kg)   SpO2 97%   BMI 22.02 kg/m²     24HR INTAKE/OUTPUT:      Intake/Output Summary (Last 24 hours) at 2022 0859  Last data filed at 2022 0600  Gross per 24 hour   Intake 30 ml   Output 2550 ml   Net -2520 ml       24HR PULSE OXIMETRY RANGE:    SpO2  Av.5 %  Min: 97 %  Max: 100 %    General appearance: alert, appears stated age, and cooperative  Lungs: rhonchi bilaterally worse with cough  Heart: regular rate and rhythm, S1, S2 normal, no murmur, click, rub or gallop  Abdomen: soft, non-tender; bowel sounds normal; no masses,  no organomegaly  Extremities: extremities normal, atraumatic, no cyanosis or edema  Neurologic: Mental status: Alert, oriented, thought content appropriate    Data    CBC:   Recent Labs     10/31/22  1347 11/01/22  0300 11/02/22  0245   WBC 8.7 11.2 30.1*   HGB 11.1* 12.1* 12.2*   HCT 36.4* 39.6 39.8   .0* 101.8* 102.8*    219 196       BMP:  Recent Labs     10/31/22  1347 11/01/22  0300 11/02/22  0245    137 135   K 4.1 4.8 4.9   CL 92* 90* 92*   CO2 40* 38* 34*   BUN 8 15 15   CREATININE 0.8 0.7 0.7    ALB:3,BILIDIR:3,BILITOT:3,ALKPHOS:3)@    PT/INR: No results for input(s): PROTIME, INR in the last 72 hours. ABG:   Recent Labs     10/31/22  1709   PH 7.441   PO2 74.0*   PCO2 59.0*   HCO3 39.3*   BE 12.6*   O2SAT 95.3   METHB 0.3   O2HB 94.0   COHB 1.1   O2CON 17.9   HHB 4.6   THB 13.5             Radiology/Other tests reviewed: none    Assessment:     Principal Problem:    Heart failure (HCC)  Active Problems:    COPD exacerbation (HCC)  Resolved Problems:    * No resolved hospital problems. *      Plan:       Cont with steroids, taper as tolerated  Cont with nebs, observe respiratory function and sputum production  Cont with oxygen, taper as tolerated  Still RSV (+) but better procalcitonin, may be more residual from previous admission, claims didn't feel he got worse while at home, just didn't get any much better  OOB to chair, PT/OT      Time at the bedside, reviewing labs and radiographs, reviewing notes and consultations, discussing with staff and family was more than 35 minutes. Thanks for letting us see this patient in consultation. Please contact us with any questions. Office [phone] or after hours through [employer], x [phone].

## 2022-11-03 PROBLEM — G25.81 RLS (RESTLESS LEGS SYNDROME): Status: ACTIVE | Noted: 2022-11-03

## 2022-11-03 LAB
ANION GAP SERPL CALCULATED.3IONS-SCNC: 7 MMOL/L (ref 7–16)
BASOPHILIC STIPPLING: ABNORMAL
BASOPHILS ABSOLUTE: 0.01 E9/L (ref 0–0.2)
BASOPHILS RELATIVE PERCENT: 0.1 % (ref 0–2)
BUN BLDV-MCNC: 19 MG/DL (ref 6–20)
CALCIUM SERPL-MCNC: 8 MG/DL (ref 8.6–10.2)
CHLORIDE BLD-SCNC: 96 MMOL/L (ref 98–107)
CO2: 35 MMOL/L (ref 22–29)
CREAT SERPL-MCNC: 0.7 MG/DL (ref 0.7–1.2)
EOSINOPHILS ABSOLUTE: 0 E9/L (ref 0.05–0.5)
EOSINOPHILS RELATIVE PERCENT: 0 % (ref 0–6)
GFR SERPL CREATININE-BSD FRML MDRD: >60 ML/MIN/1.73
GLUCOSE BLD-MCNC: 156 MG/DL (ref 74–99)
HCT VFR BLD CALC: 37.9 % (ref 37–54)
HEMOGLOBIN: 11.6 G/DL (ref 12.5–16.5)
IMMATURE GRANULOCYTES #: 0.08 E9/L
IMMATURE GRANULOCYTES %: 0.6 % (ref 0–5)
LYMPHOCYTES ABSOLUTE: 0.32 E9/L (ref 1.5–4)
LYMPHOCYTES RELATIVE PERCENT: 2.3 % (ref 20–42)
MAGNESIUM: 2.1 MG/DL (ref 1.6–2.6)
MCH RBC QN AUTO: 31.9 PG (ref 26–35)
MCHC RBC AUTO-ENTMCNC: 30.6 % (ref 32–34.5)
MCV RBC AUTO: 104.1 FL (ref 80–99.9)
MONOCYTES ABSOLUTE: 0.25 E9/L (ref 0.1–0.95)
MONOCYTES RELATIVE PERCENT: 1.8 % (ref 2–12)
NEUTROPHILS ABSOLUTE: 13.15 E9/L (ref 1.8–7.3)
NEUTROPHILS RELATIVE PERCENT: 95.2 % (ref 43–80)
PDW BLD-RTO: 13.5 FL (ref 11.5–15)
PLATELET # BLD: 171 E9/L (ref 130–450)
PMV BLD AUTO: 9.8 FL (ref 7–12)
POLYCHROMASIA: ABNORMAL
POTASSIUM SERPL-SCNC: 5 MMOL/L (ref 3.5–5)
RBC # BLD: 3.64 E12/L (ref 3.8–5.8)
SODIUM BLD-SCNC: 138 MMOL/L (ref 132–146)
WBC # BLD: 13.8 E9/L (ref 4.5–11.5)

## 2022-11-03 PROCEDURE — 2700000000 HC OXYGEN THERAPY PER DAY

## 2022-11-03 PROCEDURE — 6360000002 HC RX W HCPCS: Performed by: INTERNAL MEDICINE

## 2022-11-03 PROCEDURE — 6370000000 HC RX 637 (ALT 250 FOR IP): Performed by: STUDENT IN AN ORGANIZED HEALTH CARE EDUCATION/TRAINING PROGRAM

## 2022-11-03 PROCEDURE — 99232 SBSQ HOSP IP/OBS MODERATE 35: CPT | Performed by: INTERNAL MEDICINE

## 2022-11-03 PROCEDURE — 85025 COMPLETE CBC W/AUTO DIFF WBC: CPT

## 2022-11-03 PROCEDURE — 2060000000 HC ICU INTERMEDIATE R&B

## 2022-11-03 PROCEDURE — 6370000000 HC RX 637 (ALT 250 FOR IP)

## 2022-11-03 PROCEDURE — 80048 BASIC METABOLIC PNL TOTAL CA: CPT

## 2022-11-03 PROCEDURE — 6370000000 HC RX 637 (ALT 250 FOR IP): Performed by: INTERNAL MEDICINE

## 2022-11-03 PROCEDURE — 83735 ASSAY OF MAGNESIUM: CPT

## 2022-11-03 PROCEDURE — 94640 AIRWAY INHALATION TREATMENT: CPT

## 2022-11-03 PROCEDURE — 36415 COLL VENOUS BLD VENIPUNCTURE: CPT

## 2022-11-03 PROCEDURE — 2580000003 HC RX 258: Performed by: INTERNAL MEDICINE

## 2022-11-03 RX ORDER — METHYLPREDNISOLONE SODIUM SUCCINATE 40 MG/ML
40 INJECTION, POWDER, LYOPHILIZED, FOR SOLUTION INTRAMUSCULAR; INTRAVENOUS EVERY 12 HOURS
Status: COMPLETED | OUTPATIENT
Start: 2022-11-03 | End: 2022-11-05

## 2022-11-03 RX ADMIN — METHYLPREDNISOLONE SODIUM SUCCINATE 40 MG: 40 INJECTION, POWDER, LYOPHILIZED, FOR SOLUTION INTRAMUSCULAR; INTRAVENOUS at 05:13

## 2022-11-03 RX ADMIN — FLUOXETINE HYDROCHLORIDE 10 MG: 10 TABLET ORAL at 08:20

## 2022-11-03 RX ADMIN — HYDROCODONE BITARTRATE AND HOMATROPINE METHYLBROMIDE 5 ML: 5; 1.5 SOLUTION ORAL at 12:38

## 2022-11-03 RX ADMIN — SODIUM CHLORIDE, PRESERVATIVE FREE 10 ML: 5 INJECTION INTRAVENOUS at 16:39

## 2022-11-03 RX ADMIN — Medication 10 ML: at 08:21

## 2022-11-03 RX ADMIN — IPRATROPIUM BROMIDE AND ALBUTEROL SULFATE 1 AMPULE: .5; 2.5 SOLUTION RESPIRATORY (INHALATION) at 15:25

## 2022-11-03 RX ADMIN — IPRATROPIUM BROMIDE AND ALBUTEROL SULFATE 1 AMPULE: .5; 2.5 SOLUTION RESPIRATORY (INHALATION) at 07:39

## 2022-11-03 RX ADMIN — IPRATROPIUM BROMIDE AND ALBUTEROL SULFATE 1 AMPULE: .5; 2.5 SOLUTION RESPIRATORY (INHALATION) at 19:23

## 2022-11-03 RX ADMIN — GABAPENTIN 100 MG: 100 CAPSULE ORAL at 20:23

## 2022-11-03 RX ADMIN — PRAMIPEXOLE DIHYDROCHLORIDE 0.25 MG: 0.25 TABLET ORAL at 20:23

## 2022-11-03 RX ADMIN — ACETAMINOPHEN 650 MG: 325 TABLET ORAL at 05:13

## 2022-11-03 RX ADMIN — METOPROLOL TARTRATE 25 MG: 25 TABLET, FILM COATED ORAL at 08:20

## 2022-11-03 RX ADMIN — METOPROLOL TARTRATE 25 MG: 25 TABLET, FILM COATED ORAL at 20:23

## 2022-11-03 RX ADMIN — ARFORMOTEROL TARTRATE 15 MCG: 15 SOLUTION RESPIRATORY (INHALATION) at 19:23

## 2022-11-03 RX ADMIN — Medication 10 ML: at 20:25

## 2022-11-03 RX ADMIN — ARIPIPRAZOLE 5 MG: 5 TABLET ORAL at 08:20

## 2022-11-03 RX ADMIN — HYDROCODONE BITARTRATE AND HOMATROPINE METHYLBROMIDE 5 ML: 5; 1.5 SOLUTION ORAL at 05:13

## 2022-11-03 RX ADMIN — HYDROCODONE BITARTRATE AND HOMATROPINE METHYLBROMIDE 5 ML: 5; 1.5 SOLUTION ORAL at 20:23

## 2022-11-03 RX ADMIN — SODIUM CHLORIDE, PRESERVATIVE FREE 10 ML: 5 INJECTION INTRAVENOUS at 05:13

## 2022-11-03 RX ADMIN — LORAZEPAM 1 MG: 1 TABLET ORAL at 08:20

## 2022-11-03 RX ADMIN — PANTOPRAZOLE SODIUM 40 MG: 40 TABLET, DELAYED RELEASE ORAL at 05:13

## 2022-11-03 RX ADMIN — BUDESONIDE 1000 MCG: 0.5 SUSPENSION RESPIRATORY (INHALATION) at 19:23

## 2022-11-03 RX ADMIN — GABAPENTIN 100 MG: 100 CAPSULE ORAL at 08:20

## 2022-11-03 RX ADMIN — LORAZEPAM 1 MG: 1 TABLET ORAL at 20:23

## 2022-11-03 RX ADMIN — Medication 500 MG: at 16:39

## 2022-11-03 RX ADMIN — ARFORMOTEROL TARTRATE 15 MCG: 15 SOLUTION RESPIRATORY (INHALATION) at 07:39

## 2022-11-03 RX ADMIN — GABAPENTIN 100 MG: 100 CAPSULE ORAL at 13:25

## 2022-11-03 RX ADMIN — HYDROCODONE BITARTRATE AND ACETAMINOPHEN 1 TABLET: 7.5; 325 TABLET ORAL at 20:24

## 2022-11-03 RX ADMIN — LORAZEPAM 1 MG: 1 TABLET ORAL at 13:25

## 2022-11-03 RX ADMIN — Medication 3 MG: at 20:23

## 2022-11-03 RX ADMIN — HYDROCODONE BITARTRATE AND ACETAMINOPHEN 1 TABLET: 7.5; 325 TABLET ORAL at 08:24

## 2022-11-03 RX ADMIN — ENOXAPARIN SODIUM 40 MG: 100 INJECTION SUBCUTANEOUS at 08:20

## 2022-11-03 RX ADMIN — BUDESONIDE 1000 MCG: 0.5 SUSPENSION RESPIRATORY (INHALATION) at 07:39

## 2022-11-03 RX ADMIN — Medication 500 MG: at 08:20

## 2022-11-03 RX ADMIN — HYDROCODONE BITARTRATE AND HOMATROPINE METHYLBROMIDE 5 ML: 5; 1.5 SOLUTION ORAL at 01:11

## 2022-11-03 RX ADMIN — METHYLPREDNISOLONE SODIUM SUCCINATE 40 MG: 40 INJECTION, POWDER, LYOPHILIZED, FOR SOLUTION INTRAMUSCULAR; INTRAVENOUS at 16:39

## 2022-11-03 RX ADMIN — IPRATROPIUM BROMIDE AND ALBUTEROL SULFATE 1 AMPULE: .5; 2.5 SOLUTION RESPIRATORY (INHALATION) at 12:03

## 2022-11-03 ASSESSMENT — PAIN SCALES - GENERAL
PAINLEVEL_OUTOF10: 7
PAINLEVEL_OUTOF10: 8
PAINLEVEL_OUTOF10: 0
PAINLEVEL_OUTOF10: 8
PAINLEVEL_OUTOF10: 9
PAINLEVEL_OUTOF10: 6

## 2022-11-03 ASSESSMENT — PAIN DESCRIPTION - LOCATION
LOCATION: LEG

## 2022-11-03 ASSESSMENT — PAIN DESCRIPTION - DESCRIPTORS
DESCRIPTORS: ACHING;CRUSHING;DISCOMFORT
DESCRIPTORS: ACHING;DISCOMFORT;SORE
DESCRIPTORS: ACHING;DISCOMFORT;SORE

## 2022-11-03 ASSESSMENT — PAIN DESCRIPTION - ORIENTATION
ORIENTATION: LEFT

## 2022-11-03 NOTE — PLAN OF CARE
Problem: Discharge Planning  Goal: Discharge to home or other facility with appropriate resources  11/3/2022 0837 by Jerad Herman RN  Outcome: Progressing     Problem: Pain  Goal: Verbalizes/displays adequate comfort level or baseline comfort level  11/3/2022 0837 by Jerad Herman RN  Outcome: Progressing     Problem: Safety - Adult  Goal: Free from fall injury  11/3/2022 0837 by Jerad Herman RN  Outcome: Progressing

## 2022-11-03 NOTE — CARE COORDINATION
CASE MANAGEMENT. .. + RSV. On aerosols and iv solumedrol 40mg q12hrs. Tolerating his baseline of 7 liters nc. Spoke with Kory Bernla from [facility] [phone]. Confirmed patient is active with their services. He receives assistance from aides with ADL's, light housework etc. Nurse also visits twice weekly. Will need to notify [facility] at OR, obtain resume orders and fax to [phone]. Will cont to follow.

## 2022-11-03 NOTE — PROGRESS NOTES
[facility]  [address], [address], [address]      Pulmonary Progress Note      SUBJECTIVE:  claims gradually feeling some better with breathing, similar with cough and mod sputum production though looking thinner compared to previous admission    OBJECTIVE    Medications    Continuous Infusions:   sodium chloride         Scheduled Meds:   LORazepam  1 mg Oral TID    budesonide  1 mg Nebulization BID    Arformoterol Tartrate  15 mcg Nebulization BID    calcium elemental  500 mg Oral BID WC    FLUoxetine  10 mg Oral Daily    gabapentin  100 mg Oral TID    ipratropium-albuterol  1 ampule Inhalation Q4H WA    metoprolol tartrate  25 mg Oral BID    pantoprazole  40 mg Oral QAM AC    pramipexole  0.25 mg Oral Nightly    methylPREDNISolone  40 mg IntraVENous Q8H    sodium chloride flush  5-40 mL IntraVENous 2 times per day    enoxaparin  40 mg SubCUTAneous Daily    ARIPiprazole  5 mg Oral Daily       PRN Meds:melatonin, HYDROcodone homatropine, HYDROcodone-acetaminophen, sodium chloride flush, sodium chloride, ondansetron **OR** ondansetron, polyethylene glycol, acetaminophen **OR** acetaminophen    Physical    VITALS:  BP (!) 168/85   Pulse 83   Temp 98 °F (36.7 °C) (Oral)   Resp 18   Ht 5' 4\" (1.626 m)   Wt 130 lb 12.8 oz (59.3 kg)   SpO2 97%   BMI 22.45 kg/m²     24HR INTAKE/OUTPUT:      Intake/Output Summary (Last 24 hours) at 11/3/2022 0847  Last data filed at 11/3/2022 0412  Gross per 24 hour   Intake 240 ml   Output 1700 ml   Net -1460 ml         24HR PULSE OXIMETRY RANGE:    SpO2  Av.6 %  Min: 97 %  Max: 100 %    General appearance: alert, appears stated age, and cooperative  Lungs: rhonchi bilaterally worse with cough  Heart: regular rate and rhythm, S1, S2 normal, no murmur, click, rub or gallop  Abdomen: soft, non-tender; bowel sounds normal; no masses,  no organomegaly  Extremities: extremities normal, atraumatic, no cyanosis or edema  Neurologic: Mental status: Alert, oriented, thought content appropriate    Data    CBC:   Recent Labs     11/01/22  0300 11/02/22 0245 11/03/22 0305   WBC 11.2 30.1* 13.8*   HGB 12.1* 12.2* 11.6*   HCT 39.6 39.8 37.9   .8* 102.8* 104.1*    196 171         BMP:  Recent Labs     11/01/22  0300 11/02/22 0245 11/03/22 0305    135 138   K 4.8 4.9 5.0   CL 90* 92* 96*   CO2 38* 34* 35*   BUN 15 15 19   CREATININE 0.7 0.7 0.7      ALB:3,BILIDIR:3,BILITOT:3,ALKPHOS:3)@    PT/INR: No results for input(s): PROTIME, INR in the last 72 hours. ABG:   Recent Labs     10/31/22  1709   PH 7.441   PO2 74.0*   PCO2 59.0*   HCO3 39.3*   BE 12.6*   O2SAT 95.3   METHB 0.3   O2HB 94.0   COHB 1.1   O2CON 17.9   HHB 4.6   THB 13.5               Radiology/Other tests reviewed: none    Assessment:     Principal Problem:    Heart failure (HCC)  Active Problems:    COPD exacerbation (HCC)  Resolved Problems:    * No resolved hospital problems. *      Plan:       Cont with steroids, taper as tolerated  Cont with nebs, observe respiratory function and sputum production  Cont with oxygen, taper as tolerated  Still RSV (+) but better procalcitonin, may be more residual from previous admission, claims didn't feel he got worse while at home, just didn't get any much better  OOB to chair, PT/OT      Time at the bedside, reviewing labs and radiographs, reviewing notes and consultations, discussing with staff and family was more than 35 minutes. Thanks for letting us see this patient in consultation. Please contact us with any questions. Office [phone] or after hours through [employer], x [phone].

## 2022-11-03 NOTE — PLAN OF CARE
Problem: Discharge Planning  Goal: Discharge to home or other facility with appropriate resources  11/2/2022 2208 by Hilary Harris RN  Outcome: Progressing     Problem: Pain  Goal: Verbalizes/displays adequate comfort level or baseline comfort level  11/2/2022 2208 by Hilary Harris RN  Outcome: Progressing     Problem: Safety - Adult  Goal: Free from fall injury  11/2/2022 2208 by Hilary Harris RN  Outcome: Progressing     Problem: ABCDS Injury Assessment  Goal: Absence of physical injury  Outcome: Progressing

## 2022-11-04 ENCOUNTER — APPOINTMENT (OUTPATIENT)
Dept: GENERAL RADIOLOGY | Age: 57
DRG: 140 | End: 2022-11-04
Payer: MEDICAID

## 2022-11-04 LAB
BASOPHILS ABSOLUTE: 0.01 E9/L (ref 0–0.2)
BASOPHILS RELATIVE PERCENT: 0.1 % (ref 0–2)
EOSINOPHILS ABSOLUTE: 0 E9/L (ref 0.05–0.5)
EOSINOPHILS RELATIVE PERCENT: 0 % (ref 0–6)
HCT VFR BLD CALC: 39.3 % (ref 37–54)
HEMOGLOBIN: 11.4 G/DL (ref 12.5–16.5)
IMMATURE GRANULOCYTES #: 0.1 E9/L
IMMATURE GRANULOCYTES %: 0.7 % (ref 0–5)
LYMPHOCYTES ABSOLUTE: 0.53 E9/L (ref 1.5–4)
LYMPHOCYTES RELATIVE PERCENT: 3.9 % (ref 20–42)
MCH RBC QN AUTO: 30.6 PG (ref 26–35)
MCHC RBC AUTO-ENTMCNC: 29 % (ref 32–34.5)
MCV RBC AUTO: 105.6 FL (ref 80–99.9)
MONOCYTES ABSOLUTE: 0.37 E9/L (ref 0.1–0.95)
MONOCYTES RELATIVE PERCENT: 2.7 % (ref 2–12)
NEUTROPHILS ABSOLUTE: 12.75 E9/L (ref 1.8–7.3)
NEUTROPHILS RELATIVE PERCENT: 92.6 % (ref 43–80)
PDW BLD-RTO: 13.5 FL (ref 11.5–15)
PLATELET # BLD: 165 E9/L (ref 130–450)
PMV BLD AUTO: 9.9 FL (ref 7–12)
PROCALCITONIN: 0.04 NG/ML (ref 0–0.08)
RBC # BLD: 3.72 E12/L (ref 3.8–5.8)
RBC # BLD: NORMAL 10*6/UL
WBC # BLD: 13.8 E9/L (ref 4.5–11.5)

## 2022-11-04 PROCEDURE — 6360000002 HC RX W HCPCS: Performed by: INTERNAL MEDICINE

## 2022-11-04 PROCEDURE — 85025 COMPLETE CBC W/AUTO DIFF WBC: CPT

## 2022-11-04 PROCEDURE — 6370000000 HC RX 637 (ALT 250 FOR IP): Performed by: INTERNAL MEDICINE

## 2022-11-04 PROCEDURE — 6370000000 HC RX 637 (ALT 250 FOR IP): Performed by: STUDENT IN AN ORGANIZED HEALTH CARE EDUCATION/TRAINING PROGRAM

## 2022-11-04 PROCEDURE — 2580000003 HC RX 258: Performed by: INTERNAL MEDICINE

## 2022-11-04 PROCEDURE — 99232 SBSQ HOSP IP/OBS MODERATE 35: CPT | Performed by: INTERNAL MEDICINE

## 2022-11-04 PROCEDURE — 71045 X-RAY EXAM CHEST 1 VIEW: CPT

## 2022-11-04 PROCEDURE — 92526 ORAL FUNCTION THERAPY: CPT

## 2022-11-04 PROCEDURE — 2060000000 HC ICU INTERMEDIATE R&B

## 2022-11-04 PROCEDURE — 6370000000 HC RX 637 (ALT 250 FOR IP)

## 2022-11-04 PROCEDURE — 84145 PROCALCITONIN (PCT): CPT

## 2022-11-04 PROCEDURE — 2700000000 HC OXYGEN THERAPY PER DAY

## 2022-11-04 PROCEDURE — 94640 AIRWAY INHALATION TREATMENT: CPT

## 2022-11-04 PROCEDURE — 36415 COLL VENOUS BLD VENIPUNCTURE: CPT

## 2022-11-04 RX ADMIN — GABAPENTIN 100 MG: 100 CAPSULE ORAL at 13:52

## 2022-11-04 RX ADMIN — ARFORMOTEROL TARTRATE 15 MCG: 15 SOLUTION RESPIRATORY (INHALATION) at 20:43

## 2022-11-04 RX ADMIN — PANTOPRAZOLE SODIUM 40 MG: 40 TABLET, DELAYED RELEASE ORAL at 05:03

## 2022-11-04 RX ADMIN — ARIPIPRAZOLE 5 MG: 5 TABLET ORAL at 08:14

## 2022-11-04 RX ADMIN — METHYLPREDNISOLONE SODIUM SUCCINATE 40 MG: 40 INJECTION, POWDER, LYOPHILIZED, FOR SOLUTION INTRAMUSCULAR; INTRAVENOUS at 16:23

## 2022-11-04 RX ADMIN — Medication 10 ML: at 20:41

## 2022-11-04 RX ADMIN — IPRATROPIUM BROMIDE AND ALBUTEROL SULFATE 1 AMPULE: .5; 2.5 SOLUTION RESPIRATORY (INHALATION) at 12:54

## 2022-11-04 RX ADMIN — IPRATROPIUM BROMIDE AND ALBUTEROL SULFATE 1 AMPULE: .5; 2.5 SOLUTION RESPIRATORY (INHALATION) at 20:43

## 2022-11-04 RX ADMIN — FLUOXETINE HYDROCHLORIDE 10 MG: 10 TABLET ORAL at 08:14

## 2022-11-04 RX ADMIN — PRAMIPEXOLE DIHYDROCHLORIDE 0.25 MG: 0.25 TABLET ORAL at 20:40

## 2022-11-04 RX ADMIN — Medication 500 MG: at 16:23

## 2022-11-04 RX ADMIN — HYDROCODONE BITARTRATE AND HOMATROPINE METHYLBROMIDE 5 ML: 5; 1.5 SOLUTION ORAL at 13:52

## 2022-11-04 RX ADMIN — METOPROLOL TARTRATE 25 MG: 25 TABLET, FILM COATED ORAL at 08:14

## 2022-11-04 RX ADMIN — HYDROCODONE BITARTRATE AND ACETAMINOPHEN 1 TABLET: 7.5; 325 TABLET ORAL at 20:40

## 2022-11-04 RX ADMIN — LORAZEPAM 1 MG: 1 TABLET ORAL at 13:52

## 2022-11-04 RX ADMIN — IPRATROPIUM BROMIDE AND ALBUTEROL SULFATE 1 AMPULE: .5; 2.5 SOLUTION RESPIRATORY (INHALATION) at 16:24

## 2022-11-04 RX ADMIN — METOPROLOL TARTRATE 25 MG: 25 TABLET, FILM COATED ORAL at 20:40

## 2022-11-04 RX ADMIN — ENOXAPARIN SODIUM 40 MG: 100 INJECTION SUBCUTANEOUS at 08:14

## 2022-11-04 RX ADMIN — HYDROCODONE BITARTRATE AND ACETAMINOPHEN 1 TABLET: 7.5; 325 TABLET ORAL at 08:14

## 2022-11-04 RX ADMIN — BUDESONIDE 1000 MCG: 0.5 SUSPENSION RESPIRATORY (INHALATION) at 08:35

## 2022-11-04 RX ADMIN — Medication 3 MG: at 20:40

## 2022-11-04 RX ADMIN — CEFTRIAXONE 1000 MG: 1 INJECTION, POWDER, FOR SOLUTION INTRAMUSCULAR; INTRAVENOUS at 10:45

## 2022-11-04 RX ADMIN — GABAPENTIN 100 MG: 100 CAPSULE ORAL at 20:40

## 2022-11-04 RX ADMIN — Medication 500 MG: at 08:14

## 2022-11-04 RX ADMIN — ARFORMOTEROL TARTRATE 15 MCG: 15 SOLUTION RESPIRATORY (INHALATION) at 08:35

## 2022-11-04 RX ADMIN — HYDROCODONE BITARTRATE AND HOMATROPINE METHYLBROMIDE 5 ML: 5; 1.5 SOLUTION ORAL at 22:04

## 2022-11-04 RX ADMIN — HYDROCODONE BITARTRATE AND HOMATROPINE METHYLBROMIDE 5 ML: 5; 1.5 SOLUTION ORAL at 00:26

## 2022-11-04 RX ADMIN — LORAZEPAM 1 MG: 1 TABLET ORAL at 08:14

## 2022-11-04 RX ADMIN — LORAZEPAM 1 MG: 1 TABLET ORAL at 20:39

## 2022-11-04 RX ADMIN — Medication 10 ML: at 08:15

## 2022-11-04 RX ADMIN — SODIUM CHLORIDE, PRESERVATIVE FREE 10 ML: 5 INJECTION INTRAVENOUS at 05:03

## 2022-11-04 RX ADMIN — IPRATROPIUM BROMIDE AND ALBUTEROL SULFATE 1 AMPULE: .5; 2.5 SOLUTION RESPIRATORY (INHALATION) at 08:34

## 2022-11-04 RX ADMIN — METHYLPREDNISOLONE SODIUM SUCCINATE 40 MG: 40 INJECTION, POWDER, LYOPHILIZED, FOR SOLUTION INTRAMUSCULAR; INTRAVENOUS at 05:03

## 2022-11-04 RX ADMIN — HYDROCODONE BITARTRATE AND HOMATROPINE METHYLBROMIDE 5 ML: 5; 1.5 SOLUTION ORAL at 05:03

## 2022-11-04 RX ADMIN — BUDESONIDE 1000 MCG: 0.5 SUSPENSION RESPIRATORY (INHALATION) at 20:43

## 2022-11-04 RX ADMIN — GABAPENTIN 100 MG: 100 CAPSULE ORAL at 08:14

## 2022-11-04 ASSESSMENT — PAIN DESCRIPTION - LOCATION: LOCATION: LEG

## 2022-11-04 ASSESSMENT — PAIN SCALES - GENERAL
PAINLEVEL_OUTOF10: 0
PAINLEVEL_OUTOF10: 0
PAINLEVEL_OUTOF10: 9

## 2022-11-04 ASSESSMENT — PAIN DESCRIPTION - DESCRIPTORS: DESCRIPTORS: ACHING;CRAMPING;DISCOMFORT

## 2022-11-04 ASSESSMENT — PAIN DESCRIPTION - ORIENTATION: ORIENTATION: LEFT

## 2022-11-04 NOTE — PROGRESS NOTES
[facility]   [facility]    Admitting Date and Time: 10/31/2022 12:55 PM  Admit Dx: Heart failure (HCC) [I50.9]  COPD exacerbation ([facility] [J44.1]    Subjective:    Patient was admitted with Heart failure ([facility] [I50.9]  COPD exacerbation ([facility] [J44.1].  Patient denies fever, chills, cp, n/v. Sob with some improvement     methylPREDNISolone  40 mg IntraVENous Q12H    LORazepam  1 mg Oral TID    budesonide  1 mg Nebulization BID    Arformoterol Tartrate  15 mcg Nebulization BID    calcium elemental  500 mg Oral BID WC    FLUoxetine  10 mg Oral Daily    gabapentin  100 mg Oral TID    ipratropium-albuterol  1 ampule Inhalation Q4H WA    metoprolol tartrate  25 mg Oral BID    pantoprazole  40 mg Oral QAM AC    pramipexole  0.25 mg Oral Nightly    sodium chloride flush  5-40 mL IntraVENous 2 times per day    enoxaparin  40 mg SubCUTAneous Daily    ARIPiprazole  5 mg Oral Daily     melatonin, 3 mg, Nightly PRN  HYDROcodone homatropine, 5 mL, Q4H PRN  HYDROcodone-acetaminophen, 1 tablet, BID PRN  sodium chloride flush, 5-40 mL, PRN  sodium chloride, , PRN  ondansetron, 4 mg, Q8H PRN   Or  ondansetron, 4 mg, Q6H PRN  polyethylene glycol, 17 g, Daily PRN  acetaminophen, 650 mg, Q6H PRN   Or  acetaminophen, 650 mg, Q6H PRN         Objective:    BP (!) 150/86   Pulse 85   Temp 97.8 °F (36.6 °C) (Oral)   Resp 20   Ht 5' 4\" (1.626 m)   Wt 130 lb 12.8 oz (59.3 kg)   SpO2 97%   BMI 22.45 kg/m²   Skin: warm and dry, no rash or erythema  Pulmonary/Chest: clear to auscultation bilaterally- no wheezes, rales or rhonchi, normal air movement, no respiratory distress  Cardiovascular: rhythm reg at rate of 84  Abdomen: soft, non-tender, non-distended, normal bowel sounds, no masses or organomegaly  Extremities: no cyanosis, no clubbing, and no edema      Recent Labs     11/01/22  0300 11/02/22  0245 11/03/22  0305    135 138   K 4.8 4.9 5.0   CL 90* 92* 96*   CO2 38* 34* 35*   BUN 15 15 19 CREATININE 0.7 0.7 0.7   GLUCOSE 118* 143* 156*   CALCIUM 7.7* 7.9* 8.0*       Recent Labs     11/01/22  0300 11/02/22  0245 11/03/22  0305   WBC 11.2 30.1* 13.8*   RBC 3.89 3.87 3.64*   HGB 12.1* 12.2* 11.6*   HCT 39.6 39.8 37.9   .8* 102.8* 104.1*   MCH 31.1 31.5 31.9   MCHC 30.6* 30.7* 30.6*   RDW 13.5 13.9 13.5    196 171   MPV 9.4 10.1 9.8       CBC with Differential:    Lab Results   Component Value Date/Time    WBC 13.8 11/03/2022 03:05 AM    RBC 3.64 11/03/2022 03:05 AM    HGB 11.6 11/03/2022 03:05 AM    HCT 37.9 11/03/2022 03:05 AM     11/03/2022 03:05 AM    .1 11/03/2022 03:05 AM    MCH 31.9 11/03/2022 03:05 AM    MCHC 30.6 11/03/2022 03:05 AM    RDW 13.5 11/03/2022 03:05 AM    NRBC 0.0 06/19/2022 04:12 AM    SEGSPCT 79.2 05/31/2022 08:06 PM    BANDSPCT 0.0 02/10/2022 07:41 PM    METASPCT 4.0 02/21/2020 07:05 PM    LYMPHOPCT 2.3 11/03/2022 03:05 AM    MONOPCT 1.8 11/03/2022 03:05 AM    MYELOPCT 0.0 09/17/2021 03:10 AM    BASOPCT 0.1 11/03/2022 03:05 AM    MONOSABS 0.25 11/03/2022 03:05 AM    LYMPHSABS 0.32 11/03/2022 03:05 AM    EOSABS 0.00 11/03/2022 03:05 AM    BASOSABS 0.01 11/03/2022 03:05 AM     BMP:    Lab Results   Component Value Date/Time     11/03/2022 03:05 AM    K 5.0 11/03/2022 03:05 AM    K 4.9 11/02/2022 02:45 AM    CL 96 11/03/2022 03:05 AM    CO2 35 11/03/2022 03:05 AM    BUN 19 11/03/2022 03:05 AM    LABALBU 3.8 11/01/2022 03:00 AM    CREATININE 0.7 11/03/2022 03:05 AM    CALCIUM 8.0 11/03/2022 03:05 AM    GFRAA >60 10/17/2022 04:24 AM    LABGLOM >60 11/03/2022 03:05 AM    GLUCOSE 156 11/03/2022 03:05 AM     Magnesium:    Lab Results   Component Value Date/Time    MG 2.1 11/03/2022 03:05 AM        Radiology:   US DUP LOWER EXTREMITY LEFT LIBBY   Final Result   No evidence of DVT in the left lower extremity. RECOMMENDATIONS:   Unavailable         XR CHEST PORTABLE   Final Result   Suspect mild pulmonary vascular congestion. Assessment:    Principal Problem:    Heart failure (HCC)  Active Problems:    COPD exacerbation (HCC)  Resolved Problems:    * No resolved hospital problems. *      Plan:  chronic respiratory failure with hypoxia adjust o2 as needed.  pulmonary following  Copd exacerbation continue steroids and nebs  Htn continue med  RLS continue med        Electronically signed by Ghislaine Schreiber DO on 11/3/2022 at 9:35 PM

## 2022-11-04 NOTE — PROGRESS NOTES
[facility]  [address], [address]  [facility] [address] [facility]      Pulmonary Progress Note      SUBJECTIVE:  stable with respiratory function, similar with cough and mod sputum production, sitting up in bed when seen on [facility].     OBJECTIVE    Medications    Continuous Infusions:   sodium chloride         Scheduled Meds:   methylPREDNISolone  40 mg IntraVENous Q12H    LORazepam  1 mg Oral TID    budesonide  1 mg Nebulization BID    Arformoterol Tartrate  15 mcg Nebulization BID    calcium elemental  500 mg Oral BID WC    FLUoxetine  10 mg Oral Daily    gabapentin  100 mg Oral TID    ipratropium-albuterol  1 ampule Inhalation Q4H WA    metoprolol tartrate  25 mg Oral BID    pantoprazole  40 mg Oral QAM AC    pramipexole  0.25 mg Oral Nightly    sodium chloride flush  5-40 mL IntraVENous 2 times per day    enoxaparin  40 mg SubCUTAneous Daily    ARIPiprazole  5 mg Oral Daily       PRN Meds:melatonin, HYDROcodone homatropine, HYDROcodone-acetaminophen, sodium chloride flush, sodium chloride, ondansetron **OR** ondansetron, polyethylene glycol, acetaminophen **OR** acetaminophen    Physical    VITALS:  BP (!) 165/84   Pulse 68   Temp 97.9 °F (36.6 °C) (Oral)   Resp 20   Ht 5' 4\" (1.626 m)   Wt 129 lb 8 oz (58.7 kg)   SpO2 97%   BMI 22.23 kg/m²     24HR INTAKE/OUTPUT:      Intake/Output Summary (Last 24 hours) at 2022 0919  Last data filed at 2022 0507  Gross per 24 hour   Intake --   Output 1850 ml   Net -1850 ml         24HR PULSE OXIMETRY RANGE:    SpO2  Av.8 %  Min: 97 %  Max: 100 %    General appearance: alert, appears stated age, and cooperative  Lungs: rhonchi bilaterally worse with cough  Heart: regular rate and rhythm, S1, S2 normal, no murmur, click, rub or gallop  Abdomen: soft, non-tender; bowel sounds normal; no masses,  no organomegaly  Extremities: extremities normal, atraumatic, no cyanosis or edema  Neurologic: Mental status: Alert, oriented, thought content appropriate    Data    CBC:   Recent Labs     11/02/22  0245 11/03/22  0305 11/04/22  0230   WBC 30.1* 13.8* 13.8*   HGB 12.2* 11.6* 11.4*   HCT 39.8 37.9 39.3   .8* 104.1* 105.6*    171 165         BMP:  Recent Labs     11/02/22 0245 11/03/22  0305    138   K 4.9 5.0   CL 92* 96*   CO2 34* 35*   BUN 15 19   CREATININE 0.7 0.7      ALB:3,BILIDIR:3,BILITOT:3,ALKPHOS:3)@    PT/INR: No results for input(s): PROTIME, INR in the last 72 hours. ABG:   No results for input(s): PH, PO2, PCO2, HCO3, BE, O2SAT, METHB, O2HB, COHB, O2CON, HHB, THB in the last 72 hours. Radiology/Other tests reviewed: none    Assessment:     Principal Problem:    Heart failure (HCC)  Active Problems:    COPD exacerbation (HCC)    RLS (restless legs syndrome)  Resolved Problems:    * No resolved hospital problems. *      Plan:       Cont with steroids, taper as tolerated  Cont with nebs, observe respiratory function and sputum production  Cont with oxygen, taper as tolerated  Sputum with pansensitive strep pneumo, procalcitonin is normal, wasn't on antibiotics last admission. Will repeat CXR, can start rocephin for now, observe cough/sputum production  OOB to chair, PT/OT      Time at the bedside, reviewing labs and radiographs, reviewing notes and consultations, discussing with staff and family was more than 35 minutes. Thanks for letting us see this patient in consultation. Please contact us with any questions. Office [phone] or after hours through [employer], x [phone].

## 2022-11-04 NOTE — PROGRESS NOTES
SPEECH LANGUAGE PATHOLOGY  DAILY PROGRESS NOTE        PATIENT NAME:  Fahad Garcia      :  1965          TODAY'S DATE:  2022 ROOM:  0440/0440-A    Pt seen for f/u dysphagia tx this AM; goals targeted per POC. Nursing staff reported pt to have been swallowing \"better\" and no issues reported. RN reported no coughing/choking noted w/ thin liquid intake. SLP reviewed ST POC and current diet recs w/ pt-pt indicated puree/thin to have been going down well and denied swallow difficulty with such. Breakfast tray present with pt intake was 100%. SLP reviewed comp swallow strategies including small bites/sips, no straw, alt solids and liquids, assure oral clearance prior to taking next bite; pt demo'd fair recall of such during session. Straw noted in pt's room-SLP discarded this and informed pt's nurse re: rec for no straws. Pt requesting coffee and pt agreeable for few bites of applesauce during session. Pt tolerated applesauce w/o overt s/s of aspirarion noted. No oral residue noted. Pt tolerated thin from cup for majority of thin liquid intake w/o overt s/s of aspiration; X1 dry throat clear and cough noted. SLP educated pt re: small sips to assist with maintaining tolerance. Pt left in room w/ callbell w/I reach following session. Plan to continue per ST POC.        CPT code(s) 71303  dysphagia tx  Total minutes :  20 minutes

## 2022-11-04 NOTE — PLAN OF CARE
Problem: Discharge Planning  Goal: Discharge to home or other facility with appropriate resources  11/4/2022 0819 by Tram Acosta RN  Outcome: Progressing     Problem: Pain  Goal: Verbalizes/displays adequate comfort level or baseline comfort level  11/4/2022 0819 by Tram Acosta RN  Outcome: Progressing     Problem: Safety - Adult  Goal: Free from fall injury  11/4/2022 0819 by Tram Acosta RN  Outcome: Progressing

## 2022-11-04 NOTE — CARE COORDINATION
Social Work/Discharge Planning:  Chart reviewed. Patient remains on oxygen baseline of 7 liters oxygen. Plan remains home at discharge. He receives home health care through [facility] (ph: [phone]) and they will need to be notified of discharge. Will continue to follow.   Electronically signed by FEDERIOC López on 11/4/2022 at 11:44 AM

## 2022-11-04 NOTE — PLAN OF CARE
Problem: Discharge Planning  Goal: Discharge to home or other facility with appropriate resources  11/3/2022 2130 by Joe Vargas RN  Outcome: Progressing     Problem: Pain  Goal: Verbalizes/displays adequate comfort level or baseline comfort level  11/3/2022 2130 by Joe Vargas RN  Outcome: Progressing     Problem: Safety - Adult  Goal: Free from fall injury  11/3/2022 2130 by Joe Vargas RN  Outcome: Progressing     Problem: ABCDS Injury Assessment  Goal: Absence of physical injury  Outcome: Progressing

## 2022-11-05 LAB
BASOPHILS ABSOLUTE: 0.01 E9/L (ref 0–0.2)
BASOPHILS RELATIVE PERCENT: 0.1 % (ref 0–2)
EOSINOPHILS ABSOLUTE: 0 E9/L (ref 0.05–0.5)
EOSINOPHILS RELATIVE PERCENT: 0 % (ref 0–6)
HCT VFR BLD CALC: 36.3 % (ref 37–54)
HEMOGLOBIN: 11 G/DL (ref 12.5–16.5)
IMMATURE GRANULOCYTES #: 0.04 E9/L
IMMATURE GRANULOCYTES %: 0.5 % (ref 0–5)
LYMPHOCYTES ABSOLUTE: 0.65 E9/L (ref 1.5–4)
LYMPHOCYTES RELATIVE PERCENT: 7.9 % (ref 20–42)
MCH RBC QN AUTO: 31.2 PG (ref 26–35)
MCHC RBC AUTO-ENTMCNC: 30.3 % (ref 32–34.5)
MCV RBC AUTO: 102.8 FL (ref 80–99.9)
MONOCYTES ABSOLUTE: 0.39 E9/L (ref 0.1–0.95)
MONOCYTES RELATIVE PERCENT: 4.7 % (ref 2–12)
NEUTROPHILS ABSOLUTE: 7.16 E9/L (ref 1.8–7.3)
NEUTROPHILS RELATIVE PERCENT: 86.8 % (ref 43–80)
PDW BLD-RTO: 13.3 FL (ref 11.5–15)
PLATELET # BLD: 139 E9/L (ref 130–450)
PMV BLD AUTO: 9.6 FL (ref 7–12)
RBC # BLD: 3.53 E12/L (ref 3.8–5.8)
WBC # BLD: 8.3 E9/L (ref 4.5–11.5)

## 2022-11-05 PROCEDURE — 6370000000 HC RX 637 (ALT 250 FOR IP): Performed by: INTERNAL MEDICINE

## 2022-11-05 PROCEDURE — 99232 SBSQ HOSP IP/OBS MODERATE 35: CPT | Performed by: INTERNAL MEDICINE

## 2022-11-05 PROCEDURE — 6360000002 HC RX W HCPCS: Performed by: INTERNAL MEDICINE

## 2022-11-05 PROCEDURE — 85025 COMPLETE CBC W/AUTO DIFF WBC: CPT

## 2022-11-05 PROCEDURE — 6370000000 HC RX 637 (ALT 250 FOR IP)

## 2022-11-05 PROCEDURE — 6370000000 HC RX 637 (ALT 250 FOR IP): Performed by: STUDENT IN AN ORGANIZED HEALTH CARE EDUCATION/TRAINING PROGRAM

## 2022-11-05 PROCEDURE — 36415 COLL VENOUS BLD VENIPUNCTURE: CPT

## 2022-11-05 PROCEDURE — 94640 AIRWAY INHALATION TREATMENT: CPT

## 2022-11-05 PROCEDURE — 2580000003 HC RX 258: Performed by: INTERNAL MEDICINE

## 2022-11-05 PROCEDURE — 2060000000 HC ICU INTERMEDIATE R&B

## 2022-11-05 PROCEDURE — 2700000000 HC OXYGEN THERAPY PER DAY

## 2022-11-05 RX ADMIN — CEFTRIAXONE 1000 MG: 1 INJECTION, POWDER, FOR SOLUTION INTRAMUSCULAR; INTRAVENOUS at 10:24

## 2022-11-05 RX ADMIN — METOPROLOL TARTRATE 25 MG: 25 TABLET, FILM COATED ORAL at 21:40

## 2022-11-05 RX ADMIN — ARIPIPRAZOLE 5 MG: 5 TABLET ORAL at 07:54

## 2022-11-05 RX ADMIN — METHYLPREDNISOLONE SODIUM SUCCINATE 40 MG: 40 INJECTION, POWDER, LYOPHILIZED, FOR SOLUTION INTRAMUSCULAR; INTRAVENOUS at 17:58

## 2022-11-05 RX ADMIN — LORAZEPAM 1 MG: 1 TABLET ORAL at 14:39

## 2022-11-05 RX ADMIN — BUDESONIDE 1000 MCG: 0.5 SUSPENSION RESPIRATORY (INHALATION) at 19:29

## 2022-11-05 RX ADMIN — Medication 10 ML: at 07:54

## 2022-11-05 RX ADMIN — ARFORMOTEROL TARTRATE 15 MCG: 15 SOLUTION RESPIRATORY (INHALATION) at 19:29

## 2022-11-05 RX ADMIN — HYDROCODONE BITARTRATE AND ACETAMINOPHEN 1 TABLET: 7.5; 325 TABLET ORAL at 21:40

## 2022-11-05 RX ADMIN — METHYLPREDNISOLONE SODIUM SUCCINATE 40 MG: 40 INJECTION, POWDER, LYOPHILIZED, FOR SOLUTION INTRAMUSCULAR; INTRAVENOUS at 05:26

## 2022-11-05 RX ADMIN — Medication 10 ML: at 21:41

## 2022-11-05 RX ADMIN — HYDROCODONE BITARTRATE AND HOMATROPINE METHYLBROMIDE 5 ML: 5; 1.5 SOLUTION ORAL at 22:14

## 2022-11-05 RX ADMIN — ENOXAPARIN SODIUM 40 MG: 100 INJECTION SUBCUTANEOUS at 07:53

## 2022-11-05 RX ADMIN — METOPROLOL TARTRATE 25 MG: 25 TABLET, FILM COATED ORAL at 07:54

## 2022-11-05 RX ADMIN — BUDESONIDE 1000 MCG: 0.5 SUSPENSION RESPIRATORY (INHALATION) at 08:19

## 2022-11-05 RX ADMIN — IPRATROPIUM BROMIDE AND ALBUTEROL SULFATE 1 AMPULE: .5; 2.5 SOLUTION RESPIRATORY (INHALATION) at 01:37

## 2022-11-05 RX ADMIN — PRAMIPEXOLE DIHYDROCHLORIDE 0.25 MG: 0.25 TABLET ORAL at 21:40

## 2022-11-05 RX ADMIN — Medication 500 MG: at 17:58

## 2022-11-05 RX ADMIN — HYDROCODONE BITARTRATE AND HOMATROPINE METHYLBROMIDE 5 ML: 5; 1.5 SOLUTION ORAL at 02:17

## 2022-11-05 RX ADMIN — HYDROCODONE BITARTRATE AND HOMATROPINE METHYLBROMIDE 5 ML: 5; 1.5 SOLUTION ORAL at 08:04

## 2022-11-05 RX ADMIN — IPRATROPIUM BROMIDE AND ALBUTEROL SULFATE 1 AMPULE: .5; 2.5 SOLUTION RESPIRATORY (INHALATION) at 15:40

## 2022-11-05 RX ADMIN — ARFORMOTEROL TARTRATE 15 MCG: 15 SOLUTION RESPIRATORY (INHALATION) at 08:19

## 2022-11-05 RX ADMIN — Medication 3 MG: at 21:40

## 2022-11-05 RX ADMIN — GABAPENTIN 100 MG: 100 CAPSULE ORAL at 21:40

## 2022-11-05 RX ADMIN — IPRATROPIUM BROMIDE AND ALBUTEROL SULFATE 1 AMPULE: .5; 2.5 SOLUTION RESPIRATORY (INHALATION) at 08:19

## 2022-11-05 RX ADMIN — HYDROCODONE BITARTRATE AND ACETAMINOPHEN 1 TABLET: 7.5; 325 TABLET ORAL at 07:54

## 2022-11-05 RX ADMIN — LORAZEPAM 1 MG: 1 TABLET ORAL at 07:54

## 2022-11-05 RX ADMIN — GABAPENTIN 100 MG: 100 CAPSULE ORAL at 07:54

## 2022-11-05 RX ADMIN — LORAZEPAM 1 MG: 1 TABLET ORAL at 21:40

## 2022-11-05 RX ADMIN — IPRATROPIUM BROMIDE AND ALBUTEROL SULFATE 1 AMPULE: .5; 2.5 SOLUTION RESPIRATORY (INHALATION) at 12:39

## 2022-11-05 RX ADMIN — Medication 500 MG: at 07:54

## 2022-11-05 RX ADMIN — HYDROCODONE BITARTRATE AND HOMATROPINE METHYLBROMIDE 5 ML: 5; 1.5 SOLUTION ORAL at 16:17

## 2022-11-05 RX ADMIN — IPRATROPIUM BROMIDE AND ALBUTEROL SULFATE 1 AMPULE: .5; 2.5 SOLUTION RESPIRATORY (INHALATION) at 19:29

## 2022-11-05 RX ADMIN — PANTOPRAZOLE SODIUM 40 MG: 40 TABLET, DELAYED RELEASE ORAL at 05:26

## 2022-11-05 RX ADMIN — FLUOXETINE HYDROCHLORIDE 10 MG: 10 TABLET ORAL at 07:54

## 2022-11-05 RX ADMIN — GABAPENTIN 100 MG: 100 CAPSULE ORAL at 14:40

## 2022-11-05 ASSESSMENT — PAIN SCALES - GENERAL
PAINLEVEL_OUTOF10: 8
PAINLEVEL_OUTOF10: 8
PAINLEVEL_OUTOF10: 0

## 2022-11-05 ASSESSMENT — PAIN DESCRIPTION - LOCATION
LOCATION: LEG
LOCATION: LEG

## 2022-11-05 ASSESSMENT — PAIN DESCRIPTION - ORIENTATION: ORIENTATION: LEFT

## 2022-11-05 NOTE — PROGRESS NOTES
[facility]  [address], [address]  [facility], [facility]      Pulmonary Progress Note      SUBJECTIVE:  stable with respiratory function, similar with cough and mod sputum production, sitting up in bed when seen on [facility].     OBJECTIVE    Medications    Continuous Infusions:   sodium chloride         Scheduled Meds:   cefTRIAXone (ROCEPHIN) IV  1,000 mg IntraVENous Q24H    methylPREDNISolone  40 mg IntraVENous Q12H    LORazepam  1 mg Oral TID    budesonide  1 mg Nebulization BID    Arformoterol Tartrate  15 mcg Nebulization BID    calcium elemental  500 mg Oral BID WC    FLUoxetine  10 mg Oral Daily    gabapentin  100 mg Oral TID    ipratropium-albuterol  1 ampule Inhalation Q4H WA    metoprolol tartrate  25 mg Oral BID    pantoprazole  40 mg Oral QAM AC    pramipexole  0.25 mg Oral Nightly    sodium chloride flush  5-40 mL IntraVENous 2 times per day    enoxaparin  40 mg SubCUTAneous Daily    ARIPiprazole  5 mg Oral Daily       PRN Meds:melatonin, HYDROcodone homatropine, HYDROcodone-acetaminophen, sodium chloride flush, sodium chloride, ondansetron **OR** ondansetron, polyethylene glycol, acetaminophen **OR** acetaminophen    Physical    VITALS:  /74   Pulse 82   Temp 98.4 °F (36.9 °C) (Oral)   Resp 20   Ht 5' 4\" (1.626 m)   Wt 130 lb 1.6 oz (59 kg)   SpO2 100%   BMI 22.33 kg/m²     24HR INTAKE/OUTPUT:      Intake/Output Summary (Last 24 hours) at 2022 1547  Last data filed at 2022 0943  Gross per 24 hour   Intake --   Output 1700 ml   Net -1700 ml         24HR PULSE OXIMETRY RANGE:    SpO2  Av.4 %  Min: 98 %  Max: 100 %    General appearance: alert, appears stated age, and cooperative  Lungs: rhonchi bilaterally worse with cough  Heart: regular rate and rhythm, S1, S2 normal, no murmur, click, rub or gallop  Abdomen: soft, non-tender; bowel sounds normal; no masses,  no organomegaly  Extremities: extremities normal, atraumatic, no cyanosis or edema  Neurologic: Mental status: Alert, oriented, thought content appropriate    Data    CBC:   Recent Labs     11/03/22  0305 11/04/22  0230 11/05/22  0303   WBC 13.8* 13.8* 8.3   HGB 11.6* 11.4* 11.0*   HCT 37.9 39.3 36.3*   .1* 105.6* 102.8*    165 139         BMP:  Recent Labs     11/03/22  0305      K 5.0   CL 96*   CO2 35*   BUN 19   CREATININE 0.7      ALB:3,BILIDIR:3,BILITOT:3,ALKPHOS:3)@    PT/INR: No results for input(s): PROTIME, INR in the last 72 hours. ABG:   No results for input(s): PH, PO2, PCO2, HCO3, BE, O2SAT, METHB, O2HB, COHB, O2CON, HHB, THB in the last 72 hours. Radiology/Other tests reviewed: CXR reviewed with similar interstitial markings/atelectasis left lower lung field    Assessment:     Principal Problem:    Heart failure ([address]  Active Problems:    COPD exacerbation (HCC)    RLS (restless legs syndrome)  Resolved Problems:    * No resolved hospital problems. *      Plan:       Cont with steroids, taper as tolerated, can start prednisone tomorrow  Cont with nebs, observe respiratory function and sputum production  Cont with oxygen, taper as tolerated  Cont with rocephin, observe cough/sputum production, will keep on antibiotics for total of 7 days, can be po and finish upon discharge  OOB to chair, PT/OT      Time at the bedside, reviewing labs and radiographs, reviewing notes and consultations, discussing with staff and family was more than 35 minutes. Thanks for letting us see this patient in consultation. Please contact us with any questions. Office [phone] or after hours through [employer], x [phone].

## 2022-11-05 NOTE — PROGRESS NOTES
[facility] Hospitalist   Progress Note    Admitting Date and Time: 10/31/2022 12:55 PM  Admit Dx: Heart failure (HCC) [I50.9]  COPD exacerbation ([facility] [J44.1]    Subjective:    Patient was admitted with Heart failure ([facility] [I50.9]  COPD exacerbation ([facility] [J44.1].  Patient denies fever, chills, cp, sob, n/v.     [START ON 11/6/2022] predniSONE  30 mg Oral Daily    cefTRIAXone (ROCEPHIN) IV  1,000 mg IntraVENous Q24H    LORazepam  1 mg Oral TID    budesonide  1 mg Nebulization BID    Arformoterol Tartrate  15 mcg Nebulization BID    calcium elemental  500 mg Oral BID WC    FLUoxetine  10 mg Oral Daily    gabapentin  100 mg Oral TID    ipratropium-albuterol  1 ampule Inhalation Q4H WA    metoprolol tartrate  25 mg Oral BID    pantoprazole  40 mg Oral QAM AC    pramipexole  0.25 mg Oral Nightly    sodium chloride flush  5-40 mL IntraVENous 2 times per day    enoxaparin  40 mg SubCUTAneous Daily    ARIPiprazole  5 mg Oral Daily     melatonin, 3 mg, Nightly PRN  HYDROcodone homatropine, 5 mL, Q4H PRN  HYDROcodone-acetaminophen, 1 tablet, BID PRN  sodium chloride flush, 5-40 mL, PRN  sodium chloride, , PRN  ondansetron, 4 mg, Q8H PRN   Or  ondansetron, 4 mg, Q6H PRN  polyethylene glycol, 17 g, Daily PRN  acetaminophen, 650 mg, Q6H PRN   Or  acetaminophen, 650 mg, Q6H PRN         Objective:    /74   Pulse 82   Temp 98.4 °F (36.9 °C) (Oral)   Resp 20   Ht 5' 4\" (1.626 m)   Wt 130 lb 1.6 oz (59 kg)   SpO2 100%   BMI 22.33 kg/m²   Skin: warm and dry, no rash or erythema  Pulmonary/Chest: clear to auscultation bilaterally- no wheezes, rales or rhonchi, normal air movement, no respiratory distress  Cardiovascular: rhythm reg at rate of 84  Abdomen: soft, non-tender, non-distended, normal bowel sounds, no masses or organomegaly  Extremities: no cyanosis, no clubbing, and no edema      Recent Labs     11/03/22  0305      K 5.0   CL 96*   CO2 35*   BUN 19   CREATININE 0.7   GLUCOSE 156* CALCIUM 8.0*       Recent Labs     11/03/22  0305 11/04/22  0230 11/05/22  0303   WBC 13.8* 13.8* 8.3   RBC 3.64* 3.72* 3.53*   HGB 11.6* 11.4* 11.0*   HCT 37.9 39.3 36.3*   .1* 105.6* 102.8*   MCH 31.9 30.6 31.2   MCHC 30.6* 29.0* 30.3*   RDW 13.5 13.5 13.3    165 139   MPV 9.8 9.9 9.6       CBC with Differential:    Lab Results   Component Value Date/Time    WBC 8.3 11/05/2022 03:03 AM    RBC 3.53 11/05/2022 03:03 AM    HGB 11.0 11/05/2022 03:03 AM    HCT 36.3 11/05/2022 03:03 AM     11/05/2022 03:03 AM    .8 11/05/2022 03:03 AM    MCH 31.2 11/05/2022 03:03 AM    MCHC 30.3 11/05/2022 03:03 AM    RDW 13.3 11/05/2022 03:03 AM    NRBC 0.0 06/19/2022 04:12 AM    SEGSPCT 79.2 05/31/2022 08:06 PM    BANDSPCT 0.0 02/10/2022 07:41 PM    METASPCT 4.0 02/21/2020 07:05 PM    LYMPHOPCT 7.9 11/05/2022 03:03 AM    MONOPCT 4.7 11/05/2022 03:03 AM    MYELOPCT 0.0 09/17/2021 03:10 AM    BASOPCT 0.1 11/05/2022 03:03 AM    MONOSABS 0.39 11/05/2022 03:03 AM    LYMPHSABS 0.65 11/05/2022 03:03 AM    EOSABS 0.00 11/05/2022 03:03 AM    BASOSABS 0.01 11/05/2022 03:03 AM        Radiology:   XR CHEST PORTABLE   Final Result   Chronic findings in both lungs. Left basilar atelectasis and probable small   effusions. US DUP LOWER EXTREMITY LEFT LIBBY   Final Result   No evidence of DVT in the left lower extremity. RECOMMENDATIONS:   Unavailable         XR CHEST PORTABLE   Final Result   Suspect mild pulmonary vascular congestion. Assessment:    Principal Problem:    Heart failure (HCC)  Active Problems:    COPD exacerbation (HCC)    RLS (restless legs syndrome)  Resolved Problems:    * No resolved hospital problems. *      Plan:  chronic respiratory failure with hypoxia adjust o2 as needed. pulmonary following  Copd exacerbation wean steroids as able and continue nebs. Pt has been placed on abx by pulmonary. Continue abx.   Htn continue med  RLS continue med    Increase activity    Anticipate discharge tomorrow.      Electronically signed by Kailey Mix DO on 11/5/2022 at 7:32 PM

## 2022-11-05 NOTE — DISCHARGE INSTR - DIET
Good nutrition is important when healing from an illness, injury, or surgery. Follow any nutrition recommendations given to you during your hospital stay. If you were given an oral nutrition supplement while in the hospital, continue to take this supplement at home. You can take it with meals, in-between meals, and/or before bedtime. These supplements can be purchased at most local grocery stores, pharmacies, and chain super-stores. If you have any questions about your diet or nutrition, call the hospital and ask for the dietitian. Heart Failure Nutrition Therapy     This nutrition therapy will help you feel better and support your heart. This plan focuses on:   Limiting sodium in your diet. Salt (sodium) makes your body hold water. When your body holds too much water, you can feel shortness of breath and swelling. You can prevent these symptoms by eating less salt. Limiting fluid in your diet. For some patients, drinking too much fluid can make heart failure worse. It can cause symptoms such as shortness of breath and swelling. Limiting fluids can help relieve some of your symptoms. Managing your weight. Your registered dietitian nutritionist (RDN) can help you choose a healthy weight for your body type. You can achieve these goals by:   Reading food labels to keep track of how much sodium is in the foods you eat. Limiting foods that are high in sodium. Checking your weight to make sure you're not retaining too much fluid. Reading the Food Label: How Much Sodium Is Too Much? The nutrition plan for heart failure usually limits the sodium you get from food and drinks to 2,000 milligrams per day. Salt is the main source of sodium. Read the nutrition label to find out how much sodium is in 1 serving of a food. Select foods with 140 milligrams of sodium or less per serving. Foods with more than 300 milligrams of sodium per serving may not fit into a reduced-sodium meal plan.    Check serving sizes. If you eat more than 1 serving, you will get more sodium than the amount listed. Cutting Back on Sodium   Avoid processed foods. Eat more fresh foods. Fresh and frozen fruits and vegetables without added juices or sauces are naturally low in sodium. Fresh meats are lower in sodium than processed meats, such as bryan, sausage, and hot dogs. Read the nutrition label or ask your  to help you find a fresh meat that is low in sodium. Eat less salt, at the table and when cooking. Just 1 teaspoon of table salt has 2,300 milligrams of sodium. Leave the salt out of recipes for pasta, casseroles, and soups. Ask your RDN how to cook your favorite recipes without sodium. Be a smart . Look for food packages that say salt-free or sodium-free.  These items contain less than 5 milligrams of sodium per serving. Very-low-sodium products contain less than 35 milligrams of sodium per serving. Low-sodium products contain less than 140 milligrams of sodium per serving. Unsalted or no added salt products may still be high in sodium. Check the nutrition label. Add flavors to your food without adding sodium. Try lemon juice, lime juice, fruit juice, or vinegar. Dry or fresh herbs add flavor. Try basil, bay leaf, dill, rosemary, parsley, escobar, dry mustard, nutmeg, thyme, and paprika. Pepper, red pepper flakes, and cayenne pepper can add spice to your meals without adding sodium. Hot sauce contains sodium, but if you use just a drop or two, it will not add up to much. Buy a sodium-free seasoning blend or make your own at home. Use caution when you eat outside your home. Restaurant foods can be very high in sodium. Ask for nutrition information. Many restaurants provide nutrition facts on their menus or websites. Let your  know that you want your food to be cooked without salt. Ask for your salad dressing and sauces to come on the side.   Fluid Restriction Your doctor may ask you to follow a fluid restriction in addition to taking diuretics (water pills). Ask your doctor how much fluid you can have. Foods that are liquid at room temperature are considered a fluid, such as popsicles, soup, ice cream, and Jell-O. Here are some common conversions that will help you measure your fluid intake every day:   1,000 milliliters = 1 liter or 4 cups 1 fluid ounce = 30 milliliters   1 cup = 240 milliliters 2,000 milliliters = 2 liters or 8 cups   1,500 milliliters = 1½ liters or 6 cups    Weight Monitoring   Weigh yourself each day. Sudden weight gain is a sign that fluid is building up in your body. Follow these guidelines:   Weigh yourself every morning. If you gain 3 or more pounds in 1-2 days or 5 or more pounds within 1 week, call your doctor. Your doctor may adjust your medicine to get rid of the extra fluid. Talk with your doctor or RDN about what a healthy weight is for you. Talk with your doctor to find out what type of physical activity is best for you.   Foods Recommended   Food Group Recommended Foods   Grains Bread with less than 80 milligrams sodium per slice (yeast breads usually have less sodium than those made with baking soda)  Homemade bread made with reduced-sodium baking soda  Many cold cereals, especially shredded wheat and puffed rice  Oats, grits, or cream of wheat  Dry pastas, noodles, quinoa, and rice   Vegetables Fresh and frozen vegetables without added sauces, salt, or sodium  Homemade soups (salt free or low sodium)  Low-sodium or sodium-free canned vegetables and soups   Fruits Fresh and canned fruits  Dried fruits, such as raisins, cranberries, and prunes   Dairy (Milk and Milk Products) Milk or milk powder  Rice milk and soy milk  Yogurt, including Greek yogurt  Small amounts of natural, block cheese or reduced-sodium cheese (Swiss, ricotta, and fresh mozzarella are lower in sodium than others)  Regular or soft cream cheese and low-sodium cottage cheese   Protein Foods (Meat, Poultry, Fish, Beans) [employer] and fish  [place] ([facility]) bryan (except if packaged in a sodium solution)  Canned or packed tuna (no more than 4 ounces at 1 serving)  Dried beans and peas; edamame (fresh soybeans)  Eggs or egg beaters (if less than 200 mg per serving)  Unsalted nuts or peanut butter   Desserts and Snacks Fresh fruit or applesauce  Lyle food cake  Granola bars  Unsalted pretzels, popcorn, or nuts  Pudding or gelatin with whipped cream topping  Homemade rice-crispy treats  Vanilla wafers  Frozen fruit bars   Fats Tub or liquid margarine  Unsaturated fat oils (canola, olive, corn, sunflower, safflower, peanut)   Condiments Fresh or dried herbs; low-sodium ketchup; vinegar; lemon or lime juice; pepper; salt-free seasoning mixes and marinades (salt-free seasoning blend); simple salad dressings (vinegar and oil); salt-free sauces   Foods Not Recommended   Food Group Foods Not Recommended   Grains Breads or crackers topped with salt  Cereals (hot/cold) with more than 300 milligrams sodium per serving  Biscuits, cornbread, and other quick breads prepared with baking soda  Prepackaged bread crumbs  Self-rising flours   Vegetables Canned vegetables (unless they are salt free or low sodium)  Frozen vegetables with seasoning and sauces  Sauerkraut and pickled vegetables  Canned or dried soups (unless they are salt free or low sodium)  [place] fries and onion rings   Fruits Dried fruits preserved with sodium-containing additives   Dairy (Milk and Milk Products) Buttermilk  Processed cheeses   Cottage cheese (unless a low-sodium variety)  Feta cheese; shredded cheese (has more sodium than block cheese); singles slices and string cheese   Protein Foods (Meat, Poultry, Fish, Beans) Cured meats: bryan, ham, sausage, pepperoni, and hot dogs  Canned meats: chili, [place] sausage, sardines, and ham  Smoked fish and meats  Frozen meals that have more than 600 milligrams sodium   Fats Salted butter or margarine   Condiments Salt, sea salt, kosher salt, onion salt, and garlic salt  Seasoning mixes containing salt (Lemon Pepper or Bouillon cubes)  Catsup or ketchup, BBQ sauce, Worcestershire and soy sauce  Salsa, pickles, olives, relish  Salad dressings: ranch, blue cheese, [place], and [place]    Alcohol Check with your doctor.    Heart Failure Sample 1-Day Menu View Nutrient Info         Breakfast 1 cup regular oatmeal made with water or milk   1 cup reduced-fat (2%) milk   1 medium banana   1 slice whole wheat bread   1 tablespoon salt-free peanut butter   Morning Snack 1/2 cup dried cranberries   Lunch 3 ounces grilled chicken breast   1 cup salad greens   Olive oil and vinegar dressing (for greens)   5 unsalted or low-sodium crackers   Fruit plate with 1/4 cup strawberries   1/2 sliced orange (for fruit plate)   1 peach half (for fruit plate)   Afternoon Snack 1 ounce low-sodium turkey   1 piece whole wheat bread   Evening Meal 3 ounces herb-baked fish   1 baked potato   2 teaspoons soft margarine (trans fat-free) (for potato)   Sliced tomatoes   1/2 cup steamed spinach drizzled with lemon juice   3-inch square of jennifer food cake   Fresh strawberries (2) (for cake)   Evening Snack 2 tablespoons salt-free peanut butter   5 low-sodium crackers   Daily Sum   Nutrient Unit Value   Macronutrients   Energy kcal 1890   Energy kJ 7906   Protein g 95   Total lipid (fat) g 56   Carbohydrate, by difference g 270   Fiber, total dietary g 31   Sugars, total g 99   Minerals   Calcium, Ca mg 949   Iron, Fe mg 27   Sodium, Na mg 1538   Vitamins   Vitamin C, total ascorbic acid mg 118   Vitamin A, IU IU 02874   Vitamin D    Lipids   Fatty acids, total saturated g 13   Fatty acids, total monounsaturated g 22   Fatty acids, total polyunsaturated g 16   Cholesterol mg 126   Heart Failure Vegan Sample 1-Day Menu View Nutrient Info         Breakfast 1 cup oatmeal   ¼ cup walnuts   1 banana   1 cup soymilk fortified with calcium, vitamin B12, and vitamin D   Lunch 1 large whole wheat mallika   Salad made with: 1 cup chickpeas   1 cup lettuce   ½ cup cherry tomatoes   1 cup strawberries   1 tablespoon olive oil   1 tablespoon balsamic vinegar    Evening Meal ¾ cup tofu   2 teaspoons olive oil   Pinch garlic powder   1 baked potato   1 tablespoon margarine, soft, tub   ½ cup cooked spinach with:   Squeeze of lemon   1 cup soymilk fortified with calcium, vitamin B12, and vitamin D   Evening Snack 1 tablespoon peanut butter, without salt   ¾ ounce pretzels, without salt   Daily Sum   Nutrient Unit Value   Macronutrients   Energy kcal 1848   Energy kJ 7735   Protein g 74   Total lipid (fat) g 83   Carbohydrate, by difference g 223   Fiber, total dietary g 39   Sugars, total g 44   Minerals   Calcium, Ca mg 1325   Iron, Fe mg 20   Sodium, Na mg 1098   Vitamins   Vitamin C, total ascorbic acid mg 140   Vitamin A, IU IU 80100   Vitamin D    Lipids   Fatty acids, total saturated g 13   Fatty acids, total monounsaturated g 33   Fatty acids, total polyunsaturated g 32   Cholesterol mg 0   Heart Failure Vegetarian (Lacto-Ovo) Sample 1-Day Menu View Nutrient Info         Breakfast 1 cup oatmeal   ¼ cup walnuts   1 banana   1 cup fat-free milk   Lunch 1 large whole wheat mallika   Salad made with: ½ cup chickpeas   1 ounce mozzarella cheese   1 cup lettuce   ½ cup cherry tomatoes   1 cup strawberries   1 tablespoon olive oil   1 tablespoon balsamic vinegar    Evening Meal ¾ cup tofu   2 teaspoons olive oil   Pinch garlic powder   1 baked potato   1 tablespoon margarine, soft, tub   ½ cup cooked spinach with:   Squeeze of lemon   1 cup fat-free milk   Evening Snack 1 tablespoon peanut butter, without salt   1 apple   Daily Sum   Nutrient Unit Value   Macronutrients   Energy kcal 1847   Energy kJ 7734   Protein g 76   Total lipid (fat) g 79   Carbohydrate, by difference g 231   Fiber, total dietary g 35   Sugars, total g 83 Minerals   Calcium, Ca mg 1488   Iron, Fe mg 16   Sodium, Na mg 1100   Vitamins   Vitamin C, total ascorbic acid mg 149   Vitamin A, IU IU 50896   Vitamin D    Lipids   Fatty acids, total saturated g 15   Fatty acids, total monounsaturated g 32   Fatty acids, total polyunsaturated g 26   Cholesterol mg 28     If any questions/concerns, please don't hesitate to reach out to either myself or primarily our out-patient dietitian @ [phone] (Desk).     Urszula Nevarez RD, LD  In-Patient Clinical Dietitian  [phone] (Office)  [place]

## 2022-11-05 NOTE — PROGRESS NOTES
Nutrition Note     Type and Reason for Visit: Patient Education    Nutrition Assessment:  Pt to be educated per CHF LOS protocol. Chart reviewed. Pt provided w/ written edu on the Cardiac/CHF Diet which includes main diet guidelines, ways to reduce sodium intake and sample meal plans. Handout and contact info placed in d/c instructions. Will follow-up per policy.     Elbert Gibbons RD, LD  Contact: ext [phone]

## 2022-11-05 NOTE — PROGRESS NOTES
[facility] CARE AT [facility]   Progress Note    Admitting Date and Time: 10/31/2022 12:55 PM  Admit Dx: Heart failure (HCC) [I50.9]  COPD exacerbation ([facility] [J44.1]    Subjective:    Patient was admitted with Heart failure ([facility] [I50.9]  COPD exacerbation ([facility] [J44.1].  Patient denies fever, chills, cp, sob, n/v.     cefTRIAXone (ROCEPHIN) IV  1,000 mg IntraVENous Q24H    methylPREDNISolone  40 mg IntraVENous Q12H    LORazepam  1 mg Oral TID    budesonide  1 mg Nebulization BID    Arformoterol Tartrate  15 mcg Nebulization BID    calcium elemental  500 mg Oral BID WC    FLUoxetine  10 mg Oral Daily    gabapentin  100 mg Oral TID    ipratropium-albuterol  1 ampule Inhalation Q4H WA    metoprolol tartrate  25 mg Oral BID    pantoprazole  40 mg Oral QAM AC    pramipexole  0.25 mg Oral Nightly    sodium chloride flush  5-40 mL IntraVENous 2 times per day    enoxaparin  40 mg SubCUTAneous Daily    ARIPiprazole  5 mg Oral Daily     melatonin, 3 mg, Nightly PRN  HYDROcodone homatropine, 5 mL, Q4H PRN  HYDROcodone-acetaminophen, 1 tablet, BID PRN  sodium chloride flush, 5-40 mL, PRN  sodium chloride, , PRN  ondansetron, 4 mg, Q8H PRN   Or  ondansetron, 4 mg, Q6H PRN  polyethylene glycol, 17 g, Daily PRN  acetaminophen, 650 mg, Q6H PRN   Or  acetaminophen, 650 mg, Q6H PRN         Objective:    BP (!) 164/91   Pulse 83   Temp 98.5 °F (36.9 °C) (Oral)   Resp 20   Ht 5' 4\" (1.626 m)   Wt 129 lb 8 oz (58.7 kg)   SpO2 100%   BMI 22.23 kg/m²   Skin: warm and dry, no rash or erythema  Pulmonary/Chest: clear to auscultation bilaterally- no wheezes, rales or rhonchi, normal air movement, no respiratory distress  Cardiovascular: rhythm reg at rate of 84  Abdomen: soft, non-tender, non-distended, normal bowel sounds, no masses or organomegaly  Extremities: no cyanosis, no clubbing, and no edema      Recent Labs     11/02/22  0245 11/03/22  0305    138   K 4.9 5.0   CL 92* 96*   CO2 34* 35*   BUN 15 19 CREATININE 0.7 0.7   GLUCOSE 143* 156*   CALCIUM 7.9* 8.0*       Recent Labs     11/02/22  0245 11/03/22  0305 11/04/22  0230   WBC 30.1* 13.8* 13.8*   RBC 3.87 3.64* 3.72*   HGB 12.2* 11.6* 11.4*   HCT 39.8 37.9 39.3   .8* 104.1* 105.6*   MCH 31.5 31.9 30.6   MCHC 30.7* 30.6* 29.0*   RDW 13.9 13.5 13.5    171 165   MPV 10.1 9.8 9.9       CBC with Differential:    Lab Results   Component Value Date/Time    WBC 13.8 11/04/2022 02:30 AM    RBC 3.72 11/04/2022 02:30 AM    HGB 11.4 11/04/2022 02:30 AM    HCT 39.3 11/04/2022 02:30 AM     11/04/2022 02:30 AM    .6 11/04/2022 02:30 AM    MCH 30.6 11/04/2022 02:30 AM    MCHC 29.0 11/04/2022 02:30 AM    RDW 13.5 11/04/2022 02:30 AM    NRBC 0.0 06/19/2022 04:12 AM    SEGSPCT 79.2 05/31/2022 08:06 PM    BANDSPCT 0.0 02/10/2022 07:41 PM    METASPCT 4.0 02/21/2020 07:05 PM    LYMPHOPCT 3.9 11/04/2022 02:30 AM    MONOPCT 2.7 11/04/2022 02:30 AM    MYELOPCT 0.0 09/17/2021 03:10 AM    BASOPCT 0.1 11/04/2022 02:30 AM    MONOSABS 0.37 11/04/2022 02:30 AM    LYMPHSABS 0.53 11/04/2022 02:30 AM    EOSABS 0.00 11/04/2022 02:30 AM    BASOSABS 0.01 11/04/2022 02:30 AM        Radiology:   XR CHEST PORTABLE   Final Result   Chronic findings in both lungs. Left basilar atelectasis and probable small   effusions. US DUP LOWER EXTREMITY LEFT LIBBY   Final Result   No evidence of DVT in the left lower extremity. RECOMMENDATIONS:   Unavailable         XR CHEST PORTABLE   Final Result   Suspect mild pulmonary vascular congestion. Assessment:    Principal Problem:    Heart failure (HCC)  Active Problems:    COPD exacerbation (HCC)    RLS (restless legs syndrome)  Resolved Problems:    * No resolved hospital problems. *      Plan:  chronic respiratory failure with hypoxia adjust o2 as needed. pulmonary following  Copd exacerbation wean steroids as able and continue nebs.  Pt has been placed on abx by pulmonary  Htn continue med  RLS continue med        Electronically signed by Coy Lennox, DO on 11/4/2022 at 9:52 PM

## 2022-11-06 VITALS
WEIGHT: 128 LBS | DIASTOLIC BLOOD PRESSURE: 80 MMHG | SYSTOLIC BLOOD PRESSURE: 178 MMHG | BODY MASS INDEX: 21.85 KG/M2 | OXYGEN SATURATION: 99 % | HEART RATE: 82 BPM | HEIGHT: 64 IN | TEMPERATURE: 98.4 F | RESPIRATION RATE: 20 BRPM

## 2022-11-06 LAB
BASOPHILS ABSOLUTE: 0 E9/L (ref 0–0.2)
BASOPHILS RELATIVE PERCENT: 0 % (ref 0–2)
CULTURE, RESPIRATORY: ABNORMAL
EOSINOPHILS ABSOLUTE: 0 E9/L (ref 0.05–0.5)
EOSINOPHILS RELATIVE PERCENT: 0 % (ref 0–6)
HCT VFR BLD CALC: 39.9 % (ref 37–54)
HEMOGLOBIN: 12 G/DL (ref 12.5–16.5)
IMMATURE GRANULOCYTES #: 0.06 E9/L
IMMATURE GRANULOCYTES %: 0.8 % (ref 0–5)
LYMPHOCYTES ABSOLUTE: 0.87 E9/L (ref 1.5–4)
LYMPHOCYTES RELATIVE PERCENT: 10.9 % (ref 20–42)
MCH RBC QN AUTO: 30.8 PG (ref 26–35)
MCHC RBC AUTO-ENTMCNC: 30.1 % (ref 32–34.5)
MCV RBC AUTO: 102.3 FL (ref 80–99.9)
MONOCYTES ABSOLUTE: 0.46 E9/L (ref 0.1–0.95)
MONOCYTES RELATIVE PERCENT: 5.8 % (ref 2–12)
NEUTROPHILS ABSOLUTE: 6.57 E9/L (ref 1.8–7.3)
NEUTROPHILS RELATIVE PERCENT: 82.5 % (ref 43–80)
ORGANISM: ABNORMAL
PDW BLD-RTO: 13.4 FL (ref 11.5–15)
PLATELET # BLD: 155 E9/L (ref 130–450)
PMV BLD AUTO: 10.1 FL (ref 7–12)
RBC # BLD: 3.9 E12/L (ref 3.8–5.8)
SMEAR, RESPIRATORY: ABNORMAL
WBC # BLD: 8 E9/L (ref 4.5–11.5)

## 2022-11-06 PROCEDURE — 6370000000 HC RX 637 (ALT 250 FOR IP): Performed by: STUDENT IN AN ORGANIZED HEALTH CARE EDUCATION/TRAINING PROGRAM

## 2022-11-06 PROCEDURE — 6370000000 HC RX 637 (ALT 250 FOR IP): Performed by: INTERNAL MEDICINE

## 2022-11-06 PROCEDURE — 6360000002 HC RX W HCPCS: Performed by: INTERNAL MEDICINE

## 2022-11-06 PROCEDURE — 2580000003 HC RX 258: Performed by: INTERNAL MEDICINE

## 2022-11-06 PROCEDURE — 85025 COMPLETE CBC W/AUTO DIFF WBC: CPT

## 2022-11-06 PROCEDURE — 36415 COLL VENOUS BLD VENIPUNCTURE: CPT

## 2022-11-06 PROCEDURE — 94640 AIRWAY INHALATION TREATMENT: CPT

## 2022-11-06 PROCEDURE — 99239 HOSP IP/OBS DSCHRG MGMT >30: CPT | Performed by: INTERNAL MEDICINE

## 2022-11-06 PROCEDURE — 2700000000 HC OXYGEN THERAPY PER DAY

## 2022-11-06 RX ORDER — CEFDINIR 300 MG/1
300 CAPSULE ORAL 2 TIMES DAILY
Qty: 8 CAPSULE | Refills: 0 | Status: SHIPPED | OUTPATIENT
Start: 2022-11-06 | End: 2022-11-10

## 2022-11-06 RX ORDER — PREDNISONE 10 MG/1
TABLET ORAL
Qty: 12 TABLET | Refills: 0 | Status: SHIPPED | OUTPATIENT
Start: 2022-11-06

## 2022-11-06 RX ADMIN — PANTOPRAZOLE SODIUM 40 MG: 40 TABLET, DELAYED RELEASE ORAL at 06:06

## 2022-11-06 RX ADMIN — Medication 500 MG: at 08:28

## 2022-11-06 RX ADMIN — GABAPENTIN 100 MG: 100 CAPSULE ORAL at 14:08

## 2022-11-06 RX ADMIN — FLUOXETINE HYDROCHLORIDE 10 MG: 10 TABLET ORAL at 10:45

## 2022-11-06 RX ADMIN — HYDROCODONE BITARTRATE AND ACETAMINOPHEN 1 TABLET: 7.5; 325 TABLET ORAL at 08:28

## 2022-11-06 RX ADMIN — LORAZEPAM 1 MG: 1 TABLET ORAL at 08:28

## 2022-11-06 RX ADMIN — ARFORMOTEROL TARTRATE 15 MCG: 15 SOLUTION RESPIRATORY (INHALATION) at 08:10

## 2022-11-06 RX ADMIN — ENOXAPARIN SODIUM 40 MG: 100 INJECTION SUBCUTANEOUS at 08:28

## 2022-11-06 RX ADMIN — METOPROLOL TARTRATE 25 MG: 25 TABLET, FILM COATED ORAL at 08:28

## 2022-11-06 RX ADMIN — HYDROCODONE BITARTRATE AND HOMATROPINE METHYLBROMIDE 5 ML: 5; 1.5 SOLUTION ORAL at 14:08

## 2022-11-06 RX ADMIN — PREDNISONE 30 MG: 5 TABLET ORAL at 08:28

## 2022-11-06 RX ADMIN — LORAZEPAM 1 MG: 1 TABLET ORAL at 14:08

## 2022-11-06 RX ADMIN — HYDROCODONE BITARTRATE AND HOMATROPINE METHYLBROMIDE 5 ML: 5; 1.5 SOLUTION ORAL at 08:28

## 2022-11-06 RX ADMIN — ARIPIPRAZOLE 5 MG: 5 TABLET ORAL at 10:45

## 2022-11-06 RX ADMIN — IPRATROPIUM BROMIDE AND ALBUTEROL SULFATE 1 AMPULE: .5; 2.5 SOLUTION RESPIRATORY (INHALATION) at 12:10

## 2022-11-06 RX ADMIN — IPRATROPIUM BROMIDE AND ALBUTEROL SULFATE 1 AMPULE: .5; 2.5 SOLUTION RESPIRATORY (INHALATION) at 08:10

## 2022-11-06 RX ADMIN — GABAPENTIN 100 MG: 100 CAPSULE ORAL at 08:28

## 2022-11-06 RX ADMIN — BUDESONIDE 1000 MCG: 0.5 SUSPENSION RESPIRATORY (INHALATION) at 08:10

## 2022-11-06 RX ADMIN — CEFTRIAXONE 1000 MG: 1 INJECTION, POWDER, FOR SOLUTION INTRAMUSCULAR; INTRAVENOUS at 10:45

## 2022-11-06 RX ADMIN — Medication 10 ML: at 08:30

## 2022-11-06 ASSESSMENT — PAIN DESCRIPTION - LOCATION: LOCATION: LEG

## 2022-11-06 ASSESSMENT — PAIN SCALES - GENERAL
PAINLEVEL_OUTOF10: 9
PAINLEVEL_OUTOF10: 0
PAINLEVEL_OUTOF10: 9

## 2022-11-06 ASSESSMENT — PAIN DESCRIPTION - ORIENTATION: ORIENTATION: LEFT

## 2022-11-06 NOTE — PROGRESS NOTES
[facility] [address]  [address], [address]  [facility], [facility]      Pulmonary Progress Note      SUBJECTIVE:  stable with respiratory function, similar with cough and mod sputum production (?) thinner and slightly less though still brownish in color, sitting up in bed on 7 li HFNC.     OBJECTIVE    Medications    Continuous Infusions:   sodium chloride         Scheduled Meds:   predniSONE  30 mg Oral Daily    cefTRIAXone (ROCEPHIN) IV  1,000 mg IntraVENous Q24H    LORazepam  1 mg Oral TID    budesonide  1 mg Nebulization BID    Arformoterol Tartrate  15 mcg Nebulization BID    calcium elemental  500 mg Oral BID WC    FLUoxetine  10 mg Oral Daily    gabapentin  100 mg Oral TID    ipratropium-albuterol  1 ampule Inhalation Q4H WA    metoprolol tartrate  25 mg Oral BID    pantoprazole  40 mg Oral QAM AC    pramipexole  0.25 mg Oral Nightly    sodium chloride flush  5-40 mL IntraVENous 2 times per day    enoxaparin  40 mg SubCUTAneous Daily    ARIPiprazole  5 mg Oral Daily       PRN Meds:melatonin, HYDROcodone homatropine, HYDROcodone-acetaminophen, sodium chloride flush, sodium chloride, ondansetron **OR** ondansetron, polyethylene glycol, acetaminophen **OR** acetaminophen    Physical    VITALS:  BP (!) 178/80   Pulse 82   Temp 98.4 °F (36.9 °C) (Oral)   Resp 20   Ht 5' 4\" (1.626 m)   Wt 128 lb (58.1 kg)   SpO2 99%   BMI 21.97 kg/m²     24HR INTAKE/OUTPUT:      Intake/Output Summary (Last 24 hours) at 2022 1407  Last data filed at 2022 1240  Gross per 24 hour   Intake --   Output 2275 ml   Net -2275 ml         24HR PULSE OXIMETRY RANGE:    SpO2  Av.2 %  Min: 98 %  Max: 100 %    General appearance: alert, appears stated age, and cooperative  Lungs: rhonchi bilaterally worse with cough  Heart: regular rate and rhythm, S1, S2 normal, no murmur, click, rub or gallop  Abdomen: soft, non-tender; bowel sounds normal; no masses,  no organomegaly  Extremities: extremities normal, atraumatic, no cyanosis or edema  Neurologic: Mental status: Alert, oriented, thought content appropriate    Data    CBC:   Recent Labs     11/04/22  0230 11/05/22  0303 11/06/22  0354   WBC 13.8* 8.3 8.0   HGB 11.4* 11.0* 12.0*   HCT 39.3 36.3* 39.9   .6* 102.8* 102.3*    139 155         BMP:  No results for input(s): NA, K, CL, CO2, PHOS, BUN, CREATININE, CA in the last 72 hours. ALB:3,BILIDIR:3,BILITOT:3,ALKPHOS:3)@    PT/INR: No results for input(s): PROTIME, INR in the last 72 hours. ABG:   No results for input(s): PH, PO2, PCO2, HCO3, BE, O2SAT, METHB, O2HB, COHB, O2CON, HHB, THB in the last 72 hours. Radiology/Other tests reviewed: CXR reviewed with similar interstitial markings/atelectasis left lower lung field    Assessment:     Principal Problem:    Heart failure ([address]  Active Problems:    COPD exacerbation (HCC)    RLS (restless legs syndrome)  Resolved Problems:    * No resolved hospital problems. *      Plan:       Cont with steroids, taper as tolerated  Cont with nebs, observe respiratory function and sputum production  Cont with oxygen, taper as tolerated  Cont with rocephin, observe cough/sputum production, will keep on antibiotics for total of 7 days, can be po and finish upon discharge  Can be discharged from pulmonary pov      Time at the bedside, reviewing labs and radiographs, reviewing notes and consultations, discussing with staff and family was more than 35 minutes. Thanks for letting us see this patient in consultation. Please contact us with any questions. Office [phone] or after hours through [employer], [phone].

## 2022-11-06 NOTE — PROGRESS NOTES
Called pt to offer an in-office apt for further assessment for today, and pt declined. Per pt looking to get a switch ocp med as believes may be a SE to med. Please advise.      Pt was also advised to monitor symptoms and proceed to ER if any worsening Discharge paperwork reviewed with patient and sister at bedside. Explained importance of med compliance and f/u appts. Patient voiced understanding.

## 2022-11-06 NOTE — PROGRESS NOTES
Message left for [facility] in regards to patients discharge today. Requested call back.

## 2022-11-06 NOTE — DISCHARGE SUMMARY
[id number] [facility] EMERGENCY SERVICE Physician Discharge Summary       Christopher Rodriguez MD  [address] [facility]          MD Mayur Jimdorothy [address], [place] [address] [facility]  [phone]    Follow up        Activity level: as keely    Diet: ADULT DIET; Dysphagia - Pureed    Dispo:home    Condition at discharge: fair          Patient ID:  Ignacia Hernandez  [id number]  61 y.o.  1965    Admit date: 10/31/2022    Discharge date and time:  11/6/2022  4:25 PM    Admission Diagnoses: Principal Problem:    Heart failure ([address]  Active Problems:    COPD exacerbation (HCC)    RLS (restless legs syndrome)  Resolved Problems:    * No resolved hospital problems. *      Discharge Diagnoses: Principal Problem:    Heart failure ([address]  Active Problems:    COPD exacerbation (HCC)    RLS (restless legs syndrome)  Resolved Problems:    * No resolved hospital problems. *    Chronic respiratory failure with hypoxia  Copd exacerbation  Htn  RLS      Consults:  IP CONSULT TO PULMONOLOGY    Procedures: none    Hospital Course: Patient was admitted with Heart failure ([address] [I50.9]  COPD exacerbation ([facility] [J44.1]. Patient is a 49-year-old male with a history of hypertension, and end-stage COPD on 7 L of oxygen at home. Patient just recently discharged from this hospital with pneumonia, COPD exacerbation, and mild CHF exacerbation. At that time patient was felt to have streptococcal pneumonia as well as RSV. He was weaned down to oral steroids and according to most records patient stated he was feeling better and ready to go home with his sister. Patient tells me that he never felt better and had wanted to come back to the hospital every day since being discharged 7 days ago. His cough remains harsh and debilitating and is coughing up rust colored sputum. Nothing relieves his feeling of breathlessness and dyspnea. He has not had any fevers.   He has not been able to sleep for days due to cough. In the emergency room RSV panel is positive. ABG with pH 7.441, PCO2 59.0, PO2 74. BNP 1041. COVID and influenza rapid test are negative. Pt seen and examined by pulmonary. Pt improved with steroids, nebs and antibiotics. On day of discharge, pt denied fevers, chills,n/v. Discharge planning d/w pt. Time given for questions and all questions answered. Discharge Exam:  Vitals:    11/06/22 0600 11/06/22 0730 11/06/22 0811 11/06/22 1211   BP:  (!) 178/80     Pulse:  82     Resp:  20     Temp:  98.4 °F (36.9 °C)     TempSrc:  Oral     SpO2:  100% 98% 99%   Weight: 128 lb (58.1 kg)      Height:           Skin: warm and dry, no rash or erythema  Pulmonary/Chest: clear to auscultation bilaterally- no wheezes, rales or rhonchi, normal air movement, no respiratory distress  Cardiovascular: rhythm reg at rate of 84  Abdomen: soft, non-tender, non-distended, normal bowel sounds, no masses or organomegaly  Extremities: no cyanosis, no clubbing, and no edema  I/O last 3 completed shifts:  In: -   Out: 3200 [Urine:3200]  I/O this shift:  In: -   Out: 775 [Urine:775]      LABS:  No results for input(s): NA, K, CL, CO2, BUN, CREATININE, GLUCOSE, CALCIUM in the last 72 hours. Recent Labs     11/04/22  0230 11/05/22  0303 11/06/22  0354   WBC 13.8* 8.3 8.0   RBC 3.72* 3.53* 3.90   HGB 11.4* 11.0* 12.0*   HCT 39.3 36.3* 39.9   .6* 102.8* 102.3*   MCH 30.6 31.2 30.8   MCHC 29.0* 30.3* 30.1*   RDW 13.5 13.3 13.4    139 155   MPV 9.9 9.6 10.1       No results for input(s): POCGLU in the last 72 hours.     CBC with Differential:    Lab Results   Component Value Date/Time    WBC 8.0 11/06/2022 03:54 AM    RBC 3.90 11/06/2022 03:54 AM    HGB 12.0 11/06/2022 03:54 AM    HCT 39.9 11/06/2022 03:54 AM     11/06/2022 03:54 AM    .3 11/06/2022 03:54 AM    MCH 30.8 11/06/2022 03:54 AM    MCHC 30.1 11/06/2022 03:54 AM    RDW 13.4 11/06/2022 03:54 AM    NRBC 0.0 06/19/2022 04:12 AM SEGSPCT 79.2 05/31/2022 08:06 PM    BANDSPCT 0.0 02/10/2022 07:41 PM    METASPCT 4.0 02/21/2020 07:05 PM    LYMPHOPCT 10.9 11/06/2022 03:54 AM    MONOPCT 5.8 11/06/2022 03:54 AM    MYELOPCT 0.0 09/17/2021 03:10 AM    BASOPCT 0.0 11/06/2022 03:54 AM    MONOSABS 0.46 11/06/2022 03:54 AM    LYMPHSABS 0.87 11/06/2022 03:54 AM    EOSABS 0.00 11/06/2022 03:54 AM    BASOSABS 0.00 11/06/2022 03:54 AM       Imaging:   XR CHEST PORTABLE   Final Result   Chronic findings in both lungs. Left basilar atelectasis and probable small   effusions. US DUP LOWER EXTREMITY LEFT LIBBY   Final Result   No evidence of DVT in the left lower extremity. RECOMMENDATIONS:   Unavailable         XR CHEST PORTABLE   Final Result   Suspect mild pulmonary vascular congestion. Patient Instructions:      Medication List        START taking these medications      cefdinir 300 MG capsule  Commonly known as: OMNICEF  Take 1 capsule by mouth 2 times daily for 4 days            CHANGE how you take these medications      predniSONE 10 MG tablet  Commonly known as: Gypsy Aleksey  Take orally 3 tabs 11/7 -11/8; 2 tabs 11/9 - 11/10; 1 tab 11/11-11/12  What changed: additional instructions            CONTINUE taking these medications      ARIPiprazole 5 MG tablet  Commonly known as: ABILIFY     Bactroban Nasal 2 % nasal ointment  Generic drug: mupirocin  by Nasal route 2 times daily Take by Nasal route 2 times daily. Brovana 15 MCG/2ML Nebu  Generic drug: Arformoterol Tartrate  Take 2 mLs by nebulization 2 times daily     budesonide 0.5 MG/2ML nebulizer suspension  Commonly known as: PULMICORT  Take 2 mLs by nebulization in the morning and 2 mLs before bedtime. calcium elemental 500 MG Tabs tablet  Commonly known as: OSCAL  Take 1 tablet by mouth 2 times daily (with meals)     Florajen3 Caps  Patient is to take one tab bid.  Patient has been on antibiotics for the last 3 months     FLUoxetine 10 MG tablet  Commonly known as: PROZAC     gabapentin 100 MG capsule  Commonly known as: NEURONTIN  Take 1 capsule by mouth 3 times daily for 30 days. HYDROcodone-acetaminophen 7.5-325 MG per tablet  Commonly known as: NORCO     ipratropium-albuterol 0.5-2.5 (3) MG/3ML Soln nebulizer solution  Commonly known as: DUONEB  Inhale 3 mLs into the lungs every 6 hours as needed for Shortness of Breath     metoprolol tartrate 25 MG tablet  Commonly known as: LOPRESSOR  Take 1 tablet by mouth 2 times daily Twice A Day     omeprazole 40 MG delayed release capsule  Commonly known as: PRILOSEC  Take 1 capsule by mouth daily     pramipexole 0.25 MG tablet  Commonly known as: Mirapex  Take 1 tablet by mouth nightly     vitamin D 1.25 MG (62098 UT) Caps capsule  Commonly known as: ERGOCALCIFEROL  Take 1 capsule by mouth once a week for 6 doses     Yupelri 175 MCG/3ML Soln  Generic drug: Revefenacin  Inhale 1 ampule into the lungs daily               Where to Get Your Medications        These medications were sent to [facility], [address] [place] [address] Drive      Phone: [phone]   cefdinir 300 MG capsule  predniSONE 10 MG tablet         Total time for discharge is 37 min    Signed:  Electronically signed by Gage West DO on 11/6/2022 at 4:25 PM

## 2022-11-07 ENCOUNTER — TELEPHONE (OUTPATIENT)
Dept: PRIMARY CARE CLINIC | Age: 57
End: 2022-11-07

## 2022-11-07 NOTE — TELEPHONE ENCOUNTER
[facility] Transitions Initial Follow Up Call    Outreach made within 2 business days of discharge: Yes    Patient: Hayden Perez Patient : 1965   MRN: [id number]  Reason for Admission: There are no discharge diagnoses documented for the most recent discharge. Discharge Date: 22       Spoke with: Kai Mclean    Discharge department/facility: [address] [place] [address] Interactive Patient Contact:  Was patient able to fill all prescriptions: Yes  Was patient instructed to bring all medications to the follow-up visit: Yes  Is patient taking all medications as directed in the discharge summary?  Yes  Does patient understand their discharge instructions: Yes  Does patient have questions or concerns that need addressed prior to 7-14 day follow up office visit: no    Scheduled appointment with PCP within 7-14 days    Follow Up  Future Appointments   Date Time Provider Mik Rosario   2022 10:30 AM Judith Aponte MD [facility]   2022 11:00 AM Judith Aponte MD [facility]       Vincenzo Og LPN

## 2022-11-13 DIAGNOSIS — K21.9 GASTROESOPHAGEAL REFLUX DISEASE, UNSPECIFIED WHETHER ESOPHAGITIS PRESENT: ICD-10-CM

## 2022-11-14 RX ORDER — OMEPRAZOLE 40 MG/1
40 CAPSULE, DELAYED RELEASE ORAL DAILY
Qty: 90 CAPSULE | Refills: 1 | Status: SHIPPED | OUTPATIENT
Start: 2022-11-14

## 2022-12-02 ENCOUNTER — OFFICE VISIT (OUTPATIENT)
Dept: FAMILY MEDICINE CLINIC | Age: 57
End: 2022-12-02
Payer: MEDICAID

## 2022-12-02 VITALS
DIASTOLIC BLOOD PRESSURE: 80 MMHG | WEIGHT: 128 LBS | SYSTOLIC BLOOD PRESSURE: 152 MMHG | HEART RATE: 67 BPM | HEIGHT: 64 IN | RESPIRATION RATE: 20 BRPM | OXYGEN SATURATION: 95 % | TEMPERATURE: 98 F | BODY MASS INDEX: 21.85 KG/M2

## 2022-12-02 DIAGNOSIS — J40 SINOBRONCHITIS: Primary | ICD-10-CM

## 2022-12-02 DIAGNOSIS — J32.9 SINOBRONCHITIS: Primary | ICD-10-CM

## 2022-12-02 PROCEDURE — 3074F SYST BP LT 130 MM HG: CPT | Performed by: NURSE PRACTITIONER

## 2022-12-02 PROCEDURE — 99213 OFFICE O/P EST LOW 20 MIN: CPT | Performed by: NURSE PRACTITIONER

## 2022-12-02 PROCEDURE — 3078F DIAST BP <80 MM HG: CPT | Performed by: NURSE PRACTITIONER

## 2022-12-02 RX ORDER — CEFDINIR 300 MG/1
300 CAPSULE ORAL 2 TIMES DAILY
Qty: 20 CAPSULE | Refills: 0 | Status: SHIPPED | OUTPATIENT
Start: 2022-12-02 | End: 2022-12-12

## 2022-12-02 RX ORDER — PREDNISONE 20 MG/1
20 TABLET ORAL 2 TIMES DAILY
Qty: 10 TABLET | Refills: 0 | Status: SHIPPED | OUTPATIENT
Start: 2022-12-02 | End: 2022-12-07

## 2022-12-02 RX ORDER — GUAIFENESIN DEXTROMETHORPHAN HYDROBROMIDE ORAL SOLUTION 10; 100 MG/5ML; MG/5ML
10 SOLUTION ORAL EVERY 4 HOURS PRN
Qty: 240 ML | Refills: 0 | Status: SHIPPED | OUTPATIENT
Start: 2022-12-02

## 2022-12-02 NOTE — PROGRESS NOTES
22  Maya Cross : 1965 Sex: male  Age 62 y.o. Subjective:  Chief Complaint   Patient presents with    Cough     X +1 week     Congestion           Otalgia     B/L     Fever     Per visiting nurse, low grade fever for the last 5 visits        HPI:   Maya Cross , 62 y.o. male presents to the clinic for evaluation of cough x 8 days. The patient also reports fever, sinus congestion, DIAS, intermittent wheezing, and ear discomfort. The patient has taken Tylenol sinus for symptoms. The patient reports unchanged symptoms over time. 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 The patient reports hx of COVID-19. The patient denies acute loss of taste and smell, headache, sore throat, rash, and fever. The patient also denies chest pain, abdominal pain, and nausea / vomiting / diarrhea. ROS:   Unless otherwise stated in this report the patient's positive and negative responses for review of systems for constitutional, eyes, ENT, cardiovascular, respiratory, gastrointestinal, neurological, , musculoskeletal, and integument systems and related systems to the presenting problem are either stated in the history of present illness or were not pertinent or were negative for the symptoms and/or complaints related to the presenting medical problem. Positives and pertinent negatives as per HPI. All others reviewed and are negative.       PMH:     Past Medical History:   Diagnosis Date    Anxiety     COPD (chronic obstructive pulmonary disease) (HCC)     Hypertension     Leg swelling     Multiple gastric ulcers     Restless leg syndrome        Past Surgical History:   Procedure Laterality Date    HERNIA REPAIR      HIP SURGERY      KNEE SURGERY         Family History   Problem Relation Age of Onset    Colon Cancer Mother     Other Father         house fire    No Known Problems Sister     No Known Problems Brother     No Known Problems Sister     No Known Problems Brother        Medications: Current Outpatient Medications:     predniSONE (DELTASONE) 20 MG tablet, Take 1 tablet by mouth 2 times daily for 5 days, Disp: 10 tablet, Rfl: 0    cefdinir (OMNICEF) 300 MG capsule, Take 1 capsule by mouth 2 times daily for 10 days, Disp: 20 capsule, Rfl: 0    dextromethorphan-guaiFENesin (ROBITUSSIN-DM)  MG/5ML syrup, Take 10 mLs by mouth every 4 hours as needed for Cough, Disp: 240 mL, Rfl: 0    benzonatate (TESSALON) 100 MG capsule, Take 1 capsule by mouth 3 times daily as needed for Cough, Disp: 30 capsule, Rfl: 0    omeprazole (PRILOSEC) 40 MG delayed release capsule, Take 1 capsule by mouth daily, Disp: 90 capsule, Rfl: 1    LORazepam (ATIVAN) 1 MG tablet, Take 1 tablet by mouth every 8 hours as needed for Anxiety for up to 30 days. , Disp: 90 tablet, Rfl: 0    BROVANA 15 MCG/2ML NEBU, Take 2 mLs by nebulization in the morning and 2 mLs in the evening., Disp: 120 mL, Rfl: 3    calcium elemental (OSCAL) 500 MG TABS tablet, Take 1 tablet by mouth 2 times daily (with meals), Disp: 30 tablet, Rfl: 0    vitamin D (ERGOCALCIFEROL) 1.25 MG (03884 UT) CAPS capsule, Take 1 capsule by mouth once a week for 6 doses, Disp: 6 capsule, Rfl: 0    ipratropium-albuterol (DUONEB) 0.5-2.5 (3) MG/3ML SOLN nebulizer solution, Inhale 3 mLs into the lungs every 6 hours as needed for Shortness of Breath, Disp: 90 each, Rfl: 3    FLUoxetine (PROZAC) 10 MG tablet, Take 10 mg by mouth daily, Disp: , Rfl:     pramipexole (MIRAPEX) 0.25 MG tablet, Take 1 tablet by mouth nightly, Disp: 90 tablet, Rfl: 1    metoprolol tartrate (LOPRESSOR) 25 MG tablet, Take 1 tablet by mouth 2 times daily Twice A Day, Disp: 180 tablet, Rfl: 1    mupirocin (BACTROBAN NASAL) 2 % nasal ointment, by Nasal route 2 times daily Take by Nasal route 2 times daily. , Disp: 1 g, Rfl: 3    budesonide (PULMICORT) 0.5 MG/2ML nebulizer suspension, Take 2 mLs by nebulization in the morning and 2 mLs before bedtime. , Disp: 60 each, Rfl: 5    Revefenacin (Susan Vickie) 175 MCG/3ML SOLN, Inhale 1 ampule into the lungs daily, Disp: 30 each, Rfl: 3    gabapentin (NEURONTIN) 100 MG capsule, Take 1 capsule by mouth 3 times daily for 30 days. , Disp: 90 capsule, Rfl: 0    Probiotic Product [facility]) CAPS, Patient is to take one tab bid. Patient has been on antibiotics for the last 3 months, Disp: 30 capsule, Rfl: 3    HYDROcodone-acetaminophen (NORCO) 7.5-325 MG per tablet, Take 1 tablet by mouth 2 times daily as needed. Takes religiously twice daily for leg pain per pt, Disp: , Rfl:     ARIPiprazole (ABILIFY) 5 MG tablet, take 1 tablet by mouth once daily, Disp: , Rfl: 0    Allergies: Allergies   Allergen Reactions    Aspirin     Lisinopril     Other      Other reaction(s): ABD PAIN    Tramadol     Ibuprofen Nausea And Vomiting    Sulfa Antibiotics Nausea And Vomiting       Social History:     Social History     Tobacco Use    Smoking status: Former     Packs/day: 4.00     Years: 25.00     Pack years: 100.00     Types: Cigarettes     Start date: 3/10/1995     Quit date: 10/13/2020     Years since quittin.1    Smokeless tobacco: Never   Vaping Use    Vaping Use: Never used   Substance Use Topics    Alcohol use: No    Drug use: No       Physical Exam:     Vitals:    22 1214 22 1220   BP: (!) 152/80 (!) 152/80   Pulse: 67    Resp: 20    Temp: 98 °F (36.7 °C)    TempSrc: Temporal    SpO2: 95%    Weight: 128 lb (58.1 kg)  Comment: Pt reported    Height: 5' 4\" (1.626 m)        Physical Exam (PE)    Physical Exam  Constitutional:       Appearance: Normal appearance. HENT:      Head: Normocephalic. Right Ear: Tympanic membrane, ear canal and external ear normal.      Left Ear: Tympanic membrane, ear canal and external ear normal.      Nose: Rhinorrhea present. Right Sinus: Maxillary sinus tenderness and frontal sinus tenderness present. Left Sinus: Maxillary sinus tenderness and frontal sinus tenderness present.       Mouth/Throat:      Mouth: Mucous membranes are moist.      Pharynx: Oropharynx is clear. Eyes:      Pupils: Pupils are equal, round, and reactive to light. Cardiovascular:      Rate and Rhythm: Normal rate and regular rhythm. Pulses: Normal pulses. Heart sounds: Normal heart sounds. Pulmonary:      Effort: Pulmonary effort is normal.      Breath sounds: Decreased breath sounds present. No wheezing, rhonchi or rales. Abdominal:      General: Bowel sounds are normal.      Palpations: Abdomen is soft. Musculoskeletal:         General: Normal range of motion. Cervical back: Normal range of motion and neck supple. Lymphadenopathy:      Cervical: No cervical adenopathy. Skin:     General: Skin is warm and dry. Capillary Refill: Capillary refill takes less than 2 seconds. Neurological:      General: No focal deficit present. Mental Status: He is alert and oriented to person, place, and time. Psychiatric:         Mood and Affect: Mood normal.         Behavior: Behavior normal.        Testing:   (All laboratory and radiology results have been personally reviewed by myself)  Labs:  No results found for this visit on 12/02/22. Imaging: All Radiology results interpreted by Radiologist unless otherwise noted. No orders to display       Assessment / Plan:   The patient's vitals, allergies, medications, and past medical history have been reviewed. Lauren Hare was seen today for cough, congestion, otalgia and fever. Diagnoses and all orders for this visit:    Sinobronchitis  -     predniSONE (DELTASONE) 20 MG tablet; Take 1 tablet by mouth 2 times daily for 5 days  -     cefdinir (OMNICEF) 300 MG capsule; Take 1 capsule by mouth 2 times daily for 10 days  -     dextromethorphan-guaiFENesin (ROBITUSSIN-DM)  MG/5ML syrup; Take 10 mLs by mouth every 4 hours as needed for Cough      - Disposition: Home    - Educational material printed for patient's review and were included in patient instructions.  After Visit Summary was given to patient at the end of visit. - Albuterol Inhaler 2 puffs qid x 5 days. Patient declined chest xray today. Patient also reports he cannot take doxycyline because of GI upset. -  Encouraged oral fluids and rest. Discussed symptomatic treatments with patient today. The patient is to schedule a follow-up with PCP in the next 2-3 days for reevaluation. Red flag symptoms were also discussed with the patient today. If symptoms worsen the patient is to go directly to the emergency department for reevaluation and treatment. Pt verbalizes understanding and is in agreement with plan of care. All questions answered. SIGNATURE: Abhinav Lind, ARGELIA-CNP    *NOTE: This report was transcribed using voice recognition software. Every effort was made to ensure accuracy; however, inadvertent computerized transcription errors may be present.

## 2022-12-09 ENCOUNTER — OFFICE VISIT (OUTPATIENT)
Dept: PRIMARY CARE CLINIC | Age: 57
End: 2022-12-09
Payer: MEDICAID

## 2022-12-09 VITALS
SYSTOLIC BLOOD PRESSURE: 128 MMHG | DIASTOLIC BLOOD PRESSURE: 66 MMHG | RESPIRATION RATE: 18 BRPM | OXYGEN SATURATION: 99 % | TEMPERATURE: 98.6 F | HEART RATE: 89 BPM

## 2022-12-09 DIAGNOSIS — J40 SINOBRONCHITIS: ICD-10-CM

## 2022-12-09 DIAGNOSIS — R19.7 DIARRHEA, UNSPECIFIED TYPE: ICD-10-CM

## 2022-12-09 DIAGNOSIS — R09.89 PULMONARY CONGESTION: ICD-10-CM

## 2022-12-09 DIAGNOSIS — I10 PRIMARY HYPERTENSION: ICD-10-CM

## 2022-12-09 DIAGNOSIS — R35.0 URINE FREQUENCY: ICD-10-CM

## 2022-12-09 DIAGNOSIS — J32.9 SINOBRONCHITIS: ICD-10-CM

## 2022-12-09 DIAGNOSIS — R19.7 DIARRHEA, UNSPECIFIED TYPE: Primary | ICD-10-CM

## 2022-12-09 LAB
ANION GAP SERPL CALCULATED.3IONS-SCNC: 10 MMOL/L (ref 7–16)
BASOPHILS ABSOLUTE: 0.04 E9/L (ref 0–0.2)
BASOPHILS RELATIVE PERCENT: 0.2 % (ref 0–2)
BILIRUBIN, POC: NORMAL
BLOOD URINE, POC: NORMAL
BUN BLDV-MCNC: 11 MG/DL (ref 6–20)
CALCIUM SERPL-MCNC: 7.8 MG/DL (ref 8.6–10.2)
CHLORIDE BLD-SCNC: 92 MMOL/L (ref 98–107)
CLARITY, POC: CLEAR
CO2: 38 MMOL/L (ref 22–29)
COLOR, POC: YELLOW
CREAT SERPL-MCNC: 0.8 MG/DL (ref 0.7–1.2)
EOSINOPHILS ABSOLUTE: 0.02 E9/L (ref 0.05–0.5)
EOSINOPHILS RELATIVE PERCENT: 0.1 % (ref 0–6)
GFR SERPL CREATININE-BSD FRML MDRD: >60 ML/MIN/1.73
GLUCOSE BLD-MCNC: 127 MG/DL (ref 74–99)
GLUCOSE URINE, POC: NORMAL
HCT VFR BLD CALC: 37.7 % (ref 37–54)
HEMOGLOBIN: 11.1 G/DL (ref 12.5–16.5)
IMMATURE GRANULOCYTES #: 0.09 E9/L
IMMATURE GRANULOCYTES %: 0.5 % (ref 0–5)
INFLUENZA A ANTIBODY: NORMAL
INFLUENZA B ANTIBODY: NORMAL
KETONES, POC: NORMAL
LEUKOCYTE EST, POC: NORMAL
LYMPHOCYTES ABSOLUTE: 1.66 E9/L (ref 1.5–4)
LYMPHOCYTES RELATIVE PERCENT: 8.4 % (ref 20–42)
MCH RBC QN AUTO: 31.7 PG (ref 26–35)
MCHC RBC AUTO-ENTMCNC: 29.4 % (ref 32–34.5)
MCV RBC AUTO: 107.7 FL (ref 80–99.9)
MONOCYTES ABSOLUTE: 1.19 E9/L (ref 0.1–0.95)
MONOCYTES RELATIVE PERCENT: 6.1 % (ref 2–12)
NEUTROPHILS ABSOLUTE: 16.66 E9/L (ref 1.8–7.3)
NEUTROPHILS RELATIVE PERCENT: 84.7 % (ref 43–80)
NITRITE, POC: NORMAL
PDW BLD-RTO: 15.1 FL (ref 11.5–15)
PH, POC: 6
PLATELET # BLD: 230 E9/L (ref 130–450)
PMV BLD AUTO: 11.2 FL (ref 7–12)
POTASSIUM SERPL-SCNC: 4 MMOL/L (ref 3.5–5)
PROTEIN, POC: NORMAL
RBC # BLD: 3.5 E12/L (ref 3.8–5.8)
SODIUM BLD-SCNC: 140 MMOL/L (ref 132–146)
SPECIFIC GRAVITY, POC: 1.02
UROBILINOGEN, POC: 0.2
WBC # BLD: 19.7 E9/L (ref 4.5–11.5)

## 2022-12-09 PROCEDURE — 87804 INFLUENZA ASSAY W/OPTIC: CPT | Performed by: FAMILY MEDICINE

## 2022-12-09 PROCEDURE — 3074F SYST BP LT 130 MM HG: CPT | Performed by: FAMILY MEDICINE

## 2022-12-09 PROCEDURE — 99214 OFFICE O/P EST MOD 30 MIN: CPT | Performed by: FAMILY MEDICINE

## 2022-12-09 PROCEDURE — 81002 URINALYSIS NONAUTO W/O SCOPE: CPT | Performed by: FAMILY MEDICINE

## 2022-12-09 PROCEDURE — 3078F DIAST BP <80 MM HG: CPT | Performed by: FAMILY MEDICINE

## 2022-12-09 RX ORDER — LOSARTAN POTASSIUM 25 MG/1
25 TABLET ORAL DAILY
Qty: 30 TABLET | Refills: 5 | Status: SHIPPED | OUTPATIENT
Start: 2022-12-09

## 2022-12-09 RX ORDER — OMEPRAZOLE 40 MG/1
40 CAPSULE, DELAYED RELEASE ORAL DAILY
Qty: 90 CAPSULE | Refills: 1 | Status: SHIPPED | OUTPATIENT
Start: 2022-12-09

## 2022-12-09 RX ORDER — LORAZEPAM 1 MG/1
1 TABLET ORAL EVERY 8 HOURS PRN
Qty: 90 TABLET | Refills: 0 | Status: SHIPPED | OUTPATIENT
Start: 2022-12-12 | End: 2023-01-11

## 2022-12-09 NOTE — PROGRESS NOTES
22  Hayden Cocker : 1965 Sex: male  Age: 62 y.o. Assessment and Plan:  Marii Wolfe was seen today for follow-up from hospital.    Diagnoses and all orders for this visit:    Diarrhea, unspecified type  -     CLOSTRIDIUM DIFFICILE EIA; Future  -     Culture, Stool; Future  -     FECAL LEUKOCYTES; Future  -     GIARDIA ANTIGEN; Future  -     CBC with Auto Differential; Future  -     Basic Metabolic Panel; Future  -     POCT Urinalysis no Micro    Primary hypertension  -     losartan (COZAAR) 25 MG tablet; Take 1 tablet by mouth daily    Sinobronchitis  -     CBC with Auto Differential; Future  -     Basic Metabolic Panel; Future  -     POCT Urinalysis no Micro  -     XR CHEST (2 VW); Future    Pulmonary congestion  -     XR CHEST (2 VW); Future  -     POCT Influenza A/B    Urine frequency  -     Culture, Urine; Future    Other orders  -     LORazepam (ATIVAN) 1 MG tablet; Take 1 tablet by mouth every 8 hours as needed for Anxiety for up to 30 days. -     metoprolol tartrate (LOPRESSOR) 25 MG tablet; Take 1 tablet by mouth 2 times daily Twice A Day  -     omeprazole (PRILOSEC) 40 MG delayed release capsule; Take 1 capsule by mouth daily    High concern for decompensation in light of pt's significant underlying health issues  Check labs, urine  Check stool  LOW threshold for ER- sister and pt agree     HTN - uncontrolled  Trial of losartan    Other chronic refills sent      No follow-ups on file.         Chief Complaint   Patient presents with    Follow-Up from Hospital       HPI  Pt here for acute concerns     He was also just recently seen in walk in beginning of the month for sinobronchitis   Treated with omnicef and prednisone burst   He was doing ok, but yesterday he was horse   He has not been feeing well   Head feels it's in a hollow log  Ears feels full   Slight increased SOB  A lot of head congestion and coughing up phlegm   Diarrhea has been \"all week\" since the ABx  Watery, also sticky   Pt thinks he might have had a worm when he has BM the other day and they are worried about this    Pt has recurrent fevers, \"low grade\" per home nursing     HTN -  Home readings high  Failed lisinopril and amlodipine in the past     Problem list reviewed and updated in full with patient today as necessary. A comprehensive ROS was negative, except as documented above. Current Outpatient Medications:     [START ON 12/12/2022] LORazepam (ATIVAN) 1 MG tablet, Take 1 tablet by mouth every 8 hours as needed for Anxiety for up to 30 days. , Disp: 90 tablet, Rfl: 0    metoprolol tartrate (LOPRESSOR) 25 MG tablet, Take 1 tablet by mouth 2 times daily Twice A Day, Disp: 180 tablet, Rfl: 1    omeprazole (PRILOSEC) 40 MG delayed release capsule, Take 1 capsule by mouth daily, Disp: 90 capsule, Rfl: 1    losartan (COZAAR) 25 MG tablet, Take 1 tablet by mouth daily, Disp: 30 tablet, Rfl: 5    cefdinir (OMNICEF) 300 MG capsule, Take 1 capsule by mouth 2 times daily for 10 days, Disp: 20 capsule, Rfl: 0    dextromethorphan-guaiFENesin (ROBITUSSIN-DM)  MG/5ML syrup, Take 10 mLs by mouth every 4 hours as needed for Cough, Disp: 240 mL, Rfl: 0    BROVANA 15 MCG/2ML NEBU, Take 2 mLs by nebulization in the morning and 2 mLs in the evening., Disp: 120 mL, Rfl: 3    calcium elemental (OSCAL) 500 MG TABS tablet, Take 1 tablet by mouth 2 times daily (with meals), Disp: 30 tablet, Rfl: 0    ipratropium-albuterol (DUONEB) 0.5-2.5 (3) MG/3ML SOLN nebulizer solution, Inhale 3 mLs into the lungs every 6 hours as needed for Shortness of Breath, Disp: 90 each, Rfl: 3    FLUoxetine (PROZAC) 10 MG tablet, Take 10 mg by mouth daily, Disp: , Rfl:     pramipexole (MIRAPEX) 0.25 MG tablet, Take 1 tablet by mouth nightly, Disp: 90 tablet, Rfl: 1    budesonide (PULMICORT) 0.5 MG/2ML nebulizer suspension, Take 2 mLs by nebulization in the morning and 2 mLs before bedtime. , Disp: 60 each, Rfl: 5    Revefenacin (YUPELRI) 175 MCG/3ML SOLN, Inhale 1 ampule into the lungs daily, Disp: 30 each, Rfl: 3    Probiotic Product [facility]) CAPS, Patient is to take one tab bid. Patient has been on antibiotics for the last 3 months, Disp: 30 capsule, Rfl: 3    HYDROcodone-acetaminophen (NORCO) 7.5-325 MG per tablet, Take 1 tablet by mouth 2 times daily as needed. Takes religiously twice daily for leg pain per pt, Disp: , Rfl:     ARIPiprazole (ABILIFY) 5 MG tablet, take 1 tablet by mouth once daily, Disp: , Rfl: 0    vitamin D (ERGOCALCIFEROL) 1.25 MG (48133 UT) CAPS capsule, Take 1 capsule by mouth once a week for 6 doses, Disp: 6 capsule, Rfl: 0    gabapentin (NEURONTIN) 100 MG capsule, Take 1 capsule by mouth 3 times daily for 30 days. , Disp: 90 capsule, Rfl: 0  Allergies   Allergen Reactions    Aspirin     Lisinopril     Other      Other reaction(s): ABD PAIN    Tramadol     Ibuprofen Nausea And Vomiting    Sulfa Antibiotics Nausea And Vomiting       Pt's past medical and surgical history were reviewed and updated as necessary today   Pt's family and social history were reviewed and updated as necessary today          Vitals:    12/09/22 1129   BP: 128/66   Pulse: 89   Resp: 18   Temp: 98.6 °F (37 °C)   TempSrc: Temporal   SpO2: 99%       Physical Exam  Constitutional:       Appearance: Normal appearance. HENT:      Head: Normocephalic and atraumatic. Eyes:      Conjunctiva/sclera: Conjunctivae normal.   Cardiovascular:      Rate and Rhythm: Normal rate and regular rhythm. Heart sounds: Normal heart sounds. Pulmonary:      Effort: Pulmonary effort is normal.      Comments: Congestion, wheezing diffusely throughout   Abdominal:      Palpations: Abdomen is soft. Musculoskeletal:      Comments: In wheelchair   Skin:     General: Skin is warm and dry. Neurological:      General: No focal deficit present. Mental Status: He is alert and oriented to person, place, and time.    Psychiatric:         Mood and Affect: Mood normal. Behavior: Behavior normal.        Counseled patient as appropriate and relevant regarding above diagnosis, including possible risks and complications, especially if left uncontrolled. Counseled patient as appropriate and relevant regarding any  possible side effects, risks, and alternatives to treatment; patient and/or guardian verbalizes understanding, and is in agreement with the plan as detailed above. Reviewed age and gender appropriate health screening exams and vaccinations. Advised patient regarding importance of keeping up with recommended health maintenance and to schedule as soon as possible if overdue, as this is important in assessing for undiagnosed pathology, especially cancer, as well as protecting against potentially harmful/life threatening disease. If discussed, any educational materials and/or home exercises printed for patient's review and were included in patient instructions on his/her After Visit Summary and given to patient at the end of visit. Advised patient to call with any new medication issues, and and other concerns/complaints prior to scheduled follow up. All questions answered to the patient's satisfaction.         Seen By:  Shantel Gillespie MD

## 2022-12-11 LAB — URINE CULTURE, ROUTINE: NORMAL

## 2022-12-16 ENCOUNTER — TELEPHONE (OUTPATIENT)
Dept: PRIMARY CARE CLINIC | Age: 57
End: 2022-12-16

## 2022-12-19 NOTE — TELEPHONE ENCOUNTER
[facility]  Please do [facility] tomorrow or Wedesday

## 2022-12-20 NOTE — TELEPHONE ENCOUNTER
Appointment scheduled for:   Care Transitions Initial Follow Up Call    Outreach made within 2 business days of discharge: Yes    Patient: Oscar Gentile Patient : 1965   MRN: [id number]  Reason for Admission: There are no discharge diagnoses documented for the most recent discharge. Discharge Date: 22       Spoke with: Santana Nam    Discharge department/facility: [facility]    TCM Interactive Patient Contact:  Was patient able to fill all prescriptions: Yes  Was patient instructed to bring all medications to the follow-up visit: Yes  Is patient taking all medications as directed in the discharge summary?  Yes  Does patient understand their discharge instructions: Yes  Does patient have questions or concerns that need addressed prior to 7-14 day follow up office visit: no    Scheduled appointment with PCP within 7-14 days    Follow Up  Future Appointments   Date Time Provider Mik Rosario   2023 10:45 AM MD Franki Goldsmith   3/10/2023 11:30 AM MD Franki Goldsmith LPN

## 2022-12-23 ENCOUNTER — TELEMEDICINE (OUTPATIENT)
Dept: PRIMARY CARE CLINIC | Age: 57
End: 2022-12-23

## 2022-12-23 DIAGNOSIS — I10 PRIMARY HYPERTENSION: ICD-10-CM

## 2022-12-23 DIAGNOSIS — Z09 HOSPITAL DISCHARGE FOLLOW-UP: Primary | ICD-10-CM

## 2022-12-23 RX ORDER — ALBUTEROL SULFATE 2.5 MG/3ML
2.5 SOLUTION RESPIRATORY (INHALATION) EVERY 6 HOURS PRN
Qty: 120 EACH | Refills: 3 | Status: SHIPPED | OUTPATIENT
Start: 2022-12-23

## 2022-12-23 RX ORDER — LOSARTAN POTASSIUM 50 MG/1
50 TABLET ORAL DAILY
Qty: 90 TABLET | Refills: 1 | Status: SHIPPED | OUTPATIENT
Start: 2022-12-23

## 2022-12-23 NOTE — PROGRESS NOTES
Post-Discharge Transitional Care  Follow Up      Sheila Rincon   YOB: 1965    Date of Office Visit:  12/23/2022  Date of Hospital Admission: 12/09/2022  Date of Hospital Discharge: Friday, 12/19/2022  Risk of hospital readmission (high >=14%. Medium >=10%) :Readmission Risk Score: 22.6      Care management risk score Rising risk (score 2-5) and Complex Care (Scores >=6): No Risk Score On File     Non face to face  following discharge, date last encounter closed (first attempt may have been earlier): 12/20/22 - see     Call initiated 2 business days of discharge: Yes    ASSESSMENT/PLAN:   Below is the assessment and plan developed based on review of pertinent history, physical exam, labs, studies, and medications. Hospital discharge follow-up  -     MO DISCHARGE MEDS RECONCILED W/ CURRENT OUTPATIENT MED LIST  Primary hypertension  -     losartan (COZAAR) 50 MG tablet; Take 1 tablet by mouth daily, Disp-90 tablet, R-1Normal    Medical Decision Making: moderate complexity  Return in 1 month (on 1/23/2023). Increase losartan to 50mg daily   Albuterol nebs ordered as recommended by hospital   Home nursing will cont to monitor  Will reach out to patient again next week to see how he's doing    Subjective:   HPI:  Follow up of Hospital problems/diagnosis(es): COPD exacerbation and PNA     Inpatient course: Discharge summary reviewed- see chart.     Interval history/Current status:     Pt was treated with Zosyn in the hospital   Also had bronchoscopy - was told they got tons of gunk out of there   D/c home with steroid taper which he is still taking  Remains on 7L O2 NC, up from previous 2016 [facility] is coming   Pt was breathing well up until Wednesday and now it's been poor again    BP has been high since the hospital  162/92 today   173/94 Wednesday  175/63 Monday   160/94 12/19   Hospital wanted him to take Norvasc but pt had previously failed this due to associated swelling  They didn't start the losartan that we discussed at last OV but Marcello Sonia started him on that Saturday after he got home  Denies any side effects or issues with med     He is still running low grade fever with home nursing   100.9, 100.4  Today was 99.8  Pt reports he does not feel fevered and he doesn't feel sick       Patient Active Problem List   Diagnosis    Essential hypertension    Hypertension    Peripheral vascular disease ([address]    Chronic obstructive pulmonary disease ([address]    Tobacco use    Raynaud's phenomenon    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, left    S/P Girdlestone procedure    Onychomycosis    Venous insufficiency of both lower extremities    Depression    Oxygen dependent    Anxiety    Cellulitis    Swelling of both lower extremities    Leg swelling    Pneumonia due to COVID-19 virus    Gastroesophageal reflux disease    Fluid retention    Lower extremity pain, left    Acute on chronic heart failure with preserved ejection fraction (HFpEF) (HCC)    Oropharyngeal dysphagia    COPD exacerbation (HCC)    Chronic respiratory failure with hypoxia ([address]    History of COVID-19    Acute on chronic diastolic (congestive) heart failure (HCC)    Fever of unknown origin    Nodule of upper lobe of left lung    Pneumonia due to organism    RSV (respiratory syncytial virus infection)    Acute on chronic respiratory failure with hypoxia ([address]    Heart failure ([address]    RLS (restless legs syndrome)       Medications listed as ordered at the time of discharge from hospital     Medication List            Accurate as of December 23, 2022 12:37 PM. If you have any questions, ask your nurse or doctor.                 START taking these medications      albuterol (2.5 MG/3ML) 0.083% nebulizer solution  Commonly known as: PROVENTIL  Take 3 mLs by nebulization every 6 hours as needed for Wheezing  Started by: Judith Aponte MD            CHANGE how you take these medications      losartan 50 MG tablet  Commonly known as: COZAAR  Take 1 tablet by mouth daily  What changed:   medication strength  how much to take  Changed by: Mechelle Dodd MD            CONTINUE taking these medications      ARIPiprazole 5 MG tablet  Commonly known as: ABILIFY     Brovana 15 MCG/2ML Nebu  Generic drug: Arformoterol Tartrate  Take 2 mLs by nebulization in the morning and 2 mLs in the evening. budesonide 0.5 MG/2ML nebulizer suspension  Commonly known as: PULMICORT  Take 2 mLs by nebulization in the morning and 2 mLs before bedtime. calcium elemental 500 MG Tabs tablet  Commonly known as: OSCAL  Take 1 tablet by mouth 2 times daily (with meals)     dextromethorphan-guaiFENesin  MG/5ML syrup  Commonly known as: ROBITUSSIN-DM  Take 10 mLs by mouth every 4 hours as needed for Cough     Florajen3 Caps  Patient is to take one tab bid. Patient has been on antibiotics for the last 3 months     FLUoxetine 10 MG tablet  Commonly known as: PROZAC     gabapentin 100 MG capsule  Commonly known as: NEURONTIN  Take 1 capsule by mouth 3 times daily for 30 days. HYDROcodone-acetaminophen 7.5-325 MG per tablet  Commonly known as: NORCO     ipratropium-albuterol 0.5-2.5 (3) MG/3ML Soln nebulizer solution  Commonly known as: DUONEB  Inhale 3 mLs into the lungs every 6 hours as needed for Shortness of Breath     LORazepam 1 MG tablet  Commonly known as: ATIVAN  Take 1 tablet by mouth every 8 hours as needed for Anxiety for up to 30 days.      metoprolol tartrate 25 MG tablet  Commonly known as: LOPRESSOR  Take 1 tablet by mouth 2 times daily Twice A Day     omeprazole 40 MG delayed release capsule  Commonly known as: PRILOSEC  Take 1 capsule by mouth daily     pramipexole 0.25 MG tablet  Commonly known as: Mirapex  Take 1 tablet by mouth nightly     vitamin D 1.25 MG (70423 UT) Caps capsule  Commonly known as: ERGOCALCIFEROL  Take 1 capsule by mouth once a week for 6 doses     Yupelri 175 MCG/3ML Soln  Generic drug: Revefenacin  Inhale 1 ampule into the lungs daily               Where to Get Your Medications        These medications were sent to [facility], [address] [address] [address] [address]      Phone: [phone]   albuterol (2.5 MG/3ML) 0.083% nebulizer solution  losartan 50 MG tablet           Medications marked \"taking\" at this time  Outpatient Medications Marked as Taking for the 12/23/22 encounter (Telemedicine) with Nicola Keating MD   Medication Sig Dispense Refill    losartan (COZAAR) 50 MG tablet Take 1 tablet by mouth daily 90 tablet 1    albuterol (PROVENTIL) (2.5 MG/3ML) 0.083% nebulizer solution Take 3 mLs by nebulization every 6 hours as needed for Wheezing 120 each 3        Medications patient taking as of now reconciled against medications ordered at time of hospital discharge: Yes      Objective:    Patient-Reported Vitals  No data recorded    General Appearance: in no acute distress and well-developed  Head: normocephalic and atraumatic  Pulmonary/Chest: no evidence of increased work of breathing  Pt had trouble holding the phone and so camera was mostly on his sister who relayed majority of the history     Meli Hart, was evaluated through a synchronous (real-time) audio-video encounter. The patient (or guardian if applicable) is aware that this is a billable service, which includes applicable co-pays. This Virtual Visit was conducted with patient's (and/or legal guardian's) consent. The visit was conducted pursuant to the emergency declaration under the [facility], [facility] authority and the [employer] and [employer] General Act. Patient identification was verified, and a caregiver was present when appropriate. The patient was located at Home: [address]  [address] [facility] [address] [facility].    Provider was located at [facility] ([address] [place], [place] [address]t): [address] [facility] [address],  [facility]. An electronic signature was used to authenticate this note.   --Bridget Marina MD

## 2023-01-09 ENCOUNTER — TELEPHONE (OUTPATIENT)
Dept: PRIMARY CARE CLINIC | Age: 58
End: 2023-01-09

## 2023-01-09 NOTE — TELEPHONE ENCOUNTER
----- Message from Bianca Mejias sent at 1/9/2023  8:29 AM EST -----  Subject: Message to Provider    QUESTIONS  Information for Provider? Pt would like to know why he is getting a   headache 2-3 hours after taking his losartan medication. Pt would like a   call back. ---------------------------------------------------------------------------  --------------  Chica Raymundo [facility]  [phone]; OK to leave message on voicemail  ---------------------------------------------------------------------------  --------------  SCRIPT ANSWERS  Relationship to Patient? Other  Representative Name? Zenaida Ann-Marie  Additional information verified (besides Name and Date of Birth)? Phone   Number  (Patient requests to see provider urgently. )? No  Do you have any questions for your primary care provider that need to be   answered prior to your appointment?  Yes

## 2023-01-09 NOTE — TELEPHONE ENCOUNTER
Is this new, or has it been going on for some time? Are they able to check his BP when the HA starts?

## 2023-01-09 NOTE — TELEPHONE ENCOUNTER
[id number] Savannah Santiago I can only think med is to blame  Please d/c med and see if the HA resolve  Have pt/sister call in 2-3 days with update on HA and BP

## 2023-01-09 NOTE — TELEPHONE ENCOUNTER
This is new when he started with the losartan about 2 or 3 weeks ago. Takes medication at night and patient states he wakes up with a headache every single night. They checked his bp today and it was perfect but they are getting high readings throughout the day here and there not every day. They check BP 2 to 3 times daily.

## 2023-01-12 ENCOUNTER — TELEPHONE (OUTPATIENT)
Dept: PRIMARY CARE CLINIC | Age: 58
End: 2023-01-12

## 2023-01-12 NOTE — TELEPHONE ENCOUNTER
Since stopping the losartan?   Per last message, we were going to d/c it due to HA     These readings look ok for now  Would cont off med if that is what he's been doing

## 2023-01-13 ENCOUNTER — PATIENT MESSAGE (OUTPATIENT)
Dept: PRIMARY CARE CLINIC | Age: 58
End: 2023-01-13

## 2023-01-13 DIAGNOSIS — F41.9 ANXIETY: ICD-10-CM

## 2023-01-13 RX ORDER — LORAZEPAM 1 MG/1
1 TABLET ORAL EVERY 8 HOURS PRN
Qty: 90 TABLET | Refills: 0 | Status: SHIPPED | OUTPATIENT
Start: 2023-01-13 | End: 2023-02-12

## 2023-01-13 NOTE — TELEPHONE ENCOUNTER
From: Maya Cross  To: Dr. Jessica Chauhancy: 1/13/2023 8:45 AM EST  Subject: Dgs ativan    Hello we have an appointment on the 20th but he will be out of his Ativan, could you call it in.  Thank you. 1mg 3 times a days

## 2023-01-20 ENCOUNTER — OFFICE VISIT (OUTPATIENT)
Dept: PRIMARY CARE CLINIC | Age: 58
End: 2023-01-20
Payer: MEDICAID

## 2023-01-20 VITALS
RESPIRATION RATE: 20 BRPM | OXYGEN SATURATION: 94 % | BODY MASS INDEX: 22.2 KG/M2 | SYSTOLIC BLOOD PRESSURE: 154 MMHG | WEIGHT: 130 LBS | DIASTOLIC BLOOD PRESSURE: 78 MMHG | TEMPERATURE: 97.1 F | HEART RATE: 77 BPM | HEIGHT: 64 IN

## 2023-01-20 DIAGNOSIS — I10 PRIMARY HYPERTENSION: Primary | ICD-10-CM

## 2023-01-20 DIAGNOSIS — J44.9 CHRONIC OBSTRUCTIVE PULMONARY DISEASE, UNSPECIFIED COPD TYPE (HCC): ICD-10-CM

## 2023-01-20 PROCEDURE — 3078F DIAST BP <80 MM HG: CPT | Performed by: FAMILY MEDICINE

## 2023-01-20 PROCEDURE — 3074F SYST BP LT 130 MM HG: CPT | Performed by: FAMILY MEDICINE

## 2023-01-20 PROCEDURE — 99214 OFFICE O/P EST MOD 30 MIN: CPT | Performed by: FAMILY MEDICINE

## 2023-01-20 RX ORDER — HYDROCHLOROTHIAZIDE 12.5 MG/1
12.5 CAPSULE, GELATIN COATED ORAL EVERY MORNING
Qty: 90 CAPSULE | Refills: 1 | Status: SHIPPED | OUTPATIENT
Start: 2023-01-20

## 2023-01-20 SDOH — ECONOMIC STABILITY: FOOD INSECURITY: WITHIN THE PAST 12 MONTHS, THE FOOD YOU BOUGHT JUST DIDN'T LAST AND YOU DIDN'T HAVE MONEY TO GET MORE.: NEVER TRUE

## 2023-01-20 SDOH — ECONOMIC STABILITY: FOOD INSECURITY: WITHIN THE PAST 12 MONTHS, YOU WORRIED THAT YOUR FOOD WOULD RUN OUT BEFORE YOU GOT MONEY TO BUY MORE.: NEVER TRUE

## 2023-01-20 ASSESSMENT — PATIENT HEALTH QUESTIONNAIRE - PHQ9
SUM OF ALL RESPONSES TO PHQ QUESTIONS 1-9: 5
1. LITTLE INTEREST OR PLEASURE IN DOING THINGS: 1
2. FEELING DOWN, DEPRESSED OR HOPELESS: 0
3. TROUBLE FALLING OR STAYING ASLEEP: 1
SUM OF ALL RESPONSES TO PHQ QUESTIONS 1-9: 5
8. MOVING OR SPEAKING SO SLOWLY THAT OTHER PEOPLE COULD HAVE NOTICED. OR THE OPPOSITE, BEING SO FIGETY OR RESTLESS THAT YOU HAVE BEEN MOVING AROUND A LOT MORE THAN USUAL: 1
SUM OF ALL RESPONSES TO PHQ9 QUESTIONS 1 & 2: 1
5. POOR APPETITE OR OVEREATING: 0
10. IF YOU CHECKED OFF ANY PROBLEMS, HOW DIFFICULT HAVE THESE PROBLEMS MADE IT FOR YOU TO DO YOUR WORK, TAKE CARE OF THINGS AT HOME, OR GET ALONG WITH OTHER PEOPLE: 1
6. FEELING BAD ABOUT YOURSELF - OR THAT YOU ARE A FAILURE OR HAVE LET YOURSELF OR YOUR FAMILY DOWN: 0
7. TROUBLE CONCENTRATING ON THINGS, SUCH AS READING THE NEWSPAPER OR WATCHING TELEVISION: 1
SUM OF ALL RESPONSES TO PHQ QUESTIONS 1-9: 5
SUM OF ALL RESPONSES TO PHQ QUESTIONS 1-9: 5
9. THOUGHTS THAT YOU WOULD BE BETTER OFF DEAD, OR OF HURTING YOURSELF: 0
4. FEELING TIRED OR HAVING LITTLE ENERGY: 1

## 2023-01-20 ASSESSMENT — SOCIAL DETERMINANTS OF HEALTH (SDOH): HOW HARD IS IT FOR YOU TO PAY FOR THE VERY BASICS LIKE FOOD, HOUSING, MEDICAL CARE, AND HEATING?: NOT HARD AT ALL

## 2023-01-20 NOTE — PROGRESS NOTES
23  Sal Suh : 1965 Sex: male  Age: 62 y.o. Assessment and Plan:  Marleny Kearney was seen today for ed follow-up. Diagnoses and all orders for this visit:    Primary hypertension  -     hydroCHLOROthiazide (MICROZIDE) 12.5 MG capsule; Take 1 capsule by mouth every morning  Uncontrolled but will cont current dose for now  Will cont to monitor at home and can increase to 25mg if he trends higher again  Sister will message an update next week     Chronic obstructive pulmonary disease, unspecified COPD type ([address]  Improved from recent flare. Cont current treatment as documented below. Return in about 6 weeks (around 3/3/2023). Chief Complaint   Patient presents with    ED Follow-up       HPI  Pt here for ED f/u  He is with his sister     Was recently seen for COPD exacerbation and bronchoscopy   Had f/u with pulm Wednesday   They added doxy and prednisone taper   Breathing has been a bit better but still gets really winded when doing anything   Per sister, home health has said his lungs sound a bit better     Started him on HCTZ for BP   This has been working ok for him so far  BP today is a bit better   He denies any associated HA     Of note, when his BP is controlled he has no real lower extremity edema    Problem list reviewed and updated in full with patient today as necessary. A comprehensive ROS was negative, except as documented above. Current Outpatient Medications:     hydroCHLOROthiazide (MICROZIDE) 12.5 MG capsule, Take 1 capsule by mouth every morning, Disp: 90 capsule, Rfl: 1    LORazepam (ATIVAN) 1 MG tablet, Take 1 tablet by mouth every 8 hours as needed for Anxiety for up to 30 days. , Disp: 90 tablet, Rfl: 0    losartan (COZAAR) 50 MG tablet, Take 1 tablet by mouth daily, Disp: 90 tablet, Rfl: 1    albuterol (PROVENTIL) (2.5 MG/3ML) 0.083% nebulizer solution, Take 3 mLs by nebulization every 6 hours as needed for Wheezing, Disp: 120 each, Rfl: 3 metoprolol tartrate (LOPRESSOR) 25 MG tablet, Take 1 tablet by mouth 2 times daily Twice A Day, Disp: 180 tablet, Rfl: 1    omeprazole (PRILOSEC) 40 MG delayed release capsule, Take 1 capsule by mouth daily, Disp: 90 capsule, Rfl: 1    dextromethorphan-guaiFENesin (ROBITUSSIN-DM)  MG/5ML syrup, Take 10 mLs by mouth every 4 hours as needed for Cough, Disp: 240 mL, Rfl: 0    BROVANA 15 MCG/2ML NEBU, Take 2 mLs by nebulization in the morning and 2 mLs in the evening., Disp: 120 mL, Rfl: 3    calcium elemental (OSCAL) 500 MG TABS tablet, Take 1 tablet by mouth 2 times daily (with meals), Disp: 30 tablet, Rfl: 0    vitamin D (ERGOCALCIFEROL) 1.25 MG (90614 UT) CAPS capsule, Take 1 capsule by mouth once a week for 6 doses, Disp: 6 capsule, Rfl: 0    ipratropium-albuterol (DUONEB) 0.5-2.5 (3) MG/3ML SOLN nebulizer solution, Inhale 3 mLs into the lungs every 6 hours as needed for Shortness of Breath, Disp: 90 each, Rfl: 3    FLUoxetine (PROZAC) 10 MG tablet, Take 10 mg by mouth daily, Disp: , Rfl:     pramipexole (MIRAPEX) 0.25 MG tablet, Take 1 tablet by mouth nightly, Disp: 90 tablet, Rfl: 1    budesonide (PULMICORT) 0.5 MG/2ML nebulizer suspension, Take 2 mLs by nebulization in the morning and 2 mLs before bedtime. , Disp: 60 each, Rfl: 5    Revefenacin (YUPELRI) 175 MCG/3ML SOLN, Inhale 1 ampule into the lungs daily, Disp: 30 each, Rfl: 3    Probiotic Product [facility]) CAPS, Patient is to take one tab bid. Patient has been on antibiotics for the last 3 months, Disp: 30 capsule, Rfl: 3    HYDROcodone-acetaminophen (NORCO) 7.5-325 MG per tablet, Take 1 tablet by mouth 2 times daily as needed. Takes religiously twice daily for leg pain per pt, Disp: , Rfl:     ARIPiprazole (ABILIFY) 5 MG tablet, take 1 tablet by mouth once daily, Disp: , Rfl: 0    gabapentin (NEURONTIN) 100 MG capsule, Take 1 capsule by mouth 3 times daily for 30 days. , Disp: 90 capsule, Rfl: 0  Allergies   Allergen Reactions    Aspirin Lisinopril     Other      Other reaction(s): ABD PAIN    Tramadol     Ibuprofen Nausea And Vomiting    Sulfa Antibiotics Nausea And Vomiting       Pt's past medical and surgical history were reviewed and updated as necessary today   Pt's family and social history were reviewed and updated as necessary today      Vitals:    01/20/23 1100 01/20/23 1102   BP: (!) 152/80 (!) 154/78   Pulse: 77    Resp: 20    Temp: 97.1 °F (36.2 °C)    SpO2: 94%    Weight: 130 lb (59 kg)    Height: 5' 4\" (1.626 m)        Physical Exam  Constitutional:       Appearance: Normal appearance. HENT:      Head: Normocephalic and atraumatic. Eyes:      Conjunctiva/sclera: Conjunctivae normal.   Cardiovascular:      Rate and Rhythm: Normal rate and regular rhythm. Heart sounds: Normal heart sounds. Pulmonary:      Effort: Pulmonary effort is normal.      Comments: Congested, but moving air better than before  Abdominal:      Palpations: Abdomen is soft. Tenderness: There is no abdominal tenderness. Musculoskeletal:      Comments: Confined to wheelchair   Skin:     General: Skin is warm and dry. Neurological:      General: No focal deficit present. Mental Status: He is alert and oriented to person, place, and time. Psychiatric:         Mood and Affect: Mood normal.         Behavior: Behavior normal.     Counseled patient as appropriate and relevant regarding above diagnosis, including possible risks and complications, especially if left uncontrolled. Counseled patient as appropriate and relevant regarding any  possible side effects, risks, and alternatives to treatment; patient and/or guardian verbalizes understanding, and is in agreement with the plan as detailed above. Reviewed age and gender appropriate health screening exams and vaccinations.   Advised patient regarding importance of keeping up with recommended health maintenance and to schedule as soon as possible if overdue, as this is important in assessing for undiagnosed pathology, especially cancer, as well as protecting against potentially harmful/life threatening disease. If discussed, any educational materials and/or home exercises printed for patient's review and were included in patient instructions on his/her After Visit Summary and given to patient at the end of visit. Advised patient to call with any new medication issues, and and other concerns/complaints prior to scheduled follow up. All questions answered to the patient's satisfaction.         Seen By:  Omega Spears MD

## 2023-01-23 ENCOUNTER — TELEPHONE (OUTPATIENT)
Dept: PRIMARY CARE CLINIC | Age: 58
End: 2023-01-23

## 2023-01-23 NOTE — TELEPHONE ENCOUNTER
Yumi Palmer, pt's home health nurse, stated pt took blood pressure medication about 7am this morning but pt's BP has not decreased.  180/100

## 2023-01-23 NOTE — TELEPHONE ENCOUNTER
Bernard Whatley returned call. Read to her Dr Ivonne Russ note regarding pt to take another 12.5mg for a total of 25mg and to continue that moving forward. Bernard Whatley verbalized understanding.  Thank you

## 2023-01-23 NOTE — TELEPHONE ENCOUNTER
I'm so sorry, I meant 12.5  He can take another 12.5mg for total of 25mg today  He can cont that moving forward

## 2023-01-23 NOTE — TELEPHONE ENCOUNTER
Notified Yumi Palmer who will let Dg's sister know.  Yumi Palmer was wondering if prednisone can cause high BP; Yumi Palmer stated she read that frequent and prolong use of prednisone may cause high BP

## 2023-01-29 ENCOUNTER — PATIENT MESSAGE (OUTPATIENT)
Dept: PRIMARY CARE CLINIC | Age: 58
End: 2023-01-29

## 2023-01-29 DIAGNOSIS — G89.29 CHRONIC INTRACTABLE HEADACHE, UNSPECIFIED HEADACHE TYPE: Primary | ICD-10-CM

## 2023-01-29 DIAGNOSIS — R51.9 CHRONIC INTRACTABLE HEADACHE, UNSPECIFIED HEADACHE TYPE: Primary | ICD-10-CM

## 2023-01-30 NOTE — TELEPHONE ENCOUNTER
BP aren't great, but also aren't terrible  I'm gonna order head CT given persistent HAs  Really BP doesn't seem high enough to be causing this

## 2023-01-30 NOTE — TELEPHONE ENCOUNTER
From: Edmundo Little  To: Dr. Bridget Marina  Sent: 1/29/2023 9:54 AM EST  Subject: Blood pressure     1-21-23. 153/90. 157/98. 1-23-23. 180/108. 164/98. 1-25-23. 150/95. 141/81. 1-26-23. 153/93. 153/83. 1-27-23. 164/94. 152/87. Temp 100.6. 1-29-23. 163/91. 144/92. Headaches everyday, in back top of head right side.  Thank you

## 2023-02-09 ENCOUNTER — PATIENT MESSAGE (OUTPATIENT)
Dept: PRIMARY CARE CLINIC | Age: 58
End: 2023-02-09

## 2023-02-09 DIAGNOSIS — F41.9 ANXIETY: ICD-10-CM

## 2023-02-09 NOTE — TELEPHONE ENCOUNTER
From: Marleny Quiles  To: Dr. Tyson Cam: 2/9/2023 7:59 AM EST  Subject: Dgs headaches    He went to the ER on Sunday morning because he had a bad nosebleed and a bad headache. Ct is scheduled for Feb 26, at ER they gave him FIORICET. BUT only 7 of them. He wants to know if you could prescribe him them? They do help his headache. Also his ATIVAN 1 mg needs refilled . Thank you.  Kurt Scott

## 2023-02-10 RX ORDER — BUTALBITAL, ACETAMINOPHEN AND CAFFEINE 50; 325; 40 MG/1; MG/1; MG/1
1 TABLET ORAL EVERY 6 HOURS PRN
Qty: 30 TABLET | Refills: 0 | Status: SHIPPED | OUTPATIENT
Start: 2023-02-10

## 2023-02-10 RX ORDER — LORAZEPAM 1 MG/1
1 TABLET ORAL EVERY 8 HOURS PRN
Qty: 90 TABLET | Refills: 0 | Status: SHIPPED | OUTPATIENT
Start: 2023-02-10 | End: 2023-03-12

## 2023-02-15 ENCOUNTER — HOSPITAL ENCOUNTER (OUTPATIENT)
Age: 58
Discharge: HOME | End: 2023-02-15
Payer: MEDICAID

## 2023-02-15 DIAGNOSIS — R51.9 CHRONIC INTRACTABLE HEADACHE, UNSPECIFIED HEADACHE TYPE: ICD-10-CM

## 2023-02-15 DIAGNOSIS — G89.29 CHRONIC INTRACTABLE HEADACHE, UNSPECIFIED HEADACHE TYPE: ICD-10-CM

## 2023-02-15 LAB
ANION GAP SERPL CALCULATED.3IONS-SCNC: 9 MMOL/L (ref 7–16)
BUN BLDV-MCNC: 11 MG/DL (ref 6–20)
CALCIUM SERPL-MCNC: 8 MG/DL (ref 8.6–10.2)
CHLORIDE BLD-SCNC: 92 MMOL/L (ref 98–107)
CO2: 37 MMOL/L (ref 22–29)
CREAT SERPL-MCNC: 1 MG/DL (ref 0.7–1.2)
GFR SERPL CREATININE-BSD FRML MDRD: >60 ML/MIN/1.73
GLUCOSE BLD-MCNC: 116 MG/DL (ref 74–99)
POTASSIUM SERPL-SCNC: 3.5 MMOL/L (ref 3.5–5)
SODIUM BLD-SCNC: 138 MMOL/L (ref 132–146)

## 2023-02-15 PROCEDURE — 80048 BASIC METABOLIC PNL TOTAL CA: CPT

## 2023-02-15 PROCEDURE — 36415 COLL VENOUS BLD VENIPUNCTURE: CPT

## 2023-02-26 ENCOUNTER — HOSPITAL ENCOUNTER (OUTPATIENT)
Dept: CT IMAGING | Age: 58
Discharge: HOME | End: 2023-02-28
Payer: MEDICAID

## 2023-02-26 DIAGNOSIS — G89.29 CHRONIC INTRACTABLE HEADACHE, UNSPECIFIED HEADACHE TYPE: ICD-10-CM

## 2023-02-26 DIAGNOSIS — R51.9 CHRONIC INTRACTABLE HEADACHE, UNSPECIFIED HEADACHE TYPE: ICD-10-CM

## 2023-02-26 PROCEDURE — 70470 CT HEAD/BRAIN W/O & W/DYE: CPT

## 2023-02-26 PROCEDURE — 6360000004 HC RX CONTRAST MEDICATION: Performed by: RADIOLOGY

## 2023-02-26 RX ADMIN — IOPAMIDOL 75 ML: 755 INJECTION, SOLUTION INTRAVENOUS at 09:49

## 2023-02-27 ENCOUNTER — TELEPHONE (OUTPATIENT)
Dept: PRIMARY CARE CLINIC | Age: 58
End: 2023-02-27

## 2023-02-27 NOTE — TELEPHONE ENCOUNTER
Pt was in hospital ,discharged on 2.24.2023  Hospital told pt to stop taking hydroCHLOROthiazide    Blood pressure has been high so sister had him take it again, does Dr Irma Palmer want pt to stop or continue to take

## 2023-02-28 ENCOUNTER — TELEPHONE (OUTPATIENT)
Dept: PRIMARY CARE CLINIC | Age: 58
End: 2023-02-28

## 2023-02-28 DIAGNOSIS — M24.20 EAGLE'S SYNDROME: ICD-10-CM

## 2023-02-28 DIAGNOSIS — J32.2 CHRONIC ETHMOIDAL SINUSITIS: Primary | ICD-10-CM

## 2023-02-28 RX ORDER — AMOXICILLIN 500 MG/1
500 CAPSULE ORAL 3 TIMES DAILY
Qty: 21 CAPSULE | Refills: 0 | Status: SHIPPED | OUTPATIENT
Start: 2023-02-28 | End: 2023-03-07

## 2023-02-28 RX ORDER — BUTALBITAL, ACETAMINOPHEN AND CAFFEINE 50; 325; 40 MG/1; MG/1; MG/1
1 TABLET ORAL EVERY 6 HOURS PRN
Qty: 30 TABLET | Refills: 0 | Status: SHIPPED | OUTPATIENT
Start: 2023-02-28

## 2023-02-28 NOTE — TELEPHONE ENCOUNTER
Referral placed  Also I misread  Stated acute sinusitis, not chronic  So I sent Abx too  My apologies

## 2023-02-28 NOTE — TELEPHONE ENCOUNTER
Pt r/c about CT scan would  like a referral to a ENT         Also needs refill on the below      butalbital-acetaminophen-caffeine (FIORICET, ESGIC) -40 MG per tablet        [facility] - [address], [address]

## 2023-02-28 NOTE — TELEPHONE ENCOUNTER
Reviewed  I don't see why they d/c the med so ok to restart for now if his BP is elevated  We can repeat BW at his f/u to ensure stability

## 2023-03-01 NOTE — TELEPHONE ENCOUNTER
Patients sister is stating that he is on Muropenem Antibiotic IV TID. Is it okay to take the amoxicillin with this?

## 2023-03-07 SDOH — ECONOMIC STABILITY: FOOD INSECURITY: WITHIN THE PAST 12 MONTHS, YOU WORRIED THAT YOUR FOOD WOULD RUN OUT BEFORE YOU GOT MONEY TO BUY MORE.: NEVER TRUE

## 2023-03-07 SDOH — ECONOMIC STABILITY: HOUSING INSECURITY
IN THE LAST 12 MONTHS, WAS THERE A TIME WHEN YOU DID NOT HAVE A STEADY PLACE TO SLEEP OR SLEPT IN A SHELTER (INCLUDING NOW)?: NO

## 2023-03-07 SDOH — ECONOMIC STABILITY: FOOD INSECURITY: WITHIN THE PAST 12 MONTHS, THE FOOD YOU BOUGHT JUST DIDN'T LAST AND YOU DIDN'T HAVE MONEY TO GET MORE.: NEVER TRUE

## 2023-03-07 SDOH — ECONOMIC STABILITY: TRANSPORTATION INSECURITY
IN THE PAST 12 MONTHS, HAS LACK OF TRANSPORTATION KEPT YOU FROM MEETINGS, WORK, OR FROM GETTING THINGS NEEDED FOR DAILY LIVING?: NO

## 2023-03-07 SDOH — ECONOMIC STABILITY: INCOME INSECURITY: HOW HARD IS IT FOR YOU TO PAY FOR THE VERY BASICS LIKE FOOD, HOUSING, MEDICAL CARE, AND HEATING?: NOT HARD AT ALL

## 2023-03-10 ENCOUNTER — OFFICE VISIT (OUTPATIENT)
Dept: PRIMARY CARE CLINIC | Age: 58
End: 2023-03-10

## 2023-03-10 VITALS
WEIGHT: 118 LBS | HEART RATE: 84 BPM | BODY MASS INDEX: 20.14 KG/M2 | OXYGEN SATURATION: 98 % | TEMPERATURE: 98.4 F | SYSTOLIC BLOOD PRESSURE: 124 MMHG | DIASTOLIC BLOOD PRESSURE: 68 MMHG | HEIGHT: 64 IN

## 2023-03-10 DIAGNOSIS — F41.9 ANXIETY: ICD-10-CM

## 2023-03-10 DIAGNOSIS — G89.29 CHRONIC INTRACTABLE HEADACHE, UNSPECIFIED HEADACHE TYPE: ICD-10-CM

## 2023-03-10 DIAGNOSIS — Z09 HOSPITAL DISCHARGE FOLLOW-UP: ICD-10-CM

## 2023-03-10 DIAGNOSIS — N48.89 SORE PENIS: ICD-10-CM

## 2023-03-10 DIAGNOSIS — J44.1 COPD EXACERBATION (HCC): Primary | ICD-10-CM

## 2023-03-10 DIAGNOSIS — R51.9 CHRONIC INTRACTABLE HEADACHE, UNSPECIFIED HEADACHE TYPE: ICD-10-CM

## 2023-03-10 DIAGNOSIS — I10 PRIMARY HYPERTENSION: ICD-10-CM

## 2023-03-10 RX ORDER — LORAZEPAM 1 MG/1
1 TABLET ORAL EVERY 8 HOURS PRN
Qty: 90 TABLET | Refills: 0 | Status: CANCELLED | OUTPATIENT
Start: 2023-03-10 | End: 2023-04-09

## 2023-03-10 RX ORDER — BUTALBITAL, ACETAMINOPHEN AND CAFFEINE 50; 325; 40 MG/1; MG/1; MG/1
1 TABLET ORAL 2 TIMES DAILY PRN
Qty: 60 TABLET | Refills: 0 | Status: CANCELLED | OUTPATIENT
Start: 2023-03-10

## 2023-03-10 RX ORDER — MUPIROCIN CALCIUM 20 MG/G
CREAM TOPICAL
Qty: 15 G | Refills: 1 | Status: SHIPPED | OUTPATIENT
Start: 2023-03-10 | End: 2023-04-09

## 2023-03-11 DIAGNOSIS — R51.9 BAD HEADACHE: Primary | ICD-10-CM

## 2023-03-11 DIAGNOSIS — I10 PRIMARY HYPERTENSION: ICD-10-CM

## 2023-03-11 DIAGNOSIS — F41.9 ANXIETY: ICD-10-CM

## 2023-03-11 RX ORDER — HYDROCHLOROTHIAZIDE 12.5 MG/1
12.5 CAPSULE, GELATIN COATED ORAL EVERY MORNING
Qty: 90 CAPSULE | Refills: 1 | Status: CANCELLED | OUTPATIENT
Start: 2023-03-11

## 2023-03-13 RX ORDER — HYDROCHLOROTHIAZIDE 25 MG/1
25 TABLET ORAL EVERY MORNING
Qty: 90 TABLET | Refills: 1 | Status: ON HOLD
Start: 2023-03-13 | End: 2023-04-29 | Stop reason: HOSPADM

## 2023-03-13 RX ORDER — BUTALBITAL, ACETAMINOPHEN AND CAFFEINE 50; 325; 40 MG/1; MG/1; MG/1
1 TABLET ORAL EVERY 6 HOURS PRN
Qty: 30 TABLET | Refills: 1 | Status: SHIPPED
Start: 2023-03-13 | End: 2023-04-17

## 2023-03-13 RX ORDER — LORAZEPAM 1 MG/1
1 TABLET ORAL EVERY 8 HOURS PRN
Qty: 90 TABLET | Refills: 0 | Status: SHIPPED
Start: 2023-03-13 | End: 2023-04-17 | Stop reason: SDUPTHER

## 2023-03-14 ENCOUNTER — TELEPHONE (OUTPATIENT)
Dept: PRIMARY CARE CLINIC | Age: 58
End: 2023-03-14

## 2023-03-14 RX ORDER — NITROFURANTOIN 25; 75 MG/1; MG/1
100 CAPSULE ORAL 2 TIMES DAILY
Qty: 20 CAPSULE | Refills: 0 | Status: SHIPPED | OUTPATIENT
Start: 2023-03-14 | End: 2023-03-24

## 2023-03-28 ENCOUNTER — TELEPHONE (OUTPATIENT)
Dept: PRIMARY CARE CLINIC | Age: 58
End: 2023-03-28

## 2023-03-28 NOTE — TELEPHONE ENCOUNTER
Pt would really need evaluated so that acute imaging could be obtained  He recently required bronchospy for mucous plugging as well  They could reach out to their lung doctors but otherwise should be seen acutely, likely ER

## 2023-03-28 NOTE — TELEPHONE ENCOUNTER
Sister called stating pt having a hard time breathing , the visiting nurse stated his lungs are diminished.  Sister asking what should they do

## 2023-04-15 DIAGNOSIS — R51.9 BAD HEADACHE: ICD-10-CM

## 2023-04-17 DIAGNOSIS — F41.9 ANXIETY: ICD-10-CM

## 2023-04-17 RX ORDER — BUTALBITAL, ACETAMINOPHEN AND CAFFEINE 50; 325; 40 MG/1; MG/1; MG/1
TABLET ORAL
Qty: 30 TABLET | Refills: 1 | Status: ON HOLD | OUTPATIENT
Start: 2023-04-17

## 2023-04-17 RX ORDER — LORAZEPAM 1 MG/1
1 TABLET ORAL EVERY 8 HOURS PRN
Qty: 90 TABLET | Refills: 0 | Status: ON HOLD | OUTPATIENT
Start: 2023-04-17 | End: 2023-05-17

## 2023-04-17 NOTE — TELEPHONE ENCOUNTER
Last Appointment:  3/10/2023  Future Appointments  Date Time Provider Mik Rosario  4/21/2023 11:30 AM MD Praveen Toney 57

## 2023-04-17 NOTE — TELEPHONE ENCOUNTER
LORazepam     Nystatin for sore on groin area- hospital provided butt paste but Armand Sesay (pt's sister) stated seems to not be working

## 2023-04-17 NOTE — TELEPHONE ENCOUNTER
Patient also requesting nystatin for sore on groin area            Last Appointment:  3/10/2023  Future Appointments   Date Time Provider Mik Peresi   4/21/2023 11:30 AM Jayna Francois MD [facility]   5/9/2023 10:30 AM Francella Goldmann, APRN - CNP [facility]   6/27/2023  1:15 PM ARGELIA Bazzi - NP [facility]

## 2023-04-21 ENCOUNTER — OFFICE VISIT (OUTPATIENT)
Dept: PRIMARY CARE CLINIC | Age: 58
End: 2023-04-21
Payer: MEDICAID

## 2023-04-21 VITALS
BODY MASS INDEX: 22.14 KG/M2 | OXYGEN SATURATION: 99 % | DIASTOLIC BLOOD PRESSURE: 70 MMHG | TEMPERATURE: 97.7 F | HEART RATE: 85 BPM | HEIGHT: 64 IN | SYSTOLIC BLOOD PRESSURE: 124 MMHG

## 2023-04-21 DIAGNOSIS — J44.9 CHRONIC OBSTRUCTIVE PULMONARY DISEASE, UNSPECIFIED COPD TYPE (HCC): ICD-10-CM

## 2023-04-21 DIAGNOSIS — R73.9 HYPERGLYCEMIA: ICD-10-CM

## 2023-04-21 DIAGNOSIS — I50.9 HEART FAILURE, UNSPECIFIED HF CHRONICITY, UNSPECIFIED HEART FAILURE TYPE (HCC): ICD-10-CM

## 2023-04-21 DIAGNOSIS — I50.9 HEART FAILURE, UNSPECIFIED HF CHRONICITY, UNSPECIFIED HEART FAILURE TYPE (HCC): Primary | ICD-10-CM

## 2023-04-21 LAB
BASOPHILS # BLD: 0.04 E9/L (ref 0–0.2)
BASOPHILS NFR BLD: 0.5 % (ref 0–2)
EOSINOPHIL # BLD: 0.16 E9/L (ref 0.05–0.5)
EOSINOPHIL NFR BLD: 2.1 % (ref 0–6)
ERYTHROCYTE [DISTWIDTH] IN BLOOD BY AUTOMATED COUNT: 13.2 FL (ref 11.5–15)
HBA1C MFR BLD: 5 % (ref 4–5.6)
HCT VFR BLD AUTO: 35.3 % (ref 37–54)
HGB BLD-MCNC: 10.6 G/DL (ref 12.5–16.5)
IMM GRANULOCYTES # BLD: 0.02 E9/L
IMM GRANULOCYTES NFR BLD: 0.3 % (ref 0–5)
LYMPHOCYTES # BLD: 1.24 E9/L (ref 1.5–4)
LYMPHOCYTES NFR BLD: 15.9 % (ref 20–42)
MCH RBC QN AUTO: 32.3 PG (ref 26–35)
MCHC RBC AUTO-ENTMCNC: 30 % (ref 32–34.5)
MCV RBC AUTO: 107.6 FL (ref 80–99.9)
MONOCYTES # BLD: 0.63 E9/L (ref 0.1–0.95)
MONOCYTES NFR BLD: 8.1 % (ref 2–12)
NEUTROPHILS # BLD: 5.71 E9/L (ref 1.8–7.3)
NEUTS SEG NFR BLD: 73.1 % (ref 43–80)
PLATELET # BLD AUTO: 188 E9/L (ref 130–450)
PMV BLD AUTO: 10.2 FL (ref 7–12)
RBC # BLD AUTO: 3.28 E12/L (ref 3.8–5.8)
WBC # BLD: 7.8 E9/L (ref 4.5–11.5)

## 2023-04-21 PROCEDURE — 3078F DIAST BP <80 MM HG: CPT | Performed by: FAMILY MEDICINE

## 2023-04-21 PROCEDURE — 99214 OFFICE O/P EST MOD 30 MIN: CPT | Performed by: FAMILY MEDICINE

## 2023-04-21 PROCEDURE — 3074F SYST BP LT 130 MM HG: CPT | Performed by: FAMILY MEDICINE

## 2023-04-21 RX ORDER — ECHINACEA PURPUREA EXTRACT 125 MG
TABLET ORAL
Status: ON HOLD | COMMUNITY
Start: 2023-02-07

## 2023-04-21 RX ORDER — BLOOD-GLUCOSE METER
1 KIT MISCELLANEOUS DAILY
Qty: 1 KIT | Refills: 0 | Status: ON HOLD | OUTPATIENT
Start: 2023-04-21

## 2023-04-21 NOTE — PROGRESS NOTES
Comments: Very congested with abnormal sounds throughout  Abdominal:      Palpations: Abdomen is soft. Tenderness: There is no abdominal tenderness. Musculoskeletal:         General: Normal range of motion. Comments: Left leg swollen, somewhat tense. Mild TTP all over. No erythema or purplish discoloration. Skin:     General: Skin is warm and dry. Neurological:      General: No focal deficit present. Mental Status: He is alert and oriented to person, place, and time. Psychiatric:         Mood and Affect: Mood normal.         Behavior: Behavior normal.        Counseled patient as appropriate and relevant regarding above diagnosis, including possible risks and complications, especially if left uncontrolled. Counseled patient as appropriate and relevant regarding any  possible side effects, risks, and alternatives to treatment; patient and/or guardian verbalizes understanding, and is in agreement with the plan as detailed above. Reviewed age and gender appropriate health screening exams and vaccinations. Advised patient regarding importance of keeping up with recommended health maintenance and to schedule as soon as possible if overdue, as this is important in assessing for undiagnosed pathology, especially cancer, as well as protecting against potentially harmful/life threatening disease. If discussed, any educational materials and/or home exercises printed for patient's review and were included in patient instructions on his/her After Visit Summary and given to patient at the end of visit. Advised patient to call with any new medication issues, and and other concerns/complaints prior to scheduled follow up. All questions answered to the patient's satisfaction.         Seen By:  Joan Díaz MD

## 2023-04-22 LAB
ANION GAP SERPL CALCULATED.3IONS-SCNC: 11 MMOL/L (ref 7–16)
BNP BLD-MCNC: 640 PG/ML (ref 0–125)
BUN SERPL-MCNC: 9 MG/DL (ref 6–20)
CALCIUM SERPL-MCNC: 8.2 MG/DL (ref 8.6–10.2)
CHLORIDE SERPL-SCNC: 92 MMOL/L (ref 98–107)
CO2 SERPL-SCNC: 32 MMOL/L (ref 22–29)
CREAT SERPL-MCNC: 0.6 MG/DL (ref 0.7–1.2)
GLUCOSE SERPL-MCNC: 127 MG/DL (ref 74–99)
POTASSIUM SERPL-SCNC: 4.6 MMOL/L (ref 3.5–5)
SODIUM SERPL-SCNC: 135 MMOL/L (ref 132–146)

## 2023-04-23 ENCOUNTER — HOSPITAL ENCOUNTER (INPATIENT)
Age: 58
LOS: 9 days | Discharge: HOME HEALTH | DRG: 194 | End: 2023-05-02
Attending: EMERGENCY MEDICINE | Admitting: INTERNAL MEDICINE
Payer: MEDICAID

## 2023-04-23 ENCOUNTER — APPOINTMENT (OUTPATIENT)
Dept: GENERAL RADIOLOGY | Age: 58
DRG: 194 | End: 2023-04-23
Payer: MEDICAID

## 2023-04-23 ENCOUNTER — APPOINTMENT (OUTPATIENT)
Dept: ULTRASOUND IMAGING | Age: 58
DRG: 194 | End: 2023-04-23
Payer: MEDICAID

## 2023-04-23 DIAGNOSIS — J44.1 COPD EXACERBATION (HCC): ICD-10-CM

## 2023-04-23 DIAGNOSIS — E87.5 HYPERKALEMIA: ICD-10-CM

## 2023-04-23 DIAGNOSIS — R91.8 LUNG MASS: ICD-10-CM

## 2023-04-23 DIAGNOSIS — R06.09 DYSPNEA ON MINIMAL EXERTION: ICD-10-CM

## 2023-04-23 DIAGNOSIS — Z51.5 PALLIATIVE CARE ENCOUNTER: ICD-10-CM

## 2023-04-23 DIAGNOSIS — I50.9 ACUTE CONGESTIVE HEART FAILURE, UNSPECIFIED HEART FAILURE TYPE (HCC): Primary | ICD-10-CM

## 2023-04-23 DIAGNOSIS — J44.9 END STAGE COPD (HCC): ICD-10-CM

## 2023-04-23 DIAGNOSIS — J18.9 PNEUMONIA DUE TO INFECTIOUS ORGANISM, UNSPECIFIED LATERALITY, UNSPECIFIED PART OF LUNG: ICD-10-CM

## 2023-04-23 LAB
ALBUMIN SERPL-MCNC: 4.1 G/DL (ref 3.5–5.2)
ALP SERPL-CCNC: 65 U/L (ref 40–129)
ALT SERPL-CCNC: 17 U/L (ref 0–40)
ANION GAP SERPL CALCULATED.3IONS-SCNC: 11 MMOL/L (ref 7–16)
ANION GAP SERPL CALCULATED.3IONS-SCNC: 9 MMOL/L (ref 7–16)
AST SERPL-CCNC: 20 U/L (ref 0–39)
B PARAP IS1001 DNA NPH QL NAA+NON-PROBE: NOT DETECTED
B PERT.PT PRMT NPH QL NAA+NON-PROBE: NOT DETECTED
B.E.: 11 MMOL/L (ref -3–3)
BACTERIA URNS QL MICRO: NORMAL /HPF
BASOPHILS # BLD: 0.04 E9/L (ref 0–0.2)
BASOPHILS NFR BLD: 0.5 % (ref 0–2)
BILIRUB SERPL-MCNC: <0.2 MG/DL (ref 0–1.2)
BILIRUB UR QL STRIP: NEGATIVE
BNP BLD-MCNC: 985 PG/ML (ref 0–125)
BUN SERPL-MCNC: 11 MG/DL (ref 6–20)
BUN SERPL-MCNC: 13 MG/DL (ref 6–20)
C PNEUM DNA NPH QL NAA+NON-PROBE: NOT DETECTED
CALCIUM SERPL-MCNC: 8.1 MG/DL (ref 8.6–10.2)
CALCIUM SERPL-MCNC: 8.8 MG/DL (ref 8.6–10.2)
CHLORIDE SERPL-SCNC: 89 MMOL/L (ref 98–107)
CHLORIDE SERPL-SCNC: 94 MMOL/L (ref 98–107)
CHP ED QC CHECK: NORMAL
CLARITY UR: CLEAR
CO2 SERPL-SCNC: 34 MMOL/L (ref 22–29)
CO2 SERPL-SCNC: 35 MMOL/L (ref 22–29)
COHB: 0.9 % (ref 0–1.5)
COLOR UR: YELLOW
CREAT SERPL-MCNC: 0.7 MG/DL (ref 0.7–1.2)
CREAT SERPL-MCNC: 0.7 MG/DL (ref 0.7–1.2)
CRITICAL: ABNORMAL
CRP SERPL HS-MCNC: 6.8 MG/DL (ref 0–0.4)
D DIMER: <200 NG/ML DDU
DATE ANALYZED: ABNORMAL
DATE OF COLLECTION: ABNORMAL
EKG ATRIAL RATE: 81 BPM
EKG P AXIS: 76 DEGREES
EKG P-R INTERVAL: 112 MS
EKG Q-T INTERVAL: 360 MS
EKG QRS DURATION: 72 MS
EKG QTC CALCULATION (BAZETT): 418 MS
EKG R AXIS: 78 DEGREES
EKG T AXIS: 76 DEGREES
EKG VENTRICULAR RATE: 81 BPM
EOSINOPHIL # BLD: 0.16 E9/L (ref 0.05–0.5)
EOSINOPHIL NFR BLD: 1.9 % (ref 0–6)
EPI CELLS #/AREA URNS HPF: NORMAL /HPF
ERYTHROCYTE [DISTWIDTH] IN BLOOD BY AUTOMATED COUNT: 13.2 FL (ref 11.5–15)
ERYTHROCYTE [SEDIMENTATION RATE] IN BLOOD BY WESTERGREN METHOD: 79 MM/HR (ref 0–15)
FLUAV RNA NPH QL NAA+NON-PROBE: NOT DETECTED
FLUBV RNA NPH QL NAA+NON-PROBE: NOT DETECTED
FOLATE SERPL-MCNC: 8.9 NG/ML (ref 4.8–24.2)
GLUCOSE BLD-MCNC: 123 MG/DL
GLUCOSE SERPL-MCNC: 110 MG/DL (ref 74–99)
GLUCOSE SERPL-MCNC: 167 MG/DL (ref 74–99)
GLUCOSE UR STRIP-MCNC: NEGATIVE MG/DL
HADV DNA NPH QL NAA+NON-PROBE: NOT DETECTED
HCO3: 38.2 MMOL/L (ref 22–26)
HCOV 229E RNA NPH QL NAA+NON-PROBE: NOT DETECTED
HCOV HKU1 RNA NPH QL NAA+NON-PROBE: NOT DETECTED
HCOV NL63 RNA NPH QL NAA+NON-PROBE: NOT DETECTED
HCOV OC43 RNA NPH QL NAA+NON-PROBE: NOT DETECTED
HCT VFR BLD AUTO: 35.1 % (ref 37–54)
HGB BLD-MCNC: 10.6 G/DL (ref 12.5–16.5)
HGB UR QL STRIP: NEGATIVE
HHB: 6.4 % (ref 0–5)
HMPV RNA NPH QL NAA+NON-PROBE: NOT DETECTED
HPIV1 RNA NPH QL NAA+NON-PROBE: NOT DETECTED
HPIV2 RNA NPH QL NAA+NON-PROBE: NOT DETECTED
HPIV3 RNA NPH QL NAA+NON-PROBE: NOT DETECTED
HPIV4 RNA NPH QL NAA+NON-PROBE: NOT DETECTED
IMM GRANULOCYTES # BLD: 0.04 E9/L
IMM GRANULOCYTES NFR BLD: 0.5 % (ref 0–5)
INFLUENZA A BY PCR: NOT DETECTED
INFLUENZA B BY PCR: NOT DETECTED
KETONES UR STRIP-MCNC: NEGATIVE MG/DL
LAB: ABNORMAL
LACTATE BLDV-SCNC: 1.3 MMOL/L (ref 0.5–1.9)
LEUKOCYTE ESTERASE UR QL STRIP: NEGATIVE
LYMPHOCYTES # BLD: 1.03 E9/L (ref 1.5–4)
LYMPHOCYTES NFR BLD: 12.4 % (ref 20–42)
Lab: ABNORMAL
M PNEUMO DNA NPH QL NAA+NON-PROBE: NOT DETECTED
MCH RBC QN AUTO: 32.2 PG (ref 26–35)
MCHC RBC AUTO-ENTMCNC: 30.2 % (ref 32–34.5)
MCV RBC AUTO: 106.7 FL (ref 80–99.9)
METER GLUCOSE: 123 MG/DL (ref 74–99)
METHB: 0.3 % (ref 0–1.5)
MODE: ABNORMAL
MONOCYTES # BLD: 0.55 E9/L (ref 0.1–0.95)
MONOCYTES NFR BLD: 6.6 % (ref 2–12)
NEUTROPHILS # BLD: 6.51 E9/L (ref 1.8–7.3)
NEUTS SEG NFR BLD: 78.1 % (ref 43–80)
NITRITE UR QL STRIP: NEGATIVE
O2 CONTENT: 15.4 ML/DL
O2 SATURATION: 93.5 % (ref 92–98.5)
O2HB: 92.4 % (ref 94–97)
OPERATOR ID: 913
PATIENT TEMP: 37 C
PCO2: 63.9 MMHG (ref 35–45)
PH BLOOD GAS: 7.39 (ref 7.35–7.45)
PH UR STRIP: 6.5 [PH] (ref 5–9)
PLATELET # BLD AUTO: 165 E9/L (ref 130–450)
PMV BLD AUTO: 10.3 FL (ref 7–12)
PO2: 68.7 MMHG (ref 75–100)
POTASSIUM SERPL-SCNC: 4.5 MMOL/L (ref 3.5–5)
POTASSIUM SERPL-SCNC: 5.5 MMOL/L (ref 3.5–5)
PROT SERPL-MCNC: 7.3 G/DL (ref 6.4–8.3)
PROT UR STRIP-MCNC: NEGATIVE MG/DL
RBC # BLD AUTO: 3.29 E12/L (ref 3.8–5.8)
RBC #/AREA URNS HPF: NORMAL /HPF (ref 0–2)
RSV RNA NPH QL NAA+NON-PROBE: NOT DETECTED
RV+EV RNA NPH QL NAA+NON-PROBE: NOT DETECTED
SARS-COV-2 RDRP RESP QL NAA+PROBE: NOT DETECTED
SARS-COV-2 RNA NPH QL NAA+NON-PROBE: NOT DETECTED
SODIUM SERPL-SCNC: 134 MMOL/L (ref 132–146)
SODIUM SERPL-SCNC: 138 MMOL/L (ref 132–146)
SOURCE, BLOOD GAS: ABNORMAL
SP GR UR STRIP: 1.01 (ref 1–1.03)
THB: 11.8 G/DL (ref 11.5–16.5)
TIME ANALYZED: 1603
TROPONIN, HIGH SENSITIVITY: 13 NG/L (ref 0–11)
TROPONIN, HIGH SENSITIVITY: 13 NG/L (ref 0–11)
TSH SERPL-MCNC: 0.32 UIU/ML (ref 0.27–4.2)
UROBILINOGEN UR STRIP-ACNC: 0.2 E.U./DL
VIT B12 SERPL-MCNC: 658 PG/ML (ref 211–946)
VITAMIN D 25-HYDROXY: 21 NG/ML (ref 30–100)
WBC # BLD: 8.3 E9/L (ref 4.5–11.5)
WBC #/AREA URNS HPF: NORMAL /HPF (ref 0–5)

## 2023-04-23 PROCEDURE — 6370000000 HC RX 637 (ALT 250 FOR IP): Performed by: STUDENT IN AN ORGANIZED HEALTH CARE EDUCATION/TRAINING PROGRAM

## 2023-04-23 PROCEDURE — 94640 AIRWAY INHALATION TREATMENT: CPT

## 2023-04-23 PROCEDURE — 6360000002 HC RX W HCPCS

## 2023-04-23 PROCEDURE — 99223 1ST HOSP IP/OBS HIGH 75: CPT | Performed by: INTERNAL MEDICINE

## 2023-04-23 PROCEDURE — 2580000003 HC RX 258: Performed by: STUDENT IN AN ORGANIZED HEALTH CARE EDUCATION/TRAINING PROGRAM

## 2023-04-23 PROCEDURE — 86140 C-REACTIVE PROTEIN: CPT

## 2023-04-23 PROCEDURE — 80053 COMPREHEN METABOLIC PANEL: CPT

## 2023-04-23 PROCEDURE — 71045 X-RAY EXAM CHEST 1 VIEW: CPT

## 2023-04-23 PROCEDURE — 87081 CULTURE SCREEN ONLY: CPT

## 2023-04-23 PROCEDURE — 6360000002 HC RX W HCPCS: Performed by: INTERNAL MEDICINE

## 2023-04-23 PROCEDURE — 87449 NOS EACH ORGANISM AG IA: CPT

## 2023-04-23 PROCEDURE — 96375 TX/PRO/DX INJ NEW DRUG ADDON: CPT

## 2023-04-23 PROCEDURE — 94664 DEMO&/EVAL PT USE INHALER: CPT

## 2023-04-23 PROCEDURE — 1200000000 HC SEMI PRIVATE

## 2023-04-23 PROCEDURE — 93971 EXTREMITY STUDY: CPT

## 2023-04-23 PROCEDURE — 93010 ELECTROCARDIOGRAM REPORT: CPT | Performed by: INTERNAL MEDICINE

## 2023-04-23 PROCEDURE — 82805 BLOOD GASES W/O2 SATURATION: CPT

## 2023-04-23 PROCEDURE — 83605 ASSAY OF LACTIC ACID: CPT

## 2023-04-23 PROCEDURE — 87088 URINE BACTERIA CULTURE: CPT

## 2023-04-23 PROCEDURE — 85378 FIBRIN DEGRADE SEMIQUANT: CPT

## 2023-04-23 PROCEDURE — 87502 INFLUENZA DNA AMP PROBE: CPT

## 2023-04-23 PROCEDURE — 82746 ASSAY OF FOLIC ACID SERUM: CPT

## 2023-04-23 PROCEDURE — 94667 MNPJ CHEST WALL 1ST: CPT

## 2023-04-23 PROCEDURE — 85025 COMPLETE CBC W/AUTO DIFF WBC: CPT

## 2023-04-23 PROCEDURE — 6370000000 HC RX 637 (ALT 250 FOR IP): Performed by: INTERNAL MEDICINE

## 2023-04-23 PROCEDURE — 87635 SARS-COV-2 COVID-19 AMP PRB: CPT

## 2023-04-23 PROCEDURE — 82962 GLUCOSE BLOOD TEST: CPT

## 2023-04-23 PROCEDURE — 85651 RBC SED RATE NONAUTOMATED: CPT

## 2023-04-23 PROCEDURE — 99285 EMERGENCY DEPT VISIT HI MDM: CPT

## 2023-04-23 PROCEDURE — 84443 ASSAY THYROID STIM HORMONE: CPT

## 2023-04-23 PROCEDURE — 87075 CULTR BACTERIA EXCEPT BLOOD: CPT

## 2023-04-23 PROCEDURE — 96374 THER/PROPH/DIAG INJ IV PUSH: CPT

## 2023-04-23 PROCEDURE — 2580000003 HC RX 258: Performed by: INTERNAL MEDICINE

## 2023-04-23 PROCEDURE — 81001 URINALYSIS AUTO W/SCOPE: CPT

## 2023-04-23 PROCEDURE — 87077 CULTURE AEROBIC IDENTIFY: CPT

## 2023-04-23 PROCEDURE — 87070 CULTURE OTHR SPECIMN AEROBIC: CPT

## 2023-04-23 PROCEDURE — 2500000003 HC RX 250 WO HCPCS: Performed by: STUDENT IN AN ORGANIZED HEALTH CARE EDUCATION/TRAINING PROGRAM

## 2023-04-23 PROCEDURE — 36415 COLL VENOUS BLD VENIPUNCTURE: CPT

## 2023-04-23 PROCEDURE — 82607 VITAMIN B-12: CPT

## 2023-04-23 PROCEDURE — 6370000000 HC RX 637 (ALT 250 FOR IP)

## 2023-04-23 PROCEDURE — 2700000000 HC OXYGEN THERAPY PER DAY

## 2023-04-23 PROCEDURE — 93005 ELECTROCARDIOGRAM TRACING: CPT | Performed by: STUDENT IN AN ORGANIZED HEALTH CARE EDUCATION/TRAINING PROGRAM

## 2023-04-23 PROCEDURE — 87186 SC STD MICRODIL/AGAR DIL: CPT

## 2023-04-23 PROCEDURE — 0202U NFCT DS 22 TRGT SARS-COV-2: CPT

## 2023-04-23 PROCEDURE — 87040 BLOOD CULTURE FOR BACTERIA: CPT

## 2023-04-23 PROCEDURE — 82306 VITAMIN D 25 HYDROXY: CPT

## 2023-04-23 PROCEDURE — 6360000002 HC RX W HCPCS: Performed by: STUDENT IN AN ORGANIZED HEALTH CARE EDUCATION/TRAINING PROGRAM

## 2023-04-23 PROCEDURE — 80048 BASIC METABOLIC PNL TOTAL CA: CPT

## 2023-04-23 PROCEDURE — 84484 ASSAY OF TROPONIN QUANT: CPT

## 2023-04-23 PROCEDURE — 83880 ASSAY OF NATRIURETIC PEPTIDE: CPT

## 2023-04-23 RX ORDER — SODIUM CHLORIDE 9 MG/ML
INJECTION, SOLUTION INTRAVENOUS PRN
Status: DISCONTINUED | OUTPATIENT
Start: 2023-04-23 | End: 2023-05-02 | Stop reason: HOSPADM

## 2023-04-23 RX ORDER — ARFORMOTEROL TARTRATE 15 UG/2ML
15 SOLUTION RESPIRATORY (INHALATION) 2 TIMES DAILY
Status: DISCONTINUED | OUTPATIENT
Start: 2023-04-23 | End: 2023-05-02 | Stop reason: HOSPADM

## 2023-04-23 RX ORDER — SODIUM CHLORIDE 0.9 % (FLUSH) 0.9 %
5-40 SYRINGE (ML) INJECTION PRN
Status: DISCONTINUED | OUTPATIENT
Start: 2023-04-23 | End: 2023-05-02 | Stop reason: HOSPADM

## 2023-04-23 RX ORDER — DEXTROSE MONOHYDRATE 100 MG/ML
INJECTION, SOLUTION INTRAVENOUS CONTINUOUS PRN
Status: DISCONTINUED | OUTPATIENT
Start: 2023-04-23 | End: 2023-05-02 | Stop reason: HOSPADM

## 2023-04-23 RX ORDER — PANTOPRAZOLE SODIUM 40 MG/1
40 TABLET, DELAYED RELEASE ORAL
Status: DISCONTINUED | OUTPATIENT
Start: 2023-04-24 | End: 2023-05-02 | Stop reason: HOSPADM

## 2023-04-23 RX ORDER — HYDROCODONE BITARTRATE AND ACETAMINOPHEN 7.5; 325 MG/1; MG/1
1 TABLET ORAL 2 TIMES DAILY PRN
Status: DISCONTINUED | OUTPATIENT
Start: 2023-04-23 | End: 2023-05-02 | Stop reason: HOSPADM

## 2023-04-23 RX ORDER — PROCHLORPERAZINE EDISYLATE 5 MG/ML
10 INJECTION INTRAMUSCULAR; INTRAVENOUS ONCE
Status: COMPLETED | OUTPATIENT
Start: 2023-04-23 | End: 2023-04-23

## 2023-04-23 RX ORDER — ARIPIPRAZOLE 5 MG/1
5 TABLET ORAL DAILY
Status: DISCONTINUED | OUTPATIENT
Start: 2023-04-23 | End: 2023-05-02 | Stop reason: HOSPADM

## 2023-04-23 RX ORDER — CALCIUM GLUCONATE 94 MG/ML
INJECTION, SOLUTION INTRAVENOUS
Status: COMPLETED
Start: 2023-04-23 | End: 2023-04-23

## 2023-04-23 RX ORDER — PREDNISONE 20 MG/1
40 TABLET ORAL DAILY
Status: COMPLETED | OUTPATIENT
Start: 2023-04-23 | End: 2023-04-27

## 2023-04-23 RX ORDER — BUDESONIDE 0.5 MG/2ML
0.5 INHALANT ORAL 2 TIMES DAILY
Status: DISCONTINUED | OUTPATIENT
Start: 2023-04-23 | End: 2023-05-02 | Stop reason: HOSPADM

## 2023-04-23 RX ORDER — HYDROXYZINE PAMOATE 25 MG/1
25 CAPSULE ORAL 3 TIMES DAILY PRN
Status: DISCONTINUED | OUTPATIENT
Start: 2023-04-23 | End: 2023-05-02 | Stop reason: HOSPADM

## 2023-04-23 RX ORDER — SODIUM CHLORIDE 0.9 % (FLUSH) 0.9 %
5-40 SYRINGE (ML) INJECTION EVERY 12 HOURS SCHEDULED
Status: DISCONTINUED | OUTPATIENT
Start: 2023-04-23 | End: 2023-05-02 | Stop reason: HOSPADM

## 2023-04-23 RX ORDER — ENOXAPARIN SODIUM 100 MG/ML
40 INJECTION SUBCUTANEOUS DAILY
Status: DISCONTINUED | OUTPATIENT
Start: 2023-04-23 | End: 2023-05-02 | Stop reason: HOSPADM

## 2023-04-23 RX ORDER — FLUOXETINE HYDROCHLORIDE 20 MG/1
20 CAPSULE ORAL DAILY
Status: DISCONTINUED | OUTPATIENT
Start: 2023-04-23 | End: 2023-05-02 | Stop reason: HOSPADM

## 2023-04-23 RX ORDER — BUTALBITAL, ACETAMINOPHEN AND CAFFEINE 50; 325; 40 MG/1; MG/1; MG/1
1 TABLET ORAL EVERY 6 HOURS PRN
Status: DISCONTINUED | OUTPATIENT
Start: 2023-04-23 | End: 2023-05-02 | Stop reason: HOSPADM

## 2023-04-23 RX ORDER — METHYLPREDNISOLONE SODIUM SUCCINATE 125 MG/2ML
125 INJECTION, POWDER, LYOPHILIZED, FOR SOLUTION INTRAMUSCULAR; INTRAVENOUS ONCE
Status: COMPLETED | OUTPATIENT
Start: 2023-04-23 | End: 2023-04-23

## 2023-04-23 RX ORDER — CALCIUM GLUCONATE 94 MG/ML
1000 INJECTION, SOLUTION INTRAVENOUS ONCE
Status: COMPLETED | OUTPATIENT
Start: 2023-04-23 | End: 2023-04-23

## 2023-04-23 RX ORDER — PRAMIPEXOLE DIHYDROCHLORIDE 0.25 MG/1
0.25 TABLET ORAL NIGHTLY
Status: DISCONTINUED | OUTPATIENT
Start: 2023-04-23 | End: 2023-05-02 | Stop reason: HOSPADM

## 2023-04-23 RX ORDER — ACETAMINOPHEN 325 MG/1
650 TABLET ORAL EVERY 6 HOURS PRN
Status: DISCONTINUED | OUTPATIENT
Start: 2023-04-23 | End: 2023-05-02 | Stop reason: HOSPADM

## 2023-04-23 RX ORDER — FUROSEMIDE 10 MG/ML
20 INJECTION INTRAMUSCULAR; INTRAVENOUS DAILY
Status: DISCONTINUED | OUTPATIENT
Start: 2023-04-24 | End: 2023-04-26

## 2023-04-23 RX ORDER — FENTANYL CITRATE 50 UG/ML
50 INJECTION, SOLUTION INTRAMUSCULAR; INTRAVENOUS ONCE
Status: COMPLETED | OUTPATIENT
Start: 2023-04-23 | End: 2023-04-23

## 2023-04-23 RX ORDER — ACETAMINOPHEN 650 MG/1
650 SUPPOSITORY RECTAL EVERY 6 HOURS PRN
Status: DISCONTINUED | OUTPATIENT
Start: 2023-04-23 | End: 2023-05-02 | Stop reason: HOSPADM

## 2023-04-23 RX ORDER — IPRATROPIUM BROMIDE AND ALBUTEROL SULFATE 2.5; .5 MG/3ML; MG/3ML
3 SOLUTION RESPIRATORY (INHALATION) ONCE
Status: COMPLETED | OUTPATIENT
Start: 2023-04-23 | End: 2023-04-23

## 2023-04-23 RX ORDER — FUROSEMIDE 10 MG/ML
20 INJECTION INTRAMUSCULAR; INTRAVENOUS ONCE
Status: COMPLETED | OUTPATIENT
Start: 2023-04-23 | End: 2023-04-23

## 2023-04-23 RX ADMIN — ENOXAPARIN SODIUM 40 MG: 100 INJECTION SUBCUTANEOUS at 20:16

## 2023-04-23 RX ADMIN — PIPERACILLIN AND TAZOBACTAM 4500 MG: 4; .5 INJECTION, POWDER, FOR SOLUTION INTRAVENOUS at 22:49

## 2023-04-23 RX ADMIN — Medication 10 ML: at 20:17

## 2023-04-23 RX ADMIN — CALCIUM GLUCONATE 1000 MG: 94 INJECTION, SOLUTION INTRAVENOUS at 16:29

## 2023-04-23 RX ADMIN — BUDESONIDE 500 MCG: 0.5 SUSPENSION RESPIRATORY (INHALATION) at 19:53

## 2023-04-23 RX ADMIN — METHYLPREDNISOLONE SODIUM SUCCINATE 125 MG: 125 INJECTION, POWDER, FOR SOLUTION INTRAMUSCULAR; INTRAVENOUS at 12:41

## 2023-04-23 RX ADMIN — PRAMIPEXOLE DIHYDROCHLORIDE 0.25 MG: 0.25 TABLET ORAL at 20:17

## 2023-04-23 RX ADMIN — HYDROXYZINE PAMOATE 25 MG: 25 CAPSULE ORAL at 23:57

## 2023-04-23 RX ADMIN — ARIPIPRAZOLE 5 MG: 5 TABLET ORAL at 18:52

## 2023-04-23 RX ADMIN — DOXYCYCLINE 100 MG: 100 INJECTION, POWDER, LYOPHILIZED, FOR SOLUTION INTRAVENOUS at 13:48

## 2023-04-23 RX ADMIN — PREDNISONE 40 MG: 20 TABLET ORAL at 20:16

## 2023-04-23 RX ADMIN — FLUOXETINE HYDROCHLORIDE 20 MG: 20 CAPSULE ORAL at 20:16

## 2023-04-23 RX ADMIN — FENTANYL CITRATE 50 MCG: 50 INJECTION, SOLUTION INTRAMUSCULAR; INTRAVENOUS at 13:28

## 2023-04-23 RX ADMIN — FUROSEMIDE 20 MG: 10 INJECTION, SOLUTION INTRAVENOUS at 13:27

## 2023-04-23 RX ADMIN — DEXTROSE MONOHYDRATE 250 ML: 100 INJECTION, SOLUTION INTRAVENOUS at 16:39

## 2023-04-23 RX ADMIN — HYDROCODONE BITARTRATE AND ACETAMINOPHEN 1 TABLET: 7.5; 325 TABLET ORAL at 18:52

## 2023-04-23 RX ADMIN — PROCHLORPERAZINE EDISYLATE 10 MG: 5 INJECTION INTRAMUSCULAR; INTRAVENOUS at 22:00

## 2023-04-23 RX ADMIN — ARFORMOTEROL TARTRATE 15 MCG: 15 SOLUTION RESPIRATORY (INHALATION) at 19:53

## 2023-04-23 RX ADMIN — VANCOMYCIN HYDROCHLORIDE 1500 MG: 10 INJECTION, POWDER, LYOPHILIZED, FOR SOLUTION INTRAVENOUS at 20:21

## 2023-04-23 RX ADMIN — IPRATROPIUM BROMIDE AND ALBUTEROL SULFATE 3 AMPULE: 2.5; .5 SOLUTION RESPIRATORY (INHALATION) at 12:47

## 2023-04-23 RX ADMIN — CALCIUM GLUCONATE 1000 MG: 98 INJECTION, SOLUTION INTRAVENOUS at 16:29

## 2023-04-23 RX ADMIN — CEFTRIAXONE SODIUM 2000 MG: 2 INJECTION, POWDER, FOR SOLUTION INTRAMUSCULAR; INTRAVENOUS at 13:27

## 2023-04-23 RX ADMIN — METOPROLOL TARTRATE 25 MG: 25 TABLET, FILM COATED ORAL at 20:16

## 2023-04-23 RX ADMIN — INSULIN HUMAN 5 UNITS: 100 INJECTION, SOLUTION PARENTERAL at 16:31

## 2023-04-23 ASSESSMENT — PAIN DESCRIPTION - ORIENTATION
ORIENTATION: RIGHT
ORIENTATION: LEFT

## 2023-04-23 ASSESSMENT — PAIN DESCRIPTION - LOCATION
LOCATION: LEG
LOCATION: HEAD

## 2023-04-23 ASSESSMENT — PAIN SCALES - GENERAL
PAINLEVEL_OUTOF10: 10
PAINLEVEL_OUTOF10: 8
PAINLEVEL_OUTOF10: 10
PAINLEVEL_OUTOF10: 10

## 2023-04-23 ASSESSMENT — PAIN DESCRIPTION - DESCRIPTORS
DESCRIPTORS: ACHING
DESCRIPTORS: DISCOMFORT;HEAVINESS;THROBBING

## 2023-04-23 ASSESSMENT — PAIN DESCRIPTION - FREQUENCY: FREQUENCY: CONTINUOUS

## 2023-04-23 ASSESSMENT — PAIN - FUNCTIONAL ASSESSMENT: PAIN_FUNCTIONAL_ASSESSMENT: ACTIVITIES ARE NOT PREVENTED

## 2023-04-23 ASSESSMENT — PAIN DESCRIPTION - ONSET: ONSET: ON-GOING

## 2023-04-23 ASSESSMENT — PAIN DESCRIPTION - PAIN TYPE: TYPE: ACUTE PAIN

## 2023-04-23 ASSESSMENT — LIFESTYLE VARIABLES
HOW MANY STANDARD DRINKS CONTAINING ALCOHOL DO YOU HAVE ON A TYPICAL DAY: PATIENT DOES NOT DRINK
HOW OFTEN DO YOU HAVE A DRINK CONTAINING ALCOHOL: NEVER

## 2023-04-24 PROBLEM — R07.2 PRECORDIAL PAIN: Status: ACTIVE | Noted: 2023-04-24

## 2023-04-24 PROBLEM — I25.10 CORONARY ARTERY DISEASE INVOLVING NATIVE CORONARY ARTERY OF NATIVE HEART WITHOUT ANGINA PECTORIS: Status: ACTIVE | Noted: 2023-04-24

## 2023-04-24 LAB
ANION GAP SERPL CALCULATED.3IONS-SCNC: 13 MMOL/L (ref 7–16)
BASOPHILS # BLD: 0.01 E9/L (ref 0–0.2)
BASOPHILS NFR BLD: 0.1 % (ref 0–2)
BUN SERPL-MCNC: 11 MG/DL (ref 6–20)
CALCIUM SERPL-MCNC: 8.7 MG/DL (ref 8.6–10.2)
CHLORIDE SERPL-SCNC: 91 MMOL/L (ref 98–107)
CO2 SERPL-SCNC: 32 MMOL/L (ref 22–29)
CREAT SERPL-MCNC: 0.7 MG/DL (ref 0.7–1.2)
EOSINOPHIL # BLD: 0 E9/L (ref 0.05–0.5)
EOSINOPHIL NFR BLD: 0 % (ref 0–6)
ERYTHROCYTE [DISTWIDTH] IN BLOOD BY AUTOMATED COUNT: 13.2 FL (ref 11.5–15)
GLUCOSE SERPL-MCNC: 145 MG/DL (ref 74–99)
HCT VFR BLD AUTO: 33.8 % (ref 37–54)
HGB BLD-MCNC: 10.4 G/DL (ref 12.5–16.5)
IMM GRANULOCYTES # BLD: 0.01 E9/L
IMM GRANULOCYTES NFR BLD: 0.1 % (ref 0–5)
LEGIONELLA AG UR QL: NORMAL
LYMPHOCYTES # BLD: 0.41 E9/L (ref 1.5–4)
LYMPHOCYTES NFR BLD: 5.8 % (ref 20–42)
MCH RBC QN AUTO: 31.3 PG (ref 26–35)
MCHC RBC AUTO-ENTMCNC: 30.8 % (ref 32–34.5)
MCV RBC AUTO: 101.8 FL (ref 80–99.9)
METER GLUCOSE: 92 MG/DL (ref 74–99)
MONOCYTES # BLD: 0.13 E9/L (ref 0.1–0.95)
MONOCYTES NFR BLD: 1.8 % (ref 2–12)
NEUTROPHILS # BLD: 6.52 E9/L (ref 1.8–7.3)
NEUTS SEG NFR BLD: 92.2 % (ref 43–80)
PLATELET # BLD AUTO: 174 E9/L (ref 130–450)
PMV BLD AUTO: 10.1 FL (ref 7–12)
POTASSIUM SERPL-SCNC: 4.7 MMOL/L (ref 3.5–5)
PROCALCITONIN: 0.08 NG/ML (ref 0–0.08)
RBC # BLD AUTO: 3.32 E12/L (ref 3.8–5.8)
S PNEUM AG SPEC QL: NORMAL
SODIUM SERPL-SCNC: 136 MMOL/L (ref 132–146)
WBC # BLD: 7.1 E9/L (ref 4.5–11.5)

## 2023-04-24 PROCEDURE — 6360000002 HC RX W HCPCS: Performed by: INTERNAL MEDICINE

## 2023-04-24 PROCEDURE — 6370000000 HC RX 637 (ALT 250 FOR IP): Performed by: STUDENT IN AN ORGANIZED HEALTH CARE EDUCATION/TRAINING PROGRAM

## 2023-04-24 PROCEDURE — 84145 PROCALCITONIN (PCT): CPT

## 2023-04-24 PROCEDURE — 2700000000 HC OXYGEN THERAPY PER DAY

## 2023-04-24 PROCEDURE — 87070 CULTURE OTHR SPECIMN AEROBIC: CPT

## 2023-04-24 PROCEDURE — 6360000002 HC RX W HCPCS

## 2023-04-24 PROCEDURE — 94640 AIRWAY INHALATION TREATMENT: CPT

## 2023-04-24 PROCEDURE — 36415 COLL VENOUS BLD VENIPUNCTURE: CPT

## 2023-04-24 PROCEDURE — 87077 CULTURE AEROBIC IDENTIFY: CPT

## 2023-04-24 PROCEDURE — 1200000000 HC SEMI PRIVATE

## 2023-04-24 PROCEDURE — 99255 IP/OBS CONSLTJ NEW/EST HI 80: CPT | Performed by: INTERNAL MEDICINE

## 2023-04-24 PROCEDURE — 82962 GLUCOSE BLOOD TEST: CPT

## 2023-04-24 PROCEDURE — 6370000000 HC RX 637 (ALT 250 FOR IP)

## 2023-04-24 PROCEDURE — 87206 SMEAR FLUORESCENT/ACID STAI: CPT

## 2023-04-24 PROCEDURE — 80048 BASIC METABOLIC PNL TOTAL CA: CPT

## 2023-04-24 PROCEDURE — 94669 MECHANICAL CHEST WALL OSCILL: CPT

## 2023-04-24 PROCEDURE — 6370000000 HC RX 637 (ALT 250 FOR IP): Performed by: INTERNAL MEDICINE

## 2023-04-24 PROCEDURE — 2580000003 HC RX 258: Performed by: INTERNAL MEDICINE

## 2023-04-24 PROCEDURE — 87186 SC STD MICRODIL/AGAR DIL: CPT

## 2023-04-24 PROCEDURE — 85025 COMPLETE CBC W/AUTO DIFF WBC: CPT

## 2023-04-24 PROCEDURE — 99232 SBSQ HOSP IP/OBS MODERATE 35: CPT | Performed by: STUDENT IN AN ORGANIZED HEALTH CARE EDUCATION/TRAINING PROGRAM

## 2023-04-24 RX ORDER — LORAZEPAM 1 MG/1
1 TABLET ORAL EVERY 8 HOURS PRN
Status: DISCONTINUED | OUTPATIENT
Start: 2023-04-24 | End: 2023-05-02 | Stop reason: HOSPADM

## 2023-04-24 RX ORDER — IPRATROPIUM BROMIDE AND ALBUTEROL SULFATE 2.5; .5 MG/3ML; MG/3ML
1 SOLUTION RESPIRATORY (INHALATION) EVERY 4 HOURS PRN
Status: DISCONTINUED | OUTPATIENT
Start: 2023-04-24 | End: 2023-05-02 | Stop reason: HOSPADM

## 2023-04-24 RX ORDER — IPRATROPIUM BROMIDE AND ALBUTEROL SULFATE 2.5; .5 MG/3ML; MG/3ML
1 SOLUTION RESPIRATORY (INHALATION) EVERY 4 HOURS
Status: DISCONTINUED | OUTPATIENT
Start: 2023-04-24 | End: 2023-05-02 | Stop reason: HOSPADM

## 2023-04-24 RX ORDER — LORAZEPAM 2 MG/ML
0.5 INJECTION INTRAMUSCULAR ONCE
Status: COMPLETED | OUTPATIENT
Start: 2023-04-24 | End: 2023-04-24

## 2023-04-24 RX ADMIN — VANCOMYCIN HYDROCHLORIDE 1000 MG: 1 INJECTION, POWDER, LYOPHILIZED, FOR SOLUTION INTRAVENOUS at 09:41

## 2023-04-24 RX ADMIN — BUDESONIDE 500 MCG: 0.5 SUSPENSION RESPIRATORY (INHALATION) at 08:42

## 2023-04-24 RX ADMIN — Medication 10 ML: at 20:46

## 2023-04-24 RX ADMIN — HYDROXYZINE PAMOATE 25 MG: 25 CAPSULE ORAL at 08:00

## 2023-04-24 RX ADMIN — HYDROCODONE BITARTRATE AND ACETAMINOPHEN 1 TABLET: 7.5; 325 TABLET ORAL at 01:01

## 2023-04-24 RX ADMIN — IPRATROPIUM BROMIDE AND ALBUTEROL SULFATE 1 AMPULE: 2.5; .5 SOLUTION RESPIRATORY (INHALATION) at 08:42

## 2023-04-24 RX ADMIN — LORAZEPAM 0.5 MG: 2 INJECTION INTRAMUSCULAR; INTRAVENOUS at 01:41

## 2023-04-24 RX ADMIN — IPRATROPIUM BROMIDE AND ALBUTEROL SULFATE 1 AMPULE: .5; 2.5 SOLUTION RESPIRATORY (INHALATION) at 16:16

## 2023-04-24 RX ADMIN — PIPERACILLIN AND TAZOBACTAM 4500 MG: 4; .5 INJECTION, POWDER, LYOPHILIZED, FOR SOLUTION INTRAVENOUS at 06:27

## 2023-04-24 RX ADMIN — Medication 10 ML: at 08:03

## 2023-04-24 RX ADMIN — FLUOXETINE HYDROCHLORIDE 20 MG: 20 CAPSULE ORAL at 08:00

## 2023-04-24 RX ADMIN — ENOXAPARIN SODIUM 40 MG: 100 INJECTION SUBCUTANEOUS at 07:59

## 2023-04-24 RX ADMIN — PRAMIPEXOLE DIHYDROCHLORIDE 0.25 MG: 0.25 TABLET ORAL at 20:46

## 2023-04-24 RX ADMIN — PREDNISONE 40 MG: 20 TABLET ORAL at 08:00

## 2023-04-24 RX ADMIN — ARIPIPRAZOLE 5 MG: 5 TABLET ORAL at 08:00

## 2023-04-24 RX ADMIN — ACETAMINOPHEN 650 MG: 325 TABLET ORAL at 21:15

## 2023-04-24 RX ADMIN — IPRATROPIUM BROMIDE AND ALBUTEROL SULFATE 1 AMPULE: .5; 2.5 SOLUTION RESPIRATORY (INHALATION) at 21:01

## 2023-04-24 RX ADMIN — IPRATROPIUM BROMIDE AND ALBUTEROL SULFATE 1 AMPULE: 2.5; .5 SOLUTION RESPIRATORY (INHALATION) at 00:34

## 2023-04-24 RX ADMIN — METOPROLOL TARTRATE 25 MG: 25 TABLET, FILM COATED ORAL at 20:46

## 2023-04-24 RX ADMIN — PANTOPRAZOLE SODIUM 40 MG: 40 TABLET, DELAYED RELEASE ORAL at 06:29

## 2023-04-24 RX ADMIN — FUROSEMIDE 20 MG: 10 INJECTION, SOLUTION INTRAMUSCULAR; INTRAVENOUS at 07:59

## 2023-04-24 RX ADMIN — LORAZEPAM 1 MG: 1 TABLET ORAL at 16:22

## 2023-04-24 RX ADMIN — HYDROCODONE BITARTRATE AND ACETAMINOPHEN 1 TABLET: 7.5; 325 TABLET ORAL at 13:37

## 2023-04-24 RX ADMIN — ARFORMOTEROL TARTRATE 15 MCG: 15 SOLUTION RESPIRATORY (INHALATION) at 08:42

## 2023-04-24 RX ADMIN — ARFORMOTEROL TARTRATE 15 MCG: 15 SOLUTION RESPIRATORY (INHALATION) at 21:01

## 2023-04-24 RX ADMIN — LORAZEPAM 1 MG: 1 TABLET ORAL at 08:34

## 2023-04-24 RX ADMIN — METOPROLOL TARTRATE 25 MG: 25 TABLET, FILM COATED ORAL at 07:59

## 2023-04-24 RX ADMIN — BUDESONIDE 500 MCG: 0.5 SUSPENSION RESPIRATORY (INHALATION) at 21:01

## 2023-04-24 ASSESSMENT — PAIN - FUNCTIONAL ASSESSMENT
PAIN_FUNCTIONAL_ASSESSMENT: ACTIVITIES ARE NOT PREVENTED

## 2023-04-24 ASSESSMENT — PAIN DESCRIPTION - DESCRIPTORS
DESCRIPTORS: ACHING;DISCOMFORT
DESCRIPTORS: ACHING

## 2023-04-24 ASSESSMENT — PAIN DESCRIPTION - LOCATION
LOCATION: HEAD
LOCATION: HEAD;LEG
LOCATION: HAND
LOCATION: HEAD

## 2023-04-24 ASSESSMENT — PAIN SCALES - GENERAL
PAINLEVEL_OUTOF10: 9
PAINLEVEL_OUTOF10: 1
PAINLEVEL_OUTOF10: 9
PAINLEVEL_OUTOF10: 9
PAINLEVEL_OUTOF10: 1
PAINLEVEL_OUTOF10: 7

## 2023-04-24 ASSESSMENT — PAIN DESCRIPTION - FREQUENCY
FREQUENCY: CONTINUOUS
FREQUENCY: CONTINUOUS

## 2023-04-24 ASSESSMENT — PAIN SCALES - WONG BAKER: WONGBAKER_NUMERICALRESPONSE: 0

## 2023-04-24 ASSESSMENT — PAIN DESCRIPTION - PAIN TYPE
TYPE: ACUTE PAIN
TYPE: ACUTE PAIN

## 2023-04-24 ASSESSMENT — PAIN DESCRIPTION - ONSET
ONSET: ON-GOING
ONSET: ON-GOING

## 2023-04-24 ASSESSMENT — PAIN DESCRIPTION - ORIENTATION
ORIENTATION: RIGHT;LOWER
ORIENTATION: RIGHT;LEFT

## 2023-04-25 LAB
ANION GAP SERPL CALCULATED.3IONS-SCNC: 9 MMOL/L (ref 7–16)
BASOPHILS # BLD: 0.02 E9/L (ref 0–0.2)
BASOPHILS NFR BLD: 0.2 % (ref 0–2)
BUN SERPL-MCNC: 11 MG/DL (ref 6–20)
CALCIUM SERPL-MCNC: 8.8 MG/DL (ref 8.6–10.2)
CHLORIDE SERPL-SCNC: 95 MMOL/L (ref 98–107)
CO2 SERPL-SCNC: 36 MMOL/L (ref 22–29)
CREAT SERPL-MCNC: 0.7 MG/DL (ref 0.7–1.2)
EOSINOPHIL # BLD: 0.07 E9/L (ref 0.05–0.5)
EOSINOPHIL NFR BLD: 0.8 % (ref 0–6)
ERYTHROCYTE [DISTWIDTH] IN BLOOD BY AUTOMATED COUNT: 13.6 FL (ref 11.5–15)
GLUCOSE SERPL-MCNC: 90 MG/DL (ref 74–99)
HCT VFR BLD AUTO: 35.6 % (ref 37–54)
HGB BLD-MCNC: 10.7 G/DL (ref 12.5–16.5)
IMM GRANULOCYTES # BLD: 0.03 E9/L
IMM GRANULOCYTES NFR BLD: 0.4 % (ref 0–5)
LYMPHOCYTES # BLD: 1.04 E9/L (ref 1.5–4)
LYMPHOCYTES NFR BLD: 12.2 % (ref 20–42)
MCH RBC QN AUTO: 31.3 PG (ref 26–35)
MCHC RBC AUTO-ENTMCNC: 30.1 % (ref 32–34.5)
MCV RBC AUTO: 104.1 FL (ref 80–99.9)
MONOCYTES # BLD: 0.6 E9/L (ref 0.1–0.95)
MONOCYTES NFR BLD: 7 % (ref 2–12)
MRSA SPEC QL CULT: NORMAL
NEUTROPHILS # BLD: 6.78 E9/L (ref 1.8–7.3)
NEUTS SEG NFR BLD: 79.4 % (ref 43–80)
PLATELET # BLD AUTO: 146 E9/L (ref 130–450)
PMV BLD AUTO: 10 FL (ref 7–12)
POTASSIUM SERPL-SCNC: 4.4 MMOL/L (ref 3.5–5)
RBC # BLD AUTO: 3.42 E12/L (ref 3.8–5.8)
SODIUM SERPL-SCNC: 140 MMOL/L (ref 132–146)
WBC # BLD: 8.5 E9/L (ref 4.5–11.5)

## 2023-04-25 PROCEDURE — 80048 BASIC METABOLIC PNL TOTAL CA: CPT

## 2023-04-25 PROCEDURE — 6370000000 HC RX 637 (ALT 250 FOR IP): Performed by: INTERNAL MEDICINE

## 2023-04-25 PROCEDURE — 6370000000 HC RX 637 (ALT 250 FOR IP): Performed by: STUDENT IN AN ORGANIZED HEALTH CARE EDUCATION/TRAINING PROGRAM

## 2023-04-25 PROCEDURE — 94640 AIRWAY INHALATION TREATMENT: CPT

## 2023-04-25 PROCEDURE — 97161 PT EVAL LOW COMPLEX 20 MIN: CPT

## 2023-04-25 PROCEDURE — 6370000000 HC RX 637 (ALT 250 FOR IP)

## 2023-04-25 PROCEDURE — 6360000002 HC RX W HCPCS: Performed by: INTERNAL MEDICINE

## 2023-04-25 PROCEDURE — 99232 SBSQ HOSP IP/OBS MODERATE 35: CPT | Performed by: STUDENT IN AN ORGANIZED HEALTH CARE EDUCATION/TRAINING PROGRAM

## 2023-04-25 PROCEDURE — 36415 COLL VENOUS BLD VENIPUNCTURE: CPT

## 2023-04-25 PROCEDURE — 99233 SBSQ HOSP IP/OBS HIGH 50: CPT | Performed by: INTERNAL MEDICINE

## 2023-04-25 PROCEDURE — 85025 COMPLETE CBC W/AUTO DIFF WBC: CPT

## 2023-04-25 PROCEDURE — 94668 MNPJ CHEST WALL SBSQ: CPT

## 2023-04-25 PROCEDURE — 2580000003 HC RX 258: Performed by: INTERNAL MEDICINE

## 2023-04-25 PROCEDURE — 1200000000 HC SEMI PRIVATE

## 2023-04-25 PROCEDURE — 2700000000 HC OXYGEN THERAPY PER DAY

## 2023-04-25 PROCEDURE — 6370000000 HC RX 637 (ALT 250 FOR IP): Performed by: PHYSICIAN ASSISTANT

## 2023-04-25 PROCEDURE — 97165 OT EVAL LOW COMPLEX 30 MIN: CPT

## 2023-04-25 RX ORDER — LOSARTAN POTASSIUM 50 MG/1
50 TABLET ORAL DAILY
Status: DISCONTINUED | OUTPATIENT
Start: 2023-04-25 | End: 2023-05-02 | Stop reason: HOSPADM

## 2023-04-25 RX ORDER — HYDRALAZINE HYDROCHLORIDE 20 MG/ML
10 INJECTION INTRAMUSCULAR; INTRAVENOUS EVERY 6 HOURS PRN
Status: DISCONTINUED | OUTPATIENT
Start: 2023-04-25 | End: 2023-05-02 | Stop reason: HOSPADM

## 2023-04-25 RX ADMIN — IPRATROPIUM BROMIDE AND ALBUTEROL SULFATE 1 AMPULE: .5; 2.5 SOLUTION RESPIRATORY (INHALATION) at 03:44

## 2023-04-25 RX ADMIN — METOPROLOL TARTRATE 25 MG: 25 TABLET, FILM COATED ORAL at 07:41

## 2023-04-25 RX ADMIN — IPRATROPIUM BROMIDE AND ALBUTEROL SULFATE 1 AMPULE: .5; 2.5 SOLUTION RESPIRATORY (INHALATION) at 00:01

## 2023-04-25 RX ADMIN — Medication 10 ML: at 20:58

## 2023-04-25 RX ADMIN — BUDESONIDE 500 MCG: 0.5 SUSPENSION RESPIRATORY (INHALATION) at 19:35

## 2023-04-25 RX ADMIN — Medication 10 ML: at 07:42

## 2023-04-25 RX ADMIN — ARFORMOTEROL TARTRATE 15 MCG: 15 SOLUTION RESPIRATORY (INHALATION) at 08:17

## 2023-04-25 RX ADMIN — BUDESONIDE 500 MCG: 0.5 SUSPENSION RESPIRATORY (INHALATION) at 08:18

## 2023-04-25 RX ADMIN — METOPROLOL TARTRATE 25 MG: 25 TABLET, FILM COATED ORAL at 20:59

## 2023-04-25 RX ADMIN — LORAZEPAM 1 MG: 1 TABLET ORAL at 00:37

## 2023-04-25 RX ADMIN — ACETAMINOPHEN 650 MG: 325 TABLET ORAL at 06:47

## 2023-04-25 RX ADMIN — IPRATROPIUM BROMIDE AND ALBUTEROL SULFATE 1 AMPULE: .5; 2.5 SOLUTION RESPIRATORY (INHALATION) at 12:34

## 2023-04-25 RX ADMIN — HYDROCODONE BITARTRATE AND ACETAMINOPHEN 1 TABLET: 7.5; 325 TABLET ORAL at 13:35

## 2023-04-25 RX ADMIN — PANTOPRAZOLE SODIUM 40 MG: 40 TABLET, DELAYED RELEASE ORAL at 06:44

## 2023-04-25 RX ADMIN — FLUOXETINE HYDROCHLORIDE 20 MG: 20 CAPSULE ORAL at 07:41

## 2023-04-25 RX ADMIN — PRAMIPEXOLE DIHYDROCHLORIDE 0.25 MG: 0.25 TABLET ORAL at 20:59

## 2023-04-25 RX ADMIN — ARFORMOTEROL TARTRATE 15 MCG: 15 SOLUTION RESPIRATORY (INHALATION) at 19:34

## 2023-04-25 RX ADMIN — IPRATROPIUM BROMIDE AND ALBUTEROL SULFATE 1 AMPULE: .5; 2.5 SOLUTION RESPIRATORY (INHALATION) at 19:34

## 2023-04-25 RX ADMIN — LORAZEPAM 1 MG: 1 TABLET ORAL at 17:11

## 2023-04-25 RX ADMIN — HYDROXYZINE PAMOATE 25 MG: 25 CAPSULE ORAL at 06:48

## 2023-04-25 RX ADMIN — ENOXAPARIN SODIUM 40 MG: 100 INJECTION SUBCUTANEOUS at 07:42

## 2023-04-25 RX ADMIN — BUTALBITAL, ACETAMINOPHEN AND CAFFEINE 1 TABLET: 50; 325; 40 TABLET ORAL at 14:49

## 2023-04-25 RX ADMIN — LORAZEPAM 1 MG: 1 TABLET ORAL at 07:45

## 2023-04-25 RX ADMIN — LOSARTAN POTASSIUM 50 MG: 50 TABLET, FILM COATED ORAL at 09:51

## 2023-04-25 RX ADMIN — PREDNISONE 40 MG: 20 TABLET ORAL at 07:41

## 2023-04-25 RX ADMIN — FUROSEMIDE 20 MG: 10 INJECTION, SOLUTION INTRAMUSCULAR; INTRAVENOUS at 07:42

## 2023-04-25 RX ADMIN — ARIPIPRAZOLE 5 MG: 5 TABLET ORAL at 07:41

## 2023-04-25 RX ADMIN — IPRATROPIUM BROMIDE AND ALBUTEROL SULFATE 1 AMPULE: .5; 2.5 SOLUTION RESPIRATORY (INHALATION) at 15:44

## 2023-04-25 RX ADMIN — HYDROCODONE BITARTRATE AND ACETAMINOPHEN 1 TABLET: 7.5; 325 TABLET ORAL at 01:37

## 2023-04-25 RX ADMIN — IPRATROPIUM BROMIDE AND ALBUTEROL SULFATE 1 AMPULE: .5; 2.5 SOLUTION RESPIRATORY (INHALATION) at 08:17

## 2023-04-25 ASSESSMENT — PAIN SCALES - GENERAL
PAINLEVEL_OUTOF10: 2
PAINLEVEL_OUTOF10: 5
PAINLEVEL_OUTOF10: 10
PAINLEVEL_OUTOF10: 0
PAINLEVEL_OUTOF10: 7
PAINLEVEL_OUTOF10: 10
PAINLEVEL_OUTOF10: 7
PAINLEVEL_OUTOF10: 8
PAINLEVEL_OUTOF10: 1

## 2023-04-25 ASSESSMENT — PAIN - FUNCTIONAL ASSESSMENT
PAIN_FUNCTIONAL_ASSESSMENT: ACTIVITIES ARE NOT PREVENTED

## 2023-04-25 ASSESSMENT — PAIN DESCRIPTION - DESCRIPTORS
DESCRIPTORS: ACHING;DISCOMFORT
DESCRIPTORS: ACHING;DISCOMFORT
DESCRIPTORS: ACHING
DESCRIPTORS: ACHING;CRAMPING;DISCOMFORT
DESCRIPTORS: ACHING;DISCOMFORT

## 2023-04-25 ASSESSMENT — PAIN DESCRIPTION - ORIENTATION
ORIENTATION: LEFT;RIGHT
ORIENTATION: RIGHT;LEFT
ORIENTATION: RIGHT;LEFT

## 2023-04-25 ASSESSMENT — PAIN DESCRIPTION - LOCATION
LOCATION: HEAD
LOCATION: GENERALIZED
LOCATION: HEAD;LEG
LOCATION: HEAD
LOCATION: HEAD

## 2023-04-25 ASSESSMENT — PAIN DESCRIPTION - ONSET: ONSET: ON-GOING

## 2023-04-25 ASSESSMENT — PAIN DESCRIPTION - PAIN TYPE: TYPE: ACUTE PAIN

## 2023-04-25 ASSESSMENT — PAIN DESCRIPTION - FREQUENCY: FREQUENCY: CONTINUOUS

## 2023-04-25 ASSESSMENT — PAIN SCALES - WONG BAKER: WONGBAKER_NUMERICALRESPONSE: 0

## 2023-04-26 LAB
ANION GAP SERPL CALCULATED.3IONS-SCNC: 11 MMOL/L (ref 7–16)
BACTERIA SPEC ANAEROBE CULT: NORMAL
BACTERIA UR CULT: NORMAL
BASOPHILS # BLD: 0.02 E9/L (ref 0–0.2)
BASOPHILS NFR BLD: 0.3 % (ref 0–2)
BUN SERPL-MCNC: 10 MG/DL (ref 6–20)
CALCIUM SERPL-MCNC: 8.3 MG/DL (ref 8.6–10.2)
CHLORIDE SERPL-SCNC: 89 MMOL/L (ref 98–107)
CO2 SERPL-SCNC: 34 MMOL/L (ref 22–29)
CREAT SERPL-MCNC: 0.6 MG/DL (ref 0.7–1.2)
EOSINOPHIL # BLD: 0.1 E9/L (ref 0.05–0.5)
EOSINOPHIL NFR BLD: 1.5 % (ref 0–6)
ERYTHROCYTE [DISTWIDTH] IN BLOOD BY AUTOMATED COUNT: 13.4 FL (ref 11.5–15)
GLUCOSE SERPL-MCNC: 95 MG/DL (ref 74–99)
HCT VFR BLD AUTO: 33.3 % (ref 37–54)
HGB BLD-MCNC: 10.3 G/DL (ref 12.5–16.5)
IMM GRANULOCYTES # BLD: 0.02 E9/L
IMM GRANULOCYTES NFR BLD: 0.3 % (ref 0–5)
LYMPHOCYTES # BLD: 1.28 E9/L (ref 1.5–4)
LYMPHOCYTES NFR BLD: 19 % (ref 20–42)
MCH RBC QN AUTO: 31.2 PG (ref 26–35)
MCHC RBC AUTO-ENTMCNC: 30.9 % (ref 32–34.5)
MCV RBC AUTO: 100.9 FL (ref 80–99.9)
MONOCYTES # BLD: 0.51 E9/L (ref 0.1–0.95)
MONOCYTES NFR BLD: 7.6 % (ref 2–12)
NEUTROPHILS # BLD: 4.79 E9/L (ref 1.8–7.3)
NEUTS SEG NFR BLD: 71.3 % (ref 43–80)
PLATELET # BLD AUTO: 155 E9/L (ref 130–450)
PMV BLD AUTO: 9.5 FL (ref 7–12)
POTASSIUM SERPL-SCNC: 3.7 MMOL/L (ref 3.5–5)
PROCALCITONIN: 0.09 NG/ML (ref 0–0.08)
RBC # BLD AUTO: 3.3 E12/L (ref 3.8–5.8)
SODIUM SERPL-SCNC: 134 MMOL/L (ref 132–146)
WBC # BLD: 6.7 E9/L (ref 4.5–11.5)

## 2023-04-26 PROCEDURE — 80048 BASIC METABOLIC PNL TOTAL CA: CPT

## 2023-04-26 PROCEDURE — 6370000000 HC RX 637 (ALT 250 FOR IP): Performed by: INTERNAL MEDICINE

## 2023-04-26 PROCEDURE — 6370000000 HC RX 637 (ALT 250 FOR IP)

## 2023-04-26 PROCEDURE — 2580000003 HC RX 258: Performed by: INTERNAL MEDICINE

## 2023-04-26 PROCEDURE — 99232 SBSQ HOSP IP/OBS MODERATE 35: CPT | Performed by: STUDENT IN AN ORGANIZED HEALTH CARE EDUCATION/TRAINING PROGRAM

## 2023-04-26 PROCEDURE — 85025 COMPLETE CBC W/AUTO DIFF WBC: CPT

## 2023-04-26 PROCEDURE — 36415 COLL VENOUS BLD VENIPUNCTURE: CPT

## 2023-04-26 PROCEDURE — 1200000000 HC SEMI PRIVATE

## 2023-04-26 PROCEDURE — 6370000000 HC RX 637 (ALT 250 FOR IP): Performed by: STUDENT IN AN ORGANIZED HEALTH CARE EDUCATION/TRAINING PROGRAM

## 2023-04-26 PROCEDURE — 2700000000 HC OXYGEN THERAPY PER DAY

## 2023-04-26 PROCEDURE — 94669 MECHANICAL CHEST WALL OSCILL: CPT

## 2023-04-26 PROCEDURE — 6360000002 HC RX W HCPCS: Performed by: INTERNAL MEDICINE

## 2023-04-26 PROCEDURE — 84145 PROCALCITONIN (PCT): CPT

## 2023-04-26 PROCEDURE — 94640 AIRWAY INHALATION TREATMENT: CPT

## 2023-04-26 PROCEDURE — 6370000000 HC RX 637 (ALT 250 FOR IP): Performed by: PHYSICIAN ASSISTANT

## 2023-04-26 PROCEDURE — 99233 SBSQ HOSP IP/OBS HIGH 50: CPT | Performed by: INTERNAL MEDICINE

## 2023-04-26 RX ORDER — HYDROCODONE BITARTRATE AND ACETAMINOPHEN 7.5; 325 MG/1; MG/1
1 TABLET ORAL ONCE
Status: COMPLETED | OUTPATIENT
Start: 2023-04-26 | End: 2023-04-26

## 2023-04-26 RX ORDER — FUROSEMIDE 40 MG/1
40 TABLET ORAL DAILY
Status: DISCONTINUED | OUTPATIENT
Start: 2023-04-26 | End: 2023-05-02 | Stop reason: HOSPADM

## 2023-04-26 RX ADMIN — LOSARTAN POTASSIUM 50 MG: 50 TABLET, FILM COATED ORAL at 07:50

## 2023-04-26 RX ADMIN — HYDROCODONE BITARTRATE AND ACETAMINOPHEN 1 TABLET: 7.5; 325 TABLET ORAL at 11:09

## 2023-04-26 RX ADMIN — HYDROCODONE BITARTRATE AND ACETAMINOPHEN 1 TABLET: 7.5; 325 TABLET ORAL at 14:55

## 2023-04-26 RX ADMIN — Medication 10 ML: at 20:38

## 2023-04-26 RX ADMIN — FUROSEMIDE 40 MG: 40 TABLET ORAL at 07:50

## 2023-04-26 RX ADMIN — FLUOXETINE HYDROCHLORIDE 20 MG: 20 CAPSULE ORAL at 07:50

## 2023-04-26 RX ADMIN — ACETAMINOPHEN 650 MG: 325 TABLET ORAL at 20:38

## 2023-04-26 RX ADMIN — METOPROLOL TARTRATE 25 MG: 25 TABLET, FILM COATED ORAL at 07:51

## 2023-04-26 RX ADMIN — LORAZEPAM 1 MG: 1 TABLET ORAL at 00:57

## 2023-04-26 RX ADMIN — ACETAMINOPHEN 650 MG: 325 TABLET ORAL at 07:49

## 2023-04-26 RX ADMIN — IPRATROPIUM BROMIDE AND ALBUTEROL SULFATE 1 AMPULE: .5; 2.5 SOLUTION RESPIRATORY (INHALATION) at 20:18

## 2023-04-26 RX ADMIN — ENOXAPARIN SODIUM 40 MG: 100 INJECTION SUBCUTANEOUS at 07:50

## 2023-04-26 RX ADMIN — METOPROLOL TARTRATE 25 MG: 25 TABLET, FILM COATED ORAL at 20:38

## 2023-04-26 RX ADMIN — BUDESONIDE 500 MCG: 0.5 SUSPENSION RESPIRATORY (INHALATION) at 20:19

## 2023-04-26 RX ADMIN — Medication 10 ML: at 07:51

## 2023-04-26 RX ADMIN — ARFORMOTEROL TARTRATE 15 MCG: 15 SOLUTION RESPIRATORY (INHALATION) at 08:18

## 2023-04-26 RX ADMIN — HYDROXYZINE PAMOATE 25 MG: 25 CAPSULE ORAL at 05:06

## 2023-04-26 RX ADMIN — IPRATROPIUM BROMIDE AND ALBUTEROL SULFATE 1 AMPULE: .5; 2.5 SOLUTION RESPIRATORY (INHALATION) at 08:18

## 2023-04-26 RX ADMIN — BUTALBITAL, ACETAMINOPHEN AND CAFFEINE 1 TABLET: 50; 325; 40 TABLET ORAL at 09:14

## 2023-04-26 RX ADMIN — HYDROCODONE BITARTRATE AND ACETAMINOPHEN 1 TABLET: 7.5; 325 TABLET ORAL at 01:55

## 2023-04-26 RX ADMIN — LORAZEPAM 1 MG: 1 TABLET ORAL at 20:38

## 2023-04-26 RX ADMIN — ARIPIPRAZOLE 5 MG: 5 TABLET ORAL at 07:50

## 2023-04-26 RX ADMIN — PANTOPRAZOLE SODIUM 40 MG: 40 TABLET, DELAYED RELEASE ORAL at 06:10

## 2023-04-26 RX ADMIN — LORAZEPAM 1 MG: 1 TABLET ORAL at 09:14

## 2023-04-26 RX ADMIN — IPRATROPIUM BROMIDE AND ALBUTEROL SULFATE 1 AMPULE: .5; 2.5 SOLUTION RESPIRATORY (INHALATION) at 15:50

## 2023-04-26 RX ADMIN — IPRATROPIUM BROMIDE AND ALBUTEROL SULFATE 1 AMPULE: .5; 2.5 SOLUTION RESPIRATORY (INHALATION) at 04:40

## 2023-04-26 RX ADMIN — PREDNISONE 40 MG: 20 TABLET ORAL at 07:51

## 2023-04-26 RX ADMIN — IPRATROPIUM BROMIDE AND ALBUTEROL SULFATE 1 AMPULE: .5; 2.5 SOLUTION RESPIRATORY (INHALATION) at 12:28

## 2023-04-26 RX ADMIN — ARFORMOTEROL TARTRATE 15 MCG: 15 SOLUTION RESPIRATORY (INHALATION) at 20:19

## 2023-04-26 RX ADMIN — BUDESONIDE 500 MCG: 0.5 SUSPENSION RESPIRATORY (INHALATION) at 08:18

## 2023-04-26 RX ADMIN — PRAMIPEXOLE DIHYDROCHLORIDE 0.25 MG: 0.25 TABLET ORAL at 20:38

## 2023-04-26 RX ADMIN — IPRATROPIUM BROMIDE AND ALBUTEROL SULFATE 1 AMPULE: .5; 2.5 SOLUTION RESPIRATORY (INHALATION) at 01:19

## 2023-04-26 ASSESSMENT — PAIN - FUNCTIONAL ASSESSMENT: PAIN_FUNCTIONAL_ASSESSMENT: PREVENTS OR INTERFERES SOME ACTIVE ACTIVITIES AND ADLS

## 2023-04-26 ASSESSMENT — PAIN DESCRIPTION - LOCATION
LOCATION: LEG
LOCATION: HEAD
LOCATION: LEG
LOCATION: LEG

## 2023-04-26 ASSESSMENT — PAIN DESCRIPTION - DESCRIPTORS
DESCRIPTORS: ACHING;DISCOMFORT
DESCRIPTORS: ACHING

## 2023-04-26 ASSESSMENT — PAIN SCALES - GENERAL
PAINLEVEL_OUTOF10: 0
PAINLEVEL_OUTOF10: 5
PAINLEVEL_OUTOF10: 9

## 2023-04-26 ASSESSMENT — PAIN DESCRIPTION - FREQUENCY: FREQUENCY: CONTINUOUS

## 2023-04-26 ASSESSMENT — PAIN DESCRIPTION - ORIENTATION: ORIENTATION: LEFT

## 2023-04-26 ASSESSMENT — PAIN DESCRIPTION - PAIN TYPE: TYPE: ACUTE PAIN

## 2023-04-26 ASSESSMENT — PAIN DESCRIPTION - ONSET: ONSET: ON-GOING

## 2023-04-27 ENCOUNTER — ANESTHESIA (OUTPATIENT)
Dept: ENDOSCOPY | Age: 58
End: 2023-04-27
Payer: MEDICAID

## 2023-04-27 ENCOUNTER — ANESTHESIA EVENT (OUTPATIENT)
Dept: ENDOSCOPY | Age: 58
End: 2023-04-27
Payer: MEDICAID

## 2023-04-27 LAB
ANION GAP SERPL CALCULATED.3IONS-SCNC: 11 MMOL/L (ref 7–16)
BACTERIA SPEC RESP CULT: ABNORMAL
BACTERIA SPEC RESP CULT: ABNORMAL
BACTERIA WND AEROBE CULT: ABNORMAL
BASOPHILS # BLD: 0.03 E9/L (ref 0–0.2)
BASOPHILS NFR BLD: 0.5 % (ref 0–2)
BUN SERPL-MCNC: 9 MG/DL (ref 6–20)
CALCIUM SERPL-MCNC: 8.7 MG/DL (ref 8.6–10.2)
CHLORIDE SERPL-SCNC: 88 MMOL/L (ref 98–107)
CO2 SERPL-SCNC: 33 MMOL/L (ref 22–29)
CREAT SERPL-MCNC: 0.7 MG/DL (ref 0.7–1.2)
EOSINOPHIL # BLD: 0.14 E9/L (ref 0.05–0.5)
EOSINOPHIL NFR BLD: 2.4 % (ref 0–6)
ERYTHROCYTE [DISTWIDTH] IN BLOOD BY AUTOMATED COUNT: 13.2 FL (ref 11.5–15)
GLUCOSE SERPL-MCNC: 85 MG/DL (ref 74–99)
HCT VFR BLD AUTO: 34.8 % (ref 37–54)
HGB BLD-MCNC: 11.1 G/DL (ref 12.5–16.5)
IMM GRANULOCYTES # BLD: 0.01 E9/L
IMM GRANULOCYTES NFR BLD: 0.2 % (ref 0–5)
LYMPHOCYTES # BLD: 1.39 E9/L (ref 1.5–4)
LYMPHOCYTES NFR BLD: 24.2 % (ref 20–42)
MCH RBC QN AUTO: 31.4 PG (ref 26–35)
MCHC RBC AUTO-ENTMCNC: 31.9 % (ref 32–34.5)
MCV RBC AUTO: 98.3 FL (ref 80–99.9)
MONOCYTES # BLD: 0.6 E9/L (ref 0.1–0.95)
MONOCYTES NFR BLD: 10.5 % (ref 2–12)
NEUTROPHILS # BLD: 3.57 E9/L (ref 1.8–7.3)
NEUTS SEG NFR BLD: 62.2 % (ref 43–80)
ORGANISM: ABNORMAL
PLATELET # BLD AUTO: 164 E9/L (ref 130–450)
PMV BLD AUTO: 9.6 FL (ref 7–12)
POTASSIUM SERPL-SCNC: 4.1 MMOL/L (ref 3.5–5)
RBC # BLD AUTO: 3.54 E12/L (ref 3.8–5.8)
SMEAR, RESPIRATORY: ABNORMAL
SODIUM SERPL-SCNC: 132 MMOL/L (ref 132–146)
WBC # BLD: 5.7 E9/L (ref 4.5–11.5)

## 2023-04-27 PROCEDURE — 6370000000 HC RX 637 (ALT 250 FOR IP): Performed by: PHYSICIAN ASSISTANT

## 2023-04-27 PROCEDURE — 87070 CULTURE OTHR SPECIMN AEROBIC: CPT

## 2023-04-27 PROCEDURE — 6370000000 HC RX 637 (ALT 250 FOR IP): Performed by: STUDENT IN AN ORGANIZED HEALTH CARE EDUCATION/TRAINING PROGRAM

## 2023-04-27 PROCEDURE — 87186 SC STD MICRODIL/AGAR DIL: CPT

## 2023-04-27 PROCEDURE — 6370000000 HC RX 637 (ALT 250 FOR IP): Performed by: NURSE PRACTITIONER

## 2023-04-27 PROCEDURE — 2709999900 HC NON-CHARGEABLE SUPPLY: Performed by: INTERNAL MEDICINE

## 2023-04-27 PROCEDURE — 2580000003 HC RX 258: Performed by: NURSE ANESTHETIST, CERTIFIED REGISTERED

## 2023-04-27 PROCEDURE — 6370000000 HC RX 637 (ALT 250 FOR IP): Performed by: INTERNAL MEDICINE

## 2023-04-27 PROCEDURE — 99231 SBSQ HOSP IP/OBS SF/LOW 25: CPT | Performed by: STUDENT IN AN ORGANIZED HEALTH CARE EDUCATION/TRAINING PROGRAM

## 2023-04-27 PROCEDURE — 85025 COMPLETE CBC W/AUTO DIFF WBC: CPT

## 2023-04-27 PROCEDURE — 7100000010 HC PHASE II RECOVERY - FIRST 15 MIN: Performed by: INTERNAL MEDICINE

## 2023-04-27 PROCEDURE — 36415 COLL VENOUS BLD VENIPUNCTURE: CPT

## 2023-04-27 PROCEDURE — 99233 SBSQ HOSP IP/OBS HIGH 50: CPT | Performed by: INTERNAL MEDICINE

## 2023-04-27 PROCEDURE — APPSS30 APP SPLIT SHARED TIME 16-30 MINUTES: Performed by: NURSE PRACTITIONER

## 2023-04-27 PROCEDURE — 80048 BASIC METABOLIC PNL TOTAL CA: CPT

## 2023-04-27 PROCEDURE — 1200000000 HC SEMI PRIVATE

## 2023-04-27 PROCEDURE — 0BJ08ZZ INSPECTION OF TRACHEOBRONCHIAL TREE, VIA NATURAL OR ARTIFICIAL OPENING ENDOSCOPIC: ICD-10-PCS | Performed by: INTERNAL MEDICINE

## 2023-04-27 PROCEDURE — 2580000003 HC RX 258: Performed by: INTERNAL MEDICINE

## 2023-04-27 PROCEDURE — 97530 THERAPEUTIC ACTIVITIES: CPT

## 2023-04-27 PROCEDURE — 87205 SMEAR GRAM STAIN: CPT

## 2023-04-27 PROCEDURE — 6360000002 HC RX W HCPCS: Performed by: INTERNAL MEDICINE

## 2023-04-27 PROCEDURE — 3700000000 HC ANESTHESIA ATTENDED CARE: Performed by: INTERNAL MEDICINE

## 2023-04-27 PROCEDURE — 87077 CULTURE AEROBIC IDENTIFY: CPT

## 2023-04-27 PROCEDURE — 7100000011 HC PHASE II RECOVERY - ADDTL 15 MIN: Performed by: INTERNAL MEDICINE

## 2023-04-27 PROCEDURE — 94669 MECHANICAL CHEST WALL OSCILL: CPT

## 2023-04-27 PROCEDURE — 3609027000 HC BRONCHOSCOPY: Performed by: INTERNAL MEDICINE

## 2023-04-27 PROCEDURE — 2700000000 HC OXYGEN THERAPY PER DAY

## 2023-04-27 PROCEDURE — 94640 AIRWAY INHALATION TREATMENT: CPT

## 2023-04-27 PROCEDURE — 99222 1ST HOSP IP/OBS MODERATE 55: CPT | Performed by: NURSE PRACTITIONER

## 2023-04-27 PROCEDURE — 87206 SMEAR FLUORESCENT/ACID STAI: CPT

## 2023-04-27 PROCEDURE — 3700000001 HC ADD 15 MINUTES (ANESTHESIA): Performed by: INTERNAL MEDICINE

## 2023-04-27 RX ORDER — FENTANYL CITRATE 50 UG/ML
INJECTION, SOLUTION INTRAMUSCULAR; INTRAVENOUS PRN
Status: DISCONTINUED | OUTPATIENT
Start: 2023-04-27 | End: 2023-04-27 | Stop reason: SDUPTHER

## 2023-04-27 RX ORDER — MORPHINE SULFATE 20 MG/ML
2.5 SOLUTION ORAL EVERY 4 HOURS PRN
Status: DISCONTINUED | OUTPATIENT
Start: 2023-04-27 | End: 2023-05-02 | Stop reason: HOSPADM

## 2023-04-27 RX ORDER — AMMONIUM LACTATE 12 G/100G
LOTION TOPICAL DAILY
Status: DISCONTINUED | OUTPATIENT
Start: 2023-04-27 | End: 2023-05-02 | Stop reason: HOSPADM

## 2023-04-27 RX ORDER — SODIUM CHLORIDE 9 MG/ML
INJECTION, SOLUTION INTRAVENOUS CONTINUOUS PRN
Status: DISCONTINUED | OUTPATIENT
Start: 2023-04-27 | End: 2023-04-27 | Stop reason: SDUPTHER

## 2023-04-27 RX ADMIN — BUDESONIDE 500 MCG: 0.5 SUSPENSION RESPIRATORY (INHALATION) at 08:25

## 2023-04-27 RX ADMIN — HYDROCODONE BITARTRATE AND ACETAMINOPHEN 1 TABLET: 7.5; 325 TABLET ORAL at 19:22

## 2023-04-27 RX ADMIN — FLUOXETINE HYDROCHLORIDE 20 MG: 20 CAPSULE ORAL at 08:16

## 2023-04-27 RX ADMIN — LORAZEPAM 1 MG: 1 TABLET ORAL at 23:12

## 2023-04-27 RX ADMIN — MORPHINE SULFATE 2.5 MG: 100 SOLUTION ORAL at 21:13

## 2023-04-27 RX ADMIN — ACETAMINOPHEN 650 MG: 325 TABLET ORAL at 06:48

## 2023-04-27 RX ADMIN — ARFORMOTEROL TARTRATE 15 MCG: 15 SOLUTION RESPIRATORY (INHALATION) at 08:25

## 2023-04-27 RX ADMIN — IPRATROPIUM BROMIDE AND ALBUTEROL SULFATE 1 AMPULE: .5; 2.5 SOLUTION RESPIRATORY (INHALATION) at 16:10

## 2023-04-27 RX ADMIN — METOPROLOL TARTRATE 25 MG: 25 TABLET, FILM COATED ORAL at 21:13

## 2023-04-27 RX ADMIN — IPRATROPIUM BROMIDE AND ALBUTEROL SULFATE 1 AMPULE: .5; 2.5 SOLUTION RESPIRATORY (INHALATION) at 19:44

## 2023-04-27 RX ADMIN — SODIUM CHLORIDE: 9 INJECTION, SOLUTION INTRAVENOUS at 13:35

## 2023-04-27 RX ADMIN — FENTANYL CITRATE 50 MCG: 50 INJECTION, SOLUTION INTRAMUSCULAR; INTRAVENOUS at 14:07

## 2023-04-27 RX ADMIN — PANTOPRAZOLE SODIUM 40 MG: 40 TABLET, DELAYED RELEASE ORAL at 05:14

## 2023-04-27 RX ADMIN — FUROSEMIDE 40 MG: 40 TABLET ORAL at 08:16

## 2023-04-27 RX ADMIN — ACETAMINOPHEN 650 MG: 325 TABLET ORAL at 12:34

## 2023-04-27 RX ADMIN — IPRATROPIUM BROMIDE AND ALBUTEROL SULFATE 1 AMPULE: .5; 2.5 SOLUTION RESPIRATORY (INHALATION) at 12:42

## 2023-04-27 RX ADMIN — PREDNISONE 40 MG: 20 TABLET ORAL at 08:16

## 2023-04-27 RX ADMIN — FENTANYL CITRATE 50 MCG: 50 INJECTION, SOLUTION INTRAMUSCULAR; INTRAVENOUS at 14:13

## 2023-04-27 RX ADMIN — ARFORMOTEROL TARTRATE 15 MCG: 15 SOLUTION RESPIRATORY (INHALATION) at 19:44

## 2023-04-27 RX ADMIN — Medication 10 ML: at 08:17

## 2023-04-27 RX ADMIN — LOSARTAN POTASSIUM 50 MG: 50 TABLET, FILM COATED ORAL at 08:16

## 2023-04-27 RX ADMIN — HYDROCODONE BITARTRATE AND ACETAMINOPHEN 1 TABLET: 7.5; 325 TABLET ORAL at 01:53

## 2023-04-27 RX ADMIN — IPRATROPIUM BROMIDE AND ALBUTEROL SULFATE 1 AMPULE: .5; 2.5 SOLUTION RESPIRATORY (INHALATION) at 08:24

## 2023-04-27 RX ADMIN — LORAZEPAM 1 MG: 1 TABLET ORAL at 08:18

## 2023-04-27 RX ADMIN — Medication 10 ML: at 21:23

## 2023-04-27 RX ADMIN — BUDESONIDE 500 MCG: 0.5 SUSPENSION RESPIRATORY (INHALATION) at 19:44

## 2023-04-27 RX ADMIN — METOPROLOL TARTRATE 25 MG: 25 TABLET, FILM COATED ORAL at 08:16

## 2023-04-27 RX ADMIN — PRAMIPEXOLE DIHYDROCHLORIDE 0.25 MG: 0.25 TABLET ORAL at 21:13

## 2023-04-27 RX ADMIN — ARIPIPRAZOLE 5 MG: 5 TABLET ORAL at 08:15

## 2023-04-27 RX ADMIN — Medication: at 12:34

## 2023-04-27 ASSESSMENT — PAIN SCALES - GENERAL
PAINLEVEL_OUTOF10: 0
PAINLEVEL_OUTOF10: 8
PAINLEVEL_OUTOF10: 2
PAINLEVEL_OUTOF10: 9
PAINLEVEL_OUTOF10: 9
PAINLEVEL_OUTOF10: 0
PAINLEVEL_OUTOF10: 6
PAINLEVEL_OUTOF10: 0
PAINLEVEL_OUTOF10: 3

## 2023-04-27 ASSESSMENT — PAIN DESCRIPTION - DESCRIPTORS
DESCRIPTORS: ACHING

## 2023-04-27 ASSESSMENT — PAIN DESCRIPTION - LOCATION
LOCATION: HEAD;LEG
LOCATION: HEAD;LEG
LOCATION: LEG;HEAD
LOCATION: HEAD;LEG

## 2023-04-27 ASSESSMENT — PAIN DESCRIPTION - FREQUENCY: FREQUENCY: CONTINUOUS

## 2023-04-27 ASSESSMENT — PAIN - FUNCTIONAL ASSESSMENT
PAIN_FUNCTIONAL_ASSESSMENT: PREVENTS OR INTERFERES SOME ACTIVE ACTIVITIES AND ADLS
PAIN_FUNCTIONAL_ASSESSMENT: ACTIVITIES ARE NOT PREVENTED
PAIN_FUNCTIONAL_ASSESSMENT: ACTIVITIES ARE NOT PREVENTED

## 2023-04-27 ASSESSMENT — PAIN DESCRIPTION - ONSET: ONSET: ON-GOING

## 2023-04-27 ASSESSMENT — PAIN SCALES - WONG BAKER: WONGBAKER_NUMERICALRESPONSE: 0

## 2023-04-27 ASSESSMENT — LIFESTYLE VARIABLES: SMOKING_STATUS: 1

## 2023-04-27 ASSESSMENT — PAIN DESCRIPTION - PAIN TYPE: TYPE: ACUTE PAIN

## 2023-04-27 ASSESSMENT — PAIN DESCRIPTION - ORIENTATION
ORIENTATION: LEFT
ORIENTATION: LEFT

## 2023-04-27 NOTE — ANESTHESIA POSTPROCEDURE EVALUATION
Department of Anesthesiology  Postprocedure Note    Patient: Casie Stevens  MRN: [id number]  YOB: 1965  Date of evaluation: 4/27/2023      Procedure Summary     Date: 04/27/23 Room / Location: [facility] / [facility]    Anesthesia Start: 1400 Anesthesia Stop: 9838    Procedure: BRONCHOSCOPY DIAGNOSTIC OR CELL [address] ONLY Diagnosis:       Lung mass      (Lung mass [R91.8])    Surgeons: Chance Hernandez MD Responsible Provider: Yolanda Mckenna DO    Anesthesia Type: MAC ASA Status: 3          Anesthesia Type: No value filed.     Brenda Phase I:      Brenda Phase II:        Anesthesia Post Evaluation    Patient location during evaluation: PACU  Patient participation: complete - patient participated  Level of consciousness: awake  Airway patency: patent  Nausea & Vomiting: no nausea and no vomiting  Complications: no  Cardiovascular status: hemodynamically stable  Respiratory status: acceptable  Hydration status: euvolemic

## 2023-04-27 NOTE — ANESTHESIA PRE PROCEDURE
Department of Anesthesiology  Preprocedure Note       Name:  Ignacia Hernandez   Age:  62 y.o.  :  1965                                          MRN:  [id number]         Date:  2023      Surgeon: Migue Gomez):  Aidan Law MD    Procedure: Procedure(s):  BRONCHOSCOPY    +++CONTACT ISOLATION+++    Medications prior to admission:   Prior to Admission medications    Medication Sig Start Date End Date Taking? Authorizing Provider   sodium chloride (OCEAN) 0.65 % nasal spray by Nasal route 23   Historical Provider, MD   glucose monitoring (FREESTYLE FREEDOM) kit 1 kit by Does not apply route daily 23   Christopher Rodriguez MD   nystatin (MYCOSTATIN) 266136 UNIT/GM powder Apply 3 times daily. 23   Christopher Rodriguez MD   butalbital-acetaminophen-caffeine (FIORICET, ESGIC) -40 MG per tablet TAKE ONE (1) TABLET BY MOUTH EVERY SIX (6) HOURS AS NEEDED FOR HEADACHES 23   Christopher Rodriguez MD   LORazepam (ATIVAN) 1 MG tablet Take 1 tablet by mouth every 8 hours as needed for Anxiety for up to 30 days.  23  Christopher Rodriguez MD   BROVANA 15 MCG/2ML NEBU TAKE TWO (2) MLS BY NEBULIZATION IN THE MORNING AND TWO (2) MLS IN THE EVENING. 3/23/23   ARGELIA Centeno NP   hydroCHLOROthiazide (HYDRODIURIL) 25 MG tablet Take 1 tablet by mouth every morning 3/13/23   Christopher Rodriguez MD   metoprolol tartrate (LOPRESSOR) 25 MG tablet Take 1 tablet by mouth 2 times daily Twice A Day 3/10/23   Christopher Rordiguez MD   losartan (COZAAR) 50 MG tablet Take 1 tablet by mouth daily 22   Christopher Rodriguez MD   albuterol (PROVENTIL) (2.5 MG/3ML) 0.083% nebulizer solution Take 3 mLs by nebulization every 6 hours as needed for Wheezing 22   Christopher Rodriguez MD   omeprazole (PRILOSEC) 40 MG delayed release capsule Take 1 capsule by mouth daily 22   Christopher Rodriguez MD   dextromethorphan-guaiFENesin (ROBITUSSIN-DM)  MG/5ML syrup Take 10 mLs by mouth every 4 hours as needed for Cough

## 2023-04-28 LAB
ANION GAP SERPL CALCULATED.3IONS-SCNC: 13 MMOL/L (ref 7–16)
BACTERIA BLD CULT ORG #2: NORMAL
BACTERIA BLD CULT: NORMAL
BASOPHILS # BLD: 0.03 E9/L (ref 0–0.2)
BASOPHILS NFR BLD: 0.3 % (ref 0–2)
BUN SERPL-MCNC: 21 MG/DL (ref 6–20)
CALCIUM SERPL-MCNC: 8.2 MG/DL (ref 8.6–10.2)
CHLORIDE SERPL-SCNC: 88 MMOL/L (ref 98–107)
CO2 SERPL-SCNC: 32 MMOL/L (ref 22–29)
CREAT SERPL-MCNC: 0.9 MG/DL (ref 0.7–1.2)
EOSINOPHIL # BLD: 0.16 E9/L (ref 0.05–0.5)
EOSINOPHIL NFR BLD: 1.5 % (ref 0–6)
ERYTHROCYTE [DISTWIDTH] IN BLOOD BY AUTOMATED COUNT: 13.4 FL (ref 11.5–15)
GLUCOSE SERPL-MCNC: 118 MG/DL (ref 74–99)
GRAM STAIN ORDERABLE: NORMAL
HCT VFR BLD AUTO: 36.8 % (ref 37–54)
HGB BLD-MCNC: 11.8 G/DL (ref 12.5–16.5)
IMM GRANULOCYTES # BLD: 0.05 E9/L
IMM GRANULOCYTES NFR BLD: 0.5 % (ref 0–5)
LYMPHOCYTES # BLD: 1.48 E9/L (ref 1.5–4)
LYMPHOCYTES NFR BLD: 13.8 % (ref 20–42)
MCH RBC QN AUTO: 31.6 PG (ref 26–35)
MCHC RBC AUTO-ENTMCNC: 32.1 % (ref 32–34.5)
MCV RBC AUTO: 98.4 FL (ref 80–99.9)
MONOCYTES # BLD: 0.85 E9/L (ref 0.1–0.95)
MONOCYTES NFR BLD: 8 % (ref 2–12)
NEUTROPHILS # BLD: 8.12 E9/L (ref 1.8–7.3)
NEUTS SEG NFR BLD: 75.9 % (ref 43–80)
PLATELET # BLD AUTO: 200 E9/L (ref 130–450)
PMV BLD AUTO: 10 FL (ref 7–12)
POTASSIUM SERPL-SCNC: 3.6 MMOL/L (ref 3.5–5)
RBC # BLD AUTO: 3.74 E12/L (ref 3.8–5.8)
SODIUM SERPL-SCNC: 133 MMOL/L (ref 132–146)
WBC # BLD: 10.7 E9/L (ref 4.5–11.5)

## 2023-04-28 PROCEDURE — 2580000003 HC RX 258: Performed by: INTERNAL MEDICINE

## 2023-04-28 PROCEDURE — 6370000000 HC RX 637 (ALT 250 FOR IP): Performed by: PHYSICIAN ASSISTANT

## 2023-04-28 PROCEDURE — 6360000002 HC RX W HCPCS: Performed by: INTERNAL MEDICINE

## 2023-04-28 PROCEDURE — 6370000000 HC RX 637 (ALT 250 FOR IP): Performed by: INTERNAL MEDICINE

## 2023-04-28 PROCEDURE — 99232 SBSQ HOSP IP/OBS MODERATE 35: CPT | Performed by: STUDENT IN AN ORGANIZED HEALTH CARE EDUCATION/TRAINING PROGRAM

## 2023-04-28 PROCEDURE — 36415 COLL VENOUS BLD VENIPUNCTURE: CPT

## 2023-04-28 PROCEDURE — 94669 MECHANICAL CHEST WALL OSCILL: CPT

## 2023-04-28 PROCEDURE — 85025 COMPLETE CBC W/AUTO DIFF WBC: CPT

## 2023-04-28 PROCEDURE — 80048 BASIC METABOLIC PNL TOTAL CA: CPT

## 2023-04-28 PROCEDURE — APPSS30 APP SPLIT SHARED TIME 16-30 MINUTES: Performed by: NURSE PRACTITIONER

## 2023-04-28 PROCEDURE — 6360000002 HC RX W HCPCS: Performed by: REGISTERED NURSE

## 2023-04-28 PROCEDURE — 94640 AIRWAY INHALATION TREATMENT: CPT

## 2023-04-28 PROCEDURE — 6370000000 HC RX 637 (ALT 250 FOR IP): Performed by: REGISTERED NURSE

## 2023-04-28 PROCEDURE — 6370000000 HC RX 637 (ALT 250 FOR IP): Performed by: STUDENT IN AN ORGANIZED HEALTH CARE EDUCATION/TRAINING PROGRAM

## 2023-04-28 PROCEDURE — 99233 SBSQ HOSP IP/OBS HIGH 50: CPT | Performed by: INTERNAL MEDICINE

## 2023-04-28 PROCEDURE — 1200000000 HC SEMI PRIVATE

## 2023-04-28 PROCEDURE — 2700000000 HC OXYGEN THERAPY PER DAY

## 2023-04-28 RX ORDER — TOBRAMYCIN 40 MG/ML
300 INJECTION INTRAMUSCULAR; INTRAVENOUS EVERY 12 HOURS
Status: DISCONTINUED | OUTPATIENT
Start: 2023-04-28 | End: 2023-05-02 | Stop reason: HOSPADM

## 2023-04-28 RX ADMIN — Medication: at 08:28

## 2023-04-28 RX ADMIN — FLUOXETINE HYDROCHLORIDE 20 MG: 20 CAPSULE ORAL at 08:27

## 2023-04-28 RX ADMIN — BUDESONIDE 500 MCG: 0.5 SUSPENSION RESPIRATORY (INHALATION) at 20:31

## 2023-04-28 RX ADMIN — LOSARTAN POTASSIUM 50 MG: 50 TABLET, FILM COATED ORAL at 08:27

## 2023-04-28 RX ADMIN — IPRATROPIUM BROMIDE AND ALBUTEROL SULFATE 1 AMPULE: .5; 2.5 SOLUTION RESPIRATORY (INHALATION) at 20:32

## 2023-04-28 RX ADMIN — PRAMIPEXOLE DIHYDROCHLORIDE 0.25 MG: 0.25 TABLET ORAL at 19:56

## 2023-04-28 RX ADMIN — FUROSEMIDE 40 MG: 40 TABLET ORAL at 08:27

## 2023-04-28 RX ADMIN — PANTOPRAZOLE SODIUM 40 MG: 40 TABLET, DELAYED RELEASE ORAL at 07:22

## 2023-04-28 RX ADMIN — LORAZEPAM 1 MG: 1 TABLET ORAL at 16:26

## 2023-04-28 RX ADMIN — HYDROCODONE BITARTRATE AND ACETAMINOPHEN 1 TABLET: 7.5; 325 TABLET ORAL at 08:27

## 2023-04-28 RX ADMIN — METOPROLOL TARTRATE 25 MG: 25 TABLET, FILM COATED ORAL at 08:27

## 2023-04-28 RX ADMIN — METOPROLOL TARTRATE 25 MG: 25 TABLET, FILM COATED ORAL at 19:56

## 2023-04-28 RX ADMIN — MORPHINE SULFATE 2.5 MG: 100 SOLUTION ORAL at 19:58

## 2023-04-28 RX ADMIN — LORAZEPAM 1 MG: 1 TABLET ORAL at 08:35

## 2023-04-28 RX ADMIN — LEVOFLOXACIN 750 MG: 500 TABLET, FILM COATED ORAL at 14:23

## 2023-04-28 RX ADMIN — IPRATROPIUM BROMIDE AND ALBUTEROL SULFATE 1 AMPULE: .5; 2.5 SOLUTION RESPIRATORY (INHALATION) at 08:37

## 2023-04-28 RX ADMIN — Medication 10 ML: at 08:28

## 2023-04-28 RX ADMIN — HYDROCODONE BITARTRATE AND ACETAMINOPHEN 1 TABLET: 7.5; 325 TABLET ORAL at 21:26

## 2023-04-28 RX ADMIN — BUDESONIDE 500 MCG: 0.5 SUSPENSION RESPIRATORY (INHALATION) at 08:37

## 2023-04-28 RX ADMIN — MORPHINE SULFATE 2.5 MG: 100 SOLUTION ORAL at 14:20

## 2023-04-28 RX ADMIN — MORPHINE SULFATE 2.5 MG: 100 SOLUTION ORAL at 05:26

## 2023-04-28 RX ADMIN — ARFORMOTEROL TARTRATE 15 MCG: 15 SOLUTION RESPIRATORY (INHALATION) at 08:37

## 2023-04-28 RX ADMIN — IPRATROPIUM BROMIDE AND ALBUTEROL SULFATE 1 AMPULE: .5; 2.5 SOLUTION RESPIRATORY (INHALATION) at 12:49

## 2023-04-28 RX ADMIN — MORPHINE SULFATE 2.5 MG: 100 SOLUTION ORAL at 10:07

## 2023-04-28 RX ADMIN — ARFORMOTEROL TARTRATE 15 MCG: 15 SOLUTION RESPIRATORY (INHALATION) at 20:31

## 2023-04-28 RX ADMIN — ARIPIPRAZOLE 5 MG: 5 TABLET ORAL at 08:27

## 2023-04-28 RX ADMIN — TOBRAMYCIN 300 MG: 40 INJECTION INTRAMUSCULAR; INTRAVENOUS at 20:11

## 2023-04-28 RX ADMIN — MORPHINE SULFATE 2.5 MG: 100 SOLUTION ORAL at 01:31

## 2023-04-28 RX ADMIN — IPRATROPIUM BROMIDE AND ALBUTEROL SULFATE 1 AMPULE: .5; 2.5 SOLUTION RESPIRATORY (INHALATION) at 16:00

## 2023-04-28 RX ADMIN — Medication 10 ML: at 19:57

## 2023-04-28 ASSESSMENT — PAIN DESCRIPTION - DESCRIPTORS
DESCRIPTORS: CRAMPING
DESCRIPTORS: ACHING;BURNING

## 2023-04-28 ASSESSMENT — PAIN DESCRIPTION - LOCATION
LOCATION: LEG

## 2023-04-28 ASSESSMENT — PAIN SCALES - WONG BAKER
WONGBAKER_NUMERICALRESPONSE: 0
WONGBAKER_NUMERICALRESPONSE: 0

## 2023-04-28 ASSESSMENT — PAIN SCALES - GENERAL
PAINLEVEL_OUTOF10: 1
PAINLEVEL_OUTOF10: 8
PAINLEVEL_OUTOF10: 2

## 2023-04-28 ASSESSMENT — PAIN DESCRIPTION - ORIENTATION
ORIENTATION: LEFT;RIGHT
ORIENTATION: RIGHT;LEFT
ORIENTATION: LEFT;RIGHT

## 2023-04-29 PROBLEM — Z51.5 PALLIATIVE CARE ENCOUNTER: Status: ACTIVE | Noted: 2023-04-29

## 2023-04-29 LAB
ANION GAP SERPL CALCULATED.3IONS-SCNC: 10 MMOL/L (ref 7–16)
BASOPHILS # BLD: 0.02 E9/L (ref 0–0.2)
BASOPHILS NFR BLD: 0.2 % (ref 0–2)
BUN SERPL-MCNC: 21 MG/DL (ref 6–20)
CALCIUM SERPL-MCNC: 8.7 MG/DL (ref 8.6–10.2)
CHLORIDE SERPL-SCNC: 88 MMOL/L (ref 98–107)
CO2 SERPL-SCNC: 37 MMOL/L (ref 22–29)
CREAT SERPL-MCNC: 1.1 MG/DL (ref 0.7–1.2)
EOSINOPHIL # BLD: 0.22 E9/L (ref 0.05–0.5)
EOSINOPHIL NFR BLD: 2.2 % (ref 0–6)
ERYTHROCYTE [DISTWIDTH] IN BLOOD BY AUTOMATED COUNT: 13.4 FL (ref 11.5–15)
GLUCOSE SERPL-MCNC: 114 MG/DL (ref 74–99)
HCT VFR BLD AUTO: 40.3 % (ref 37–54)
HGB BLD-MCNC: 12.3 G/DL (ref 12.5–16.5)
IMM GRANULOCYTES # BLD: 0.05 E9/L
IMM GRANULOCYTES NFR BLD: 0.5 % (ref 0–5)
LYMPHOCYTES # BLD: 1.59 E9/L (ref 1.5–4)
LYMPHOCYTES NFR BLD: 16.2 % (ref 20–42)
MCH RBC QN AUTO: 31.1 PG (ref 26–35)
MCHC RBC AUTO-ENTMCNC: 30.5 % (ref 32–34.5)
MCV RBC AUTO: 101.8 FL (ref 80–99.9)
MONOCYTES # BLD: 0.92 E9/L (ref 0.1–0.95)
MONOCYTES NFR BLD: 9.4 % (ref 2–12)
NEUTROPHILS # BLD: 7.02 E9/L (ref 1.8–7.3)
NEUTS SEG NFR BLD: 71.5 % (ref 43–80)
PLATELET # BLD AUTO: 202 E9/L (ref 130–450)
PMV BLD AUTO: 9.8 FL (ref 7–12)
POTASSIUM SERPL-SCNC: 5.5 MMOL/L (ref 3.5–5)
RBC # BLD AUTO: 3.96 E12/L (ref 3.8–5.8)
SODIUM SERPL-SCNC: 135 MMOL/L (ref 132–146)
WBC # BLD: 9.8 E9/L (ref 4.5–11.5)

## 2023-04-29 PROCEDURE — 1200000000 HC SEMI PRIVATE

## 2023-04-29 PROCEDURE — 2700000000 HC OXYGEN THERAPY PER DAY

## 2023-04-29 PROCEDURE — 36415 COLL VENOUS BLD VENIPUNCTURE: CPT

## 2023-04-29 PROCEDURE — 6360000002 HC RX W HCPCS: Performed by: INTERNAL MEDICINE

## 2023-04-29 PROCEDURE — 94669 MECHANICAL CHEST WALL OSCILL: CPT

## 2023-04-29 PROCEDURE — 6370000000 HC RX 637 (ALT 250 FOR IP): Performed by: INTERNAL MEDICINE

## 2023-04-29 PROCEDURE — 85025 COMPLETE CBC W/AUTO DIFF WBC: CPT

## 2023-04-29 PROCEDURE — 99231 SBSQ HOSP IP/OBS SF/LOW 25: CPT | Performed by: NURSE PRACTITIONER

## 2023-04-29 PROCEDURE — 99231 SBSQ HOSP IP/OBS SF/LOW 25: CPT | Performed by: STUDENT IN AN ORGANIZED HEALTH CARE EDUCATION/TRAINING PROGRAM

## 2023-04-29 PROCEDURE — 99233 SBSQ HOSP IP/OBS HIGH 50: CPT | Performed by: INTERNAL MEDICINE

## 2023-04-29 PROCEDURE — 6370000000 HC RX 637 (ALT 250 FOR IP): Performed by: REGISTERED NURSE

## 2023-04-29 PROCEDURE — 6370000000 HC RX 637 (ALT 250 FOR IP): Performed by: STUDENT IN AN ORGANIZED HEALTH CARE EDUCATION/TRAINING PROGRAM

## 2023-04-29 PROCEDURE — 6370000000 HC RX 637 (ALT 250 FOR IP): Performed by: NURSE PRACTITIONER

## 2023-04-29 PROCEDURE — APPSS30 APP SPLIT SHARED TIME 16-30 MINUTES: Performed by: NURSE PRACTITIONER

## 2023-04-29 PROCEDURE — 94640 AIRWAY INHALATION TREATMENT: CPT

## 2023-04-29 PROCEDURE — 80048 BASIC METABOLIC PNL TOTAL CA: CPT

## 2023-04-29 PROCEDURE — 2500000003 HC RX 250 WO HCPCS: Performed by: NURSE PRACTITIONER

## 2023-04-29 PROCEDURE — 6370000000 HC RX 637 (ALT 250 FOR IP): Performed by: PHYSICIAN ASSISTANT

## 2023-04-29 PROCEDURE — 2580000003 HC RX 258: Performed by: INTERNAL MEDICINE

## 2023-04-29 PROCEDURE — 6360000002 HC RX W HCPCS: Performed by: REGISTERED NURSE

## 2023-04-29 RX ORDER — FUROSEMIDE 40 MG/1
40 TABLET ORAL DAILY
Qty: 30 TABLET | Refills: 0 | Status: SHIPPED | OUTPATIENT
Start: 2023-04-30

## 2023-04-29 RX ORDER — HYDROXYZINE PAMOATE 25 MG/1
25 CAPSULE ORAL 3 TIMES DAILY PRN
Qty: 90 CAPSULE | Refills: 0 | Status: SHIPPED | OUTPATIENT
Start: 2023-04-29 | End: 2023-05-29

## 2023-04-29 RX ADMIN — MORPHINE SULFATE 2.5 MG: 100 SOLUTION ORAL at 12:13

## 2023-04-29 RX ADMIN — SODIUM ZIRCONIUM CYCLOSILICATE 10 G: 10 POWDER, FOR SUSPENSION ORAL at 09:36

## 2023-04-29 RX ADMIN — PRAMIPEXOLE DIHYDROCHLORIDE 0.25 MG: 0.25 TABLET ORAL at 21:08

## 2023-04-29 RX ADMIN — TOBRAMYCIN 300 MG: 40 INJECTION INTRAMUSCULAR; INTRAVENOUS at 08:13

## 2023-04-29 RX ADMIN — BUDESONIDE 500 MCG: 0.5 SUSPENSION RESPIRATORY (INHALATION) at 07:52

## 2023-04-29 RX ADMIN — MICONAZOLE NITRATE: 20 POWDER TOPICAL at 09:44

## 2023-04-29 RX ADMIN — BUDESONIDE 500 MCG: 0.5 SUSPENSION RESPIRATORY (INHALATION) at 21:11

## 2023-04-29 RX ADMIN — ENOXAPARIN SODIUM 40 MG: 100 INJECTION SUBCUTANEOUS at 09:37

## 2023-04-29 RX ADMIN — BUTALBITAL, ACETAMINOPHEN AND CAFFEINE 1 TABLET: 50; 325; 40 TABLET ORAL at 06:46

## 2023-04-29 RX ADMIN — METOPROLOL TARTRATE 25 MG: 25 TABLET, FILM COATED ORAL at 21:08

## 2023-04-29 RX ADMIN — MORPHINE SULFATE 2.5 MG: 100 SOLUTION ORAL at 21:05

## 2023-04-29 RX ADMIN — ACETAMINOPHEN 650 MG: 325 TABLET ORAL at 17:15

## 2023-04-29 RX ADMIN — IPRATROPIUM BROMIDE AND ALBUTEROL SULFATE 1 AMPULE: .5; 2.5 SOLUTION RESPIRATORY (INHALATION) at 17:55

## 2023-04-29 RX ADMIN — MORPHINE SULFATE 2.5 MG: 100 SOLUTION ORAL at 05:17

## 2023-04-29 RX ADMIN — Medication 10 ML: at 09:42

## 2023-04-29 RX ADMIN — METOPROLOL TARTRATE 25 MG: 25 TABLET, FILM COATED ORAL at 09:38

## 2023-04-29 RX ADMIN — IPRATROPIUM BROMIDE AND ALBUTEROL SULFATE 1 AMPULE: .5; 2.5 SOLUTION RESPIRATORY (INHALATION) at 21:11

## 2023-04-29 RX ADMIN — IPRATROPIUM BROMIDE AND ALBUTEROL SULFATE 1 AMPULE: .5; 2.5 SOLUTION RESPIRATORY (INHALATION) at 07:52

## 2023-04-29 RX ADMIN — FUROSEMIDE 40 MG: 40 TABLET ORAL at 09:38

## 2023-04-29 RX ADMIN — MORPHINE SULFATE 2.5 MG: 100 SOLUTION ORAL at 16:33

## 2023-04-29 RX ADMIN — ARFORMOTEROL TARTRATE 15 MCG: 15 SOLUTION RESPIRATORY (INHALATION) at 07:52

## 2023-04-29 RX ADMIN — FLUOXETINE HYDROCHLORIDE 20 MG: 20 CAPSULE ORAL at 09:38

## 2023-04-29 RX ADMIN — HYDROCODONE BITARTRATE AND ACETAMINOPHEN 1 TABLET: 7.5; 325 TABLET ORAL at 09:38

## 2023-04-29 RX ADMIN — PANTOPRAZOLE SODIUM 40 MG: 40 TABLET, DELAYED RELEASE ORAL at 05:17

## 2023-04-29 RX ADMIN — LORAZEPAM 1 MG: 1 TABLET ORAL at 23:07

## 2023-04-29 RX ADMIN — LORAZEPAM 1 MG: 1 TABLET ORAL at 10:32

## 2023-04-29 RX ADMIN — Medication 10 ML: at 21:09

## 2023-04-29 RX ADMIN — LEVOFLOXACIN 750 MG: 500 TABLET, FILM COATED ORAL at 09:39

## 2023-04-29 RX ADMIN — ARIPIPRAZOLE 5 MG: 5 TABLET ORAL at 09:38

## 2023-04-29 RX ADMIN — HYDROCODONE BITARTRATE AND ACETAMINOPHEN 1 TABLET: 7.5; 325 TABLET ORAL at 22:13

## 2023-04-29 RX ADMIN — LOSARTAN POTASSIUM 50 MG: 50 TABLET, FILM COATED ORAL at 09:39

## 2023-04-29 RX ADMIN — MORPHINE SULFATE 2.5 MG: 100 SOLUTION ORAL at 00:07

## 2023-04-29 RX ADMIN — TOBRAMYCIN 300 MG: 40 INJECTION INTRAMUSCULAR; INTRAVENOUS at 21:20

## 2023-04-29 RX ADMIN — MICONAZOLE NITRATE: 20 POWDER TOPICAL at 21:08

## 2023-04-29 RX ADMIN — MICONAZOLE NITRATE: 20 POWDER TOPICAL at 02:29

## 2023-04-29 RX ADMIN — IPRATROPIUM BROMIDE AND ALBUTEROL SULFATE 1 AMPULE: .5; 2.5 SOLUTION RESPIRATORY (INHALATION) at 12:23

## 2023-04-29 RX ADMIN — Medication: at 09:43

## 2023-04-29 RX ADMIN — ARFORMOTEROL TARTRATE 15 MCG: 15 SOLUTION RESPIRATORY (INHALATION) at 21:11

## 2023-04-29 ASSESSMENT — PAIN SCALES - GENERAL
PAINLEVEL_OUTOF10: 6
PAINLEVEL_OUTOF10: 0
PAINLEVEL_OUTOF10: 4
PAINLEVEL_OUTOF10: 9
PAINLEVEL_OUTOF10: 8

## 2023-04-29 ASSESSMENT — PAIN - FUNCTIONAL ASSESSMENT
PAIN_FUNCTIONAL_ASSESSMENT: ACTIVITIES ARE NOT PREVENTED

## 2023-04-29 ASSESSMENT — PAIN DESCRIPTION - LOCATION
LOCATION: HEAD
LOCATION: HEAD
LOCATION: HEAD;LEG
LOCATION: HEAD

## 2023-04-29 ASSESSMENT — PAIN SCALES - WONG BAKER
WONGBAKER_NUMERICALRESPONSE: 0
WONGBAKER_NUMERICALRESPONSE: 0

## 2023-04-29 ASSESSMENT — PAIN DESCRIPTION - ONSET: ONSET: ON-GOING

## 2023-04-29 ASSESSMENT — PAIN DESCRIPTION - DESCRIPTORS
DESCRIPTORS: ACHING
DESCRIPTORS: ACHING;BURNING
DESCRIPTORS: ACHING;DISCOMFORT
DESCRIPTORS: ACHING

## 2023-04-29 ASSESSMENT — PAIN DESCRIPTION - PAIN TYPE: TYPE: ACUTE PAIN

## 2023-04-29 ASSESSMENT — PAIN DESCRIPTION - FREQUENCY: FREQUENCY: CONTINUOUS

## 2023-04-29 ASSESSMENT — PAIN DESCRIPTION - ORIENTATION
ORIENTATION: RIGHT;LEFT
ORIENTATION: RIGHT;LEFT

## 2023-04-30 ENCOUNTER — APPOINTMENT (OUTPATIENT)
Dept: GENERAL RADIOLOGY | Age: 58
DRG: 194 | End: 2023-04-30
Payer: MEDICAID

## 2023-04-30 LAB
ANION GAP SERPL CALCULATED.3IONS-SCNC: 10 MMOL/L (ref 7–16)
BASOPHILS # BLD: 0.02 E9/L (ref 0–0.2)
BASOPHILS NFR BLD: 0.3 % (ref 0–2)
BUN SERPL-MCNC: 21 MG/DL (ref 6–20)
CALCIUM SERPL-MCNC: 8.4 MG/DL (ref 8.6–10.2)
CHLORIDE SERPL-SCNC: 89 MMOL/L (ref 98–107)
CO2 SERPL-SCNC: 37 MMOL/L (ref 22–29)
CREAT SERPL-MCNC: 1.2 MG/DL (ref 0.7–1.2)
EOSINOPHIL # BLD: 0.21 E9/L (ref 0.05–0.5)
EOSINOPHIL NFR BLD: 3.1 % (ref 0–6)
ERYTHROCYTE [DISTWIDTH] IN BLOOD BY AUTOMATED COUNT: 13.2 FL (ref 11.5–15)
GLUCOSE SERPL-MCNC: 97 MG/DL (ref 74–99)
HCT VFR BLD AUTO: 37.7 % (ref 37–54)
HGB BLD-MCNC: 11.6 G/DL (ref 12.5–16.5)
IMM GRANULOCYTES # BLD: 0.02 E9/L
IMM GRANULOCYTES NFR BLD: 0.3 % (ref 0–5)
LYMPHOCYTES # BLD: 1.51 E9/L (ref 1.5–4)
LYMPHOCYTES NFR BLD: 22.2 % (ref 20–42)
MCH RBC QN AUTO: 31.4 PG (ref 26–35)
MCHC RBC AUTO-ENTMCNC: 30.8 % (ref 32–34.5)
MCV RBC AUTO: 102.2 FL (ref 80–99.9)
MONOCYTES # BLD: 0.81 E9/L (ref 0.1–0.95)
MONOCYTES NFR BLD: 11.9 % (ref 2–12)
NEUTROPHILS # BLD: 4.24 E9/L (ref 1.8–7.3)
NEUTS SEG NFR BLD: 62.2 % (ref 43–80)
PLATELET # BLD AUTO: 191 E9/L (ref 130–450)
PMV BLD AUTO: 9.5 FL (ref 7–12)
POTASSIUM SERPL-SCNC: 4.7 MMOL/L (ref 3.5–5)
RBC # BLD AUTO: 3.69 E12/L (ref 3.8–5.8)
SODIUM SERPL-SCNC: 136 MMOL/L (ref 132–146)
WBC # BLD: 6.8 E9/L (ref 4.5–11.5)

## 2023-04-30 PROCEDURE — APPSS30 APP SPLIT SHARED TIME 16-30 MINUTES: Performed by: NURSE PRACTITIONER

## 2023-04-30 PROCEDURE — 2700000000 HC OXYGEN THERAPY PER DAY

## 2023-04-30 PROCEDURE — 6370000000 HC RX 637 (ALT 250 FOR IP): Performed by: STUDENT IN AN ORGANIZED HEALTH CARE EDUCATION/TRAINING PROGRAM

## 2023-04-30 PROCEDURE — 99231 SBSQ HOSP IP/OBS SF/LOW 25: CPT | Performed by: STUDENT IN AN ORGANIZED HEALTH CARE EDUCATION/TRAINING PROGRAM

## 2023-04-30 PROCEDURE — 94669 MECHANICAL CHEST WALL OSCILL: CPT

## 2023-04-30 PROCEDURE — 94640 AIRWAY INHALATION TREATMENT: CPT

## 2023-04-30 PROCEDURE — 6370000000 HC RX 637 (ALT 250 FOR IP): Performed by: INTERNAL MEDICINE

## 2023-04-30 PROCEDURE — 36415 COLL VENOUS BLD VENIPUNCTURE: CPT

## 2023-04-30 PROCEDURE — 1200000000 HC SEMI PRIVATE

## 2023-04-30 PROCEDURE — 80048 BASIC METABOLIC PNL TOTAL CA: CPT

## 2023-04-30 PROCEDURE — 6360000002 HC RX W HCPCS: Performed by: REGISTERED NURSE

## 2023-04-30 PROCEDURE — 6370000000 HC RX 637 (ALT 250 FOR IP): Performed by: PHYSICIAN ASSISTANT

## 2023-04-30 PROCEDURE — 71045 X-RAY EXAM CHEST 1 VIEW: CPT

## 2023-04-30 PROCEDURE — 2580000003 HC RX 258: Performed by: INTERNAL MEDICINE

## 2023-04-30 PROCEDURE — 85025 COMPLETE CBC W/AUTO DIFF WBC: CPT

## 2023-04-30 PROCEDURE — 6370000000 HC RX 637 (ALT 250 FOR IP): Performed by: REGISTERED NURSE

## 2023-04-30 PROCEDURE — 6360000002 HC RX W HCPCS: Performed by: INTERNAL MEDICINE

## 2023-04-30 RX ADMIN — FUROSEMIDE 40 MG: 40 TABLET ORAL at 08:36

## 2023-04-30 RX ADMIN — ARFORMOTEROL TARTRATE 15 MCG: 15 SOLUTION RESPIRATORY (INHALATION) at 08:50

## 2023-04-30 RX ADMIN — MORPHINE SULFATE 2.5 MG: 100 SOLUTION ORAL at 21:17

## 2023-04-30 RX ADMIN — Medication 10 ML: at 08:36

## 2023-04-30 RX ADMIN — Medication: at 08:34

## 2023-04-30 RX ADMIN — MORPHINE SULFATE 2.5 MG: 100 SOLUTION ORAL at 17:08

## 2023-04-30 RX ADMIN — METOPROLOL TARTRATE 25 MG: 25 TABLET, FILM COATED ORAL at 08:36

## 2023-04-30 RX ADMIN — IPRATROPIUM BROMIDE AND ALBUTEROL SULFATE 1 AMPULE: .5; 2.5 SOLUTION RESPIRATORY (INHALATION) at 20:25

## 2023-04-30 RX ADMIN — MICONAZOLE NITRATE: 20 POWDER TOPICAL at 12:06

## 2023-04-30 RX ADMIN — PRAMIPEXOLE DIHYDROCHLORIDE 0.25 MG: 0.25 TABLET ORAL at 21:16

## 2023-04-30 RX ADMIN — IPRATROPIUM BROMIDE AND ALBUTEROL SULFATE 1 AMPULE: .5; 2.5 SOLUTION RESPIRATORY (INHALATION) at 12:15

## 2023-04-30 RX ADMIN — HYDROCODONE BITARTRATE AND ACETAMINOPHEN 1 TABLET: 7.5; 325 TABLET ORAL at 12:05

## 2023-04-30 RX ADMIN — PANTOPRAZOLE SODIUM 40 MG: 40 TABLET, DELAYED RELEASE ORAL at 05:48

## 2023-04-30 RX ADMIN — LEVOFLOXACIN 750 MG: 500 TABLET, FILM COATED ORAL at 08:36

## 2023-04-30 RX ADMIN — MORPHINE SULFATE 2.5 MG: 100 SOLUTION ORAL at 03:40

## 2023-04-30 RX ADMIN — HYDROCODONE BITARTRATE AND ACETAMINOPHEN 1 TABLET: 7.5; 325 TABLET ORAL at 23:41

## 2023-04-30 RX ADMIN — FLUOXETINE HYDROCHLORIDE 20 MG: 20 CAPSULE ORAL at 08:35

## 2023-04-30 RX ADMIN — MICONAZOLE NITRATE: 20 POWDER TOPICAL at 21:21

## 2023-04-30 RX ADMIN — ARFORMOTEROL TARTRATE 15 MCG: 15 SOLUTION RESPIRATORY (INHALATION) at 20:25

## 2023-04-30 RX ADMIN — IPRATROPIUM BROMIDE AND ALBUTEROL SULFATE 1 AMPULE: .5; 2.5 SOLUTION RESPIRATORY (INHALATION) at 08:50

## 2023-04-30 RX ADMIN — MORPHINE SULFATE 2.5 MG: 100 SOLUTION ORAL at 08:35

## 2023-04-30 RX ADMIN — MORPHINE SULFATE 2.5 MG: 100 SOLUTION ORAL at 12:40

## 2023-04-30 RX ADMIN — ARIPIPRAZOLE 5 MG: 5 TABLET ORAL at 08:36

## 2023-04-30 RX ADMIN — LOSARTAN POTASSIUM 50 MG: 50 TABLET, FILM COATED ORAL at 08:35

## 2023-04-30 RX ADMIN — Medication 10 ML: at 21:16

## 2023-04-30 RX ADMIN — TOBRAMYCIN 300 MG: 40 INJECTION INTRAMUSCULAR; INTRAVENOUS at 20:37

## 2023-04-30 RX ADMIN — BUDESONIDE 500 MCG: 0.5 SUSPENSION RESPIRATORY (INHALATION) at 20:25

## 2023-04-30 RX ADMIN — BUDESONIDE 500 MCG: 0.5 SUSPENSION RESPIRATORY (INHALATION) at 08:50

## 2023-04-30 RX ADMIN — ACETAMINOPHEN 650 MG: 325 TABLET ORAL at 18:39

## 2023-04-30 RX ADMIN — LORAZEPAM 1 MG: 1 TABLET ORAL at 10:16

## 2023-04-30 RX ADMIN — LORAZEPAM 1 MG: 1 TABLET ORAL at 18:39

## 2023-04-30 RX ADMIN — BUTALBITAL, ACETAMINOPHEN AND CAFFEINE 1 TABLET: 50; 325; 40 TABLET ORAL at 07:01

## 2023-04-30 RX ADMIN — IPRATROPIUM BROMIDE AND ALBUTEROL SULFATE 1 AMPULE: .5; 2.5 SOLUTION RESPIRATORY (INHALATION) at 15:43

## 2023-04-30 RX ADMIN — TOBRAMYCIN 300 MG: 40 INJECTION INTRAMUSCULAR; INTRAVENOUS at 08:36

## 2023-04-30 RX ADMIN — METOPROLOL TARTRATE 25 MG: 25 TABLET, FILM COATED ORAL at 21:16

## 2023-04-30 ASSESSMENT — PAIN SCALES - GENERAL
PAINLEVEL_OUTOF10: 8
PAINLEVEL_OUTOF10: 7
PAINLEVEL_OUTOF10: 7
PAINLEVEL_OUTOF10: 8
PAINLEVEL_OUTOF10: 10
PAINLEVEL_OUTOF10: 8

## 2023-04-30 ASSESSMENT — PAIN DESCRIPTION - FREQUENCY: FREQUENCY: CONTINUOUS

## 2023-04-30 ASSESSMENT — PAIN DESCRIPTION - ONSET: ONSET: ON-GOING

## 2023-04-30 ASSESSMENT — PAIN DESCRIPTION - ORIENTATION
ORIENTATION: LEFT
ORIENTATION: DISTAL

## 2023-04-30 ASSESSMENT — PAIN - FUNCTIONAL ASSESSMENT
PAIN_FUNCTIONAL_ASSESSMENT: ACTIVITIES ARE NOT PREVENTED
PAIN_FUNCTIONAL_ASSESSMENT: PREVENTS OR INTERFERES SOME ACTIVE ACTIVITIES AND ADLS

## 2023-04-30 ASSESSMENT — PAIN DESCRIPTION - LOCATION
LOCATION: LEG
LOCATION: HEAD
LOCATION: LEG
LOCATION: FOOT;LEG
LOCATION: LEG
LOCATION: LEG
LOCATION: HEAD
LOCATION: LEG

## 2023-04-30 ASSESSMENT — PAIN DESCRIPTION - PAIN TYPE: TYPE: ACUTE PAIN

## 2023-04-30 ASSESSMENT — PAIN DESCRIPTION - DESCRIPTORS
DESCRIPTORS: BURNING;ACHING
DESCRIPTORS: SORE;ACHING
DESCRIPTORS: ACHING
DESCRIPTORS: SORE;ACHING
DESCRIPTORS: ACHING;BURNING

## 2023-05-01 LAB
ANION GAP SERPL CALCULATED.3IONS-SCNC: 14 MMOL/L (ref 7–16)
BACTERIA SPEC RESP CULT: ABNORMAL
BACTERIA SPEC RESP CULT: ABNORMAL
BASOPHILS # BLD: 0.03 E9/L (ref 0–0.2)
BASOPHILS NFR BLD: 0.5 % (ref 0–2)
BUN SERPL-MCNC: 22 MG/DL (ref 6–20)
CALCIUM SERPL-MCNC: 8.7 MG/DL (ref 8.6–10.2)
CHLORIDE SERPL-SCNC: 89 MMOL/L (ref 98–107)
CO2 SERPL-SCNC: 32 MMOL/L (ref 22–29)
CREAT SERPL-MCNC: 1.1 MG/DL (ref 0.7–1.2)
EOSINOPHIL # BLD: 0.23 E9/L (ref 0.05–0.5)
EOSINOPHIL NFR BLD: 3.5 % (ref 0–6)
ERYTHROCYTE [DISTWIDTH] IN BLOOD BY AUTOMATED COUNT: 13 FL (ref 11.5–15)
GLUCOSE SERPL-MCNC: 128 MG/DL (ref 74–99)
HCT VFR BLD AUTO: 37.3 % (ref 37–54)
HGB BLD-MCNC: 11.4 G/DL (ref 12.5–16.5)
IMM GRANULOCYTES # BLD: 0.02 E9/L
IMM GRANULOCYTES NFR BLD: 0.3 % (ref 0–5)
LYMPHOCYTES # BLD: 1.34 E9/L (ref 1.5–4)
LYMPHOCYTES NFR BLD: 20.3 % (ref 20–42)
MCH RBC QN AUTO: 31.5 PG (ref 26–35)
MCHC RBC AUTO-ENTMCNC: 30.6 % (ref 32–34.5)
MCV RBC AUTO: 103 FL (ref 80–99.9)
METER GLUCOSE: 106 MG/DL (ref 74–99)
MONOCYTES # BLD: 0.66 E9/L (ref 0.1–0.95)
MONOCYTES NFR BLD: 10 % (ref 2–12)
NEUTROPHILS # BLD: 4.32 E9/L (ref 1.8–7.3)
NEUTS SEG NFR BLD: 65.4 % (ref 43–80)
ORGANISM: ABNORMAL
PLATELET # BLD AUTO: 206 E9/L (ref 130–450)
PMV BLD AUTO: 10 FL (ref 7–12)
POTASSIUM SERPL-SCNC: 4.8 MMOL/L (ref 3.5–5)
RBC # BLD AUTO: 3.62 E12/L (ref 3.8–5.8)
SMEAR, RESPIRATORY: ABNORMAL
SODIUM SERPL-SCNC: 135 MMOL/L (ref 132–146)
WBC # BLD: 6.6 E9/L (ref 4.5–11.5)

## 2023-05-01 PROCEDURE — 36415 COLL VENOUS BLD VENIPUNCTURE: CPT

## 2023-05-01 PROCEDURE — 1200000000 HC SEMI PRIVATE

## 2023-05-01 PROCEDURE — 97535 SELF CARE MNGMENT TRAINING: CPT

## 2023-05-01 PROCEDURE — 6370000000 HC RX 637 (ALT 250 FOR IP): Performed by: INTERNAL MEDICINE

## 2023-05-01 PROCEDURE — 99231 SBSQ HOSP IP/OBS SF/LOW 25: CPT | Performed by: NURSE PRACTITIONER

## 2023-05-01 PROCEDURE — 80048 BASIC METABOLIC PNL TOTAL CA: CPT

## 2023-05-01 PROCEDURE — 94669 MECHANICAL CHEST WALL OSCILL: CPT

## 2023-05-01 PROCEDURE — 99231 SBSQ HOSP IP/OBS SF/LOW 25: CPT | Performed by: STUDENT IN AN ORGANIZED HEALTH CARE EDUCATION/TRAINING PROGRAM

## 2023-05-01 PROCEDURE — 6370000000 HC RX 637 (ALT 250 FOR IP): Performed by: PHYSICIAN ASSISTANT

## 2023-05-01 PROCEDURE — APPSS30 APP SPLIT SHARED TIME 16-30 MINUTES: Performed by: NURSE PRACTITIONER

## 2023-05-01 PROCEDURE — 94640 AIRWAY INHALATION TREATMENT: CPT

## 2023-05-01 PROCEDURE — 6360000002 HC RX W HCPCS: Performed by: REGISTERED NURSE

## 2023-05-01 PROCEDURE — 6360000002 HC RX W HCPCS: Performed by: INTERNAL MEDICINE

## 2023-05-01 PROCEDURE — 6370000000 HC RX 637 (ALT 250 FOR IP): Performed by: REGISTERED NURSE

## 2023-05-01 PROCEDURE — 2700000000 HC OXYGEN THERAPY PER DAY

## 2023-05-01 PROCEDURE — 82962 GLUCOSE BLOOD TEST: CPT

## 2023-05-01 PROCEDURE — 6370000000 HC RX 637 (ALT 250 FOR IP): Performed by: NURSE PRACTITIONER

## 2023-05-01 PROCEDURE — 85025 COMPLETE CBC W/AUTO DIFF WBC: CPT

## 2023-05-01 PROCEDURE — 2580000003 HC RX 258: Performed by: INTERNAL MEDICINE

## 2023-05-01 PROCEDURE — 6370000000 HC RX 637 (ALT 250 FOR IP): Performed by: STUDENT IN AN ORGANIZED HEALTH CARE EDUCATION/TRAINING PROGRAM

## 2023-05-01 RX ORDER — TOBRAMYCIN 40 MG/ML
300 INJECTION INTRAMUSCULAR; INTRAVENOUS EVERY 12 HOURS
Qty: 150 ML | Refills: 0 | Status: SHIPPED | OUTPATIENT
Start: 2023-05-01 | End: 2023-05-02 | Stop reason: HOSPADM

## 2023-05-01 RX ORDER — MORPHINE SULFATE 20 MG/ML
2.5 SOLUTION ORAL EVERY 4 HOURS PRN
Qty: 15 ML | Refills: 0 | Status: SHIPPED | OUTPATIENT
Start: 2023-05-01 | End: 2023-05-02 | Stop reason: SDUPTHER

## 2023-05-01 RX ORDER — SENNA AND DOCUSATE SODIUM 50; 8.6 MG/1; MG/1
1 TABLET, FILM COATED ORAL NIGHTLY
Status: DISCONTINUED | OUTPATIENT
Start: 2023-05-01 | End: 2023-05-02 | Stop reason: HOSPADM

## 2023-05-01 RX ORDER — SENNA AND DOCUSATE SODIUM 50; 8.6 MG/1; MG/1
1 TABLET, FILM COATED ORAL NIGHTLY
Qty: 30 TABLET | Refills: 0 | COMMUNITY
Start: 2023-05-01

## 2023-05-01 RX ORDER — LEVOFLOXACIN 750 MG/1
750 TABLET ORAL DAILY
Qty: 10 TABLET | Refills: 0 | Status: SHIPPED | OUTPATIENT
Start: 2023-05-02 | End: 2023-05-02 | Stop reason: SDUPTHER

## 2023-05-01 RX ORDER — POLYETHYLENE GLYCOL 3350 17 G/17G
17 POWDER, FOR SOLUTION ORAL DAILY
Status: DISCONTINUED | OUTPATIENT
Start: 2023-05-01 | End: 2023-05-02 | Stop reason: HOSPADM

## 2023-05-01 RX ADMIN — Medication 10 ML: at 09:13

## 2023-05-01 RX ADMIN — LEVOFLOXACIN 750 MG: 500 TABLET, FILM COATED ORAL at 09:13

## 2023-05-01 RX ADMIN — IPRATROPIUM BROMIDE AND ALBUTEROL SULFATE 1 AMPULE: .5; 2.5 SOLUTION RESPIRATORY (INHALATION) at 16:29

## 2023-05-01 RX ADMIN — LORAZEPAM 1 MG: 1 TABLET ORAL at 23:42

## 2023-05-01 RX ADMIN — ENOXAPARIN SODIUM 40 MG: 100 INJECTION SUBCUTANEOUS at 09:12

## 2023-05-01 RX ADMIN — ARFORMOTEROL TARTRATE 15 MCG: 15 SOLUTION RESPIRATORY (INHALATION) at 09:09

## 2023-05-01 RX ADMIN — BUTALBITAL, ACETAMINOPHEN AND CAFFEINE 1 TABLET: 50; 325; 40 TABLET ORAL at 23:43

## 2023-05-01 RX ADMIN — TOBRAMYCIN 300 MG: 40 INJECTION INTRAMUSCULAR; INTRAVENOUS at 20:24

## 2023-05-01 RX ADMIN — IPRATROPIUM BROMIDE AND ALBUTEROL SULFATE 1 AMPULE: .5; 2.5 SOLUTION RESPIRATORY (INHALATION) at 20:40

## 2023-05-01 RX ADMIN — Medication: at 09:15

## 2023-05-01 RX ADMIN — PRAMIPEXOLE DIHYDROCHLORIDE 0.25 MG: 0.25 TABLET ORAL at 22:32

## 2023-05-01 RX ADMIN — MICONAZOLE NITRATE: 20 POWDER TOPICAL at 23:44

## 2023-05-01 RX ADMIN — MORPHINE SULFATE 2.5 MG: 100 SOLUTION ORAL at 11:06

## 2023-05-01 RX ADMIN — FUROSEMIDE 40 MG: 40 TABLET ORAL at 09:13

## 2023-05-01 RX ADMIN — MORPHINE SULFATE 2.5 MG: 100 SOLUTION ORAL at 22:28

## 2023-05-01 RX ADMIN — IPRATROPIUM BROMIDE AND ALBUTEROL SULFATE 1 AMPULE: .5; 2.5 SOLUTION RESPIRATORY (INHALATION) at 09:09

## 2023-05-01 RX ADMIN — METOPROLOL TARTRATE 25 MG: 25 TABLET, FILM COATED ORAL at 09:13

## 2023-05-01 RX ADMIN — IPRATROPIUM BROMIDE AND ALBUTEROL SULFATE 1 AMPULE: .5; 2.5 SOLUTION RESPIRATORY (INHALATION) at 12:46

## 2023-05-01 RX ADMIN — HYDROCODONE BITARTRATE AND ACETAMINOPHEN 1 TABLET: 7.5; 325 TABLET ORAL at 23:42

## 2023-05-01 RX ADMIN — BUDESONIDE 500 MCG: 0.5 SUSPENSION RESPIRATORY (INHALATION) at 09:09

## 2023-05-01 RX ADMIN — Medication 10 ML: at 22:32

## 2023-05-01 RX ADMIN — LOSARTAN POTASSIUM 50 MG: 50 TABLET, FILM COATED ORAL at 09:13

## 2023-05-01 RX ADMIN — ARIPIPRAZOLE 5 MG: 5 TABLET ORAL at 09:13

## 2023-05-01 RX ADMIN — PANTOPRAZOLE SODIUM 40 MG: 40 TABLET, DELAYED RELEASE ORAL at 06:51

## 2023-05-01 RX ADMIN — MORPHINE SULFATE 2.5 MG: 100 SOLUTION ORAL at 02:08

## 2023-05-01 RX ADMIN — HYDROCODONE BITARTRATE AND ACETAMINOPHEN 1 TABLET: 7.5; 325 TABLET ORAL at 09:12

## 2023-05-01 RX ADMIN — MICONAZOLE NITRATE: 20 POWDER TOPICAL at 09:16

## 2023-05-01 RX ADMIN — LORAZEPAM 1 MG: 1 TABLET ORAL at 04:11

## 2023-05-01 RX ADMIN — MORPHINE SULFATE 2.5 MG: 100 SOLUTION ORAL at 16:27

## 2023-05-01 RX ADMIN — POLYETHYLENE GLYCOL 3350 17 G: 17 POWDER, FOR SOLUTION ORAL at 12:54

## 2023-05-01 RX ADMIN — ARFORMOTEROL TARTRATE 15 MCG: 15 SOLUTION RESPIRATORY (INHALATION) at 20:40

## 2023-05-01 RX ADMIN — BUTALBITAL, ACETAMINOPHEN AND CAFFEINE 1 TABLET: 50; 325; 40 TABLET ORAL at 09:37

## 2023-05-01 RX ADMIN — BUDESONIDE 500 MCG: 0.5 SUSPENSION RESPIRATORY (INHALATION) at 20:40

## 2023-05-01 RX ADMIN — FLUOXETINE HYDROCHLORIDE 20 MG: 20 CAPSULE ORAL at 09:17

## 2023-05-01 RX ADMIN — TOBRAMYCIN 300 MG: 40 INJECTION INTRAMUSCULAR; INTRAVENOUS at 09:09

## 2023-05-01 RX ADMIN — DOCUSATE SODIUM 50 MG AND SENNOSIDES 8.6 MG 1 TABLET: 8.6; 5 TABLET, FILM COATED ORAL at 22:32

## 2023-05-01 RX ADMIN — METOPROLOL TARTRATE 25 MG: 25 TABLET, FILM COATED ORAL at 22:32

## 2023-05-01 ASSESSMENT — PAIN DESCRIPTION - FREQUENCY: FREQUENCY: INTERMITTENT

## 2023-05-01 ASSESSMENT — PAIN DESCRIPTION - ORIENTATION
ORIENTATION: RIGHT
ORIENTATION: RIGHT;LEFT
ORIENTATION: MID
ORIENTATION: RIGHT;LEFT

## 2023-05-01 ASSESSMENT — PAIN DESCRIPTION - LOCATION
LOCATION: HEAD;LEG
LOCATION: LEG;HEAD
LOCATION: HEAD
LOCATION: LEG

## 2023-05-01 ASSESSMENT — PAIN SCALES - GENERAL
PAINLEVEL_OUTOF10: 4
PAINLEVEL_OUTOF10: 9
PAINLEVEL_OUTOF10: 8
PAINLEVEL_OUTOF10: 8
PAINLEVEL_OUTOF10: 9

## 2023-05-01 ASSESSMENT — PAIN DESCRIPTION - DESCRIPTORS
DESCRIPTORS: ACHING
DESCRIPTORS: POUNDING
DESCRIPTORS: ACHING
DESCRIPTORS: ACHING

## 2023-05-01 ASSESSMENT — PAIN - FUNCTIONAL ASSESSMENT: PAIN_FUNCTIONAL_ASSESSMENT: ACTIVITIES ARE NOT PREVENTED

## 2023-05-01 NOTE — CARE COORDINATION
02 7lnc. Nellie Harding Has home 02 7lnc/ nebs/portability through [address]. Chest vest. Plan at discharge is home; need to fax [facility] orders/d/c date to [address] [phone]  fax [phone]. Sister Yariel Notice can transport home. S/p bronch 4/28; cx  pended. ; pt states feels much better. Re- affirmed with pt he plans HOME on d/c with [facility]. currently on chest vest/ tibi nebs. Await further ID input. Sol Umana.

## 2023-05-01 NOTE — CARE COORDINATION
Received RX for CHINEDU NEBS  from evan with ID. Chastity Robert Spoke to [facility] pharmacy await some clarification of specifics of RX. Spoke to Rhiannon at [facility]; verified pt has nebulizer at home through  them. . Per Rhiannon; she will contact [phone] for RX renewal for home nebulizer supplies. Await call back from  [place] [id number] re; ?? Need for prior auth for Chinedu nebs. Cristi Madrigal. Per [facility] pharmacy; med will need prior auth. Spoke to Lavell Sacks at [facility]  ([facility]) [phone]; provided information for prior auth for CHINEDU NEBS; Lavell Sacks states will take up to 24 hrs  to make a determination. Case ref# [id number]. Determination will be faxed to [phone]. Will periodically check at this number for determination. Spoke to [facility]; if med approved , will order only at that time d/t med cost.They  do not have Chinedu presently in stock and would take a day to obtain when and if ordered. Will follow. D/W Jessica Vásquez; he is aware. Pharmacy will further discuss/inform  Evan from ID.will follow. Cristi Madrigal. Spoke to [address] at [facility] re; status of Chinedu nebulizer. States auth still pending. Will follow. Cristi Madrigal.

## 2023-05-02 VITALS
OXYGEN SATURATION: 94 % | BODY MASS INDEX: 23.37 KG/M2 | DIASTOLIC BLOOD PRESSURE: 61 MMHG | HEIGHT: 64 IN | RESPIRATION RATE: 16 BRPM | TEMPERATURE: 97.7 F | SYSTOLIC BLOOD PRESSURE: 109 MMHG | WEIGHT: 136.9 LBS | HEART RATE: 82 BPM

## 2023-05-02 LAB
ANION GAP SERPL CALCULATED.3IONS-SCNC: 11 MMOL/L (ref 7–16)
BASOPHILS # BLD: 0.03 E9/L (ref 0–0.2)
BASOPHILS NFR BLD: 0.6 % (ref 0–2)
BUN SERPL-MCNC: 18 MG/DL (ref 6–20)
CALCIUM SERPL-MCNC: 8.6 MG/DL (ref 8.6–10.2)
CHLORIDE SERPL-SCNC: 92 MMOL/L (ref 98–107)
CO2 SERPL-SCNC: 32 MMOL/L (ref 22–29)
CREAT SERPL-MCNC: 1.1 MG/DL (ref 0.7–1.2)
EOSINOPHIL # BLD: 0.2 E9/L (ref 0.05–0.5)
EOSINOPHIL NFR BLD: 3.8 % (ref 0–6)
ERYTHROCYTE [DISTWIDTH] IN BLOOD BY AUTOMATED COUNT: 13.1 FL (ref 11.5–15)
GLUCOSE SERPL-MCNC: 92 MG/DL (ref 74–99)
HCT VFR BLD AUTO: 36.1 % (ref 37–54)
HGB BLD-MCNC: 11.2 G/DL (ref 12.5–16.5)
IMM GRANULOCYTES # BLD: 0.02 E9/L
IMM GRANULOCYTES NFR BLD: 0.4 % (ref 0–5)
LYMPHOCYTES # BLD: 1.54 E9/L (ref 1.5–4)
LYMPHOCYTES NFR BLD: 28.9 % (ref 20–42)
MCH RBC QN AUTO: 31.3 PG (ref 26–35)
MCHC RBC AUTO-ENTMCNC: 31 % (ref 32–34.5)
MCV RBC AUTO: 100.8 FL (ref 80–99.9)
MONOCYTES # BLD: 0.65 E9/L (ref 0.1–0.95)
MONOCYTES NFR BLD: 12.2 % (ref 2–12)
NEUTROPHILS # BLD: 2.88 E9/L (ref 1.8–7.3)
NEUTS SEG NFR BLD: 54.1 % (ref 43–80)
PLATELET # BLD AUTO: 195 E9/L (ref 130–450)
PMV BLD AUTO: 9.8 FL (ref 7–12)
POTASSIUM SERPL-SCNC: 4.8 MMOL/L (ref 3.5–5)
RBC # BLD AUTO: 3.58 E12/L (ref 3.8–5.8)
SODIUM SERPL-SCNC: 135 MMOL/L (ref 132–146)
WBC # BLD: 5.3 E9/L (ref 4.5–11.5)

## 2023-05-02 PROCEDURE — 99231 SBSQ HOSP IP/OBS SF/LOW 25: CPT | Performed by: NURSE PRACTITIONER

## 2023-05-02 PROCEDURE — 6370000000 HC RX 637 (ALT 250 FOR IP): Performed by: INTERNAL MEDICINE

## 2023-05-02 PROCEDURE — 2700000000 HC OXYGEN THERAPY PER DAY

## 2023-05-02 PROCEDURE — 97530 THERAPEUTIC ACTIVITIES: CPT

## 2023-05-02 PROCEDURE — 2580000003 HC RX 258: Performed by: INTERNAL MEDICINE

## 2023-05-02 PROCEDURE — 36415 COLL VENOUS BLD VENIPUNCTURE: CPT

## 2023-05-02 PROCEDURE — 6360000002 HC RX W HCPCS: Performed by: INTERNAL MEDICINE

## 2023-05-02 PROCEDURE — 80048 BASIC METABOLIC PNL TOTAL CA: CPT

## 2023-05-02 PROCEDURE — 6360000002 HC RX W HCPCS: Performed by: REGISTERED NURSE

## 2023-05-02 PROCEDURE — 6370000000 HC RX 637 (ALT 250 FOR IP): Performed by: STUDENT IN AN ORGANIZED HEALTH CARE EDUCATION/TRAINING PROGRAM

## 2023-05-02 PROCEDURE — 94640 AIRWAY INHALATION TREATMENT: CPT

## 2023-05-02 PROCEDURE — 6370000000 HC RX 637 (ALT 250 FOR IP): Performed by: REGISTERED NURSE

## 2023-05-02 PROCEDURE — 94669 MECHANICAL CHEST WALL OSCILL: CPT

## 2023-05-02 PROCEDURE — 85025 COMPLETE CBC W/AUTO DIFF WBC: CPT

## 2023-05-02 PROCEDURE — 6370000000 HC RX 637 (ALT 250 FOR IP): Performed by: PHYSICIAN ASSISTANT

## 2023-05-02 PROCEDURE — 6370000000 HC RX 637 (ALT 250 FOR IP): Performed by: NURSE PRACTITIONER

## 2023-05-02 PROCEDURE — 6370000000 HC RX 637 (ALT 250 FOR IP)

## 2023-05-02 PROCEDURE — 99239 HOSP IP/OBS DSCHRG MGMT >30: CPT | Performed by: STUDENT IN AN ORGANIZED HEALTH CARE EDUCATION/TRAINING PROGRAM

## 2023-05-02 RX ORDER — MORPHINE SULFATE 20 MG/ML
2.5 SOLUTION ORAL EVERY 4 HOURS PRN
Qty: 30 ML | Refills: 0 | Status: SHIPPED | OUTPATIENT
Start: 2023-05-02 | End: 2023-06-01

## 2023-05-02 RX ORDER — TOBRAMYCIN INHALATION SOLUTION 300 MG/5ML
300 INHALANT RESPIRATORY (INHALATION) 2 TIMES DAILY
Qty: 100 ML | Refills: 0 | Status: SHIPPED | OUTPATIENT
Start: 2023-05-02 | End: 2023-05-02 | Stop reason: HOSPADM

## 2023-05-02 RX ORDER — LEVOFLOXACIN 750 MG/1
750 TABLET ORAL DAILY
Qty: 10 TABLET | Refills: 0 | Status: SHIPPED | OUTPATIENT
Start: 2023-05-02 | End: 2023-05-12

## 2023-05-02 RX ADMIN — MORPHINE SULFATE 2.5 MG: 100 SOLUTION ORAL at 06:35

## 2023-05-02 RX ADMIN — BUTALBITAL, ACETAMINOPHEN AND CAFFEINE 1 TABLET: 50; 325; 40 TABLET ORAL at 09:22

## 2023-05-02 RX ADMIN — ARFORMOTEROL TARTRATE 15 MCG: 15 SOLUTION RESPIRATORY (INHALATION) at 05:57

## 2023-05-02 RX ADMIN — FUROSEMIDE 40 MG: 40 TABLET ORAL at 09:04

## 2023-05-02 RX ADMIN — POLYETHYLENE GLYCOL 3350 17 G: 17 POWDER, FOR SOLUTION ORAL at 09:04

## 2023-05-02 RX ADMIN — BUTALBITAL, ACETAMINOPHEN AND CAFFEINE 1 TABLET: 50; 325; 40 TABLET ORAL at 15:43

## 2023-05-02 RX ADMIN — MORPHINE SULFATE 2.5 MG: 100 SOLUTION ORAL at 13:25

## 2023-05-02 RX ADMIN — PANTOPRAZOLE SODIUM 40 MG: 40 TABLET, DELAYED RELEASE ORAL at 06:35

## 2023-05-02 RX ADMIN — BUDESONIDE 500 MCG: 0.5 SUSPENSION RESPIRATORY (INHALATION) at 09:40

## 2023-05-02 RX ADMIN — IPRATROPIUM BROMIDE AND ALBUTEROL SULFATE 1 AMPULE: 2.5; .5 SOLUTION RESPIRATORY (INHALATION) at 05:56

## 2023-05-02 RX ADMIN — FLUOXETINE HYDROCHLORIDE 20 MG: 20 CAPSULE ORAL at 09:04

## 2023-05-02 RX ADMIN — ARIPIPRAZOLE 5 MG: 5 TABLET ORAL at 09:04

## 2023-05-02 RX ADMIN — LOSARTAN POTASSIUM 50 MG: 50 TABLET, FILM COATED ORAL at 09:04

## 2023-05-02 RX ADMIN — LORAZEPAM 1 MG: 1 TABLET ORAL at 09:12

## 2023-05-02 RX ADMIN — Medication: at 09:06

## 2023-05-02 RX ADMIN — LEVOFLOXACIN 750 MG: 500 TABLET, FILM COATED ORAL at 09:04

## 2023-05-02 RX ADMIN — MICONAZOLE NITRATE: 20 POWDER TOPICAL at 09:06

## 2023-05-02 RX ADMIN — TOBRAMYCIN 300 MG: 40 INJECTION INTRAMUSCULAR; INTRAVENOUS at 10:05

## 2023-05-02 RX ADMIN — LORAZEPAM 1 MG: 1 TABLET ORAL at 18:11

## 2023-05-02 RX ADMIN — METOPROLOL TARTRATE 25 MG: 25 TABLET, FILM COATED ORAL at 09:04

## 2023-05-02 RX ADMIN — ARFORMOTEROL TARTRATE 15 MCG: 15 SOLUTION RESPIRATORY (INHALATION) at 09:40

## 2023-05-02 RX ADMIN — Medication 10 ML: at 09:06

## 2023-05-02 RX ADMIN — IPRATROPIUM BROMIDE AND ALBUTEROL SULFATE 1 AMPULE: .5; 2.5 SOLUTION RESPIRATORY (INHALATION) at 09:40

## 2023-05-02 RX ADMIN — MORPHINE SULFATE 2.5 MG: 100 SOLUTION ORAL at 18:14

## 2023-05-02 RX ADMIN — BUDESONIDE 500 MCG: 0.5 SUSPENSION RESPIRATORY (INHALATION) at 05:57

## 2023-05-02 RX ADMIN — IPRATROPIUM BROMIDE AND ALBUTEROL SULFATE 1 AMPULE: .5; 2.5 SOLUTION RESPIRATORY (INHALATION) at 13:40

## 2023-05-02 RX ADMIN — MORPHINE SULFATE 2.5 MG: 100 SOLUTION ORAL at 09:12

## 2023-05-02 RX ADMIN — ENOXAPARIN SODIUM 40 MG: 100 INJECTION SUBCUTANEOUS at 09:04

## 2023-05-02 RX ADMIN — IPRATROPIUM BROMIDE AND ALBUTEROL SULFATE 1 AMPULE: .5; 2.5 SOLUTION RESPIRATORY (INHALATION) at 16:43

## 2023-05-02 RX ADMIN — HYDROCODONE BITARTRATE AND ACETAMINOPHEN 1 TABLET: 7.5; 325 TABLET ORAL at 10:21

## 2023-05-02 ASSESSMENT — PAIN SCALES - GENERAL: PAINLEVEL_OUTOF10: 9

## 2023-05-02 ASSESSMENT — PAIN DESCRIPTION - DESCRIPTORS: DESCRIPTORS: ACHING

## 2023-05-02 ASSESSMENT — PAIN DESCRIPTION - ORIENTATION: ORIENTATION: RIGHT;LEFT

## 2023-05-02 ASSESSMENT — PAIN DESCRIPTION - LOCATION
LOCATION: LEG
LOCATION: HEAD

## 2023-05-02 NOTE — PLAN OF CARE
Problem: Discharge Planning  Goal: Discharge to home or other facility with appropriate resources  Outcome: Completed     Problem: Skin/Tissue Integrity  Goal: Absence of new skin breakdown  Description: 1. Monitor for areas of redness and/or skin breakdown  2. Assess vascular access sites hourly  3. Every 4-6 hours minimum:  Change oxygen saturation probe site  4. Every 4-6 hours:  If on nasal continuous positive airway pressure, respiratory therapy assess nares and determine need for appliance change or resting period.   Outcome: Completed     Problem: ABCDS Injury Assessment  Goal: Absence of physical injury  Outcome: Completed     Problem: Safety - Adult  Goal: Free from fall injury  Outcome: Completed     Problem: Pain  Goal: Verbalizes/displays adequate comfort level or baseline comfort level  Outcome: Completed     Problem: Chronic Conditions and Co-morbidities  Goal: Patient's chronic conditions and co-morbidity symptoms are monitored and maintained or improved  Outcome: Completed

## 2023-05-02 NOTE — DISCHARGE SUMMARY
[facility] Physician Discharge Summary       Blaise Cordova MD  [address] [facility]  [phone]            Activity level: As tolerated     Dispo: Home      Condition on discharge: Stable     Patient ID:  Nieves Lord  [id number]  62 y.o.  1965    Admit date: 4/23/2023    Discharge date and time:  5/2/2023  1:41 PM    Admission Diagnoses: Principal Problem:    Acute congestive heart failure, unspecified heart failure type ([address]  Active Problems:    Acute on chronic respiratory failure with hypoxia (HCC)    Coronary artery disease involving native coronary artery of native heart without angina pectoris    Precordial pain    Palliative care encounter  Resolved Problems:    * No resolved hospital problems. *      Discharge Diagnoses: Principal Problem:    Acute congestive heart failure, unspecified heart failure type (HCC)  Active Problems:    Acute on chronic respiratory failure with hypoxia (HCC)    Coronary artery disease involving native coronary artery of native heart without angina pectoris    Precordial pain    Palliative care encounter  Resolved Problems:    * No resolved hospital problems.  *      Consults:  IP CONSULT TO INFECTIOUS DISEASES  IP CONSULT TO PULMONOLOGY  IP CONSULT TO PHARMACY  IP CONSULT TO CARDIOLOGY  IP CONSULT TO HEART FAILURE NURSE/COORDINATOR  IP CONSULT TO HOME CARE NEEDS  IP CONSULT TO PALLIATIVE CARE  IP CONSULT TO INFECTIOUS DISEASES    Hospital Course:   Patient Nieves Lord is a 62 y.o. presented with Hyperkalemia [E87.5]  COPD exacerbation ([facility] [J44.1]  Acute on chronic respiratory failure with hypoxia (HCC) [J96.21]  Pneumonia due to infectious organism, unspecified laterality, unspecified part of lung [J18.9]  Acute congestive heart failure, unspecified heart failure type ([facility] [address] [I50.9]    Mr Georgi Burkitt is a 62year old man with hx of chronic respiratory failure due to end-stage COPD, RLS,  tobacco abuse, venous stasis

## 2023-05-02 NOTE — CARE COORDINATION
02 7lnc. Nithin Casas Has home 02 7lnc/ nebs/portability through [address] Chest vest. Plan at discharge is home; need to fax [facility] orders/d/c date to [address] [phone]  fax [phone]. Sister Brenda Michaud can transport home. S/p bronch 4/28; cx  pended. ; pt states feels much better. Re- affirmed with pt he plans HOME on d/c with [facility]. currently on chest vest/ tibi nebs. ; awaiting auth from [facility] for CHINEDU NEBS,case ref# [id number]. If approved, [facility] pharmacy would need to order, which could take up to 24 hrs. Will not pre-order w/o approval d/t high cost of drug. Idelia [place]. KRYSTEN. Received fax this am from [facility] re; Jojo  for CHINEDU NEBS. Additional information provided,faxed bck to [phone].will follow. Keenan Guadalupe.

## 2023-05-02 NOTE — CARE COORDINATION
Per Julita Paige at [address] [place] [address] FOR PSYCHIATRY) pt has BEEN DENIED COVERAGE  for Eleazar riversDamaris Foster from ID aware; Dahlia Going /Dr Dominic Steiner aware. Pt plans home. Await possible discharge today. Will fax info to [facility]/ d/c date. Guillermo Still. D/c order noted; SUNDAY orders/d/c date faxed to [facility] [phone]. Guillermo Still.
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
## 2023-05-02 NOTE — CARE COORDINATION
Initially spoke with Pacheco Wright at [employer] regarding prior auth request for Morphine Sulfate but was disconnected. Called back and spoke with another rep. Received prior auth request form via fax. MASON Black completed form and this was faxed back to the [place] [employer] Medicaid at [phone]. Confirmation was received that it was sent. Floor notified. Await determination. Pharmacy- [facility] in [place]'s phone number is [phone]. Spoke with Meño Pedersen at pharmacy and notified her of above and she stated pharmacy closes at 1800 today and they will await determination from Medicaid.     LILLIE Mendez, RN  Manager Care Coordination  [facility]  Cell: [phone]

## 2023-05-03 ENCOUNTER — TELEPHONE (OUTPATIENT)
Dept: PRIMARY CARE CLINIC | Age: 58
End: 2023-05-03

## 2023-05-03 ENCOUNTER — TELEPHONE (OUTPATIENT)
Dept: PALLATIVE CARE | Age: 58
End: 2023-05-03

## 2023-05-03 NOTE — TELEPHONE ENCOUNTER
Care Transitions Initial Follow Up Call    Outreach made within 2 business days of discharge: Yes    Patient: Robinson Patiño Patient : 1965   MRN: [id number]  Reason for Admission: SOB, CHF  Discharge Date: 23       Spoke with: Sayra Antoine    Discharge department/facility: [address] [place] [address] Interactive Patient Contact:  Was patient able to fill all prescriptions: Yes  Was patient instructed to bring all medications to the follow-up visit: Yes  Is patient taking all medications as directed in the discharge summary? Yes  Does patient understand their discharge instructions: Yes  Does patient have questions or concerns that need addressed prior to 7-14 day follow up office visit: yes, the hospital discontinued his gabapentin, she was wondering why.     Scheduled appointment with PCP within 7-14 days    Follow Up  Future Appointments   Date Time Provider iMk Rosario   2023  7:45 AM Mir Armenta MD [facility]   2023  9:00 AM ARGELIA Ghosh - CNP [facility]   2023  1:15 PM ARGELIA Dowling NP [facility]       Rosita Sorenson LPN

## 2023-05-03 NOTE — TELEPHONE ENCOUNTER
I can't figure this out per d/c records  Appears pt will be seen by palliative care tomorrow  Would suggest asking if they have any problems with him restarting as they did follow along during his inpatient stay  If not, I think it's reasonable to restart

## 2023-05-05 ENCOUNTER — OFFICE VISIT (OUTPATIENT)
Dept: PRIMARY CARE CLINIC | Age: 58
End: 2023-05-05

## 2023-05-05 VITALS — DIASTOLIC BLOOD PRESSURE: 70 MMHG | TEMPERATURE: 97.5 F | SYSTOLIC BLOOD PRESSURE: 102 MMHG

## 2023-05-05 DIAGNOSIS — K59.1 FUNCTIONAL DIARRHEA: ICD-10-CM

## 2023-05-05 DIAGNOSIS — J96.21 ACUTE ON CHRONIC RESPIRATORY FAILURE WITH HYPOXIA (HCC): ICD-10-CM

## 2023-05-05 DIAGNOSIS — Z09 HOSPITAL DISCHARGE FOLLOW-UP: Primary | ICD-10-CM

## 2023-05-05 DIAGNOSIS — J44.1 COPD EXACERBATION (HCC): ICD-10-CM

## 2023-05-05 DIAGNOSIS — I50.33 ACUTE ON CHRONIC DIASTOLIC CONGESTIVE HEART FAILURE (HCC): ICD-10-CM

## 2023-05-05 RX ORDER — DIPHENOXYLATE HYDROCHLORIDE AND ATROPINE SULFATE 2.5; .025 MG/1; MG/1
1 TABLET ORAL 3 TIMES DAILY PRN
Qty: 21 TABLET | Refills: 0 | Status: SHIPPED | OUTPATIENT
Start: 2023-05-05 | End: 2023-05-12

## 2023-05-05 NOTE — PROGRESS NOTES
mLs by mouth every 4 hours as needed for Cough 240 mL 0    calcium elemental (OSCAL) 500 MG TABS tablet Take 1 tablet by mouth 2 times daily (with meals) 30 tablet 0    ipratropium-albuterol (DUONEB) 0.5-2.5 (3) MG/3ML SOLN nebulizer solution Inhale 3 mLs into the lungs every 6 hours as needed for Shortness of Breath 90 each 3    FLUoxetine (PROZAC) 10 MG tablet Take 2 tablets by mouth daily      pramipexole (MIRAPEX) 0.25 MG tablet Take 1 tablet by mouth nightly 90 tablet 1    budesonide (PULMICORT) 0.5 MG/2ML nebulizer suspension Take 2 mLs by nebulization in the morning and 2 mLs before bedtime. 60 each 5    Revefenacin (YUPELRI) 175 MCG/3ML SOLN Inhale 1 ampule into the lungs daily 30 each 3    Probiotic Product [facility]) CAPS Patient is to take one tab bid. Patient has been on antibiotics for the last 3 months 30 capsule 3    [DISCONTINUED] albuterol sulfate HFA (PROAIR HFA) 108 (90 Base) MCG/ACT inhaler Inhale 2 puffs into the lungs every 4 hours as needed for Wheezing 1 Inhaler 2    HYDROcodone-acetaminophen (NORCO) 7.5-325 MG per tablet Take 1 tablet by mouth 2 times daily as needed. Takes religiously twice daily for leg pain per pt      ARIPiprazole (ABILIFY) 5 MG tablet take 1 tablet by mouth once daily  0        Medications patient taking as of now reconciled against medications ordered at time of hospital discharge: Yes        Objective:    /70   Temp 97.5 °F (36.4 °C) (Infrared)   Physical Exam  Constitutional:       Appearance: Normal appearance. HENT:      Head: Normocephalic and atraumatic. Eyes:      Conjunctiva/sclera: Conjunctivae normal.   Cardiovascular:      Rate and Rhythm: Normal rate. Heart sounds: Normal heart sounds. Comments: Distant   Pulmonary:      Effort: Pulmonary effort is normal.      Comments: Diminished throughout, but without significant congestion   Abdominal:      Palpations: Abdomen is soft. Tenderness: There is no abdominal tenderness.

## 2023-05-05 NOTE — TELEPHONE ENCOUNTER
BP readings dr asked for Since taking Losarton    1.11.2023 - morning  144/97                     Afternoon     134/81                        Evening    139/87      1.12.2023     morning   153/67            Early afternoon     145/82             Since taking Losarton No

## 2023-05-08 ENCOUNTER — TELEPHONE (OUTPATIENT)
Dept: PALLATIVE CARE | Age: 58
End: 2023-05-08

## 2023-05-08 DIAGNOSIS — F41.9 ANXIETY: ICD-10-CM

## 2023-05-08 DIAGNOSIS — R51.9 BAD HEADACHE: ICD-10-CM

## 2023-05-08 RX ORDER — LORAZEPAM 1 MG/1
1 TABLET ORAL EVERY 8 HOURS PRN
Qty: 90 TABLET | Refills: 0 | Status: SHIPPED | OUTPATIENT
Start: 2023-05-08 | End: 2023-06-07

## 2023-05-08 RX ORDER — BUTALBITAL, ACETAMINOPHEN AND CAFFEINE 50; 325; 40 MG/1; MG/1; MG/1
1 TABLET ORAL EVERY 6 HOURS PRN
Qty: 30 TABLET | Refills: 1 | Status: SHIPPED | OUTPATIENT
Start: 2023-05-08

## 2023-05-08 NOTE — TELEPHONE ENCOUNTER
Could authorize 40 or 80mg lasix IM if someone can do for pt at home today, first thing tomorrow   Would also request BMP checked later this week   I do understand his frustration and reluctance to go back to ER  However, we may not be able to get home help as quickly as needed, so encourage ER if no other solutions

## 2023-05-08 NOTE — TELEPHONE ENCOUNTER
[facility]. They would be unable to get this medication in stock for about 2 days. Pharmacist directed me to [facility] pharmacy [phone]. Pharmacist there advised they are unable to bill for oxygen medications at the pharmacy. However they have an outpatient pharmacy clinic (typically used for hematology/oncology injections and infusions) that will be back open tomorrow morning. Pharmacist advised we should be able to print and fax order tomorrow morning to the outpatient pharmacy for him to be scheduled for injection. I apologize, she did not give me a fax number. Called sister Anna Simmons, she is on board with this plan and could take him in for this tomorrow morning. She will wait for a call back from us or the pharmacy clinic.

## 2023-05-08 NOTE — TELEPHONE ENCOUNTER
Spoke with sister Morgan Galindo, she asked if we can send the order for the injection to [address] [place] [address] and the nurse, Edie, can administer tonight or tomorrow.

## 2023-05-08 NOTE — TELEPHONE ENCOUNTER
Call from Edie with [facility]. Edie states Dg's legs are very swollen and red, and he has coarse lung sounds. Instructed to go to ED. Patient and his sister refuse ED. Edie [facility] RN is asking for Lasix IM order. Instructed her to contact PCP. Edie states Dg's sister would like some one to come see him today. That is not possible, patient is scheduled 5/18/23.

## 2023-05-09 ENCOUNTER — TELEPHONE (OUTPATIENT)
Dept: CARDIOLOGY CLINIC | Age: 58
End: 2023-05-09

## 2023-05-09 ENCOUNTER — TELEPHONE (OUTPATIENT)
Dept: PRIMARY CARE CLINIC | Age: 58
End: 2023-05-09

## 2023-05-09 DIAGNOSIS — M79.89 LEG SWELLING: Primary | ICD-10-CM

## 2023-05-09 RX ORDER — FUROSEMIDE 10 MG/ML
80 INJECTION INTRAMUSCULAR; INTRAVENOUS ONCE
COMMUNITY
End: 2023-05-09 | Stop reason: SDUPTHER

## 2023-05-09 RX ORDER — FUROSEMIDE 10 MG/ML
80 INJECTION INTRAMUSCULAR; INTRAVENOUS ONCE
Qty: 8 ML | Refills: 0 | Status: SHIPPED | OUTPATIENT
Start: 2023-05-09 | End: 2023-05-09

## 2023-05-09 NOTE — TELEPHONE ENCOUNTER
PTS SISTER CALLED ASKING ABOUT ORDER FOR PT TO GET A SHOT FOR HIS LEGS AT [facility]

## 2023-05-09 NOTE — TELEPHONE ENCOUNTER
Last Appointment:  5/5/2023  Future Appointments   Date Time Provider Mik Rosario   5/18/2023  9:00 AM ARGELIA Robledo - CNP [facility]   6/2/2023 11:15 AM Cecilia Soni MD [facility]   6/27/2023  1:15 PM ARGELIA Seals - NP [facility]      Pended order for 80 mg Lasix IM for your review and signature. I have tried to call [facility] out patient services several times. No answer.       Electronically signed by Ivonne Reveles LPN on 7/5/4172 at 65:52 AM

## 2023-05-09 NOTE — TELEPHONE ENCOUNTER
Attempting to call the outpatient pharmacy clinic to find out where we need to send the script, but the number I was given just keeps ringing.

## 2023-05-09 NOTE — TELEPHONE ENCOUNTER
Last Appointment:  5/5/2023  Future Appointments   Date Time Provider Mik Rosario   5/18/2023  9:00 AM ARGELIA Schaefer - CNP [facility]   6[facility]2/2023 11:[facility]   6/27/2023  1:15 PM ARGELIA Mckeon - NP [facility]      Spoke with Oncology/out patient clinic they need order for IM injection to be faxed to central scheduling. Done.     Electronically signed by Chikis Gilbert LPN on 0/2/6536 at 2:60 PM

## 2023-05-09 NOTE — TELEPHONE ENCOUNTER
Kristin from [facility] called, she stated the script for the lasix needs to have Dr Maggy Bee and the pt's birth date on it. Dr Qiu File signed, birth date was added, and was faxed back to [facility]. Pt's sister Beth Schmidt was notified.

## 2023-05-10 ENCOUNTER — TELEPHONE (OUTPATIENT)
Dept: PRIMARY CARE CLINIC | Age: 58
End: 2023-05-10

## 2023-05-10 DIAGNOSIS — I50.33 ACUTE ON CHRONIC DIASTOLIC CONGESTIVE HEART FAILURE (HCC): ICD-10-CM

## 2023-05-10 LAB
BUN BLDV-MCNC: NORMAL MG/DL
CALCIUM SERPL-MCNC: NORMAL MG/DL
CHLORIDE BLD-SCNC: NORMAL MMOL/L
CO2: NORMAL
CREAT SERPL-MCNC: NORMAL MG/DL
EGFR: NORMAL
GLUCOSE BLD-MCNC: NORMAL MG/DL
POTASSIUM SERPL-SCNC: NORMAL MMOL/L
SODIUM BLD-SCNC: NORMAL MMOL/L

## 2023-05-10 NOTE — TELEPHONE ENCOUNTER
[facility] called to see if we could get his lasix shot switched to iv. The shot would be 4 pokes. Verbal given for iv.  Also verbal given for them to do the bmp lab draw that is in the chart for pt to have done

## 2023-05-11 RX ORDER — METOLAZONE 5 MG/1
5 TABLET ORAL DAILY
Qty: 30 TABLET | Refills: 0 | Status: SHIPPED | OUTPATIENT
Start: 2023-05-11

## 2023-05-18 ENCOUNTER — OFFICE VISIT (OUTPATIENT)
Dept: PALLATIVE CARE | Age: 58
End: 2023-05-18

## 2023-05-18 VITALS
DIASTOLIC BLOOD PRESSURE: 80 MMHG | OXYGEN SATURATION: 100 % | RESPIRATION RATE: 14 BRPM | HEART RATE: 66 BPM | SYSTOLIC BLOOD PRESSURE: 134 MMHG

## 2023-05-18 DIAGNOSIS — Z51.5 PALLIATIVE CARE ENCOUNTER: ICD-10-CM

## 2023-05-18 DIAGNOSIS — R06.09 DYSPNEA ON MINIMAL EXERTION: ICD-10-CM

## 2023-05-18 DIAGNOSIS — J44.9 END STAGE COPD (HCC): ICD-10-CM

## 2023-05-18 RX ORDER — MORPHINE SULFATE 20 MG/ML
10 SOLUTION ORAL EVERY 4 HOURS
Qty: 90 ML | Refills: 0 | Status: SHIPPED | OUTPATIENT
Start: 2023-05-18 | End: 2023-06-17

## 2023-05-18 RX ORDER — FUROSEMIDE 40 MG/1
TABLET ORAL
Qty: 90 TABLET | Refills: 1 | Status: SHIPPED | OUTPATIENT
Start: 2023-05-18
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
## 2023-05-19 ENCOUNTER — TELEPHONE (OUTPATIENT)
Dept: PALLATIVE CARE | Age: 58
End: 2023-05-19

## 2023-05-19 NOTE — TELEPHONE ENCOUNTER
Junior  (Key: [id number]) - [id number]  Morphine Sulfate (Concentrate) 100MG/5ML solution  Status: Sent To Plan  Created: May 19th, 2023  Sent: May 19th, 2023

## 2023-05-22 ENCOUNTER — TELEPHONE (OUTPATIENT)
Dept: PALLATIVE CARE | Age: 58
End: 2023-05-22

## 2023-05-22 NOTE — TELEPHONE ENCOUNTER
Incoming call from [facility] stating that they were unable to  the full 30 days of morphine due to an insurance refusal. Pt instead picked up a 10 day supply on Saturday 5/20/23 and paid out of pocket. Called and confirmed with Pharmacy who stated that they had problems getting a hold of [employer] to resolve the insurance issue. Called and spoke to Ben[facility] who acknowledged the prior auth approval for 30 day supply but stated that the pharmacy will need to contact them over the phone when next rx is sent due to an override being needed. Pt will need new Rx sent on Tuesday 5/30/23.

## 2023-05-25 DIAGNOSIS — G25.81 RLS (RESTLESS LEGS SYNDROME): ICD-10-CM

## 2023-05-25 RX ORDER — OMEPRAZOLE 40 MG/1
40 CAPSULE, DELAYED RELEASE ORAL DAILY
Qty: 90 CAPSULE | Refills: 1 | Status: SHIPPED | OUTPATIENT
Start: 2023-05-25

## 2023-05-25 RX ORDER — PRAMIPEXOLE DIHYDROCHLORIDE 0.25 MG/1
0.25 TABLET ORAL NIGHTLY
Qty: 90 TABLET | Refills: 1 | Status: SHIPPED | OUTPATIENT
Start: 2023-05-25

## 2023-05-25 RX ORDER — HYDROXYZINE PAMOATE 25 MG/1
25 CAPSULE ORAL 3 TIMES DAILY PRN
Qty: 90 CAPSULE | Refills: 1 | Status: SHIPPED | OUTPATIENT
Start: 2023-05-25 | End: 2023-06-24

## 2023-05-25 NOTE — TELEPHONE ENCOUNTER
Last Appointment:  5/5/2023  Future Appointments  Date Time Provider Mik Rosario  6/2/2023 11:15 AM MD Praveen Howe 57

## 2023-05-25 NOTE — TELEPHONE ENCOUNTER
Patient needs refills on the below 3      pramipexole (MIRAPEX) 0.25 MG tablet    omeprazole (PRILOSEC) 40 MG delayed release capsule      hydrOXYzine pamoate (VISTARIL) 25 MG capsule        [facility] [address] [address] - P [phone] - F [phone]

## 2023-05-26 DIAGNOSIS — R06.09 DYSPNEA ON MINIMAL EXERTION: ICD-10-CM

## 2023-05-26 DIAGNOSIS — Z51.5 PALLIATIVE CARE ENCOUNTER: ICD-10-CM

## 2023-05-26 DIAGNOSIS — J44.9 END STAGE COPD (HCC): ICD-10-CM

## 2023-05-26 RX ORDER — MORPHINE SULFATE 20 MG/ML
10 SOLUTION ORAL EVERY 4 HOURS PRN
Qty: 90 ML | Refills: 0 | Status: SHIPPED | OUTPATIENT
Start: 2023-05-26 | End: 2023-06-25

## 2023-05-26 NOTE — TELEPHONE ENCOUNTER
Patient had insurance issue with last prescribed Morphine order and only got 10 day supply. Please send refill for 30 days to [facility]. Next kristina to be scheduled with [facility].

## 2023-06-01 ENCOUNTER — TELEPHONE (OUTPATIENT)
Dept: PALLATIVE CARE | Age: 58
End: 2023-06-01

## 2023-06-01 NOTE — TELEPHONE ENCOUNTER
Call from [facility] stating Lexie Rios has a low grade fever and increased shortness of breath. Called back and spoke with [facility], she states Lexie Rios takes Morphine concentrate on schedule every 4 hours for SOB and wears O2 continuously. [facility] is giving him Tylenol for low grade fever. [facility] states they are planning on keeping PCP appointment tomorrow and hope to get an antibiotic. They do not wish to go to hospital. Support provided through active listening.

## 2023-06-02 ENCOUNTER — OFFICE VISIT (OUTPATIENT)
Dept: PRIMARY CARE CLINIC | Age: 58
End: 2023-06-02
Payer: MEDICAID

## 2023-06-02 VITALS
TEMPERATURE: 96.9 F | HEART RATE: 99 BPM | DIASTOLIC BLOOD PRESSURE: 58 MMHG | SYSTOLIC BLOOD PRESSURE: 110 MMHG | BODY MASS INDEX: 23.22 KG/M2 | HEIGHT: 64 IN | WEIGHT: 136 LBS | RESPIRATION RATE: 20 BRPM | OXYGEN SATURATION: 97 %

## 2023-06-02 DIAGNOSIS — J18.9 PNEUMONIA DUE TO INFECTIOUS ORGANISM, UNSPECIFIED LATERALITY, UNSPECIFIED PART OF LUNG: ICD-10-CM

## 2023-06-02 DIAGNOSIS — G25.81 RLS (RESTLESS LEGS SYNDROME): ICD-10-CM

## 2023-06-02 DIAGNOSIS — F41.9 ANXIETY: ICD-10-CM

## 2023-06-02 DIAGNOSIS — J18.9 PNEUMONIA DUE TO INFECTIOUS ORGANISM, UNSPECIFIED LATERALITY, UNSPECIFIED PART OF LUNG: Primary | ICD-10-CM

## 2023-06-02 DIAGNOSIS — R51.9 BAD HEADACHE: ICD-10-CM

## 2023-06-02 LAB
BASOPHILS # BLD: 0.03 E9/L (ref 0–0.2)
BASOPHILS NFR BLD: 0.3 % (ref 0–2)
EOSINOPHIL # BLD: 0.13 E9/L (ref 0.05–0.5)
EOSINOPHIL NFR BLD: 1.3 % (ref 0–6)
ERYTHROCYTE [DISTWIDTH] IN BLOOD BY AUTOMATED COUNT: 13.3 FL (ref 11.5–15)
HCT VFR BLD AUTO: 32.9 % (ref 37–54)
HGB BLD-MCNC: 9.9 G/DL (ref 12.5–16.5)
IMM GRANULOCYTES # BLD: 0.04 E9/L
IMM GRANULOCYTES NFR BLD: 0.4 % (ref 0–5)
LYMPHOCYTES # BLD: 1.19 E9/L (ref 1.5–4)
LYMPHOCYTES NFR BLD: 12 % (ref 20–42)
MCH RBC QN AUTO: 30.9 PG (ref 26–35)
MCHC RBC AUTO-ENTMCNC: 30.1 % (ref 32–34.5)
MCV RBC AUTO: 102.8 FL (ref 80–99.9)
MONOCYTES # BLD: 0.83 E9/L (ref 0.1–0.95)
MONOCYTES NFR BLD: 8.4 % (ref 2–12)
NEUTROPHILS # BLD: 7.71 E9/L (ref 1.8–7.3)
NEUTS SEG NFR BLD: 77.6 % (ref 43–80)
PLATELET # BLD AUTO: 181 E9/L (ref 130–450)
PMV BLD AUTO: 10.9 FL (ref 7–12)
RBC # BLD AUTO: 3.2 E12/L (ref 3.8–5.8)
WBC # BLD: 9.9 E9/L (ref 4.5–11.5)

## 2023-06-02 PROCEDURE — 3074F SYST BP LT 130 MM HG: CPT | Performed by: FAMILY MEDICINE

## 2023-06-02 PROCEDURE — 3078F DIAST BP <80 MM HG: CPT | Performed by: FAMILY MEDICINE

## 2023-06-02 PROCEDURE — 99215 OFFICE O/P EST HI 40 MIN: CPT | Performed by: FAMILY MEDICINE

## 2023-06-02 RX ORDER — LORAZEPAM 1 MG/1
1 TABLET ORAL EVERY 8 HOURS PRN
Qty: 90 TABLET | Refills: 0 | Status: CANCELLED | OUTPATIENT
Start: 2023-06-02 | End: 2023-07-02

## 2023-06-02 RX ORDER — AMOXICILLIN AND CLAVULANATE POTASSIUM 875; 125 MG/1; MG/1
1 TABLET, FILM COATED ORAL 2 TIMES DAILY
Qty: 14 TABLET | Refills: 0 | Status: SHIPPED | OUTPATIENT
Start: 2023-06-02 | End: 2023-06-09

## 2023-06-02 RX ORDER — BUTALBITAL, ACETAMINOPHEN AND CAFFEINE 50; 325; 40 MG/1; MG/1; MG/1
1 TABLET ORAL EVERY 6 HOURS PRN
Qty: 30 TABLET | Refills: 1 | Status: SHIPPED | OUTPATIENT
Start: 2023-06-02

## 2023-06-02 RX ORDER — POTASSIUM CHLORIDE 20 MEQ/1
20 TABLET, EXTENDED RELEASE ORAL DAILY
Qty: 30 TABLET | Refills: 5 | Status: SHIPPED | OUTPATIENT
Start: 2023-06-02

## 2023-06-02 RX ORDER — PRAMIPEXOLE DIHYDROCHLORIDE 0.5 MG/1
0.5 TABLET ORAL NIGHTLY
Qty: 90 TABLET | Refills: 1 | Status: SHIPPED | OUTPATIENT
Start: 2023-06-02

## 2023-06-02 RX ORDER — AZITHROMYCIN 250 MG/1
250 TABLET, FILM COATED ORAL SEE ADMIN INSTRUCTIONS
Qty: 6 TABLET | Refills: 0 | Status: SHIPPED | OUTPATIENT
Start: 2023-06-02 | End: 2023-06-07

## 2023-06-02 RX ORDER — GABAPENTIN 100 MG/1
CAPSULE ORAL
COMMUNITY
Start: 2023-05-16

## 2023-06-02 RX ORDER — LORAZEPAM 1 MG/1
1 TABLET ORAL EVERY 8 HOURS PRN
Qty: 90 TABLET | Refills: 0 | Status: SHIPPED | OUTPATIENT
Start: 2023-06-13 | End: 2023-07-13

## 2023-06-02 NOTE — TELEPHONE ENCOUNTER
Last Appointment:  6/2/2023  Future Appointments   Date Time Provider Mik Rosario   6/16/2023 11:30 AM Marah Parks MD [facility]   6/23/2023  9:00 AM ARGELIA Trejo - CNP [facility]   6/27/2023  1:15 PM ARGELIA Quiroz - NP [facility]   8/14/2023 10:45 AM Joseluis Alanis MD [address]

## 2023-06-02 NOTE — PROGRESS NOTES
-40 MG per tablet, Take 1 tablet by mouth every 6 hours as needed for Headaches, Disp: 30 tablet, Rfl: 1    sennosides-docusate sodium (SENOKOT-S) 8.6-50 MG tablet, Take 1 tablet by mouth at bedtime, Disp: 30 tablet, Rfl: 0    sodium chloride (OCEAN) 0.65 % nasal spray, by Nasal route, Disp: , Rfl:     glucose monitoring (FREESTYLE FREEDOM) kit, 1 kit by Does not apply route daily, Disp: 1 kit, Rfl: 0    nystatin (MYCOSTATIN) 687011 UNIT/GM powder, Apply 3 times daily. , Disp: 60 g, Rfl: 1    BROVANA 15 MCG/2ML NEBU, TAKE TWO (2) MLS BY NEBULIZATION IN THE MORNING AND TWO (2) MLS IN THE EVENING., Disp: 120 mL, Rfl: 3    metoprolol tartrate (LOPRESSOR) 25 MG tablet, Take 1 tablet by mouth 2 times daily Twice A Day, Disp: 180 tablet, Rfl: 1    losartan (COZAAR) 50 MG tablet, Take 1 tablet by mouth daily, Disp: 90 tablet, Rfl: 1    albuterol (PROVENTIL) (2.5 MG/3ML) 0.083% nebulizer solution, Take 3 mLs by nebulization every 6 hours as needed for Wheezing, Disp: 120 each, Rfl: 3    dextromethorphan-guaiFENesin (ROBITUSSIN-DM)  MG/5ML syrup, Take 10 mLs by mouth every 4 hours as needed for Cough, Disp: 240 mL, Rfl: 0    calcium elemental (OSCAL) 500 MG TABS tablet, Take 1 tablet by mouth 2 times daily (with meals), Disp: 30 tablet, Rfl: 0    ipratropium-albuterol (DUONEB) 0.5-2.5 (3) MG/3ML SOLN nebulizer solution, Inhale 3 mLs into the lungs every 6 hours as needed for Shortness of Breath, Disp: 90 each, Rfl: 3    FLUoxetine (PROZAC) 10 MG tablet, Take 2 tablets by mouth daily, Disp: , Rfl:     budesonide (PULMICORT) 0.5 MG/2ML nebulizer suspension, Take 2 mLs by nebulization in the morning and 2 mLs before bedtime. , Disp: 60 each, Rfl: 5    Revefenacin (YUPELRI) 175 MCG/3ML SOLN, Inhale 1 ampule into the lungs daily, Disp: 30 each, Rfl: 3    Probiotic Product [facility]) CAPS, Patient is to take one tab bid.  Patient has been on antibiotics for the last 3 months, Disp: 30 capsule, Rfl: 3

## 2023-06-08 DIAGNOSIS — G89.4 CHRONIC PAIN SYNDROME: ICD-10-CM

## 2023-06-08 DIAGNOSIS — Z51.5 PALLIATIVE CARE ENCOUNTER: Primary | ICD-10-CM

## 2023-06-08 RX ORDER — GABAPENTIN 300 MG/1
300 CAPSULE ORAL 3 TIMES DAILY
Qty: 90 CAPSULE | Refills: 0 | Status: SHIPPED | OUTPATIENT
Start: 2023-06-08 | End: 2023-07-08

## 2023-06-08 RX ORDER — HYDROCODONE BITARTRATE AND ACETAMINOPHEN 7.5; 325 MG/1; MG/1
1 TABLET ORAL 2 TIMES DAILY PRN
Qty: 28 TABLET | Refills: 0 | Status: SHIPPED | OUTPATIENT
Start: 2023-06-08 | End: 2023-06-22

## 2023-06-08 NOTE — TELEPHONE ENCOUNTER
Call from Dg's sister Boston Mccain requesting refills for Gabapentin 300 mg three times a day and Norco 7.5/325 mg every 12 hours prn. Pharmacy is [facility] [place]. [facility]

## 2023-06-16 ENCOUNTER — TELEPHONE (OUTPATIENT)
Dept: PRIMARY CARE CLINIC | Age: 58
End: 2023-06-16

## 2023-06-16 RX ORDER — LEVOFLOXACIN 500 MG/1
500 TABLET, FILM COATED ORAL DAILY
Qty: 10 TABLET | Refills: 0 | Status: ON HOLD
Start: 2023-06-16 | End: 2023-06-25

## 2023-06-23 ENCOUNTER — OFFICE VISIT (OUTPATIENT)
Dept: PALLATIVE CARE | Age: 58
End: 2023-06-23
Payer: MEDICAID

## 2023-06-23 DIAGNOSIS — R05.2 SUBACUTE COUGH: ICD-10-CM

## 2023-06-23 DIAGNOSIS — Z51.5 PALLIATIVE CARE ENCOUNTER: ICD-10-CM

## 2023-06-23 DIAGNOSIS — G89.4 CHRONIC PAIN SYNDROME: ICD-10-CM

## 2023-06-23 DIAGNOSIS — J44.9 END STAGE COPD (HCC): Primary | ICD-10-CM

## 2023-06-23 PROCEDURE — 99349 HOME/RES VST EST MOD MDM 40: CPT | Performed by: NURSE PRACTITIONER

## 2023-06-23 RX ORDER — HYDROCODONE BITARTRATE AND HOMATROPINE METHYLBROMIDE ORAL SOLUTION 5; 1.5 MG/5ML; MG/5ML
5 LIQUID ORAL 4 TIMES DAILY PRN
Qty: 200 ML | Refills: 0 | Status: ON HOLD
Start: 2023-06-23 | End: 2023-06-25

## 2023-06-23 RX ORDER — HYDROCODONE BITARTRATE AND ACETAMINOPHEN 7.5; 325 MG/1; MG/1
1 TABLET ORAL 2 TIMES DAILY PRN
Qty: 60 TABLET | Refills: 0 | Status: ON HOLD | OUTPATIENT
Start: 2023-06-23 | End: 2023-07-23

## 2023-06-23 NOTE — PROGRESS NOTES
him to have a difficult time with sleeping due to his coughing, which makes his SOB worse. Tessalon has not been helpful for him. He agrees to try hycodan. He would also benefit from chest physiotherapy and needs a chest vest for this. Pain Assessment   Ratin  Description: aching, burning, sharp, and stabbing  Duration: years  Frequency: daily  Location: left hip, feet, legs  Alleviating Factors: relaxation and pain medication  Exacerbating Factors: unable to associate with any factor  Effect: change in function, interference with activities, sleep, and mood    Past Medical History:   Diagnosis Date    Anxiety     COPD (chronic obstructive pulmonary disease) (HCC)     Hypertension     Leg swelling     Multiple gastric ulcers     Restless leg syndrome        Past Surgical History:   Procedure Laterality Date    BRONCHOSCOPY N/A 2023    BRONCHOSCOPY DIAGNOSTIC OR CELL [address] ONLY performed by Abe Walsh MD at [address]         Family History   Problem Relation Age of Onset    Colon Cancer Mother     Other Father         house fire    No Known Problems Sister     No Known Problems Brother     No Known Problems Sister     No Known Problems Brother        ROS: UNLESS STATED ABOVE PATIENT DENIES:  CONSTITUTIONAL:  fever, chill, rigors, nausea, vomiting, fatigue. HEENT: blurry vision, double vision, hearing problem, tinnitus, hoarseness, dysphagia, odynophagia  RESPIRATORY: cough, shortness of breath, sputum expectoration. CARDIOVASCULAR:  Chest pain/pressure, palpitation, syncope, irregular beats  GASTROINTESTINAL:  abdominal or rectal pain, diarrhea, constipation, . GENITOURINARY:  Burning, frequency, urgency, incontinence, discharge  INTEGUMENTARY: rash, wound, pruritis  HEMATOLOGIC/LYMPHATIC:  Swelling, sores, gum bleeding, easy bruising, pica.   MUSCULOSKELETAL:  pain, edema, joint swelling or redness  NEUROLOGICAL:  light headed,

## 2023-06-24 ENCOUNTER — APPOINTMENT (OUTPATIENT)
Dept: GENERAL RADIOLOGY | Age: 58
DRG: 133 | End: 2023-06-24
Payer: MEDICAID

## 2023-06-24 ENCOUNTER — FOLLOWUP TELEPHONE ENCOUNTER (OUTPATIENT)
Dept: PALLATIVE CARE | Age: 58
End: 2023-06-24

## 2023-06-24 ENCOUNTER — HOSPITAL ENCOUNTER (INPATIENT)
Age: 58
LOS: 12 days | Discharge: HOME HEALTH | DRG: 133 | End: 2023-07-06
Attending: EMERGENCY MEDICINE | Admitting: STUDENT IN AN ORGANIZED HEALTH CARE EDUCATION/TRAINING PROGRAM
Payer: MEDICAID

## 2023-06-24 DIAGNOSIS — R06.09 DYSPNEA ON MINIMAL EXERTION: ICD-10-CM

## 2023-06-24 DIAGNOSIS — I50.9 ACUTE ON CHRONIC CONGESTIVE HEART FAILURE, UNSPECIFIED HEART FAILURE TYPE (HCC): ICD-10-CM

## 2023-06-24 DIAGNOSIS — J96.02 ACUTE RESPIRATORY FAILURE WITH HYPOXIA AND HYPERCAPNIA (HCC): Primary | ICD-10-CM

## 2023-06-24 DIAGNOSIS — J44.9 END STAGE COPD (HCC): ICD-10-CM

## 2023-06-24 DIAGNOSIS — J96.01 ACUTE RESPIRATORY FAILURE WITH HYPOXIA AND HYPERCAPNIA (HCC): Primary | ICD-10-CM

## 2023-06-24 DIAGNOSIS — G25.81 RLS (RESTLESS LEGS SYNDROME): ICD-10-CM

## 2023-06-24 DIAGNOSIS — I21.4 NSTEMI (NON-ST ELEVATED MYOCARDIAL INFARCTION) (HCC): ICD-10-CM

## 2023-06-24 DIAGNOSIS — Z51.5 PALLIATIVE CARE ENCOUNTER: ICD-10-CM

## 2023-06-24 PROBLEM — J44.1 COPD WITH ACUTE EXACERBATION (HCC): Status: ACTIVE | Noted: 2023-06-24

## 2023-06-24 LAB
ANION GAP SERPL CALCULATED.3IONS-SCNC: 12 MMOL/L (ref 7–16)
B.E.: 6.4 MMOL/L (ref -3–3)
B.E.: 6.9 MMOL/L (ref -3–3)
B.E.: 8.1 MMOL/L (ref -3–3)
BASOPHILS # BLD: 0.02 E9/L (ref 0–0.2)
BASOPHILS NFR BLD: 0.1 % (ref 0–2)
BNP BLD-MCNC: 1997 PG/ML (ref 0–125)
BUN SERPL-MCNC: 13 MG/DL (ref 6–20)
CALCIUM SERPL-MCNC: 8.7 MG/DL (ref 8.6–10.2)
CHLORIDE SERPL-SCNC: 93 MMOL/L (ref 98–107)
CO2 SERPL-SCNC: 35 MMOL/L (ref 22–29)
COHB: 0.5 % (ref 0–1.5)
COHB: 0.6 % (ref 0–1.5)
COHB: 0.8 % (ref 0–1.5)
COMMENT: ABNORMAL
COMMENT: ABNORMAL
CREAT SERPL-MCNC: 0.7 MG/DL (ref 0.7–1.2)
CRITICAL: ABNORMAL
DATE ANALYZED: ABNORMAL
DATE OF COLLECTION: ABNORMAL
EOSINOPHIL # BLD: 0.03 E9/L (ref 0.05–0.5)
EOSINOPHIL NFR BLD: 0.2 % (ref 0–6)
ERYTHROCYTE [DISTWIDTH] IN BLOOD BY AUTOMATED COUNT: 13.8 FL (ref 11.5–15)
FIO2: 50 %
FIO2: 50 %
GLUCOSE SERPL-MCNC: 231 MG/DL (ref 74–99)
HCO3: 34.8 MMOL/L (ref 22–26)
HCO3: 35.7 MMOL/L (ref 22–26)
HCO3: 37.6 MMOL/L (ref 22–26)
HCT VFR BLD AUTO: 37.7 % (ref 37–54)
HGB BLD-MCNC: 11 G/DL (ref 12.5–16.5)
HHB: 2.1 % (ref 0–5)
HHB: 3.1 % (ref 0–5)
HHB: 4.5 % (ref 0–5)
IMM GRANULOCYTES # BLD: 0.09 E9/L
IMM GRANULOCYTES NFR BLD: 0.6 % (ref 0–5)
LAB: ABNORMAL
LYMPHOCYTES # BLD: 0.93 E9/L (ref 1.5–4)
LYMPHOCYTES NFR BLD: 6.6 % (ref 20–42)
Lab: ABNORMAL
MCH RBC QN AUTO: 30.6 PG (ref 26–35)
MCHC RBC AUTO-ENTMCNC: 29.2 % (ref 32–34.5)
MCV RBC AUTO: 104.7 FL (ref 80–99.9)
METHB: 0.3 % (ref 0–1.5)
MODE: ABNORMAL
MONOCYTES # BLD: 0.84 E9/L (ref 0.1–0.95)
MONOCYTES NFR BLD: 6 % (ref 2–12)
NEUTROPHILS # BLD: 12.15 E9/L (ref 1.8–7.3)
NEUTS SEG NFR BLD: 86.5 % (ref 43–80)
O2 CONTENT: 14.9 ML/DL
O2 CONTENT: 15.1 ML/DL
O2 CONTENT: 16.8 ML/DL
O2 SATURATION: 95.5 % (ref 92–98.5)
O2 SATURATION: 96.9 % (ref 92–98.5)
O2 SATURATION: 97.9 % (ref 92–98.5)
O2HB: 94.6 % (ref 94–97)
O2HB: 96.1 % (ref 94–97)
O2HB: 96.8 % (ref 94–97)
OPERATOR ID: 8262
OPERATOR ID: ABNORMAL
OPERATOR ID: ABNORMAL
PATIENT TEMP: 37 C
PCO2: 67 MMHG (ref 35–45)
PCO2: 72.1 MMHG (ref 35–45)
PCO2: 93.4 MMHG (ref 35–45)
PEEP/CPAP: 6 CMH2O
PEEP/CPAP: 6 CMH2O
PFO2: 1.67 MMHG/%
PFO2: 1.93 MMHG/%
PH BLOOD GAS: 7.22 (ref 7.35–7.45)
PH BLOOD GAS: 7.3 (ref 7.35–7.45)
PH BLOOD GAS: 7.34 (ref 7.35–7.45)
PLATELET # BLD AUTO: 219 E9/L (ref 130–450)
PMV BLD AUTO: 10.7 FL (ref 7–12)
PO2: 114.3 MMHG (ref 75–100)
PO2: 83.6 MMHG (ref 75–100)
PO2: 96.6 MMHG (ref 75–100)
POTASSIUM SERPL-SCNC: 4.9 MMOL/L (ref 3.5–5)
RBC # BLD AUTO: 3.6 E12/L (ref 3.8–5.8)
RI(T): 1.91
RI(T): 2.29
RR MECHANICAL: 20 B/MIN
RR MECHANICAL: 20 B/MIN
SARS-COV-2 RDRP RESP QL NAA+PROBE: NOT DETECTED
SODIUM SERPL-SCNC: 140 MMOL/L (ref 132–146)
SOURCE, BLOOD GAS: ABNORMAL
THB: 11.1 G/DL (ref 11.5–16.5)
THB: 11.1 G/DL (ref 11.5–16.5)
THB: 12.2 G/DL (ref 11.5–16.5)
TIME ANALYZED: 1830
TIME ANALYZED: 1942
TIME ANALYZED: 2046
TROPONIN, HIGH SENSITIVITY: 197 NG/L (ref 0–11)
TROPONIN, HIGH SENSITIVITY: 306 NG/L (ref 0–11)
VT MECHANICAL: 450 ML
VT MECHANICAL: 450 ML
WBC # BLD: 14.1 E9/L (ref 4.5–11.5)

## 2023-06-24 PROCEDURE — 96375 TX/PRO/DX INJ NEW DRUG ADDON: CPT

## 2023-06-24 PROCEDURE — 85025 COMPLETE CBC W/AUTO DIFF WBC: CPT

## 2023-06-24 PROCEDURE — 6360000002 HC RX W HCPCS

## 2023-06-24 PROCEDURE — 99285 EMERGENCY DEPT VISIT HI MDM: CPT

## 2023-06-24 PROCEDURE — 6360000002 HC RX W HCPCS: Performed by: STUDENT IN AN ORGANIZED HEALTH CARE EDUCATION/TRAINING PROGRAM

## 2023-06-24 PROCEDURE — 87635 SARS-COV-2 COVID-19 AMP PRB: CPT

## 2023-06-24 PROCEDURE — 94640 AIRWAY INHALATION TREATMENT: CPT

## 2023-06-24 PROCEDURE — 84145 PROCALCITONIN (PCT): CPT

## 2023-06-24 PROCEDURE — 2580000003 HC RX 258: Performed by: STUDENT IN AN ORGANIZED HEALTH CARE EDUCATION/TRAINING PROGRAM

## 2023-06-24 PROCEDURE — 71045 X-RAY EXAM CHEST 1 VIEW: CPT

## 2023-06-24 PROCEDURE — 93005 ELECTROCARDIOGRAM TRACING: CPT

## 2023-06-24 PROCEDURE — 5A09457 ASSISTANCE WITH RESPIRATORY VENTILATION, 24-96 CONSECUTIVE HOURS, CONTINUOUS POSITIVE AIRWAY PRESSURE: ICD-10-PCS | Performed by: STUDENT IN AN ORGANIZED HEALTH CARE EDUCATION/TRAINING PROGRAM

## 2023-06-24 PROCEDURE — 80048 BASIC METABOLIC PNL TOTAL CA: CPT

## 2023-06-24 PROCEDURE — 96372 THER/PROPH/DIAG INJ SC/IM: CPT

## 2023-06-24 PROCEDURE — 96374 THER/PROPH/DIAG INJ IV PUSH: CPT

## 2023-06-24 PROCEDURE — 94660 CPAP INITIATION&MGMT: CPT

## 2023-06-24 PROCEDURE — 99222 1ST HOSP IP/OBS MODERATE 55: CPT | Performed by: STUDENT IN AN ORGANIZED HEALTH CARE EDUCATION/TRAINING PROGRAM

## 2023-06-24 PROCEDURE — 96376 TX/PRO/DX INJ SAME DRUG ADON: CPT

## 2023-06-24 PROCEDURE — 82805 BLOOD GASES W/O2 SATURATION: CPT

## 2023-06-24 PROCEDURE — 84484 ASSAY OF TROPONIN QUANT: CPT

## 2023-06-24 PROCEDURE — 2060000000 HC ICU INTERMEDIATE R&B

## 2023-06-24 PROCEDURE — 83880 ASSAY OF NATRIURETIC PEPTIDE: CPT

## 2023-06-24 RX ORDER — SODIUM CHLORIDE 0.9 % (FLUSH) 0.9 %
5-40 SYRINGE (ML) INJECTION EVERY 12 HOURS SCHEDULED
Status: DISCONTINUED | OUTPATIENT
Start: 2023-06-24 | End: 2023-07-06 | Stop reason: HOSPADM

## 2023-06-24 RX ORDER — SODIUM CHLORIDE, SODIUM LACTATE, POTASSIUM CHLORIDE, CALCIUM CHLORIDE 600; 310; 30; 20 MG/100ML; MG/100ML; MG/100ML; MG/100ML
INJECTION, SOLUTION INTRAVENOUS CONTINUOUS
Status: DISCONTINUED | OUTPATIENT
Start: 2023-06-24 | End: 2023-06-25

## 2023-06-24 RX ORDER — ACETAMINOPHEN 650 MG/1
650 SUPPOSITORY RECTAL EVERY 6 HOURS PRN
Status: DISCONTINUED | OUTPATIENT
Start: 2023-06-24 | End: 2023-07-06 | Stop reason: HOSPADM

## 2023-06-24 RX ORDER — SODIUM CHLORIDE 0.9 % (FLUSH) 0.9 %
5-40 SYRINGE (ML) INJECTION PRN
Status: DISCONTINUED | OUTPATIENT
Start: 2023-06-24 | End: 2023-07-06 | Stop reason: HOSPADM

## 2023-06-24 RX ORDER — IPRATROPIUM BROMIDE AND ALBUTEROL SULFATE 2.5; .5 MG/3ML; MG/3ML
1 SOLUTION RESPIRATORY (INHALATION)
Status: DISCONTINUED | OUTPATIENT
Start: 2023-06-24 | End: 2023-06-24

## 2023-06-24 RX ORDER — LEVALBUTEROL 1.25 MG/.5ML
1.25 SOLUTION, CONCENTRATE RESPIRATORY (INHALATION) ONCE
Status: COMPLETED | OUTPATIENT
Start: 2023-06-24 | End: 2023-06-24

## 2023-06-24 RX ORDER — IPRATROPIUM BROMIDE AND ALBUTEROL SULFATE 2.5; .5 MG/3ML; MG/3ML
1 SOLUTION RESPIRATORY (INHALATION)
Status: DISCONTINUED | OUTPATIENT
Start: 2023-06-24 | End: 2023-07-06 | Stop reason: HOSPADM

## 2023-06-24 RX ORDER — FLUOXETINE 10 MG/1
20 TABLET, FILM COATED ORAL DAILY
Status: DISCONTINUED | OUTPATIENT
Start: 2023-06-25 | End: 2023-07-06 | Stop reason: HOSPADM

## 2023-06-24 RX ORDER — ALBUTEROL SULFATE 2.5 MG/3ML
2.5 SOLUTION RESPIRATORY (INHALATION) EVERY 4 HOURS PRN
Status: DISCONTINUED | OUTPATIENT
Start: 2023-06-24 | End: 2023-07-06 | Stop reason: HOSPADM

## 2023-06-24 RX ORDER — FENTANYL CITRATE 50 UG/ML
50 INJECTION, SOLUTION INTRAMUSCULAR; INTRAVENOUS ONCE
Status: COMPLETED | OUTPATIENT
Start: 2023-06-24 | End: 2023-06-24

## 2023-06-24 RX ORDER — ENOXAPARIN SODIUM 100 MG/ML
1 INJECTION SUBCUTANEOUS EVERY 12 HOURS
Status: DISCONTINUED | OUTPATIENT
Start: 2023-06-24 | End: 2023-07-04

## 2023-06-24 RX ORDER — ARIPIPRAZOLE 5 MG/1
5 TABLET ORAL DAILY
Status: DISCONTINUED | OUTPATIENT
Start: 2023-06-25 | End: 2023-07-06 | Stop reason: HOSPADM

## 2023-06-24 RX ORDER — ARFORMOTEROL TARTRATE 15 UG/2ML
15 SOLUTION RESPIRATORY (INHALATION) 2 TIMES DAILY
Status: DISCONTINUED | OUTPATIENT
Start: 2023-06-24 | End: 2023-07-06 | Stop reason: HOSPADM

## 2023-06-24 RX ORDER — POLYETHYLENE GLYCOL 3350 17 G/17G
17 POWDER, FOR SOLUTION ORAL DAILY PRN
Status: DISCONTINUED | OUTPATIENT
Start: 2023-06-24 | End: 2023-07-06 | Stop reason: HOSPADM

## 2023-06-24 RX ORDER — BUDESONIDE 0.5 MG/2ML
500 INHALANT ORAL 2 TIMES DAILY
Status: DISCONTINUED | OUTPATIENT
Start: 2023-06-24 | End: 2023-07-06 | Stop reason: HOSPADM

## 2023-06-24 RX ORDER — ONDANSETRON 4 MG/1
4 TABLET, ORALLY DISINTEGRATING ORAL EVERY 8 HOURS PRN
Status: DISCONTINUED | OUTPATIENT
Start: 2023-06-24 | End: 2023-07-06 | Stop reason: HOSPADM

## 2023-06-24 RX ORDER — LORAZEPAM 1 MG/1
1 TABLET ORAL EVERY 8 HOURS PRN
Status: DISCONTINUED | OUTPATIENT
Start: 2023-06-24 | End: 2023-06-25

## 2023-06-24 RX ORDER — SODIUM CHLORIDE 9 MG/ML
INJECTION, SOLUTION INTRAVENOUS PRN
Status: DISCONTINUED | OUTPATIENT
Start: 2023-06-24 | End: 2023-07-06 | Stop reason: HOSPADM

## 2023-06-24 RX ORDER — ACETAMINOPHEN 325 MG/1
650 TABLET ORAL EVERY 6 HOURS PRN
Status: DISCONTINUED | OUTPATIENT
Start: 2023-06-24 | End: 2023-07-06 | Stop reason: HOSPADM

## 2023-06-24 RX ORDER — HYDROXYZINE PAMOATE 25 MG/1
25 CAPSULE ORAL 3 TIMES DAILY PRN
Status: DISCONTINUED | OUTPATIENT
Start: 2023-06-24 | End: 2023-06-25

## 2023-06-24 RX ORDER — METHYLPREDNISOLONE SODIUM SUCCINATE 40 MG/ML
40 INJECTION, POWDER, LYOPHILIZED, FOR SOLUTION INTRAMUSCULAR; INTRAVENOUS EVERY 8 HOURS
Status: DISCONTINUED | OUTPATIENT
Start: 2023-06-25 | End: 2023-06-29

## 2023-06-24 RX ORDER — PANTOPRAZOLE SODIUM 40 MG/1
40 TABLET, DELAYED RELEASE ORAL
Status: DISCONTINUED | OUTPATIENT
Start: 2023-06-25 | End: 2023-07-06 | Stop reason: HOSPADM

## 2023-06-24 RX ORDER — FUROSEMIDE 10 MG/ML
40 INJECTION INTRAMUSCULAR; INTRAVENOUS ONCE
Status: COMPLETED | OUTPATIENT
Start: 2023-06-24 | End: 2023-06-24

## 2023-06-24 RX ORDER — GABAPENTIN 300 MG/1
300 CAPSULE ORAL 3 TIMES DAILY
Status: DISCONTINUED | OUTPATIENT
Start: 2023-06-24 | End: 2023-07-06 | Stop reason: HOSPADM

## 2023-06-24 RX ORDER — MAGNESIUM SULFATE IN WATER 40 MG/ML
2000 INJECTION, SOLUTION INTRAVENOUS ONCE
Status: COMPLETED | OUTPATIENT
Start: 2023-06-24 | End: 2023-06-24

## 2023-06-24 RX ORDER — FUROSEMIDE 40 MG/1
40 TABLET ORAL DAILY
Status: DISCONTINUED | OUTPATIENT
Start: 2023-06-25 | End: 2023-07-06 | Stop reason: HOSPADM

## 2023-06-24 RX ORDER — ONDANSETRON 2 MG/ML
4 INJECTION INTRAMUSCULAR; INTRAVENOUS EVERY 6 HOURS PRN
Status: DISCONTINUED | OUTPATIENT
Start: 2023-06-24 | End: 2023-07-06 | Stop reason: HOSPADM

## 2023-06-24 RX ADMIN — FENTANYL CITRATE 50 MCG: 50 INJECTION, SOLUTION INTRAMUSCULAR; INTRAVENOUS at 19:59

## 2023-06-24 RX ADMIN — ENOXAPARIN SODIUM 60 MG: 100 INJECTION SUBCUTANEOUS at 21:26

## 2023-06-24 RX ADMIN — MAGNESIUM SULFATE HEPTAHYDRATE 2000 MG: 40 INJECTION, SOLUTION INTRAVENOUS at 18:44

## 2023-06-24 RX ADMIN — FENTANYL CITRATE 50 MCG: 50 INJECTION, SOLUTION INTRAMUSCULAR; INTRAVENOUS at 22:01

## 2023-06-24 RX ADMIN — SODIUM CHLORIDE, POTASSIUM CHLORIDE, SODIUM LACTATE AND CALCIUM CHLORIDE: 600; 310; 30; 20 INJECTION, SOLUTION INTRAVENOUS at 23:44

## 2023-06-24 RX ADMIN — METHYLPREDNISOLONE SODIUM SUCCINATE 40 MG: 40 INJECTION, POWDER, FOR SOLUTION INTRAMUSCULAR; INTRAVENOUS at 23:38

## 2023-06-24 RX ADMIN — FUROSEMIDE 40 MG: 10 INJECTION, SOLUTION INTRAMUSCULAR; INTRAVENOUS at 19:24

## 2023-06-24 RX ADMIN — LEVALBUTEROL 1.25 MG: 1.25 SOLUTION, CONCENTRATE RESPIRATORY (INHALATION) at 18:36

## 2023-06-24 ASSESSMENT — PAIN DESCRIPTION - DESCRIPTORS
DESCRIPTORS: CRAMPING

## 2023-06-24 ASSESSMENT — PAIN SCALES - GENERAL
PAINLEVEL_OUTOF10: 2
PAINLEVEL_OUTOF10: 7
PAINLEVEL_OUTOF10: 9
PAINLEVEL_OUTOF10: 0
PAINLEVEL_OUTOF10: 9

## 2023-06-24 ASSESSMENT — PAIN DESCRIPTION - LOCATION
LOCATION: LEG

## 2023-06-24 ASSESSMENT — PAIN DESCRIPTION - ORIENTATION
ORIENTATION: LEFT;RIGHT
ORIENTATION: RIGHT;LEFT

## 2023-06-24 ASSESSMENT — PAIN - FUNCTIONAL ASSESSMENT
PAIN_FUNCTIONAL_ASSESSMENT: 0-10
PAIN_FUNCTIONAL_ASSESSMENT: NONE - DENIES PAIN
PAIN_FUNCTIONAL_ASSESSMENT: 0-10
PAIN_FUNCTIONAL_ASSESSMENT: 0-10

## 2023-06-24 ASSESSMENT — PAIN DESCRIPTION - PAIN TYPE
TYPE: CHRONIC PAIN
TYPE: CHRONIC PAIN

## 2023-06-24 NOTE — TELEPHONE ENCOUNTER
Call received from patients family member that patient was having difficulty breathing and his oxygen was 72%. Advised patients sister to call 911 for patient to be taken to the hospital for further evaluation and medical management.  His sister states that they are driving because they [facility] patient [facility]

## 2023-06-25 PROBLEM — J96.01 ACUTE RESPIRATORY FAILURE WITH HYPOXIA AND HYPERCAPNIA (HCC): Status: ACTIVE | Noted: 2023-06-25

## 2023-06-25 PROBLEM — I21.4 NSTEMI (NON-ST ELEVATED MYOCARDIAL INFARCTION) (HCC): Status: ACTIVE | Noted: 2023-06-25

## 2023-06-25 PROBLEM — J96.02 ACUTE RESPIRATORY FAILURE WITH HYPOXIA AND HYPERCAPNIA (HCC): Status: ACTIVE | Noted: 2023-06-25

## 2023-06-25 LAB
ANION GAP SERPL CALCULATED.3IONS-SCNC: 12 MMOL/L (ref 7–16)
ANION GAP SERPL CALCULATED.3IONS-SCNC: 14 MMOL/L (ref 7–16)
B PARAP IS1001 DNA NPH QL NAA+NON-PROBE: NOT DETECTED
B PERT.PT PRMT NPH QL NAA+NON-PROBE: NOT DETECTED
B.E.: 11 MMOL/L (ref -3–3)
B.E.: 13.9 MMOL/L (ref -3–3)
BASOPHILS # BLD: 0 E9/L (ref 0–0.2)
BASOPHILS NFR BLD: 0 % (ref 0–2)
BUN SERPL-MCNC: 16 MG/DL (ref 6–20)
BUN SERPL-MCNC: 20 MG/DL (ref 6–20)
C PNEUM DNA NPH QL NAA+NON-PROBE: NOT DETECTED
CALCIUM SERPL-MCNC: 8.4 MG/DL (ref 8.6–10.2)
CALCIUM SERPL-MCNC: 8.8 MG/DL (ref 8.6–10.2)
CHLORIDE SERPL-SCNC: 92 MMOL/L (ref 98–107)
CHLORIDE SERPL-SCNC: 94 MMOL/L (ref 98–107)
CHOLESTEROL, FASTING: 147 MG/DL (ref 0–199)
CO2 SERPL-SCNC: 35 MMOL/L (ref 22–29)
CO2 SERPL-SCNC: 36 MMOL/L (ref 22–29)
COHB: 0.5 % (ref 0–1.5)
COHB: 0.6 % (ref 0–1.5)
CREAT SERPL-MCNC: 0.7 MG/DL (ref 0.7–1.2)
CREAT SERPL-MCNC: 0.7 MG/DL (ref 0.7–1.2)
CRITICAL: ABNORMAL
CRITICAL: ABNORMAL
DATE ANALYZED: ABNORMAL
DATE ANALYZED: ABNORMAL
DATE OF COLLECTION: ABNORMAL
DATE OF COLLECTION: ABNORMAL
EKG ATRIAL RATE: 117 BPM
EKG ATRIAL RATE: 133 BPM
EKG P AXIS: 73 DEGREES
EKG P AXIS: 77 DEGREES
EKG P-R INTERVAL: 120 MS
EKG P-R INTERVAL: 132 MS
EKG Q-T INTERVAL: 292 MS
EKG Q-T INTERVAL: 326 MS
EKG QRS DURATION: 62 MS
EKG QRS DURATION: 76 MS
EKG QTC CALCULATION (BAZETT): 434 MS
EKG QTC CALCULATION (BAZETT): 454 MS
EKG R AXIS: 77 DEGREES
EKG R AXIS: 86 DEGREES
EKG T AXIS: 79 DEGREES
EKG T AXIS: 81 DEGREES
EKG VENTRICULAR RATE: 117 BPM
EKG VENTRICULAR RATE: 133 BPM
EOSINOPHIL # BLD: 0 E9/L (ref 0.05–0.5)
EOSINOPHIL NFR BLD: 0 % (ref 0–6)
ERYTHROCYTE [DISTWIDTH] IN BLOOD BY AUTOMATED COUNT: 14.1 FL (ref 11.5–15)
FIO2: 50 %
FIO2: 50 %
FLUAV RNA NPH QL NAA+NON-PROBE: NOT DETECTED
FLUBV RNA NPH QL NAA+NON-PROBE: NOT DETECTED
GLUCOSE SERPL-MCNC: 144 MG/DL (ref 74–99)
GLUCOSE SERPL-MCNC: 162 MG/DL (ref 74–99)
HADV DNA NPH QL NAA+NON-PROBE: NOT DETECTED
HCO3: 38.5 MMOL/L (ref 22–26)
HCO3: 42.1 MMOL/L (ref 22–26)
HCOV 229E RNA NPH QL NAA+NON-PROBE: NOT DETECTED
HCOV HKU1 RNA NPH QL NAA+NON-PROBE: NOT DETECTED
HCOV NL63 RNA NPH QL NAA+NON-PROBE: NOT DETECTED
HCOV OC43 RNA NPH QL NAA+NON-PROBE: NOT DETECTED
HCT VFR BLD AUTO: 33.2 % (ref 37–54)
HDLC SERPL-MCNC: 51 MG/DL
HGB BLD-MCNC: 9.8 G/DL (ref 12.5–16.5)
HHB: 3.2 % (ref 0–5)
HHB: 4.6 % (ref 0–5)
HMPV RNA NPH QL NAA+NON-PROBE: NOT DETECTED
HPIV1 RNA NPH QL NAA+NON-PROBE: NOT DETECTED
HPIV2 RNA NPH QL NAA+NON-PROBE: NOT DETECTED
HPIV3 RNA NPH QL NAA+NON-PROBE: DETECTED
HPIV4 RNA NPH QL NAA+NON-PROBE: NOT DETECTED
HYPOCHROMIA: ABNORMAL
IMM GRANULOCYTES # BLD: 0.03 E9/L
IMM GRANULOCYTES NFR BLD: 0.5 % (ref 0–5)
LAB: ABNORMAL
LAB: ABNORMAL
LDL CHOLESTEROL CALCULATED: 82 MG/DL (ref 0–99)
LYMPHOCYTES # BLD: 0.28 E9/L (ref 1.5–4)
LYMPHOCYTES NFR BLD: 5.1 % (ref 20–42)
Lab: ABNORMAL
Lab: ABNORMAL
M PNEUMO DNA NPH QL NAA+NON-PROBE: NOT DETECTED
MCH RBC QN AUTO: 29.8 PG (ref 26–35)
MCHC RBC AUTO-ENTMCNC: 29.5 % (ref 32–34.5)
MCV RBC AUTO: 100.9 FL (ref 80–99.9)
METHB: 0.3 % (ref 0–1.5)
METHB: 0.3 % (ref 0–1.5)
MODE: ABNORMAL
MODE: ABNORMAL
MONOCYTES # BLD: 0.07 E9/L (ref 0.1–0.95)
MONOCYTES NFR BLD: 1.3 % (ref 2–12)
NEUTROPHILS # BLD: 5.15 E9/L (ref 1.8–7.3)
NEUTS SEG NFR BLD: 93.1 % (ref 43–80)
O2 CONTENT: 15 ML/DL
O2 CONTENT: 15.6 ML/DL
O2 SATURATION: 95.4 % (ref 92–98.5)
O2 SATURATION: 96.8 % (ref 92–98.5)
O2HB: 94.5 % (ref 94–97)
O2HB: 96 % (ref 94–97)
OPERATOR ID: 2485
OPERATOR ID: 7294
PATIENT TEMP: 37 C
PATIENT TEMP: 37 C
PCO2: 67.2 MMHG (ref 35–45)
PCO2: 75.4 MMHG (ref 35–45)
PEEP/CPAP: 6 CMH2O
PEEP/CPAP: 6 CMH2O
PFO2: 1.59 MMHG/%
PFO2: 1.85 MMHG/%
PH BLOOD GAS: 7.37 (ref 7.35–7.45)
PH BLOOD GAS: 7.38 (ref 7.35–7.45)
PLATELET # BLD AUTO: 113 E9/L (ref 130–450)
PMV BLD AUTO: 11.7 FL (ref 7–12)
PO2: 79.5 MMHG (ref 75–100)
PO2: 92.5 MMHG (ref 75–100)
POIKILOCYTES: ABNORMAL
POLYCHROMASIA: ABNORMAL
POTASSIUM SERPL-SCNC: 4.5 MMOL/L (ref 3.5–5)
POTASSIUM SERPL-SCNC: 5.5 MMOL/L (ref 3.5–5)
PROCALCITONIN: 0.26 NG/ML (ref 0–0.08)
PROCALCITONIN: 0.41 NG/ML (ref 0–0.08)
RBC # BLD AUTO: 3.29 E12/L (ref 3.8–5.8)
REASON FOR REJECTION: NORMAL
REASON FOR REJECTION: NORMAL
REJECTED TEST: NORMAL
REJECTED TEST: NORMAL
RI(T): 2.04
RI(T): 2.42
RR MECHANICAL: 20 B/MIN
RR MECHANICAL: 20 B/MIN
RSV RNA NPH QL NAA+NON-PROBE: NOT DETECTED
RV+EV RNA NPH QL NAA+NON-PROBE: NOT DETECTED
SARS-COV-2 RNA NPH QL NAA+NON-PROBE: NOT DETECTED
SODIUM SERPL-SCNC: 139 MMOL/L (ref 132–146)
SODIUM SERPL-SCNC: 144 MMOL/L (ref 132–146)
SOURCE, BLOOD GAS: ABNORMAL
SOURCE, BLOOD GAS: ABNORMAL
STOMATOCYTES: ABNORMAL
THB: 11.2 G/DL (ref 11.5–16.5)
THB: 11.5 G/DL (ref 11.5–16.5)
TIME ANALYZED: 232
TIME ANALYZED: 932
TRIGLYCERIDE, FASTING: 69 MG/DL (ref 0–149)
TROPONIN, HIGH SENSITIVITY: 262 NG/L (ref 0–11)
TROPONIN, HIGH SENSITIVITY: 321 NG/L (ref 0–11)
TROPONIN, HIGH SENSITIVITY: 354 NG/L (ref 0–11)
VLDLC SERPL CALC-MCNC: 14 MG/DL
VT MECHANICAL: 450 ML
VT MECHANICAL: 450 ML
WBC # BLD: 5.5 E9/L (ref 4.5–11.5)

## 2023-06-25 PROCEDURE — 2700000000 HC OXYGEN THERAPY PER DAY

## 2023-06-25 PROCEDURE — 99254 IP/OBS CNSLTJ NEW/EST MOD 60: CPT | Performed by: INTERNAL MEDICINE

## 2023-06-25 PROCEDURE — 6360000002 HC RX W HCPCS: Performed by: NURSE PRACTITIONER

## 2023-06-25 PROCEDURE — 93010 ELECTROCARDIOGRAM REPORT: CPT | Performed by: INTERNAL MEDICINE

## 2023-06-25 PROCEDURE — 87040 BLOOD CULTURE FOR BACTERIA: CPT

## 2023-06-25 PROCEDURE — 80061 LIPID PANEL: CPT

## 2023-06-25 PROCEDURE — 6360000002 HC RX W HCPCS: Performed by: STUDENT IN AN ORGANIZED HEALTH CARE EDUCATION/TRAINING PROGRAM

## 2023-06-25 PROCEDURE — 94660 CPAP INITIATION&MGMT: CPT

## 2023-06-25 PROCEDURE — 84145 PROCALCITONIN (PCT): CPT

## 2023-06-25 PROCEDURE — 80048 BASIC METABOLIC PNL TOTAL CA: CPT

## 2023-06-25 PROCEDURE — 6370000000 HC RX 637 (ALT 250 FOR IP): Performed by: STUDENT IN AN ORGANIZED HEALTH CARE EDUCATION/TRAINING PROGRAM

## 2023-06-25 PROCEDURE — 2500000003 HC RX 250 WO HCPCS: Performed by: INTERNAL MEDICINE

## 2023-06-25 PROCEDURE — 87150 DNA/RNA AMPLIFIED PROBE: CPT

## 2023-06-25 PROCEDURE — 6360000002 HC RX W HCPCS

## 2023-06-25 PROCEDURE — 2580000003 HC RX 258: Performed by: STUDENT IN AN ORGANIZED HEALTH CARE EDUCATION/TRAINING PROGRAM

## 2023-06-25 PROCEDURE — 36415 COLL VENOUS BLD VENIPUNCTURE: CPT

## 2023-06-25 PROCEDURE — 85025 COMPLETE CBC W/AUTO DIFF WBC: CPT

## 2023-06-25 PROCEDURE — 2580000003 HC RX 258: Performed by: INTERNAL MEDICINE

## 2023-06-25 PROCEDURE — 94640 AIRWAY INHALATION TREATMENT: CPT

## 2023-06-25 PROCEDURE — 87449 NOS EACH ORGANISM AG IA: CPT

## 2023-06-25 PROCEDURE — 2060000000 HC ICU INTERMEDIATE R&B

## 2023-06-25 PROCEDURE — 99233 SBSQ HOSP IP/OBS HIGH 50: CPT | Performed by: INTERNAL MEDICINE

## 2023-06-25 PROCEDURE — APPSS60 APP SPLIT SHARED TIME 46-60 MINUTES

## 2023-06-25 PROCEDURE — 6370000000 HC RX 637 (ALT 250 FOR IP): Performed by: INTERNAL MEDICINE

## 2023-06-25 PROCEDURE — 82805 BLOOD GASES W/O2 SATURATION: CPT

## 2023-06-25 PROCEDURE — 84484 ASSAY OF TROPONIN QUANT: CPT

## 2023-06-25 PROCEDURE — 0202U NFCT DS 22 TRGT SARS-COV-2: CPT

## 2023-06-25 PROCEDURE — 6360000002 HC RX W HCPCS: Performed by: INTERNAL MEDICINE

## 2023-06-25 PROCEDURE — 87081 CULTURE SCREEN ONLY: CPT

## 2023-06-25 RX ORDER — LORAZEPAM 2 MG/ML
0.5 INJECTION INTRAMUSCULAR ONCE
Status: COMPLETED | OUTPATIENT
Start: 2023-06-25 | End: 2023-06-25

## 2023-06-25 RX ORDER — HYDROXYZINE HYDROCHLORIDE 50 MG/ML
50 INJECTION, SOLUTION INTRAMUSCULAR EVERY 6 HOURS PRN
Status: DISCONTINUED | OUTPATIENT
Start: 2023-06-25 | End: 2023-06-29

## 2023-06-25 RX ORDER — LORAZEPAM 2 MG/ML
1 INJECTION INTRAMUSCULAR ONCE
Status: COMPLETED | OUTPATIENT
Start: 2023-06-25 | End: 2023-06-25

## 2023-06-25 RX ORDER — MORPHINE SULFATE 2 MG/ML
2 INJECTION, SOLUTION INTRAMUSCULAR; INTRAVENOUS EVERY 4 HOURS PRN
Status: DISCONTINUED | OUTPATIENT
Start: 2023-06-25 | End: 2023-06-29

## 2023-06-25 RX ORDER — ASPIRIN 300 MG/1
600 SUPPOSITORY RECTAL DAILY
Status: DISCONTINUED | OUTPATIENT
Start: 2023-06-25 | End: 2023-06-26

## 2023-06-25 RX ORDER — ATORVASTATIN CALCIUM 40 MG/1
40 TABLET, FILM COATED ORAL NIGHTLY
Status: DISCONTINUED | OUTPATIENT
Start: 2023-06-25 | End: 2023-07-06 | Stop reason: HOSPADM

## 2023-06-25 RX ORDER — CLOPIDOGREL BISULFATE 75 MG/1
300 TABLET ORAL ONCE
Status: DISCONTINUED | OUTPATIENT
Start: 2023-06-25 | End: 2023-06-25

## 2023-06-25 RX ORDER — CLOPIDOGREL BISULFATE 75 MG/1
75 TABLET ORAL DAILY
Status: DISCONTINUED | OUTPATIENT
Start: 2023-06-26 | End: 2023-06-25

## 2023-06-25 RX ORDER — LORAZEPAM 2 MG/ML
0.5 INJECTION INTRAMUSCULAR EVERY 8 HOURS PRN
Status: DISCONTINUED | OUTPATIENT
Start: 2023-06-25 | End: 2023-06-27

## 2023-06-25 RX ORDER — MORPHINE SULFATE 2 MG/ML
2 INJECTION, SOLUTION INTRAMUSCULAR; INTRAVENOUS ONCE
Status: COMPLETED | OUTPATIENT
Start: 2023-06-25 | End: 2023-06-25

## 2023-06-25 RX ADMIN — METHYLPREDNISOLONE SODIUM SUCCINATE 40 MG: 40 INJECTION, POWDER, FOR SOLUTION INTRAMUSCULAR; INTRAVENOUS at 08:41

## 2023-06-25 RX ADMIN — IPRATROPIUM BROMIDE AND ALBUTEROL SULFATE 1 DOSE: .5; 2.5 SOLUTION RESPIRATORY (INHALATION) at 16:02

## 2023-06-25 RX ADMIN — ASPIRIN 600 MG: 300 SUPPOSITORY RECTAL at 12:14

## 2023-06-25 RX ADMIN — BUDESONIDE INHALATION 500 MCG: 0.5 SUSPENSION RESPIRATORY (INHALATION) at 08:42

## 2023-06-25 RX ADMIN — MORPHINE SULFATE 2 MG: 2 INJECTION, SOLUTION INTRAMUSCULAR; INTRAVENOUS at 12:22

## 2023-06-25 RX ADMIN — ENOXAPARIN SODIUM 60 MG: 100 INJECTION SUBCUTANEOUS at 08:50

## 2023-06-25 RX ADMIN — MORPHINE SULFATE 2 MG: 2 INJECTION, SOLUTION INTRAMUSCULAR; INTRAVENOUS at 21:10

## 2023-06-25 RX ADMIN — ARFORMOTEROL TARTRATE 15 MCG: 15 SOLUTION RESPIRATORY (INHALATION) at 08:42

## 2023-06-25 RX ADMIN — SODIUM CHLORIDE, PRESERVATIVE FREE 10 ML: 5 INJECTION INTRAVENOUS at 08:41

## 2023-06-25 RX ADMIN — HYDROXYZINE HYDROCHLORIDE 50 MG: 50 INJECTION, SOLUTION INTRAMUSCULAR at 21:09

## 2023-06-25 RX ADMIN — ARFORMOTEROL TARTRATE 15 MCG: 15 SOLUTION RESPIRATORY (INHALATION) at 20:22

## 2023-06-25 RX ADMIN — METHYLPREDNISOLONE SODIUM SUCCINATE 40 MG: 40 INJECTION, POWDER, FOR SOLUTION INTRAMUSCULAR; INTRAVENOUS at 17:19

## 2023-06-25 RX ADMIN — PIPERACILLIN AND TAZOBACTAM 4500 MG: 4; .5 INJECTION, POWDER, LYOPHILIZED, FOR SOLUTION INTRAVENOUS at 21:18

## 2023-06-25 RX ADMIN — MORPHINE SULFATE 2 MG: 2 INJECTION, SOLUTION INTRAMUSCULAR; INTRAVENOUS at 01:58

## 2023-06-25 RX ADMIN — MORPHINE SULFATE 2 MG: 2 INJECTION, SOLUTION INTRAMUSCULAR; INTRAVENOUS at 06:42

## 2023-06-25 RX ADMIN — LORAZEPAM 0.5 MG: 2 INJECTION INTRAMUSCULAR; INTRAVENOUS at 09:17

## 2023-06-25 RX ADMIN — MORPHINE SULFATE 2 MG: 2 INJECTION, SOLUTION INTRAMUSCULAR; INTRAVENOUS at 17:12

## 2023-06-25 RX ADMIN — SODIUM CHLORIDE, PRESERVATIVE FREE 10 ML: 5 INJECTION INTRAVENOUS at 17:19

## 2023-06-25 RX ADMIN — IPRATROPIUM BROMIDE AND ALBUTEROL SULFATE 1 DOSE: .5; 2.5 SOLUTION RESPIRATORY (INHALATION) at 08:42

## 2023-06-25 RX ADMIN — IPRATROPIUM BROMIDE AND ALBUTEROL SULFATE 1 DOSE: .5; 2.5 SOLUTION RESPIRATORY (INHALATION) at 00:54

## 2023-06-25 RX ADMIN — BUDESONIDE INHALATION 500 MCG: 0.5 SUSPENSION RESPIRATORY (INHALATION) at 00:54

## 2023-06-25 RX ADMIN — ENOXAPARIN SODIUM 60 MG: 100 INJECTION SUBCUTANEOUS at 20:00

## 2023-06-25 RX ADMIN — LORAZEPAM 0.5 MG: 2 INJECTION INTRAMUSCULAR; INTRAVENOUS at 09:18

## 2023-06-25 RX ADMIN — LORAZEPAM 0.5 MG: 2 INJECTION INTRAMUSCULAR; INTRAVENOUS at 19:38

## 2023-06-25 RX ADMIN — DOXYCYCLINE 100 MG: 100 INJECTION, POWDER, LYOPHILIZED, FOR SOLUTION INTRAVENOUS at 12:44

## 2023-06-25 RX ADMIN — LORAZEPAM 1 MG: 2 INJECTION INTRAMUSCULAR; INTRAVENOUS at 13:22

## 2023-06-25 RX ADMIN — IPRATROPIUM BROMIDE AND ALBUTEROL SULFATE 1 DOSE: .5; 2.5 SOLUTION RESPIRATORY (INHALATION) at 20:22

## 2023-06-25 RX ADMIN — BUDESONIDE INHALATION 500 MCG: 0.5 SUSPENSION RESPIRATORY (INHALATION) at 20:22

## 2023-06-25 RX ADMIN — SODIUM CHLORIDE, PRESERVATIVE FREE 10 ML: 5 INJECTION INTRAVENOUS at 19:39

## 2023-06-25 RX ADMIN — IPRATROPIUM BROMIDE AND ALBUTEROL SULFATE 1 DOSE: .5; 2.5 SOLUTION RESPIRATORY (INHALATION) at 12:18

## 2023-06-25 RX ADMIN — ARFORMOTEROL TARTRATE 15 MCG: 15 SOLUTION RESPIRATORY (INHALATION) at 00:54

## 2023-06-25 RX ADMIN — PIPERACILLIN AND TAZOBACTAM 4500 MG: 4; .5 INJECTION, POWDER, LYOPHILIZED, FOR SOLUTION INTRAVENOUS at 13:53

## 2023-06-25 ASSESSMENT — PAIN SCALES - GENERAL
PAINLEVEL_OUTOF10: 8
PAINLEVEL_OUTOF10: 7
PAINLEVEL_OUTOF10: 0
PAINLEVEL_OUTOF10: 8
PAINLEVEL_OUTOF10: 7
PAINLEVEL_OUTOF10: 7

## 2023-06-25 ASSESSMENT — PAIN DESCRIPTION - DESCRIPTORS: DESCRIPTORS: DISCOMFORT;CRAMPING

## 2023-06-25 ASSESSMENT — PAIN DESCRIPTION - LOCATION
LOCATION: BACK
LOCATION: BACK;LEG

## 2023-06-25 ASSESSMENT — PAIN DESCRIPTION - PAIN TYPE: TYPE: CHRONIC PAIN

## 2023-06-26 LAB
A BAUMANNII DNA BLD POS QL NAA+NON-PROBE: NOT DETECTED
ANION GAP SERPL CALCULATED.3IONS-SCNC: 11 MMOL/L (ref 7–16)
BASOPHILS # BLD: 0 E9/L (ref 0–0.2)
BASOPHILS NFR BLD: 0 % (ref 0–2)
BOTTLE TYPE: ABNORMAL
BUN SERPL-MCNC: 28 MG/DL (ref 6–20)
C ALBICANS DNA BLD POS QL NAA+NON-PROBE: NOT DETECTED
C AURIS DNA BLD POS QL NAA+PROBE: NOT DETECTED
C GLABRATA DNA BLD POS QL NAA+NON-PROBE: NOT DETECTED
C KRUSEI DNA BLD POS QL NAA+NON-PROBE: NOT DETECTED
C PARAP DNA BLD POS QL NAA+NON-PROBE: NOT DETECTED
C TROPICLS DNA BLD POS QL NAA+NON-PROBE: NOT DETECTED
CALCIUM SERPL-MCNC: 8.4 MG/DL (ref 8.6–10.2)
CHLORIDE SERPL-SCNC: 95 MMOL/L (ref 98–107)
CO2 SERPL-SCNC: 35 MMOL/L (ref 22–29)
CREAT SERPL-MCNC: 0.6 MG/DL (ref 0.7–1.2)
CRYPTOCOCCUS NEOFORMANS/GATTII BY PCR: NOT DETECTED
E CLOAC COMP DNA BLD POS NAA+NON-PROBE: NOT DETECTED
E COLI DNA BLD POS QL NAA+NON-PROBE: NOT DETECTED
E FAECALIS DNA BLD POS QL NAA+PROBE: NOT DETECTED
E FAECIUM DNA BLD POS QL NAA+PROBE: NOT DETECTED
ENTEROBACT DNA BLD POS QL NAA+NON-PROBE: NOT DETECTED
ENTEROCOC DNA BLD POS QL NAA+NON-PROBE: NOT DETECTED
EOSINOPHIL # BLD: 0 E9/L (ref 0.05–0.5)
EOSINOPHIL NFR BLD: 0 % (ref 0–6)
ERYTHROCYTE [DISTWIDTH] IN BLOOD BY AUTOMATED COUNT: 14.3 FL (ref 11.5–15)
GLUCOSE SERPL-MCNC: 141 MG/DL (ref 74–99)
GN BLD CULTURE PNL BLD POS NAA+PROBE: NOT DETECTED
HCT VFR BLD AUTO: 35.1 % (ref 37–54)
HGB BLD-MCNC: 10.8 G/DL (ref 12.5–16.5)
IMM GRANULOCYTES # BLD: 0.02 E9/L
IMM GRANULOCYTES NFR BLD: 0.4 % (ref 0–5)
K OXYTOCA DNA BLD POS QL NAA+NON-PROBE: NOT DETECTED
K PNEUMON DNA SPEC QL NAA+PROBE: NOT DETECTED
K. AEROGENES DNA SPEC QL NAA+PROBE: NOT DETECTED
L MONOCYTOG DNA BLD POS QL NAA+NON-PROBE: NOT DETECTED
LEGIONELLA AG UR QL: NORMAL
LYMPHOCYTES # BLD: 0.31 E9/L (ref 1.5–4)
LYMPHOCYTES NFR BLD: 6.2 % (ref 20–42)
MCH RBC QN AUTO: 30.8 PG (ref 26–35)
MCHC RBC AUTO-ENTMCNC: 30.8 % (ref 32–34.5)
MCV RBC AUTO: 100 FL (ref 80–99.9)
MONOCYTES # BLD: 0.22 E9/L (ref 0.1–0.95)
MONOCYTES NFR BLD: 4.4 % (ref 2–12)
N MEN DNA BLD POS QL NAA+NON-PROBE: NOT DETECTED
NEUTROPHILS # BLD: 4.43 E9/L (ref 1.8–7.3)
NEUTS SEG NFR BLD: 89 % (ref 43–80)
ORDER NUMBER: ABNORMAL
P AERUGINOSA DNA BLD POS NAA+NON-PROBE: NOT DETECTED
PLATELET # BLD AUTO: 189 E9/L (ref 130–450)
PMV BLD AUTO: 11.3 FL (ref 7–12)
POTASSIUM SERPL-SCNC: 4.5 MMOL/L (ref 3.5–5)
PROTEUS SP DNA BLD POS QL NAA+NON-PROBE: NOT DETECTED
RBC # BLD AUTO: 3.51 E12/L (ref 3.8–5.8)
RBC MORPH BLD: NORMAL
S AUREUS DNA BLD POS QL NAA+NON-PROBE: NOT DETECTED
S AUREUS+CONS DNA BLD POS NAA+NON-PROBE: DETECTED
S EPIDERMIDIS DNA BLD POS QL NAA+PROBE: NOT DETECTED
S LUGDUNENSIS DNA BLD POS QL NAA+PROBE: NOT DETECTED
S MALTOPH DNA BLD POS QL NAA+PROBE: NOT DETECTED
S MARCESCENS DNA BLD POS NAA+NON-PROBE: NOT DETECTED
S PNEUM AG SPEC QL: NORMAL
S PNEUM DNA BLD POS QL NAA+NON-PROBE: NOT DETECTED
S PYO DNA BLD POS QL NAA+NON-PROBE: NOT DETECTED
SALMONELLA DNA BLD POS QL NAA+PROBE: NOT DETECTED
SODIUM SERPL-SCNC: 141 MMOL/L (ref 132–146)
SOURCE OF BLOOD CULTURE: ABNORMAL
STREPTOCOCCUS AGALACTIAE BY PCR: NOT DETECTED
STREPTOCOCCUS DNA BLD POS NAA+NON-PROBE: NOT DETECTED
TROPONIN, HIGH SENSITIVITY: 130 NG/L (ref 0–11)
TROPONIN, HIGH SENSITIVITY: 149 NG/L (ref 0–11)
TROPONIN, HIGH SENSITIVITY: 194 NG/L (ref 0–11)
TROPONIN, HIGH SENSITIVITY: 83 NG/L (ref 0–11)
WBC # BLD: 5 E9/L (ref 4.5–11.5)

## 2023-06-26 PROCEDURE — 2580000003 HC RX 258: Performed by: INTERNAL MEDICINE

## 2023-06-26 PROCEDURE — 2500000003 HC RX 250 WO HCPCS: Performed by: INTERNAL MEDICINE

## 2023-06-26 PROCEDURE — 6360000002 HC RX W HCPCS

## 2023-06-26 PROCEDURE — 6360000002 HC RX W HCPCS: Performed by: INTERNAL MEDICINE

## 2023-06-26 PROCEDURE — 6370000000 HC RX 637 (ALT 250 FOR IP): Performed by: STUDENT IN AN ORGANIZED HEALTH CARE EDUCATION/TRAINING PROGRAM

## 2023-06-26 PROCEDURE — 2060000000 HC ICU INTERMEDIATE R&B

## 2023-06-26 PROCEDURE — 6360000002 HC RX W HCPCS: Performed by: STUDENT IN AN ORGANIZED HEALTH CARE EDUCATION/TRAINING PROGRAM

## 2023-06-26 PROCEDURE — 94660 CPAP INITIATION&MGMT: CPT

## 2023-06-26 PROCEDURE — 36415 COLL VENOUS BLD VENIPUNCTURE: CPT

## 2023-06-26 PROCEDURE — 2580000003 HC RX 258: Performed by: STUDENT IN AN ORGANIZED HEALTH CARE EDUCATION/TRAINING PROGRAM

## 2023-06-26 PROCEDURE — 6360000002 HC RX W HCPCS: Performed by: NURSE PRACTITIONER

## 2023-06-26 PROCEDURE — 99233 SBSQ HOSP IP/OBS HIGH 50: CPT | Performed by: INTERNAL MEDICINE

## 2023-06-26 PROCEDURE — 80048 BASIC METABOLIC PNL TOTAL CA: CPT

## 2023-06-26 PROCEDURE — 84484 ASSAY OF TROPONIN QUANT: CPT

## 2023-06-26 PROCEDURE — 94640 AIRWAY INHALATION TREATMENT: CPT

## 2023-06-26 PROCEDURE — 85025 COMPLETE CBC W/AUTO DIFF WBC: CPT

## 2023-06-26 PROCEDURE — 99232 SBSQ HOSP IP/OBS MODERATE 35: CPT | Performed by: INTERNAL MEDICINE

## 2023-06-26 RX ORDER — ASPIRIN 81 MG/1
81 TABLET, CHEWABLE ORAL DAILY
Status: DISCONTINUED | OUTPATIENT
Start: 2023-06-26 | End: 2023-07-06 | Stop reason: HOSPADM

## 2023-06-26 RX ADMIN — PIPERACILLIN AND TAZOBACTAM 4500 MG: 4; .5 INJECTION, POWDER, LYOPHILIZED, FOR SOLUTION INTRAVENOUS at 05:40

## 2023-06-26 RX ADMIN — IPRATROPIUM BROMIDE AND ALBUTEROL SULFATE 1 DOSE: .5; 2.5 SOLUTION RESPIRATORY (INHALATION) at 09:13

## 2023-06-26 RX ADMIN — MORPHINE SULFATE 2 MG: 2 INJECTION, SOLUTION INTRAMUSCULAR; INTRAVENOUS at 19:26

## 2023-06-26 RX ADMIN — ENOXAPARIN SODIUM 60 MG: 100 INJECTION SUBCUTANEOUS at 08:05

## 2023-06-26 RX ADMIN — IPRATROPIUM BROMIDE AND ALBUTEROL SULFATE 1 DOSE: .5; 2.5 SOLUTION RESPIRATORY (INHALATION) at 16:50

## 2023-06-26 RX ADMIN — MORPHINE SULFATE 2 MG: 2 INJECTION, SOLUTION INTRAMUSCULAR; INTRAVENOUS at 23:22

## 2023-06-26 RX ADMIN — LORAZEPAM 0.5 MG: 2 INJECTION INTRAMUSCULAR; INTRAVENOUS at 03:36

## 2023-06-26 RX ADMIN — MORPHINE SULFATE 2 MG: 2 INJECTION, SOLUTION INTRAMUSCULAR; INTRAVENOUS at 14:12

## 2023-06-26 RX ADMIN — PIPERACILLIN AND TAZOBACTAM 4500 MG: 4; .5 INJECTION, POWDER, LYOPHILIZED, FOR SOLUTION INTRAVENOUS at 14:17

## 2023-06-26 RX ADMIN — ENOXAPARIN SODIUM 60 MG: 100 INJECTION SUBCUTANEOUS at 20:00

## 2023-06-26 RX ADMIN — IPRATROPIUM BROMIDE AND ALBUTEROL SULFATE 1 DOSE: .5; 2.5 SOLUTION RESPIRATORY (INHALATION) at 20:31

## 2023-06-26 RX ADMIN — METHYLPREDNISOLONE SODIUM SUCCINATE 40 MG: 40 INJECTION, POWDER, FOR SOLUTION INTRAMUSCULAR; INTRAVENOUS at 16:23

## 2023-06-26 RX ADMIN — LORAZEPAM 0.5 MG: 2 INJECTION INTRAMUSCULAR; INTRAVENOUS at 11:46

## 2023-06-26 RX ADMIN — ARFORMOTEROL TARTRATE 15 MCG: 15 SOLUTION RESPIRATORY (INHALATION) at 20:31

## 2023-06-26 RX ADMIN — SODIUM CHLORIDE, PRESERVATIVE FREE 10 ML: 5 INJECTION INTRAVENOUS at 08:05

## 2023-06-26 RX ADMIN — DOXYCYCLINE 100 MG: 100 INJECTION, POWDER, LYOPHILIZED, FOR SOLUTION INTRAVENOUS at 01:10

## 2023-06-26 RX ADMIN — HYDROXYZINE HYDROCHLORIDE 50 MG: 50 INJECTION, SOLUTION INTRAMUSCULAR at 23:22

## 2023-06-26 RX ADMIN — MORPHINE SULFATE 2 MG: 2 INJECTION, SOLUTION INTRAMUSCULAR; INTRAVENOUS at 09:55

## 2023-06-26 RX ADMIN — SODIUM CHLORIDE, PRESERVATIVE FREE 10 ML: 5 INJECTION INTRAVENOUS at 20:00

## 2023-06-26 RX ADMIN — HYDROXYZINE HYDROCHLORIDE 50 MG: 50 INJECTION, SOLUTION INTRAMUSCULAR at 16:38

## 2023-06-26 RX ADMIN — METHYLPREDNISOLONE SODIUM SUCCINATE 40 MG: 40 INJECTION, POWDER, FOR SOLUTION INTRAMUSCULAR; INTRAVENOUS at 23:22

## 2023-06-26 RX ADMIN — DOXYCYCLINE 100 MG: 100 INJECTION, POWDER, LYOPHILIZED, FOR SOLUTION INTRAVENOUS at 23:22

## 2023-06-26 RX ADMIN — MORPHINE SULFATE 2 MG: 2 INJECTION, SOLUTION INTRAMUSCULAR; INTRAVENOUS at 01:05

## 2023-06-26 RX ADMIN — BUDESONIDE INHALATION 500 MCG: 0.5 SUSPENSION RESPIRATORY (INHALATION) at 09:13

## 2023-06-26 RX ADMIN — IPRATROPIUM BROMIDE AND ALBUTEROL SULFATE 1 DOSE: .5; 2.5 SOLUTION RESPIRATORY (INHALATION) at 12:59

## 2023-06-26 RX ADMIN — DOXYCYCLINE 100 MG: 100 INJECTION, POWDER, LYOPHILIZED, FOR SOLUTION INTRAVENOUS at 11:50

## 2023-06-26 RX ADMIN — METHYLPREDNISOLONE SODIUM SUCCINATE 40 MG: 40 INJECTION, POWDER, FOR SOLUTION INTRAMUSCULAR; INTRAVENOUS at 08:05

## 2023-06-26 RX ADMIN — METHYLPREDNISOLONE SODIUM SUCCINATE 40 MG: 40 INJECTION, POWDER, FOR SOLUTION INTRAMUSCULAR; INTRAVENOUS at 01:05

## 2023-06-26 RX ADMIN — PIPERACILLIN AND TAZOBACTAM 4500 MG: 4; .5 INJECTION, POWDER, LYOPHILIZED, FOR SOLUTION INTRAVENOUS at 23:20

## 2023-06-26 RX ADMIN — BUDESONIDE INHALATION 500 MCG: 0.5 SUSPENSION RESPIRATORY (INHALATION) at 20:31

## 2023-06-26 RX ADMIN — ARFORMOTEROL TARTRATE 15 MCG: 15 SOLUTION RESPIRATORY (INHALATION) at 09:13

## 2023-06-26 RX ADMIN — LORAZEPAM 0.5 MG: 2 INJECTION INTRAMUSCULAR; INTRAVENOUS at 19:59

## 2023-06-26 RX ADMIN — MORPHINE SULFATE 2 MG: 2 INJECTION, SOLUTION INTRAMUSCULAR; INTRAVENOUS at 05:41

## 2023-06-26 RX ADMIN — IPRATROPIUM BROMIDE AND ALBUTEROL SULFATE 1 DOSE: .5; 2.5 SOLUTION RESPIRATORY (INHALATION) at 02:43

## 2023-06-26 ASSESSMENT — PAIN SCALES - GENERAL
PAINLEVEL_OUTOF10: 9
PAINLEVEL_OUTOF10: 6
PAINLEVEL_OUTOF10: 10
PAINLEVEL_OUTOF10: 7
PAINLEVEL_OUTOF10: 7
PAINLEVEL_OUTOF10: 9
PAINLEVEL_OUTOF10: 10
PAINLEVEL_OUTOF10: 9

## 2023-06-26 ASSESSMENT — PAIN DESCRIPTION - DESCRIPTORS
DESCRIPTORS: ACHING
DESCRIPTORS: STABBING
DESCRIPTORS: STABBING;THROBBING;ACHING
DESCRIPTORS: ACHING

## 2023-06-26 ASSESSMENT — PAIN DESCRIPTION - LOCATION
LOCATION: LEG

## 2023-06-26 ASSESSMENT — PAIN DESCRIPTION - ORIENTATION
ORIENTATION: RIGHT;LEFT;MID

## 2023-06-27 PROBLEM — Z71.89 GOALS OF CARE, COUNSELING/DISCUSSION: Status: ACTIVE | Noted: 2023-04-29

## 2023-06-27 LAB
ANION GAP SERPL CALCULATED.3IONS-SCNC: 6 MMOL/L (ref 7–16)
BASOPHILS # BLD: 0 E9/L (ref 0–0.2)
BASOPHILS NFR BLD: 0 % (ref 0–2)
BNP BLD-MCNC: ABNORMAL PG/ML (ref 0–125)
BUN SERPL-MCNC: 30 MG/DL (ref 6–20)
CALCIUM SERPL-MCNC: 8.5 MG/DL (ref 8.6–10.2)
CHLORIDE SERPL-SCNC: 101 MMOL/L (ref 98–107)
CO2 SERPL-SCNC: 39 MMOL/L (ref 22–29)
CREAT SERPL-MCNC: 0.7 MG/DL (ref 0.7–1.2)
EOSINOPHIL # BLD: 0 E9/L (ref 0.05–0.5)
EOSINOPHIL NFR BLD: 0 % (ref 0–6)
ERYTHROCYTE [DISTWIDTH] IN BLOOD BY AUTOMATED COUNT: 14.2 FL (ref 11.5–15)
GLUCOSE SERPL-MCNC: 120 MG/DL (ref 74–99)
HCT VFR BLD AUTO: 36.9 % (ref 37–54)
HGB BLD-MCNC: 10.9 G/DL (ref 12.5–16.5)
HYPOCHROMIA: ABNORMAL
IMM GRANULOCYTES # BLD: 0.02 E9/L
IMM GRANULOCYTES NFR BLD: 0.4 % (ref 0–5)
LV EF: 33 %
LVEF MODALITY: NORMAL
LYMPHOCYTES # BLD: 0.44 E9/L (ref 1.5–4)
LYMPHOCYTES NFR BLD: 8.5 % (ref 20–42)
MCH RBC QN AUTO: 29.9 PG (ref 26–35)
MCHC RBC AUTO-ENTMCNC: 29.5 % (ref 32–34.5)
MCV RBC AUTO: 101.4 FL (ref 80–99.9)
METER GLUCOSE: 121 MG/DL (ref 74–99)
MONOCYTES # BLD: 0.29 E9/L (ref 0.1–0.95)
MONOCYTES NFR BLD: 5.6 % (ref 2–12)
MRSA SPEC QL CULT: NORMAL
NEUTROPHILS # BLD: 4.41 E9/L (ref 1.8–7.3)
NEUTS SEG NFR BLD: 85.5 % (ref 43–80)
PLATELET # BLD AUTO: 177 E9/L (ref 130–450)
PMV BLD AUTO: 10.5 FL (ref 7–12)
POIKILOCYTES: ABNORMAL
POTASSIUM SERPL-SCNC: 5.1 MMOL/L (ref 3.5–5)
RBC # BLD AUTO: 3.64 E12/L (ref 3.8–5.8)
SODIUM SERPL-SCNC: 146 MMOL/L (ref 132–146)
STOMATOCYTES: ABNORMAL
TARGET CELLS: ABNORMAL
TROPONIN, HIGH SENSITIVITY: 117 NG/L (ref 0–11)
WBC # BLD: 5.2 E9/L (ref 4.5–11.5)

## 2023-06-27 PROCEDURE — 6360000002 HC RX W HCPCS: Performed by: NURSE PRACTITIONER

## 2023-06-27 PROCEDURE — 2060000000 HC ICU INTERMEDIATE R&B

## 2023-06-27 PROCEDURE — 6370000000 HC RX 637 (ALT 250 FOR IP)

## 2023-06-27 PROCEDURE — 94667 MNPJ CHEST WALL 1ST: CPT

## 2023-06-27 PROCEDURE — 2580000003 HC RX 258: Performed by: STUDENT IN AN ORGANIZED HEALTH CARE EDUCATION/TRAINING PROGRAM

## 2023-06-27 PROCEDURE — 92526 ORAL FUNCTION THERAPY: CPT | Performed by: SPEECH-LANGUAGE PATHOLOGIST

## 2023-06-27 PROCEDURE — 2500000003 HC RX 250 WO HCPCS: Performed by: INTERNAL MEDICINE

## 2023-06-27 PROCEDURE — 6360000002 HC RX W HCPCS

## 2023-06-27 PROCEDURE — 94640 AIRWAY INHALATION TREATMENT: CPT

## 2023-06-27 PROCEDURE — 92610 EVALUATE SWALLOWING FUNCTION: CPT | Performed by: SPEECH-LANGUAGE PATHOLOGIST

## 2023-06-27 PROCEDURE — 99222 1ST HOSP IP/OBS MODERATE 55: CPT | Performed by: NURSE PRACTITIONER

## 2023-06-27 PROCEDURE — 83880 ASSAY OF NATRIURETIC PEPTIDE: CPT

## 2023-06-27 PROCEDURE — 99232 SBSQ HOSP IP/OBS MODERATE 35: CPT | Performed by: INTERNAL MEDICINE

## 2023-06-27 PROCEDURE — 6360000002 HC RX W HCPCS: Performed by: STUDENT IN AN ORGANIZED HEALTH CARE EDUCATION/TRAINING PROGRAM

## 2023-06-27 PROCEDURE — 82962 GLUCOSE BLOOD TEST: CPT

## 2023-06-27 PROCEDURE — 6370000000 HC RX 637 (ALT 250 FOR IP): Performed by: STUDENT IN AN ORGANIZED HEALTH CARE EDUCATION/TRAINING PROGRAM

## 2023-06-27 PROCEDURE — 36415 COLL VENOUS BLD VENIPUNCTURE: CPT

## 2023-06-27 PROCEDURE — 85025 COMPLETE CBC W/AUTO DIFF WBC: CPT

## 2023-06-27 PROCEDURE — 84484 ASSAY OF TROPONIN QUANT: CPT

## 2023-06-27 PROCEDURE — 2700000000 HC OXYGEN THERAPY PER DAY

## 2023-06-27 PROCEDURE — 99233 SBSQ HOSP IP/OBS HIGH 50: CPT | Performed by: INTERNAL MEDICINE

## 2023-06-27 PROCEDURE — 93306 TTE W/DOPPLER COMPLETE: CPT

## 2023-06-27 PROCEDURE — 94660 CPAP INITIATION&MGMT: CPT

## 2023-06-27 PROCEDURE — 6360000002 HC RX W HCPCS: Performed by: INTERNAL MEDICINE

## 2023-06-27 PROCEDURE — 80048 BASIC METABOLIC PNL TOTAL CA: CPT

## 2023-06-27 PROCEDURE — 2580000003 HC RX 258: Performed by: INTERNAL MEDICINE

## 2023-06-27 RX ORDER — LORAZEPAM 0.5 MG/1
0.5 TABLET ORAL EVERY 6 HOURS PRN
Status: DISCONTINUED | OUTPATIENT
Start: 2023-06-27 | End: 2023-06-28

## 2023-06-27 RX ORDER — LORAZEPAM 2 MG/ML
0.5 INJECTION INTRAMUSCULAR EVERY 6 HOURS PRN
Status: DISCONTINUED | OUTPATIENT
Start: 2023-06-27 | End: 2023-06-28

## 2023-06-27 RX ADMIN — PIPERACILLIN AND TAZOBACTAM 4500 MG: 4; .5 INJECTION, POWDER, LYOPHILIZED, FOR SOLUTION INTRAVENOUS at 14:51

## 2023-06-27 RX ADMIN — LORAZEPAM 0.5 MG: 2 INJECTION INTRAMUSCULAR; INTRAVENOUS at 20:18

## 2023-06-27 RX ADMIN — MORPHINE SULFATE 2 MG: 2 INJECTION, SOLUTION INTRAMUSCULAR; INTRAVENOUS at 22:26

## 2023-06-27 RX ADMIN — ATORVASTATIN CALCIUM 40 MG: 40 TABLET, FILM COATED ORAL at 20:18

## 2023-06-27 RX ADMIN — IPRATROPIUM BROMIDE AND ALBUTEROL SULFATE 1 DOSE: .5; 2.5 SOLUTION RESPIRATORY (INHALATION) at 12:29

## 2023-06-27 RX ADMIN — BUDESONIDE INHALATION 500 MCG: 0.5 SUSPENSION RESPIRATORY (INHALATION) at 21:42

## 2023-06-27 RX ADMIN — ENOXAPARIN SODIUM 60 MG: 100 INJECTION SUBCUTANEOUS at 08:10

## 2023-06-27 RX ADMIN — IPRATROPIUM BROMIDE AND ALBUTEROL SULFATE 1 DOSE: .5; 2.5 SOLUTION RESPIRATORY (INHALATION) at 21:42

## 2023-06-27 RX ADMIN — GABAPENTIN 300 MG: 300 CAPSULE ORAL at 20:17

## 2023-06-27 RX ADMIN — ENOXAPARIN SODIUM 60 MG: 100 INJECTION SUBCUTANEOUS at 20:17

## 2023-06-27 RX ADMIN — MORPHINE SULFATE 2 MG: 2 INJECTION, SOLUTION INTRAMUSCULAR; INTRAVENOUS at 08:12

## 2023-06-27 RX ADMIN — BUDESONIDE INHALATION 500 MCG: 0.5 SUSPENSION RESPIRATORY (INHALATION) at 09:07

## 2023-06-27 RX ADMIN — IPRATROPIUM BROMIDE AND ALBUTEROL SULFATE 1 DOSE: .5; 2.5 SOLUTION RESPIRATORY (INHALATION) at 16:19

## 2023-06-27 RX ADMIN — PIPERACILLIN AND TAZOBACTAM 4500 MG: 4; .5 INJECTION, POWDER, LYOPHILIZED, FOR SOLUTION INTRAVENOUS at 05:31

## 2023-06-27 RX ADMIN — METHYLPREDNISOLONE SODIUM SUCCINATE 40 MG: 40 INJECTION, POWDER, FOR SOLUTION INTRAMUSCULAR; INTRAVENOUS at 15:33

## 2023-06-27 RX ADMIN — HYDROXYZINE HYDROCHLORIDE 50 MG: 50 INJECTION, SOLUTION INTRAMUSCULAR at 23:19

## 2023-06-27 RX ADMIN — SODIUM CHLORIDE, PRESERVATIVE FREE 10 ML: 5 INJECTION INTRAVENOUS at 20:18

## 2023-06-27 RX ADMIN — ARFORMOTEROL TARTRATE 15 MCG: 15 SOLUTION RESPIRATORY (INHALATION) at 09:07

## 2023-06-27 RX ADMIN — DOXYCYCLINE 100 MG: 100 INJECTION, POWDER, LYOPHILIZED, FOR SOLUTION INTRAVENOUS at 11:16

## 2023-06-27 RX ADMIN — MORPHINE SULFATE 2 MG: 2 INJECTION, SOLUTION INTRAMUSCULAR; INTRAVENOUS at 12:47

## 2023-06-27 RX ADMIN — MORPHINE SULFATE 2 MG: 2 INJECTION, SOLUTION INTRAMUSCULAR; INTRAVENOUS at 03:45

## 2023-06-27 RX ADMIN — LORAZEPAM 0.5 MG: 2 INJECTION INTRAMUSCULAR; INTRAVENOUS at 03:46

## 2023-06-27 RX ADMIN — PIPERACILLIN AND TAZOBACTAM 4500 MG: 4; .5 INJECTION, POWDER, LYOPHILIZED, FOR SOLUTION INTRAVENOUS at 22:31

## 2023-06-27 RX ADMIN — METHYLPREDNISOLONE SODIUM SUCCINATE 40 MG: 40 INJECTION, POWDER, FOR SOLUTION INTRAMUSCULAR; INTRAVENOUS at 08:11

## 2023-06-27 RX ADMIN — IPRATROPIUM BROMIDE AND ALBUTEROL SULFATE 1 DOSE: .5; 2.5 SOLUTION RESPIRATORY (INHALATION) at 09:07

## 2023-06-27 RX ADMIN — LORAZEPAM 0.5 MG: 2 INJECTION INTRAMUSCULAR; INTRAVENOUS at 12:09

## 2023-06-27 RX ADMIN — METHYLPREDNISOLONE SODIUM SUCCINATE 40 MG: 40 INJECTION, POWDER, FOR SOLUTION INTRAMUSCULAR; INTRAVENOUS at 23:19

## 2023-06-27 RX ADMIN — ARFORMOTEROL TARTRATE 15 MCG: 15 SOLUTION RESPIRATORY (INHALATION) at 21:42

## 2023-06-27 RX ADMIN — MORPHINE SULFATE 2 MG: 2 INJECTION, SOLUTION INTRAMUSCULAR; INTRAVENOUS at 17:00

## 2023-06-27 ASSESSMENT — PAIN DESCRIPTION - DESCRIPTORS
DESCRIPTORS: ACHING

## 2023-06-27 ASSESSMENT — PAIN SCALES - GENERAL
PAINLEVEL_OUTOF10: 9
PAINLEVEL_OUTOF10: 10
PAINLEVEL_OUTOF10: 9
PAINLEVEL_OUTOF10: 8
PAINLEVEL_OUTOF10: 9

## 2023-06-27 ASSESSMENT — PAIN DESCRIPTION - LOCATION
LOCATION: LEG
LOCATION: GENERALIZED
LOCATION: LEG
LOCATION: GENERALIZED
LOCATION: LEG
LOCATION: LEG

## 2023-06-27 ASSESSMENT — PAIN DESCRIPTION - ORIENTATION
ORIENTATION: RIGHT;LEFT;MID

## 2023-06-27 ASSESSMENT — PAIN DESCRIPTION - PAIN TYPE: TYPE: CHRONIC PAIN

## 2023-06-28 LAB
ANION GAP SERPL CALCULATED.3IONS-SCNC: 7 MMOL/L (ref 7–16)
BASOPHILS # BLD: 0.01 E9/L (ref 0–0.2)
BASOPHILS NFR BLD: 0.1 % (ref 0–2)
BUN SERPL-MCNC: 27 MG/DL (ref 6–20)
CALCIUM SERPL-MCNC: 8.1 MG/DL (ref 8.6–10.2)
CHLORIDE SERPL-SCNC: 98 MMOL/L (ref 98–107)
CO2 SERPL-SCNC: 36 MMOL/L (ref 22–29)
CREAT SERPL-MCNC: 0.6 MG/DL (ref 0.7–1.2)
EOSINOPHIL # BLD: 0 E9/L (ref 0.05–0.5)
EOSINOPHIL NFR BLD: 0 % (ref 0–6)
ERYTHROCYTE [DISTWIDTH] IN BLOOD BY AUTOMATED COUNT: 14.3 FL (ref 11.5–15)
GLUCOSE SERPL-MCNC: 146 MG/DL (ref 74–99)
HCT VFR BLD AUTO: 35.2 % (ref 37–54)
HGB BLD-MCNC: 10.5 G/DL (ref 12.5–16.5)
IMM GRANULOCYTES # BLD: 0.02 E9/L
IMM GRANULOCYTES NFR BLD: 0.3 % (ref 0–5)
LYMPHOCYTES # BLD: 0.61 E9/L (ref 1.5–4)
LYMPHOCYTES NFR BLD: 8 % (ref 20–42)
MCH RBC QN AUTO: 29.8 PG (ref 26–35)
MCHC RBC AUTO-ENTMCNC: 29.8 % (ref 32–34.5)
MCV RBC AUTO: 100 FL (ref 80–99.9)
MONOCYTES # BLD: 0.47 E9/L (ref 0.1–0.95)
MONOCYTES NFR BLD: 6.2 % (ref 2–12)
NEUTROPHILS # BLD: 6.47 E9/L (ref 1.8–7.3)
NEUTS SEG NFR BLD: 85.4 % (ref 43–80)
PLATELET # BLD AUTO: 198 E9/L (ref 130–450)
PMV BLD AUTO: 10.2 FL (ref 7–12)
POTASSIUM SERPL-SCNC: 4.3 MMOL/L (ref 3.5–5)
RBC # BLD AUTO: 3.52 E12/L (ref 3.8–5.8)
SODIUM SERPL-SCNC: 141 MMOL/L (ref 132–146)
WBC # BLD: 7.6 E9/L (ref 4.5–11.5)

## 2023-06-28 PROCEDURE — 36415 COLL VENOUS BLD VENIPUNCTURE: CPT

## 2023-06-28 PROCEDURE — 6360000002 HC RX W HCPCS: Performed by: NURSE PRACTITIONER

## 2023-06-28 PROCEDURE — 2060000000 HC ICU INTERMEDIATE R&B

## 2023-06-28 PROCEDURE — 2580000003 HC RX 258: Performed by: STUDENT IN AN ORGANIZED HEALTH CARE EDUCATION/TRAINING PROGRAM

## 2023-06-28 PROCEDURE — 99233 SBSQ HOSP IP/OBS HIGH 50: CPT | Performed by: INTERNAL MEDICINE

## 2023-06-28 PROCEDURE — 6360000002 HC RX W HCPCS: Performed by: STUDENT IN AN ORGANIZED HEALTH CARE EDUCATION/TRAINING PROGRAM

## 2023-06-28 PROCEDURE — 87186 SC STD MICRODIL/AGAR DIL: CPT

## 2023-06-28 PROCEDURE — 87070 CULTURE OTHR SPECIMN AEROBIC: CPT

## 2023-06-28 PROCEDURE — 6370000000 HC RX 637 (ALT 250 FOR IP): Performed by: STUDENT IN AN ORGANIZED HEALTH CARE EDUCATION/TRAINING PROGRAM

## 2023-06-28 PROCEDURE — 99232 SBSQ HOSP IP/OBS MODERATE 35: CPT | Performed by: INTERNAL MEDICINE

## 2023-06-28 PROCEDURE — 6360000002 HC RX W HCPCS: Performed by: INTERNAL MEDICINE

## 2023-06-28 PROCEDURE — 94640 AIRWAY INHALATION TREATMENT: CPT

## 2023-06-28 PROCEDURE — 6370000000 HC RX 637 (ALT 250 FOR IP): Performed by: NURSE PRACTITIONER

## 2023-06-28 PROCEDURE — 2700000000 HC OXYGEN THERAPY PER DAY

## 2023-06-28 PROCEDURE — 2500000003 HC RX 250 WO HCPCS: Performed by: INTERNAL MEDICINE

## 2023-06-28 PROCEDURE — 85025 COMPLETE CBC W/AUTO DIFF WBC: CPT

## 2023-06-28 PROCEDURE — 6370000000 HC RX 637 (ALT 250 FOR IP)

## 2023-06-28 PROCEDURE — 6360000002 HC RX W HCPCS

## 2023-06-28 PROCEDURE — 2580000003 HC RX 258: Performed by: INTERNAL MEDICINE

## 2023-06-28 PROCEDURE — 94669 MECHANICAL CHEST WALL OSCILL: CPT

## 2023-06-28 PROCEDURE — 6370000000 HC RX 637 (ALT 250 FOR IP): Performed by: INTERNAL MEDICINE

## 2023-06-28 PROCEDURE — 80048 BASIC METABOLIC PNL TOTAL CA: CPT

## 2023-06-28 PROCEDURE — 94660 CPAP INITIATION&MGMT: CPT

## 2023-06-28 PROCEDURE — 87206 SMEAR FLUORESCENT/ACID STAI: CPT

## 2023-06-28 RX ORDER — LORAZEPAM 0.5 MG/1
0.5 TABLET ORAL EVERY 4 HOURS PRN
Status: DISCONTINUED | OUTPATIENT
Start: 2023-06-28 | End: 2023-07-06 | Stop reason: HOSPADM

## 2023-06-28 RX ORDER — ISOSORBIDE DINITRATE 10 MG/1
20 TABLET ORAL 3 TIMES DAILY
Status: DISCONTINUED | OUTPATIENT
Start: 2023-06-28 | End: 2023-07-06 | Stop reason: HOSPADM

## 2023-06-28 RX ORDER — HYDROCODONE BITARTRATE AND ACETAMINOPHEN 7.5; 325 MG/1; MG/1
1 TABLET ORAL
Status: DISCONTINUED | OUTPATIENT
Start: 2023-06-28 | End: 2023-06-29

## 2023-06-28 RX ORDER — LORAZEPAM 2 MG/ML
0.5 INJECTION INTRAMUSCULAR EVERY 4 HOURS PRN
Status: DISCONTINUED | OUTPATIENT
Start: 2023-06-28 | End: 2023-06-29

## 2023-06-28 RX ORDER — HYDRALAZINE HYDROCHLORIDE 25 MG/1
25 TABLET, FILM COATED ORAL EVERY 8 HOURS SCHEDULED
Status: DISCONTINUED | OUTPATIENT
Start: 2023-06-28 | End: 2023-07-06 | Stop reason: HOSPADM

## 2023-06-28 RX ADMIN — HYDRALAZINE HYDROCHLORIDE 25 MG: 25 TABLET, FILM COATED ORAL at 22:53

## 2023-06-28 RX ADMIN — METHYLPREDNISOLONE SODIUM SUCCINATE 40 MG: 40 INJECTION, POWDER, FOR SOLUTION INTRAMUSCULAR; INTRAVENOUS at 09:49

## 2023-06-28 RX ADMIN — ARIPIPRAZOLE 5 MG: 5 TABLET ORAL at 09:49

## 2023-06-28 RX ADMIN — ENOXAPARIN SODIUM 60 MG: 100 INJECTION SUBCUTANEOUS at 09:55

## 2023-06-28 RX ADMIN — PANTOPRAZOLE SODIUM 40 MG: 40 TABLET, DELAYED RELEASE ORAL at 06:23

## 2023-06-28 RX ADMIN — SODIUM CHLORIDE, PRESERVATIVE FREE 10 ML: 5 INJECTION INTRAVENOUS at 09:48

## 2023-06-28 RX ADMIN — MORPHINE SULFATE 2 MG: 2 INJECTION, SOLUTION INTRAMUSCULAR; INTRAVENOUS at 14:40

## 2023-06-28 RX ADMIN — GABAPENTIN 300 MG: 300 CAPSULE ORAL at 09:49

## 2023-06-28 RX ADMIN — IPRATROPIUM BROMIDE AND ALBUTEROL SULFATE 1 DOSE: .5; 2.5 SOLUTION RESPIRATORY (INHALATION) at 08:22

## 2023-06-28 RX ADMIN — ISOSORBIDE DINITRATE 20 MG: 10 TABLET ORAL at 14:19

## 2023-06-28 RX ADMIN — SODIUM CHLORIDE, PRESERVATIVE FREE 10 ML: 5 INJECTION INTRAVENOUS at 22:54

## 2023-06-28 RX ADMIN — HYDRALAZINE HYDROCHLORIDE 25 MG: 25 TABLET, FILM COATED ORAL at 14:19

## 2023-06-28 RX ADMIN — ASPIRIN 81 MG CHEWABLE TABLET 81 MG: 81 TABLET CHEWABLE at 09:49

## 2023-06-28 RX ADMIN — SODIUM CHLORIDE, PRESERVATIVE FREE 10 ML: 5 INJECTION INTRAVENOUS at 14:43

## 2023-06-28 RX ADMIN — ENOXAPARIN SODIUM 60 MG: 100 INJECTION SUBCUTANEOUS at 22:53

## 2023-06-28 RX ADMIN — LORAZEPAM 0.5 MG: 2 INJECTION INTRAMUSCULAR; INTRAVENOUS at 10:39

## 2023-06-28 RX ADMIN — IPRATROPIUM BROMIDE AND ALBUTEROL SULFATE 1 DOSE: .5; 2.5 SOLUTION RESPIRATORY (INHALATION) at 05:17

## 2023-06-28 RX ADMIN — IPRATROPIUM BROMIDE AND ALBUTEROL SULFATE 1 DOSE: .5; 2.5 SOLUTION RESPIRATORY (INHALATION) at 12:52

## 2023-06-28 RX ADMIN — DOXYCYCLINE 100 MG: 100 INJECTION, POWDER, LYOPHILIZED, FOR SOLUTION INTRAVENOUS at 02:14

## 2023-06-28 RX ADMIN — ALBUTEROL SULFATE 2.5 MG: 2.5 SOLUTION RESPIRATORY (INHALATION) at 00:33

## 2023-06-28 RX ADMIN — ATORVASTATIN CALCIUM 40 MG: 40 TABLET, FILM COATED ORAL at 22:53

## 2023-06-28 RX ADMIN — LORAZEPAM 0.5 MG: 2 INJECTION INTRAMUSCULAR; INTRAVENOUS at 19:38

## 2023-06-28 RX ADMIN — FLUOXETINE 20 MG: 10 TABLET, FILM COATED ORAL at 09:48

## 2023-06-28 RX ADMIN — ISOSORBIDE DINITRATE 20 MG: 10 TABLET ORAL at 22:53

## 2023-06-28 RX ADMIN — MORPHINE SULFATE 2 MG: 2 INJECTION, SOLUTION INTRAMUSCULAR; INTRAVENOUS at 22:55

## 2023-06-28 RX ADMIN — BUDESONIDE INHALATION 500 MCG: 0.5 SUSPENSION RESPIRATORY (INHALATION) at 20:43

## 2023-06-28 RX ADMIN — GABAPENTIN 300 MG: 300 CAPSULE ORAL at 14:19

## 2023-06-28 RX ADMIN — HYDROXYZINE HYDROCHLORIDE 50 MG: 50 INJECTION, SOLUTION INTRAMUSCULAR at 19:38

## 2023-06-28 RX ADMIN — LORAZEPAM 0.5 MG: 2 INJECTION INTRAMUSCULAR; INTRAVENOUS at 04:22

## 2023-06-28 RX ADMIN — MORPHINE SULFATE 2 MG: 2 INJECTION, SOLUTION INTRAMUSCULAR; INTRAVENOUS at 02:18

## 2023-06-28 RX ADMIN — GABAPENTIN 300 MG: 300 CAPSULE ORAL at 22:53

## 2023-06-28 RX ADMIN — ARFORMOTEROL TARTRATE 15 MCG: 15 SOLUTION RESPIRATORY (INHALATION) at 08:21

## 2023-06-28 RX ADMIN — METHYLPREDNISOLONE SODIUM SUCCINATE 40 MG: 40 INJECTION, POWDER, FOR SOLUTION INTRAMUSCULAR; INTRAVENOUS at 19:02

## 2023-06-28 RX ADMIN — PIPERACILLIN AND TAZOBACTAM 4500 MG: 4; .5 INJECTION, POWDER, LYOPHILIZED, FOR SOLUTION INTRAVENOUS at 06:20

## 2023-06-28 RX ADMIN — LORAZEPAM 0.5 MG: 0.5 TABLET ORAL at 15:24

## 2023-06-28 RX ADMIN — MORPHINE SULFATE 2 MG: 2 INJECTION, SOLUTION INTRAMUSCULAR; INTRAVENOUS at 06:21

## 2023-06-28 RX ADMIN — MORPHINE SULFATE 2 MG: 2 INJECTION, SOLUTION INTRAMUSCULAR; INTRAVENOUS at 10:40

## 2023-06-28 RX ADMIN — PIPERACILLIN AND TAZOBACTAM 4500 MG: 4; .5 INJECTION, POWDER, LYOPHILIZED, FOR SOLUTION INTRAVENOUS at 14:28

## 2023-06-28 RX ADMIN — DOXYCYCLINE 100 MG: 100 INJECTION, POWDER, LYOPHILIZED, FOR SOLUTION INTRAVENOUS at 14:16

## 2023-06-28 RX ADMIN — HYDROXYZINE HYDROCHLORIDE 50 MG: 50 INJECTION, SOLUTION INTRAMUSCULAR at 09:47

## 2023-06-28 RX ADMIN — ISOSORBIDE DINITRATE 20 MG: 10 TABLET ORAL at 09:49

## 2023-06-28 RX ADMIN — ARFORMOTEROL TARTRATE 15 MCG: 15 SOLUTION RESPIRATORY (INHALATION) at 20:43

## 2023-06-28 RX ADMIN — MORPHINE SULFATE 2 MG: 2 INJECTION, SOLUTION INTRAMUSCULAR; INTRAVENOUS at 19:02

## 2023-06-28 RX ADMIN — IPRATROPIUM BROMIDE AND ALBUTEROL SULFATE 1 DOSE: .5; 2.5 SOLUTION RESPIRATORY (INHALATION) at 16:12

## 2023-06-28 RX ADMIN — FUROSEMIDE 40 MG: 40 TABLET ORAL at 09:48

## 2023-06-28 RX ADMIN — SODIUM CHLORIDE, PRESERVATIVE FREE 10 ML: 5 INJECTION INTRAVENOUS at 10:40

## 2023-06-28 RX ADMIN — IPRATROPIUM BROMIDE AND ALBUTEROL SULFATE 1 DOSE: .5; 2.5 SOLUTION RESPIRATORY (INHALATION) at 20:43

## 2023-06-28 RX ADMIN — BUDESONIDE INHALATION 500 MCG: 0.5 SUSPENSION RESPIRATORY (INHALATION) at 08:21

## 2023-06-28 ASSESSMENT — PAIN SCALES - GENERAL
PAINLEVEL_OUTOF10: 4
PAINLEVEL_OUTOF10: 9
PAINLEVEL_OUTOF10: 10
PAINLEVEL_OUTOF10: 10
PAINLEVEL_OUTOF10: 9
PAINLEVEL_OUTOF10: 8
PAINLEVEL_OUTOF10: 9
PAINLEVEL_OUTOF10: 10
PAINLEVEL_OUTOF10: 7
PAINLEVEL_OUTOF10: 9

## 2023-06-28 ASSESSMENT — PAIN DESCRIPTION - LOCATION
LOCATION: GENERALIZED

## 2023-06-28 ASSESSMENT — PAIN DESCRIPTION - DESCRIPTORS
DESCRIPTORS: ACHING
DESCRIPTORS: BURNING;STABBING;CRAMPING

## 2023-06-28 ASSESSMENT — PAIN DESCRIPTION - PAIN TYPE
TYPE: CHRONIC PAIN

## 2023-06-28 ASSESSMENT — PAIN DESCRIPTION - FREQUENCY
FREQUENCY: CONTINUOUS
FREQUENCY: CONTINUOUS

## 2023-06-28 ASSESSMENT — PAIN DESCRIPTION - ONSET
ONSET: ON-GOING
ONSET: ON-GOING

## 2023-06-28 ASSESSMENT — PAIN - FUNCTIONAL ASSESSMENT
PAIN_FUNCTIONAL_ASSESSMENT: ACTIVITIES ARE NOT PREVENTED

## 2023-06-28 ASSESSMENT — PAIN DESCRIPTION - ORIENTATION: ORIENTATION: RIGHT;LEFT;MID

## 2023-06-29 ENCOUNTER — APPOINTMENT (OUTPATIENT)
Dept: GENERAL RADIOLOGY | Age: 58
DRG: 133 | End: 2023-06-29
Payer: MEDICAID

## 2023-06-29 LAB
ANION GAP SERPL CALCULATED.3IONS-SCNC: 7 MMOL/L (ref 7–16)
BASOPHILS # BLD: 0 E9/L (ref 0–0.2)
BASOPHILS NFR BLD: 0 % (ref 0–2)
BUN SERPL-MCNC: 24 MG/DL (ref 6–20)
CALCIUM SERPL-MCNC: 7.9 MG/DL (ref 8.6–10.2)
CHLORIDE SERPL-SCNC: 97 MMOL/L (ref 98–107)
CO2 SERPL-SCNC: 38 MMOL/L (ref 22–29)
CREAT SERPL-MCNC: 0.6 MG/DL (ref 0.7–1.2)
EOSINOPHIL # BLD: 0 E9/L (ref 0.05–0.5)
EOSINOPHIL NFR BLD: 0 % (ref 0–6)
ERYTHROCYTE [DISTWIDTH] IN BLOOD BY AUTOMATED COUNT: 13.9 FL (ref 11.5–15)
GLUCOSE SERPL-MCNC: 109 MG/DL (ref 74–99)
HCT VFR BLD AUTO: 33.2 % (ref 37–54)
HGB BLD-MCNC: 10 G/DL (ref 12.5–16.5)
IMM GRANULOCYTES # BLD: 0.04 E9/L
IMM GRANULOCYTES NFR BLD: 0.6 % (ref 0–5)
LYMPHOCYTES # BLD: 0.71 E9/L (ref 1.5–4)
LYMPHOCYTES NFR BLD: 10.8 % (ref 20–42)
MCH RBC QN AUTO: 30.1 PG (ref 26–35)
MCHC RBC AUTO-ENTMCNC: 30.1 % (ref 32–34.5)
MCV RBC AUTO: 100 FL (ref 80–99.9)
MONOCYTES # BLD: 0.48 E9/L (ref 0.1–0.95)
MONOCYTES NFR BLD: 7.3 % (ref 2–12)
NEUTROPHILS # BLD: 5.37 E9/L (ref 1.8–7.3)
NEUTS SEG NFR BLD: 81.3 % (ref 43–80)
PLATELET # BLD AUTO: 210 E9/L (ref 130–450)
PMV BLD AUTO: 10.5 FL (ref 7–12)
POTASSIUM SERPL-SCNC: 4.3 MMOL/L (ref 3.5–5)
RBC # BLD AUTO: 3.32 E12/L (ref 3.8–5.8)
SODIUM SERPL-SCNC: 142 MMOL/L (ref 132–146)
WBC # BLD: 6.6 E9/L (ref 4.5–11.5)

## 2023-06-29 PROCEDURE — 6370000000 HC RX 637 (ALT 250 FOR IP): Performed by: INTERNAL MEDICINE

## 2023-06-29 PROCEDURE — 2580000003 HC RX 258: Performed by: STUDENT IN AN ORGANIZED HEALTH CARE EDUCATION/TRAINING PROGRAM

## 2023-06-29 PROCEDURE — 92526 ORAL FUNCTION THERAPY: CPT | Performed by: SPEECH-LANGUAGE PATHOLOGIST

## 2023-06-29 PROCEDURE — 85025 COMPLETE CBC W/AUTO DIFF WBC: CPT

## 2023-06-29 PROCEDURE — 97161 PT EVAL LOW COMPLEX 20 MIN: CPT

## 2023-06-29 PROCEDURE — 94669 MECHANICAL CHEST WALL OSCILL: CPT

## 2023-06-29 PROCEDURE — 99233 SBSQ HOSP IP/OBS HIGH 50: CPT | Performed by: INTERNAL MEDICINE

## 2023-06-29 PROCEDURE — 6360000002 HC RX W HCPCS

## 2023-06-29 PROCEDURE — 6370000000 HC RX 637 (ALT 250 FOR IP)

## 2023-06-29 PROCEDURE — 6360000002 HC RX W HCPCS: Performed by: INTERNAL MEDICINE

## 2023-06-29 PROCEDURE — 80048 BASIC METABOLIC PNL TOTAL CA: CPT

## 2023-06-29 PROCEDURE — 2700000000 HC OXYGEN THERAPY PER DAY

## 2023-06-29 PROCEDURE — 2060000000 HC ICU INTERMEDIATE R&B

## 2023-06-29 PROCEDURE — 71046 X-RAY EXAM CHEST 2 VIEWS: CPT

## 2023-06-29 PROCEDURE — 36415 COLL VENOUS BLD VENIPUNCTURE: CPT

## 2023-06-29 PROCEDURE — 97165 OT EVAL LOW COMPLEX 30 MIN: CPT

## 2023-06-29 PROCEDURE — 6370000000 HC RX 637 (ALT 250 FOR IP): Performed by: NURSE PRACTITIONER

## 2023-06-29 PROCEDURE — 6360000002 HC RX W HCPCS: Performed by: STUDENT IN AN ORGANIZED HEALTH CARE EDUCATION/TRAINING PROGRAM

## 2023-06-29 PROCEDURE — 6370000000 HC RX 637 (ALT 250 FOR IP): Performed by: STUDENT IN AN ORGANIZED HEALTH CARE EDUCATION/TRAINING PROGRAM

## 2023-06-29 PROCEDURE — 6360000002 HC RX W HCPCS: Performed by: NURSE PRACTITIONER

## 2023-06-29 PROCEDURE — 99232 SBSQ HOSP IP/OBS MODERATE 35: CPT | Performed by: INTERNAL MEDICINE

## 2023-06-29 PROCEDURE — 94660 CPAP INITIATION&MGMT: CPT

## 2023-06-29 PROCEDURE — 94640 AIRWAY INHALATION TREATMENT: CPT

## 2023-06-29 RX ORDER — HYDROXYZINE PAMOATE 25 MG/1
50 CAPSULE ORAL EVERY 6 HOURS PRN
Status: DISCONTINUED | OUTPATIENT
Start: 2023-06-29 | End: 2023-07-06 | Stop reason: HOSPADM

## 2023-06-29 RX ORDER — LORAZEPAM 2 MG/ML
0.5 INJECTION INTRAMUSCULAR ONCE
Status: COMPLETED | OUTPATIENT
Start: 2023-06-29 | End: 2023-06-29

## 2023-06-29 RX ORDER — SPIRONOLACTONE 25 MG/1
25 TABLET ORAL DAILY
Status: DISCONTINUED | OUTPATIENT
Start: 2023-06-29 | End: 2023-07-06 | Stop reason: HOSPADM

## 2023-06-29 RX ORDER — MORPHINE SULFATE 10 MG/5ML
10 SOLUTION ORAL EVERY 4 HOURS PRN
Status: DISCONTINUED | OUTPATIENT
Start: 2023-06-29 | End: 2023-07-06 | Stop reason: HOSPADM

## 2023-06-29 RX ORDER — METHYLPREDNISOLONE SODIUM SUCCINATE 40 MG/ML
40 INJECTION, POWDER, LYOPHILIZED, FOR SOLUTION INTRAMUSCULAR; INTRAVENOUS EVERY 12 HOURS
Status: DISCONTINUED | OUTPATIENT
Start: 2023-06-29 | End: 2023-07-05

## 2023-06-29 RX ORDER — GUAIFENESIN/DEXTROMETHORPHAN 100-10MG/5
5 SYRUP ORAL EVERY 4 HOURS PRN
Status: DISCONTINUED | OUTPATIENT
Start: 2023-06-29 | End: 2023-07-06 | Stop reason: HOSPADM

## 2023-06-29 RX ORDER — HYDROCODONE BITARTRATE AND ACETAMINOPHEN 7.5; 325 MG/1; MG/1
1 TABLET ORAL 2 TIMES DAILY PRN
Status: DISCONTINUED | OUTPATIENT
Start: 2023-06-29 | End: 2023-06-30

## 2023-06-29 RX ADMIN — HYDROCODONE BITARTRATE AND ACETAMINOPHEN 1 TABLET: 7.5; 325 TABLET ORAL at 23:53

## 2023-06-29 RX ADMIN — IPRATROPIUM BROMIDE AND ALBUTEROL SULFATE 1 DOSE: .5; 2.5 SOLUTION RESPIRATORY (INHALATION) at 13:36

## 2023-06-29 RX ADMIN — ENOXAPARIN SODIUM 60 MG: 100 INJECTION SUBCUTANEOUS at 09:17

## 2023-06-29 RX ADMIN — ISOSORBIDE DINITRATE 20 MG: 10 TABLET ORAL at 09:15

## 2023-06-29 RX ADMIN — MORPHINE SULFATE 10 MG: 10 SOLUTION ORAL at 21:31

## 2023-06-29 RX ADMIN — LORAZEPAM 0.5 MG: 2 INJECTION INTRAMUSCULAR; INTRAVENOUS at 10:58

## 2023-06-29 RX ADMIN — BUDESONIDE INHALATION 500 MCG: 0.5 SUSPENSION RESPIRATORY (INHALATION) at 20:50

## 2023-06-29 RX ADMIN — LORAZEPAM 0.5 MG: 2 INJECTION INTRAMUSCULAR; INTRAVENOUS at 03:59

## 2023-06-29 RX ADMIN — BUDESONIDE INHALATION 500 MCG: 0.5 SUSPENSION RESPIRATORY (INHALATION) at 09:24

## 2023-06-29 RX ADMIN — METHYLPREDNISOLONE SODIUM SUCCINATE 40 MG: 40 INJECTION, POWDER, FOR SOLUTION INTRAMUSCULAR; INTRAVENOUS at 09:17

## 2023-06-29 RX ADMIN — MORPHINE SULFATE 2 MG: 2 INJECTION, SOLUTION INTRAMUSCULAR; INTRAVENOUS at 02:38

## 2023-06-29 RX ADMIN — METHYLPREDNISOLONE SODIUM SUCCINATE 40 MG: 40 INJECTION, POWDER, FOR SOLUTION INTRAMUSCULAR; INTRAVENOUS at 02:37

## 2023-06-29 RX ADMIN — LORAZEPAM 0.5 MG: 0.5 TABLET ORAL at 00:00

## 2023-06-29 RX ADMIN — METHYLPREDNISOLONE SODIUM SUCCINATE 40 MG: 40 INJECTION, POWDER, LYOPHILIZED, FOR SOLUTION INTRAMUSCULAR; INTRAVENOUS at 21:32

## 2023-06-29 RX ADMIN — GABAPENTIN 300 MG: 300 CAPSULE ORAL at 21:31

## 2023-06-29 RX ADMIN — HYDROXYZINE PAMOATE 50 MG: 25 CAPSULE ORAL at 09:15

## 2023-06-29 RX ADMIN — ARFORMOTEROL TARTRATE 15 MCG: 15 SOLUTION RESPIRATORY (INHALATION) at 09:24

## 2023-06-29 RX ADMIN — HYDROXYZINE PAMOATE 50 MG: 25 CAPSULE ORAL at 18:48

## 2023-06-29 RX ADMIN — SPIRONOLACTONE 25 MG: 25 TABLET ORAL at 09:16

## 2023-06-29 RX ADMIN — FLUOXETINE 20 MG: 10 TABLET, FILM COATED ORAL at 09:15

## 2023-06-29 RX ADMIN — GABAPENTIN 300 MG: 300 CAPSULE ORAL at 09:16

## 2023-06-29 RX ADMIN — ARIPIPRAZOLE 5 MG: 5 TABLET ORAL at 09:15

## 2023-06-29 RX ADMIN — MORPHINE SULFATE 2 MG: 2 INJECTION, SOLUTION INTRAMUSCULAR; INTRAVENOUS at 08:01

## 2023-06-29 RX ADMIN — ARFORMOTEROL TARTRATE 15 MCG: 15 SOLUTION RESPIRATORY (INHALATION) at 20:50

## 2023-06-29 RX ADMIN — LORAZEPAM 0.5 MG: 0.5 TABLET ORAL at 16:53

## 2023-06-29 RX ADMIN — ATORVASTATIN CALCIUM 40 MG: 40 TABLET, FILM COATED ORAL at 21:31

## 2023-06-29 RX ADMIN — ISOSORBIDE DINITRATE 20 MG: 10 TABLET ORAL at 14:49

## 2023-06-29 RX ADMIN — MORPHINE SULFATE 10 MG: 10 SOLUTION ORAL at 16:53

## 2023-06-29 RX ADMIN — IPRATROPIUM BROMIDE AND ALBUTEROL SULFATE 1 DOSE: .5; 2.5 SOLUTION RESPIRATORY (INHALATION) at 20:50

## 2023-06-29 RX ADMIN — HYDRALAZINE HYDROCHLORIDE 25 MG: 25 TABLET, FILM COATED ORAL at 21:31

## 2023-06-29 RX ADMIN — SODIUM CHLORIDE, PRESERVATIVE FREE 10 ML: 5 INJECTION INTRAVENOUS at 09:17

## 2023-06-29 RX ADMIN — ASPIRIN 81 MG CHEWABLE TABLET 81 MG: 81 TABLET CHEWABLE at 09:15

## 2023-06-29 RX ADMIN — SODIUM CHLORIDE, PRESERVATIVE FREE 10 ML: 5 INJECTION INTRAVENOUS at 10:58

## 2023-06-29 RX ADMIN — ISOSORBIDE DINITRATE 20 MG: 10 TABLET ORAL at 21:30

## 2023-06-29 RX ADMIN — IPRATROPIUM BROMIDE AND ALBUTEROL SULFATE 1 DOSE: .5; 2.5 SOLUTION RESPIRATORY (INHALATION) at 09:25

## 2023-06-29 RX ADMIN — GUAIFENESIN AND DEXTROMETHORPHAN 5 ML: 100; 10 SYRUP ORAL at 21:37

## 2023-06-29 RX ADMIN — HYDROXYZINE HYDROCHLORIDE 50 MG: 50 INJECTION, SOLUTION INTRAMUSCULAR at 02:49

## 2023-06-29 RX ADMIN — GABAPENTIN 300 MG: 300 CAPSULE ORAL at 14:49

## 2023-06-29 RX ADMIN — SODIUM CHLORIDE, PRESERVATIVE FREE 10 ML: 5 INJECTION INTRAVENOUS at 21:28

## 2023-06-29 RX ADMIN — LORAZEPAM 0.5 MG: 2 INJECTION INTRAMUSCULAR; INTRAVENOUS at 08:01

## 2023-06-29 RX ADMIN — PANTOPRAZOLE SODIUM 40 MG: 40 TABLET, DELAYED RELEASE ORAL at 05:58

## 2023-06-29 RX ADMIN — MORPHINE SULFATE 10 MG: 10 SOLUTION ORAL at 12:33

## 2023-06-29 RX ADMIN — ENOXAPARIN SODIUM 60 MG: 100 INJECTION SUBCUTANEOUS at 21:32

## 2023-06-29 RX ADMIN — HYDRALAZINE HYDROCHLORIDE 25 MG: 25 TABLET, FILM COATED ORAL at 14:48

## 2023-06-29 RX ADMIN — HYDROCODONE BITARTRATE AND ACETAMINOPHEN 1 TABLET: 7.5; 325 TABLET ORAL at 09:16

## 2023-06-29 RX ADMIN — FUROSEMIDE 40 MG: 40 TABLET ORAL at 09:19

## 2023-06-29 RX ADMIN — GUAIFENESIN AND DEXTROMETHORPHAN 5 ML: 100; 10 SYRUP ORAL at 14:49

## 2023-06-29 RX ADMIN — SODIUM CHLORIDE, PRESERVATIVE FREE 10 ML: 5 INJECTION INTRAVENOUS at 08:02

## 2023-06-29 RX ADMIN — LORAZEPAM 0.5 MG: 0.5 TABLET ORAL at 12:33

## 2023-06-29 RX ADMIN — IPRATROPIUM BROMIDE AND ALBUTEROL SULFATE 1 DOSE: .5; 2.5 SOLUTION RESPIRATORY (INHALATION) at 16:20

## 2023-06-29 RX ADMIN — LORAZEPAM 0.5 MG: 0.5 TABLET ORAL at 21:31

## 2023-06-29 RX ADMIN — HYDRALAZINE HYDROCHLORIDE 25 MG: 25 TABLET, FILM COATED ORAL at 05:58

## 2023-06-29 ASSESSMENT — PAIN SCALES - GENERAL
PAINLEVEL_OUTOF10: 8
PAINLEVEL_OUTOF10: 9
PAINLEVEL_OUTOF10: 10
PAINLEVEL_OUTOF10: 9
PAINLEVEL_OUTOF10: 10
PAINLEVEL_OUTOF10: 9
PAINLEVEL_OUTOF10: 8
PAINLEVEL_OUTOF10: 9
PAINLEVEL_OUTOF10: 9
PAINLEVEL_OUTOF10: 7
PAINLEVEL_OUTOF10: 4

## 2023-06-29 ASSESSMENT — PAIN DESCRIPTION - ORIENTATION
ORIENTATION: RIGHT;LEFT
ORIENTATION: RIGHT;LEFT
ORIENTATION: LEFT;RIGHT
ORIENTATION: RIGHT;LEFT
ORIENTATION: RIGHT;LEFT

## 2023-06-29 ASSESSMENT — PAIN DESCRIPTION - LOCATION
LOCATION: GENERALIZED
LOCATION: LEG
LOCATION: LEG;BACK
LOCATION: GENERALIZED
LOCATION: GENERALIZED

## 2023-06-29 ASSESSMENT — PAIN - FUNCTIONAL ASSESSMENT
PAIN_FUNCTIONAL_ASSESSMENT: ACTIVITIES ARE NOT PREVENTED
PAIN_FUNCTIONAL_ASSESSMENT: ACTIVITIES ARE NOT PREVENTED

## 2023-06-29 ASSESSMENT — PAIN DESCRIPTION - FREQUENCY
FREQUENCY: CONTINUOUS
FREQUENCY: CONTINUOUS

## 2023-06-29 ASSESSMENT — PAIN DESCRIPTION - DESCRIPTORS
DESCRIPTORS: STABBING
DESCRIPTORS: ACHING;SHOOTING;TENDER
DESCRIPTORS: ACHING;SHOOTING
DESCRIPTORS: ACHING
DESCRIPTORS: ACHING

## 2023-06-29 ASSESSMENT — PAIN DESCRIPTION - ONSET
ONSET: ON-GOING
ONSET: ON-GOING

## 2023-06-29 ASSESSMENT — PAIN DESCRIPTION - PAIN TYPE
TYPE: CHRONIC PAIN
TYPE: CHRONIC PAIN

## 2023-06-30 ENCOUNTER — APPOINTMENT (OUTPATIENT)
Dept: ULTRASOUND IMAGING | Age: 58
DRG: 133 | End: 2023-06-30
Payer: MEDICAID

## 2023-06-30 PROBLEM — E44.0 MODERATE PROTEIN MALNUTRITION (HCC): Status: ACTIVE | Noted: 2023-06-30

## 2023-06-30 LAB
ANION GAP SERPL CALCULATED.3IONS-SCNC: 6 MMOL/L (ref 7–16)
ANISOCYTOSIS: ABNORMAL
BACTERIA BLD CULT ORG #2: NORMAL
BACTERIA BLD CULT ORG #2: NORMAL
BACTERIA BLD CULT: ABNORMAL
BACTERIA BLD CULT: ABNORMAL
BACTERIA BLD CULT: NORMAL
BASOPHILS # BLD: 0 E9/L (ref 0–0.2)
BASOPHILS NFR BLD: 0 % (ref 0–2)
BNP BLD-MCNC: 8316 PG/ML (ref 0–125)
BUN SERPL-MCNC: 26 MG/DL (ref 6–20)
CALCIUM SERPL-MCNC: 8.2 MG/DL (ref 8.6–10.2)
CHLORIDE SERPL-SCNC: 94 MMOL/L (ref 98–107)
CO2 SERPL-SCNC: 39 MMOL/L (ref 22–29)
CREAT SERPL-MCNC: 0.7 MG/DL (ref 0.7–1.2)
EOSINOPHIL # BLD: 0 E9/L (ref 0.05–0.5)
EOSINOPHIL NFR BLD: 0 % (ref 0–6)
ERYTHROCYTE [DISTWIDTH] IN BLOOD BY AUTOMATED COUNT: 14.3 FL (ref 11.5–15)
GLUCOSE SERPL-MCNC: 168 MG/DL (ref 74–99)
HCT VFR BLD AUTO: 33.9 % (ref 37–54)
HGB BLD-MCNC: 10.4 G/DL (ref 12.5–16.5)
HYPOCHROMIA: ABNORMAL
LYMPHOCYTES # BLD: 0.35 E9/L (ref 1.5–4)
LYMPHOCYTES NFR BLD: 5.2 % (ref 20–42)
MCH RBC QN AUTO: 30.1 PG (ref 26–35)
MCHC RBC AUTO-ENTMCNC: 30.7 % (ref 32–34.5)
MCV RBC AUTO: 98 FL (ref 80–99.9)
MONOCYTES # BLD: 0.35 E9/L (ref 0.1–0.95)
MONOCYTES NFR BLD: 5.3 % (ref 2–12)
NEUTROPHILS # BLD: 6.3 E9/L (ref 1.8–7.3)
NEUTS SEG NFR BLD: 89.5 % (ref 43–80)
NRBC BLD-RTO: 0 /100 WBC
ORGANISM: ABNORMAL
ORGANISM: ABNORMAL
PLATELET # BLD AUTO: 217 E9/L (ref 130–450)
PMV BLD AUTO: 10.6 FL (ref 7–12)
POLYCHROMASIA: ABNORMAL
POTASSIUM SERPL-SCNC: 4.3 MMOL/L (ref 3.5–5)
RBC # BLD AUTO: 3.46 E12/L (ref 3.8–5.8)
SODIUM SERPL-SCNC: 139 MMOL/L (ref 132–146)
STOMATOCYTES: ABNORMAL
WBC # BLD: 7 E9/L (ref 4.5–11.5)

## 2023-06-30 PROCEDURE — 92526 ORAL FUNCTION THERAPY: CPT | Performed by: SPEECH-LANGUAGE PATHOLOGIST

## 2023-06-30 PROCEDURE — 2700000000 HC OXYGEN THERAPY PER DAY

## 2023-06-30 PROCEDURE — 6370000000 HC RX 637 (ALT 250 FOR IP): Performed by: INTERNAL MEDICINE

## 2023-06-30 PROCEDURE — 6360000002 HC RX W HCPCS

## 2023-06-30 PROCEDURE — 6360000002 HC RX W HCPCS: Performed by: STUDENT IN AN ORGANIZED HEALTH CARE EDUCATION/TRAINING PROGRAM

## 2023-06-30 PROCEDURE — 36415 COLL VENOUS BLD VENIPUNCTURE: CPT

## 2023-06-30 PROCEDURE — 94640 AIRWAY INHALATION TREATMENT: CPT

## 2023-06-30 PROCEDURE — 6370000000 HC RX 637 (ALT 250 FOR IP): Performed by: STUDENT IN AN ORGANIZED HEALTH CARE EDUCATION/TRAINING PROGRAM

## 2023-06-30 PROCEDURE — 6370000000 HC RX 637 (ALT 250 FOR IP): Performed by: NURSE PRACTITIONER

## 2023-06-30 PROCEDURE — 2580000003 HC RX 258: Performed by: STUDENT IN AN ORGANIZED HEALTH CARE EDUCATION/TRAINING PROGRAM

## 2023-06-30 PROCEDURE — 80048 BASIC METABOLIC PNL TOTAL CA: CPT

## 2023-06-30 PROCEDURE — 94660 CPAP INITIATION&MGMT: CPT

## 2023-06-30 PROCEDURE — 99232 SBSQ HOSP IP/OBS MODERATE 35: CPT | Performed by: INTERNAL MEDICINE

## 2023-06-30 PROCEDURE — 99233 SBSQ HOSP IP/OBS HIGH 50: CPT | Performed by: INTERNAL MEDICINE

## 2023-06-30 PROCEDURE — 2060000000 HC ICU INTERMEDIATE R&B

## 2023-06-30 PROCEDURE — 93971 EXTREMITY STUDY: CPT

## 2023-06-30 PROCEDURE — 83880 ASSAY OF NATRIURETIC PEPTIDE: CPT

## 2023-06-30 PROCEDURE — 94668 MNPJ CHEST WALL SBSQ: CPT

## 2023-06-30 PROCEDURE — 85025 COMPLETE CBC W/AUTO DIFF WBC: CPT

## 2023-06-30 PROCEDURE — 6370000000 HC RX 637 (ALT 250 FOR IP)

## 2023-06-30 RX ORDER — HYDROCODONE BITARTRATE AND ACETAMINOPHEN 7.5; 325 MG/1; MG/1
1 TABLET ORAL EVERY 8 HOURS PRN
Status: DISCONTINUED | OUTPATIENT
Start: 2023-06-30 | End: 2023-07-06 | Stop reason: HOSPADM

## 2023-06-30 RX ORDER — AMMONIUM LACTATE 12 G/100G
LOTION TOPICAL DAILY PRN
Status: DISCONTINUED | OUTPATIENT
Start: 2023-06-30 | End: 2023-07-06 | Stop reason: HOSPADM

## 2023-06-30 RX ADMIN — METHYLPREDNISOLONE SODIUM SUCCINATE 40 MG: 40 INJECTION, POWDER, LYOPHILIZED, FOR SOLUTION INTRAMUSCULAR; INTRAVENOUS at 08:56

## 2023-06-30 RX ADMIN — METHYLPREDNISOLONE SODIUM SUCCINATE 40 MG: 40 INJECTION, POWDER, LYOPHILIZED, FOR SOLUTION INTRAMUSCULAR; INTRAVENOUS at 22:08

## 2023-06-30 RX ADMIN — HYDRALAZINE HYDROCHLORIDE 25 MG: 25 TABLET, FILM COATED ORAL at 21:55

## 2023-06-30 RX ADMIN — MORPHINE SULFATE 10 MG: 10 SOLUTION ORAL at 18:42

## 2023-06-30 RX ADMIN — LORAZEPAM 0.5 MG: 0.5 TABLET ORAL at 21:55

## 2023-06-30 RX ADMIN — LORAZEPAM 0.5 MG: 0.5 TABLET ORAL at 02:13

## 2023-06-30 RX ADMIN — BUDESONIDE INHALATION 500 MCG: 0.5 SUSPENSION RESPIRATORY (INHALATION) at 09:32

## 2023-06-30 RX ADMIN — HYDROXYZINE PAMOATE 50 MG: 25 CAPSULE ORAL at 08:56

## 2023-06-30 RX ADMIN — GABAPENTIN 300 MG: 300 CAPSULE ORAL at 13:56

## 2023-06-30 RX ADMIN — HYDROXYZINE PAMOATE 50 MG: 25 CAPSULE ORAL at 18:42

## 2023-06-30 RX ADMIN — Medication: at 13:58

## 2023-06-30 RX ADMIN — SODIUM CHLORIDE, PRESERVATIVE FREE 10 ML: 5 INJECTION INTRAVENOUS at 08:57

## 2023-06-30 RX ADMIN — MORPHINE SULFATE 10 MG: 10 SOLUTION ORAL at 08:54

## 2023-06-30 RX ADMIN — SODIUM CHLORIDE, PRESERVATIVE FREE 10 ML: 5 INJECTION INTRAVENOUS at 08:56

## 2023-06-30 RX ADMIN — FLUOXETINE 20 MG: 10 TABLET, FILM COATED ORAL at 08:56

## 2023-06-30 RX ADMIN — HYDRALAZINE HYDROCHLORIDE 25 MG: 25 TABLET, FILM COATED ORAL at 06:23

## 2023-06-30 RX ADMIN — IPRATROPIUM BROMIDE AND ALBUTEROL SULFATE 1 DOSE: .5; 2.5 SOLUTION RESPIRATORY (INHALATION) at 09:32

## 2023-06-30 RX ADMIN — BUDESONIDE INHALATION 500 MCG: 0.5 SUSPENSION RESPIRATORY (INHALATION) at 21:14

## 2023-06-30 RX ADMIN — LORAZEPAM 0.5 MG: 0.5 TABLET ORAL at 08:55

## 2023-06-30 RX ADMIN — MORPHINE SULFATE 10 MG: 10 SOLUTION ORAL at 21:55

## 2023-06-30 RX ADMIN — HYDROCODONE BITARTRATE AND ACETAMINOPHEN 1 TABLET: 7.5; 325 TABLET ORAL at 12:25

## 2023-06-30 RX ADMIN — MORPHINE SULFATE 10 MG: 10 SOLUTION ORAL at 13:56

## 2023-06-30 RX ADMIN — SPIRONOLACTONE 25 MG: 25 TABLET ORAL at 08:55

## 2023-06-30 RX ADMIN — IPRATROPIUM BROMIDE AND ALBUTEROL SULFATE 1 DOSE: .5; 2.5 SOLUTION RESPIRATORY (INHALATION) at 13:29

## 2023-06-30 RX ADMIN — HYDRALAZINE HYDROCHLORIDE 25 MG: 25 TABLET, FILM COATED ORAL at 13:56

## 2023-06-30 RX ADMIN — GUAIFENESIN AND DEXTROMETHORPHAN 5 ML: 100; 10 SYRUP ORAL at 09:08

## 2023-06-30 RX ADMIN — ISOSORBIDE DINITRATE 20 MG: 10 TABLET ORAL at 21:55

## 2023-06-30 RX ADMIN — SODIUM CHLORIDE, PRESERVATIVE FREE 10 ML: 5 INJECTION INTRAVENOUS at 22:08

## 2023-06-30 RX ADMIN — LORAZEPAM 0.5 MG: 0.5 TABLET ORAL at 18:42

## 2023-06-30 RX ADMIN — ENOXAPARIN SODIUM 60 MG: 100 INJECTION SUBCUTANEOUS at 21:56

## 2023-06-30 RX ADMIN — GABAPENTIN 300 MG: 300 CAPSULE ORAL at 08:55

## 2023-06-30 RX ADMIN — MORPHINE SULFATE 10 MG: 10 SOLUTION ORAL at 02:13

## 2023-06-30 RX ADMIN — PANTOPRAZOLE SODIUM 40 MG: 40 TABLET, DELAYED RELEASE ORAL at 06:23

## 2023-06-30 RX ADMIN — GABAPENTIN 300 MG: 300 CAPSULE ORAL at 21:55

## 2023-06-30 RX ADMIN — ARFORMOTEROL TARTRATE 15 MCG: 15 SOLUTION RESPIRATORY (INHALATION) at 09:33

## 2023-06-30 RX ADMIN — GUAIFENESIN AND DEXTROMETHORPHAN 5 ML: 100; 10 SYRUP ORAL at 18:42

## 2023-06-30 RX ADMIN — ISOSORBIDE DINITRATE 20 MG: 10 TABLET ORAL at 13:56

## 2023-06-30 RX ADMIN — ISOSORBIDE DINITRATE 20 MG: 10 TABLET ORAL at 08:56

## 2023-06-30 RX ADMIN — IPRATROPIUM BROMIDE AND ALBUTEROL SULFATE 1 DOSE: .5; 2.5 SOLUTION RESPIRATORY (INHALATION) at 21:14

## 2023-06-30 RX ADMIN — ARIPIPRAZOLE 5 MG: 5 TABLET ORAL at 08:56

## 2023-06-30 RX ADMIN — ENOXAPARIN SODIUM 60 MG: 100 INJECTION SUBCUTANEOUS at 09:22

## 2023-06-30 RX ADMIN — LORAZEPAM 0.5 MG: 0.5 TABLET ORAL at 13:56

## 2023-06-30 RX ADMIN — ATORVASTATIN CALCIUM 40 MG: 40 TABLET, FILM COATED ORAL at 21:55

## 2023-06-30 RX ADMIN — GUAIFENESIN AND DEXTROMETHORPHAN 5 ML: 100; 10 SYRUP ORAL at 13:56

## 2023-06-30 RX ADMIN — ARFORMOTEROL TARTRATE 15 MCG: 15 SOLUTION RESPIRATORY (INHALATION) at 21:14

## 2023-06-30 RX ADMIN — ASPIRIN 81 MG CHEWABLE TABLET 81 MG: 81 TABLET CHEWABLE at 08:55

## 2023-06-30 RX ADMIN — FUROSEMIDE 40 MG: 40 TABLET ORAL at 08:55

## 2023-06-30 ASSESSMENT — PAIN DESCRIPTION - LOCATION
LOCATION: LEG
LOCATION: LEG
LOCATION: GENERALIZED
LOCATION: GENERALIZED
LOCATION: LEG
LOCATION: GENERALIZED

## 2023-06-30 ASSESSMENT — PAIN SCALES - GENERAL
PAINLEVEL_OUTOF10: 9
PAINLEVEL_OUTOF10: 7
PAINLEVEL_OUTOF10: 9
PAINLEVEL_OUTOF10: 7
PAINLEVEL_OUTOF10: 10
PAINLEVEL_OUTOF10: 4
PAINLEVEL_OUTOF10: 9
PAINLEVEL_OUTOF10: 4
PAINLEVEL_OUTOF10: 8
PAINLEVEL_OUTOF10: 0
PAINLEVEL_OUTOF10: 10

## 2023-06-30 ASSESSMENT — PAIN DESCRIPTION - PAIN TYPE
TYPE: CHRONIC PAIN

## 2023-06-30 ASSESSMENT — PAIN DESCRIPTION - ORIENTATION
ORIENTATION: LEFT;RIGHT
ORIENTATION: RIGHT;LEFT

## 2023-06-30 ASSESSMENT — PAIN DESCRIPTION - FREQUENCY: FREQUENCY: CONTINUOUS

## 2023-06-30 ASSESSMENT — PAIN DESCRIPTION - ONSET: ONSET: ON-GOING

## 2023-06-30 ASSESSMENT — PAIN DESCRIPTION - DESCRIPTORS
DESCRIPTORS: ACHING;DISCOMFORT
DESCRIPTORS: DULL
DESCRIPTORS: SHARP
DESCRIPTORS: ACHING;SHOOTING
DESCRIPTORS: SHARP;STABBING;ACHING
DESCRIPTORS: ACHING;DISCOMFORT
DESCRIPTORS: SHARP

## 2023-06-30 ASSESSMENT — PAIN - FUNCTIONAL ASSESSMENT
PAIN_FUNCTIONAL_ASSESSMENT: ACTIVITIES ARE NOT PREVENTED
PAIN_FUNCTIONAL_ASSESSMENT: PREVENTS OR INTERFERES WITH MANY ACTIVE NOT PASSIVE ACTIVITIES
PAIN_FUNCTIONAL_ASSESSMENT: PREVENTS OR INTERFERES SOME ACTIVE ACTIVITIES AND ADLS
PAIN_FUNCTIONAL_ASSESSMENT: PREVENTS OR INTERFERES SOME ACTIVE ACTIVITIES AND ADLS

## 2023-07-01 PROBLEM — B34.8 PARAINFLUENZA: Status: ACTIVE | Noted: 2023-07-01

## 2023-07-01 LAB
ANION GAP SERPL CALCULATED.3IONS-SCNC: 9 MMOL/L (ref 7–16)
BASOPHILS # BLD: 0.01 E9/L (ref 0–0.2)
BASOPHILS NFR BLD: 0.1 % (ref 0–2)
BUN SERPL-MCNC: 21 MG/DL (ref 6–20)
CALCIUM SERPL-MCNC: 8.4 MG/DL (ref 8.6–10.2)
CHLORIDE SERPL-SCNC: 91 MMOL/L (ref 98–107)
CO2 SERPL-SCNC: 37 MMOL/L (ref 22–29)
CREAT SERPL-MCNC: 0.6 MG/DL (ref 0.7–1.2)
EOSINOPHIL # BLD: 0.01 E9/L (ref 0.05–0.5)
EOSINOPHIL NFR BLD: 0.1 % (ref 0–6)
ERYTHROCYTE [DISTWIDTH] IN BLOOD BY AUTOMATED COUNT: 14.4 FL (ref 11.5–15)
GLUCOSE SERPL-MCNC: 156 MG/DL (ref 74–99)
HCT VFR BLD AUTO: 35.2 % (ref 37–54)
HGB BLD-MCNC: 11 G/DL (ref 12.5–16.5)
IMM GRANULOCYTES # BLD: 0.08 E9/L
IMM GRANULOCYTES NFR BLD: 0.9 % (ref 0–5)
LYMPHOCYTES # BLD: 0.72 E9/L (ref 1.5–4)
LYMPHOCYTES NFR BLD: 8 % (ref 20–42)
MCH RBC QN AUTO: 30.6 PG (ref 26–35)
MCHC RBC AUTO-ENTMCNC: 31.3 % (ref 32–34.5)
MCV RBC AUTO: 97.8 FL (ref 80–99.9)
MONOCYTES # BLD: 0.32 E9/L (ref 0.1–0.95)
MONOCYTES NFR BLD: 3.6 % (ref 2–12)
NEUTROPHILS # BLD: 7.83 E9/L (ref 1.8–7.3)
NEUTS SEG NFR BLD: 87.3 % (ref 43–80)
PLATELET # BLD AUTO: 259 E9/L (ref 130–450)
PMV BLD AUTO: 10.3 FL (ref 7–12)
POTASSIUM SERPL-SCNC: 4.3 MMOL/L (ref 3.5–5)
RBC # BLD AUTO: 3.6 E12/L (ref 3.8–5.8)
SODIUM SERPL-SCNC: 137 MMOL/L (ref 132–146)
WBC # BLD: 9 E9/L (ref 4.5–11.5)

## 2023-07-01 PROCEDURE — 6360000002 HC RX W HCPCS: Performed by: STUDENT IN AN ORGANIZED HEALTH CARE EDUCATION/TRAINING PROGRAM

## 2023-07-01 PROCEDURE — 2060000000 HC ICU INTERMEDIATE R&B

## 2023-07-01 PROCEDURE — 6370000000 HC RX 637 (ALT 250 FOR IP): Performed by: STUDENT IN AN ORGANIZED HEALTH CARE EDUCATION/TRAINING PROGRAM

## 2023-07-01 PROCEDURE — 94640 AIRWAY INHALATION TREATMENT: CPT

## 2023-07-01 PROCEDURE — 6370000000 HC RX 637 (ALT 250 FOR IP): Performed by: NURSE PRACTITIONER

## 2023-07-01 PROCEDURE — 6360000002 HC RX W HCPCS

## 2023-07-01 PROCEDURE — 2580000003 HC RX 258: Performed by: STUDENT IN AN ORGANIZED HEALTH CARE EDUCATION/TRAINING PROGRAM

## 2023-07-01 PROCEDURE — 80048 BASIC METABOLIC PNL TOTAL CA: CPT

## 2023-07-01 PROCEDURE — 97530 THERAPEUTIC ACTIVITIES: CPT

## 2023-07-01 PROCEDURE — 99232 SBSQ HOSP IP/OBS MODERATE 35: CPT | Performed by: NURSE PRACTITIONER

## 2023-07-01 PROCEDURE — 2580000003 HC RX 258

## 2023-07-01 PROCEDURE — 6370000000 HC RX 637 (ALT 250 FOR IP): Performed by: INTERNAL MEDICINE

## 2023-07-01 PROCEDURE — 85025 COMPLETE CBC W/AUTO DIFF WBC: CPT

## 2023-07-01 PROCEDURE — 36415 COLL VENOUS BLD VENIPUNCTURE: CPT

## 2023-07-01 PROCEDURE — 94669 MECHANICAL CHEST WALL OSCILL: CPT

## 2023-07-01 PROCEDURE — 6370000000 HC RX 637 (ALT 250 FOR IP)

## 2023-07-01 PROCEDURE — 99233 SBSQ HOSP IP/OBS HIGH 50: CPT | Performed by: INTERNAL MEDICINE

## 2023-07-01 PROCEDURE — 2700000000 HC OXYGEN THERAPY PER DAY

## 2023-07-01 PROCEDURE — 94660 CPAP INITIATION&MGMT: CPT

## 2023-07-01 RX ORDER — WATER 1000 ML/1000ML
INJECTION, SOLUTION INTRAVENOUS
Status: COMPLETED
Start: 2023-07-01 | End: 2023-07-01

## 2023-07-01 RX ADMIN — LORAZEPAM 0.5 MG: 0.5 TABLET ORAL at 14:45

## 2023-07-01 RX ADMIN — ENOXAPARIN SODIUM 60 MG: 100 INJECTION SUBCUTANEOUS at 20:59

## 2023-07-01 RX ADMIN — FLUOXETINE 20 MG: 10 TABLET, FILM COATED ORAL at 08:45

## 2023-07-01 RX ADMIN — IPRATROPIUM BROMIDE AND ALBUTEROL SULFATE 1 DOSE: .5; 2.5 SOLUTION RESPIRATORY (INHALATION) at 17:29

## 2023-07-01 RX ADMIN — LORAZEPAM 0.5 MG: 0.5 TABLET ORAL at 10:22

## 2023-07-01 RX ADMIN — ONDANSETRON 4 MG: 2 INJECTION INTRAMUSCULAR; INTRAVENOUS at 09:50

## 2023-07-01 RX ADMIN — HYDROXYZINE PAMOATE 50 MG: 25 CAPSULE ORAL at 08:45

## 2023-07-01 RX ADMIN — HYDROCODONE BITARTRATE AND ACETAMINOPHEN 1 TABLET: 7.5; 325 TABLET ORAL at 22:20

## 2023-07-01 RX ADMIN — LORAZEPAM 0.5 MG: 0.5 TABLET ORAL at 21:14

## 2023-07-01 RX ADMIN — ENOXAPARIN SODIUM 60 MG: 100 INJECTION SUBCUTANEOUS at 08:44

## 2023-07-01 RX ADMIN — ARFORMOTEROL TARTRATE 15 MCG: 15 SOLUTION RESPIRATORY (INHALATION) at 20:47

## 2023-07-01 RX ADMIN — ASPIRIN 81 MG CHEWABLE TABLET 81 MG: 81 TABLET CHEWABLE at 08:45

## 2023-07-01 RX ADMIN — GUAIFENESIN AND DEXTROMETHORPHAN 5 ML: 100; 10 SYRUP ORAL at 02:13

## 2023-07-01 RX ADMIN — GUAIFENESIN AND DEXTROMETHORPHAN 5 ML: 100; 10 SYRUP ORAL at 21:14

## 2023-07-01 RX ADMIN — IPRATROPIUM BROMIDE AND ALBUTEROL SULFATE 1 DOSE: .5; 2.5 SOLUTION RESPIRATORY (INHALATION) at 13:14

## 2023-07-01 RX ADMIN — SODIUM CHLORIDE, PRESERVATIVE FREE 10 ML: 5 INJECTION INTRAVENOUS at 21:00

## 2023-07-01 RX ADMIN — GABAPENTIN 300 MG: 300 CAPSULE ORAL at 14:45

## 2023-07-01 RX ADMIN — FUROSEMIDE 40 MG: 40 TABLET ORAL at 08:51

## 2023-07-01 RX ADMIN — LORAZEPAM 0.5 MG: 0.5 TABLET ORAL at 05:42

## 2023-07-01 RX ADMIN — ONDANSETRON 4 MG: 2 INJECTION INTRAMUSCULAR; INTRAVENOUS at 16:44

## 2023-07-01 RX ADMIN — ARFORMOTEROL TARTRATE 15 MCG: 15 SOLUTION RESPIRATORY (INHALATION) at 09:13

## 2023-07-01 RX ADMIN — IPRATROPIUM BROMIDE AND ALBUTEROL SULFATE 1 DOSE: .5; 2.5 SOLUTION RESPIRATORY (INHALATION) at 09:13

## 2023-07-01 RX ADMIN — ISOSORBIDE DINITRATE 20 MG: 10 TABLET ORAL at 20:59

## 2023-07-01 RX ADMIN — IPRATROPIUM BROMIDE AND ALBUTEROL SULFATE 1 DOSE: .5; 2.5 SOLUTION RESPIRATORY (INHALATION) at 20:47

## 2023-07-01 RX ADMIN — HYDRALAZINE HYDROCHLORIDE 25 MG: 25 TABLET, FILM COATED ORAL at 05:42

## 2023-07-01 RX ADMIN — BUDESONIDE INHALATION 500 MCG: 0.5 SUSPENSION RESPIRATORY (INHALATION) at 09:13

## 2023-07-01 RX ADMIN — HYDROXYZINE PAMOATE 50 MG: 25 CAPSULE ORAL at 02:13

## 2023-07-01 RX ADMIN — MORPHINE SULFATE 10 MG: 10 SOLUTION ORAL at 05:42

## 2023-07-01 RX ADMIN — WATER 10 ML: 1 INJECTION INTRAMUSCULAR; INTRAVENOUS; SUBCUTANEOUS at 21:00

## 2023-07-01 RX ADMIN — MORPHINE SULFATE 10 MG: 10 SOLUTION ORAL at 09:45

## 2023-07-01 RX ADMIN — MORPHINE SULFATE 10 MG: 10 SOLUTION ORAL at 13:49

## 2023-07-01 RX ADMIN — PANTOPRAZOLE SODIUM 40 MG: 40 TABLET, DELAYED RELEASE ORAL at 05:42

## 2023-07-01 RX ADMIN — ATORVASTATIN CALCIUM 40 MG: 40 TABLET, FILM COATED ORAL at 20:59

## 2023-07-01 RX ADMIN — METHYLPREDNISOLONE SODIUM SUCCINATE 40 MG: 40 INJECTION, POWDER, LYOPHILIZED, FOR SOLUTION INTRAMUSCULAR; INTRAVENOUS at 21:00

## 2023-07-01 RX ADMIN — GABAPENTIN 300 MG: 300 CAPSULE ORAL at 20:59

## 2023-07-01 RX ADMIN — SODIUM CHLORIDE, PRESERVATIVE FREE 10 ML: 5 INJECTION INTRAVENOUS at 08:46

## 2023-07-01 RX ADMIN — HYDRALAZINE HYDROCHLORIDE 25 MG: 25 TABLET, FILM COATED ORAL at 22:13

## 2023-07-01 RX ADMIN — ONDANSETRON 4 MG: 4 TABLET, ORALLY DISINTEGRATING ORAL at 23:40

## 2023-07-01 RX ADMIN — HYDROCODONE BITARTRATE AND ACETAMINOPHEN 1 TABLET: 7.5; 325 TABLET ORAL at 11:02

## 2023-07-01 RX ADMIN — BUDESONIDE INHALATION 500 MCG: 0.5 SUSPENSION RESPIRATORY (INHALATION) at 20:47

## 2023-07-01 RX ADMIN — ISOSORBIDE DINITRATE 20 MG: 10 TABLET ORAL at 08:45

## 2023-07-01 RX ADMIN — HYDROCODONE BITARTRATE AND ACETAMINOPHEN 1 TABLET: 7.5; 325 TABLET ORAL at 02:13

## 2023-07-01 RX ADMIN — HYDRALAZINE HYDROCHLORIDE 25 MG: 25 TABLET, FILM COATED ORAL at 14:45

## 2023-07-01 RX ADMIN — SPIRONOLACTONE 25 MG: 25 TABLET ORAL at 08:45

## 2023-07-01 RX ADMIN — ISOSORBIDE DINITRATE 20 MG: 10 TABLET ORAL at 14:47

## 2023-07-01 RX ADMIN — ARIPIPRAZOLE 5 MG: 5 TABLET ORAL at 08:45

## 2023-07-01 RX ADMIN — HYDROXYZINE PAMOATE 50 MG: 25 CAPSULE ORAL at 15:32

## 2023-07-01 RX ADMIN — MORPHINE SULFATE 10 MG: 10 SOLUTION ORAL at 21:14

## 2023-07-01 RX ADMIN — METHYLPREDNISOLONE SODIUM SUCCINATE 40 MG: 40 INJECTION, POWDER, LYOPHILIZED, FOR SOLUTION INTRAMUSCULAR; INTRAVENOUS at 08:45

## 2023-07-01 RX ADMIN — GABAPENTIN 300 MG: 300 CAPSULE ORAL at 08:45

## 2023-07-01 ASSESSMENT — PAIN DESCRIPTION - FREQUENCY
FREQUENCY: CONTINUOUS
FREQUENCY: CONTINUOUS

## 2023-07-01 ASSESSMENT — PAIN DESCRIPTION - LOCATION
LOCATION: GENERALIZED
LOCATION: LEG
LOCATION: GENERALIZED
LOCATION: GENERALIZED
LOCATION: LEG
LOCATION: LEG
LOCATION: GENERALIZED

## 2023-07-01 ASSESSMENT — PAIN DESCRIPTION - ONSET
ONSET: ON-GOING
ONSET: ON-GOING

## 2023-07-01 ASSESSMENT — PAIN SCALES - GENERAL
PAINLEVEL_OUTOF10: 7
PAINLEVEL_OUTOF10: 7
PAINLEVEL_OUTOF10: 0
PAINLEVEL_OUTOF10: 8
PAINLEVEL_OUTOF10: 0
PAINLEVEL_OUTOF10: 4
PAINLEVEL_OUTOF10: 9
PAINLEVEL_OUTOF10: 9
PAINLEVEL_OUTOF10: 8
PAINLEVEL_OUTOF10: 8
PAINLEVEL_OUTOF10: 4
PAINLEVEL_OUTOF10: 0
PAINLEVEL_OUTOF10: 2
PAINLEVEL_OUTOF10: 4

## 2023-07-01 ASSESSMENT — PAIN DESCRIPTION - DESCRIPTORS
DESCRIPTORS: ACHING;DISCOMFORT
DESCRIPTORS: ACHING;DISCOMFORT
DESCRIPTORS: ACHING
DESCRIPTORS: ACHING;DISCOMFORT
DESCRIPTORS: ACHING;DISCOMFORT;DULL

## 2023-07-01 ASSESSMENT — PAIN - FUNCTIONAL ASSESSMENT
PAIN_FUNCTIONAL_ASSESSMENT: ACTIVITIES ARE NOT PREVENTED

## 2023-07-01 ASSESSMENT — PAIN DESCRIPTION - ORIENTATION
ORIENTATION: RIGHT;LEFT
ORIENTATION: RIGHT;LEFT

## 2023-07-01 ASSESSMENT — PAIN DESCRIPTION - PAIN TYPE: TYPE: CHRONIC PAIN

## 2023-07-02 LAB
ANION GAP SERPL CALCULATED.3IONS-SCNC: 7 MMOL/L (ref 7–16)
BASOPHILIC STIPPLING: ABNORMAL
BASOPHILS # BLD: 0 E9/L (ref 0–0.2)
BASOPHILS NFR BLD: 0 % (ref 0–2)
BUN SERPL-MCNC: 26 MG/DL (ref 6–20)
CALCIUM SERPL-MCNC: 7.8 MG/DL (ref 8.6–10.2)
CHLORIDE SERPL-SCNC: 93 MMOL/L (ref 98–107)
CO2 SERPL-SCNC: 38 MMOL/L (ref 22–29)
CREAT SERPL-MCNC: 0.9 MG/DL (ref 0.7–1.2)
EOSINOPHIL # BLD: 0 E9/L (ref 0.05–0.5)
EOSINOPHIL NFR BLD: 0 % (ref 0–6)
ERYTHROCYTE [DISTWIDTH] IN BLOOD BY AUTOMATED COUNT: 14.6 FL (ref 11.5–15)
GLUCOSE SERPL-MCNC: 165 MG/DL (ref 74–99)
HCT VFR BLD AUTO: 33.8 % (ref 37–54)
HGB BLD-MCNC: 10.3 G/DL (ref 12.5–16.5)
HYPOCHROMIA: ABNORMAL
LYMPHOCYTES # BLD: 0.45 E9/L (ref 1.5–4)
LYMPHOCYTES NFR BLD: 4.3 % (ref 20–42)
MCH RBC QN AUTO: 30.5 PG (ref 26–35)
MCHC RBC AUTO-ENTMCNC: 30.5 % (ref 32–34.5)
MCV RBC AUTO: 100 FL (ref 80–99.9)
MONOCYTES # BLD: 0.11 E9/L (ref 0.1–0.95)
MONOCYTES NFR BLD: 0.9 % (ref 2–12)
NEUTROPHILS # BLD: 10.74 E9/L (ref 1.8–7.3)
NEUTS SEG NFR BLD: 94.8 % (ref 43–80)
NRBC BLD-RTO: 0 /100 WBC
PLATELET # BLD AUTO: 237 E9/L (ref 130–450)
PMV BLD AUTO: 9.8 FL (ref 7–12)
POLYCHROMASIA: ABNORMAL
POTASSIUM SERPL-SCNC: 4.9 MMOL/L (ref 3.5–5)
RBC # BLD AUTO: 3.38 E12/L (ref 3.8–5.8)
SODIUM SERPL-SCNC: 138 MMOL/L (ref 132–146)
STOMATOCYTES: ABNORMAL
WBC # BLD: 11.3 E9/L (ref 4.5–11.5)

## 2023-07-02 PROCEDURE — 6370000000 HC RX 637 (ALT 250 FOR IP): Performed by: NURSE PRACTITIONER

## 2023-07-02 PROCEDURE — 6360000002 HC RX W HCPCS: Performed by: STUDENT IN AN ORGANIZED HEALTH CARE EDUCATION/TRAINING PROGRAM

## 2023-07-02 PROCEDURE — 6370000000 HC RX 637 (ALT 250 FOR IP): Performed by: INTERNAL MEDICINE

## 2023-07-02 PROCEDURE — 94640 AIRWAY INHALATION TREATMENT: CPT

## 2023-07-02 PROCEDURE — 94660 CPAP INITIATION&MGMT: CPT

## 2023-07-02 PROCEDURE — 87206 SMEAR FLUORESCENT/ACID STAI: CPT

## 2023-07-02 PROCEDURE — 85025 COMPLETE CBC W/AUTO DIFF WBC: CPT

## 2023-07-02 PROCEDURE — 2700000000 HC OXYGEN THERAPY PER DAY

## 2023-07-02 PROCEDURE — 99233 SBSQ HOSP IP/OBS HIGH 50: CPT | Performed by: INTERNAL MEDICINE

## 2023-07-02 PROCEDURE — 94669 MECHANICAL CHEST WALL OSCILL: CPT

## 2023-07-02 PROCEDURE — 6370000000 HC RX 637 (ALT 250 FOR IP)

## 2023-07-02 PROCEDURE — 6360000002 HC RX W HCPCS

## 2023-07-02 PROCEDURE — 87070 CULTURE OTHR SPECIMN AEROBIC: CPT

## 2023-07-02 PROCEDURE — 87186 SC STD MICRODIL/AGAR DIL: CPT

## 2023-07-02 PROCEDURE — 80048 BASIC METABOLIC PNL TOTAL CA: CPT

## 2023-07-02 PROCEDURE — 99232 SBSQ HOSP IP/OBS MODERATE 35: CPT | Performed by: INTERNAL MEDICINE

## 2023-07-02 PROCEDURE — 2580000003 HC RX 258: Performed by: STUDENT IN AN ORGANIZED HEALTH CARE EDUCATION/TRAINING PROGRAM

## 2023-07-02 PROCEDURE — 6370000000 HC RX 637 (ALT 250 FOR IP): Performed by: STUDENT IN AN ORGANIZED HEALTH CARE EDUCATION/TRAINING PROGRAM

## 2023-07-02 PROCEDURE — 36415 COLL VENOUS BLD VENIPUNCTURE: CPT

## 2023-07-02 PROCEDURE — 87077 CULTURE AEROBIC IDENTIFY: CPT

## 2023-07-02 PROCEDURE — 2060000000 HC ICU INTERMEDIATE R&B

## 2023-07-02 RX ORDER — METOPROLOL SUCCINATE 25 MG/1
25 TABLET, EXTENDED RELEASE ORAL DAILY
Status: DISCONTINUED | OUTPATIENT
Start: 2023-07-02 | End: 2023-07-04

## 2023-07-02 RX ADMIN — GABAPENTIN 300 MG: 300 CAPSULE ORAL at 13:55

## 2023-07-02 RX ADMIN — GUAIFENESIN AND DEXTROMETHORPHAN 5 ML: 100; 10 SYRUP ORAL at 10:43

## 2023-07-02 RX ADMIN — HYDRALAZINE HYDROCHLORIDE 25 MG: 25 TABLET, FILM COATED ORAL at 05:50

## 2023-07-02 RX ADMIN — HYDROXYZINE PAMOATE 50 MG: 25 CAPSULE ORAL at 11:55

## 2023-07-02 RX ADMIN — BUDESONIDE INHALATION 500 MCG: 0.5 SUSPENSION RESPIRATORY (INHALATION) at 20:24

## 2023-07-02 RX ADMIN — MORPHINE SULFATE 10 MG: 10 SOLUTION ORAL at 02:41

## 2023-07-02 RX ADMIN — SPIRONOLACTONE 25 MG: 25 TABLET ORAL at 08:16

## 2023-07-02 RX ADMIN — IPRATROPIUM BROMIDE AND ALBUTEROL SULFATE 1 DOSE: .5; 2.5 SOLUTION RESPIRATORY (INHALATION) at 16:21

## 2023-07-02 RX ADMIN — IPRATROPIUM BROMIDE AND ALBUTEROL SULFATE 1 DOSE: .5; 2.5 SOLUTION RESPIRATORY (INHALATION) at 20:24

## 2023-07-02 RX ADMIN — ISOSORBIDE DINITRATE 20 MG: 10 TABLET ORAL at 08:16

## 2023-07-02 RX ADMIN — METHYLPREDNISOLONE SODIUM SUCCINATE 40 MG: 40 INJECTION, POWDER, LYOPHILIZED, FOR SOLUTION INTRAMUSCULAR; INTRAVENOUS at 08:15

## 2023-07-02 RX ADMIN — ATORVASTATIN CALCIUM 40 MG: 40 TABLET, FILM COATED ORAL at 20:53

## 2023-07-02 RX ADMIN — BUDESONIDE INHALATION 500 MCG: 0.5 SUSPENSION RESPIRATORY (INHALATION) at 09:38

## 2023-07-02 RX ADMIN — METOPROLOL SUCCINATE 25 MG: 25 TABLET, EXTENDED RELEASE ORAL at 20:53

## 2023-07-02 RX ADMIN — LORAZEPAM 0.5 MG: 0.5 TABLET ORAL at 10:40

## 2023-07-02 RX ADMIN — METHYLPREDNISOLONE SODIUM SUCCINATE 40 MG: 40 INJECTION, POWDER, LYOPHILIZED, FOR SOLUTION INTRAMUSCULAR; INTRAVENOUS at 20:53

## 2023-07-02 RX ADMIN — ONDANSETRON 4 MG: 2 INJECTION INTRAMUSCULAR; INTRAVENOUS at 08:11

## 2023-07-02 RX ADMIN — ARIPIPRAZOLE 5 MG: 5 TABLET ORAL at 08:16

## 2023-07-02 RX ADMIN — ARFORMOTEROL TARTRATE 15 MCG: 15 SOLUTION RESPIRATORY (INHALATION) at 09:38

## 2023-07-02 RX ADMIN — FUROSEMIDE 40 MG: 40 TABLET ORAL at 08:16

## 2023-07-02 RX ADMIN — ASPIRIN 81 MG CHEWABLE TABLET 81 MG: 81 TABLET CHEWABLE at 08:16

## 2023-07-02 RX ADMIN — GABAPENTIN 300 MG: 300 CAPSULE ORAL at 20:53

## 2023-07-02 RX ADMIN — IPRATROPIUM BROMIDE AND ALBUTEROL SULFATE 1 DOSE: .5; 2.5 SOLUTION RESPIRATORY (INHALATION) at 13:05

## 2023-07-02 RX ADMIN — HYDRALAZINE HYDROCHLORIDE 25 MG: 25 TABLET, FILM COATED ORAL at 13:55

## 2023-07-02 RX ADMIN — ISOSORBIDE DINITRATE 20 MG: 10 TABLET ORAL at 13:55

## 2023-07-02 RX ADMIN — SODIUM CHLORIDE, PRESERVATIVE FREE 10 ML: 5 INJECTION INTRAVENOUS at 20:55

## 2023-07-02 RX ADMIN — HYDROCODONE BITARTRATE AND ACETAMINOPHEN 1 TABLET: 7.5; 325 TABLET ORAL at 23:49

## 2023-07-02 RX ADMIN — HYDROCODONE BITARTRATE AND ACETAMINOPHEN 1 TABLET: 7.5; 325 TABLET ORAL at 17:43

## 2023-07-02 RX ADMIN — LORAZEPAM 0.5 MG: 0.5 TABLET ORAL at 02:41

## 2023-07-02 RX ADMIN — FLUOXETINE 20 MG: 10 TABLET, FILM COATED ORAL at 08:16

## 2023-07-02 RX ADMIN — ARFORMOTEROL TARTRATE 15 MCG: 15 SOLUTION RESPIRATORY (INHALATION) at 20:24

## 2023-07-02 RX ADMIN — PANTOPRAZOLE SODIUM 40 MG: 40 TABLET, DELAYED RELEASE ORAL at 05:51

## 2023-07-02 RX ADMIN — MORPHINE SULFATE 10 MG: 10 SOLUTION ORAL at 20:57

## 2023-07-02 RX ADMIN — IPRATROPIUM BROMIDE AND ALBUTEROL SULFATE 1 DOSE: .5; 2.5 SOLUTION RESPIRATORY (INHALATION) at 09:38

## 2023-07-02 RX ADMIN — MORPHINE SULFATE 10 MG: 10 SOLUTION ORAL at 13:56

## 2023-07-02 RX ADMIN — GABAPENTIN 300 MG: 300 CAPSULE ORAL at 08:16

## 2023-07-02 RX ADMIN — LORAZEPAM 0.5 MG: 0.5 TABLET ORAL at 15:42

## 2023-07-02 RX ADMIN — ENOXAPARIN SODIUM 60 MG: 100 INJECTION SUBCUTANEOUS at 08:16

## 2023-07-02 RX ADMIN — HYDRALAZINE HYDROCHLORIDE 25 MG: 25 TABLET, FILM COATED ORAL at 20:53

## 2023-07-02 RX ADMIN — MORPHINE SULFATE 10 MG: 10 SOLUTION ORAL at 08:13

## 2023-07-02 RX ADMIN — ONDANSETRON 4 MG: 2 INJECTION INTRAMUSCULAR; INTRAVENOUS at 13:55

## 2023-07-02 RX ADMIN — HYDROCODONE BITARTRATE AND ACETAMINOPHEN 1 TABLET: 7.5; 325 TABLET ORAL at 09:24

## 2023-07-02 RX ADMIN — ISOSORBIDE DINITRATE 20 MG: 10 TABLET ORAL at 20:53

## 2023-07-02 RX ADMIN — LORAZEPAM 0.5 MG: 0.5 TABLET ORAL at 22:01

## 2023-07-02 RX ADMIN — SODIUM CHLORIDE, PRESERVATIVE FREE 10 ML: 5 INJECTION INTRAVENOUS at 08:17

## 2023-07-02 ASSESSMENT — PAIN DESCRIPTION - LOCATION
LOCATION: LEG
LOCATION: BACK
LOCATION: LEG

## 2023-07-02 ASSESSMENT — PAIN SCALES - GENERAL
PAINLEVEL_OUTOF10: 9
PAINLEVEL_OUTOF10: 0
PAINLEVEL_OUTOF10: 7
PAINLEVEL_OUTOF10: 7
PAINLEVEL_OUTOF10: 8
PAINLEVEL_OUTOF10: 9
PAINLEVEL_OUTOF10: 7
PAINLEVEL_OUTOF10: 0
PAINLEVEL_OUTOF10: 7
PAINLEVEL_OUTOF10: 7

## 2023-07-02 ASSESSMENT — PAIN DESCRIPTION - DESCRIPTORS
DESCRIPTORS: ACHING;DISCOMFORT;SORE
DESCRIPTORS: ACHING

## 2023-07-02 ASSESSMENT — PAIN DESCRIPTION - ORIENTATION
ORIENTATION: RIGHT;LEFT
ORIENTATION: RIGHT

## 2023-07-03 ENCOUNTER — HOSPITAL ENCOUNTER (OUTPATIENT)
Dept: CARDIAC CATH/INVASIVE PROCEDURES | Age: 58
Discharge: HOME | End: 2023-07-03

## 2023-07-03 LAB
ANION GAP SERPL CALCULATED.3IONS-SCNC: 5 MMOL/L (ref 7–16)
ANISOCYTOSIS: ABNORMAL
BACTERIA SPEC RESP CULT: ABNORMAL
BASOPHILIC STIPPLING: ABNORMAL
BASOPHILS # BLD: 0 E9/L (ref 0–0.2)
BASOPHILS NFR BLD: 0 % (ref 0–2)
BUN SERPL-MCNC: 24 MG/DL (ref 6–20)
CALCIUM SERPL-MCNC: 7.2 MG/DL (ref 8.6–10.2)
CHLORIDE SERPL-SCNC: 89 MMOL/L (ref 98–107)
CO2 SERPL-SCNC: 40 MMOL/L (ref 22–29)
CREAT SERPL-MCNC: 0.8 MG/DL (ref 0.7–1.2)
EOSINOPHIL # BLD: 0 E9/L (ref 0.05–0.5)
EOSINOPHIL NFR BLD: 0 % (ref 0–6)
ERYTHROCYTE [DISTWIDTH] IN BLOOD BY AUTOMATED COUNT: 14.6 FL (ref 11.5–15)
GLUCOSE SERPL-MCNC: 143 MG/DL (ref 74–99)
HCT VFR BLD AUTO: 31.6 % (ref 37–54)
HGB BLD-MCNC: 9.5 G/DL (ref 12.5–16.5)
HYPOCHROMIA: ABNORMAL
LYMPHOCYTES # BLD: 0.64 E9/L (ref 1.5–4)
LYMPHOCYTES NFR BLD: 7 % (ref 20–42)
MCH RBC QN AUTO: 30.1 PG (ref 26–35)
MCHC RBC AUTO-ENTMCNC: 30.1 % (ref 32–34.5)
MCV RBC AUTO: 100 FL (ref 80–99.9)
MONOCYTES # BLD: 0.18 E9/L (ref 0.1–0.95)
MONOCYTES NFR BLD: 1.7 % (ref 2–12)
NEUTROPHILS # BLD: 8.28 E9/L (ref 1.8–7.3)
NEUTS SEG NFR BLD: 91.3 % (ref 43–80)
NRBC BLD-RTO: 0 /100 WBC
ORGANISM: ABNORMAL
ORGANISM: ABNORMAL
PLATELET # BLD AUTO: 244 E9/L (ref 130–450)
PMV BLD AUTO: 10.1 FL (ref 7–12)
POLYCHROMASIA: ABNORMAL
POTASSIUM SERPL-SCNC: 5 MMOL/L (ref 3.5–5)
RBC # BLD AUTO: 3.16 E12/L (ref 3.8–5.8)
SMEAR, RESPIRATORY: ABNORMAL
SODIUM SERPL-SCNC: 134 MMOL/L (ref 132–146)
SPHEROCYTES: ABNORMAL
WBC # BLD: 9.1 E9/L (ref 4.5–11.5)

## 2023-07-03 PROCEDURE — 94669 MECHANICAL CHEST WALL OSCILL: CPT

## 2023-07-03 PROCEDURE — 94660 CPAP INITIATION&MGMT: CPT

## 2023-07-03 PROCEDURE — 94640 AIRWAY INHALATION TREATMENT: CPT

## 2023-07-03 PROCEDURE — 6370000000 HC RX 637 (ALT 250 FOR IP): Performed by: INTERNAL MEDICINE

## 2023-07-03 PROCEDURE — 99233 SBSQ HOSP IP/OBS HIGH 50: CPT | Performed by: INTERNAL MEDICINE

## 2023-07-03 PROCEDURE — 99232 SBSQ HOSP IP/OBS MODERATE 35: CPT | Performed by: INTERNAL MEDICINE

## 2023-07-03 PROCEDURE — 6370000000 HC RX 637 (ALT 250 FOR IP)

## 2023-07-03 PROCEDURE — 80048 BASIC METABOLIC PNL TOTAL CA: CPT

## 2023-07-03 PROCEDURE — 6370000000 HC RX 637 (ALT 250 FOR IP): Performed by: STUDENT IN AN ORGANIZED HEALTH CARE EDUCATION/TRAINING PROGRAM

## 2023-07-03 PROCEDURE — 6360000002 HC RX W HCPCS

## 2023-07-03 PROCEDURE — 6370000000 HC RX 637 (ALT 250 FOR IP): Performed by: NURSE PRACTITIONER

## 2023-07-03 PROCEDURE — 2060000000 HC ICU INTERMEDIATE R&B

## 2023-07-03 PROCEDURE — 85025 COMPLETE CBC W/AUTO DIFF WBC: CPT

## 2023-07-03 PROCEDURE — 36415 COLL VENOUS BLD VENIPUNCTURE: CPT

## 2023-07-03 PROCEDURE — 2700000000 HC OXYGEN THERAPY PER DAY

## 2023-07-03 PROCEDURE — 6360000002 HC RX W HCPCS: Performed by: STUDENT IN AN ORGANIZED HEALTH CARE EDUCATION/TRAINING PROGRAM

## 2023-07-03 PROCEDURE — 99231 SBSQ HOSP IP/OBS SF/LOW 25: CPT | Performed by: NURSE PRACTITIONER

## 2023-07-03 PROCEDURE — 2580000003 HC RX 258: Performed by: STUDENT IN AN ORGANIZED HEALTH CARE EDUCATION/TRAINING PROGRAM

## 2023-07-03 RX ADMIN — BUDESONIDE INHALATION 500 MCG: 0.5 SUSPENSION RESPIRATORY (INHALATION) at 21:20

## 2023-07-03 RX ADMIN — MORPHINE SULFATE 10 MG: 10 SOLUTION ORAL at 15:18

## 2023-07-03 RX ADMIN — HYDRALAZINE HYDROCHLORIDE 25 MG: 25 TABLET, FILM COATED ORAL at 15:18

## 2023-07-03 RX ADMIN — HYDROCODONE BITARTRATE AND ACETAMINOPHEN 1 TABLET: 7.5; 325 TABLET ORAL at 09:20

## 2023-07-03 RX ADMIN — ISOSORBIDE DINITRATE 20 MG: 10 TABLET ORAL at 09:20

## 2023-07-03 RX ADMIN — ATORVASTATIN CALCIUM 40 MG: 40 TABLET, FILM COATED ORAL at 20:49

## 2023-07-03 RX ADMIN — METHYLPREDNISOLONE SODIUM SUCCINATE 40 MG: 40 INJECTION, POWDER, LYOPHILIZED, FOR SOLUTION INTRAMUSCULAR; INTRAVENOUS at 20:49

## 2023-07-03 RX ADMIN — SODIUM CHLORIDE, PRESERVATIVE FREE 10 ML: 5 INJECTION INTRAVENOUS at 09:22

## 2023-07-03 RX ADMIN — LORAZEPAM 0.5 MG: 0.5 TABLET ORAL at 16:43

## 2023-07-03 RX ADMIN — HYDRALAZINE HYDROCHLORIDE 25 MG: 25 TABLET, FILM COATED ORAL at 20:49

## 2023-07-03 RX ADMIN — MORPHINE SULFATE 10 MG: 10 SOLUTION ORAL at 06:05

## 2023-07-03 RX ADMIN — MORPHINE SULFATE 10 MG: 10 SOLUTION ORAL at 20:49

## 2023-07-03 RX ADMIN — IPRATROPIUM BROMIDE AND ALBUTEROL SULFATE 1 DOSE: .5; 2.5 SOLUTION RESPIRATORY (INHALATION) at 09:28

## 2023-07-03 RX ADMIN — ISOSORBIDE DINITRATE 20 MG: 10 TABLET ORAL at 15:18

## 2023-07-03 RX ADMIN — IPRATROPIUM BROMIDE AND ALBUTEROL SULFATE 1 DOSE: .5; 2.5 SOLUTION RESPIRATORY (INHALATION) at 12:17

## 2023-07-03 RX ADMIN — BUDESONIDE INHALATION 500 MCG: 0.5 SUSPENSION RESPIRATORY (INHALATION) at 09:28

## 2023-07-03 RX ADMIN — HYDROCODONE BITARTRATE AND ACETAMINOPHEN 1 TABLET: 7.5; 325 TABLET ORAL at 17:31

## 2023-07-03 RX ADMIN — MORPHINE SULFATE 10 MG: 10 SOLUTION ORAL at 02:02

## 2023-07-03 RX ADMIN — GABAPENTIN 300 MG: 300 CAPSULE ORAL at 20:49

## 2023-07-03 RX ADMIN — ISOSORBIDE DINITRATE 20 MG: 10 TABLET ORAL at 20:49

## 2023-07-03 RX ADMIN — LORAZEPAM 0.5 MG: 0.5 TABLET ORAL at 20:49

## 2023-07-03 RX ADMIN — GABAPENTIN 300 MG: 300 CAPSULE ORAL at 15:18

## 2023-07-03 RX ADMIN — LORAZEPAM 0.5 MG: 0.5 TABLET ORAL at 04:56

## 2023-07-03 RX ADMIN — IPRATROPIUM BROMIDE AND ALBUTEROL SULFATE 1 DOSE: .5; 2.5 SOLUTION RESPIRATORY (INHALATION) at 21:20

## 2023-07-03 RX ADMIN — HYDRALAZINE HYDROCHLORIDE 25 MG: 25 TABLET, FILM COATED ORAL at 04:56

## 2023-07-03 RX ADMIN — ASPIRIN 81 MG CHEWABLE TABLET 81 MG: 81 TABLET CHEWABLE at 09:20

## 2023-07-03 RX ADMIN — SODIUM CHLORIDE, PRESERVATIVE FREE 10 ML: 5 INJECTION INTRAVENOUS at 20:50

## 2023-07-03 RX ADMIN — ARFORMOTEROL TARTRATE 15 MCG: 15 SOLUTION RESPIRATORY (INHALATION) at 21:20

## 2023-07-03 RX ADMIN — IPRATROPIUM BROMIDE AND ALBUTEROL SULFATE 1 DOSE: .5; 2.5 SOLUTION RESPIRATORY (INHALATION) at 16:13

## 2023-07-03 ASSESSMENT — PAIN - FUNCTIONAL ASSESSMENT: PAIN_FUNCTIONAL_ASSESSMENT: ACTIVITIES ARE NOT PREVENTED

## 2023-07-03 ASSESSMENT — PAIN DESCRIPTION - DESCRIPTORS
DESCRIPTORS: ACHING

## 2023-07-03 ASSESSMENT — PAIN DESCRIPTION - LOCATION
LOCATION: LEG

## 2023-07-03 ASSESSMENT — PAIN DESCRIPTION - PAIN TYPE: TYPE: CHRONIC PAIN

## 2023-07-03 ASSESSMENT — PAIN SCALES - GENERAL
PAINLEVEL_OUTOF10: 9
PAINLEVEL_OUTOF10: 10
PAINLEVEL_OUTOF10: 9
PAINLEVEL_OUTOF10: 9
PAINLEVEL_OUTOF10: 8

## 2023-07-03 ASSESSMENT — PAIN DESCRIPTION - ORIENTATION
ORIENTATION: RIGHT;LEFT;MID

## 2023-07-03 NOTE — PLAN OF CARE
Problem: Pain  Goal: Verbalizes/displays adequate comfort level or baseline comfort level  Outcome: Progressing     Problem: Safety - Adult  Goal: Free from fall injury  Outcome: Progressing     Problem: Skin/Tissue Integrity  Goal: Absence of new skin breakdown  Description: 1. Monitor for areas of redness and/or skin breakdown  2. Assess vascular access sites hourly  3. Every 4-6 hours minimum:  Change oxygen saturation probe site  4. Every 4-6 hours:  If on nasal continuous positive airway pressure, respiratory therapy assess nares and determine need for appliance change or resting period.   Outcome: Progressing     Problem: Chronic Conditions and Co-morbidities  Goal: Patient's chronic conditions and co-morbidity symptoms are monitored and maintained or improved  Outcome: Progressing     Problem: Nutrition Deficit:  Goal: Optimize nutritional status  Outcome: Progressing

## 2023-07-04 LAB
ANION GAP SERPL CALCULATED.3IONS-SCNC: 6 MMOL/L (ref 7–16)
BUN SERPL-MCNC: 18 MG/DL (ref 6–20)
CALCIUM SERPL-MCNC: 7.8 MG/DL (ref 8.6–10.2)
CHLORIDE SERPL-SCNC: 91 MMOL/L (ref 98–107)
CO2 SERPL-SCNC: 38 MMOL/L (ref 22–29)
CREAT SERPL-MCNC: 0.6 MG/DL (ref 0.7–1.2)
GLUCOSE SERPL-MCNC: 152 MG/DL (ref 74–99)
POTASSIUM SERPL-SCNC: 4.6 MMOL/L (ref 3.5–5)
REASON FOR REJECTION: NORMAL
REJECTED TEST: NORMAL
SODIUM SERPL-SCNC: 135 MMOL/L (ref 132–146)

## 2023-07-04 PROCEDURE — 6370000000 HC RX 637 (ALT 250 FOR IP): Performed by: NURSE PRACTITIONER

## 2023-07-04 PROCEDURE — 94640 AIRWAY INHALATION TREATMENT: CPT

## 2023-07-04 PROCEDURE — 6370000000 HC RX 637 (ALT 250 FOR IP): Performed by: INTERNAL MEDICINE

## 2023-07-04 PROCEDURE — 6360000002 HC RX W HCPCS: Performed by: STUDENT IN AN ORGANIZED HEALTH CARE EDUCATION/TRAINING PROGRAM

## 2023-07-04 PROCEDURE — 99233 SBSQ HOSP IP/OBS HIGH 50: CPT | Performed by: INTERNAL MEDICINE

## 2023-07-04 PROCEDURE — 80048 BASIC METABOLIC PNL TOTAL CA: CPT

## 2023-07-04 PROCEDURE — 6370000000 HC RX 637 (ALT 250 FOR IP)

## 2023-07-04 PROCEDURE — 2700000000 HC OXYGEN THERAPY PER DAY

## 2023-07-04 PROCEDURE — 36415 COLL VENOUS BLD VENIPUNCTURE: CPT

## 2023-07-04 PROCEDURE — 6370000000 HC RX 637 (ALT 250 FOR IP): Performed by: STUDENT IN AN ORGANIZED HEALTH CARE EDUCATION/TRAINING PROGRAM

## 2023-07-04 PROCEDURE — 94660 CPAP INITIATION&MGMT: CPT

## 2023-07-04 PROCEDURE — 6360000002 HC RX W HCPCS

## 2023-07-04 PROCEDURE — 2060000000 HC ICU INTERMEDIATE R&B

## 2023-07-04 PROCEDURE — 2580000003 HC RX 258: Performed by: STUDENT IN AN ORGANIZED HEALTH CARE EDUCATION/TRAINING PROGRAM

## 2023-07-04 RX ORDER — LEVOFLOXACIN 500 MG/1
500 TABLET, FILM COATED ORAL DAILY
Status: DISCONTINUED | OUTPATIENT
Start: 2023-07-04 | End: 2023-07-06 | Stop reason: HOSPADM

## 2023-07-04 RX ORDER — METOPROLOL SUCCINATE 50 MG/1
50 TABLET, EXTENDED RELEASE ORAL DAILY
Status: DISCONTINUED | OUTPATIENT
Start: 2023-07-04 | End: 2023-07-06 | Stop reason: HOSPADM

## 2023-07-04 RX ORDER — ENOXAPARIN SODIUM 100 MG/ML
40 INJECTION SUBCUTANEOUS DAILY
Status: DISCONTINUED | OUTPATIENT
Start: 2023-07-04 | End: 2023-07-06 | Stop reason: HOSPADM

## 2023-07-04 RX ADMIN — HYDRALAZINE HYDROCHLORIDE 25 MG: 25 TABLET, FILM COATED ORAL at 06:29

## 2023-07-04 RX ADMIN — ISOSORBIDE DINITRATE 20 MG: 10 TABLET ORAL at 09:40

## 2023-07-04 RX ADMIN — SODIUM CHLORIDE, PRESERVATIVE FREE 10 ML: 5 INJECTION INTRAVENOUS at 21:06

## 2023-07-04 RX ADMIN — ARFORMOTEROL TARTRATE 15 MCG: 15 SOLUTION RESPIRATORY (INHALATION) at 09:05

## 2023-07-04 RX ADMIN — ASPIRIN 81 MG CHEWABLE TABLET 81 MG: 81 TABLET CHEWABLE at 09:39

## 2023-07-04 RX ADMIN — PANTOPRAZOLE SODIUM 40 MG: 40 TABLET, DELAYED RELEASE ORAL at 06:29

## 2023-07-04 RX ADMIN — ARIPIPRAZOLE 5 MG: 5 TABLET ORAL at 09:43

## 2023-07-04 RX ADMIN — LEVOFLOXACIN 500 MG: 500 TABLET, FILM COATED ORAL at 09:39

## 2023-07-04 RX ADMIN — METOPROLOL SUCCINATE 50 MG: 50 TABLET, EXTENDED RELEASE ORAL at 09:39

## 2023-07-04 RX ADMIN — ISOSORBIDE DINITRATE 20 MG: 10 TABLET ORAL at 15:44

## 2023-07-04 RX ADMIN — HYDROCODONE BITARTRATE AND ACETAMINOPHEN 1 TABLET: 7.5; 325 TABLET ORAL at 18:31

## 2023-07-04 RX ADMIN — MORPHINE SULFATE 10 MG: 10 SOLUTION ORAL at 04:21

## 2023-07-04 RX ADMIN — HYDROXYZINE PAMOATE 50 MG: 25 CAPSULE ORAL at 21:09

## 2023-07-04 RX ADMIN — BUDESONIDE INHALATION 500 MCG: 0.5 SUSPENSION RESPIRATORY (INHALATION) at 20:51

## 2023-07-04 RX ADMIN — ONDANSETRON 4 MG: 4 TABLET, ORALLY DISINTEGRATING ORAL at 06:29

## 2023-07-04 RX ADMIN — GABAPENTIN 300 MG: 300 CAPSULE ORAL at 09:39

## 2023-07-04 RX ADMIN — ATORVASTATIN CALCIUM 40 MG: 40 TABLET, FILM COATED ORAL at 21:05

## 2023-07-04 RX ADMIN — LORAZEPAM 0.5 MG: 0.5 TABLET ORAL at 21:05

## 2023-07-04 RX ADMIN — SODIUM CHLORIDE, PRESERVATIVE FREE 10 ML: 5 INJECTION INTRAVENOUS at 09:45

## 2023-07-04 RX ADMIN — ARFORMOTEROL TARTRATE 15 MCG: 15 SOLUTION RESPIRATORY (INHALATION) at 20:51

## 2023-07-04 RX ADMIN — LORAZEPAM 0.5 MG: 0.5 TABLET ORAL at 11:24

## 2023-07-04 RX ADMIN — HYDROCODONE BITARTRATE AND ACETAMINOPHEN 1 TABLET: 7.5; 325 TABLET ORAL at 09:39

## 2023-07-04 RX ADMIN — MORPHINE SULFATE 10 MG: 10 SOLUTION ORAL at 21:06

## 2023-07-04 RX ADMIN — IPRATROPIUM BROMIDE AND ALBUTEROL SULFATE 1 DOSE: .5; 2.5 SOLUTION RESPIRATORY (INHALATION) at 12:49

## 2023-07-04 RX ADMIN — EMPAGLIFLOZIN 10 MG: 10 TABLET, FILM COATED ORAL at 10:39

## 2023-07-04 RX ADMIN — GABAPENTIN 300 MG: 300 CAPSULE ORAL at 15:44

## 2023-07-04 RX ADMIN — ISOSORBIDE DINITRATE 20 MG: 10 TABLET ORAL at 21:37

## 2023-07-04 RX ADMIN — SPIRONOLACTONE 25 MG: 25 TABLET ORAL at 09:39

## 2023-07-04 RX ADMIN — HYDRALAZINE HYDROCHLORIDE 25 MG: 25 TABLET, FILM COATED ORAL at 21:06

## 2023-07-04 RX ADMIN — MORPHINE SULFATE 10 MG: 10 SOLUTION ORAL at 13:18

## 2023-07-04 RX ADMIN — FLUOXETINE 20 MG: 10 TABLET, FILM COATED ORAL at 09:43

## 2023-07-04 RX ADMIN — LORAZEPAM 0.5 MG: 0.5 TABLET ORAL at 01:08

## 2023-07-04 RX ADMIN — HYDRALAZINE HYDROCHLORIDE 25 MG: 25 TABLET, FILM COATED ORAL at 15:44

## 2023-07-04 RX ADMIN — IPRATROPIUM BROMIDE AND ALBUTEROL SULFATE 1 DOSE: .5; 2.5 SOLUTION RESPIRATORY (INHALATION) at 16:34

## 2023-07-04 RX ADMIN — FUROSEMIDE 40 MG: 40 TABLET ORAL at 09:39

## 2023-07-04 RX ADMIN — IPRATROPIUM BROMIDE AND ALBUTEROL SULFATE 1 DOSE: .5; 2.5 SOLUTION RESPIRATORY (INHALATION) at 20:52

## 2023-07-04 RX ADMIN — METHYLPREDNISOLONE SODIUM SUCCINATE 40 MG: 40 INJECTION, POWDER, LYOPHILIZED, FOR SOLUTION INTRAMUSCULAR; INTRAVENOUS at 21:06

## 2023-07-04 RX ADMIN — HYDROCODONE BITARTRATE AND ACETAMINOPHEN 1 TABLET: 7.5; 325 TABLET ORAL at 01:08

## 2023-07-04 RX ADMIN — LORAZEPAM 0.5 MG: 0.5 TABLET ORAL at 04:21

## 2023-07-04 RX ADMIN — IPRATROPIUM BROMIDE AND ALBUTEROL SULFATE 1 DOSE: .5; 2.5 SOLUTION RESPIRATORY (INHALATION) at 09:05

## 2023-07-04 RX ADMIN — BUDESONIDE INHALATION 500 MCG: 0.5 SUSPENSION RESPIRATORY (INHALATION) at 09:05

## 2023-07-04 RX ADMIN — GABAPENTIN 300 MG: 300 CAPSULE ORAL at 21:06

## 2023-07-04 RX ADMIN — METHYLPREDNISOLONE SODIUM SUCCINATE 40 MG: 40 INJECTION, POWDER, LYOPHILIZED, FOR SOLUTION INTRAMUSCULAR; INTRAVENOUS at 09:40

## 2023-07-04 RX ADMIN — LORAZEPAM 0.5 MG: 0.5 TABLET ORAL at 16:51

## 2023-07-04 ASSESSMENT — PAIN DESCRIPTION - DESCRIPTORS
DESCRIPTORS: ACHING
DESCRIPTORS: ACHING
DESCRIPTORS: ACHING;SHOOTING

## 2023-07-04 ASSESSMENT — PAIN DESCRIPTION - LOCATION
LOCATION: GENERALIZED;LEG
LOCATION: LEG
LOCATION: LEG

## 2023-07-04 ASSESSMENT — PAIN SCALES - GENERAL
PAINLEVEL_OUTOF10: 8
PAINLEVEL_OUTOF10: 9
PAINLEVEL_OUTOF10: 10
PAINLEVEL_OUTOF10: 8

## 2023-07-04 ASSESSMENT — PAIN DESCRIPTION - ORIENTATION
ORIENTATION: RIGHT;LEFT
ORIENTATION: RIGHT;LEFT;MID
ORIENTATION: RIGHT;LEFT;MID

## 2023-07-04 NOTE — PLAN OF CARE
Problem: Pain  Goal: Verbalizes/displays adequate comfort level or baseline comfort level  7/3/2023 2033 by Maegan Smith RN  Outcome: Progressing  7/3/2023 1817 by Ricky Lopez RN  Outcome: Progressing     Problem: Safety - Adult  Goal: Free from fall injury  7/3/2023 2033 by Maegan Smith RN  Outcome: Progressing  7/3/2023 1817 by Ricky Lopez RN  Outcome: Progressing     Problem: Skin/Tissue Integrity  Goal: Absence of new skin breakdown  Description: 1. Monitor for areas of redness and/or skin breakdown  2. Assess vascular access sites hourly  3. Every 4-6 hours minimum:  Change oxygen saturation probe site  4. Every 4-6 hours:  If on nasal continuous positive airway pressure, respiratory therapy assess nares and determine need for appliance change or resting period.   7/3/2023 2033 by Maegan Smith RN  Outcome: Progressing  7/3/2023 1817 by Ricky Lopez RN  Outcome: Progressing     Problem: Chronic Conditions and Co-morbidities  Goal: Patient's chronic conditions and co-morbidity symptoms are monitored and maintained or improved  7/3/2023 2033 by Maegan Smith RN  Outcome: Progressing  7/3/2023 1817 by Ricky Lopez RN  Outcome: Progressing     Problem: Nutrition Deficit:  Goal: Optimize nutritional status  7/3/2023 2033 by Maegan Smith RN  Outcome: Progressing  7/3/2023 1817 by Ricky Lopez RN  Outcome: Progressing

## 2023-07-05 ENCOUNTER — HOSPITAL ENCOUNTER (OUTPATIENT)
Dept: CARDIAC CATH/INVASIVE PROCEDURES | Age: 58
Discharge: HOME | End: 2023-07-05
Payer: MEDICAID

## 2023-07-05 LAB
ANION GAP SERPL CALCULATED.3IONS-SCNC: 8 MMOL/L (ref 7–16)
BUN SERPL-MCNC: 17 MG/DL (ref 6–20)
CALCIUM SERPL-MCNC: 7.5 MG/DL (ref 8.6–10.2)
CHLORIDE SERPL-SCNC: 92 MMOL/L (ref 98–107)
CO2 SERPL-SCNC: 38 MMOL/L (ref 22–29)
CREAT SERPL-MCNC: 0.8 MG/DL (ref 0.7–1.2)
GLUCOSE SERPL-MCNC: 125 MG/DL (ref 74–99)
POTASSIUM SERPL-SCNC: 4.7 MMOL/L (ref 3.5–5)
SODIUM SERPL-SCNC: 138 MMOL/L (ref 132–146)

## 2023-07-05 PROCEDURE — 6370000000 HC RX 637 (ALT 250 FOR IP): Performed by: INTERNAL MEDICINE

## 2023-07-05 PROCEDURE — 6360000002 HC RX W HCPCS: Performed by: STUDENT IN AN ORGANIZED HEALTH CARE EDUCATION/TRAINING PROGRAM

## 2023-07-05 PROCEDURE — 6370000000 HC RX 637 (ALT 250 FOR IP): Performed by: NURSE PRACTITIONER

## 2023-07-05 PROCEDURE — 99233 SBSQ HOSP IP/OBS HIGH 50: CPT | Performed by: INTERNAL MEDICINE

## 2023-07-05 PROCEDURE — 6370000000 HC RX 637 (ALT 250 FOR IP): Performed by: STUDENT IN AN ORGANIZED HEALTH CARE EDUCATION/TRAINING PROGRAM

## 2023-07-05 PROCEDURE — 94640 AIRWAY INHALATION TREATMENT: CPT

## 2023-07-05 PROCEDURE — 6360000002 HC RX W HCPCS

## 2023-07-05 PROCEDURE — 2580000003 HC RX 258: Performed by: STUDENT IN AN ORGANIZED HEALTH CARE EDUCATION/TRAINING PROGRAM

## 2023-07-05 PROCEDURE — 2700000000 HC OXYGEN THERAPY PER DAY

## 2023-07-05 PROCEDURE — 36415 COLL VENOUS BLD VENIPUNCTURE: CPT

## 2023-07-05 PROCEDURE — 2060000000 HC ICU INTERMEDIATE R&B

## 2023-07-05 PROCEDURE — 94660 CPAP INITIATION&MGMT: CPT

## 2023-07-05 PROCEDURE — 99231 SBSQ HOSP IP/OBS SF/LOW 25: CPT | Performed by: NURSE PRACTITIONER

## 2023-07-05 PROCEDURE — 80048 BASIC METABOLIC PNL TOTAL CA: CPT

## 2023-07-05 PROCEDURE — 94669 MECHANICAL CHEST WALL OSCILL: CPT

## 2023-07-05 PROCEDURE — 6370000000 HC RX 637 (ALT 250 FOR IP)

## 2023-07-05 RX ORDER — METHYLPREDNISOLONE SODIUM SUCCINATE 40 MG/ML
40 INJECTION, POWDER, LYOPHILIZED, FOR SOLUTION INTRAMUSCULAR; INTRAVENOUS DAILY
Status: DISCONTINUED | OUTPATIENT
Start: 2023-07-06 | End: 2023-07-06 | Stop reason: HOSPADM

## 2023-07-05 RX ADMIN — GUAIFENESIN AND DEXTROMETHORPHAN 5 ML: 100; 10 SYRUP ORAL at 22:36

## 2023-07-05 RX ADMIN — FUROSEMIDE 40 MG: 40 TABLET ORAL at 09:34

## 2023-07-05 RX ADMIN — METHYLPREDNISOLONE SODIUM SUCCINATE 40 MG: 40 INJECTION, POWDER, LYOPHILIZED, FOR SOLUTION INTRAMUSCULAR; INTRAVENOUS at 09:50

## 2023-07-05 RX ADMIN — MORPHINE SULFATE 10 MG: 10 SOLUTION ORAL at 22:34

## 2023-07-05 RX ADMIN — PANTOPRAZOLE SODIUM 40 MG: 40 TABLET, DELAYED RELEASE ORAL at 05:00

## 2023-07-05 RX ADMIN — MORPHINE SULFATE 10 MG: 10 SOLUTION ORAL at 09:41

## 2023-07-05 RX ADMIN — SODIUM CHLORIDE, PRESERVATIVE FREE 10 ML: 5 INJECTION INTRAVENOUS at 20:30

## 2023-07-05 RX ADMIN — GUAIFENESIN AND DEXTROMETHORPHAN 5 ML: 100; 10 SYRUP ORAL at 09:34

## 2023-07-05 RX ADMIN — LORAZEPAM 0.5 MG: 0.5 TABLET ORAL at 14:22

## 2023-07-05 RX ADMIN — IPRATROPIUM BROMIDE AND ALBUTEROL SULFATE 1 DOSE: .5; 2.5 SOLUTION RESPIRATORY (INHALATION) at 20:02

## 2023-07-05 RX ADMIN — METOPROLOL SUCCINATE 50 MG: 50 TABLET, EXTENDED RELEASE ORAL at 09:34

## 2023-07-05 RX ADMIN — GUAIFENESIN AND DEXTROMETHORPHAN 5 ML: 100; 10 SYRUP ORAL at 14:22

## 2023-07-05 RX ADMIN — ARIPIPRAZOLE 5 MG: 5 TABLET ORAL at 09:35

## 2023-07-05 RX ADMIN — ASPIRIN 81 MG CHEWABLE TABLET 81 MG: 81 TABLET CHEWABLE at 09:34

## 2023-07-05 RX ADMIN — HYDROCODONE BITARTRATE AND ACETAMINOPHEN 1 TABLET: 7.5; 325 TABLET ORAL at 04:52

## 2023-07-05 RX ADMIN — BUDESONIDE INHALATION 500 MCG: 0.5 SUSPENSION RESPIRATORY (INHALATION) at 20:02

## 2023-07-05 RX ADMIN — ISOSORBIDE DINITRATE 20 MG: 10 TABLET ORAL at 09:34

## 2023-07-05 RX ADMIN — GABAPENTIN 300 MG: 300 CAPSULE ORAL at 09:35

## 2023-07-05 RX ADMIN — LORAZEPAM 0.5 MG: 0.5 TABLET ORAL at 22:34

## 2023-07-05 RX ADMIN — LEVOFLOXACIN 500 MG: 500 TABLET, FILM COATED ORAL at 09:35

## 2023-07-05 RX ADMIN — IPRATROPIUM BROMIDE AND ALBUTEROL SULFATE 1 DOSE: .5; 2.5 SOLUTION RESPIRATORY (INHALATION) at 09:00

## 2023-07-05 RX ADMIN — HYDROCODONE BITARTRATE AND ACETAMINOPHEN 1 TABLET: 7.5; 325 TABLET ORAL at 20:29

## 2023-07-05 RX ADMIN — ISOSORBIDE DINITRATE 20 MG: 10 TABLET ORAL at 22:34

## 2023-07-05 RX ADMIN — HYDROXYZINE PAMOATE 50 MG: 25 CAPSULE ORAL at 22:34

## 2023-07-05 RX ADMIN — HYDROXYZINE PAMOATE 50 MG: 25 CAPSULE ORAL at 09:34

## 2023-07-05 RX ADMIN — MORPHINE SULFATE 10 MG: 10 SOLUTION ORAL at 01:30

## 2023-07-05 RX ADMIN — GABAPENTIN 300 MG: 300 CAPSULE ORAL at 20:29

## 2023-07-05 RX ADMIN — IPRATROPIUM BROMIDE AND ALBUTEROL SULFATE 1 DOSE: .5; 2.5 SOLUTION RESPIRATORY (INHALATION) at 14:55

## 2023-07-05 RX ADMIN — IPRATROPIUM BROMIDE AND ALBUTEROL SULFATE 1 DOSE: .5; 2.5 SOLUTION RESPIRATORY (INHALATION) at 11:44

## 2023-07-05 RX ADMIN — ISOSORBIDE DINITRATE 20 MG: 10 TABLET ORAL at 14:22

## 2023-07-05 RX ADMIN — SODIUM CHLORIDE, PRESERVATIVE FREE 10 ML: 5 INJECTION INTRAVENOUS at 09:44

## 2023-07-05 RX ADMIN — LORAZEPAM 0.5 MG: 0.5 TABLET ORAL at 04:53

## 2023-07-05 RX ADMIN — LORAZEPAM 0.5 MG: 0.5 TABLET ORAL at 09:34

## 2023-07-05 RX ADMIN — GABAPENTIN 300 MG: 300 CAPSULE ORAL at 14:22

## 2023-07-05 RX ADMIN — SPIRONOLACTONE 25 MG: 25 TABLET ORAL at 09:35

## 2023-07-05 RX ADMIN — FLUOXETINE 20 MG: 10 TABLET, FILM COATED ORAL at 09:35

## 2023-07-05 RX ADMIN — ARFORMOTEROL TARTRATE 15 MCG: 15 SOLUTION RESPIRATORY (INHALATION) at 09:00

## 2023-07-05 RX ADMIN — HYDRALAZINE HYDROCHLORIDE 25 MG: 25 TABLET, FILM COATED ORAL at 05:00

## 2023-07-05 RX ADMIN — ATORVASTATIN CALCIUM 40 MG: 40 TABLET, FILM COATED ORAL at 20:29

## 2023-07-05 RX ADMIN — BUDESONIDE INHALATION 500 MCG: 0.5 SUSPENSION RESPIRATORY (INHALATION) at 09:00

## 2023-07-05 RX ADMIN — ARFORMOTEROL TARTRATE 15 MCG: 15 SOLUTION RESPIRATORY (INHALATION) at 20:02

## 2023-07-05 RX ADMIN — MORPHINE SULFATE 10 MG: 10 SOLUTION ORAL at 14:22

## 2023-07-05 ASSESSMENT — PAIN DESCRIPTION - LOCATION
LOCATION: LEG

## 2023-07-05 ASSESSMENT — PAIN SCALES - GENERAL
PAINLEVEL_OUTOF10: 9
PAINLEVEL_OUTOF10: 7
PAINLEVEL_OUTOF10: 9
PAINLEVEL_OUTOF10: 9
PAINLEVEL_OUTOF10: 6
PAINLEVEL_OUTOF10: 9

## 2023-07-05 ASSESSMENT — PAIN DESCRIPTION - DESCRIPTORS
DESCRIPTORS: SHARP
DESCRIPTORS: ACHING

## 2023-07-05 ASSESSMENT — PAIN DESCRIPTION - ORIENTATION
ORIENTATION: RIGHT;LEFT

## 2023-07-05 ASSESSMENT — PAIN - FUNCTIONAL ASSESSMENT: PAIN_FUNCTIONAL_ASSESSMENT: PREVENTS OR INTERFERES WITH MANY ACTIVE NOT PASSIVE ACTIVITIES

## 2023-07-05 ASSESSMENT — PAIN DESCRIPTION - PAIN TYPE: TYPE: CHRONIC PAIN

## 2023-07-05 ASSESSMENT — PAIN DESCRIPTION - ONSET: ONSET: ON-GOING

## 2023-07-05 ASSESSMENT — PAIN DESCRIPTION - FREQUENCY: FREQUENCY: CONTINUOUS

## 2023-07-05 NOTE — DISCHARGE INSTRUCTIONS
weight gain of 3 pounds or more in 1 day         OR 5 pounds or more in one week  More shortness of breath  More swelling of your stomach, legs, ankles or feet  Feeling more tired, No energy  Dry hacky cough  Dizziness  More chest pain / discomfort       (CALL 911 IF ANY OF THE FOLLOWING OCCURS  Chest pain (not relieved with nitroglycerine, if you have been prescribed this medication)  Severe shortness of breath  Faint / Pass out  Confusion / cannot think clearly  If symptoms get worse           SMOKING - TOBACCO USE:  * IF YOU SMOKE - STOP! Kick the habit. [facility] is offered at [address] [facility] and [address] [facility] ([place], [place]). Call [phone] extension [phone] for more information. ACTIVITY:   (Ask your doctor when you will be able to return to work and before starting any exercise program.  Do not drive unless unless your doctor has given you permission to do so). Start light exercise. Even if you can only do a small amount, exercise will help you get stronger, have more energy, help manage your weight and decrease  stress. Walking is an easy way to get exercise. Start out slowly and  increase the amount you walk as tolerated  If you become short of breath, dizzy or have chest pain; stop, sit down, and rest.  If you feel \"wiped out\" the day after you exercise, walk at a slower pace or for a shorter distance. You can gradually increase the pace or amount of time. (Do not exercise right after a meal or in extreme temperatures, such as above 85 degrees, if the air is really humid, or wind chill is less than 20 degrees)                                             ADDITIONAL INFORMATION:  Avoid getting sick from colds and the flu. Stay away from friends or family that you know may have a contagious illness  Get plenty of rest   Get a flu shot each year. Get a pneumococcal vaccine shot.  If you have had one before, ask your

## 2023-07-05 NOTE — PLAN OF CARE
Problem: Pain  Goal: Verbalizes/displays adequate comfort level or baseline comfort level  7/4/2023 2219 by Luis Miguel King RN  Outcome: Progressing  7/4/2023 1330 by Junior Graeme RN  Outcome: Progressing     Problem: Safety - Adult  Goal: Free from fall injury  7/4/2023 2219 by Luis Miguel King RN  Outcome: Progressing  7/4/2023 1330 by Junior Graeme RN  Outcome: Progressing     Problem: Skin/Tissue Integrity  Goal: Absence of new skin breakdown  Description: 1. Monitor for areas of redness and/or skin breakdown  2. Assess vascular access sites hourly  3. Every 4-6 hours minimum:  Change oxygen saturation probe site  4. Every 4-6 hours:  If on nasal continuous positive airway pressure, respiratory therapy assess nares and determine need for appliance change or resting period.   7/4/2023 2219 by Luis Miguel King RN  Outcome: Progressing  7/4/2023 1330 by Junior Graeme RN  Outcome: Progressing     Problem: Chronic Conditions and Co-morbidities  Goal: Patient's chronic conditions and co-morbidity symptoms are monitored and maintained or improved  7/4/2023 2219 by Luis Miguel King RN  Outcome: Progressing  7/4/2023 1330 by Junior Graeme RN  Outcome: Progressing     Problem: Nutrition Deficit:  Goal: Optimize nutritional status  7/4/2023 2219 by Luis Miguel King RN  Outcome: Progressing  7/4/2023 1330 by Junior Graeme RN  Outcome: Progressing

## 2023-07-06 ENCOUNTER — HOSPITAL ENCOUNTER (OUTPATIENT)
Dept: CARDIAC CATH/INVASIVE PROCEDURES | Age: 58
Discharge: HOME | End: 2023-07-06
Payer: MEDICAID

## 2023-07-06 VITALS
DIASTOLIC BLOOD PRESSURE: 58 MMHG | RESPIRATION RATE: 19 BRPM | BODY MASS INDEX: 21.97 KG/M2 | OXYGEN SATURATION: 100 % | HEIGHT: 64 IN | TEMPERATURE: 97.8 F | HEART RATE: 74 BPM | WEIGHT: 128.7 LBS | SYSTOLIC BLOOD PRESSURE: 102 MMHG

## 2023-07-06 VITALS — HEART RATE: 75 BPM | TEMPERATURE: 97.5 F | DIASTOLIC BLOOD PRESSURE: 54 MMHG | SYSTOLIC BLOOD PRESSURE: 91 MMHG

## 2023-07-06 LAB
ABO + RH BLD: NORMAL
BACTERIA SPEC RESP CULT: ABNORMAL
BLD GP AB SCN SERPL QL: NORMAL
ORGANISM: ABNORMAL
ORGANISM: ABNORMAL
SMEAR, RESPIRATORY: ABNORMAL

## 2023-07-06 PROCEDURE — 6370000000 HC RX 637 (ALT 250 FOR IP): Performed by: INTERNAL MEDICINE

## 2023-07-06 PROCEDURE — 6360000002 HC RX W HCPCS

## 2023-07-06 PROCEDURE — 2709999900 HC NON-CHARGEABLE SUPPLY

## 2023-07-06 PROCEDURE — 36415 COLL VENOUS BLD VENIPUNCTURE: CPT

## 2023-07-06 PROCEDURE — 93458 L HRT ARTERY/VENTRICLE ANGIO: CPT

## 2023-07-06 PROCEDURE — 99239 HOSP IP/OBS DSCHRG MGMT >30: CPT | Performed by: INTERNAL MEDICINE

## 2023-07-06 PROCEDURE — C1894 INTRO/SHEATH, NON-LASER: HCPCS

## 2023-07-06 PROCEDURE — 86901 BLOOD TYPING SEROLOGIC RH(D): CPT

## 2023-07-06 PROCEDURE — C1769 GUIDE WIRE: HCPCS

## 2023-07-06 PROCEDURE — 2500000003 HC RX 250 WO HCPCS

## 2023-07-06 PROCEDURE — 6370000000 HC RX 637 (ALT 250 FOR IP): Performed by: NURSE PRACTITIONER

## 2023-07-06 PROCEDURE — 86900 BLOOD TYPING SEROLOGIC ABO: CPT

## 2023-07-06 PROCEDURE — 6370000000 HC RX 637 (ALT 250 FOR IP): Performed by: STUDENT IN AN ORGANIZED HEALTH CARE EDUCATION/TRAINING PROGRAM

## 2023-07-06 PROCEDURE — 94660 CPAP INITIATION&MGMT: CPT

## 2023-07-06 PROCEDURE — 99233 SBSQ HOSP IP/OBS HIGH 50: CPT | Performed by: INTERNAL MEDICINE

## 2023-07-06 PROCEDURE — 2700000000 HC OXYGEN THERAPY PER DAY

## 2023-07-06 PROCEDURE — 86850 RBC ANTIBODY SCREEN: CPT

## 2023-07-06 RX ORDER — ATORVASTATIN CALCIUM 40 MG/1
40 TABLET, FILM COATED ORAL NIGHTLY
Qty: 30 TABLET | Refills: 3 | Status: SHIPPED | OUTPATIENT
Start: 2023-07-06

## 2023-07-06 RX ORDER — LEVOFLOXACIN 500 MG/1
500 TABLET, FILM COATED ORAL DAILY
Qty: 2 TABLET | Refills: 0 | Status: SHIPPED | OUTPATIENT
Start: 2023-07-07 | End: 2023-07-09

## 2023-07-06 RX ORDER — PRAMIPEXOLE DIHYDROCHLORIDE 0.5 MG/1
0.25 TABLET ORAL NIGHTLY
Qty: 15 TABLET | Refills: 0 | Status: SHIPPED | OUTPATIENT
Start: 2023-07-06 | End: 2023-08-05

## 2023-07-06 RX ORDER — ASPIRIN 81 MG/1
81 TABLET, CHEWABLE ORAL DAILY
Qty: 30 TABLET | Refills: 3 | Status: SHIPPED | OUTPATIENT
Start: 2023-07-07

## 2023-07-06 RX ORDER — HYDROXYZINE PAMOATE 25 MG/1
25 CAPSULE ORAL 3 TIMES DAILY PRN
Qty: 90 CAPSULE | Refills: 1 | Status: SHIPPED | OUTPATIENT
Start: 2023-07-06 | End: 2023-09-04

## 2023-07-06 RX ORDER — ISOSORBIDE DINITRATE 20 MG/1
20 TABLET ORAL 3 TIMES DAILY
Qty: 90 TABLET | Refills: 3 | Status: SHIPPED | OUTPATIENT
Start: 2023-07-06

## 2023-07-06 RX ORDER — SPIRONOLACTONE 25 MG/1
25 TABLET ORAL DAILY
Qty: 30 TABLET | Refills: 3 | Status: SHIPPED | OUTPATIENT
Start: 2023-07-07

## 2023-07-06 RX ORDER — HYDRALAZINE HYDROCHLORIDE 25 MG/1
25 TABLET, FILM COATED ORAL EVERY 8 HOURS SCHEDULED
Qty: 90 TABLET | Refills: 3 | Status: SHIPPED | OUTPATIENT
Start: 2023-07-06

## 2023-07-06 RX ORDER — METOPROLOL SUCCINATE 50 MG/1
50 TABLET, EXTENDED RELEASE ORAL DAILY
Qty: 30 TABLET | Refills: 3 | Status: SHIPPED | OUTPATIENT
Start: 2023-07-07

## 2023-07-06 RX ORDER — MORPHINE SULFATE 20 MG/ML
10 SOLUTION ORAL EVERY 4 HOURS PRN
Qty: 90 ML | Refills: 0 | Status: SHIPPED | OUTPATIENT
Start: 2023-07-06 | End: 2023-08-05

## 2023-07-06 RX ADMIN — GABAPENTIN 300 MG: 300 CAPSULE ORAL at 08:03

## 2023-07-06 RX ADMIN — ASPIRIN 81 MG CHEWABLE TABLET 81 MG: 81 TABLET CHEWABLE at 08:03

## 2023-07-06 RX ADMIN — MORPHINE SULFATE 10 MG: 10 SOLUTION ORAL at 08:02

## 2023-07-06 RX ADMIN — HYDRALAZINE HYDROCHLORIDE 25 MG: 25 TABLET, FILM COATED ORAL at 04:55

## 2023-07-06 RX ADMIN — FLUOXETINE 20 MG: 10 TABLET, FILM COATED ORAL at 08:03

## 2023-07-06 RX ADMIN — HYDROCODONE BITARTRATE AND ACETAMINOPHEN 1 TABLET: 7.5; 325 TABLET ORAL at 04:56

## 2023-07-06 RX ADMIN — ARIPIPRAZOLE 5 MG: 5 TABLET ORAL at 08:03

## 2023-07-06 RX ADMIN — LEVOFLOXACIN 500 MG: 500 TABLET, FILM COATED ORAL at 08:03

## 2023-07-06 RX ADMIN — METOPROLOL SUCCINATE 50 MG: 50 TABLET, EXTENDED RELEASE ORAL at 08:03

## 2023-07-06 RX ADMIN — PANTOPRAZOLE SODIUM 40 MG: 40 TABLET, DELAYED RELEASE ORAL at 05:00

## 2023-07-06 RX ADMIN — ISOSORBIDE DINITRATE 20 MG: 10 TABLET ORAL at 08:03

## 2023-07-06 RX ADMIN — LORAZEPAM 0.5 MG: 0.5 TABLET ORAL at 04:56

## 2023-07-06 RX ADMIN — METHYLPREDNISOLONE SODIUM SUCCINATE 40 MG: 40 INJECTION, POWDER, LYOPHILIZED, FOR SOLUTION INTRAMUSCULAR; INTRAVENOUS at 08:03

## 2023-07-06 RX ADMIN — GUAIFENESIN AND DEXTROMETHORPHAN 5 ML: 100; 10 SYRUP ORAL at 08:02

## 2023-07-06 ASSESSMENT — PAIN DESCRIPTION - DESCRIPTORS
DESCRIPTORS: SHARP
DESCRIPTORS: ACHING;SHOOTING

## 2023-07-06 ASSESSMENT — PAIN DESCRIPTION - LOCATION
LOCATION: LEG
LOCATION: LEG

## 2023-07-06 ASSESSMENT — PAIN DESCRIPTION - ORIENTATION
ORIENTATION: RIGHT;LEFT
ORIENTATION: RIGHT;LEFT

## 2023-07-06 ASSESSMENT — PAIN SCALES - GENERAL
PAINLEVEL_OUTOF10: 0
PAINLEVEL_OUTOF10: 9
PAINLEVEL_OUTOF10: 9

## 2023-07-06 NOTE — H&P
Orthopaedic Follow-Up Visit    Last Appointment: 2021  Diagnosis: Rotator cuff tear arthropathy of right shoulder  Prior Procedure: injections (2021)    Kenia Alonzo is a 64 y.o. female who is here for f/u evaluation of the right shoulder. The patient was last seen here by me on 2021 at which point we decided to send her for physical therapy as well as perform CSI prior to considering further treatment options. The patient returns today reporting that the symptoms have worsened and is interested in proceeding with operative treatment.     To review her history, Kenia Alonzo presented with right shoulder pain and dysfunction. Onset of the symptoms was 2020. No specific injury, gradual onset of symptoms, patient has had several falls within the last year that have exacerbated her symptoms.    Patient's medications, allergies, past medical, surgical, social and family histories were reviewed and updated as appropriate.    Review of Systems   All systems reviewed were negative.  Specifically, the patient denies fever, chills, weight loss, chest pain, shortness of breath, or dyspnea on exertion.      Past Medical History:   Diagnosis Date    Arthritis     knee    Asthma     childhood     Depression     Digestive disorder     Encounter for blood transfusion     General anesthetics causing adverse effect in therapeutic use     severe headache after crainiotomy    GI problem     IBS    Gout     Headache     Hyperlipidemia     Meniere disease     MVP (mitral valve prolapse)     minor    Renal disorder     Vertigo        Past Surgical History:   Procedure Laterality Date    BRAIN SURGERY      vestibular neurectomy    BREAST BIOPSY      15 years ago?  no visible scar    BREAST SURGERY      benign     SECTION      x 1    CHOLECYSTECTOMY      COLONOSCOPY N/A 3/15/2021    Procedure: COLONOSCOPY;  Surgeon: Mita Stoddard MD;  Location: [facility];  Service:  Patient seen and examined. Chart, labs, imaging studies, EKG and rhythm strips reviewed. No full H&P needed  For details see Cardiology consult note and subsequent cardiology progress notes  Patient is a transfer from the [facility] hospital for cath to rule out ischemic cardiomyopathy as the cause for his LV dysfunction  He is known to have severe COPD with chronic hypoxic respiratory failure, on chronic O2 therapy at home  Currently his SOB is at baseline  He denies CP  He is Eu volemic on exam:   No JVD  R&R heart. No murmurs  Coarse breath sound s and rhonchi  mid to lower lung fields  No lower extremity edema    Risks, benefits and alternative therapies to the procedure explained.  He understood and consented to proceed  Further recommendations to follow    Electronically signed by Mamie Whiting MD on 7/6/2023 at 2:19 PM Endoscopy;  Laterality: N/A;    EXAMINATION UNDER ANESTHESIA N/A 8/12/2020    Procedure: EXAM UNDER ANESTHESIA;  Surgeon: Lucy Crane MD;  Location: [facility] OR;  Service: OB/GYN;  Laterality: N/A;    LASER ABLATION N/A 8/12/2020    Procedure: ABLATION, USING LASER;  Surgeon: Lucy Crane MD;  Location: [facility] OR;  Service: OB/GYN;  Laterality: N/A;    TONSILLECTOMY         Patient's Medications   New Prescriptions    No medications on file   Previous Medications    ACYCLOVIR 5% (ZOVIRAX) 5 % OINTMENT    Apply topically 5 (five) times daily.    ALBUTEROL (VENTOLIN HFA) 90 MCG/ACTUATION INHALER    Inhale 2 puffs into the lungs every 6 (six) hours as needed for Wheezing. Rescue    AZELASTINE (ASTELIN) 137 MCG (0.1 %) NASAL SPRAY    1 spray (137 mcg total) by Nasal route 2 (two) times daily.    BUMETANIDE (BUMEX) 2 MG TABLET    Take 1 tablet (2 mg total) by mouth once daily.    BUPROPION (WELLBUTRIN XL) 150 MG TB24 TABLET    Take 3 tablets (450 mg total) by mouth once daily.    BUSPIRONE (BUSPAR) 30 MG TAB    Take 1 tablet (30 mg total) by mouth 2 (two) times daily.    C/SOURCHERRY/CELERY/GRAPE SEED (TART CHERRY ORAL)    Take 1 tablet by mouth daily as needed.     CO-ENZYME Q-10 30 MG CAPSULE    Take 30 mg by mouth 3 (three) times daily.    CONJUGATED ESTROGENS (PREMARIN) VAGINAL CREAM    Place 1 g vaginally twice a week.    DIAZEPAM (VALIUM) 2 MG TABLET    Take 1 tablet (2 mg total) by mouth every 6 (six) hours as needed (dizziness/Meniere's attack).    DICYCLOMINE (BENTYL) 10 MG CAPSULE    TAKE 1 CAPSULE BY MOUTH THREE TIMES DAILY    ERENUMAB-AOOE (AIMOVIG AUTOINJECTOR) 140 MG/ML AUTOINJECTOR    Inject 1 pen (140 mg total) into the skin every 28 days.    ESTRADIOL-NORETHINDRONE (COMBIPATCH) 0.05-0.14 MG/24 HR    Place 1 patch onto the skin twice a week.    FLUTICASONE PROPIONATE (FLONASE) 50 MCG/ACTUATION NASAL SPRAY    2 sprays (100 mcg total) by Each Nostril route 2 (two) times daily.    KETOROLAC (TORADOL)  10 MG TABLET    TAKE ONE TABLET BY MOUTH ONCE DAILY AS NEEDED FOR PAIN (MAX TWO TIMES A WEEK)    LEVOCETIRIZINE (XYZAL) 5 MG TABLET    TAKE ONE TABLET BY MOUTH EVERY MORNING    LIDOCAINE-PRILOCAINE (EMLA) CREAM        MAGNESIUM 30 MG TAB    Take by mouth once.    MELATONIN ORAL    Take by mouth nightly as needed.     METHYLPREDNISOLONE (MEDROL DOSEPACK) 4 MG TABLET    use as directed    MONTELUKAST (SINGULAIR) 10 MG TABLET    TAKE ONE TABLET BY MOUTH EVERY EVENING    MUPIROCIN (BACTROBAN) 2 % OINTMENT    Apply topically 3 (three) times daily.    OMEGA-3 FATTY ACIDS/FISH OIL (FISH OIL-OMEGA-3 FATTY ACIDS) 300-1,000 MG CAPSULE    Take 1 capsule by mouth once daily.    ONDANSETRON (ZOFRAN-ODT) 4 MG TBDL    DISSOLVE 1 TABLET IN MOUTH EVERY 8 HOURS AS NEEDED    POTASSIUM CHLORIDE SA (K-DUR,KLOR-CON) 10 MEQ TABLET    Take by mouth.    SIMVASTATIN (ZOCOR) 5 MG TABLET    TAKE ONE TABLET BY MOUTH NIGHTLY    TROKENDI  MG CP24    Take 1 capsule (100 mg total) by mouth once daily.    UBROGEPANT 100 MG TABLET    Take 1 tablet (100 mg total) by mouth every 72 hours as needed for Migraine (2 to 3 times max per week as needed).    VITAMIN B COMPLEX/FOLIC ACID (B COMPLEX 100 ORAL)    Take by mouth nightly.    ZINC GLUCONATE 50 MG TABLET    Take 50 mg by mouth once daily.    ZOLPIDEM (AMBIEN) 5 MG TAB    Take 1/2 to 1 tablet at bedtime as needed for sleep   Modified Medications    No medications on file   Discontinued Medications    No medications on file       Family History   Problem Relation Age of Onset    Colon cancer Father     Diabetes Father     Diabetes Sister        Review of patient's allergies indicates:   Allergen Reactions    Demerol [meperidine] Nausea And Vomiting     Pt refuses Demerol     Codeine Itching         Objective:      Physical Exam  Patient is alert and oriented, no distress. Skin is intact. Neuro is normal with no focal motor or sensory findings.    Cervical exam is unremarkable. Intact  cervical ROM. Negative Spurling's test    Physical Exam:  RIGHT    LEFT    Scap Dyskinesis/Winging +    (-)    Tenderness:          Greater Tuberosity  +    (-)  Bicipital Groove  +    (-)  AC joint   (-)    (-)  Other:     ROM:  Forward Elevation 30    160  Abduction  30    120  ER (at side)  10    60  IR   Back pocket   T8    Strength:   Supraspinatus  2/5    5/5  Infraspinatus  2/5    5/5  Subscap / IR  4/5    5/5     Special Tests:   Neer:    +    (-)   Foster:   +    (-)   Bear Hug:   (-)    (-)   Cross Arm Abduction:  +    (-)    Imaging:    XR SHOULDER COMPLETE 2 OR MORE VIEWS RIGHT     CLINICAL HISTORY:  Pain in right shoulder     TECHNIQUE:  Two or three views of the right shoulder were performed.     COMPARISON:  None     FINDINGS:  There is no right shoulder fracture.  Apparent mild superior subluxation of the humeral head suggesting possible underlying rotator cuff tear.  Moderate right acromioclavicular joint osteoarthritis.  Mild right glenohumeral joint osteoarthritis.  No suspicious osseous lesion.  Visualized lungs are clear.     Impression:     As above.        Electronically signed by: Yuri Ragsdale  Date:                                            12/14/2020      MRI Shoulder Without Contrast Right  Narrative: EXAMINATION:  MRI SHOULDER WITHOUT CONTRAST RIGHT    CLINICAL HISTORY:  Shoulder pain, rotator cuff disorder suspected, nondiagnostic xray;  Pain in right shoulder    TECHNIQUE:  Multiplanar, multisequence MR images were performed of the right shoulder. No intravenous contrast was administered.    COMPARISON:  Shoulder radiographs December 14, 2020    FINDINGS:  Type 2 acromion is present. There is moderate right acromioclavicular joint osteoarthritis There is mild fluid within the subacromial subdeltoid bursa.    There is a large full-thickness tear of the rotator cuff which involves nearly the entirety of the supraspinatus tendon and a portion of the anterior infraspinatus tendon.   This cuff tear measures approximately 3 cm x 2.7 cm.  The torn supraspinatus tendon is retracted with torn tendon edge positioned between the acromion and the superior aspect of the humeral head (series 7, image 12). No teres minor tendon tear.    There is thinning of the subscapularis tendon possibly representing a partial thickness tear.  No retraction of the subscapularis tendon to indicate definite presence of a full-thickness tear.  The long head biceps tendon is not definitely visualized possibly indicating presence of tear.    No edema within the rotator cuff musculature.  There is chronic fatty atrophy involving the supraspinatus and subscapularis muscles.    No definite abnormality of the glenoid labrum on this non-arthrographic examination.    No glenohumeral joint effusion. No gross abnormality of the glenohumeral articular cartilage on this non-arthrographic examination.    No fracture.  No suspicious osseous lesion.  Impression: 1.  Full-thickness tear of the right rotator cuff involving nearly the entirety of the supraspinatus tendon and part of the infraspinatus tendon measuring 3 x 2.7 cm.  There is mild retraction of the torn tendon which is positioned between the acromion and superior aspect of the femoral head.    2.  Thinning of the right subscapularis tendon likely representing a partial thickness tear.    3.  Chronic fatty atrophy of the right supraspinatus and subscapularis muscles.    4.  Long head biceps tendon is not definitely visualized possibly indicating presence of tear.    5.  Moderate right acromioclavicular joint osteoarthritis.    Electronically signed by: Yuri Ragsdale  Date:    12/18/2020  Time:    12:13       Physician read: I agree with the above impression.    Assessment/Plan:   Kenia Alonzo is a 64 y.o. female with right shoulder rotator cuff tear arthropathy    Plan:    1. She continues to be symptomatic from her right shoulder rotator cuff tear arthropathy.  She has  tried multiple injections in the past as well as physical therapy and is beginning to get diminishing returns.  Patient states she has been in physical therapy but has not been seeing results lately. The last injection provided relief for about 6 weeks.  2. She has exhausted all nonoperative treatment options and continues to have symptoms on a daily basis that limit her activities and diminish her quality of life.  3. She would like to proceed with corticosteroid injection today, but also would like to go ahead and schedule surgery.  We will plan on doing an injection today in the right shoulder and a shoulder replacement in May 2022.   4. We reviewed the proposed procedure in detail, which included discussion of risks and benefits, techniques, and possible complications of the procedure. Risks include infection, bleeding, damage to artery and nerves, continual pain and possible stiffness, and blood clots. We reviewed the post-operative restrictions, recovery period, and rehabilitation.  All patient questions were answered. The consent was read and signed. We will move forward with scheduling and preparation for the procedure.  5. Follow up 10-14 days after surgery.            Reza Matute MD

## 2023-07-06 NOTE — PROCEDURES
[address], [address] [place] [address]                            CARDIAC CATHETERIZATION    PATIENT NAME: Edy Gupta                 :        1965  MED REC NO:   [id number]                            ROOM:  ACCOUNT NO:   [de-identified]                           ADMIT DATE: 2023  PROVIDER:     Milana Price MD    DATE OF PROCEDURE:  2023    PROCEDURE:  Left heart catheterization, selective coronary angiography  and left ventriculography. The procedure was done through right radial approach using ultrasound  guidance. The patient received intravenous Versed and intravenous fentanyl for  sedation. INDICATION:  Newly diagnosed cardiomyopathy with LV dysfunction and  congestive heart failure, elevated troponin. AUC:  7-93. PRESSURES:  Aorta 96/52 with a mean of 71. Left ventricular systolic pressure 128, left ventricular end-diastolic  pressure 15. There was no significant gradient across the aortic valve. CORONARY ANGIOGRAPHY  1.  Left main. The left main artery appeared heavily calcified, but  there did not appear to be any significant angiographic luminal  narrowings. 2. LAD. The left anterior descending artery appeared heavily calcified  in its proximal segment. There was an eccentric 20% to 30% proximal LAD  disease at the site of heavy calcification. The rest of the LAD did not  appear to have any significant disease. 3. LCX. The left circumflex is a large, but nondominant vessel which  did not appear to have any significant angiographic disease. 4.  RCA. The right coronary artery is a large dominant vessel, which  did not appear to have any significant angiographic disease. LEFT VENTRICULOGRAM:  The left ventriculogram is normal in size and is  hyperdynamic in contractility with an estimated ejection fraction of  75%.   There appeared to be concentric left ventricular

## 2023-07-06 NOTE — DISCHARGE SUMMARY
[address] [facility] Physician Discharge Summary       [facility] AT [facility] ( [phone]  Follow up  You will resume care with [facility] upon discharge. Activity level: As tolerated     Dispo: [facility] then home       Condition on discharge: Stable     Patient ID:  Leatha Whitehead  [id number]  62 y.o.  1965    Admit date: 6/24/2023    Discharge date and time:  7/6/2023  2:39 PM    Admission Diagnoses: Principal Problem:    COPD with acute exacerbation ([address])  Active Problems:    Acute on chronic congestive heart failure (HCC)    Palliative care encounter    Acute hypercapnic respiratory failure (HCC)    Acute respiratory failure with hypoxia and hypercapnia (HCC)    NSTEMI (non-ST elevated myocardial infarction) (HCC)    Moderate protein-calorie malnutrition ([address])    Parainfluenza  Resolved Problems:    * No resolved hospital problems. *      Discharge Diagnoses: Principal Problem:    COPD with acute exacerbation ([address])  Active Problems:    Acute on chronic congestive heart failure (HCC)    Palliative care encounter    Acute hypercapnic respiratory failure (HCC)    Acute respiratory failure with hypoxia and hypercapnia (HCC)    NSTEMI (non-ST elevated myocardial infarction) (HCC)    Moderate protein-calorie malnutrition (HCC)    Parainfluenza  Resolved Problems:    * No resolved hospital problems.  *      Consults:  IP CONSULT TO CARDIOLOGY  IP CONSULT TO PULMONOLOGY  IP CONSULT TO IV TEAM  IP CONSULT TO PALLIATIVE CARE  IP CONSULT TO IV TEAM  IP CONSULT TO INFECTIOUS DISEASES    Hospital Course:   Patient Leatha Whitehead is a 62 y.o. presented with NSTEMI (non-ST elevated myocardial infarction) ([address]) [I21.4]  COPD with acute exacerbation ([address]) [J44.1]  Acute respiratory failure with hypoxia and hypercapnia ([address]) [J96.01, J96.02]  Acute on chronic congestive heart failure, unspecified heart failure type ([address]) [I50.9]    Mr. Durga Rush

## 2023-07-06 NOTE — PLAN OF CARE
Problem: Pain  Goal: Verbalizes/displays adequate comfort level or baseline comfort level  Outcome: Progressing     Problem: Safety - Adult  Goal: Free from fall injury  Outcome: Progressing     Problem: Skin/Tissue Integrity  Goal: Absence of new skin breakdown  Description: 1. Monitor for areas of redness and/or skin breakdown  2. Assess vascular access sites hourly  3. Every 4-6 hours minimum:  Change oxygen saturation probe site  4. Every 4-6 hours:  If on nasal continuous positive airway pressure, respiratory therapy assess nares and determine need for appliance change or resting period.   Outcome: Progressing     Problem: Chronic Conditions and Co-morbidities  Goal: Patient's chronic conditions and co-morbidity symptoms are monitored and maintained or improved  Outcome: Progressing     Problem: Nutrition Deficit:  Goal: Optimize nutritional status  Outcome: Progressing  Flowsheets (Taken 7/5/2023 1856 by Sienna Riddle RD)  Nutrient intake appropriate for improving, restoring, or maintaining nutritional needs:   Monitor oral intake, labs, and treatment plans   Assess nutritional status and recommend course of action   Recommend appropriate diets, oral nutritional supplements, and vitamin/mineral supplements

## 2023-07-07 ENCOUNTER — TELEPHONE (OUTPATIENT)
Dept: PRIMARY CARE CLINIC | Age: 58
End: 2023-07-07

## 2023-07-07 ENCOUNTER — CARE COORDINATION (OUTPATIENT)
Dept: CASE MANAGEMENT | Age: 58
End: 2023-07-07

## 2023-07-07 DIAGNOSIS — G89.4 CHRONIC PAIN SYNDROME: ICD-10-CM

## 2023-07-07 DIAGNOSIS — I21.4 NSTEMI (NON-ST ELEVATED MYOCARDIAL INFARCTION) (HCC): Primary | ICD-10-CM

## 2023-07-07 DIAGNOSIS — Z51.5 PALLIATIVE CARE ENCOUNTER: ICD-10-CM

## 2023-07-07 DIAGNOSIS — R51.9 BAD HEADACHE: ICD-10-CM

## 2023-07-07 RX ORDER — BUTALBITAL, ACETAMINOPHEN AND CAFFEINE 50; 325; 40 MG/1; MG/1; MG/1
TABLET ORAL
Qty: 30 TABLET | Refills: 1 | Status: SHIPPED | OUTPATIENT
Start: 2023-07-07

## 2023-07-07 RX ORDER — HYDROCODONE BITARTRATE AND ACETAMINOPHEN 7.5; 325 MG/1; MG/1
1 TABLET ORAL 2 TIMES DAILY PRN
Qty: 60 TABLET | Refills: 0 | Status: SHIPPED | OUTPATIENT
Start: 2023-07-07 | End: 2023-08-06

## 2023-07-07 NOTE — TELEPHONE ENCOUNTER
Call from Anju requesting refill for Juanell Nose as patient went to hospital and never picked up last prescription form 6/23/23. New script is needed to be sent to [address] [place] [address] in [place]. Next kristina 7/27/23 with [facility].

## 2023-07-07 NOTE — TELEPHONE ENCOUNTER
Last Appointment:  6/2/2023  Future Appointments   Date Time Provider [address]   7/27/2023  9:00 AM ARGELIA Leblanc - CNP [facility]   8/14/2023 10:45 AM Clarita Bryan MD [address] [facility]

## 2023-07-07 NOTE — CARE COORDINATION
[address] Madelin Tarango [facility],[place] [address] Care Transitions Initial Follow Up Call    Call within 2 business days of discharge: Yes    Patient Current Location:  Home: [address] [place] [address]    Care Transition Nurse contacted the family by telephone to perform post hospital discharge assessment. Verified name and  with family as identifiers. Provided introduction to self, and explanation of the Care Transition Nurse role. Patient: Dimitri Borrego Patient : 1965   MRN: <[id number]>  Reason for Admission: 2023 - 2023 [facility] [facility]. A/C COPD, A/C CHF, NSTEMI, Parainfluenza, s/p Heart Cath. Recent death of younger sister. Discharge Date: 23 RARS: Readmission Risk Score: 28.6  NR  CT    [facility] [place] P: [phone] sos 23  Waiver   1:30 home visit  Sister/Sayra is pts A    P JOSE ALBERTO BOSTON  CLINICAL/ staff routed to schedule 7 day hosp fu PCP. [address]  9:00  Cardio/T [place]  10:45    AVS unavailable. PDM: Abad Gustafson [phone]     PMH: CHF, COPD, COVID, RSV, HTN, PAD, CAD, MI. Chronic 02 through [facility] at [address] Hosp bed. Last Discharge [address] [facility] [facility]       Date Complaint Diagnosis Description Type Department Provider    23 Shortness of Breath Acute respiratory failure with hypoxia and hypercapnia ([address]) . .. ED to Hosp-Admission (Discharged) (ADMITTED) DIONE 6S Piedad Culver MD; Gabriel Henley, ... Was this an external facility discharge? No Discharge Facility: [facility]    Challenges to be reviewed by the provider   Additional needs identified to be addressed with provider: No  none               Method of communication with provider: none. CTN spoke w/ pt and sister/Sayra on speaker phone. Ruddy Baconton is pts aide, as well. Med rec/1111F order completed. Has/taking meds as directed. States she will call to schedule PCP appt and enc to discuss BP medications.  Pt has BP today of 93/55 HR

## 2023-07-12 ENCOUNTER — CARE COORDINATION (OUTPATIENT)
Dept: CASE MANAGEMENT | Age: 58
End: 2023-07-12

## 2023-07-12 NOTE — CARE COORDINATION
[facility] Care Transitions Follow Up Call    Patient Current Location:  Home: [address] [place] [address]    Care Transition Nurse contacted the family by telephone to follow up after admission. Verified name and  with family as identifiers. Patient: Tanvi Tenorio  Patient : 1965   MRN: <[id number]>  Reason for Admission: COPD with acute exacerbation  Discharge Date: 23 RARS: Readmission Risk Score: 28.6      Needs to be reviewed by the provider   Additional needs identified to be addressed with provider: No  none             Method of communication with provider: none. Talked with sister who is an state certified aid. She is primary caregiver. States she is doing ok, status quo. Mobility is less. Palliative care due at the end of the month and has home SN 3/day/week. Addressed changes since last contact:  none  Discussed follow-up appointments. If no appointment was previously scheduled, appointment scheduling offered: No.   Is follow up appointment scheduled within 7 days of discharge? Yes. Follow Up  Future Appointments   Date Time Provider [facility] [address]   2023  3:15 PM Gisela Addison MD [facility]   2023  9:30 AM ARGELIA Varner [facility]   2023 10:45 AM Katina Landry MD [address] [facility] Transition Nurse reviewed discharge instructions and red flags with family and discussed any barriers to care and/or understanding of plan of care after discharge. Discussed appropriate site of care based on symptoms and resources available to patient including: PCP  Specialist  Home health  When to call 911. The family agrees to contact the PCP office for questions related to their healthcare.           Patients top risk factors for readmission: falls, lack of knowledge about disease, and medical condition-COPD and end stage kidney  Interventions to address risk factors: Obtained and reviewed discharge summary and/or continuity

## 2023-07-13 ENCOUNTER — TELEMEDICINE (OUTPATIENT)
Dept: PRIMARY CARE CLINIC | Age: 58
End: 2023-07-13

## 2023-07-13 DIAGNOSIS — I50.33 ACUTE ON CHRONIC DIASTOLIC (CONGESTIVE) HEART FAILURE (HCC): ICD-10-CM

## 2023-07-13 DIAGNOSIS — J44.1 COPD EXACERBATION (HCC): ICD-10-CM

## 2023-07-13 DIAGNOSIS — F41.9 ANXIETY: ICD-10-CM

## 2023-07-13 DIAGNOSIS — I21.4 NSTEMI (NON-ST ELEVATED MYOCARDIAL INFARCTION) (HCC): ICD-10-CM

## 2023-07-13 DIAGNOSIS — Z09 HOSPITAL DISCHARGE FOLLOW-UP: Primary | ICD-10-CM

## 2023-07-13 RX ORDER — LORAZEPAM 1 MG/1
1 TABLET ORAL EVERY 8 HOURS PRN
Qty: 90 TABLET | Refills: 0 | Status: SHIPPED | OUTPATIENT
Start: 2023-07-13 | End: 2023-08-12

## 2023-07-13 NOTE — PROGRESS NOTES
TELEHEALTH VIDEO VISIT    Sawyer Cao, was evaluated through a synchronous (real-time) audio-video encounter. The patient (or guardian if applicable) is aware that this is a billable service. , which includes applicable co-pays. This virtual visit was conducted with the patient's  (and/or legal guardian's) consent. This Virtual Visit was conducted with patient's (and/or legal guardian's) consent  Patient identification was verified, and a caregiver was present when appropriate. Patient identification was verified, and a caregiver was present when appropriate. The patient was located in a state where the provider was licensed to provide care. The patient was located at Home: [address] [facility]  [address] [id number] [address]  Provider was located at [facility] (Appt Dept): [address] [facility]  [address] [facility]       other people involved in call Aliriosister Sinclair      Patient identification was verified at the start of the visit, including the patient's telephone number and physical location. I discussed with the patient the nature of our telehealth visits, that:     Due to the nature of an audio- video modality, the only components of a physical exam that could be done are the elements supported by direct observation. I would evaluate the patient and recommend diagnostics and treatments based on my assessment. If it was felt that the patient should be evaluated in clinic or an emergency room setting, then they would be directed there. Our sessions are not being recorded and that personal health information is protected. Our team would provide follow up care in person if/when the patient needs it.            HPI:    Sawyer Cao (:  1965) has requested an audio/video evaluation for the following concern(s):    Chief Complaint   Patient presents with    Follow-Up from [facility] with patient by virtual visit for hospital discharge follow-up  Patient was discharged   Patient had

## 2023-07-14 DIAGNOSIS — J96.11 CHRONIC RESPIRATORY FAILURE WITH HYPOXIA (HCC): ICD-10-CM

## 2023-07-14 DIAGNOSIS — J44.1 COPD EXACERBATION (HCC): Primary | ICD-10-CM

## 2023-07-14 RX ORDER — ALBUTEROL SULFATE 90 UG/1
2 AEROSOL, METERED RESPIRATORY (INHALATION) EVERY 4 HOURS PRN
Qty: 1 EACH | Refills: 3 | Status: SHIPPED | OUTPATIENT
Start: 2023-07-14

## 2023-07-19 ENCOUNTER — CARE COORDINATION (OUTPATIENT)
Dept: CASE MANAGEMENT | Age: 58
End: 2023-07-19

## 2023-07-19 NOTE — CARE COORDINATION
[facility] Care Transitions Follow Up Call    Care Transition Nurse attempted subsequent CT outreach leaving Hipaa VM w/ my contact info/purpose of call. Noting pt is currently taking Lasix 80 mg daily and Potassium 40 meq daily for 1 wk d/t leg edema. Had home wt of 129 lbs on 23. Patient: Rhiannon Benson  Patient : 1965   MRN: <[id number]>  Reason for Admission: 2023 - 2023 [address] [place] [address],[address]. A/C COPD, A/C CHF, NSTEMI, Parainfluenza, s/p Heart Cath. Recent death of younger sister. Discharge Date: 23 RARS: Readmission Risk Score: 28.6  NR  CT      [facility]  sos 23  Waiver   1:30 home visit  Sister/Sayra is pts HHA     PCP VV  Attended. [facility]  9:00  Cardio/T [place]  10:45      PDM: Fela Wall [phone]      PMH: CHF, COPD, COVID, RSV, HTN, PAD, CAD, MI. Chronic 02 through [facility] at [address] Hosp bed.     Arturo Wing RN

## 2023-07-20 ENCOUNTER — CARE COORDINATION (OUTPATIENT)
Dept: CASE MANAGEMENT | Age: 58
End: 2023-07-20

## 2023-07-20 NOTE — CARE COORDINATION
overnight/wkly wt gains of 3+lbs or greater    Offered patient enrollment in the Remote Patient Monitoring (RPM) program for in-home monitoring: Patient declined. Care Transitions Subsequent and Final Call    Subsequent and Final Calls  Do you have any ongoing symptoms?: Yes  Patient-reported symptoms: Shortness of Breath, Other  Do you have any questions related to your medications?: No  Do you currently have any active services?: Yes  Are you currently active with any services?: Home Health, Outpatient/Community Services  Identified Barriers: Lack of Education, Lack of Motivation, Impairment  Care Transitions Interventions  Other Interventions:             Care Transition Nurse provided contact information for future needs. Plan for follow-up call in 5-7 days based on severity of symptoms and risk factors. Plan for next call: CT FU, Check compliance w/ Lasix, wt checks, BiPAP, and compression stockings.     Pedro Proctor RN

## 2023-07-26 ENCOUNTER — CARE COORDINATION (OUTPATIENT)
Dept: CASE MANAGEMENT | Age: 58
End: 2023-07-26

## 2023-07-26 NOTE — CARE COORDINATION
symptoms: Shortness of Breath, Other  Have your medications changed?: Yes  Patient Reports: Resumes Lasix 40 mg daily on 7/27/23. Erin Richard prior auth came through. Pending Budesomide. Do you have any questions related to your medications?: No  Do you currently have any active services?: Yes  Are you currently active with any services?: Home Health, Outpatient/Community Services  Identified Barriers: Lack of Education  Care Transitions Interventions  Other Interventions:             Care Transition Nurse provided contact information for future needs. Plan for follow-up call in 7-10 days based on severity of symptoms and risk factors. Plan for next call: CT Final FU, SOB, Edema, and BiPAP compliance. Check if prior auth for budesomide.     Marilee Gavin RN

## 2023-07-27 ENCOUNTER — OFFICE VISIT (OUTPATIENT)
Dept: PALLATIVE CARE | Age: 58
End: 2023-07-27
Payer: MEDICAID

## 2023-07-27 DIAGNOSIS — Z51.5 PALLIATIVE CARE ENCOUNTER: ICD-10-CM

## 2023-07-27 DIAGNOSIS — I50.33 ACUTE ON CHRONIC HEART FAILURE WITH PRESERVED EJECTION FRACTION (HFPEF) (HCC): Primary | ICD-10-CM

## 2023-07-27 DIAGNOSIS — G89.4 CHRONIC PAIN SYNDROME: ICD-10-CM

## 2023-07-27 PROCEDURE — 99349 HOME/RES VST EST MOD MDM 40: CPT | Performed by: NURSE PRACTITIONER

## 2023-07-27 RX ORDER — HYDROCODONE BITARTRATE AND ACETAMINOPHEN 7.5; 325 MG/1; MG/1
1 TABLET ORAL EVERY 8 HOURS PRN
Qty: 90 TABLET | Refills: 0 | Status: SHIPPED | OUTPATIENT
Start: 2023-07-27 | End: 2023-08-26

## 2023-07-27 RX ORDER — GABAPENTIN 300 MG/1
300 CAPSULE ORAL 3 TIMES DAILY
Qty: 270 CAPSULE | Refills: 1 | Status: SHIPPED | OUTPATIENT
Start: 2023-07-27 | End: 2024-01-23

## 2023-07-27 RX ORDER — ARIPIPRAZOLE 5 MG/1
5 TABLET ORAL DAILY
Qty: 90 TABLET | Refills: 3 | Status: SHIPPED | OUTPATIENT
Start: 2023-07-27 | End: 2024-07-21

## 2023-07-27 RX ORDER — FLUOXETINE 20 MG/1
20 TABLET, FILM COATED ORAL DAILY
Qty: 90 TABLET | Refills: 3 | Status: SHIPPED | OUTPATIENT
Start: 2023-07-27 | End: 2024-07-21
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
## 2023-08-02 ENCOUNTER — APPOINTMENT (OUTPATIENT)
Dept: CT IMAGING | Age: 58
End: 2023-08-02
Payer: MEDICAID

## 2023-08-02 ENCOUNTER — APPOINTMENT (OUTPATIENT)
Dept: ULTRASOUND IMAGING | Age: 58
End: 2023-08-02
Payer: MEDICAID

## 2023-08-02 ENCOUNTER — CARE COORDINATION (OUTPATIENT)
Dept: CARE COORDINATION | Age: 58
End: 2023-08-02

## 2023-08-02 ENCOUNTER — TELEPHONE (OUTPATIENT)
Dept: OTHER | Facility: CLINIC | Age: 58
End: 2023-08-02

## 2023-08-02 ENCOUNTER — HOSPITAL ENCOUNTER (INPATIENT)
Age: 58
LOS: 1 days | Discharge: HOME HEALTH | End: 2023-08-04
Attending: EMERGENCY MEDICINE | Admitting: INTERNAL MEDICINE
Payer: MEDICAID

## 2023-08-02 DIAGNOSIS — I50.21 ACUTE SYSTOLIC CONGESTIVE HEART FAILURE (HCC): ICD-10-CM

## 2023-08-02 DIAGNOSIS — R07.9 CHEST PAIN, UNSPECIFIED TYPE: Primary | ICD-10-CM

## 2023-08-02 PROBLEM — I50.9 ACUTE DECOMPENSATED HEART FAILURE (HCC): Status: ACTIVE | Noted: 2023-08-02

## 2023-08-02 LAB
ALBUMIN SERPL-MCNC: 3.8 G/DL (ref 3.5–5.2)
ALP SERPL-CCNC: 78 U/L (ref 40–129)
ALT SERPL-CCNC: 12 U/L (ref 0–40)
ANION GAP SERPL CALCULATED.3IONS-SCNC: 7 MMOL/L (ref 7–16)
AST SERPL-CCNC: 13 U/L (ref 0–39)
B.E.: 9.1 MMOL/L (ref -3–3)
BASOPHILS # BLD: 0.04 K/UL (ref 0–0.2)
BASOPHILS NFR BLD: 1 % (ref 0–2)
BILIRUB SERPL-MCNC: <0.2 MG/DL (ref 0–1.2)
BNP SERPL-MCNC: 3046 PG/ML (ref 0–125)
BUN SERPL-MCNC: 16 MG/DL (ref 6–20)
CALCIUM SERPL-MCNC: 7.1 MG/DL (ref 8.6–10.2)
CHLORIDE SERPL-SCNC: 95 MMOL/L (ref 98–107)
CO2 SERPL-SCNC: 39 MMOL/L (ref 22–29)
COHB: 0.9 % (ref 0–1.5)
CREAT SERPL-MCNC: 1 MG/DL (ref 0.7–1.2)
CRITICAL: ABNORMAL
DATE ANALYZED: ABNORMAL
DATE OF COLLECTION: ABNORMAL
EOSINOPHIL # BLD: 0.09 K/UL (ref 0.05–0.5)
EOSINOPHILS RELATIVE PERCENT: 2 % (ref 0–6)
ERYTHROCYTE [DISTWIDTH] IN BLOOD BY AUTOMATED COUNT: 14.8 % (ref 11.5–15)
GFR SERPL CREATININE-BSD FRML MDRD: >60 ML/MIN/1.73M2
GLUCOSE SERPL-MCNC: 144 MG/DL (ref 74–99)
HCO3: 35.4 MMOL/L (ref 22–26)
HCT VFR BLD AUTO: 30.9 % (ref 37–54)
HGB BLD-MCNC: 9.1 G/DL (ref 12.5–16.5)
HHB: 3.4 % (ref 0–5)
IMM GRANULOCYTES # BLD AUTO: <0.03 K/UL (ref 0–0.58)
IMM GRANULOCYTES NFR BLD: 0 % (ref 0–5)
INR PPP: 1
LAB: ABNORMAL
LACTATE BLDV-SCNC: 0.9 MMOL/L (ref 0.5–2.2)
LIPASE SERPL-CCNC: 11 U/L (ref 13–60)
LYMPHOCYTES NFR BLD: 0.82 K/UL (ref 1.5–4)
LYMPHOCYTES RELATIVE PERCENT: 14 % (ref 20–42)
Lab: ABNORMAL
MAGNESIUM SERPL-MCNC: 1.9 MG/DL (ref 1.6–2.6)
MCH RBC QN AUTO: 29.8 PG (ref 26–35)
MCHC RBC AUTO-ENTMCNC: 29.4 G/DL (ref 32–34.5)
MCV RBC AUTO: 101.3 FL (ref 80–99.9)
METHB: 0.3 % (ref 0–1.5)
MODE: ABNORMAL
MONOCYTES NFR BLD: 0.63 K/UL (ref 0.1–0.95)
MONOCYTES NFR BLD: 11 % (ref 2–12)
NEUTROPHILS NFR BLD: 73 % (ref 43–80)
NEUTS SEG NFR BLD: 4.4 K/UL (ref 1.8–7.3)
O2 CONTENT: 14.3 ML/DL
O2 SATURATION: 96.6 % (ref 92–98.5)
O2HB: 95.4 % (ref 94–97)
OPERATOR ID: ABNORMAL
PATIENT TEMP: 37 C
PCO2: 57.8 MMHG (ref 35–45)
PH BLOOD GAS: 7.41 (ref 7.35–7.45)
PLATELET # BLD AUTO: 235 K/UL (ref 130–450)
PMV BLD AUTO: 11 FL (ref 7–12)
PO2: 89 MMHG (ref 75–100)
POTASSIUM SERPL-SCNC: 4.4 MMOL/L (ref 3.5–5)
PROT SERPL-MCNC: 7 G/DL (ref 6.4–8.3)
PROTHROMBIN TIME: 11.7 SEC (ref 9.3–12.4)
RBC # BLD AUTO: 3.05 M/UL (ref 3.8–5.8)
REASON FOR REJECTION: NORMAL
SARS-COV-2 RDRP RESP QL NAA+PROBE: NOT DETECTED
SODIUM SERPL-SCNC: 141 MMOL/L (ref 132–146)
SOURCE, BLOOD GAS: ABNORMAL
SPECIMEN DESCRIPTION: NORMAL
SPECIMEN SOURCE: NORMAL
THB: 10.6 G/DL (ref 11.5–16.5)
TIME ANALYZED: 1608
TROPONIN I SERPL HS-MCNC: 22 NG/L (ref 0–11)
TROPONIN I SERPL HS-MCNC: 25 NG/L (ref 0–11)
TSH SERPL DL<=0.05 MIU/L-ACNC: 0.64 UIU/ML (ref 0.27–4.2)
WBC OTHER # BLD: 6 K/UL (ref 4.5–11.5)
ZZ NTE CLEAN UP: ORDERED TEST: NORMAL

## 2023-08-02 PROCEDURE — 6370000000 HC RX 637 (ALT 250 FOR IP): Performed by: EMERGENCY MEDICINE

## 2023-08-02 PROCEDURE — 84484 ASSAY OF TROPONIN QUANT: CPT

## 2023-08-02 PROCEDURE — 99285 EMERGENCY DEPT VISIT HI MDM: CPT

## 2023-08-02 PROCEDURE — 85610 PROTHROMBIN TIME: CPT

## 2023-08-02 PROCEDURE — 82805 BLOOD GASES W/O2 SATURATION: CPT

## 2023-08-02 PROCEDURE — 84443 ASSAY THYROID STIM HORMONE: CPT

## 2023-08-02 PROCEDURE — 93970 EXTREMITY STUDY: CPT

## 2023-08-02 PROCEDURE — 83880 ASSAY OF NATRIURETIC PEPTIDE: CPT

## 2023-08-02 PROCEDURE — 85025 COMPLETE CBC W/AUTO DIFF WBC: CPT

## 2023-08-02 PROCEDURE — 87635 SARS-COV-2 COVID-19 AMP PRB: CPT

## 2023-08-02 PROCEDURE — 96374 THER/PROPH/DIAG INJ IV PUSH: CPT

## 2023-08-02 PROCEDURE — 94664 DEMO&/EVAL PT USE INHALER: CPT

## 2023-08-02 PROCEDURE — 6360000002 HC RX W HCPCS: Performed by: EMERGENCY MEDICINE

## 2023-08-02 PROCEDURE — 83735 ASSAY OF MAGNESIUM: CPT

## 2023-08-02 PROCEDURE — 71275 CT ANGIOGRAPHY CHEST: CPT

## 2023-08-02 PROCEDURE — 83690 ASSAY OF LIPASE: CPT

## 2023-08-02 PROCEDURE — 94640 AIRWAY INHALATION TREATMENT: CPT

## 2023-08-02 PROCEDURE — 83605 ASSAY OF LACTIC ACID: CPT

## 2023-08-02 PROCEDURE — 93005 ELECTROCARDIOGRAM TRACING: CPT | Performed by: EMERGENCY MEDICINE

## 2023-08-02 PROCEDURE — 6360000004 HC RX CONTRAST MEDICATION: Performed by: RADIOLOGY

## 2023-08-02 PROCEDURE — 80053 COMPREHEN METABOLIC PANEL: CPT

## 2023-08-02 RX ORDER — FUROSEMIDE 10 MG/ML
40 INJECTION INTRAMUSCULAR; INTRAVENOUS ONCE
Status: COMPLETED | OUTPATIENT
Start: 2023-08-02 | End: 2023-08-02

## 2023-08-02 RX ORDER — IPRATROPIUM BROMIDE AND ALBUTEROL SULFATE 2.5; .5 MG/3ML; MG/3ML
1 SOLUTION RESPIRATORY (INHALATION)
Status: COMPLETED | OUTPATIENT
Start: 2023-08-02 | End: 2023-08-02

## 2023-08-02 RX ORDER — FENTANYL CITRATE 50 UG/ML
25 INJECTION, SOLUTION INTRAMUSCULAR; INTRAVENOUS ONCE
Status: COMPLETED | OUTPATIENT
Start: 2023-08-02 | End: 2023-08-02

## 2023-08-02 RX ADMIN — FENTANYL CITRATE 25 MCG: 50 INJECTION, SOLUTION INTRAMUSCULAR; INTRAVENOUS at 16:05

## 2023-08-02 RX ADMIN — IPRATROPIUM BROMIDE AND ALBUTEROL SULFATE 1 DOSE: .5; 3 SOLUTION RESPIRATORY (INHALATION) at 16:43

## 2023-08-02 RX ADMIN — FUROSEMIDE 40 MG: 10 INJECTION, SOLUTION INTRAMUSCULAR; INTRAVENOUS at 18:16

## 2023-08-02 RX ADMIN — IOPAMIDOL 75 ML: 755 INJECTION, SOLUTION INTRAVENOUS at 18:54

## 2023-08-02 RX ADMIN — IPRATROPIUM BROMIDE AND ALBUTEROL SULFATE 1 DOSE: .5; 3 SOLUTION RESPIRATORY (INHALATION) at 16:44

## 2023-08-02 RX ADMIN — IPRATROPIUM BROMIDE AND ALBUTEROL SULFATE 1 DOSE: .5; 3 SOLUTION RESPIRATORY (INHALATION) at 16:42

## 2023-08-02 ASSESSMENT — PAIN SCALES - GENERAL: PAINLEVEL_OUTOF10: 8

## 2023-08-02 ASSESSMENT — PAIN DESCRIPTION - LOCATION: LOCATION: CHEST

## 2023-08-02 ASSESSMENT — ENCOUNTER SYMPTOMS
SHORTNESS OF BREATH: 1
ABDOMINAL PAIN: 0
COUGH: 1
PHOTOPHOBIA: 0
BACK PAIN: 0
WHEEZING: 1
RECTAL PAIN: 0

## 2023-08-02 ASSESSMENT — LIFESTYLE VARIABLES
HOW MANY STANDARD DRINKS CONTAINING ALCOHOL DO YOU HAVE ON A TYPICAL DAY: PATIENT DOES NOT DRINK
HOW OFTEN DO YOU HAVE A DRINK CONTAINING ALCOHOL: NEVER
HOW OFTEN DO YOU HAVE A DRINK CONTAINING ALCOHOL: NEVER

## 2023-08-02 ASSESSMENT — PAIN DESCRIPTION - PAIN TYPE: TYPE: ACUTE PAIN

## 2023-08-02 ASSESSMENT — PAIN DESCRIPTION - FREQUENCY: FREQUENCY: CONTINUOUS

## 2023-08-02 ASSESSMENT — PAIN DESCRIPTION - DESCRIPTORS: DESCRIPTORS: ACHING

## 2023-08-02 ASSESSMENT — PAIN - FUNCTIONAL ASSESSMENT: PAIN_FUNCTIONAL_ASSESSMENT: 0-10

## 2023-08-02 NOTE — ED PROVIDER NOTES
[facility] 4S INTERMEDIATE [facility]        Pt Name: Mayda Hernandez  MRN: [id number]  [facility] 1965  Date of evaluation: 8/2/2023  Provider: Haile Funes DO  PCP: Bruce Lopez MD  Note Started: 3:09 PM EDT 8/2/23    CHIEF COMPLAINT       Chief Complaint   Patient presents with    Shortness of Breath     Transfer from home with increase sob, edema lower ext.  02 at home 7L, d/c from hospital 2 weeks ago    Chest Pain     Chest pain today, 8/10 , MI 2 weeks ago       HISTORY OF PRESENT ILLNESS: 1 or more Elements     History from : Patient    Limitations to history : None    Mayda Hernandez is a 62 y.o. male who presents for increased le edema and sob. Pt was recently admitted for nstemi, chf required AVAPS, had catherization but did not require stents, pt was parainfluenza + intitially was on lovenox was found to have HFrEF w ef 30-35% suppose to be wearing life vest but is not wearing one \" pt states he didn't need it\" Not on ACE, ARB, or Entresto due to reported allergy to lisinopril. Pt notes he has been having intermittent cp since last  that is sharp and midline and is 8/10 now nothing makes it better or worse. Pt also notes wheezing also w increased le edema and leg pain. Pt is chronically on  -Norco 7.5-325 mg every 8 as needed & Gabapentin increased to 300 mg 3 times daily and &l nc o2. Pt is asking for pain medications now. Pt had one dose of pain medications this am    Nursing Notes were all reviewed and agreed with or any disagreements were addressed in the HPI. REVIEW OF SYSTEMS :      Review of Systems   Constitutional:  Negative for fatigue and fever. HENT:  Positive for congestion. Eyes:  Negative for photophobia and visual disturbance. Respiratory:  Positive for cough, shortness of breath and wheezing. Cardiovascular:  Positive for chest pain and leg swelling. Gastrointestinal:  Negative for abdominal pain and rectal pain.

## 2023-08-02 NOTE — ED NOTES
The following labs were labeled with appropriate pt sticker and tubed to lab:     [] Blue     [] Lavender   [] on ice  [x] Green/yellow  [] Green/black [] on ice  [] Rosario Leys  [] on ice  [] Yellow  [] Red  [] Type/ Screen  [] ABG  [] VBG    [] COVID-19 swab    [] Rapid  [] PCR  [] Flu swab  [] Peds Viral Panel     [] Urine Sample  [] Fecal Sample  [] Pelvic Cultures  [] Blood Cultures  [] X 2  [] STREP Cultures       Sonido Gonzalez RN  08/02/23 5626

## 2023-08-02 NOTE — ED NOTES
The following labs were labeled with appropriate pt sticker and tubed to lab:     [] Blue     [x] Lavender   [] on ice  [x] Green/yellow  [] Green/black [] on ice  [x] Grey  [x] on ice  [] Yellow  [] Red  [] Type/ Screen  [] ABG  [] VBG    [] COVID-19 swab    [] Rapid  [] PCR  [] Flu swab  [] Peds Viral Panel     [] Urine Sample  [] Fecal Sample  [] Pelvic Cultures  [] Blood Cultures  [] X 2  [] STREP Cultures       Andres Gonzalez RN  08/02/23 5732

## 2023-08-02 NOTE — CARE COORDINATION
[facility] Care Transitions Follow Up Call    Patient Current Location:  Home: [address] [place] [address]    Care Transition Nurse contacted the patient and family by telephone to follow up after admission on 23. Verified name and  with patient and family as identifiers. Patient: Blessing Chaney  Patient : 1965   MRN: <[id number]>  Reason for Admission: 2023 - 2023 [address] [place] [address],[address]. A/C COPD, A/C CHF, NSTEMI, Parainfluenza, s/p Heart Cath. Recent death of younger sister. Discharge Date: 23 RARS: Readmission Risk Score: 28.6        Needs to be reviewed by the provider   Additional needs identified to be addressed with provider: Yes  Per Pt sister Hortensia Hinojosa, Pt is on his way in an ambulance to [facility] for increased BLE edema (Hips down) and audible moist breath sounds, cough. Method of communication with provider: chart routing. Spoke with Pt's sister Hortensia Hinojosa briefly who stated that the Pt was currently on his way by ambulance to [facility] ED for increased BLE edema (Hips down) and audible moist breath sounds, cough. She stated that she was just locking up the house to join him in the Ed. Pt has CHF Consult scheduled 23    Follow Up  Future Appointments   Date Time Provider [address]   2023  9:15 AM DIONE CHF ROOM 2 [facility]   2023 10:45 AM Evita Hassan MD [facility]   2023  3:00 PM ARGELIA Alexander - CNP [facility]   2024  1:15 PM ARGELIA Koroma - NP [facility]     Offered patient enrollment in the Remote Patient Monitoring (RPM) program for in-home monitoring: Patient declined. Care Transitions Subsequent and Final Call    Subsequent and Final Calls  Care Transitions Interventions  Other Interventions:         Plan for follow-up call in 1-2 days based on severity of symptoms and risk factors.   Plan for next call:  ER F/U    Jose D Torres LPN

## 2023-08-02 NOTE — TELEPHONE ENCOUNTER
Writer contacted Dr. Wil Olvera to inform of 30 day readmission risk. Writer's attempt to contact Dr. Wil Olvera was unsuccessful. No Decision on disposition at this time.     Call Back: If you need to call back to inform of disposition you can contact me at [phone]

## 2023-08-03 PROBLEM — I50.23 ACUTE ON CHRONIC SYSTOLIC (CONGESTIVE) HEART FAILURE (HCC): Status: ACTIVE | Noted: 2023-08-03

## 2023-08-03 PROBLEM — R07.9 CHEST PAIN: Status: ACTIVE | Noted: 2023-04-24

## 2023-08-03 PROBLEM — I50.9 CHF WITH UNKNOWN LVEF (HCC): Status: ACTIVE | Noted: 2023-08-03

## 2023-08-03 LAB
B PARAP IS1001 DNA NPH QL NAA+NON-PROBE: NOT DETECTED
B PERT DNA SPEC QL NAA+PROBE: NOT DETECTED
C PNEUM DNA NPH QL NAA+NON-PROBE: NOT DETECTED
EKG ATRIAL RATE: 75 BPM
EKG P AXIS: 62 DEGREES
EKG P-R INTERVAL: 122 MS
EKG Q-T INTERVAL: 422 MS
EKG QRS DURATION: 80 MS
EKG QTC CALCULATION (BAZETT): 471 MS
EKG R AXIS: 78 DEGREES
EKG T AXIS: 79 DEGREES
EKG VENTRICULAR RATE: 75 BPM
FLUAV RNA NPH QL NAA+NON-PROBE: NOT DETECTED
FLUBV RNA NPH QL NAA+NON-PROBE: NOT DETECTED
HADV DNA NPH QL NAA+NON-PROBE: NOT DETECTED
HCOV 229E RNA NPH QL NAA+NON-PROBE: NOT DETECTED
HCOV HKU1 RNA NPH QL NAA+NON-PROBE: NOT DETECTED
HCOV NL63 RNA NPH QL NAA+NON-PROBE: NOT DETECTED
HCOV OC43 RNA NPH QL NAA+NON-PROBE: NOT DETECTED
HMPV RNA NPH QL NAA+NON-PROBE: NOT DETECTED
HPIV1 RNA NPH QL NAA+NON-PROBE: NOT DETECTED
HPIV2 RNA NPH QL NAA+NON-PROBE: NOT DETECTED
HPIV3 RNA NPH QL NAA+NON-PROBE: NOT DETECTED
HPIV4 RNA NPH QL NAA+NON-PROBE: NOT DETECTED
LV EF: 63 %
LV EF: 63 %
LVEF MODALITY: NORMAL
M PNEUMO DNA NPH QL NAA+NON-PROBE: NOT DETECTED
RSV RNA NPH QL NAA+NON-PROBE: NOT DETECTED
RV+EV RNA NPH QL NAA+NON-PROBE: NOT DETECTED
SARS-COV-2 RNA NPH QL NAA+NON-PROBE: NOT DETECTED
SPECIMEN DESCRIPTION: NORMAL

## 2023-08-03 PROCEDURE — 6370000000 HC RX 637 (ALT 250 FOR IP): Performed by: INTERNAL MEDICINE

## 2023-08-03 PROCEDURE — 99223 1ST HOSP IP/OBS HIGH 75: CPT | Performed by: INTERNAL MEDICINE

## 2023-08-03 PROCEDURE — 93308 TTE F-UP OR LMTD: CPT

## 2023-08-03 PROCEDURE — 2580000003 HC RX 258: Performed by: INTERNAL MEDICINE

## 2023-08-03 PROCEDURE — 94640 AIRWAY INHALATION TREATMENT: CPT

## 2023-08-03 PROCEDURE — 93010 ELECTROCARDIOGRAM REPORT: CPT | Performed by: INTERNAL MEDICINE

## 2023-08-03 PROCEDURE — 2700000000 HC OXYGEN THERAPY PER DAY

## 2023-08-03 PROCEDURE — 0202U NFCT DS 22 TRGT SARS-COV-2: CPT

## 2023-08-03 PROCEDURE — 6360000002 HC RX W HCPCS: Performed by: INTERNAL MEDICINE

## 2023-08-03 PROCEDURE — 6360000002 HC RX W HCPCS: Performed by: NURSE PRACTITIONER

## 2023-08-03 PROCEDURE — 2060000000 HC ICU INTERMEDIATE R&B

## 2023-08-03 PROCEDURE — APPSS60 APP SPLIT SHARED TIME 46-60 MINUTES: Performed by: NURSE PRACTITIONER

## 2023-08-03 PROCEDURE — 6370000000 HC RX 637 (ALT 250 FOR IP): Performed by: NURSE PRACTITIONER

## 2023-08-03 PROCEDURE — 99254 IP/OBS CNSLTJ NEW/EST MOD 60: CPT | Performed by: INTERNAL MEDICINE

## 2023-08-03 RX ORDER — HYDROXYZINE PAMOATE 25 MG/1
25 CAPSULE ORAL 3 TIMES DAILY PRN
Status: DISCONTINUED | OUTPATIENT
Start: 2023-08-03 | End: 2023-08-04 | Stop reason: HOSPADM

## 2023-08-03 RX ORDER — ACETAMINOPHEN 650 MG/1
650 SUPPOSITORY RECTAL EVERY 6 HOURS PRN
Status: DISCONTINUED | OUTPATIENT
Start: 2023-08-03 | End: 2023-08-04 | Stop reason: HOSPADM

## 2023-08-03 RX ORDER — ARFORMOTEROL TARTRATE 15 UG/2ML
15 SOLUTION RESPIRATORY (INHALATION)
Status: DISCONTINUED | OUTPATIENT
Start: 2023-08-03 | End: 2023-08-04 | Stop reason: HOSPADM

## 2023-08-03 RX ORDER — ONDANSETRON 4 MG/1
4 TABLET, ORALLY DISINTEGRATING ORAL EVERY 8 HOURS PRN
Status: DISCONTINUED | OUTPATIENT
Start: 2023-08-03 | End: 2023-08-04 | Stop reason: HOSPADM

## 2023-08-03 RX ORDER — CALCIUM CARBONATE 500(1250)
500 TABLET ORAL 2 TIMES DAILY WITH MEALS
Status: DISCONTINUED | OUTPATIENT
Start: 2023-08-03 | End: 2023-08-04 | Stop reason: HOSPADM

## 2023-08-03 RX ORDER — SODIUM CHLORIDE 0.9 % (FLUSH) 0.9 %
5-40 SYRINGE (ML) INJECTION PRN
Status: DISCONTINUED | OUTPATIENT
Start: 2023-08-03 | End: 2023-08-04 | Stop reason: HOSPADM

## 2023-08-03 RX ORDER — MORPHINE SULFATE 10 MG/5ML
10 SOLUTION ORAL EVERY 4 HOURS PRN
Status: DISCONTINUED | OUTPATIENT
Start: 2023-08-03 | End: 2023-08-04 | Stop reason: HOSPADM

## 2023-08-03 RX ORDER — SENNA AND DOCUSATE SODIUM 50; 8.6 MG/1; MG/1
1 TABLET, FILM COATED ORAL NIGHTLY
Status: DISCONTINUED | OUTPATIENT
Start: 2023-08-03 | End: 2023-08-04 | Stop reason: HOSPADM

## 2023-08-03 RX ORDER — ACETAMINOPHEN 325 MG/1
650 TABLET ORAL EVERY 6 HOURS PRN
Status: DISCONTINUED | OUTPATIENT
Start: 2023-08-03 | End: 2023-08-04 | Stop reason: HOSPADM

## 2023-08-03 RX ORDER — HYDROCODONE BITARTRATE AND ACETAMINOPHEN 7.5; 325 MG/1; MG/1
1 TABLET ORAL EVERY 8 HOURS PRN
Status: DISCONTINUED | OUTPATIENT
Start: 2023-08-03 | End: 2023-08-04 | Stop reason: HOSPADM

## 2023-08-03 RX ORDER — ATORVASTATIN CALCIUM 40 MG/1
40 TABLET, FILM COATED ORAL NIGHTLY
Status: DISCONTINUED | OUTPATIENT
Start: 2023-08-03 | End: 2023-08-04 | Stop reason: HOSPADM

## 2023-08-03 RX ORDER — ENOXAPARIN SODIUM 100 MG/ML
40 INJECTION SUBCUTANEOUS DAILY
Status: DISCONTINUED | OUTPATIENT
Start: 2023-08-03 | End: 2023-08-04 | Stop reason: HOSPADM

## 2023-08-03 RX ORDER — ASPIRIN 81 MG/1
81 TABLET, CHEWABLE ORAL DAILY
Status: DISCONTINUED | OUTPATIENT
Start: 2023-08-03 | End: 2023-08-04 | Stop reason: HOSPADM

## 2023-08-03 RX ORDER — ARIPIPRAZOLE 5 MG/1
5 TABLET ORAL DAILY
Status: DISCONTINUED | OUTPATIENT
Start: 2023-08-03 | End: 2023-08-04 | Stop reason: HOSPADM

## 2023-08-03 RX ORDER — LOSARTAN POTASSIUM 50 MG/1
50 TABLET ORAL DAILY
Status: DISCONTINUED | OUTPATIENT
Start: 2023-08-03 | End: 2023-08-04 | Stop reason: HOSPADM

## 2023-08-03 RX ORDER — SODIUM CHLORIDE 0.9 % (FLUSH) 0.9 %
5-40 SYRINGE (ML) INJECTION EVERY 12 HOURS SCHEDULED
Status: DISCONTINUED | OUTPATIENT
Start: 2023-08-03 | End: 2023-08-04 | Stop reason: HOSPADM

## 2023-08-03 RX ORDER — SODIUM CHLORIDE 9 MG/ML
INJECTION, SOLUTION INTRAVENOUS PRN
Status: DISCONTINUED | OUTPATIENT
Start: 2023-08-03 | End: 2023-08-04 | Stop reason: HOSPADM

## 2023-08-03 RX ORDER — ONDANSETRON 2 MG/ML
4 INJECTION INTRAMUSCULAR; INTRAVENOUS EVERY 6 HOURS PRN
Status: DISCONTINUED | OUTPATIENT
Start: 2023-08-03 | End: 2023-08-04 | Stop reason: HOSPADM

## 2023-08-03 RX ORDER — FLUOXETINE 10 MG/1
20 TABLET, FILM COATED ORAL DAILY
Status: DISCONTINUED | OUTPATIENT
Start: 2023-08-03 | End: 2023-08-04 | Stop reason: HOSPADM

## 2023-08-03 RX ORDER — GABAPENTIN 300 MG/1
300 CAPSULE ORAL 3 TIMES DAILY
Status: DISCONTINUED | OUTPATIENT
Start: 2023-08-03 | End: 2023-08-04 | Stop reason: HOSPADM

## 2023-08-03 RX ORDER — SPIRONOLACTONE 25 MG/1
25 TABLET ORAL DAILY
Status: DISCONTINUED | OUTPATIENT
Start: 2023-08-03 | End: 2023-08-04 | Stop reason: HOSPADM

## 2023-08-03 RX ORDER — PANTOPRAZOLE SODIUM 40 MG/1
40 TABLET, DELAYED RELEASE ORAL
Status: DISCONTINUED | OUTPATIENT
Start: 2023-08-03 | End: 2023-08-04 | Stop reason: HOSPADM

## 2023-08-03 RX ORDER — POLYETHYLENE GLYCOL 3350 17 G/17G
17 POWDER, FOR SOLUTION ORAL DAILY PRN
Status: DISCONTINUED | OUTPATIENT
Start: 2023-08-03 | End: 2023-08-04 | Stop reason: HOSPADM

## 2023-08-03 RX ORDER — IPRATROPIUM BROMIDE AND ALBUTEROL SULFATE 2.5; .5 MG/3ML; MG/3ML
1 SOLUTION RESPIRATORY (INHALATION) EVERY 6 HOURS PRN
Status: DISCONTINUED | OUTPATIENT
Start: 2023-08-03 | End: 2023-08-04 | Stop reason: HOSPADM

## 2023-08-03 RX ORDER — PRAMIPEXOLE DIHYDROCHLORIDE 0.25 MG/1
0.25 TABLET ORAL NIGHTLY
Status: DISCONTINUED | OUTPATIENT
Start: 2023-08-03 | End: 2023-08-04 | Stop reason: HOSPADM

## 2023-08-03 RX ORDER — POTASSIUM CHLORIDE 20 MEQ/1
20 TABLET, EXTENDED RELEASE ORAL DAILY
Status: DISCONTINUED | OUTPATIENT
Start: 2023-08-03 | End: 2023-08-04 | Stop reason: HOSPADM

## 2023-08-03 RX ORDER — HYDRALAZINE HYDROCHLORIDE 25 MG/1
25 TABLET, FILM COATED ORAL EVERY 8 HOURS SCHEDULED
Status: DISCONTINUED | OUTPATIENT
Start: 2023-08-03 | End: 2023-08-04 | Stop reason: HOSPADM

## 2023-08-03 RX ORDER — ISOSORBIDE DINITRATE 10 MG/1
20 TABLET ORAL 3 TIMES DAILY
Status: DISCONTINUED | OUTPATIENT
Start: 2023-08-03 | End: 2023-08-04 | Stop reason: HOSPADM

## 2023-08-03 RX ORDER — BUDESONIDE 0.5 MG/2ML
500 INHALANT ORAL 2 TIMES DAILY
Status: DISCONTINUED | OUTPATIENT
Start: 2023-08-03 | End: 2023-08-04 | Stop reason: HOSPADM

## 2023-08-03 RX ORDER — GUAIFENESIN/DEXTROMETHORPHAN 100-10MG/5
10 SYRUP ORAL EVERY 4 HOURS PRN
Status: DISCONTINUED | OUTPATIENT
Start: 2023-08-03 | End: 2023-08-04 | Stop reason: HOSPADM

## 2023-08-03 RX ORDER — ALBUTEROL SULFATE 2.5 MG/3ML
2.5 SOLUTION RESPIRATORY (INHALATION) EVERY 6 HOURS PRN
Status: DISCONTINUED | OUTPATIENT
Start: 2023-08-03 | End: 2023-08-04 | Stop reason: HOSPADM

## 2023-08-03 RX ORDER — METOPROLOL SUCCINATE 50 MG/1
50 TABLET, EXTENDED RELEASE ORAL DAILY
Status: DISCONTINUED | OUTPATIENT
Start: 2023-08-03 | End: 2023-08-04 | Stop reason: HOSPADM

## 2023-08-03 RX ORDER — LORAZEPAM 1 MG/1
1 TABLET ORAL EVERY 8 HOURS PRN
Status: DISCONTINUED | OUTPATIENT
Start: 2023-08-03 | End: 2023-08-04 | Stop reason: HOSPADM

## 2023-08-03 RX ORDER — FUROSEMIDE 10 MG/ML
40 INJECTION INTRAMUSCULAR; INTRAVENOUS 2 TIMES DAILY
Status: DISCONTINUED | OUTPATIENT
Start: 2023-08-03 | End: 2023-08-03

## 2023-08-03 RX ORDER — FUROSEMIDE 10 MG/ML
60 INJECTION INTRAMUSCULAR; INTRAVENOUS 2 TIMES DAILY
Status: DISCONTINUED | OUTPATIENT
Start: 2023-08-03 | End: 2023-08-04

## 2023-08-03 RX ADMIN — ASPIRIN 81 MG CHEWABLE TABLET 81 MG: 81 TABLET CHEWABLE at 09:18

## 2023-08-03 RX ADMIN — ISOSORBIDE DINITRATE 20 MG: 10 TABLET ORAL at 09:18

## 2023-08-03 RX ADMIN — HYDROCODONE BITARTRATE AND ACETAMINOPHEN 1 TABLET: 7.5; 325 TABLET ORAL at 20:06

## 2023-08-03 RX ADMIN — ARIPIPRAZOLE 5 MG: 5 TABLET ORAL at 09:18

## 2023-08-03 RX ADMIN — ISOSORBIDE DINITRATE 20 MG: 10 TABLET ORAL at 20:06

## 2023-08-03 RX ADMIN — HYDRALAZINE HYDROCHLORIDE 25 MG: 25 TABLET, FILM COATED ORAL at 15:18

## 2023-08-03 RX ADMIN — HYDROCODONE BITARTRATE AND ACETAMINOPHEN 1 TABLET: 7.5; 325 TABLET ORAL at 12:00

## 2023-08-03 RX ADMIN — PANTOPRAZOLE SODIUM 40 MG: 40 TABLET, DELAYED RELEASE ORAL at 05:51

## 2023-08-03 RX ADMIN — MORPHINE SULFATE 10 MG: 10 SOLUTION ORAL at 04:36

## 2023-08-03 RX ADMIN — HYDRALAZINE HYDROCHLORIDE 25 MG: 25 TABLET, FILM COATED ORAL at 22:37

## 2023-08-03 RX ADMIN — IPRATROPIUM BROMIDE AND ALBUTEROL SULFATE 1 DOSE: 2.5; .5 SOLUTION RESPIRATORY (INHALATION) at 11:34

## 2023-08-03 RX ADMIN — POTASSIUM CHLORIDE 20 MEQ: 1500 TABLET, EXTENDED RELEASE ORAL at 09:17

## 2023-08-03 RX ADMIN — Medication 500 MG: at 09:19

## 2023-08-03 RX ADMIN — MORPHINE SULFATE 10 MG: 10 SOLUTION ORAL at 09:35

## 2023-08-03 RX ADMIN — PRAMIPEXOLE DIHYDROCHLORIDE 0.25 MG: 0.25 TABLET ORAL at 20:06

## 2023-08-03 RX ADMIN — FUROSEMIDE 60 MG: 10 INJECTION, SOLUTION INTRAMUSCULAR; INTRAVENOUS at 17:37

## 2023-08-03 RX ADMIN — MORPHINE SULFATE 10 MG: 10 SOLUTION ORAL at 17:38

## 2023-08-03 RX ADMIN — LORAZEPAM 1 MG: 1 TABLET ORAL at 12:00

## 2023-08-03 RX ADMIN — IPRATROPIUM BROMIDE AND ALBUTEROL SULFATE 1 DOSE: 2.5; .5 SOLUTION RESPIRATORY (INHALATION) at 06:19

## 2023-08-03 RX ADMIN — SODIUM CHLORIDE, PRESERVATIVE FREE 10 ML: 5 INJECTION INTRAVENOUS at 09:19

## 2023-08-03 RX ADMIN — ENOXAPARIN SODIUM 40 MG: 100 INJECTION SUBCUTANEOUS at 09:22

## 2023-08-03 RX ADMIN — GABAPENTIN 300 MG: 300 CAPSULE ORAL at 15:18

## 2023-08-03 RX ADMIN — GABAPENTIN 300 MG: 300 CAPSULE ORAL at 20:06

## 2023-08-03 RX ADMIN — GABAPENTIN 300 MG: 300 CAPSULE ORAL at 09:18

## 2023-08-03 RX ADMIN — FUROSEMIDE 60 MG: 10 INJECTION, SOLUTION INTRAMUSCULAR; INTRAVENOUS at 09:17

## 2023-08-03 RX ADMIN — EMPAGLIFLOZIN 10 MG: 10 TABLET, FILM COATED ORAL at 09:17

## 2023-08-03 RX ADMIN — ATORVASTATIN CALCIUM 40 MG: 40 TABLET, FILM COATED ORAL at 20:06

## 2023-08-03 RX ADMIN — FLUOXETINE 20 MG: 10 TABLET, FILM COATED ORAL at 09:18

## 2023-08-03 RX ADMIN — IPRATROPIUM BROMIDE AND ALBUTEROL SULFATE 1 DOSE: 2.5; .5 SOLUTION RESPIRATORY (INHALATION) at 20:16

## 2023-08-03 RX ADMIN — METOPROLOL SUCCINATE 50 MG: 50 TABLET, EXTENDED RELEASE ORAL at 09:18

## 2023-08-03 RX ADMIN — ISOSORBIDE DINITRATE 20 MG: 10 TABLET ORAL at 15:18

## 2023-08-03 RX ADMIN — Medication 500 MG: at 17:38

## 2023-08-03 RX ADMIN — HYDROXYZINE PAMOATE 25 MG: 25 CAPSULE ORAL at 09:18

## 2023-08-03 RX ADMIN — MORPHINE SULFATE 10 MG: 10 SOLUTION ORAL at 22:37

## 2023-08-03 RX ADMIN — DOCUSATE SODIUM 50 MG AND SENNOSIDES 8.6 MG 1 TABLET: 8.6; 5 TABLET, FILM COATED ORAL at 20:06

## 2023-08-03 RX ADMIN — LORAZEPAM 1 MG: 1 TABLET ORAL at 20:06

## 2023-08-03 RX ADMIN — BUDESONIDE INHALATION 500 MCG: 0.5 SUSPENSION RESPIRATORY (INHALATION) at 06:19

## 2023-08-03 RX ADMIN — HYDRALAZINE HYDROCHLORIDE 25 MG: 25 TABLET, FILM COATED ORAL at 05:51

## 2023-08-03 RX ADMIN — SALINE NASAL SPRAY 1 SPRAY: 1.5 SOLUTION NASAL at 17:38

## 2023-08-03 RX ADMIN — HYDROCODONE BITARTRATE AND ACETAMINOPHEN 1 TABLET: 7.5; 325 TABLET ORAL at 02:01

## 2023-08-03 RX ADMIN — LOSARTAN POTASSIUM 50 MG: 50 TABLET, FILM COATED ORAL at 09:17

## 2023-08-03 RX ADMIN — SODIUM CHLORIDE, PRESERVATIVE FREE 10 ML: 5 INJECTION INTRAVENOUS at 20:06

## 2023-08-03 RX ADMIN — BUDESONIDE INHALATION 500 MCG: 0.5 SUSPENSION RESPIRATORY (INHALATION) at 20:16

## 2023-08-03 RX ADMIN — PRAMIPEXOLE DIHYDROCHLORIDE 0.25 MG: 0.25 TABLET ORAL at 02:01

## 2023-08-03 RX ADMIN — ATORVASTATIN CALCIUM 40 MG: 40 TABLET, FILM COATED ORAL at 02:01

## 2023-08-03 RX ADMIN — SPIRONOLACTONE 25 MG: 25 TABLET ORAL at 09:18

## 2023-08-03 RX ADMIN — LORAZEPAM 1 MG: 1 TABLET ORAL at 02:01

## 2023-08-03 RX ADMIN — ARFORMOTEROL TARTRATE 15 MCG: 15 SOLUTION RESPIRATORY (INHALATION) at 06:20

## 2023-08-03 RX ADMIN — ARFORMOTEROL TARTRATE 15 MCG: 15 SOLUTION RESPIRATORY (INHALATION) at 20:16

## 2023-08-03 ASSESSMENT — PAIN DESCRIPTION - ONSET
ONSET: ON-GOING
ONSET: ON-GOING

## 2023-08-03 ASSESSMENT — PAIN DESCRIPTION - LOCATION
LOCATION: BACK;LEG
LOCATION: LEG

## 2023-08-03 ASSESSMENT — PAIN DESCRIPTION - ORIENTATION
ORIENTATION: MID;LOWER;RIGHT;LEFT
ORIENTATION: RIGHT;LEFT
ORIENTATION: RIGHT;LEFT
ORIENTATION: MID;LOWER

## 2023-08-03 ASSESSMENT — PAIN DESCRIPTION - DESCRIPTORS
DESCRIPTORS: ACHING;DISCOMFORT
DESCRIPTORS: ACHING;DISCOMFORT;CRAMPING

## 2023-08-03 ASSESSMENT — PAIN DESCRIPTION - FREQUENCY
FREQUENCY: CONTINUOUS
FREQUENCY: CONTINUOUS

## 2023-08-03 ASSESSMENT — PAIN SCALES - GENERAL
PAINLEVEL_OUTOF10: 10
PAINLEVEL_OUTOF10: 9
PAINLEVEL_OUTOF10: 7
PAINLEVEL_OUTOF10: 10
PAINLEVEL_OUTOF10: 2
PAINLEVEL_OUTOF10: 8

## 2023-08-03 ASSESSMENT — PAIN DESCRIPTION - PAIN TYPE
TYPE: ACUTE PAIN
TYPE: ACUTE PAIN

## 2023-08-03 NOTE — CARE COORDINATION
Social Work/Discharge Planning:  Met with patient and completed initial assessment. Explained Social Work role and discussed transition of care/discharge planning. His cousin lives with him in an apartment with ramp to enter. PTA he uses a walker. He has a nebulizer, bipap and wears seven liters oxygen supplied through [facility].  He has a home health aide daily but can not recall name of company. He states he is seen by a nurse three times a week through [facility]. He plans to return home at discharge and will have a ride home. Called [facility] (ph: [phone]) and confirmed patient is active for nursing and will need discharge instructions faxed at discharge. Will continue to follow and assist with discharge planning.   Electronically signed by FEDERICO High on 8/3/2023 at 3:20 PM

## 2023-08-03 NOTE — PLAN OF CARE
Problem: Discharge Planning  Goal: Discharge to home or other facility with appropriate resources  8/3/2023 0942 by Rafiq Mcnamara RN  Outcome: Progressing     Problem: Pain  Goal: Verbalizes/displays adequate comfort level or baseline comfort level  8/3/2023 0942 by Rafiq Mcnamara RN  Outcome: Progressing     Problem: Safety - Adult  Goal: Free from fall injury  8/3/2023 0942 by Rafiq Mcnamara RN  Outcome: Progressing

## 2023-08-03 NOTE — PROGRESS NOTES
Progress  Note  Chief Complaint   Patient presents with    Shortness of Breath     Transfer from home with increase sob, edema lower ext.  02 at home 7L, d/c from hospital 2 weeks ago    Chest Pain     Chest pain today, 8/10 , MI 2 weeks ago     Historical Issues:  Current Facility-Administered Medications   Medication Dose Route Frequency Provider Last Rate Last Admin    albuterol (PROVENTIL) (2.5 MG/3ML) 0.083% nebulizer solution 2.5 mg  2.5 mg Nebulization Q6H PRN Ashkan Hric, DO        arformoterol tartrate (BROVANA) nebulizer solution 15 mcg  15 mcg Nebulization BID RT Ashkan Hric, DO   15 mcg at 08/03/23 0620    ARIPiprazole (ABILIFY) tablet 5 mg  5 mg Oral Daily Ashkan Hric, DO        aspirin chewable tablet 81 mg  81 mg Oral Daily Ashkan Hric, DO        atorvastatin (LIPITOR) tablet 40 mg  40 mg Oral Nightly Ashkan Hric, DO   40 mg at 08/03/23 0201    budesonide (PULMICORT) nebulizer suspension 500 mcg  500 mcg Nebulization BID Ashkan Hric, DO   500 mcg at 08/03/23 1346    calcium elemental (OSCAL) tablet 500 mg  500 mg Oral BID WC Ashkan Hric, DO        guaiFENesin-dextromethorphan (ROBITUSSIN DM) 100-10 MG/5ML syrup 10 mL  10 mL Oral Q4H PRN Ashkan Hric, DO        FLUoxetine (PROZAC) tablet 20 mg  20 mg Oral Daily Ashkan Hric, DO        gabapentin (NEURONTIN) capsule 300 mg  300 mg Oral TID Ashkan Hric, DO        hydrALAZINE (APRESOLINE) tablet 25 mg  25 mg Oral 3 times per day Ashkan Hric, DO   25 mg at 08/03/23 0551    HYDROcodone-acetaminophen (NORCO) 7.5-325 MG per tablet 1 tablet  1 tablet Oral Q8H PRN Ashkan Hric, DO   1 tablet at 08/03/23 0201    hydrOXYzine pamoate (VISTARIL) capsule 25 mg  25 mg Oral TID PRN Ashkan Hric, DO        ipratropium 0.5 mg-albuterol 2.5 mg (DUONEB) nebulizer solution 1 Dose  1 Dose Inhalation Q6H PRN Ashkan Hric, DO   1 Dose at 08/03/23 0619    isosorbide dinitrate (ISORDIL) tablet 20 mg  20 mg Oral TID Ashkan Hric, DO        LORazepam (ATIVAN) tablet

## 2023-08-03 NOTE — CONSULTS
Inpatient Cardiology Consultation      Reason for Consult:  CP. Acute on chronic HF. CHF. Consulting Physician: Dr Charlie Vasques    Requesting Physician:  Dr Ann Marie Camejo. Dr Nikos Miguel    Date of Consultation: 8/3/2023    HISTORY OF PRESENT ILLNESS: 61 yo  male known to Dr Jani Rick    PMH: NICMP, VHD, chronic HFrEF, anemia with hs of gastric ulcers, Ex heavy smoker, COPD/chronic hypercapnic respiratory failure on chronic O2, Chronic pain/peripheral neuropathy on chronic narcotics (follows with palliative care), and anxiety. Approximately 1 week ago noticed worsening BLE swelling    Couple days ago while laying in bed felt mid-sternal CP described as sharp that would come and go without any intervention. Yesterday the CP came back while laying in bed>sister called EMS>states he did not receive any medications. Has been getting CP on and off since admission that lasts only a couple minutes    [facility] ED 8/2/2023 /67 HR 70's SR, afebrile, and O2 saturation 97% on 7 liters NC. Troponin 25>22, p-BNP 0024(9721 on 6/30/2023), Bun/Cr 16/1.0, K+ 4.4, Na 141, Lipase 11, LFT's WNL, TSH 0.64, WBC 6.0, Hgb 9.1, Plt 235, COVID-19 NEGATIVE. Lasix 40 mg IV and Fentanyl IV given in ED    Lasix 40 mg IV BID ordered    ASA 81 mg QD, Lipitor 40 mg QD, hydralazine 25 mg TID, isordil 20 mg TID, Cozaar 50 mg QD, Toprol XL 50 mg QD , aldactone 25 mg QD      Currently /71 HR 70's SR, remains afebrile, and O@ saturation 100% on 7 liters NC    Please note: past medical records were reviewed per electronic medical record (EMR) - see detailed reports under Past Medical/ Surgical History. Past Medical History:    ACEI caused>>patient unsure of reaction  VHD  9/2021 TTE: EF 55%. NWMA. Normal diastolic filling pattern for age. Normal RV size and function. Trace MR. Trace TR  1/28/2022 LESLYE Dr Laura Cross (r/o endocarditis) EF 60%. Normal RV size and function. No VHD  6/3/2022 TTE Dr Pearce: EF 55-60%. meals) 10/23/22   Rossana Harrell MD   vitamin D (ERGOCALCIFEROL) 1.25 MG (54716 UT) CAPS capsule Take 1 capsule by mouth once a week for 6 doses 10/22/22 4/21/23  KIM Hwang   ipratropium-albuterol (DUONEB) 0.5-2.5 (3) MG/3ML SOLN nebulizer solution Inhale 3 mLs into the lungs every 6 hours as needed for Shortness of Breath 10/22/22   Atiya Karimi MD   Revefenacin Tulio Oden) 175 MCG/3ML SOLN Inhale 1 ampule into the lungs daily 7/11/22   Atiya Karimi MD   Probiotic Product [facility]) CAPS Patient is to take one tab bid.  Patient has been on antibiotics for the last 3 months 10/27/21   ARGELIA Yousif - NP       Current Medications:    Current Facility-Administered Medications: albuterol (PROVENTIL) (2.5 MG/3ML) 0.083% nebulizer solution 2.5 mg, 2.5 mg, Nebulization, Q6H PRN  arformoterol tartrate (BROVANA) nebulizer solution 15 mcg, 15 mcg, Nebulization, BID RT  ARIPiprazole (ABILIFY) tablet 5 mg, 5 mg, Oral, Daily  aspirin chewable tablet 81 mg, 81 mg, Oral, Daily  atorvastatin (LIPITOR) tablet 40 mg, 40 mg, Oral, Nightly  budesonide (PULMICORT) nebulizer suspension 500 mcg, 500 mcg, Nebulization, BID  calcium elemental (OSCAL) tablet 500 mg, 500 mg, Oral, BID WC  guaiFENesin-dextromethorphan (ROBITUSSIN DM) 100-10 MG/5ML syrup 10 mL, 10 mL, Oral, Q4H PRN  FLUoxetine (PROZAC) tablet 20 mg, 20 mg, Oral, Daily  gabapentin (NEURONTIN) capsule 300 mg, 300 mg, Oral, TID  hydrALAZINE (APRESOLINE) tablet 25 mg, 25 mg, Oral, 3 times per day  HYDROcodone-acetaminophen (NORCO) 7.5-325 MG per tablet 1 tablet, 1 tablet, Oral, Q8H PRN  hydrOXYzine pamoate (VISTARIL) capsule 25 mg, 25 mg, Oral, TID PRN  ipratropium 0.5 mg-albuterol 2.5 mg (DUONEB) nebulizer solution 1 Dose, 1 Dose, Inhalation, Q6H PRN  isosorbide dinitrate (ISORDIL) tablet 20 mg, 20 mg, Oral, TID  LORazepam (ATIVAN) tablet 1 mg, 1 mg, Oral, Q8H PRN  losartan (COZAAR) tablet 50 mg, 50 mg, Oral, Daily  metoprolol succinate (TOPROL XL) extended

## 2023-08-03 NOTE — PROGRESS NOTES
Ildefonso notified that the speciality bed has been cancelled via a secure vm.  [facility]

## 2023-08-03 NOTE — H&P
[facility] Hospitalist Group   History and Physical      CHIEF COMPLAINT:  shortness of breath    History of Present Illness: pt is a 62 y.o. male who presents to hospital for sob, cp, and swelling of legs. Pt states pain has occurred off and on. Pain in middle of chest. Pain described as sharp. Nothing make pain better or worse. Pt also noted to have le edema that has worsened and notes discomfort. Pt has been recently admitted for nstemi and parainfluenza infection. Pt denies fevers, chills,n/v, abd pain, diarrhea, constipation, melena, hematochezia, dysuria, or hematuria. Pt notes decrease in urination    REVIEW OF SYSTEMS:    A detailed system review was conducted with patient and is negative unless stated in hpi. PMH:  Past Medical History:   Diagnosis Date    Anxiety     COPD (chronic obstructive pulmonary disease) (HCC)     Hypertension     Leg swelling     Multiple gastric ulcers     Restless leg syndrome        Surgical History:  Past Surgical History:   Procedure Laterality Date    BRONCHOSCOPY N/A 4/27/2023    BRONCHOSCOPY DIAGNOSTIC OR CELL [address] ONLY performed by Larry Berrios MD at [address]         Medications Prior to Admission:    Prior to Admission medications    Medication Sig Start Date End Date Taking? Authorizing Provider   HYDROcodone-acetaminophen (NORCO) 7.5-325 MG per tablet Take 1 tablet by mouth every 8 hours as needed for Pain for up to 30 days. Takes religiously twice daily for leg pain per pt Max Daily Amount: 3 tablets 7/27/23 8/26/23  ARGELIA Alexander CNP   FLUoxetine (PROZAC) 20 MG tablet Take 1 tablet by mouth daily 7/27/23 7/21/24  ARGELIA Alexander CNP   ARIPiprazole (ABILIFY) 5 MG tablet Take 1 tablet by mouth daily 7/27/23 7/21/24  ARGELIA Alexander CNP   gabapentin (NEURONTIN) 300 MG capsule Take 1 capsule by mouth 3 times daily for 180 days.  7/27/23 1/23/24  ARGELIA Alexander areas of scarring/fibrosis. More patchy, peripheral interstitial and some airspace opacity in both lower lungs suggests additional areas of scarring/fibrosis though there may also be some superimposed atelectasis. Small area of superimposed pneumonitis would be difficult to exclude. US DUP LOWER EXTREMITIES BILATERAL VENOUS    Result Date: 8/2/2023  EXAMINATION: DUPLEX VENOUS ULTRASOUND OF THE BILATERAL LOWER EXTREMITIES8/2/2023 5:33 pm TECHNIQUE: Duplex ultrasound using B-mode/gray scaled imaging, Doppler spectral analysis and color flow Doppler was obtained of the deep venous structures of the lower bilateral extremities. COMPARISON: None. HISTORY: ORDERING SYSTEM PROVIDED HISTORY: b/l pain TECHNOLOGIST PROVIDED HISTORY: Reason for exam:->b/l pain What reading provider will be dictating this exam?->CRC FINDINGS: The visualized veins of the bilateral lower extremities are patent and free of echogenic thrombus. The veins demonstrate good compressibility with normal color flow study and spectral analysis. Please note the right peroneal vein and left popliteal trunk are not visualized. No evidence of DVT in either lower extremity. ASSESSMENT:      Principal Problem:    Acute decompensated heart failure (HCC)  Active Problems:    CHF with unknown LVEF ([address])  Resolved Problems:    * No resolved hospital problems. *      PLAN:    Acute on chronic systolic chf iv diuretics check wts and I/o's. Cardio on consult  Copd continue nebs continue morphine that he states is used for palliation for his sob.    Htn continue meds  Depression/anxiety continue meds    Decision to admit        Electronically signed by Gosia Louis DO on 8/3/2023 at 1:46 AM

## 2023-08-03 NOTE — ACP (ADVANCE CARE PLANNING)
Advance Care Planning   Healthcare Decision Maker:    Primary Decision Maker: Oma Ovalles Brother/Sister - [phone]    Secondary Decision Maker: Samaria Peraza - Amaris - [phone]    Click here to complete Healthcare Decision Makers including selection of the Healthcare Decision Maker Relationship (ie \"Primary\"). Today we documented Decision Maker(s) consistent with ACP documents on file.

## 2023-08-03 NOTE — PROGRESS NOTES
Patient refusing lo air loss bed that was ordered for him due to his oliver score of 18. Obtained respiratory panel from patient and informed him that he will be in droplet plus isolation until this panel results and that his door would need to be shut in the mean time. Patient states he is unable to breathe if the door is shut and refuses it to be shut at this time. Droplet plus sign is outside patients room.

## 2023-08-04 VITALS
WEIGHT: 125.9 LBS | HEIGHT: 64 IN | RESPIRATION RATE: 18 BRPM | SYSTOLIC BLOOD PRESSURE: 94 MMHG | HEART RATE: 68 BPM | DIASTOLIC BLOOD PRESSURE: 60 MMHG | TEMPERATURE: 97.9 F | OXYGEN SATURATION: 96 % | BODY MASS INDEX: 21.49 KG/M2

## 2023-08-04 LAB
ALBUMIN SERPL-MCNC: 3.6 G/DL (ref 3.5–5.2)
ALP SERPL-CCNC: 75 U/L (ref 40–129)
ALT SERPL-CCNC: 10 U/L (ref 0–40)
ANION GAP SERPL CALCULATED.3IONS-SCNC: 10 MMOL/L (ref 7–16)
AST SERPL-CCNC: 16 U/L (ref 0–39)
BASOPHILS # BLD: 0.05 K/UL (ref 0–0.2)
BASOPHILS NFR BLD: 1 % (ref 0–2)
BILIRUB SERPL-MCNC: 0.2 MG/DL (ref 0–1.2)
BUN SERPL-MCNC: 12 MG/DL (ref 6–20)
CALCIUM SERPL-MCNC: 7.8 MG/DL (ref 8.6–10.2)
CHLORIDE SERPL-SCNC: 92 MMOL/L (ref 98–107)
CO2 SERPL-SCNC: 39 MMOL/L (ref 22–29)
CREAT SERPL-MCNC: 0.9 MG/DL (ref 0.7–1.2)
EOSINOPHIL # BLD: 0.19 K/UL (ref 0.05–0.5)
EOSINOPHILS RELATIVE PERCENT: 3 % (ref 0–6)
ERYTHROCYTE [DISTWIDTH] IN BLOOD BY AUTOMATED COUNT: 14.8 % (ref 11.5–15)
GFR SERPL CREATININE-BSD FRML MDRD: >60 ML/MIN/1.73M2
GLUCOSE SERPL-MCNC: 143 MG/DL (ref 74–99)
HCT VFR BLD AUTO: 37 % (ref 37–54)
HGB BLD-MCNC: 11 G/DL (ref 12.5–16.5)
IMM GRANULOCYTES # BLD AUTO: <0.03 K/UL (ref 0–0.58)
IMM GRANULOCYTES NFR BLD: 0 % (ref 0–5)
LACTATE BLDV-SCNC: 1.3 MMOL/L (ref 0.5–2.2)
LYMPHOCYTES NFR BLD: 1.09 K/UL (ref 1.5–4)
LYMPHOCYTES RELATIVE PERCENT: 18 % (ref 20–42)
MCH RBC QN AUTO: 29.8 PG (ref 26–35)
MCHC RBC AUTO-ENTMCNC: 29.7 G/DL (ref 32–34.5)
MCV RBC AUTO: 100.3 FL (ref 80–99.9)
MONOCYTES NFR BLD: 0.64 K/UL (ref 0.1–0.95)
MONOCYTES NFR BLD: 11 % (ref 2–12)
NEUTROPHILS NFR BLD: 67 % (ref 43–80)
NEUTS SEG NFR BLD: 4.06 K/UL (ref 1.8–7.3)
PHOSPHATE SERPL-MCNC: 5 MG/DL (ref 2.5–4.5)
PLATELET # BLD AUTO: 253 K/UL (ref 130–450)
PMV BLD AUTO: 10.8 FL (ref 7–12)
POTASSIUM SERPL-SCNC: 4.3 MMOL/L (ref 3.5–5)
PROT SERPL-MCNC: 6.9 G/DL (ref 6.4–8.3)
RBC # BLD AUTO: 3.69 M/UL (ref 3.8–5.8)
SODIUM SERPL-SCNC: 141 MMOL/L (ref 132–146)
WBC OTHER # BLD: 6.1 K/UL (ref 4.5–11.5)

## 2023-08-04 PROCEDURE — 99239 HOSP IP/OBS DSCHRG MGMT >30: CPT | Performed by: INTERNAL MEDICINE

## 2023-08-04 PROCEDURE — 84100 ASSAY OF PHOSPHORUS: CPT

## 2023-08-04 PROCEDURE — 85025 COMPLETE CBC W/AUTO DIFF WBC: CPT

## 2023-08-04 PROCEDURE — 6360000002 HC RX W HCPCS: Performed by: INTERNAL MEDICINE

## 2023-08-04 PROCEDURE — 99232 SBSQ HOSP IP/OBS MODERATE 35: CPT | Performed by: INTERNAL MEDICINE

## 2023-08-04 PROCEDURE — 94640 AIRWAY INHALATION TREATMENT: CPT

## 2023-08-04 PROCEDURE — 6370000000 HC RX 637 (ALT 250 FOR IP): Performed by: INTERNAL MEDICINE

## 2023-08-04 PROCEDURE — 6370000000 HC RX 637 (ALT 250 FOR IP): Performed by: NURSE PRACTITIONER

## 2023-08-04 PROCEDURE — 2580000003 HC RX 258: Performed by: INTERNAL MEDICINE

## 2023-08-04 PROCEDURE — 6360000002 HC RX W HCPCS: Performed by: NURSE PRACTITIONER

## 2023-08-04 PROCEDURE — 80053 COMPREHEN METABOLIC PANEL: CPT

## 2023-08-04 PROCEDURE — 83605 ASSAY OF LACTIC ACID: CPT

## 2023-08-04 RX ORDER — FUROSEMIDE 40 MG/1
40 TABLET ORAL DAILY
Status: DISCONTINUED | OUTPATIENT
Start: 2023-08-04 | End: 2023-08-04 | Stop reason: HOSPADM

## 2023-08-04 RX ADMIN — HYDROCODONE BITARTRATE AND ACETAMINOPHEN 1 TABLET: 7.5; 325 TABLET ORAL at 04:12

## 2023-08-04 RX ADMIN — POTASSIUM CHLORIDE 20 MEQ: 1500 TABLET, EXTENDED RELEASE ORAL at 09:38

## 2023-08-04 RX ADMIN — SPIRONOLACTONE 25 MG: 25 TABLET ORAL at 09:38

## 2023-08-04 RX ADMIN — LOSARTAN POTASSIUM 50 MG: 50 TABLET, FILM COATED ORAL at 09:38

## 2023-08-04 RX ADMIN — GABAPENTIN 300 MG: 300 CAPSULE ORAL at 09:38

## 2023-08-04 RX ADMIN — HYDROCODONE BITARTRATE AND ACETAMINOPHEN 1 TABLET: 7.5; 325 TABLET ORAL at 12:27

## 2023-08-04 RX ADMIN — ENOXAPARIN SODIUM 40 MG: 100 INJECTION SUBCUTANEOUS at 09:38

## 2023-08-04 RX ADMIN — ASPIRIN 81 MG CHEWABLE TABLET 81 MG: 81 TABLET CHEWABLE at 09:38

## 2023-08-04 RX ADMIN — LORAZEPAM 1 MG: 1 TABLET ORAL at 04:12

## 2023-08-04 RX ADMIN — ARFORMOTEROL TARTRATE 15 MCG: 15 SOLUTION RESPIRATORY (INHALATION) at 08:33

## 2023-08-04 RX ADMIN — ISOSORBIDE DINITRATE 20 MG: 10 TABLET ORAL at 09:38

## 2023-08-04 RX ADMIN — HYDRALAZINE HYDROCHLORIDE 25 MG: 25 TABLET, FILM COATED ORAL at 06:25

## 2023-08-04 RX ADMIN — SODIUM CHLORIDE, PRESERVATIVE FREE 10 ML: 5 INJECTION INTRAVENOUS at 09:39

## 2023-08-04 RX ADMIN — PANTOPRAZOLE SODIUM 40 MG: 40 TABLET, DELAYED RELEASE ORAL at 06:25

## 2023-08-04 RX ADMIN — Medication 500 MG: at 09:39

## 2023-08-04 RX ADMIN — METOPROLOL SUCCINATE 50 MG: 50 TABLET, EXTENDED RELEASE ORAL at 09:38

## 2023-08-04 RX ADMIN — FUROSEMIDE 60 MG: 10 INJECTION, SOLUTION INTRAMUSCULAR; INTRAVENOUS at 09:39

## 2023-08-04 RX ADMIN — MORPHINE SULFATE 10 MG: 10 SOLUTION ORAL at 10:48

## 2023-08-04 RX ADMIN — BUDESONIDE INHALATION 500 MCG: 0.5 SUSPENSION RESPIRATORY (INHALATION) at 08:33

## 2023-08-04 RX ADMIN — MORPHINE SULFATE 10 MG: 10 SOLUTION ORAL at 06:25

## 2023-08-04 RX ADMIN — ARIPIPRAZOLE 5 MG: 5 TABLET ORAL at 09:38

## 2023-08-04 RX ADMIN — FLUOXETINE 20 MG: 10 TABLET, FILM COATED ORAL at 09:38

## 2023-08-04 RX ADMIN — EMPAGLIFLOZIN 10 MG: 10 TABLET, FILM COATED ORAL at 09:38

## 2023-08-04 ASSESSMENT — ENCOUNTER SYMPTOMS: RESPIRATORY NEGATIVE: 1

## 2023-08-04 ASSESSMENT — PAIN SCALES - GENERAL: PAINLEVEL_OUTOF10: 8

## 2023-08-04 NOTE — DISCHARGE INSTRUCTIONS
HEART FAILURE  / CONGESTIVE HEART FAILURE  DISCHARGE INSTRUCTIONS:  GUIDELINES TO FOLLOW AT HOME    Self- Managed Care:     MEDICATIONS:  Take your medication as directed. If you are experiencing any side effects, inform your doctor, Do not stop taking any of your medications without letting your doctor know. Check with your doctor before taking any over-the-counter medications / herbal / or dietary supplements. They may interfere with your other medications. Do not take ibuprofen (Advil or Motrin) and naproxen (Aleve) without talking to your doctor first. They could make your heart failure worse. WEIGHT MONITORING:   Weigh yourself everyday (with the same scale) around the same time of the day and write it down. (you can chart them on a calendar or keep track of them on paper. Notify your doctor of a weight gain of 3 pounds or more in 1 day   OR a total of 5 pounds or more in 1 week    Take your weight record to your doctor visits  Also, the same goes if you loose more than 3# in one day, let your heart doctor know. DIET:   Cardiac heart healthy diet- Low saturated / low trans fat, no added salt, caffeine restricted, Low sodium diet-   No more than 2,000mg (2 grams) of salt / sodium per day (which equals to a little less than  a teaspoon of salt)  If your doctor wants you on a fluid restriction. ..it is usually recommended a fluid limit of 2,000cc -  Fluid restriction- 2,000 ml (milliliters) = 64 ounces = you can have 8 glasses of fluid per day (each glass 8 ounces)    Follow a low salt diet - avoid using salt at the table, avoid / limit use of canned soups, processed / packaged foods, salted snacks, olives and pickles. Do not use a salt substitute without checking with your doctor, they may contain a high amount of potassioum. (Mrs. Annie Tovar is safe to use).     Limit the use of alcohol       CALL YOUR DOCTOR THE FIRST DAY YOU NOTICE ANY OF THESE   SYMPTOMS:  You have a

## 2023-08-04 NOTE — DISCHARGE SUMMARY
Discharge Summary     Patient ID:  Rhiannon Benson  [id number]  62 y.o. 1965 male  Shalonda Rodriguez MD        Admit date: 8/2/2023    Discharge date and time:  8/4/2023  10:54 AM      Activity level: As tolerated  Diet: Low-sodium  Labs: None  Disposition: Home with home care  Condition on Discharge: Stable  DME: Home oxygen    Admit Diagnoses:   Patient Active Problem List   Diagnosis    Essential hypertension    Hypertension    Peripheral vascular disease ([address])    Chronic obstructive pulmonary disease (HCC)    Tobacco use    Raynaud's phenomenon    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, left    S/P Girdlestone procedure    Onychomycosis    Venous insufficiency of both lower extremities    Depression    Oxygen dependent    Anxiety    Cellulitis    Swelling of both lower extremities    Leg swelling    Pneumonia due to COVID-19 virus    Gastroesophageal reflux disease    Fluid retention    Lower extremity pain, left    Acute on chronic heart failure with preserved ejection fraction (HFpEF) (HCC)    Oropharyngeal dysphagia    COPD exacerbation (HCC)    Chronic respiratory failure with hypoxia (HCC)    History of COVID-19    Acute on chronic diastolic (congestive) heart failure (HCC)    Fever of unknown origin    Nodule of upper lobe of left lung    Pneumonia due to organism    RSV (respiratory syncytial virus infection)    Acute on chronic respiratory failure with hypoxia (HCC)    Heart failure (HCC)    RLS (restless legs syndrome)    Acute on chronic congestive heart failure (HCC)    Coronary artery disease involving native coronary artery of native heart without angina pectoris    Chest pain    Palliative care encounter    COPD with acute exacerbation ([address])    Acute hypercapnic respiratory failure (HCC)    Acute respiratory failure with hypoxia and hypercapnia (HCC)    NSTEMI (non-ST elevated myocardial infarction) ([address])    Moderate protein-calorie malnutrition ([address]) disease, unspecified COPD type (HCC)      albuterol sulfate HFA (PROAIR HFA) 108 (90 Base) MCG/ACT inhaler Inhale 2 puffs into the lungs every 4 hours as needed for Wheezing  Qty: 1 each, Refills: 3    Associated Diagnoses: COPD exacerbation ([address]); Chronic respiratory failure with hypoxia ([facility])      butalbital-acetaminophen-caffeine (FIORICET, ESGIC) -40 MG per tablet TAKE ONE (1) TABLET BY MOUTH EVERY SIX (6) HOURS AS NEEDED FOR HEADACHES  Qty: 30 tablet, Refills: 1    Associated Diagnoses: Bad headache      Morphine Sulfate (MORPHINE 20MG/ML) SOLN concentrated solution Take 0.5 mLs by mouth every 4 hours as needed for Pain for up to 30 days. Max Daily Amount: 60 mg  Qty: 90 mL, Refills: 0    Comments: Reduce doses taken as pain becomes manageable  Associated Diagnoses: End stage COPD ([address]);  Palliative care encounter; Dyspnea on minimal exertion      aspirin 81 MG chewable tablet Take 1 tablet by mouth daily  Qty: 30 tablet, Refills: 3      isosorbide dinitrate (ISORDIL) 20 MG tablet Take 1 tablet by mouth 3 times daily  Qty: 90 tablet, Refills: 3      empagliflozin (JARDIANCE) 10 MG tablet Take 1 tablet by mouth daily  Qty: 30 tablet, Refills: 3      atorvastatin (LIPITOR) 40 MG tablet Take 1 tablet by mouth nightly  Qty: 30 tablet, Refills: 3      hydrALAZINE (APRESOLINE) 25 MG tablet Take 1 tablet by mouth every 8 hours  Qty: 90 tablet, Refills: 3      pramipexole (MIRAPEX) 0.5 MG tablet Take 0.5 tablets by mouth nightly  Qty: 15 tablet, Refills: 0    Associated Diagnoses: RLS (restless legs syndrome)      metoprolol succinate (TOPROL XL) 50 MG extended release tablet Take 1 tablet by mouth daily  Qty: 30 tablet, Refills: 3      spironolactone (ALDACTONE) 25 MG tablet Take 1 tablet by mouth daily  Qty: 30 tablet, Refills: 3      potassium chloride (KLOR-CON M) 20 MEQ extended release tablet Take 1 tablet by mouth daily If taking lasix  Qty: 30 tablet, Refills: 5      omeprazole (PRILOSEC) 40 MG delayed

## 2023-08-04 NOTE — PLAN OF CARE
Problem: Discharge Planning  Goal: Discharge to home or other facility with appropriate resources  8/4/2023 1006 by Chary Linda RN  Outcome: Progressing     Problem: Pain  Goal: Verbalizes/displays adequate comfort level or baseline comfort level  8/4/2023 1006 by Chary Linda RN  Outcome: Progressing     Problem: Safety - Adult  Goal: Free from fall injury  8/4/2023 1006 by Chary Linda RN  Outcome: Progressing

## 2023-08-04 NOTE — CONSULTS
CHF NURSE NAVIGATOR CONSULT NOTE:    Patient currently admitted with diagnosis of diastolic heart failure. Patient was awake and alert, laying in bed during the consultation. He was engaged and asked appropriate questions throughout the education session. He is agreeable to heart failure education and self monitoring once discharged. He is doing well on his low sodium diet and is aware of upcoming appointments. Scheduling with the CHF clinic Yes. Future Appointments   Date Time Provider [address]   8/8/2023  9:15 AM [facility] ROOM 2 [facility]   8/14/2023 10:45 AM Clarita Bryan MD [address] [place] [address]   8/24/2023  3:00 PM ARGELIA Leblanc - CNP [facility]   1/23/2024  1:15 PM ARGELIA Guerrero NP [facility]     Barriers to Care:  Contributing risk factors for Heart Failure are identified as none. The patient is ordered:  Diet: ADULT DIET; Dysphagia - Pureed   Sodium controlled diet Yes  Fluid restriction daily ordered (fluid restriction recommended if patient is hyponatremic and/or diuretic is initiated or increased) No  FR:   Daily Weights: Patient Vitals for the past 96 hrs (Last 3 readings):   Weight   08/04/23 0001 125 lb 14.4 oz (57.1 kg)   08/03/23 0000 132 lb 8 oz (60.1 kg)   08/02/23 1452 137 lb (62.1 kg)     I/O:   Intake/Output Summary (Last 24 hours) at 8/4/2023 1043  Last data filed at 8/4/2023 0442  Gross per 24 hour   Intake 120 ml   Output 2125 ml   Net -2005 ml       The Heart Failure Booklet given to the patient with additional patient education addressing:  What is Heart Failure? Things You Can Do to Live Well with HF  How to Take Your Medications  How to Eat Less Salt  [place] its Salt?   Exercising Well with Heart Failure  Signs and symptoms of HF to report  Weight Yourself Each Day  Home Self Management- activity, weight tracking, taking medications as prescribed, meals /diet planning (sodium and fluid restriction), how to read food labels,

## 2023-08-04 NOTE — PLAN OF CARE
Problem: Discharge Planning  Goal: Discharge to home or other facility with appropriate resources  8/3/2023 2055 by Keyla Pérez RN  Outcome: Progressing  8/3/2023 0942 by Blossom Haddad RN  Outcome: Progressing     Problem: Pain  Goal: Verbalizes/displays adequate comfort level or baseline comfort level  8/3/2023 2055 by Keyla Pérez RN  Outcome: Progressing  8/3/2023 0942 by Blossom Haddad RN  Outcome: Progressing     Problem: Safety - Adult  Goal: Free from fall injury  8/3/2023 2055 by Keyla Pérez RN  Outcome: Progressing  8/3/2023 0942 by Blossom Haddad RN  Outcome: Progressing     Problem: ABCDS Injury Assessment  Goal: Absence of physical injury  Outcome: Progressing     Problem: Skin/Tissue Integrity  Goal: Absence of new skin breakdown  Description: 1. Monitor for areas of redness and/or skin breakdown  2. Assess vascular access sites hourly  3. Every 4-6 hours minimum:  Change oxygen saturation probe site  4. Every 4-6 hours:  If on nasal continuous positive airway pressure, respiratory therapy assess nares and determine need for appliance change or resting period.   Outcome: Progressing     Problem: Chronic Conditions and Co-morbidities  Goal: Patient's chronic conditions and co-morbidity symptoms are monitored and maintained or improved  Outcome: Progressing Return call received from [place]  OP SWCM verified information and informed of concern that her ex- is driving by the house in different vehicles (patient discussed this with her neighbor)  [place] will further assess

## 2023-08-04 NOTE — PROGRESS NOTES
Progress  Note  Chief Complaint   Patient presents with    Shortness of Breath     Transfer from home with increase sob, edema lower ext.  02 at home 7L, d/c from hospital 2 weeks ago    Chest Pain     Chest pain today, 8/10 , MI 2 weeks ago     Historical Issues:  Current Facility-Administered Medications   Medication Dose Route Frequency Provider Last Rate Last Admin    furosemide (LASIX) tablet 40 mg  40 mg Oral Daily Celso Banks MD        albuterol (PROVENTIL) (2.5 MG/3ML) 0.083% nebulizer solution 2.5 mg  2.5 mg Nebulization Q6H PRN Ashkan Hric, DO        arformoterol tartrate (BROVANA) nebulizer solution 15 mcg  15 mcg Nebulization BID RT Ashkan Hric, DO   15 mcg at 08/04/23 0833    ARIPiprazole (ABILIFY) tablet 5 mg  5 mg Oral Daily Ashkan Hric, DO   5 mg at 08/04/23 0690    aspirin chewable tablet 81 mg  81 mg Oral Daily Ashkan Hric, DO   81 mg at 08/04/23 0938    atorvastatin (LIPITOR) tablet 40 mg  40 mg Oral Nightly Ashkan Hric, DO   40 mg at 08/03/23 2006    budesonide (PULMICORT) nebulizer suspension 500 mcg  500 mcg Nebulization BID Ashkan Hric, DO   500 mcg at 08/04/23 0833    calcium elemental (OSCAL) tablet 500 mg  500 mg Oral BID WC Ashkan Hric, DO   500 mg at 08/04/23 0939    guaiFENesin-dextromethorphan (ROBITUSSIN DM) 100-10 MG/5ML syrup 10 mL  10 mL Oral Q4H PRN Ashkan Hric, DO        FLUoxetine (PROZAC) tablet 20 mg  20 mg Oral Daily Ashkan Hric, DO   20 mg at 08/04/23 0938    gabapentin (NEURONTIN) capsule 300 mg  300 mg Oral TID Ashkan Hric, DO   300 mg at 08/04/23 0938    hydrALAZINE (APRESOLINE) tablet 25 mg  25 mg Oral 3 times per day Ashkan Hric, DO   25 mg at 08/04/23 0625    HYDROcodone-acetaminophen (NORCO) 7.5-325 MG per tablet 1 tablet  1 tablet Oral Q8H PRN Ashkan Hric, DO   1 tablet at 08/04/23 0412    hydrOXYzine pamoate (VISTARIL) capsule 25 mg  25 mg Oral TID PRN Ashkan Hric, DO   25 mg at 08/03/23 0918    ipratropium 0.5 mg-albuterol 2.5 mg (DUONEB) nebulizer solution 1 Dose  1 Dose Inhalation Q6H PRN Ashkan Hric, DO   1 Dose at 08/03/23 2016    isosorbide dinitrate (ISORDIL) tablet 20 mg  20 mg Oral TID Ashkan Hric, DO   20 mg at 08/04/23 0938    LORazepam (ATIVAN) tablet 1 mg  1 mg Oral Q8H PRN Ashkan Hric, DO   1 mg at 08/04/23 0412    losartan (COZAAR) tablet 50 mg  50 mg Oral Daily Ashkan Hric, DO   50 mg at 08/04/23 2674    metoprolol succinate (TOPROL XL) extended release tablet 50 mg  50 mg Oral Daily Ashkan Hric, DO   50 mg at 08/04/23 0938    morphine 10 MG/5ML solution 10 mg  10 mg Oral Q4H PRN Ashkan Hric, DO   10 mg at 08/04/23 0625    pantoprazole (PROTONIX) tablet 40 mg  40 mg Oral QAM AC Ashkan Hric, DO   40 mg at 08/04/23 0625    potassium chloride (KLOR-CON M) extended release tablet 20 mEq  20 mEq Oral Daily Ashkan Hric, DO   20 mEq at 08/04/23 0938    pramipexole (MIRAPEX) tablet 0.25 mg  0.25 mg Oral Nightly Ashkan Hric, DO   0.25 mg at 08/03/23 2006    sennosides-docusate sodium (SENOKOT-S) 8.6-50 MG tablet 1 tablet  1 tablet Oral Nightly Ashkan Hric, DO   1 tablet at 08/03/23 2006    spironolactone (ALDACTONE) tablet 25 mg  25 mg Oral Daily Ashkan Hric, DO   25 mg at 08/04/23 0938    sodium chloride flush 0.9 % injection 5-40 mL  5-40 mL IntraVENous 2 times per day Ashkan Hric, DO   10 mL at 08/04/23 0939    sodium chloride flush 0.9 % injection 5-40 mL  5-40 mL IntraVENous PRN Ashkan Hric, DO        0.9 % sodium chloride infusion   IntraVENous PRN Ashkan Hric, DO        enoxaparin (LOVENOX) injection 40 mg  40 mg SubCUTAneous Daily Ashkan Hric, DO   40 mg at 08/04/23 0938    ondansetron (ZOFRAN-ODT) disintegrating tablet 4 mg  4 mg Oral Q8H PRN Ashkan Hric, DO        Or    ondansetron (ZOFRAN) injection 4 mg  4 mg IntraVENous Q6H PRN Ashkan Hric, DO        polyethylene glycol (GLYCOLAX) packet 17 g  17 g Oral Daily PRN Ashkan Hric, DO        acetaminophen (TYLENOL) tablet 650 mg  650 mg Oral Q6H PRN Ashkan Hric, DO

## 2023-08-04 NOTE — CARE COORDINATION
Social Work/Discharge Planning:  Discharge and [address] order noted. Called [facility] (ph: [phone], fax: [phone]) and informed them of discharge. Faxed [facility] order.  Electronically signed by FEDERICO Lnae on 8/4/2023 at 11:37 AM

## 2023-08-04 NOTE — PROGRESS NOTES
INPATIENT CARDIOLOGY FOLLOW-UP    Name: Blessing Chaney    Age: 62 y.o. Date of Admission: 8/2/2023  2:39 PM    Date of Service: 8/4/2023    Chief Complaint: Follow-up for elevated troponin, COPD with no chest pain    Interim History:  No new overnight cardiac complaints except for dyspnea which is improving and is feeling slightly better. Currently with no complaints of CP,, palpitations, dizziness, or lightheadedness. SR on telemetry.     Review of Systems:   Cardiac: As per HPI  General: No fever, chills  Pulmonary: As per HPI  HEENT: No visual disturbances, difficult swallowing  GI: No nausea, vomiting  Endocrine: No thyroid disease or DM  Musculoskeletal: SANDERS x 4, no focal motor deficits  Skin: Intact, no rashes  Neuro/Psych: No headache or seizures    Problem List:  Patient Active Problem List   Diagnosis    Essential hypertension    Hypertension    Peripheral vascular disease (HCC)    Chronic obstructive pulmonary disease (HCC)    Tobacco use    Raynaud's phenomenon    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, left    S/P Girdlestone procedure    Onychomycosis    Venous insufficiency of both lower extremities    Depression    Oxygen dependent    Anxiety    Cellulitis    Swelling of both lower extremities    Leg swelling    Pneumonia due to COVID-19 virus    Gastroesophageal reflux disease    Fluid retention    Lower extremity pain, left    Acute on chronic heart failure with preserved ejection fraction (HFpEF) (HCC)    Oropharyngeal dysphagia    COPD exacerbation (HCC)    Chronic respiratory failure with hypoxia (HCC)    History of COVID-19    Acute on chronic diastolic (congestive) heart failure (HCC)    Fever of unknown origin    Nodule of upper lobe of left lung    Pneumonia due to organism    RSV (respiratory syncytial virus infection)    Acute on chronic respiratory failure with hypoxia (HCC)    Heart failure (HCC)    RLS (restless legs syndrome)    Acute on chronic obtained after the diuresis. 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 VHD: Mild MR  Hypertension, well controlled  Hyperlipidemia  Severe COPD with chronic hypercapnic respiratory failure on home O2 with bronchospasm. History of tobacco use quit in 2020 with 37-pack-year history of smoking  Chronic anemia  History of gastric ulcers chronic peripheral neuropathy  Anxiety/depression. RLS        Plan:   Echo results were reviewed, as above has normal LV systolic and diastolic function, normal RV size and function, mild pulm hypertension. Management of COPD with bronchospasm as per primary service  Lasix to p.o. check proBNP level. Recent cardiac cath was reviewed, as above no obstructive CAD. Rest of the management as per primary service. Patient is stable from the cardiology service. More  than 35 minutes  was spent counseling the patient, reviewing the medications, results, assessment and the rationale for the above recommendations. Armando Almeida MD., Elli Reyes.   [facility]) Cardiology

## 2023-08-04 NOTE — PLAN OF CARE
Patient's chart updated to reflect:      . - HF care plan, HF education points and HF discharge instructions.  -Orders: 2 gram sodium diet, daily weights, I/O.  -PCP and cardiology follow up appointments to be scheduled within 7 days of hospital discharge. -CHF education session will be provided to the patient prior to hospital discharge.     Rafaela Rivas RN BSN  Heart Failure Navigator

## 2023-08-04 NOTE — CARE COORDINATION
Social Work/Discharge Planning:  Chart  reviewed. Patient on seven liters oxygen. Plan is home at discharge. Called [facility] with [facility] (ph: [phone]) and confirmed patient home oxygen order is 7-8 liters continuously with 4 liters bled in to bipap. Updated RN. [facility] (ph: [phone]) will need discharge instructions at discharge. Will continue to follow.   Electronically signed by FEDERICO Reich on 8/4/2023 at 9:49 AM

## 2023-08-05 NOTE — PROGRESS NOTES
Physician Progress Note      Claudia Chapa  CSN #:                  [id number]  :                       1965  ADMIT DATE:       2023 2:39 PM  [address] [facility] DATE:        2023 1:28 PM  RESPONDING  PROVIDER #:        Rik Villanueva MD          QUERY TEXT:    Pt admitted with CHF. If possible, please document in progress notes and   discharge summary further specificity regarding the type and acuity of CHF:    The medical record reflects the following:  Risk Factors: HTN, CAD, COPD  Clinical Indicators: BNP 3046. Per consult \"Decompensated heart failure   appears only mildly volume overloaded. \"   PN \"Decompensated heart failure appears only mildly volume overloaded,   improving Echo>> normal LV systolic and diastolic function. Heart failure with   improved ejection fraction, EF improved from 30-35%>> 60 to 65%. \"  Treatment: echo, Lasix IV 60mg  every 12 hrs converted to po    Thank you,  Basim Vivas RN, CCDS  Clinical Documentation   [email or web address]  Options provided:  -- Acute on Chronic Systolic CHF/HFrEF  -- Acute on Chronic Diastolic CHF/HFpEF  -- Acute on Chronic Systolic and Diastolic CHF  -- Chronic Systolic CHF/HFrEF  -- Chronic Diastolic CHF/HFpEF  -- Chronic Systolic and Diastolic CHF  -- Other - I will add my own diagnosis  -- Disagree - Not applicable / Not valid  -- Disagree - Clinically unable to determine / Unknown  -- Refer to Clinical Documentation Reviewer    PROVIDER RESPONSE TEXT:    This patient is in acute on chronic diastolic CHF/HFpEF. Query created by: Basim Vivas on 2023 12:10 PM      Electronically signed by:   Rik Villanueva MD 2023 7:53 AM

## 2023-08-07 DIAGNOSIS — R51.9 BAD HEADACHE: ICD-10-CM

## 2023-08-08 ENCOUNTER — TELEPHONE (OUTPATIENT)
Dept: OTHER | Age: 58
End: 2023-08-08

## 2023-08-08 ENCOUNTER — HOSPITAL ENCOUNTER (OUTPATIENT)
Dept: OTHER | Age: 58
Setting detail: THERAPIES SERIES
Discharge: HOME | End: 2023-08-08
Payer: MEDICAID

## 2023-08-08 VITALS
DIASTOLIC BLOOD PRESSURE: 63 MMHG | BODY MASS INDEX: 21.11 KG/M2 | OXYGEN SATURATION: 94 % | WEIGHT: 123 LBS | SYSTOLIC BLOOD PRESSURE: 133 MMHG | RESPIRATION RATE: 20 BRPM | HEART RATE: 73 BPM

## 2023-08-08 LAB
ANION GAP SERPL CALCULATED.3IONS-SCNC: 12 MMOL/L (ref 7–16)
BNP SERPL-MCNC: 1300 PG/ML (ref 0–125)
BUN SERPL-MCNC: 18 MG/DL (ref 6–20)
CALCIUM SERPL-MCNC: 8.2 MG/DL (ref 8.6–10.2)
CHLORIDE SERPL-SCNC: 91 MMOL/L (ref 98–107)
CO2 SERPL-SCNC: 34 MMOL/L (ref 22–29)
CREAT SERPL-MCNC: 0.7 MG/DL (ref 0.7–1.2)
GFR SERPL CREATININE-BSD FRML MDRD: >60 ML/MIN/1.73M2
GLUCOSE SERPL-MCNC: 111 MG/DL (ref 74–99)
POTASSIUM SERPL-SCNC: 4.3 MMOL/L (ref 3.5–5)
SODIUM SERPL-SCNC: 137 MMOL/L (ref 132–146)

## 2023-08-08 PROCEDURE — 36415 COLL VENOUS BLD VENIPUNCTURE: CPT

## 2023-08-08 PROCEDURE — 6360000002 HC RX W HCPCS

## 2023-08-08 PROCEDURE — 83880 ASSAY OF NATRIURETIC PEPTIDE: CPT

## 2023-08-08 PROCEDURE — 2580000003 HC RX 258

## 2023-08-08 PROCEDURE — 96374 THER/PROPH/DIAG INJ IV PUSH: CPT

## 2023-08-08 PROCEDURE — 99204 OFFICE O/P NEW MOD 45 MIN: CPT

## 2023-08-08 PROCEDURE — 80048 BASIC METABOLIC PNL TOTAL CA: CPT

## 2023-08-08 RX ORDER — FUROSEMIDE 10 MG/ML
INJECTION INTRAMUSCULAR; INTRAVENOUS
Status: COMPLETED
Start: 2023-08-08 | End: 2023-08-08

## 2023-08-08 RX ORDER — SODIUM CHLORIDE 0.9 % (FLUSH) 0.9 %
10 SYRINGE (ML) INJECTION ONCE
Status: COMPLETED | OUTPATIENT
Start: 2023-08-08 | End: 2023-08-08

## 2023-08-08 RX ORDER — HYDROXYZINE PAMOATE 25 MG/1
25 CAPSULE ORAL 2 TIMES DAILY
COMMUNITY

## 2023-08-08 RX ORDER — SODIUM CHLORIDE 0.9 % (FLUSH) 0.9 %
SYRINGE (ML) INJECTION
Status: COMPLETED
Start: 2023-08-08 | End: 2023-08-08

## 2023-08-08 RX ORDER — LORAZEPAM 1 MG/1
1 TABLET ORAL EVERY 8 HOURS PRN
COMMUNITY
End: 2023-08-10 | Stop reason: SDUPTHER

## 2023-08-08 RX ORDER — FUROSEMIDE 10 MG/ML
40 INJECTION INTRAMUSCULAR; INTRAVENOUS ONCE
Status: COMPLETED | OUTPATIENT
Start: 2023-08-08 | End: 2023-08-08

## 2023-08-08 RX ORDER — BUTALBITAL, ACETAMINOPHEN AND CAFFEINE 50; 325; 40 MG/1; MG/1; MG/1
TABLET ORAL
Qty: 30 TABLET | Refills: 1 | Status: SHIPPED | OUTPATIENT
Start: 2023-08-08

## 2023-08-08 RX ADMIN — FUROSEMIDE 40 MG: 10 INJECTION INTRAMUSCULAR; INTRAVENOUS at 09:50

## 2023-08-08 RX ADMIN — SODIUM CHLORIDE, PRESERVATIVE FREE 10 ML: 5 INJECTION INTRAVENOUS at 09:50

## 2023-08-08 RX ADMIN — Medication 10 ML: at 09:50

## 2023-08-08 RX ADMIN — FUROSEMIDE 40 MG: 10 INJECTION, SOLUTION INTRAMUSCULAR; INTRAVENOUS at 09:50

## 2023-08-08 NOTE — PROGRESS NOTES
Congestive Heart Failure [address] [facility]   1965          Referring Provider: Dr. Maria Guadalupe Braxton Physician: Dr. Covington  Cardiologist: Yissel Mckinney  Nephrologist:         History of Present Illness:     Gianni Pressley is a 62 y.o. male with a history of HFimpEF, most recent EF 63% on 8/3/2023. Patient Story:    He does  have dyspnea with exertion, shortness of breath, or decline in overall functional capacity. He does have orthopnea, PND, nocturnal cough or hemoptysis. He does not have abdominal distention or bloating, early satiety, anorexia/change in appetite. He does has a good urinary response to  oral diuretic. He does have  lower extremity edema. He denies lightheadedness, dizziness. He denies palpitations, syncope or near syncope. He does not complain of chest pain, pressure, discomfort. Allergies   Allergen Reactions    Lisinopril     Other      Other reaction(s): ABD PAIN    Tramadol     Ibuprofen Nausea And Vomiting    Sulfa Antibiotics Nausea And Vomiting         Outpatient Medications Marked as Taking for the 8/8/23 encounter [facility] Encounter) with [facility] CHF ROOM 2   Medication Sig Dispense Refill    hydrOXYzine pamoate (VISTARIL) 25 MG capsule Take 1 capsule by mouth 2 times daily      LORazepam (ATIVAN) 1 MG tablet Take 1 tablet by mouth every 8 hours as needed for Anxiety. Max Daily Amount: 3 mg       Current Outpatient Medications on File Prior to Encounter   Medication Sig Dispense Refill    hydrOXYzine pamoate (VISTARIL) 25 MG capsule Take 1 capsule by mouth 2 times daily      LORazepam (ATIVAN) 1 MG tablet Take 1 tablet by mouth every 8 hours as needed for Anxiety.  Max Daily Amount: 3 mg      butalbital-acetaminophen-caffeine (FIORICET, ESGIC) -40 MG per tablet TAKE ONE (1) TABLET BY MOUTH EVERY SIX (6) HOURS AS NEEDED FOR HEADACHES 30 tablet 1    HYDROcodone-acetaminophen (NORCO) 7.5-325 MG per

## 2023-08-09 DIAGNOSIS — Z51.5 PALLIATIVE CARE ENCOUNTER: ICD-10-CM

## 2023-08-09 DIAGNOSIS — J44.9 END STAGE COPD (HCC): ICD-10-CM

## 2023-08-09 DIAGNOSIS — R06.09 DYSPNEA ON MINIMAL EXERTION: ICD-10-CM

## 2023-08-09 RX ORDER — MORPHINE SULFATE 20 MG/ML
10 SOLUTION ORAL EVERY 4 HOURS PRN
Qty: 90 ML | Refills: 0 | Status: SHIPPED | OUTPATIENT
Start: 2023-08-09 | End: 2023-09-08

## 2023-08-10 ENCOUNTER — TELEPHONE (OUTPATIENT)
Dept: PRIMARY CARE CLINIC | Age: 58
End: 2023-08-10

## 2023-08-10 DIAGNOSIS — F41.9 ANXIETY: ICD-10-CM

## 2023-08-10 DIAGNOSIS — J44.1 COPD EXACERBATION (HCC): Primary | ICD-10-CM

## 2023-08-10 RX ORDER — LORAZEPAM 0.5 MG/1
1 TABLET ORAL EVERY 8 HOURS PRN
Qty: 180 TABLET | Refills: 0 | Status: CANCELLED | OUTPATIENT
Start: 2023-08-10 | End: 2023-09-09

## 2023-08-10 RX ORDER — LORAZEPAM 1 MG/1
1 TABLET ORAL EVERY 8 HOURS PRN
Qty: 90 TABLET | Refills: 0 | Status: SHIPPED
Start: 2023-08-10 | End: 2023-08-10 | Stop reason: CLARIF

## 2023-08-10 NOTE — TELEPHONE ENCOUNTER
Last Appointment:  7/13/2023  Future Appointments   Date Time Provider [address]   8/14/2023 10:45 AM Mey Jones MD [address] [facility]   8/22/2023  7:45 AM [facility] ROOM 2 [facility] [place] HOD   8/24/2023  3:00 PM ARGELIA Alejandre - CNP [facility]   8/29/2023  7:45 AM [facility] ROOM 2 [facility] [place] HOD   1/23/2024  1:15 PM ARGELIA De La Fuente - NP [facility]

## 2023-08-10 NOTE — TELEPHONE ENCOUNTER
LORazepam (ATIVAN) 1 MG tablet   [facility] - [address] [facility], [address]

## 2023-08-10 NOTE — TELEPHONE ENCOUNTER
Pharmacy called issue getting 1mg in on the below    LORazepam (ATIVAN) 1 MG tablet       Pharmacy Has . 05mg if ok change quantity and directions based on this     Verbal or send knew script      [address], [address]

## 2023-08-10 NOTE — TELEPHONE ENCOUNTER
Last Appointment:  7/13/2023  Future Appointments   Date Time Provider [address]   8/14/2023 10:45 AM Tigre Khan MD [address] [facility]   8/22/2023  7:45 AM [facility]   8/24/2023  3:00 PM ARGELIA Walter - CNP [facility]   8/29/2023  7:45 AM [facility] ROOM 2 [facility] [place] HOD   1/23/2024  1:15 PM ARGELIA Stephens - NP [facility]

## 2023-08-11 NOTE — TELEPHONE ENCOUNTER
Chantal Aceves, pt's sister, just wants to make sure the dosage request has been placed.  Notified pt that it has

## 2023-08-14 ENCOUNTER — OFFICE VISIT (OUTPATIENT)
Dept: CARDIOLOGY CLINIC | Age: 58
End: 2023-08-14
Payer: MEDICAID

## 2023-08-14 VITALS
SYSTOLIC BLOOD PRESSURE: 122 MMHG | BODY MASS INDEX: 21.68 KG/M2 | OXYGEN SATURATION: 98 % | HEART RATE: 79 BPM | WEIGHT: 127 LBS | HEIGHT: 64 IN | RESPIRATION RATE: 16 BRPM | DIASTOLIC BLOOD PRESSURE: 58 MMHG

## 2023-08-14 DIAGNOSIS — I73.9 PERIPHERAL VASCULAR DISEASE (HCC): ICD-10-CM

## 2023-08-14 DIAGNOSIS — R07.2 PRECORDIAL PAIN: ICD-10-CM

## 2023-08-14 DIAGNOSIS — I50.32 CHRONIC DIASTOLIC (CONGESTIVE) HEART FAILURE (HCC): Primary | ICD-10-CM

## 2023-08-14 DIAGNOSIS — I10 PRIMARY HYPERTENSION: ICD-10-CM

## 2023-08-14 DIAGNOSIS — I25.10 CORONARY ARTERY DISEASE INVOLVING NATIVE CORONARY ARTERY OF NATIVE HEART WITHOUT ANGINA PECTORIS: ICD-10-CM

## 2023-08-14 PROCEDURE — 93000 ELECTROCARDIOGRAM COMPLETE: CPT | Performed by: INTERNAL MEDICINE

## 2023-08-14 PROCEDURE — 3078F DIAST BP <80 MM HG: CPT | Performed by: INTERNAL MEDICINE

## 2023-08-14 PROCEDURE — 3074F SYST BP LT 130 MM HG: CPT | Performed by: INTERNAL MEDICINE

## 2023-08-14 PROCEDURE — 99214 OFFICE O/P EST MOD 30 MIN: CPT | Performed by: INTERNAL MEDICINE

## 2023-08-14 RX ORDER — LORAZEPAM 1 MG/1
1 TABLET ORAL EVERY 6 HOURS PRN
COMMUNITY

## 2023-08-14 NOTE — PROGRESS NOTES
OUTPATIENT CARDIOLOGY FOLLOW-UP    Name: Edy Gupta    Age: 62 y.o. Date of Service: 8/14/2023    Chief Complaint: Chronic HFpEF, CAD, chest pain    Referring Physician: 8/14/2023    History of Present Illness:   +severe COPD / on home O2. +decreased functional capacity / he ambulates with a walker/wheelchair. +LE edema (improved with ongoing diuresis). Still with occasional right-sided chest pain -- episodes last a few minutes, daily, no particular relationship to exertion, no radiation of the pain, no associated symptoms --> mild CAD on recent cardiac catheterization. Currently with no complaints of CP, palpitations, or respiratory distress at rest. SR on EKG.     Review of Systems:   Cardiac: As per HPI  General: No fever, chills  Pulmonary: As per HPI  HEENT: No visual disturbances, difficult swallowing  GI: No nausea, vomiting  : No dysuria, hematuria  Endocrine: No thyroid disease or DM  Musculoskeletal: SANDERS x 4, no focal motor deficits  Skin: Intact, no rashes  Neuro: No headache, seizures  Psych: Currently with no depression, anxiety    Past Medical History:  Past Medical History:   Diagnosis Date    Anxiety     COPD (chronic obstructive pulmonary disease) (HCC)     Hypertension     Leg swelling     Multiple gastric ulcers     Restless leg syndrome        Past Surgical History:  Past Surgical History:   Procedure Laterality Date    BRONCHOSCOPY N/A 4/27/2023    BRONCHOSCOPY DIAGNOSTIC OR [facility] ONLY performed by Kale Wilks MD at [address]         Family History:  Family History   Problem Relation Age of Onset    Colon Cancer Mother     Other Father         house fire    No Known Problems Sister     No Known Problems Brother     No Known Problems Sister     No Known Problems Brother        Social History:  Social History     Socioeconomic History    Marital status:      Spouse name: Not on file    Number of children:

## 2023-08-16 NOTE — TELEPHONE ENCOUNTER
Medication was not called into the pharmacy. Physician ok'd it but no one called the pharmacy.  Will pend medication in an updated encounter

## 2023-08-22 ENCOUNTER — HOSPITAL ENCOUNTER (OUTPATIENT)
Dept: OTHER | Age: 58
Setting detail: THERAPIES SERIES
Discharge: HOME | End: 2023-08-22
Payer: MEDICAID

## 2023-08-22 VITALS
RESPIRATION RATE: 16 BRPM | SYSTOLIC BLOOD PRESSURE: 104 MMHG | OXYGEN SATURATION: 98 % | HEART RATE: 75 BPM | BODY MASS INDEX: 21.97 KG/M2 | WEIGHT: 128 LBS | DIASTOLIC BLOOD PRESSURE: 55 MMHG

## 2023-08-22 LAB
ANION GAP SERPL CALCULATED.3IONS-SCNC: 9 MMOL/L (ref 7–16)
BNP SERPL-MCNC: 1289 PG/ML (ref 0–125)
BUN SERPL-MCNC: 14 MG/DL (ref 6–20)
CALCIUM SERPL-MCNC: 8.3 MG/DL (ref 8.6–10.2)
CHLORIDE SERPL-SCNC: 94 MMOL/L (ref 98–107)
CO2 SERPL-SCNC: 36 MMOL/L (ref 22–29)
CREAT SERPL-MCNC: 0.7 MG/DL (ref 0.7–1.2)
GFR SERPL CREATININE-BSD FRML MDRD: >60 ML/MIN/1.73M2
GLUCOSE SERPL-MCNC: 102 MG/DL (ref 74–99)
POTASSIUM SERPL-SCNC: 4.5 MMOL/L (ref 3.5–5)
SODIUM SERPL-SCNC: 139 MMOL/L (ref 132–146)

## 2023-08-22 PROCEDURE — 36415 COLL VENOUS BLD VENIPUNCTURE: CPT

## 2023-08-22 PROCEDURE — 83880 ASSAY OF NATRIURETIC PEPTIDE: CPT

## 2023-08-22 PROCEDURE — 99214 OFFICE O/P EST MOD 30 MIN: CPT

## 2023-08-22 PROCEDURE — 80048 BASIC METABOLIC PNL TOTAL CA: CPT

## 2023-08-22 NOTE — PROGRESS NOTES
Congestive Heart Failure [address] [facility]   1965          Referring Provider: ARGELIA Lentz-CNP  Primary Care Physician: Dr. Long Maxwell  Cardiologist:   Nephrologist:         History of Present Illness:     Aram Ornelas is a 62 y.o. male with a history of HFimpEF, most recent EF 63% on 8/3/2023. Patient Story:    He does have dyspnea with exertion, shortness of breath, or decline in overall functional capacity. He does have orthopnea, PND, nocturnal cough or hemoptysis. He does not have abdominal distention or bloating, early satiety, anorexia/change in appetite. He does has a good urinary response to  oral diuretic. He does have  lower extremity edema. He denies lightheadedness, dizziness. He denies palpitations, syncope or near syncope. He does not complain of chest pain, pressure, discomfort. Allergies   Allergen Reactions    Lisinopril     Other      Other reaction(s): ABD PAIN    Tramadol     Ibuprofen Nausea And Vomiting    Sulfa Antibiotics Nausea And Vomiting         No outpatient medications have been marked as taking for the 8/22/23 encounter [facility] Encounter) with [facility] ROOM 2. Current Outpatient Medications on File Prior to Encounter   Medication Sig Dispense Refill    LORazepam (ATIVAN) 1 MG tablet Take 1 tablet by mouth every 6 hours as needed for Anxiety. Morphine Sulfate (MORPHINE 20MG/ML) SOLN concentrated solution Take 0.5 mLs by mouth every 4 hours as needed for Pain for up to 30 days.  Max Daily Amount: 60 mg 90 mL 0    butalbital-acetaminophen-caffeine (FIORICET, ESGIC) -40 MG per tablet TAKE ONE (1) TABLET BY MOUTH EVERY SIX (6) HOURS AS NEEDED FOR HEADACHES 30 tablet 1    hydrOXYzine pamoate (VISTARIL) 25 MG capsule Take 1 capsule by mouth 2 times daily      HYDROcodone-acetaminophen (NORCO) 7.5-325 MG per tablet Take 1 tablet by mouth every 8 hours as needed for

## 2023-08-23 LAB
LEFT VENTRICULAR EJECTION FRACTION HIGH VALUE: 65 %
LEFT VENTRICULAR EJECTION FRACTION MODE: NORMAL
LV EF: 60 %

## 2023-08-24 ENCOUNTER — OFFICE VISIT (OUTPATIENT)
Dept: PALLATIVE CARE | Age: 58
End: 2023-08-24
Payer: MEDICAID

## 2023-08-24 DIAGNOSIS — J44.9 END STAGE COPD (HCC): ICD-10-CM

## 2023-08-24 DIAGNOSIS — G89.4 CHRONIC PAIN SYNDROME: ICD-10-CM

## 2023-08-24 DIAGNOSIS — Z51.5 PALLIATIVE CARE ENCOUNTER: Primary | ICD-10-CM

## 2023-08-24 PROCEDURE — 99348 HOME/RES VST EST LOW MDM 30: CPT | Performed by: NURSE PRACTITIONER
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
## 2023-08-25 ENCOUNTER — TELEPHONE (OUTPATIENT)
Dept: PRIMARY CARE CLINIC | Age: 58
End: 2023-08-25

## 2023-08-25 NOTE — TELEPHONE ENCOUNTER
Spoke with Analilia Espinoza at the pharmacy and he wasn't sure what the issue was- it was a different pharmacist that called in. Analilia Espinoza realized that the script that other pharmacist was looking at was from June and the medication was more recently sent in July. He cancelled that old order and is keeping the medication the same as it was last ordered.
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
## 2023-08-25 NOTE — TELEPHONE ENCOUNTER
----- Message from Mason Velarde sent at 8/24/2023  1:29 PM EDT -----  Subject: Medication Problem    Medication: pramipexole (MIRAPEX) 0.5 MG tablet  Dosage: 0.5 mg   Ordering Provider: kamlesh    Question/Problem: Didi Manzano from [facility] pharmacy stated that pt was seen in   [address] and they had change the direction of the medication and   needs to verifry      Pharmacy: [address], [facility]    ---------------------------------------------------------------------------  --------------  [address]  [phone]; OK to leave message on voicemail  ---------------------------------------------------------------------------  --------------    SCRIPT ANSWERS  Relationship to Patient: Covered Entity  Covered Entity Type: Pharmacy?   Representative Name: Didi Manzano

## 2023-08-29 ENCOUNTER — HOSPITAL ENCOUNTER (OUTPATIENT)
Dept: OTHER | Age: 58
Setting detail: THERAPIES SERIES
Discharge: HOME | End: 2023-08-29
Payer: MEDICAID

## 2023-08-29 VITALS
OXYGEN SATURATION: 99 % | SYSTOLIC BLOOD PRESSURE: 125 MMHG | WEIGHT: 128 LBS | HEART RATE: 68 BPM | RESPIRATION RATE: 22 BRPM | DIASTOLIC BLOOD PRESSURE: 68 MMHG | BODY MASS INDEX: 21.97 KG/M2

## 2023-08-29 DIAGNOSIS — Z51.5 PALLIATIVE CARE ENCOUNTER: ICD-10-CM

## 2023-08-29 DIAGNOSIS — G89.4 CHRONIC PAIN SYNDROME: ICD-10-CM

## 2023-08-29 LAB
ANION GAP SERPL CALCULATED.3IONS-SCNC: 9 MMOL/L (ref 7–16)
BNP SERPL-MCNC: 724 PG/ML (ref 0–125)
BUN SERPL-MCNC: 10 MG/DL (ref 6–20)
CALCIUM SERPL-MCNC: 8.7 MG/DL (ref 8.6–10.2)
CHLORIDE SERPL-SCNC: 93 MMOL/L (ref 98–107)
CO2 SERPL-SCNC: 34 MMOL/L (ref 22–29)
CREAT SERPL-MCNC: 0.6 MG/DL (ref 0.7–1.2)
GFR SERPL CREATININE-BSD FRML MDRD: >60 ML/MIN/1.73M2
GLUCOSE SERPL-MCNC: 96 MG/DL (ref 74–99)
POTASSIUM SERPL-SCNC: 4.1 MMOL/L (ref 3.5–5)
SODIUM SERPL-SCNC: 136 MMOL/L (ref 132–146)

## 2023-08-29 PROCEDURE — 6360000002 HC RX W HCPCS

## 2023-08-29 PROCEDURE — 80048 BASIC METABOLIC PNL TOTAL CA: CPT

## 2023-08-29 PROCEDURE — 36415 COLL VENOUS BLD VENIPUNCTURE: CPT

## 2023-08-29 PROCEDURE — 96374 THER/PROPH/DIAG INJ IV PUSH: CPT

## 2023-08-29 PROCEDURE — 2580000003 HC RX 258

## 2023-08-29 PROCEDURE — 83880 ASSAY OF NATRIURETIC PEPTIDE: CPT

## 2023-08-29 PROCEDURE — 99214 OFFICE O/P EST MOD 30 MIN: CPT

## 2023-08-29 RX ORDER — HYDROCODONE BITARTRATE AND ACETAMINOPHEN 7.5; 325 MG/1; MG/1
1 TABLET ORAL EVERY 8 HOURS PRN
Qty: 90 TABLET | Refills: 0 | Status: SHIPPED | OUTPATIENT
Start: 2023-08-29 | End: 2023-09-28

## 2023-08-29 RX ORDER — SODIUM CHLORIDE 0.9 % (FLUSH) 0.9 %
SYRINGE (ML) INJECTION
Status: COMPLETED
Start: 2023-08-29 | End: 2023-08-29

## 2023-08-29 RX ORDER — FUROSEMIDE 10 MG/ML
INJECTION INTRAMUSCULAR; INTRAVENOUS
Status: COMPLETED
Start: 2023-08-29 | End: 2023-08-29

## 2023-08-29 RX ORDER — SODIUM CHLORIDE 0.9 % (FLUSH) 0.9 %
10 SYRINGE (ML) INJECTION ONCE
Status: COMPLETED | OUTPATIENT
Start: 2023-08-29 | End: 2023-08-29

## 2023-08-29 RX ORDER — FUROSEMIDE 10 MG/ML
40 INJECTION INTRAMUSCULAR; INTRAVENOUS ONCE
Status: COMPLETED | OUTPATIENT
Start: 2023-08-29 | End: 2023-08-29

## 2023-08-29 RX ADMIN — SODIUM CHLORIDE, PRESERVATIVE FREE 10 ML: 5 INJECTION INTRAVENOUS at 08:31

## 2023-08-29 RX ADMIN — Medication 10 ML: at 08:31

## 2023-08-29 RX ADMIN — FUROSEMIDE 40 MG: 10 INJECTION, SOLUTION INTRAMUSCULAR; INTRAVENOUS at 08:31

## 2023-08-29 RX ADMIN — FUROSEMIDE 40 MG: 10 INJECTION INTRAMUSCULAR; INTRAVENOUS at 08:31

## 2023-08-29 NOTE — TELEPHONE ENCOUNTER
Received call from pt's sister, Linda Fuller requesting a refill for pt's [address] sent to Pt's pharmacy [facility]. Pt is an established [facility] patient.

## 2023-09-06 ENCOUNTER — HOSPITAL ENCOUNTER (OUTPATIENT)
Dept: OTHER | Age: 58
Setting detail: THERAPIES SERIES
Discharge: HOME | End: 2023-09-06
Payer: MEDICAID

## 2023-09-06 VITALS
SYSTOLIC BLOOD PRESSURE: 138 MMHG | OXYGEN SATURATION: 100 % | RESPIRATION RATE: 18 BRPM | WEIGHT: 126 LBS | HEART RATE: 63 BPM | DIASTOLIC BLOOD PRESSURE: 71 MMHG | BODY MASS INDEX: 21.63 KG/M2

## 2023-09-06 LAB
ANION GAP SERPL CALCULATED.3IONS-SCNC: 10 MMOL/L (ref 7–16)
BNP SERPL-MCNC: 882 PG/ML (ref 0–125)
BUN SERPL-MCNC: 16 MG/DL (ref 6–20)
CALCIUM SERPL-MCNC: 8.1 MG/DL (ref 8.6–10.2)
CHLORIDE SERPL-SCNC: 94 MMOL/L (ref 98–107)
CO2 SERPL-SCNC: 34 MMOL/L (ref 22–29)
CREAT SERPL-MCNC: 0.7 MG/DL (ref 0.7–1.2)
GFR SERPL CREATININE-BSD FRML MDRD: >60 ML/MIN/1.73M2
GLUCOSE SERPL-MCNC: 94 MG/DL (ref 74–99)
POTASSIUM SERPL-SCNC: 4.8 MMOL/L (ref 3.5–5)
SODIUM SERPL-SCNC: 138 MMOL/L (ref 132–146)

## 2023-09-06 PROCEDURE — 99214 OFFICE O/P EST MOD 30 MIN: CPT

## 2023-09-06 PROCEDURE — 2580000003 HC RX 258

## 2023-09-06 PROCEDURE — 96374 THER/PROPH/DIAG INJ IV PUSH: CPT

## 2023-09-06 PROCEDURE — 83880 ASSAY OF NATRIURETIC PEPTIDE: CPT

## 2023-09-06 PROCEDURE — 6360000002 HC RX W HCPCS

## 2023-09-06 PROCEDURE — 36415 COLL VENOUS BLD VENIPUNCTURE: CPT

## 2023-09-06 PROCEDURE — 80048 BASIC METABOLIC PNL TOTAL CA: CPT

## 2023-09-06 RX ORDER — SODIUM CHLORIDE 0.9 % (FLUSH) 0.9 %
5-40 SYRINGE (ML) INJECTION 2 TIMES DAILY
Status: DISCONTINUED | OUTPATIENT
Start: 2023-09-06 | End: 2023-09-07 | Stop reason: HOSPADM

## 2023-09-06 RX ORDER — FUROSEMIDE 10 MG/ML
40 INJECTION INTRAMUSCULAR; INTRAVENOUS ONCE
Status: COMPLETED | OUTPATIENT
Start: 2023-09-06 | End: 2023-09-06

## 2023-09-06 RX ORDER — SODIUM CHLORIDE 0.9 % (FLUSH) 0.9 %
SYRINGE (ML) INJECTION
Status: COMPLETED
Start: 2023-09-06 | End: 2023-09-06

## 2023-09-06 RX ORDER — FUROSEMIDE 10 MG/ML
INJECTION INTRAMUSCULAR; INTRAVENOUS
Status: COMPLETED
Start: 2023-09-06 | End: 2023-09-06

## 2023-09-06 RX ADMIN — FUROSEMIDE 40 MG: 10 INJECTION INTRAMUSCULAR; INTRAVENOUS at 07:43

## 2023-09-06 RX ADMIN — SODIUM CHLORIDE, PRESERVATIVE FREE 10 ML: 5 INJECTION INTRAVENOUS at 07:43

## 2023-09-06 RX ADMIN — FUROSEMIDE 40 MG: 10 INJECTION, SOLUTION INTRAMUSCULAR; INTRAVENOUS at 07:43

## 2023-09-06 RX ADMIN — Medication 10 ML: at 07:43

## 2023-09-06 NOTE — PROGRESS NOTES
Congestive Heart Failure [address]       Referring Provider: Daljit Best. Niecy Cho APRN-CNP  Primary Care Physician: Treasure Rivera MD   Cardiologist: Adarsh Hanna  Nephrologist: n/a      HISTORY OF PRESENT ILLNESS:     Lesli Burton is a 62 y.o. (1965) male with a history of HFpEF, most recent EF:  Lab Results   Component Value Date    LVEF 63 08/03/2023    Chaneta Idol Echo 01/21/2022         He presents to the CHF clinic for ongoing evaluation and monitoring of heart failure. In the CHF clinic today he denies any adverse symptoms except:  [] Dizziness or lightheadedness   [] Syncope or near syncope  [] Recent Fall  [x] Shortness of breath at rest   [x] Dyspnea with exertion  [] Decline in functional capacity (unable to perform activities they had previously been able to do)  [] Fatigue   [] Orthopnea  [] PND  [] Nocturnal cough  [] Hemoptysis  [] Chest pain, pressure, or discomfort  [] Palpitations  [] Abdominal distention  [] Abdominal bloating  [] Early satiety  [] Blood in stool   [] Diarrhea  [] Constipation  [] Nausea/Vomiting  [] Decreased urinary response to oral diuretic   [] Scrotal swelling   [] Lower extremity edema  [] Used PRN doses of oral diuretic   [] Weight gain    Wt Readings from Last 3 Encounters:   09/06/23 126 lb (57.2 kg)   08/29/23 128 lb (58.1 kg)   08/22/23 128 lb (58.1 kg)           SOCIAL HISTORY:  [x] Denies tobacco, alcohol or illicit drug abuse  [] Tobacco use:  [] ETOH use:  [] Illicit drug use:        MEDICATIONS:    Allergies   Allergen Reactions    Lisinopril     Other      Other reaction(s): ABD PAIN    Tramadol     Ibuprofen Nausea And Vomiting    Sulfa Antibiotics Nausea And Vomiting       Current Outpatient Medications:     HYDROcodone-acetaminophen (NORCO) 7.5-325 MG per tablet, Take 1 tablet by mouth every 8 hours as needed for Pain for up to 30 days.  Takes religiously twice daily for leg pain per pt Max Daily Amount: 3

## 2023-09-07 ENCOUNTER — TELEPHONE (OUTPATIENT)
Dept: PALLATIVE CARE | Age: 58
End: 2023-09-07

## 2023-09-07 NOTE — TELEPHONE ENCOUNTER
Placed call to pt sister Day Lorenzo to schedule pt for follow up Palliative medicine home visit with MICA Deluna. Pt scheduled to be seen on 9/28 at [facility] agreed to appt time and date. Advised to call for any needs or issues.       Domingo Julian MSW,LSW  Palliative Medicine

## 2023-09-08 DIAGNOSIS — R51.9 BAD HEADACHE: ICD-10-CM

## 2023-09-08 DIAGNOSIS — G25.81 RLS (RESTLESS LEGS SYNDROME): Primary | ICD-10-CM

## 2023-09-08 RX ORDER — BUTALBITAL, ACETAMINOPHEN AND CAFFEINE 50; 325; 40 MG/1; MG/1; MG/1
TABLET ORAL
Qty: 30 TABLET | Refills: 1 | Status: SHIPPED | OUTPATIENT
Start: 2023-09-08

## 2023-09-08 NOTE — TELEPHONE ENCOUNTER
Pts sister called pt needs refill on below    pramipexole (MIRAPEX) 0.5 MG tablet       Sister thought Dr was increasing to 1mg , can we check       [address] [facility], [address]

## 2023-09-11 DIAGNOSIS — R06.09 DYSPNEA ON MINIMAL EXERTION: ICD-10-CM

## 2023-09-11 DIAGNOSIS — J44.9 END STAGE COPD (HCC): ICD-10-CM

## 2023-09-11 DIAGNOSIS — Z51.5 PALLIATIVE CARE ENCOUNTER: ICD-10-CM

## 2023-09-11 RX ORDER — MORPHINE SULFATE 20 MG/ML
10 SOLUTION ORAL EVERY 4 HOURS PRN
Qty: 90 ML | Refills: 0 | Status: ON HOLD | OUTPATIENT
Start: 2023-09-11 | End: 2023-10-11

## 2023-09-11 NOTE — TELEPHONE ENCOUNTER
Call from Dg's sister Kay Calloway requesting refill for his Morphine solution. Pharmacy is [facility]. Next kristina 9/28/23 with [facility].

## 2023-09-12 ENCOUNTER — HOSPITAL ENCOUNTER (INPATIENT)
Age: 58
LOS: 6 days | Discharge: HOME HEALTH | DRG: 720 | End: 2023-09-18
Attending: STUDENT IN AN ORGANIZED HEALTH CARE EDUCATION/TRAINING PROGRAM | Admitting: STUDENT IN AN ORGANIZED HEALTH CARE EDUCATION/TRAINING PROGRAM
Payer: MEDICAID

## 2023-09-12 ENCOUNTER — APPOINTMENT (OUTPATIENT)
Dept: GENERAL RADIOLOGY | Age: 58
DRG: 720 | End: 2023-09-12
Payer: MEDICAID

## 2023-09-12 ENCOUNTER — APPOINTMENT (OUTPATIENT)
Dept: CT IMAGING | Age: 58
DRG: 720 | End: 2023-09-12
Payer: MEDICAID

## 2023-09-12 ENCOUNTER — HOSPITAL ENCOUNTER (OUTPATIENT)
Dept: OTHER | Age: 58
Setting detail: THERAPIES SERIES
Discharge: HOME | End: 2023-09-12
Payer: MEDICAID

## 2023-09-12 VITALS
SYSTOLIC BLOOD PRESSURE: 74 MMHG | DIASTOLIC BLOOD PRESSURE: 50 MMHG | BODY MASS INDEX: 22.31 KG/M2 | WEIGHT: 130 LBS | OXYGEN SATURATION: 94 % | HEART RATE: 74 BPM | RESPIRATION RATE: 18 BRPM

## 2023-09-12 DIAGNOSIS — I50.9 ACUTE ON CHRONIC CONGESTIVE HEART FAILURE, UNSPECIFIED HEART FAILURE TYPE (HCC): Primary | ICD-10-CM

## 2023-09-12 DIAGNOSIS — I10 PRIMARY HYPERTENSION: ICD-10-CM

## 2023-09-12 DIAGNOSIS — N39.0 URINARY TRACT INFECTION WITHOUT HEMATURIA, SITE UNSPECIFIED: ICD-10-CM

## 2023-09-12 PROBLEM — R79.89 ELEVATED TROPONIN: Status: ACTIVE | Noted: 2023-09-12

## 2023-09-12 PROBLEM — R77.8 ELEVATED TROPONIN: Status: ACTIVE | Noted: 2023-09-12

## 2023-09-12 PROBLEM — A41.9 SEPSIS (HCC): Status: ACTIVE | Noted: 2023-09-12

## 2023-09-12 LAB
ALBUMIN SERPL-MCNC: 4.4 G/DL (ref 3.5–5.2)
ALP SERPL-CCNC: 61 U/L (ref 40–129)
ALT SERPL-CCNC: 21 U/L (ref 0–40)
ANION GAP SERPL CALCULATED.3IONS-SCNC: 9 MMOL/L (ref 7–16)
AST SERPL-CCNC: 27 U/L (ref 0–39)
BACTERIA URNS QL MICRO: ABNORMAL
BASOPHILS # BLD: 0.05 K/UL (ref 0–0.2)
BASOPHILS NFR BLD: 0 % (ref 0–2)
BILIRUB SERPL-MCNC: 0.3 MG/DL (ref 0–1.2)
BILIRUB UR QL STRIP: NEGATIVE
BNP SERPL-MCNC: 4677 PG/ML (ref 0–125)
BUN SERPL-MCNC: 24 MG/DL (ref 6–20)
CALCIUM SERPL-MCNC: 8 MG/DL (ref 8.6–10.2)
CHLORIDE SERPL-SCNC: 95 MMOL/L (ref 98–107)
CLARITY UR: ABNORMAL
CO2 SERPL-SCNC: 39 MMOL/L (ref 22–29)
COLOR UR: YELLOW
CREAT SERPL-MCNC: 0.8 MG/DL (ref 0.7–1.2)
EOSINOPHIL # BLD: 0 K/UL (ref 0.05–0.5)
EOSINOPHILS RELATIVE PERCENT: 0 % (ref 0–6)
ERYTHROCYTE [DISTWIDTH] IN BLOOD BY AUTOMATED COUNT: 14.6 % (ref 11.5–15)
GFR SERPL CREATININE-BSD FRML MDRD: >60 ML/MIN/1.73M2
GLUCOSE SERPL-MCNC: 118 MG/DL (ref 74–99)
GLUCOSE UR STRIP-MCNC: NEGATIVE MG/DL
HCT VFR BLD AUTO: 37.9 % (ref 37–54)
HGB BLD-MCNC: 10.9 G/DL (ref 12.5–16.5)
HGB UR QL STRIP.AUTO: ABNORMAL
IMM GRANULOCYTES # BLD AUTO: 0.22 K/UL (ref 0–0.58)
IMM GRANULOCYTES NFR BLD: 1 % (ref 0–5)
KETONES UR STRIP-MCNC: NEGATIVE MG/DL
LEUKOCYTE ESTERASE UR QL STRIP: ABNORMAL
LYMPHOCYTES NFR BLD: 0.6 K/UL (ref 1.5–4)
LYMPHOCYTES RELATIVE PERCENT: 3 % (ref 20–42)
MCH RBC QN AUTO: 29.9 PG (ref 26–35)
MCHC RBC AUTO-ENTMCNC: 28.8 G/DL (ref 32–34.5)
MCV RBC AUTO: 103.8 FL (ref 80–99.9)
MONOCYTES NFR BLD: 0.99 K/UL (ref 0.1–0.95)
MONOCYTES NFR BLD: 5 % (ref 2–12)
NEUTROPHILS NFR BLD: 91 % (ref 43–80)
NEUTS SEG NFR BLD: 18.9 K/UL (ref 1.8–7.3)
NITRITE UR QL STRIP: NEGATIVE
PH UR STRIP: 5.5 [PH] (ref 5–9)
PLATELET, FLUORESCENCE: 132 K/UL (ref 130–450)
PMV BLD AUTO: 11.6 FL (ref 7–12)
POTASSIUM SERPL-SCNC: 4.3 MMOL/L (ref 3.5–5)
PROT SERPL-MCNC: 7.8 G/DL (ref 6.4–8.3)
PROT UR STRIP-MCNC: 30 MG/DL
RBC # BLD AUTO: 3.65 M/UL (ref 3.8–5.8)
RBC # BLD: ABNORMAL 10*6/UL
RBC #/AREA URNS HPF: ABNORMAL /HPF
SARS-COV-2 RDRP RESP QL NAA+PROBE: NOT DETECTED
SODIUM SERPL-SCNC: 143 MMOL/L (ref 132–146)
SP GR UR STRIP: 1.02 (ref 1–1.03)
SPECIMEN DESCRIPTION: NORMAL
TROPONIN I SERPL HS-MCNC: 23 NG/L (ref 0–11)
UROBILINOGEN UR STRIP-ACNC: 0.2 EU/DL (ref 0–1)
WBC #/AREA URNS HPF: ABNORMAL /HPF
WBC OTHER # BLD: 20.8 K/UL (ref 4.5–11.5)

## 2023-09-12 PROCEDURE — 99215 OFFICE O/P EST HI 40 MIN: CPT

## 2023-09-12 PROCEDURE — 36415 COLL VENOUS BLD VENIPUNCTURE: CPT

## 2023-09-12 PROCEDURE — 83880 ASSAY OF NATRIURETIC PEPTIDE: CPT

## 2023-09-12 PROCEDURE — 2060000000 HC ICU INTERMEDIATE R&B

## 2023-09-12 PROCEDURE — 99214 OFFICE O/P EST MOD 30 MIN: CPT

## 2023-09-12 PROCEDURE — 94640 AIRWAY INHALATION TREATMENT: CPT

## 2023-09-12 PROCEDURE — 71045 X-RAY EXAM CHEST 1 VIEW: CPT

## 2023-09-12 PROCEDURE — 71275 CT ANGIOGRAPHY CHEST: CPT

## 2023-09-12 PROCEDURE — 6370000000 HC RX 637 (ALT 250 FOR IP): Performed by: STUDENT IN AN ORGANIZED HEALTH CARE EDUCATION/TRAINING PROGRAM

## 2023-09-12 PROCEDURE — 96374 THER/PROPH/DIAG INJ IV PUSH: CPT

## 2023-09-12 PROCEDURE — 81001 URINALYSIS AUTO W/SCOPE: CPT

## 2023-09-12 PROCEDURE — 80053 COMPREHEN METABOLIC PANEL: CPT

## 2023-09-12 PROCEDURE — 2500000003 HC RX 250 WO HCPCS

## 2023-09-12 PROCEDURE — 99285 EMERGENCY DEPT VISIT HI MDM: CPT

## 2023-09-12 PROCEDURE — 87635 SARS-COV-2 COVID-19 AMP PRB: CPT

## 2023-09-12 PROCEDURE — 6360000002 HC RX W HCPCS: Performed by: STUDENT IN AN ORGANIZED HEALTH CARE EDUCATION/TRAINING PROGRAM

## 2023-09-12 PROCEDURE — 6360000004 HC RX CONTRAST MEDICATION: Performed by: RADIOLOGY

## 2023-09-12 PROCEDURE — 84484 ASSAY OF TROPONIN QUANT: CPT

## 2023-09-12 PROCEDURE — 85025 COMPLETE CBC W/AUTO DIFF WBC: CPT

## 2023-09-12 PROCEDURE — 2580000003 HC RX 258

## 2023-09-12 PROCEDURE — 2700000000 HC OXYGEN THERAPY PER DAY

## 2023-09-12 PROCEDURE — 93005 ELECTROCARDIOGRAM TRACING: CPT

## 2023-09-12 PROCEDURE — 6360000002 HC RX W HCPCS

## 2023-09-12 PROCEDURE — 99222 1ST HOSP IP/OBS MODERATE 55: CPT | Performed by: STUDENT IN AN ORGANIZED HEALTH CARE EDUCATION/TRAINING PROGRAM

## 2023-09-12 RX ORDER — SODIUM CHLORIDE 9 MG/ML
INJECTION, SOLUTION INTRAVENOUS PRN
Status: DISCONTINUED | OUTPATIENT
Start: 2023-09-12 | End: 2023-09-18 | Stop reason: HOSPADM

## 2023-09-12 RX ORDER — ONDANSETRON 2 MG/ML
4 INJECTION INTRAMUSCULAR; INTRAVENOUS EVERY 6 HOURS PRN
Status: DISCONTINUED | OUTPATIENT
Start: 2023-09-12 | End: 2023-09-18 | Stop reason: HOSPADM

## 2023-09-12 RX ORDER — LORAZEPAM 1 MG/1
1 TABLET ORAL EVERY 6 HOURS PRN
Status: DISCONTINUED | OUTPATIENT
Start: 2023-09-12 | End: 2023-09-18 | Stop reason: HOSPADM

## 2023-09-12 RX ORDER — METHYLPREDNISOLONE SODIUM SUCCINATE 40 MG/ML
40 INJECTION, POWDER, LYOPHILIZED, FOR SOLUTION INTRAMUSCULAR; INTRAVENOUS EVERY 8 HOURS
Status: DISCONTINUED | OUTPATIENT
Start: 2023-09-12 | End: 2023-09-15

## 2023-09-12 RX ORDER — SODIUM CHLORIDE 0.9 % (FLUSH) 0.9 %
5-40 SYRINGE (ML) INJECTION PRN
Status: DISCONTINUED | OUTPATIENT
Start: 2023-09-12 | End: 2023-09-18 | Stop reason: HOSPADM

## 2023-09-12 RX ORDER — POLYETHYLENE GLYCOL 3350 17 G/17G
17 POWDER, FOR SOLUTION ORAL DAILY PRN
Status: DISCONTINUED | OUTPATIENT
Start: 2023-09-12 | End: 2023-09-18 | Stop reason: HOSPADM

## 2023-09-12 RX ORDER — ACETAMINOPHEN 650 MG/1
650 SUPPOSITORY RECTAL EVERY 6 HOURS PRN
Status: DISCONTINUED | OUTPATIENT
Start: 2023-09-12 | End: 2023-09-18 | Stop reason: HOSPADM

## 2023-09-12 RX ORDER — ARFORMOTEROL TARTRATE 15 UG/2ML
15 SOLUTION RESPIRATORY (INHALATION)
Status: DISCONTINUED | OUTPATIENT
Start: 2023-09-13 | End: 2023-09-18 | Stop reason: HOSPADM

## 2023-09-12 RX ORDER — ALBUTEROL SULFATE 2.5 MG/3ML
2.5 SOLUTION RESPIRATORY (INHALATION) EVERY 4 HOURS PRN
Status: DISCONTINUED | OUTPATIENT
Start: 2023-09-12 | End: 2023-09-18 | Stop reason: HOSPADM

## 2023-09-12 RX ORDER — ONDANSETRON 4 MG/1
4 TABLET, ORALLY DISINTEGRATING ORAL EVERY 8 HOURS PRN
Status: DISCONTINUED | OUTPATIENT
Start: 2023-09-12 | End: 2023-09-18 | Stop reason: HOSPADM

## 2023-09-12 RX ORDER — IPRATROPIUM BROMIDE AND ALBUTEROL SULFATE 2.5; .5 MG/3ML; MG/3ML
1 SOLUTION RESPIRATORY (INHALATION)
Status: DISCONTINUED | OUTPATIENT
Start: 2023-09-12 | End: 2023-09-12 | Stop reason: SDUPTHER

## 2023-09-12 RX ORDER — ACETAMINOPHEN 325 MG/1
650 TABLET ORAL EVERY 6 HOURS PRN
Status: DISCONTINUED | OUTPATIENT
Start: 2023-09-12 | End: 2023-09-18 | Stop reason: HOSPADM

## 2023-09-12 RX ORDER — PANTOPRAZOLE SODIUM 40 MG/1
40 TABLET, DELAYED RELEASE ORAL
Status: DISCONTINUED | OUTPATIENT
Start: 2023-09-13 | End: 2023-09-18 | Stop reason: HOSPADM

## 2023-09-12 RX ORDER — ARIPIPRAZOLE 5 MG/1
5 TABLET ORAL DAILY
Status: DISCONTINUED | OUTPATIENT
Start: 2023-09-13 | End: 2023-09-18 | Stop reason: HOSPADM

## 2023-09-12 RX ORDER — ASPIRIN 81 MG/1
81 TABLET, CHEWABLE ORAL DAILY
Status: DISCONTINUED | OUTPATIENT
Start: 2023-09-13 | End: 2023-09-18 | Stop reason: HOSPADM

## 2023-09-12 RX ORDER — SODIUM CHLORIDE, SODIUM LACTATE, POTASSIUM CHLORIDE, CALCIUM CHLORIDE 600; 310; 30; 20 MG/100ML; MG/100ML; MG/100ML; MG/100ML
INJECTION, SOLUTION INTRAVENOUS CONTINUOUS
Status: DISCONTINUED | OUTPATIENT
Start: 2023-09-12 | End: 2023-09-13

## 2023-09-12 RX ORDER — FLUOXETINE 10 MG/1
20 TABLET, FILM COATED ORAL DAILY
Status: DISCONTINUED | OUTPATIENT
Start: 2023-09-13 | End: 2023-09-18 | Stop reason: HOSPADM

## 2023-09-12 RX ORDER — IPRATROPIUM BROMIDE AND ALBUTEROL SULFATE 2.5; .5 MG/3ML; MG/3ML
1 SOLUTION RESPIRATORY (INHALATION)
Status: DISCONTINUED | OUTPATIENT
Start: 2023-09-13 | End: 2023-09-18 | Stop reason: HOSPADM

## 2023-09-12 RX ORDER — IPRATROPIUM BROMIDE AND ALBUTEROL SULFATE 2.5; .5 MG/3ML; MG/3ML
1 SOLUTION RESPIRATORY (INHALATION)
Status: DISCONTINUED | OUTPATIENT
Start: 2023-09-12 | End: 2023-09-12

## 2023-09-12 RX ORDER — GABAPENTIN 300 MG/1
300 CAPSULE ORAL 3 TIMES DAILY
Status: DISCONTINUED | OUTPATIENT
Start: 2023-09-13 | End: 2023-09-18 | Stop reason: HOSPADM

## 2023-09-12 RX ORDER — IPRATROPIUM BROMIDE AND ALBUTEROL SULFATE 2.5; .5 MG/3ML; MG/3ML
1 SOLUTION RESPIRATORY (INHALATION)
Status: COMPLETED | OUTPATIENT
Start: 2023-09-12 | End: 2023-09-12

## 2023-09-12 RX ORDER — HYDROCODONE BITARTRATE AND ACETAMINOPHEN 7.5; 325 MG/1; MG/1
1 TABLET ORAL EVERY 8 HOURS PRN
Status: DISCONTINUED | OUTPATIENT
Start: 2023-09-12 | End: 2023-09-18 | Stop reason: HOSPADM

## 2023-09-12 RX ORDER — ATORVASTATIN CALCIUM 40 MG/1
40 TABLET, FILM COATED ORAL NIGHTLY
Status: DISCONTINUED | OUTPATIENT
Start: 2023-09-13 | End: 2023-09-18 | Stop reason: HOSPADM

## 2023-09-12 RX ORDER — ENOXAPARIN SODIUM 100 MG/ML
40 INJECTION SUBCUTANEOUS DAILY
Status: DISCONTINUED | OUTPATIENT
Start: 2023-09-13 | End: 2023-09-18 | Stop reason: HOSPADM

## 2023-09-12 RX ORDER — SODIUM CHLORIDE 0.9 % (FLUSH) 0.9 %
5-40 SYRINGE (ML) INJECTION EVERY 12 HOURS SCHEDULED
Status: DISCONTINUED | OUTPATIENT
Start: 2023-09-13 | End: 2023-09-18 | Stop reason: HOSPADM

## 2023-09-12 RX ADMIN — HYDROCODONE BITARTRATE AND ACETAMINOPHEN 1 TABLET: 7.5; 325 TABLET ORAL at 23:57

## 2023-09-12 RX ADMIN — IPRATROPIUM BROMIDE AND ALBUTEROL SULFATE 1 DOSE: .5; 3 SOLUTION RESPIRATORY (INHALATION) at 17:17

## 2023-09-12 RX ADMIN — LORAZEPAM 1 MG: 1 TABLET ORAL at 23:56

## 2023-09-12 RX ADMIN — DOXYCYCLINE 100 MG: 100 INJECTION, POWDER, LYOPHILIZED, FOR SOLUTION INTRAVENOUS at 20:05

## 2023-09-12 RX ADMIN — METHYLPREDNISOLONE SODIUM SUCCINATE 40 MG: 40 INJECTION, POWDER, LYOPHILIZED, FOR SOLUTION INTRAMUSCULAR; INTRAVENOUS at 23:56

## 2023-09-12 RX ADMIN — ATORVASTATIN CALCIUM 40 MG: 40 TABLET, FILM COATED ORAL at 23:56

## 2023-09-12 RX ADMIN — IPRATROPIUM BROMIDE AND ALBUTEROL SULFATE 1 DOSE: .5; 3 SOLUTION RESPIRATORY (INHALATION) at 17:19

## 2023-09-12 RX ADMIN — IPRATROPIUM BROMIDE AND ALBUTEROL SULFATE 1 DOSE: .5; 3 SOLUTION RESPIRATORY (INHALATION) at 17:18

## 2023-09-12 RX ADMIN — IOPAMIDOL 75 ML: 755 INJECTION, SOLUTION INTRAVENOUS at 20:26

## 2023-09-12 RX ADMIN — WATER 2000 MG: 1 INJECTION INTRAMUSCULAR; INTRAVENOUS; SUBCUTANEOUS at 20:04

## 2023-09-12 RX ADMIN — GABAPENTIN 300 MG: 300 CAPSULE ORAL at 23:56

## 2023-09-12 ASSESSMENT — PAIN SCALES - GENERAL
PAINLEVEL_OUTOF10: 9
PAINLEVEL_OUTOF10: 9
PAINLEVEL_OUTOF10: 10
PAINLEVEL_OUTOF10: 9

## 2023-09-12 ASSESSMENT — PAIN DESCRIPTION - LOCATION
LOCATION: LEG

## 2023-09-12 ASSESSMENT — PAIN - FUNCTIONAL ASSESSMENT
PAIN_FUNCTIONAL_ASSESSMENT: 0-10

## 2023-09-12 ASSESSMENT — PAIN DESCRIPTION - DESCRIPTORS
DESCRIPTORS: ACHING

## 2023-09-12 ASSESSMENT — PAIN DESCRIPTION - ORIENTATION: ORIENTATION: RIGHT;LEFT

## 2023-09-12 ASSESSMENT — PAIN DESCRIPTION - PAIN TYPE
TYPE: CHRONIC PAIN
TYPE: CHRONIC PAIN

## 2023-09-12 ASSESSMENT — PAIN DESCRIPTION - FREQUENCY
FREQUENCY: CONTINUOUS
FREQUENCY: CONTINUOUS

## 2023-09-12 ASSESSMENT — PAIN DESCRIPTION - ONSET: ONSET: ON-GOING

## 2023-09-12 NOTE — ED PROVIDER NOTES
[address] [facility]        Pt Name: Jeremi Malik  MRN: [id number]  [address] [facility] 1965  Date of evaluation: 9/12/2023  Provider: Shantanu English DO  PCP: Meryle Bair, MD  Note Started: 2:54 PM EDT 9/12/23    CHIEF COMPLAINT       Chief Complaint   Patient presents with    Hypotension    Shortness of Breath     Increasing in intensity, had O2 on 4 liters at CHF clinic with reading of 88 %, uses 7 L at home. Urinary Retention       HISTORY OF PRESENT ILLNESS: 1 or more Elements            Jeremi Malik is a 62 y.o. male with history of CHF, COPD, hypertension, PVD, comes to the ER for complaint of shortness of breath, increased bilateral lower extremity edema and pain for the past 2 days. Patient states his symptoms started 2 days ago, progressively gotten worse. Patient denies any true chest pain, headache, dizziness, abdominal pain, nausea, vomiting, diarrhea. Patient does state a chronic history of intermittent urinary retention. Patient was referred to the ER when he was at his CHF clinic and found to be hypoxic at around 88% while using 4 L per nasal cannula. Patient states he normally uses 6 to 7 L per nasal cannula at home. Nursing Notes were all reviewed and agreed with or any disagreements were addressed in the HPI. REVIEW OF EXTERNAL NOTE :       Records reviewed for medical history, surgical history, medication list.      Chart Review/External Note Review    Last Echo reviewed by Me:  Lab Results   Component Value Date    LVEF 63 08/03/2023    LVEFMODE Echo 01/21/2022             Controlled Substance Monitoring:    Acute and Chronic Pain Monitoring:   RX Monitoring Periodic Controlled Substance Monitoring   7/27/2023  12:59 PM Possible medication side effects, risk of tolerance/dependence & alternative treatments discussed. ;No signs of potential drug abuse or diversion

## 2023-09-12 NOTE — ED NOTES
Attempted to put patient in hospital gown. Patient refused. Patient placed on tele-monitor, all alarms on.       Leonidas Mathews RN  09/12/23 7800

## 2023-09-12 NOTE — PROGRESS NOTES
mg)  Barriers to compliance  [] Refuses to monitor diet  [] Socioeconomic difficulties  [] Unable to cook for self (use of frozen meals, can goods, etc)  [] CHF CHW consulted  [] Low sodium meal delivery options given to patient  [] Dietician consulted   [] Low sodium recipes provided  [] Sodium free seasoning provided   [x] Fluid intake (64 oz)  [] Reviewed currently prescribed medications with patient, educated on importance of compliance and answered any questions regarding their medication  [] Pill box provided to patient  [] Patient using pill packing pharmacy   [] CPAP/BiPAP use  [] Low impact exercise / cardiac rehab   [] LifeVest use  [x] Patient aware of signs and symptoms of worsening HF, CHF clinic phone number provided and made aware to call clinic for sooner if evaluation if needed     [] Difficulty affording medications  [] CHF CHW consulted  [] Prescription assistance information given   [] [facility] medication assistance program information given   [] Sample medications provided to patient to help bridge gap until affordability Jardiance      Additional Notes:  Patient presents with sister Dayna Farah. Patient admits to being short of breath more than usual and has crackles mid and bases and a moist productive cough ( sister says white thick). Patient wears 4 liters of oxygen per nasal cannula and PO 86% oxygen increased to 5liters n/c and o2 sat 94%. . Patient c/o being tired and b/p 74/50(left arm manual )patient states he is not voiding well ,at clinic had to urinate twice and approx 50cc of clear yellow. Patient taken to Emergency         Scheduled to follow up in CHF clinic on:    Future Appointments   Date Time Provider [address] [place] [address]   9/19/2023  8:00 AM [facility] ROOM 3 [facility]   9/28/2023  9:00 AM Marilyn Cancino APRN - CNP [facility]   1/23/2024  1:15 PM ARGELIA Payne - NP [facility]   2/19/2024 10:15 AM Ca Perry MD [address] [facility]

## 2023-09-12 NOTE — ED NOTES
Department of Emergency Medicine  FIRST PROVIDER TRIAGE NOTE             [facility]           9/12/23  2:01 PM EDT    Date of Encounter: 9/12/23   MRN: [id number]      HPI: Wai Fraga is a 62 y.o. male who presents to the ED for Hypotension, Shortness of Breath (Increasing in intensity, had O2 on 4 liters at [facility] clinic with reading of 88 %, uses 7 L at home.), and Urinary Retention     Patient was being seen at the [facility] clinic today and pulse ox was reading 88%. Patient normally wears 6 or 7 L. Patient is having also urinary retention was sent in by the clinic. Patient is working to breathe. ROS: Negative for vomiting, diarrhea, rash, or headache. PE: Gen Appearance/Constitutional: alert  HEENT: NC/NT. PERRLA,  Airway patent. Neck: supple  Lungs: Bilateral wheezing noted. Initial Plan of Care: All treatment areas with department are currently occupied. Plan to order/Initiate the following while awaiting opening in ED: labs and imaging studies.   Initiate Treatment-Testing, Proceed toTreatment Area When Bed Available for ED Attending/MLP to Continue Care    Electronically signed by Casandra Dill PA-C   DD: 9/12/23       Casandra Dill PA-C  09/12/23 [facility]  09/12/23 1655

## 2023-09-12 NOTE — PLAN OF CARE
Problem: Chronic Conditions and Co-morbidities  Goal: Patient's chronic conditions and co-morbidity symptoms are monitored and maintained or improved  Outcome: Progressing   Conrinue plan of care

## 2023-09-13 LAB
ANION GAP SERPL CALCULATED.3IONS-SCNC: 12 MMOL/L (ref 7–16)
B PARAP IS1001 DNA NPH QL NAA+NON-PROBE: NOT DETECTED
B PERT DNA SPEC QL NAA+PROBE: NOT DETECTED
BASOPHILS # BLD: 0.03 K/UL (ref 0–0.2)
BASOPHILS NFR BLD: 0 % (ref 0–2)
BUN SERPL-MCNC: 22 MG/DL (ref 6–20)
C PNEUM DNA NPH QL NAA+NON-PROBE: NOT DETECTED
CALCIUM SERPL-MCNC: 7.8 MG/DL (ref 8.6–10.2)
CHLORIDE SERPL-SCNC: 94 MMOL/L (ref 98–107)
CO2 SERPL-SCNC: 35 MMOL/L (ref 22–29)
CREAT SERPL-MCNC: 0.7 MG/DL (ref 0.7–1.2)
EKG ATRIAL RATE: 76 BPM
EKG P AXIS: 65 DEGREES
EKG P-R INTERVAL: 122 MS
EKG Q-T INTERVAL: 398 MS
EKG QRS DURATION: 72 MS
EKG QTC CALCULATION (BAZETT): 447 MS
EKG R AXIS: 74 DEGREES
EKG T AXIS: 74 DEGREES
EKG VENTRICULAR RATE: 76 BPM
EOSINOPHIL # BLD: 0.02 K/UL (ref 0.05–0.5)
EOSINOPHILS RELATIVE PERCENT: 0 % (ref 0–6)
ERYTHROCYTE [DISTWIDTH] IN BLOOD BY AUTOMATED COUNT: 14.8 % (ref 11.5–15)
FLUAV RNA NPH QL NAA+NON-PROBE: NOT DETECTED
FLUBV RNA NPH QL NAA+NON-PROBE: NOT DETECTED
GFR SERPL CREATININE-BSD FRML MDRD: >60 ML/MIN/1.73M2
GLUCOSE SERPL-MCNC: 126 MG/DL (ref 74–99)
HADV DNA NPH QL NAA+NON-PROBE: NOT DETECTED
HCOV 229E RNA NPH QL NAA+NON-PROBE: NOT DETECTED
HCOV HKU1 RNA NPH QL NAA+NON-PROBE: NOT DETECTED
HCOV NL63 RNA NPH QL NAA+NON-PROBE: NOT DETECTED
HCOV OC43 RNA NPH QL NAA+NON-PROBE: NOT DETECTED
HCT VFR BLD AUTO: 34.3 % (ref 37–54)
HGB BLD-MCNC: 10.2 G/DL (ref 12.5–16.5)
HMPV RNA NPH QL NAA+NON-PROBE: NOT DETECTED
HPIV1 RNA NPH QL NAA+NON-PROBE: NOT DETECTED
HPIV2 RNA NPH QL NAA+NON-PROBE: NOT DETECTED
HPIV3 RNA NPH QL NAA+NON-PROBE: NOT DETECTED
HPIV4 RNA NPH QL NAA+NON-PROBE: NOT DETECTED
IMM GRANULOCYTES # BLD AUTO: 0.28 K/UL (ref 0–0.58)
IMM GRANULOCYTES NFR BLD: 1 % (ref 0–5)
L PNEUMO1 AG UR QL IA.RAPID: NEGATIVE
LYMPHOCYTES NFR BLD: 0.94 K/UL (ref 1.5–4)
LYMPHOCYTES RELATIVE PERCENT: 5 % (ref 20–42)
M PNEUMO DNA NPH QL NAA+NON-PROBE: NOT DETECTED
MCH RBC QN AUTO: 30.3 PG (ref 26–35)
MCHC RBC AUTO-ENTMCNC: 29.7 G/DL (ref 32–34.5)
MCV RBC AUTO: 101.8 FL (ref 80–99.9)
MONOCYTES NFR BLD: 0.72 K/UL (ref 0.1–0.95)
MONOCYTES NFR BLD: 4 % (ref 2–12)
NEUTROPHILS NFR BLD: 90 % (ref 43–80)
NEUTS SEG NFR BLD: 17.89 K/UL (ref 1.8–7.3)
PLATELET, FLUORESCENCE: 116 K/UL (ref 130–450)
PMV BLD AUTO: 11.8 FL (ref 7–12)
POTASSIUM SERPL-SCNC: 4.1 MMOL/L (ref 3.5–5)
PROCALCITONIN SERPL-MCNC: 6.88 NG/ML (ref 0–0.08)
RBC # BLD AUTO: 3.37 M/UL (ref 3.8–5.8)
RSV RNA NPH QL NAA+NON-PROBE: NOT DETECTED
RV+EV RNA NPH QL NAA+NON-PROBE: NOT DETECTED
S PNEUM AG SPEC QL: NEGATIVE
SARS-COV-2 RNA NPH QL NAA+NON-PROBE: NOT DETECTED
SODIUM SERPL-SCNC: 141 MMOL/L (ref 132–146)
SPECIMEN DESCRIPTION: NORMAL
SPECIMEN SOURCE: NORMAL
TROPONIN I SERPL HS-MCNC: 26 NG/L (ref 0–11)
WBC OTHER # BLD: 19.9 K/UL (ref 4.5–11.5)

## 2023-09-13 PROCEDURE — 2580000003 HC RX 258: Performed by: STUDENT IN AN ORGANIZED HEALTH CARE EDUCATION/TRAINING PROGRAM

## 2023-09-13 PROCEDURE — 6360000002 HC RX W HCPCS: Performed by: STUDENT IN AN ORGANIZED HEALTH CARE EDUCATION/TRAINING PROGRAM

## 2023-09-13 PROCEDURE — 94640 AIRWAY INHALATION TREATMENT: CPT

## 2023-09-13 PROCEDURE — 2500000003 HC RX 250 WO HCPCS: Performed by: STUDENT IN AN ORGANIZED HEALTH CARE EDUCATION/TRAINING PROGRAM

## 2023-09-13 PROCEDURE — 2700000000 HC OXYGEN THERAPY PER DAY

## 2023-09-13 PROCEDURE — 2580000003 HC RX 258

## 2023-09-13 PROCEDURE — 6370000000 HC RX 637 (ALT 250 FOR IP): Performed by: STUDENT IN AN ORGANIZED HEALTH CARE EDUCATION/TRAINING PROGRAM

## 2023-09-13 PROCEDURE — 6370000000 HC RX 637 (ALT 250 FOR IP): Performed by: INTERNAL MEDICINE

## 2023-09-13 PROCEDURE — 6370000000 HC RX 637 (ALT 250 FOR IP): Performed by: NURSE PRACTITIONER

## 2023-09-13 PROCEDURE — 87081 CULTURE SCREEN ONLY: CPT

## 2023-09-13 PROCEDURE — 80048 BASIC METABOLIC PNL TOTAL CA: CPT

## 2023-09-13 PROCEDURE — 6360000002 HC RX W HCPCS: Performed by: INTERNAL MEDICINE

## 2023-09-13 PROCEDURE — 0202U NFCT DS 22 TRGT SARS-COV-2: CPT

## 2023-09-13 PROCEDURE — 84484 ASSAY OF TROPONIN QUANT: CPT

## 2023-09-13 PROCEDURE — 87449 NOS EACH ORGANISM AG IA: CPT

## 2023-09-13 PROCEDURE — 87899 AGENT NOS ASSAY W/OPTIC: CPT

## 2023-09-13 PROCEDURE — 93010 ELECTROCARDIOGRAM REPORT: CPT | Performed by: INTERNAL MEDICINE

## 2023-09-13 PROCEDURE — 2060000000 HC ICU INTERMEDIATE R&B

## 2023-09-13 PROCEDURE — 85025 COMPLETE CBC W/AUTO DIFF WBC: CPT

## 2023-09-13 PROCEDURE — 84145 PROCALCITONIN (PCT): CPT

## 2023-09-13 RX ORDER — WATER 1000 ML/1000ML
INJECTION, SOLUTION INTRAVENOUS
Status: COMPLETED
Start: 2023-09-13 | End: 2023-09-13

## 2023-09-13 RX ORDER — METOPROLOL SUCCINATE 50 MG/1
50 TABLET, EXTENDED RELEASE ORAL NIGHTLY
Status: DISCONTINUED | OUTPATIENT
Start: 2023-09-13 | End: 2023-09-18 | Stop reason: HOSPADM

## 2023-09-13 RX ORDER — LEVOFLOXACIN 5 MG/ML
750 INJECTION, SOLUTION INTRAVENOUS EVERY 24 HOURS
Status: DISCONTINUED | OUTPATIENT
Start: 2023-09-13 | End: 2023-09-18 | Stop reason: SDUPTHER

## 2023-09-13 RX ORDER — MIDODRINE HYDROCHLORIDE 5 MG/1
10 TABLET ORAL
Status: DISCONTINUED | OUTPATIENT
Start: 2023-09-13 | End: 2023-09-14

## 2023-09-13 RX ORDER — FUROSEMIDE 10 MG/ML
40 INJECTION INTRAMUSCULAR; INTRAVENOUS DAILY
Status: DISCONTINUED | OUTPATIENT
Start: 2023-09-13 | End: 2023-09-18 | Stop reason: HOSPADM

## 2023-09-13 RX ADMIN — IPRATROPIUM BROMIDE AND ALBUTEROL SULFATE 1 DOSE: .5; 2.5 SOLUTION RESPIRATORY (INHALATION) at 09:22

## 2023-09-13 RX ADMIN — MIDODRINE HYDROCHLORIDE 10 MG: 5 TABLET ORAL at 16:38

## 2023-09-13 RX ADMIN — ASPIRIN 81 MG CHEWABLE TABLET 81 MG: 81 TABLET CHEWABLE at 08:30

## 2023-09-13 RX ADMIN — IPRATROPIUM BROMIDE AND ALBUTEROL SULFATE 1 DOSE: .5; 2.5 SOLUTION RESPIRATORY (INHALATION) at 11:46

## 2023-09-13 RX ADMIN — MIDODRINE HYDROCHLORIDE 10 MG: 5 TABLET ORAL at 11:20

## 2023-09-13 RX ADMIN — HYDROCODONE BITARTRATE AND ACETAMINOPHEN 1 TABLET: 7.5; 325 TABLET ORAL at 16:38

## 2023-09-13 RX ADMIN — GABAPENTIN 300 MG: 300 CAPSULE ORAL at 08:30

## 2023-09-13 RX ADMIN — ARIPIPRAZOLE 5 MG: 5 TABLET ORAL at 08:30

## 2023-09-13 RX ADMIN — GABAPENTIN 300 MG: 300 CAPSULE ORAL at 13:12

## 2023-09-13 RX ADMIN — ATORVASTATIN CALCIUM 40 MG: 40 TABLET, FILM COATED ORAL at 20:06

## 2023-09-13 RX ADMIN — ENOXAPARIN SODIUM 40 MG: 100 INJECTION SUBCUTANEOUS at 08:31

## 2023-09-13 RX ADMIN — FLUOXETINE 20 MG: 10 TABLET, FILM COATED ORAL at 08:30

## 2023-09-13 RX ADMIN — SODIUM CHLORIDE, POTASSIUM CHLORIDE, SODIUM LACTATE AND CALCIUM CHLORIDE: 600; 310; 30; 20 INJECTION, SOLUTION INTRAVENOUS at 00:09

## 2023-09-13 RX ADMIN — ARFORMOTEROL TARTRATE 15 MCG: 15 SOLUTION RESPIRATORY (INHALATION) at 19:46

## 2023-09-13 RX ADMIN — METHYLPREDNISOLONE SODIUM SUCCINATE 40 MG: 40 INJECTION, POWDER, LYOPHILIZED, FOR SOLUTION INTRAMUSCULAR; INTRAVENOUS at 13:12

## 2023-09-13 RX ADMIN — METOPROLOL SUCCINATE 50 MG: 50 TABLET, EXTENDED RELEASE ORAL at 22:45

## 2023-09-13 RX ADMIN — LORAZEPAM 1 MG: 1 TABLET ORAL at 08:30

## 2023-09-13 RX ADMIN — PANTOPRAZOLE SODIUM 40 MG: 40 TABLET, DELAYED RELEASE ORAL at 06:10

## 2023-09-13 RX ADMIN — GABAPENTIN 300 MG: 300 CAPSULE ORAL at 20:06

## 2023-09-13 RX ADMIN — LORAZEPAM 1 MG: 1 TABLET ORAL at 20:35

## 2023-09-13 RX ADMIN — LORAZEPAM 1 MG: 1 TABLET ORAL at 14:32

## 2023-09-13 RX ADMIN — HYDROCODONE BITARTRATE AND ACETAMINOPHEN 1 TABLET: 7.5; 325 TABLET ORAL at 08:29

## 2023-09-13 RX ADMIN — DOXYCYCLINE 100 MG: 100 INJECTION, POWDER, LYOPHILIZED, FOR SOLUTION INTRAVENOUS at 16:43

## 2023-09-13 RX ADMIN — METHYLPREDNISOLONE SODIUM SUCCINATE 40 MG: 40 INJECTION, POWDER, LYOPHILIZED, FOR SOLUTION INTRAMUSCULAR; INTRAVENOUS at 04:54

## 2023-09-13 RX ADMIN — IPRATROPIUM BROMIDE AND ALBUTEROL SULFATE 1 DOSE: .5; 2.5 SOLUTION RESPIRATORY (INHALATION) at 00:24

## 2023-09-13 RX ADMIN — SODIUM CHLORIDE, PRESERVATIVE FREE 10 ML: 5 INJECTION INTRAVENOUS at 20:07

## 2023-09-13 RX ADMIN — IPRATROPIUM BROMIDE AND ALBUTEROL SULFATE 1 DOSE: .5; 2.5 SOLUTION RESPIRATORY (INHALATION) at 19:46

## 2023-09-13 RX ADMIN — METHYLPREDNISOLONE SODIUM SUCCINATE 40 MG: 40 INJECTION, POWDER, LYOPHILIZED, FOR SOLUTION INTRAMUSCULAR; INTRAVENOUS at 20:35

## 2023-09-13 RX ADMIN — WATER 1 ML: 1 INJECTION INTRAMUSCULAR; INTRAVENOUS; SUBCUTANEOUS at 13:12

## 2023-09-13 RX ADMIN — FUROSEMIDE 40 MG: 10 INJECTION, SOLUTION INTRAMUSCULAR; INTRAVENOUS at 11:20

## 2023-09-13 RX ADMIN — DOXYCYCLINE 100 MG: 100 INJECTION, POWDER, LYOPHILIZED, FOR SOLUTION INTRAVENOUS at 04:54

## 2023-09-13 RX ADMIN — LEVOFLOXACIN 750 MG: 5 INJECTION, SOLUTION INTRAVENOUS at 11:27

## 2023-09-13 RX ADMIN — ARFORMOTEROL TARTRATE 15 MCG: 15 SOLUTION RESPIRATORY (INHALATION) at 09:22

## 2023-09-13 RX ADMIN — ARFORMOTEROL TARTRATE 15 MCG: 15 SOLUTION RESPIRATORY (INHALATION) at 00:24

## 2023-09-13 ASSESSMENT — PAIN DESCRIPTION - LOCATION
LOCATION: LEG
LOCATION: LEG
LOCATION: CHEST

## 2023-09-13 ASSESSMENT — PAIN SCALES - GENERAL
PAINLEVEL_OUTOF10: 8
PAINLEVEL_OUTOF10: 8
PAINLEVEL_OUTOF10: 9

## 2023-09-13 ASSESSMENT — PAIN DESCRIPTION - DESCRIPTORS
DESCRIPTORS: PRESSURE
DESCRIPTORS: SHARP
DESCRIPTORS: ACHING

## 2023-09-13 ASSESSMENT — PAIN - FUNCTIONAL ASSESSMENT: PAIN_FUNCTIONAL_ASSESSMENT: PREVENTS OR INTERFERES SOME ACTIVE ACTIVITIES AND ADLS

## 2023-09-13 ASSESSMENT — PAIN DESCRIPTION - PAIN TYPE: TYPE: ACUTE PAIN

## 2023-09-13 ASSESSMENT — PAIN DESCRIPTION - ONSET: ONSET: PROGRESSIVE

## 2023-09-13 ASSESSMENT — PAIN DESCRIPTION - ORIENTATION
ORIENTATION: LEFT
ORIENTATION: RIGHT;LEFT
ORIENTATION: RIGHT;LEFT

## 2023-09-13 ASSESSMENT — PAIN DESCRIPTION - FREQUENCY: FREQUENCY: INTERMITTENT

## 2023-09-13 NOTE — H&P
[facility] History and Physical      CHIEF COMPLAINT: Low blood pressure, shortness of breath    History of Present Illness:    Mr. Gabriel Vidal is a 54-year-old male with past medical history significant for chronic diastolic CHF, COPD, chronic hypoxic respiratory failure requiring supplemental oxygen via nasal cannula (baseline 6-7 L) hypertension, anxiety who presents with low blood pressure and shortness of breath from CHF clinic. In talking with Mr. Maged Rivera, he reports over the past 2 days he has noticed that he was more short of breath than usual.  He typically wears 6-7 L of oxygen 24 hours a day. He noticed that he had a hard time catching his breath even on this amount of oxygen. He reports a productive cough. He denies fevers or chills. He presented to the CHF clinic today and he was noted to have low blood pressure. Due to his symptoms and vital signs he decided to present to the [facility] emergency department for further evaluation. In the ED, vitals on presentation were temp 98.3, RR 21, HR 80, /54, O2 sat 100% on 6 L supplemental oxygen via nasal cannula. Lab work was obtained which revealed serum sodium 143, potassium 4.3, bicarb 39, creatinine 0.8, blood glucose 118, proBNP 4677, high-sensitivity troponin 23. CBC was obtained which revealed WBC 20.8, hemoglobin 10.9 with .8, platelets 234V. Urinalysis was obtained which revealed negative nitrate, moderate leuk esterase, 6-9 WBCs, 0-2 RBCs, 2+ bacteria. Imaging work-up was obtained in chest x-ray which revealed worsening bilateral airspace disease. Suspected small right pleural effusion. This was followed up with CTA pulmonary with contrast which revealed no evidence of pulmonary embolism. Patchy bilateral groundglass opacities, concerning for multifocal pneumonia. In the ED, patient was started on IV ceftriaxone 2000 mg once and IV doxycycline 100 mg once.     Decision

## 2023-09-13 NOTE — DISCHARGE INSTRUCTIONS
HEART FAILURE  / CONGESTIVE HEART FAILURE  DISCHARGE INSTRUCTIONS:  GUIDELINES TO FOLLOW AT HOME    Self- Managed Care:     MEDICATIONS:  Take your medication as directed. If you are experiencing any side effects, inform your doctor, Do not stop taking any of your medications without letting your doctor know. Check with your doctor before taking any over-the-counter medications / herbal / or dietary supplements. They may interfere with your other medications. Do not take ibuprofen (Advil or Motrin) and naproxen (Aleve) without talking to your doctor first. They could make your heart failure worse. WEIGHT MONITORING:   Weigh yourself everyday (with the same scale) around the same time of the day and write it down. (you can chart them on a calendar or keep track of them on paper. Notify your doctor of a weight gain of 3 pounds or more in 1 day   OR a total of 5 pounds or more in 1 week    Take your weight record to your doctor visits  Also, the same goes if you loose more than 3# in one day, let your heart doctor know. DIET:   Cardiac heart healthy diet- Low saturated / low trans fat, no added salt, caffeine restricted, Low sodium diet-   No more than 2,000mg (2 grams) of salt / sodium per day (which equals to a little less than  a teaspoon of salt)  If your doctor wants you on a fluid restriction. ..it is usually recommended a fluid limit of 2,000cc -  Fluid restriction- 2,000 ml (milliliters) = 64 ounces = you can have 8 glasses of fluid per day (each glass 8 ounces)    Follow a low salt diet - avoid using salt at the table, avoid / limit use of canned soups, processed / packaged foods, salted snacks, olives and pickles. Do not use a salt substitute without checking with your doctor, they may contain a high amount of potassioum. (Mrs. Danette Jones is safe to use).     Limit the use of alcohol       CALL YOUR DOCTOR THE FIRST DAY YOU NOTICE ANY OF THESE   SYMPTOMS:  You have a

## 2023-09-13 NOTE — PLAN OF CARE
Problem: Discharge Planning  Goal: Discharge to home or other facility with appropriate resources  9/13/2023 1014 by Denice Hudson  Outcome: Progressing  Flowsheets (Taken 9/13/2023 0800)  Discharge to home or other facility with appropriate resources:   Identify barriers to discharge with patient and caregiver   Identify discharge learning needs (meds, wound care, etc)  9/13/2023 0019 by Rosi Duarte RN  Outcome: Progressing     Problem: Pain  Goal: Verbalizes/displays adequate comfort level or baseline comfort level  9/13/2023 1014 by Denice Hudson  Outcome: Progressing  9/13/2023 0019 by Rosi Duarte RN  Outcome: Progressing     Problem: Safety - Adult  Goal: Free from fall injury  9/13/2023 1014 by Denice Hudson  Outcome: Progressing  Flowsheets (Taken 9/13/2023 0800)  Free From Fall Injury: Instruct family/caregiver on patient safety  9/13/2023 0019 by Rosi Duarte RN  Outcome: Progressing     Problem: ABCDS Injury Assessment  Goal: Absence of physical injury  9/13/2023 1014 by Denice Hudson  Outcome: Progressing  Flowsheets (Taken 9/13/2023 0800)  Absence of Physical Injury: Implement safety measures based on patient assessment  9/13/2023 0019 by Rosi Duarte RN  Outcome: Progressing     Problem: Chronic Conditions and Co-morbidities  Goal: Patient's chronic conditions and co-morbidity symptoms are monitored and maintained or improved  Outcome: Progressing  Flowsheets (Taken 9/13/2023 0800)  Care Plan - Patient's Chronic Conditions and Co-Morbidity Symptoms are Monitored and Maintained or Improved:   Monitor and assess patient's chronic conditions and comorbid symptoms for stability, deterioration, or improvement   Collaborate with multidisciplinary team to address chronic and comorbid conditions and prevent exacerbation or deterioration   Update acute care plan with appropriate goals if chronic or comorbid symptoms are exacerbated and prevent overall

## 2023-09-14 LAB
ANION GAP SERPL CALCULATED.3IONS-SCNC: 8 MMOL/L (ref 7–16)
BASOPHILS # BLD: 0.01 K/UL (ref 0–0.2)
BASOPHILS NFR BLD: 0 % (ref 0–2)
BUN SERPL-MCNC: 24 MG/DL (ref 6–20)
CALCIUM SERPL-MCNC: 8.1 MG/DL (ref 8.6–10.2)
CHLORIDE SERPL-SCNC: 95 MMOL/L (ref 98–107)
CO2 SERPL-SCNC: 35 MMOL/L (ref 22–29)
CREAT SERPL-MCNC: 0.7 MG/DL (ref 0.7–1.2)
EOSINOPHIL # BLD: 0 K/UL (ref 0.05–0.5)
EOSINOPHILS RELATIVE PERCENT: 0 % (ref 0–6)
ERYTHROCYTE [DISTWIDTH] IN BLOOD BY AUTOMATED COUNT: 14.7 % (ref 11.5–15)
GFR SERPL CREATININE-BSD FRML MDRD: >60 ML/MIN/1.73M2
GLUCOSE SERPL-MCNC: 118 MG/DL (ref 74–99)
HCT VFR BLD AUTO: 33.8 % (ref 37–54)
HGB BLD-MCNC: 10.2 G/DL (ref 12.5–16.5)
IMM GRANULOCYTES # BLD AUTO: 0.13 K/UL (ref 0–0.58)
IMM GRANULOCYTES NFR BLD: 1 % (ref 0–5)
LYMPHOCYTES NFR BLD: 0.56 K/UL (ref 1.5–4)
LYMPHOCYTES RELATIVE PERCENT: 5 % (ref 20–42)
MCH RBC QN AUTO: 29.7 PG (ref 26–35)
MCHC RBC AUTO-ENTMCNC: 30.2 G/DL (ref 32–34.5)
MCV RBC AUTO: 98.5 FL (ref 80–99.9)
MICROORGANISM SPEC CULT: NORMAL
MONOCYTES NFR BLD: 0.39 K/UL (ref 0.1–0.95)
MONOCYTES NFR BLD: 3 % (ref 2–12)
NEUTROPHILS NFR BLD: 91 % (ref 43–80)
NEUTS SEG NFR BLD: 10.38 K/UL (ref 1.8–7.3)
PLATELET, FLUORESCENCE: 116 K/UL (ref 130–450)
PMV BLD AUTO: 12.2 FL (ref 7–12)
POTASSIUM SERPL-SCNC: 4.4 MMOL/L (ref 3.5–5)
RBC # BLD AUTO: 3.43 M/UL (ref 3.8–5.8)
RBC # BLD: NORMAL 10*6/UL
SODIUM SERPL-SCNC: 138 MMOL/L (ref 132–146)
SPECIMEN DESCRIPTION: NORMAL
WBC OTHER # BLD: 11.5 K/UL (ref 4.5–11.5)

## 2023-09-14 PROCEDURE — 6370000000 HC RX 637 (ALT 250 FOR IP): Performed by: STUDENT IN AN ORGANIZED HEALTH CARE EDUCATION/TRAINING PROGRAM

## 2023-09-14 PROCEDURE — 6360000002 HC RX W HCPCS

## 2023-09-14 PROCEDURE — 2500000003 HC RX 250 WO HCPCS: Performed by: STUDENT IN AN ORGANIZED HEALTH CARE EDUCATION/TRAINING PROGRAM

## 2023-09-14 PROCEDURE — 6360000002 HC RX W HCPCS: Performed by: STUDENT IN AN ORGANIZED HEALTH CARE EDUCATION/TRAINING PROGRAM

## 2023-09-14 PROCEDURE — 2700000000 HC OXYGEN THERAPY PER DAY

## 2023-09-14 PROCEDURE — 2060000000 HC ICU INTERMEDIATE R&B

## 2023-09-14 PROCEDURE — 2580000003 HC RX 258: Performed by: STUDENT IN AN ORGANIZED HEALTH CARE EDUCATION/TRAINING PROGRAM

## 2023-09-14 PROCEDURE — 85025 COMPLETE CBC W/AUTO DIFF WBC: CPT

## 2023-09-14 PROCEDURE — 6370000000 HC RX 637 (ALT 250 FOR IP): Performed by: NURSE PRACTITIONER

## 2023-09-14 PROCEDURE — 94640 AIRWAY INHALATION TREATMENT: CPT

## 2023-09-14 PROCEDURE — 99232 SBSQ HOSP IP/OBS MODERATE 35: CPT | Performed by: STUDENT IN AN ORGANIZED HEALTH CARE EDUCATION/TRAINING PROGRAM

## 2023-09-14 PROCEDURE — 80048 BASIC METABOLIC PNL TOTAL CA: CPT

## 2023-09-14 PROCEDURE — 6360000002 HC RX W HCPCS: Performed by: INTERNAL MEDICINE

## 2023-09-14 RX ORDER — BUDESONIDE 0.5 MG/2ML
0.5 INHALANT ORAL
Status: DISCONTINUED | OUTPATIENT
Start: 2023-09-14 | End: 2023-09-18 | Stop reason: HOSPADM

## 2023-09-14 RX ORDER — MORPHINE SULFATE 10 MG/5ML
10 SOLUTION ORAL EVERY 4 HOURS PRN
Status: DISCONTINUED | OUTPATIENT
Start: 2023-09-14 | End: 2023-09-18 | Stop reason: HOSPADM

## 2023-09-14 RX ORDER — CALCIUM CARBONATE 500(1250)
500 TABLET ORAL 2 TIMES DAILY WITH MEALS
Status: DISCONTINUED | OUTPATIENT
Start: 2023-09-14 | End: 2023-09-18 | Stop reason: HOSPADM

## 2023-09-14 RX ORDER — LOSARTAN POTASSIUM 50 MG/1
50 TABLET ORAL DAILY
Status: DISCONTINUED | OUTPATIENT
Start: 2023-09-14 | End: 2023-09-18 | Stop reason: HOSPADM

## 2023-09-14 RX ORDER — HYDRALAZINE HYDROCHLORIDE 25 MG/1
25 TABLET, FILM COATED ORAL EVERY 8 HOURS SCHEDULED
Status: DISCONTINUED | OUTPATIENT
Start: 2023-09-14 | End: 2023-09-18 | Stop reason: HOSPADM

## 2023-09-14 RX ORDER — SPIRONOLACTONE 25 MG/1
25 TABLET ORAL DAILY
Status: DISCONTINUED | OUTPATIENT
Start: 2023-09-14 | End: 2023-09-18 | Stop reason: HOSPADM

## 2023-09-14 RX ORDER — PRAMIPEXOLE DIHYDROCHLORIDE 0.25 MG/1
0.25 TABLET ORAL NIGHTLY
Status: DISCONTINUED | OUTPATIENT
Start: 2023-09-14 | End: 2023-09-18 | Stop reason: HOSPADM

## 2023-09-14 RX ORDER — HYDROXYZINE PAMOATE 25 MG/1
25 CAPSULE ORAL 2 TIMES DAILY
Status: DISCONTINUED | OUTPATIENT
Start: 2023-09-14 | End: 2023-09-18 | Stop reason: HOSPADM

## 2023-09-14 RX ORDER — MIDODRINE HYDROCHLORIDE 5 MG/1
5 TABLET ORAL
Status: DISCONTINUED | OUTPATIENT
Start: 2023-09-14 | End: 2023-09-18 | Stop reason: HOSPADM

## 2023-09-14 RX ORDER — ISOSORBIDE DINITRATE 10 MG/1
20 TABLET ORAL 3 TIMES DAILY
Status: DISCONTINUED | OUTPATIENT
Start: 2023-09-14 | End: 2023-09-18 | Stop reason: HOSPADM

## 2023-09-14 RX ORDER — POTASSIUM CHLORIDE 20 MEQ/1
20 TABLET, EXTENDED RELEASE ORAL DAILY
Status: DISCONTINUED | OUTPATIENT
Start: 2023-09-14 | End: 2023-09-18 | Stop reason: HOSPADM

## 2023-09-14 RX ORDER — BUTALBITAL, ACETAMINOPHEN AND CAFFEINE 50; 325; 40 MG/1; MG/1; MG/1
1 TABLET ORAL EVERY 6 HOURS PRN
Status: DISCONTINUED | OUTPATIENT
Start: 2023-09-14 | End: 2023-09-18 | Stop reason: HOSPADM

## 2023-09-14 RX ORDER — FUROSEMIDE 40 MG/1
40 TABLET ORAL DAILY
Status: DISCONTINUED | OUTPATIENT
Start: 2023-09-14 | End: 2023-09-18 | Stop reason: HOSPADM

## 2023-09-14 RX ADMIN — PANTOPRAZOLE SODIUM 40 MG: 40 TABLET, DELAYED RELEASE ORAL at 05:32

## 2023-09-14 RX ADMIN — FUROSEMIDE 40 MG: 10 INJECTION, SOLUTION INTRAMUSCULAR; INTRAVENOUS at 10:20

## 2023-09-14 RX ADMIN — ARFORMOTEROL TARTRATE 15 MCG: 15 SOLUTION RESPIRATORY (INHALATION) at 21:28

## 2023-09-14 RX ADMIN — IPRATROPIUM BROMIDE AND ALBUTEROL SULFATE 1 DOSE: .5; 2.5 SOLUTION RESPIRATORY (INHALATION) at 21:28

## 2023-09-14 RX ADMIN — IPRATROPIUM BROMIDE AND ALBUTEROL SULFATE 1 DOSE: .5; 2.5 SOLUTION RESPIRATORY (INHALATION) at 12:25

## 2023-09-14 RX ADMIN — ARIPIPRAZOLE 5 MG: 5 TABLET ORAL at 10:18

## 2023-09-14 RX ADMIN — LEVOFLOXACIN 750 MG: 5 INJECTION, SOLUTION INTRAVENOUS at 10:35

## 2023-09-14 RX ADMIN — SODIUM CHLORIDE, PRESERVATIVE FREE 10 ML: 5 INJECTION INTRAVENOUS at 10:20

## 2023-09-14 RX ADMIN — MORPHINE SULFATE 10 MG: 10 SOLUTION ORAL at 11:22

## 2023-09-14 RX ADMIN — BUDESONIDE INHALATION 500 MCG: 0.5 SUSPENSION RESPIRATORY (INHALATION) at 12:26

## 2023-09-14 RX ADMIN — POTASSIUM CHLORIDE 20 MEQ: 1500 TABLET, EXTENDED RELEASE ORAL at 10:50

## 2023-09-14 RX ADMIN — LORAZEPAM 1 MG: 1 TABLET ORAL at 03:33

## 2023-09-14 RX ADMIN — HYDROCODONE BITARTRATE AND ACETAMINOPHEN 1 TABLET: 7.5; 325 TABLET ORAL at 19:06

## 2023-09-14 RX ADMIN — BUDESONIDE INHALATION 500 MCG: 0.5 SUSPENSION RESPIRATORY (INHALATION) at 21:28

## 2023-09-14 RX ADMIN — MORPHINE SULFATE 10 MG: 10 SOLUTION ORAL at 16:47

## 2023-09-14 RX ADMIN — GABAPENTIN 300 MG: 300 CAPSULE ORAL at 10:18

## 2023-09-14 RX ADMIN — HYDROCODONE BITARTRATE AND ACETAMINOPHEN 1 TABLET: 7.5; 325 TABLET ORAL at 00:44

## 2023-09-14 RX ADMIN — MORPHINE SULFATE 10 MG: 10 SOLUTION ORAL at 20:54

## 2023-09-14 RX ADMIN — METHYLPREDNISOLONE SODIUM SUCCINATE 40 MG: 40 INJECTION, POWDER, LYOPHILIZED, FOR SOLUTION INTRAMUSCULAR; INTRAVENOUS at 05:32

## 2023-09-14 RX ADMIN — GABAPENTIN 300 MG: 300 CAPSULE ORAL at 13:10

## 2023-09-14 RX ADMIN — DOXYCYCLINE 100 MG: 100 INJECTION, POWDER, LYOPHILIZED, FOR SOLUTION INTRAVENOUS at 05:33

## 2023-09-14 RX ADMIN — ENOXAPARIN SODIUM 40 MG: 100 INJECTION SUBCUTANEOUS at 10:17

## 2023-09-14 RX ADMIN — ARFORMOTEROL TARTRATE 15 MCG: 15 SOLUTION RESPIRATORY (INHALATION) at 07:54

## 2023-09-14 RX ADMIN — HYDROCODONE BITARTRATE AND ACETAMINOPHEN 1 TABLET: 7.5; 325 TABLET ORAL at 10:18

## 2023-09-14 RX ADMIN — LORAZEPAM 1 MG: 1 TABLET ORAL at 16:47

## 2023-09-14 RX ADMIN — DOXYCYCLINE 100 MG: 100 INJECTION, POWDER, LYOPHILIZED, FOR SOLUTION INTRAVENOUS at 16:50

## 2023-09-14 RX ADMIN — IPRATROPIUM BROMIDE AND ALBUTEROL SULFATE 1 DOSE: .5; 2.5 SOLUTION RESPIRATORY (INHALATION) at 07:54

## 2023-09-14 RX ADMIN — METHYLPREDNISOLONE SODIUM SUCCINATE 40 MG: 40 INJECTION, POWDER, LYOPHILIZED, FOR SOLUTION INTRAMUSCULAR; INTRAVENOUS at 20:54

## 2023-09-14 RX ADMIN — ATORVASTATIN CALCIUM 40 MG: 40 TABLET, FILM COATED ORAL at 20:53

## 2023-09-14 RX ADMIN — METOPROLOL SUCCINATE 50 MG: 50 TABLET, EXTENDED RELEASE ORAL at 20:53

## 2023-09-14 RX ADMIN — SODIUM CHLORIDE, PRESERVATIVE FREE 10 ML: 5 INJECTION INTRAVENOUS at 20:54

## 2023-09-14 RX ADMIN — EMPAGLIFLOZIN 10 MG: 10 TABLET, FILM COATED ORAL at 13:10

## 2023-09-14 RX ADMIN — CALCIUM 500 MG: 500 TABLET ORAL at 10:50

## 2023-09-14 RX ADMIN — ASPIRIN 81 MG CHEWABLE TABLET 81 MG: 81 TABLET CHEWABLE at 10:19

## 2023-09-14 RX ADMIN — FLUOXETINE 20 MG: 10 TABLET, FILM COATED ORAL at 10:18

## 2023-09-14 RX ADMIN — PRAMIPEXOLE DIHYDROCHLORIDE 0.25 MG: 0.25 TABLET ORAL at 20:53

## 2023-09-14 RX ADMIN — ALBUTEROL SULFATE 2.5 MG: 2.5 SOLUTION RESPIRATORY (INHALATION) at 00:49

## 2023-09-14 RX ADMIN — IPRATROPIUM BROMIDE AND ALBUTEROL SULFATE 1 DOSE: .5; 2.5 SOLUTION RESPIRATORY (INHALATION) at 17:32

## 2023-09-14 RX ADMIN — HYDROXYZINE PAMOATE 25 MG: 25 CAPSULE ORAL at 20:53

## 2023-09-14 RX ADMIN — GABAPENTIN 300 MG: 300 CAPSULE ORAL at 20:53

## 2023-09-14 RX ADMIN — METHYLPREDNISOLONE SODIUM SUCCINATE 40 MG: 40 INJECTION, POWDER, LYOPHILIZED, FOR SOLUTION INTRAMUSCULAR; INTRAVENOUS at 13:10

## 2023-09-14 RX ADMIN — HYDROXYZINE PAMOATE 25 MG: 25 CAPSULE ORAL at 10:50

## 2023-09-14 ASSESSMENT — PAIN SCALES - GENERAL
PAINLEVEL_OUTOF10: 8
PAINLEVEL_OUTOF10: 0
PAINLEVEL_OUTOF10: 9
PAINLEVEL_OUTOF10: 9
PAINLEVEL_OUTOF10: 0

## 2023-09-14 ASSESSMENT — PAIN DESCRIPTION - ORIENTATION
ORIENTATION: RIGHT;LEFT

## 2023-09-14 ASSESSMENT — PAIN DESCRIPTION - DESCRIPTORS
DESCRIPTORS: ACHING
DESCRIPTORS: ACHING
DESCRIPTORS: SHARP

## 2023-09-14 ASSESSMENT — PAIN DESCRIPTION - LOCATION
LOCATION: LEG

## 2023-09-14 ASSESSMENT — PAIN - FUNCTIONAL ASSESSMENT: PAIN_FUNCTIONAL_ASSESSMENT: PREVENTS OR INTERFERES SOME ACTIVE ACTIVITIES AND ADLS

## 2023-09-14 NOTE — TELEPHONE ENCOUNTER
I spoke with the pharmacy on Fri 9/8/23 and clarified the order. At one point the pharm could not get in the 0.5mg and asked to change it to 1mg tabs and have him take 1/2 a tab nightly which was approved. But we asked to switch him back once they got in the appropriate dose. They switched to the appropriate dose but didn't switch back to the correct directions. Rupesh Ruiz was a little confused. I spoke with Rupesh Ruiz and explained and so this has all been taken care of and changed at the pharmacy and needs a new script which I have pend to you.

## 2023-09-14 NOTE — CONSULTS
Pulmonary Consultation    Admit Date: 9/12/2023  Requesting Physician: Kesha Whitlock MD    Reason for consultation:  Acute hypoxic respiratory failure      HPI:  Patient is a 62year old male who presents from the CHF clinic to emergency room with increasing shortness of breath and hypotension. Patient admits to having fevers and productive brown sputum at home. Patient was hypoxic on arrival and his supplemental oxygen was increased with recovery. Patient was found to have pneumonia on a CT of the chest with elevated inflammatory markers. He was started on intravenous antibiotics, intravenous steroids, and aerosols. Patient does have chronic respiratory failure and very severe COPD with heavy smoking history. Patient follows at congestive heart failure clinic. Patient is does have supplemental oxygen and nebulizer at home. Patient is seen on 7 L nasal cannula. He relates he is still producing dark sputum. He has dyspnea on exertion. He does have complaints of urinary retention. PMH:    Past Medical History:   Diagnosis Date    Anxiety     COPD (chronic obstructive pulmonary disease) (HCC)     Hypertension     Leg swelling     Multiple gastric ulcers     Restless leg syndrome          PSH:   Past Surgical History:   Procedure Laterality Date    BRONCHOSCOPY N/A 4/27/2023    BRONCHOSCOPY DIAGNOSTIC OR CELL [address] ONLY performed by Cesar Prabhakar MD at [address]         Review of Systems:     Constitutional: As noted in the HPI. Eyes: No visual changes or diplopia. No scleral icterus. ENT: No headaches, hearing loss or vertigo. No nasal congestion, or sore throat. Cardiovascular: No chest pain, (+) dyspnea on exertion, no palpitations. Respiratory: See above  Gastrointestinal: No abdominal pain, nausea or emesis. No diarrhea or rectal bleeding or melena. No change in bowel habits.    Genitourinary: No dysuria, urinary
addressed    Patient/family educated on daily monitoring tools for CHF, made aware of signs and symptoms of worsening HF and to notify provider immediately of change in symptoms. Heart Failure Home Instructions placed in patient's discharge instructions    Per AHA guidelines patient to be closely monitored following discharge with 7 day follow up appointment    Scheduling with the CHF clinic Already follows, post hospital appointment made    Future Appointments   Date Time Provider [address]   9/19/2023  8:00 AM DIONE CHF ROOM 3 [facility]   9/28/2023  9:00 AM ARGELIA Leblanc - CNP [facility]   1/23/2024  1:15 PM ARGELIA Guerrero - NP [facility]   2/19/2024 10:15 AM Clarita Bryan MD [facility]       Patient Education:  Self Monitoring/management:  Reviewed the introduction to Heart Failure, the HF zones, signs and symptoms to report on day 1 of onset, medications, medication compliance, the importance of obtaining daily weights, following a low sodium diet, reading food labels for the sodium content, keeping physician appointments, and smoking cessation. Discussed writing / tracking daily weights on a calendar / log because a 5 pound gain in 1 week can sneak up if you are not tracking it. Advised patient they can reduce the risk for Heart Failure exacerbations by modifying / controlling the risk factors. Discussed self-managed care which includes the following: take medications as prescribed, report any intolerable side effects of medications to the medical provider (PCP or cardiologist), do not just stop taking the medication; follow a cardiac heart healthy / low sodium diet; weigh yourself daily, exercise regularly- per doctor recommendation and not to smoke or use an excess amount of alcohol. Discussed calling the cardiologist / doctor with a weight gain of 3 pounds in one day or a total of 5 pounds or more in one week.  Also, if you should have a
signed note. Emigdio Knutson MD,  MTHOM., F.C.C.P.    [facility] [address], [address] [facility], [facility],[address], [place], [facility]  Office Visits:  [address] [facility], [place] [id number] [facility]

## 2023-09-15 LAB
ALBUMIN SERPL-MCNC: 3.6 G/DL (ref 3.5–5.2)
ALP SERPL-CCNC: 47 U/L (ref 40–129)
ALT SERPL-CCNC: 12 U/L (ref 0–40)
ANION GAP SERPL CALCULATED.3IONS-SCNC: 9 MMOL/L (ref 7–16)
AST SERPL-CCNC: 11 U/L (ref 0–39)
BASOPHILS # BLD: 0 K/UL (ref 0–0.2)
BASOPHILS NFR BLD: 0 % (ref 0–2)
BILIRUB SERPL-MCNC: 0.2 MG/DL (ref 0–1.2)
BUN SERPL-MCNC: 20 MG/DL (ref 6–20)
CALCIUM SERPL-MCNC: 8.1 MG/DL (ref 8.6–10.2)
CHLORIDE SERPL-SCNC: 94 MMOL/L (ref 98–107)
CO2 SERPL-SCNC: 34 MMOL/L (ref 22–29)
CREAT SERPL-MCNC: 0.7 MG/DL (ref 0.7–1.2)
EOSINOPHIL # BLD: 0 K/UL (ref 0.05–0.5)
EOSINOPHILS RELATIVE PERCENT: 0 % (ref 0–6)
ERYTHROCYTE [DISTWIDTH] IN BLOOD BY AUTOMATED COUNT: 14.6 % (ref 11.5–15)
GFR SERPL CREATININE-BSD FRML MDRD: >60 ML/MIN/1.73M2
GLUCOSE SERPL-MCNC: 174 MG/DL (ref 74–99)
HCT VFR BLD AUTO: 34.4 % (ref 37–54)
HGB BLD-MCNC: 10.4 G/DL (ref 12.5–16.5)
IMM GRANULOCYTES # BLD AUTO: 0.06 K/UL (ref 0–0.58)
IMM GRANULOCYTES NFR BLD: 1 % (ref 0–5)
LYMPHOCYTES NFR BLD: 0.31 K/UL (ref 1.5–4)
LYMPHOCYTES RELATIVE PERCENT: 5 % (ref 20–42)
MCH RBC QN AUTO: 29.9 PG (ref 26–35)
MCHC RBC AUTO-ENTMCNC: 30.2 G/DL (ref 32–34.5)
MCV RBC AUTO: 98.9 FL (ref 80–99.9)
MONOCYTES NFR BLD: 0.26 K/UL (ref 0.1–0.95)
MONOCYTES NFR BLD: 4 % (ref 2–12)
NEUTROPHILS NFR BLD: 91 % (ref 43–80)
NEUTS SEG NFR BLD: 5.96 K/UL (ref 1.8–7.3)
PLATELET, FLUORESCENCE: 116 K/UL (ref 130–450)
PMV BLD AUTO: 11.7 FL (ref 7–12)
POTASSIUM SERPL-SCNC: 4.4 MMOL/L (ref 3.5–5)
PROT SERPL-MCNC: 7.1 G/DL (ref 6.4–8.3)
RBC # BLD AUTO: 3.48 M/UL (ref 3.8–5.8)
RBC # BLD: NORMAL 10*6/UL
SODIUM SERPL-SCNC: 137 MMOL/L (ref 132–146)
WBC OTHER # BLD: 6.6 K/UL (ref 4.5–11.5)

## 2023-09-15 PROCEDURE — 6370000000 HC RX 637 (ALT 250 FOR IP): Performed by: STUDENT IN AN ORGANIZED HEALTH CARE EDUCATION/TRAINING PROGRAM

## 2023-09-15 PROCEDURE — 6360000002 HC RX W HCPCS: Performed by: INTERNAL MEDICINE

## 2023-09-15 PROCEDURE — 2060000000 HC ICU INTERMEDIATE R&B

## 2023-09-15 PROCEDURE — 6360000002 HC RX W HCPCS

## 2023-09-15 PROCEDURE — 80053 COMPREHEN METABOLIC PANEL: CPT

## 2023-09-15 PROCEDURE — 2700000000 HC OXYGEN THERAPY PER DAY

## 2023-09-15 PROCEDURE — 2500000003 HC RX 250 WO HCPCS: Performed by: STUDENT IN AN ORGANIZED HEALTH CARE EDUCATION/TRAINING PROGRAM

## 2023-09-15 PROCEDURE — 6360000002 HC RX W HCPCS: Performed by: STUDENT IN AN ORGANIZED HEALTH CARE EDUCATION/TRAINING PROGRAM

## 2023-09-15 PROCEDURE — 94640 AIRWAY INHALATION TREATMENT: CPT

## 2023-09-15 PROCEDURE — 85025 COMPLETE CBC W/AUTO DIFF WBC: CPT

## 2023-09-15 PROCEDURE — 2580000003 HC RX 258: Performed by: STUDENT IN AN ORGANIZED HEALTH CARE EDUCATION/TRAINING PROGRAM

## 2023-09-15 PROCEDURE — 6370000000 HC RX 637 (ALT 250 FOR IP): Performed by: NURSE PRACTITIONER

## 2023-09-15 PROCEDURE — 99232 SBSQ HOSP IP/OBS MODERATE 35: CPT | Performed by: STUDENT IN AN ORGANIZED HEALTH CARE EDUCATION/TRAINING PROGRAM

## 2023-09-15 RX ORDER — PRAMIPEXOLE DIHYDROCHLORIDE 0.5 MG/1
0.5 TABLET ORAL NIGHTLY
Qty: 90 TABLET | Refills: 3 | Status: SHIPPED | OUTPATIENT
Start: 2023-09-15

## 2023-09-15 RX ORDER — METHYLPREDNISOLONE SODIUM SUCCINATE 40 MG/ML
40 INJECTION, POWDER, LYOPHILIZED, FOR SOLUTION INTRAMUSCULAR; INTRAVENOUS EVERY 12 HOURS
Status: COMPLETED | OUTPATIENT
Start: 2023-09-15 | End: 2023-09-17

## 2023-09-15 RX ADMIN — METHYLPREDNISOLONE SODIUM SUCCINATE 40 MG: 40 INJECTION, POWDER, LYOPHILIZED, FOR SOLUTION INTRAMUSCULAR; INTRAVENOUS at 05:18

## 2023-09-15 RX ADMIN — HYDROCODONE BITARTRATE AND ACETAMINOPHEN 1 TABLET: 7.5; 325 TABLET ORAL at 10:27

## 2023-09-15 RX ADMIN — METOPROLOL SUCCINATE 50 MG: 50 TABLET, EXTENDED RELEASE ORAL at 19:51

## 2023-09-15 RX ADMIN — FLUOXETINE 20 MG: 10 TABLET, FILM COATED ORAL at 08:17

## 2023-09-15 RX ADMIN — HYDROXYZINE PAMOATE 25 MG: 25 CAPSULE ORAL at 08:10

## 2023-09-15 RX ADMIN — DOXYCYCLINE 100 MG: 100 INJECTION, POWDER, LYOPHILIZED, FOR SOLUTION INTRAVENOUS at 16:21

## 2023-09-15 RX ADMIN — MORPHINE SULFATE 10 MG: 10 SOLUTION ORAL at 19:51

## 2023-09-15 RX ADMIN — EMPAGLIFLOZIN 10 MG: 10 TABLET, FILM COATED ORAL at 08:16

## 2023-09-15 RX ADMIN — BUDESONIDE INHALATION 500 MCG: 0.5 SUSPENSION RESPIRATORY (INHALATION) at 08:53

## 2023-09-15 RX ADMIN — ARIPIPRAZOLE 5 MG: 5 TABLET ORAL at 08:17

## 2023-09-15 RX ADMIN — MORPHINE SULFATE 10 MG: 10 SOLUTION ORAL at 14:21

## 2023-09-15 RX ADMIN — SPIRONOLACTONE 25 MG: 25 TABLET ORAL at 08:10

## 2023-09-15 RX ADMIN — LORAZEPAM 1 MG: 1 TABLET ORAL at 14:21

## 2023-09-15 RX ADMIN — HYDROXYZINE PAMOATE 25 MG: 25 CAPSULE ORAL at 19:51

## 2023-09-15 RX ADMIN — SODIUM CHLORIDE, PRESERVATIVE FREE 10 ML: 5 INJECTION INTRAVENOUS at 23:22

## 2023-09-15 RX ADMIN — POTASSIUM CHLORIDE 20 MEQ: 1500 TABLET, EXTENDED RELEASE ORAL at 08:10

## 2023-09-15 RX ADMIN — ASPIRIN 81 MG CHEWABLE TABLET 81 MG: 81 TABLET CHEWABLE at 08:10

## 2023-09-15 RX ADMIN — IPRATROPIUM BROMIDE AND ALBUTEROL SULFATE 1 DOSE: .5; 2.5 SOLUTION RESPIRATORY (INHALATION) at 16:19

## 2023-09-15 RX ADMIN — METHYLPREDNISOLONE SODIUM SUCCINATE 40 MG: 40 INJECTION, POWDER, LYOPHILIZED, FOR SOLUTION INTRAMUSCULAR; INTRAVENOUS at 16:21

## 2023-09-15 RX ADMIN — PANTOPRAZOLE SODIUM 40 MG: 40 TABLET, DELAYED RELEASE ORAL at 08:10

## 2023-09-15 RX ADMIN — PRAMIPEXOLE DIHYDROCHLORIDE 0.25 MG: 0.25 TABLET ORAL at 19:51

## 2023-09-15 RX ADMIN — GABAPENTIN 300 MG: 300 CAPSULE ORAL at 08:10

## 2023-09-15 RX ADMIN — LORAZEPAM 1 MG: 1 TABLET ORAL at 20:50

## 2023-09-15 RX ADMIN — ATORVASTATIN CALCIUM 40 MG: 40 TABLET, FILM COATED ORAL at 19:51

## 2023-09-15 RX ADMIN — ARFORMOTEROL TARTRATE 15 MCG: 15 SOLUTION RESPIRATORY (INHALATION) at 08:53

## 2023-09-15 RX ADMIN — HYDROCODONE BITARTRATE AND ACETAMINOPHEN 1 TABLET: 7.5; 325 TABLET ORAL at 02:03

## 2023-09-15 RX ADMIN — IPRATROPIUM BROMIDE AND ALBUTEROL SULFATE 1 DOSE: .5; 2.5 SOLUTION RESPIRATORY (INHALATION) at 08:52

## 2023-09-15 RX ADMIN — LEVOFLOXACIN 750 MG: 5 INJECTION, SOLUTION INTRAVENOUS at 10:20

## 2023-09-15 RX ADMIN — LORAZEPAM 1 MG: 1 TABLET ORAL at 01:31

## 2023-09-15 RX ADMIN — ISOSORBIDE DINITRATE 20 MG: 10 TABLET ORAL at 14:21

## 2023-09-15 RX ADMIN — FUROSEMIDE 40 MG: 10 INJECTION, SOLUTION INTRAMUSCULAR; INTRAVENOUS at 08:10

## 2023-09-15 RX ADMIN — MORPHINE SULFATE 10 MG: 10 SOLUTION ORAL at 05:37

## 2023-09-15 RX ADMIN — MORPHINE SULFATE 10 MG: 10 SOLUTION ORAL at 08:16

## 2023-09-15 RX ADMIN — ISOSORBIDE DINITRATE 20 MG: 10 TABLET ORAL at 08:17

## 2023-09-15 RX ADMIN — ENOXAPARIN SODIUM 40 MG: 100 INJECTION SUBCUTANEOUS at 08:10

## 2023-09-15 RX ADMIN — GABAPENTIN 300 MG: 300 CAPSULE ORAL at 14:21

## 2023-09-15 RX ADMIN — ONDANSETRON 4 MG: 2 INJECTION INTRAMUSCULAR; INTRAVENOUS at 23:21

## 2023-09-15 RX ADMIN — HYDROCODONE BITARTRATE AND ACETAMINOPHEN 1 TABLET: 7.5; 325 TABLET ORAL at 18:36

## 2023-09-15 RX ADMIN — LORAZEPAM 1 MG: 1 TABLET ORAL at 08:10

## 2023-09-15 RX ADMIN — IPRATROPIUM BROMIDE AND ALBUTEROL SULFATE 1 DOSE: .5; 2.5 SOLUTION RESPIRATORY (INHALATION) at 20:24

## 2023-09-15 RX ADMIN — CALCIUM 500 MG: 500 TABLET ORAL at 08:10

## 2023-09-15 RX ADMIN — DOXYCYCLINE 100 MG: 100 INJECTION, POWDER, LYOPHILIZED, FOR SOLUTION INTRAVENOUS at 05:29

## 2023-09-15 RX ADMIN — ARFORMOTEROL TARTRATE 15 MCG: 15 SOLUTION RESPIRATORY (INHALATION) at 20:24

## 2023-09-15 RX ADMIN — BUDESONIDE INHALATION 500 MCG: 0.5 SUSPENSION RESPIRATORY (INHALATION) at 20:24

## 2023-09-15 RX ADMIN — SODIUM CHLORIDE, PRESERVATIVE FREE 10 ML: 5 INJECTION INTRAVENOUS at 08:18

## 2023-09-15 RX ADMIN — MORPHINE SULFATE 10 MG: 10 SOLUTION ORAL at 01:31

## 2023-09-15 RX ADMIN — LOSARTAN POTASSIUM 50 MG: 50 TABLET, FILM COATED ORAL at 08:21

## 2023-09-15 RX ADMIN — ISOSORBIDE DINITRATE 20 MG: 10 TABLET ORAL at 19:50

## 2023-09-15 RX ADMIN — IPRATROPIUM BROMIDE AND ALBUTEROL SULFATE 1 DOSE: .5; 2.5 SOLUTION RESPIRATORY (INHALATION) at 12:37

## 2023-09-15 RX ADMIN — CALCIUM 500 MG: 500 TABLET ORAL at 16:21

## 2023-09-15 RX ADMIN — GABAPENTIN 300 MG: 300 CAPSULE ORAL at 19:51

## 2023-09-15 ASSESSMENT — PAIN DESCRIPTION - LOCATION
LOCATION: LEG

## 2023-09-15 ASSESSMENT — PAIN DESCRIPTION - DESCRIPTORS
DESCRIPTORS: ACHING
DESCRIPTORS: ACHING;JABBING
DESCRIPTORS: ACHING
DESCRIPTORS: ACHING

## 2023-09-15 ASSESSMENT — PAIN SCALES - GENERAL
PAINLEVEL_OUTOF10: 8
PAINLEVEL_OUTOF10: 5
PAINLEVEL_OUTOF10: 7
PAINLEVEL_OUTOF10: 8

## 2023-09-15 ASSESSMENT — PAIN DESCRIPTION - ORIENTATION
ORIENTATION: RIGHT
ORIENTATION: LEFT;RIGHT

## 2023-09-15 ASSESSMENT — PAIN - FUNCTIONAL ASSESSMENT: PAIN_FUNCTIONAL_ASSESSMENT: PREVENTS OR INTERFERES SOME ACTIVE ACTIVITIES AND ADLS

## 2023-09-15 NOTE — ACP (ADVANCE CARE PLANNING)
Advance Care Planning   Healthcare Decision Maker:    Primary Decision Maker: Abilio Malhotra Brother/Sister - [phone]    Secondary Decision Maker: Michelet Grove - Amaris - [phone]    Click here to complete Healthcare Decision Makers including selection of the Healthcare Decision Maker Relationship (ie \"Primary\"). Today we documented Decision Maker(s) consistent with ACP documents on file.

## 2023-09-15 NOTE — CARE COORDINATION
Introduced my self and provided explanation of CM role to patient. Patient is awake, alert, and aware of current diagnosis and treatment plan including pulm consult, nebs, steroids, antibiotics  He voices he resides at home with family and completes his adl's with independence. Patient is established with a pcp and denies any issue with retail pharmaceutical coverage. [facility] verified same with Joel Larios at agency [phone], fax [phone]. SUNDAY orders on chart. Agency will need notified at time of discharge. Patient has home O2 from [facility].  Patient is denying new home going needs  Explained ELOS of 24-48 hours; patient voiced understanding and agreement. Meri Hanson.  Mei, MSN RN  [facility] Case Management  [phone]

## 2023-09-16 PROCEDURE — 6360000002 HC RX W HCPCS: Performed by: STUDENT IN AN ORGANIZED HEALTH CARE EDUCATION/TRAINING PROGRAM

## 2023-09-16 PROCEDURE — 87205 SMEAR GRAM STAIN: CPT

## 2023-09-16 PROCEDURE — 6370000000 HC RX 637 (ALT 250 FOR IP): Performed by: STUDENT IN AN ORGANIZED HEALTH CARE EDUCATION/TRAINING PROGRAM

## 2023-09-16 PROCEDURE — 87070 CULTURE OTHR SPECIMN AEROBIC: CPT

## 2023-09-16 PROCEDURE — 2580000003 HC RX 258: Performed by: STUDENT IN AN ORGANIZED HEALTH CARE EDUCATION/TRAINING PROGRAM

## 2023-09-16 PROCEDURE — 94640 AIRWAY INHALATION TREATMENT: CPT

## 2023-09-16 PROCEDURE — 6360000002 HC RX W HCPCS: Performed by: INTERNAL MEDICINE

## 2023-09-16 PROCEDURE — 99231 SBSQ HOSP IP/OBS SF/LOW 25: CPT | Performed by: STUDENT IN AN ORGANIZED HEALTH CARE EDUCATION/TRAINING PROGRAM

## 2023-09-16 PROCEDURE — 2500000003 HC RX 250 WO HCPCS: Performed by: STUDENT IN AN ORGANIZED HEALTH CARE EDUCATION/TRAINING PROGRAM

## 2023-09-16 PROCEDURE — 2060000000 HC ICU INTERMEDIATE R&B

## 2023-09-16 PROCEDURE — 6360000002 HC RX W HCPCS

## 2023-09-16 PROCEDURE — 6370000000 HC RX 637 (ALT 250 FOR IP): Performed by: NURSE PRACTITIONER

## 2023-09-16 PROCEDURE — 2700000000 HC OXYGEN THERAPY PER DAY

## 2023-09-16 RX ADMIN — LEVOFLOXACIN 750 MG: 5 INJECTION, SOLUTION INTRAVENOUS at 10:56

## 2023-09-16 RX ADMIN — ISOSORBIDE DINITRATE 20 MG: 10 TABLET ORAL at 20:35

## 2023-09-16 RX ADMIN — GABAPENTIN 300 MG: 300 CAPSULE ORAL at 07:46

## 2023-09-16 RX ADMIN — ISOSORBIDE DINITRATE 20 MG: 10 TABLET ORAL at 07:46

## 2023-09-16 RX ADMIN — ATORVASTATIN CALCIUM 40 MG: 40 TABLET, FILM COATED ORAL at 20:35

## 2023-09-16 RX ADMIN — LORAZEPAM 1 MG: 1 TABLET ORAL at 10:53

## 2023-09-16 RX ADMIN — HYDROXYZINE PAMOATE 25 MG: 25 CAPSULE ORAL at 07:46

## 2023-09-16 RX ADMIN — PANTOPRAZOLE SODIUM 40 MG: 40 TABLET, DELAYED RELEASE ORAL at 06:05

## 2023-09-16 RX ADMIN — HYDROCODONE BITARTRATE AND ACETAMINOPHEN 1 TABLET: 7.5; 325 TABLET ORAL at 12:22

## 2023-09-16 RX ADMIN — BUDESONIDE INHALATION 500 MCG: 0.5 SUSPENSION RESPIRATORY (INHALATION) at 19:43

## 2023-09-16 RX ADMIN — GABAPENTIN 300 MG: 300 CAPSULE ORAL at 20:35

## 2023-09-16 RX ADMIN — MORPHINE SULFATE 10 MG: 10 SOLUTION ORAL at 17:51

## 2023-09-16 RX ADMIN — ARFORMOTEROL TARTRATE 15 MCG: 15 SOLUTION RESPIRATORY (INHALATION) at 19:44

## 2023-09-16 RX ADMIN — DOXYCYCLINE 100 MG: 100 INJECTION, POWDER, LYOPHILIZED, FOR SOLUTION INTRAVENOUS at 05:00

## 2023-09-16 RX ADMIN — CALCIUM 500 MG: 500 TABLET ORAL at 15:48

## 2023-09-16 RX ADMIN — LOSARTAN POTASSIUM 50 MG: 50 TABLET, FILM COATED ORAL at 07:46

## 2023-09-16 RX ADMIN — HYDROCODONE BITARTRATE AND ACETAMINOPHEN 1 TABLET: 7.5; 325 TABLET ORAL at 04:00

## 2023-09-16 RX ADMIN — ENOXAPARIN SODIUM 40 MG: 100 INJECTION SUBCUTANEOUS at 07:46

## 2023-09-16 RX ADMIN — EMPAGLIFLOZIN 10 MG: 10 TABLET, FILM COATED ORAL at 07:46

## 2023-09-16 RX ADMIN — HYDROXYZINE PAMOATE 25 MG: 25 CAPSULE ORAL at 20:35

## 2023-09-16 RX ADMIN — POTASSIUM CHLORIDE 20 MEQ: 1500 TABLET, EXTENDED RELEASE ORAL at 07:46

## 2023-09-16 RX ADMIN — BUDESONIDE INHALATION 500 MCG: 0.5 SUSPENSION RESPIRATORY (INHALATION) at 08:45

## 2023-09-16 RX ADMIN — LORAZEPAM 1 MG: 1 TABLET ORAL at 16:54

## 2023-09-16 RX ADMIN — ARIPIPRAZOLE 5 MG: 5 TABLET ORAL at 07:46

## 2023-09-16 RX ADMIN — LORAZEPAM 1 MG: 1 TABLET ORAL at 23:06

## 2023-09-16 RX ADMIN — IPRATROPIUM BROMIDE AND ALBUTEROL SULFATE 1 DOSE: .5; 2.5 SOLUTION RESPIRATORY (INHALATION) at 15:33

## 2023-09-16 RX ADMIN — LORAZEPAM 1 MG: 1 TABLET ORAL at 04:49

## 2023-09-16 RX ADMIN — METOPROLOL SUCCINATE 50 MG: 50 TABLET, EXTENDED RELEASE ORAL at 20:35

## 2023-09-16 RX ADMIN — IPRATROPIUM BROMIDE AND ALBUTEROL SULFATE 1 DOSE: .5; 2.5 SOLUTION RESPIRATORY (INHALATION) at 19:44

## 2023-09-16 RX ADMIN — MORPHINE SULFATE 10 MG: 10 SOLUTION ORAL at 04:49

## 2023-09-16 RX ADMIN — METHYLPREDNISOLONE SODIUM SUCCINATE 40 MG: 40 INJECTION, POWDER, LYOPHILIZED, FOR SOLUTION INTRAMUSCULAR; INTRAVENOUS at 15:48

## 2023-09-16 RX ADMIN — GABAPENTIN 300 MG: 300 CAPSULE ORAL at 12:42

## 2023-09-16 RX ADMIN — MORPHINE SULFATE 10 MG: 10 SOLUTION ORAL at 09:09

## 2023-09-16 RX ADMIN — FUROSEMIDE 40 MG: 10 INJECTION, SOLUTION INTRAMUSCULAR; INTRAVENOUS at 07:46

## 2023-09-16 RX ADMIN — HYDROCODONE BITARTRATE AND ACETAMINOPHEN 1 TABLET: 7.5; 325 TABLET ORAL at 20:35

## 2023-09-16 RX ADMIN — FLUOXETINE 20 MG: 10 TABLET, FILM COATED ORAL at 07:46

## 2023-09-16 RX ADMIN — IPRATROPIUM BROMIDE AND ALBUTEROL SULFATE 1 DOSE: .5; 2.5 SOLUTION RESPIRATORY (INHALATION) at 12:31

## 2023-09-16 RX ADMIN — SODIUM CHLORIDE, PRESERVATIVE FREE 10 ML: 5 INJECTION INTRAVENOUS at 20:36

## 2023-09-16 RX ADMIN — ASPIRIN 81 MG CHEWABLE TABLET 81 MG: 81 TABLET CHEWABLE at 07:46

## 2023-09-16 RX ADMIN — SPIRONOLACTONE 25 MG: 25 TABLET ORAL at 07:46

## 2023-09-16 RX ADMIN — DOXYCYCLINE 100 MG: 100 INJECTION, POWDER, LYOPHILIZED, FOR SOLUTION INTRAVENOUS at 16:03

## 2023-09-16 RX ADMIN — CALCIUM 500 MG: 500 TABLET ORAL at 07:46

## 2023-09-16 RX ADMIN — MORPHINE SULFATE 10 MG: 10 SOLUTION ORAL at 21:58

## 2023-09-16 RX ADMIN — SODIUM CHLORIDE, PRESERVATIVE FREE 10 ML: 5 INJECTION INTRAVENOUS at 07:46

## 2023-09-16 RX ADMIN — METHYLPREDNISOLONE SODIUM SUCCINATE 40 MG: 40 INJECTION, POWDER, LYOPHILIZED, FOR SOLUTION INTRAMUSCULAR; INTRAVENOUS at 04:50

## 2023-09-16 RX ADMIN — MORPHINE SULFATE 10 MG: 10 SOLUTION ORAL at 13:50

## 2023-09-16 RX ADMIN — IPRATROPIUM BROMIDE AND ALBUTEROL SULFATE 1 DOSE: .5; 2.5 SOLUTION RESPIRATORY (INHALATION) at 08:45

## 2023-09-16 RX ADMIN — ARFORMOTEROL TARTRATE 15 MCG: 15 SOLUTION RESPIRATORY (INHALATION) at 08:45

## 2023-09-16 RX ADMIN — PRAMIPEXOLE DIHYDROCHLORIDE 0.25 MG: 0.25 TABLET ORAL at 20:35

## 2023-09-16 ASSESSMENT — PAIN SCALES - GENERAL
PAINLEVEL_OUTOF10: 0
PAINLEVEL_OUTOF10: 0

## 2023-09-16 NOTE — PLAN OF CARE
Problem: Discharge Planning  Goal: Discharge to home or other facility with appropriate resources  9/16/2023 1212 by Teja Angelo RN  Outcome: Progressing     Problem: Pain  Goal: Verbalizes/displays adequate comfort level or baseline comfort level  9/16/2023 1212 by Teja Angelo RN  Outcome: Progressing     Problem: Safety - Adult  Goal: Free from fall injury  9/16/2023 1212 by Teja Angelo RN  Outcome: Progressing     Problem: ABCDS Injury Assessment  Goal: Absence of physical injury  9/16/2023 1212 by Teja Angelo RN  Outcome: Progressing     Problem: Chronic Conditions and Co-morbidities  Goal: Patient's chronic conditions and co-morbidity symptoms are monitored and maintained or improved  9/16/2023 1212 by Teja Angelo RN  Outcome: Progressing     Problem: Skin/Tissue Integrity  Goal: Absence of new skin breakdown  Description: 1. Monitor for areas of redness and/or skin breakdown  2. Assess vascular access sites hourly  3. Every 4-6 hours minimum:  Change oxygen saturation probe site  4. Every 4-6 hours:  If on nasal continuous positive airway pressure, respiratory therapy assess nares and determine need for appliance change or resting period.   9/16/2023 1212 by Teja Angelo RN  Outcome: Progressing

## 2023-09-17 LAB
MICROORGANISM SPEC CULT: ABNORMAL
MICROORGANISM/AGENT SPEC: ABNORMAL
SPECIMEN DESCRIPTION: ABNORMAL

## 2023-09-17 PROCEDURE — 2060000000 HC ICU INTERMEDIATE R&B

## 2023-09-17 PROCEDURE — 6360000002 HC RX W HCPCS: Performed by: INTERNAL MEDICINE

## 2023-09-17 PROCEDURE — 6360000002 HC RX W HCPCS: Performed by: STUDENT IN AN ORGANIZED HEALTH CARE EDUCATION/TRAINING PROGRAM

## 2023-09-17 PROCEDURE — 6370000000 HC RX 637 (ALT 250 FOR IP): Performed by: STUDENT IN AN ORGANIZED HEALTH CARE EDUCATION/TRAINING PROGRAM

## 2023-09-17 PROCEDURE — 99231 SBSQ HOSP IP/OBS SF/LOW 25: CPT | Performed by: STUDENT IN AN ORGANIZED HEALTH CARE EDUCATION/TRAINING PROGRAM

## 2023-09-17 PROCEDURE — 2500000003 HC RX 250 WO HCPCS: Performed by: STUDENT IN AN ORGANIZED HEALTH CARE EDUCATION/TRAINING PROGRAM

## 2023-09-17 PROCEDURE — 6360000002 HC RX W HCPCS

## 2023-09-17 PROCEDURE — 2580000003 HC RX 258: Performed by: STUDENT IN AN ORGANIZED HEALTH CARE EDUCATION/TRAINING PROGRAM

## 2023-09-17 PROCEDURE — 6370000000 HC RX 637 (ALT 250 FOR IP): Performed by: NURSE PRACTITIONER

## 2023-09-17 PROCEDURE — 94640 AIRWAY INHALATION TREATMENT: CPT

## 2023-09-17 PROCEDURE — 2700000000 HC OXYGEN THERAPY PER DAY

## 2023-09-17 RX ADMIN — SODIUM CHLORIDE, PRESERVATIVE FREE 10 ML: 5 INJECTION INTRAVENOUS at 19:53

## 2023-09-17 RX ADMIN — ARFORMOTEROL TARTRATE 15 MCG: 15 SOLUTION RESPIRATORY (INHALATION) at 19:18

## 2023-09-17 RX ADMIN — ARFORMOTEROL TARTRATE 15 MCG: 15 SOLUTION RESPIRATORY (INHALATION) at 08:30

## 2023-09-17 RX ADMIN — HYDROXYZINE PAMOATE 25 MG: 25 CAPSULE ORAL at 19:52

## 2023-09-17 RX ADMIN — METOPROLOL SUCCINATE 50 MG: 50 TABLET, EXTENDED RELEASE ORAL at 19:52

## 2023-09-17 RX ADMIN — LOSARTAN POTASSIUM 50 MG: 50 TABLET, FILM COATED ORAL at 07:32

## 2023-09-17 RX ADMIN — GABAPENTIN 300 MG: 300 CAPSULE ORAL at 12:27

## 2023-09-17 RX ADMIN — HYDROXYZINE PAMOATE 25 MG: 25 CAPSULE ORAL at 07:32

## 2023-09-17 RX ADMIN — LEVOFLOXACIN 750 MG: 5 INJECTION, SOLUTION INTRAVENOUS at 10:24

## 2023-09-17 RX ADMIN — ARIPIPRAZOLE 5 MG: 5 TABLET ORAL at 07:32

## 2023-09-17 RX ADMIN — BUDESONIDE INHALATION 500 MCG: 0.5 SUSPENSION RESPIRATORY (INHALATION) at 19:18

## 2023-09-17 RX ADMIN — METHYLPREDNISOLONE SODIUM SUCCINATE 40 MG: 40 INJECTION, POWDER, LYOPHILIZED, FOR SOLUTION INTRAMUSCULAR; INTRAVENOUS at 04:22

## 2023-09-17 RX ADMIN — ENOXAPARIN SODIUM 40 MG: 100 INJECTION SUBCUTANEOUS at 07:32

## 2023-09-17 RX ADMIN — MORPHINE SULFATE 10 MG: 10 SOLUTION ORAL at 06:45

## 2023-09-17 RX ADMIN — HYDROCODONE BITARTRATE AND ACETAMINOPHEN 1 TABLET: 7.5; 325 TABLET ORAL at 04:22

## 2023-09-17 RX ADMIN — IPRATROPIUM BROMIDE AND ALBUTEROL SULFATE 1 DOSE: .5; 2.5 SOLUTION RESPIRATORY (INHALATION) at 08:30

## 2023-09-17 RX ADMIN — LORAZEPAM 1 MG: 1 TABLET ORAL at 22:04

## 2023-09-17 RX ADMIN — LORAZEPAM 1 MG: 1 TABLET ORAL at 10:22

## 2023-09-17 RX ADMIN — LORAZEPAM 1 MG: 1 TABLET ORAL at 16:22

## 2023-09-17 RX ADMIN — MORPHINE SULFATE 10 MG: 10 SOLUTION ORAL at 12:27

## 2023-09-17 RX ADMIN — MORPHINE SULFATE 10 MG: 10 SOLUTION ORAL at 22:04

## 2023-09-17 RX ADMIN — FLUOXETINE 20 MG: 10 TABLET, FILM COATED ORAL at 07:32

## 2023-09-17 RX ADMIN — LORAZEPAM 1 MG: 1 TABLET ORAL at 04:22

## 2023-09-17 RX ADMIN — DOXYCYCLINE 100 MG: 100 INJECTION, POWDER, LYOPHILIZED, FOR SOLUTION INTRAVENOUS at 16:25

## 2023-09-17 RX ADMIN — ISOSORBIDE DINITRATE 20 MG: 10 TABLET ORAL at 19:52

## 2023-09-17 RX ADMIN — IPRATROPIUM BROMIDE AND ALBUTEROL SULFATE 1 DOSE: .5; 2.5 SOLUTION RESPIRATORY (INHALATION) at 19:18

## 2023-09-17 RX ADMIN — CALCIUM 500 MG: 500 TABLET ORAL at 16:23

## 2023-09-17 RX ADMIN — HYDROCODONE BITARTRATE AND ACETAMINOPHEN 1 TABLET: 7.5; 325 TABLET ORAL at 19:52

## 2023-09-17 RX ADMIN — PANTOPRAZOLE SODIUM 40 MG: 40 TABLET, DELAYED RELEASE ORAL at 04:23

## 2023-09-17 RX ADMIN — IPRATROPIUM BROMIDE AND ALBUTEROL SULFATE 1 DOSE: .5; 2.5 SOLUTION RESPIRATORY (INHALATION) at 15:40

## 2023-09-17 RX ADMIN — IPRATROPIUM BROMIDE AND ALBUTEROL SULFATE 1 DOSE: .5; 2.5 SOLUTION RESPIRATORY (INHALATION) at 11:26

## 2023-09-17 RX ADMIN — POTASSIUM CHLORIDE 20 MEQ: 1500 TABLET, EXTENDED RELEASE ORAL at 07:31

## 2023-09-17 RX ADMIN — GABAPENTIN 300 MG: 300 CAPSULE ORAL at 07:31

## 2023-09-17 RX ADMIN — ISOSORBIDE DINITRATE 20 MG: 10 TABLET ORAL at 07:32

## 2023-09-17 RX ADMIN — METHYLPREDNISOLONE SODIUM SUCCINATE 40 MG: 40 INJECTION, POWDER, LYOPHILIZED, FOR SOLUTION INTRAMUSCULAR; INTRAVENOUS at 16:23

## 2023-09-17 RX ADMIN — PRAMIPEXOLE DIHYDROCHLORIDE 0.25 MG: 0.25 TABLET ORAL at 19:52

## 2023-09-17 RX ADMIN — HYDROCODONE BITARTRATE AND ACETAMINOPHEN 1 TABLET: 7.5; 325 TABLET ORAL at 13:17

## 2023-09-17 RX ADMIN — SPIRONOLACTONE 25 MG: 25 TABLET ORAL at 07:31

## 2023-09-17 RX ADMIN — DOXYCYCLINE 100 MG: 100 INJECTION, POWDER, LYOPHILIZED, FOR SOLUTION INTRAVENOUS at 04:25

## 2023-09-17 RX ADMIN — MORPHINE SULFATE 10 MG: 10 SOLUTION ORAL at 17:42

## 2023-09-17 RX ADMIN — GABAPENTIN 300 MG: 300 CAPSULE ORAL at 19:52

## 2023-09-17 RX ADMIN — EMPAGLIFLOZIN 10 MG: 10 TABLET, FILM COATED ORAL at 07:31

## 2023-09-17 RX ADMIN — FUROSEMIDE 40 MG: 10 INJECTION, SOLUTION INTRAMUSCULAR; INTRAVENOUS at 07:31

## 2023-09-17 RX ADMIN — ASPIRIN 81 MG CHEWABLE TABLET 81 MG: 81 TABLET CHEWABLE at 07:32

## 2023-09-17 RX ADMIN — SODIUM CHLORIDE, PRESERVATIVE FREE 10 ML: 5 INJECTION INTRAVENOUS at 07:29

## 2023-09-17 RX ADMIN — ATORVASTATIN CALCIUM 40 MG: 40 TABLET, FILM COATED ORAL at 19:52

## 2023-09-17 RX ADMIN — BUDESONIDE INHALATION 500 MCG: 0.5 SUSPENSION RESPIRATORY (INHALATION) at 08:30

## 2023-09-17 RX ADMIN — SODIUM CHLORIDE, PRESERVATIVE FREE 10 ML: 5 INJECTION INTRAVENOUS at 12:13

## 2023-09-17 RX ADMIN — CALCIUM 500 MG: 500 TABLET ORAL at 07:32

## 2023-09-17 ASSESSMENT — PAIN SCALES - GENERAL: PAINLEVEL_OUTOF10: 0

## 2023-09-18 VITALS
TEMPERATURE: 97.8 F | BODY MASS INDEX: 22.2 KG/M2 | WEIGHT: 130 LBS | HEIGHT: 64 IN | HEART RATE: 75 BPM | DIASTOLIC BLOOD PRESSURE: 70 MMHG | OXYGEN SATURATION: 98 % | SYSTOLIC BLOOD PRESSURE: 122 MMHG | RESPIRATION RATE: 20 BRPM

## 2023-09-18 PROCEDURE — 2580000003 HC RX 258: Performed by: STUDENT IN AN ORGANIZED HEALTH CARE EDUCATION/TRAINING PROGRAM

## 2023-09-18 PROCEDURE — 6360000002 HC RX W HCPCS

## 2023-09-18 PROCEDURE — 99239 HOSP IP/OBS DSCHRG MGMT >30: CPT | Performed by: STUDENT IN AN ORGANIZED HEALTH CARE EDUCATION/TRAINING PROGRAM

## 2023-09-18 PROCEDURE — 2500000003 HC RX 250 WO HCPCS: Performed by: STUDENT IN AN ORGANIZED HEALTH CARE EDUCATION/TRAINING PROGRAM

## 2023-09-18 PROCEDURE — 94640 AIRWAY INHALATION TREATMENT: CPT

## 2023-09-18 PROCEDURE — 6370000000 HC RX 637 (ALT 250 FOR IP): Performed by: INTERNAL MEDICINE

## 2023-09-18 PROCEDURE — 6370000000 HC RX 637 (ALT 250 FOR IP): Performed by: STUDENT IN AN ORGANIZED HEALTH CARE EDUCATION/TRAINING PROGRAM

## 2023-09-18 PROCEDURE — 6360000002 HC RX W HCPCS: Performed by: STUDENT IN AN ORGANIZED HEALTH CARE EDUCATION/TRAINING PROGRAM

## 2023-09-18 PROCEDURE — 6360000002 HC RX W HCPCS: Performed by: INTERNAL MEDICINE

## 2023-09-18 PROCEDURE — 2700000000 HC OXYGEN THERAPY PER DAY

## 2023-09-18 RX ORDER — BUDESONIDE 0.5 MG/2ML
0.5 INHALANT ORAL
Qty: 60 EACH | Refills: 3 | Status: SHIPPED | OUTPATIENT
Start: 2023-09-18

## 2023-09-18 RX ORDER — PREDNISONE 10 MG/1
TABLET ORAL
Qty: 18 TABLET | Refills: 0 | Status: SHIPPED | OUTPATIENT
Start: 2023-09-19

## 2023-09-18 RX ORDER — DOXYCYCLINE HYCLATE 100 MG/1
100 CAPSULE ORAL EVERY 12 HOURS SCHEDULED
Status: DISCONTINUED | OUTPATIENT
Start: 2023-09-18 | End: 2023-09-18 | Stop reason: HOSPADM

## 2023-09-18 RX ADMIN — IPRATROPIUM BROMIDE AND ALBUTEROL SULFATE 1 DOSE: .5; 2.5 SOLUTION RESPIRATORY (INHALATION) at 07:50

## 2023-09-18 RX ADMIN — CALCIUM 500 MG: 500 TABLET ORAL at 08:16

## 2023-09-18 RX ADMIN — IPRATROPIUM BROMIDE AND ALBUTEROL SULFATE 1 DOSE: .5; 2.5 SOLUTION RESPIRATORY (INHALATION) at 12:22

## 2023-09-18 RX ADMIN — HYDROCODONE BITARTRATE AND ACETAMINOPHEN 1 TABLET: 7.5; 325 TABLET ORAL at 12:38

## 2023-09-18 RX ADMIN — DOXYCYCLINE 100 MG: 100 INJECTION, POWDER, LYOPHILIZED, FOR SOLUTION INTRAVENOUS at 04:38

## 2023-09-18 RX ADMIN — FUROSEMIDE 40 MG: 10 INJECTION, SOLUTION INTRAMUSCULAR; INTRAVENOUS at 08:16

## 2023-09-18 RX ADMIN — HYDROXYZINE PAMOATE 25 MG: 25 CAPSULE ORAL at 08:16

## 2023-09-18 RX ADMIN — GABAPENTIN 300 MG: 300 CAPSULE ORAL at 13:07

## 2023-09-18 RX ADMIN — MORPHINE SULFATE 10 MG: 10 SOLUTION ORAL at 09:23

## 2023-09-18 RX ADMIN — SPIRONOLACTONE 25 MG: 25 TABLET ORAL at 08:17

## 2023-09-18 RX ADMIN — EMPAGLIFLOZIN 10 MG: 10 TABLET, FILM COATED ORAL at 08:18

## 2023-09-18 RX ADMIN — ENOXAPARIN SODIUM 40 MG: 100 INJECTION SUBCUTANEOUS at 08:17

## 2023-09-18 RX ADMIN — PREDNISONE 30 MG: 20 TABLET ORAL at 08:17

## 2023-09-18 RX ADMIN — GABAPENTIN 300 MG: 300 CAPSULE ORAL at 08:17

## 2023-09-18 RX ADMIN — ASPIRIN 81 MG CHEWABLE TABLET 81 MG: 81 TABLET CHEWABLE at 08:18

## 2023-09-18 RX ADMIN — HYDROCODONE BITARTRATE AND ACETAMINOPHEN 1 TABLET: 7.5; 325 TABLET ORAL at 04:36

## 2023-09-18 RX ADMIN — LEVOFLOXACIN 750 MG: 500 TABLET, FILM COATED ORAL at 09:23

## 2023-09-18 RX ADMIN — ISOSORBIDE DINITRATE 20 MG: 10 TABLET ORAL at 13:07

## 2023-09-18 RX ADMIN — LORAZEPAM 1 MG: 1 TABLET ORAL at 04:35

## 2023-09-18 RX ADMIN — MORPHINE SULFATE 10 MG: 10 SOLUTION ORAL at 13:31

## 2023-09-18 RX ADMIN — BUDESONIDE INHALATION 500 MCG: 0.5 SUSPENSION RESPIRATORY (INHALATION) at 07:50

## 2023-09-18 RX ADMIN — SODIUM CHLORIDE, PRESERVATIVE FREE 10 ML: 5 INJECTION INTRAVENOUS at 08:18

## 2023-09-18 RX ADMIN — MORPHINE SULFATE 10 MG: 10 SOLUTION ORAL at 02:41

## 2023-09-18 RX ADMIN — ARIPIPRAZOLE 5 MG: 5 TABLET ORAL at 08:24

## 2023-09-18 RX ADMIN — ARFORMOTEROL TARTRATE 15 MCG: 15 SOLUTION RESPIRATORY (INHALATION) at 07:50

## 2023-09-18 RX ADMIN — FLUOXETINE 20 MG: 10 TABLET, FILM COATED ORAL at 08:18

## 2023-09-18 RX ADMIN — POTASSIUM CHLORIDE 20 MEQ: 1500 TABLET, EXTENDED RELEASE ORAL at 08:17

## 2023-09-18 RX ADMIN — PANTOPRAZOLE SODIUM 40 MG: 40 TABLET, DELAYED RELEASE ORAL at 04:36

## 2023-09-18 RX ADMIN — ISOSORBIDE DINITRATE 20 MG: 10 TABLET ORAL at 08:16

## 2023-09-18 RX ADMIN — LORAZEPAM 1 MG: 1 TABLET ORAL at 12:38

## 2023-09-18 RX ADMIN — LOSARTAN POTASSIUM 50 MG: 50 TABLET, FILM COATED ORAL at 08:17

## 2023-09-18 ASSESSMENT — PAIN DESCRIPTION - ORIENTATION: ORIENTATION: RIGHT;LEFT

## 2023-09-18 ASSESSMENT — PAIN DESCRIPTION - LOCATION: LOCATION: LEG

## 2023-09-18 ASSESSMENT — PAIN DESCRIPTION - DESCRIPTORS: DESCRIPTORS: ACHING

## 2023-09-18 ASSESSMENT — PAIN - FUNCTIONAL ASSESSMENT: PAIN_FUNCTIONAL_ASSESSMENT: ACTIVITIES ARE NOT PREVENTED

## 2023-09-18 ASSESSMENT — PAIN SCALES - GENERAL: PAINLEVEL_OUTOF10: 10

## 2023-09-18 NOTE — CARE COORDINATION
Call placed to [facility] - Notified agency of patient's discharge today  Discharge summary is sent via fax to   Augusto Navraro.  Mei, MSN RN  [facility] Case Management  [phone]

## 2023-09-18 NOTE — DISCHARGE SUMMARY
[facility] Physician Discharge Summary       Corinne Romo MD  [address]  [phone]    Schedule an appointment as soon as possible for a visit in 1 week(s)      Corinne Romo MD  [address] [address]  [phone]          [address]   [phone]  F [phone]  Follow up        Activity level: As tolerated     Dispo: Home      Condition on discharge: Stable     Patient ID:  Sandrine Celaya  [id number]  62 y.o.  1965    Admit date: 9/12/2023    Discharge date and time:  9/18/2023  11:45 AM    Admission Diagnoses: Principal Problem:    Heart failure ([address])  Active Problems:    Sepsis ([address])    Urinary tract infection without hematuria    Elevated troponin  Resolved Problems:    * No resolved hospital problems. *      Discharge Diagnoses: Principal Problem:    Heart failure ([address])  Active Problems:    Sepsis ([address])    Urinary tract infection without hematuria    Elevated troponin  Resolved Problems:    * No resolved hospital problems. *      Consults:  IP CONSULT TO IV TEAM  IP CONSULT TO HEART FAILURE NURSE/COORDINATOR  IP CONSULT TO PULMONOLOGY  IP CONSULT TO IV TEAM  IP CONSULT TO IV TEAM    Hospital Course:   Patient Sandrine Celaya is a 62 y.o. presented with Heart failure ([address]) [I50.9]  Sepsis ([address]) [A41.9]  Urinary tract infection without hematuria, site unspecified [N39.0]  Acute on chronic congestive heart failure, unspecified heart failure type ([address]) [I50.9]  Patient was admitted and managed for Acute on chronic hypoxic respiratory failure 2/2 bacterial PNA and CHF exacerbation - Patient wears 4- 6 L NC at home. He is currently on 6 L NC. No PE on CTA. Negative viral respiratory panel. Negative urine antigen studies. MRSA pending. DC rocephin. S/p levofloxacin and doxycycline. Pulmonology consulted. Acute on chronic HFpEF exacerbation - Patient is on lasix 40 mg po daily at home. s/p lasix 40 mg IV daily.  Monitor I&O as

## 2023-09-19 ENCOUNTER — PATIENT MESSAGE (OUTPATIENT)
Dept: PRIMARY CARE CLINIC | Age: 58
End: 2023-09-19

## 2023-09-19 ENCOUNTER — HOSPITAL ENCOUNTER (OUTPATIENT)
Dept: OTHER | Age: 58
Setting detail: THERAPIES SERIES
Discharge: HOME | End: 2023-09-19
Payer: MEDICAID

## 2023-09-19 DIAGNOSIS — F41.9 ANXIETY: Primary | ICD-10-CM

## 2023-09-19 NOTE — PROGRESS NOTES
CLINICAL PHARMACY NOTE: MEDS TO BEDS    Total # of Prescriptions Filled: 2   The following medications were delivered to the patient:  Prednisone 10  Budesonide 0.5    Additional Documentation:

## 2023-09-20 RX ORDER — LORAZEPAM 1 MG/1
1 TABLET ORAL EVERY 8 HOURS PRN
Qty: 90 TABLET | Refills: 1 | Status: SHIPPED | OUTPATIENT
Start: 2023-09-20 | End: 2023-11-19

## 2023-09-20 NOTE — TELEPHONE ENCOUNTER
From: Deana Blas  To: Dr. Glenny Dee  Sent: 9/19/2023 9:39 PM EDT  Subject: Needs a refill on ativan     Олег needs his Ativan called in.  Thank youElla

## 2023-09-25 ENCOUNTER — OFFICE VISIT (OUTPATIENT)
Dept: FAMILY MEDICINE CLINIC | Age: 58
End: 2023-09-25
Payer: MEDICAID

## 2023-09-25 VITALS
HEART RATE: 95 BPM | DIASTOLIC BLOOD PRESSURE: 52 MMHG | RESPIRATION RATE: 16 BRPM | HEIGHT: 64 IN | SYSTOLIC BLOOD PRESSURE: 104 MMHG | TEMPERATURE: 97.8 F | WEIGHT: 129 LBS | OXYGEN SATURATION: 97 % | BODY MASS INDEX: 22.02 KG/M2

## 2023-09-25 DIAGNOSIS — J44.1 COPD EXACERBATION (HCC): Primary | ICD-10-CM

## 2023-09-25 PROCEDURE — 99214 OFFICE O/P EST MOD 30 MIN: CPT | Performed by: NURSE PRACTITIONER

## 2023-09-25 PROCEDURE — 3078F DIAST BP <80 MM HG: CPT | Performed by: NURSE PRACTITIONER

## 2023-09-25 PROCEDURE — 3074F SYST BP LT 130 MM HG: CPT | Performed by: NURSE PRACTITIONER

## 2023-09-25 RX ORDER — CEFDINIR 300 MG/1
300 CAPSULE ORAL 2 TIMES DAILY
Qty: 14 CAPSULE | Refills: 0 | Status: SHIPPED | OUTPATIENT
Start: 2023-09-25 | End: 2023-10-02

## 2023-09-25 RX ORDER — DOXYCYCLINE HYCLATE 100 MG
100 TABLET ORAL 2 TIMES DAILY
Qty: 14 TABLET | Refills: 0 | Status: SHIPPED | OUTPATIENT
Start: 2023-09-25 | End: 2023-10-02

## 2023-09-26 ENCOUNTER — HOSPITAL ENCOUNTER (OUTPATIENT)
Dept: OTHER | Age: 58
Setting detail: THERAPIES SERIES
Discharge: HOME | End: 2023-09-26
Payer: MEDICAID

## 2023-09-26 VITALS
DIASTOLIC BLOOD PRESSURE: 56 MMHG | WEIGHT: 127 LBS | BODY MASS INDEX: 21.8 KG/M2 | OXYGEN SATURATION: 91 % | RESPIRATION RATE: 20 BRPM | SYSTOLIC BLOOD PRESSURE: 101 MMHG | HEART RATE: 78 BPM

## 2023-09-26 LAB
ANION GAP SERPL CALCULATED.3IONS-SCNC: 9 MMOL/L (ref 7–16)
BNP SERPL-MCNC: 517 PG/ML (ref 0–125)
BUN SERPL-MCNC: 13 MG/DL (ref 6–20)
CALCIUM SERPL-MCNC: 6.9 MG/DL (ref 8.6–10.2)
CHLORIDE SERPL-SCNC: 95 MMOL/L (ref 98–107)
CO2 SERPL-SCNC: 33 MMOL/L (ref 22–29)
CREAT SERPL-MCNC: 0.6 MG/DL (ref 0.7–1.2)
GFR SERPL CREATININE-BSD FRML MDRD: >60 ML/MIN/1.73M2
GLUCOSE SERPL-MCNC: 116 MG/DL (ref 74–99)
POTASSIUM SERPL-SCNC: 4 MMOL/L (ref 3.5–5)
SODIUM SERPL-SCNC: 137 MMOL/L (ref 132–146)

## 2023-09-26 PROCEDURE — 96374 THER/PROPH/DIAG INJ IV PUSH: CPT

## 2023-09-26 PROCEDURE — 36415 COLL VENOUS BLD VENIPUNCTURE: CPT

## 2023-09-26 PROCEDURE — 2580000003 HC RX 258

## 2023-09-26 PROCEDURE — 6360000002 HC RX W HCPCS

## 2023-09-26 PROCEDURE — 99214 OFFICE O/P EST MOD 30 MIN: CPT

## 2023-09-26 PROCEDURE — 83880 ASSAY OF NATRIURETIC PEPTIDE: CPT

## 2023-09-26 PROCEDURE — 80048 BASIC METABOLIC PNL TOTAL CA: CPT

## 2023-09-26 RX ORDER — SODIUM CHLORIDE 0.9 % (FLUSH) 0.9 %
SYRINGE (ML) INJECTION
Status: COMPLETED
Start: 2023-09-26 | End: 2023-09-26

## 2023-09-26 RX ORDER — SODIUM CHLORIDE 0.9 % (FLUSH) 0.9 %
10 SYRINGE (ML) INJECTION ONCE
Status: COMPLETED | OUTPATIENT
Start: 2023-09-26 | End: 2023-09-26

## 2023-09-26 RX ORDER — FUROSEMIDE 10 MG/ML
40 INJECTION INTRAMUSCULAR; INTRAVENOUS ONCE
Status: COMPLETED | OUTPATIENT
Start: 2023-09-26 | End: 2023-09-26

## 2023-09-26 RX ORDER — FUROSEMIDE 10 MG/ML
INJECTION INTRAMUSCULAR; INTRAVENOUS
Status: COMPLETED
Start: 2023-09-26 | End: 2023-09-26

## 2023-09-26 RX ADMIN — Medication 10 ML: at 08:28

## 2023-09-26 RX ADMIN — FUROSEMIDE 40 MG: 10 INJECTION, SOLUTION INTRAMUSCULAR; INTRAVENOUS at 08:27

## 2023-09-26 RX ADMIN — FUROSEMIDE 40 MG: 10 INJECTION INTRAMUSCULAR; INTRAVENOUS at 08:27

## 2023-09-26 RX ADMIN — SODIUM CHLORIDE, PRESERVATIVE FREE 10 ML: 5 INJECTION INTRAVENOUS at 08:28

## 2023-09-26 NOTE — PROGRESS NOTES
Congestive Heart Failure [address]       Referring Provider: Dominic [place]. Armando Montalvo APRN-CNP  Primary Care Physician: Abbie Burgos MD   Cardiologist: Wilfrido Arango  Nephrologist: n/a      HISTORY OF PRESENT ILLNESS:     Wai Fraga is a 62 y.o. (1965) male with a history of HFpEF, most recent EF:  Lab Results   Component Value Date    LVEF 63 08/03/2023    Hermradha De La Rosa Echo 01/21/2022         He presents to the CHF clinic for ongoing evaluation and monitoring of heart failure.     In the CHF clinic today he denies any adverse symptoms except:  [] Dizziness or lightheadedness   [] Syncope or near syncope  [] Recent Fall  [x] Shortness of breath at rest   [x] Dyspnea with exertion  [] Decline in functional capacity (unable to perform activities they had previously been able to do)  [x] Fatigue   [] Orthopnea  [] PND  [] Nocturnal cough  [] Hemoptysis  [] Chest pain, pressure, or discomfort  [] Palpitations  [] Abdominal distention  [] Abdominal bloating  [] Early satiety  [] Blood in stool   [] Diarrhea  [] Constipation  [] Nausea/Vomiting  [] Decreased urinary response to oral diuretic   [] Scrotal swelling   [x] Lower extremity edema  [] Used PRN doses of oral diuretic   [] Weight gain    Wt Readings from Last 3 Encounters:   09/26/23 127 lb (57.6 kg)   09/25/23 129 lb (58.5 kg)   09/18/23 130 lb (59 kg)           SOCIAL HISTORY:  [x] Denies tobacco, alcohol or illicit drug abuse  [] Tobacco use:  [] ETOH use:  [] Illicit drug use:        MEDICATIONS:    Allergies   Allergen Reactions    Levofloxacin Other (See Comments)     Other reaction(s): Abdominal pain    Lisinopril      Other reaction(s): COUGHING    Tramadol      Other reaction(s): SHAKING    Other      Other reaction(s): ABD PAIN    Ibuprofen Nausea And Vomiting    Sulfa Antibiotics Nausea And Vomiting       Current Outpatient Medications:     doxycycline hyclate (VIBRA-TABS) 100 MG tablet, Take 1 tablet by

## 2023-09-27 ENCOUNTER — TELEPHONE (OUTPATIENT)
Dept: PRIMARY CARE CLINIC | Age: 58
End: 2023-09-27

## 2023-09-27 DIAGNOSIS — Z51.5 PALLIATIVE CARE ENCOUNTER: ICD-10-CM

## 2023-09-27 DIAGNOSIS — G89.4 CHRONIC PAIN SYNDROME: ICD-10-CM

## 2023-09-27 RX ORDER — HYDROCODONE BITARTRATE AND ACETAMINOPHEN 7.5; 325 MG/1; MG/1
1 TABLET ORAL EVERY 8 HOURS PRN
Qty: 90 TABLET | Refills: 0 | Status: SHIPPED | OUTPATIENT
Start: 2023-09-27 | End: 2023-10-27

## 2023-09-27 NOTE — TELEPHONE ENCOUNTER
Family member called took pt to [place] walk in for symptoms nasal congestion , hurt all over , headache low grade fever , light cough    They gave him  doxycycline and cefdinir  Pt still taking medication     Family member thought she would give pt an at home covid test and it came back positvie     Should pt isolate and does dr want to give pt anything else     Pt still experiencing same symptoms has only been on medication for 2 days       [facility] [place], [address] [phone] - F [phone]

## 2023-09-27 NOTE — TELEPHONE ENCOUNTER
Call from [facility] stating Kyrie Strickland tested positive for Covid and needs to cancel appointment tomorrow. Will reschedule when [facility] is able to . [facility] states Kyrie Strickland will be needing a refill for [address] is [employer] [facility].

## 2023-09-28 NOTE — TELEPHONE ENCOUNTER
I spoke to Anju and she stated it would be better not to take the Paxlovid. He can't be off those other medications. She stated if he starts having any issues they'll take him to the ED. Thanked us for getting back to her.

## 2023-09-28 NOTE — TELEPHONE ENCOUNTER
Paxlovid is only other med we could consider but would have to NOT take his atorvastatin, abilify, norco during treatment    Not sure the benefits of paxlovid will outweigh having to hold his norco for instance    Will leave it up to pt/family    If ANY worsening breathing to ER

## 2023-10-01 ENCOUNTER — APPOINTMENT (OUTPATIENT)
Dept: CT IMAGING | Age: 58
End: 2023-10-01
Payer: MEDICAID

## 2023-10-01 ENCOUNTER — HOSPITAL ENCOUNTER (EMERGENCY)
Age: 58
Discharge: HOME | End: 2023-10-01
Attending: EMERGENCY MEDICINE
Payer: MEDICAID

## 2023-10-01 ENCOUNTER — TELEPHONE (OUTPATIENT)
Dept: OTHER | Facility: CLINIC | Age: 58
End: 2023-10-01

## 2023-10-01 VITALS
HEART RATE: 81 BPM | OXYGEN SATURATION: 92 % | RESPIRATION RATE: 20 BRPM | TEMPERATURE: 98.2 F | DIASTOLIC BLOOD PRESSURE: 71 MMHG | HEIGHT: 64 IN | WEIGHT: 127 LBS | BODY MASS INDEX: 21.68 KG/M2 | SYSTOLIC BLOOD PRESSURE: 118 MMHG

## 2023-10-01 DIAGNOSIS — R39.11 BENIGN PROSTATIC HYPERPLASIA WITH URINARY HESITANCY: Primary | ICD-10-CM

## 2023-10-01 DIAGNOSIS — N40.1 BENIGN PROSTATIC HYPERPLASIA WITH URINARY HESITANCY: Primary | ICD-10-CM

## 2023-10-01 LAB
ALBUMIN SERPL-MCNC: 3.8 G/DL (ref 3.5–5.2)
ALP SERPL-CCNC: 62 U/L (ref 40–129)
ALT SERPL-CCNC: 9 U/L (ref 0–40)
ANION GAP SERPL CALCULATED.3IONS-SCNC: 8 MMOL/L (ref 7–16)
AST SERPL-CCNC: 14 U/L (ref 0–39)
BASOPHILS # BLD: 0 K/UL (ref 0–0.2)
BASOPHILS NFR BLD: 0 % (ref 0–2)
BILIRUB SERPL-MCNC: 0.2 MG/DL (ref 0–1.2)
BILIRUB UR QL STRIP: NEGATIVE
BUN SERPL-MCNC: 12 MG/DL (ref 6–20)
CALCIUM SERPL-MCNC: 7 MG/DL (ref 8.6–10.2)
CHLORIDE SERPL-SCNC: 92 MMOL/L (ref 98–107)
CLARITY UR: CLEAR
CO2 SERPL-SCNC: 40 MMOL/L (ref 22–29)
COLOR UR: YELLOW
CREAT SERPL-MCNC: 0.8 MG/DL (ref 0.7–1.2)
EOSINOPHIL # BLD: 0.06 K/UL (ref 0.05–0.5)
EOSINOPHILS RELATIVE PERCENT: 1 % (ref 0–6)
ERYTHROCYTE [DISTWIDTH] IN BLOOD BY AUTOMATED COUNT: 14.3 % (ref 11.5–15)
GFR SERPL CREATININE-BSD FRML MDRD: >60 ML/MIN/1.73M2
GLUCOSE SERPL-MCNC: 121 MG/DL (ref 74–99)
GLUCOSE UR STRIP-MCNC: NEGATIVE MG/DL
HCT VFR BLD AUTO: 31.1 % (ref 37–54)
HGB BLD-MCNC: 9.3 G/DL (ref 12.5–16.5)
HGB UR QL STRIP.AUTO: NEGATIVE
IMM GRANULOCYTES # BLD AUTO: <0.03 K/UL (ref 0–0.58)
IMM GRANULOCYTES NFR BLD: 0 % (ref 0–5)
KETONES UR STRIP-MCNC: NEGATIVE MG/DL
LACTATE BLDV-SCNC: 0.9 MMOL/L (ref 0.5–2.2)
LEUKOCYTE ESTERASE UR QL STRIP: NEGATIVE
LYMPHOCYTES NFR BLD: 0.85 K/UL (ref 1.5–4)
LYMPHOCYTES RELATIVE PERCENT: 17 % (ref 20–42)
MCH RBC QN AUTO: 29.7 PG (ref 26–35)
MCHC RBC AUTO-ENTMCNC: 29.9 G/DL (ref 32–34.5)
MCV RBC AUTO: 99.4 FL (ref 80–99.9)
MONOCYTES NFR BLD: 0.33 K/UL (ref 0.1–0.95)
MONOCYTES NFR BLD: 7 % (ref 2–12)
NEUTROPHILS NFR BLD: 76 % (ref 43–80)
NEUTS SEG NFR BLD: 3.86 K/UL (ref 1.8–7.3)
NITRITE UR QL STRIP: NEGATIVE
PH UR STRIP: 6 [PH] (ref 5–9)
PLATELET # BLD AUTO: 142 K/UL (ref 130–450)
PMV BLD AUTO: 11 FL (ref 7–12)
POTASSIUM SERPL-SCNC: 3.8 MMOL/L (ref 3.5–5)
PROT SERPL-MCNC: 7.3 G/DL (ref 6.4–8.3)
PROT UR STRIP-MCNC: 30 MG/DL
RBC # BLD AUTO: 3.13 M/UL (ref 3.8–5.8)
RBC #/AREA URNS HPF: NORMAL /HPF
SODIUM SERPL-SCNC: 140 MMOL/L (ref 132–146)
SP GR UR STRIP: 1.02 (ref 1–1.03)
UROBILINOGEN UR STRIP-ACNC: 0.2 EU/DL (ref 0–1)
WBC #/AREA URNS HPF: NORMAL /HPF
WBC OTHER # BLD: 5.1 K/UL (ref 4.5–11.5)

## 2023-10-01 PROCEDURE — 6370000000 HC RX 637 (ALT 250 FOR IP)

## 2023-10-01 PROCEDURE — 74176 CT ABD & PELVIS W/O CONTRAST: CPT

## 2023-10-01 PROCEDURE — 85025 COMPLETE CBC W/AUTO DIFF WBC: CPT

## 2023-10-01 PROCEDURE — 83605 ASSAY OF LACTIC ACID: CPT

## 2023-10-01 PROCEDURE — 99284 EMERGENCY DEPT VISIT MOD MDM: CPT

## 2023-10-01 PROCEDURE — 81001 URINALYSIS AUTO W/SCOPE: CPT

## 2023-10-01 PROCEDURE — 80053 COMPREHEN METABOLIC PANEL: CPT

## 2023-10-01 PROCEDURE — 6370000000 HC RX 637 (ALT 250 FOR IP): Performed by: STUDENT IN AN ORGANIZED HEALTH CARE EDUCATION/TRAINING PROGRAM

## 2023-10-01 RX ORDER — IPRATROPIUM BROMIDE AND ALBUTEROL SULFATE 2.5; .5 MG/3ML; MG/3ML
1 SOLUTION RESPIRATORY (INHALATION) ONCE
Status: COMPLETED | OUTPATIENT
Start: 2023-10-01 | End: 2023-10-01

## 2023-10-01 RX ORDER — HYDROCODONE BITARTRATE AND ACETAMINOPHEN 7.5; 325 MG/1; MG/1
1 TABLET ORAL ONCE
Status: COMPLETED | OUTPATIENT
Start: 2023-10-01 | End: 2023-10-01

## 2023-10-01 RX ORDER — TAMSULOSIN HYDROCHLORIDE 0.4 MG/1
0.4 CAPSULE ORAL DAILY
Qty: 30 CAPSULE | Refills: 0 | Status: SHIPPED | OUTPATIENT
Start: 2023-10-01

## 2023-10-01 RX ADMIN — HYDROCODONE BITARTRATE AND ACETAMINOPHEN 1 TABLET: 7.5; 325 TABLET ORAL at 15:54

## 2023-10-01 RX ADMIN — IPRATROPIUM BROMIDE AND ALBUTEROL SULFATE 1 DOSE: .5; 2.5 SOLUTION RESPIRATORY (INHALATION) at 22:09

## 2023-10-01 ASSESSMENT — ENCOUNTER SYMPTOMS
VOMITING: 0
DIARRHEA: 0
CONSTIPATION: 0
NAUSEA: 0
SHORTNESS OF BREATH: 0
ABDOMINAL PAIN: 0
BACK PAIN: 1

## 2023-10-01 ASSESSMENT — PAIN SCALES - GENERAL
PAINLEVEL_OUTOF10: 9
PAINLEVEL_OUTOF10: 9

## 2023-10-01 ASSESSMENT — PAIN DESCRIPTION - LOCATION: LOCATION: BACK

## 2023-10-01 ASSESSMENT — PAIN DESCRIPTION - DESCRIPTORS: DESCRIPTORS: ACHING

## 2023-10-01 ASSESSMENT — PAIN - FUNCTIONAL ASSESSMENT: PAIN_FUNCTIONAL_ASSESSMENT: 0-10

## 2023-10-01 ASSESSMENT — PAIN DESCRIPTION - PAIN TYPE: TYPE: CHRONIC PAIN

## 2023-10-01 ASSESSMENT — PAIN DESCRIPTION - FREQUENCY: FREQUENCY: CONTINUOUS

## 2023-10-01 NOTE — ED PROVIDER NOTES
Department of Emergency Medicine   ED  Provider Note    Pt Name: Leatha Whitehead         MRN: [id number]         [address] [facility] [place] 1965    Admit Date/RoomTime: 10/1/2023  2:47 PM  ED Room: 16/16      Chief Complaint   Patient presents with    Back Pain     Back pain started several days ago    Urinary Retention     Unable to urinate this am, only went very small amt        HPI     Leatha Whitehead is a 62 y.o. male with PMHx of chronic leg pain, COPD, chronic respiratory failure on home 4-6 L O2 NC, s/p left knee and hip surgeris presenting to the ED for back pain, urinary retention, and epigastric abdominal pain. Patient reports back pain started Friday in his low back he rates as a 9 out of 10 without radiation. He takes Norco 3 times daily for leg pain and liquid morphine every 4 hours for his chronic respiratory failure. He denies exerting himself, lifting, or bending. Since then, he reports having trouble urinating. He has urinated once normally this morning, otherwise it has been occasional dribbles. He reports he feels the urge to go but cannot. He denies episodes of incontinence. He reports occasional dysuria. He reports that he has chronic weakness and numbness in his left leg. Patient has a history of a GI bleed in 1995. He reports epigastric abdominal pain that started today. He denies nausea or vomiting. He denies blood or black and tarry stools. Suspect kidney stone due to decreased urination. Review of Systems   Constitutional:  Negative for chills and fever. Respiratory:  Negative for shortness of breath. Cardiovascular:  Negative for chest pain and leg swelling. Gastrointestinal:  Negative for abdominal pain, constipation, diarrhea, nausea and vomiting. Genitourinary:  Positive for difficulty urinating and hematuria. Negative for dysuria and urgency. Musculoskeletal:  Positive for back pain. Neurological:  Negative for dizziness, weakness and light-headedness.

## 2023-10-12 PROBLEM — R79.89 ELEVATED TROPONIN: Status: RESOLVED | Noted: 2023-09-12 | Resolved: 2023-10-12

## 2023-10-13 ENCOUNTER — OFFICE VISIT (OUTPATIENT)
Dept: PRIMARY CARE CLINIC | Age: 58
End: 2023-10-13
Payer: MEDICAID

## 2023-10-13 ENCOUNTER — TELEPHONE (OUTPATIENT)
Dept: PRIMARY CARE CLINIC | Age: 58
End: 2023-10-13

## 2023-10-13 VITALS
BODY MASS INDEX: 22.14 KG/M2 | HEART RATE: 78 BPM | HEIGHT: 64 IN | TEMPERATURE: 97.1 F | OXYGEN SATURATION: 99 % | DIASTOLIC BLOOD PRESSURE: 60 MMHG | SYSTOLIC BLOOD PRESSURE: 122 MMHG

## 2023-10-13 DIAGNOSIS — J44.1 COPD EXACERBATION (HCC): ICD-10-CM

## 2023-10-13 DIAGNOSIS — R51.9 BAD HEADACHE: ICD-10-CM

## 2023-10-13 DIAGNOSIS — J96.11 CHRONIC RESPIRATORY FAILURE WITH HYPOXIA (HCC): ICD-10-CM

## 2023-10-13 DIAGNOSIS — F41.9 ANXIETY: ICD-10-CM

## 2023-10-13 PROCEDURE — 3074F SYST BP LT 130 MM HG: CPT | Performed by: FAMILY MEDICINE

## 2023-10-13 PROCEDURE — 99214 OFFICE O/P EST MOD 30 MIN: CPT | Performed by: FAMILY MEDICINE

## 2023-10-13 PROCEDURE — 3078F DIAST BP <80 MM HG: CPT | Performed by: FAMILY MEDICINE

## 2023-10-13 RX ORDER — BUTALBITAL, ACETAMINOPHEN AND CAFFEINE 50; 325; 40 MG/1; MG/1; MG/1
TABLET ORAL
Qty: 30 TABLET | Refills: 1 | Status: SHIPPED | OUTPATIENT
Start: 2023-10-13

## 2023-10-13 RX ORDER — AZITHROMYCIN 250 MG/1
250 TABLET, FILM COATED ORAL SEE ADMIN INSTRUCTIONS
Qty: 6 TABLET | Refills: 0 | Status: SHIPPED | OUTPATIENT
Start: 2023-10-13 | End: 2023-10-18

## 2023-10-13 RX ORDER — PREDNISONE 10 MG/1
TABLET ORAL
Qty: 30 TABLET | Refills: 2 | Status: SHIPPED | OUTPATIENT
Start: 2023-10-13

## 2023-10-13 RX ORDER — ALBUTEROL SULFATE 2.5 MG/3ML
2.5 SOLUTION RESPIRATORY (INHALATION) 2 TIMES DAILY
Qty: 120 EACH | Refills: 5 | Status: SHIPPED | OUTPATIENT
Start: 2023-10-13

## 2023-10-13 RX ORDER — LORAZEPAM 1 MG/1
1 TABLET ORAL EVERY 8 HOURS PRN
Qty: 90 TABLET | Refills: 1 | Status: SHIPPED | OUTPATIENT
Start: 2023-10-19 | End: 2023-12-18

## 2023-10-13 RX ORDER — ALBUTEROL SULFATE 90 UG/1
2 AEROSOL, METERED RESPIRATORY (INHALATION) EVERY 4 HOURS PRN
Qty: 1 EACH | Refills: 3 | Status: SHIPPED | OUTPATIENT
Start: 2023-10-13

## 2023-10-13 RX ORDER — ALBUTEROL SULFATE 2.5 MG/3ML
SOLUTION RESPIRATORY (INHALATION)
COMMUNITY
Start: 2023-10-04 | End: 2023-10-13 | Stop reason: SDUPTHER

## 2023-10-13 NOTE — TELEPHONE ENCOUNTER
Pharmacy called has questions about the below     LORazepam (ATIVAN) 1 MG tablet        They are out of the 1mg     Called to get approval for adjustments and or change

## 2023-10-13 NOTE — TELEPHONE ENCOUNTER
I spoke with pharmacy, they are changing his tablets to Ativan 0.5mg take 2 tabs every 8hrs PRN then should switch back to the 1mg tabs for refills once they have them in. I spoke to Anju and she is aware of the difference in mg and that he will take the two tabs rather than the one.

## 2023-10-16 ENCOUNTER — HOSPITAL ENCOUNTER (OUTPATIENT)
Dept: OTHER | Age: 58
Setting detail: THERAPIES SERIES
Discharge: HOME | End: 2023-10-16

## 2023-10-18 DIAGNOSIS — J44.9 END STAGE COPD (HCC): ICD-10-CM

## 2023-10-18 DIAGNOSIS — Z51.5 PALLIATIVE CARE ENCOUNTER: ICD-10-CM

## 2023-10-18 DIAGNOSIS — R06.09 DYSPNEA ON MINIMAL EXERTION: ICD-10-CM

## 2023-10-18 RX ORDER — MORPHINE SULFATE 20 MG/ML
10 SOLUTION ORAL EVERY 4 HOURS PRN
Qty: 90 ML | Refills: 0 | Status: SHIPPED | OUTPATIENT
Start: 2023-10-18 | End: 2023-11-17

## 2023-10-18 NOTE — TELEPHONE ENCOUNTER
Call from Anju, [address] [facility] sister requesting refill for Morphine concentrate. Pharmacy is [facility]

## 2023-10-23 DIAGNOSIS — F41.9 ANXIETY: ICD-10-CM

## 2023-10-23 RX ORDER — LORAZEPAM 1 MG/1
1 TABLET ORAL EVERY 8 HOURS PRN
Qty: 90 TABLET | Refills: 1 | Status: SHIPPED | OUTPATIENT
Start: 2023-10-23 | End: 2023-12-22

## 2023-10-23 NOTE — TELEPHONE ENCOUNTER
Family member called pharmacy [facility]  unable to get the below     LORazepam (ATIVAN) 1 MG tablet      Please send script to     [address], [address] [place] [address] [phone]

## 2023-10-24 RX ORDER — ISOSORBIDE DINITRATE 20 MG/1
20 TABLET ORAL 3 TIMES DAILY
Qty: 90 TABLET | Refills: 3 | Status: SHIPPED | OUTPATIENT
Start: 2023-10-24

## 2023-10-24 NOTE — TELEPHONE ENCOUNTER
Family member called pt needs refill on below     isosorbide dinitrate (ISORDIL) 20 MG tablet    [facility] - [facility], [address]

## 2023-10-24 NOTE — TELEPHONE ENCOUNTER
Last Appointment:  10/13/2023  Future Appointments   Date Time Provider [address]   10/25/2023  8:45 AM DIONE CHF ROOM 2 [facility]E CHF [facility]   1/12/2024  9:30 AM Haltigan, Adron Alpers, MD [facility]   1/23/2024  1:15 PM ARGELIA Strong - NP [facility]   2/19/2024 10:15 AM Elana Vega MD [address] [facility]

## 2023-10-25 ENCOUNTER — HOSPITAL ENCOUNTER (OUTPATIENT)
Dept: OTHER | Age: 58
Setting detail: THERAPIES SERIES
Discharge: HOME | End: 2023-10-25
Payer: MEDICAID

## 2023-10-25 VITALS
HEART RATE: 90 BPM | OXYGEN SATURATION: 97 % | RESPIRATION RATE: 20 BRPM | WEIGHT: 121 LBS | BODY MASS INDEX: 20.77 KG/M2 | SYSTOLIC BLOOD PRESSURE: 120 MMHG | DIASTOLIC BLOOD PRESSURE: 70 MMHG

## 2023-10-25 LAB
ANION GAP SERPL CALCULATED.3IONS-SCNC: 12 MMOL/L (ref 7–16)
BNP SERPL-MCNC: 1020 PG/ML (ref 0–125)
BUN SERPL-MCNC: 8 MG/DL (ref 6–20)
CALCIUM SERPL-MCNC: 8.1 MG/DL (ref 8.6–10.2)
CHLORIDE SERPL-SCNC: 92 MMOL/L (ref 98–107)
CO2 SERPL-SCNC: 33 MMOL/L (ref 22–29)
CREAT SERPL-MCNC: 0.6 MG/DL (ref 0.7–1.2)
GFR SERPL CREATININE-BSD FRML MDRD: >60 ML/MIN/1.73M2
GLUCOSE SERPL-MCNC: 110 MG/DL (ref 74–99)
POTASSIUM SERPL-SCNC: 4.1 MMOL/L (ref 3.5–5)
SODIUM SERPL-SCNC: 137 MMOL/L (ref 132–146)

## 2023-10-25 PROCEDURE — 6360000002 HC RX W HCPCS

## 2023-10-25 PROCEDURE — 80048 BASIC METABOLIC PNL TOTAL CA: CPT

## 2023-10-25 PROCEDURE — 36415 COLL VENOUS BLD VENIPUNCTURE: CPT

## 2023-10-25 PROCEDURE — 2580000003 HC RX 258

## 2023-10-25 PROCEDURE — 99214 OFFICE O/P EST MOD 30 MIN: CPT

## 2023-10-25 PROCEDURE — 83880 ASSAY OF NATRIURETIC PEPTIDE: CPT

## 2023-10-25 PROCEDURE — 96374 THER/PROPH/DIAG INJ IV PUSH: CPT

## 2023-10-25 RX ORDER — SODIUM CHLORIDE 0.9 % (FLUSH) 0.9 %
SYRINGE (ML) INJECTION
Status: COMPLETED
Start: 2023-10-25 | End: 2023-10-25

## 2023-10-25 RX ORDER — SODIUM CHLORIDE 0.9 % (FLUSH) 0.9 %
10 SYRINGE (ML) INJECTION ONCE
Status: DISCONTINUED | OUTPATIENT
Start: 2023-10-25 | End: 2023-10-26 | Stop reason: HOSPADM

## 2023-10-25 RX ORDER — FUROSEMIDE 10 MG/ML
40 INJECTION INTRAMUSCULAR; INTRAVENOUS ONCE
Status: DISCONTINUED | OUTPATIENT
Start: 2023-10-25 | End: 2023-10-26 | Stop reason: HOSPADM

## 2023-10-25 RX ORDER — FUROSEMIDE 10 MG/ML
INJECTION INTRAMUSCULAR; INTRAVENOUS
Status: COMPLETED
Start: 2023-10-25 | End: 2023-10-25

## 2023-10-25 RX ADMIN — SODIUM CHLORIDE, PRESERVATIVE FREE 10 ML: 5 INJECTION INTRAVENOUS at 09:04

## 2023-10-25 RX ADMIN — FUROSEMIDE 40 MG: 10 INJECTION, SOLUTION INTRAMUSCULAR; INTRAVENOUS at 09:04

## 2023-10-25 ASSESSMENT — PAIN SCALES - GENERAL: PAINLEVEL_OUTOF10: 0

## 2023-10-25 NOTE — PROGRESS NOTES
Congestive Heart Failure [address]       Referring Provider: Yue Gomez. Dougie Romero APRN-CNP  Primary Care Physician: Claudia Guajardo MD   Cardiologist: Cheryle Hector  Nephrologist: n/a      HISTORY OF PRESENT ILLNESS:     Mary Mcallister is a 62 y.o. (1965) male with a history of HFpEF, most recent EF:  Lab Results   Component Value Date    LVEF 63 08/03/2023    Cam Crow Echo 01/21/2022         He presents to the CHF clinic for ongoing evaluation and monitoring of heart failure.     In the CHF clinic today he denies any adverse symptoms except:  [] Dizziness or lightheadedness   [] Syncope or near syncope  [] Recent Fall  [x] Shortness of breath at    [x] Dyspnea with exertion  [] Decline in functional capacity (unable to perform activities they had previously been able to do)  [x] Fatigue   [] Orthopnea  [] PND  [] Nocturnal cough  [] Hemoptysis  [] Chest pain, pressure, or discomfort  [] Palpitations  [x] Abdominal distention  [] Abdominal bloating  [] Early satiety  [] Blood in stool   [] Diarrhea  [] Constipation  [] Nausea/Vomiting  [] Decreased urinary response to oral diuretic   [] Scrotal swelling   [] Lower extremity edema  [] Used PRN doses of oral diuretic   [] Weight gain    Wt Readings from Last 3 Encounters:   10/25/23 54.9 kg (121 lb)   10/04/23 58.5 kg (129 lb)   10/01/23 57.6 kg (127 lb)           SOCIAL HISTORY:  [x] Denies tobacco, alcohol or illicit drug abuse  [] Tobacco use:  [] ETOH use:  [] Illicit drug use:        MEDICATIONS:    Allergies   Allergen Reactions    Levofloxacin Other (See Comments)     Other reaction(s): Abdominal pain    Lisinopril      Other reaction(s): COUGHING    Tramadol      Other reaction(s): SHAKING    Other      Other reaction(s): ABD PAIN    Ibuprofen Nausea And Vomiting    Sulfa Antibiotics Nausea And Vomiting       Current Outpatient Medications:     arformoterol tartrate (BROVANA) 15 MCG/2ML NEBU, Take 2 ml via

## 2023-10-26 DIAGNOSIS — G89.4 CHRONIC PAIN SYNDROME: ICD-10-CM

## 2023-10-26 DIAGNOSIS — Z51.5 PALLIATIVE CARE ENCOUNTER: ICD-10-CM

## 2023-10-26 RX ORDER — HYDROCODONE BITARTRATE AND ACETAMINOPHEN 7.5; 325 MG/1; MG/1
1 TABLET ORAL EVERY 8 HOURS PRN
Qty: 90 TABLET | Refills: 0 | Status: SHIPPED | OUTPATIENT
Start: 2023-10-26 | End: 2023-11-25

## 2023-10-26 NOTE — TELEPHONE ENCOUNTER
Call from [facility] requesting refill for [address] is [facility]. [facility] states pharmacist was only able to give them 30 ml of Morphine solution , stating insurance would not cover. Instructed [facility] to call when she is running low. Magi Mariano understanding. Next kristina with [facility].

## 2023-10-30 DIAGNOSIS — Z51.5 PALLIATIVE CARE ENCOUNTER: ICD-10-CM

## 2023-10-30 DIAGNOSIS — R06.09 DYSPNEA ON MINIMAL EXERTION: ICD-10-CM

## 2023-10-30 DIAGNOSIS — J44.9 END STAGE COPD (HCC): ICD-10-CM

## 2023-10-30 RX ORDER — MORPHINE SULFATE 20 MG/ML
10 SOLUTION ORAL EVERY 4 HOURS PRN
Qty: 30 ML | Refills: 0 | Status: SHIPPED | OUTPATIENT
Start: 2023-10-30 | End: 2023-11-09

## 2023-10-30 NOTE — TELEPHONE ENCOUNTER
Call from [facility] requesting refill for MS Concentrate. [facility] states they are only able to get 30 ml every two weeks. Verified this with [facility], Pharmacist. Please send 30 ml refill of Morphine concentrate solution to [facility]. Next kristina 11/2/23.

## 2023-11-02 ENCOUNTER — OFFICE VISIT (OUTPATIENT)
Dept: PALLATIVE CARE | Age: 58
End: 2023-11-02
Payer: MEDICAID

## 2023-11-02 VITALS
SYSTOLIC BLOOD PRESSURE: 118 MMHG | OXYGEN SATURATION: 97 % | DIASTOLIC BLOOD PRESSURE: 62 MMHG | RESPIRATION RATE: 14 BRPM | HEART RATE: 87 BPM

## 2023-11-02 DIAGNOSIS — Z51.5 PALLIATIVE CARE ENCOUNTER: ICD-10-CM

## 2023-11-02 DIAGNOSIS — J44.9 END STAGE COPD (HCC): Primary | ICD-10-CM

## 2023-11-02 DIAGNOSIS — R06.09 DYSPNEA ON MINIMAL EXERTION: ICD-10-CM

## 2023-11-02 DIAGNOSIS — G89.4 CHRONIC PAIN SYNDROME: ICD-10-CM

## 2023-11-02 PROCEDURE — 3074F SYST BP LT 130 MM HG: CPT | Performed by: NURSE PRACTITIONER

## 2023-11-02 PROCEDURE — 99348 HOME/RES VST EST LOW MDM 30: CPT | Performed by: NURSE PRACTITIONER

## 2023-11-02 PROCEDURE — 3078F DIAST BP <80 MM HG: CPT | Performed by: NURSE PRACTITIONER
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
## 2023-11-08 DIAGNOSIS — J44.9 END STAGE COPD (HCC): ICD-10-CM

## 2023-11-08 DIAGNOSIS — R06.09 DYSPNEA ON MINIMAL EXERTION: ICD-10-CM

## 2023-11-08 DIAGNOSIS — Z51.5 PALLIATIVE CARE ENCOUNTER: ICD-10-CM

## 2023-11-08 RX ORDER — MORPHINE SULFATE 20 MG/ML
10 SOLUTION ORAL EVERY 4 HOURS PRN
Qty: 30 ML | Refills: 0 | Status: SHIPPED | OUTPATIENT
Start: 2023-11-08 | End: 2023-11-18

## 2023-11-08 NOTE — TELEPHONE ENCOUNTER
Call from Dg's sister requesting refill for Morphine solution. Pharmacy is [facility]. Next kristina with [facility].

## 2023-11-09 ENCOUNTER — HOSPITAL ENCOUNTER (OUTPATIENT)
Dept: OTHER | Age: 58
Setting detail: THERAPIES SERIES
Discharge: HOME | End: 2023-11-09
Payer: MEDICAID

## 2023-11-09 ENCOUNTER — TELEPHONE (OUTPATIENT)
Dept: OTHER | Age: 58
End: 2023-11-09

## 2023-11-09 VITALS
HEART RATE: 71 BPM | DIASTOLIC BLOOD PRESSURE: 56 MMHG | OXYGEN SATURATION: 98 % | BODY MASS INDEX: 20.87 KG/M2 | WEIGHT: 121.6 LBS | RESPIRATION RATE: 18 BRPM | SYSTOLIC BLOOD PRESSURE: 115 MMHG

## 2023-11-09 DIAGNOSIS — I50.20 HFREF (HEART FAILURE WITH REDUCED EJECTION FRACTION) (HCC): Primary | ICD-10-CM

## 2023-11-09 LAB
ANION GAP SERPL CALCULATED.3IONS-SCNC: 11 MMOL/L (ref 7–16)
BNP SERPL-MCNC: 1012 PG/ML (ref 0–125)
BUN SERPL-MCNC: 12 MG/DL (ref 6–20)
CALCIUM SERPL-MCNC: 8.4 MG/DL (ref 8.6–10.2)
CHLORIDE SERPL-SCNC: 93 MMOL/L (ref 98–107)
CO2 SERPL-SCNC: 33 MMOL/L (ref 22–29)
CREAT SERPL-MCNC: 0.6 MG/DL (ref 0.7–1.2)
GFR SERPL CREATININE-BSD FRML MDRD: >60 ML/MIN/1.73M2
GLUCOSE SERPL-MCNC: 129 MG/DL (ref 74–99)
POTASSIUM SERPL-SCNC: 4.5 MMOL/L (ref 3.5–5)
SODIUM SERPL-SCNC: 137 MMOL/L (ref 132–146)

## 2023-11-09 PROCEDURE — 80048 BASIC METABOLIC PNL TOTAL CA: CPT

## 2023-11-09 PROCEDURE — 36415 COLL VENOUS BLD VENIPUNCTURE: CPT

## 2023-11-09 PROCEDURE — 99214 OFFICE O/P EST MOD 30 MIN: CPT

## 2023-11-09 PROCEDURE — 83880 ASSAY OF NATRIURETIC PEPTIDE: CPT

## 2023-11-09 ASSESSMENT — PATIENT HEALTH QUESTIONNAIRE - PHQ9
1. LITTLE INTEREST OR PLEASURE IN DOING THINGS: NOT AT ALL
2. FEELING DOWN, DEPRESSED OR HOPELESS: NOT AT ALL
SUM OF ALL RESPONSES TO PHQ9 QUESTIONS 1 & 2: 0

## 2023-11-09 NOTE — TELEPHONE ENCOUNTER
Labs and CHF Clinic note reviewed  Spoke with Chey Whipple in CHF clinic     Vitals: 115/56, 71    Toprol 50 mg nightly   Losartan 50 mg daily   Isosorbide 20 mg TID  Jardiance 10 mg daily   Spironolactone 25 mg daily   Lasix 40 mg daily   KDUR 20 meq daily       Stop isordil   Stop potassium   Labs in 2 weeks

## 2023-11-09 NOTE — PROGRESS NOTES
prescribed medications with patient, educated on importance of compliance and answered any questions regarding their medication  [] Pill box provided to patient  [] Patient using pill packing pharmacy   [] CPAP/BiPAP use  [] Low impact exercise / cardiac rehab   [] LifeVest use  [x] Patient aware of signs and symptoms of worsening HF, CHF clinic phone number provided and made aware to call clinic for sooner if evaluation if needed     [] Difficulty affording medications  [] CHF CHW consulted  [] Prescription assistance information given   [] [facility] medication assistance program information given   [] Sample medications provided to patient to help bridge gap until affordability N/A      Additional Notes:    Scheduled to follow up in CHF clinic on:    Future Appointments   Date Time Provider [address]   12/5/2023 12:00 PM DIONE [facility] ROOM 3 [facility]   1/12/2024  9:30 AM Inderjit Sanchez MD [facility]   1/23/2024  1:15 PM ARGELIA Loya NP [facility]   2/19/2024 10:15 AM Elina Vega MD [address] [facility]

## 2023-11-09 NOTE — TELEPHONE ENCOUNTER
Labs and CHF Clinic note reviewed  Spoke with Meena Raphael in CHF clinic      Vitals: 115/56, 71     Toprol 50 mg nightly   Losartan 50 mg daily   Isosorbide 20 mg TID  Jardiance 10 mg daily   Spironolactone 25 mg daily   Lasix 40 mg daily   KDUR 20 meq daily         Stop isordil             Talked with Cristal Puente, explained to stop Isosorbide 20 mg TID and KDUR 20 meq daily and to get follow up labs in 2 weeks. Cristal Puente verbalized understanding.

## 2023-11-16 ENCOUNTER — TELEPHONE (OUTPATIENT)
Dept: PRIMARY CARE CLINIC | Age: 58
End: 2023-11-16

## 2023-11-16 DIAGNOSIS — M54.50 ACUTE BILATERAL LOW BACK PAIN WITHOUT SCIATICA: ICD-10-CM

## 2023-11-16 DIAGNOSIS — M54.6 ACUTE BILATERAL THORACIC BACK PAIN: Primary | ICD-10-CM

## 2023-11-16 NOTE — TELEPHONE ENCOUNTER
I spoke with Heriberto Proctor and let her know I faxed the orders over to [facility].  It might take a few for them to get the orders into the system. She will check on them and take him over once they are ready for him.

## 2023-11-16 NOTE — TELEPHONE ENCOUNTER
Pts sister called , due to patients coughing he may have pulled something in his back   He now has some back pain    She is asking if  can put in an order for a entire back xray , pt will be going to [facility] if office can fax it there     Fax [phone]

## 2023-11-17 ENCOUNTER — TELEMEDICINE (OUTPATIENT)
Dept: PRIMARY CARE CLINIC | Age: 58
End: 2023-11-17
Payer: MEDICAID

## 2023-11-17 ENCOUNTER — TELEPHONE (OUTPATIENT)
Dept: PRIMARY CARE CLINIC | Age: 58
End: 2023-11-17

## 2023-11-17 DIAGNOSIS — J44.1 COPD EXACERBATION (HCC): Primary | ICD-10-CM

## 2023-11-17 DIAGNOSIS — J18.9 PNEUMONIA DUE TO INFECTIOUS ORGANISM, UNSPECIFIED LATERALITY, UNSPECIFIED PART OF LUNG: ICD-10-CM

## 2023-11-17 DIAGNOSIS — R06.09 DYSPNEA ON MINIMAL EXERTION: ICD-10-CM

## 2023-11-17 DIAGNOSIS — M54.50 ACUTE BILATERAL LOW BACK PAIN WITHOUT SCIATICA: ICD-10-CM

## 2023-11-17 DIAGNOSIS — J44.9 END STAGE COPD (HCC): ICD-10-CM

## 2023-11-17 DIAGNOSIS — Z51.5 PALLIATIVE CARE ENCOUNTER: ICD-10-CM

## 2023-11-17 DIAGNOSIS — M54.6 ACUTE BILATERAL THORACIC BACK PAIN: ICD-10-CM

## 2023-11-17 PROCEDURE — 99212 OFFICE O/P EST SF 10 MIN: CPT | Performed by: FAMILY MEDICINE

## 2023-11-17 RX ORDER — PREDNISONE 20 MG/1
20 TABLET ORAL 2 TIMES DAILY
Qty: 10 TABLET | Refills: 0 | Status: SHIPPED | OUTPATIENT
Start: 2023-11-17 | End: 2023-11-22

## 2023-11-17 RX ORDER — MORPHINE SULFATE 20 MG/ML
10 SOLUTION ORAL EVERY 4 HOURS PRN
Qty: 30 ML | Refills: 0 | Status: SHIPPED | OUTPATIENT
Start: 2023-11-17 | End: 2023-11-27

## 2023-11-17 RX ORDER — DOXYCYCLINE HYCLATE 100 MG
100 TABLET ORAL 2 TIMES DAILY
Qty: 20 TABLET | Refills: 0 | Status: SHIPPED | OUTPATIENT
Start: 2023-11-17 | End: 2023-11-27

## 2023-11-17 NOTE — TELEPHONE ENCOUNTER
Call from Anju, [facility] sister, requesting refill for Morphine solution. He will need refill over the weekend. [facility]. [facility]

## 2023-11-17 NOTE — TELEPHONE ENCOUNTER
Have sent janis in for pt   Happy to do VV later today but not sure what else I can add since I won't be able to examen him myself  Do need to have low threshold for ER if no improvement

## 2023-11-17 NOTE — TELEPHONE ENCOUNTER
[facility] nurse triage call: Patients sister calling stating that the nurse that comes there said his lungs sounded congested/ full and he needs seen also he is having SOB and not feeling well. Sister is asking for a VV today with you so he can get an antibiotic. Advised them going to ED and she really doesn't want to take him. Please advise.

## 2023-11-17 NOTE — PROGRESS NOTES
TELEPHONE VISIT     Luana Ball is a 62 y.o. male evaluated via telephone on 11/17/2023. Consent:  He and/or health care decision maker is aware that that he may receive a bill for this telephone service,  which includes applicable co-pays. This virtual visit was conducted with the patient's  (and/or legal guardian's) consent. This Virtual  Telephone Visit was conducted with patient's (and/or legal guardian's) consent  Patient identification was verified, and a caregiver was present when appropriate. Patient identification was verified, and a caregiver was present when appropriate. The patient was located in a state where the provider was licensed to provide care. depending on his insurance coverage, and has provided verbal consent to proceed    Yes    The patient was located at Other: parked car in [place]  Provider was located at [facility] (Appt Dept): [address] [facility],  [address] [facility]  Hodalandon Chavis also on the call     Documentation:  Patient identification was verified at the start of the visit: Yes  I communicated with the patient and/or health care decision maker about back pain. Details of this discussion including any medical advice provided:     Pt had been coughing a lot of and felt he hurt his back  Requested back xrays  These were ordered yesterday   He just had them done today but no results as of yet, still driving home from hospital   Pain is mostly left side, around his ribs  He just today started coughing up green thick phlegm  Breathing has been worse  Pt states he feels like he has PNA   Visiting nurse said he sounds very congested in the lungs      I affirm this is a Patient Initiated Episode with a Patient who has not had a related appointment within my department in the past 7 days or scheduled within the next 24 hours. Diagnosis Orders   1. COPD exacerbation (HCC)  predniSONE (DELTASONE) 20 MG tablet      2.  Pneumonia due to infectious organism, unspecified

## 2023-11-19 ENCOUNTER — PATIENT MESSAGE (OUTPATIENT)
Dept: PRIMARY CARE CLINIC | Age: 58
End: 2023-11-19

## 2023-11-19 DIAGNOSIS — R51.9 BAD HEADACHE: ICD-10-CM

## 2023-11-19 DIAGNOSIS — F41.9 ANXIETY: ICD-10-CM

## 2023-11-20 RX ORDER — LORAZEPAM 1 MG/1
1 TABLET ORAL EVERY 8 HOURS PRN
Qty: 90 TABLET | Refills: 1 | Status: SHIPPED | OUTPATIENT
Start: 2023-11-20 | End: 2024-01-19

## 2023-11-20 RX ORDER — BUTALBITAL, ACETAMINOPHEN AND CAFFEINE 50; 325; 40 MG/1; MG/1; MG/1
TABLET ORAL
Qty: 30 TABLET | Refills: 1 | Status: SHIPPED | OUTPATIENT
Start: 2023-11-20

## 2023-11-20 NOTE — TELEPHONE ENCOUNTER
From: Sandrine Celaya  To: Dr. Corinne Romo  Sent: 11/19/2023 8:10 AM EST  Subject: Refills    Hi. Daniel Todd needs his Ativan and the fioricet refilled, please and thank you. [facility].  Marietta Baker
Inadequate energy intake.../Loss of subcutaneous fat.../Loss of muscle...

## 2023-11-21 DIAGNOSIS — G89.4 CHRONIC PAIN SYNDROME: ICD-10-CM

## 2023-11-21 DIAGNOSIS — Z51.5 PALLIATIVE CARE ENCOUNTER: ICD-10-CM

## 2023-11-21 RX ORDER — HYDROCODONE BITARTRATE AND ACETAMINOPHEN 7.5; 325 MG/1; MG/1
1 TABLET ORAL EVERY 8 HOURS PRN
Qty: 90 TABLET | Refills: 0 | Status: SHIPPED | OUTPATIENT
Start: 2023-11-21 | End: 2023-12-21

## 2023-11-21 NOTE — TELEPHONE ENCOUNTER
Call from Anju requesting a refill for her brother's Margarita Cardenas states she is calling early due to the [facility] weekend, she states she does not need to  until 11/27. [facility] [place]. Next kristina to be scheduled with [facility].

## 2023-11-27 DIAGNOSIS — J44.9 END STAGE COPD (HCC): ICD-10-CM

## 2023-11-27 DIAGNOSIS — Z51.5 PALLIATIVE CARE ENCOUNTER: ICD-10-CM

## 2023-11-27 DIAGNOSIS — R06.09 DYSPNEA ON MINIMAL EXERTION: ICD-10-CM

## 2023-11-27 RX ORDER — MORPHINE SULFATE 20 MG/ML
10 SOLUTION ORAL EVERY 4 HOURS PRN
Qty: 30 ML | Refills: 0 | Status: SHIPPED | OUTPATIENT
Start: 2023-11-27 | End: 2023-12-07

## 2023-11-27 NOTE — TELEPHONE ENCOUNTER
Call from [facility] for Dg's refill of Morphine solution. [facility]

## 2023-11-28 RX ORDER — ASPIRIN 81 MG
81 TABLET,CHEWABLE ORAL DAILY
Qty: 30 TABLET | Refills: 3 | Status: SHIPPED | OUTPATIENT
Start: 2023-11-28

## 2023-11-28 NOTE — TELEPHONE ENCOUNTER
Medication: Olmesartan Medoxomil 20 MG Oral Tablet  Last office visit date: 9/19/23  Next appointment scheduled?: Yes   Number of refills given: 2  Last dispensed on 10/17/23 with a 30 day supply     Nurse Kely Negrete called stating pt was in [facility] ER yesterday and xrays show pneumonia. Pt is coughing, on 6L O2, high pitched wheezing, diminished, almost a \"rub\" in his lungs. Very uncomfortable. The nurse states that she is concerned about him and is not sure what to do because the ER just sent him home. Left leg very swollen also, but shows no DVT. Sister [phone].

## 2023-12-05 ENCOUNTER — HOSPITAL ENCOUNTER (OUTPATIENT)
Dept: OTHER | Age: 58
Setting detail: THERAPIES SERIES
Discharge: HOME | End: 2023-12-05

## 2023-12-08 DIAGNOSIS — R06.09 DYSPNEA ON MINIMAL EXERTION: ICD-10-CM

## 2023-12-08 DIAGNOSIS — Z51.5 PALLIATIVE CARE ENCOUNTER: ICD-10-CM

## 2023-12-08 DIAGNOSIS — J44.9 END STAGE COPD (HCC): ICD-10-CM

## 2023-12-08 RX ORDER — MORPHINE SULFATE 20 MG/ML
10 SOLUTION ORAL EVERY 4 HOURS PRN
Qty: 30 ML | Refills: 0 | Status: SHIPPED | OUTPATIENT
Start: 2023-12-08 | End: 2023-12-18

## 2023-12-08 NOTE — TELEPHONE ENCOUNTER
Call from Anju requesting refill for Morphine solution for her brother Mohsen Reveles. [facility]acy [facility]. Next kristina with [facility].

## 2023-12-13 ENCOUNTER — TELEPHONE (OUTPATIENT)
Dept: PRIMARY CARE CLINIC | Age: 58
End: 2023-12-13

## 2023-12-13 NOTE — TELEPHONE ENCOUNTER
Called, no answer, left message to return call, checked with mark Mckeon to add to schedule on Wednesday 12/20 at 8:45.

## 2023-12-13 NOTE — TELEPHONE ENCOUNTER
Pt has not been discharged yet, but he does have an appointment scheduled already for 12/19. He has Medicaid so we can not do a TCM.

## 2023-12-13 NOTE — TELEPHONE ENCOUNTER
[facility] to schedule [facility] f/u appt. Pt was admitted for four days due to SOB/COPD exacerbation. D/c today. We can call the pt to schedule. Please advise where you would like him.

## 2023-12-26 DIAGNOSIS — R51.9 BAD HEADACHE: ICD-10-CM

## 2023-12-26 NOTE — TELEPHONE ENCOUNTER
Last Appointment:  12/19/2023  Future Appointments   Date Time Provider [address]   1/2/2024  9:30 AM DIONE CHF ROOM 1 DIONE [facility]F [facility]   1/12/2024  9:30 AM Kia Sanchez MD [facility]   1/23/2024  1:15 PM ARGELIA Cary - NP [facility]   2/19/2024 10:15 AM Roseanna Vega MD [facility]

## 2023-12-26 NOTE — TELEPHONE ENCOUNTER
Last Appointment:  12/19/2023  Future Appointments   Date Time Provider Department Center   1/2/2024  9:30 AM DIONE CHF ROOM 1 [facility]E [facility]   1/12/2024  9:30 AM [facility]   1/23/2024  1:15 PM Bernice Ceja APRN - NP [facility]   2/19/2024 10:15 AM Alonzo Vega MD [facility]

## 2023-12-27 RX ORDER — BUTALBITAL, ACETAMINOPHEN AND CAFFEINE 50; 325; 40 MG/1; MG/1; MG/1
TABLET ORAL
Qty: 30 TABLET | Refills: 1 | OUTPATIENT
Start: 2023-12-27

## 2023-12-27 RX ORDER — BUTALBITAL, ACETAMINOPHEN AND CAFFEINE 50; 325; 40 MG/1; MG/1; MG/1
TABLET ORAL
Qty: 30 TABLET | Refills: 1 | Status: SHIPPED | OUTPATIENT
Start: 2023-12-27

## 2024-01-02 ENCOUNTER — HOSPITAL ENCOUNTER (OUTPATIENT)
Dept: OTHER | Age: 59
Setting detail: THERAPIES SERIES
Discharge: HOME | End: 2024-01-02
Payer: MEDICAID

## 2024-01-02 VITALS
WEIGHT: 125.6 LBS | OXYGEN SATURATION: 99 % | SYSTOLIC BLOOD PRESSURE: 102 MMHG | BODY MASS INDEX: 21.56 KG/M2 | DIASTOLIC BLOOD PRESSURE: 51 MMHG | RESPIRATION RATE: 18 BRPM | HEART RATE: 73 BPM

## 2024-01-02 DIAGNOSIS — Z51.5 PALLIATIVE CARE ENCOUNTER: ICD-10-CM

## 2024-01-02 DIAGNOSIS — R06.09 DYSPNEA ON MINIMAL EXERTION: ICD-10-CM

## 2024-01-02 DIAGNOSIS — J44.9 END STAGE COPD (HCC): ICD-10-CM

## 2024-01-02 LAB
ANION GAP SERPL CALCULATED.3IONS-SCNC: 5 MMOL/L (ref 7–16)
BNP SERPL-MCNC: 924 PG/ML (ref 0–125)
BUN SERPL-MCNC: 11 MG/DL (ref 6–20)
CALCIUM SERPL-MCNC: 8 MG/DL (ref 8.6–10.2)
CHLORIDE SERPL-SCNC: 98 MMOL/L (ref 98–107)
CO2 SERPL-SCNC: 35 MMOL/L (ref 22–29)
CREAT SERPL-MCNC: 0.6 MG/DL (ref 0.7–1.2)
GFR SERPL CREATININE-BSD FRML MDRD: >60 ML/MIN/1.73M2
GLUCOSE SERPL-MCNC: 115 MG/DL (ref 74–99)
POTASSIUM SERPL-SCNC: 4.1 MMOL/L (ref 3.5–5)
SODIUM SERPL-SCNC: 138 MMOL/L (ref 132–146)

## 2024-01-02 PROCEDURE — 83880 ASSAY OF NATRIURETIC PEPTIDE: CPT

## 2024-01-02 PROCEDURE — 36415 COLL VENOUS BLD VENIPUNCTURE: CPT

## 2024-01-02 PROCEDURE — 99214 OFFICE O/P EST MOD 30 MIN: CPT

## 2024-01-02 PROCEDURE — 2580000003 HC RX 258

## 2024-01-02 PROCEDURE — 80048 BASIC METABOLIC PNL TOTAL CA: CPT

## 2024-01-02 PROCEDURE — 96374 THER/PROPH/DIAG INJ IV PUSH: CPT

## 2024-01-02 PROCEDURE — 6360000002 HC RX W HCPCS

## 2024-01-02 RX ORDER — MORPHINE SULFATE 20 MG/ML
10 SOLUTION ORAL EVERY 4 HOURS PRN
Qty: 30 ML | Refills: 0 | Status: SHIPPED | OUTPATIENT
Start: 2024-01-02 | End: 2024-01-12

## 2024-01-02 RX ORDER — FUROSEMIDE 10 MG/ML
INJECTION INTRAMUSCULAR; INTRAVENOUS
Status: COMPLETED
Start: 2024-01-02 | End: 2024-01-02

## 2024-01-02 RX ORDER — FUROSEMIDE 10 MG/ML
40 INJECTION INTRAMUSCULAR; INTRAVENOUS ONCE
Status: COMPLETED | OUTPATIENT
Start: 2024-01-02 | End: 2024-01-02

## 2024-01-02 RX ORDER — FLUTICASONE PROPIONATE 50 MCG
1 SPRAY, SUSPENSION (ML) NASAL DAILY
COMMUNITY

## 2024-01-02 RX ORDER — SODIUM CHLORIDE 0.9 % (FLUSH) 0.9 %
SYRINGE (ML) INJECTION
Status: COMPLETED
Start: 2024-01-02 | End: 2024-01-02

## 2024-01-02 RX ORDER — SODIUM CHLORIDE 0.9 % (FLUSH) 0.9 %
10 SYRINGE (ML) INJECTION ONCE
Status: COMPLETED | OUTPATIENT
Start: 2024-01-02 | End: 2024-01-02

## 2024-01-02 RX ADMIN — SODIUM CHLORIDE, PRESERVATIVE FREE 10 ML: 5 INJECTION INTRAVENOUS at 10:00

## 2024-01-02 RX ADMIN — Medication 10 ML: at 10:00

## 2024-01-02 RX ADMIN — FUROSEMIDE 40 MG: 10 INJECTION, SOLUTION INTRAMUSCULAR; INTRAVENOUS at 10:09

## 2024-01-02 RX ADMIN — FUROSEMIDE 40 MG: 10 INJECTION INTRAMUSCULAR; INTRAVENOUS at 10:09

## 2024-01-02 NOTE — TELEPHONE ENCOUNTER
Call from Sayra Dg's sister, requesting refill for Morphine solution 20 mg/ml. [facility]. [facility]

## 2024-01-02 NOTE — PROGRESS NOTES
Rhythm: Sinus rhythm  Rhythm Interpretation  Pulse: 73     Gastrointestinal  Abdominal (WDL): Exceptions to WDL  Abdomen Inspection: Taut (paitents wife states this is his baseline)  RUQ Bowel Sounds: Active  LUQ Bowel Sounds: Active  RLQ Bowel Sounds: Active  LLQ Bowel Sounds: Active      Bowel Sounds  RUQ Bowel Sounds: Active  LUQ Bowel Sounds: Active  RLQ Bowel Sounds: Active  LLQ Bowel Sounds: Active  Peripheral Vascular  Peripheral Vascular (WDL): Exceptions to WDL  Edema: Right lower extremity, Left lower extremity  RLE Edema: Trace  LLE Edema: Trace        Musculoskeletal  RL Extremity: Unsteady  LL Extremity: Unsteady  Genitourinary  Genitourinary (WDL): Within Defined Limits  Psychosocial  Psychosocial (WDL): Within Defined Limits              Pulse: 73                 LAB DATA:  BMP:  Sodium (mmol/L)   Date Value   01/02/2024 138   11/09/2023 137   10/25/2023 137     Potassium (mmol/L)   Date Value   01/02/2024 4.1   11/09/2023 4.5   10/25/2023 4.1     Potassium reflex Magnesium (mmol/L)   Date Value   06/25/2023 5.5 (H)   04/23/2023 4.5   11/02/2022 4.9     Chloride (mmol/L)   Date Value   01/02/2024 98   11/09/2023 93 (L)   10/25/2023 92 (L)     CO2 (mmol/L)   Date Value   01/02/2024 35 (H)   11/09/2023 33 (H)   10/25/2023 33 (H)     BUN (mg/dL)   Date Value   01/02/2024 11   11/09/2023 12   10/25/2023 8     Creatinine (mg/dL)   Date Value   01/02/2024 0.6 (L)   11/09/2023 0.6 (L)   10/25/2023 0.6 (L)     Glucose (mg/dL)   Date Value   01/02/2024 115 (H)   11/09/2023 129 (H)   10/25/2023 110 (H)     Calcium (mg/dL)   Date Value   01/02/2024 8.0 (L)   11/09/2023 8.4 (L)   10/25/2023 8.1 (L)     BNP:  Pro-BNP (pg/mL)   Date Value   01/02/2024 924 (H)   11/09/2023 1,012 (H)   10/25/2023 1,020 (H)      CBC:  WBC (k/uL)   Date Value   10/01/2023 5.1     Hemoglobin (g/dL)   Date Value   10/01/2023 9.3 (L)     Hematocrit (%)   Date Value   10/01/2023 31.1 (L)     Platelets (k/uL)   Date Value   10/01/2023 142

## 2024-01-04 ENCOUNTER — OFFICE VISIT (OUTPATIENT)
Dept: PALLATIVE CARE | Age: 59
End: 2024-01-04

## 2024-01-04 VITALS
HEART RATE: 87 BPM | DIASTOLIC BLOOD PRESSURE: 68 MMHG | SYSTOLIC BLOOD PRESSURE: 134 MMHG | OXYGEN SATURATION: 97 % | RESPIRATION RATE: 16 BRPM

## 2024-01-04 DIAGNOSIS — J44.9 END STAGE COPD (HCC): Primary | ICD-10-CM

## 2024-01-04 DIAGNOSIS — G89.4 CHRONIC PAIN SYNDROME: ICD-10-CM

## 2024-01-04 DIAGNOSIS — Z51.5 PALLIATIVE CARE ENCOUNTER: ICD-10-CM

## 2024-01-04 DIAGNOSIS — R06.09 DYSPNEA ON MINIMAL EXERTION: ICD-10-CM
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
## 2024-01-12 ENCOUNTER — TELEPHONE (OUTPATIENT)
Dept: PRIMARY CARE CLINIC | Age: 59
End: 2024-01-12

## 2024-01-12 NOTE — TELEPHONE ENCOUNTER
They called in to see if they want to try to call again for the virtual visit or to reschedule the PT?

## 2024-01-12 NOTE — TELEPHONE ENCOUNTER
I spoke to Sayra and they will call if he needs seen.  He has appt with the pulmonologist coming up.  He has been doing good last couple of weeks she stated.

## 2024-01-15 DIAGNOSIS — R06.09 DYSPNEA ON MINIMAL EXERTION: ICD-10-CM

## 2024-01-15 DIAGNOSIS — Z51.5 PALLIATIVE CARE ENCOUNTER: ICD-10-CM

## 2024-01-15 DIAGNOSIS — G89.4 CHRONIC PAIN SYNDROME: ICD-10-CM

## 2024-01-15 DIAGNOSIS — J44.9 END STAGE COPD (HCC): ICD-10-CM

## 2024-01-15 RX ORDER — MORPHINE SULFATE 20 MG/ML
10 SOLUTION ORAL EVERY 4 HOURS PRN
Qty: 30 ML | Refills: 0 | Status: SHIPPED | OUTPATIENT
Start: 2024-01-15 | End: 2024-01-25

## 2024-01-15 RX ORDER — HYDROCODONE BITARTRATE AND ACETAMINOPHEN 7.5; 325 MG/1; MG/1
1 TABLET ORAL EVERY 8 HOURS PRN
Qty: 90 TABLET | Refills: 0 | Status: SHIPPED | OUTPATIENT
Start: 2024-01-15 | End: 2024-02-14

## 2024-01-16 ENCOUNTER — TELEPHONE (OUTPATIENT)
Dept: PRIMARY CARE CLINIC | Age: 59
End: 2024-01-16

## 2024-01-16 RX ORDER — CEPHALEXIN 500 MG/1
500 CAPSULE ORAL 2 TIMES DAILY
Qty: 14 CAPSULE | Refills: 0 | Status: SHIPPED | OUTPATIENT
Start: 2024-01-16 | End: 2024-01-23

## 2024-01-16 NOTE — TELEPHONE ENCOUNTER
Sister called pt experiencing burning while urinating with some discomfort since yesterday   Pain in back where kidneys are located       Asking if dr can call something in         [facility] - [place], [place] - [address] [address] -  [phone] - F [phone] [[phone]]

## 2024-01-17 ENCOUNTER — PATIENT MESSAGE (OUTPATIENT)
Dept: PRIMARY CARE CLINIC | Age: 59
End: 2024-01-17

## 2024-01-17 RX ORDER — FLUTICASONE PROPIONATE 50 MCG
1 SPRAY, SUSPENSION (ML) NASAL DAILY
Qty: 16 G | Refills: 3 | Status: SHIPPED | OUTPATIENT
Start: 2024-01-17

## 2024-01-17 NOTE — TELEPHONE ENCOUNTER
From: Dg Peña  To: Dr. Jana Sanchez  Sent: 1/17/2024 11:11 AM EST  Subject: flonase    when sunny was in the hospital last time they gave him Flonase, however he is out of it now. Could you phone him in a refill for that? Thank you, Deng

## 2024-01-22 ENCOUNTER — HOSPITAL ENCOUNTER (OUTPATIENT)
Dept: OTHER | Age: 59
Discharge: HOME | End: 2024-01-22

## 2024-01-23 DIAGNOSIS — J44.9 END STAGE COPD (HCC): ICD-10-CM

## 2024-01-23 DIAGNOSIS — Z51.5 PALLIATIVE CARE ENCOUNTER: ICD-10-CM

## 2024-01-23 DIAGNOSIS — R51.9 BAD HEADACHE: ICD-10-CM

## 2024-01-23 DIAGNOSIS — R06.09 DYSPNEA ON MINIMAL EXERTION: ICD-10-CM

## 2024-01-23 RX ORDER — BUTALBITAL, ACETAMINOPHEN AND CAFFEINE 50; 325; 40 MG/1; MG/1; MG/1
TABLET ORAL
Qty: 30 TABLET | Refills: 1 | Status: SHIPPED | OUTPATIENT
Start: 2024-01-23

## 2024-01-23 RX ORDER — MORPHINE SULFATE 20 MG/ML
10 SOLUTION ORAL EVERY 4 HOURS PRN
Qty: 30 ML | Refills: 0 | Status: SHIPPED | OUTPATIENT
Start: 2024-01-23 | End: 2024-02-02

## 2024-01-23 NOTE — TELEPHONE ENCOUNTER
Call from Sayra requesting a refill of Morphine Concentrate 20 mg/ml for her brother Dg. [email or web address]. Next kristina with [facility].

## 2024-01-23 NOTE — TELEPHONE ENCOUNTER
Last Appointment:  12/19/2023  Future Appointments   Date Time Provider Department Center   1/24/2024  9:45 AM DIONE [facility] ROOM 2 [facility]   1/31/2024  1:30 PM Bernice Ceja APRN - NP [facility]   2/19/2024 10:15 AM Alonzo Vega MD [facility]

## 2024-01-24 ENCOUNTER — HOSPITAL ENCOUNTER (OUTPATIENT)
Dept: OTHER | Age: 59
Setting detail: THERAPIES SERIES
Discharge: HOME | End: 2024-01-24
Payer: MEDICAID

## 2024-01-24 VITALS
DIASTOLIC BLOOD PRESSURE: 56 MMHG | BODY MASS INDEX: 20.43 KG/M2 | SYSTOLIC BLOOD PRESSURE: 118 MMHG | HEART RATE: 77 BPM | WEIGHT: 119 LBS | RESPIRATION RATE: 18 BRPM | OXYGEN SATURATION: 96 %

## 2024-01-24 LAB
ANION GAP SERPL CALCULATED.3IONS-SCNC: 11 MMOL/L (ref 7–16)
BNP SERPL-MCNC: 189 PG/ML (ref 0–125)
BUN SERPL-MCNC: 13 MG/DL (ref 6–20)
CALCIUM SERPL-MCNC: 8.3 MG/DL (ref 8.6–10.2)
CHLORIDE SERPL-SCNC: 94 MMOL/L (ref 98–107)
CO2 SERPL-SCNC: 30 MMOL/L (ref 22–29)
CREAT SERPL-MCNC: 0.6 MG/DL (ref 0.7–1.2)
GFR SERPL CREATININE-BSD FRML MDRD: >60 ML/MIN/1.73M2
GLUCOSE SERPL-MCNC: 148 MG/DL (ref 74–99)
POTASSIUM SERPL-SCNC: 4.8 MMOL/L (ref 3.5–5)
SODIUM SERPL-SCNC: 135 MMOL/L (ref 132–146)

## 2024-01-24 PROCEDURE — 83880 ASSAY OF NATRIURETIC PEPTIDE: CPT

## 2024-01-24 PROCEDURE — 36415 COLL VENOUS BLD VENIPUNCTURE: CPT

## 2024-01-24 PROCEDURE — 99214 OFFICE O/P EST MOD 30 MIN: CPT

## 2024-01-24 PROCEDURE — 80048 BASIC METABOLIC PNL TOTAL CA: CPT

## 2024-01-29 RX ORDER — GABAPENTIN 300 MG/1
300 CAPSULE ORAL 3 TIMES DAILY
Qty: 270 CAPSULE | Refills: 1 | Status: SHIPPED | OUTPATIENT
Start: 2024-01-29 | End: 2024-07-27

## 2024-01-29 NOTE — TELEPHONE ENCOUNTER
Call from Dg's sister Sayra requesting refill for gabapentin. Next kristina with [facility] and pharmacy is [facility].

## 2024-02-02 DIAGNOSIS — Z51.5 PALLIATIVE CARE ENCOUNTER: ICD-10-CM

## 2024-02-02 DIAGNOSIS — J44.9 END STAGE COPD (HCC): ICD-10-CM

## 2024-02-02 DIAGNOSIS — R06.09 DYSPNEA ON MINIMAL EXERTION: ICD-10-CM

## 2024-02-02 RX ORDER — MORPHINE SULFATE 20 MG/ML
10 SOLUTION ORAL EVERY 4 HOURS PRN
Qty: 30 ML | Refills: 0 | Status: SHIPPED | OUTPATIENT
Start: 2024-02-02 | End: 2024-02-12

## 2024-02-02 NOTE — TELEPHONE ENCOUNTER
Call from Sayra Dg's sister, requesting refill for Morphine concentrate. [facility]. Next kristina with [facility] is 2/15/24.

## 2024-02-08 RX ORDER — FUROSEMIDE 40 MG/1
TABLET ORAL
Qty: 90 TABLET | Refills: 1 | Status: SHIPPED | OUTPATIENT
Start: 2024-02-08

## 2024-02-12 DIAGNOSIS — R06.09 DYSPNEA ON MINIMAL EXERTION: ICD-10-CM

## 2024-02-12 DIAGNOSIS — Z51.5 PALLIATIVE CARE ENCOUNTER: ICD-10-CM

## 2024-02-12 DIAGNOSIS — J44.9 END STAGE COPD (HCC): ICD-10-CM

## 2024-02-12 DIAGNOSIS — G89.4 CHRONIC PAIN SYNDROME: ICD-10-CM

## 2024-02-12 RX ORDER — MORPHINE SULFATE 20 MG/ML
10 SOLUTION ORAL EVERY 4 HOURS PRN
Qty: 30 ML | Refills: 0 | Status: SHIPPED | OUTPATIENT
Start: 2024-02-12 | End: 2024-02-22

## 2024-02-12 RX ORDER — HYDROCODONE BITARTRATE AND ACETAMINOPHEN 7.5; 325 MG/1; MG/1
1 TABLET ORAL EVERY 8 HOURS PRN
Qty: 90 TABLET | Refills: 0 | Status: SHIPPED | OUTPATIENT
Start: 2024-02-12 | End: 2024-03-13

## 2024-02-12 NOTE — TELEPHONE ENCOUNTER
Call from Sayra requesting refill for Dg's Morphine concentrate and [place]. Pharmacy is [facility]. Next kristina is with [facility] 2/15/24.

## 2024-02-13 ENCOUNTER — TELEPHONE (OUTPATIENT)
Dept: PALLATIVE CARE | Age: 59
End: 2024-02-13

## 2024-02-13 NOTE — TELEPHONE ENCOUNTER
Called to notify sister that Morphine was pre approved.  Sister stated that she picked it up from the pharmacy already.

## 2024-02-15 ENCOUNTER — OFFICE VISIT (OUTPATIENT)
Dept: PALLATIVE CARE | Age: 59
End: 2024-02-15
Payer: MEDICAID

## 2024-02-15 VITALS
DIASTOLIC BLOOD PRESSURE: 72 MMHG | OXYGEN SATURATION: 93 % | HEART RATE: 84 BPM | SYSTOLIC BLOOD PRESSURE: 134 MMHG | RESPIRATION RATE: 16 BRPM

## 2024-02-15 DIAGNOSIS — J44.9 END STAGE COPD (HCC): ICD-10-CM

## 2024-02-15 DIAGNOSIS — G89.4 CHRONIC PAIN SYNDROME: ICD-10-CM

## 2024-02-15 DIAGNOSIS — R06.09 DYSPNEA ON MINIMAL EXERTION: ICD-10-CM

## 2024-02-15 DIAGNOSIS — Z51.5 PALLIATIVE CARE ENCOUNTER: Primary | ICD-10-CM

## 2024-02-15 PROCEDURE — 99348 HOME/RES VST EST LOW MDM 30: CPT | Performed by: NURSE PRACTITIONER

## 2024-02-15 PROCEDURE — 3078F DIAST BP <80 MM HG: CPT | Performed by: NURSE PRACTITIONER

## 2024-02-15 PROCEDURE — 3075F SYST BP GE 130 - 139MM HG: CPT | Performed by: NURSE PRACTITIONER

## 2024-02-15 NOTE — PROGRESS NOTES
stated age, well nourished, in no acute distress  HEENT:  Normocephalic, conjunctiva pink, no drainage, mucosa moist  Neck:  Supple  Lungs:  course throughout  Heart:  RRR  Abd:  Soft, non tender, non distended, BS+  M/S/Ext:  Moving all extremities, + BLE edema, pulses present  Skin:  Warm and dry, wounds/skin tears noted to right LL  Neuro:  PERRL, Alert, oriented x 3; following commands      Opioid Risk Tool Scores    If Patient is Female If Patient is Male   Family History of Substance Abuse:     Alcohol [] 1 [x] 3   Illegal Drugs [] 2 [] 3   Prescription Drugs [] 4 [] 3    Personal History of Substance Abuse:     Alcohol [] 3 [x] 3   Illegal Drugs [] 4 [] 4   Prescription Drugs [] 5 [] 5    Age between 16 and 45 [] 1 [] 1   History of Preadolescent Sexual Abuse [] 3 [] 0   Psychological Disease     ADD, OCD, Bipolar, or Schizophrenia [] 2 [] 2   Depression [] 1 [] 1   Total 6   Low Risk = 0-3          Moderate Risk = 4-7          High Risk = 8 and above       Current Medications:  Medications reviewed: yes    Controlled Substances Monitoring: [place] reviewed 11/2/23.  RX Monitoring Periodic Controlled Substance Monitoring   1/4/2024   4:39 PM No signs of potential drug abuse or diversion identified.;Possible medication side effects, risk of tolerance/dependence & alternative treatments discussed.;Assessed functional status (ability to engage in work or other purposeful activities, the pain intensity and its interference with activities of daily living, quality of family life and social activities, and the physical activity);Obtaining appropriate analgesic effect of treatment.       ARGELIA Ramirez - CNP  Palliative Medicine    MDM/Time:  The total encounter time on this service date was 25 minutes, which was spent performing a face-to-face encounter and personally completing the provider-level activities documented in the note.  This includes time spent prior to the visit and after the visit in direct care

## 2024-02-19 ENCOUNTER — OFFICE VISIT (OUTPATIENT)
Dept: CARDIOLOGY CLINIC | Age: 59
End: 2024-02-19
Payer: MEDICAID

## 2024-02-19 ENCOUNTER — PATIENT MESSAGE (OUTPATIENT)
Dept: PRIMARY CARE CLINIC | Age: 59
End: 2024-02-19

## 2024-02-19 VITALS
RESPIRATION RATE: 18 BRPM | SYSTOLIC BLOOD PRESSURE: 144 MMHG | WEIGHT: 121.6 LBS | BODY MASS INDEX: 20.76 KG/M2 | HEIGHT: 64 IN | DIASTOLIC BLOOD PRESSURE: 78 MMHG

## 2024-02-19 DIAGNOSIS — I73.9 PERIPHERAL VASCULAR DISEASE (HCC): ICD-10-CM

## 2024-02-19 DIAGNOSIS — I50.32 CHRONIC DIASTOLIC (CONGESTIVE) HEART FAILURE (HCC): Primary | ICD-10-CM

## 2024-02-19 DIAGNOSIS — R51.9 BAD HEADACHE: ICD-10-CM

## 2024-02-19 DIAGNOSIS — I25.10 CORONARY ARTERY DISEASE INVOLVING NATIVE CORONARY ARTERY OF NATIVE HEART WITHOUT ANGINA PECTORIS: ICD-10-CM

## 2024-02-19 DIAGNOSIS — J96.11 CHRONIC RESPIRATORY FAILURE WITH HYPOXIA (HCC): ICD-10-CM

## 2024-02-19 DIAGNOSIS — F41.9 ANXIETY: ICD-10-CM

## 2024-02-19 DIAGNOSIS — R07.2 PRECORDIAL PAIN: ICD-10-CM

## 2024-02-19 DIAGNOSIS — I10 PRIMARY HYPERTENSION: ICD-10-CM

## 2024-02-19 PROCEDURE — 3078F DIAST BP <80 MM HG: CPT | Performed by: INTERNAL MEDICINE

## 2024-02-19 PROCEDURE — 99214 OFFICE O/P EST MOD 30 MIN: CPT | Performed by: INTERNAL MEDICINE

## 2024-02-19 PROCEDURE — 3077F SYST BP >= 140 MM HG: CPT | Performed by: INTERNAL MEDICINE

## 2024-02-19 PROCEDURE — 93000 ELECTROCARDIOGRAM COMPLETE: CPT | Performed by: INTERNAL MEDICINE

## 2024-02-19 RX ORDER — LORAZEPAM 1 MG/1
1 TABLET ORAL EVERY 8 HOURS PRN
Qty: 90 TABLET | Refills: 1 | Status: SHIPPED | OUTPATIENT
Start: 2024-02-21 | End: 2024-04-21

## 2024-02-19 RX ORDER — BUTALBITAL, ACETAMINOPHEN AND CAFFEINE 50; 325; 40 MG/1; MG/1; MG/1
TABLET ORAL
Qty: 30 TABLET | Refills: 1 | Status: SHIPPED | OUTPATIENT
Start: 2024-02-19

## 2024-02-19 NOTE — TELEPHONE ENCOUNTER
From: Dg Peña  To: Dr. Jana Sanchez  Sent: 2/19/2024 7:37 AM EST  Subject: Dg Peña     he needs his ativan refilled and his fioricet. [facility]. thank you

## 2024-02-19 NOTE — PROGRESS NOTES
OUTPATIENT CARDIOLOGY FOLLOW-UP    Name: Dg Peña    Age: 58 y.o.    Date of Service: 2/19/2024    Chief Complaint: Chronic HFpEF, CAD, chest pain    Referring Physician: 2/19/2024    History of Present Illness:   +severe COPD / on home O2. +decreased functional capacity / he ambulates with a walker/wheelchair. +LE edema (improved with ongoing diuresis). Still with occasional right-sided chest pain -- episodes last a few minutes, daily, no particular relationship to exertion, no radiation of the pain, no associated symptoms --> mild CAD on recent cardiac catheterization. Currently with no complaints of CP, palpitations, or respiratory distress at rest. SR on EKG. He recently stopped taking aldactone due to possible side effects (\"it makes me too tired when I take it\").    Review of Systems:   Cardiac: As per HPI  General: No fever, chills  Pulmonary: As per HPI  HEENT: No visual disturbances, difficult swallowing  GI: No nausea, vomiting  : No dysuria, hematuria  Endocrine: No thyroid disease or DM  Musculoskeletal: SANDERS x 4, no focal motor deficits  Skin: Intact, no rashes  Neuro: No headache, seizures  Psych: Currently with no depression, anxiety    Past Medical History:  Past Medical History:   Diagnosis Date    Anxiety     COPD (chronic obstructive pulmonary disease) (HCC)     Hypertension     Leg swelling     Multiple gastric ulcers     Restless leg syndrome        Past Surgical History:  Past Surgical History:   Procedure Laterality Date    BRONCHOSCOPY N/A 4/27/2023    BRONCHOSCOPY DIAGNOSTIC OR CELL WASH ONLY performed by Zhang Murphy MD at [facility] ENDOSCOPY    HERNIA REPAIR      HIP SURGERY      KNEE SURGERY         Family History:  Family History   Problem Relation Age of Onset    Colon Cancer Mother     Other Father         house fire    No Known Problems Sister     No Known Problems Brother     No Known Problems Sister     No Known Problems Brother        Social History:  Social History

## 2024-02-21 DIAGNOSIS — Z51.5 PALLIATIVE CARE ENCOUNTER: ICD-10-CM

## 2024-02-21 DIAGNOSIS — J44.9 END STAGE COPD (HCC): ICD-10-CM

## 2024-02-21 DIAGNOSIS — R06.09 DYSPNEA ON MINIMAL EXERTION: ICD-10-CM

## 2024-02-21 RX ORDER — MORPHINE SULFATE 20 MG/ML
10 SOLUTION ORAL EVERY 4 HOURS PRN
Qty: 30 ML | Refills: 0 | Status: SHIPPED | OUTPATIENT
Start: 2024-02-21 | End: 2024-03-02

## 2024-02-21 NOTE — TELEPHONE ENCOUNTER
Call from Sayra Dg's sister, requesting refill for Morphine Concentrate. [facility]acy is [facility]. Next kristina with [facility].

## 2024-02-26 ENCOUNTER — HOSPITAL ENCOUNTER (OUTPATIENT)
Dept: OTHER | Age: 59
Setting detail: THERAPIES SERIES
Discharge: HOME | End: 2024-02-26
Payer: MEDICAID

## 2024-02-26 VITALS
BODY MASS INDEX: 20.8 KG/M2 | DIASTOLIC BLOOD PRESSURE: 75 MMHG | HEART RATE: 88 BPM | RESPIRATION RATE: 18 BRPM | WEIGHT: 121.2 LBS | SYSTOLIC BLOOD PRESSURE: 143 MMHG | OXYGEN SATURATION: 98 %

## 2024-02-26 LAB
ANION GAP SERPL CALCULATED.3IONS-SCNC: 12 MMOL/L (ref 7–16)
BNP SERPL-MCNC: 351 PG/ML (ref 0–125)
BUN SERPL-MCNC: 7 MG/DL (ref 6–20)
CALCIUM SERPL-MCNC: 8.3 MG/DL (ref 8.6–10.2)
CHLORIDE SERPL-SCNC: 96 MMOL/L (ref 98–107)
CO2 SERPL-SCNC: 30 MMOL/L (ref 22–29)
CREAT SERPL-MCNC: 0.6 MG/DL (ref 0.7–1.2)
GFR SERPL CREATININE-BSD FRML MDRD: >60 ML/MIN/1.73M2
GLUCOSE SERPL-MCNC: 133 MG/DL (ref 74–99)
POTASSIUM SERPL-SCNC: 4.4 MMOL/L (ref 3.5–5)
SODIUM SERPL-SCNC: 138 MMOL/L (ref 132–146)

## 2024-02-26 PROCEDURE — 99214 OFFICE O/P EST MOD 30 MIN: CPT

## 2024-02-26 PROCEDURE — 83880 ASSAY OF NATRIURETIC PEPTIDE: CPT

## 2024-02-26 PROCEDURE — 6360000002 HC RX W HCPCS

## 2024-02-26 PROCEDURE — 2580000003 HC RX 258

## 2024-02-26 PROCEDURE — 36415 COLL VENOUS BLD VENIPUNCTURE: CPT

## 2024-02-26 PROCEDURE — 96374 THER/PROPH/DIAG INJ IV PUSH: CPT

## 2024-02-26 PROCEDURE — 80048 BASIC METABOLIC PNL TOTAL CA: CPT

## 2024-02-26 RX ORDER — FUROSEMIDE 10 MG/ML
INJECTION INTRAMUSCULAR; INTRAVENOUS
Status: COMPLETED
Start: 2024-02-26 | End: 2024-02-26

## 2024-02-26 RX ORDER — SODIUM CHLORIDE 0.9 % (FLUSH) 0.9 %
10 SYRINGE (ML) INJECTION ONCE
Status: COMPLETED | OUTPATIENT
Start: 2024-02-26 | End: 2024-02-26

## 2024-02-26 RX ORDER — SODIUM CHLORIDE 0.9 % (FLUSH) 0.9 %
SYRINGE (ML) INJECTION
Status: COMPLETED
Start: 2024-02-26 | End: 2024-02-26

## 2024-02-26 RX ORDER — FUROSEMIDE 10 MG/ML
40 INJECTION INTRAMUSCULAR; INTRAVENOUS ONCE
Status: COMPLETED | OUTPATIENT
Start: 2024-02-26 | End: 2024-02-26

## 2024-02-26 RX ADMIN — FUROSEMIDE 40 MG: 10 INJECTION INTRAMUSCULAR; INTRAVENOUS at 10:52

## 2024-02-26 RX ADMIN — Medication 10 ML: at 10:50

## 2024-02-26 RX ADMIN — SODIUM CHLORIDE, PRESERVATIVE FREE 10 ML: 5 INJECTION INTRAVENOUS at 10:50

## 2024-02-26 RX ADMIN — FUROSEMIDE 40 MG: 10 INJECTION, SOLUTION INTRAMUSCULAR; INTRAVENOUS at 10:52

## 2024-02-26 ASSESSMENT — PATIENT HEALTH QUESTIONNAIRE - PHQ9
SUM OF ALL RESPONSES TO PHQ9 QUESTIONS 1 & 2: 0
2. FEELING DOWN, DEPRESSED OR HOPELESS: NOT AT ALL
1. LITTLE INTEREST OR PLEASURE IN DOING THINGS: NOT AT ALL

## 2024-02-26 NOTE — PROGRESS NOTES
answered any questions regarding their medication  [] Pill box provided to patient  [] Patient using pill packing pharmacy   [] CPAP/BiPAP use  [] Low impact exercise / cardiac rehab   [] LifeVest use  [x] Patient aware of signs and symptoms of worsening HF, CHF clinic phone number provided and made aware to call clinic for sooner if evaluation if needed     [] Difficulty affording medications  [] CHF CHW consulted  [] Prescription assistance information given   [] [facility] medication assistance program information given   [] Sample medications provided to patient to help bridge gap until affordability N/A      Additional Notes:patient has been holding spironolactone since he saw Dr. Vega. Up 3lbs. LLE 1+ and crackles in RLL.  Scheduled to follow up in CHF clinic on:   Future Appointments   Date Time Provider Department Center   3/12/2024  9:30 AM [facility] ROOM 2 [facility]   7/31/2024  1:15 PM Bernice Ceja APRN - NP [facility]   8/19/2024 10:45 AM Alonzo Vega MD [facility]

## 2024-02-26 NOTE — FLOWSHEET NOTE
02/26/24 1026   Over the past 2 weeks, how often have you been bothered by any of the following problems?   Little interest or pleasure in doing things Not at all   Feeling down, depressed, or hopeless Not at all   Patient Health Questionnaire-2 Score 0

## 2024-02-27 ENCOUNTER — TELEPHONE (OUTPATIENT)
Dept: OTHER | Age: 59
End: 2024-02-27

## 2024-02-27 NOTE — RESULT ENCOUNTER NOTE
Labs and CHF clinic note reviewed  Appears that patient self stopped spironolactone because it made him tired  Did stopping this medication improve his symptoms?    Thank you

## 2024-02-27 NOTE — TELEPHONE ENCOUNTER
----- Message from ARGELIA Edmonds CNP sent at 2/27/2024 10:00 AM EST -----  Labs and CHF clinic note reviewed  Appears that patient self stopped spironolactone because it made him tired  Did stopping this medication improve his symptoms?    Thank you

## 2024-02-27 NOTE — TELEPHONE ENCOUNTER
Per patients sister, it did not improve his symptoms.  Patient says he does.  Patient feels terrible today. Sister thinks he is getting sick.  They are watching his weight daily.

## 2024-02-28 NOTE — TELEPHONE ENCOUNTER
Thank you for the follow up  Given that he isn't feeling well then won't restart at this time, however consider restarting spironolactone in the future  If illness doesn't improve ensure to follow up with pcp.     Thank you     ===View-only below this line===  ----- Message -----  From: Jesusita Quiles RN  Sent: 2/27/2024   2:40 PM EST  To: ARGELIA Edmonds - [facility]ICA [place],     You had sent this earlier wanting to know if he had symptom improvement.  I called the patient sister.

## 2024-02-29 DIAGNOSIS — R06.09 DYSPNEA ON MINIMAL EXERTION: ICD-10-CM

## 2024-02-29 DIAGNOSIS — I10 PRIMARY HYPERTENSION: ICD-10-CM

## 2024-02-29 DIAGNOSIS — Z51.5 PALLIATIVE CARE ENCOUNTER: ICD-10-CM

## 2024-02-29 DIAGNOSIS — J44.9 END STAGE COPD (HCC): ICD-10-CM

## 2024-02-29 RX ORDER — MORPHINE SULFATE 20 MG/ML
10 SOLUTION ORAL EVERY 4 HOURS PRN
Qty: 30 ML | Refills: 0 | Status: SHIPPED | OUTPATIENT
Start: 2024-02-29 | End: 2024-03-10

## 2024-02-29 RX ORDER — LOSARTAN POTASSIUM 50 MG/1
50 TABLET ORAL DAILY
Qty: 90 TABLET | Refills: 1 | Status: SHIPPED | OUTPATIENT
Start: 2024-02-29

## 2024-02-29 RX ORDER — FLUTICASONE PROPIONATE 50 MCG
1 SPRAY, SUSPENSION (ML) NASAL DAILY
Qty: 16 G | Refills: 3 | Status: SHIPPED | OUTPATIENT
Start: 2024-02-29

## 2024-02-29 NOTE — TELEPHONE ENCOUNTER
Call from Dg's sister Sayra, requesting refill for Morphine concentrate. [facility]acy [facility]. Next kristina with [facility].

## 2024-02-29 NOTE — TELEPHONE ENCOUNTER
Last Appointment:  12/19/2023  Future Appointments   Date Time Provider Department Center   3/12/2024  9:30 AM DIONE [facility] ROOM 2 [facility]   7/31/2024  1:15 PM Bernice Ceja APRN - NP [facility]   8/19/2024 10:45 AM Alonzo Vega MD [facility]

## 2024-03-07 DIAGNOSIS — J44.9 END STAGE COPD (HCC): ICD-10-CM

## 2024-03-07 DIAGNOSIS — Z51.5 PALLIATIVE CARE ENCOUNTER: ICD-10-CM

## 2024-03-07 DIAGNOSIS — G89.4 CHRONIC PAIN SYNDROME: ICD-10-CM

## 2024-03-07 DIAGNOSIS — R06.09 DYSPNEA ON MINIMAL EXERTION: ICD-10-CM

## 2024-03-07 RX ORDER — HYDROCODONE BITARTRATE AND ACETAMINOPHEN 7.5; 325 MG/1; MG/1
1 TABLET ORAL EVERY 8 HOURS PRN
Qty: 90 TABLET | Refills: 0 | Status: SHIPPED | OUTPATIENT
Start: 2024-03-07 | End: 2024-04-06

## 2024-03-07 RX ORDER — MORPHINE SULFATE 20 MG/ML
10 SOLUTION ORAL EVERY 4 HOURS PRN
Qty: 30 ML | Refills: 0 | Status: SHIPPED | OUTPATIENT
Start: 2024-03-07 | End: 2024-03-17

## 2024-03-07 NOTE — TELEPHONE ENCOUNTER
Call from Sayra Dg's sister, requesting refill for Morphine solution and Norco that he will need in a few days. [facility]. [facility]

## 2024-03-12 ENCOUNTER — HOSPITAL ENCOUNTER (OUTPATIENT)
Dept: OTHER | Age: 59
Setting detail: THERAPIES SERIES
Discharge: HOME | End: 2024-03-12
Payer: MEDICAID

## 2024-03-12 VITALS
HEART RATE: 69 BPM | BODY MASS INDEX: 21.18 KG/M2 | DIASTOLIC BLOOD PRESSURE: 57 MMHG | SYSTOLIC BLOOD PRESSURE: 122 MMHG | WEIGHT: 123.4 LBS | RESPIRATION RATE: 18 BRPM

## 2024-03-12 LAB
ANION GAP SERPL CALCULATED.3IONS-SCNC: 11 MMOL/L (ref 7–16)
BNP SERPL-MCNC: 544 PG/ML (ref 0–125)
BUN SERPL-MCNC: 17 MG/DL (ref 6–20)
CALCIUM SERPL-MCNC: 7.7 MG/DL (ref 8.6–10.2)
CHLORIDE SERPL-SCNC: 91 MMOL/L (ref 98–107)
CO2 SERPL-SCNC: 34 MMOL/L (ref 22–29)
CREAT SERPL-MCNC: 0.8 MG/DL (ref 0.7–1.2)
GFR SERPL CREATININE-BSD FRML MDRD: >60 ML/MIN/1.73M2
GLUCOSE SERPL-MCNC: 166 MG/DL (ref 74–99)
POTASSIUM SERPL-SCNC: 4.6 MMOL/L (ref 3.5–5)
SODIUM SERPL-SCNC: 136 MMOL/L (ref 132–146)

## 2024-03-12 PROCEDURE — 80048 BASIC METABOLIC PNL TOTAL CA: CPT

## 2024-03-12 PROCEDURE — 83880 ASSAY OF NATRIURETIC PEPTIDE: CPT

## 2024-03-12 PROCEDURE — 99214 OFFICE O/P EST MOD 30 MIN: CPT

## 2024-03-12 PROCEDURE — 96374 THER/PROPH/DIAG INJ IV PUSH: CPT

## 2024-03-12 PROCEDURE — 2580000003 HC RX 258: Performed by: NURSE PRACTITIONER

## 2024-03-12 PROCEDURE — 36415 COLL VENOUS BLD VENIPUNCTURE: CPT

## 2024-03-12 PROCEDURE — 6360000002 HC RX W HCPCS

## 2024-03-12 RX ORDER — FUROSEMIDE 10 MG/ML
40 INJECTION INTRAMUSCULAR; INTRAVENOUS ONCE
Status: COMPLETED | OUTPATIENT
Start: 2024-03-12 | End: 2024-03-12

## 2024-03-12 RX ORDER — SODIUM CHLORIDE 0.9 % (FLUSH) 0.9 %
5-40 SYRINGE (ML) INJECTION ONCE
Status: COMPLETED | OUTPATIENT
Start: 2024-03-12 | End: 2024-03-12

## 2024-03-12 RX ORDER — FUROSEMIDE 10 MG/ML
INJECTION INTRAMUSCULAR; INTRAVENOUS
Status: DISCONTINUED
Start: 2024-03-12 | End: 2024-03-12 | Stop reason: CLARIF

## 2024-03-12 RX ADMIN — SODIUM CHLORIDE, PRESERVATIVE FREE 10 ML: 5 INJECTION INTRAVENOUS at 09:57

## 2024-03-12 RX ADMIN — FUROSEMIDE 40 MG: 10 INJECTION INTRAMUSCULAR; INTRAVENOUS at 09:56

## 2024-03-12 RX ADMIN — FUROSEMIDE 40 MG: 10 INJECTION, SOLUTION INTRAMUSCULAR; INTRAVENOUS at 09:56

## 2024-03-12 NOTE — PROGRESS NOTES
self (use of frozen meals, can goods, etc)  [] CHF CHW consulted  [] Low sodium meal delivery options given to patient  [] Dietician consulted   [] Low sodium recipes provided  [] Sodium free seasoning provided   [x] Fluid intake (64 oz)  [] Reviewed currently prescribed medications with patient, educated on importance of compliance and answered any questions regarding their medication  [] Pill box provided to patient  [] Patient using pill packing pharmacy   [] CPAP/BiPAP use  [] Low impact exercise / cardiac rehab   [] LifeVest use  [x] Patient aware of signs and symptoms of worsening HF, CHF clinic phone number provided and made aware to call clinic for sooner if evaluation if needed     [] Difficulty affording medications  [] CHF CHW consulted  [] Prescription assistance information given   [] [facility] medication assistance program information given   [] Sample medications provided to patient to help bridge gap until affordability N/A      Scheduled to follow up in CHF clinic on:   Future Appointments   Date Time Provider Department Center   3/15/2024  9:45 AM [facility]   3/26/2024  9:30 AM DIONE [facility] 2 [facility]   7/31/2024  1:15 PM Bernice Ceja APRN - NP [facility]   8/19/2024 10:45 AM Alonzo Vega MD [facility]

## 2024-03-15 ENCOUNTER — OFFICE VISIT (OUTPATIENT)
Dept: PRIMARY CARE CLINIC | Age: 59
End: 2024-03-15

## 2024-03-15 VITALS
HEART RATE: 69 BPM | BODY MASS INDEX: 21.18 KG/M2 | DIASTOLIC BLOOD PRESSURE: 56 MMHG | HEIGHT: 64 IN | SYSTOLIC BLOOD PRESSURE: 100 MMHG | TEMPERATURE: 97.1 F | OXYGEN SATURATION: 99 %

## 2024-03-15 DIAGNOSIS — N32.9 BLADDER PROBLEM: ICD-10-CM

## 2024-03-15 DIAGNOSIS — R51.9 BAD HEADACHE: ICD-10-CM

## 2024-03-15 DIAGNOSIS — Z87.891 STOPPED SMOKING WITH GREATER THAN 40 PACK YEAR HISTORY: ICD-10-CM

## 2024-03-15 DIAGNOSIS — G89.29 CHRONIC BILATERAL LOW BACK PAIN WITHOUT SCIATICA: Primary | ICD-10-CM

## 2024-03-15 DIAGNOSIS — M54.50 CHRONIC BILATERAL LOW BACK PAIN WITHOUT SCIATICA: Primary | ICD-10-CM

## 2024-03-15 RX ORDER — BUTALBITAL, ACETAMINOPHEN AND CAFFEINE 50; 325; 40 MG/1; MG/1; MG/1
TABLET ORAL
Qty: 30 TABLET | Refills: 1 | Status: SHIPPED | OUTPATIENT
Start: 2024-03-15

## 2024-03-15 RX ORDER — LIDOCAINE 50 MG/G
OINTMENT TOPICAL
Qty: 240 G | Refills: 0 | Status: SHIPPED | OUTPATIENT
Start: 2024-03-15

## 2024-03-15 SDOH — ECONOMIC STABILITY: INCOME INSECURITY: HOW HARD IS IT FOR YOU TO PAY FOR THE VERY BASICS LIKE FOOD, HOUSING, MEDICAL CARE, AND HEATING?: NOT HARD AT ALL

## 2024-03-15 SDOH — ECONOMIC STABILITY: FOOD INSECURITY: WITHIN THE PAST 12 MONTHS, THE FOOD YOU BOUGHT JUST DIDN'T LAST AND YOU DIDN'T HAVE MONEY TO GET MORE.: NEVER TRUE

## 2024-03-15 SDOH — ECONOMIC STABILITY: FOOD INSECURITY: WITHIN THE PAST 12 MONTHS, YOU WORRIED THAT YOUR FOOD WOULD RUN OUT BEFORE YOU GOT MONEY TO BUY MORE.: NEVER TRUE

## 2024-03-15 ASSESSMENT — PATIENT HEALTH QUESTIONNAIRE - PHQ9
SUM OF ALL RESPONSES TO PHQ QUESTIONS 1-9: 0
SUM OF ALL RESPONSES TO PHQ QUESTIONS 1-9: 0
6. FEELING BAD ABOUT YOURSELF - OR THAT YOU ARE A FAILURE OR HAVE LET YOURSELF OR YOUR FAMILY DOWN: 0
SUM OF ALL RESPONSES TO PHQ QUESTIONS 1-9: 0
4. FEELING TIRED OR HAVING LITTLE ENERGY: 0
SUM OF ALL RESPONSES TO PHQ9 QUESTIONS 1 & 2: 0
2. FEELING DOWN, DEPRESSED OR HOPELESS: 0
10. IF YOU CHECKED OFF ANY PROBLEMS, HOW DIFFICULT HAVE THESE PROBLEMS MADE IT FOR YOU TO DO YOUR WORK, TAKE CARE OF THINGS AT HOME, OR GET ALONG WITH OTHER PEOPLE: 0
5. POOR APPETITE OR OVEREATING: 0
1. LITTLE INTEREST OR PLEASURE IN DOING THINGS: 0
SUM OF ALL RESPONSES TO PHQ QUESTIONS 1-9: 0
7. TROUBLE CONCENTRATING ON THINGS, SUCH AS READING THE NEWSPAPER OR WATCHING TELEVISION: 0
3. TROUBLE FALLING OR STAYING ASLEEP: 0
8. MOVING OR SPEAKING SO SLOWLY THAT OTHER PEOPLE COULD HAVE NOTICED. OR THE OPPOSITE, BEING SO FIGETY OR RESTLESS THAT YOU HAVE BEEN MOVING AROUND A LOT MORE THAN USUAL: 0
9. THOUGHTS THAT YOU WOULD BE BETTER OFF DEAD, OR OF HURTING YOURSELF: 0

## 2024-03-15 NOTE — PROGRESS NOTES
3/15/24  Dg Peña : 1965 Sex: male  Age: 58 y.o.      Assessment and Plan:  Dg was seen today for bladder problem and follow-up.    Diagnoses and all orders for this visit:    Chronic bilateral low back pain without sciatica  -     lidocaine (XYLOCAINE) 5 % ointment; Apply topically to lower back as needed.  Trial as requested    Bad headache  -     butalbital-acetaminophen-caffeine (FIORICET, ESGIC) -40 MG per tablet; TAKE ONE (1) TABLET BY MOUTH EVERY SIX (6) HOURS AS NEEDED FOR HEADACHES  Refills     Stopped smoking with greater than 40 pack year history  -     TN VISIT TO DISCUSS LUNG CA SCREEN W LDCT  -     CT Lung Screen (Initial/Annual/Baseline); Future    Bladder problem  Request records and arrange         Declines flu shot     Return in about 3 months (around 6/15/2024).        Chief Complaint   Patient presents with    Bladder Problem    Follow-up       HPI  Pt here for routine f/u     Saw Dr. Mcgregor  Had cystoscopy  Apparently everything looked ok but pt has retained treasureapgabriela   Recommended a pain injection     Also having trouble with his lower back  He has to either lay or stand  Can't really stand given his left hip/leg issues  Requesting lidocaine cream for his back     Breathing still poor but he is only on 5L NC  Low for him   No recent breathing flares though, has christian been holding his own   He still feels poorly though    Problem list reviewed and updated in full with patient today as necessary.  A comprehensive ROS was negative, except as documented above.       Current Outpatient Medications:     butalbital-acetaminophen-caffeine (FIORICET, ESGIC) -40 MG per tablet, TAKE ONE (1) TABLET BY MOUTH EVERY SIX (6) HOURS AS NEEDED FOR HEADACHES, Disp: 30 tablet, Rfl: 1    lidocaine (XYLOCAINE) 5 % ointment, Apply topically to lower back as needed., Disp: 240 g, Rfl: 0    HYDROcodone-acetaminophen (NORCO) 7.5-325 MG per tablet, Take 1 tablet by mouth every 8

## 2024-03-21 ENCOUNTER — HOSPITAL ENCOUNTER (EMERGENCY)
Age: 59
Discharge: HOME | End: 2024-03-21
Attending: EMERGENCY MEDICINE
Payer: MEDICAID

## 2024-03-21 ENCOUNTER — APPOINTMENT (OUTPATIENT)
Dept: GENERAL RADIOLOGY | Age: 59
End: 2024-03-21
Payer: MEDICAID

## 2024-03-21 ENCOUNTER — TELEPHONE (OUTPATIENT)
Dept: PRIMARY CARE CLINIC | Age: 59
End: 2024-03-21

## 2024-03-21 VITALS
RESPIRATION RATE: 18 BRPM | WEIGHT: 123 LBS | BODY MASS INDEX: 21.11 KG/M2 | OXYGEN SATURATION: 97 % | TEMPERATURE: 98.6 F | HEART RATE: 74 BPM | DIASTOLIC BLOOD PRESSURE: 75 MMHG | SYSTOLIC BLOOD PRESSURE: 141 MMHG

## 2024-03-21 DIAGNOSIS — G89.29 CHRONIC LEFT HIP PAIN: ICD-10-CM

## 2024-03-21 DIAGNOSIS — J18.9 COMMUNITY ACQUIRED PNEUMONIA, UNSPECIFIED LATERALITY: ICD-10-CM

## 2024-03-21 DIAGNOSIS — M54.50 CHRONIC BILATERAL LOW BACK PAIN WITHOUT SCIATICA: Primary | ICD-10-CM

## 2024-03-21 DIAGNOSIS — Z51.5 PALLIATIVE CARE ENCOUNTER: ICD-10-CM

## 2024-03-21 DIAGNOSIS — G89.29 CHRONIC BILATERAL LOW BACK PAIN WITHOUT SCIATICA: Primary | ICD-10-CM

## 2024-03-21 DIAGNOSIS — J44.9 END STAGE COPD (HCC): ICD-10-CM

## 2024-03-21 DIAGNOSIS — J96.10 CHRONIC RESPIRATORY FAILURE, UNSPECIFIED WHETHER WITH HYPOXIA OR HYPERCAPNIA (HCC): Primary | ICD-10-CM

## 2024-03-21 DIAGNOSIS — M25.552 CHRONIC LEFT HIP PAIN: ICD-10-CM

## 2024-03-21 DIAGNOSIS — R06.09 DYSPNEA ON MINIMAL EXERTION: ICD-10-CM

## 2024-03-21 LAB
ANION GAP SERPL CALCULATED.3IONS-SCNC: 9 MMOL/L (ref 7–16)
BASOPHILS # BLD: 0.03 K/UL (ref 0–0.2)
BASOPHILS NFR BLD: 0 % (ref 0–2)
BNP SERPL-MCNC: 820 PG/ML (ref 0–125)
BUN SERPL-MCNC: 13 MG/DL (ref 6–20)
CALCIUM SERPL-MCNC: 8 MG/DL (ref 8.6–10.2)
CHLORIDE SERPL-SCNC: 89 MMOL/L (ref 98–107)
CO2 SERPL-SCNC: 35 MMOL/L (ref 22–29)
CREAT SERPL-MCNC: 0.7 MG/DL (ref 0.7–1.2)
EKG ATRIAL RATE: 68 BPM
EKG P AXIS: 57 DEGREES
EKG P-R INTERVAL: 124 MS
EKG Q-T INTERVAL: 414 MS
EKG QRS DURATION: 80 MS
EKG QTC CALCULATION (BAZETT): 440 MS
EKG R AXIS: 66 DEGREES
EKG T AXIS: 63 DEGREES
EKG VENTRICULAR RATE: 68 BPM
EOSINOPHIL # BLD: 0.07 K/UL (ref 0.05–0.5)
EOSINOPHILS RELATIVE PERCENT: 1 % (ref 0–6)
ERYTHROCYTE [DISTWIDTH] IN BLOOD BY AUTOMATED COUNT: 14 % (ref 11.5–15)
GFR SERPL CREATININE-BSD FRML MDRD: >60 ML/MIN/1.73M2
GLUCOSE SERPL-MCNC: 111 MG/DL (ref 74–99)
HCT VFR BLD AUTO: 36.5 % (ref 37–54)
HGB BLD-MCNC: 11 G/DL (ref 12.5–16.5)
IMM GRANULOCYTES # BLD AUTO: <0.03 K/UL (ref 0–0.58)
IMM GRANULOCYTES NFR BLD: 0 % (ref 0–5)
INFLUENZA A BY PCR: NOT DETECTED
INFLUENZA B BY PCR: NOT DETECTED
LYMPHOCYTES NFR BLD: 1.15 K/UL (ref 1.5–4)
LYMPHOCYTES RELATIVE PERCENT: 17 % (ref 20–42)
MCH RBC QN AUTO: 31 PG (ref 26–35)
MCHC RBC AUTO-ENTMCNC: 30.1 G/DL (ref 32–34.5)
MCV RBC AUTO: 102.8 FL (ref 80–99.9)
MONOCYTES NFR BLD: 0.43 K/UL (ref 0.1–0.95)
MONOCYTES NFR BLD: 6 % (ref 2–12)
NEUTROPHILS NFR BLD: 75 % (ref 43–80)
NEUTS SEG NFR BLD: 5.16 K/UL (ref 1.8–7.3)
PLATELET # BLD AUTO: 171 K/UL (ref 130–450)
PMV BLD AUTO: 10.2 FL (ref 7–12)
POTASSIUM SERPL-SCNC: 3.9 MMOL/L (ref 3.5–5)
RBC # BLD AUTO: 3.55 M/UL (ref 3.8–5.8)
SARS-COV-2 RDRP RESP QL NAA+PROBE: NOT DETECTED
SODIUM SERPL-SCNC: 133 MMOL/L (ref 132–146)
SPECIMEN DESCRIPTION: NORMAL
TROPONIN I SERPL HS-MCNC: 17 NG/L (ref 0–11)
TROPONIN I SERPL HS-MCNC: 17 NG/L (ref 0–11)
WBC OTHER # BLD: 6.9 K/UL (ref 4.5–11.5)

## 2024-03-21 PROCEDURE — 83880 ASSAY OF NATRIURETIC PEPTIDE: CPT

## 2024-03-21 PROCEDURE — 71046 X-RAY EXAM CHEST 2 VIEWS: CPT

## 2024-03-21 PROCEDURE — 96374 THER/PROPH/DIAG INJ IV PUSH: CPT

## 2024-03-21 PROCEDURE — 99285 EMERGENCY DEPT VISIT HI MDM: CPT

## 2024-03-21 PROCEDURE — 6370000000 HC RX 637 (ALT 250 FOR IP): Performed by: EMERGENCY MEDICINE

## 2024-03-21 PROCEDURE — 87502 INFLUENZA DNA AMP PROBE: CPT

## 2024-03-21 PROCEDURE — 85025 COMPLETE CBC W/AUTO DIFF WBC: CPT

## 2024-03-21 PROCEDURE — 84484 ASSAY OF TROPONIN QUANT: CPT

## 2024-03-21 PROCEDURE — 94664 DEMO&/EVAL PT USE INHALER: CPT

## 2024-03-21 PROCEDURE — 87635 SARS-COV-2 COVID-19 AMP PRB: CPT

## 2024-03-21 PROCEDURE — 93005 ELECTROCARDIOGRAM TRACING: CPT | Performed by: PHYSICIAN ASSISTANT

## 2024-03-21 PROCEDURE — 94640 AIRWAY INHALATION TREATMENT: CPT

## 2024-03-21 PROCEDURE — 6360000002 HC RX W HCPCS: Performed by: EMERGENCY MEDICINE

## 2024-03-21 PROCEDURE — 93010 ELECTROCARDIOGRAM REPORT: CPT | Performed by: INTERNAL MEDICINE

## 2024-03-21 PROCEDURE — 80048 BASIC METABOLIC PNL TOTAL CA: CPT

## 2024-03-21 RX ORDER — DOXYCYCLINE HYCLATE 100 MG
100 TABLET ORAL 2 TIMES DAILY
Qty: 20 TABLET | Refills: 0 | Status: SHIPPED | OUTPATIENT
Start: 2024-03-21 | End: 2024-03-31

## 2024-03-21 RX ORDER — HYDROCODONE BITARTRATE AND ACETAMINOPHEN 7.5; 325 MG/1; MG/1
1 TABLET ORAL ONCE
Status: COMPLETED | OUTPATIENT
Start: 2024-03-21 | End: 2024-03-21

## 2024-03-21 RX ORDER — CEFDINIR 300 MG/1
300 CAPSULE ORAL 2 TIMES DAILY
Qty: 20 CAPSULE | Refills: 0 | Status: SHIPPED | OUTPATIENT
Start: 2024-03-21 | End: 2024-03-31

## 2024-03-21 RX ORDER — METHYLPREDNISOLONE SODIUM SUCCINATE 125 MG/2ML
80 INJECTION, POWDER, LYOPHILIZED, FOR SOLUTION INTRAMUSCULAR; INTRAVENOUS ONCE
Status: COMPLETED | OUTPATIENT
Start: 2024-03-21 | End: 2024-03-21

## 2024-03-21 RX ORDER — IPRATROPIUM BROMIDE AND ALBUTEROL SULFATE 2.5; .5 MG/3ML; MG/3ML
3 SOLUTION RESPIRATORY (INHALATION) ONCE
Status: COMPLETED | OUTPATIENT
Start: 2024-03-21 | End: 2024-03-21

## 2024-03-21 RX ORDER — MORPHINE SULFATE 20 MG/ML
10 SOLUTION ORAL EVERY 4 HOURS PRN
Qty: 30 ML | Refills: 0 | Status: SHIPPED | OUTPATIENT
Start: 2024-03-21 | End: 2024-03-31

## 2024-03-21 RX ORDER — CEFDINIR 300 MG/1
300 CAPSULE ORAL ONCE
Status: COMPLETED | OUTPATIENT
Start: 2024-03-21 | End: 2024-03-21

## 2024-03-21 RX ORDER — DOXYCYCLINE HYCLATE 100 MG/1
100 CAPSULE ORAL ONCE
Status: COMPLETED | OUTPATIENT
Start: 2024-03-21 | End: 2024-03-21

## 2024-03-21 RX ORDER — MORPHINE SULFATE 10 MG/5ML
5 SOLUTION ORAL ONCE
Status: DISCONTINUED | OUTPATIENT
Start: 2024-03-21 | End: 2024-03-21 | Stop reason: HOSPADM

## 2024-03-21 RX ORDER — MORPHINE SULFATE 10 MG/5ML
5 SOLUTION ORAL ONCE
Status: COMPLETED | OUTPATIENT
Start: 2024-03-21 | End: 2024-03-21

## 2024-03-21 RX ADMIN — IPRATROPIUM BROMIDE AND ALBUTEROL SULFATE 3 DOSE: 2.5; .5 SOLUTION RESPIRATORY (INHALATION) at 12:56

## 2024-03-21 RX ADMIN — HYDROCODONE BITARTRATE AND ACETAMINOPHEN 1 TABLET: 7.5; 325 TABLET ORAL at 14:20

## 2024-03-21 RX ADMIN — CEFDINIR 300 MG: 300 CAPSULE ORAL at 17:20

## 2024-03-21 RX ADMIN — MORPHINE SULFATE 5 MG: 10 SOLUTION ORAL at 14:20

## 2024-03-21 RX ADMIN — METHYLPREDNISOLONE SODIUM SUCCINATE 80 MG: 125 INJECTION INTRAMUSCULAR; INTRAVENOUS at 14:20

## 2024-03-21 RX ADMIN — DOXYCYCLINE HYCLATE 100 MG: 100 CAPSULE ORAL at 17:20

## 2024-03-21 ASSESSMENT — ENCOUNTER SYMPTOMS
BACK PAIN: 0
WHEEZING: 0
SHORTNESS OF BREATH: 1
ABDOMINAL PAIN: 0
VOMITING: 0
SORE THROAT: 0
SINUS PRESSURE: 0
EYE DISCHARGE: 0
DIARRHEA: 0
COUGH: 0
EYE REDNESS: 0
NAUSEA: 0
EYE PAIN: 0

## 2024-03-21 ASSESSMENT — PAIN DESCRIPTION - ORIENTATION: ORIENTATION: LEFT

## 2024-03-21 ASSESSMENT — PAIN - FUNCTIONAL ASSESSMENT
PAIN_FUNCTIONAL_ASSESSMENT: 0-10
PAIN_FUNCTIONAL_ASSESSMENT: PREVENTS OR INTERFERES SOME ACTIVE ACTIVITIES AND ADLS

## 2024-03-21 ASSESSMENT — PAIN DESCRIPTION - FREQUENCY: FREQUENCY: CONTINUOUS

## 2024-03-21 ASSESSMENT — PAIN DESCRIPTION - ONSET: ONSET: ON-GOING

## 2024-03-21 ASSESSMENT — PAIN SCALES - GENERAL: PAINLEVEL_OUTOF10: 9

## 2024-03-21 ASSESSMENT — PAIN DESCRIPTION - DESCRIPTORS: DESCRIPTORS: SHARP

## 2024-03-21 NOTE — TELEPHONE ENCOUNTER
Patient Dg's sister Sayra called for refill on morphine sulfate 20mg. Community based last appointment was 2-. No future appointments with us. Pharmacy [facility]

## 2024-03-21 NOTE — ED TRIAGE NOTES
Department of Emergency Medicine    FIRST PROVIDER TRIAGE NOTE             Independent MLP           3/21/24  11:36 AM EDT    Date of Encounter: 3/21/24   MRN: [id number]    Vitals:    03/21/24 1136   BP: (!) 149/77   Pulse: 63   Resp: 18   Temp: 98.6 °F (37 °C)   TempSrc: Oral   SpO2: 100%   Weight: 55.8 kg (123 lb)      HPI: Dg Peña is a 58 y.o. male who presents to the ED for Shortness of Breath (\"Feels like I have pneumonia\" on 5L o2 at all times. )     100% on 6 L     ROS: Negative for vomiting, rash, or headache.    Physical Exam:   Gen Appearance/Constitutional: alert  CV: regular rate     Initial Plan of Care: All treatment areas with department are currently occupied.     Plan to order/Initiate the following while awaiting opening in ED: Triage evaluation  EKG and imaging studies.    Initial Plan of Care: Initiate Treatment-Testing, Proceed toTreatment Area When Bed Available for ED Attending/MLP to Continue Care    Electronically signed by Kelly Key PA-C   DD: 3/21/24

## 2024-03-21 NOTE — ED NOTES
RN ambulated patient with walker and pulse ox. Patient ambulated with walker with no complications. Patient's pulse ox went from 98% 6L NC to 97% 6L NC.

## 2024-03-21 NOTE — DISCHARGE INSTRUCTIONS
Return to the ED department if symptoms worsen including you have a drop in your oxygen levels.  At this point you are at your baseline oxygen did not drop with ambulation.  We will discharge you on antibiotics.

## 2024-03-21 NOTE — ED PROVIDER NOTES
58-year-old male history of COPD and CHF on 5 L of oxygen chronically presents to the emergency department with shortness of breath and feeling like his lungs are full of fluid.  He states no fevers or chills no nausea vomiting dye abdominal pain or leg swelling.  He states his home health nurse came and saw him today and was concerned.  States no fevers or chills he has no other complaints    The history is provided by the patient.   Shortness of Breath  Severity:  Moderate  Onset quality:  Gradual  Duration:  3 days  Timing:  Intermittent  Progression:  Waxing and waning  Chronicity:  Recurrent  Relieved by:  Nothing  Worsened by:  Nothing  Ineffective treatments:  None tried  Associated symptoms: no abdominal pain, no chest pain, no cough, no ear pain, no fever, no headaches, no rash, no sore throat, no sputum production, no vomiting and no wheezing         Review of Systems   Constitutional:  Negative for chills and fever.   HENT:  Negative for ear pain, sinus pressure and sore throat.    Eyes:  Negative for pain, discharge and redness.   Respiratory:  Positive for shortness of breath. Negative for cough, sputum production and wheezing.    Cardiovascular:  Negative for chest pain.   Gastrointestinal:  Negative for abdominal pain, diarrhea, nausea and vomiting.   Genitourinary:  Negative for dysuria and frequency.   Musculoskeletal:  Negative for arthralgias and back pain.   Skin:  Negative for rash and wound.   Neurological:  Negative for weakness and headaches.   Hematological:  Negative for adenopathy.   All other systems reviewed and are negative.       Physical Exam  Constitutional:       Appearance: He is well-developed.   HENT:      Head: Normocephalic and atraumatic.   Eyes:      Extraocular Movements: Extraocular movements intact.      Pupils: Pupils are equal, round, and reactive to light.   Cardiovascular:      Rate and Rhythm: Normal rate and regular rhythm.   Pulmonary:      Effort: Pulmonary

## 2024-03-25 NOTE — PROGRESS NOTES
[address] [place] [address] [facility]  Progress Note    SUBJECTIVE:  CC: cellulitis    Lying in bed on 4LNC - which is his baseline  In no distress  Tolerating oral antibiotics. Review of systems:  As stated above in the chief complaint, otherwise negative. Medications:  Scheduled Meds:   predniSONE  40 mg Oral Daily    fluticasone  2 spray Each Nostril Daily    amLODIPine  5 mg Oral Daily    ARIPiprazole  5 mg Oral Daily    DULoxetine  60 mg Oral Nightly    sodium chloride flush  10 mL IntraVENous 2 times per day    enoxaparin  40 mg Subcutaneous Daily    calcium-vitamin D  1 tablet Oral BID    Arformoterol Tartrate  15 mcg Nebulization BID    budesonide  0.5 mg Nebulization BID    [Held by provider] bumetanide  1 mg Oral Daily    doxycycline hyclate  100 mg Oral 2 times per day    cephALEXin  1,000 mg Oral 3 times per day    LORazepam  0.5 mg Oral Once    baclofen  5 mg Oral BID     Continuous Infusions:   sodium chloride       PRN Meds:guaiFENesin, hydrOXYzine, HYDROcodone-acetaminophen, ipratropium-albuterol, sodium chloride flush, sodium chloride, polyethylene glycol, acetaminophen **OR** acetaminophen    OBJECTIVE:  /85   Pulse 84   Temp 98.2 °F (36.8 °C) (Oral)   Resp 20   Ht 5' 4\" (1.626 m)   Wt 135 lb 8 oz (61.5 kg)   SpO2 100%   BMI 23.26 kg/m²   Temp  Av °F (36.7 °C)  Min: 97.6 °F (36.4 °C)  Max: 98.3 °F (36.8 °C)  Constitutional: The patient is lying in bed, getting breathing treatment. Skin: Warm and dry. No rashes were noted. HEENT: Round and reactive pupils. Moist mucous membranes. No ulcerations or thrush. Neck: Supple to movements. Chest: No respiratory distress. Symmetrical expansion. Diminished in the bases. 4LNC today   Cardiovascular: S1 and S2 are rhythmic and regular. No murmurs appreciated. Abdomen: Positive bowel sounds to auscultation. Benign to palpation. No masses felt.    Extremities: venous stasis dermatitis bilateral lower ext. No erythema, purplish in color. No open wounds. Trace edema   Lines: peripheral    Laboratory and Tests Review:  Lab Results   Component Value Date    WBC 3.8 (L) 08/28/2021    WBC 2.9 (L) 08/27/2021    WBC 4.5 08/26/2021    HGB 9.7 (L) 08/28/2021    HCT 32.8 (L) 08/28/2021    MCV 99.1 08/28/2021     08/28/2021     Lab Results   Component Value Date    NEUTROABS 2.96 08/28/2021    NEUTROABS 2.37 08/27/2021    NEUTROABS 2.68 08/26/2021     No results found for: CRPHS  Lab Results   Component Value Date    ALT 20 08/23/2021    AST 24 08/23/2021    ALKPHOS 57 08/23/2021    BILITOT 0.3 08/23/2021     Lab Results   Component Value Date     08/28/2021    K 5.0 08/28/2021    CL 94 08/28/2021    CO2 37 08/28/2021    BUN 10 08/28/2021    CREATININE 0.6 08/28/2021    CREATININE 0.5 08/27/2021    CREATININE 0.7 08/26/2021    GFRAA >60 08/28/2021    AGRATIO 0.9 08/19/2021    LABGLOM >60 08/28/2021    GLUCOSE 126 08/28/2021    PROT 7.3 08/23/2021    LABALBU 3.4 08/23/2021    CALCIUM 9.0 08/28/2021    BILITOT 0.3 08/23/2021    ALKPHOS 57 08/23/2021    AST 24 08/23/2021    ALT 20 08/23/2021     Lab Results   Component Value Date    CRP 14.8 (H) 08/23/2021    .3 (H) 08/12/2021    CRP 49.4 (H) 08/11/2021     Lab Results   Component Value Date    SEDRATE 103 (H) 08/23/2021    SEDRATE 51 (H) 08/12/2021    SEDRATE 52 (H) 08/11/2021     Radiology:  Reviewed     Microbiology:   Blood cultures: negative so far    ASO 68    ASSESSMENT:  · Left lower extremity cellulitis, treated with Ceftin. · Venous stasis dermatitis  · Leukopenia    PLAN:  · Continue PO Cephalexin & PO Doxycycline for 4 more days  · Ok to discharge from ID standpoint - med rec complete      ARGELIA Mejia - CNP  10:52 AM  8/28/2021     Patient seen and examined. I had a face to face encounter with the patient. Agree with exam.  Assessment and plan as outlined above and directed by me.  Addition and corrections were done as deemed appropriate. My exam and plan include: The patient is doing well. He is still complaining of pain in the legs. He is really do not look infected. Continue and complete antibiotic.     Ranulfo Sinclair MD  8/28/2021  3:17 PM show

## 2024-03-26 ENCOUNTER — HOSPITAL ENCOUNTER (OUTPATIENT)
Dept: OTHER | Age: 59
Setting detail: THERAPIES SERIES
Discharge: HOME | End: 2024-03-26
Payer: MEDICAID

## 2024-03-26 VITALS
WEIGHT: 119.8 LBS | DIASTOLIC BLOOD PRESSURE: 65 MMHG | BODY MASS INDEX: 20.56 KG/M2 | HEART RATE: 77 BPM | OXYGEN SATURATION: 97 % | SYSTOLIC BLOOD PRESSURE: 143 MMHG | RESPIRATION RATE: 18 BRPM

## 2024-03-26 LAB
ANION GAP SERPL CALCULATED.3IONS-SCNC: 12 MMOL/L (ref 7–16)
BNP SERPL-MCNC: 244 PG/ML (ref 0–125)
BUN SERPL-MCNC: 24 MG/DL (ref 6–20)
CALCIUM SERPL-MCNC: 8.3 MG/DL (ref 8.6–10.2)
CHLORIDE SERPL-SCNC: 97 MMOL/L (ref 98–107)
CO2 SERPL-SCNC: 33 MMOL/L (ref 22–29)
CREAT SERPL-MCNC: 0.7 MG/DL (ref 0.7–1.2)
GFR SERPL CREATININE-BSD FRML MDRD: >90 ML/MIN/1.73M2
GLUCOSE SERPL-MCNC: 131 MG/DL (ref 74–99)
POTASSIUM SERPL-SCNC: 4.3 MMOL/L (ref 3.5–5)
SODIUM SERPL-SCNC: 142 MMOL/L (ref 132–146)

## 2024-03-26 PROCEDURE — 83880 ASSAY OF NATRIURETIC PEPTIDE: CPT

## 2024-03-26 PROCEDURE — 99214 OFFICE O/P EST MOD 30 MIN: CPT

## 2024-03-26 PROCEDURE — 80048 BASIC METABOLIC PNL TOTAL CA: CPT

## 2024-03-26 PROCEDURE — 36415 COLL VENOUS BLD VENIPUNCTURE: CPT

## 2024-03-26 NOTE — PROGRESS NOTES
Congestive Heart Failure Clinic   [facility]       Referring Provider: Wm Zuñiga APRN-CNP  Primary Care Physician: Jana Sanchez MD   Cardiologist:   Nephrologist: n/a      HISTORY OF PRESENT ILLNESS:     Dg Peña is a 58 y.o. (1965) male with a history of HFimpEF, most recent EF:  Lab Results   Component Value Date    LVEF 63 08/03/2023    LVEFMODE Echo 01/21/2022         He presents to the CHF clinic for ongoing evaluation and monitoring of heart failure.    In the CHF clinic today he denies any adverse symptoms except:  [] Dizziness or lightheadedness   [] Syncope or near syncope  [] Recent Fall  [] Shortness of breath at    [x] Dyspnea with exertion recovers with rest  [] Decline in functional capacity (unable to perform activities they had previously been able to do)  [] Fatigue   [] Orthopnea  [] PND  [] Nocturnal cough  [] Hemoptysis  [] Chest pain, pressure, or discomfort  [] Palpitations  [] Abdominal distention  [] Abdominal bloating  [] Early satiety  [] Blood in stool   [] Diarrhea  [] Constipation  [] Nausea/Vomiting  [] Decreased urinary response to oral diuretic   [] Scrotal swelling   [x] Lower extremity edema  [] Used PRN doses of oral diuretic   [x] Weight gain    Wt Readings from Last 3 Encounters:   03/26/24 54.3 kg (119 lb 12.8 oz)   03/21/24 55.8 kg (123 lb)   03/12/24 56 kg (123 lb 6.4 oz)           SOCIAL HISTORY:  [x] Denies tobacco, alcohol or illicit drug abuse  [] Tobacco use:  [] ETOH use:  [] Illicit drug use:        MEDICATIONS:    Allergies   Allergen Reactions    Levofloxacin Other (See Comments)     Other reaction(s): Abdominal pain    Lisinopril      Other reaction(s): COUGHING    Tramadol      Other reaction(s): SHAKING    Ibuprofen Nausea And Vomiting    Sulfa Antibiotics Nausea And Vomiting       Current Outpatient Medications:     Morphine Sulfate (MORPHINE 20MG/ML) SOLN concentrated solution, Take 0.5 mLs

## 2024-03-28 ENCOUNTER — TELEPHONE (OUTPATIENT)
Dept: PRIMARY CARE CLINIC | Age: 59
End: 2024-03-28

## 2024-03-28 DIAGNOSIS — M79.89 LEG SWELLING: Primary | ICD-10-CM

## 2024-03-28 NOTE — TELEPHONE ENCOUNTER
Dg was seen in the CHF clinic, he had stop taking his lasix for a few days and now back to having shortness of breath.  But feels dehydrated to much on present kind of water pill (he said).  Can he be switched to a different kind.  CHF clinic asked him to talk to his PCP about this.

## 2024-03-28 NOTE — TELEPHONE ENCOUNTER
Typically they would do this? Their note from two days ago says he's euvolemic and down 4lbs (though still SOB)  When did he stop the lasix?

## 2024-03-29 DIAGNOSIS — R06.09 DYSPNEA ON MINIMAL EXERTION: ICD-10-CM

## 2024-03-29 DIAGNOSIS — Z51.5 PALLIATIVE CARE ENCOUNTER: ICD-10-CM

## 2024-03-29 DIAGNOSIS — J44.9 END STAGE COPD (HCC): ICD-10-CM

## 2024-03-29 RX ORDER — MORPHINE SULFATE 20 MG/ML
10 SOLUTION ORAL EVERY 4 HOURS PRN
Qty: 30 ML | Refills: 0 | Status: SHIPPED | OUTPATIENT
Start: 2024-03-29 | End: 2024-04-08

## 2024-03-29 RX ORDER — BUMETANIDE 1 MG/1
1 TABLET ORAL DAILY
Qty: 30 TABLET | Refills: 3 | Status: SHIPPED | OUTPATIENT
Start: 2024-03-29

## 2024-03-29 NOTE — TELEPHONE ENCOUNTER
Call from Sayra requesting refill for Dg's Morphine that he will need over the weekend. Pharmacy is [facility]. [facility]

## 2024-03-29 NOTE — TELEPHONE ENCOUNTER
She stated this was only The day that he was seen at [facility] but he is currently taking them now.

## 2024-04-09 DIAGNOSIS — R06.09 DYSPNEA ON MINIMAL EXERTION: ICD-10-CM

## 2024-04-09 DIAGNOSIS — G89.4 CHRONIC PAIN SYNDROME: ICD-10-CM

## 2024-04-09 DIAGNOSIS — J44.9 END STAGE COPD (HCC): ICD-10-CM

## 2024-04-09 DIAGNOSIS — Z51.5 PALLIATIVE CARE ENCOUNTER: ICD-10-CM

## 2024-04-09 RX ORDER — MORPHINE SULFATE 20 MG/ML
10 SOLUTION ORAL EVERY 4 HOURS PRN
Qty: 30 ML | Refills: 0 | Status: SHIPPED
Start: 2024-04-09 | End: 2024-04-10 | Stop reason: SDUPTHER

## 2024-04-09 RX ORDER — HYDROCODONE BITARTRATE AND ACETAMINOPHEN 7.5; 325 MG/1; MG/1
1 TABLET ORAL EVERY 8 HOURS PRN
Qty: 90 TABLET | Refills: 0 | Status: SHIPPED
Start: 2024-04-09 | End: 2024-04-10 | Stop reason: SDUPTHER

## 2024-04-09 NOTE — TELEPHONE ENCOUNTER
Patient Dg's sister Sayra called for refill on Norco 7.5-325 and morphine sulfate 20mg/ml soln. Last home visit 2-. [facility]

## 2024-04-10 DIAGNOSIS — R06.09 DYSPNEA ON MINIMAL EXERTION: ICD-10-CM

## 2024-04-10 DIAGNOSIS — Z51.5 PALLIATIVE CARE ENCOUNTER: ICD-10-CM

## 2024-04-10 DIAGNOSIS — G89.4 CHRONIC PAIN SYNDROME: ICD-10-CM

## 2024-04-10 DIAGNOSIS — J44.9 END STAGE COPD (HCC): ICD-10-CM

## 2024-04-10 RX ORDER — MORPHINE SULFATE 20 MG/ML
10 SOLUTION ORAL EVERY 4 HOURS PRN
Qty: 30 ML | Refills: 0 | Status: SHIPPED | OUTPATIENT
Start: 2024-04-10 | End: 2024-04-20

## 2024-04-10 RX ORDER — HYDROCODONE BITARTRATE AND ACETAMINOPHEN 7.5; 325 MG/1; MG/1
1 TABLET ORAL EVERY 8 HOURS PRN
Qty: 90 TABLET | Refills: 0 | Status: SHIPPED | OUTPATIENT
Start: 2024-04-10 | End: 2024-05-10

## 2024-04-10 NOTE — TELEPHONE ENCOUNTER
Please resend refills for Morphine and [place] to the correct pharmacy os [facility]. Next kristina with [facility].

## 2024-04-16 DIAGNOSIS — R51.9 BAD HEADACHE: ICD-10-CM

## 2024-04-16 RX ORDER — BUTALBITAL, ACETAMINOPHEN AND CAFFEINE 50; 325; 40 MG/1; MG/1; MG/1
TABLET ORAL
Qty: 30 TABLET | Refills: 1 | Status: SHIPPED | OUTPATIENT
Start: 2024-04-16

## 2024-04-16 RX ORDER — METOPROLOL SUCCINATE 50 MG/1
50 TABLET, EXTENDED RELEASE ORAL NIGHTLY
Qty: 90 TABLET | Refills: 1 | Status: SHIPPED | OUTPATIENT
Start: 2024-04-16

## 2024-04-17 ENCOUNTER — HOSPITAL ENCOUNTER (OUTPATIENT)
Dept: OTHER | Age: 59
Discharge: HOME | End: 2024-04-17

## 2024-04-19 ENCOUNTER — OFFICE VISIT (OUTPATIENT)
Dept: PRIMARY CARE CLINIC | Age: 59
End: 2024-04-19
Payer: MEDICAID

## 2024-04-19 VITALS
HEART RATE: 74 BPM | DIASTOLIC BLOOD PRESSURE: 62 MMHG | TEMPERATURE: 97 F | SYSTOLIC BLOOD PRESSURE: 132 MMHG | RESPIRATION RATE: 18 BRPM | BODY MASS INDEX: 21.51 KG/M2 | OXYGEN SATURATION: 98 % | WEIGHT: 126 LBS | HEIGHT: 64 IN

## 2024-04-19 DIAGNOSIS — G57.93 NEUROPATHY INVOLVING BOTH LOWER EXTREMITIES: Primary | ICD-10-CM

## 2024-04-19 DIAGNOSIS — R10.9 LEFT FLANK PAIN: ICD-10-CM

## 2024-04-19 DIAGNOSIS — F41.9 ANXIETY: ICD-10-CM

## 2024-04-19 DIAGNOSIS — G25.81 RLS (RESTLESS LEGS SYNDROME): ICD-10-CM

## 2024-04-19 DIAGNOSIS — B35.6 JOCK ITCH: ICD-10-CM

## 2024-04-19 DIAGNOSIS — Z12.5 PROSTATE CANCER SCREENING: ICD-10-CM

## 2024-04-19 DIAGNOSIS — I10 PRIMARY HYPERTENSION: ICD-10-CM

## 2024-04-19 LAB
ALBUMIN: 4.4 G/DL (ref 3.5–5.2)
ALP BLD-CCNC: 58 U/L (ref 40–129)
ALT SERPL-CCNC: 7 U/L (ref 0–40)
ANION GAP SERPL CALCULATED.3IONS-SCNC: 18 MMOL/L (ref 7–16)
AST SERPL-CCNC: 14 U/L (ref 0–39)
BASOPHILS ABSOLUTE: 0.02 K/UL (ref 0–0.2)
BASOPHILS RELATIVE PERCENT: 0 % (ref 0–2)
BILIRUB SERPL-MCNC: 0.2 MG/DL (ref 0–1.2)
BILIRUBIN DIRECT: <0.2 MG/DL (ref 0–0.3)
BILIRUBIN, INDIRECT: NORMAL MG/DL (ref 0–1)
BILIRUBIN, POC: NORMAL
BLOOD URINE, POC: NORMAL
BUN BLDV-MCNC: 8 MG/DL (ref 6–20)
CALCIUM SERPL-MCNC: 7.9 MG/DL (ref 8.6–10.2)
CHLORIDE BLD-SCNC: 96 MMOL/L (ref 98–107)
CHOLESTEROL: 242 MG/DL
CLARITY, POC: CLEAR
CO2: 29 MMOL/L (ref 22–29)
COLOR, POC: NORMAL
CREAT SERPL-MCNC: 0.6 MG/DL (ref 0.7–1.2)
EOSINOPHILS ABSOLUTE: 0.02 K/UL (ref 0.05–0.5)
EOSINOPHILS RELATIVE PERCENT: 0 % (ref 0–6)
GFR SERPL CREATININE-BSD FRML MDRD: >90 ML/MIN/1.73M2
GLUCOSE BLD-MCNC: 103 MG/DL (ref 74–99)
GLUCOSE URINE, POC: NORMAL
HCT VFR BLD CALC: 39.3 % (ref 37–54)
HDLC SERPL-MCNC: 97 MG/DL
HEMOGLOBIN: 11.7 G/DL (ref 12.5–16.5)
IMMATURE GRANULOCYTES %: 0 % (ref 0–5)
IMMATURE GRANULOCYTES ABSOLUTE: 0.04 K/UL (ref 0–0.58)
KETONES, POC: NORMAL
LDL CHOLESTEROL: 132 MG/DL
LEUKOCYTE EST, POC: NORMAL
LYMPHOCYTES ABSOLUTE: 0.83 K/UL (ref 1.5–4)
LYMPHOCYTES RELATIVE PERCENT: 8 % (ref 20–42)
MCH RBC QN AUTO: 31.8 PG (ref 26–35)
MCHC RBC AUTO-ENTMCNC: 29.8 G/DL (ref 32–34.5)
MCV RBC AUTO: 106.8 FL (ref 80–99.9)
MONOCYTES ABSOLUTE: 0.32 K/UL (ref 0.1–0.95)
MONOCYTES RELATIVE PERCENT: 3 % (ref 2–12)
NEUTROPHILS ABSOLUTE: 9.45 K/UL (ref 1.8–7.3)
NEUTROPHILS RELATIVE PERCENT: 88 % (ref 43–80)
NITRITE, POC: NORMAL
PDW BLD-RTO: 13.5 % (ref 11.5–15)
PH, POC: 8
PLATELET # BLD: 177 K/UL (ref 130–450)
PMV BLD AUTO: 11.5 FL (ref 7–12)
POTASSIUM SERPL-SCNC: 4.7 MMOL/L (ref 3.5–5)
PROSTATE SPECIFIC ANTIGEN: 0.63 NG/ML (ref 0–4)
PROTEIN, POC: NORMAL
RBC # BLD: 3.68 M/UL (ref 3.8–5.8)
SODIUM BLD-SCNC: 143 MMOL/L (ref 132–146)
SPECIFIC GRAVITY, POC: 1.01
TOTAL PROTEIN: 7.5 G/DL (ref 6.4–8.3)
TRIGL SERPL-MCNC: 64 MG/DL
UROBILINOGEN, POC: NORMAL
VLDLC SERPL CALC-MCNC: 13 MG/DL
WBC # BLD: 10.7 K/UL (ref 4.5–11.5)

## 2024-04-19 PROCEDURE — 99214 OFFICE O/P EST MOD 30 MIN: CPT | Performed by: FAMILY MEDICINE

## 2024-04-19 PROCEDURE — 3078F DIAST BP <80 MM HG: CPT | Performed by: FAMILY MEDICINE

## 2024-04-19 PROCEDURE — 81002 URINALYSIS NONAUTO W/O SCOPE: CPT | Performed by: FAMILY MEDICINE

## 2024-04-19 PROCEDURE — 36415 COLL VENOUS BLD VENIPUNCTURE: CPT | Performed by: FAMILY MEDICINE

## 2024-04-19 PROCEDURE — 3075F SYST BP GE 130 - 139MM HG: CPT | Performed by: FAMILY MEDICINE

## 2024-04-19 RX ORDER — NYSTATIN 100000 [USP'U]/G
POWDER TOPICAL
Qty: 60 G | Refills: 1 | Status: SHIPPED | OUTPATIENT
Start: 2024-04-19

## 2024-04-19 RX ORDER — NYSTATIN 100000 U/G
CREAM TOPICAL
Qty: 30 G | Refills: 1 | Status: SHIPPED | OUTPATIENT
Start: 2024-04-19

## 2024-04-19 RX ORDER — GABAPENTIN 300 MG/1
CAPSULE ORAL
Qty: 450 CAPSULE | Refills: 1 | Status: SHIPPED | OUTPATIENT
Start: 2024-04-19 | End: 2024-10-19

## 2024-04-19 RX ORDER — PRAMIPEXOLE DIHYDROCHLORIDE 0.75 MG/1
0.75 TABLET ORAL NIGHTLY
Qty: 90 TABLET | Refills: 1 | Status: SHIPPED | OUTPATIENT
Start: 2024-04-19

## 2024-04-19 RX ORDER — LORAZEPAM 1 MG/1
1 TABLET ORAL EVERY 8 HOURS PRN
Qty: 90 TABLET | Refills: 1 | Status: SHIPPED | OUTPATIENT
Start: 2024-04-19 | End: 2024-06-18

## 2024-04-19 NOTE — PROGRESS NOTES
albuterol (PROVENTIL) (2.5 MG/3ML) 0.083% nebulizer solution, Take 3 mLs by nebulization in the morning and at bedtime, Disp: 120 each, Rfl: 5    tamsulosin (FLOMAX) 0.4 MG capsule, Take 1 capsule by mouth daily (Patient taking differently: Take 1 capsule by mouth in the morning and at bedtime), Disp: 30 capsule, Rfl: 0    hydrOXYzine pamoate (VISTARIL) 25 MG capsule, Take 1 capsule by mouth 2 times daily, Disp: , Rfl:     FLUoxetine (PROZAC) 20 MG tablet, Take 1 tablet by mouth daily, Disp: 90 tablet, Rfl: 3    ARIPiprazole (ABILIFY) 5 MG tablet, Take 1 tablet by mouth daily, Disp: 90 tablet, Rfl: 3    empagliflozin (JARDIANCE) 10 MG tablet, Take 1 tablet by mouth daily, Disp: 30 tablet, Rfl: 3    atorvastatin (LIPITOR) 40 MG tablet, Take 1 tablet by mouth nightly, Disp: 30 tablet, Rfl: 3    glucose monitoring (FREESTYLE FREEDOM) kit, 1 kit by Does not apply route daily, Disp: 1 kit, Rfl: 0    ipratropium-albuterol (DUONEB) 0.5-2.5 (3) MG/3ML SOLN nebulizer solution, Inhale 3 mLs into the lungs every 6 hours as needed for Shortness of Breath, Disp: 90 each, Rfl: 3  Allergies   Allergen Reactions    Levofloxacin Other (See Comments)     Other reaction(s): Abdominal pain    Lisinopril      Other reaction(s): COUGHING    Tramadol      Other reaction(s): SHAKING    Ibuprofen Nausea And Vomiting    Sulfa Antibiotics Nausea And Vomiting       Pt's past medical and surgical history were reviewed and updated as necessary today   Pt's family and social history were reviewed and updated as necessary today      Vitals:    04/19/24 1134   BP: 132/62   Pulse: 74   Resp: 18   Temp: 97 °F (36.1 °C)   SpO2: 98%   Weight: 57.2 kg (126 lb)   Height: 1.626 m (5' 4\")       Physical Exam  Constitutional:       Appearance: Normal appearance.   HENT:      Head: Normocephalic and atraumatic.   Eyes:      Conjunctiva/sclera: Conjunctivae normal.   Cardiovascular:      Rate and Rhythm: Normal rate and regular rhythm.      Heart sounds:

## 2024-04-21 LAB
CULTURE: NORMAL
SPECIMEN DESCRIPTION: NORMAL

## 2024-04-22 DIAGNOSIS — R06.09 DYSPNEA ON MINIMAL EXERTION: ICD-10-CM

## 2024-04-22 DIAGNOSIS — Z51.5 PALLIATIVE CARE ENCOUNTER: ICD-10-CM

## 2024-04-22 DIAGNOSIS — J44.9 END STAGE COPD (HCC): ICD-10-CM

## 2024-04-22 RX ORDER — MORPHINE SULFATE 20 MG/ML
10 SOLUTION ORAL EVERY 4 HOURS PRN
Qty: 30 ML | Refills: 0 | Status: SHIPPED | OUTPATIENT
Start: 2024-04-22 | End: 2024-05-02

## 2024-04-22 NOTE — TELEPHONE ENCOUNTER
Patient Dg's sister Sayra called for refill on morphine sulfate 20mg/ml solution. Last home visit 2-. No future home visits scheduled. [facility] [place]

## 2024-04-30 DIAGNOSIS — R06.09 DYSPNEA ON MINIMAL EXERTION: ICD-10-CM

## 2024-04-30 DIAGNOSIS — Z51.5 PALLIATIVE CARE ENCOUNTER: ICD-10-CM

## 2024-04-30 DIAGNOSIS — J44.9 END STAGE COPD (HCC): ICD-10-CM

## 2024-04-30 RX ORDER — MORPHINE SULFATE 20 MG/ML
10 SOLUTION ORAL EVERY 4 HOURS PRN
Qty: 30 ML | Refills: 0 | Status: SHIPPED | OUTPATIENT
Start: 2024-04-30 | End: 2024-05-10

## 2024-04-30 NOTE — TELEPHONE ENCOUNTER
Patient's sister Sayra called for refill morphine 20mg/ml. Next appointment 5-9-2024. [facility] [place].

## 2024-05-06 ENCOUNTER — HOSPITAL ENCOUNTER (EMERGENCY)
Age: 59
Discharge: HOME | End: 2024-05-06
Payer: MEDICAID

## 2024-05-06 ENCOUNTER — APPOINTMENT (OUTPATIENT)
Dept: GENERAL RADIOLOGY | Age: 59
End: 2024-05-06
Payer: MEDICAID

## 2024-05-06 VITALS
TEMPERATURE: 98.4 F | SYSTOLIC BLOOD PRESSURE: 151 MMHG | HEIGHT: 64 IN | BODY MASS INDEX: 21.68 KG/M2 | OXYGEN SATURATION: 98 % | HEART RATE: 80 BPM | DIASTOLIC BLOOD PRESSURE: 72 MMHG | WEIGHT: 127 LBS | RESPIRATION RATE: 22 BRPM

## 2024-05-06 DIAGNOSIS — J44.1 COPD EXACERBATION (HCC): Primary | ICD-10-CM

## 2024-05-06 LAB
ALBUMIN SERPL-MCNC: 4.4 G/DL (ref 3.5–5.2)
ALP SERPL-CCNC: 59 U/L (ref 40–129)
ALT SERPL-CCNC: 7 U/L (ref 0–40)
ANION GAP SERPL CALCULATED.3IONS-SCNC: 11 MMOL/L (ref 7–16)
AST SERPL-CCNC: 11 U/L (ref 0–39)
BASOPHILS # BLD: 0.01 K/UL (ref 0–0.2)
BASOPHILS NFR BLD: 0 % (ref 0–2)
BILIRUB SERPL-MCNC: 0.2 MG/DL (ref 0–1.2)
BILIRUB UR QL STRIP: NEGATIVE
BUN SERPL-MCNC: 8 MG/DL (ref 6–20)
CALCIUM SERPL-MCNC: 7.7 MG/DL (ref 8.6–10.2)
CHLORIDE SERPL-SCNC: 92 MMOL/L (ref 98–107)
CLARITY UR: CLEAR
CO2 SERPL-SCNC: 34 MMOL/L (ref 22–29)
COLOR UR: YELLOW
CREAT SERPL-MCNC: 0.6 MG/DL (ref 0.7–1.2)
EKG ATRIAL RATE: 72 BPM
EKG P AXIS: 61 DEGREES
EKG P-R INTERVAL: 126 MS
EKG Q-T INTERVAL: 412 MS
EKG QRS DURATION: 70 MS
EKG QTC CALCULATION (BAZETT): 451 MS
EKG R AXIS: 73 DEGREES
EKG T AXIS: 73 DEGREES
EKG VENTRICULAR RATE: 72 BPM
EOSINOPHIL # BLD: 0.02 K/UL (ref 0.05–0.5)
EOSINOPHILS RELATIVE PERCENT: 0 % (ref 0–6)
ERYTHROCYTE [DISTWIDTH] IN BLOOD BY AUTOMATED COUNT: 13.3 % (ref 11.5–15)
GFR, ESTIMATED: >90 ML/MIN/1.73M2
GLUCOSE SERPL-MCNC: 114 MG/DL (ref 74–99)
GLUCOSE UR STRIP-MCNC: NEGATIVE MG/DL
HCT VFR BLD AUTO: 35.9 % (ref 37–54)
HGB BLD-MCNC: 11 G/DL (ref 12.5–16.5)
HGB UR QL STRIP.AUTO: NEGATIVE
IMM GRANULOCYTES # BLD AUTO: 0.03 K/UL (ref 0–0.58)
IMM GRANULOCYTES NFR BLD: 0 % (ref 0–5)
INFLUENZA A BY PCR: NOT DETECTED
INFLUENZA B BY PCR: NOT DETECTED
KETONES UR STRIP-MCNC: NEGATIVE MG/DL
LACTATE BLDV-SCNC: 0.8 MMOL/L (ref 0.5–2.2)
LEUKOCYTE ESTERASE UR QL STRIP: NEGATIVE
LYMPHOCYTES NFR BLD: 0.88 K/UL (ref 1.5–4)
LYMPHOCYTES RELATIVE PERCENT: 9 % (ref 20–42)
MCH RBC QN AUTO: 31.6 PG (ref 26–35)
MCHC RBC AUTO-ENTMCNC: 30.6 G/DL (ref 32–34.5)
MCV RBC AUTO: 103.2 FL (ref 80–99.9)
MONOCYTES NFR BLD: 0.53 K/UL (ref 0.1–0.95)
MONOCYTES NFR BLD: 5 % (ref 2–12)
NEUTROPHILS NFR BLD: 85 % (ref 43–80)
NEUTS SEG NFR BLD: 8.36 K/UL (ref 1.8–7.3)
NITRITE UR QL STRIP: NEGATIVE
PH UR STRIP: 7 [PH] (ref 5–9)
PLATELET # BLD AUTO: 145 K/UL (ref 130–450)
PMV BLD AUTO: 11.1 FL (ref 7–12)
POTASSIUM SERPL-SCNC: 4.2 MMOL/L (ref 3.5–5)
PROT SERPL-MCNC: 8.2 G/DL (ref 6.4–8.3)
PROT UR STRIP-MCNC: NEGATIVE MG/DL
RBC # BLD AUTO: 3.48 M/UL (ref 3.8–5.8)
RBC #/AREA URNS HPF: NORMAL /HPF
RSV BY PCR: NOT DETECTED
SARS-COV-2 RDRP RESP QL NAA+PROBE: NOT DETECTED
SODIUM SERPL-SCNC: 137 MMOL/L (ref 132–146)
SP GR UR STRIP: 1.01 (ref 1–1.03)
SPECIMEN DESCRIPTION: NORMAL
SPECIMEN SOURCE: NORMAL
TROPONIN I SERPL HS-MCNC: 13 NG/L (ref 0–11)
TROPONIN I SERPL HS-MCNC: 15 NG/L (ref 0–11)
UROBILINOGEN UR STRIP-ACNC: 0.2 EU/DL (ref 0–1)
WBC #/AREA URNS HPF: NORMAL /HPF
WBC OTHER # BLD: 9.8 K/UL (ref 4.5–11.5)

## 2024-05-06 PROCEDURE — 87634 RSV DNA/RNA AMP PROBE: CPT

## 2024-05-06 PROCEDURE — 6370000000 HC RX 637 (ALT 250 FOR IP): Performed by: NURSE PRACTITIONER

## 2024-05-06 PROCEDURE — 87502 INFLUENZA DNA AMP PROBE: CPT

## 2024-05-06 PROCEDURE — 81001 URINALYSIS AUTO W/SCOPE: CPT

## 2024-05-06 PROCEDURE — 99285 EMERGENCY DEPT VISIT HI MDM: CPT

## 2024-05-06 PROCEDURE — 85025 COMPLETE CBC W/AUTO DIFF WBC: CPT

## 2024-05-06 PROCEDURE — 93005 ELECTROCARDIOGRAM TRACING: CPT | Performed by: NURSE PRACTITIONER

## 2024-05-06 PROCEDURE — 84484 ASSAY OF TROPONIN QUANT: CPT

## 2024-05-06 PROCEDURE — 93010 ELECTROCARDIOGRAM REPORT: CPT | Performed by: INTERNAL MEDICINE

## 2024-05-06 PROCEDURE — 87635 SARS-COV-2 COVID-19 AMP PRB: CPT

## 2024-05-06 PROCEDURE — 80053 COMPREHEN METABOLIC PANEL: CPT

## 2024-05-06 PROCEDURE — 71045 X-RAY EXAM CHEST 1 VIEW: CPT

## 2024-05-06 PROCEDURE — 83605 ASSAY OF LACTIC ACID: CPT

## 2024-05-06 RX ORDER — CEFDINIR 300 MG/1
300 CAPSULE ORAL 2 TIMES DAILY
Qty: 20 CAPSULE | Refills: 0 | Status: SHIPPED | OUTPATIENT
Start: 2024-05-06 | End: 2024-05-16

## 2024-05-06 RX ORDER — LORAZEPAM 1 MG/1
1 TABLET ORAL ONCE
Status: COMPLETED | OUTPATIENT
Start: 2024-05-06 | End: 2024-05-06

## 2024-05-06 RX ORDER — DOXYCYCLINE HYCLATE 100 MG
100 TABLET ORAL 2 TIMES DAILY
Qty: 20 TABLET | Refills: 0 | Status: SHIPPED | OUTPATIENT
Start: 2024-05-06 | End: 2024-05-16

## 2024-05-06 RX ORDER — HYDROCODONE BITARTRATE AND ACETAMINOPHEN 5; 325 MG/1; MG/1
1 TABLET ORAL ONCE
Status: COMPLETED | OUTPATIENT
Start: 2024-05-06 | End: 2024-05-06

## 2024-05-06 RX ORDER — MORPHINE SULFATE 10 MG/5ML
10 SOLUTION ORAL ONCE
Status: COMPLETED | OUTPATIENT
Start: 2024-05-06 | End: 2024-05-06

## 2024-05-06 RX ORDER — METHYLPREDNISOLONE 4 MG/1
TABLET ORAL
Qty: 21 TABLET | Refills: 0 | Status: SHIPPED | OUTPATIENT
Start: 2024-05-06 | End: 2024-05-06

## 2024-05-06 RX ORDER — IPRATROPIUM BROMIDE AND ALBUTEROL SULFATE 2.5; .5 MG/3ML; MG/3ML
1 SOLUTION RESPIRATORY (INHALATION) ONCE
Status: DISCONTINUED | OUTPATIENT
Start: 2024-05-06 | End: 2024-05-06 | Stop reason: HOSPADM

## 2024-05-06 RX ADMIN — MORPHINE SULFATE 10 MG: 10 SOLUTION ORAL at 17:06

## 2024-05-06 RX ADMIN — HYDROCODONE BITARTRATE AND ACETAMINOPHEN 1 TABLET: 5; 325 TABLET ORAL at 19:50

## 2024-05-06 RX ADMIN — LORAZEPAM 1 MG: 1 TABLET ORAL at 17:05

## 2024-05-06 ASSESSMENT — PAIN - FUNCTIONAL ASSESSMENT: PAIN_FUNCTIONAL_ASSESSMENT: NONE - DENIES PAIN

## 2024-05-06 ASSESSMENT — PAIN SCALES - GENERAL: PAINLEVEL_OUTOF10: 9

## 2024-05-06 NOTE — ED TRIAGE NOTES
FIRST PROVIDER CONTACT ASSESSMENT NOTE       Department of Emergency Medicine                 First Provider Note            24  12:14 PM EDT    Date of Encounter: No admission date for patient encounter.    Patient Name: Dg Peña  : 1965  MRN: [id number]    Chief Complaint: Shortness of Breath (X1 week, 6L NC, was on z-cassia for upper respiratory infection and not feeling any better, visiting nurse saw him today and sent him in ) and Cough      History of Present Illness:   Dg Peña is a 58 y.o. male who presents to the ED for shortness of breath and chest pain.  Was treated last week with Zithromax.  Patient is on oxygen chronically 6 L.  Not getting any better    Focused Physical Exam:  VS:    ED Triage Vitals [24 1211]   BP Temp Temp Source Pulse Respirations SpO2 Height Weight - Scale   (!) 151/72 98.4 °F (36.9 °C) Oral 80 22 98 % 1.626 m (5' 4\") 57.6 kg (127 lb)        Physical Ex: Constitutional: Alert and non-toxic.    Medical History:  has a past medical history of Anxiety, COPD (chronic obstructive pulmonary disease) (HCC), Hypertension, Leg swelling, Multiple gastric ulcers, and Restless leg syndrome.  Surgical History:  has a past surgical history that includes knee surgery; hip surgery; hernia repair; and bronchoscopy (N/A, 2023).  Social History:  reports that he quit smoking about 3 years ago. His smoking use included cigarettes. He started smoking about 46 years ago. He has a 172.5 pack-year smoking history. He has never used smokeless tobacco. He reports that he does not drink alcohol and does not use drugs.  Family History: family history includes Colon Cancer in his mother; No Known Problems in his brother, brother, sister, and sister; Other in his father.    Allergies: Levofloxacin, Lisinopril, Tramadol, Ibuprofen, and Sulfa antibiotics     Initial Plan of Care: Initiate Treatment-Testing, Proceed toTreatment Area When Bed Available for ED Attending/MLP

## 2024-05-06 NOTE — ED PROVIDER NOTES
(chronic obstructive pulmonary disease) (HCC)    • Hypertension    • Leg swelling    • Multiple gastric ulcers    • Restless leg syndrome        CONSULTS: (Who and What was discussed)  None    Discussion with Other Profesionals : None    Social Determinants : None    Records Reviewed : Outpatient Notes outpatient notes from 4/19/2024 from patient's PCP as well as telephone encounter from 5-24.  ED notes from 3/21/2024, patient had chronic respiratory failure and and pneumonia, was discharged, doxycycline.    CC/HPI Summary, DDx, ED Course, and Reassessment: Briefly is a 58-year-old male patient with a history of COPD who is home O2 dependent with 6 L who has multiple other medical comorbidities who is complaining of an increased cough, was sent in by home health nurse who thought that his lungs sounded bad.  Is not having any more shortness of breath than his baseline.  Is not having any chest pain.  States he has a chronic cough due to his COPD, does not currently smoke at this time.    On exam patient is overall well-appearing, well-hydrated, he does have a dullness to the left lower lobe, faint and strain x-ray wheezing, his O2 sat is 98% on his baseline 6 L.  He is not in any respiratory distress.    Differentials considered include viral illness, pneumonia, COPD exacerbation, ACS.     The patient was placed on the cardiac monitor for continuous monitoring of rhythm and vitals. EKG ordered to evaluate patient's current cardiac rate, rhythm, and QT interval. CBC was ordered as part of my assessment for possible infection, anemia or thrombocytopenia. CMP to assess electrolytes, kidney function, liver function or any metabolic derangements. Troponin as a marker for myocardial ischemia or heart strain. Chest x-ray for any possible signs of, but without limitation to, pneumonia, pleural effusions, cardiomegaly, pneumothorax, atelectasis, rib or sternal abnormalities including fractures.      Please refer to ED course

## 2024-05-06 NOTE — DISCHARGE INSTRUCTIONS
XR CHEST PORTABLE   Final Result   Stable patchy right lower lobe opacity            Your labs were all normal, your viral testing was normal, your chest x-ray was stable from your previous.  You are likely experiencing a COPD exacerbation.  Will add doxycycline.  Continue your nebulizer treatments and your prednisone.  Please follow-up with your PCP in 1 week.

## 2024-05-07 ENCOUNTER — TELEPHONE (OUTPATIENT)
Dept: PRIMARY CARE CLINIC | Age: 59
End: 2024-05-07

## 2024-05-07 DIAGNOSIS — G89.4 CHRONIC PAIN SYNDROME: ICD-10-CM

## 2024-05-07 DIAGNOSIS — J44.9 END STAGE COPD (HCC): ICD-10-CM

## 2024-05-07 DIAGNOSIS — Z51.5 PALLIATIVE CARE ENCOUNTER: ICD-10-CM

## 2024-05-07 DIAGNOSIS — R06.09 DYSPNEA ON MINIMAL EXERTION: ICD-10-CM

## 2024-05-07 RX ORDER — MORPHINE SULFATE 20 MG/ML
10 SOLUTION ORAL EVERY 4 HOURS PRN
Qty: 30 ML | Refills: 0 | Status: SHIPPED | OUTPATIENT
Start: 2024-05-07 | End: 2024-05-17

## 2024-05-07 RX ORDER — HYDROCODONE BITARTRATE AND ACETAMINOPHEN 7.5; 325 MG/1; MG/1
1 TABLET ORAL EVERY 8 HOURS PRN
Qty: 90 TABLET | Refills: 0 | Status: SHIPPED | OUTPATIENT
Start: 2024-05-07 | End: 2024-06-06

## 2024-05-07 NOTE — TELEPHONE ENCOUNTER
Patient Dg's sister Sayra called for refill on norco 7.5-325mg and morphine 20mg/ml soln. Next appointment 5-9-2024. Pharmacy [facility] in [place].

## 2024-05-07 NOTE — TELEPHONE ENCOUNTER
gave PT Bumex and he is very swollen. Would like to go back to Lasix. But wanted the  to give the okay first.

## 2024-05-08 NOTE — TELEPHONE ENCOUNTER
I called and spoke to Sayra and let her know okay to switch back.  She thinks they have enough for awhile.  He has appt coming up she said.

## 2024-05-09 ENCOUNTER — OFFICE VISIT (OUTPATIENT)
Dept: PALLATIVE CARE | Age: 59
End: 2024-05-09

## 2024-05-09 VITALS
DIASTOLIC BLOOD PRESSURE: 74 MMHG | RESPIRATION RATE: 16 BRPM | HEART RATE: 99 BPM | SYSTOLIC BLOOD PRESSURE: 142 MMHG | OXYGEN SATURATION: 96 %

## 2024-05-09 DIAGNOSIS — Z51.5 PALLIATIVE CARE ENCOUNTER: Primary | ICD-10-CM

## 2024-05-09 DIAGNOSIS — G89.4 CHRONIC PAIN SYNDROME: ICD-10-CM

## 2024-05-09 DIAGNOSIS — J44.9 END STAGE COPD (HCC): ICD-10-CM

## 2024-05-09 DIAGNOSIS — R06.09 DYSPNEA ON MINIMAL EXERTION: ICD-10-CM

## 2024-05-09 RX ORDER — PREDNISONE 20 MG/1
20 TABLET ORAL DAILY
Qty: 10 TABLET | Refills: 0 | Status: SHIPPED | OUTPATIENT
Start: 2024-05-09 | End: 2024-05-19
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
## 2024-05-15 ENCOUNTER — HOSPITAL ENCOUNTER (OUTPATIENT)
Dept: OTHER | Age: 59
Setting detail: THERAPIES SERIES
Discharge: HOME | End: 2024-05-15
Payer: MEDICAID

## 2024-05-15 VITALS
OXYGEN SATURATION: 98 % | RESPIRATION RATE: 16 BRPM | BODY MASS INDEX: 20.67 KG/M2 | DIASTOLIC BLOOD PRESSURE: 58 MMHG | SYSTOLIC BLOOD PRESSURE: 122 MMHG | WEIGHT: 120.4 LBS | HEART RATE: 75 BPM

## 2024-05-15 LAB
ANION GAP SERPL CALCULATED.3IONS-SCNC: 12 MMOL/L (ref 7–16)
BNP SERPL-MCNC: 330 PG/ML (ref 0–125)
BUN SERPL-MCNC: 24 MG/DL (ref 6–20)
CALCIUM SERPL-MCNC: 7.2 MG/DL (ref 8.6–10.2)
CHLORIDE SERPL-SCNC: 93 MMOL/L (ref 98–107)
CO2 SERPL-SCNC: 35 MMOL/L (ref 22–29)
CREAT SERPL-MCNC: 0.9 MG/DL (ref 0.7–1.2)
GFR, ESTIMATED: >90 ML/MIN/1.73M2
GLUCOSE SERPL-MCNC: 190 MG/DL (ref 74–99)
POTASSIUM SERPL-SCNC: 4.2 MMOL/L (ref 3.5–5)
SODIUM SERPL-SCNC: 140 MMOL/L (ref 132–146)

## 2024-05-15 PROCEDURE — 99214 OFFICE O/P EST MOD 30 MIN: CPT

## 2024-05-15 PROCEDURE — 83880 ASSAY OF NATRIURETIC PEPTIDE: CPT

## 2024-05-15 PROCEDURE — 96374 THER/PROPH/DIAG INJ IV PUSH: CPT

## 2024-05-15 PROCEDURE — 80048 BASIC METABOLIC PNL TOTAL CA: CPT

## 2024-05-15 PROCEDURE — 2580000003 HC RX 258: Performed by: NURSE PRACTITIONER

## 2024-05-15 PROCEDURE — 6360000002 HC RX W HCPCS

## 2024-05-15 PROCEDURE — 36415 COLL VENOUS BLD VENIPUNCTURE: CPT

## 2024-05-15 RX ORDER — FUROSEMIDE 10 MG/ML
40 INJECTION INTRAMUSCULAR; INTRAVENOUS ONCE
Status: COMPLETED | OUTPATIENT
Start: 2024-05-15 | End: 2024-05-15

## 2024-05-15 RX ORDER — FUROSEMIDE 10 MG/ML
INJECTION INTRAMUSCULAR; INTRAVENOUS
Status: DISCONTINUED
Start: 2024-05-15 | End: 2024-05-15 | Stop reason: SDUPTHER

## 2024-05-15 RX ORDER — SODIUM CHLORIDE 0.9 % (FLUSH) 0.9 %
5-40 SYRINGE (ML) INJECTION ONCE
Status: COMPLETED | OUTPATIENT
Start: 2024-05-15 | End: 2024-05-15

## 2024-05-15 RX ADMIN — FUROSEMIDE 40 MG: 10 INJECTION, SOLUTION INTRAMUSCULAR; INTRAVENOUS at 10:12

## 2024-05-15 RX ADMIN — SODIUM CHLORIDE, PRESERVATIVE FREE 10 ML: 5 INJECTION INTRAVENOUS at 10:12

## 2024-05-15 RX ADMIN — FUROSEMIDE 40 MG: 10 INJECTION INTRAMUSCULAR; INTRAVENOUS at 10:12

## 2024-05-15 ASSESSMENT — PATIENT HEALTH QUESTIONNAIRE - PHQ9
SUM OF ALL RESPONSES TO PHQ QUESTIONS 1-9: 2
1. LITTLE INTEREST OR PLEASURE IN DOING THINGS: SEVERAL DAYS
2. FEELING DOWN, DEPRESSED OR HOPELESS: SEVERAL DAYS
SUM OF ALL RESPONSES TO PHQ QUESTIONS 1-9: 2
SUM OF ALL RESPONSES TO PHQ9 QUESTIONS 1 & 2: 2
SUM OF ALL RESPONSES TO PHQ QUESTIONS 1-9: 2
SUM OF ALL RESPONSES TO PHQ QUESTIONS 1-9: 2

## 2024-05-15 NOTE — PROGRESS NOTES
Congestive Heart Failure Clinic   [facility]       Referring Provider: Wm Zuñiga APRCELESTINA-CNP  Primary Care Physician: Jana Sacnhez MD   Cardiologist:   Nephrologist: n/a      HISTORY OF PRESENT ILLNESS:     Dg Peña is a 58 y.o. (1965) male with a history of HFimpEF, most recent EF:  Lab Results   Component Value Date    LVEF 63 08/03/2023    LVEFMODE Echo 01/21/2022         He presents to the CHF clinic for ongoing evaluation and monitoring of heart failure.    In the CHF clinic today he denies any adverse symptoms except:  [] Dizziness or lightheadedness   [] Syncope or near syncope  [] Recent Fall  [] Shortness of breath at    [x] Dyspnea with exertion recovers with rest  [] Decline in functional capacity (unable to perform activities they had previously been able to do)  [] Fatigue   [] Orthopnea  [] PND  [] Nocturnal cough  [] Hemoptysis  [] Chest pain, pressure, or discomfort  [] Palpitations  [] Abdominal distention  [] Abdominal bloating  [] Early satiety  [] Blood in stool   [] Diarrhea  [] Constipation  [] Nausea/Vomiting  [] Decreased urinary response to oral diuretic   [] Scrotal swelling   [x] Lower extremity edema  [] Used PRN doses of oral diuretic   [] Weight gain    Wt Readings from Last 3 Encounters:   05/15/24 54.6 kg (120 lb 6.4 oz)   05/06/24 57.6 kg (127 lb)   04/19/24 57.2 kg (126 lb)           SOCIAL HISTORY:  [x] Denies tobacco, alcohol or illicit drug abuse  [] Tobacco use:  [] ETOH use:  [] Illicit drug use:        MEDICATIONS:    Allergies   Allergen Reactions    Levofloxacin Other (See Comments)     Other reaction(s): Abdominal pain    Lisinopril      Other reaction(s): COUGHING    Tramadol      Other reaction(s): SHAKING    Ibuprofen Nausea And Vomiting    Sulfa Antibiotics Nausea And Vomiting       Current Outpatient Medications:     predniSONE (DELTASONE) 20 MG tablet, Take 1 tablet by mouth daily for 10 days, Disp:

## 2024-05-21 DIAGNOSIS — Z51.5 PALLIATIVE CARE ENCOUNTER: ICD-10-CM

## 2024-05-21 DIAGNOSIS — J44.9 END STAGE COPD (HCC): ICD-10-CM

## 2024-05-21 DIAGNOSIS — R06.09 DYSPNEA ON MINIMAL EXERTION: ICD-10-CM

## 2024-05-21 RX ORDER — MORPHINE SULFATE 20 MG/ML
10 SOLUTION ORAL EVERY 4 HOURS PRN
Qty: 30 ML | Refills: 0 | Status: SHIPPED | OUTPATIENT
Start: 2024-05-21 | End: 2024-05-31

## 2024-05-21 NOTE — TELEPHONE ENCOUNTER
Call from Sayra requesting refill for Morphine concentrate. Pharmacy is [facility]. Next kristina with [facility].

## 2024-05-30 DIAGNOSIS — Z51.5 PALLIATIVE CARE ENCOUNTER: ICD-10-CM

## 2024-05-30 DIAGNOSIS — J44.9 END STAGE COPD (HCC): ICD-10-CM

## 2024-05-30 DIAGNOSIS — R06.09 DYSPNEA ON MINIMAL EXERTION: ICD-10-CM

## 2024-05-30 RX ORDER — MORPHINE SULFATE 20 MG/ML
10 SOLUTION ORAL EVERY 4 HOURS PRN
Qty: 30 ML | Refills: 0 | Status: SHIPPED | OUTPATIENT
Start: 2024-05-30 | End: 2024-06-09

## 2024-05-30 NOTE — TELEPHONE ENCOUNTER
Patient Dg's sister Sayra called for refill on morphine sulfate 20mg/ml soln. [facility] in [place]. Last home visit 5-9-2024.

## 2024-05-31 ENCOUNTER — HOSPITAL ENCOUNTER (OUTPATIENT)
Dept: OTHER | Age: 59
Setting detail: THERAPIES SERIES
Discharge: HOME | End: 2024-05-31
Payer: MEDICAID

## 2024-05-31 VITALS
WEIGHT: 128 LBS | HEART RATE: 70 BPM | SYSTOLIC BLOOD PRESSURE: 162 MMHG | OXYGEN SATURATION: 99 % | DIASTOLIC BLOOD PRESSURE: 75 MMHG | BODY MASS INDEX: 21.97 KG/M2 | RESPIRATION RATE: 16 BRPM

## 2024-05-31 LAB
ANION GAP SERPL CALCULATED.3IONS-SCNC: 9 MMOL/L (ref 7–16)
BNP SERPL-MCNC: 458 PG/ML (ref 0–125)
BUN SERPL-MCNC: 20 MG/DL (ref 6–20)
CALCIUM SERPL-MCNC: 7.1 MG/DL (ref 8.6–10.2)
CHLORIDE SERPL-SCNC: 97 MMOL/L (ref 98–107)
CO2 SERPL-SCNC: 37 MMOL/L (ref 22–29)
CREAT SERPL-MCNC: 0.7 MG/DL (ref 0.7–1.2)
GFR, ESTIMATED: >90 ML/MIN/1.73M2
POTASSIUM SERPL-SCNC: 4.2 MMOL/L (ref 3.5–5)
SODIUM SERPL-SCNC: 143 MMOL/L (ref 132–146)

## 2024-05-31 PROCEDURE — 99214 OFFICE O/P EST MOD 30 MIN: CPT

## 2024-05-31 PROCEDURE — 80048 BASIC METABOLIC PNL TOTAL CA: CPT

## 2024-05-31 PROCEDURE — 36415 COLL VENOUS BLD VENIPUNCTURE: CPT

## 2024-05-31 PROCEDURE — 83880 ASSAY OF NATRIURETIC PEPTIDE: CPT

## 2024-05-31 PROCEDURE — 96374 THER/PROPH/DIAG INJ IV PUSH: CPT

## 2024-05-31 PROCEDURE — 6360000002 HC RX W HCPCS

## 2024-05-31 PROCEDURE — 2580000003 HC RX 258: Performed by: NURSE PRACTITIONER

## 2024-05-31 RX ORDER — FUROSEMIDE 10 MG/ML
INJECTION INTRAMUSCULAR; INTRAVENOUS
Status: COMPLETED
Start: 2024-05-31 | End: 2024-05-31

## 2024-05-31 RX ORDER — FUROSEMIDE 10 MG/ML
40 INJECTION INTRAMUSCULAR; INTRAVENOUS ONCE
Status: COMPLETED | OUTPATIENT
Start: 2024-05-31 | End: 2024-05-31

## 2024-05-31 RX ORDER — SODIUM CHLORIDE 0.9 % (FLUSH) 0.9 %
10 SYRINGE (ML) INJECTION ONCE
Status: COMPLETED | OUTPATIENT
Start: 2024-05-31 | End: 2024-05-31

## 2024-05-31 RX ADMIN — SODIUM CHLORIDE, PRESERVATIVE FREE 10 ML: 5 INJECTION INTRAVENOUS at 08:40

## 2024-05-31 RX ADMIN — FUROSEMIDE 40 MG: 10 INJECTION, SOLUTION INTRAMUSCULAR; INTRAVENOUS at 08:39

## 2024-05-31 RX ADMIN — FUROSEMIDE 40 MG: 10 INJECTION INTRAMUSCULAR; INTRAVENOUS at 08:39

## 2024-05-31 NOTE — PROGRESS NOTES
Congestive Heart Failure Clinic   [facility]       Referring Provider: Wm Zuñiga APRN-CNP  Primary Care Physician: Jana Sanchez MD   Cardiologist:   Nephrologist: n/a      HISTORY OF PRESENT ILLNESS:     Dg Peña is a 58 y.o. (1965) male with a history of HFimpEF, most recent EF:  Lab Results   Component Value Date    LVEF 63 08/03/2023    LVEFMODE Echo 01/21/2022         He presents to the CHF clinic for ongoing evaluation and monitoring of heart failure.    In the CHF clinic today he denies any adverse symptoms except:  [] Dizziness or lightheadedness   [] Syncope or near syncope  [] Recent Fall  [x] Shortness of breath at rest  [] Dyspnea with exertion recovers with rest  [x] Decline in functional capacity (unable to perform activities they had previously been able to do)  [] Fatigue   [x] Orthopnea  [] PND  [] Nocturnal cough  [] Hemoptysis  [] Chest pain, pressure, or discomfort  [] Palpitations  [] Abdominal distention  [] Abdominal bloating  [] Early satiety  [] Blood in stool   [] Diarrhea  [] Constipation  [] Nausea/Vomiting  [] Decreased urinary response to oral diuretic   [] Scrotal swelling   [x] Lower extremity edema  [] Used PRN doses of oral diuretic   [x] Weight gain    Wt Readings from Last 3 Encounters:   05/31/24 58.1 kg (128 lb)   05/29/24 58.5 kg (129 lb)   05/15/24 54.6 kg (120 lb 6.4 oz)           SOCIAL HISTORY:  [x] Denies tobacco, alcohol or illicit drug abuse  [] Tobacco use:  [] ETOH use:  [] Illicit drug use:        MEDICATIONS:    Allergies   Allergen Reactions    Levofloxacin Other (See Comments)     Other reaction(s): Abdominal pain    Lisinopril      Other reaction(s): COUGHING    Tramadol      Other reaction(s): SHAKING    Ibuprofen Nausea And Vomiting    Sulfa Antibiotics Nausea And Vomiting       Current Outpatient Medications:     Morphine Sulfate (MORPHINE 20MG/ML) SOLN concentrated solution, Take 0.5 mLs by

## 2024-06-01 DIAGNOSIS — R51.9 BAD HEADACHE: ICD-10-CM

## 2024-06-03 RX ORDER — BUTALBITAL, ACETAMINOPHEN AND CAFFEINE 50; 325; 40 MG/1; MG/1; MG/1
TABLET ORAL
Qty: 30 TABLET | Refills: 1 | Status: SHIPPED | OUTPATIENT
Start: 2024-06-03

## 2024-06-04 ENCOUNTER — TELEPHONE (OUTPATIENT)
Dept: OTHER | Age: 59
End: 2024-06-04

## 2024-06-04 NOTE — TELEPHONE ENCOUNTER
Patients sister called in to reschedule Dg he is currently in the hospital for cellulitis and he had a bronch 6/3/24

## 2024-06-07 ENCOUNTER — HOSPITAL ENCOUNTER (OUTPATIENT)
Dept: OTHER | Age: 59
Discharge: HOME | End: 2024-06-07

## 2024-06-07 DIAGNOSIS — G89.4 CHRONIC PAIN SYNDROME: ICD-10-CM

## 2024-06-07 DIAGNOSIS — Z51.5 PALLIATIVE CARE ENCOUNTER: ICD-10-CM

## 2024-06-07 RX ORDER — HYDROCODONE BITARTRATE AND ACETAMINOPHEN 7.5; 325 MG/1; MG/1
1 TABLET ORAL EVERY 8 HOURS PRN
Qty: 90 TABLET | Refills: 0 | Status: SHIPPED | OUTPATIENT
Start: 2024-06-07 | End: 2024-07-07

## 2024-06-07 NOTE — TELEPHONE ENCOUNTER
Call from Sayra, Dg's sister, requesting refill for Russell. Sayra states Dg has been in the [facility] and is being discharged today. Pharmacy is [facility]. [facility]

## 2024-06-11 ENCOUNTER — TELEPHONE (OUTPATIENT)
Dept: PRIMARY CARE CLINIC | Age: 59
End: 2024-06-11

## 2024-06-11 NOTE — TELEPHONE ENCOUNTER
Caregiver states patient was in ED yesterday for blood coming out of ear. He was recently started on Linezolid 600 mg by infectious disease. ED packed his ear but his ear is still bleeding. She contacted Dr. Duarte and they have not responded. Not sure what to do at this point and requesting recommendations

## 2024-06-11 NOTE — TELEPHONE ENCOUNTER
I read the ER note.  He had persistent bleeding through the packaging there, but it was felt he could just follow-up with ENT.  Did they get a referral to ENT or have someone that he already sees?  We are not able to pack his ears in the office appropriately.  If the bleeding is persistent, will need to go back to the emergency room.

## 2024-06-13 DIAGNOSIS — Z51.5 PALLIATIVE CARE ENCOUNTER: ICD-10-CM

## 2024-06-13 DIAGNOSIS — J44.9 END STAGE COPD (HCC): ICD-10-CM

## 2024-06-13 DIAGNOSIS — R06.09 DYSPNEA ON MINIMAL EXERTION: ICD-10-CM

## 2024-06-13 RX ORDER — MORPHINE SULFATE 20 MG/ML
10 SOLUTION ORAL EVERY 4 HOURS PRN
Qty: 30 ML | Refills: 0 | Status: SHIPPED | OUTPATIENT
Start: 2024-06-13 | End: 2024-06-23

## 2024-06-13 NOTE — TELEPHONE ENCOUNTER
Call from Sayra Dg's sister/POA,  requesting a refill for Dg's Morphine concentrate. Pharmacy is [facility]. [facility]

## 2024-06-14 ENCOUNTER — OFFICE VISIT (OUTPATIENT)
Dept: PRIMARY CARE CLINIC | Age: 59
End: 2024-06-14
Payer: MEDICAID

## 2024-06-14 VITALS
HEART RATE: 77 BPM | WEIGHT: 129 LBS | HEIGHT: 64 IN | OXYGEN SATURATION: 96 % | BODY MASS INDEX: 22.02 KG/M2 | SYSTOLIC BLOOD PRESSURE: 101 MMHG | DIASTOLIC BLOOD PRESSURE: 64 MMHG | TEMPERATURE: 97.8 F

## 2024-06-14 DIAGNOSIS — L03.116 CELLULITIS OF LEFT LOWER EXTREMITY: Primary | ICD-10-CM

## 2024-06-14 DIAGNOSIS — G57.93 NEUROPATHY INVOLVING BOTH LOWER EXTREMITIES: ICD-10-CM

## 2024-06-14 DIAGNOSIS — J96.02 ACUTE RESPIRATORY FAILURE WITH HYPOXIA AND HYPERCAPNIA (HCC): ICD-10-CM

## 2024-06-14 DIAGNOSIS — F41.9 ANXIETY: ICD-10-CM

## 2024-06-14 DIAGNOSIS — I50.33 ACUTE ON CHRONIC DIASTOLIC (CONGESTIVE) HEART FAILURE (HCC): ICD-10-CM

## 2024-06-14 DIAGNOSIS — J96.01 ACUTE RESPIRATORY FAILURE WITH HYPOXIA AND HYPERCAPNIA (HCC): ICD-10-CM

## 2024-06-14 PROCEDURE — 3074F SYST BP LT 130 MM HG: CPT | Performed by: FAMILY MEDICINE

## 2024-06-14 PROCEDURE — 3078F DIAST BP <80 MM HG: CPT | Performed by: FAMILY MEDICINE

## 2024-06-14 PROCEDURE — 99214 OFFICE O/P EST MOD 30 MIN: CPT | Performed by: FAMILY MEDICINE

## 2024-06-14 RX ORDER — SULFAMETHOXAZOLE AND TRIMETHOPRIM 800; 160 MG/1; MG/1
1 TABLET ORAL 2 TIMES DAILY
Qty: 14 TABLET | Refills: 0 | Status: SHIPPED | OUTPATIENT
Start: 2024-06-14 | End: 2024-06-21

## 2024-06-14 RX ORDER — LORAZEPAM 1 MG/1
1 TABLET ORAL EVERY 8 HOURS PRN
Qty: 90 TABLET | Refills: 1 | Status: SHIPPED | OUTPATIENT
Start: 2024-06-14 | End: 2024-08-13

## 2024-06-14 RX ORDER — GABAPENTIN 300 MG/1
CAPSULE ORAL
Qty: 450 CAPSULE | Refills: 1 | Status: SHIPPED | OUTPATIENT
Start: 2024-06-14 | End: 2024-12-14

## 2024-06-14 RX ORDER — ALBUTEROL SULFATE 90 UG/1
2 AEROSOL, METERED RESPIRATORY (INHALATION) 4 TIMES DAILY PRN
Qty: 54 G | Refills: 1 | Status: SHIPPED | OUTPATIENT
Start: 2024-06-14

## 2024-06-14 NOTE — PROGRESS NOTES
TAKE TWO (2) MLS BY NEBULIZATION IN THE MORNING AND TWO (2) MLS IN THE EVENING., Disp: 480 mL, Rfl: 5    lidocaine (XYLOCAINE) 5 % ointment, Apply topically to lower back as needed., Disp: 240 g, Rfl: 0    fluticasone (FLONASE) 50 MCG/ACT nasal spray, 1 spray by Each Nostril route daily, Disp: 16 g, Rfl: 3    losartan (COZAAR) 50 MG tablet, Take 1 tablet by mouth daily, Disp: 90 tablet, Rfl: 1    furosemide (LASIX) 40 MG tablet, TAKE ONE (1) TABLET BY MOUTH DAILY, Disp: 90 tablet, Rfl: 1    sodium chloride (OCEAN) 0.65 % nasal spray, by Nasal route, Disp: , Rfl:     ASPIRIN LOW DOSE 81 MG chewable tablet, TAKE ONE (1) TABLET BY MOUTH DAILY, Disp: 30 tablet, Rfl: 3    albuterol sulfate HFA (PROAIR HFA) 108 (90 Base) MCG/ACT inhaler, Inhale 2 puffs into the lungs every 4 hours as needed for Wheezing, Disp: 1 each, Rfl: 3    albuterol (PROVENTIL) (2.5 MG/3ML) 0.083% nebulizer solution, Take 3 mLs by nebulization in the morning and at bedtime, Disp: 120 each, Rfl: 5    tamsulosin (FLOMAX) 0.4 MG capsule, Take 1 capsule by mouth daily (Patient taking differently: Take 1 capsule by mouth in the morning and at bedtime), Disp: 30 capsule, Rfl: 0    hydrOXYzine pamoate (VISTARIL) 25 MG capsule, Take 1 capsule by mouth 2 times daily, Disp: , Rfl:     FLUoxetine (PROZAC) 20 MG tablet, Take 1 tablet by mouth daily, Disp: 90 tablet, Rfl: 3    ARIPiprazole (ABILIFY) 5 MG tablet, Take 1 tablet by mouth daily, Disp: 90 tablet, Rfl: 3    empagliflozin (JARDIANCE) 10 MG tablet, Take 1 tablet by mouth daily, Disp: 30 tablet, Rfl: 3    atorvastatin (LIPITOR) 40 MG tablet, Take 1 tablet by mouth nightly, Disp: 30 tablet, Rfl: 3    glucose monitoring (FREESTYLE FREEDOM) kit, 1 kit by Does not apply route daily, Disp: 1 kit, Rfl: 0    ipratropium-albuterol (DUONEB) 0.5-2.5 (3) MG/3ML SOLN nebulizer solution, Inhale 3 mLs into the lungs every 6 hours as needed for Shortness of Breath, Disp: 90 each, Rfl: 3    omeprazole (PRILOSEC) 40 MG

## 2024-06-18 ENCOUNTER — TELEPHONE (OUTPATIENT)
Dept: PRIMARY CARE CLINIC | Age: 59
End: 2024-06-18

## 2024-06-18 NOTE — TELEPHONE ENCOUNTER
If it's getting better, they can just continue with the Benadryl and hydrocortisone.  If it was from the Bactrim, then obviously the longer he is off of it, the less the symptoms should be.  Was he having any worsening shortness of breath related to the reaction they think?  Is there any other concerns that they have?

## 2024-06-18 NOTE — TELEPHONE ENCOUNTER
Sayra called in stating patient started on Bactrim on 6/14 and after taking it for 2 days he started developing hives.  He stopped the medication and she started giving him benadryl and using hydrocortisone cream.   She said that it is starting to look better but wants to know what she should do. She said that with the heat she is unable to get him out but is willing to do a virtual if needed.   Please advise.

## 2024-06-19 DIAGNOSIS — K59.1 FUNCTIONAL DIARRHEA: ICD-10-CM

## 2024-06-19 RX ORDER — DIPHENOXYLATE HYDROCHLORIDE AND ATROPINE SULFATE 2.5; .025 MG/1; MG/1
TABLET ORAL
Qty: 21 TABLET | Refills: 1 | Status: SHIPPED | OUTPATIENT
Start: 2024-06-19 | End: 2024-06-26

## 2024-06-21 RX ORDER — OMEPRAZOLE 40 MG/1
40 CAPSULE, DELAYED RELEASE ORAL DAILY
Qty: 90 CAPSULE | Refills: 1 | Status: SHIPPED | OUTPATIENT
Start: 2024-06-21

## 2024-06-24 ENCOUNTER — HOSPITAL ENCOUNTER (OUTPATIENT)
Dept: OTHER | Age: 59
Setting detail: THERAPIES SERIES
Discharge: HOME | End: 2024-06-24
Payer: MEDICAID

## 2024-06-24 VITALS
HEART RATE: 88 BPM | BODY MASS INDEX: 22.31 KG/M2 | RESPIRATION RATE: 16 BRPM | DIASTOLIC BLOOD PRESSURE: 57 MMHG | WEIGHT: 130 LBS | OXYGEN SATURATION: 96 % | SYSTOLIC BLOOD PRESSURE: 111 MMHG

## 2024-06-24 DIAGNOSIS — Z51.5 PALLIATIVE CARE ENCOUNTER: ICD-10-CM

## 2024-06-24 DIAGNOSIS — R06.09 DYSPNEA ON MINIMAL EXERTION: ICD-10-CM

## 2024-06-24 DIAGNOSIS — J44.9 END STAGE COPD (HCC): ICD-10-CM

## 2024-06-24 LAB
ANION GAP SERPL CALCULATED.3IONS-SCNC: 10 MMOL/L (ref 7–16)
BNP SERPL-MCNC: 556 PG/ML (ref 0–125)
BUN SERPL-MCNC: 17 MG/DL (ref 6–20)
CALCIUM SERPL-MCNC: 7.5 MG/DL (ref 8.6–10.2)
CHLORIDE SERPL-SCNC: 96 MMOL/L (ref 98–107)
CO2 SERPL-SCNC: 32 MMOL/L (ref 22–29)
CREAT SERPL-MCNC: 0.6 MG/DL (ref 0.7–1.2)
GFR, ESTIMATED: >90 ML/MIN/1.73M2
GLUCOSE SERPL-MCNC: 179 MG/DL (ref 74–99)
POTASSIUM SERPL-SCNC: 4.5 MMOL/L (ref 3.5–5)
SODIUM SERPL-SCNC: 138 MMOL/L (ref 132–146)

## 2024-06-24 PROCEDURE — 80048 BASIC METABOLIC PNL TOTAL CA: CPT

## 2024-06-24 PROCEDURE — 83880 ASSAY OF NATRIURETIC PEPTIDE: CPT

## 2024-06-24 PROCEDURE — 36415 COLL VENOUS BLD VENIPUNCTURE: CPT

## 2024-06-24 PROCEDURE — 99214 OFFICE O/P EST MOD 30 MIN: CPT

## 2024-06-24 RX ORDER — MORPHINE SULFATE 20 MG/ML
10 SOLUTION ORAL EVERY 4 HOURS PRN
Qty: 30 ML | Refills: 0 | Status: SHIPPED | OUTPATIENT
Start: 2024-06-24 | End: 2024-07-04

## 2024-06-24 NOTE — TELEPHONE ENCOUNTER
Patient Dg's sister Sayra called for refill morphine sulfate 20mg/ml soln. Last home visit 5-9-2024. [facility]

## 2024-07-01 ENCOUNTER — TELEPHONE (OUTPATIENT)
Dept: PRIMARY CARE CLINIC | Age: 59
End: 2024-07-01

## 2024-07-01 RX ORDER — CLINDAMYCIN HYDROCHLORIDE 150 MG/1
450 CAPSULE ORAL 3 TIMES DAILY
Qty: 63 CAPSULE | Refills: 0 | Status: SHIPPED
Start: 2024-07-01 | End: 2024-07-02 | Stop reason: ALTCHOICE

## 2024-07-01 NOTE — TELEPHONE ENCOUNTER
Audra from [facility] called stating she noticed Friday that the pt's left leg was red, edematous, and firm to the touch. She states it is much worse today, and has spread from his ankle to his thigh.   He was recently hospitalized for this and was unable to complete antibiotics due to diarrhea and rash.   Audra asked if you can call something in for him, or do you want him to be evaluated in person.     Please advise.

## 2024-07-01 NOTE — TELEPHONE ENCOUNTER
If there is any concern for the possibility of lower extremity DVT, the patient needs evaluated in the ER now    Otherwise, I will resend antibiotics, however if there is ANY worsening, needs in person eval - walk ins open

## 2024-07-01 NOTE — TELEPHONE ENCOUNTER
Sayra states she does not think it is a blood clot, and he has already been cleared of blood clots. She states she will bring him through the walk in tomorrow.

## 2024-07-02 ENCOUNTER — HOSPITAL ENCOUNTER (INPATIENT)
Age: 59
LOS: 3 days | Discharge: HOME HEALTH | DRG: 383 | End: 2024-07-05
Attending: EMERGENCY MEDICINE | Admitting: INTERNAL MEDICINE
Payer: MEDICAID

## 2024-07-02 ENCOUNTER — OFFICE VISIT (OUTPATIENT)
Dept: FAMILY MEDICINE CLINIC | Age: 59
End: 2024-07-02
Payer: MEDICAID

## 2024-07-02 ENCOUNTER — APPOINTMENT (OUTPATIENT)
Dept: GENERAL RADIOLOGY | Age: 59
DRG: 383 | End: 2024-07-02
Payer: MEDICAID

## 2024-07-02 ENCOUNTER — APPOINTMENT (OUTPATIENT)
Dept: ULTRASOUND IMAGING | Age: 59
DRG: 383 | End: 2024-07-02
Payer: MEDICAID

## 2024-07-02 VITALS
HEIGHT: 64 IN | BODY MASS INDEX: 22.2 KG/M2 | TEMPERATURE: 98.7 F | WEIGHT: 130 LBS | SYSTOLIC BLOOD PRESSURE: 112 MMHG | HEART RATE: 70 BPM | RESPIRATION RATE: 18 BRPM | OXYGEN SATURATION: 99 % | DIASTOLIC BLOOD PRESSURE: 60 MMHG

## 2024-07-02 DIAGNOSIS — L03.116 CELLULITIS OF LEG, LEFT: Primary | ICD-10-CM

## 2024-07-02 DIAGNOSIS — M79.662 PAIN AND SWELLING OF LEFT LOWER LEG: ICD-10-CM

## 2024-07-02 DIAGNOSIS — L03.116 CELLULITIS OF LEFT LOWER EXTREMITY: Primary | ICD-10-CM

## 2024-07-02 DIAGNOSIS — M79.89 PAIN AND SWELLING OF LEFT LOWER LEG: ICD-10-CM

## 2024-07-02 LAB
ANION GAP SERPL CALCULATED.3IONS-SCNC: 8 MMOL/L (ref 7–16)
BUN SERPL-MCNC: 18 MG/DL (ref 6–20)
CALCIUM SERPL-MCNC: 7.2 MG/DL (ref 8.6–10.2)
CHLORIDE SERPL-SCNC: 96 MMOL/L (ref 98–107)
CO2 SERPL-SCNC: 36 MMOL/L (ref 22–29)
CREAT SERPL-MCNC: 0.7 MG/DL (ref 0.7–1.2)
CRP SERPL HS-MCNC: 21 MG/L (ref 0–5)
ECHO BSA: 1.63 M2
ERYTHROCYTE [DISTWIDTH] IN BLOOD BY AUTOMATED COUNT: 13.5 % (ref 11.5–15)
ERYTHROCYTE [SEDIMENTATION RATE] IN BLOOD BY WESTERGREN METHOD: 41 MM/HR (ref 0–15)
GFR, ESTIMATED: >90 ML/MIN/1.73M2
GLUCOSE SERPL-MCNC: 95 MG/DL (ref 74–99)
HCT VFR BLD AUTO: 33.8 % (ref 37–54)
HGB BLD-MCNC: 9.9 G/DL (ref 12.5–16.5)
MCH RBC QN AUTO: 30.5 PG (ref 26–35)
MCHC RBC AUTO-ENTMCNC: 29.3 G/DL (ref 32–34.5)
MCV RBC AUTO: 104 FL (ref 80–99.9)
PLATELET # BLD AUTO: 148 K/UL (ref 130–450)
PMV BLD AUTO: 11.8 FL (ref 7–12)
POTASSIUM SERPL-SCNC: 4 MMOL/L (ref 3.5–5)
RBC # BLD AUTO: 3.25 M/UL (ref 3.8–5.8)
SODIUM SERPL-SCNC: 140 MMOL/L (ref 132–146)
WBC OTHER # BLD: 5.7 K/UL (ref 4.5–11.5)

## 2024-07-02 PROCEDURE — 1200000000 HC SEMI PRIVATE

## 2024-07-02 PROCEDURE — 85652 RBC SED RATE AUTOMATED: CPT

## 2024-07-02 PROCEDURE — 93926 LOWER EXTREMITY STUDY: CPT

## 2024-07-02 PROCEDURE — 99285 EMERGENCY DEPT VISIT HI MDM: CPT

## 2024-07-02 PROCEDURE — 6370000000 HC RX 637 (ALT 250 FOR IP): Performed by: INTERNAL MEDICINE

## 2024-07-02 PROCEDURE — 2580000003 HC RX 258: Performed by: INTERNAL MEDICINE

## 2024-07-02 PROCEDURE — 85027 COMPLETE CBC AUTOMATED: CPT

## 2024-07-02 PROCEDURE — 73552 X-RAY EXAM OF FEMUR 2/>: CPT

## 2024-07-02 PROCEDURE — 3078F DIAST BP <80 MM HG: CPT | Performed by: EMERGENCY MEDICINE

## 2024-07-02 PROCEDURE — 99223 1ST HOSP IP/OBS HIGH 75: CPT | Performed by: INTERNAL MEDICINE

## 2024-07-02 PROCEDURE — 99214 OFFICE O/P EST MOD 30 MIN: CPT | Performed by: EMERGENCY MEDICINE

## 2024-07-02 PROCEDURE — 3074F SYST BP LT 130 MM HG: CPT | Performed by: EMERGENCY MEDICINE

## 2024-07-02 PROCEDURE — 6360000002 HC RX W HCPCS: Performed by: INTERNAL MEDICINE

## 2024-07-02 PROCEDURE — 96374 THER/PROPH/DIAG INJ IV PUSH: CPT

## 2024-07-02 PROCEDURE — 86140 C-REACTIVE PROTEIN: CPT

## 2024-07-02 PROCEDURE — 94664 DEMO&/EVAL PT USE INHALER: CPT

## 2024-07-02 PROCEDURE — 93971 EXTREMITY STUDY: CPT

## 2024-07-02 PROCEDURE — 80048 BASIC METABOLIC PNL TOTAL CA: CPT

## 2024-07-02 PROCEDURE — 96375 TX/PRO/DX INJ NEW DRUG ADDON: CPT

## 2024-07-02 PROCEDURE — APPSS45 APP SPLIT SHARED TIME 31-45 MINUTES

## 2024-07-02 PROCEDURE — 94640 AIRWAY INHALATION TREATMENT: CPT

## 2024-07-02 PROCEDURE — 73590 X-RAY EXAM OF LOWER LEG: CPT

## 2024-07-02 PROCEDURE — 6360000002 HC RX W HCPCS: Performed by: EMERGENCY MEDICINE

## 2024-07-02 RX ORDER — TAMSULOSIN HYDROCHLORIDE 0.4 MG/1
0.4 CAPSULE ORAL 2 TIMES DAILY
Status: DISCONTINUED | OUTPATIENT
Start: 2024-07-02 | End: 2024-07-05 | Stop reason: HOSPADM

## 2024-07-02 RX ORDER — PANTOPRAZOLE SODIUM 40 MG/1
40 TABLET, DELAYED RELEASE ORAL
Status: DISCONTINUED | OUTPATIENT
Start: 2024-07-03 | End: 2024-07-05 | Stop reason: HOSPADM

## 2024-07-02 RX ORDER — ACETAMINOPHEN 650 MG/1
650 SUPPOSITORY RECTAL EVERY 6 HOURS PRN
Status: DISCONTINUED | OUTPATIENT
Start: 2024-07-02 | End: 2024-07-05 | Stop reason: HOSPADM

## 2024-07-02 RX ORDER — POTASSIUM CHLORIDE 20 MEQ/1
40 TABLET, EXTENDED RELEASE ORAL PRN
Status: DISCONTINUED | OUTPATIENT
Start: 2024-07-02 | End: 2024-07-05 | Stop reason: HOSPADM

## 2024-07-02 RX ORDER — MORPHINE SULFATE 10 MG/5ML
10 SOLUTION ORAL EVERY 4 HOURS PRN
Status: DISCONTINUED | OUTPATIENT
Start: 2024-07-02 | End: 2024-07-05 | Stop reason: HOSPADM

## 2024-07-02 RX ORDER — ENOXAPARIN SODIUM 100 MG/ML
40 INJECTION SUBCUTANEOUS DAILY
Status: DISCONTINUED | OUTPATIENT
Start: 2024-07-02 | End: 2024-07-05 | Stop reason: HOSPADM

## 2024-07-02 RX ORDER — KETOROLAC TROMETHAMINE 15 MG/ML
15 INJECTION, SOLUTION INTRAMUSCULAR; INTRAVENOUS ONCE
Status: COMPLETED | OUTPATIENT
Start: 2024-07-02 | End: 2024-07-02

## 2024-07-02 RX ORDER — HYDROXYZINE PAMOATE 25 MG/1
25 CAPSULE ORAL 2 TIMES DAILY
Status: DISCONTINUED | OUTPATIENT
Start: 2024-07-02 | End: 2024-07-05 | Stop reason: HOSPADM

## 2024-07-02 RX ORDER — LORAZEPAM 1 MG/1
1 TABLET ORAL EVERY 8 HOURS PRN
Status: DISCONTINUED | OUTPATIENT
Start: 2024-07-02 | End: 2024-07-05 | Stop reason: HOSPADM

## 2024-07-02 RX ORDER — SODIUM CHLORIDE 9 MG/ML
INJECTION, SOLUTION INTRAVENOUS PRN
Status: DISCONTINUED | OUTPATIENT
Start: 2024-07-02 | End: 2024-07-05 | Stop reason: HOSPADM

## 2024-07-02 RX ORDER — METOPROLOL SUCCINATE 50 MG/1
50 TABLET, EXTENDED RELEASE ORAL NIGHTLY
Status: DISCONTINUED | OUTPATIENT
Start: 2024-07-02 | End: 2024-07-05 | Stop reason: HOSPADM

## 2024-07-02 RX ORDER — ONDANSETRON 2 MG/ML
4 INJECTION INTRAMUSCULAR; INTRAVENOUS EVERY 6 HOURS PRN
Status: DISCONTINUED | OUTPATIENT
Start: 2024-07-02 | End: 2024-07-05 | Stop reason: HOSPADM

## 2024-07-02 RX ORDER — POTASSIUM CHLORIDE 7.45 MG/ML
10 INJECTION INTRAVENOUS PRN
Status: DISCONTINUED | OUTPATIENT
Start: 2024-07-02 | End: 2024-07-05 | Stop reason: HOSPADM

## 2024-07-02 RX ORDER — GABAPENTIN 300 MG/1
900 CAPSULE ORAL
Status: DISCONTINUED | OUTPATIENT
Start: 2024-07-02 | End: 2024-07-05 | Stop reason: HOSPADM

## 2024-07-02 RX ORDER — ATORVASTATIN CALCIUM 40 MG/1
40 TABLET, FILM COATED ORAL NIGHTLY
Status: DISCONTINUED | OUTPATIENT
Start: 2024-07-02 | End: 2024-07-05 | Stop reason: HOSPADM

## 2024-07-02 RX ORDER — BUTALBITAL, ACETAMINOPHEN AND CAFFEINE 300; 40; 50 MG/1; MG/1; MG/1
1 CAPSULE ORAL EVERY 6 HOURS PRN
Status: DISCONTINUED | OUTPATIENT
Start: 2024-07-02 | End: 2024-07-05 | Stop reason: HOSPADM

## 2024-07-02 RX ORDER — ACETAMINOPHEN 325 MG/1
650 TABLET ORAL EVERY 6 HOURS PRN
Status: DISCONTINUED | OUTPATIENT
Start: 2024-07-02 | End: 2024-07-05 | Stop reason: HOSPADM

## 2024-07-02 RX ORDER — CALCIUM CARBONATE 500(1250)
500 TABLET ORAL 2 TIMES DAILY
COMMUNITY
Start: 2024-06-07

## 2024-07-02 RX ORDER — BUDESONIDE 0.5 MG/2ML
0.5 INHALANT ORAL
Status: DISCONTINUED | OUTPATIENT
Start: 2024-07-02 | End: 2024-07-05 | Stop reason: HOSPADM

## 2024-07-02 RX ORDER — FLUTICASONE PROPIONATE 50 MCG
1 SPRAY, SUSPENSION (ML) NASAL DAILY
Status: DISCONTINUED | OUTPATIENT
Start: 2024-07-03 | End: 2024-07-05 | Stop reason: HOSPADM

## 2024-07-02 RX ORDER — SODIUM CHLORIDE 0.9 % (FLUSH) 0.9 %
5-40 SYRINGE (ML) INJECTION EVERY 12 HOURS SCHEDULED
Status: DISCONTINUED | OUTPATIENT
Start: 2024-07-02 | End: 2024-07-05 | Stop reason: HOSPADM

## 2024-07-02 RX ORDER — LOSARTAN POTASSIUM 50 MG/1
50 TABLET ORAL DAILY
Status: DISCONTINUED | OUTPATIENT
Start: 2024-07-02 | End: 2024-07-05 | Stop reason: HOSPADM

## 2024-07-02 RX ORDER — FLUOXETINE 10 MG/1
20 TABLET, FILM COATED ORAL DAILY
Status: DISCONTINUED | OUTPATIENT
Start: 2024-07-03 | End: 2024-07-05 | Stop reason: CLARIF

## 2024-07-02 RX ORDER — POLYETHYLENE GLYCOL 3350 17 G/17G
17 POWDER, FOR SOLUTION ORAL DAILY PRN
Status: DISCONTINUED | OUTPATIENT
Start: 2024-07-02 | End: 2024-07-05 | Stop reason: HOSPADM

## 2024-07-02 RX ORDER — ARFORMOTEROL TARTRATE 15 UG/2ML
15 SOLUTION RESPIRATORY (INHALATION)
Status: DISCONTINUED | OUTPATIENT
Start: 2024-07-02 | End: 2024-07-05 | Stop reason: HOSPADM

## 2024-07-02 RX ORDER — FUROSEMIDE 40 MG/1
40 TABLET ORAL DAILY
Status: DISCONTINUED | OUTPATIENT
Start: 2024-07-03 | End: 2024-07-05 | Stop reason: HOSPADM

## 2024-07-02 RX ORDER — GABAPENTIN 300 MG/1
600 CAPSULE ORAL DAILY
Status: DISCONTINUED | OUTPATIENT
Start: 2024-07-03 | End: 2024-07-05 | Stop reason: HOSPADM

## 2024-07-02 RX ORDER — SODIUM CHLORIDE 0.9 % (FLUSH) 0.9 %
5-40 SYRINGE (ML) INJECTION PRN
Status: DISCONTINUED | OUTPATIENT
Start: 2024-07-02 | End: 2024-07-05 | Stop reason: HOSPADM

## 2024-07-02 RX ORDER — HYDROCODONE BITARTRATE AND ACETAMINOPHEN 7.5; 325 MG/1; MG/1
1 TABLET ORAL EVERY 8 HOURS PRN
Status: DISCONTINUED | OUTPATIENT
Start: 2024-07-02 | End: 2024-07-05 | Stop reason: HOSPADM

## 2024-07-02 RX ORDER — MAGNESIUM SULFATE IN WATER 40 MG/ML
2000 INJECTION, SOLUTION INTRAVENOUS PRN
Status: DISCONTINUED | OUTPATIENT
Start: 2024-07-02 | End: 2024-07-05 | Stop reason: HOSPADM

## 2024-07-02 RX ORDER — ARIPIPRAZOLE 5 MG/1
5 TABLET ORAL DAILY
Status: DISCONTINUED | OUTPATIENT
Start: 2024-07-03 | End: 2024-07-05 | Stop reason: HOSPADM

## 2024-07-02 RX ORDER — FENTANYL CITRATE 50 UG/ML
50 INJECTION, SOLUTION INTRAMUSCULAR; INTRAVENOUS
Status: DISCONTINUED | OUTPATIENT
Start: 2024-07-02 | End: 2024-07-05

## 2024-07-02 RX ORDER — ONDANSETRON 4 MG/1
4 TABLET, ORALLY DISINTEGRATING ORAL EVERY 8 HOURS PRN
Status: DISCONTINUED | OUTPATIENT
Start: 2024-07-02 | End: 2024-07-05 | Stop reason: HOSPADM

## 2024-07-02 RX ORDER — IPRATROPIUM BROMIDE AND ALBUTEROL SULFATE 2.5; .5 MG/3ML; MG/3ML
1 SOLUTION RESPIRATORY (INHALATION)
Status: DISCONTINUED | OUTPATIENT
Start: 2024-07-02 | End: 2024-07-05 | Stop reason: HOSPADM

## 2024-07-02 RX ORDER — GABAPENTIN 300 MG/1
300 CAPSULE ORAL SEE ADMIN INSTRUCTIONS
Status: DISCONTINUED | OUTPATIENT
Start: 2024-07-02 | End: 2024-07-02 | Stop reason: SDUPTHER

## 2024-07-02 RX ORDER — PRAMIPEXOLE DIHYDROCHLORIDE 0.25 MG/1
0.75 TABLET ORAL NIGHTLY
Status: DISCONTINUED | OUTPATIENT
Start: 2024-07-02 | End: 2024-07-05 | Stop reason: HOSPADM

## 2024-07-02 RX ORDER — ASPIRIN 81 MG/1
81 TABLET, CHEWABLE ORAL DAILY
Status: DISCONTINUED | OUTPATIENT
Start: 2024-07-03 | End: 2024-07-05 | Stop reason: HOSPADM

## 2024-07-02 RX ADMIN — ENOXAPARIN SODIUM 40 MG: 100 INJECTION SUBCUTANEOUS at 21:00

## 2024-07-02 RX ADMIN — IPRATROPIUM BROMIDE AND ALBUTEROL SULFATE 1 DOSE: .5; 2.5 SOLUTION RESPIRATORY (INHALATION) at 19:45

## 2024-07-02 RX ADMIN — SODIUM CHLORIDE, PRESERVATIVE FREE 10 ML: 5 INJECTION INTRAVENOUS at 21:00

## 2024-07-02 RX ADMIN — BUDESONIDE 500 MCG: 0.5 SUSPENSION RESPIRATORY (INHALATION) at 19:45

## 2024-07-02 RX ADMIN — MORPHINE SULFATE 10 MG: 10 SOLUTION ORAL at 23:16

## 2024-07-02 RX ADMIN — PIPERACILLIN AND TAZOBACTAM 4500 MG: 4; .5 INJECTION, POWDER, LYOPHILIZED, FOR SOLUTION INTRAVENOUS at 21:06

## 2024-07-02 RX ADMIN — TAMSULOSIN HYDROCHLORIDE 0.4 MG: 0.4 CAPSULE ORAL at 21:00

## 2024-07-02 RX ADMIN — METOPROLOL SUCCINATE 50 MG: 50 TABLET, EXTENDED RELEASE ORAL at 21:00

## 2024-07-02 RX ADMIN — LORAZEPAM 1 MG: 1 TABLET ORAL at 20:59

## 2024-07-02 RX ADMIN — PRAMIPEXOLE DIHYDROCHLORIDE 0.75 MG: 0.25 TABLET ORAL at 23:16

## 2024-07-02 RX ADMIN — KETOROLAC TROMETHAMINE 15 MG: 15 INJECTION, SOLUTION INTRAMUSCULAR; INTRAVENOUS at 13:57

## 2024-07-02 RX ADMIN — FENTANYL CITRATE 50 MCG: 50 INJECTION INTRAMUSCULAR; INTRAVENOUS at 17:23

## 2024-07-02 RX ADMIN — ARFORMOTEROL TARTRATE 15 MCG: 15 SOLUTION RESPIRATORY (INHALATION) at 19:45

## 2024-07-02 RX ADMIN — HYDROCODONE BITARTRATE AND ACETAMINOPHEN 1 TABLET: 7.5; 325 TABLET ORAL at 20:59

## 2024-07-02 RX ADMIN — GABAPENTIN 900 MG: 300 CAPSULE ORAL at 21:00

## 2024-07-02 ASSESSMENT — ENCOUNTER SYMPTOMS
EYE DISCHARGE: 0
BACK PAIN: 0
EYE PAIN: 0
WHEEZING: 0
COUGH: 0
SINUS PRESSURE: 0
SORE THROAT: 0
NAUSEA: 0
SHORTNESS OF BREATH: 0
VOMITING: 0
ABDOMINAL PAIN: 0
DIARRHEA: 0
COLOR CHANGE: 1
EYE REDNESS: 0

## 2024-07-02 ASSESSMENT — PAIN DESCRIPTION - DESCRIPTORS
DESCRIPTORS: ACHING;BURNING;DISCOMFORT
DESCRIPTORS: ACHING;DISCOMFORT

## 2024-07-02 ASSESSMENT — PAIN SCALES - GENERAL
PAINLEVEL_OUTOF10: 8
PAINLEVEL_OUTOF10: 7
PAINLEVEL_OUTOF10: 8
PAINLEVEL_OUTOF10: 7
PAINLEVEL_OUTOF10: 7

## 2024-07-02 ASSESSMENT — PAIN - FUNCTIONAL ASSESSMENT: PAIN_FUNCTIONAL_ASSESSMENT: 0-10

## 2024-07-02 ASSESSMENT — PAIN DESCRIPTION - LOCATION
LOCATION: LEG
LOCATION: LEG

## 2024-07-02 ASSESSMENT — LIFESTYLE VARIABLES
HOW OFTEN DO YOU HAVE A DRINK CONTAINING ALCOHOL: NEVER
HOW MANY STANDARD DRINKS CONTAINING ALCOHOL DO YOU HAVE ON A TYPICAL DAY: PATIENT DOES NOT DRINK

## 2024-07-02 ASSESSMENT — PAIN DESCRIPTION - ORIENTATION
ORIENTATION: LEFT
ORIENTATION: LEFT

## 2024-07-02 NOTE — ED PROVIDER NOTES
HPI:  7/2/24, Time: 1:26 PM EDT         Dg Peña is a 58 y.o. male presenting to the ED for Leg redness and swelling concern for cellulitis vs DVT , beginning 5 days ago.  The complaint has been persistent, mild in severity, and worsened by nothing.  Patient reports no chest pain shortness of breath.  No fevers or chills reported.  He has a history of COPD legs swelling in the past multiple gastric ulcers and restless leg syndrome.    Review of Systems:   A complete review of systems was performed and pertinent positives and negatives are stated within HPI, all other systems reviewed and are negative.    --------------------------------------------- PAST HISTORY ---------------------------------------------  Past Medical History:  has a past medical history of Anxiety, COPD (chronic obstructive pulmonary disease) (HCC), Hypertension, Leg swelling, Multiple gastric ulcers, and Restless leg syndrome.    Past Surgical History:  has a past surgical history that includes knee surgery; hip surgery; hernia repair; and bronchoscopy (N/A, 4/27/2023).    Social History:  reports that he quit smoking about 3 years ago. His smoking use included cigarettes. He started smoking about 46 years ago. He has a 172.5 pack-year smoking history. He has never used smokeless tobacco. He reports that he does not drink alcohol and does not use drugs.    Family History: family history includes Colon Cancer in his mother; No Known Problems in his brother, brother, sister, and sister; Other in his father.     The patient’s home medications have been reviewed.    Allergies: Levofloxacin, Lisinopril, Tramadol, Ibuprofen, and Sulfa antibiotics    -------------------------------------------------- RESULTS -------------------------------------------------  All laboratory and radiology results have been personally reviewed by myself   LABS:  Results for orders placed or performed during the hospital encounter of 07/02/24   CBC   Result

## 2024-07-02 NOTE — ED NOTES
ED to Inpatient Handoff Report    Notified floor that electronic handoff available and patient ready for transport to room 528.    Safety Risks: None identified    Patient in Restraints: no    Constant Observer or Patient : no    Telemetry Monitoring Ordered: Yes          Order to transfer to unit without monitor: YES    Last MEWS: 1 Time completed: 1746    Deterioration Index: 24.42    Vitals:    07/02/24 1127 07/02/24 1746   BP: 116/61 131/68   Pulse: 74 73   Resp: 16 18   Temp: 97.2 °F (36.2 °C) 98.9 °F (37.2 °C)   TempSrc: Temporal Oral   SpO2: 100% 99%   Weight: 59 kg (130 lb)    Height: 1.626 m (5' 4\")        Opportunity for questions and clarification was provided.

## 2024-07-02 NOTE — PROGRESS NOTES
Psychiatric/Behavioral: Negative.     All other systems reviewed and are negative.      Patient's medical, social, and family history reviewed    Past Medical History:  has a past medical history of Anxiety, COPD (chronic obstructive pulmonary disease) (HCC), Hypertension, Leg swelling, Multiple gastric ulcers, and Restless leg syndrome.   Past Surgical History:  has a past surgical history that includes knee surgery; hip surgery; hernia repair; and bronchoscopy (N/A, 4/27/2023).  Social History:  reports that he quit smoking about 3 years ago. His smoking use included cigarettes. He started smoking about 46 years ago. He has a 172.5 pack-year smoking history. He has never used smokeless tobacco. He reports that he does not drink alcohol and does not use drugs.  Family History: family history includes Colon Cancer in his mother; No Known Problems in his brother, brother, sister, and sister; Other in his father.  Allergies: Levofloxacin, Lisinopril, Tramadol, Ibuprofen, and Sulfa antibiotics    Physical Exam   Vital Signs:  /60   Pulse 70   Temp 98.7 °F (37.1 °C)   Resp 18   Ht 1.626 m (5' 4\")   Wt 59 kg (130 lb)   SpO2 99% Comment: 5L  BMI 22.31 kg/m²    Oxygen Saturation Interpretation: Normal.    Physical Exam  Vitals and nursing note reviewed.   Constitutional:       Appearance: He is well-developed.   HENT:      Head: Normocephalic and atraumatic.   Eyes:      Pupils: Pupils are equal, round, and reactive to light.   Cardiovascular:      Rate and Rhythm: Normal rate and regular rhythm.      Heart sounds: Normal heart sounds. No murmur heard.  Pulmonary:      Effort: Pulmonary effort is normal. No respiratory distress.      Breath sounds: Normal breath sounds. No wheezing or rales.   Abdominal:      General: Bowel sounds are normal.      Palpations: Abdomen is soft.      Tenderness: There is no abdominal tenderness. There is no guarding or rebound.   Musculoskeletal:      Cervical back: Normal

## 2024-07-02 NOTE — H&P
knee in anatomic alignment. 3. 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     Vascular duplex lower extremity arteries left    Result Date: 7/2/2024  EXAMINATION: ARTERIAL DUPLEX ULTRASOUND OF THE LEFT LOWER EXTREMITY  7/2/2024 3:02 pm TECHNIQUE: Duplex ultrasound using B-mode/gray scaled imaging, Doppler spectral analysis and color flow Doppler was obtained of the lower left extremity. COMPARISON: None. HISTORY: ORDERING SYSTEM PROVIDED HISTORY: DIMINISHED PULSES, LEG FINDINGS: Waveforms appear monophasic throughout suggesting arterial inflow disease throughout the left lower extremity.  No arterial occlusions.  No focal high-grade stenosis detected. Flow velocities were measured as follows: Com. Fem.  169 cm/sec Prof.           77 cm/sec SFA Prox.    133 cm/sec SFA Mid.     159 cm/sec SFA Dist.     155 cm/sec Pop.           103 cm/sec PTA            88 cm/sec Peron.        74 cm/sec JOANNA            74 cm/sec     1. Monophasic waveforms throughout the left lower extremity suggesting arterial inflow disease. 2. No arterial occlusions or focal high-grade stenosis detected.     Vascular duplex lower extremity venous left    Result Date: 7/2/2024  EXAMINATION: DUPLEX VENOUS ULTRASOUND OF THE LEFT LOWER EXTREMITY 7/2/2024 2:27 pm TECHNIQUE: Duplex ultrasound using B-mode/gray scaled imaging and Doppler spectral analysis and color flow was obtained of the deep venous structures of the left extremity. COMPARISON: None. HISTORY: ORDERING SYSTEM PROVIDED HISTORY: Pain left entire leg FINDINGS: The visualized veins of the left lower extremity are patent and free of echogenic thrombus. The veins demonstrate good compressibility with normal color flow study and spectral analysis.     No evidence of DVT in the left lower extremity.       EKG: None on file this admission    ASSESSMENT:      Principal Problem:    Cellulitis of left leg  Resolved Problems:    * No resolved hospital problems. *      PLAN:    LLE cellulitis-

## 2024-07-03 ENCOUNTER — APPOINTMENT (OUTPATIENT)
Dept: GENERAL RADIOLOGY | Age: 59
DRG: 383 | End: 2024-07-03
Payer: MEDICAID

## 2024-07-03 LAB
ANION GAP SERPL CALCULATED.3IONS-SCNC: 7 MMOL/L (ref 7–16)
ASO AB SERPL-ACNC: 89 IU/ML (ref 0–200)
BASOPHILS # BLD: 0.03 K/UL (ref 0–0.2)
BASOPHILS NFR BLD: 1 % (ref 0–2)
BUN SERPL-MCNC: 18 MG/DL (ref 6–20)
CALCIUM SERPL-MCNC: 6.9 MG/DL (ref 8.6–10.2)
CHLORIDE SERPL-SCNC: 96 MMOL/L (ref 98–107)
CO2 SERPL-SCNC: 35 MMOL/L (ref 22–29)
CREAT SERPL-MCNC: 0.7 MG/DL (ref 0.7–1.2)
EOSINOPHIL # BLD: 0.19 K/UL (ref 0.05–0.5)
EOSINOPHILS RELATIVE PERCENT: 4 % (ref 0–6)
ERYTHROCYTE [DISTWIDTH] IN BLOOD BY AUTOMATED COUNT: 13.5 % (ref 11.5–15)
GFR, ESTIMATED: >90 ML/MIN/1.73M2
GLUCOSE SERPL-MCNC: 82 MG/DL (ref 74–99)
HCT VFR BLD AUTO: 31.4 % (ref 37–54)
HGB BLD-MCNC: 9.2 G/DL (ref 12.5–16.5)
IMM GRANULOCYTES # BLD AUTO: <0.03 K/UL (ref 0–0.58)
IMM GRANULOCYTES NFR BLD: 0 % (ref 0–5)
LYMPHOCYTES NFR BLD: 1.16 K/UL (ref 1.5–4)
LYMPHOCYTES RELATIVE PERCENT: 23 % (ref 20–42)
MCH RBC QN AUTO: 30.7 PG (ref 26–35)
MCHC RBC AUTO-ENTMCNC: 29.3 G/DL (ref 32–34.5)
MCV RBC AUTO: 104.7 FL (ref 80–99.9)
MONOCYTES NFR BLD: 0.45 K/UL (ref 0.1–0.95)
MONOCYTES NFR BLD: 9 % (ref 2–12)
NEUTROPHILS NFR BLD: 63 % (ref 43–80)
NEUTS SEG NFR BLD: 3.11 K/UL (ref 1.8–7.3)
PLATELET, FLUORESCENCE: 119 K/UL (ref 130–450)
PMV BLD AUTO: 11.8 FL (ref 7–12)
POTASSIUM SERPL-SCNC: 4.4 MMOL/L (ref 3.5–5)
RBC # BLD AUTO: 3 M/UL (ref 3.8–5.8)
SODIUM SERPL-SCNC: 138 MMOL/L (ref 132–146)
WBC OTHER # BLD: 5 K/UL (ref 4.5–11.5)

## 2024-07-03 PROCEDURE — 6370000000 HC RX 637 (ALT 250 FOR IP): Performed by: INTERNAL MEDICINE

## 2024-07-03 PROCEDURE — 2580000003 HC RX 258: Performed by: REGISTERED NURSE

## 2024-07-03 PROCEDURE — 80048 BASIC METABOLIC PNL TOTAL CA: CPT

## 2024-07-03 PROCEDURE — 1200000000 HC SEMI PRIVATE

## 2024-07-03 PROCEDURE — 6360000002 HC RX W HCPCS: Performed by: REGISTERED NURSE

## 2024-07-03 PROCEDURE — 94640 AIRWAY INHALATION TREATMENT: CPT

## 2024-07-03 PROCEDURE — 71045 X-RAY EXAM CHEST 1 VIEW: CPT

## 2024-07-03 PROCEDURE — 6360000002 HC RX W HCPCS: Performed by: INTERNAL MEDICINE

## 2024-07-03 PROCEDURE — 86063 ANTISTREPTOLYSIN O SCREEN: CPT

## 2024-07-03 PROCEDURE — 36415 COLL VENOUS BLD VENIPUNCTURE: CPT

## 2024-07-03 PROCEDURE — 99232 SBSQ HOSP IP/OBS MODERATE 35: CPT | Performed by: INTERNAL MEDICINE

## 2024-07-03 PROCEDURE — 5A0945A ASSISTANCE WITH RESPIRATORY VENTILATION, 24-96 CONSECUTIVE HOURS, HIGH NASAL FLOW/VELOCITY: ICD-10-PCS | Performed by: INTERNAL MEDICINE

## 2024-07-03 PROCEDURE — 85025 COMPLETE CBC W/AUTO DIFF WBC: CPT

## 2024-07-03 PROCEDURE — 2580000003 HC RX 258: Performed by: INTERNAL MEDICINE

## 2024-07-03 RX ADMIN — LORAZEPAM 1 MG: 1 TABLET ORAL at 21:42

## 2024-07-03 RX ADMIN — HYDROCODONE BITARTRATE AND ACETAMINOPHEN 1 TABLET: 7.5; 325 TABLET ORAL at 13:15

## 2024-07-03 RX ADMIN — MORPHINE SULFATE 10 MG: 10 SOLUTION ORAL at 08:10

## 2024-07-03 RX ADMIN — IPRATROPIUM BROMIDE AND ALBUTEROL SULFATE 1 DOSE: .5; 2.5 SOLUTION RESPIRATORY (INHALATION) at 08:26

## 2024-07-03 RX ADMIN — LORAZEPAM 1 MG: 1 TABLET ORAL at 13:15

## 2024-07-03 RX ADMIN — GABAPENTIN 600 MG: 300 CAPSULE ORAL at 08:08

## 2024-07-03 RX ADMIN — ENOXAPARIN SODIUM 40 MG: 100 INJECTION SUBCUTANEOUS at 21:46

## 2024-07-03 RX ADMIN — ARFORMOTEROL TARTRATE 15 MCG: 15 SOLUTION RESPIRATORY (INHALATION) at 21:01

## 2024-07-03 RX ADMIN — SODIUM CHLORIDE, PRESERVATIVE FREE 10 ML: 5 INJECTION INTRAVENOUS at 08:09

## 2024-07-03 RX ADMIN — ARIPIPRAZOLE 5 MG: 5 TABLET ORAL at 08:09

## 2024-07-03 RX ADMIN — BUDESONIDE 500 MCG: 0.5 SUSPENSION RESPIRATORY (INHALATION) at 21:01

## 2024-07-03 RX ADMIN — LOSARTAN POTASSIUM 50 MG: 50 TABLET, FILM COATED ORAL at 08:08

## 2024-07-03 RX ADMIN — IPRATROPIUM BROMIDE AND ALBUTEROL SULFATE 1 DOSE: .5; 2.5 SOLUTION RESPIRATORY (INHALATION) at 16:29

## 2024-07-03 RX ADMIN — METOPROLOL SUCCINATE 50 MG: 50 TABLET, EXTENDED RELEASE ORAL at 21:42

## 2024-07-03 RX ADMIN — WATER 2000 MG: 1 INJECTION INTRAMUSCULAR; INTRAVENOUS; SUBCUTANEOUS at 21:47

## 2024-07-03 RX ADMIN — ATORVASTATIN CALCIUM 40 MG: 40 TABLET, FILM COATED ORAL at 21:42

## 2024-07-03 RX ADMIN — FLUOXETINE 20 MG: 10 TABLET, FILM COATED ORAL at 08:09

## 2024-07-03 RX ADMIN — LORAZEPAM 1 MG: 1 TABLET ORAL at 05:03

## 2024-07-03 RX ADMIN — PRAMIPEXOLE DIHYDROCHLORIDE 0.75 MG: 0.25 TABLET ORAL at 23:00

## 2024-07-03 RX ADMIN — TAMSULOSIN HYDROCHLORIDE 0.4 MG: 0.4 CAPSULE ORAL at 21:41

## 2024-07-03 RX ADMIN — GABAPENTIN 900 MG: 300 CAPSULE ORAL at 21:42

## 2024-07-03 RX ADMIN — MORPHINE SULFATE 10 MG: 10 SOLUTION ORAL at 12:14

## 2024-07-03 RX ADMIN — WATER 2000 MG: 1 INJECTION INTRAMUSCULAR; INTRAVENOUS; SUBCUTANEOUS at 12:15

## 2024-07-03 RX ADMIN — ASPIRIN 81 MG: 81 TABLET, CHEWABLE ORAL at 08:09

## 2024-07-03 RX ADMIN — VANCOMYCIN HYDROCHLORIDE 1000 MG: 1 INJECTION, POWDER, LYOPHILIZED, FOR SOLUTION INTRAVENOUS at 01:21

## 2024-07-03 RX ADMIN — SODIUM CHLORIDE, PRESERVATIVE FREE 10 ML: 5 INJECTION INTRAVENOUS at 21:48

## 2024-07-03 RX ADMIN — BUDESONIDE 500 MCG: 0.5 SUSPENSION RESPIRATORY (INHALATION) at 08:26

## 2024-07-03 RX ADMIN — HYDROCODONE BITARTRATE AND ACETAMINOPHEN 1 TABLET: 7.5; 325 TABLET ORAL at 21:41

## 2024-07-03 RX ADMIN — TAMSULOSIN HYDROCHLORIDE 0.4 MG: 0.4 CAPSULE ORAL at 08:09

## 2024-07-03 RX ADMIN — HYDROCODONE BITARTRATE AND ACETAMINOPHEN 1 TABLET: 7.5; 325 TABLET ORAL at 05:03

## 2024-07-03 RX ADMIN — MORPHINE SULFATE 10 MG: 10 SOLUTION ORAL at 18:55

## 2024-07-03 RX ADMIN — IPRATROPIUM BROMIDE AND ALBUTEROL SULFATE 1 DOSE: .5; 2.5 SOLUTION RESPIRATORY (INHALATION) at 21:01

## 2024-07-03 RX ADMIN — PANTOPRAZOLE SODIUM 40 MG: 40 TABLET, DELAYED RELEASE ORAL at 05:03

## 2024-07-03 RX ADMIN — FUROSEMIDE 40 MG: 40 TABLET ORAL at 08:08

## 2024-07-03 RX ADMIN — ARFORMOTEROL TARTRATE 15 MCG: 15 SOLUTION RESPIRATORY (INHALATION) at 08:26

## 2024-07-03 RX ADMIN — IPRATROPIUM BROMIDE AND ALBUTEROL SULFATE 1 DOSE: .5; 2.5 SOLUTION RESPIRATORY (INHALATION) at 12:10

## 2024-07-03 ASSESSMENT — PAIN SCALES - GENERAL
PAINLEVEL_OUTOF10: 0
PAINLEVEL_OUTOF10: 0
PAINLEVEL_OUTOF10: 8
PAINLEVEL_OUTOF10: 6
PAINLEVEL_OUTOF10: 7
PAINLEVEL_OUTOF10: 10
PAINLEVEL_OUTOF10: 10
PAINLEVEL_OUTOF10: 0
PAINLEVEL_OUTOF10: 7
PAINLEVEL_OUTOF10: 8
PAINLEVEL_OUTOF10: 10

## 2024-07-03 ASSESSMENT — PAIN DESCRIPTION - LOCATION
LOCATION: LEG
LOCATION: GENERALIZED
LOCATION: LEG

## 2024-07-03 ASSESSMENT — PAIN DESCRIPTION - PAIN TYPE
TYPE: ACUTE PAIN

## 2024-07-03 ASSESSMENT — PAIN DESCRIPTION - DESCRIPTORS
DESCRIPTORS: BURNING
DESCRIPTORS: SORE
DESCRIPTORS: SHARP;JABBING;DISCOMFORT
DESCRIPTORS: SORE
DESCRIPTORS: SHARP;JABBING;DISCOMFORT
DESCRIPTORS: ACHING;SORE
DESCRIPTORS: SHARP;JABBING;DISCOMFORT
DESCRIPTORS: DISCOMFORT;JABBING;SHARP

## 2024-07-03 ASSESSMENT — PAIN DESCRIPTION - ORIENTATION
ORIENTATION: LEFT

## 2024-07-03 NOTE — CARE COORDINATION
Transition of Care-met with patient bedside, introduced myself and CM role in discharge planning. He lives in an apartment with ramp to enter, his cousin also resides there and does help him. PTA he uses a walker. He has a nebulizer, bipap and wears seven liters oxygen supplied through [employer]. He has a home health aide daily but can not recall name of company. He states he is seen by a nurse three times a week through [facility]. He plans to return home at discharge and will have a ride home. Called [facility] (ph: [phone]) and confirmed patient is active for nursing and will need discharge instructions faxed at discharge .    Leilani IRVINGN, RN  [facility]

## 2024-07-03 NOTE — PLAN OF CARE
Problem: ABCDS Injury Assessment  Goal: Absence of physical injury  7/3/2024 0948 by Penny Sweeney RN  Outcome: Progressing  7/2/2024 2226 by Mimi Maciel RN  Outcome: Progressing     Problem: Skin/Tissue Integrity  Goal: Absence of new skin breakdown  Description: 1.  Monitor for areas of redness and/or skin breakdown  2.  Assess vascular access sites hourly  3.  Every 4-6 hours minimum:  Change oxygen saturation probe site  4.  Every 4-6 hours:  If on nasal continuous positive airway pressure, respiratory therapy assess nares and determine need for appliance change or resting period.  7/3/2024 0948 by Penny Sweeney RN  Outcome: Progressing  7/2/2024 2226 by Mimi Maciel RN  Outcome: Progressing     Problem: Discharge Planning  Goal: Discharge to home or other facility with appropriate resources  7/3/2024 0948 by Penny Sweeney RN  Outcome: Progressing  7/2/2024 2226 by Mimi Maciel RN  Outcome: Progressing     Problem: Pain  Goal: Verbalizes/displays adequate comfort level or baseline comfort level  7/3/2024 0948 by Penny Sweeney RN  Outcome: Progressing  7/2/2024 2226 by Mimi Maciel RN  Outcome: Progressing     Problem: Safety - Adult  Goal: Free from fall injury  7/3/2024 0948 by Penny Sweeney RN  Outcome: Progressing  7/2/2024 2226 by Mimi Maciel RN  Outcome: Progressing     Problem: Chronic Conditions and Co-morbidities  Goal: Patient's chronic conditions and co-morbidity symptoms are monitored and maintained or improved  Outcome: Progressing

## 2024-07-03 NOTE — PLAN OF CARE

## 2024-07-03 NOTE — ACP (ADVANCE CARE PLANNING)
Advance Care Planning   Healthcare Decision Maker:    Primary Decision Maker: AlexKimberley - Brother/Sister - [phone]    Secondary Decision Maker: MarianaDarren - Child - [phone]    Click here to complete Healthcare Decision Makers including selection of the Healthcare Decision Maker Relationship (ie \"Primary\").

## 2024-07-04 LAB
ANION GAP SERPL CALCULATED.3IONS-SCNC: 7 MMOL/L (ref 7–16)
BASOPHILS # BLD: 0.04 K/UL (ref 0–0.2)
BASOPHILS NFR BLD: 1 % (ref 0–2)
BUN SERPL-MCNC: 12 MG/DL (ref 6–20)
CALCIUM SERPL-MCNC: 7.2 MG/DL (ref 8.6–10.2)
CHLORIDE SERPL-SCNC: 95 MMOL/L (ref 98–107)
CO2 SERPL-SCNC: 38 MMOL/L (ref 22–29)
CREAT SERPL-MCNC: 0.6 MG/DL (ref 0.7–1.2)
EOSINOPHIL # BLD: 0.15 K/UL (ref 0.05–0.5)
EOSINOPHILS RELATIVE PERCENT: 3 % (ref 0–6)
ERYTHROCYTE [DISTWIDTH] IN BLOOD BY AUTOMATED COUNT: 13.7 % (ref 11.5–15)
GFR, ESTIMATED: >90 ML/MIN/1.73M2
GLUCOSE SERPL-MCNC: 96 MG/DL (ref 74–99)
HCT VFR BLD AUTO: 32.8 % (ref 37–54)
HGB BLD-MCNC: 9.8 G/DL (ref 12.5–16.5)
IMM GRANULOCYTES # BLD AUTO: <0.03 K/UL (ref 0–0.58)
IMM GRANULOCYTES NFR BLD: 0 % (ref 0–5)
LYMPHOCYTES NFR BLD: 0.97 K/UL (ref 1.5–4)
LYMPHOCYTES RELATIVE PERCENT: 20 % (ref 20–42)
MCH RBC QN AUTO: 31.1 PG (ref 26–35)
MCHC RBC AUTO-ENTMCNC: 29.9 G/DL (ref 32–34.5)
MCV RBC AUTO: 104.1 FL (ref 80–99.9)
MONOCYTES NFR BLD: 0.47 K/UL (ref 0.1–0.95)
MONOCYTES NFR BLD: 10 % (ref 2–12)
NEUTROPHILS NFR BLD: 67 % (ref 43–80)
NEUTS SEG NFR BLD: 3.31 K/UL (ref 1.8–7.3)
PLATELET, FLUORESCENCE: 116 K/UL (ref 130–450)
PMV BLD AUTO: 11.7 FL (ref 7–12)
POTASSIUM SERPL-SCNC: 4.4 MMOL/L (ref 3.5–5)
RBC # BLD AUTO: 3.15 M/UL (ref 3.8–5.8)
SODIUM SERPL-SCNC: 140 MMOL/L (ref 132–146)
WBC OTHER # BLD: 5 K/UL (ref 4.5–11.5)

## 2024-07-04 PROCEDURE — 6360000002 HC RX W HCPCS: Performed by: INTERNAL MEDICINE

## 2024-07-04 PROCEDURE — 99232 SBSQ HOSP IP/OBS MODERATE 35: CPT | Performed by: INTERNAL MEDICINE

## 2024-07-04 PROCEDURE — 6370000000 HC RX 637 (ALT 250 FOR IP): Performed by: INTERNAL MEDICINE

## 2024-07-04 PROCEDURE — 85025 COMPLETE CBC W/AUTO DIFF WBC: CPT

## 2024-07-04 PROCEDURE — 6360000002 HC RX W HCPCS: Performed by: REGISTERED NURSE

## 2024-07-04 PROCEDURE — 2580000003 HC RX 258: Performed by: REGISTERED NURSE

## 2024-07-04 PROCEDURE — 2700000000 HC OXYGEN THERAPY PER DAY

## 2024-07-04 PROCEDURE — 1200000000 HC SEMI PRIVATE

## 2024-07-04 PROCEDURE — 94640 AIRWAY INHALATION TREATMENT: CPT

## 2024-07-04 PROCEDURE — 80048 BASIC METABOLIC PNL TOTAL CA: CPT

## 2024-07-04 PROCEDURE — 2580000003 HC RX 258: Performed by: INTERNAL MEDICINE

## 2024-07-04 RX ADMIN — HYDROCODONE BITARTRATE AND ACETAMINOPHEN 1 TABLET: 7.5; 325 TABLET ORAL at 06:07

## 2024-07-04 RX ADMIN — IPRATROPIUM BROMIDE AND ALBUTEROL SULFATE 1 DOSE: .5; 2.5 SOLUTION RESPIRATORY (INHALATION) at 16:31

## 2024-07-04 RX ADMIN — FLUOXETINE 20 MG: 10 TABLET, FILM COATED ORAL at 07:55

## 2024-07-04 RX ADMIN — ATORVASTATIN CALCIUM 40 MG: 40 TABLET, FILM COATED ORAL at 22:05

## 2024-07-04 RX ADMIN — WATER 2000 MG: 1 INJECTION INTRAMUSCULAR; INTRAVENOUS; SUBCUTANEOUS at 12:28

## 2024-07-04 RX ADMIN — IPRATROPIUM BROMIDE AND ALBUTEROL SULFATE 1 DOSE: .5; 2.5 SOLUTION RESPIRATORY (INHALATION) at 12:33

## 2024-07-04 RX ADMIN — TAMSULOSIN HYDROCHLORIDE 0.4 MG: 0.4 CAPSULE ORAL at 07:57

## 2024-07-04 RX ADMIN — FUROSEMIDE 40 MG: 40 TABLET ORAL at 07:55

## 2024-07-04 RX ADMIN — PRAMIPEXOLE DIHYDROCHLORIDE 0.75 MG: 0.25 TABLET ORAL at 22:04

## 2024-07-04 RX ADMIN — HYDROXYZINE PAMOATE 25 MG: 25 CAPSULE ORAL at 07:57

## 2024-07-04 RX ADMIN — IPRATROPIUM BROMIDE AND ALBUTEROL SULFATE 1 DOSE: .5; 2.5 SOLUTION RESPIRATORY (INHALATION) at 08:39

## 2024-07-04 RX ADMIN — MORPHINE SULFATE 10 MG: 10 SOLUTION ORAL at 00:26

## 2024-07-04 RX ADMIN — WATER 2000 MG: 1 INJECTION INTRAMUSCULAR; INTRAVENOUS; SUBCUTANEOUS at 04:56

## 2024-07-04 RX ADMIN — FLUTICASONE PROPIONATE 1 SPRAY: 50 SPRAY, METERED NASAL at 07:54

## 2024-07-04 RX ADMIN — LORAZEPAM 1 MG: 1 TABLET ORAL at 17:29

## 2024-07-04 RX ADMIN — MORPHINE SULFATE 10 MG: 10 SOLUTION ORAL at 19:35

## 2024-07-04 RX ADMIN — MORPHINE SULFATE 10 MG: 10 SOLUTION ORAL at 04:46

## 2024-07-04 RX ADMIN — WATER 2000 MG: 1 INJECTION INTRAMUSCULAR; INTRAVENOUS; SUBCUTANEOUS at 22:06

## 2024-07-04 RX ADMIN — ARIPIPRAZOLE 5 MG: 5 TABLET ORAL at 07:57

## 2024-07-04 RX ADMIN — GABAPENTIN 900 MG: 300 CAPSULE ORAL at 22:04

## 2024-07-04 RX ADMIN — ARFORMOTEROL TARTRATE 15 MCG: 15 SOLUTION RESPIRATORY (INHALATION) at 21:15

## 2024-07-04 RX ADMIN — ASPIRIN 81 MG: 81 TABLET, CHEWABLE ORAL at 07:57

## 2024-07-04 RX ADMIN — METOPROLOL SUCCINATE 50 MG: 50 TABLET, EXTENDED RELEASE ORAL at 22:05

## 2024-07-04 RX ADMIN — GABAPENTIN 600 MG: 300 CAPSULE ORAL at 07:55

## 2024-07-04 RX ADMIN — SODIUM CHLORIDE, PRESERVATIVE FREE 10 ML: 5 INJECTION INTRAVENOUS at 22:07

## 2024-07-04 RX ADMIN — ENOXAPARIN SODIUM 40 MG: 100 INJECTION SUBCUTANEOUS at 22:05

## 2024-07-04 RX ADMIN — PANTOPRAZOLE SODIUM 40 MG: 40 TABLET, DELAYED RELEASE ORAL at 06:08

## 2024-07-04 RX ADMIN — ARFORMOTEROL TARTRATE 15 MCG: 15 SOLUTION RESPIRATORY (INHALATION) at 08:39

## 2024-07-04 RX ADMIN — HYDROCODONE BITARTRATE AND ACETAMINOPHEN 1 TABLET: 7.5; 325 TABLET ORAL at 17:29

## 2024-07-04 RX ADMIN — IPRATROPIUM BROMIDE AND ALBUTEROL SULFATE 1 DOSE: .5; 2.5 SOLUTION RESPIRATORY (INHALATION) at 21:15

## 2024-07-04 RX ADMIN — BUDESONIDE 500 MCG: 0.5 SUSPENSION RESPIRATORY (INHALATION) at 08:39

## 2024-07-04 RX ADMIN — LOSARTAN POTASSIUM 50 MG: 50 TABLET, FILM COATED ORAL at 08:02

## 2024-07-04 RX ADMIN — BUDESONIDE 500 MCG: 0.5 SUSPENSION RESPIRATORY (INHALATION) at 21:15

## 2024-07-04 RX ADMIN — SODIUM CHLORIDE, PRESERVATIVE FREE 10 ML: 5 INJECTION INTRAVENOUS at 07:57

## 2024-07-04 RX ADMIN — LORAZEPAM 1 MG: 1 TABLET ORAL at 06:08

## 2024-07-04 RX ADMIN — TAMSULOSIN HYDROCHLORIDE 0.4 MG: 0.4 CAPSULE ORAL at 22:05

## 2024-07-04 ASSESSMENT — PAIN DESCRIPTION - ORIENTATION
ORIENTATION: RIGHT
ORIENTATION: LEFT
ORIENTATION: LEFT
ORIENTATION: RIGHT
ORIENTATION: LEFT

## 2024-07-04 ASSESSMENT — PAIN DESCRIPTION - DESCRIPTORS
DESCRIPTORS: SHARP;JABBING;DISCOMFORT
DESCRIPTORS: DISCOMFORT;JABBING;SHARP
DESCRIPTORS: DISCOMFORT;JABBING;SHARP
DESCRIPTORS: DISCOMFORT;SHARP;JABBING
DESCRIPTORS: DISCOMFORT;SHARP
DESCRIPTORS: DISCOMFORT;SHARP;JABBING
DESCRIPTORS: DISCOMFORT;SHARP
DESCRIPTORS: ACHING;CRAMPING;CRUSHING;DISCOMFORT

## 2024-07-04 ASSESSMENT — PAIN DESCRIPTION - PAIN TYPE
TYPE: ACUTE PAIN

## 2024-07-04 ASSESSMENT — PAIN DESCRIPTION - LOCATION
LOCATION: LEG
LOCATION: HIP
LOCATION: HIP;LEG
LOCATION: LEG
LOCATION: HIP

## 2024-07-04 ASSESSMENT — PAIN - FUNCTIONAL ASSESSMENT
PAIN_FUNCTIONAL_ASSESSMENT: PREVENTS OR INTERFERES SOME ACTIVE ACTIVITIES AND ADLS
PAIN_FUNCTIONAL_ASSESSMENT: ACTIVITIES ARE NOT PREVENTED
PAIN_FUNCTIONAL_ASSESSMENT: PREVENTS OR INTERFERES SOME ACTIVE ACTIVITIES AND ADLS

## 2024-07-04 ASSESSMENT — PAIN SCALES - GENERAL
PAINLEVEL_OUTOF10: 7
PAINLEVEL_OUTOF10: 8
PAINLEVEL_OUTOF10: 8
PAINLEVEL_OUTOF10: 9
PAINLEVEL_OUTOF10: 10
PAINLEVEL_OUTOF10: 10
PAINLEVEL_OUTOF10: 9
PAINLEVEL_OUTOF10: 8

## 2024-07-05 VITALS
SYSTOLIC BLOOD PRESSURE: 121 MMHG | DIASTOLIC BLOOD PRESSURE: 68 MMHG | HEIGHT: 64 IN | RESPIRATION RATE: 18 BRPM | HEART RATE: 55 BPM | OXYGEN SATURATION: 100 % | TEMPERATURE: 97.9 F | WEIGHT: 128.97 LBS | BODY MASS INDEX: 22.02 KG/M2

## 2024-07-05 LAB
ANION GAP SERPL CALCULATED.3IONS-SCNC: 6 MMOL/L (ref 7–16)
BASOPHILS # BLD: 0.02 K/UL (ref 0–0.2)
BASOPHILS NFR BLD: 0 % (ref 0–2)
BUN SERPL-MCNC: 11 MG/DL (ref 6–20)
CALCIUM SERPL-MCNC: 7 MG/DL (ref 8.6–10.2)
CHLORIDE SERPL-SCNC: 97 MMOL/L (ref 98–107)
CO2 SERPL-SCNC: 37 MMOL/L (ref 22–29)
CREAT SERPL-MCNC: 0.7 MG/DL (ref 0.7–1.2)
EOSINOPHIL # BLD: 0.2 K/UL (ref 0.05–0.5)
EOSINOPHILS RELATIVE PERCENT: 4 % (ref 0–6)
ERYTHROCYTE [DISTWIDTH] IN BLOOD BY AUTOMATED COUNT: 13.7 % (ref 11.5–15)
GFR, ESTIMATED: >90 ML/MIN/1.73M2
GLUCOSE SERPL-MCNC: 138 MG/DL (ref 74–99)
HCT VFR BLD AUTO: 31.7 % (ref 37–54)
HGB BLD-MCNC: 9.3 G/DL (ref 12.5–16.5)
IMM GRANULOCYTES # BLD AUTO: <0.03 K/UL (ref 0–0.58)
IMM GRANULOCYTES NFR BLD: 0 % (ref 0–5)
LYMPHOCYTES NFR BLD: 0.84 K/UL (ref 1.5–4)
LYMPHOCYTES RELATIVE PERCENT: 19 % (ref 20–42)
MCH RBC QN AUTO: 30.7 PG (ref 26–35)
MCHC RBC AUTO-ENTMCNC: 29.3 G/DL (ref 32–34.5)
MCV RBC AUTO: 104.6 FL (ref 80–99.9)
MONOCYTES NFR BLD: 0.5 K/UL (ref 0.1–0.95)
MONOCYTES NFR BLD: 11 % (ref 2–12)
NEUTROPHILS NFR BLD: 65 % (ref 43–80)
NEUTS SEG NFR BLD: 2.95 K/UL (ref 1.8–7.3)
PLATELET, FLUORESCENCE: 111 K/UL (ref 130–450)
PMV BLD AUTO: 12 FL (ref 7–12)
POTASSIUM SERPL-SCNC: 4 MMOL/L (ref 3.5–5)
RBC # BLD AUTO: 3.03 M/UL (ref 3.8–5.8)
SODIUM SERPL-SCNC: 140 MMOL/L (ref 132–146)
WBC OTHER # BLD: 4.5 K/UL (ref 4.5–11.5)

## 2024-07-05 PROCEDURE — 2580000003 HC RX 258: Performed by: REGISTERED NURSE

## 2024-07-05 PROCEDURE — 6370000000 HC RX 637 (ALT 250 FOR IP): Performed by: INTERNAL MEDICINE

## 2024-07-05 PROCEDURE — 99239 HOSP IP/OBS DSCHRG MGMT >30: CPT | Performed by: INTERNAL MEDICINE

## 2024-07-05 PROCEDURE — 94640 AIRWAY INHALATION TREATMENT: CPT

## 2024-07-05 PROCEDURE — 85025 COMPLETE CBC W/AUTO DIFF WBC: CPT

## 2024-07-05 PROCEDURE — 80048 BASIC METABOLIC PNL TOTAL CA: CPT

## 2024-07-05 PROCEDURE — 2700000000 HC OXYGEN THERAPY PER DAY

## 2024-07-05 PROCEDURE — 6360000002 HC RX W HCPCS: Performed by: INTERNAL MEDICINE

## 2024-07-05 PROCEDURE — 2580000003 HC RX 258: Performed by: INTERNAL MEDICINE

## 2024-07-05 PROCEDURE — 6360000002 HC RX W HCPCS: Performed by: REGISTERED NURSE

## 2024-07-05 PROCEDURE — 6370000000 HC RX 637 (ALT 250 FOR IP): Performed by: REGISTERED NURSE

## 2024-07-05 RX ORDER — CEPHALEXIN 500 MG/1
1000 CAPSULE ORAL EVERY 8 HOURS SCHEDULED
Qty: 42 CAPSULE | Refills: 0 | Status: SHIPPED | OUTPATIENT
Start: 2024-07-05 | End: 2024-07-12

## 2024-07-05 RX ORDER — CEPHALEXIN 500 MG/1
1000 CAPSULE ORAL EVERY 8 HOURS SCHEDULED
Status: DISCONTINUED | OUTPATIENT
Start: 2024-07-05 | End: 2024-07-05 | Stop reason: HOSPADM

## 2024-07-05 RX ORDER — FLUOXETINE HYDROCHLORIDE 20 MG/1
20 CAPSULE ORAL DAILY
Status: DISCONTINUED | OUTPATIENT
Start: 2024-07-05 | End: 2024-07-05 | Stop reason: HOSPADM

## 2024-07-05 RX ADMIN — MORPHINE SULFATE 10 MG: 10 SOLUTION ORAL at 02:42

## 2024-07-05 RX ADMIN — LOSARTAN POTASSIUM 50 MG: 50 TABLET, FILM COATED ORAL at 08:08

## 2024-07-05 RX ADMIN — WATER 2000 MG: 1 INJECTION INTRAMUSCULAR; INTRAVENOUS; SUBCUTANEOUS at 05:55

## 2024-07-05 RX ADMIN — LORAZEPAM 1 MG: 1 TABLET ORAL at 09:39

## 2024-07-05 RX ADMIN — SODIUM CHLORIDE, PRESERVATIVE FREE 10 ML: 5 INJECTION INTRAVENOUS at 08:13

## 2024-07-05 RX ADMIN — HYDROCODONE BITARTRATE AND ACETAMINOPHEN 1 TABLET: 7.5; 325 TABLET ORAL at 09:40

## 2024-07-05 RX ADMIN — ARFORMOTEROL TARTRATE 15 MCG: 15 SOLUTION RESPIRATORY (INHALATION) at 06:29

## 2024-07-05 RX ADMIN — LORAZEPAM 1 MG: 1 TABLET ORAL at 01:26

## 2024-07-05 RX ADMIN — GABAPENTIN 600 MG: 300 CAPSULE ORAL at 08:09

## 2024-07-05 RX ADMIN — ASPIRIN 81 MG: 81 TABLET, CHEWABLE ORAL at 08:08

## 2024-07-05 RX ADMIN — IPRATROPIUM BROMIDE AND ALBUTEROL SULFATE 1 DOSE: .5; 2.5 SOLUTION RESPIRATORY (INHALATION) at 06:29

## 2024-07-05 RX ADMIN — MORPHINE SULFATE 10 MG: 10 SOLUTION ORAL at 08:09

## 2024-07-05 RX ADMIN — IPRATROPIUM BROMIDE AND ALBUTEROL SULFATE 1 DOSE: .5; 2.5 SOLUTION RESPIRATORY (INHALATION) at 11:59

## 2024-07-05 RX ADMIN — CEPHALEXIN 1000 MG: 500 CAPSULE ORAL at 14:15

## 2024-07-05 RX ADMIN — TAMSULOSIN HYDROCHLORIDE 0.4 MG: 0.4 CAPSULE ORAL at 08:10

## 2024-07-05 RX ADMIN — FLUOXETINE 20 MG: 20 CAPSULE ORAL at 08:10

## 2024-07-05 RX ADMIN — HYDROCODONE BITARTRATE AND ACETAMINOPHEN 1 TABLET: 7.5; 325 TABLET ORAL at 01:25

## 2024-07-05 RX ADMIN — FUROSEMIDE 40 MG: 40 TABLET ORAL at 08:08

## 2024-07-05 RX ADMIN — FLUTICASONE PROPIONATE 1 SPRAY: 50 SPRAY, METERED NASAL at 08:13

## 2024-07-05 RX ADMIN — PANTOPRAZOLE SODIUM 40 MG: 40 TABLET, DELAYED RELEASE ORAL at 05:55

## 2024-07-05 RX ADMIN — MORPHINE SULFATE 10 MG: 10 SOLUTION ORAL at 14:15

## 2024-07-05 RX ADMIN — ARIPIPRAZOLE 5 MG: 5 TABLET ORAL at 08:10

## 2024-07-05 RX ADMIN — BUDESONIDE 500 MCG: 0.5 SUSPENSION RESPIRATORY (INHALATION) at 06:29

## 2024-07-05 ASSESSMENT — PAIN SCALES - GENERAL
PAINLEVEL_OUTOF10: 6
PAINLEVEL_OUTOF10: 10
PAINLEVEL_OUTOF10: 8
PAINLEVEL_OUTOF10: 5
PAINLEVEL_OUTOF10: 7
PAINLEVEL_OUTOF10: 10
PAINLEVEL_OUTOF10: 7

## 2024-07-05 ASSESSMENT — PAIN DESCRIPTION - DESCRIPTORS
DESCRIPTORS: ACHING;SORE;THROBBING
DESCRIPTORS: ACHING;DISCOMFORT
DESCRIPTORS: DISCOMFORT;SHARP
DESCRIPTORS: DISCOMFORT;JABBING;SHARP
DESCRIPTORS: DISCOMFORT;SHARP
DESCRIPTORS: DISCOMFORT;SHARP;JABBING
DESCRIPTORS: ACHING;THROBBING

## 2024-07-05 ASSESSMENT — PAIN DESCRIPTION - ORIENTATION
ORIENTATION: LEFT

## 2024-07-05 ASSESSMENT — PAIN - FUNCTIONAL ASSESSMENT
PAIN_FUNCTIONAL_ASSESSMENT: PREVENTS OR INTERFERES SOME ACTIVE ACTIVITIES AND ADLS

## 2024-07-05 ASSESSMENT — PAIN DESCRIPTION - LOCATION
LOCATION: LEG

## 2024-07-05 ASSESSMENT — PAIN DESCRIPTION - PAIN TYPE
TYPE: ACUTE PAIN

## 2024-07-05 NOTE — PLAN OF CARE
Problem: ABCDS Injury Assessment  Goal: Absence of physical injury  7/5/2024 1430 by Rhiannon Srivastava RN  Outcome: Adequate for Discharge  7/5/2024 1023 by Rhiannon Srivastava RN  Outcome: Progressing     Problem: Skin/Tissue Integrity  Goal: Absence of new skin breakdown  7/5/2024 1430 by Rhiannon Srivastava RN  Outcome: Adequate for Discharge  7/5/2024 1023 by Rhiannon Srivastava RN  Outcome: Progressing     Problem: Discharge Planning  Goal: Discharge to home or other facility with appropriate resources  Outcome: Adequate for Discharge     Problem: Pain  Goal: Verbalizes/displays adequate comfort level or baseline comfort level  Outcome: Adequate for Discharge     Problem: Safety - Adult  Goal: Free from fall injury  7/5/2024 1430 by Rhiannon Srivastava RN  Outcome: Adequate for Discharge  7/5/2024 0120 by Cherelle Peña RN  Outcome: Progressing     Problem: Chronic Conditions and Co-morbidities  Goal: Patient's chronic conditions and co-morbidity symptoms are monitored and maintained or improved  7/5/2024 1430 by Rhiannon Srivastava RN  Outcome: Adequate for Discharge  7/5/2024 1023 by Rhiannon Srivastava RN  Outcome: Progressing

## 2024-07-05 NOTE — PROGRESS NOTES
[facility]  ROSELINEIDA  Progress Note    SUBJECTIVE:  Chief Complaint   Patient presents with    Wound Check     Left leg swollen and red x 5 days      Patient is tolerating medications. No reported adverse drug reactions.  No nausea, vomiting, diarrhea.  Asking when he can go home  Says his leg is feeling a lot better today    Review of systems:  As stated above in the chief complaint, otherwise negative.    Medications:  Scheduled Meds:   FLUoxetine  20 mg Oral Daily    ceFAZolin  2,000 mg IntraVENous Q8H    ARIPiprazole  5 mg Oral Daily    aspirin  81 mg Oral Daily    atorvastatin  40 mg Oral Nightly    fluticasone  1 spray Each Nostril Daily    furosemide  40 mg Oral Daily    hydrOXYzine pamoate  25 mg Oral BID    losartan  50 mg Oral Daily    metoprolol succinate  50 mg Oral Nightly    pantoprazole  40 mg Oral QAM AC    pramipexole  0.75 mg Oral Nightly    tamsulosin  0.4 mg Oral BID    ipratropium 0.5 mg-albuterol 2.5 mg  1 Dose Inhalation Q4H WA RT    arformoterol tartrate  15 mcg Nebulization BID RT    budesonide  0.5 mg Nebulization BID RT    sodium chloride flush  5-40 mL IntraVENous 2 times per day    enoxaparin  40 mg SubCUTAneous Daily    gabapentin  900 mg Oral QHS    gabapentin  600 mg Oral Daily     Continuous Infusions:   sodium chloride       PRN Meds:butalbital-APAP-caffeine, HYDROcodone-acetaminophen, LORazepam, morphine, sodium chloride flush, sodium chloride, potassium chloride **OR** potassium alternative oral replacement **OR** potassium chloride, magnesium sulfate, ondansetron **OR** ondansetron, polyethylene glycol, acetaminophen **OR** acetaminophen    OBJECTIVE:  /68   Pulse 55   Temp 97.9 °F (36.6 °C) (Oral)   Resp 18   Ht 1.626 m (5' 4\")   Wt 58.5 kg (128 lb 15.5 oz)   SpO2 100%   BMI 22.14 kg/m²   Temp  Av.1 °F (36.7 °C)  Min: 97.9 °F (36.6 °C)  Max: 98.3 °F (36.8 °C)  Constitutional: The patient is awake, alert, and oriented. Sitting 
  [facility]  [facility]  Progress Note    SUBJECTIVE:  Chief Complaint   Patient presents with    Wound Check     Left leg swollen and red x 5 days      Patient resting in bed. Visitor in room. No new complaints. Patient is tolerating medications. No reported adverse drug reactions.  No nausea, vomiting, diarrhea.    Review of systems:  As stated above in the chief complaint, otherwise negative.    Medications:  Scheduled Meds:   ceFAZolin  2,000 mg IntraVENous Q8H    ARIPiprazole  5 mg Oral Daily    aspirin  81 mg Oral Daily    atorvastatin  40 mg Oral Nightly    FLUoxetine  20 mg Oral Daily    fluticasone  1 spray Each Nostril Daily    furosemide  40 mg Oral Daily    hydrOXYzine pamoate  25 mg Oral BID    losartan  50 mg Oral Daily    metoprolol succinate  50 mg Oral Nightly    pantoprazole  40 mg Oral QAM AC    pramipexole  0.75 mg Oral Nightly    tamsulosin  0.4 mg Oral BID    ipratropium 0.5 mg-albuterol 2.5 mg  1 Dose Inhalation Q4H WA RT    arformoterol tartrate  15 mcg Nebulization BID RT    budesonide  0.5 mg Nebulization BID RT    sodium chloride flush  5-40 mL IntraVENous 2 times per day    enoxaparin  40 mg SubCUTAneous Daily    gabapentin  900 mg Oral QHS    gabapentin  600 mg Oral Daily     Continuous Infusions:   sodium chloride       PRN Meds:fentanNYL, butalbital-APAP-caffeine, HYDROcodone-acetaminophen, LORazepam, morphine, sodium chloride flush, sodium chloride, potassium chloride **OR** potassium alternative oral replacement **OR** potassium chloride, magnesium sulfate, ondansetron **OR** ondansetron, polyethylene glycol, acetaminophen **OR** acetaminophen    OBJECTIVE:  /73   Pulse 64   Temp 98.2 °F (36.8 °C)   Resp 18   Ht 1.626 m (5' 4\")   Wt 57.7 kg (127 lb 4.8 oz)   SpO2 100%   BMI 21.85 kg/m²   Temp  Av.2 °F (36.8 °C)  Min: 98.2 °F (36.8 °C)  Max: 98.2 °F (36.8 °C)  Constitutional: NAD  Skin: Warm and dry. No rashes were noted.   Neuro: Alert 
4 Eyes Skin Assessment     NAME:  Dg Peña  YOB: 1965  MEDICAL RECORD NUMBER:  [id number]    The patient is being assessed for  Admission    I agree that at least one RN has performed a thorough Head to Toe Skin Assessment on the patient. ALL assessment sites listed below have been assessed.      Areas assessed by both nurses:    Head, Face, Ears, Shoulders, Back, Chest, Arms, Elbows, Hands, Sacrum. Buttock, Coccyx, Ischium, and Legs. Feet and Heels        Does the Patient have a Wound? Yes wound(s) were present on assessment. LDA wound assessment was Initiated and completed by RN       Cristofer Prevention initiated by RN: Yes  Wound Care Orders initiated by RN: Yes    Pressure Injury (Stage 3,4, Unstageable, DTI, NWPT, and Complex wounds) if present, place Wound referral order by RN under : No    New Ostomies, if present place, Ostomy referral order under : Yes     Nurse 1 eSignature: Electronically signed by Mimi Maciel RN on 7/2/24 at 8:44 PM EDT    **SHARE this note so that the co-signing nurse can place an eSignature**    Nurse 2 eSignature: {Esignature:[id number]}    
Database initiated pharmacy and medications verified with the patient. He is A&O comes in from home with cousin. He uses a walker and wears 5 liters oxygen at baseline through [facility]. He has fallen recently. He has private home care from his sister Simon   
Date:2024  Patient Name: Dg Peña  MRN: [id number]  : 1965  ROOM #: [id number]    Occupational Therapy order received, chart reviewed and evaluation attempted this date. Patient declined OT evaluation d/t hip and knee pain. Will re-attempt OT evaluation at a later time. Thank you.     Raina Vila OTR/L #[id number]    
Initial Inpatient Wound Care    Admit Date: 7/2/2024 12:39 PM    Reason for consult:  left leg cellulitis    Significant history:  adm cellulitis  COPD  Chronic respiratory failure  Wound history:  POA    Findings:  awake and verbally appropriate. States he had blister.  Left leg with scattered area of buidup. Easily comes off    Patient instructed on care  Plan:aquaphor and cleansing      Nieves Berumen RN 7/5/2024 2:38 PM      
OT EVALUATION ATTEMPT     Date:2024  Patient Name: Dg Peña  MRN: [id number]  : 1965  Room: 28/[facility]     Attempted OT session this date:    [] unavailable due to other medical staff currently with pt   [] on hold per nursing staff   [] on hold per nursing staff secondary to lab / radiology results    [x] declined treatment  this date due to indicating he was being discharged .  Benefits of participation in therapy reviewed with pt.    [] off unit   [] Other:     Will attempt evaluation at a later time if pt is agreeable.    Marzena Brush, OTR/L [id number]   
Occupational Therapy  OT consult received to eval/treat and chart review complete. Attempted OT evaluation, patient refused. OT to re-attempt at a later date/time as able and appropriate.     Ira Hernandez OTR/L  License Number: [id number]    
lb 4.8 oz)   SpO2 100%   BMI 21.85 kg/m²     General Appearance: alert and oriented to person, place and time and in no acute distress  Skin: warm and dry  Head: normocephalic and atraumatic  Eyes: pupils equal, round, and reactive to light, extraocular eye movements intact, conjunctivae normal  Neck: neck supple and non tender without mass   Pulmonary/Chest: clear to auscultation bilaterally- no wheezes, rales or rhonchi, normal air movement, no respiratory distress  Cardiovascular: normal rate, normal S1 and S2 and no carotid bruits  Abdomen: soft, non-tender, non-distended, normal bowel sounds, no masses or organomegaly  Extremities: no cyanosis, no clubbing.  LLE erythema, tenderness and slight swelling Up to mid thigh  Neurologic: no cranial nerve deficit and speech normal        Recent Labs     07/02/24  1348 07/03/24  0359 07/04/24  0312    138 140   K 4.0 4.4 4.4   CL 96* 96* 95*   CO2 36* 35* 38*   BUN 18 18 12   CREATININE 0.7 0.7 0.6*   GLUCOSE 95 82 96   CALCIUM 7.2* 6.9* 7.2*       Recent Labs     07/02/24  1348 07/03/24  0359 07/04/24  0312   WBC 5.7 5.0 5.0   RBC 3.25* 3.00* 3.15*   HGB 9.9* 9.2* 9.8*   HCT 33.8* 31.4* 32.8*   .0* 104.7* 104.1*   MCH 30.5 30.7 31.1   MCHC 29.3* 29.3* 29.9*   RDW 13.5 13.5 13.7     --   --    MPV 11.8 11.8 11.7       Assessment and Plan:     Principal Problem:    Cellulitis of left leg  Active Problems:    Chronic respiratory failure with hypoxia (HCC)    Essential hypertension    Chronic obstructive pulmonary disease (HCC)    Venous insufficiency of both lower extremities  Resolved Problems:    * No resolved hospital problems. *    Acute on chr LLE cellulitis. US negative for DVT. Arterial study showed monophasic wave forms. Give Vanc and Zosyn x1. Appreciate ID input as he was recently treated for this but did not complete course of abx at home.  Started on cefazolin per ID  HFpEF. Not in acute exacerbation. Continue home meds.   HTN. Continue 
baseline  Chronic resp failure. Uses 5L at baseline.   Neuropathy. Gabapentin.       NOTE: This report was transcribed using voice recognition software. Every effort was made to ensure accuracy; however, inadvertent computerized transcription errors may be present.  Electronically signed by Moshe Gregg MD on 7/3/2024 at 1:53 PM
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
## 2024-07-05 NOTE — PLAN OF CARE
Problem: ABCDS Injury Assessment  Goal: Absence of physical injury  Outcome: Progressing     Problem: Skin/Tissue Integrity  Goal: Absence of new skin breakdown  Outcome: Progressing     Problem: Safety - Adult  Goal: Free from fall injury  7/5/2024 0120 by Cherelle Peña RN  Outcome: Progressing     Problem: Chronic Conditions and Co-morbidities  Goal: Patient's chronic conditions and co-morbidity symptoms are monitored and maintained or improved  Outcome: Progressing

## 2024-07-05 NOTE — CONSULTS
[facility]   Inpatient CHF Nurse Navigator Consult      Cardiologist: Gary    Dg Peña is a 58 y.o. (1965) male with a history of HFpEF, most recent EF:  Lab Results   Component Value Date    LVEF 63 08/03/2023    LVEFMODE Echo 01/21/2022       Patient was awake and alert, laying in bed during the consultation and is agreeable to heart failure education. He was engaged and asked appropriate questions throughout the education session.     Barriers identified during consult contributing to HF Hospitalization:  [] Limited medication adherence   [] Poor health literacy, education regarding HF medications provided   [] Pill box provided to patient  [] Difficulty affording medications  [] Difficulty obtaining/ managing medications  [] Prescription assistance information given     [] Not weighing themselves daily  [x] Weight log provided for easy monitoring  [] Scale provided     [] Not following low sodium diet  [] Food insecurity   [x] 2 gram sodium diet education provided   [] Low sodium recipes provided  [] Sodium free seasoning provided   [] Low sodium meal delivery options given to patient  [] Dietician consulted     [] Lack of transportation to appointments     [] Depression, given chronic illness  [] Primary team notified     [] Goals of care need addressed  [] Palliative care consulted     [] CHF CHW consulted, to assist with na        Chart Reviewed:  Diet: ADULT DIET; Regular; Low Sodium (2 gm)   Daily Weights: Patient Vitals for the past 96 hrs (Last 3 readings):   Weight   07/05/24 0633 58.5 kg (128 lb 15.5 oz)   07/04/24 0445 57.7 kg (127 lb 4.8 oz)   07/04/24 0227 57.7 kg (127 lb 3 oz)     I/O:   Intake/Output Summary (Last 24 hours) at 7/5/2024 1159  Last data filed at 7/5/2024 1024  Gross per 24 hour   Intake --   Output 1740 ml   Net -1740 ml       [] Nursing staff/manager notified of inaccurate parisi weights or I/O        Discharge Plan:  Above 
Year: No      Pets: dog  Travel: none  He lives at home alone. He has [facility] coming in. He is retired    Family History:       Problem Relation Age of Onset    Colon Cancer Mother     Other Father         house fire    No Known Problems Sister     No Known Problems Brother     No Known Problems Sister     No Known Problems Brother    . Otherwise non-pertinent to the chief complaint.    REVIEW OF SYSTEMS:    Constitutional: negative for fevers, chills, diaphoresis  Neurologic: Negative   Psychiatric: Negative  Rheumatologic: Negative   Endocrine: Negative  Hematologic: Negative  Immunologic: negative  ENT: Negative  Respiratory: Negative   Cardiovascular: Negative  GI: Negative  : Negative  Musculoskeletal: Negative  Skin: as in the HPI    PHYSICAL EXAM:    Vitals:   /62   Pulse 65   Temp 97.3 °F (36.3 °C) (Oral)   Resp 18   Ht 1.626 m (5' 4\")   Wt 59 kg (130 lb)   SpO2 100%   BMI 22.31 kg/m²   Constitutional: The patient is awake, alert, and oriented. Sitting up in bed, in no distress.  Skin: Warm and dry. No rashes were noted.   HEENT: Eyes show round, and reactive pupils. No jaundice. Moist mucous membranes, no ulcerations, no thrush.   Neck: Supple to movements. No lymphadenopathy.   Chest: No use of accessory muscles to breathe. Symmetrical expansion. Auscultation reveals no wheezing, crackles, or rhonchi. Nasal cannula   Cardiovascular: S1 and S2 are rhythmic and regular. No murmurs appreciated.   Abdomen: Positive bowel sounds to auscultation. Benign to palpation. No masses felt. No hepatosplenomegaly.  Extremities: No clubbing, no cyanosis. Right leg shows venous stasis dermatitis. Left leg shows erythema with blistering up to the knee. Erythema and tenderness extends to the medial thigh.  Lines: Peripheral.    Lab Results   Component Value Date    CRP 21.0 (H) 07/02/2024    CRP 6.8 (H) 04/23/2023    CRP 20.9 (H) 06/17/2022      Lab Results   Component Value Date    CRPHS 123.8 (H) 01/24/2022

## 2024-07-05 NOTE — CARE COORDINATION
Transition of Care-Ancef Q8 continues per ID, awaiting final cultures. Discharge plan is home with [facility], AVS will need faxed at discharge to [phone]. Patient is chronically on home oxygen-up to 7L provided by [facility]-he also has a CPAP and nebulizer. He confirmed he has transportation home at discharge.    Leilani MOREIRA, RN  [facility]

## 2024-07-08 DIAGNOSIS — J44.9 END STAGE COPD (HCC): ICD-10-CM

## 2024-07-08 DIAGNOSIS — Z51.5 PALLIATIVE CARE ENCOUNTER: ICD-10-CM

## 2024-07-08 DIAGNOSIS — G89.4 CHRONIC PAIN SYNDROME: ICD-10-CM

## 2024-07-08 DIAGNOSIS — R06.09 DYSPNEA ON MINIMAL EXERTION: ICD-10-CM

## 2024-07-08 RX ORDER — HYDROCODONE BITARTRATE AND ACETAMINOPHEN 7.5; 325 MG/1; MG/1
1 TABLET ORAL EVERY 8 HOURS PRN
Qty: 90 TABLET | Refills: 0 | Status: SHIPPED | OUTPATIENT
Start: 2024-07-08 | End: 2024-08-07

## 2024-07-08 RX ORDER — MORPHINE SULFATE 20 MG/ML
10 SOLUTION ORAL EVERY 4 HOURS PRN
Qty: 30 ML | Refills: 0 | Status: SHIPPED | OUTPATIENT
Start: 2024-07-08 | End: 2024-07-18

## 2024-07-08 NOTE — TELEPHONE ENCOUNTER
Call from Sayra requesting refill for Morphine concentrate and [place]. Pharmacy si [facility]. Next kristina to be with [facility].

## 2024-07-17 DIAGNOSIS — Z51.5 PALLIATIVE CARE ENCOUNTER: ICD-10-CM

## 2024-07-17 DIAGNOSIS — R06.09 DYSPNEA ON MINIMAL EXERTION: ICD-10-CM

## 2024-07-17 DIAGNOSIS — J44.9 END STAGE COPD (HCC): ICD-10-CM

## 2024-07-17 RX ORDER — MORPHINE SULFATE 20 MG/ML
10 SOLUTION ORAL EVERY 4 HOURS PRN
Qty: 30 ML | Refills: 0 | Status: SHIPPED | OUTPATIENT
Start: 2024-07-17 | End: 2024-07-27

## 2024-07-17 NOTE — TELEPHONE ENCOUNTER
Call from Dg Colbert's sister. Requesting refill for Morphine solution. Pharmacy is [facility]. [facility].

## 2024-07-26 ENCOUNTER — OFFICE VISIT (OUTPATIENT)
Dept: PRIMARY CARE CLINIC | Age: 59
End: 2024-07-26
Payer: MEDICAID

## 2024-07-26 VITALS
TEMPERATURE: 96.8 F | HEART RATE: 72 BPM | DIASTOLIC BLOOD PRESSURE: 68 MMHG | OXYGEN SATURATION: 96 % | BODY MASS INDEX: 22.14 KG/M2 | SYSTOLIC BLOOD PRESSURE: 124 MMHG | HEIGHT: 64 IN

## 2024-07-26 DIAGNOSIS — Z51.5 PALLIATIVE CARE ENCOUNTER: ICD-10-CM

## 2024-07-26 DIAGNOSIS — L03.116 CELLULITIS OF LEFT LOWER EXTREMITY: Primary | ICD-10-CM

## 2024-07-26 DIAGNOSIS — J44.9 END STAGE COPD (HCC): ICD-10-CM

## 2024-07-26 DIAGNOSIS — I50.33 ACUTE ON CHRONIC DIASTOLIC (CONGESTIVE) HEART FAILURE (HCC): ICD-10-CM

## 2024-07-26 DIAGNOSIS — M54.50 ACUTE LEFT-SIDED LOW BACK PAIN WITHOUT SCIATICA: ICD-10-CM

## 2024-07-26 DIAGNOSIS — R06.09 DYSPNEA ON MINIMAL EXERTION: ICD-10-CM

## 2024-07-26 PROCEDURE — 3078F DIAST BP <80 MM HG: CPT | Performed by: FAMILY MEDICINE

## 2024-07-26 PROCEDURE — 3074F SYST BP LT 130 MM HG: CPT | Performed by: FAMILY MEDICINE

## 2024-07-26 PROCEDURE — 99214 OFFICE O/P EST MOD 30 MIN: CPT | Performed by: FAMILY MEDICINE

## 2024-07-26 RX ORDER — MORPHINE SULFATE 20 MG/ML
10 SOLUTION ORAL EVERY 4 HOURS PRN
Qty: 30 ML | Refills: 0 | Status: SHIPPED | OUTPATIENT
Start: 2024-07-26 | End: 2024-08-05

## 2024-07-26 RX ORDER — CEPHALEXIN 500 MG/1
500 CAPSULE ORAL 2 TIMES DAILY
Qty: 20 CAPSULE | Refills: 0 | Status: SHIPPED | OUTPATIENT
Start: 2024-07-26 | End: 2024-08-05

## 2024-07-26 RX ORDER — ARIPIPRAZOLE 5 MG/1
5 TABLET ORAL DAILY
Qty: 90 TABLET | Refills: 3 | OUTPATIENT
Start: 2024-07-26

## 2024-07-26 RX ORDER — BUTALBITAL, ACETAMINOPHEN AND CAFFEINE 50; 325; 40 MG/1; MG/1; MG/1
TABLET ORAL
Qty: 30 TABLET | Refills: 1 | Status: SHIPPED | OUTPATIENT
Start: 2024-07-26

## 2024-07-26 RX ORDER — ARIPIPRAZOLE 5 MG/1
5 TABLET ORAL DAILY
Qty: 90 TABLET | Refills: 3 | Status: SHIPPED | OUTPATIENT
Start: 2024-07-26 | End: 2025-07-21

## 2024-07-26 RX ORDER — TAMSULOSIN HYDROCHLORIDE 0.4 MG/1
0.4 CAPSULE ORAL 2 TIMES DAILY
Qty: 60 CAPSULE | Refills: 2 | Status: SHIPPED | OUTPATIENT
Start: 2024-07-26

## 2024-07-26 RX ORDER — LORAZEPAM 1 MG/1
1 TABLET ORAL EVERY 8 HOURS PRN
Qty: 90 TABLET | Refills: 1 | Status: CANCELLED | OUTPATIENT
Start: 2024-07-26 | End: 2024-09-24

## 2024-07-26 RX ORDER — TIZANIDINE 2 MG/1
2 TABLET ORAL 3 TIMES DAILY PRN
Qty: 30 TABLET | Refills: 1 | Status: SHIPPED | OUTPATIENT
Start: 2024-07-26

## 2024-07-26 RX ORDER — PRAMIPEXOLE DIHYDROCHLORIDE 0.75 MG/1
0.75 TABLET ORAL NIGHTLY
Qty: 90 TABLET | Refills: 1 | Status: SHIPPED | OUTPATIENT
Start: 2024-07-26

## 2024-08-02 ENCOUNTER — TELEPHONE (OUTPATIENT)
Dept: PALLATIVE CARE | Age: 59
End: 2024-08-02

## 2024-08-02 DIAGNOSIS — G89.4 CHRONIC PAIN SYNDROME: ICD-10-CM

## 2024-08-02 DIAGNOSIS — R06.09 DYSPNEA ON MINIMAL EXERTION: ICD-10-CM

## 2024-08-02 DIAGNOSIS — J44.9 END STAGE COPD (HCC): ICD-10-CM

## 2024-08-02 DIAGNOSIS — Z51.5 PALLIATIVE CARE ENCOUNTER: ICD-10-CM

## 2024-08-02 RX ORDER — MORPHINE SULFATE 20 MG/ML
10 SOLUTION ORAL EVERY 4 HOURS PRN
Qty: 30 ML | Refills: 0 | Status: SHIPPED | OUTPATIENT
Start: 2024-08-02 | End: 2024-08-12

## 2024-08-02 RX ORDER — HYDROCODONE BITARTRATE AND ACETAMINOPHEN 7.5; 325 MG/1; MG/1
1 TABLET ORAL EVERY 8 HOURS PRN
Qty: 90 TABLET | Refills: 0 | Status: SHIPPED | OUTPATIENT
Start: 2024-08-02 | End: 2024-09-01

## 2024-08-02 NOTE — TELEPHONE ENCOUNTER
contacted pt sister Sayra to schedule pt for follow up Palliative care home visit with MICA Zuñiga. Pt scheduled to be seen on 8/15 at 9:45am, Sayra agreed to appt time and date.       Yesi GARVIN,LSW  Palliative Medicine

## 2024-08-02 NOTE — TELEPHONE ENCOUNTER
Called to schedule Dg's next CBPC and Sayra states he will need refills for [place] and Morphine refilled to [facility]. Meds not needed until Monday.

## 2024-08-06 ENCOUNTER — TELEPHONE (OUTPATIENT)
Dept: PALLATIVE CARE | Age: 59
End: 2024-08-06

## 2024-08-06 NOTE — TELEPHONE ENCOUNTER
Call from Sayra stating pharmacy was unable to give [place] prescription as Dg had more than 5 narcotic fills in last 30 days. Called and spoke with Pharmacist , then called to [facility]. Spoke with Carmel, she will get over ride to pharmacy so patient can get [place] today . This nurse and Carmel completed Prior Auth form so this over ride will be authorized for future refills. Sayra notified.

## 2024-08-09 ENCOUNTER — HOSPITAL ENCOUNTER (OUTPATIENT)
Dept: OTHER | Age: 59
Discharge: HOME | End: 2024-08-09

## 2024-08-10 DIAGNOSIS — F41.9 ANXIETY: ICD-10-CM

## 2024-08-12 ENCOUNTER — PATIENT MESSAGE (OUTPATIENT)
Dept: PRIMARY CARE CLINIC | Age: 59
End: 2024-08-12

## 2024-08-12 DIAGNOSIS — M54.50 LUMBAR BACK PAIN: Primary | ICD-10-CM

## 2024-08-12 RX ORDER — LORAZEPAM 1 MG/1
TABLET ORAL
Qty: 90 TABLET | Refills: 1 | Status: SHIPPED | OUTPATIENT
Start: 2024-08-12 | End: 2024-09-11

## 2024-08-13 DIAGNOSIS — F41.9 ANXIETY: ICD-10-CM

## 2024-08-13 RX ORDER — METHOCARBAMOL 500 MG/1
500 TABLET, FILM COATED ORAL 3 TIMES DAILY PRN
Qty: 30 TABLET | Refills: 0 | Status: SHIPPED | OUTPATIENT
Start: 2024-08-13 | End: 2024-08-23

## 2024-08-13 RX ORDER — LORAZEPAM 1 MG/1
TABLET ORAL
Qty: 90 TABLET | Refills: 1 | OUTPATIENT
Start: 2024-08-13 | End: 2024-09-12

## 2024-08-15 ENCOUNTER — OFFICE VISIT (OUTPATIENT)
Dept: PALLATIVE CARE | Age: 59
End: 2024-08-15
Payer: MEDICAID

## 2024-08-15 DIAGNOSIS — J44.9 END STAGE COPD (HCC): ICD-10-CM

## 2024-08-15 DIAGNOSIS — Z51.5 PALLIATIVE CARE ENCOUNTER: Primary | ICD-10-CM

## 2024-08-15 DIAGNOSIS — R06.09 DYSPNEA ON MINIMAL EXERTION: ICD-10-CM

## 2024-08-15 DIAGNOSIS — G25.81 RLS (RESTLESS LEGS SYNDROME): ICD-10-CM

## 2024-08-15 DIAGNOSIS — G89.4 CHRONIC PAIN SYNDROME: ICD-10-CM

## 2024-08-15 PROCEDURE — 99348 HOME/RES VST EST LOW MDM 30: CPT | Performed by: NURSE PRACTITIONER

## 2024-08-15 NOTE — PROGRESS NOTES
includes time spent prior to the visit and after the visit in direct care of the patient.  This time does not include time spent in any separately reportable services.        Thank you for allowing Palliative Medicine to participate in the care of Dg Peña.    Note: This report was completed using computeri-Human Patients voiced recognition software.  Every effort has been made to ensure accuracy; however, inadvertent computerized transcription errors may be present.

## 2024-08-16 DIAGNOSIS — R06.09 DYSPNEA ON MINIMAL EXERTION: ICD-10-CM

## 2024-08-16 DIAGNOSIS — Z51.5 PALLIATIVE CARE ENCOUNTER: ICD-10-CM

## 2024-08-16 DIAGNOSIS — J44.9 END STAGE COPD (HCC): ICD-10-CM

## 2024-08-16 RX ORDER — MORPHINE SULFATE 20 MG/ML
10 SOLUTION ORAL EVERY 4 HOURS PRN
Qty: 30 ML | Refills: 0 | Status: SHIPPED | OUTPATIENT
Start: 2024-08-16 | End: 2024-08-26

## 2024-08-16 RX ORDER — PRAMIPEXOLE DIHYDROCHLORIDE 0.5 MG/1
TABLET ORAL
Qty: 90 TABLET | Refills: 3 | OUTPATIENT
Start: 2024-08-16

## 2024-08-16 NOTE — TELEPHONE ENCOUNTER
Patient Dg's sister Sayra called for refill morphine sulfate 20mg/ml. [facility]. Last home visit 8-. Call routed to A Yogesh, NP

## 2024-08-19 ENCOUNTER — OFFICE VISIT (OUTPATIENT)
Dept: CARDIOLOGY CLINIC | Age: 59
End: 2024-08-19
Payer: MEDICAID

## 2024-08-19 VITALS
OXYGEN SATURATION: 96 % | RESPIRATION RATE: 16 BRPM | HEART RATE: 70 BPM | BODY MASS INDEX: 22.02 KG/M2 | HEIGHT: 64 IN | WEIGHT: 129 LBS | DIASTOLIC BLOOD PRESSURE: 54 MMHG | SYSTOLIC BLOOD PRESSURE: 110 MMHG

## 2024-08-19 DIAGNOSIS — I50.32 CHRONIC DIASTOLIC (CONGESTIVE) HEART FAILURE (HCC): Primary | ICD-10-CM

## 2024-08-19 DIAGNOSIS — I10 PRIMARY HYPERTENSION: ICD-10-CM

## 2024-08-19 DIAGNOSIS — J96.11 CHRONIC RESPIRATORY FAILURE WITH HYPOXIA (HCC): ICD-10-CM

## 2024-08-19 DIAGNOSIS — I25.10 CORONARY ARTERY DISEASE INVOLVING NATIVE CORONARY ARTERY OF NATIVE HEART WITHOUT ANGINA PECTORIS: ICD-10-CM

## 2024-08-19 PROCEDURE — 99214 OFFICE O/P EST MOD 30 MIN: CPT | Performed by: INTERNAL MEDICINE

## 2024-08-19 PROCEDURE — 3074F SYST BP LT 130 MM HG: CPT | Performed by: INTERNAL MEDICINE

## 2024-08-19 PROCEDURE — 3078F DIAST BP <80 MM HG: CPT | Performed by: INTERNAL MEDICINE

## 2024-08-19 PROCEDURE — 93000 ELECTROCARDIOGRAM COMPLETE: CPT | Performed by: INTERNAL MEDICINE
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
## 2024-08-21 ENCOUNTER — TELEPHONE (OUTPATIENT)
Dept: PRIMARY CARE CLINIC | Age: 59
End: 2024-08-21

## 2024-08-21 DIAGNOSIS — R09.89 CHEST CONGESTION: ICD-10-CM

## 2024-08-21 DIAGNOSIS — R05.1 ACUTE COUGH: Primary | ICD-10-CM

## 2024-08-21 RX ORDER — DOXYCYCLINE HYCLATE 100 MG/1
100 CAPSULE ORAL 2 TIMES DAILY
Qty: 20 CAPSULE | Refills: 0 | Status: SHIPPED | OUTPATIENT
Start: 2024-08-21 | End: 2024-08-31

## 2024-08-21 NOTE — TELEPHONE ENCOUNTER
Audra from [facility] called.  Stated that he has very pronounced bronchi and very harsh pitched wheezing 7 Bronchi, Not getting good air exchange.  Increase in congestion and air intake he is struggling not getting good intake at all.  She advised him to go to ED and patient declined to go.  She stated he may be up for an out patient  Xray though.

## 2024-08-21 NOTE — TELEPHONE ENCOUNTER
Sister called in stating patient is sick again.Wheezing, congested, visiting nurse said lungs sound tight. Is taking nebulizer medications. Cough, thick mucus. Sister is requesting medication. Please advise

## 2024-08-21 NOTE — TELEPHONE ENCOUNTER
Chest x-ray ordered as well as doxycycline  Patient needs to have a very low threshold to go to the ER given his COPD and history   If ANY worsening to ER

## 2024-08-21 NOTE — TELEPHONE ENCOUNTER
Order faxed sister notified she is also going to stop into the office and get a copy of xray to take to [facility] as well. Fax machine not working

## 2024-08-22 DIAGNOSIS — I10 PRIMARY HYPERTENSION: ICD-10-CM

## 2024-08-22 RX ORDER — LOSARTAN POTASSIUM 50 MG/1
50 TABLET ORAL DAILY
Qty: 90 TABLET | Refills: 1 | OUTPATIENT
Start: 2024-08-22

## 2024-08-26 DIAGNOSIS — J44.9 END STAGE COPD (HCC): ICD-10-CM

## 2024-08-26 DIAGNOSIS — R06.09 DYSPNEA ON MINIMAL EXERTION: ICD-10-CM

## 2024-08-26 DIAGNOSIS — Z51.5 PALLIATIVE CARE ENCOUNTER: ICD-10-CM

## 2024-08-26 RX ORDER — MORPHINE SULFATE 20 MG/ML
10 SOLUTION ORAL EVERY 4 HOURS PRN
Qty: 30 ML | Refills: 0 | Status: SHIPPED | OUTPATIENT
Start: 2024-08-26 | End: 2024-09-05

## 2024-08-26 RX ORDER — METHOCARBAMOL 500 MG/1
500 TABLET, FILM COATED ORAL 3 TIMES DAILY PRN
Qty: 30 TABLET | Refills: 0 | Status: SHIPPED | OUTPATIENT
Start: 2024-08-26 | End: 2024-09-05

## 2024-08-26 NOTE — TELEPHONE ENCOUNTER
Call from Sayra Dg's sister requesting refill for Morphine solution to be sent to [facility]. Next kristina to be with [facility].

## 2024-08-28 DIAGNOSIS — G89.4 CHRONIC PAIN SYNDROME: ICD-10-CM

## 2024-08-28 DIAGNOSIS — Z51.5 PALLIATIVE CARE ENCOUNTER: ICD-10-CM

## 2024-08-28 RX ORDER — HYDROCODONE BITARTRATE AND ACETAMINOPHEN 7.5; 325 MG/1; MG/1
1 TABLET ORAL EVERY 8 HOURS PRN
Qty: 90 TABLET | Refills: 0 | Status: SHIPPED | OUTPATIENT
Start: 2024-08-28 | End: 2024-09-27

## 2024-08-28 NOTE — TELEPHONE ENCOUNTER
Patient Dg's sister Sayra Lakhani called for refill hydrocodone-acetaminophen 7.5-325. Last home visit 8-. Pharmacy [facility]. Verified pharmacy with Sayra. Routed call to A Yogesh, NP

## 2024-08-30 ENCOUNTER — TELEMEDICINE (OUTPATIENT)
Dept: PRIMARY CARE CLINIC | Age: 59
End: 2024-08-30
Payer: MEDICAID

## 2024-08-30 DIAGNOSIS — J43.1 PANLOBULAR EMPHYSEMA (HCC): ICD-10-CM

## 2024-08-30 DIAGNOSIS — M79.661 PAIN IN RIGHT LOWER LEG: Primary | ICD-10-CM

## 2024-08-30 PROCEDURE — 99214 OFFICE O/P EST MOD 30 MIN: CPT | Performed by: FAMILY MEDICINE

## 2024-08-30 RX ORDER — METOPROLOL SUCCINATE 50 MG/1
50 TABLET, EXTENDED RELEASE ORAL NIGHTLY
Qty: 90 TABLET | Refills: 1 | Status: SHIPPED | OUTPATIENT
Start: 2024-08-30

## 2024-08-30 RX ORDER — OMEPRAZOLE 40 MG/1
40 CAPSULE, DELAYED RELEASE ORAL DAILY
Qty: 90 CAPSULE | Refills: 1 | Status: SHIPPED | OUTPATIENT
Start: 2024-08-30

## 2024-08-30 RX ORDER — PRAMIPEXOLE DIHYDROCHLORIDE 0.75 MG/1
0.75 TABLET ORAL NIGHTLY
Qty: 90 TABLET | Refills: 1 | Status: SHIPPED | OUTPATIENT
Start: 2024-08-30

## 2024-08-30 NOTE — PROGRESS NOTES
TELEHEALTH VIDEO VISIT    Dg Peña, was evaluated through a synchronous (real-time) audio-video encounter. The patient (or guardian if applicable) is aware that this is a billable service., which includes applicable co-pays.  This virtual visit was conducted with the patient's  (and/or legal guardian's) consent.  This Virtual Visit was conducted with patient's (and/or legal guardian's) consent  Patient identification was verified, and a caregiver was present when appropriate.   Patient identification was verified, and a caregiver was present when appropriate.     The patient was located in a state where the provider was licensed to provide care.    The patient was located at Home: [address] [place] [id number]  Provider was located at Facility (Appt Dept): [address] [place], [place] [id number]       other people involved in call  sister Sayra      Patient identification was verified at the start of the visit, including the patient's telephone number and physical location. I discussed with the patient the nature of our telehealth visits, that:     Due to the nature of an audio- video modality, the only components of a physical exam that could be done are the elements supported by direct observation.  I would evaluate the patient and recommend diagnostics and treatments based on my assessment.  If it was felt that the patient should be evaluated in clinic or an emergency room setting, then they would be directed there.  Our sessions are not being recorded and that personal health information is protected.  Our team would provide follow up care in person if/when the patient needs it.       HPI:    Dg Peña (:  1965) has requested an audio/video evaluation for the following concern(s):    Chief Complaint   Patient presents with    Follow-up    Cellulitis     Spoke with pt, and his sister Sayra   Doing much better than usual   Congestion/cough - \"All cleared up\" after  abnormalities detected in the area of concern on the right lower extremity.       [] Abnormal-            Psychiatric:       [x] Normal Affect [x] No Hallucinations        [] Abnormal-     Other pertinent observable physical exam findings-     ASSESSMENT/PLAN:   Diagnosis Orders   1. Pain in right lower leg  US SOFT TISSUE LIMITED AREA   Check US  Further recommendations pending results     2. Panlobular emphysema (HCC)  Stable.           Other conditions are stable at this time.  Refills as requested.    Return in about 2 months (around 10/30/2024).        This visit was completed virtually using My Chart/Haiku/Evonne        --Jana Sanchez MD on 8/30/2024 at 11:01 AM    An electronic signature was used to authenticate this note.

## 2024-09-03 RX ORDER — FLUOXETINE 20 MG/1
20 TABLET, FILM COATED ORAL DAILY
Qty: 90 TABLET | Refills: 3 | Status: SHIPPED | OUTPATIENT
Start: 2024-09-03 | End: 2025-08-29

## 2024-09-03 NOTE — TELEPHONE ENCOUNTER
Patient Dg's sister Sayra called for refill prozac 20mg. [facility]. Last home visit 8-. Pharmacy verified on [facility]. Call routed to A Yogesh, NP

## 2024-09-04 RX ORDER — METHOCARBAMOL 500 MG/1
500 TABLET, FILM COATED ORAL 3 TIMES DAILY PRN
Qty: 30 TABLET | Refills: 0 | OUTPATIENT
Start: 2024-09-04 | End: 2024-09-14

## 2024-09-04 RX ORDER — FLUOXETINE 20 MG/1
20 TABLET, FILM COATED ORAL DAILY
Qty: 90 TABLET | Refills: 3 | OUTPATIENT
Start: 2024-09-04 | End: 2025-08-30

## 2024-09-05 DIAGNOSIS — Z51.5 PALLIATIVE CARE ENCOUNTER: ICD-10-CM

## 2024-09-05 DIAGNOSIS — R06.09 DYSPNEA ON MINIMAL EXERTION: ICD-10-CM

## 2024-09-05 DIAGNOSIS — J44.9 END STAGE COPD (HCC): ICD-10-CM

## 2024-09-05 RX ORDER — MORPHINE SULFATE 20 MG/ML
10 SOLUTION ORAL EVERY 4 HOURS PRN
Qty: 30 ML | Refills: 0 | Status: SHIPPED
Start: 2024-09-05 | End: 2024-09-05 | Stop reason: SDUPTHER

## 2024-09-05 RX ORDER — MORPHINE SULFATE 20 MG/ML
10 SOLUTION ORAL EVERY 4 HOURS PRN
Qty: 30 ML | Refills: 0 | Status: SHIPPED | OUTPATIENT
Start: 2024-09-05 | End: 2024-09-15

## 2024-09-05 NOTE — TELEPHONE ENCOUNTER
Call from Sayra stating [facility] does not have Morphine in stock and is unsure when they can get iti in. Please resend Dg's prescription for Morphine solution to [facility] in [place].

## 2024-09-05 NOTE — TELEPHONE ENCOUNTER
Call from Sayra Dg's sister , requesting refill for Morphine solution. Pharmacy is [facility]. [facility]

## 2024-09-06 ENCOUNTER — HOSPITAL ENCOUNTER (OUTPATIENT)
Dept: OTHER | Age: 59
Setting detail: THERAPIES SERIES
Discharge: HOME | End: 2024-09-06
Payer: MEDICAID

## 2024-09-06 VITALS
SYSTOLIC BLOOD PRESSURE: 120 MMHG | OXYGEN SATURATION: 99 % | HEART RATE: 82 BPM | WEIGHT: 130 LBS | DIASTOLIC BLOOD PRESSURE: 64 MMHG | BODY MASS INDEX: 22.31 KG/M2 | RESPIRATION RATE: 18 BRPM

## 2024-09-06 LAB
ANION GAP SERPL CALCULATED.3IONS-SCNC: 11 MMOL/L (ref 7–16)
BNP SERPL-MCNC: 234 PG/ML (ref 0–125)
BUN SERPL-MCNC: 21 MG/DL (ref 6–20)
CALCIUM SERPL-MCNC: 7.3 MG/DL (ref 8.6–10.2)
CHLORIDE SERPL-SCNC: 100 MMOL/L (ref 98–107)
CO2 SERPL-SCNC: 33 MMOL/L (ref 22–29)
CREAT SERPL-MCNC: 0.8 MG/DL (ref 0.7–1.2)
GFR, ESTIMATED: >90 ML/MIN/1.73M2
GLUCOSE SERPL-MCNC: 146 MG/DL (ref 74–99)
POTASSIUM SERPL-SCNC: 4.3 MMOL/L (ref 3.5–5)
SODIUM SERPL-SCNC: 144 MMOL/L (ref 132–146)

## 2024-09-06 PROCEDURE — 36415 COLL VENOUS BLD VENIPUNCTURE: CPT

## 2024-09-06 PROCEDURE — 83880 ASSAY OF NATRIURETIC PEPTIDE: CPT

## 2024-09-06 PROCEDURE — 99214 OFFICE O/P EST MOD 30 MIN: CPT

## 2024-09-06 PROCEDURE — 80048 BASIC METABOLIC PNL TOTAL CA: CPT

## 2024-09-06 RX ORDER — METHOCARBAMOL 500 MG/1
TABLET, FILM COATED ORAL
Qty: 30 TABLET | Refills: 0 | Status: SHIPPED | OUTPATIENT
Start: 2024-09-06

## 2024-09-06 ASSESSMENT — PATIENT HEALTH QUESTIONNAIRE - PHQ9
SUM OF ALL RESPONSES TO PHQ QUESTIONS 1-9: 0
2. FEELING DOWN, DEPRESSED OR HOPELESS: NOT AT ALL
1. LITTLE INTEREST OR PLEASURE IN DOING THINGS: NOT AT ALL
SUM OF ALL RESPONSES TO PHQ QUESTIONS 1-9: 0
SUM OF ALL RESPONSES TO PHQ9 QUESTIONS 1 & 2: 0

## 2024-09-06 NOTE — PROGRESS NOTES
Congestive Heart Failure Clinic   [facility]      Referring Provider: Wm Zuñiga APRCELESTINA-CNP  Primary Care Physician: Jana Sanchez MD   Cardiologist:   Nephrologist: n/a      HISTORY OF PRESENT ILLNESS:     Dg Peña is a 58 y.o. (1965) male with a history of HFimpEF, most recent EF:  Lab Results   Component Value Date    LVEF 63 08/03/2023    LVEFMODE Echo 01/21/2022         He presents to the CHF clinic for ongoing evaluation and monitoring of heart failure.    In the CHF clinic today he denies any adverse symptoms except:  [] Dizziness or lightheadedness   [] Syncope or near syncope  [] Recent Fall  [] Shortness of breath at rest  [x] Dyspnea with exertion   [] Decline in functional capacity (unable to perform activities they had previously been able to do)  [] Fatigue   [] Orthopnea  [] PND  [] Nocturnal cough  [] Hemoptysis  [] Chest pain, pressure, or discomfort  [] Palpitations  [] Abdominal distention  [] Abdominal bloating  [] Early satiety  [] Blood in stool   [] Diarrhea  [] Constipation  [] Nausea/Vomiting  [] Decreased urinary response to oral diuretic   [] Scrotal swelling   [] Lower extremity edema  [] Used PRN doses of oral diuretic   [] Weight gain    Wt Readings from Last 3 Encounters:   09/06/24 59 kg (130 lb)   09/03/24 59 kg (130 lb)   08/19/24 58.5 kg (129 lb)           SOCIAL HISTORY:  [x] Denies tobacco, alcohol or illicit drug abuse  [] Tobacco use:  [] ETOH use:  [] Illicit drug use:        MEDICATIONS:    Allergies   Allergen Reactions    Levofloxacin Other (See Comments) and Nausea Only     Other reaction(s): Abdominal pain    Lisinopril      Other reaction(s): COUGHING    Tramadol      Other reaction(s): SHAKING    Ibuprofen Nausea And Vomiting    Sulfa Antibiotics Nausea And Vomiting     Prior to Visit Medications    Medication Sig Taking? Authorizing Provider   Morphine Sulfate (MORPHINE 20MG/ML) SOLN

## 2024-09-12 ENCOUNTER — TELEPHONE (OUTPATIENT)
Dept: PRIMARY CARE CLINIC | Age: 59
End: 2024-09-12

## 2024-09-16 DIAGNOSIS — Z51.5 PALLIATIVE CARE ENCOUNTER: ICD-10-CM

## 2024-09-16 DIAGNOSIS — R06.09 DYSPNEA ON MINIMAL EXERTION: ICD-10-CM

## 2024-09-16 DIAGNOSIS — J44.9 END STAGE COPD (HCC): ICD-10-CM

## 2024-09-16 RX ORDER — MORPHINE SULFATE 20 MG/ML
10 SOLUTION ORAL EVERY 4 HOURS PRN
Qty: 30 ML | Refills: 0 | Status: SHIPPED | OUTPATIENT
Start: 2024-09-16 | End: 2024-09-26

## 2024-09-18 DIAGNOSIS — G89.29 CHRONIC BILATERAL LOW BACK PAIN WITHOUT SCIATICA: ICD-10-CM

## 2024-09-18 DIAGNOSIS — M54.50 CHRONIC BILATERAL LOW BACK PAIN WITHOUT SCIATICA: ICD-10-CM

## 2024-09-18 RX ORDER — LIDOCAINE 50 MG/G
OINTMENT TOPICAL
Qty: 240 EACH | Refills: 0 | Status: SHIPPED | OUTPATIENT
Start: 2024-09-18

## 2024-09-20 RX ORDER — BUTALBITAL, ACETAMINOPHEN AND CAFFEINE 50; 325; 40 MG/1; MG/1; MG/1
TABLET ORAL
Qty: 30 TABLET | Refills: 1 | OUTPATIENT
Start: 2024-09-20

## 2024-09-20 RX ORDER — METHOCARBAMOL 500 MG/1
TABLET, FILM COATED ORAL
Qty: 30 TABLET | Refills: 0 | OUTPATIENT
Start: 2024-09-20

## 2024-09-23 ENCOUNTER — TELEPHONE (OUTPATIENT)
Dept: FAMILY MEDICINE CLINIC | Age: 59
End: 2024-09-23

## 2024-09-23 DIAGNOSIS — J44.9 CHRONIC OBSTRUCTIVE PULMONARY DISEASE, UNSPECIFIED COPD TYPE (HCC): ICD-10-CM

## 2024-09-23 DIAGNOSIS — G43.909 MIGRAINE WITHOUT STATUS MIGRAINOSUS, NOT INTRACTABLE, UNSPECIFIED MIGRAINE TYPE: Primary | ICD-10-CM

## 2024-09-23 RX ORDER — BUTALBITAL, ACETAMINOPHEN AND CAFFEINE 50; 325; 40 MG/1; MG/1; MG/1
2 TABLET ORAL EVERY 6 HOURS PRN
COMMUNITY
Start: 2024-09-09 | End: 2024-09-27 | Stop reason: SDUPTHER

## 2024-09-23 RX ORDER — BUTALBITAL, ACETAMINOPHEN AND CAFFEINE 300; 40; 50 MG/1; MG/1; MG/1
2 CAPSULE ORAL EVERY 8 HOURS PRN
Qty: 18 CAPSULE | Refills: 0 | OUTPATIENT
Start: 2024-09-23

## 2024-09-23 RX ORDER — METHOCARBAMOL 500 MG/1
500 TABLET, FILM COATED ORAL 3 TIMES DAILY
Qty: 30 TABLET | Refills: 0 | Status: SHIPPED | OUTPATIENT
Start: 2024-09-23

## 2024-09-23 RX ORDER — BUDESONIDE 0.5 MG/2ML
0.5 INHALANT ORAL
Qty: 60 EACH | Refills: 5 | Status: SHIPPED | OUTPATIENT
Start: 2024-09-23

## 2024-09-26 DIAGNOSIS — J44.9 END STAGE COPD (HCC): ICD-10-CM

## 2024-09-26 DIAGNOSIS — R06.09 DYSPNEA ON MINIMAL EXERTION: ICD-10-CM

## 2024-09-26 DIAGNOSIS — G89.4 CHRONIC PAIN SYNDROME: ICD-10-CM

## 2024-09-26 DIAGNOSIS — Z51.5 PALLIATIVE CARE ENCOUNTER: ICD-10-CM

## 2024-09-26 RX ORDER — MORPHINE SULFATE 20 MG/ML
10 SOLUTION ORAL EVERY 4 HOURS PRN
Qty: 30 ML | Refills: 0 | Status: SHIPPED | OUTPATIENT
Start: 2024-09-26 | End: 2024-10-06

## 2024-09-26 RX ORDER — HYDROCODONE BITARTRATE AND ACETAMINOPHEN 7.5; 325 MG/1; MG/1
1 TABLET ORAL EVERY 8 HOURS PRN
Qty: 90 TABLET | Refills: 0 | Status: SHIPPED | OUTPATIENT
Start: 2024-09-26 | End: 2024-10-26

## 2024-09-26 RX ORDER — MORPHINE SULFATE 20 MG/ML
10 SOLUTION ORAL EVERY 4 HOURS PRN
Qty: 30 ML | Refills: 0 | Status: SHIPPED
Start: 2024-09-26 | End: 2024-09-26 | Stop reason: SDUPTHER

## 2024-09-26 RX ORDER — HYDROCODONE BITARTRATE AND ACETAMINOPHEN 7.5; 325 MG/1; MG/1
1 TABLET ORAL EVERY 8 HOURS PRN
Qty: 90 TABLET | Refills: 0 | Status: SHIPPED
Start: 2024-09-26 | End: 2024-09-26 | Stop reason: SDUPTHER

## 2024-09-27 ENCOUNTER — OFFICE VISIT (OUTPATIENT)
Dept: PRIMARY CARE CLINIC | Age: 59
End: 2024-09-27
Payer: MEDICAID

## 2024-09-27 VITALS
DIASTOLIC BLOOD PRESSURE: 58 MMHG | TEMPERATURE: 97.3 F | WEIGHT: 129 LBS | HEART RATE: 76 BPM | RESPIRATION RATE: 18 BRPM | SYSTOLIC BLOOD PRESSURE: 126 MMHG | OXYGEN SATURATION: 96 % | HEIGHT: 64 IN | BODY MASS INDEX: 22.02 KG/M2

## 2024-09-27 DIAGNOSIS — I50.32 CHRONIC HEART FAILURE WITH PRESERVED EJECTION FRACTION (HCC): ICD-10-CM

## 2024-09-27 DIAGNOSIS — J96.11 CHRONIC RESPIRATORY FAILURE WITH HYPOXIA: ICD-10-CM

## 2024-09-27 DIAGNOSIS — B37.89 CANDIDIASIS OF ANUS: Primary | ICD-10-CM

## 2024-09-27 DIAGNOSIS — G43.709 CHRONIC MIGRAINE WITHOUT AURA WITHOUT STATUS MIGRAINOSUS, NOT INTRACTABLE: ICD-10-CM

## 2024-09-27 PROCEDURE — 3078F DIAST BP <80 MM HG: CPT | Performed by: FAMILY MEDICINE

## 2024-09-27 PROCEDURE — 3074F SYST BP LT 130 MM HG: CPT | Performed by: FAMILY MEDICINE

## 2024-09-27 PROCEDURE — 99214 OFFICE O/P EST MOD 30 MIN: CPT | Performed by: FAMILY MEDICINE

## 2024-09-27 RX ORDER — LORAZEPAM 1 MG/1
1 TABLET ORAL
COMMUNITY
Start: 2024-09-12

## 2024-09-27 RX ORDER — KETOCONAZOLE 20 MG/G
CREAM TOPICAL
Qty: 30 G | Refills: 1 | Status: SHIPPED | OUTPATIENT
Start: 2024-09-27

## 2024-09-27 RX ORDER — LOSARTAN POTASSIUM 50 MG/1
TABLET ORAL
COMMUNITY
Start: 2023-12-09

## 2024-09-27 RX ORDER — BUTALBITAL, ACETAMINOPHEN AND CAFFEINE 50; 325; 40 MG/1; MG/1; MG/1
2 TABLET ORAL EVERY 6 HOURS PRN
Qty: 180 TABLET | Refills: 0 | Status: SHIPPED | OUTPATIENT
Start: 2024-09-27

## 2024-09-27 RX ORDER — ALBUTEROL SULFATE 90 UG/1
2 INHALANT RESPIRATORY (INHALATION) EVERY 4 HOURS PRN
Qty: 1 EACH | Refills: 3 | Status: SHIPPED | OUTPATIENT
Start: 2024-09-27

## 2024-10-02 RX ORDER — FUROSEMIDE 40 MG
TABLET ORAL
Qty: 90 TABLET | Refills: 1 | Status: SHIPPED | OUTPATIENT
Start: 2024-10-02

## 2024-10-04 DIAGNOSIS — Z51.5 PALLIATIVE CARE ENCOUNTER: ICD-10-CM

## 2024-10-04 DIAGNOSIS — R06.09 DYSPNEA ON MINIMAL EXERTION: ICD-10-CM

## 2024-10-04 DIAGNOSIS — J44.9 END STAGE COPD (HCC): ICD-10-CM

## 2024-10-04 RX ORDER — MORPHINE SULFATE 20 MG/ML
10 SOLUTION ORAL EVERY 4 HOURS PRN
Qty: 30 ML | Refills: 0 | Status: SHIPPED | OUTPATIENT
Start: 2024-10-04 | End: 2024-10-14

## 2024-10-04 NOTE — TELEPHONE ENCOUNTER
Call from Dg Colbert's sister requesting a refill for Morphine solution. Pharmacy is [facility] in [place]. Next kristina to be scheduled with [facility].

## 2024-10-07 ENCOUNTER — TELEPHONE (OUTPATIENT)
Dept: PRIMARY CARE CLINIC | Age: 59
End: 2024-10-07

## 2024-10-07 NOTE — TELEPHONE ENCOUNTER
Sayra called in advising about [facility]'s BP, they are wanting to know if Dr. Sanchez would like to increase the metoprolol succinate (TOPROL XL) 50 MG extended release tablet, take another medication, or something else concerning the PT's high blood pressure.    It was high a couple of times last week, it started to jump up on 10/04/2024, 186/108 taken about 8/25 AM on 10/07/2024.

## 2024-10-08 NOTE — TELEPHONE ENCOUNTER
I spoke to Sayra and she will start the 100mg.  She said he has plenty of medication for now, once he starts getting low she'll let us know and we can send in the new script with the increase in capsules.  Thank you!

## 2024-10-08 NOTE — TELEPHONE ENCOUNTER
Could try doubling the Toprol-XL to make 100 mg daily and see how he does   It looks like his pulses have been high enough to tolerate this  I would hesitate to add something additional at this point since when we have checked it in the office, he has tended to run low

## 2024-10-12 ENCOUNTER — PATIENT MESSAGE (OUTPATIENT)
Dept: PRIMARY CARE CLINIC | Age: 59
End: 2024-10-12

## 2024-10-12 DIAGNOSIS — F41.9 ANXIETY: Primary | ICD-10-CM

## 2024-10-14 DIAGNOSIS — Z51.5 PALLIATIVE CARE ENCOUNTER: ICD-10-CM

## 2024-10-14 DIAGNOSIS — J44.9 END STAGE COPD (HCC): ICD-10-CM

## 2024-10-14 DIAGNOSIS — R06.09 DYSPNEA ON MINIMAL EXERTION: ICD-10-CM

## 2024-10-14 RX ORDER — MORPHINE SULFATE 20 MG/ML
10 SOLUTION ORAL EVERY 4 HOURS PRN
Qty: 30 ML | Refills: 0 | Status: SHIPPED | OUTPATIENT
Start: 2024-10-14 | End: 2024-10-24

## 2024-10-14 NOTE — TELEPHONE ENCOUNTER
Name of Medication(s) Requested:  Requested Prescriptions     Pending Prescriptions Disp Refills    LORazepam (ATIVAN) 1 MG tablet       Sig: Take 1 tablet by mouth.       Medication is on current medication list Yes    Dosage and directions were verified? Yes    Quantity verified: 30 day supply     Pharmacy Verified?  Yes    Last Appointment:  9/27/2024    Future appts:  Future Appointments   Date Time Provider Department Center   12/4/2024  2:15 PM Bernice Ceja APRN - NP [facility]   1/2/2025  1:00 PM aJna Sanchez MD [facility]   1/6/2025 10:15 AM [facility] ROOM 1 [facility]

## 2024-10-14 NOTE — TELEPHONE ENCOUNTER
Patient Dg's sister Sayra called for refill morphine sulfate 20mg/soln. Last home visit 8-. [facility] in [place]. Pharmacy verified on perfect serve. Routed call to A Yogesh, NP

## 2024-10-16 RX ORDER — LORAZEPAM 1 MG/1
1 TABLET ORAL EVERY 8 HOURS PRN
Qty: 90 TABLET | Refills: 0 | Status: SHIPPED | OUTPATIENT
Start: 2024-10-16 | End: 2024-11-15

## 2024-10-16 RX ORDER — LORAZEPAM 1 MG/1
1 TABLET ORAL
OUTPATIENT
Start: 2024-10-16

## 2024-10-16 RX ORDER — LORAZEPAM 1 MG/1
TABLET ORAL
Qty: 90 TABLET | Refills: 1 | OUTPATIENT
Start: 2024-10-16

## 2024-10-16 NOTE — TELEPHONE ENCOUNTER
Name of Medication(s) Requested:  Requested Prescriptions      No prescriptions requested or ordered in this encounter     LORazepam (ATIVAN) 1 MG tablet     Medication is on current medication list Yes    Dosage and directions were verified? Yes    Quantity verified: 30 day supply     Pharmacy Verified?  Yes    Last Appointment:  9/27/2024    Future appts:  Future Appointments   Date Time Provider Department Center   12/4/2024  2:15 PM Bernice Ceja APRN - NP [facility]   1/2/2025  1:00 PM [facility]   1/6/2025 10:15 AM [facility] ROOM 1 [facility] [place] [facility]        (If no appt send self scheduling link. .REFILLAPPT)  Scheduling request sent?     [] Yes  [] No    Does patient need updated?  [] Yes  [] No    [facility] [place] [place] - [address] [facility] [phone] - F [phone] [[id number]]

## 2024-10-17 RX ORDER — FLUTICASONE PROPIONATE 50 MCG
SPRAY, SUSPENSION (ML) NASAL
Qty: 1 EACH | Refills: 3 | Status: SHIPPED | OUTPATIENT
Start: 2024-10-17

## 2024-10-17 NOTE — TELEPHONE ENCOUNTER
Last Appointment:  9/27/2024  Future Appointments   Date Time Provider Department Center   12/4/2024  2:15 PM Bernice Ceja APRN - NP [facility]   1/2/2025  1:00 PM [facility]   1/6/2025 10:15 AM [facility]

## 2024-10-23 DIAGNOSIS — J44.9 END STAGE COPD (HCC): ICD-10-CM

## 2024-10-23 DIAGNOSIS — G89.4 CHRONIC PAIN SYNDROME: ICD-10-CM

## 2024-10-23 DIAGNOSIS — R06.09 DYSPNEA ON MINIMAL EXERTION: ICD-10-CM

## 2024-10-23 DIAGNOSIS — Z51.5 PALLIATIVE CARE ENCOUNTER: ICD-10-CM

## 2024-10-23 RX ORDER — HYDROCODONE BITARTRATE AND ACETAMINOPHEN 7.5; 325 MG/1; MG/1
1 TABLET ORAL EVERY 8 HOURS PRN
Qty: 90 TABLET | Refills: 0 | Status: SHIPPED | OUTPATIENT
Start: 2024-10-23 | End: 2024-11-22

## 2024-10-23 RX ORDER — MORPHINE SULFATE 20 MG/ML
10 SOLUTION ORAL EVERY 4 HOURS PRN
Qty: 30 ML | Refills: 0 | Status: SHIPPED | OUTPATIENT
Start: 2024-10-23 | End: 2024-11-02

## 2024-10-23 RX ORDER — METHOCARBAMOL 500 MG/1
500 TABLET, FILM COATED ORAL 3 TIMES DAILY
Qty: 90 TABLET | Refills: 2 | Status: SHIPPED | OUTPATIENT
Start: 2024-10-23

## 2024-10-23 RX ORDER — METHOCARBAMOL 500 MG/1
TABLET, FILM COATED ORAL
Qty: 30 TABLET | Refills: 0 | OUTPATIENT
Start: 2024-10-23

## 2024-10-23 NOTE — TELEPHONE ENCOUNTER
Name of Medication(s) Requested:  Requested Prescriptions      No prescriptions requested or ordered in this encounter     methocarbamol (ROBAXIN) 500 MG tablet     Medication is on current medication list Yes    Dosage and directions were verified? Yes    Quantity verified: 30 day supply     Pharmacy Verified?  Yes    Last Appointment:  9/27/2024    Future appts:  Future Appointments   Date Time Provider Department Center   12/4/2024  2:15 PM Bernice Ceja APRN - NP [facility]   1/2/2025  1:00 PM Jana Sanchez MD [facility] DEP   1/6/2025 10:15 AM [facility] ROOM 1 [facility]OD        (If no appt send self scheduling link. .REFILLAPPT)  Scheduling request sent?     [] Yes  [] No    Does patient need updated?  [] Yes  [] No    [facility] [place] [place] - [address] [facility] [phone] - F [phone] [[id number]]

## 2024-10-23 NOTE — TELEPHONE ENCOUNTER
Call from Dg's sister Sayra, requesting refill for Norco and Morphine concentrate. Pharmacy is [facility]. Next kristina to be scheduled with [facility].

## 2024-10-29 ENCOUNTER — TELEPHONE (OUTPATIENT)
Dept: PRIMARY CARE CLINIC | Age: 59
End: 2024-10-29

## 2024-10-29 RX ORDER — METOPROLOL SUCCINATE 100 MG/1
TABLET, EXTENDED RELEASE ORAL
Qty: 90 TABLET | Refills: 1 | Status: SHIPPED | OUTPATIENT
Start: 2024-10-29

## 2024-10-29 NOTE — TELEPHONE ENCOUNTER
Name of Medication(s) Requested:  Requested Prescriptions     Pending Prescriptions Disp Refills    metoprolol succinate (TOPROL XL) 100 MG extended release tablet 90 tablet 1     Sig: Take 1 tablet at bedtime       Medication is on current medication list Yes    Dosage and directions were verified? Yes    Quantity verified: 90 day supply     Pharmacy Verified?  Yes    Last Appointment:  9/27/2024    Future appts:  Future Appointments   Date Time Provider Department Center   11/5/2024  6:15 PM [facility] RM 2 [facility]   12/4/2024  2:15 PM Bernice Ceja, APRN - NP [facility]   1/2/2025  1:00 PM Jana Sanchez MD [facility] DEP   1/6/2025 10:15 AM [facility] ROOM 1 [facility]OD        (If no appt send self scheduling link. .REFILLAPPT)  Scheduling request sent?     [] Yes  [x] No    Does patient need updated?  [] Yes  [x] No     Dose change as of 10/7/24 new script being sent, checked with [place] pharmacy.

## 2024-10-29 NOTE — TELEPHONE ENCOUNTER
Dg is ready for the increase to 100 MG of the following medication:    metoprolol succinate (TOPROL XL) 100 MG extended release tablet     [facility], [place] - [address] [facility] [phone] - F [phone] [[id number]]

## 2024-10-31 DIAGNOSIS — J44.9 END STAGE COPD (HCC): ICD-10-CM

## 2024-10-31 DIAGNOSIS — R06.09 DYSPNEA ON MINIMAL EXERTION: ICD-10-CM

## 2024-10-31 DIAGNOSIS — Z51.5 PALLIATIVE CARE ENCOUNTER: ICD-10-CM

## 2024-10-31 RX ORDER — MORPHINE SULFATE 20 MG/ML
10 SOLUTION ORAL EVERY 4 HOURS PRN
Qty: 30 ML | Refills: 0 | Status: SHIPPED | OUTPATIENT
Start: 2024-10-31 | End: 2024-11-10

## 2024-10-31 NOTE — TELEPHONE ENCOUNTER
Call from Sayra Dg's sister, requesting a refill for Morphine concentrate 20 mg/ml. Pharmacy is [facility]. [facility]

## 2024-11-05 ENCOUNTER — HOSPITAL ENCOUNTER (OUTPATIENT)
Dept: ULTRASOUND IMAGING | Age: 59
Discharge: HOME | End: 2024-11-07
Payer: MEDICAID

## 2024-11-05 ENCOUNTER — TELEPHONE (OUTPATIENT)
Dept: PALLATIVE CARE | Age: 59
End: 2024-11-05

## 2024-11-05 DIAGNOSIS — M79.661 PAIN IN RIGHT LOWER LEG: ICD-10-CM

## 2024-11-05 PROCEDURE — 76999 ECHO EXAMINATION PROCEDURE: CPT

## 2024-11-05 NOTE — TELEPHONE ENCOUNTER
Placed call to pt sister Sayra to schedule pt for Palliative care follow up home visit with MICA Zuñiga. Pt scheduled to be seen on 11/22 at 10am, Sayra agreed to appt time and date.     Yesi GARVIN,LSW  Palliative Medicine

## 2024-11-08 ENCOUNTER — OFFICE VISIT (OUTPATIENT)
Dept: PRIMARY CARE CLINIC | Age: 59
End: 2024-11-08
Payer: MEDICAID

## 2024-11-08 VITALS
DIASTOLIC BLOOD PRESSURE: 60 MMHG | HEART RATE: 74 BPM | SYSTOLIC BLOOD PRESSURE: 132 MMHG | WEIGHT: 135 LBS | BODY MASS INDEX: 23.05 KG/M2 | OXYGEN SATURATION: 90 % | HEIGHT: 64 IN | TEMPERATURE: 97.8 F

## 2024-11-08 DIAGNOSIS — M79.661 PAIN IN RIGHT LOWER LEG: Primary | ICD-10-CM

## 2024-11-08 DIAGNOSIS — J44.1 COPD EXACERBATION (HCC): ICD-10-CM

## 2024-11-08 PROCEDURE — 3078F DIAST BP <80 MM HG: CPT | Performed by: FAMILY MEDICINE

## 2024-11-08 PROCEDURE — 3075F SYST BP GE 130 - 139MM HG: CPT | Performed by: FAMILY MEDICINE

## 2024-11-08 PROCEDURE — 99214 OFFICE O/P EST MOD 30 MIN: CPT | Performed by: FAMILY MEDICINE

## 2024-11-08 RX ORDER — CEFUROXIME AXETIL 500 MG/1
500 TABLET ORAL 2 TIMES DAILY
Qty: 14 TABLET | Refills: 0 | Status: SHIPPED | OUTPATIENT
Start: 2024-11-08 | End: 2024-11-15

## 2024-11-08 RX ORDER — PRAMIPEXOLE DIHYDROCHLORIDE 0.75 MG/1
0.75 TABLET ORAL NIGHTLY
Qty: 90 TABLET | Refills: 1 | Status: SHIPPED | OUTPATIENT
Start: 2024-11-08

## 2024-11-08 RX ORDER — PREDNISONE 10 MG/1
10 TABLET ORAL DAILY
COMMUNITY

## 2024-11-08 NOTE — PROGRESS NOTES
24  Dg Peña : 1965 Sex: male  Age: 58 y.o.      Assessment and Plan:  Dg \"Олег\" was seen today for leg pain.    Diagnoses and all orders for this visit:    Pain in right lower leg  -     XR TIBIA FIBULA RIGHT (2 VIEWS); Future  Given patient's description, concern for possible osteomyelitis which she has had in the past.  Check x-ray.  May need MRI or additional studies subsequent to this.  No clinical findings today of concern.    COPD exacerbation (HCC)  -     XR CHEST (2 VW); Future        -     cefUROXime (CEFTIN) 500 MG tablet; Take 1 tablet by mouth 2           times daily for 7 days  Precautions reviewed.    Other orders  -     pramipexole (MIRAPEX) 0.75 MG tablet; Take 1 tablet by mouth nightly    Return for As scheduled.        Chief Complaint   Patient presents with    Leg Pain     right       HPI  Pt here for f/u right leg pain  With his sister Sayra    Patient still complaining of some significant pain in the anterior right lower leg  This is the area where his son previously scraped the leg using a green kitchen sponge to try to get some of the skin to slough off  This happened over the summer and was not evaluated by me at the time, but his sister who has nursing background was able to treat it and it seems to have healed up quite well  He just had right lower leg ultrasound that did show some patent superficial varicosities but no other concerns  Patient feels like the pain is in the bone  It feels like jelly inside, which is the description he said earlier this year we actually ordered the ultrasound  No fevers   No redness   Healed well on the outside     I also think he might be developing a lung infection  He is coughing up green phlegm  He also feels like there is a pressure in the lower lung areas over the back  Per their report, home nurse yesterday said that his lungs were tight  His breathing has seemed to be more poor recently     Problem list reviewed and

## 2024-11-11 DIAGNOSIS — M79.661 PAIN IN RIGHT LOWER LEG: ICD-10-CM

## 2024-11-11 DIAGNOSIS — R06.09 DYSPNEA ON MINIMAL EXERTION: ICD-10-CM

## 2024-11-11 DIAGNOSIS — Z51.5 PALLIATIVE CARE ENCOUNTER: ICD-10-CM

## 2024-11-11 DIAGNOSIS — F41.9 ANXIETY: ICD-10-CM

## 2024-11-11 DIAGNOSIS — J44.1 COPD EXACERBATION (HCC): ICD-10-CM

## 2024-11-11 DIAGNOSIS — J44.9 END STAGE COPD (HCC): ICD-10-CM

## 2024-11-11 RX ORDER — LORAZEPAM 1 MG/1
1 TABLET ORAL EVERY 8 HOURS PRN
Qty: 90 TABLET | Refills: 0 | Status: SHIPPED | OUTPATIENT
Start: 2024-11-15 | End: 2024-12-15

## 2024-11-11 RX ORDER — MORPHINE SULFATE 20 MG/ML
10 SOLUTION ORAL EVERY 4 HOURS PRN
Qty: 30 ML | Refills: 0 | Status: SHIPPED | OUTPATIENT
Start: 2024-11-11 | End: 2024-11-21

## 2024-11-11 NOTE — TELEPHONE ENCOUNTER
Patient Dg's sister Sayra called for refill morphine 20mg/ml SOLN. Next [facility] 11-. [facility]. Pharmacy verified on perfect serve. Routed call to A Yogesh, NP

## 2024-11-14 DIAGNOSIS — M79.661 PAIN IN RIGHT LOWER LEG: Primary | ICD-10-CM

## 2024-11-18 DIAGNOSIS — B35.6 JOCK ITCH: ICD-10-CM

## 2024-11-19 ENCOUNTER — TELEPHONE (OUTPATIENT)
Dept: OTHER | Age: 59
End: 2024-11-19

## 2024-11-19 ENCOUNTER — HOSPITAL ENCOUNTER (OUTPATIENT)
Dept: OTHER | Age: 59
Setting detail: THERAPIES SERIES
Discharge: HOME | End: 2024-11-19
Payer: MEDICAID

## 2024-11-19 VITALS
HEART RATE: 64 BPM | RESPIRATION RATE: 20 BRPM | SYSTOLIC BLOOD PRESSURE: 153 MMHG | DIASTOLIC BLOOD PRESSURE: 71 MMHG | OXYGEN SATURATION: 99 %

## 2024-11-19 LAB
ANION GAP SERPL CALCULATED.3IONS-SCNC: 11 MMOL/L (ref 7–16)
BNP SERPL-MCNC: 1792 PG/ML (ref 0–125)
BUN SERPL-MCNC: 19 MG/DL (ref 6–20)
CALCIUM SERPL-MCNC: 6.7 MG/DL (ref 8.6–10.2)
CHLORIDE SERPL-SCNC: 96 MMOL/L (ref 98–107)
CO2 SERPL-SCNC: 36 MMOL/L (ref 22–29)
CREAT SERPL-MCNC: 0.7 MG/DL (ref 0.7–1.2)
GFR, ESTIMATED: >90 ML/MIN/1.73M2
GLUCOSE SERPL-MCNC: 120 MG/DL (ref 74–99)
POTASSIUM SERPL-SCNC: 4.2 MMOL/L (ref 3.5–5)
SODIUM SERPL-SCNC: 143 MMOL/L (ref 132–146)

## 2024-11-19 PROCEDURE — 99214 OFFICE O/P EST MOD 30 MIN: CPT

## 2024-11-19 PROCEDURE — 80048 BASIC METABOLIC PNL TOTAL CA: CPT

## 2024-11-19 PROCEDURE — 6360000002 HC RX W HCPCS

## 2024-11-19 PROCEDURE — 2580000003 HC RX 258: Performed by: NURSE PRACTITIONER

## 2024-11-19 PROCEDURE — 96374 THER/PROPH/DIAG INJ IV PUSH: CPT

## 2024-11-19 PROCEDURE — 36415 COLL VENOUS BLD VENIPUNCTURE: CPT

## 2024-11-19 PROCEDURE — 83880 ASSAY OF NATRIURETIC PEPTIDE: CPT

## 2024-11-19 RX ORDER — SODIUM CHLORIDE 0.9 % (FLUSH) 0.9 %
10 SYRINGE (ML) INJECTION ONCE
Status: COMPLETED | OUTPATIENT
Start: 2024-11-19 | End: 2024-11-19

## 2024-11-19 RX ORDER — FUROSEMIDE 10 MG/ML
40 INJECTION INTRAMUSCULAR; INTRAVENOUS ONCE
Status: DISCONTINUED | OUTPATIENT
Start: 2024-11-19 | End: 2024-11-20 | Stop reason: HOSPADM

## 2024-11-19 RX ORDER — FUROSEMIDE 10 MG/ML
INJECTION INTRAMUSCULAR; INTRAVENOUS
Status: COMPLETED
Start: 2024-11-19 | End: 2024-11-19

## 2024-11-19 RX ADMIN — SODIUM CHLORIDE, PRESERVATIVE FREE 10 ML: 5 INJECTION INTRAVENOUS at 08:52

## 2024-11-19 RX ADMIN — FUROSEMIDE 40 MG: 10 INJECTION, SOLUTION INTRAMUSCULAR; INTRAVENOUS at 08:45

## 2024-11-19 NOTE — TELEPHONE ENCOUNTER
1:28 PM spoke with patient's sister raghu re:med adjustment made by Nyla STOUT-CNP.     - increase lasix to 40 mg BID x 3 days and then return to 40 mg daily     I have reviewed the provider's instructions with the patient, answering all questions to his satisfaction.      Electronically signed by Elsie Urena RN on 11/19/2024 at 1:29 PM

## 2024-11-19 NOTE — RESULT ENCOUNTER NOTE
How is his urinary response to lasix?  If good then increase to 40 mg BID x 3 days and then return to 40 mg daily   If not having good response let me know and we will switch diuretic   Follow up as scheduled    Thank you

## 2024-11-19 NOTE — PROGRESS NOTES
provided  [] Sodium free seasoning provided   [x] Fluid intake (64 oz)  [] Reviewed currently prescribed medications with patient, educated on importance of compliance and answered any questions regarding their medication  [] Pill box provided to patient  [] Patient using pill packing pharmacy   [] CPAP/BiPAP use  [] Low impact exercise / cardiac rehab   [] LifeVest use  [x] Patient aware of signs and symptoms of worsening HF, CHF clinic phone number provided and made aware to call clinic for sooner if evaluation if needed     [] Difficulty affording medications  [] CHF CHW consulted  [] Prescription assistance information given   [] [facility] medication assistance program information given   [] Sample medications provided to patient to help bridge gap until affordability N/A      Scheduled to follow up in CHF clinic on:   Future Appointments   Date Time Provider Department Center   11/22/2024 10:00 AM Amrik Zuñiga APRN - CNP [facility]   11/25/2024  8:45 AM [facility] ROOM 5 [facility]   12/4/2024  2:15 PM Bernice Ceja APRN - NP [facility]   1/2/2025  1:00 PM Jana Sanchez MD [facility]   1/6/2025 10:15 AM [facility] ROOM 1 [facility]

## 2024-11-20 RX ORDER — NYSTATIN 100000 [USP'U]/G
POWDER TOPICAL
Qty: 60 G | Refills: 1 | Status: SHIPPED | OUTPATIENT
Start: 2024-11-20

## 2024-11-20 RX ORDER — NYSTATIN 100000 U/G
CREAM TOPICAL
Qty: 30 G | Refills: 1 | Status: SHIPPED | OUTPATIENT
Start: 2024-11-20

## 2024-11-22 ENCOUNTER — OFFICE VISIT (OUTPATIENT)
Dept: PALLATIVE CARE | Age: 59
End: 2024-11-22
Payer: MEDICAID

## 2024-11-22 DIAGNOSIS — Z51.5 PALLIATIVE CARE ENCOUNTER: ICD-10-CM

## 2024-11-22 DIAGNOSIS — J44.9 END STAGE COPD (HCC): ICD-10-CM

## 2024-11-22 DIAGNOSIS — R06.09 DYSPNEA ON MINIMAL EXERTION: ICD-10-CM

## 2024-11-22 DIAGNOSIS — G89.4 CHRONIC PAIN SYNDROME: ICD-10-CM

## 2024-11-22 PROCEDURE — 99349 HOME/RES VST EST MOD MDM 40: CPT | Performed by: NURSE PRACTITIONER

## 2024-11-22 RX ORDER — HYDROCODONE BITARTRATE AND ACETAMINOPHEN 7.5; 325 MG/1; MG/1
1 TABLET ORAL EVERY 8 HOURS PRN
Qty: 90 TABLET | Refills: 0 | Status: SHIPPED | OUTPATIENT
Start: 2024-11-22 | End: 2024-12-22

## 2024-11-22 RX ORDER — CEFDINIR 300 MG/1
300 CAPSULE ORAL 2 TIMES DAILY
Qty: 20 CAPSULE | Refills: 0 | Status: SHIPPED | OUTPATIENT
Start: 2024-11-22 | End: 2024-12-02

## 2024-11-22 RX ORDER — PREDNISONE 20 MG/1
TABLET ORAL
Qty: 10 TABLET | Refills: 0 | Status: SHIPPED | OUTPATIENT
Start: 2024-11-22 | End: 2024-12-02

## 2024-11-22 RX ORDER — MORPHINE SULFATE 20 MG/ML
10 SOLUTION ORAL EVERY 4 HOURS PRN
Qty: 90 ML | Refills: 0 | Status: SHIPPED | OUTPATIENT
Start: 2024-11-22 | End: 2024-12-22

## 2024-11-22 RX ORDER — PREGABALIN 100 MG/1
100 CAPSULE ORAL 3 TIMES DAILY
Qty: 90 CAPSULE | Refills: 0 | Status: SHIPPED | OUTPATIENT
Start: 2024-11-22 | End: 2024-12-22
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
## 2024-11-25 ENCOUNTER — HOSPITAL ENCOUNTER (INPATIENT)
Age: 59
LOS: 6 days | Discharge: HOME HEALTH | DRG: 425 | End: 2024-12-01
Attending: EMERGENCY MEDICINE | Admitting: INTERNAL MEDICINE
Payer: MEDICAID

## 2024-11-25 ENCOUNTER — APPOINTMENT (OUTPATIENT)
Dept: GENERAL RADIOLOGY | Age: 59
DRG: 425 | End: 2024-11-25
Payer: MEDICAID

## 2024-11-25 ENCOUNTER — APPOINTMENT (OUTPATIENT)
Dept: ULTRASOUND IMAGING | Age: 59
DRG: 425 | End: 2024-11-25
Payer: MEDICAID

## 2024-11-25 ENCOUNTER — HOSPITAL ENCOUNTER (OUTPATIENT)
Dept: OTHER | Age: 59
Setting detail: THERAPIES SERIES
Discharge: HOME | End: 2024-11-25
Payer: MEDICAID

## 2024-11-25 VITALS
DIASTOLIC BLOOD PRESSURE: 82 MMHG | RESPIRATION RATE: 22 BRPM | BODY MASS INDEX: 23.34 KG/M2 | WEIGHT: 136 LBS | OXYGEN SATURATION: 98 % | SYSTOLIC BLOOD PRESSURE: 158 MMHG | HEART RATE: 60 BPM

## 2024-11-25 DIAGNOSIS — I50.9 ACUTE ON CHRONIC CONGESTIVE HEART FAILURE, UNSPECIFIED HEART FAILURE TYPE (HCC): Primary | ICD-10-CM

## 2024-11-25 DIAGNOSIS — E83.51 HYPOCALCEMIA: ICD-10-CM

## 2024-11-25 PROBLEM — I50.33 ACUTE ON CHRONIC HEART FAILURE WITH PRESERVED EJECTION FRACTION (HCC): Status: ACTIVE | Noted: 2024-11-25

## 2024-11-25 PROBLEM — E78.5 HYPERLIPIDEMIA: Status: ACTIVE | Noted: 2024-11-25

## 2024-11-25 LAB
ALBUMIN SERPL-MCNC: 4.4 G/DL (ref 3.5–5.2)
ALP SERPL-CCNC: 54 U/L (ref 40–129)
ALT SERPL-CCNC: 7 U/L (ref 0–40)
ANION GAP SERPL CALCULATED.3IONS-SCNC: 13 MMOL/L (ref 7–16)
AST SERPL-CCNC: 14 U/L (ref 0–39)
BASOPHILS # BLD: 0.02 K/UL (ref 0–0.2)
BASOPHILS NFR BLD: 0 % (ref 0–2)
BILIRUB SERPL-MCNC: 0.3 MG/DL (ref 0–1.2)
BNP SERPL-MCNC: 1079 PG/ML (ref 0–125)
BUN SERPL-MCNC: 20 MG/DL (ref 6–20)
CA-I BLD-SCNC: 0.87 MMOL/L (ref 1.15–1.33)
CALCIUM SERPL-MCNC: 6.2 MG/DL (ref 8.6–10.2)
CHLORIDE SERPL-SCNC: 94 MMOL/L (ref 98–107)
CO2 SERPL-SCNC: 34 MMOL/L (ref 22–29)
CREAT SERPL-MCNC: 0.7 MG/DL (ref 0.7–1.2)
EKG ATRIAL RATE: 64 BPM
EKG P AXIS: 57 DEGREES
EKG P-R INTERVAL: 120 MS
EKG Q-T INTERVAL: 454 MS
EKG QRS DURATION: 84 MS
EKG QTC CALCULATION (BAZETT): 468 MS
EKG R AXIS: 59 DEGREES
EKG T AXIS: 66 DEGREES
EKG VENTRICULAR RATE: 64 BPM
EOSINOPHIL # BLD: 0.02 K/UL (ref 0.05–0.5)
EOSINOPHILS RELATIVE PERCENT: 0 % (ref 0–6)
ERYTHROCYTE [DISTWIDTH] IN BLOOD BY AUTOMATED COUNT: 13.8 % (ref 11.5–15)
GFR, ESTIMATED: >90 ML/MIN/1.73M2
GLUCOSE SERPL-MCNC: 148 MG/DL (ref 74–99)
HCT VFR BLD AUTO: 36 % (ref 37–54)
HGB BLD-MCNC: 10.7 G/DL (ref 12.5–16.5)
IMM GRANULOCYTES # BLD AUTO: <0.03 K/UL (ref 0–0.58)
IMM GRANULOCYTES NFR BLD: 0 % (ref 0–5)
INFLUENZA A BY PCR: NOT DETECTED
INFLUENZA B BY PCR: NOT DETECTED
LYMPHOCYTES NFR BLD: 0.5 K/UL (ref 1.5–4)
LYMPHOCYTES RELATIVE PERCENT: 9 % (ref 20–42)
MCH RBC QN AUTO: 32.8 PG (ref 26–35)
MCHC RBC AUTO-ENTMCNC: 29.7 G/DL (ref 32–34.5)
MCV RBC AUTO: 110.4 FL (ref 80–99.9)
MONOCYTES NFR BLD: 0.15 K/UL (ref 0.1–0.95)
MONOCYTES NFR BLD: 3 % (ref 2–12)
NEUTROPHILS NFR BLD: 87 % (ref 43–80)
NEUTS SEG NFR BLD: 4.9 K/UL (ref 1.8–7.3)
PLATELET, FLUORESCENCE: 122 K/UL (ref 130–450)
PMV BLD AUTO: 11.8 FL (ref 7–12)
POTASSIUM SERPL-SCNC: 4.3 MMOL/L (ref 3.5–5)
PROT SERPL-MCNC: 7.4 G/DL (ref 6.4–8.3)
RBC # BLD AUTO: 3.26 M/UL (ref 3.8–5.8)
RBC # BLD: NORMAL 10*6/UL
SARS-COV-2 RDRP RESP QL NAA+PROBE: NOT DETECTED
SODIUM SERPL-SCNC: 141 MMOL/L (ref 132–146)
SPECIMEN DESCRIPTION: NORMAL
TROPONIN I SERPL HS-MCNC: 13 NG/L (ref 0–11)
WBC OTHER # BLD: 5.6 K/UL (ref 4.5–11.5)

## 2024-11-25 PROCEDURE — 99215 OFFICE O/P EST HI 40 MIN: CPT

## 2024-11-25 PROCEDURE — 83880 ASSAY OF NATRIURETIC PEPTIDE: CPT

## 2024-11-25 PROCEDURE — 87635 SARS-COV-2 COVID-19 AMP PRB: CPT

## 2024-11-25 PROCEDURE — 93010 ELECTROCARDIOGRAM REPORT: CPT | Performed by: INTERNAL MEDICINE

## 2024-11-25 PROCEDURE — 2580000003 HC RX 258: Performed by: INTERNAL MEDICINE

## 2024-11-25 PROCEDURE — 82330 ASSAY OF CALCIUM: CPT

## 2024-11-25 PROCEDURE — 84484 ASSAY OF TROPONIN QUANT: CPT

## 2024-11-25 PROCEDURE — 80053 COMPREHEN METABOLIC PANEL: CPT

## 2024-11-25 PROCEDURE — 93005 ELECTROCARDIOGRAM TRACING: CPT | Performed by: EMERGENCY MEDICINE

## 2024-11-25 PROCEDURE — 2700000000 HC OXYGEN THERAPY PER DAY

## 2024-11-25 PROCEDURE — 99285 EMERGENCY DEPT VISIT HI MDM: CPT

## 2024-11-25 PROCEDURE — 83036 HEMOGLOBIN GLYCOSYLATED A1C: CPT

## 2024-11-25 PROCEDURE — 5A0945A ASSISTANCE WITH RESPIRATORY VENTILATION, 24-96 CONSECUTIVE HOURS, HIGH NASAL FLOW/VELOCITY: ICD-10-PCS | Performed by: INTERNAL MEDICINE

## 2024-11-25 PROCEDURE — 99223 1ST HOSP IP/OBS HIGH 75: CPT | Performed by: INTERNAL MEDICINE

## 2024-11-25 PROCEDURE — 87502 INFLUENZA DNA AMP PROBE: CPT

## 2024-11-25 PROCEDURE — 96365 THER/PROPH/DIAG IV INF INIT: CPT

## 2024-11-25 PROCEDURE — 2060000000 HC ICU INTERMEDIATE R&B

## 2024-11-25 PROCEDURE — 6370000000 HC RX 637 (ALT 250 FOR IP): Performed by: INTERNAL MEDICINE

## 2024-11-25 PROCEDURE — 71045 X-RAY EXAM CHEST 1 VIEW: CPT

## 2024-11-25 PROCEDURE — 96375 TX/PRO/DX INJ NEW DRUG ADDON: CPT

## 2024-11-25 PROCEDURE — 6360000002 HC RX W HCPCS: Performed by: INTERNAL MEDICINE

## 2024-11-25 PROCEDURE — 6360000002 HC RX W HCPCS: Performed by: EMERGENCY MEDICINE

## 2024-11-25 PROCEDURE — 85025 COMPLETE CBC W/AUTO DIFF WBC: CPT

## 2024-11-25 PROCEDURE — 94640 AIRWAY INHALATION TREATMENT: CPT

## 2024-11-25 PROCEDURE — 6370000000 HC RX 637 (ALT 250 FOR IP): Performed by: EMERGENCY MEDICINE

## 2024-11-25 PROCEDURE — 93970 EXTREMITY STUDY: CPT

## 2024-11-25 RX ORDER — CALCIUM GLUCONATE 94 MG/ML
1000 INJECTION, SOLUTION INTRAVENOUS ONCE
Status: COMPLETED | OUTPATIENT
Start: 2024-11-25 | End: 2024-11-25

## 2024-11-25 RX ORDER — PRAMIPEXOLE DIHYDROCHLORIDE 0.25 MG/1
0.75 TABLET ORAL NIGHTLY
Status: DISCONTINUED | OUTPATIENT
Start: 2024-11-25 | End: 2024-12-01 | Stop reason: HOSPADM

## 2024-11-25 RX ORDER — ARFORMOTEROL TARTRATE 15 UG/2ML
15 SOLUTION RESPIRATORY (INHALATION)
Status: DISCONTINUED | OUTPATIENT
Start: 2024-11-25 | End: 2024-11-25

## 2024-11-25 RX ORDER — LOSARTAN POTASSIUM 50 MG/1
50 TABLET ORAL DAILY
Status: DISCONTINUED | OUTPATIENT
Start: 2024-11-25 | End: 2024-12-01 | Stop reason: HOSPADM

## 2024-11-25 RX ORDER — ASPIRIN 81 MG/1
81 TABLET, CHEWABLE ORAL DAILY
Status: DISCONTINUED | OUTPATIENT
Start: 2024-11-26 | End: 2024-12-01 | Stop reason: HOSPADM

## 2024-11-25 RX ORDER — FUROSEMIDE 40 MG/1
40 TABLET ORAL DAILY
Status: DISCONTINUED | OUTPATIENT
Start: 2024-11-25 | End: 2024-12-01 | Stop reason: HOSPADM

## 2024-11-25 RX ORDER — FUROSEMIDE 10 MG/ML
40 INJECTION INTRAMUSCULAR; INTRAVENOUS ONCE
Status: COMPLETED | OUTPATIENT
Start: 2024-11-25 | End: 2024-11-25

## 2024-11-25 RX ORDER — FLUOXETINE 10 MG/1
20 TABLET, FILM COATED ORAL DAILY
Status: DISCONTINUED | OUTPATIENT
Start: 2024-11-25 | End: 2024-12-01 | Stop reason: HOSPADM

## 2024-11-25 RX ORDER — GABAPENTIN 300 MG/1
600 CAPSULE ORAL EVERY MORNING
Status: DISCONTINUED | OUTPATIENT
Start: 2024-11-26 | End: 2024-12-01 | Stop reason: HOSPADM

## 2024-11-25 RX ORDER — IPRATROPIUM BROMIDE AND ALBUTEROL SULFATE 2.5; .5 MG/3ML; MG/3ML
3 SOLUTION RESPIRATORY (INHALATION) ONCE
Status: COMPLETED | OUTPATIENT
Start: 2024-11-25 | End: 2024-11-25

## 2024-11-25 RX ORDER — POLYETHYLENE GLYCOL 3350 17 G/17G
17 POWDER, FOR SOLUTION ORAL DAILY PRN
Status: DISCONTINUED | OUTPATIENT
Start: 2024-11-25 | End: 2024-12-01 | Stop reason: HOSPADM

## 2024-11-25 RX ORDER — SODIUM CHLORIDE 0.9 % (FLUSH) 0.9 %
5-40 SYRINGE (ML) INJECTION EVERY 12 HOURS SCHEDULED
Status: DISCONTINUED | OUTPATIENT
Start: 2024-11-25 | End: 2024-12-01 | Stop reason: HOSPADM

## 2024-11-25 RX ORDER — TAMSULOSIN HYDROCHLORIDE 0.4 MG/1
0.4 CAPSULE ORAL 2 TIMES DAILY
Status: DISCONTINUED | OUTPATIENT
Start: 2024-11-25 | End: 2024-12-01 | Stop reason: HOSPADM

## 2024-11-25 RX ORDER — INSULIN LISPRO 100 [IU]/ML
0-8 INJECTION, SOLUTION INTRAVENOUS; SUBCUTANEOUS
Status: DISCONTINUED | OUTPATIENT
Start: 2024-11-25 | End: 2024-11-25

## 2024-11-25 RX ORDER — PANTOPRAZOLE SODIUM 40 MG/1
40 TABLET, DELAYED RELEASE ORAL
Status: DISCONTINUED | OUTPATIENT
Start: 2024-11-26 | End: 2024-12-01 | Stop reason: HOSPADM

## 2024-11-25 RX ORDER — ALBUTEROL SULFATE 0.83 MG/ML
2.5 SOLUTION RESPIRATORY (INHALATION) 2 TIMES DAILY
Status: DISCONTINUED | OUTPATIENT
Start: 2024-11-25 | End: 2024-12-01 | Stop reason: HOSPADM

## 2024-11-25 RX ORDER — SODIUM CHLORIDE 9 MG/ML
INJECTION, SOLUTION INTRAVENOUS PRN
Status: DISCONTINUED | OUTPATIENT
Start: 2024-11-25 | End: 2024-12-01 | Stop reason: HOSPADM

## 2024-11-25 RX ORDER — FENTANYL CITRATE 50 UG/ML
50 INJECTION, SOLUTION INTRAMUSCULAR; INTRAVENOUS ONCE
Status: COMPLETED | OUTPATIENT
Start: 2024-11-25 | End: 2024-11-25

## 2024-11-25 RX ORDER — BUDESONIDE 0.5 MG/2ML
0.5 INHALANT ORAL
Status: DISCONTINUED | OUTPATIENT
Start: 2024-11-25 | End: 2024-12-01 | Stop reason: HOSPADM

## 2024-11-25 RX ORDER — ARIPIPRAZOLE 5 MG/1
5 TABLET ORAL DAILY
Status: DISCONTINUED | OUTPATIENT
Start: 2024-11-26 | End: 2024-12-01 | Stop reason: HOSPADM

## 2024-11-25 RX ORDER — ARFORMOTEROL TARTRATE 15 UG/2ML
15 SOLUTION RESPIRATORY (INHALATION)
Status: DISCONTINUED | OUTPATIENT
Start: 2024-11-25 | End: 2024-12-01 | Stop reason: HOSPADM

## 2024-11-25 RX ORDER — HYDROCODONE BITARTRATE AND ACETAMINOPHEN 7.5; 325 MG/1; MG/1
1 TABLET ORAL EVERY 8 HOURS PRN
Status: DISCONTINUED | OUTPATIENT
Start: 2024-11-25 | End: 2024-12-01 | Stop reason: HOSPADM

## 2024-11-25 RX ORDER — ACETAMINOPHEN 325 MG/1
650 TABLET ORAL EVERY 6 HOURS PRN
Status: DISCONTINUED | OUTPATIENT
Start: 2024-11-25 | End: 2024-12-01 | Stop reason: HOSPADM

## 2024-11-25 RX ORDER — METOPROLOL SUCCINATE 100 MG/1
100 TABLET, EXTENDED RELEASE ORAL DAILY
Status: DISCONTINUED | OUTPATIENT
Start: 2024-11-25 | End: 2024-12-01 | Stop reason: HOSPADM

## 2024-11-25 RX ORDER — MAGNESIUM SULFATE IN WATER 40 MG/ML
2000 INJECTION, SOLUTION INTRAVENOUS ONCE
Status: COMPLETED | OUTPATIENT
Start: 2024-11-25 | End: 2024-11-25

## 2024-11-25 RX ORDER — ENOXAPARIN SODIUM 100 MG/ML
40 INJECTION SUBCUTANEOUS DAILY
Status: DISCONTINUED | OUTPATIENT
Start: 2024-11-25 | End: 2024-12-01 | Stop reason: HOSPADM

## 2024-11-25 RX ORDER — ACETAMINOPHEN 650 MG/1
650 SUPPOSITORY RECTAL EVERY 6 HOURS PRN
Status: DISCONTINUED | OUTPATIENT
Start: 2024-11-25 | End: 2024-12-01 | Stop reason: HOSPADM

## 2024-11-25 RX ORDER — GABAPENTIN 300 MG/1
900 CAPSULE ORAL NIGHTLY
Status: DISCONTINUED | OUTPATIENT
Start: 2024-11-25 | End: 2024-12-01 | Stop reason: HOSPADM

## 2024-11-25 RX ORDER — LORAZEPAM 1 MG/1
1 TABLET ORAL EVERY 8 HOURS PRN
Status: DISCONTINUED | OUTPATIENT
Start: 2024-11-25 | End: 2024-11-26

## 2024-11-25 RX ORDER — ATORVASTATIN CALCIUM 40 MG/1
40 TABLET, FILM COATED ORAL NIGHTLY
Status: DISCONTINUED | OUTPATIENT
Start: 2024-11-25 | End: 2024-12-01 | Stop reason: HOSPADM

## 2024-11-25 RX ORDER — MORPHINE SULFATE 10 MG/5ML
10 SOLUTION ORAL EVERY 4 HOURS PRN
Status: DISCONTINUED | OUTPATIENT
Start: 2024-11-25 | End: 2024-12-01 | Stop reason: HOSPADM

## 2024-11-25 RX ORDER — FLUTICASONE PROPIONATE 50 MCG
1 SPRAY, SUSPENSION (ML) NASAL DAILY PRN
Status: DISCONTINUED | OUTPATIENT
Start: 2024-11-25 | End: 2024-12-01 | Stop reason: HOSPADM

## 2024-11-25 RX ORDER — ONDANSETRON 2 MG/ML
4 INJECTION INTRAMUSCULAR; INTRAVENOUS EVERY 6 HOURS PRN
Status: DISCONTINUED | OUTPATIENT
Start: 2024-11-25 | End: 2024-12-01 | Stop reason: HOSPADM

## 2024-11-25 RX ORDER — METHYLPREDNISOLONE SODIUM SUCCINATE 125 MG/2ML
125 INJECTION INTRAMUSCULAR; INTRAVENOUS ONCE
Status: COMPLETED | OUTPATIENT
Start: 2024-11-25 | End: 2024-11-25

## 2024-11-25 RX ORDER — SODIUM CHLORIDE 0.9 % (FLUSH) 0.9 %
5-40 SYRINGE (ML) INJECTION PRN
Status: DISCONTINUED | OUTPATIENT
Start: 2024-11-25 | End: 2024-12-01 | Stop reason: HOSPADM

## 2024-11-25 RX ORDER — ONDANSETRON 4 MG/1
4 TABLET, ORALLY DISINTEGRATING ORAL EVERY 8 HOURS PRN
Status: DISCONTINUED | OUTPATIENT
Start: 2024-11-25 | End: 2024-12-01 | Stop reason: HOSPADM

## 2024-11-25 RX ORDER — GABAPENTIN 300 MG/1
900 CAPSULE ORAL 2 TIMES DAILY
Status: DISCONTINUED | OUTPATIENT
Start: 2024-11-25 | End: 2024-11-25 | Stop reason: CLARIF

## 2024-11-25 RX ADMIN — ALBUTEROL SULFATE 2.5 MG: 2.5 SOLUTION RESPIRATORY (INHALATION) at 20:31

## 2024-11-25 RX ADMIN — METHYLPREDNISOLONE SODIUM SUCCINATE 125 MG: 125 INJECTION INTRAMUSCULAR; INTRAVENOUS at 10:59

## 2024-11-25 RX ADMIN — MAGNESIUM SULFATE HEPTAHYDRATE 2000 MG: 40 INJECTION, SOLUTION INTRAVENOUS at 10:59

## 2024-11-25 RX ADMIN — FUROSEMIDE 40 MG: 10 INJECTION, SOLUTION INTRAMUSCULAR; INTRAVENOUS at 14:32

## 2024-11-25 RX ADMIN — FENTANYL CITRATE 50 MCG: 50 INJECTION INTRAMUSCULAR; INTRAVENOUS at 12:32

## 2024-11-25 RX ADMIN — GABAPENTIN 900 MG: 300 CAPSULE ORAL at 21:25

## 2024-11-25 RX ADMIN — IPRATROPIUM BROMIDE AND ALBUTEROL SULFATE 3 DOSE: .5; 2.5 SOLUTION RESPIRATORY (INHALATION) at 11:38

## 2024-11-25 RX ADMIN — MORPHINE SULFATE 10 MG: 10 SOLUTION ORAL at 21:27

## 2024-11-25 RX ADMIN — ARFORMOTEROL TARTRATE 15 MCG: 15 SOLUTION RESPIRATORY (INHALATION) at 20:31

## 2024-11-25 RX ADMIN — TAMSULOSIN HYDROCHLORIDE 0.4 MG: 0.4 CAPSULE ORAL at 21:26

## 2024-11-25 RX ADMIN — HYDROCODONE BITARTRATE AND ACETAMINOPHEN 1 TABLET: 7.5; 325 TABLET ORAL at 23:36

## 2024-11-25 RX ADMIN — LOSARTAN POTASSIUM 50 MG: 50 TABLET, FILM COATED ORAL at 21:28

## 2024-11-25 RX ADMIN — BUDESONIDE 500 MCG: 0.5 SUSPENSION RESPIRATORY (INHALATION) at 20:31

## 2024-11-25 RX ADMIN — CALCIUM GLUCONATE 1000 MG: 98 INJECTION, SOLUTION INTRAVENOUS at 16:01

## 2024-11-25 RX ADMIN — LORAZEPAM 1 MG: 1 TABLET ORAL at 21:27

## 2024-11-25 RX ADMIN — SODIUM CHLORIDE, PRESERVATIVE FREE 10 ML: 5 INJECTION INTRAVENOUS at 21:28

## 2024-11-25 RX ADMIN — PRAMIPEXOLE DIHYDROCHLORIDE 0.75 MG: 0.25 TABLET ORAL at 21:25

## 2024-11-25 RX ADMIN — DESMOPRESSIN ACETATE 40 MG: 0.2 TABLET ORAL at 21:25

## 2024-11-25 RX ADMIN — METOPROLOL SUCCINATE 100 MG: 100 TABLET, EXTENDED RELEASE ORAL at 21:29

## 2024-11-25 ASSESSMENT — PAIN SCALES - GENERAL
PAINLEVEL_OUTOF10: 9
PAINLEVEL_OUTOF10: 8
PAINLEVEL_OUTOF10: 7
PAINLEVEL_OUTOF10: 10

## 2024-11-25 ASSESSMENT — PAIN DESCRIPTION - ORIENTATION
ORIENTATION: RIGHT;LEFT
ORIENTATION: RIGHT;LEFT
ORIENTATION: RIGHT

## 2024-11-25 ASSESSMENT — PAIN DESCRIPTION - FREQUENCY: FREQUENCY: CONTINUOUS

## 2024-11-25 ASSESSMENT — PAIN DESCRIPTION - LOCATION
LOCATION: BACK;LEG
LOCATION: LEG

## 2024-11-25 ASSESSMENT — PAIN DESCRIPTION - PAIN TYPE: TYPE: CHRONIC PAIN

## 2024-11-25 ASSESSMENT — PAIN - FUNCTIONAL ASSESSMENT
PAIN_FUNCTIONAL_ASSESSMENT: ACTIVITIES ARE NOT PREVENTED
PAIN_FUNCTIONAL_ASSESSMENT: ACTIVITIES ARE NOT PREVENTED
PAIN_FUNCTIONAL_ASSESSMENT: NONE - DENIES PAIN

## 2024-11-25 ASSESSMENT — PAIN DESCRIPTION - DESCRIPTORS
DESCRIPTORS: STABBING
DESCRIPTORS: STABBING

## 2024-11-25 NOTE — PLAN OF CARE
Problem: Chronic Conditions and Co-morbidities  Goal: Patient's chronic conditions and co-morbidity symptoms are monitored and maintained or improved  Flowsheets (Taken 11/25/2024 0913)  Care Plan - Patient's Chronic Conditions and Co-Morbidity Symptoms are Monitored and Maintained or Improved: Monitor and assess patient's chronic conditions and comorbid symptoms for stability, deterioration, or improvement

## 2024-11-25 NOTE — PROGRESS NOTES
1/6/2025 10:15 AM [facility] ROOM 1 [facility]

## 2024-11-25 NOTE — ED PROVIDER NOTES
All other systems reviewed and are negative.      Positives and Pertinent negatives as per HPI.     SURGICAL HISTORY     Past Surgical History:   Procedure Laterality Date    BRONCHOSCOPY N/A 04/27/2023    BRONCHOSCOPY DIAGNOSTIC OR CELL WASH ONLY performed by Zhang Murphy MD at [facility] ENDOSCOPY    HERNIA REPAIR      HIP SURGERY      JOINT REPLACEMENT  1987    KNEE SURGERY         CURRENTMEDICATIONS       Current Discharge Medication List        CONTINUE these medications which have NOT CHANGED    Details   Morphine Sulfate (MORPHINE 20MG/ML) SOLN concentrated solution Take 0.5 mLs by mouth every 4 hours as needed for Pain for up to 30 days. Max Daily Amount: 60 mg  Qty: 90 mL, Refills: 0    Comments: Reduce doses taken as pain becomes manageable  Associated Diagnoses: Palliative care encounter; End stage COPD (HCC); Dyspnea on minimal exertion      HYDROcodone-acetaminophen (NORCO) 7.5-325 MG per tablet Take 1 tablet by mouth every 8 hours as needed for Pain for up to 30 days. Max Daily Amount: 3 tablets  Qty: 90 tablet, Refills: 0    Comments: Reduce doses taken as pain becomes manageable  Associated Diagnoses: Palliative care encounter; Chronic pain syndrome      nystatin (MYCOSTATIN) 098906 UNIT/GM cream APPLY TOPICALLY TWO (2) TIMES DAILY.  Qty: 30 g, Refills: 1    Associated Diagnoses: Jock itch      nystatin 076810 UNIT/GM powder APPLY THREE (3) TIMES DAILY.  Qty: 60 g, Refills: 1    Associated Diagnoses: Jock itch      LORazepam (ATIVAN) 1 MG tablet Take 1 tablet by mouth every 8 hours as needed for Anxiety for up to 30 days. Max Daily Amount: 3 mg  Qty: 90 tablet, Refills: 0    Associated Diagnoses: Anxiety      pramipexole (MIRAPEX) 0.75 MG tablet Take 1 tablet by mouth nightly  Qty: 90 tablet, Refills: 1      !! predniSONE (DELTASONE) 10 MG tablet Take 1 tablet by mouth daily Currently on hold for 20mg BID x 5 days      formoterol (PERFOROMIST) 20 MCG/2ML nebulizer solution inhale one vial (2ml) via

## 2024-11-25 NOTE — ED NOTES
ED to Inpatient Handoff Report    Notified 5 that electronic handoff available and patient ready for transport to room 0521.    Safety Risks: Risk of falls    Patient in Restraints: no    Constant Observer or Patient : no    Telemetry Monitoring Ordered :Yes           Order to transfer to unit without monitor:YES    Last MEWS: 1 Time completed: 1710    Deterioration Index Score:   Predictive Model Details          30  Factor Value    Calculated 11/25/2024 17:11 37% Supplemental oxygen Nasal cannula    Deterioration Index Model 33% Age 59 years old     13% Respiratory rate 19     6% Potassium 4.3 mmol/L     5% Systolic 138     4% Pulse oximetry 100 %     2% Sodium 141 mmol/L     1% Hematocrit abnormal (36.0 %)     0% Pulse 68     0% WBC count 5.6 k/uL     0% Temperature 98.6 °F (37 °C)        Vitals:    11/25/24 1432 11/25/24 1501 11/25/24 1603 11/25/24 1709   BP: (!) 146/82 (!) 146/87 (!) 142/63 138/71   Pulse:  69 73 68   Resp:    19   Temp:    98.6 °F (37 °C)   TempSrc:    Oral   SpO2:   99% 100%   Weight:       Height:             Opportunity for questions and clarification was provided.

## 2024-11-25 NOTE — H&P
rate of 72  Lungs:  pos rhonchi  Abdomen:  Positive bowel sounds positive.  Soft.  Non-tender. No guarding, rebound or rigidity.  Breast/Rectal/Genitourinary: not pertinent.    Extremities: positive for 2+ b/l lower extremity edema  Skin:  Warm and dry  Vascular: 2/4 Dorsalis Pedis pulses bilaterally.  Neuro:  limited due to pt's status            I agree with the assessment and plan of AGRELIA Starr - NP    Poss acute chf  Copd, end stage  Hypocalcemia  Anxiety  Htn  Anemia  hyperlipidemia        Decision to admit    Electronically signed by Ashkan Warner D.O.  Hospitalist  4M Hospitalist Service at [facility]

## 2024-11-26 LAB
25(OH)D3 SERPL-MCNC: 7 NG/ML (ref 30–100)
ALBUMIN SERPL-MCNC: 3.9 G/DL (ref 3.5–5.2)
ALP SERPL-CCNC: 52 U/L (ref 40–129)
ALT SERPL-CCNC: 6 U/L (ref 0–40)
ANION GAP SERPL CALCULATED.3IONS-SCNC: 9 MMOL/L (ref 7–16)
AST SERPL-CCNC: 12 U/L (ref 0–39)
BASOPHILS # BLD: 0.01 K/UL (ref 0–0.2)
BASOPHILS NFR BLD: 0 % (ref 0–2)
BILIRUB SERPL-MCNC: 0.3 MG/DL (ref 0–1.2)
BUN SERPL-MCNC: 21 MG/DL (ref 6–20)
CALCIUM SERPL-MCNC: 6.4 MG/DL (ref 8.6–10.2)
CHLORIDE SERPL-SCNC: 94 MMOL/L (ref 98–107)
CO2 SERPL-SCNC: 38 MMOL/L (ref 22–29)
CREAT SERPL-MCNC: 0.8 MG/DL (ref 0.7–1.2)
EOSINOPHIL # BLD: 0 K/UL (ref 0.05–0.5)
EOSINOPHILS RELATIVE PERCENT: 0 % (ref 0–6)
ERYTHROCYTE [DISTWIDTH] IN BLOOD BY AUTOMATED COUNT: 13.7 % (ref 11.5–15)
GFR, ESTIMATED: >90 ML/MIN/1.73M2
GLUCOSE SERPL-MCNC: 82 MG/DL (ref 74–99)
HBA1C MFR BLD: 5.6 % (ref 4–5.6)
HCT VFR BLD AUTO: 33.4 % (ref 37–54)
HGB BLD-MCNC: 10 G/DL (ref 12.5–16.5)
IMM GRANULOCYTES # BLD AUTO: <0.03 K/UL (ref 0–0.58)
IMM GRANULOCYTES NFR BLD: 0 % (ref 0–5)
LYMPHOCYTES NFR BLD: 0.64 K/UL (ref 1.5–4)
LYMPHOCYTES RELATIVE PERCENT: 11 % (ref 20–42)
MCH RBC QN AUTO: 32.8 PG (ref 26–35)
MCHC RBC AUTO-ENTMCNC: 29.9 G/DL (ref 32–34.5)
MCV RBC AUTO: 109.5 FL (ref 80–99.9)
MONOCYTES NFR BLD: 0.65 K/UL (ref 0.1–0.95)
MONOCYTES NFR BLD: 11 % (ref 2–12)
NEUTROPHILS NFR BLD: 77 % (ref 43–80)
NEUTS SEG NFR BLD: 4.39 K/UL (ref 1.8–7.3)
PLATELET, FLUORESCENCE: 150 K/UL (ref 130–450)
PMV BLD AUTO: 11.6 FL (ref 7–12)
POTASSIUM SERPL-SCNC: 4.3 MMOL/L (ref 3.5–5)
PROT SERPL-MCNC: 6.5 G/DL (ref 6.4–8.3)
RBC # BLD AUTO: 3.05 M/UL (ref 3.8–5.8)
SODIUM SERPL-SCNC: 141 MMOL/L (ref 132–146)
WBC OTHER # BLD: 5.7 K/UL (ref 4.5–11.5)

## 2024-11-26 PROCEDURE — 6370000000 HC RX 637 (ALT 250 FOR IP): Performed by: INTERNAL MEDICINE

## 2024-11-26 PROCEDURE — 94640 AIRWAY INHALATION TREATMENT: CPT

## 2024-11-26 PROCEDURE — 2580000003 HC RX 258: Performed by: INTERNAL MEDICINE

## 2024-11-26 PROCEDURE — 6360000002 HC RX W HCPCS: Performed by: NURSE PRACTITIONER

## 2024-11-26 PROCEDURE — 6360000002 HC RX W HCPCS: Performed by: INTERNAL MEDICINE

## 2024-11-26 PROCEDURE — 36415 COLL VENOUS BLD VENIPUNCTURE: CPT

## 2024-11-26 PROCEDURE — 80053 COMPREHEN METABOLIC PANEL: CPT

## 2024-11-26 PROCEDURE — 2700000000 HC OXYGEN THERAPY PER DAY

## 2024-11-26 PROCEDURE — 2060000000 HC ICU INTERMEDIATE R&B

## 2024-11-26 PROCEDURE — 82306 VITAMIN D 25 HYDROXY: CPT

## 2024-11-26 PROCEDURE — 99222 1ST HOSP IP/OBS MODERATE 55: CPT

## 2024-11-26 PROCEDURE — 99232 SBSQ HOSP IP/OBS MODERATE 35: CPT | Performed by: INTERNAL MEDICINE

## 2024-11-26 PROCEDURE — 85025 COMPLETE CBC W/AUTO DIFF WBC: CPT

## 2024-11-26 RX ORDER — CALCIUM GLUCONATE 20 MG/ML
2000 INJECTION, SOLUTION INTRAVENOUS ONCE
Status: COMPLETED | OUTPATIENT
Start: 2024-11-26 | End: 2024-11-26

## 2024-11-26 RX ORDER — LORAZEPAM 1 MG/1
1 TABLET ORAL EVERY 6 HOURS PRN
Status: DISCONTINUED | OUTPATIENT
Start: 2024-11-26 | End: 2024-12-01 | Stop reason: HOSPADM

## 2024-11-26 RX ORDER — METHYLPREDNISOLONE SODIUM SUCCINATE 40 MG/ML
40 INJECTION INTRAMUSCULAR; INTRAVENOUS EVERY 8 HOURS
Status: DISCONTINUED | OUTPATIENT
Start: 2024-11-26 | End: 2024-11-27

## 2024-11-26 RX ORDER — CALCIUM GLUCONATE 94 MG/ML
2000 INJECTION, SOLUTION INTRAVENOUS ONCE
Status: DISCONTINUED | OUTPATIENT
Start: 2024-11-26 | End: 2024-11-26 | Stop reason: CLARIF

## 2024-11-26 RX ADMIN — BUDESONIDE 500 MCG: 0.5 SUSPENSION RESPIRATORY (INHALATION) at 07:46

## 2024-11-26 RX ADMIN — ARFORMOTEROL TARTRATE 15 MCG: 15 SOLUTION RESPIRATORY (INHALATION) at 07:46

## 2024-11-26 RX ADMIN — PANTOPRAZOLE SODIUM 40 MG: 40 TABLET, DELAYED RELEASE ORAL at 05:22

## 2024-11-26 RX ADMIN — SODIUM CHLORIDE, PRESERVATIVE FREE 10 ML: 5 INJECTION INTRAVENOUS at 21:52

## 2024-11-26 RX ADMIN — FUROSEMIDE 40 MG: 40 TABLET ORAL at 08:10

## 2024-11-26 RX ADMIN — LORAZEPAM 1 MG: 1 TABLET ORAL at 16:01

## 2024-11-26 RX ADMIN — MORPHINE SULFATE 10 MG: 10 SOLUTION ORAL at 05:22

## 2024-11-26 RX ADMIN — SODIUM CHLORIDE, PRESERVATIVE FREE 10 ML: 5 INJECTION INTRAVENOUS at 13:42

## 2024-11-26 RX ADMIN — ARFORMOTEROL TARTRATE 15 MCG: 15 SOLUTION RESPIRATORY (INHALATION) at 18:24

## 2024-11-26 RX ADMIN — ALBUTEROL SULFATE 2.5 MG: 2.5 SOLUTION RESPIRATORY (INHALATION) at 18:26

## 2024-11-26 RX ADMIN — DESMOPRESSIN ACETATE 40 MG: 0.2 TABLET ORAL at 21:52

## 2024-11-26 RX ADMIN — ARIPIPRAZOLE 5 MG: 5 TABLET ORAL at 08:09

## 2024-11-26 RX ADMIN — ASPIRIN 81 MG CHEWABLE TABLET 81 MG: 81 TABLET CHEWABLE at 08:10

## 2024-11-26 RX ADMIN — TAMSULOSIN HYDROCHLORIDE 0.4 MG: 0.4 CAPSULE ORAL at 08:10

## 2024-11-26 RX ADMIN — BUDESONIDE 500 MCG: 0.5 SUSPENSION RESPIRATORY (INHALATION) at 18:24

## 2024-11-26 RX ADMIN — METHYLPREDNISOLONE SODIUM SUCCINATE 40 MG: 40 INJECTION INTRAMUSCULAR; INTRAVENOUS at 21:49

## 2024-11-26 RX ADMIN — FLUOXETINE 20 MG: 10 TABLET, FILM COATED ORAL at 08:09

## 2024-11-26 RX ADMIN — GABAPENTIN 600 MG: 300 CAPSULE ORAL at 08:09

## 2024-11-26 RX ADMIN — ENOXAPARIN SODIUM 40 MG: 100 INJECTION SUBCUTANEOUS at 08:10

## 2024-11-26 RX ADMIN — GABAPENTIN 900 MG: 300 CAPSULE ORAL at 21:50

## 2024-11-26 RX ADMIN — HYDROCODONE BITARTRATE AND ACETAMINOPHEN 1 TABLET: 7.5; 325 TABLET ORAL at 21:51

## 2024-11-26 RX ADMIN — METHYLPREDNISOLONE SODIUM SUCCINATE 40 MG: 40 INJECTION INTRAMUSCULAR; INTRAVENOUS at 13:42

## 2024-11-26 RX ADMIN — PRAMIPEXOLE DIHYDROCHLORIDE 0.75 MG: 0.25 TABLET ORAL at 21:51

## 2024-11-26 RX ADMIN — LOSARTAN POTASSIUM 50 MG: 50 TABLET, FILM COATED ORAL at 08:10

## 2024-11-26 RX ADMIN — LORAZEPAM 1 MG: 1 TABLET ORAL at 21:51

## 2024-11-26 RX ADMIN — TAMSULOSIN HYDROCHLORIDE 0.4 MG: 0.4 CAPSULE ORAL at 21:52

## 2024-11-26 RX ADMIN — HYDROCODONE BITARTRATE AND ACETAMINOPHEN 1 TABLET: 7.5; 325 TABLET ORAL at 08:24

## 2024-11-26 RX ADMIN — CALCIUM GLUCONATE 2000 MG: 20 INJECTION, SOLUTION INTRAVENOUS at 06:37

## 2024-11-26 RX ADMIN — LORAZEPAM 1 MG: 1 TABLET ORAL at 08:10

## 2024-11-26 RX ADMIN — ALBUTEROL SULFATE 2.5 MG: 2.5 SOLUTION RESPIRATORY (INHALATION) at 07:46

## 2024-11-26 RX ADMIN — MORPHINE SULFATE 10 MG: 10 SOLUTION ORAL at 16:28

## 2024-11-26 RX ADMIN — METOPROLOL SUCCINATE 100 MG: 100 TABLET, EXTENDED RELEASE ORAL at 21:51

## 2024-11-26 ASSESSMENT — PAIN SCALES - GENERAL
PAINLEVEL_OUTOF10: 3
PAINLEVEL_OUTOF10: 6
PAINLEVEL_OUTOF10: 8
PAINLEVEL_OUTOF10: 7
PAINLEVEL_OUTOF10: 5
PAINLEVEL_OUTOF10: 0

## 2024-11-26 ASSESSMENT — PAIN DESCRIPTION - ORIENTATION
ORIENTATION: LOWER
ORIENTATION: LEFT;RIGHT

## 2024-11-26 ASSESSMENT — PAIN DESCRIPTION - LOCATION
LOCATION: BACK
LOCATION: BACK;LEG

## 2024-11-26 ASSESSMENT — PAIN - FUNCTIONAL ASSESSMENT: PAIN_FUNCTIONAL_ASSESSMENT: ACTIVITIES ARE NOT PREVENTED

## 2024-11-26 ASSESSMENT — PAIN DESCRIPTION - DESCRIPTORS
DESCRIPTORS: SHARP
DESCRIPTORS: ACHING;DISCOMFORT;SORE

## 2024-11-26 ASSESSMENT — PAIN DESCRIPTION - PAIN TYPE: TYPE: CHRONIC PAIN

## 2024-11-26 NOTE — PLAN OF CARE
Patient's chart updated to reflect:      .    - HF care plan, HF education points and HF discharge instructions.  -Orders: 2 gram sodium diet, daily weights, I/O.  -PCP and cardiology follow up appointments to be scheduled within 7 days of hospital discharge.  -CHF education session will be provided to the patient prior to hospital discharge.    Shavon Man RN   Heart Failure Navigator

## 2024-11-26 NOTE — CARE COORDINATION
Introduced my self and provided explanation of CM role to patient. Admitted for HFpEF. End stage COPD. Chronically on 5-7L O2 home supplier is [facility] patient has nebulizer, c-pap, walker and wc. He voices he resides in a 1st floor apt. with ramp entrance, his cousin lives with him and assists with his adl's.  Patient has a private pay aide daily and visiting [facility] a [phone]. SUNDAY order completed and faxed to [phone]. Patient is established with Dr. Sanchez and uses [facility] [place]. Discharge plan is home with [facility] when medically stable. Will continue to follow.  Becky IRVINGN, RN  Case Management

## 2024-11-26 NOTE — PLAN OF CARE
Problem: ABCDS Injury Assessment  Goal: Absence of physical injury  11/26/2024 0956 by Nyla Bello, RN  Outcome: Progressing  11/25/2024 2158 by Tracy Guillen, RN  Outcome: Progressing     Problem: Skin/Tissue Integrity  Goal: Absence of new skin breakdown  Description: 1.  Monitor for areas of redness and/or skin breakdown  2.  Assess vascular access sites hourly  3.  Every 4-6 hours minimum:  Change oxygen saturation probe site  4.  Every 4-6 hours:  If on nasal continuous positive airway pressure, respiratory therapy assess nares and determine need for appliance change or resting period.  11/26/2024 0956 by Nyla Bello, RN  Outcome: Progressing  11/25/2024 2158 by Tracy Guillen, RN  Outcome: Progressing     Problem: Discharge Planning  Goal: Discharge to home or other facility with appropriate resources  11/26/2024 0956 by Nyla Bello, RN  Outcome: Progressing  Flowsheets (Taken 11/26/2024 0810)  Discharge to home or other facility with appropriate resources:   Identify barriers to discharge with patient and caregiver   Arrange for needed discharge resources and transportation as appropriate   Identify discharge learning needs (meds, wound care, etc)   Arrange for interpreters to assist at discharge as needed   Refer to discharge planning if patient needs post-hospital services based on physician order or complex needs related to functional status, cognitive ability or social support system  11/25/2024 2158 by Tracy Guillen, RN  Outcome: Progressing     Problem: Chronic Conditions and Co-morbidities  Goal: Patient's chronic conditions and co-morbidity symptoms are monitored and maintained or improved  11/26/2024 0956 by Nyla Bello RN  Outcome: Progressing  Flowsheets (Taken 11/26/2024 0810)  Care Plan - Patient's Chronic Conditions and Co-Morbidity Symptoms are Monitored and Maintained or Improved:   Monitor and assess patient's chronic conditions and comorbid symptoms for stability,

## 2024-11-26 NOTE — ACP (ADVANCE CARE PLANNING)
Advance Care Planning   Healthcare Decision Maker:    Primary Decision Maker: AlexKimberley - Brother/Sister - [phone]    Secondary Decision Maker: Darren Peña - Amaris - [phone]    Click here to complete Healthcare Decision Makers including selection of the Healthcare Decision Maker Relationship (ie \"Primary\").  Today we documented Decision Maker(s) consistent with ACP documents on file.

## 2024-11-27 LAB
ALBUMIN SERPL-MCNC: 4 G/DL (ref 3.5–5.2)
ALP SERPL-CCNC: 50 U/L (ref 40–129)
ALT SERPL-CCNC: 7 U/L (ref 0–40)
ANION GAP SERPL CALCULATED.3IONS-SCNC: 11 MMOL/L (ref 7–16)
AST SERPL-CCNC: 11 U/L (ref 0–39)
BASOPHILS # BLD: 0 K/UL (ref 0–0.2)
BASOPHILS NFR BLD: 0 % (ref 0–2)
BILIRUB SERPL-MCNC: 0.4 MG/DL (ref 0–1.2)
BUN SERPL-MCNC: 25 MG/DL (ref 6–20)
CALCIUM SERPL-MCNC: 6.7 MG/DL (ref 8.6–10.2)
CHLORIDE SERPL-SCNC: 95 MMOL/L (ref 98–107)
CO2 SERPL-SCNC: 33 MMOL/L (ref 22–29)
CREAT SERPL-MCNC: 0.7 MG/DL (ref 0.7–1.2)
EOSINOPHIL # BLD: 0 K/UL (ref 0.05–0.5)
EOSINOPHILS RELATIVE PERCENT: 0 % (ref 0–6)
ERYTHROCYTE [DISTWIDTH] IN BLOOD BY AUTOMATED COUNT: 13.7 % (ref 11.5–15)
GFR, ESTIMATED: >90 ML/MIN/1.73M2
GLUCOSE SERPL-MCNC: 134 MG/DL (ref 74–99)
HCT VFR BLD AUTO: 38.6 % (ref 37–54)
HGB BLD-MCNC: 11.4 G/DL (ref 12.5–16.5)
LYMPHOCYTES NFR BLD: 0.13 K/UL (ref 1.5–4)
LYMPHOCYTES RELATIVE PERCENT: 2 % (ref 20–42)
MCH RBC QN AUTO: 32.6 PG (ref 26–35)
MCHC RBC AUTO-ENTMCNC: 29.5 G/DL (ref 32–34.5)
MCV RBC AUTO: 110.3 FL (ref 80–99.9)
MONOCYTES NFR BLD: 0 % (ref 2–12)
MONOCYTES NFR BLD: 0 K/UL (ref 0.1–0.95)
NEUTROPHILS NFR BLD: 98 % (ref 43–80)
NEUTS SEG NFR BLD: 7.37 K/UL (ref 1.8–7.3)
PLATELET, FLUORESCENCE: 148 K/UL (ref 130–450)
PMV BLD AUTO: 11.5 FL (ref 7–12)
POTASSIUM SERPL-SCNC: 4.6 MMOL/L (ref 3.5–5)
PROT SERPL-MCNC: 6.7 G/DL (ref 6.4–8.3)
RBC # BLD AUTO: 3.5 M/UL (ref 3.8–5.8)
RBC # BLD: ABNORMAL 10*6/UL
SODIUM SERPL-SCNC: 139 MMOL/L (ref 132–146)
WBC OTHER # BLD: 7.5 K/UL (ref 4.5–11.5)

## 2024-11-27 PROCEDURE — 94640 AIRWAY INHALATION TREATMENT: CPT

## 2024-11-27 PROCEDURE — 2060000000 HC ICU INTERMEDIATE R&B

## 2024-11-27 PROCEDURE — 6360000002 HC RX W HCPCS: Performed by: INTERNAL MEDICINE

## 2024-11-27 PROCEDURE — 6370000000 HC RX 637 (ALT 250 FOR IP): Performed by: INTERNAL MEDICINE

## 2024-11-27 PROCEDURE — 36415 COLL VENOUS BLD VENIPUNCTURE: CPT

## 2024-11-27 PROCEDURE — 80053 COMPREHEN METABOLIC PANEL: CPT

## 2024-11-27 PROCEDURE — 85025 COMPLETE CBC W/AUTO DIFF WBC: CPT

## 2024-11-27 PROCEDURE — 99232 SBSQ HOSP IP/OBS MODERATE 35: CPT | Performed by: INTERNAL MEDICINE

## 2024-11-27 PROCEDURE — 2580000003 HC RX 258: Performed by: INTERNAL MEDICINE

## 2024-11-27 PROCEDURE — 2700000000 HC OXYGEN THERAPY PER DAY

## 2024-11-27 PROCEDURE — 99232 SBSQ HOSP IP/OBS MODERATE 35: CPT

## 2024-11-27 RX ORDER — ERGOCALCIFEROL 1.25 MG/1
50000 CAPSULE, LIQUID FILLED ORAL WEEKLY
Status: DISCONTINUED | OUTPATIENT
Start: 2024-11-27 | End: 2024-11-30

## 2024-11-27 RX ORDER — METHYLPREDNISOLONE SODIUM SUCCINATE 40 MG/ML
40 INJECTION INTRAMUSCULAR; INTRAVENOUS EVERY 12 HOURS
Status: DISCONTINUED | OUTPATIENT
Start: 2024-11-27 | End: 2024-12-01

## 2024-11-27 RX ORDER — CALCIUM GLUCONATE 20 MG/ML
2000 INJECTION, SOLUTION INTRAVENOUS ONCE
Status: COMPLETED | OUTPATIENT
Start: 2024-11-27 | End: 2024-11-27

## 2024-11-27 RX ORDER — CHOLECALCIFEROL (VITAMIN D3) 50 MCG
2000 TABLET ORAL DAILY
Status: DISCONTINUED | OUTPATIENT
Start: 2024-11-28 | End: 2024-11-30

## 2024-11-27 RX ADMIN — ALBUTEROL SULFATE 2.5 MG: 2.5 SOLUTION RESPIRATORY (INHALATION) at 18:16

## 2024-11-27 RX ADMIN — GABAPENTIN 600 MG: 300 CAPSULE ORAL at 10:53

## 2024-11-27 RX ADMIN — HYDROCODONE BITARTRATE AND ACETAMINOPHEN 1 TABLET: 7.5; 325 TABLET ORAL at 10:53

## 2024-11-27 RX ADMIN — MORPHINE SULFATE 10 MG: 10 SOLUTION ORAL at 03:19

## 2024-11-27 RX ADMIN — TAMSULOSIN HYDROCHLORIDE 0.4 MG: 0.4 CAPSULE ORAL at 10:53

## 2024-11-27 RX ADMIN — METHYLPREDNISOLONE SODIUM SUCCINATE 40 MG: 40 INJECTION INTRAMUSCULAR; INTRAVENOUS at 17:00

## 2024-11-27 RX ADMIN — ONDANSETRON 4 MG: 2 INJECTION INTRAMUSCULAR; INTRAVENOUS at 11:42

## 2024-11-27 RX ADMIN — ERGOCALCIFEROL 50000 UNITS: 1.25 CAPSULE ORAL at 10:54

## 2024-11-27 RX ADMIN — ASPIRIN 81 MG CHEWABLE TABLET 81 MG: 81 TABLET CHEWABLE at 10:53

## 2024-11-27 RX ADMIN — LORAZEPAM 1 MG: 1 TABLET ORAL at 20:06

## 2024-11-27 RX ADMIN — CALCIUM GLUCONATE 2000 MG: 20 INJECTION, SOLUTION INTRAVENOUS at 11:42

## 2024-11-27 RX ADMIN — LORAZEPAM 1 MG: 1 TABLET ORAL at 06:23

## 2024-11-27 RX ADMIN — ARIPIPRAZOLE 5 MG: 5 TABLET ORAL at 10:54

## 2024-11-27 RX ADMIN — METHYLPREDNISOLONE SODIUM SUCCINATE 40 MG: 40 INJECTION INTRAMUSCULAR; INTRAVENOUS at 04:13

## 2024-11-27 RX ADMIN — PANTOPRAZOLE SODIUM 40 MG: 40 TABLET, DELAYED RELEASE ORAL at 04:13

## 2024-11-27 RX ADMIN — FUROSEMIDE 40 MG: 40 TABLET ORAL at 10:53

## 2024-11-27 RX ADMIN — ARFORMOTEROL TARTRATE 15 MCG: 15 SOLUTION RESPIRATORY (INHALATION) at 07:53

## 2024-11-27 RX ADMIN — ALBUTEROL SULFATE 2.5 MG: 2.5 SOLUTION RESPIRATORY (INHALATION) at 07:53

## 2024-11-27 RX ADMIN — PRAMIPEXOLE DIHYDROCHLORIDE 0.75 MG: 0.25 TABLET ORAL at 20:05

## 2024-11-27 RX ADMIN — ENOXAPARIN SODIUM 40 MG: 100 INJECTION SUBCUTANEOUS at 10:56

## 2024-11-27 RX ADMIN — FLUOXETINE 20 MG: 10 TABLET, FILM COATED ORAL at 10:54

## 2024-11-27 RX ADMIN — GABAPENTIN 900 MG: 300 CAPSULE ORAL at 20:05

## 2024-11-27 RX ADMIN — SODIUM CHLORIDE, PRESERVATIVE FREE 10 ML: 5 INJECTION INTRAVENOUS at 20:07

## 2024-11-27 RX ADMIN — DESMOPRESSIN ACETATE 40 MG: 0.2 TABLET ORAL at 20:06

## 2024-11-27 RX ADMIN — BUDESONIDE 500 MCG: 0.5 SUSPENSION RESPIRATORY (INHALATION) at 18:16

## 2024-11-27 RX ADMIN — ARFORMOTEROL TARTRATE 15 MCG: 15 SOLUTION RESPIRATORY (INHALATION) at 18:16

## 2024-11-27 RX ADMIN — SODIUM CHLORIDE, PRESERVATIVE FREE 10 ML: 5 INJECTION INTRAVENOUS at 10:55

## 2024-11-27 RX ADMIN — MORPHINE SULFATE 10 MG: 10 SOLUTION ORAL at 20:05

## 2024-11-27 RX ADMIN — TAMSULOSIN HYDROCHLORIDE 0.4 MG: 0.4 CAPSULE ORAL at 20:06

## 2024-11-27 RX ADMIN — BUDESONIDE 500 MCG: 0.5 SUSPENSION RESPIRATORY (INHALATION) at 07:53

## 2024-11-27 RX ADMIN — LOSARTAN POTASSIUM 50 MG: 50 TABLET, FILM COATED ORAL at 10:54

## 2024-11-27 RX ADMIN — METOPROLOL SUCCINATE 100 MG: 100 TABLET, EXTENDED RELEASE ORAL at 20:06

## 2024-11-27 ASSESSMENT — PAIN DESCRIPTION - LOCATION: LOCATION: LEG

## 2024-11-27 ASSESSMENT — PAIN SCALES - GENERAL
PAINLEVEL_OUTOF10: 8
PAINLEVEL_OUTOF10: 6
PAINLEVEL_OUTOF10: 3

## 2024-11-27 ASSESSMENT — PAIN DESCRIPTION - DESCRIPTORS: DESCRIPTORS: ACHING;DISCOMFORT;SORE

## 2024-11-27 ASSESSMENT — PAIN - FUNCTIONAL ASSESSMENT: PAIN_FUNCTIONAL_ASSESSMENT: ACTIVITIES ARE NOT PREVENTED

## 2024-11-27 ASSESSMENT — PAIN DESCRIPTION - ORIENTATION: ORIENTATION: RIGHT;LEFT

## 2024-11-27 NOTE — PLAN OF CARE
Problem: ABCDS Injury Assessment  Goal: Absence of physical injury  11/26/2024 2352 by Jose Hurley RN  Outcome: Progressing  Flowsheets (Taken 11/26/2024 2125)  Absence of Physical Injury: Implement safety measures based on patient assessment  11/26/2024 0956 by Nyla Bello, RN  Outcome: Progressing     Problem: Skin/Tissue Integrity  Goal: Absence of new skin breakdown  Description: 1.  Monitor for areas of redness and/or skin breakdown  2.  Assess vascular access sites hourly  3.  Every 4-6 hours minimum:  Change oxygen saturation probe site  4.  Every 4-6 hours:  If on nasal continuous positive airway pressure, respiratory therapy assess nares and determine need for appliance change or resting period.  11/26/2024 2352 by Jose Hurley RN  Outcome: Progressing  11/26/2024 0956 by Nyla Bello RN  Outcome: Progressing     Problem: Discharge Planning  Goal: Discharge to home or other facility with appropriate resources  11/26/2024 2352 by Jose Hurley RN  Outcome: Progressing  11/26/2024 0956 by Nyla Bello RN  Outcome: Progressing  Flowsheets (Taken 11/26/2024 0810)  Discharge to home or other facility with appropriate resources:   Identify barriers to discharge with patient and caregiver   Arrange for needed discharge resources and transportation as appropriate   Identify discharge learning needs (meds, wound care, etc)   Arrange for interpreters to assist at discharge as needed   Refer to discharge planning if patient needs post-hospital services based on physician order or complex needs related to functional status, cognitive ability or social support system     Problem: Chronic Conditions and Co-morbidities  Goal: Patient's chronic conditions and co-morbidity symptoms are monitored and maintained or improved  11/26/2024 2352 by Jose Hurley, RN  Outcome: Progressing  11/26/2024 0956 by Nyla Bello RN  Outcome: Progressing  Flowsheets (Taken 11/26/2024 0810)  Care Plan

## 2024-11-27 NOTE — PLAN OF CARE
Problem: ABCDS Injury Assessment  Goal: Absence of physical injury  11/27/2024 1257 by Corie Espinal RN  Outcome: Progressing  Flowsheets (Taken 11/27/2024 0800)  Absence of Physical Injury: Implement safety measures based on patient assessment  11/26/2024 2352 by Jose Hurley RN  Outcome: Progressing  Flowsheets (Taken 11/26/2024 2125)  Absence of Physical Injury: Implement safety measures based on patient assessment     Problem: Skin/Tissue Integrity  Goal: Absence of new skin breakdown  Description: 1.  Monitor for areas of redness and/or skin breakdown  2.  Assess vascular access sites hourly  3.  Every 4-6 hours minimum:  Change oxygen saturation probe site  4.  Every 4-6 hours:  If on nasal continuous positive airway pressure, respiratory therapy assess nares and determine need for appliance change or resting period.  11/27/2024 1257 by Corie Espinal RN  Outcome: Progressing  11/26/2024 2352 by Jose Hurley RN  Outcome: Progressing     Problem: Discharge Planning  Goal: Discharge to home or other facility with appropriate resources  11/27/2024 1257 by Corie Espinal RN  Outcome: Progressing  Flowsheets (Taken 11/27/2024 0800)  Discharge to home or other facility with appropriate resources: Identify barriers to discharge with patient and caregiver  11/26/2024 2352 by Jose Hurley RN  Outcome: Progressing     Problem: Chronic Conditions and Co-morbidities  Goal: Patient's chronic conditions and co-morbidity symptoms are monitored and maintained or improved  11/27/2024 1257 by Corie Espinal RN  Outcome: Progressing  Flowsheets (Taken 11/27/2024 0800)  Care Plan - Patient's Chronic Conditions and Co-Morbidity Symptoms are Monitored and Maintained or Improved: Monitor and assess patient's chronic conditions and comorbid symptoms for stability, deterioration, or improvement  11/26/2024 2352 by Jose Hurley RN  Outcome: Progressing     Problem: Safety - Adult  Goal: Free from fall

## 2024-11-27 NOTE — CARE COORDINATION
Discharge plan is home with cousin, private pay aide and  [facility] [phone] when medically stable. Please notify [facility] when patient discharges. Chronically on 5-7L O2 home supplier is [facility]. Will continue to follow.  Becky IRVINGN, RN  Case Management

## 2024-11-28 LAB
ALBUMIN SERPL-MCNC: 3.9 G/DL (ref 3.5–5.2)
ALP SERPL-CCNC: 44 U/L (ref 40–129)
ALT SERPL-CCNC: 7 U/L (ref 0–40)
ANION GAP SERPL CALCULATED.3IONS-SCNC: 10 MMOL/L (ref 7–16)
AST SERPL-CCNC: 13 U/L (ref 0–39)
BASOPHILS # BLD: 0.01 K/UL (ref 0–0.2)
BASOPHILS NFR BLD: 0 % (ref 0–2)
BILIRUB SERPL-MCNC: 0.4 MG/DL (ref 0–1.2)
BUN SERPL-MCNC: 23 MG/DL (ref 6–20)
CA-I BLD-SCNC: 1.05 MMOL/L (ref 1.15–1.33)
CALCIUM SERPL-MCNC: 7 MG/DL (ref 8.6–10.2)
CHLORIDE SERPL-SCNC: 93 MMOL/L (ref 98–107)
CO2 SERPL-SCNC: 39 MMOL/L (ref 22–29)
CREAT SERPL-MCNC: 0.7 MG/DL (ref 0.7–1.2)
EOSINOPHIL # BLD: 0 K/UL (ref 0.05–0.5)
EOSINOPHILS RELATIVE PERCENT: 0 % (ref 0–6)
ERYTHROCYTE [DISTWIDTH] IN BLOOD BY AUTOMATED COUNT: 13.7 % (ref 11.5–15)
GFR, ESTIMATED: >90 ML/MIN/1.73M2
GLUCOSE SERPL-MCNC: 125 MG/DL (ref 74–99)
HCT VFR BLD AUTO: 36.3 % (ref 37–54)
HGB BLD-MCNC: 11.4 G/DL (ref 12.5–16.5)
IMM GRANULOCYTES # BLD AUTO: <0.03 K/UL (ref 0–0.58)
IMM GRANULOCYTES NFR BLD: 0 % (ref 0–5)
LYMPHOCYTES NFR BLD: 0.81 K/UL (ref 1.5–4)
LYMPHOCYTES RELATIVE PERCENT: 10 % (ref 20–42)
MCH RBC QN AUTO: 33.6 PG (ref 26–35)
MCHC RBC AUTO-ENTMCNC: 31.4 G/DL (ref 32–34.5)
MCV RBC AUTO: 107.1 FL (ref 80–99.9)
MONOCYTES NFR BLD: 0.49 K/UL (ref 0.1–0.95)
MONOCYTES NFR BLD: 6 % (ref 2–12)
NEUTROPHILS NFR BLD: 83 % (ref 43–80)
NEUTS SEG NFR BLD: 6.67 K/UL (ref 1.8–7.3)
PLATELET # BLD AUTO: 146 K/UL (ref 130–450)
PMV BLD AUTO: 12.3 FL (ref 7–12)
POTASSIUM SERPL-SCNC: 4 MMOL/L (ref 3.5–5)
POTASSIUM SERPL-SCNC: 5.1 MMOL/L (ref 3.5–5)
PROT SERPL-MCNC: 6.5 G/DL (ref 6.4–8.3)
RBC # BLD AUTO: 3.39 M/UL (ref 3.8–5.8)
SODIUM SERPL-SCNC: 142 MMOL/L (ref 132–146)
WBC OTHER # BLD: 8 K/UL (ref 4.5–11.5)

## 2024-11-28 PROCEDURE — 2700000000 HC OXYGEN THERAPY PER DAY

## 2024-11-28 PROCEDURE — 2060000000 HC ICU INTERMEDIATE R&B

## 2024-11-28 PROCEDURE — 85025 COMPLETE CBC W/AUTO DIFF WBC: CPT

## 2024-11-28 PROCEDURE — 6360000002 HC RX W HCPCS: Performed by: INTERNAL MEDICINE

## 2024-11-28 PROCEDURE — 99233 SBSQ HOSP IP/OBS HIGH 50: CPT | Performed by: INTERNAL MEDICINE

## 2024-11-28 PROCEDURE — 2580000003 HC RX 258: Performed by: INTERNAL MEDICINE

## 2024-11-28 PROCEDURE — 6370000000 HC RX 637 (ALT 250 FOR IP): Performed by: INTERNAL MEDICINE

## 2024-11-28 PROCEDURE — 82330 ASSAY OF CALCIUM: CPT

## 2024-11-28 PROCEDURE — 84132 ASSAY OF SERUM POTASSIUM: CPT

## 2024-11-28 PROCEDURE — 94640 AIRWAY INHALATION TREATMENT: CPT

## 2024-11-28 PROCEDURE — 80053 COMPREHEN METABOLIC PANEL: CPT

## 2024-11-28 RX ORDER — LIDOCAINE 4 G/G
1 PATCH TOPICAL DAILY
Status: DISCONTINUED | OUTPATIENT
Start: 2024-11-28 | End: 2024-12-01 | Stop reason: HOSPADM

## 2024-11-28 RX ORDER — CALCIUM GLUCONATE 20 MG/ML
2000 INJECTION, SOLUTION INTRAVENOUS ONCE
Status: COMPLETED | OUTPATIENT
Start: 2024-11-28 | End: 2024-11-28

## 2024-11-28 RX ORDER — CALCIUM CARBONATE 500 MG/1
500 TABLET, CHEWABLE ORAL 3 TIMES DAILY PRN
Status: DISCONTINUED | OUTPATIENT
Start: 2024-11-28 | End: 2024-12-01 | Stop reason: HOSPADM

## 2024-11-28 RX ADMIN — LORAZEPAM 1 MG: 1 TABLET ORAL at 05:37

## 2024-11-28 RX ADMIN — DESMOPRESSIN ACETATE 40 MG: 0.2 TABLET ORAL at 20:27

## 2024-11-28 RX ADMIN — CALCIUM GLUCONATE 2000 MG: 20 INJECTION, SOLUTION INTRAVENOUS at 20:31

## 2024-11-28 RX ADMIN — ARIPIPRAZOLE 5 MG: 5 TABLET ORAL at 08:43

## 2024-11-28 RX ADMIN — BUDESONIDE 500 MCG: 0.5 SUSPENSION RESPIRATORY (INHALATION) at 19:49

## 2024-11-28 RX ADMIN — CALCIUM GLUCONATE 4000 MG: 98 INJECTION, SOLUTION INTRAVENOUS at 10:35

## 2024-11-28 RX ADMIN — SODIUM CHLORIDE, PRESERVATIVE FREE 10 ML: 5 INJECTION INTRAVENOUS at 08:46

## 2024-11-28 RX ADMIN — GABAPENTIN 900 MG: 300 CAPSULE ORAL at 20:26

## 2024-11-28 RX ADMIN — HYDROCODONE BITARTRATE AND ACETAMINOPHEN 1 TABLET: 7.5; 325 TABLET ORAL at 00:14

## 2024-11-28 RX ADMIN — SODIUM ZIRCONIUM CYCLOSILICATE 10 G: 10 POWDER, FOR SUSPENSION ORAL at 08:43

## 2024-11-28 RX ADMIN — LORAZEPAM 1 MG: 1 TABLET ORAL at 22:08

## 2024-11-28 RX ADMIN — LORAZEPAM 1 MG: 1 TABLET ORAL at 15:55

## 2024-11-28 RX ADMIN — MORPHINE SULFATE 10 MG: 10 SOLUTION ORAL at 20:27

## 2024-11-28 RX ADMIN — ASPIRIN 81 MG CHEWABLE TABLET 81 MG: 81 TABLET CHEWABLE at 08:43

## 2024-11-28 RX ADMIN — PRAMIPEXOLE DIHYDROCHLORIDE 0.75 MG: 0.25 TABLET ORAL at 20:26

## 2024-11-28 RX ADMIN — ARFORMOTEROL TARTRATE 15 MCG: 15 SOLUTION RESPIRATORY (INHALATION) at 07:33

## 2024-11-28 RX ADMIN — MORPHINE SULFATE 10 MG: 10 SOLUTION ORAL at 05:37

## 2024-11-28 RX ADMIN — SODIUM CHLORIDE, PRESERVATIVE FREE 10 ML: 5 INJECTION INTRAVENOUS at 20:27

## 2024-11-28 RX ADMIN — PANTOPRAZOLE SODIUM 40 MG: 40 TABLET, DELAYED RELEASE ORAL at 08:55

## 2024-11-28 RX ADMIN — METOPROLOL SUCCINATE 100 MG: 100 TABLET, EXTENDED RELEASE ORAL at 20:27

## 2024-11-28 RX ADMIN — MORPHINE SULFATE 10 MG: 10 SOLUTION ORAL at 00:14

## 2024-11-28 RX ADMIN — ONDANSETRON 4 MG: 2 INJECTION INTRAMUSCULAR; INTRAVENOUS at 22:08

## 2024-11-28 RX ADMIN — ENOXAPARIN SODIUM 40 MG: 100 INJECTION SUBCUTANEOUS at 08:43

## 2024-11-28 RX ADMIN — LOSARTAN POTASSIUM 50 MG: 50 TABLET, FILM COATED ORAL at 08:43

## 2024-11-28 RX ADMIN — MORPHINE SULFATE 10 MG: 10 SOLUTION ORAL at 10:33

## 2024-11-28 RX ADMIN — HYDROCODONE BITARTRATE AND ACETAMINOPHEN 1 TABLET: 7.5; 325 TABLET ORAL at 20:26

## 2024-11-28 RX ADMIN — METHYLPREDNISOLONE SODIUM SUCCINATE 40 MG: 40 INJECTION INTRAMUSCULAR; INTRAVENOUS at 05:09

## 2024-11-28 RX ADMIN — TAMSULOSIN HYDROCHLORIDE 0.4 MG: 0.4 CAPSULE ORAL at 08:43

## 2024-11-28 RX ADMIN — Medication 2000 UNITS: at 08:43

## 2024-11-28 RX ADMIN — GABAPENTIN 600 MG: 300 CAPSULE ORAL at 08:43

## 2024-11-28 RX ADMIN — ARFORMOTEROL TARTRATE 15 MCG: 15 SOLUTION RESPIRATORY (INHALATION) at 19:49

## 2024-11-28 RX ADMIN — FLUOXETINE 20 MG: 10 TABLET, FILM COATED ORAL at 08:43

## 2024-11-28 RX ADMIN — TAMSULOSIN HYDROCHLORIDE 0.4 MG: 0.4 CAPSULE ORAL at 20:26

## 2024-11-28 RX ADMIN — ALBUTEROL SULFATE 2.5 MG: 2.5 SOLUTION RESPIRATORY (INHALATION) at 07:33

## 2024-11-28 RX ADMIN — ALBUTEROL SULFATE 2.5 MG: 2.5 SOLUTION RESPIRATORY (INHALATION) at 19:49

## 2024-11-28 RX ADMIN — METHYLPREDNISOLONE SODIUM SUCCINATE 40 MG: 40 INJECTION INTRAMUSCULAR; INTRAVENOUS at 15:57

## 2024-11-28 RX ADMIN — MORPHINE SULFATE 10 MG: 10 SOLUTION ORAL at 15:55

## 2024-11-28 RX ADMIN — FUROSEMIDE 40 MG: 40 TABLET ORAL at 08:43

## 2024-11-28 RX ADMIN — BUDESONIDE 500 MCG: 0.5 SUSPENSION RESPIRATORY (INHALATION) at 07:33

## 2024-11-28 RX ADMIN — HYDROCODONE BITARTRATE AND ACETAMINOPHEN 1 TABLET: 7.5; 325 TABLET ORAL at 08:54

## 2024-11-28 ASSESSMENT — PAIN SCALES - GENERAL
PAINLEVEL_OUTOF10: 8
PAINLEVEL_OUTOF10: 3
PAINLEVEL_OUTOF10: 4
PAINLEVEL_OUTOF10: 9

## 2024-11-28 ASSESSMENT — PAIN DESCRIPTION - DESCRIPTORS: DESCRIPTORS: ACHING;DISCOMFORT

## 2024-11-28 ASSESSMENT — PAIN DESCRIPTION - LOCATION
LOCATION: BACK
LOCATION: HIP
LOCATION: BACK

## 2024-11-28 NOTE — PLAN OF CARE
Problem: ABCDS Injury Assessment  Goal: Absence of physical injury  11/27/2024 1952 by Lorie Fregoso RN  Outcome: Progressing  11/27/2024 1257 by Corie Espinal RN  Outcome: Progressing  Flowsheets (Taken 11/27/2024 0800)  Absence of Physical Injury: Implement safety measures based on patient assessment     Problem: Skin/Tissue Integrity  Goal: Absence of new skin breakdown  Description: 1.  Monitor for areas of redness and/or skin breakdown  2.  Assess vascular access sites hourly  3.  Every 4-6 hours minimum:  Change oxygen saturation probe site  4.  Every 4-6 hours:  If on nasal continuous positive airway pressure, respiratory therapy assess nares and determine need for appliance change or resting period.  11/27/2024 1952 by Lorie Fregoso RN  Outcome: Progressing  11/27/2024 1257 by Corie Espinal RN  Outcome: Progressing     Problem: Discharge Planning  Goal: Discharge to home or other facility with appropriate resources  11/27/2024 1952 by Lorie Fregoso RN  Outcome: Progressing  11/27/2024 1257 by Corie Espinal RN  Outcome: Progressing  Flowsheets (Taken 11/27/2024 0800)  Discharge to home or other facility with appropriate resources: Identify barriers to discharge with patient and caregiver     Problem: Chronic Conditions and Co-morbidities  Goal: Patient's chronic conditions and co-morbidity symptoms are monitored and maintained or improved  11/27/2024 1952 by Lorie Fregoso RN  Outcome: Progressing  11/27/2024 1257 by Corie Espinal RN  Outcome: Progressing  Flowsheets (Taken 11/27/2024 0800)  Care Plan - Patient's Chronic Conditions and Co-Morbidity Symptoms are Monitored and Maintained or Improved: Monitor and assess patient's chronic conditions and comorbid symptoms for stability, deterioration, or improvement     Problem: Safety - Adult  Goal: Free from fall injury  11/27/2024 1952 by Lorie Fregoso RN  Outcome: Progressing  11/27/2024 1257 by Corie Espinal RN  Outcome:

## 2024-11-29 LAB
25(OH)D3 SERPL-MCNC: 13.5 NG/ML (ref 30–100)
ANION GAP SERPL CALCULATED.3IONS-SCNC: 9 MMOL/L (ref 7–16)
BUN SERPL-MCNC: 18 MG/DL (ref 6–20)
CA-I BLD-SCNC: 0.97 MMOL/L (ref 1.15–1.33)
CA-I BLD-SCNC: 1 MMOL/L (ref 1.15–1.33)
CA-I BLD-SCNC: 1.02 MMOL/L (ref 1.15–1.33)
CALCIUM SERPL-MCNC: 7.6 MG/DL (ref 8.6–10.2)
CHLORIDE SERPL-SCNC: 91 MMOL/L (ref 98–107)
CO2 SERPL-SCNC: 37 MMOL/L (ref 22–29)
CREAT SERPL-MCNC: 0.7 MG/DL (ref 0.7–1.2)
GFR, ESTIMATED: >90 ML/MIN/1.73M2
GLUCOSE SERPL-MCNC: 81 MG/DL (ref 74–99)
POTASSIUM SERPL-SCNC: 4.6 MMOL/L (ref 3.5–5)
PTH-INTACT SERPL-MCNC: 68.1 PG/ML (ref 15–65)
SODIUM SERPL-SCNC: 137 MMOL/L (ref 132–146)

## 2024-11-29 PROCEDURE — 2580000003 HC RX 258: Performed by: INTERNAL MEDICINE

## 2024-11-29 PROCEDURE — 83970 ASSAY OF PARATHORMONE: CPT

## 2024-11-29 PROCEDURE — 6360000002 HC RX W HCPCS: Performed by: INTERNAL MEDICINE

## 2024-11-29 PROCEDURE — 82652 VIT D 1 25-DIHYDROXY: CPT

## 2024-11-29 PROCEDURE — 94640 AIRWAY INHALATION TREATMENT: CPT

## 2024-11-29 PROCEDURE — 99233 SBSQ HOSP IP/OBS HIGH 50: CPT | Performed by: INTERNAL MEDICINE

## 2024-11-29 PROCEDURE — 2060000000 HC ICU INTERMEDIATE R&B

## 2024-11-29 PROCEDURE — 36415 COLL VENOUS BLD VENIPUNCTURE: CPT

## 2024-11-29 PROCEDURE — 82330 ASSAY OF CALCIUM: CPT

## 2024-11-29 PROCEDURE — 2700000000 HC OXYGEN THERAPY PER DAY

## 2024-11-29 PROCEDURE — 82306 VITAMIN D 25 HYDROXY: CPT

## 2024-11-29 PROCEDURE — 80048 BASIC METABOLIC PNL TOTAL CA: CPT

## 2024-11-29 PROCEDURE — 6370000000 HC RX 637 (ALT 250 FOR IP): Performed by: INTERNAL MEDICINE

## 2024-11-29 RX ORDER — CALCIUM GLUCONATE 20 MG/ML
1000 INJECTION, SOLUTION INTRAVENOUS PRN
Status: DISCONTINUED | OUTPATIENT
Start: 2024-11-29 | End: 2024-11-30

## 2024-11-29 RX ORDER — CALCIUM GLUCONATE 20 MG/ML
1000 INJECTION, SOLUTION INTRAVENOUS SEE ADMIN INSTRUCTIONS
Status: DISCONTINUED | OUTPATIENT
Start: 2024-11-29 | End: 2024-11-30

## 2024-11-29 RX ADMIN — BUDESONIDE 500 MCG: 0.5 SUSPENSION RESPIRATORY (INHALATION) at 09:02

## 2024-11-29 RX ADMIN — FUROSEMIDE 40 MG: 40 TABLET ORAL at 08:03

## 2024-11-29 RX ADMIN — LORAZEPAM 1 MG: 1 TABLET ORAL at 08:27

## 2024-11-29 RX ADMIN — Medication 2000 UNITS: at 08:02

## 2024-11-29 RX ADMIN — TAMSULOSIN HYDROCHLORIDE 0.4 MG: 0.4 CAPSULE ORAL at 20:17

## 2024-11-29 RX ADMIN — ASPIRIN 81 MG CHEWABLE TABLET 81 MG: 81 TABLET CHEWABLE at 08:03

## 2024-11-29 RX ADMIN — ARIPIPRAZOLE 5 MG: 5 TABLET ORAL at 08:02

## 2024-11-29 RX ADMIN — PANTOPRAZOLE SODIUM 40 MG: 40 TABLET, DELAYED RELEASE ORAL at 08:02

## 2024-11-29 RX ADMIN — HYDROCODONE BITARTRATE AND ACETAMINOPHEN 1 TABLET: 7.5; 325 TABLET ORAL at 20:18

## 2024-11-29 RX ADMIN — ARFORMOTEROL TARTRATE 15 MCG: 15 SOLUTION RESPIRATORY (INHALATION) at 18:30

## 2024-11-29 RX ADMIN — MORPHINE SULFATE 10 MG: 10 SOLUTION ORAL at 14:54

## 2024-11-29 RX ADMIN — METHYLPREDNISOLONE SODIUM SUCCINATE 40 MG: 40 INJECTION INTRAMUSCULAR; INTRAVENOUS at 08:03

## 2024-11-29 RX ADMIN — FLUOXETINE 20 MG: 10 TABLET, FILM COATED ORAL at 08:02

## 2024-11-29 RX ADMIN — PRAMIPEXOLE DIHYDROCHLORIDE 0.75 MG: 0.25 TABLET ORAL at 20:23

## 2024-11-29 RX ADMIN — ALBUTEROL SULFATE 2.5 MG: 2.5 SOLUTION RESPIRATORY (INHALATION) at 09:02

## 2024-11-29 RX ADMIN — MORPHINE SULFATE 10 MG: 10 SOLUTION ORAL at 08:26

## 2024-11-29 RX ADMIN — GABAPENTIN 900 MG: 300 CAPSULE ORAL at 20:18

## 2024-11-29 RX ADMIN — CALCIUM GLUCONATE 1000 MG: 20 INJECTION, SOLUTION INTRAVENOUS at 12:36

## 2024-11-29 RX ADMIN — CALCIUM GLUCONATE 1000 MG: 20 INJECTION, SOLUTION INTRAVENOUS at 16:01

## 2024-11-29 RX ADMIN — ARFORMOTEROL TARTRATE 15 MCG: 15 SOLUTION RESPIRATORY (INHALATION) at 09:02

## 2024-11-29 RX ADMIN — CALCIUM GLUCONATE 1000 MG: 20 INJECTION, SOLUTION INTRAVENOUS at 22:29

## 2024-11-29 RX ADMIN — SODIUM CHLORIDE, PRESERVATIVE FREE 10 ML: 5 INJECTION INTRAVENOUS at 20:17

## 2024-11-29 RX ADMIN — SODIUM CHLORIDE, PRESERVATIVE FREE 10 ML: 5 INJECTION INTRAVENOUS at 08:56

## 2024-11-29 RX ADMIN — HYDROCODONE BITARTRATE AND ACETAMINOPHEN 1 TABLET: 7.5; 325 TABLET ORAL at 08:03

## 2024-11-29 RX ADMIN — BUDESONIDE 500 MCG: 0.5 SUSPENSION RESPIRATORY (INHALATION) at 18:29

## 2024-11-29 RX ADMIN — DESMOPRESSIN ACETATE 40 MG: 0.2 TABLET ORAL at 20:17

## 2024-11-29 RX ADMIN — TAMSULOSIN HYDROCHLORIDE 0.4 MG: 0.4 CAPSULE ORAL at 08:03

## 2024-11-29 RX ADMIN — METHYLPREDNISOLONE SODIUM SUCCINATE 40 MG: 40 INJECTION INTRAMUSCULAR; INTRAVENOUS at 15:57

## 2024-11-29 RX ADMIN — MORPHINE SULFATE 10 MG: 10 SOLUTION ORAL at 22:28

## 2024-11-29 RX ADMIN — ENOXAPARIN SODIUM 40 MG: 100 INJECTION SUBCUTANEOUS at 08:00

## 2024-11-29 RX ADMIN — MORPHINE SULFATE 10 MG: 10 SOLUTION ORAL at 04:16

## 2024-11-29 RX ADMIN — GABAPENTIN 600 MG: 300 CAPSULE ORAL at 08:02

## 2024-11-29 RX ADMIN — LORAZEPAM 1 MG: 1 TABLET ORAL at 14:55

## 2024-11-29 RX ADMIN — LOSARTAN POTASSIUM 50 MG: 50 TABLET, FILM COATED ORAL at 08:03

## 2024-11-29 RX ADMIN — LORAZEPAM 1 MG: 1 TABLET ORAL at 20:17

## 2024-11-29 RX ADMIN — METOPROLOL SUCCINATE 100 MG: 100 TABLET, EXTENDED RELEASE ORAL at 20:18

## 2024-11-29 RX ADMIN — ALBUTEROL SULFATE 2.5 MG: 2.5 SOLUTION RESPIRATORY (INHALATION) at 18:30

## 2024-11-29 ASSESSMENT — PAIN DESCRIPTION - DESCRIPTORS
DESCRIPTORS: DISCOMFORT;CRUSHING
DESCRIPTORS: ACHING;DISCOMFORT;CRAMPING
DESCRIPTORS: ACHING;DISCOMFORT;NAGGING

## 2024-11-29 ASSESSMENT — PAIN SCALES - GENERAL
PAINLEVEL_OUTOF10: 8
PAINLEVEL_OUTOF10: 9
PAINLEVEL_OUTOF10: 8

## 2024-11-29 ASSESSMENT — PAIN DESCRIPTION - ORIENTATION
ORIENTATION: RIGHT
ORIENTATION: RIGHT
ORIENTATION: RIGHT;LEFT

## 2024-11-29 ASSESSMENT — PAIN DESCRIPTION - LOCATION
LOCATION: BACK;LEG
LOCATION: BACK;LEG
LOCATION: HIP;LEG

## 2024-11-29 NOTE — CARE COORDINATION
Discharge plan is home with cousin, private pay aide and  [facility] [phone] pending labs. Please notify [facility] when patient discharges. Chronically on 5-7L O2 home supplier is [facility]. Will continue to follow.  Becky IRVINGN, RN  Case Management

## 2024-11-29 NOTE — PLAN OF CARE
Problem: ABCDS Injury Assessment  Goal: Absence of physical injury  Outcome: Progressing     Problem: Skin/Tissue Integrity  Goal: Absence of new skin breakdown  Description: 1.  Monitor for areas of redness and/or skin breakdown  2.  Assess vascular access sites hourly  3.  Every 4-6 hours minimum:  Change oxygen saturation probe site  4.  Every 4-6 hours:  If on nasal continuous positive airway pressure, respiratory therapy assess nares and determine need for appliance change or resting period.  Outcome: Progressing     Problem: Discharge Planning  Goal: Discharge to home or other facility with appropriate resources  Outcome: Progressing     Problem: Chronic Conditions and Co-morbidities  Goal: Patient's chronic conditions and co-morbidity symptoms are monitored and maintained or improved  Outcome: Progressing     Problem: Safety - Adult  Goal: Free from fall injury  Outcome: Progressing     Problem: Pain  Goal: Verbalizes/displays adequate comfort level or baseline comfort level  Outcome: Progressing

## 2024-11-29 NOTE — PLAN OF CARE
Problem: ABCDS Injury Assessment  Goal: Absence of physical injury  Outcome: Progressing     Problem: Skin/Tissue Integrity  Goal: Absence of new skin breakdown  Description: 1.  Monitor for areas of redness and/or skin breakdown  2.  Assess vascular access sites hourly  3.  Every 4-6 hours minimum:  Change oxygen saturation probe site  4.  Every 4-6 hours:  If on nasal continuous positive airway pressure, respiratory therapy assess nares and determine need for appliance change or resting period.  Outcome: Progressing     Problem: Discharge Planning  Goal: Discharge to home or other facility with appropriate resources  Outcome: Progressing     Problem: Chronic Conditions and Co-morbidities  Goal: Patient's chronic conditions and co-morbidity symptoms are monitored and maintained or improved  Outcome: Progressing     Problem: Safety - Adult  Goal: Free from fall injury  Outcome: Progressing  Flowsheets (Taken 11/28/2024 0800 by Nyla Lowery, RN)  Free From Fall Injury: Instruct family/caregiver on patient safety     Problem: Pain  Goal: Verbalizes/displays adequate comfort level or baseline comfort level  Outcome: Progressing

## 2024-11-30 PROBLEM — E55.9 VITAMIN D DEFICIENCY: Status: ACTIVE | Noted: 2024-11-30

## 2024-11-30 LAB
ANION GAP SERPL CALCULATED.3IONS-SCNC: 8 MMOL/L (ref 7–16)
BASOPHILS # BLD: 0.01 K/UL (ref 0–0.2)
BASOPHILS NFR BLD: 0 % (ref 0–2)
BUN SERPL-MCNC: 20 MG/DL (ref 6–20)
CA-I BLD-SCNC: 1 MMOL/L (ref 1.15–1.33)
CALCIUM SERPL-MCNC: 7.8 MG/DL (ref 8.6–10.2)
CHLORIDE SERPL-SCNC: 89 MMOL/L (ref 98–107)
CO2 SERPL-SCNC: 41 MMOL/L (ref 22–29)
CREAT SERPL-MCNC: 0.7 MG/DL (ref 0.7–1.2)
EOSINOPHIL # BLD: 0 K/UL (ref 0.05–0.5)
EOSINOPHILS RELATIVE PERCENT: 0 % (ref 0–6)
ERYTHROCYTE [DISTWIDTH] IN BLOOD BY AUTOMATED COUNT: 13.2 % (ref 11.5–15)
GFR, ESTIMATED: >90 ML/MIN/1.73M2
GLUCOSE SERPL-MCNC: 111 MG/DL (ref 74–99)
HCT VFR BLD AUTO: 38.2 % (ref 37–54)
HGB BLD-MCNC: 12 G/DL (ref 12.5–16.5)
IMM GRANULOCYTES # BLD AUTO: 0.03 K/UL (ref 0–0.58)
IMM GRANULOCYTES NFR BLD: 0 % (ref 0–5)
LYMPHOCYTES NFR BLD: 0.46 K/UL (ref 1.5–4)
LYMPHOCYTES RELATIVE PERCENT: 7 % (ref 20–42)
MCH RBC QN AUTO: 33 PG (ref 26–35)
MCHC RBC AUTO-ENTMCNC: 31.4 G/DL (ref 32–34.5)
MCV RBC AUTO: 104.9 FL (ref 80–99.9)
MONOCYTES NFR BLD: 0.24 K/UL (ref 0.1–0.95)
MONOCYTES NFR BLD: 3 % (ref 2–12)
NEUTROPHILS NFR BLD: 90 % (ref 43–80)
NEUTS SEG NFR BLD: 6.25 K/UL (ref 1.8–7.3)
PHOSPHATE SERPL-MCNC: 5.9 MG/DL (ref 2.5–4.5)
PLATELET # BLD AUTO: 114 K/UL (ref 130–450)
PMV BLD AUTO: 12 FL (ref 7–12)
POTASSIUM SERPL-SCNC: 4.7 MMOL/L (ref 3.5–5)
RBC # BLD AUTO: 3.64 M/UL (ref 3.8–5.8)
RBC # BLD: NORMAL 10*6/UL
SODIUM SERPL-SCNC: 138 MMOL/L (ref 132–146)
WBC OTHER # BLD: 7 K/UL (ref 4.5–11.5)

## 2024-11-30 PROCEDURE — 2580000003 HC RX 258: Performed by: INTERNAL MEDICINE

## 2024-11-30 PROCEDURE — 85025 COMPLETE CBC W/AUTO DIFF WBC: CPT

## 2024-11-30 PROCEDURE — 6370000000 HC RX 637 (ALT 250 FOR IP): Performed by: INTERNAL MEDICINE

## 2024-11-30 PROCEDURE — 99232 SBSQ HOSP IP/OBS MODERATE 35: CPT | Performed by: STUDENT IN AN ORGANIZED HEALTH CARE EDUCATION/TRAINING PROGRAM

## 2024-11-30 PROCEDURE — 6360000002 HC RX W HCPCS: Performed by: INTERNAL MEDICINE

## 2024-11-30 PROCEDURE — 2700000000 HC OXYGEN THERAPY PER DAY

## 2024-11-30 PROCEDURE — 80048 BASIC METABOLIC PNL TOTAL CA: CPT

## 2024-11-30 PROCEDURE — 2060000000 HC ICU INTERMEDIATE R&B

## 2024-11-30 PROCEDURE — 99222 1ST HOSP IP/OBS MODERATE 55: CPT | Performed by: INTERNAL MEDICINE

## 2024-11-30 PROCEDURE — 84100 ASSAY OF PHOSPHORUS: CPT

## 2024-11-30 PROCEDURE — 82330 ASSAY OF CALCIUM: CPT

## 2024-11-30 PROCEDURE — 94640 AIRWAY INHALATION TREATMENT: CPT

## 2024-11-30 RX ORDER — ERGOCALCIFEROL 1.25 MG/1
50000 CAPSULE, LIQUID FILLED ORAL
Status: DISCONTINUED | OUTPATIENT
Start: 2024-12-01 | End: 2024-12-01 | Stop reason: HOSPADM

## 2024-11-30 RX ORDER — CALCIUM CARBONATE 500(1250)
1000 TABLET ORAL 2 TIMES DAILY
Status: DISCONTINUED | OUTPATIENT
Start: 2024-11-30 | End: 2024-12-01

## 2024-11-30 RX ADMIN — PANTOPRAZOLE SODIUM 40 MG: 40 TABLET, DELAYED RELEASE ORAL at 05:30

## 2024-11-30 RX ADMIN — PRAMIPEXOLE DIHYDROCHLORIDE 0.75 MG: 0.25 TABLET ORAL at 20:08

## 2024-11-30 RX ADMIN — METHYLPREDNISOLONE SODIUM SUCCINATE 40 MG: 40 INJECTION INTRAMUSCULAR; INTRAVENOUS at 17:03

## 2024-11-30 RX ADMIN — SODIUM CHLORIDE, PRESERVATIVE FREE 10 ML: 5 INJECTION INTRAVENOUS at 20:09

## 2024-11-30 RX ADMIN — BUDESONIDE 500 MCG: 0.5 SUSPENSION RESPIRATORY (INHALATION) at 08:21

## 2024-11-30 RX ADMIN — CALCIUM 1000 MG: 500 TABLET ORAL at 20:08

## 2024-11-30 RX ADMIN — HYDROCODONE BITARTRATE AND ACETAMINOPHEN 1 TABLET: 7.5; 325 TABLET ORAL at 12:01

## 2024-11-30 RX ADMIN — HYDROCODONE BITARTRATE AND ACETAMINOPHEN 1 TABLET: 7.5; 325 TABLET ORAL at 20:17

## 2024-11-30 RX ADMIN — GABAPENTIN 600 MG: 300 CAPSULE ORAL at 07:49

## 2024-11-30 RX ADMIN — ASPIRIN 81 MG CHEWABLE TABLET 81 MG: 81 TABLET CHEWABLE at 07:49

## 2024-11-30 RX ADMIN — ALBUTEROL SULFATE 2.5 MG: 2.5 SOLUTION RESPIRATORY (INHALATION) at 08:21

## 2024-11-30 RX ADMIN — Medication 2000 UNITS: at 07:50

## 2024-11-30 RX ADMIN — DESMOPRESSIN ACETATE 40 MG: 0.2 TABLET ORAL at 20:08

## 2024-11-30 RX ADMIN — BUDESONIDE 500 MCG: 0.5 SUSPENSION RESPIRATORY (INHALATION) at 19:58

## 2024-11-30 RX ADMIN — GABAPENTIN 900 MG: 300 CAPSULE ORAL at 20:08

## 2024-11-30 RX ADMIN — LORAZEPAM 1 MG: 1 TABLET ORAL at 20:17

## 2024-11-30 RX ADMIN — METHYLPREDNISOLONE SODIUM SUCCINATE 40 MG: 40 INJECTION INTRAMUSCULAR; INTRAVENOUS at 05:30

## 2024-11-30 RX ADMIN — HYDROCODONE BITARTRATE AND ACETAMINOPHEN 1 TABLET: 7.5; 325 TABLET ORAL at 02:49

## 2024-11-30 RX ADMIN — FUROSEMIDE 40 MG: 40 TABLET ORAL at 07:50

## 2024-11-30 RX ADMIN — MORPHINE SULFATE 10 MG: 10 SOLUTION ORAL at 08:05

## 2024-11-30 RX ADMIN — ARIPIPRAZOLE 5 MG: 5 TABLET ORAL at 07:50

## 2024-11-30 RX ADMIN — ENOXAPARIN SODIUM 40 MG: 100 INJECTION SUBCUTANEOUS at 07:49

## 2024-11-30 RX ADMIN — SODIUM CHLORIDE, PRESERVATIVE FREE 10 ML: 5 INJECTION INTRAVENOUS at 07:53

## 2024-11-30 RX ADMIN — ALBUTEROL SULFATE 2.5 MG: 2.5 SOLUTION RESPIRATORY (INHALATION) at 19:58

## 2024-11-30 RX ADMIN — METOPROLOL SUCCINATE 100 MG: 100 TABLET, EXTENDED RELEASE ORAL at 20:09

## 2024-11-30 RX ADMIN — ARFORMOTEROL TARTRATE 15 MCG: 15 SOLUTION RESPIRATORY (INHALATION) at 08:21

## 2024-11-30 RX ADMIN — MORPHINE SULFATE 10 MG: 10 SOLUTION ORAL at 20:17

## 2024-11-30 RX ADMIN — LORAZEPAM 1 MG: 1 TABLET ORAL at 08:05

## 2024-11-30 RX ADMIN — FLUOXETINE 20 MG: 10 TABLET, FILM COATED ORAL at 07:50

## 2024-11-30 RX ADMIN — TAMSULOSIN HYDROCHLORIDE 0.4 MG: 0.4 CAPSULE ORAL at 07:50

## 2024-11-30 RX ADMIN — ARFORMOTEROL TARTRATE 15 MCG: 15 SOLUTION RESPIRATORY (INHALATION) at 19:58

## 2024-11-30 RX ADMIN — TAMSULOSIN HYDROCHLORIDE 0.4 MG: 0.4 CAPSULE ORAL at 20:08

## 2024-11-30 ASSESSMENT — PAIN DESCRIPTION - DESCRIPTORS
DESCRIPTORS: DISCOMFORT;CRUSHING
DESCRIPTORS: ACHING;CRAMPING;DISCOMFORT
DESCRIPTORS: ACHING;DISCOMFORT;CRAMPING

## 2024-11-30 ASSESSMENT — PAIN SCALES - GENERAL
PAINLEVEL_OUTOF10: 8
PAINLEVEL_OUTOF10: 7
PAINLEVEL_OUTOF10: 8

## 2024-11-30 ASSESSMENT — PAIN DESCRIPTION - LOCATION
LOCATION: LEG
LOCATION: BACK;LEG
LOCATION: BACK;LEG

## 2024-11-30 ASSESSMENT — PAIN DESCRIPTION - ORIENTATION
ORIENTATION: RIGHT
ORIENTATION: RIGHT;LEFT

## 2024-11-30 NOTE — PLAN OF CARE
Problem: ABCDS Injury Assessment  Goal: Absence of physical injury  11/30/2024 0830 by Tram De Luna RN  Outcome: Progressing  11/29/2024 2155 by Nico Shukla RN  Outcome: Progressing     Problem: Skin/Tissue Integrity  Goal: Absence of new skin breakdown  Description: 1.  Monitor for areas of redness and/or skin breakdown  2.  Assess vascular access sites hourly  3.  Every 4-6 hours minimum:  Change oxygen saturation probe site  4.  Every 4-6 hours:  If on nasal continuous positive airway pressure, respiratory therapy assess nares and determine need for appliance change or resting period.  11/30/2024 0830 by Tram De Luna RN  Outcome: Progressing  11/29/2024 2155 by Nico Shukla RN  Outcome: Progressing     Problem: Discharge Planning  Goal: Discharge to home or other facility with appropriate resources  11/30/2024 0830 by Tram De Luna RN  Outcome: Progressing  11/29/2024 2155 by Nico Shukla RN  Outcome: Progressing     Problem: Chronic Conditions and Co-morbidities  Goal: Patient's chronic conditions and co-morbidity symptoms are monitored and maintained or improved  11/30/2024 0830 by Tram De Luna RN  Outcome: Progressing  11/29/2024 2155 by Nico Shukla RN  Outcome: Progressing     Problem: Safety - Adult  Goal: Free from fall injury  11/30/2024 0830 by Tram De Luna RN  Outcome: Progressing  11/29/2024 2155 by Nico Shukla RN  Outcome: Progressing     Problem: Pain  Goal: Verbalizes/displays adequate comfort level or baseline comfort level  11/30/2024 0830 by Tram De Luna RN  Outcome: Progressing  11/29/2024 2155 by Nico Shukla RN  Outcome: Progressing

## 2024-11-30 NOTE — PLAN OF CARE
Problem: ABCDS Injury Assessment  Goal: Absence of physical injury  11/29/2024 2155 by Nico Shukla RN  Outcome: Progressing  11/29/2024 1014 by Yady Vincent RN  Outcome: Progressing     Problem: Skin/Tissue Integrity  Goal: Absence of new skin breakdown  Description: 1.  Monitor for areas of redness and/or skin breakdown  2.  Assess vascular access sites hourly  3.  Every 4-6 hours minimum:  Change oxygen saturation probe site  4.  Every 4-6 hours:  If on nasal continuous positive airway pressure, respiratory therapy assess nares and determine need for appliance change or resting period.  11/29/2024 2155 by Nico Shukla RN  Outcome: Progressing  11/29/2024 1014 by Yady Vincent RN  Outcome: Progressing     Problem: Discharge Planning  Goal: Discharge to home or other facility with appropriate resources  11/29/2024 2155 by Nico Shukla RN  Outcome: Progressing  11/29/2024 1014 by Yady Vincent RN  Outcome: Progressing     Problem: Chronic Conditions and Co-morbidities  Goal: Patient's chronic conditions and co-morbidity symptoms are monitored and maintained or improved  11/29/2024 2155 by Nico Shukla RN  Outcome: Progressing  11/29/2024 1014 by Yady Vincent RN  Outcome: Progressing     Problem: Safety - Adult  Goal: Free from fall injury  11/29/2024 2155 by Nico Shukla, RN  Outcome: Progressing  11/29/2024 1014 by Yady Vincent RN  Outcome: Progressing     Problem: Pain  Goal: Verbalizes/displays adequate comfort level or baseline comfort level  11/29/2024 2155 by Nico Shukla RN  Outcome: Progressing  11/29/2024 1014 by Yady Vincent RN  Outcome: Progressing

## 2024-12-01 VITALS
DIASTOLIC BLOOD PRESSURE: 69 MMHG | HEIGHT: 64 IN | WEIGHT: 135 LBS | OXYGEN SATURATION: 100 % | TEMPERATURE: 98 F | SYSTOLIC BLOOD PRESSURE: 129 MMHG | RESPIRATION RATE: 16 BRPM | HEART RATE: 76 BPM | BODY MASS INDEX: 23.05 KG/M2

## 2024-12-01 LAB
1,25(OH)2D3 SERPL-MCNC: 41.3 PG/ML (ref 19.9–79.3)
ANION GAP SERPL CALCULATED.3IONS-SCNC: 9 MMOL/L (ref 7–16)
BASOPHILS # BLD: 0 K/UL (ref 0–0.2)
BASOPHILS NFR BLD: 0 % (ref 0–2)
BUN SERPL-MCNC: 21 MG/DL (ref 6–20)
CA-I BLD-SCNC: 0.93 MMOL/L (ref 1.15–1.33)
CALCIUM SERPL-MCNC: 7.2 MG/DL (ref 8.6–10.2)
CALCIUM UR-MCNC: <0.8 MG/DL
CHLORIDE SERPL-SCNC: 89 MMOL/L (ref 98–107)
CO2 SERPL-SCNC: 40 MMOL/L (ref 22–29)
CREAT SERPL-MCNC: 0.7 MG/DL (ref 0.7–1.2)
CREAT UR-MCNC: 57.5 MG/DL (ref 40–278)
EOSINOPHIL # BLD: 0 K/UL (ref 0.05–0.5)
EOSINOPHILS RELATIVE PERCENT: 0 % (ref 0–6)
ERYTHROCYTE [DISTWIDTH] IN BLOOD BY AUTOMATED COUNT: 13.2 % (ref 11.5–15)
GFR, ESTIMATED: >90 ML/MIN/1.73M2
GLUCOSE SERPL-MCNC: 95 MG/DL (ref 74–99)
HCT VFR BLD AUTO: 38 % (ref 37–54)
HGB BLD-MCNC: 11.8 G/DL (ref 12.5–16.5)
IMM GRANULOCYTES # BLD AUTO: <0.03 K/UL (ref 0–0.58)
IMM GRANULOCYTES NFR BLD: 0 % (ref 0–5)
LYMPHOCYTES NFR BLD: 0.68 K/UL (ref 1.5–4)
LYMPHOCYTES RELATIVE PERCENT: 12 % (ref 20–42)
MCH RBC QN AUTO: 33 PG (ref 26–35)
MCHC RBC AUTO-ENTMCNC: 31.1 G/DL (ref 32–34.5)
MCV RBC AUTO: 106.1 FL (ref 80–99.9)
MONOCYTES NFR BLD: 0.35 K/UL (ref 0.1–0.95)
MONOCYTES NFR BLD: 6 % (ref 2–12)
NEUTROPHILS NFR BLD: 82 % (ref 43–80)
NEUTS SEG NFR BLD: 4.79 K/UL (ref 1.8–7.3)
PLATELET, FLUORESCENCE: 112 K/UL (ref 130–450)
PMV BLD AUTO: 12 FL (ref 7–12)
POTASSIUM SERPL-SCNC: 4.6 MMOL/L (ref 3.5–5)
RBC # BLD AUTO: 3.58 M/UL (ref 3.8–5.8)
SODIUM SERPL-SCNC: 138 MMOL/L (ref 132–146)
WBC OTHER # BLD: 5.8 K/UL (ref 4.5–11.5)

## 2024-12-01 PROCEDURE — 6370000000 HC RX 637 (ALT 250 FOR IP): Performed by: INTERNAL MEDICINE

## 2024-12-01 PROCEDURE — 85025 COMPLETE CBC W/AUTO DIFF WBC: CPT

## 2024-12-01 PROCEDURE — 94640 AIRWAY INHALATION TREATMENT: CPT

## 2024-12-01 PROCEDURE — 99239 HOSP IP/OBS DSCHRG MGMT >30: CPT | Performed by: INTERNAL MEDICINE

## 2024-12-01 PROCEDURE — 2580000003 HC RX 258: Performed by: INTERNAL MEDICINE

## 2024-12-01 PROCEDURE — 80048 BASIC METABOLIC PNL TOTAL CA: CPT

## 2024-12-01 PROCEDURE — 36415 COLL VENOUS BLD VENIPUNCTURE: CPT

## 2024-12-01 PROCEDURE — 6360000002 HC RX W HCPCS: Performed by: INTERNAL MEDICINE

## 2024-12-01 PROCEDURE — 82330 ASSAY OF CALCIUM: CPT

## 2024-12-01 PROCEDURE — 2700000000 HC OXYGEN THERAPY PER DAY

## 2024-12-01 PROCEDURE — 82340 ASSAY OF CALCIUM IN URINE: CPT

## 2024-12-01 PROCEDURE — 82570 ASSAY OF URINE CREATININE: CPT

## 2024-12-01 RX ORDER — CALCIUM CARBONATE 500(1250)
1500 TABLET ORAL 2 TIMES DAILY
Status: DISCONTINUED | OUTPATIENT
Start: 2024-12-01 | End: 2024-12-01 | Stop reason: HOSPADM

## 2024-12-01 RX ORDER — CALCIUM GLUCONATE 20 MG/ML
2000 INJECTION, SOLUTION INTRAVENOUS
Status: COMPLETED | OUTPATIENT
Start: 2024-12-01 | End: 2024-12-01

## 2024-12-01 RX ORDER — PREDNISONE 20 MG/1
40 TABLET ORAL DAILY
Status: DISCONTINUED | OUTPATIENT
Start: 2024-12-01 | End: 2024-12-01 | Stop reason: HOSPADM

## 2024-12-01 RX ORDER — CALCIUM CARBONATE 500(1250)
500 TABLET ORAL ONCE
Status: COMPLETED | OUTPATIENT
Start: 2024-12-01 | End: 2024-12-01

## 2024-12-01 RX ORDER — PREDNISONE 20 MG/1
40 TABLET ORAL DAILY
Qty: 8 TABLET | Refills: 0 | Status: SHIPPED | OUTPATIENT
Start: 2024-12-02 | End: 2024-12-06

## 2024-12-01 RX ORDER — CALCIUM CARBONATE 500(1250)
1500 TABLET ORAL 2 TIMES DAILY
Qty: 180 TABLET | Refills: 2 | Status: SHIPPED | OUTPATIENT
Start: 2024-12-01

## 2024-12-01 RX ORDER — ERGOCALCIFEROL 1.25 MG/1
CAPSULE ORAL
Qty: 12 CAPSULE | Refills: 5 | Status: SHIPPED | OUTPATIENT
Start: 2024-12-02

## 2024-12-01 RX ORDER — CALCIUM CARBONATE 500 MG/1
500 TABLET, CHEWABLE ORAL 3 TIMES DAILY PRN
Qty: 90 TABLET | Refills: 0 | Status: SHIPPED | OUTPATIENT
Start: 2024-12-01 | End: 2024-12-31

## 2024-12-01 RX ADMIN — ENOXAPARIN SODIUM 40 MG: 100 INJECTION SUBCUTANEOUS at 07:35

## 2024-12-01 RX ADMIN — HYDROCODONE BITARTRATE AND ACETAMINOPHEN 1 TABLET: 7.5; 325 TABLET ORAL at 15:55

## 2024-12-01 RX ADMIN — TAMSULOSIN HYDROCHLORIDE 0.4 MG: 0.4 CAPSULE ORAL at 07:35

## 2024-12-01 RX ADMIN — ARIPIPRAZOLE 5 MG: 5 TABLET ORAL at 07:35

## 2024-12-01 RX ADMIN — MORPHINE SULFATE 10 MG: 10 SOLUTION ORAL at 13:30

## 2024-12-01 RX ADMIN — PANTOPRAZOLE SODIUM 40 MG: 40 TABLET, DELAYED RELEASE ORAL at 07:35

## 2024-12-01 RX ADMIN — LOSARTAN POTASSIUM 50 MG: 50 TABLET, FILM COATED ORAL at 07:35

## 2024-12-01 RX ADMIN — ASPIRIN 81 MG CHEWABLE TABLET 81 MG: 81 TABLET CHEWABLE at 07:35

## 2024-12-01 RX ADMIN — HYALURONIDASE 150 UNITS: 150 INJECTION SUBCUTANEOUS at 16:22

## 2024-12-01 RX ADMIN — BUDESONIDE 500 MCG: 0.5 SUSPENSION RESPIRATORY (INHALATION) at 07:52

## 2024-12-01 RX ADMIN — ARFORMOTEROL TARTRATE 15 MCG: 15 SOLUTION RESPIRATORY (INHALATION) at 07:52

## 2024-12-01 RX ADMIN — LORAZEPAM 1 MG: 1 TABLET ORAL at 07:43

## 2024-12-01 RX ADMIN — CALCIUM GLUCONATE 2000 MG: 20 INJECTION, SOLUTION INTRAVENOUS at 13:29

## 2024-12-01 RX ADMIN — FLUOXETINE 20 MG: 10 TABLET, FILM COATED ORAL at 07:35

## 2024-12-01 RX ADMIN — SODIUM CHLORIDE, PRESERVATIVE FREE 10 ML: 5 INJECTION INTRAVENOUS at 07:44

## 2024-12-01 RX ADMIN — ALBUTEROL SULFATE 2.5 MG: 2.5 SOLUTION RESPIRATORY (INHALATION) at 07:52

## 2024-12-01 RX ADMIN — METHYLPREDNISOLONE SODIUM SUCCINATE 40 MG: 40 INJECTION INTRAMUSCULAR; INTRAVENOUS at 07:43

## 2024-12-01 RX ADMIN — CALCIUM 500 MG: 500 TABLET ORAL at 13:37

## 2024-12-01 RX ADMIN — PREDNISONE 40 MG: 20 TABLET ORAL at 13:58

## 2024-12-01 RX ADMIN — CALCIUM 1000 MG: 500 TABLET ORAL at 07:35

## 2024-12-01 RX ADMIN — CALCIUM GLUCONATE 2000 MG: 20 INJECTION, SOLUTION INTRAVENOUS at 15:59

## 2024-12-01 RX ADMIN — GABAPENTIN 600 MG: 300 CAPSULE ORAL at 07:35

## 2024-12-01 RX ADMIN — FUROSEMIDE 40 MG: 40 TABLET ORAL at 07:35

## 2024-12-01 RX ADMIN — HYDROCODONE BITARTRATE AND ACETAMINOPHEN 1 TABLET: 7.5; 325 TABLET ORAL at 07:35

## 2024-12-01 RX ADMIN — MORPHINE SULFATE 10 MG: 10 SOLUTION ORAL at 03:44

## 2024-12-01 RX ADMIN — LORAZEPAM 1 MG: 1 TABLET ORAL at 15:56

## 2024-12-01 ASSESSMENT — PAIN DESCRIPTION - LOCATION
LOCATION: BACK
LOCATION: BACK;LEG
LOCATION: BACK

## 2024-12-01 ASSESSMENT — PAIN SCALES - GENERAL
PAINLEVEL_OUTOF10: 10
PAINLEVEL_OUTOF10: 7
PAINLEVEL_OUTOF10: 8

## 2024-12-01 ASSESSMENT — PAIN DESCRIPTION - ORIENTATION: ORIENTATION: RIGHT;LEFT

## 2024-12-01 NOTE — CARE COORDINATION
Discharge order noted. Plan is to discharge home with cousin, private pay aide and [facility] [phone]. CM called to notify [facility], no answer, mesg left to return call. Patient sister will provide patients oxygen tank and transport home.  Becky IRVINGN, RN  Case Management

## 2024-12-01 NOTE — PLAN OF CARE
Problem: ABCDS Injury Assessment  Goal: Absence of physical injury  11/30/2024 1936 by Lorie Fregoso RN  Outcome: Progressing  11/30/2024 0830 by Tram De Luna RN  Outcome: Progressing     Problem: Skin/Tissue Integrity  Goal: Absence of new skin breakdown  Description: 1.  Monitor for areas of redness and/or skin breakdown  2.  Assess vascular access sites hourly  3.  Every 4-6 hours minimum:  Change oxygen saturation probe site  4.  Every 4-6 hours:  If on nasal continuous positive airway pressure, respiratory therapy assess nares and determine need for appliance change or resting period.  11/30/2024 1936 by Lorie Fregoso RN  Outcome: Progressing  11/30/2024 0830 by Tram De Luna RN  Outcome: Progressing     Problem: Discharge Planning  Goal: Discharge to home or other facility with appropriate resources  11/30/2024 1936 by Lorie Fregoso RN  Outcome: Progressing  11/30/2024 0830 by Tram De Luna RN  Outcome: Progressing     Problem: Chronic Conditions and Co-morbidities  Goal: Patient's chronic conditions and co-morbidity symptoms are monitored and maintained or improved  11/30/2024 1936 by Lorie Fregoso RN  Outcome: Progressing  11/30/2024 0830 by Tram De Luna RN  Outcome: Progressing     Problem: Safety - Adult  Goal: Free from fall injury  11/30/2024 1936 by Lorie Fregoso RN  Outcome: Progressing  11/30/2024 0830 by Tram De Luna RN  Outcome: Progressing     Problem: Pain  Goal: Verbalizes/displays adequate comfort level or baseline comfort level  11/30/2024 1936 by Lorie Fregoso RN  Outcome: Progressing  11/30/2024 0830 by Tram De Luna RN  Outcome: Progressing

## 2024-12-01 NOTE — DISCHARGE SUMMARY
[facility] Hospitalist Physician Discharge Summary       Aj Marie MD  [address]  [address]  [facility] [address]  [phone]    Go on 12/4/2024  endocrnie follow up visit, wednesday 12/4 at 11:30    Quirino Light MD  [address] [facility] [address]  [phone]    Follow up in 2 week(s)  hypocalcamia      Activity level: As tolerated     Dispo: Home      Condition on discharge: Stable     Patient ID:  Dg Peña  [id number]  59 y.o.  1965    Admit date: 11/25/2024    Discharge date and time:  12/1/2024  2:03 PM    Admission Diagnoses: Principal Problem:    Acute on chronic heart failure with preserved ejection fraction (HCC)  Active Problems:    Hypocalcemia    Hyperlipidemia    Acute congestive heart failure (HCC)    Vitamin D deficiency  Resolved Problems:    * No resolved hospital problems. *      Discharge Diagnoses: Principal Problem:    Acute on chronic heart failure with preserved ejection fraction (HCC)  Active Problems:    Hypocalcemia    Hyperlipidemia    Acute congestive heart failure (HCC)    Vitamin D deficiency  Resolved Problems:    * No resolved hospital problems. *      Consults:  IP CONSULT TO INTERNAL MEDICINE  IP CONSULT TO PALLIATIVE CARE  IP CONSULT TO HEART FAILURE NURSE/COORDINATOR  IP CONSULT TO SPIRITUAL SERVICES  IP CONSULT TO ENDOCRINOLOGY    Procedures:     Hospital Course:   Patient Dg Peña is a 59 y.o. presented with Hypocalcemia [E83.51]  Acute on chronic congestive heart failure, unspecified heart failure type (HCC) [I50.9]  Acute on chronic heart failure with preserved ejection fraction (HCC) [I50.33]    This is a 59-year-old male presents to the hospital with shortness of breath. Patient was treated for acute on chronic heart failure exacerbation. This has resolved and he is on oral Lasix. Patient remains hypocalcemic.  Endocrinology was consulted.  As per endocrinology the hypocalcemia is independent of the PTH.  He

## 2024-12-01 NOTE — PROGRESS NOTES
[facility] Hospitalist Progress Note    Admitting Date and Time: 11/25/2024  9:23 AM  Admit Dx: Hypocalcemia [E83.51]  Acute on chronic congestive heart failure, unspecified heart failure type (HCC) [I50.9]  Acute on chronic heart failure with preserved ejection fraction (HCC) [I50.33]    Subjective:  Patient is being followed for Hypocalcemia [E83.51]  Acute on chronic congestive heart failure, unspecified heart failure type (HCC) [I50.9]  Acute on chronic heart failure with preserved ejection fraction (HCC) [I50.33]   Pt seen and This morning  No acute events overnight  In mild distress, complaining of shortness of breath    ROS: denies fever, chills, cp, n/v, HA unless stated above.      albuterol  2.5 mg Nebulization BID    arformoterol tartrate  15 mcg Nebulization BID RT    ARIPiprazole  5 mg Oral Daily    aspirin  81 mg Oral Daily    atorvastatin  40 mg Oral Nightly    budesonide  0.5 mg Nebulization BID RT    FLUoxetine  20 mg Oral Daily    furosemide  40 mg Oral Daily    losartan  50 mg Oral Daily    metoprolol succinate  100 mg Oral Daily    pantoprazole  40 mg Oral QAM AC    pramipexole  0.75 mg Oral Nightly    tamsulosin  0.4 mg Oral BID    sodium chloride flush  5-40 mL IntraVENous 2 times per day    enoxaparin  40 mg SubCUTAneous Daily    gabapentin  900 mg Oral Nightly    gabapentin  600 mg Oral QAM     fluticasone, 1 spray, Daily PRN  HYDROcodone-acetaminophen, 1 tablet, Q8H PRN  LORazepam, 1 mg, Q8H PRN  sodium chloride flush, 5-40 mL, PRN  sodium chloride, , PRN  ondansetron, 4 mg, Q8H PRN   Or  ondansetron, 4 mg, Q6H PRN  polyethylene glycol, 17 g, Daily PRN  acetaminophen, 650 mg, Q6H PRN   Or  acetaminophen, 650 mg, Q6H PRN  morphine, 10 mg, Q4H PRN         Objective:    /74   Pulse 67   Temp 98.4 °F (36.9 °C) (Oral)   Resp 18   Ht 1.626 m (5' 4\")   Wt 60.4 kg (133 lb 3.2 oz)   SpO2 100%   BMI 22.86 kg/m²     General Appearance: alert and oriented to person, place and time 
       [facility] Hospitalist Progress Note    Admitting Date and Time: 11/25/2024  9:23 AM  Admit Dx: Hypocalcemia [E83.51]  Acute on chronic congestive heart failure, unspecified heart failure type (HCC) [I50.9]  Acute on chronic heart failure with preserved ejection fraction (HCC) [I50.33]    Subjective:  Patient is being followed for Hypocalcemia [E83.51]  Acute on chronic congestive heart failure, unspecified heart failure type (HCC) [I50.9]  Acute on chronic heart failure with preserved ejection fraction (HCC) [I50.33]   Pt seen and This morning  No acute events overnight  States overall feels slightly better.  Still having mild shortness of breath    ROS: denies fever, chills, cp, n/v, HA unless stated above.      vitamin D  50,000 Units Oral Weekly    [START ON 11/28/2024] vitamin D  2,000 Units Oral Daily    calcium gluconate  2,000 mg IntraVENous Once    methylPREDNISolone  40 mg IntraVENous Q12H    albuterol  2.5 mg Nebulization BID    arformoterol tartrate  15 mcg Nebulization BID RT    ARIPiprazole  5 mg Oral Daily    aspirin  81 mg Oral Daily    atorvastatin  40 mg Oral Nightly    budesonide  0.5 mg Nebulization BID RT    FLUoxetine  20 mg Oral Daily    furosemide  40 mg Oral Daily    losartan  50 mg Oral Daily    metoprolol succinate  100 mg Oral Daily    pantoprazole  40 mg Oral QAM AC    pramipexole  0.75 mg Oral Nightly    tamsulosin  0.4 mg Oral BID    sodium chloride flush  5-40 mL IntraVENous 2 times per day    enoxaparin  40 mg SubCUTAneous Daily    gabapentin  900 mg Oral Nightly    gabapentin  600 mg Oral QAM     LORazepam, 1 mg, Q6H PRN  fluticasone, 1 spray, Daily PRN  HYDROcodone-acetaminophen, 1 tablet, Q8H PRN  sodium chloride flush, 5-40 mL, PRN  sodium chloride, , PRN  ondansetron, 4 mg, Q8H PRN   Or  ondansetron, 4 mg, Q6H PRN  polyethylene glycol, 17 g, Daily PRN  acetaminophen, 650 mg, Q6H PRN   Or  acetaminophen, 650 mg, Q6H PRN  morphine, 10 mg, Q4H PRN         Objective:    BP 
       [facility] Hospitalist Progress Note    Admitting Date and Time: 11/25/2024  9:23 AM  Admit Dx: Hypocalcemia [E83.51]  Acute on chronic congestive heart failure, unspecified heart failure type (HCC) [I50.9]  Acute on chronic heart failure with preserved ejection fraction (HCC) [I50.33]    Subjective:  Patient is being followed for Hypocalcemia [E83.51]  Acute on chronic congestive heart failure, unspecified heart failure type (HCC) [I50.9]  Acute on chronic heart failure with preserved ejection fraction (HCC) [I50.33]     No acute events overnight    ROS: denies fever, chills, cp, sob, n/v, HA unless stated above.      calcium gluconate  1,000 mg IntraVENous See Admin Instructions    lidocaine  1 patch TransDERmal Daily    vitamin D  50,000 Units Oral Weekly    vitamin D  2,000 Units Oral Daily    methylPREDNISolone  40 mg IntraVENous Q12H    albuterol  2.5 mg Nebulization BID    arformoterol tartrate  15 mcg Nebulization BID RT    ARIPiprazole  5 mg Oral Daily    aspirin  81 mg Oral Daily    atorvastatin  40 mg Oral Nightly    budesonide  0.5 mg Nebulization BID RT    FLUoxetine  20 mg Oral Daily    furosemide  40 mg Oral Daily    losartan  50 mg Oral Daily    metoprolol succinate  100 mg Oral Daily    pantoprazole  40 mg Oral QAM AC    pramipexole  0.75 mg Oral Nightly    tamsulosin  0.4 mg Oral BID    sodium chloride flush  5-40 mL IntraVENous 2 times per day    enoxaparin  40 mg SubCUTAneous Daily    gabapentin  900 mg Oral Nightly    gabapentin  600 mg Oral QAM     calcium gluconate, 1,000 mg, PRN  calcium carbonate, 500 mg, TID PRN  LORazepam, 1 mg, Q6H PRN  fluticasone, 1 spray, Daily PRN  HYDROcodone-acetaminophen, 1 tablet, Q8H PRN  sodium chloride flush, 5-40 mL, PRN  sodium chloride, , PRN  ondansetron, 4 mg, Q8H PRN   Or  ondansetron, 4 mg, Q6H PRN  polyethylene glycol, 17 g, Daily PRN  acetaminophen, 650 mg, Q6H PRN   Or  acetaminophen, 650 mg, Q6H PRN  morphine, 10 mg, Q4H PRN     
       [facility] Hospitalist Progress Note    Admitting Date and Time: 11/25/2024  9:23 AM  Admit Dx: Hypocalcemia [E83.51]  Acute on chronic congestive heart failure, unspecified heart failure type (HCC) [I50.9]  Acute on chronic heart failure with preserved ejection fraction (HCC) [I50.33]    Subjective:  Patient is being followed for Hypocalcemia [E83.51]  Acute on chronic congestive heart failure, unspecified heart failure type (HCC) [I50.9]  Acute on chronic heart failure with preserved ejection fraction (HCC) [I50.33]     Denies any complaints at this time    ROS: denies fever, chills, cp, sob, n/v, HA unless stated above.      lidocaine  1 patch TransDERmal Daily    calcium gluconate  2,000 mg IntraVENous Once    vitamin D  50,000 Units Oral Weekly    vitamin D  2,000 Units Oral Daily    methylPREDNISolone  40 mg IntraVENous Q12H    albuterol  2.5 mg Nebulization BID    arformoterol tartrate  15 mcg Nebulization BID RT    ARIPiprazole  5 mg Oral Daily    aspirin  81 mg Oral Daily    atorvastatin  40 mg Oral Nightly    budesonide  0.5 mg Nebulization BID RT    FLUoxetine  20 mg Oral Daily    furosemide  40 mg Oral Daily    losartan  50 mg Oral Daily    metoprolol succinate  100 mg Oral Daily    pantoprazole  40 mg Oral QAM AC    pramipexole  0.75 mg Oral Nightly    tamsulosin  0.4 mg Oral BID    sodium chloride flush  5-40 mL IntraVENous 2 times per day    enoxaparin  40 mg SubCUTAneous Daily    gabapentin  900 mg Oral Nightly    gabapentin  600 mg Oral QAM     LORazepam, 1 mg, Q6H PRN  fluticasone, 1 spray, Daily PRN  HYDROcodone-acetaminophen, 1 tablet, Q8H PRN  sodium chloride flush, 5-40 mL, PRN  sodium chloride, , PRN  ondansetron, 4 mg, Q8H PRN   Or  ondansetron, 4 mg, Q6H PRN  polyethylene glycol, 17 g, Daily PRN  acetaminophen, 650 mg, Q6H PRN   Or  acetaminophen, 650 mg, Q6H PRN  morphine, 10 mg, Q4H PRN         Objective:    /62   Pulse 60   Temp 98.4 °F (36.9 °C) (Oral)   Resp 18   Ht 
  Palliative Care Department  [phone]  Palliative Care Progress Note  Provider ARGELIA Zee CNP      PATIENT: Dg Peña  : 1965  MRN: [id number]  ADMISSION DATE: 2024  9:23 AM  Referring Provider: Corie Read APRN - NP     Palliative Medicine was consulted on hospital day 2 for assistance with Goals of care, end-stage disease  HPI:     Clinical Summary:Dg Peña is a 59 y.o. 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  Found hypertensive, chest x-ray showed cardiomegaly with pulmonary vascular congestion.  He was admitted for further medical management.  Vitamin C consulted to see further goals of care, end-stage disease.  ASSESSMENT/PLAN:     Pertinent Hospital Diagnoses     Acute on chronic congestive heart failure  End-stage COPD  Hypocalcemia  Anxiety      Palliative Care Encounter / Counseling Regarding Goals of Care  Please see detailed goals of care discussion as below  At this time, Dg Peña, Does have capacity for medical decision-making.  Capacity is time limited and situation/question specific  During encounter Kimberley was surrogate medical decision-maker  Outcome of goals of care meeting:  Continue with current medical treatment  Continue full code  Left voicemail to sisters Sayra and Kimberley  Code status Full Code  Advanced Directives: no POA or [facility]  Surrogate/Legal NOK  Sayra Burt (Sister) [phone]  Kimberley Good (Sister/POA) [phone]     Spiritual assessment: no spiritual distress identified  Bereavement and grief: to be determined  Referrals to: none today    Thank you for the opportunity to participate in the care of Dg Peña.     ARGELIA Crowell CNP  Palliative Medicine     SUBJECTIVE:     Details of Conversation:    Chart was reviewed.  Patient 
4 Eyes Skin Assessment     NAME:  Dg Peña  YOB: 1965  MEDICAL RECORD NUMBER:  [id number]    The patient is being assessed for  Admission    I agree that at least one RN has performed a thorough Head to Toe Skin Assessment on the patient. ALL assessment sites listed below have been assessed.      Areas assessed by both nurses:    Head, Face, Ears, Shoulders, Back, Chest, Arms, Elbows, Hands, Sacrum. Buttock, Coccyx, Ischium, Legs. Feet and Heels, and Under Medical Devices         Does the Patient have a Wound? Yes wound(s) were present on assessment. LDA wound assessment was Initiated and completed by RN       Cristofer Prevention initiated by RN: Yes  Wound Care Orders initiated by RN: No    Pressure Injury (Stage 3,4, Unstageable, DTI, NWPT, and Complex wounds) if present, place Wound referral order by RN under : No    New Ostomies, if present place, Ostomy referral order under : No     Nurse 1 eSignature: Electronically signed by Arlen Hawk RN on 11/25/24 at 6:47 PM EST    **SHARE this note so that the co-signing nurse can place an eSignature**    Nurse 2 eSignature: Electronically signed by Beny Morse RN on 11/25/24 at 7:04 PM EST    
4 Eyes Skin Assessment     NAME:  Dg Peña  YOB: 1965  MEDICAL RECORD NUMBER:  [id number]    The patient is being assessed for  Admission    I agree that at least one RN has performed a thorough Head to Toe Skin Assessment on the patient. ALL assessment sites listed below have been assessed.      Areas assessed by both nurses:    Head, Face, Ears, Shoulders, Back, Chest, Arms, Elbows, Hands, Sacrum. Buttock, Coccyx, Ischium, and Legs. Feet and Heels        Does the Patient have a Wound? No noted wound(s)       Cristofer Prevention initiated by RN: No  Wound Care Orders initiated by RN: No    Pressure Injury (Stage 3,4, Unstageable, DTI, NWPT, and Complex wounds) if present, place Wound referral order by RN under : No    New Ostomies, if present place, Ostomy referral order under : No     Nurse 1 eSignature: Electronically signed by Tracy Guillen RN on 11/25/24 at 10:36 PM EST    **SHARE this note so that the co-signing nurse can place an eSignature**    Nurse 2 eSignature: Electronically signed by Tere Brown RN on 11/26/24 at 1:36 AM EST   
Chart reviewed, no acute changes.  Discharge plan is home with cousin.  Patient wishes to remain a full code. There are no further PM needs at this time.  PM will now sign off.  If new PM needs arise, please re-consult.  Thank you.   
Database initiated. Patient is A&O comes in from home with cousin. States he uses a walker and a wheelchair sometimes. He wears 5-6 liters oxygen at baseline. His sister Sayra is his home health aid and knows his medications. I tried calling her to verify them there was no answer.     
IV infiltrated while infusing Calcium gluconate in Right Forearm - attempted to aspirate per protocol, scant amount returned - IV removed - Ice pack placed and arm elevated - Dr Gorman notified, extravasation orders received - Amphadase injected around infiltrated site in 5, 0.2mL doses. - Swelling/Redness/Mild discomfort noted.   
Lenny served Dr. Gregg about patients calcium of 6.7.   
New consult sent to Dr. Marie at this time.  
New consult sent to Palliative Medicine via [employer] message.  
Per RN overnight, pt refused additional calcium replacement after ionized calcium redraw.    Electronically signed by Tram De Luna RN on 11/30/2024 at 7:40 AM   
Spiritual Health History and Assessment/Progress Note  [facility]    Spiritual/Emotional Needs,  ,  ,      Name: Dg Peña MRN: [id number]    Age: 59 y.o.     Sex: male   Language: English   Zoroastrian: None   Acute on chronic heart failure with preserved ejection fraction (HCC)     Date: 11/27/2024                           Spiritual Assessment began in [address] MED SURG        Referral/Consult From: Patient, Nurse   Encounter Overview/Reason: Spiritual/Emotional Needs  Service Provided For: Patient    Kelsey, Belief, Meaning:   Patient has beliefs or practices that help with coping during difficult times  Family/Friends No family/friends present      Importance and Influence:  Patient has no beliefs influential to healthcare decision-making identified during this visit  Family/Friends No family/friends present    Community:  Patient feels well-supported. Support system includes: Friends and Extended family  Family/Friends No family/friends present    Assessment and Plan of Care:     Patient Interventions include: Facilitated expression of thoughts and feelings, Explored spiritual coping/struggle/distress, Affirmed coping skills/support systems, and Other: During visit talked of family and support system as well as the uncertain future, but that hope remains for quality to days ahead  Family/Friends Interventions include: No family/friends present    Patient Plan of Care: Spiritual Care available upon further referral  Family/Friends Plan of Care: No family/friends present    Electronically signed by Chaplain Eddie on 11/27/2024 at 9:42 AM   
Spiritual Health History and Assessment/Progress Note  [facility]    Initial Encounter, Attempted Encounter,  ,  ,      Name: Dg Peña MRN: [id number]    Age: 59 y.o.     Sex: male   Language: English   Church: None   Acute on chronic heart failure with preserved ejection fraction (HCC)     Date: 11/26/2024                           Spiritual Assessment began in [address] MED SURG        Referral/Consult From: Rounding, Palliative Care   Encounter Overview/Reason: Initial Encounter, Attempted Encounter  Service Provided For: Patient, Patient not available    Kelsey, Belief, Meaning:   Patient unable to assess at this time  Family/Friends No family/friends present      Importance and Influence:  Patient unable to assess at this time  Family/Friends No family/friends present    Community:  Patient feels well-supported. Support system includes: Children and Extended family  Family/Friends No family/friends present    Assessment and Plan of Care:     Patient Interventions include: Other: Nursing caring for the patient at the time and patient is not responsive to visit  at this time.  Family/Friends Interventions include: No family/friends present    Patient Plan of Care: Spiritual Care available upon further referral  Family/Friends Plan of Care: No family/friends present    Electronically signed by Chaplain Eddie on 11/26/2024 at 4:18 PM   
Daily PRN  acetaminophen, 650 mg, Q6H PRN   Or  acetaminophen, 650 mg, Q6H PRN  morphine, 10 mg, Q4H PRN         Objective:    BP (!) 148/78   Pulse 67   Temp 97.7 °F (36.5 °C) (Oral)   Resp 18   Ht 1.626 m (5' 4\")   Wt 60.8 kg (134 lb 1.6 oz)   SpO2 100%   BMI 23.02 kg/m²     General Appearance: alert and oriented to person, place and time and in no acute distress  Skin: warm and dry  Head: normocephalic and atraumatic  Eyes: pupils equal, round, and reactive to light, extraocular eye movements intact, conjunctivae normal  Neck: neck supple and non tender without mass   Pulmonary/Chest: Decreased breath sound with mild wheezing bilaterally- no rales or rhonchi, normal air movement, no respiratory distress  Cardiovascular: normal rate, normal S1 and S2 and no carotid bruits  Abdomen: soft, non-tender, non-distended, normal bowel sounds, no masses or organomegaly  Extremities: no cyanosis, no clubbing and no edema  Neurologic: no cranial nerve deficit and speech normal        Recent Labs     11/28/24  0334 11/28/24  1330 11/29/24  0905     --  137   K 5.1* 4.0 4.6   CL 93*  --  91*   CO2 39*  --  37*   BUN 23*  --  18   CREATININE 0.7  --  0.7   GLUCOSE 125*  --  81   CALCIUM 7.0*  --  7.6*       Recent Labs     11/28/24  0334   WBC 8.0   RBC 3.39*   HGB 11.4*   HCT 36.3*   .1*   MCH 33.6   MCHC 31.4*   RDW 13.7      MPV 12.3*       Radiology:     ONE XRAY VIEW OF THE CHEST   IMPRESSION:  1. Cardiomegaly with pulmonary vascular congestion.  2. Increased linear markings in the lung bases may represent atelectasis or  infiltrate.  3. Small bilateral pleural effusions.    Assessment:    Principal Problem:    Acute on chronic heart failure with preserved ejection fraction (HCC)  Active Problems:    Hypocalcemia    Hyperlipidemia    Acute congestive heart failure (HCC)  Resolved Problems:    * No resolved hospital problems. *      Plan:    Acute on chronic diastolic heart failure  -Patient

## 2024-12-01 NOTE — CONSULTS
Palliative Care Department  [phone]  Palliative Care Initial Consult  Provider ARGELIA Zee CNP      PATIENT: Dg Peña  : 1965  MRN: [id number]  ADMISSION DATE: 2024  9:23 AM  Referring Provider: Corie Read APRN - NP     Palliative Medicine was consulted on hospital day 1 for assistance with Goals of care, end-stage disease  HPI:     Clinical Summary:Dg Peña is a 59 y.o. 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  Found hypertensive, chest x-ray showed cardiomegaly with pulmonary vascular congestion.  He was admitted for further medical management.  Vitamin C consulted to see further goals of care, end-stage disease.  ASSESSMENT/PLAN:     Pertinent Hospital Diagnoses     Acute on chronic congestive heart failure  End-stage COPD  Hypocalcemia  Anxiety      Palliative Care Encounter / Counseling Regarding Goals of Care  Please see detailed goals of care discussion as below  At this time, Dg Peña, Does have capacity for medical decision-making.  Capacity is time limited and situation/question specific  During encounter Kimberley was surrogate medical decision-maker  Outcome of goals of care meeting:  Continue with current medical treatment  Continue full code  Left voicemail to sisters Sayra and Kimberley  Code status Full Code  Advanced Directives: no POA or living will in [facility]  Surrogate/Legal NOK  Sayra Burt (Sister) [phone]  Kimberley Good (Sister/POA) [phone]     Spiritual assessment: no spiritual distress identified  Bereavement and grief: to be determined  Referrals to: none today    Thank you for the opportunity to participate in the care of Dg Peña.     ARGELIA Crowell CNP  Palliative Medicine     SUBJECTIVE:     Details of Conversation:      Chart reviewed.  Patient 
cardiologist / doctor with a weight gain of 3 pounds in one day or a total of 5 pounds or more in one week. Also, if you should have a significant weight loss of 3# or more in one day to call the doctor, they may need to decrease or hold the diuretic dose. On days you feels nauseated and not eating / drinking, having emesis or diarrhea,  informed to call the cardiologist  / doctor, they may need to decrease or hold diuretic to avoid dehydration.  Again, stressed the importance of informing their medical provider the first day of onset of any of the signs and symptoms in the \"Yellow Zone\" of the HF Zones.     The Heart Failure Booklet given to the patient with additional patient education addressing:  What is Heart Failure?  Things You Can Do to Live Well with HF  How to Take Your Medications  How to Eat Less Salt  Taylor its Salt?  Exercising Well with Heart Failure  Signs and symptoms of HF to report  Weight Yourself Each Day  Home Self Management- activity, weight tracking, taking medications as prescribed, meals /diet planning (sodium and fluid restriction), how to read food labels, keeping physician follow ups, smoking cessation, follow the “Heart Failure Zones”  The Heart Failure zones  Every Dose Every Day    Instructed to call 911 if you have any of the following symptoms:  Struggling to breathe unrelieved with rest  Having chest pain  Confusion or can’t think clearly      Patient verbalizes understanding of above.   Greater than 30 minutes was spent educating patient.        Shavon Man RN   Heart Failure Navigator     
reviewed independently    ASSESSMENT & RECOMMENDATIONS   Dg Peña, a 59 y.o.-old male seen in for evaluation management of the following issue    Hypocalcemia  PTH independent, PTH level is high as expected.  Likely secondary to vitamin D deficiency and inadequate calcium intake  The patient was on vitamin D 50,000 units weekly prior to admission.  Will going to change vitamin D to 50,000 twice a week for 4 weeks then back to 50,000 once a week thereafter.  Start calcium Os-Garry 1000 mg twice daily  We will obtain calcium/creatinine ratio to help determine if there is an inadequate calcium intake/absorption  Agree with additional as needed IV calcium for now      Vitamin D deficiency  Adjusted vitamin D supplements has been above      Interdisciplinary plan for communication with healthcare providers:   Consult recommendations were discussed with the Primary Service/Nursing staff      The above issues were reviewed with the patient who understood and agreed with the plan.  Thank you for allowing us to participate in the care of this patient. Please do not hesitate to contact us with any additional questions.     Aj Marie MD  Endocrinologist, [facility]   [address] MED SURG  [address] [place] [address]  Dept: [phone]  Loc: [phone]   Phone: [phone]  Fax: [phone]  ---------------------------------  An electronic signature was used to authenticate this note. Aj Marie MD on 11/30/2024 at 8:37 PM

## 2024-12-02 DIAGNOSIS — G57.93 NEUROPATHY INVOLVING BOTH LOWER EXTREMITIES: ICD-10-CM

## 2024-12-02 RX ORDER — GABAPENTIN 300 MG/1
CAPSULE ORAL
Qty: 450 CAPSULE | Refills: 1 | Status: SHIPPED | OUTPATIENT
Start: 2024-12-02 | End: 2025-03-02

## 2024-12-02 NOTE — TELEPHONE ENCOUNTER
Name of Medication(s) Requested:  Requested Prescriptions     Pending Prescriptions Disp Refills    gabapentin (NEURONTIN) 300 MG capsule [Pharmacy Med Name: GABAPENTIN 300MG CAPSULE] 450 capsule 1     Sig: TAKE TWO (2) TABS BY MOUTH IN THE MORNING AND THREE (3) TABS BY MOUTH IN THE EVENING       Medication is on current medication list Yes    Dosage and directions were verified? Yes    Quantity verified: 90 day supply     Pharmacy Verified?  Yes    Last Appointment:  11/8/2024    Future appts:  Future Appointments   Date Time Provider Department Center   12/4/2024 11:30 AM Aj Marie MD [facility]   12/4/2024  2:15 PM Bernice Ceja APRN - NP [facility]   12/17/2024 10:15 AM [facility] ROOM 4 [facility]   1/2/2025  1:00 PM Jana Sanchez MD [facility]   1/6/2025 10:15 AM [facility] ROOM 1 [facility]   1/16/2025  7:45 AM Nyla Calix, APRN - CNP [facility]        (If no appt send self scheduling link. .REFILLAPPT)  Scheduling request sent?     [] Yes  [] No    Does patient need updated?  [] Yes  [] No

## 2024-12-06 DIAGNOSIS — J44.9 END STAGE COPD (HCC): ICD-10-CM

## 2024-12-06 DIAGNOSIS — Z51.5 PALLIATIVE CARE ENCOUNTER: ICD-10-CM

## 2024-12-06 DIAGNOSIS — R06.09 DYSPNEA ON MINIMAL EXERTION: ICD-10-CM

## 2024-12-06 RX ORDER — MORPHINE SULFATE 20 MG/ML
10 SOLUTION ORAL EVERY 4 HOURS PRN
Qty: 30 ML | Refills: 0 | Status: SHIPPED | OUTPATIENT
Start: 2024-12-06 | End: 2024-12-16

## 2024-12-06 RX ORDER — MORPHINE SULFATE 20 MG/ML
10 SOLUTION ORAL EVERY 4 HOURS PRN
Qty: 90 ML | Refills: 0 | Status: CANCELLED | OUTPATIENT
Start: 2024-12-06 | End: 2025-01-05

## 2024-12-06 NOTE — TELEPHONE ENCOUNTER
Call from Sayra requesting refill for Morphine concentrate. Sayra states pharmacy was not able to fill the prescription for 90 ml. Called and spoke with the pharmacist at [facility]. He requests that three separate prescriptions be sent for 30 ml bottles of Morphine. Pharmacist states he will dispense over the 30 days and monitor filling dates. He states due to Medicaid only allowing for one 30 ml bottle to be dispensed at a time, but he cannot do partial fill so 3 separate prescriptions need sent. [facility]

## 2024-12-09 ENCOUNTER — HOSPITAL ENCOUNTER (OUTPATIENT)
Dept: OTHER | Age: 59
Setting detail: THERAPIES SERIES
Discharge: HOME | End: 2024-12-09
Payer: MEDICAID

## 2024-12-09 ENCOUNTER — HOSPITAL ENCOUNTER (INPATIENT)
Age: 59
LOS: 1 days | Discharge: HOME | DRG: 425 | End: 2024-12-13
Attending: EMERGENCY MEDICINE | Admitting: INTERNAL MEDICINE
Payer: MEDICAID

## 2024-12-09 ENCOUNTER — APPOINTMENT (OUTPATIENT)
Dept: GENERAL RADIOLOGY | Age: 59
DRG: 425 | End: 2024-12-09
Payer: MEDICAID

## 2024-12-09 ENCOUNTER — APPOINTMENT (OUTPATIENT)
Dept: CT IMAGING | Age: 59
DRG: 425 | End: 2024-12-09
Payer: MEDICAID

## 2024-12-09 ENCOUNTER — TELEPHONE (OUTPATIENT)
Dept: OTHER | Age: 59
End: 2024-12-09

## 2024-12-09 VITALS
BODY MASS INDEX: 23.34 KG/M2 | DIASTOLIC BLOOD PRESSURE: 70 MMHG | HEART RATE: 66 BPM | SYSTOLIC BLOOD PRESSURE: 144 MMHG | RESPIRATION RATE: 22 BRPM | WEIGHT: 136 LBS | OXYGEN SATURATION: 94 %

## 2024-12-09 DIAGNOSIS — J96.02 ACUTE RESPIRATORY FAILURE WITH HYPERCAPNIA: Primary | ICD-10-CM

## 2024-12-09 DIAGNOSIS — I50.9 ACUTE ON CHRONIC HEART FAILURE, UNSPECIFIED HEART FAILURE TYPE (HCC): ICD-10-CM

## 2024-12-09 DIAGNOSIS — E83.51 HYPOCALCEMIA: ICD-10-CM

## 2024-12-09 LAB
ALBUMIN SERPL-MCNC: 3.9 G/DL (ref 3.5–5.2)
ALP SERPL-CCNC: 48 U/L (ref 40–129)
ALT SERPL-CCNC: 9 U/L (ref 0–40)
ANION GAP SERPL CALCULATED.3IONS-SCNC: 10 MMOL/L (ref 7–16)
ANION GAP SERPL CALCULATED.3IONS-SCNC: 10 MMOL/L (ref 7–16)
AST SERPL-CCNC: 12 U/L (ref 0–39)
B PARAP IS1001 DNA NPH QL NAA+NON-PROBE: NOT DETECTED
B PERT DNA SPEC QL NAA+PROBE: NOT DETECTED
B.E.: 10.4 MMOL/L (ref -3–3)
B.E.: 14.7 MMOL/L (ref -3–3)
BASOPHILS # BLD: 0 K/UL (ref 0–0.2)
BASOPHILS NFR BLD: 0 % (ref 0–2)
BILIRUB SERPL-MCNC: 0.3 MG/DL (ref 0–1.2)
BNP SERPL-MCNC: 695 PG/ML (ref 0–125)
BNP SERPL-MCNC: 954 PG/ML (ref 0–125)
BUN SERPL-MCNC: 15 MG/DL (ref 6–20)
BUN SERPL-MCNC: 17 MG/DL (ref 6–20)
C PNEUM DNA NPH QL NAA+NON-PROBE: NOT DETECTED
CALCIUM SERPL-MCNC: 6.2 MG/DL (ref 8.6–10.2)
CALCIUM SERPL-MCNC: 6.3 MG/DL (ref 8.6–10.2)
CHLORIDE SERPL-SCNC: 87 MMOL/L (ref 98–107)
CHLORIDE SERPL-SCNC: 89 MMOL/L (ref 98–107)
CO2 SERPL-SCNC: 43 MMOL/L (ref 22–29)
CO2 SERPL-SCNC: 43 MMOL/L (ref 22–29)
COHB: 0.1 % (ref 0–1.5)
COHB: 0.5 % (ref 0–1.5)
CREAT SERPL-MCNC: 0.8 MG/DL (ref 0.7–1.2)
CREAT SERPL-MCNC: 0.9 MG/DL (ref 0.7–1.2)
CRITICAL: ABNORMAL
CRITICAL: ABNORMAL
DATE ANALYZED: ABNORMAL
DATE ANALYZED: ABNORMAL
DATE OF COLLECTION: ABNORMAL
DATE OF COLLECTION: ABNORMAL
EOSINOPHIL # BLD: 0.12 K/UL (ref 0.05–0.5)
EOSINOPHILS RELATIVE PERCENT: 2 % (ref 0–6)
ERYTHROCYTE [DISTWIDTH] IN BLOOD BY AUTOMATED COUNT: 13.2 % (ref 11.5–15)
FIO2: 60 %
FLUAV RNA NPH QL NAA+NON-PROBE: NOT DETECTED
FLUBV RNA NPH QL NAA+NON-PROBE: NOT DETECTED
GFR, ESTIMATED: >90 ML/MIN/1.73M2
GFR, ESTIMATED: >90 ML/MIN/1.73M2
GLUCOSE SERPL-MCNC: 118 MG/DL (ref 74–99)
GLUCOSE SERPL-MCNC: 136 MG/DL (ref 74–99)
HADV DNA NPH QL NAA+NON-PROBE: NOT DETECTED
HCO3: 38.3 MMOL/L (ref 22–26)
HCO3: 42.2 MMOL/L (ref 22–26)
HCOV 229E RNA NPH QL NAA+NON-PROBE: NOT DETECTED
HCOV HKU1 RNA NPH QL NAA+NON-PROBE: NOT DETECTED
HCOV NL63 RNA NPH QL NAA+NON-PROBE: NOT DETECTED
HCOV OC43 RNA NPH QL NAA+NON-PROBE: NOT DETECTED
HCT VFR BLD AUTO: 33.3 % (ref 37–54)
HGB BLD-MCNC: 10.1 G/DL (ref 12.5–16.5)
HHB: 2.2 % (ref 0–5)
HHB: 3.5 % (ref 0–5)
HMPV RNA NPH QL NAA+NON-PROBE: NOT DETECTED
HPIV1 RNA NPH QL NAA+NON-PROBE: NOT DETECTED
HPIV2 RNA NPH QL NAA+NON-PROBE: NOT DETECTED
HPIV3 RNA NPH QL NAA+NON-PROBE: NOT DETECTED
HPIV4 RNA NPH QL NAA+NON-PROBE: NOT DETECTED
IMM GRANULOCYTES # BLD AUTO: 0.07 K/UL (ref 0–0.58)
IMM GRANULOCYTES NFR BLD: 1 % (ref 0–5)
LAB: ABNORMAL
LAB: ABNORMAL
LACTATE BLDV-SCNC: 1.5 MMOL/L (ref 0.5–1.9)
LYMPHOCYTES NFR BLD: 1.13 K/UL (ref 1.5–4)
LYMPHOCYTES RELATIVE PERCENT: 14 % (ref 20–42)
Lab: 1514
Lab: 2044
M PNEUMO DNA NPH QL NAA+NON-PROBE: NOT DETECTED
MAGNESIUM SERPL-MCNC: 1.5 MG/DL (ref 1.6–2.6)
MCH RBC QN AUTO: 32.8 PG (ref 26–35)
MCHC RBC AUTO-ENTMCNC: 30.3 G/DL (ref 32–34.5)
MCV RBC AUTO: 108.1 FL (ref 80–99.9)
METHB: 0.3 % (ref 0–1.5)
METHB: 0.3 % (ref 0–1.5)
MODE: ABNORMAL
MODE: ABNORMAL
MONOCYTES NFR BLD: 0.47 K/UL (ref 0.1–0.95)
MONOCYTES NFR BLD: 6 % (ref 2–12)
NEUTROPHILS NFR BLD: 78 % (ref 43–80)
NEUTS SEG NFR BLD: 6.39 K/UL (ref 1.8–7.3)
O2 CONTENT: 15.5 ML/DL
O2 CONTENT: 17.9 ML/DL
O2 SATURATION: 96.5 % (ref 92–98.5)
O2 SATURATION: 97.8 % (ref 92–98.5)
O2HB: 96.1 % (ref 94–97)
O2HB: 97 % (ref 94–97)
OPERATOR ID: 3186
OPERATOR ID: 46
PATIENT TEMP: 37 C
PATIENT TEMP: 37 C
PCO2: 67.7 MMHG (ref 35–45)
PCO2: 69.1 MMHG (ref 35–45)
PEEP/CPAP: 5 CMH2O
PFO2: 1.75 MMHG/%
PH BLOOD GAS: 7.37 (ref 7.35–7.45)
PH BLOOD GAS: 7.4 (ref 7.35–7.45)
PIP: 12 CMH2O
PLATELET # BLD AUTO: 199 K/UL (ref 130–450)
PMV BLD AUTO: 10.9 FL (ref 7–12)
PO2: 105.2 MMHG (ref 75–100)
PO2: 91.2 MMHG (ref 75–100)
POTASSIUM SERPL-SCNC: 3.9 MMOL/L (ref 3.5–5)
POTASSIUM SERPL-SCNC: 5 MMOL/L (ref 3.5–5)
PROT SERPL-MCNC: 6.9 G/DL (ref 6.4–8.3)
RBC # BLD AUTO: 3.08 M/UL (ref 3.8–5.8)
RI(T): 2.36
RSV RNA NPH QL NAA+NON-PROBE: NOT DETECTED
RV+EV RNA NPH QL NAA+NON-PROBE: NOT DETECTED
SARS-COV-2 RDRP RESP QL NAA+PROBE: NOT DETECTED
SARS-COV-2 RNA NPH QL NAA+NON-PROBE: NOT DETECTED
SODIUM SERPL-SCNC: 140 MMOL/L (ref 132–146)
SODIUM SERPL-SCNC: 142 MMOL/L (ref 132–146)
SOURCE, BLOOD GAS: ABNORMAL
SOURCE, BLOOD GAS: ABNORMAL
SPECIMEN DESCRIPTION: NORMAL
SPECIMEN DESCRIPTION: NORMAL
THB: 11.4 G/DL (ref 11.5–16.5)
THB: 13 G/DL (ref 11.5–16.5)
TIME ANALYZED: 1521
TIME ANALYZED: 2111
TROPONIN I SERPL HS-MCNC: 19 NG/L (ref 0–11)
TROPONIN I SERPL HS-MCNC: 20 NG/L (ref 0–11)
WBC OTHER # BLD: 8.2 K/UL (ref 4.5–11.5)

## 2024-12-09 PROCEDURE — 0202U NFCT DS 22 TRGT SARS-COV-2: CPT

## 2024-12-09 PROCEDURE — 6360000002 HC RX W HCPCS: Performed by: NURSE PRACTITIONER

## 2024-12-09 PROCEDURE — 71045 X-RAY EXAM CHEST 1 VIEW: CPT

## 2024-12-09 PROCEDURE — 83735 ASSAY OF MAGNESIUM: CPT

## 2024-12-09 PROCEDURE — APPSS60 APP SPLIT SHARED TIME 46-60 MINUTES: Performed by: NURSE PRACTITIONER

## 2024-12-09 PROCEDURE — 84484 ASSAY OF TROPONIN QUANT: CPT

## 2024-12-09 PROCEDURE — 96365 THER/PROPH/DIAG IV INF INIT: CPT

## 2024-12-09 PROCEDURE — 83880 ASSAY OF NATRIURETIC PEPTIDE: CPT

## 2024-12-09 PROCEDURE — 85025 COMPLETE CBC W/AUTO DIFF WBC: CPT

## 2024-12-09 PROCEDURE — 99285 EMERGENCY DEPT VISIT HI MDM: CPT

## 2024-12-09 PROCEDURE — 93005 ELECTROCARDIOGRAM TRACING: CPT

## 2024-12-09 PROCEDURE — G0378 HOSPITAL OBSERVATION PER HR: HCPCS

## 2024-12-09 PROCEDURE — 87635 SARS-COV-2 COVID-19 AMP PRB: CPT

## 2024-12-09 PROCEDURE — 83605 ASSAY OF LACTIC ACID: CPT

## 2024-12-09 PROCEDURE — 2580000003 HC RX 258: Performed by: INTERNAL MEDICINE

## 2024-12-09 PROCEDURE — 82805 BLOOD GASES W/O2 SATURATION: CPT

## 2024-12-09 PROCEDURE — 94640 AIRWAY INHALATION TREATMENT: CPT

## 2024-12-09 PROCEDURE — 6360000002 HC RX W HCPCS

## 2024-12-09 PROCEDURE — 80053 COMPREHEN METABOLIC PANEL: CPT

## 2024-12-09 PROCEDURE — 71275 CT ANGIOGRAPHY CHEST: CPT

## 2024-12-09 PROCEDURE — 96374 THER/PROPH/DIAG INJ IV PUSH: CPT

## 2024-12-09 PROCEDURE — 96375 TX/PRO/DX INJ NEW DRUG ADDON: CPT

## 2024-12-09 PROCEDURE — 36415 COLL VENOUS BLD VENIPUNCTURE: CPT

## 2024-12-09 PROCEDURE — 6360000004 HC RX CONTRAST MEDICATION: Performed by: RADIOLOGY

## 2024-12-09 PROCEDURE — 6370000000 HC RX 637 (ALT 250 FOR IP): Performed by: EMERGENCY MEDICINE

## 2024-12-09 PROCEDURE — 6360000002 HC RX W HCPCS: Performed by: EMERGENCY MEDICINE

## 2024-12-09 PROCEDURE — 94660 CPAP INITIATION&MGMT: CPT

## 2024-12-09 PROCEDURE — 80048 BASIC METABOLIC PNL TOTAL CA: CPT

## 2024-12-09 PROCEDURE — 6370000000 HC RX 637 (ALT 250 FOR IP): Performed by: NURSE PRACTITIONER

## 2024-12-09 PROCEDURE — 99214 OFFICE O/P EST MOD 30 MIN: CPT

## 2024-12-09 RX ORDER — LORAZEPAM 1 MG/1
1 TABLET ORAL EVERY 8 HOURS PRN
Status: DISCONTINUED | OUTPATIENT
Start: 2024-12-09 | End: 2024-12-13 | Stop reason: HOSPADM

## 2024-12-09 RX ORDER — SODIUM CHLORIDE 0.9 % (FLUSH) 0.9 %
10 SYRINGE (ML) INJECTION ONCE
Status: COMPLETED | OUTPATIENT
Start: 2024-12-09 | End: 2024-12-09

## 2024-12-09 RX ORDER — PANTOPRAZOLE SODIUM 40 MG/1
40 TABLET, DELAYED RELEASE ORAL
Status: DISCONTINUED | OUTPATIENT
Start: 2024-12-10 | End: 2024-12-13 | Stop reason: HOSPADM

## 2024-12-09 RX ORDER — FUROSEMIDE 10 MG/ML
40 INJECTION INTRAMUSCULAR; INTRAVENOUS DAILY
Status: DISCONTINUED | OUTPATIENT
Start: 2024-12-10 | End: 2024-12-10

## 2024-12-09 RX ORDER — FUROSEMIDE 40 MG/1
40 TABLET ORAL DAILY
Status: DISCONTINUED | OUTPATIENT
Start: 2024-12-10 | End: 2024-12-13 | Stop reason: HOSPADM

## 2024-12-09 RX ORDER — SODIUM CHLORIDE 9 MG/ML
INJECTION, SOLUTION INTRAVENOUS PRN
Status: DISCONTINUED | OUTPATIENT
Start: 2024-12-09 | End: 2024-12-13 | Stop reason: HOSPADM

## 2024-12-09 RX ORDER — ALBUTEROL SULFATE 0.83 MG/ML
2.5 SOLUTION RESPIRATORY (INHALATION) EVERY 4 HOURS PRN
Status: DISCONTINUED | OUTPATIENT
Start: 2024-12-09 | End: 2024-12-13 | Stop reason: HOSPADM

## 2024-12-09 RX ORDER — ERGOCALCIFEROL 1.25 MG/1
50000 CAPSULE, LIQUID FILLED ORAL ONCE
Status: DISCONTINUED | OUTPATIENT
Start: 2024-12-09 | End: 2024-12-10

## 2024-12-09 RX ORDER — ONDANSETRON 4 MG/1
4 TABLET, ORALLY DISINTEGRATING ORAL EVERY 8 HOURS PRN
Status: DISCONTINUED | OUTPATIENT
Start: 2024-12-09 | End: 2024-12-13 | Stop reason: HOSPADM

## 2024-12-09 RX ORDER — CALCIUM GLUCONATE 20 MG/ML
1000 INJECTION, SOLUTION INTRAVENOUS ONCE
Status: COMPLETED | OUTPATIENT
Start: 2024-12-09 | End: 2024-12-09

## 2024-12-09 RX ORDER — ARIPIPRAZOLE 5 MG/1
5 TABLET ORAL DAILY
Status: DISCONTINUED | OUTPATIENT
Start: 2024-12-10 | End: 2024-12-13 | Stop reason: HOSPADM

## 2024-12-09 RX ORDER — BUDESONIDE 0.5 MG/2ML
0.5 INHALANT ORAL
Status: DISCONTINUED | OUTPATIENT
Start: 2024-12-09 | End: 2024-12-13 | Stop reason: HOSPADM

## 2024-12-09 RX ORDER — POTASSIUM CHLORIDE 1500 MG/1
40 TABLET, EXTENDED RELEASE ORAL PRN
Status: DISCONTINUED | OUTPATIENT
Start: 2024-12-09 | End: 2024-12-13 | Stop reason: HOSPADM

## 2024-12-09 RX ORDER — ARFORMOTEROL TARTRATE 15 UG/2ML
15 SOLUTION RESPIRATORY (INHALATION)
Status: DISCONTINUED | OUTPATIENT
Start: 2024-12-09 | End: 2024-12-13 | Stop reason: HOSPADM

## 2024-12-09 RX ORDER — CALCIUM CARBONATE 500(1250)
1500 TABLET ORAL 2 TIMES DAILY
Status: DISCONTINUED | OUTPATIENT
Start: 2024-12-09 | End: 2024-12-13 | Stop reason: HOSPADM

## 2024-12-09 RX ORDER — SODIUM CHLORIDE 0.9 % (FLUSH) 0.9 %
5-40 SYRINGE (ML) INJECTION EVERY 12 HOURS SCHEDULED
Status: DISCONTINUED | OUTPATIENT
Start: 2024-12-09 | End: 2024-12-13 | Stop reason: HOSPADM

## 2024-12-09 RX ORDER — HYDROCODONE BITARTRATE AND ACETAMINOPHEN 7.5; 325 MG/1; MG/1
1 TABLET ORAL EVERY 8 HOURS PRN
Status: DISCONTINUED | OUTPATIENT
Start: 2024-12-09 | End: 2024-12-13 | Stop reason: HOSPADM

## 2024-12-09 RX ORDER — FLUTICASONE PROPIONATE 50 MCG
1 SPRAY, SUSPENSION (ML) NASAL DAILY
Status: DISCONTINUED | OUTPATIENT
Start: 2024-12-10 | End: 2024-12-13 | Stop reason: HOSPADM

## 2024-12-09 RX ORDER — FLUOXETINE 10 MG/1
20 TABLET, FILM COATED ORAL NIGHTLY
Status: DISCONTINUED | OUTPATIENT
Start: 2024-12-09 | End: 2024-12-10 | Stop reason: CLARIF

## 2024-12-09 RX ORDER — ACETAMINOPHEN 325 MG/1
650 TABLET ORAL EVERY 6 HOURS PRN
Status: DISCONTINUED | OUTPATIENT
Start: 2024-12-09 | End: 2024-12-13 | Stop reason: HOSPADM

## 2024-12-09 RX ORDER — POLYETHYLENE GLYCOL 3350 17 G/17G
17 POWDER, FOR SOLUTION ORAL DAILY PRN
Status: DISCONTINUED | OUTPATIENT
Start: 2024-12-09 | End: 2024-12-13 | Stop reason: HOSPADM

## 2024-12-09 RX ORDER — IPRATROPIUM BROMIDE AND ALBUTEROL SULFATE 2.5; .5 MG/3ML; MG/3ML
1 SOLUTION RESPIRATORY (INHALATION) ONCE
Status: COMPLETED | OUTPATIENT
Start: 2024-12-09 | End: 2024-12-09

## 2024-12-09 RX ORDER — CALCIUM GLUCONATE 94 MG/ML
1000 INJECTION, SOLUTION INTRAVENOUS ONCE
Status: DISCONTINUED | OUTPATIENT
Start: 2024-12-09 | End: 2024-12-10

## 2024-12-09 RX ORDER — ASPIRIN 81 MG/1
81 TABLET, CHEWABLE ORAL DAILY
Status: DISCONTINUED | OUTPATIENT
Start: 2024-12-10 | End: 2024-12-13 | Stop reason: HOSPADM

## 2024-12-09 RX ORDER — IOPAMIDOL 755 MG/ML
75 INJECTION, SOLUTION INTRAVASCULAR
Status: COMPLETED | OUTPATIENT
Start: 2024-12-09 | End: 2024-12-09

## 2024-12-09 RX ORDER — FUROSEMIDE 10 MG/ML
INJECTION INTRAMUSCULAR; INTRAVENOUS
Status: COMPLETED
Start: 2024-12-09 | End: 2024-12-09

## 2024-12-09 RX ORDER — ENOXAPARIN SODIUM 100 MG/ML
40 INJECTION SUBCUTANEOUS DAILY
Status: DISCONTINUED | OUTPATIENT
Start: 2024-12-10 | End: 2024-12-13 | Stop reason: HOSPADM

## 2024-12-09 RX ORDER — LOSARTAN POTASSIUM 50 MG/1
50 TABLET ORAL DAILY
Status: DISCONTINUED | OUTPATIENT
Start: 2024-12-10 | End: 2024-12-11

## 2024-12-09 RX ORDER — ACETAMINOPHEN 650 MG/1
650 SUPPOSITORY RECTAL EVERY 6 HOURS PRN
Status: DISCONTINUED | OUTPATIENT
Start: 2024-12-09 | End: 2024-12-13 | Stop reason: HOSPADM

## 2024-12-09 RX ORDER — PRAMIPEXOLE DIHYDROCHLORIDE 0.25 MG/1
0.75 TABLET ORAL NIGHTLY
Status: DISCONTINUED | OUTPATIENT
Start: 2024-12-09 | End: 2024-12-13 | Stop reason: HOSPADM

## 2024-12-09 RX ORDER — ONDANSETRON 2 MG/ML
4 INJECTION INTRAMUSCULAR; INTRAVENOUS EVERY 6 HOURS PRN
Status: DISCONTINUED | OUTPATIENT
Start: 2024-12-09 | End: 2024-12-13 | Stop reason: HOSPADM

## 2024-12-09 RX ORDER — FUROSEMIDE 10 MG/ML
40 INJECTION INTRAMUSCULAR; INTRAVENOUS ONCE
Status: COMPLETED | OUTPATIENT
Start: 2024-12-09 | End: 2024-12-09

## 2024-12-09 RX ORDER — FENTANYL CITRATE 50 UG/ML
25 INJECTION, SOLUTION INTRAMUSCULAR; INTRAVENOUS ONCE
Status: COMPLETED | OUTPATIENT
Start: 2024-12-09 | End: 2024-12-09

## 2024-12-09 RX ORDER — MAGNESIUM SULFATE IN WATER 40 MG/ML
2000 INJECTION, SOLUTION INTRAVENOUS ONCE
Status: COMPLETED | OUTPATIENT
Start: 2024-12-09 | End: 2024-12-09

## 2024-12-09 RX ORDER — METOPROLOL SUCCINATE 50 MG/1
100 TABLET, EXTENDED RELEASE ORAL NIGHTLY
Status: DISCONTINUED | OUTPATIENT
Start: 2024-12-09 | End: 2024-12-13 | Stop reason: HOSPADM

## 2024-12-09 RX ORDER — ATORVASTATIN CALCIUM 40 MG/1
40 TABLET, FILM COATED ORAL NIGHTLY
Status: DISCONTINUED | OUTPATIENT
Start: 2024-12-09 | End: 2024-12-13 | Stop reason: HOSPADM

## 2024-12-09 RX ORDER — CALCIUM CARBONATE 500 MG/1
500 TABLET, CHEWABLE ORAL 3 TIMES DAILY PRN
Status: DISCONTINUED | OUTPATIENT
Start: 2024-12-09 | End: 2024-12-13 | Stop reason: HOSPADM

## 2024-12-09 RX ORDER — POTASSIUM CHLORIDE 7.45 MG/ML
10 INJECTION INTRAVENOUS PRN
Status: DISCONTINUED | OUTPATIENT
Start: 2024-12-09 | End: 2024-12-13 | Stop reason: HOSPADM

## 2024-12-09 RX ORDER — MAGNESIUM SULFATE IN WATER 40 MG/ML
2000 INJECTION, SOLUTION INTRAVENOUS PRN
Status: DISCONTINUED | OUTPATIENT
Start: 2024-12-09 | End: 2024-12-13 | Stop reason: HOSPADM

## 2024-12-09 RX ORDER — GABAPENTIN 300 MG/1
600 CAPSULE ORAL 2 TIMES DAILY
Status: DISCONTINUED | OUTPATIENT
Start: 2024-12-09 | End: 2024-12-13 | Stop reason: HOSPADM

## 2024-12-09 RX ORDER — IPRATROPIUM BROMIDE AND ALBUTEROL SULFATE 2.5; .5 MG/3ML; MG/3ML
1 SOLUTION RESPIRATORY (INHALATION) EVERY 4 HOURS
Status: DISCONTINUED | OUTPATIENT
Start: 2024-12-09 | End: 2024-12-13 | Stop reason: HOSPADM

## 2024-12-09 RX ORDER — SODIUM CHLORIDE 0.9 % (FLUSH) 0.9 %
5-40 SYRINGE (ML) INJECTION PRN
Status: DISCONTINUED | OUTPATIENT
Start: 2024-12-09 | End: 2024-12-13 | Stop reason: HOSPADM

## 2024-12-09 RX ORDER — TAMSULOSIN HYDROCHLORIDE 0.4 MG/1
0.4 CAPSULE ORAL 2 TIMES DAILY
Status: DISCONTINUED | OUTPATIENT
Start: 2024-12-09 | End: 2024-12-13 | Stop reason: HOSPADM

## 2024-12-09 RX ORDER — AZITHROMYCIN 250 MG/1
250 TABLET, FILM COATED ORAL DAILY
Status: DISCONTINUED | OUTPATIENT
Start: 2024-12-10 | End: 2024-12-13 | Stop reason: HOSPADM

## 2024-12-09 RX ADMIN — ARFORMOTEROL TARTRATE 15 MCG: 15 SOLUTION RESPIRATORY (INHALATION) at 22:28

## 2024-12-09 RX ADMIN — MAGNESIUM SULFATE HEPTAHYDRATE 2000 MG: 40 INJECTION, SOLUTION INTRAVENOUS at 20:24

## 2024-12-09 RX ADMIN — FENTANYL CITRATE 25 MCG: 50 INJECTION INTRAMUSCULAR; INTRAVENOUS at 17:30

## 2024-12-09 RX ADMIN — SODIUM CHLORIDE, PRESERVATIVE FREE 10 ML: 5 INJECTION INTRAVENOUS at 09:11

## 2024-12-09 RX ADMIN — FUROSEMIDE 40 MG: 10 INJECTION, SOLUTION INTRAMUSCULAR; INTRAVENOUS at 09:12

## 2024-12-09 RX ADMIN — IPRATROPIUM BROMIDE AND ALBUTEROL SULFATE 1 DOSE: 2.5; .5 SOLUTION RESPIRATORY (INHALATION) at 22:28

## 2024-12-09 RX ADMIN — CALCIUM GLUCONATE 1000 MG: 20 INJECTION, SOLUTION INTRAVENOUS at 15:11

## 2024-12-09 RX ADMIN — IPRATROPIUM BROMIDE AND ALBUTEROL SULFATE 1 DOSE: 2.5; .5 SOLUTION RESPIRATORY (INHALATION) at 15:24

## 2024-12-09 RX ADMIN — BUDESONIDE 500 MCG: 0.5 INHALANT RESPIRATORY (INHALATION) at 22:29

## 2024-12-09 RX ADMIN — FUROSEMIDE 40 MG: 10 INJECTION INTRAMUSCULAR; INTRAVENOUS at 09:12

## 2024-12-09 RX ADMIN — IOPAMIDOL 75 ML: 755 INJECTION, SOLUTION INTRAVENOUS at 17:15

## 2024-12-09 RX ADMIN — FUROSEMIDE 40 MG: 10 INJECTION, SOLUTION INTRAMUSCULAR; INTRAVENOUS at 17:30

## 2024-12-09 ASSESSMENT — PAIN SCALES - GENERAL
PAINLEVEL_OUTOF10: 8
PAINLEVEL_OUTOF10: 8
PAINLEVEL_OUTOF10: 3

## 2024-12-09 ASSESSMENT — PAIN - FUNCTIONAL ASSESSMENT: PAIN_FUNCTIONAL_ASSESSMENT: 0-10

## 2024-12-09 ASSESSMENT — PATIENT HEALTH QUESTIONNAIRE - PHQ9
1. LITTLE INTEREST OR PLEASURE IN DOING THINGS: NOT AT ALL
SUM OF ALL RESPONSES TO PHQ QUESTIONS 1-9: 0
SUM OF ALL RESPONSES TO PHQ9 QUESTIONS 1 & 2: 0
2. FEELING DOWN, DEPRESSED OR HOPELESS: NOT AT ALL
SUM OF ALL RESPONSES TO PHQ QUESTIONS 1-9: 0

## 2024-12-09 ASSESSMENT — PAIN DESCRIPTION - LOCATION
LOCATION: LEG;BACK;ARM
LOCATION: ARM

## 2024-12-09 ASSESSMENT — PAIN DESCRIPTION - ORIENTATION: ORIENTATION: RIGHT;LEFT

## 2024-12-09 ASSESSMENT — PAIN DESCRIPTION - DESCRIPTORS: DESCRIPTORS: ACHING

## 2024-12-09 NOTE — ED TRIAGE NOTES
Department of Emergency Medicine  FIRST PROVIDER TRIAGE NOTE             Independent MLP           12/9/24  1:27 PM EST    Date of Encounter: 12/9/24   MRN: [id number]      HPI: Dg Peña is a 59 y.o. male who presents to the ED for Arm Pain (Bilateral arm pain) and Abnormal Lab (seen at CHF clinic today, told to come to ED based on labs, CO2 low, carbon monoxide low, calcium 6.3)      ROS: Negative for cp, Suicidal ideation, or Homicidal Ideation.    PE: Gen Appearance/Constitutional: alert  CV: regular rate     Initial Plan of Care: All treatment areas with department are currently occupied. Plan to order/Initiate the following while awaiting opening in ED: EKG.  Initiate Treatment-Testing, Proceed toTreatment Area When Bed Available for ED Attending/MLP to Continue Care    Electronically signed by ARGELIA Crandall - MICA   DD: 12/9/24

## 2024-12-09 NOTE — TELEPHONE ENCOUNTER
Spoke to sister raghu regarding instructions Per MASON Thomas.    Labs and CHF clinic note reviewed  Advised ED evaluation for hypocalcemia     Per sister she will take patient to the emergency room.

## 2024-12-09 NOTE — PROGRESS NOTES
to help bridge gap until affordability N/A      Scheduled to follow up in CHF clinic on:   Future Appointments   Date Time Provider Department Center   12/20/2024  9:30 AM [facility] CHF ROOM 1 [facility]   1/2/2025  1:00 PM Jana Sanchez MD [facility]   1/16/2025  7:45 AM Nyla Calix APRN - CNP [facility]   1/16/2025  1:45 PM Bernice Ceja APRN - NP [facility]

## 2024-12-09 NOTE — ED PROVIDER NOTES
[facility] EMERGENCY DEPARTMENT  EMERGENCY DEPARTMENT ENCOUNTER        Pt Name: Dg Peña  MRN: [id number]  Birthdate 1965  Date of evaluation: 12/9/2024  Provider: Brittney Heaton MD  PCP: Jana Sanchez MD  Note Started: 2:13 PM EST 12/9/24    CHIEF COMPLAINT       Chief Complaint   Patient presents with    Arm Pain     Bilateral arm pain    Abnormal Lab     seen at [facility] clinic today, told to come to ED based on labs, CO2 low, carbon dioxide elevated, calcium 6.3       HISTORY OF PRESENT ILLNESS: 1 or more Elements   History From: Patient and sister at bedside    Limitations to history : None    Dg Peña is a 59 y.o. male who presents to the emergency department with abnormal outpatient labs for calcium was 6.3 at the [facility] clinic today.  Patient is a history of COPD he is on 5 L of oxygen baseline former smoker.  He was following up with the [facility] clinic when she does so routinely.  He is on Lasix daily.  He is also on vitamin D and calcium supplements due to chronic vitamin D deficiency.  Patient was recently admitted and discharged 8 days ago for hypocalcemia.  Calcium level was 7.2 at that time.  His daily calcium and vitamin D supplements were increased at that time.  He states he is not taking it daily.  He had routine lab work done at the [facility] clinic today and they told him his levels were abnormal.  He also reports increasing cough and shortness of breath.  No fevers or chills.  He is a little bit of diarrhea but no abdominal pain no vomiting no fevers or chills no chest pain no pleuritic pain.  He reports bilateral arm pain he states \"I get muscle aches when my calcium was low\".  Patient also had outpatient CO2 level of 43 and chemistry with this is chronically elevated at baseline due to history of COPD    Nursing Notes were all reviewed and agreed with or any disagreements were addressed in the HPI.      REVIEW OF EXTERNAL NOTE :    I reviewed  into the lungs in the morning and at bedtime    ERGOCALCIFEROL (VITAMIN D) 62808 UNITS CAPS    Take 1 capsule twice a week x 4 weeks then 1 capsule weekly thereafter    FLUOXETINE (PROZAC) 20 MG TABLET    Take 1 tablet by mouth daily    FLUTICASONE (FLONASE) 50 MCG/ACT NASAL SPRAY    USE ONE (1) SPRAY BY EACH NOSTRIL ROUTE DAILY    FORMOTEROL (PERFOROMIST) 20 MCG/2ML NEBULIZER SOLUTION    inhale one vial (2ml) via nebulizer in the morning and one vial (2ml) in the evening    FUROSEMIDE (LASIX) 40 MG TABLET    TAKE ONE (1) TABLET BY MOUTH DAILY    GABAPENTIN (NEURONTIN) 300 MG CAPSULE    TAKE TWO (2) TABS BY MOUTH IN THE MORNING AND THREE (3) TABS BY MOUTH IN THE EVENING    GLUCOSE MONITORING (FREESTYLE FREEDOM) KIT    1 kit by Does not apply route daily    HYDROCODONE-ACETAMINOPHEN (NORCO) 7.5-325 MG PER TABLET    Take 1 tablet by mouth every 8 hours as needed for Pain for up to 30 days. Max Daily Amount: 3 tablets    IPRATROPIUM 0.5 MG-ALBUTEROL 2.5 MG (DUONEB) 0.5-2.5 (3) MG/3ML SOLN NEBULIZER SOLUTION    Inhale 3 mLs into the lungs every 4 hours    LIDOCAINE (XYLOCAINE) 5 % OINTMENT    APPLY TOPICALLY TO LOWER BACK AS NEEDED.    LORAZEPAM (ATIVAN) 1 MG TABLET    Take 1 tablet by mouth every 8 hours as needed for Anxiety for up to 30 days. Max Daily Amount: 3 mg    LOSARTAN (COZAAR) 50 MG TABLET    Take by mouth    METHOCARBAMOL (ROBAXIN) 500 MG TABLET    Take 1 tablet by mouth 3 times daily    METOPROLOL SUCCINATE (TOPROL XL) 100 MG EXTENDED RELEASE TABLET    Take 1 tablet at bedtime    MORPHINE SULFATE (MORPHINE 20MG/ML) SOLN CONCENTRATED SOLUTION    Take 0.5 mLs by mouth every 4 hours as needed for Pain for up to 30 days. Max Daily Amount: 60 mg    NYSTATIN (MYCOSTATIN) 438607 UNIT/GM CREAM    APPLY TOPICALLY TWO (2) TIMES DAILY.    NYSTATIN 938568 UNIT/GM POWDER    APPLY THREE (3) TIMES DAILY.    OMEPRAZOLE (PRILOSEC) 40 MG DELAYED RELEASE CAPSULE    Take 1 capsule by mouth daily    PRAMIPEXOLE (MIRAPEX) 0.75

## 2024-12-09 NOTE — TELEPHONE ENCOUNTER
----- Message from ARGELIA Thomas CNP sent at 12/9/2024 11:46 AM EST -----  Labs and CHF clinic note reviewed  Advised ED evaluation for hypocalcemia

## 2024-12-09 NOTE — PROGRESS NOTES
Database initiated. Patient alert and oriented, uses 4-5L oxygen via nc; at bedside bipap in use. Patient has some assistance with walker in home. Medications reviewed.

## 2024-12-10 LAB
ALBUMIN SERPL-MCNC: 3.9 G/DL (ref 3.5–5.2)
ALP SERPL-CCNC: 42 U/L (ref 40–129)
ALT SERPL-CCNC: 8 U/L (ref 0–40)
ANION GAP SERPL CALCULATED.3IONS-SCNC: 13 MMOL/L (ref 7–16)
AST SERPL-CCNC: 11 U/L (ref 0–39)
BASOPHILS # BLD: 0.01 K/UL (ref 0–0.2)
BASOPHILS NFR BLD: 0 % (ref 0–2)
BILIRUB SERPL-MCNC: 0.3 MG/DL (ref 0–1.2)
BUN SERPL-MCNC: 16 MG/DL (ref 6–20)
CA-I BLD-SCNC: 0.58 MMOL/L (ref 1.15–1.33)
CA-I BLD-SCNC: 0.91 MMOL/L (ref 1.15–1.33)
CA-I BLD-SCNC: 0.92 MMOL/L (ref 1.15–1.33)
CALCIUM SERPL-MCNC: 7.3 MG/DL (ref 8.6–10.2)
CHLORIDE SERPL-SCNC: 87 MMOL/L (ref 98–107)
CO2 SERPL-SCNC: 41 MMOL/L (ref 22–29)
CREAT SERPL-MCNC: 0.7 MG/DL (ref 0.7–1.2)
EKG ATRIAL RATE: 72 BPM
EKG P AXIS: 54 DEGREES
EKG P-R INTERVAL: 116 MS
EKG Q-T INTERVAL: 418 MS
EKG QRS DURATION: 76 MS
EKG QTC CALCULATION (BAZETT): 457 MS
EKG R AXIS: 70 DEGREES
EKG T AXIS: 69 DEGREES
EKG VENTRICULAR RATE: 72 BPM
EOSINOPHIL # BLD: 0.16 K/UL (ref 0.05–0.5)
EOSINOPHILS RELATIVE PERCENT: 2 % (ref 0–6)
ERYTHROCYTE [DISTWIDTH] IN BLOOD BY AUTOMATED COUNT: 13.5 % (ref 11.5–15)
GFR, ESTIMATED: >90 ML/MIN/1.73M2
GLUCOSE SERPL-MCNC: 152 MG/DL (ref 74–99)
HCT VFR BLD AUTO: 35.9 % (ref 37–54)
HGB BLD-MCNC: 10.8 G/DL (ref 12.5–16.5)
IMM GRANULOCYTES # BLD AUTO: 0.05 K/UL (ref 0–0.58)
IMM GRANULOCYTES NFR BLD: 1 % (ref 0–5)
LYMPHOCYTES NFR BLD: 1.1 K/UL (ref 1.5–4)
LYMPHOCYTES RELATIVE PERCENT: 15 % (ref 20–42)
MAGNESIUM SERPL-MCNC: 2.4 MG/DL (ref 1.6–2.6)
MCH RBC QN AUTO: 32.8 PG (ref 26–35)
MCHC RBC AUTO-ENTMCNC: 30.1 G/DL (ref 32–34.5)
MCV RBC AUTO: 109.1 FL (ref 80–99.9)
MONOCYTES NFR BLD: 0.39 K/UL (ref 0.1–0.95)
MONOCYTES NFR BLD: 6 % (ref 2–12)
NEUTROPHILS NFR BLD: 76 % (ref 43–80)
NEUTS SEG NFR BLD: 5.42 K/UL (ref 1.8–7.3)
PHOSPHATE SERPL-MCNC: 6 MG/DL (ref 2.5–4.5)
PLATELET # BLD AUTO: 190 K/UL (ref 130–450)
PMV BLD AUTO: 10.7 FL (ref 7–12)
POTASSIUM SERPL-SCNC: 4 MMOL/L (ref 3.5–5)
PROT SERPL-MCNC: 7 G/DL (ref 6.4–8.3)
RBC # BLD AUTO: 3.29 M/UL (ref 3.8–5.8)
SODIUM SERPL-SCNC: 141 MMOL/L (ref 132–146)
WBC OTHER # BLD: 7.1 K/UL (ref 4.5–11.5)

## 2024-12-10 PROCEDURE — 6370000000 HC RX 637 (ALT 250 FOR IP): Performed by: NURSE PRACTITIONER

## 2024-12-10 PROCEDURE — 82330 ASSAY OF CALCIUM: CPT

## 2024-12-10 PROCEDURE — G0378 HOSPITAL OBSERVATION PER HR: HCPCS

## 2024-12-10 PROCEDURE — 84100 ASSAY OF PHOSPHORUS: CPT

## 2024-12-10 PROCEDURE — 93010 ELECTROCARDIOGRAM REPORT: CPT | Performed by: INTERNAL MEDICINE

## 2024-12-10 PROCEDURE — 2700000000 HC OXYGEN THERAPY PER DAY

## 2024-12-10 PROCEDURE — 6360000002 HC RX W HCPCS: Performed by: NURSE PRACTITIONER

## 2024-12-10 PROCEDURE — 36415 COLL VENOUS BLD VENIPUNCTURE: CPT

## 2024-12-10 PROCEDURE — 99232 SBSQ HOSP IP/OBS MODERATE 35: CPT | Performed by: STUDENT IN AN ORGANIZED HEALTH CARE EDUCATION/TRAINING PROGRAM

## 2024-12-10 PROCEDURE — 6360000002 HC RX W HCPCS: Performed by: INTERNAL MEDICINE

## 2024-12-10 PROCEDURE — 96366 THER/PROPH/DIAG IV INF ADDON: CPT

## 2024-12-10 PROCEDURE — 80053 COMPREHEN METABOLIC PANEL: CPT

## 2024-12-10 PROCEDURE — 94640 AIRWAY INHALATION TREATMENT: CPT

## 2024-12-10 PROCEDURE — 96376 TX/PRO/DX INJ SAME DRUG ADON: CPT

## 2024-12-10 PROCEDURE — 94660 CPAP INITIATION&MGMT: CPT

## 2024-12-10 PROCEDURE — 96372 THER/PROPH/DIAG INJ SC/IM: CPT

## 2024-12-10 PROCEDURE — 85025 COMPLETE CBC W/AUTO DIFF WBC: CPT

## 2024-12-10 PROCEDURE — 6370000000 HC RX 637 (ALT 250 FOR IP): Performed by: INTERNAL MEDICINE

## 2024-12-10 PROCEDURE — 83735 ASSAY OF MAGNESIUM: CPT

## 2024-12-10 PROCEDURE — 2580000003 HC RX 258: Performed by: NURSE PRACTITIONER

## 2024-12-10 RX ORDER — ERGOCALCIFEROL 1.25 MG/1
50000 CAPSULE, LIQUID FILLED ORAL
Status: DISCONTINUED | OUTPATIENT
Start: 2024-12-10 | End: 2024-12-13 | Stop reason: HOSPADM

## 2024-12-10 RX ORDER — FUROSEMIDE 10 MG/ML
20 INJECTION INTRAMUSCULAR; INTRAVENOUS DAILY
Status: DISCONTINUED | OUTPATIENT
Start: 2024-12-11 | End: 2024-12-11

## 2024-12-10 RX ORDER — CALCIUM GLUCONATE 20 MG/ML
2000 INJECTION, SOLUTION INTRAVENOUS ONCE
Status: COMPLETED | OUTPATIENT
Start: 2024-12-10 | End: 2024-12-11

## 2024-12-10 RX ADMIN — IPRATROPIUM BROMIDE AND ALBUTEROL SULFATE 1 DOSE: 2.5; .5 SOLUTION RESPIRATORY (INHALATION) at 19:10

## 2024-12-10 RX ADMIN — ENOXAPARIN SODIUM 40 MG: 100 INJECTION SUBCUTANEOUS at 08:23

## 2024-12-10 RX ADMIN — SODIUM CHLORIDE, PRESERVATIVE FREE 10 ML: 5 INJECTION INTRAVENOUS at 00:10

## 2024-12-10 RX ADMIN — CALCIUM 1500 MG: 500 TABLET ORAL at 08:23

## 2024-12-10 RX ADMIN — ATORVASTATIN CALCIUM 40 MG: 40 TABLET, FILM COATED ORAL at 00:07

## 2024-12-10 RX ADMIN — ARFORMOTEROL TARTRATE 15 MCG: 15 SOLUTION RESPIRATORY (INHALATION) at 19:10

## 2024-12-10 RX ADMIN — METOPROLOL SUCCINATE 100 MG: 50 TABLET, EXTENDED RELEASE ORAL at 00:07

## 2024-12-10 RX ADMIN — GABAPENTIN 600 MG: 300 CAPSULE ORAL at 08:23

## 2024-12-10 RX ADMIN — AZITHROMYCIN 250 MG: 250 TABLET, FILM COATED ORAL at 08:23

## 2024-12-10 RX ADMIN — ATORVASTATIN CALCIUM 40 MG: 40 TABLET, FILM COATED ORAL at 20:49

## 2024-12-10 RX ADMIN — PANTOPRAZOLE SODIUM 40 MG: 40 TABLET, DELAYED RELEASE ORAL at 08:23

## 2024-12-10 RX ADMIN — IPRATROPIUM BROMIDE AND ALBUTEROL SULFATE 1 DOSE: 2.5; .5 SOLUTION RESPIRATORY (INHALATION) at 13:01

## 2024-12-10 RX ADMIN — TAMSULOSIN HYDROCHLORIDE 0.4 MG: 0.4 CAPSULE ORAL at 00:07

## 2024-12-10 RX ADMIN — FUROSEMIDE 40 MG: 10 INJECTION, SOLUTION INTRAMUSCULAR; INTRAVENOUS at 08:24

## 2024-12-10 RX ADMIN — TAMSULOSIN HYDROCHLORIDE 0.4 MG: 0.4 CAPSULE ORAL at 08:23

## 2024-12-10 RX ADMIN — ARFORMOTEROL TARTRATE 15 MCG: 15 SOLUTION RESPIRATORY (INHALATION) at 09:11

## 2024-12-10 RX ADMIN — BUDESONIDE 500 MCG: 0.5 INHALANT RESPIRATORY (INHALATION) at 09:11

## 2024-12-10 RX ADMIN — HYDROCODONE BITARTRATE AND ACETAMINOPHEN 1 TABLET: 7.5; 325 TABLET ORAL at 08:31

## 2024-12-10 RX ADMIN — TAMSULOSIN HYDROCHLORIDE 0.4 MG: 0.4 CAPSULE ORAL at 20:48

## 2024-12-10 RX ADMIN — LOSARTAN POTASSIUM 50 MG: 50 TABLET, FILM COATED ORAL at 08:23

## 2024-12-10 RX ADMIN — METOPROLOL SUCCINATE 100 MG: 50 TABLET, EXTENDED RELEASE ORAL at 20:49

## 2024-12-10 RX ADMIN — BUDESONIDE 500 MCG: 0.5 INHALANT RESPIRATORY (INHALATION) at 19:10

## 2024-12-10 RX ADMIN — ERGOCALCIFEROL 50000 UNITS: 1.25 CAPSULE ORAL at 23:45

## 2024-12-10 RX ADMIN — CALCIUM 1500 MG: 500 TABLET ORAL at 20:49

## 2024-12-10 RX ADMIN — ARIPIPRAZOLE 5 MG: 5 TABLET ORAL at 08:23

## 2024-12-10 RX ADMIN — LORAZEPAM 1 MG: 1 TABLET ORAL at 01:10

## 2024-12-10 RX ADMIN — GABAPENTIN 600 MG: 300 CAPSULE ORAL at 20:49

## 2024-12-10 RX ADMIN — IPRATROPIUM BROMIDE AND ALBUTEROL SULFATE 1 DOSE: 2.5; .5 SOLUTION RESPIRATORY (INHALATION) at 15:38

## 2024-12-10 RX ADMIN — SODIUM CHLORIDE, PRESERVATIVE FREE 10 ML: 5 INJECTION INTRAVENOUS at 23:47

## 2024-12-10 RX ADMIN — CALCIUM GLUCONATE 2000 MG: 20 INJECTION, SOLUTION INTRAVENOUS at 23:43

## 2024-12-10 RX ADMIN — HYDROCODONE BITARTRATE AND ACETAMINOPHEN 1 TABLET: 7.5; 325 TABLET ORAL at 16:36

## 2024-12-10 RX ADMIN — ASPIRIN 81 MG CHEWABLE TABLET 81 MG: 81 TABLET CHEWABLE at 08:23

## 2024-12-10 RX ADMIN — CALCIUM 1500 MG: 500 TABLET ORAL at 00:07

## 2024-12-10 RX ADMIN — FLUOXETINE HYDROCHLORIDE 20 MG: 20 CAPSULE ORAL at 20:48

## 2024-12-10 RX ADMIN — IPRATROPIUM BROMIDE AND ALBUTEROL SULFATE 1 DOSE: 2.5; .5 SOLUTION RESPIRATORY (INHALATION) at 09:11

## 2024-12-10 RX ADMIN — PRAMIPEXOLE DIHYDROCHLORIDE 0.75 MG: 0.25 TABLET ORAL at 23:45

## 2024-12-10 RX ADMIN — IPRATROPIUM BROMIDE AND ALBUTEROL SULFATE 1 DOSE: 2.5; .5 SOLUTION RESPIRATORY (INHALATION) at 04:30

## 2024-12-10 RX ADMIN — LORAZEPAM 1 MG: 1 TABLET ORAL at 14:35

## 2024-12-10 RX ADMIN — GABAPENTIN 600 MG: 300 CAPSULE ORAL at 00:07

## 2024-12-10 ASSESSMENT — PAIN SCALES - GENERAL
PAINLEVEL_OUTOF10: 7
PAINLEVEL_OUTOF10: 2
PAINLEVEL_OUTOF10: 7

## 2024-12-10 ASSESSMENT — PAIN DESCRIPTION - ORIENTATION: ORIENTATION: LEFT

## 2024-12-10 ASSESSMENT — PAIN DESCRIPTION - LOCATION
LOCATION: LEG;HIP
LOCATION: LEG;HIP

## 2024-12-10 ASSESSMENT — PAIN DESCRIPTION - DESCRIPTORS: DESCRIPTORS: ACHING;DISCOMFORT

## 2024-12-10 NOTE — ED NOTES
ED to Inpatient Handoff Report    Notified Jocelyn DISLA that electronic handoff available and patient ready for transport to room 628.    Safety Risks: Hearing problems, Home safety issues, Difficulty with daily activities, and Risk of falls    Patient in Restraints: no    Constant Observer or Patient : no    Telemetry Monitoring Ordered: Yes          Order to transfer to unit without monitor: NO    Last MEWS: 0 Time completed: 2145         Vitals:    12/09/24 1326 12/09/24 1805 12/09/24 1830 12/09/24 2145   BP: 115/65  122/69 121/72   Pulse: 77 66 66 58   Resp: 18 15 15 14   Temp: 98.1 °F (36.7 °C)  98.1 °F (36.7 °C) 97.7 °F (36.5 °C)   TempSrc: Oral   Axillary   SpO2: 100% 93% 99% 100%   Weight:       Height:           Opportunity for questions and clarification was provided.

## 2024-12-10 NOTE — H&P
[facility] Hospitalist Group History and Physical      CHIEF COMPLAINT:  Increased SOB     History of Present Illness:  This is a 59 year old male with PMH significant for CHF, end-stage COPD on 6 L via nasal cannula at baseline, anxiety, HTN, HLD, and RLS.  Recently admitted 11/25/2024 - 11/29/2024 for hypocalcemia and acute on chronic CHF.  Endocrinology consulted during admission and to follow-up as outpatient.  Patient to ED from CHF clinic with increased shortness of breath, dyspnea on exertion, increased cough, and bilateral lower extremity swelling.  Weight also up by 1 pound.  Patient denies fever, chills, chest pain, nausea/vomiting, abdominal pain, and changes in bowel/bladder habits.  Carbon dioxide 43, magnesium 1.5, glucose 136, calcium 6.2, , and hemoglobin 10.1.  Negative respiratory panel.  ABGs showing pH 7.4, pCO2 69.1, pO2 91.2, HCO3 42.2 with base excess 14.7.  BiPAP initiated.  Repeat gases showing pCO2 67.7.  Chest x-ray showing some vascular congestion with mildly increased opacity in lung bases and small left pleural effusion.  CTA done and negative for PE, mild ceiling bronchiectasis towards lower lobes, and chronic areas of atelectasis for subpleural areas of consolidation in both lung bases.  Given 1 g calcium gluconate, fentanyl, Lasix 40 mg, DuoNeb, and magnesium 2 g in ED.  Will observe for additional evaluation and treatment.    Informant(s) for H&P: Patient and chart review    REVIEW OF SYSTEMS:  A comprehensive review of systems was negative except for: what is in the HPI      PMH:  Past Medical History:   Diagnosis Date    Anxiety     COPD (chronic obstructive pulmonary disease) (HCC)     Hypertension     Leg swelling     Multiple gastric ulcers     Restless leg syndrome        Surgical History:  Past Surgical History:   Procedure Laterality Date    BRONCHOSCOPY N/A 04/27/2023    BRONCHOSCOPY DIAGNOSTIC OR CELL WASH ONLY performed by Zhang Murphy MD at [facility]      General Appearance: Chronically ill-appearing.  Alert and oriented to person, place and time and in no acute distress  Skin: warm and dry, BLE discoloration  Head: normocephalic and atraumatic  Eyes: pupils equal, round, and reactive to light, conjunctivae normal  Pulmonary/Chest: lungs diminished with rhonchi and crackles.   Cardiovascular: normal rate, normal S1 and S2   Abdomen: soft, non-tender, non-distended, normal bowel sounds, no masses or organomegaly  Extremities: no cyanosis, no clubbing and BLE edema  Neurologic: speech normal        LABS:  Recent Labs     12/09/24  0915 12/09/24  1458    140   K 5.0 3.9   CL 89* 87*   CO2 43* 43*   BUN 15 17   CREATININE 0.8 0.9   GLUCOSE 118* 136*   CALCIUM 6.3* 6.2*       Recent Labs     12/09/24  1458   WBC 8.2   RBC 3.08*   HGB 10.1*   HCT 33.3*   .1*   MCH 32.8   MCHC 30.3*   RDW 13.2      MPV 10.9       No results for input(s): \"POCGLU\" in the last 72 hours.        Radiology:   CTA PULMONARY W CONTRAST   Final Result   1.  No acute pulmonary emboli.      2.  No aneurysm formation or dissection of the thoracic aorta.      3.  Mild ceiling bronchiectasis towards the lower lobes.      4.  Chronic areas of atelectasis or subpleural areas of consolidation in both   lung bases.  Can be residual changes from previous multifocal pneumonia   demonstrate on the study of September 2023.  Chronic airway disease towards   lower lung bases is considered.         XR CHEST PORTABLE   Final Result   1. Cardiomegaly with pulmonary vascular congestion.   2. Mildly increased opacity in the lung bases may represent atelectasis or   pneumonia.   3. Small left pleural effusion.             EKG:     ASSESSMENT:      Principal Problem:    Acute respiratory failure with hypercapnia  Active Problems:    Oropharyngeal dysphagia    Chronic respiratory failure with hypoxia    RLS (restless legs syndrome)    Essential hypertension    Hypertension    Peripheral vascular

## 2024-12-10 NOTE — CARE COORDINATION
Readmission noted, pt w/hypocalcemia and acute on chronic HF. Pt follows at the CHF clinic. Pt also w/end stage COPD, chronically on 6L O2 home supplier is [employer] w/nebulizer. CM verified home O2 is through [employer], they do not supply his bipap. CM met w/pt w/role of CM explained. Pt tells CM he no longer has his bipap that the company took it away, he doesn't recall which company supplied. He ambulates with a walker and has a wc. He resides in a 1st floor apt. with ramp entrance, his cousin lives with him and assists with his adl's.  Patient has a private pay aide daily and visiting nurse 3 days a week thru [facility] [phone], CM left a message to confirm, await a return call. SUNDAY order completed and faxed to [facility]: [phone]. Patient is established with Dr. Sanchez and uses [facility] in [place]. Discharge plan is home with [facility] when medically stable. Will continue to follow.   LILLIE Hoffman, RN  [facility] Case Management  [phone]

## 2024-12-10 NOTE — PROGRESS NOTES
Dg Peña was ordered Ohtuvayre inhalation susp which is a nonformulary medication.  This medication is not stocked by the pharmacy so it will need to be brought in from home for inpatient use.    If the medication has not been administered by 1400 on the following day from the time the order was placed, a pharmacist will follow-up with the nurse of the patient to assess the capability of the patient to bring in the medication.    Thank you

## 2024-12-10 NOTE — PROGRESS NOTES
[facility] Hospitalist Progress Note    Admitting Date and Time: 12/9/2024  1:37 PM  Admit Dx: Hypocalcemia [E83.51]  Acute respiratory failure with hypercapnia [J96.02]  Acute on chronic heart failure, unspecified heart failure type (HCC) [I50.9]    Subjective:  Patient is being followed for Hypocalcemia [E83.51]  Acute respiratory failure with hypercapnia [J96.02]  Acute on chronic heart failure, unspecified heart failure type (HCC) [I50.9]   Pt was seen and examined today. Reports some pain in his lower extremities, mostly chronic. Also reports spasms in hands.    ROS: denies fever, chills, cp, sob, n/v, HA unless stated above.     FLUoxetine  20 mg Oral Nightly    [START ON 12/11/2024] furosemide  20 mg IntraVENous Daily    vitamin D  50,000 Units Oral Once    sodium chloride flush  5-40 mL IntraVENous 2 times per day    enoxaparin  40 mg SubCUTAneous Daily    ipratropium 0.5 mg-albuterol 2.5 mg  1 Dose Inhalation Q4H    arformoterol tartrate  15 mcg Nebulization BID RT    ARIPiprazole  5 mg Oral Daily    aspirin  81 mg Oral Daily    atorvastatin  40 mg Oral Nightly    azithromycin  250 mg Oral Daily    budesonide  0.5 mg Nebulization BID RT    calcium elemental  1,500 mg Oral BID    Ensifentrine  2.5 mL Inhalation BID    fluticasone  1 spray Each Nostril Daily    [Held by provider] furosemide  40 mg Oral Daily    gabapentin  600 mg Oral BID    losartan  50 mg Oral Daily    metoprolol succinate  100 mg Oral Nightly    pantoprazole  40 mg Oral QAM AC    pramipexole  0.75 mg Oral Nightly    tamsulosin  0.4 mg Oral BID     sodium chloride flush, 5-40 mL, PRN  sodium chloride, , PRN  potassium chloride, 40 mEq, PRN   Or  potassium alternative oral replacement, 40 mEq, PRN   Or  potassium chloride, 10 mEq, PRN  magnesium sulfate, 2,000 mg, PRN  ondansetron, 4 mg, Q8H PRN   Or  ondansetron, 4 mg, Q6H PRN  polyethylene glycol, 17 g, Daily PRN  acetaminophen, 650 mg, Q6H PRN   Or  acetaminophen, 650 mg, Q6H  PRN  albuterol, 2.5 mg, Q4H PRN  calcium carbonate, 500 mg, TID PRN  HYDROcodone-acetaminophen, 1 tablet, Q8H PRN  LORazepam, 1 mg, Q8H PRN  sodium chloride, 1 spray, PRN         Objective:    /61   Pulse 68   Temp 98.8 °F (37.1 °C) (Oral)   Resp 16   Ht 1.626 m (5' 4\")   Wt 61.7 kg (136 lb)   SpO2 96%   BMI 23.34 kg/m²     General Appearance: alert and in no acute distress  Skin: warm and dry  Head: normocephalic and atraumatic  Pulmonary/Chest: clear to auscultation bilaterally- no wheezes, rales or rhonchi, normal air movement, no respiratory distress  Cardiovascular: normal rate, normal S1 and S2  Abdomen: soft, non-tender, non-distended, normal bowel sounds  Extremities: no cyanosis, no clubbing and no edema    Recent Labs     12/09/24  0915 12/09/24  1458 12/10/24  0940    140 141   K 5.0 3.9 4.0   CL 89* 87* 87*   CO2 43* 43* 41*   BUN 15 17 16   CREATININE 0.8 0.9 0.7   GLUCOSE 118* 136* 152*   CALCIUM 6.3* 6.2* 7.3*       Recent Labs     12/09/24  1458 12/10/24  0940   WBC 8.2 7.1   RBC 3.08* 3.29*   HGB 10.1* 10.8*   HCT 33.3* 35.9*   .1* 109.1*   MCH 32.8 32.8   MCHC 30.3* 30.1*   RDW 13.2 13.5    190   MPV 10.9 10.7       Radiology:   CTA PULMONARY W CONTRAST   Final Result   1.  No acute pulmonary emboli.      2.  No aneurysm formation or dissection of the thoracic aorta.      3.  Mild ceiling bronchiectasis towards the lower lobes.      4.  Chronic areas of atelectasis or subpleural areas of consolidation in both   lung bases.  Can be residual changes from previous multifocal pneumonia   demonstrate on the study of September 2023.  Chronic airway disease towards   lower lung bases is considered.         XR CHEST PORTABLE   Final Result   1. Cardiomegaly with pulmonary vascular congestion.   2. Mildly increased opacity in the lung bases may represent atelectasis or   pneumonia.   3. Small left pleural effusion.             Assessment:    Principal Problem:    Acute  respiratory failure with hypercapnia  Active Problems:    Oropharyngeal dysphagia    Chronic respiratory failure with hypoxia    RLS (restless legs syndrome)    Essential hypertension    Hypertension    Peripheral vascular disease (HCC)    Chronic obstructive pulmonary disease (HCC)    Raynaud's phenomenon    Depression    Coronary artery disease involving native coronary artery of native heart without angina pectoris    Hypocalcemia    Hyperlipidemia  Resolved Problems:    * No resolved hospital problems. *      Plan:  Endocrinology consulted, appreciate recommendations  Replace calcium as needed  COPD appears to be at baseline with regards to pCO2 and oxygen requirements  Reduce IV Lasix to 20 mg daily starting tomorrow  I/Os, daily weights  Recheck ionized calcium tonight  Continue home medications as able    Code Status: Full code  DVT/GI ppx: Lovenox/Protonix  Dispo: Continue care    Time spent reviewing chart, clinical exam, discussing case and answering questions with staff/consultants/patient/family = 35 min    NOTE: This report was transcribed using voice recognition software. Every effort was made to ensure accuracy; however, inadvertent computerized transcription errors may be present.  Electronically signed by Jan Woodruff MD on 12/10/2024 at 5:42 PM

## 2024-12-10 NOTE — CONSULTS
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 He was engaged and asked appropriate questions throughout the education session. He is feeling ok. He has been seen on multiple admissions in the past and has no additional questions at this time.      Barriers identified during consult contributing to HF Hospitalization:  [] Limited medication adherence   [] Poor health literacy, education regarding HF medications provided   [] Pill box provided to patient  [] Difficulty affording medications  [] Difficulty obtaining/ managing medications  [] Prescription assistance information given     [] Not weighing themselves daily  [x] Weight log provided for easy monitoring  [] Scale provided     [] Not following low sodium diet  [] Food insecurity   [x] 2 gram sodium diet education provided   [] Low sodium recipes provided  [] Sodium free seasoning provided   [] Low sodium meal delivery options given to patient  [] Dietician consulted     [] Lack of transportation to appointments     [] Depression, given chronic illness  [] Primary team notified     [] Goals of care need addressed  [] Palliative care consulted     [] CHF CHW consulted, to assist with     Chart Reviewed:  Diet: ADULT DIET; Regular; Low Sodium (2 gm)   Daily Weights: Patient Vitals for the past 96 hrs (Last 3 readings):   Weight   12/09/24 1320 61.7 kg (136 lb)     I/O:   Intake/Output Summary (Last 24 hours) at 12/10/2024 1035  Last data filed at 12/10/2024 0924  Gross per 24 hour   Intake --   Output 650 ml   Net -650 ml       [] Nursing staff/manager notified of inaccurate parisi weights or  day or a total of 5 pounds or more in one week. Also, if you should have a significant weight loss of 3# or more in one day to call the doctor, they may need to decrease or hold the diuretic dose. On days you feels nauseated and not eating / drinking, having emesis or diarrhea,  informed to call the cardiologist  / doctor, they may need to decrease or hold diuretic to avoid dehydration.  Again, stressed the importance of informing their medical provider the first day of onset of any of the signs and symptoms in the \"Yellow Zone\" of the HF Zones.     The Heart Failure Booklet given to the patient with additional patient education addressing:  What is Heart Failure?  Things You Can Do to Live Well with HF  How to Take Your Medications  How to Eat Less Salt  Toombs its Salt?  Exercising Well with Heart Failure  Signs and symptoms of HF to report  Weight Yourself Each Day  Home Self Management- activity, weight tracking, taking medications as prescribed, meals /diet planning (sodium and fluid restriction), how to read food labels, keeping physician follow ups, smoking cessation, follow the “Heart Failure Zones”  The Heart Failure zones  Every Dose Every Day    Instructed to call 911 if you have any of the following symptoms:  Struggling to breathe unrelieved with rest  Having chest pain  Confusion or can’t think clearly      Patient verbalizes understanding of above.   Greater than 30 minutes was spent educating patient.        Shavon Man RN   Heart Failure Navigator

## 2024-12-10 NOTE — PROGRESS NOTES
[facility] Quality Flow/Interdisciplinary Rounds Progress Note        Quality Flow Rounds held on December 10, 2024    Disciplines Attending:  Bedside Nurse, , , and Nursing Unit Leadership    Dg Peña was admitted on 12/9/2024  1:37 PM    Anticipated Discharge Date:       Disposition:    Cristofer Score:  Cristofer Scale Score: 18    Readmission Risk              Risk of Unplanned Readmission:  0           Discussed patient goal for the day, patient clinical progression, and barriers to discharge.  The following Goal(s) of the Day/Commitment(s) have been identified:   endocrine eval, iv lasix, po abx, wean oxygen as able.      Sanam Callahan RN  December 10, 2024

## 2024-12-10 NOTE — ACP (ADVANCE CARE PLANNING)
Advance Care Planning   Healthcare Decision Maker:    Primary Decision Maker (Active): Sayra Burt - Brother/Sister - [phone]    Primary Decision Maker: AlexKimberley - Brother/Sister - [phone]    Secondary Decision Maker: MarianaDarren - Child - [phone]  ACP: Kimberley  is primary HC DM.    Today we documented Decision Maker(s) consistent with ACP documents on file.

## 2024-12-11 LAB
25(OH)D3 SERPL-MCNC: 26 NG/ML (ref 30–100)
ALBUMIN SERPL-MCNC: 3.5 G/DL (ref 3.5–5.2)
ALP SERPL-CCNC: 44 U/L (ref 40–129)
ALT SERPL-CCNC: 7 U/L (ref 0–40)
ANION GAP SERPL CALCULATED.3IONS-SCNC: 8 MMOL/L (ref 7–16)
AST SERPL-CCNC: 11 U/L (ref 0–39)
BASOPHILS # BLD: 0.01 K/UL (ref 0–0.2)
BASOPHILS NFR BLD: 0 % (ref 0–2)
BILIRUB SERPL-MCNC: 0.3 MG/DL (ref 0–1.2)
BUN SERPL-MCNC: 21 MG/DL (ref 6–20)
CA-I BLD-SCNC: 1.06 MMOL/L (ref 1.15–1.33)
CA-I BLD-SCNC: 1.13 MMOL/L (ref 1.15–1.33)
CALCIUM SERPL-MCNC: 8.4 MG/DL (ref 8.6–10.2)
CHLORIDE SERPL-SCNC: 91 MMOL/L (ref 98–107)
CO2 SERPL-SCNC: 38 MMOL/L (ref 22–29)
CREAT SERPL-MCNC: 0.9 MG/DL (ref 0.7–1.2)
EOSINOPHIL # BLD: 0.21 K/UL (ref 0.05–0.5)
EOSINOPHILS RELATIVE PERCENT: 3 % (ref 0–6)
ERYTHROCYTE [DISTWIDTH] IN BLOOD BY AUTOMATED COUNT: 13.6 % (ref 11.5–15)
GFR, ESTIMATED: >90 ML/MIN/1.73M2
GLUCOSE SERPL-MCNC: 81 MG/DL (ref 74–99)
HCT VFR BLD AUTO: 34.2 % (ref 37–54)
HGB BLD-MCNC: 10.3 G/DL (ref 12.5–16.5)
IMM GRANULOCYTES # BLD AUTO: 0.05 K/UL (ref 0–0.58)
IMM GRANULOCYTES NFR BLD: 1 % (ref 0–5)
LYMPHOCYTES NFR BLD: 1.36 K/UL (ref 1.5–4)
LYMPHOCYTES RELATIVE PERCENT: 19 % (ref 20–42)
MCH RBC QN AUTO: 32.9 PG (ref 26–35)
MCHC RBC AUTO-ENTMCNC: 30.1 G/DL (ref 32–34.5)
MCV RBC AUTO: 109.3 FL (ref 80–99.9)
MONOCYTES NFR BLD: 0.48 K/UL (ref 0.1–0.95)
MONOCYTES NFR BLD: 7 % (ref 2–12)
NEUTROPHILS NFR BLD: 71 % (ref 43–80)
NEUTS SEG NFR BLD: 5.12 K/UL (ref 1.8–7.3)
PLATELET # BLD AUTO: 183 K/UL (ref 130–450)
PMV BLD AUTO: 10.9 FL (ref 7–12)
POTASSIUM SERPL-SCNC: 4.5 MMOL/L (ref 3.5–5)
PROT SERPL-MCNC: 6.2 G/DL (ref 6.4–8.3)
RBC # BLD AUTO: 3.13 M/UL (ref 3.8–5.8)
SODIUM SERPL-SCNC: 137 MMOL/L (ref 132–146)
WBC OTHER # BLD: 7.2 K/UL (ref 4.5–11.5)

## 2024-12-11 PROCEDURE — 99232 SBSQ HOSP IP/OBS MODERATE 35: CPT | Performed by: STUDENT IN AN ORGANIZED HEALTH CARE EDUCATION/TRAINING PROGRAM

## 2024-12-11 PROCEDURE — 6360000002 HC RX W HCPCS: Performed by: NURSE PRACTITIONER

## 2024-12-11 PROCEDURE — 6370000000 HC RX 637 (ALT 250 FOR IP): Performed by: NURSE PRACTITIONER

## 2024-12-11 PROCEDURE — 99232 SBSQ HOSP IP/OBS MODERATE 35: CPT | Performed by: INTERNAL MEDICINE

## 2024-12-11 PROCEDURE — 82330 ASSAY OF CALCIUM: CPT

## 2024-12-11 PROCEDURE — 85025 COMPLETE CBC W/AUTO DIFF WBC: CPT

## 2024-12-11 PROCEDURE — 2580000003 HC RX 258: Performed by: NURSE PRACTITIONER

## 2024-12-11 PROCEDURE — 96366 THER/PROPH/DIAG IV INF ADDON: CPT

## 2024-12-11 PROCEDURE — 6370000000 HC RX 637 (ALT 250 FOR IP): Performed by: STUDENT IN AN ORGANIZED HEALTH CARE EDUCATION/TRAINING PROGRAM

## 2024-12-11 PROCEDURE — 96375 TX/PRO/DX INJ NEW DRUG ADDON: CPT

## 2024-12-11 PROCEDURE — 2700000000 HC OXYGEN THERAPY PER DAY

## 2024-12-11 PROCEDURE — 94640 AIRWAY INHALATION TREATMENT: CPT

## 2024-12-11 PROCEDURE — 94660 CPAP INITIATION&MGMT: CPT

## 2024-12-11 PROCEDURE — G0378 HOSPITAL OBSERVATION PER HR: HCPCS

## 2024-12-11 PROCEDURE — 82306 VITAMIN D 25 HYDROXY: CPT

## 2024-12-11 PROCEDURE — 6360000002 HC RX W HCPCS: Performed by: STUDENT IN AN ORGANIZED HEALTH CARE EDUCATION/TRAINING PROGRAM

## 2024-12-11 PROCEDURE — 96372 THER/PROPH/DIAG INJ SC/IM: CPT

## 2024-12-11 PROCEDURE — 80053 COMPREHEN METABOLIC PANEL: CPT

## 2024-12-11 RX ORDER — LOSARTAN POTASSIUM 25 MG/1
25 TABLET ORAL DAILY
Status: DISCONTINUED | OUTPATIENT
Start: 2024-12-12 | End: 2024-12-13 | Stop reason: HOSPADM

## 2024-12-11 RX ORDER — CALCIUM GLUCONATE 20 MG/ML
2000 INJECTION, SOLUTION INTRAVENOUS ONCE
Status: COMPLETED | OUTPATIENT
Start: 2024-12-11 | End: 2024-12-11

## 2024-12-11 RX ADMIN — ARIPIPRAZOLE 5 MG: 5 TABLET ORAL at 08:51

## 2024-12-11 RX ADMIN — ATORVASTATIN CALCIUM 40 MG: 40 TABLET, FILM COATED ORAL at 20:14

## 2024-12-11 RX ADMIN — SODIUM CHLORIDE, PRESERVATIVE FREE 10 ML: 5 INJECTION INTRAVENOUS at 08:53

## 2024-12-11 RX ADMIN — SODIUM CHLORIDE, PRESERVATIVE FREE 10 ML: 5 INJECTION INTRAVENOUS at 20:20

## 2024-12-11 RX ADMIN — PRAMIPEXOLE DIHYDROCHLORIDE 0.75 MG: 0.25 TABLET ORAL at 20:14

## 2024-12-11 RX ADMIN — ONDANSETRON 4 MG: 2 INJECTION, SOLUTION INTRAMUSCULAR; INTRAVENOUS at 16:40

## 2024-12-11 RX ADMIN — GABAPENTIN 600 MG: 300 CAPSULE ORAL at 20:14

## 2024-12-11 RX ADMIN — TAMSULOSIN HYDROCHLORIDE 0.4 MG: 0.4 CAPSULE ORAL at 20:14

## 2024-12-11 RX ADMIN — TAMSULOSIN HYDROCHLORIDE 0.4 MG: 0.4 CAPSULE ORAL at 08:52

## 2024-12-11 RX ADMIN — LOSARTAN POTASSIUM 50 MG: 50 TABLET, FILM COATED ORAL at 08:52

## 2024-12-11 RX ADMIN — HYDROCODONE BITARTRATE AND ACETAMINOPHEN 1 TABLET: 7.5; 325 TABLET ORAL at 16:48

## 2024-12-11 RX ADMIN — HYDROCODONE BITARTRATE AND ACETAMINOPHEN 1 TABLET: 7.5; 325 TABLET ORAL at 08:55

## 2024-12-11 RX ADMIN — PANTOPRAZOLE SODIUM 40 MG: 40 TABLET, DELAYED RELEASE ORAL at 05:59

## 2024-12-11 RX ADMIN — CALCIUM 1500 MG: 500 TABLET ORAL at 08:51

## 2024-12-11 RX ADMIN — ENOXAPARIN SODIUM 40 MG: 100 INJECTION SUBCUTANEOUS at 08:53

## 2024-12-11 RX ADMIN — FUROSEMIDE 40 MG: 40 TABLET ORAL at 08:51

## 2024-12-11 RX ADMIN — IPRATROPIUM BROMIDE AND ALBUTEROL SULFATE 1 DOSE: 2.5; .5 SOLUTION RESPIRATORY (INHALATION) at 16:14

## 2024-12-11 RX ADMIN — METOPROLOL SUCCINATE 100 MG: 50 TABLET, EXTENDED RELEASE ORAL at 20:14

## 2024-12-11 RX ADMIN — CALCIUM 1500 MG: 500 TABLET ORAL at 20:14

## 2024-12-11 RX ADMIN — ACETAMINOPHEN 650 MG: 325 TABLET ORAL at 22:46

## 2024-12-11 RX ADMIN — LORAZEPAM 1 MG: 1 TABLET ORAL at 00:08

## 2024-12-11 RX ADMIN — AZITHROMYCIN 250 MG: 250 TABLET, FILM COATED ORAL at 08:51

## 2024-12-11 RX ADMIN — LORAZEPAM 1 MG: 1 TABLET ORAL at 20:14

## 2024-12-11 RX ADMIN — ASPIRIN 81 MG CHEWABLE TABLET 81 MG: 81 TABLET CHEWABLE at 08:52

## 2024-12-11 RX ADMIN — IPRATROPIUM BROMIDE AND ALBUTEROL SULFATE 1 DOSE: 2.5; .5 SOLUTION RESPIRATORY (INHALATION) at 04:27

## 2024-12-11 RX ADMIN — GABAPENTIN 600 MG: 300 CAPSULE ORAL at 08:51

## 2024-12-11 RX ADMIN — CALCIUM GLUCONATE 2000 MG: 20 INJECTION, SOLUTION INTRAVENOUS at 10:31

## 2024-12-11 RX ADMIN — IPRATROPIUM BROMIDE AND ALBUTEROL SULFATE 1 DOSE: 2.5; .5 SOLUTION RESPIRATORY (INHALATION) at 12:28

## 2024-12-11 RX ADMIN — ARFORMOTEROL TARTRATE 15 MCG: 15 SOLUTION RESPIRATORY (INHALATION) at 16:14

## 2024-12-11 RX ADMIN — FLUOXETINE HYDROCHLORIDE 20 MG: 20 CAPSULE ORAL at 20:14

## 2024-12-11 RX ADMIN — LORAZEPAM 1 MG: 1 TABLET ORAL at 08:55

## 2024-12-11 RX ADMIN — ALBUTEROL SULFATE 2.5 MG: 0.83 SOLUTION RESPIRATORY (INHALATION) at 00:19

## 2024-12-11 RX ADMIN — HYDROCODONE BITARTRATE AND ACETAMINOPHEN 1 TABLET: 7.5; 325 TABLET ORAL at 01:18

## 2024-12-11 ASSESSMENT — PAIN SCALES - GENERAL
PAINLEVEL_OUTOF10: 0
PAINLEVEL_OUTOF10: 3
PAINLEVEL_OUTOF10: 9
PAINLEVEL_OUTOF10: 3
PAINLEVEL_OUTOF10: 8
PAINLEVEL_OUTOF10: 2
PAINLEVEL_OUTOF10: 8

## 2024-12-11 ASSESSMENT — PAIN DESCRIPTION - DESCRIPTORS
DESCRIPTORS: STABBING;SHARP
DESCRIPTORS: SHARP
DESCRIPTORS: ACHING

## 2024-12-11 ASSESSMENT — PAIN - FUNCTIONAL ASSESSMENT: PAIN_FUNCTIONAL_ASSESSMENT: ACTIVITIES ARE NOT PREVENTED

## 2024-12-11 ASSESSMENT — PAIN DESCRIPTION - LOCATION
LOCATION: BACK;LEG
LOCATION: LEG
LOCATION: LEG

## 2024-12-11 ASSESSMENT — PAIN SCALES - WONG BAKER: WONGBAKER_NUMERICALRESPONSE: HURTS A LITTLE BIT

## 2024-12-11 ASSESSMENT — PAIN DESCRIPTION - ORIENTATION
ORIENTATION: RIGHT;LEFT
ORIENTATION: RIGHT;LEFT

## 2024-12-11 NOTE — PROGRESS NOTES
Date: 12/11/2024    Time: 12:21 AM    Patient Placed On BIPAP/CPAP/ Non-Invasive Ventilation?  Patient continues on bipap    If no must comment.  Facial area red/color change? No           If YES are Blister/Lesion present?No   If yes must notify nursing staff  BIPAP/CPAP skin barrier?  Yes    Skin barrier type:mepilexlite       Comments:        Areli Perdue RCP

## 2024-12-11 NOTE — PROGRESS NOTES
[facility] Hospitalist Progress Note    Admitting Date and Time: 12/9/2024  1:37 PM  Admit Dx: Hypocalcemia [E83.51]  Acute respiratory failure with hypercapnia [J96.02]  Acute on chronic heart failure, unspecified heart failure type (HCC) [I50.9]    Subjective:  Patient is being followed for Hypocalcemia [E83.51]  Acute respiratory failure with hypercapnia [J96.02]  Acute on chronic heart failure, unspecified heart failure type (HCC) [I50.9]   Pt was seen and examined today. Denies any new issues today.    ROS: denies fever, chills, cp, sob, n/v, HA unless stated above.     FLUoxetine  20 mg Oral Nightly    vitamin D  50,000 Units Oral Once per day on Monday Thursday    sodium chloride flush  5-40 mL IntraVENous 2 times per day    enoxaparin  40 mg SubCUTAneous Daily    ipratropium 0.5 mg-albuterol 2.5 mg  1 Dose Inhalation Q4H    arformoterol tartrate  15 mcg Nebulization BID RT    ARIPiprazole  5 mg Oral Daily    aspirin  81 mg Oral Daily    atorvastatin  40 mg Oral Nightly    azithromycin  250 mg Oral Daily    budesonide  0.5 mg Nebulization BID RT    calcium elemental  1,500 mg Oral BID    Ensifentrine  2.5 mL Inhalation BID    fluticasone  1 spray Each Nostril Daily    furosemide  40 mg Oral Daily    gabapentin  600 mg Oral BID    losartan  50 mg Oral Daily    metoprolol succinate  100 mg Oral Nightly    pantoprazole  40 mg Oral QAM AC    pramipexole  0.75 mg Oral Nightly    tamsulosin  0.4 mg Oral BID     sodium chloride flush, 5-40 mL, PRN  sodium chloride, , PRN  potassium chloride, 40 mEq, PRN   Or  potassium alternative oral replacement, 40 mEq, PRN   Or  potassium chloride, 10 mEq, PRN  magnesium sulfate, 2,000 mg, PRN  ondansetron, 4 mg, Q8H PRN   Or  ondansetron, 4 mg, Q6H PRN  polyethylene glycol, 17 g, Daily PRN  acetaminophen, 650 mg, Q6H PRN   Or  acetaminophen, 650 mg, Q6H PRN  albuterol, 2.5 mg, Q4H PRN  calcium carbonate, 500 mg, TID PRN  HYDROcodone-acetaminophen, 1 tablet, Q8H

## 2024-12-11 NOTE — PLAN OF CARE
Problem: ABCDS Injury Assessment  Goal: Absence of physical injury  Outcome: Progressing     Problem: Skin/Tissue Integrity  Goal: Absence of new skin breakdown  Description: 1.  Monitor for areas of redness and/or skin breakdown  2.  Assess vascular access sites hourly  3.  Every 4-6 hours minimum:  Change oxygen saturation probe site  4.  Every 4-6 hours:  If on nasal continuous positive airway pressure, respiratory therapy assess nares and determine need for appliance change or resting period.  Outcome: Progressing     Problem: Discharge Planning  Goal: Discharge to home or other facility with appropriate resources  Outcome: Progressing  Flowsheets (Taken 12/10/2024 0820)  Discharge to home or other facility with appropriate resources: Identify barriers to discharge with patient and caregiver     Problem: Chronic Conditions and Co-morbidities  Goal: Patient's chronic conditions and co-morbidity symptoms are monitored and maintained or improved  Recent Flowsheet Documentation  Taken 12/10/2024 0820 by Ezekiel Mcneill, RN  Care Plan - Patient's Chronic Conditions and Co-Morbidity Symptoms are Monitored and Maintained or Improved: Monitor and assess patient's chronic conditions and comorbid symptoms for stability, deterioration, or improvement

## 2024-12-11 NOTE — PLAN OF CARE
Problem: ABCDS Injury Assessment  Goal: Absence of physical injury  12/11/2024 0133 by Billie Lentz RN  Outcome: Progressing  12/10/2024 1929 by Ezekiel Mcneill, RN  Outcome: Progressing     Problem: Skin/Tissue Integrity  Goal: Absence of new skin breakdown  Description: 1.  Monitor for areas of redness and/or skin breakdown  2.  Assess vascular access sites hourly  3.  Every 4-6 hours minimum:  Change oxygen saturation probe site  4.  Every 4-6 hours:  If on nasal continuous positive airway pressure, respiratory therapy assess nares and determine need for appliance change or resting period.  12/11/2024 0133 by Billie Lentz RN  Outcome: Progressing  12/10/2024 1929 by Ezekiel Mcneill, RN  Outcome: Progressing     Problem: Discharge Planning  Goal: Discharge to home or other facility with appropriate resources  12/11/2024 0133 by Billie Lentz RN  Outcome: Progressing  12/10/2024 1929 by Ezekiel Mcneill, RN  Outcome: Progressing  Flowsheets (Taken 12/10/2024 0820)  Discharge to home or other facility with appropriate resources: Identify barriers to discharge with patient and caregiver     Problem: Respiratory - Adult  Goal: Achieves optimal ventilation and oxygenation  Outcome: Progressing     Problem: Musculoskeletal - Adult  Goal: Return mobility to safest level of function  Outcome: Progressing  Goal: Maintain proper alignment of affected body part  Outcome: Progressing  Goal: Return ADL status to a safe level of function  Outcome: Progressing     Problem: Pain  Goal: Verbalizes/displays adequate comfort level or baseline comfort level  Outcome: Progressing     Problem: Chronic Conditions and Co-morbidities  Goal: Patient's chronic conditions and co-morbidity symptoms are monitored and maintained or improved  Outcome: Progressing     Problem: Safety - Adult  Goal: Free from fall injury  Outcome: Progressing

## 2024-12-11 NOTE — PROGRESS NOTES
12/10/24 2224   NIV Type   NIV Started/Stopped On   Equipment Type V60   Mode Bilevel   Mask Type Full face mask   Mask Size Medium   Assessment   Level of Consciousness 0   Comfort Level Good   Using Accessory Muscles No   Mask Compliance Good   Skin Assessment Clean, dry, & intact   Skin Protection for O2 Device Yes   Orientation Middle   Location Nose   Intervention(s) Skin Barrier   Settings/Measurements   PIP Observed 12 cm H20   IPAP 12 cmH20   CPAP/EPAP 5 cmH2O   Vt (Measured) 419 mL   Rate Ordered 14   Insp Rise Time (%) 3 %   FiO2  60 %   I Time/ I Time % 0.85 s   Minute Volume (L/min) 5.9 Liters   Mask Leak (lpm) 44 lpm   Patient's Home Machine No   Alarm Settings   Alarms On Y     Date: 12/10/2024    Time: 10:27 PM    Patient Placed On BIPAP/CPAP/ Non-Invasive Ventilation?  Yes    If no must comment.  Facial area red/color change? No           If YES are Blister/Lesion present?No   If yes must notify nursing staff  BIPAP/CPAP skin barrier?  Yes    Skin barrier type:mepilexlite       Comments:        Court Perdomo RCP

## 2024-12-11 NOTE — PROGRESS NOTES
[facility] Quality Flow/Interdisciplinary Rounds Progress Note        Quality Flow Rounds held on December 11, 2024    Disciplines Attending:  Bedside Nurse, , , and Nursing Unit Leadership    Dg Peña was admitted on 12/9/2024  1:37 PM    Anticipated Discharge Date:       Disposition:    Cristofer Score:  Cristofer Scale Score: 16    Readmission Risk              Risk of Unplanned Readmission:  0           Discussed patient goal for the day, patient clinical progression, and barriers to discharge.  The following Goal(s) of the Day/Commitment(s) have been identified:   replace electrolytes, recheck labs, check endocrine plan       Sanam Callahan RN  December 11, 2024

## 2024-12-11 NOTE — PROGRESS NOTES
Spoke with Dr. Woodruff regarding blood pressure. Keep patient MAP above 60 and monitor. If MAP drops below 60 or patient becomes symptomatic, call.

## 2024-12-11 NOTE — CONSULTS
ENDOCRINOLOGY INITIAL CONSULTATION NOTE    Date of Admission: 12/9/2024  Date of Service: 12/10/2024  Admitting Physician: Alonzo Senior DO   Primary Care Physician: Jana Sanchez MD  Consultant physician: Aj Marie MD     Reason for the consultation:  Hypocalcemia    History of Present Illness:  The history is provided by the patient. Accuracy of the patient data is excellent.    Dg Peña is a very pleasant 59 y.o. old male with PMH of congestive heart failure, COPD on home oxygen, hypertension and other listed below admitted to [facility] on 12/9/2024 because of worsening shortness of breath and lower extremity edema, endocrine service was consulted for evaluation and management of hypocalcemia.   The patient was recently admitted to the hospital with the same complaint.  Workup during the previous hospitalization was consistent with PTH independent hypercalcemia.  Workup from the previous admission summarized in table below   Most Recent 06/07/24 05:31 06/24/24 10:52   Calcium, Ionized 1.00 (L)  11/30/24 02:40     Calcium 7.8 (L)  11/30/24 09:55 6.7 (LL) 7.5 (L)   Albumin 3.9  11/28/24 03:34     Parathyroid Hormone 68.1 (H)  11/29/24 12:05     Vit D, 25-Hydroxy 13.5 (L)  11/29/24 12:05       Repeated blood work during this admission also shows significant hypocalcemia with calcium level 6.2 albumin 3.9.    The patient is a known case of vitamin D deficiency.  Prior to admission he was on a vitamin D 50,000 once a week.  He denies any chronic diarrhea but reports a lot of GI issues.  The patient also lost weight over the past few years.  The patient told me that he has been consuming dairy on a regular basis.  He was not on a calcium supplements prior to admission    Past Medical History   Past Medical History:   Diagnosis Date    Anxiety     COPD (chronic obstructive pulmonary disease) (HCC)     Hypertension     Leg swelling     Multiple gastric ulcers     Restless leg syndrome        Past Surgical  Daily    metoprolol succinate  100 mg Oral Nightly    pantoprazole  40 mg Oral QAM AC    pramipexole  0.75 mg Oral Nightly    tamsulosin  0.4 mg Oral BID     PRN Meds:   sodium chloride flush, 5-40 mL, PRN  sodium chloride, , PRN  potassium chloride, 40 mEq, PRN   Or  potassium alternative oral replacement, 40 mEq, PRN   Or  potassium chloride, 10 mEq, PRN  magnesium sulfate, 2,000 mg, PRN  ondansetron, 4 mg, Q8H PRN   Or  ondansetron, 4 mg, Q6H PRN  polyethylene glycol, 17 g, Daily PRN  acetaminophen, 650 mg, Q6H PRN   Or  acetaminophen, 650 mg, Q6H PRN  albuterol, 2.5 mg, Q4H PRN  calcium carbonate, 500 mg, TID PRN  HYDROcodone-acetaminophen, 1 tablet, Q8H PRN  LORazepam, 1 mg, Q8H PRN  sodium chloride, 1 spray, PRN      Continuous Infusions:   sodium chloride         Review of Systems  All systems reviewed. All negative except for symptoms mentioned in HPI     OBJECTIVE    /61   Pulse 68   Temp 98.8 °F (37.1 °C) (Oral)   Resp 16   Ht 1.626 m (5' 4\")   Wt 61.7 kg (136 lb)   SpO2 96%   BMI 23.34 kg/m²   Wt Readings from Last 6 Encounters:   12/09/24 61.7 kg (136 lb)   12/09/24 61.7 kg (136 lb)   12/04/24 61.2 kg (135 lb)   12/01/24 61.2 kg (135 lb)   11/25/24 61.7 kg (136 lb)   11/08/24 61.2 kg (135 lb)       Physical examination:  General: awake alert, oriented x3, no abnormal position or movements.   HEENT: normocephalic non traumatic, negative Chvostek sign  Neck: supple, no LN enlargement, no thyromegaly,  Pulm: good equal air entry no added sounds   CVS: S1 + S2   Abd: soft lax, no tenderness,   Skin: warm, no lesions, no rash.    Review of Laboratory Data:  I personally reviewed the following labs:  Recent Labs     12/09/24  1458 12/10/24  0940   WBC 8.2 7.1   RBC 3.08* 3.29*   HGB 10.1* 10.8*   HCT 33.3* 35.9*   .1* 109.1*   MCH 32.8 32.8   MCHC 30.3* 30.1*   RDW 13.2 13.5    190   MPV 10.9 10.7     Recent Labs     12/09/24  0915 12/09/24  1458 12/10/24  0940    140 141   K  5.0 3.9 4.0   CL 89* 87* 87*   CO2 43* 43* 41*   BUN 15 17 16   CREATININE 0.8 0.9 0.7   GLUCOSE 118* 136* 152*   CALCIUM 6.3* 6.2* 7.3*   BILITOT  --  0.3 0.3   ALKPHOS  --  48 42   AST  --  12 11   ALT  --  9 8     No results found for: \"BHYDRXBUT\"  Lab Results   Component Value Date/Time    LABA1C 5.6 11/25/2024 08:40 PM    LABA1C 5.0 04/21/2023 12:00 PM    LABA1C 6.1 06/18/2022 02:58 AM     Lab Results   Component Value Date/Time    TSH 0.64 08/02/2023 03:44 PM    T4FREE 1.18 (H) 05/09/2022 01:27 PM     Lab Results   Component Value Date/Time    LABA1C 5.6 11/25/2024 08:40 PM    GLUCOSE 152 12/10/2024 09:40 AM    GLUCOSE 85 03/03/2020 10:55 AM     Lab Results   Component Value Date/Time    TRIG 64 04/19/2024 12:25 PM    HDL 97 04/19/2024 12:25 PM    CHOL 242 04/19/2024 12:25 PM    CHOL 214 06/19/2022 04:12 AM       Blood culture   Lab Results   Component Value Date/Time    BC  06/03/2024 02:32 PM      Comment:      Blood CultureBacteria Spec Anaerobe+Aerobe Cult    BC  06/03/2024 02:28 PM      Comment:      Blood CultureBacteria Spec Anaerobe+Aerobe Cult    BC  06/25/2023 09:07 AM     This organism was isolated in one set.  Susceptibility testing is not routinely done as this  organism frequently represents skin contamination.  Additional testing can be ordered by calling the  Microbiology Department.      BC  06/25/2023 09:07 AM     This organism was isolated in one set.  Susceptibility testing is not routinely done as this  organism frequently represents skin contamination.  Additional testing can be ordered by calling the  Microbiology Department.      BC 5 Days no growth 06/25/2023 02:41 AM    BC 5 Days no growth 04/23/2023 06:45 PM    BC  03/28/2023 08:20 PM      Comment:      Blood CultureBacteria Spec Anaerobe+Aerobe Cult    BC  03/28/2023 08:10 PM      Comment:      Blood CultureBacteria Spec Anaerobe+Aerobe Cult       Radiology:  CTA PULMONARY W CONTRAST   Final Result   1.  No acute pulmonary emboli.

## 2024-12-12 LAB
ALBUMIN SERPL-MCNC: 3.8 G/DL (ref 3.5–5.2)
ALP SERPL-CCNC: 44 U/L (ref 40–129)
ALT SERPL-CCNC: 6 U/L (ref 0–40)
ANION GAP SERPL CALCULATED.3IONS-SCNC: 7 MMOL/L (ref 7–16)
AST SERPL-CCNC: 10 U/L (ref 0–39)
BASOPHILS # BLD: 0.02 K/UL (ref 0–0.2)
BASOPHILS NFR BLD: 0 % (ref 0–2)
BILIRUB SERPL-MCNC: 0.4 MG/DL (ref 0–1.2)
BUN SERPL-MCNC: 14 MG/DL (ref 6–20)
CA-I BLD-SCNC: 1.11 MMOL/L (ref 1.15–1.33)
CALCIUM SERPL-MCNC: 8.6 MG/DL (ref 8.6–10.2)
CHLORIDE SERPL-SCNC: 90 MMOL/L (ref 98–107)
CO2 SERPL-SCNC: 37 MMOL/L (ref 22–29)
CREAT SERPL-MCNC: 0.7 MG/DL (ref 0.7–1.2)
EOSINOPHIL # BLD: 0.12 K/UL (ref 0.05–0.5)
EOSINOPHILS RELATIVE PERCENT: 1 % (ref 0–6)
ERYTHROCYTE [DISTWIDTH] IN BLOOD BY AUTOMATED COUNT: 13.1 % (ref 11.5–15)
GFR, ESTIMATED: >90 ML/MIN/1.73M2
GLUCOSE SERPL-MCNC: 102 MG/DL (ref 74–99)
HCT VFR BLD AUTO: 35.6 % (ref 37–54)
HGB BLD-MCNC: 11 G/DL (ref 12.5–16.5)
IMM GRANULOCYTES # BLD AUTO: 0.08 K/UL (ref 0–0.58)
IMM GRANULOCYTES NFR BLD: 1 % (ref 0–5)
LYMPHOCYTES NFR BLD: 1.16 K/UL (ref 1.5–4)
LYMPHOCYTES RELATIVE PERCENT: 12 % (ref 20–42)
MCH RBC QN AUTO: 33 PG (ref 26–35)
MCHC RBC AUTO-ENTMCNC: 30.9 G/DL (ref 32–34.5)
MCV RBC AUTO: 106.9 FL (ref 80–99.9)
MONOCYTES NFR BLD: 0.44 K/UL (ref 0.1–0.95)
MONOCYTES NFR BLD: 5 % (ref 2–12)
NEUTROPHILS NFR BLD: 81 % (ref 43–80)
NEUTS SEG NFR BLD: 7.58 K/UL (ref 1.8–7.3)
PLATELET # BLD AUTO: 193 K/UL (ref 130–450)
PMV BLD AUTO: 10.9 FL (ref 7–12)
POTASSIUM SERPL-SCNC: 4.9 MMOL/L (ref 3.5–5)
PROT SERPL-MCNC: 6.8 G/DL (ref 6.4–8.3)
RBC # BLD AUTO: 3.33 M/UL (ref 3.8–5.8)
SODIUM SERPL-SCNC: 134 MMOL/L (ref 132–146)
WBC OTHER # BLD: 9.4 K/UL (ref 4.5–11.5)

## 2024-12-12 PROCEDURE — 99232 SBSQ HOSP IP/OBS MODERATE 35: CPT | Performed by: INTERNAL MEDICINE

## 2024-12-12 PROCEDURE — 6370000000 HC RX 637 (ALT 250 FOR IP): Performed by: NURSE PRACTITIONER

## 2024-12-12 PROCEDURE — 80053 COMPREHEN METABOLIC PANEL: CPT

## 2024-12-12 PROCEDURE — 6370000000 HC RX 637 (ALT 250 FOR IP): Performed by: INTERNAL MEDICINE

## 2024-12-12 PROCEDURE — 94660 CPAP INITIATION&MGMT: CPT

## 2024-12-12 PROCEDURE — 6360000002 HC RX W HCPCS: Performed by: NURSE PRACTITIONER

## 2024-12-12 PROCEDURE — 85025 COMPLETE CBC W/AUTO DIFF WBC: CPT

## 2024-12-12 PROCEDURE — 2580000003 HC RX 258: Performed by: NURSE PRACTITIONER

## 2024-12-12 PROCEDURE — 2060000000 HC ICU INTERMEDIATE R&B

## 2024-12-12 PROCEDURE — 94640 AIRWAY INHALATION TREATMENT: CPT

## 2024-12-12 PROCEDURE — 82330 ASSAY OF CALCIUM: CPT

## 2024-12-12 PROCEDURE — 2700000000 HC OXYGEN THERAPY PER DAY

## 2024-12-12 PROCEDURE — 6370000000 HC RX 637 (ALT 250 FOR IP): Performed by: STUDENT IN AN ORGANIZED HEALTH CARE EDUCATION/TRAINING PROGRAM

## 2024-12-12 PROCEDURE — 96372 THER/PROPH/DIAG INJ SC/IM: CPT

## 2024-12-12 RX ADMIN — CALCIUM 1500 MG: 500 TABLET ORAL at 19:48

## 2024-12-12 RX ADMIN — ARFORMOTEROL TARTRATE 15 MCG: 15 SOLUTION RESPIRATORY (INHALATION) at 09:08

## 2024-12-12 RX ADMIN — AZITHROMYCIN 250 MG: 250 TABLET, FILM COATED ORAL at 08:11

## 2024-12-12 RX ADMIN — BUDESONIDE 500 MCG: 0.5 INHALANT RESPIRATORY (INHALATION) at 20:02

## 2024-12-12 RX ADMIN — FLUOXETINE HYDROCHLORIDE 20 MG: 20 CAPSULE ORAL at 19:50

## 2024-12-12 RX ADMIN — IPRATROPIUM BROMIDE AND ALBUTEROL SULFATE 1 DOSE: 2.5; .5 SOLUTION RESPIRATORY (INHALATION) at 09:08

## 2024-12-12 RX ADMIN — ASPIRIN 81 MG CHEWABLE TABLET 81 MG: 81 TABLET CHEWABLE at 08:11

## 2024-12-12 RX ADMIN — ERGOCALCIFEROL 50000 UNITS: 1.25 CAPSULE ORAL at 08:11

## 2024-12-12 RX ADMIN — CALCIUM 1500 MG: 500 TABLET ORAL at 08:11

## 2024-12-12 RX ADMIN — HYDROCODONE BITARTRATE AND ACETAMINOPHEN 1 TABLET: 7.5; 325 TABLET ORAL at 19:49

## 2024-12-12 RX ADMIN — ONDANSETRON 4 MG: 2 INJECTION, SOLUTION INTRAMUSCULAR; INTRAVENOUS at 20:52

## 2024-12-12 RX ADMIN — HYDROCODONE BITARTRATE AND ACETAMINOPHEN 1 TABLET: 7.5; 325 TABLET ORAL at 01:01

## 2024-12-12 RX ADMIN — LOSARTAN POTASSIUM 25 MG: 25 TABLET, FILM COATED ORAL at 08:11

## 2024-12-12 RX ADMIN — ATORVASTATIN CALCIUM 40 MG: 40 TABLET, FILM COATED ORAL at 19:49

## 2024-12-12 RX ADMIN — PRAMIPEXOLE DIHYDROCHLORIDE 0.75 MG: 0.25 TABLET ORAL at 20:52

## 2024-12-12 RX ADMIN — IPRATROPIUM BROMIDE AND ALBUTEROL SULFATE 1 DOSE: 2.5; .5 SOLUTION RESPIRATORY (INHALATION) at 12:10

## 2024-12-12 RX ADMIN — LORAZEPAM 1 MG: 1 TABLET ORAL at 04:48

## 2024-12-12 RX ADMIN — IPRATROPIUM BROMIDE AND ALBUTEROL SULFATE 1 DOSE: 2.5; .5 SOLUTION RESPIRATORY (INHALATION) at 16:17

## 2024-12-12 RX ADMIN — IPRATROPIUM BROMIDE AND ALBUTEROL SULFATE 1 DOSE: 2.5; .5 SOLUTION RESPIRATORY (INHALATION) at 00:53

## 2024-12-12 RX ADMIN — FUROSEMIDE 40 MG: 40 TABLET ORAL at 08:11

## 2024-12-12 RX ADMIN — BUDESONIDE 500 MCG: 0.5 INHALANT RESPIRATORY (INHALATION) at 09:08

## 2024-12-12 RX ADMIN — TAMSULOSIN HYDROCHLORIDE 0.4 MG: 0.4 CAPSULE ORAL at 19:50

## 2024-12-12 RX ADMIN — SODIUM CHLORIDE, PRESERVATIVE FREE 10 ML: 5 INJECTION INTRAVENOUS at 20:52

## 2024-12-12 RX ADMIN — GABAPENTIN 600 MG: 300 CAPSULE ORAL at 19:48

## 2024-12-12 RX ADMIN — IPRATROPIUM BROMIDE AND ALBUTEROL SULFATE 1 DOSE: 2.5; .5 SOLUTION RESPIRATORY (INHALATION) at 20:02

## 2024-12-12 RX ADMIN — GABAPENTIN 600 MG: 300 CAPSULE ORAL at 08:11

## 2024-12-12 RX ADMIN — TAMSULOSIN HYDROCHLORIDE 0.4 MG: 0.4 CAPSULE ORAL at 08:10

## 2024-12-12 RX ADMIN — ARIPIPRAZOLE 5 MG: 5 TABLET ORAL at 08:11

## 2024-12-12 RX ADMIN — PANTOPRAZOLE SODIUM 40 MG: 40 TABLET, DELAYED RELEASE ORAL at 06:30

## 2024-12-12 RX ADMIN — HYDROCODONE BITARTRATE AND ACETAMINOPHEN 1 TABLET: 7.5; 325 TABLET ORAL at 11:58

## 2024-12-12 RX ADMIN — ARFORMOTEROL TARTRATE 15 MCG: 15 SOLUTION RESPIRATORY (INHALATION) at 20:02

## 2024-12-12 RX ADMIN — ENOXAPARIN SODIUM 40 MG: 100 INJECTION SUBCUTANEOUS at 08:11

## 2024-12-12 ASSESSMENT — PAIN DESCRIPTION - LOCATION
LOCATION: LEG
LOCATION: BACK;LEG
LOCATION: LEG;BACK

## 2024-12-12 ASSESSMENT — PAIN DESCRIPTION - DESCRIPTORS
DESCRIPTORS: ACHING;DISCOMFORT;STABBING
DESCRIPTORS: STABBING
DESCRIPTORS: STABBING;SHARP

## 2024-12-12 ASSESSMENT — PAIN SCALES - GENERAL
PAINLEVEL_OUTOF10: 3
PAINLEVEL_OUTOF10: 8

## 2024-12-12 ASSESSMENT — PAIN DESCRIPTION - PAIN TYPE: TYPE: CHRONIC PAIN

## 2024-12-12 ASSESSMENT — PAIN - FUNCTIONAL ASSESSMENT: PAIN_FUNCTIONAL_ASSESSMENT: ACTIVITIES ARE NOT PREVENTED

## 2024-12-12 ASSESSMENT — PAIN DESCRIPTION - ORIENTATION
ORIENTATION: LEFT
ORIENTATION: RIGHT;LEFT;MID

## 2024-12-12 NOTE — PLAN OF CARE
Problem: ABCDS Injury Assessment  Goal: Absence of physical injury  12/12/2024 0245 by Reyna Camacho RN  Outcome: Progressing  12/11/2024 1256 by Tristen Read RN  Outcome: Progressing     Problem: Skin/Tissue Integrity  Goal: Absence of new skin breakdown  Description: 1.  Monitor for areas of redness and/or skin breakdown  2.  Assess vascular access sites hourly  3.  Every 4-6 hours minimum:  Change oxygen saturation probe site  4.  Every 4-6 hours:  If on nasal continuous positive airway pressure, respiratory therapy assess nares and determine need for appliance change or resting period.  12/12/2024 0245 by Reyna Camacho RN  Outcome: Progressing  12/11/2024 1256 by Tristen Read RN  Outcome: Progressing     Problem: Discharge Planning  Goal: Discharge to home or other facility with appropriate resources  12/12/2024 0245 by Reyna Camacho RN  Outcome: Progressing  Flowsheets (Taken 12/11/2024 2320)  Discharge to home or other facility with appropriate resources: Identify barriers to discharge with patient and caregiver  12/11/2024 2132 by Bernice Arceo, RN  Outcome: Progressing  12/11/2024 1256 by Tristen Read RN  Outcome: Progressing     Problem: Respiratory - Adult  Goal: Achieves optimal ventilation and oxygenation  Recent Flowsheet Documentation  Taken 12/11/2024 2320 by Reyna Camacho RN  Achieves optimal ventilation and oxygenation: Assess for changes in respiratory status  12/11/2024 1256 by Tristen Read RN  Outcome: Progressing     Problem: Musculoskeletal - Adult  Goal: Return mobility to safest level of function  Recent Flowsheet Documentation  Taken 12/11/2024 2320 by Reyna Camacho RN  Return Mobility to Safest Level of Function: Instruct patient/family in ordered activity level  12/11/2024 1256 by Tristen Read RN  Outcome: Progressing  Goal: Maintain proper alignment of affected body part  Recent Flowsheet  Documentation  Taken 12/11/2024 2320 by Reyna Camacho RN  Maintain proper alignment of affected body part: Support and protect limb and body alignment per provider's orders  12/11/2024 1256 by Tristen Read RN  Outcome: Progressing  Goal: Return ADL status to a safe level of function  Recent Flowsheet Documentation  Taken 12/11/2024 2320 by Reyna Camacho RN  Return ADL Status to a Safe Level of Function: Administer medication as ordered  12/11/2024 1256 by Tristen Read RN  Outcome: Progressing     Problem: Pain  Goal: Verbalizes/displays adequate comfort level or baseline comfort level  12/11/2024 1256 by Tristen Read RN  Outcome: Progressing     Problem: Chronic Conditions and Co-morbidities  Goal: Patient's chronic conditions and co-morbidity symptoms are monitored and maintained or improved  Recent Flowsheet Documentation  Taken 12/11/2024 2320 by Reyna Camacho RN  Care Plan - Patient's Chronic Conditions and Co-Morbidity Symptoms are Monitored and Maintained or Improved: Monitor and assess patient's chronic conditions and comorbid symptoms for stability, deterioration, or improvement  12/11/2024 1256 by Tristen Read RN  Outcome: Progressing     Problem: Safety - Adult  Goal: Free from fall injury  12/11/2024 1256 by Tristen Read RN  Outcome: Progressing

## 2024-12-12 NOTE — PROGRESS NOTES
[facility] Quality Flow/Interdisciplinary Rounds Progress Note        Quality Flow Rounds held on December 12, 2024    Disciplines Attending:  Bedside Nurse, , , and Nursing Unit Leadership    Dg Peña was admitted on 12/9/2024  1:37 PM    Anticipated Discharge Date:       Disposition:    Cristofer Score:  Cristofer Scale Score: 14    Readmission Risk              MH RISK OF UNPLANNED READMISSION:  0           Discussed patient goal for the day, patient clinical progression, and barriers to discharge.  The following Goal(s) of the Day/Commitment(s) have been identified:   discharge planning, endocrine plan, PO ABX      Shon Krishnamurthy RN  December 12, 2024

## 2024-12-12 NOTE — PROGRESS NOTES
Spiritual Health History and Assessment/Progress Note  [facility]    Attempted Encounter,  ,  ,      Name: Dg ePña MRN: [id number]    Age: 59 y.o.     Sex: male   Language: English   [employer]: None   Acute respiratory failure with hypercapnia     Date: 12/12/2024                           Spiritual Assessment began in [facility] INTERMEDIATE        Referral/Consult From: Rounding   Encounter Overview/Reason: Attempted Encounter  Service Provided For: Patient, Patient not available    Kelsey, Belief, Meaning:   Patient unable to assess at this time  Family/Friends No family/friends present      Importance and Influence:  Patient unable to assess at this time  Family/Friends No family/friends present    Community:  Patient feels well-supported. Support system includes: Extended family  Family/Friends No family/friends present    Assessment and Plan of Care:     Patient Interventions include: Other: Staff addressing the patient at the time, so silently prayer was said for the patient at the time.  Family/Friends Interventions include: No family/friends present    Patient Plan of Care: Spiritual Care available upon further referral  Family/Friends Plan of Care: No family/friends present    Electronically signed by Chaplain Eddie on 12/12/2024 at 1:43 PM

## 2024-12-12 NOTE — PLAN OF CARE
Problem: ABCDS Injury Assessment  Goal: Absence of physical injury  12/12/2024 0952 by Dana Sanchez, RN  Outcome: Progressing  12/12/2024 0245 by Reyna Camacho, RN  Outcome: Progressing

## 2024-12-12 NOTE — CARE COORDINATION
Plan remains for pt to return home. Pt follows at the CHF clinic. Pt also w/end stage COPD, chronically on 6L O2 home supplier is [employer] w/nebulizer. CM verified home O2 is through [employer], they do not supply his bipap. Pt tells CM he no longer has his bipap that the company took it away, he doesn't recall which company supplied. He ambulates with a walker and has a wc. He resides in a 1st floor apt. with ramp entrance, his cousin lives with him and assists with his adl's.  Patient has a private pay aide daily and visiting nurse 3 days a week thru [facility] [phone]. SUNDAY order completed and faxed to [facility]: [phone], please fax discharge summary to [facility]. Patient is established with Dr. Sanchez and uses [facility] in [place].  Will follow.  ARISTIDES HoffmanN, RN  [facility] Case Management  [phone]

## 2024-12-12 NOTE — PROGRESS NOTES
ENDOCRINOLOGY PROGRESS NOTE    Date of Admission: 12/9/2024  Date of Service: 12/11/2024  Admitting Physician: Alonzo Senior DO   Primary Care Physician: Jana Sanchez MD  Consultant physician: Aj Marie MD     Reason for the consultation:  Hypocalcemia    History of Present Illness:  The history is provided by the patient. Accuracy of the patient data is excellent.    Dg Peña is a very pleasant 59 y.o. old male with PMH of congestive heart failure, COPD on home oxygen, hypertension and other listed below admitted to [facility] on 12/9/2024 because of worsening shortness of breath and lower extremity edema, endocrine service was consulted for evaluation and management of hypocalcemia.     Subjective  The patient was seen at bedside today, no acute events overnight, calcium level is better.  Received a dose of vitamin D last night.  Vitamin D is also much better    Scheduled Meds:   [START ON 12/12/2024] losartan  25 mg Oral Daily    FLUoxetine  20 mg Oral Nightly    vitamin D  50,000 Units Oral Once per day on Monday Thursday    sodium chloride flush  5-40 mL IntraVENous 2 times per day    enoxaparin  40 mg SubCUTAneous Daily    ipratropium 0.5 mg-albuterol 2.5 mg  1 Dose Inhalation Q4H    arformoterol tartrate  15 mcg Nebulization BID RT    ARIPiprazole  5 mg Oral Daily    aspirin  81 mg Oral Daily    atorvastatin  40 mg Oral Nightly    azithromycin  250 mg Oral Daily    budesonide  0.5 mg Nebulization BID RT    calcium elemental  1,500 mg Oral BID    Ensifentrine  2.5 mL Inhalation BID    fluticasone  1 spray Each Nostril Daily    furosemide  40 mg Oral Daily    gabapentin  600 mg Oral BID    metoprolol succinate  100 mg Oral Nightly    pantoprazole  40 mg Oral QAM AC    pramipexole  0.75 mg Oral Nightly    tamsulosin  0.4 mg Oral BID     PRN Meds:   sodium chloride flush, 5-40 mL, PRN  sodium chloride, , PRN  potassium chloride, 40 mEq, PRN   Or  potassium alternative oral replacement, 40 mEq, PRN    \"BHYDRXBUT\"  Lab Results   Component Value Date/Time    LABA1C 5.6 11/25/2024 08:40 PM    LABA1C 5.0 04/21/2023 12:00 PM    LABA1C 6.1 06/18/2022 02:58 AM     Lab Results   Component Value Date/Time    TSH 0.64 08/02/2023 03:44 PM    T4FREE 1.18 (H) 05/09/2022 01:27 PM     Lab Results   Component Value Date/Time    LABA1C 5.6 11/25/2024 08:40 PM    GLUCOSE 81 12/11/2024 03:50 AM    GLUCOSE 85 03/03/2020 10:55 AM     Lab Results   Component Value Date/Time    TRIG 64 04/19/2024 12:25 PM    HDL 97 04/19/2024 12:25 PM    CHOL 242 04/19/2024 12:25 PM    CHOL 214 06/19/2022 04:12 AM       Blood culture   Lab Results   Component Value Date/Time    BC  06/03/2024 02:32 PM      Comment:      Blood CultureBacteria Spec Anaerobe+Aerobe Cult    BC  06/03/2024 02:28 PM      Comment:      Blood CultureBacteria Spec Anaerobe+Aerobe Cult    BC  06/25/2023 09:07 AM     This organism was isolated in one set.  Susceptibility testing is not routinely done as this  organism frequently represents skin contamination.  Additional testing can be ordered by calling the  Microbiology Department.      BC  06/25/2023 09:07 AM     This organism was isolated in one set.  Susceptibility testing is not routinely done as this  organism frequently represents skin contamination.  Additional testing can be ordered by calling the  Microbiology Department.      BC 5 Days no growth 06/25/2023 02:41 AM    BC 5 Days no growth 04/23/2023 06:45 PM    BC  03/28/2023 08:20 PM      Comment:      Blood CultureBacteria Spec Anaerobe+Aerobe Cult    BC  03/28/2023 08:10 PM      Comment:      Blood CultureBacteria Spec Anaerobe+Aerobe Cult       Radiology:  CTA PULMONARY W CONTRAST   Final Result   1.  No acute pulmonary emboli.      2.  No aneurysm formation or dissection of the thoracic aorta.      3.  Mild ceiling bronchiectasis towards the lower lobes.      4.  Chronic areas of atelectasis or subpleural areas of consolidation in both   lung bases.  Can be  residual changes from previous multifocal pneumonia   demonstrate on the study of September 2023.  Chronic airway disease towards   lower lung bases is considered.         XR CHEST PORTABLE   Final Result   1. Cardiomegaly with pulmonary vascular congestion.   2. Mildly increased opacity in the lung bases may represent atelectasis or   pneumonia.   3. Small left pleural effusion.             Medical Records/Labs/Images Review:   I personally reviewed and summarized previous records   All labs and imaging were reviewed independently    ASSESSMENT & RECOMMENDATIONS   Dg Peña, a 59 y.o.-old male seen in for evaluation management of the following issue    PTH independent hypocalcemia  Likely secondary to a combination of vitamin D deficiency and inadequate calcium intake  itamin D supplements to 50,000 twice a week first dose today. (The patient did not receive the dose that was ordered on admission)  Os-Garry 1500 mg twice daily  Will repeat calcium level later today and tomorrow    Vitamin D deficiency  Vitamin D supplements as per above      Interdisciplinary plan for communication with healthcare providers:   Consult recommendations were discussed with the Primary Service/Nursing staff      The above issues were reviewed with the patient who understood and agreed with the plan.  Thank you for allowing us to participate in the care of this patient. Please do not hesitate to contact us with any additional questions.     Aj Marie MD  Endocrinologist, [facility]   [place] [address]  Dept: [phone]  Loc: [phone]   Phone: [phone]  Fax: [phone]  ---------------------------------  An electronic signature was used to authenticate this note. Aj Marie MD on 12/11/2024 at 8:31 PM

## 2024-12-13 VITALS
OXYGEN SATURATION: 100 % | DIASTOLIC BLOOD PRESSURE: 65 MMHG | TEMPERATURE: 98.5 F | HEIGHT: 64 IN | RESPIRATION RATE: 20 BRPM | BODY MASS INDEX: 22.64 KG/M2 | HEART RATE: 90 BPM | SYSTOLIC BLOOD PRESSURE: 134 MMHG | WEIGHT: 132.6 LBS

## 2024-12-13 LAB
ALBUMIN SERPL-MCNC: 3.8 G/DL (ref 3.5–5.2)
ALP SERPL-CCNC: 49 U/L (ref 40–129)
ALT SERPL-CCNC: 6 U/L (ref 0–40)
ANION GAP SERPL CALCULATED.3IONS-SCNC: 8 MMOL/L (ref 7–16)
AST SERPL-CCNC: 10 U/L (ref 0–39)
BASOPHILS # BLD: 0.02 K/UL (ref 0–0.2)
BASOPHILS NFR BLD: 0 % (ref 0–2)
BILIRUB SERPL-MCNC: 0.4 MG/DL (ref 0–1.2)
BUN SERPL-MCNC: 18 MG/DL (ref 6–20)
CALCIUM SERPL-MCNC: 8.4 MG/DL (ref 8.6–10.2)
CHLORIDE SERPL-SCNC: 90 MMOL/L (ref 98–107)
CO2 SERPL-SCNC: 38 MMOL/L (ref 22–29)
CREAT SERPL-MCNC: 1 MG/DL (ref 0.7–1.2)
EOSINOPHIL # BLD: 0.14 K/UL (ref 0.05–0.5)
EOSINOPHILS RELATIVE PERCENT: 1 % (ref 0–6)
ERYTHROCYTE [DISTWIDTH] IN BLOOD BY AUTOMATED COUNT: 13.3 % (ref 11.5–15)
GFR, ESTIMATED: 89 ML/MIN/1.73M2
GLUCOSE SERPL-MCNC: 89 MG/DL (ref 74–99)
HCT VFR BLD AUTO: 36.4 % (ref 37–54)
HGB BLD-MCNC: 11.4 G/DL (ref 12.5–16.5)
IMM GRANULOCYTES # BLD AUTO: 0.08 K/UL (ref 0–0.58)
IMM GRANULOCYTES NFR BLD: 1 % (ref 0–5)
LYMPHOCYTES NFR BLD: 1.46 K/UL (ref 1.5–4)
LYMPHOCYTES RELATIVE PERCENT: 12 % (ref 20–42)
MCH RBC QN AUTO: 33.1 PG (ref 26–35)
MCHC RBC AUTO-ENTMCNC: 31.3 G/DL (ref 32–34.5)
MCV RBC AUTO: 105.8 FL (ref 80–99.9)
MONOCYTES NFR BLD: 0.59 K/UL (ref 0.1–0.95)
MONOCYTES NFR BLD: 5 % (ref 2–12)
NEUTROPHILS NFR BLD: 81 % (ref 43–80)
NEUTS SEG NFR BLD: 9.44 K/UL (ref 1.8–7.3)
PLATELET # BLD AUTO: 150 K/UL (ref 130–450)
PMV BLD AUTO: 10.6 FL (ref 7–12)
POTASSIUM SERPL-SCNC: 4.4 MMOL/L (ref 3.5–5)
PROT SERPL-MCNC: 6.9 G/DL (ref 6.4–8.3)
RBC # BLD AUTO: 3.44 M/UL (ref 3.8–5.8)
SODIUM SERPL-SCNC: 136 MMOL/L (ref 132–146)
WBC OTHER # BLD: 11.7 K/UL (ref 4.5–11.5)

## 2024-12-13 PROCEDURE — 94660 CPAP INITIATION&MGMT: CPT

## 2024-12-13 PROCEDURE — 6370000000 HC RX 637 (ALT 250 FOR IP): Performed by: NURSE PRACTITIONER

## 2024-12-13 PROCEDURE — 85025 COMPLETE CBC W/AUTO DIFF WBC: CPT

## 2024-12-13 PROCEDURE — 94640 AIRWAY INHALATION TREATMENT: CPT

## 2024-12-13 PROCEDURE — 6360000002 HC RX W HCPCS: Performed by: NURSE PRACTITIONER

## 2024-12-13 PROCEDURE — 80053 COMPREHEN METABOLIC PANEL: CPT

## 2024-12-13 PROCEDURE — 6370000000 HC RX 637 (ALT 250 FOR IP): Performed by: STUDENT IN AN ORGANIZED HEALTH CARE EDUCATION/TRAINING PROGRAM

## 2024-12-13 PROCEDURE — 2700000000 HC OXYGEN THERAPY PER DAY

## 2024-12-13 PROCEDURE — 99239 HOSP IP/OBS DSCHRG MGMT >30: CPT | Performed by: INTERNAL MEDICINE

## 2024-12-13 RX ORDER — LOSARTAN POTASSIUM 25 MG/1
25 TABLET ORAL DAILY
Qty: 30 TABLET | Refills: 3 | Status: SHIPPED | OUTPATIENT
Start: 2024-12-14

## 2024-12-13 RX ORDER — ERGOCALCIFEROL 1.25 MG/1
50000 CAPSULE ORAL
Qty: 8 CAPSULE | Refills: 0 | Status: SHIPPED | OUTPATIENT
Start: 2024-12-16 | End: 2025-01-15

## 2024-12-13 RX ADMIN — ARFORMOTEROL TARTRATE 15 MCG: 15 SOLUTION RESPIRATORY (INHALATION) at 08:59

## 2024-12-13 RX ADMIN — CALCIUM 1500 MG: 500 TABLET ORAL at 08:37

## 2024-12-13 RX ADMIN — LORAZEPAM 1 MG: 1 TABLET ORAL at 01:03

## 2024-12-13 RX ADMIN — HYDROCODONE BITARTRATE AND ACETAMINOPHEN 1 TABLET: 7.5; 325 TABLET ORAL at 08:37

## 2024-12-13 RX ADMIN — AZITHROMYCIN 250 MG: 250 TABLET, FILM COATED ORAL at 08:40

## 2024-12-13 RX ADMIN — TAMSULOSIN HYDROCHLORIDE 0.4 MG: 0.4 CAPSULE ORAL at 08:37

## 2024-12-13 RX ADMIN — IPRATROPIUM BROMIDE AND ALBUTEROL SULFATE 1 DOSE: 2.5; .5 SOLUTION RESPIRATORY (INHALATION) at 08:59

## 2024-12-13 RX ADMIN — GABAPENTIN 600 MG: 300 CAPSULE ORAL at 08:37

## 2024-12-13 RX ADMIN — BUDESONIDE 500 MCG: 0.5 INHALANT RESPIRATORY (INHALATION) at 08:59

## 2024-12-13 RX ADMIN — FUROSEMIDE 40 MG: 40 TABLET ORAL at 08:37

## 2024-12-13 RX ADMIN — PANTOPRAZOLE SODIUM 40 MG: 40 TABLET, DELAYED RELEASE ORAL at 06:53

## 2024-12-13 RX ADMIN — LORAZEPAM 1 MG: 1 TABLET ORAL at 08:44

## 2024-12-13 RX ADMIN — LOSARTAN POTASSIUM 25 MG: 25 TABLET, FILM COATED ORAL at 08:37

## 2024-12-13 RX ADMIN — IPRATROPIUM BROMIDE AND ALBUTEROL SULFATE 1 DOSE: 2.5; .5 SOLUTION RESPIRATORY (INHALATION) at 03:30

## 2024-12-13 RX ADMIN — ARIPIPRAZOLE 5 MG: 5 TABLET ORAL at 08:40

## 2024-12-13 RX ADMIN — ENOXAPARIN SODIUM 40 MG: 100 INJECTION SUBCUTANEOUS at 08:37

## 2024-12-13 RX ADMIN — ASPIRIN 81 MG CHEWABLE TABLET 81 MG: 81 TABLET CHEWABLE at 08:37

## 2024-12-13 ASSESSMENT — PAIN DESCRIPTION - PAIN TYPE: TYPE: CHRONIC PAIN

## 2024-12-13 ASSESSMENT — PAIN DESCRIPTION - DESCRIPTORS: DESCRIPTORS: STABBING;DISCOMFORT;ACHING

## 2024-12-13 ASSESSMENT — PAIN SCALES - GENERAL
PAINLEVEL_OUTOF10: 8
PAINLEVEL_OUTOF10: 3

## 2024-12-13 ASSESSMENT — PAIN DESCRIPTION - LOCATION: LOCATION: LEG

## 2024-12-13 ASSESSMENT — PAIN - FUNCTIONAL ASSESSMENT: PAIN_FUNCTIONAL_ASSESSMENT: ACTIVITIES ARE NOT PREVENTED

## 2024-12-13 ASSESSMENT — PAIN DESCRIPTION - ORIENTATION: ORIENTATION: LEFT

## 2024-12-13 NOTE — PLAN OF CARE
Problem: ABCDS Injury Assessment  Goal: Absence of physical injury  12/13/2024 0938 by Dana Sanchez, RN  Outcome: Progressing  12/13/2024 0105 by Reyna Camacho, RN  Outcome: Progressing

## 2024-12-13 NOTE — PROGRESS NOTES
[facility] Quality Flow/Interdisciplinary Rounds Progress Note        Quality Flow Rounds held on December 13, 2024    Disciplines Attending:  Bedside Nurse, , , and Nursing Unit Leadership    Dg Peña was admitted on 12/9/2024  1:37 PM    Anticipated Discharge Date:  Expected Discharge Date: 12/13/24    Disposition:    Cristofer Score:  Cristofer Scale Score: 14    [facility] RISK OF UNPLANNED READMISSION 2.0             25.4 Total Score        Discussed patient goal for the day, patient clinical progression, and barriers to discharge.  The following Goal(s) of the Day/Commitment(s) have been identified:   discharge planning, endocrine plan, monitor calcium level      Shon Krishnamurthy RN  December 13, 2024

## 2024-12-13 NOTE — PROGRESS NOTES
[facility] Hospitalist   Progress Note    Admitting Date and Time: 12/9/2024  1:37 PM  Admit Dx: Hypocalcemia [E83.51]  Acute respiratory failure with hypercapnia [J96.02]  Acute on chronic heart failure, unspecified heart failure type (HCC) [I50.9]    Subjective:    Patient was admitted with Hypocalcemia [E83.51]  Acute respiratory failure with hypercapnia [J96.02]  Acute on chronic heart failure, unspecified heart failure type (HCC) [I50.9]. Patient denies fever, chills, cp, sob, n/v.     losartan  25 mg Oral Daily    FLUoxetine  20 mg Oral Nightly    vitamin D  50,000 Units Oral Once per day on Monday Thursday    sodium chloride flush  5-40 mL IntraVENous 2 times per day    enoxaparin  40 mg SubCUTAneous Daily    ipratropium 0.5 mg-albuterol 2.5 mg  1 Dose Inhalation Q4H    arformoterol tartrate  15 mcg Nebulization BID RT    ARIPiprazole  5 mg Oral Daily    aspirin  81 mg Oral Daily    atorvastatin  40 mg Oral Nightly    azithromycin  250 mg Oral Daily    budesonide  0.5 mg Nebulization BID RT    calcium elemental  1,500 mg Oral BID    Ensifentrine  2.5 mL Inhalation BID    fluticasone  1 spray Each Nostril Daily    furosemide  40 mg Oral Daily    gabapentin  600 mg Oral BID    metoprolol succinate  100 mg Oral Nightly    pantoprazole  40 mg Oral QAM AC    pramipexole  0.75 mg Oral Nightly    tamsulosin  0.4 mg Oral BID     sodium chloride flush, 5-40 mL, PRN  sodium chloride, , PRN  potassium chloride, 40 mEq, PRN   Or  potassium alternative oral replacement, 40 mEq, PRN   Or  potassium chloride, 10 mEq, PRN  magnesium sulfate, 2,000 mg, PRN  ondansetron, 4 mg, Q8H PRN   Or  ondansetron, 4 mg, Q6H PRN  polyethylene glycol, 17 g, Daily PRN  acetaminophen, 650 mg, Q6H PRN   Or  acetaminophen, 650 mg, Q6H PRN  albuterol, 2.5 mg, Q4H PRN  calcium carbonate, 500 mg, TID PRN  HYDROcodone-acetaminophen, 1 tablet, Q8H PRN  LORazepam, 1 mg, Q8H PRN  sodium chloride, 1 spray, PRN      06/25/2023 02:30 AM    CL 90 12/12/2024 03:21 AM    CO2 37 12/12/2024 03:21 AM    BUN 14 12/12/2024 03:21 AM    CREATININE 0.7 12/12/2024 03:21 AM    GFRAA >60 10/17/2022 04:24 AM    AGRATIO 1.0 04/09/2023 06:47 AM    LABGLOM >90 12/12/2024 03:21 AM    LABGLOM >90 04/19/2024 12:25 PM    GLUCOSE 102 12/12/2024 03:21 AM    GLUCOSE 85 03/03/2020 10:55 AM    CALCIUM 8.6 12/12/2024 03:21 AM    BILITOT 0.4 12/12/2024 03:21 AM    ALKPHOS 44 12/12/2024 03:21 AM    AST 10 12/12/2024 03:21 AM    ALT 6 12/12/2024 03:21 AM     Ionized Calcium:  No components found for: \"IONCA\"     Radiology:   CTA PULMONARY W CONTRAST   Final Result   1.  No acute pulmonary emboli.      2.  No aneurysm formation or dissection of the thoracic aorta.      3.  Mild ceiling bronchiectasis towards the lower lobes.      4.  Chronic areas of atelectasis or subpleural areas of consolidation in both   lung bases.  Can be residual changes from previous multifocal pneumonia   demonstrate on the study of September 2023.  Chronic airway disease towards   lower lung bases is considered.         XR CHEST PORTABLE   Final Result   1. Cardiomegaly with pulmonary vascular congestion.   2. Mildly increased opacity in the lung bases may represent atelectasis or   pneumonia.   3. Small left pleural effusion.             Assessment:    Principal Problem:    Acute respiratory failure with hypercapnia  Active Problems:    Oropharyngeal dysphagia    Chronic respiratory failure with hypoxia    RLS (restless legs syndrome)    Essential hypertension    Hypertension    Peripheral vascular disease (HCC)    Chronic obstructive pulmonary disease (HCC)    Raynaud's phenomenon    Depression    Coronary artery disease involving native coronary artery of native heart without angina pectoris    Hypocalcemia    Hyperlipidemia  Resolved Problems:    * No resolved hospital problems. *      Plan:  Acute on chronic heart failure pt on diuretic   Pth independent hypocalcemia endo

## 2024-12-13 NOTE — PLAN OF CARE
Problem: ABCDS Injury Assessment  Goal: Absence of physical injury  Outcome: Progressing     Problem: Skin/Tissue Integrity  Goal: Absence of new skin breakdown  Description: 1.  Monitor for areas of redness and/or skin breakdown  2.  Assess vascular access sites hourly  3.  Every 4-6 hours minimum:  Change oxygen saturation probe site  4.  Every 4-6 hours:  If on nasal continuous positive airway pressure, respiratory therapy assess nares and determine need for appliance change or resting period.  Outcome: Progressing     Problem: Discharge Planning  Goal: Discharge to home or other facility with appropriate resources  Outcome: Progressing  Flowsheets (Taken 12/12/2024 2300)  Discharge to home or other facility with appropriate resources: Identify discharge learning needs (meds, wound care, etc)

## 2024-12-13 NOTE — PROGRESS NOTES
ENDOCRINOLOGY PROGRESS NOTE    Date of Admission: 12/9/2024  Date of Service: 12/12/2024  Admitting Physician: Ashkan Warner DO   Primary Care Physician: Jana Sanchez MD  Consultant physician: Aj Marie MD     Reason for the consultation:  Hypocalcemia    History of Present Illness:  The history is provided by the patient. Accuracy of the patient data is excellent.    Dg Peña is a very pleasant 59 y.o. old male with PMH of congestive heart failure, COPD on home oxygen, hypertension and other listed below admitted to [facility] on 12/9/2024 because of worsening shortness of breath and lower extremity edema, endocrine service was consulted for evaluation and management of hypocalcemia.     Subjective  The patient was seen at bedside today, no acute events overnight, calcium level is better.  Received a dose of vitamin D last night.  Vitamin D is also much better    Scheduled Meds:   losartan  25 mg Oral Daily    FLUoxetine  20 mg Oral Nightly    vitamin D  50,000 Units Oral Once per day on Monday Thursday    sodium chloride flush  5-40 mL IntraVENous 2 times per day    enoxaparin  40 mg SubCUTAneous Daily    ipratropium 0.5 mg-albuterol 2.5 mg  1 Dose Inhalation Q4H    arformoterol tartrate  15 mcg Nebulization BID RT    ARIPiprazole  5 mg Oral Daily    aspirin  81 mg Oral Daily    atorvastatin  40 mg Oral Nightly    azithromycin  250 mg Oral Daily    budesonide  0.5 mg Nebulization BID RT    calcium elemental  1,500 mg Oral BID    Ensifentrine  2.5 mL Inhalation BID    fluticasone  1 spray Each Nostril Daily    furosemide  40 mg Oral Daily    gabapentin  600 mg Oral BID    metoprolol succinate  100 mg Oral Nightly    pantoprazole  40 mg Oral QAM AC    pramipexole  0.75 mg Oral Nightly    tamsulosin  0.4 mg Oral BID     PRN Meds:   sodium chloride flush, 5-40 mL, PRN  sodium chloride, , PRN  potassium chloride, 40 mEq, PRN   Or  potassium alternative oral replacement, 40 mEq, PRN   Or  potassium chloride,    Component Value Date/Time    LABA1C 5.6 11/25/2024 08:40 PM    LABA1C 5.0 04/21/2023 12:00 PM    LABA1C 6.1 06/18/2022 02:58 AM     Lab Results   Component Value Date/Time    TSH 0.64 08/02/2023 03:44 PM    T4FREE 1.18 (H) 05/09/2022 01:27 PM     Lab Results   Component Value Date/Time    LABA1C 5.6 11/25/2024 08:40 PM    GLUCOSE 102 12/12/2024 03:21 AM    GLUCOSE 85 03/03/2020 10:55 AM     Lab Results   Component Value Date/Time    TRIG 64 04/19/2024 12:25 PM    HDL 97 04/19/2024 12:25 PM    CHOL 242 04/19/2024 12:25 PM    CHOL 214 06/19/2022 04:12 AM       Blood culture   Lab Results   Component Value Date/Time    BC  06/03/2024 02:32 PM      Comment:      Blood CultureBacteria Spec Anaerobe+Aerobe Cult    BC  06/03/2024 02:28 PM      Comment:      Blood CultureBacteria Spec Anaerobe+Aerobe Cult    BC  06/25/2023 09:07 AM     This organism was isolated in one set.  Susceptibility testing is not routinely done as this  organism frequently represents skin contamination.  Additional testing can be ordered by calling the  Microbiology Department.      BC  06/25/2023 09:07 AM     This organism was isolated in one set.  Susceptibility testing is not routinely done as this  organism frequently represents skin contamination.  Additional testing can be ordered by calling the  Microbiology Department.      BC 5 Days no growth 06/25/2023 02:41 AM    BC 5 Days no growth 04/23/2023 06:45 PM    BC  03/28/2023 08:20 PM      Comment:      Blood CultureBacteria Spec Anaerobe+Aerobe Cult    BC  03/28/2023 08:10 PM      Comment:      Blood CultureBacteria Spec Anaerobe+Aerobe Cult       Radiology:  CTA PULMONARY W CONTRAST   Final Result   1.  No acute pulmonary emboli.      2.  No aneurysm formation or dissection of the thoracic aorta.      3.  Mild ceiling bronchiectasis towards the lower lobes.      4.  Chronic areas of atelectasis or subpleural areas of consolidation in both   lung bases.  Can be residual changes from previous

## 2024-12-14 NOTE — DISCHARGE SUMMARY
[facility] Hospitalist       Hospitalist Physician Discharge Summary       Jana Sanchez MD  [address] [facility] [address]  [phone]            Activity level: as keely    Diet: No diet orders on file    Dispo:home    Condition at discharge: fair        Patient ID:  Dg Peña  [id number]  59 y.o.  1965    Admit date: 12/9/2024    Discharge date and time:  12/13/2024  7:14 PM    Admission Diagnoses: Principal Problem:    Acute respiratory failure with hypercapnia  Active Problems:    Oropharyngeal dysphagia    Chronic respiratory failure with hypoxia    RLS (restless legs syndrome)    Essential hypertension    Hypertension    Peripheral vascular disease (HCC)    Chronic obstructive pulmonary disease (HCC)    Raynaud's phenomenon    Depression    Coronary artery disease involving native coronary artery of native heart without angina pectoris    Hypocalcemia    Hyperlipidemia  Resolved Problems:    * No resolved hospital problems. *      Discharge Diagnoses: Principal Problem:    Acute respiratory failure with hypercapnia  Active Problems:    Oropharyngeal dysphagia    Chronic respiratory failure with hypoxia    RLS (restless legs syndrome)    Essential hypertension    Hypertension    Peripheral vascular disease (HCC)    Chronic obstructive pulmonary disease (HCC)    Raynaud's phenomenon    Depression    Coronary artery disease involving native coronary artery of native heart without angina pectoris    Hypocalcemia    Hyperlipidemia  Resolved Problems:    * No resolved hospital problems. *      Acute on chronic heart failure  PTH independent hypocalcemia  Copd  Htn        Consults:  IP CONSULT TO HEART FAILURE NURSE/COORDINATOR  IP CONSULT TO ENDOCRINOLOGY  IP CONSULT TO VASCULAR ACCESS TEAM    Procedures: none    Hospital Course: Patient was admitted with Hypocalcemia [E83.51]  Acute respiratory failure with hypercapnia [J96.02]  Acute on chronic heart failure, unspecified heart  erythema  Pulmonary/Chest: clear to auscultation bilaterally- no wheezes, rales or rhonchi, normal air movement, no respiratory distress  Cardiovascular: rhythm reg at rate of 78  Abdomen: soft, non-tender, non-distended, normal bowel sounds, no masses or organomegaly  Extremities: no cyanosis, no clubbing, and no edema  I/O last 3 completed shifts:  In: 820 [P.O.:820]  Out: 1400 [Urine:1400]  No intake/output data recorded.      LABS:  Recent Labs     12/11/24  0350 12/12/24  0321 12/13/24  0308    134 136   K 4.5 4.9 4.4   CL 91* 90* 90*   CO2 38* 37* 38*   BUN 21* 14 18   CREATININE 0.9 0.7 1.0   GLUCOSE 81 102* 89   CALCIUM 8.4* 8.6 8.4*       Recent Labs     12/11/24  0350 12/12/24 0321 12/13/24  0308   WBC 7.2 9.4 11.7*   RBC 3.13* 3.33* 3.44*   HGB 10.3* 11.0* 11.4*   HCT 34.2* 35.6* 36.4*   .3* 106.9* 105.8*   MCH 32.9 33.0 33.1   MCHC 30.1* 30.9* 31.3*   RDW 13.6 13.1 13.3    193 150   MPV 10.9 10.9 10.6       No results for input(s): \"POCGLU\" in the last 72 hours.    CBC with Differential:    Lab Results   Component Value Date/Time    WBC 11.7 12/13/2024 03:08 AM    RBC 3.44 12/13/2024 03:08 AM    HGB 11.4 12/13/2024 03:08 AM    HCT 36.4 12/13/2024 03:08 AM     12/13/2024 03:08 AM    .8 12/13/2024 03:08 AM    MCH 33.1 12/13/2024 03:08 AM    MCHC 31.3 12/13/2024 03:08 AM    RDW 13.3 12/13/2024 03:08 AM    NRBC 0.0 07/03/2023 02:00 AM    BANDSPCT 0.0 02/10/2022 07:41 PM    METASPCT 4.0 02/21/2020 07:05 PM    LYMPHOPCT 12 12/13/2024 03:08 AM    MONOPCT 5 12/13/2024 03:08 AM    MYELOPCT 0.0 09/17/2021 03:10 AM    EOSPCT 1 12/13/2024 03:08 AM    BASOPCT 0 12/13/2024 03:08 AM    MONOSABS 0.59 12/13/2024 03:08 AM    LYMPHSABS 1.46 12/13/2024 03:08 AM    EOSABS 0.14 12/13/2024 03:08 AM    BASOSABS 0.02 12/13/2024 03:08 AM     CMP:    Lab Results   Component Value Date/Time     12/13/2024 03:08 AM    K 4.4 12/13/2024 03:08 AM    K 5.5 06/25/2023 02:30 AM    CL 90 12/13/2024  solution  Commonly known as: PROVENTIL  Take 3 mLs by nebulization in the morning and at bedtime     * albuterol sulfate  (90 Base) MCG/ACT inhaler  Commonly known as: ProAir HFA  Inhale 2 puffs into the lungs every 4 hours as needed for Wheezing     arformoterol tartrate 15 MCG/2ML Nebu  Commonly known as: Brovana  TAKE TWO (2) MLS BY NEBULIZATION IN THE MORNING AND TWO (2) MLS IN THE EVENING.     ARIPiprazole 5 MG tablet  Commonly known as: ABILIFY  Take 1 tablet by mouth daily     Aspirin Low Dose 81 MG chewable tablet  Generic drug: aspirin  TAKE ONE (1) TABLET BY MOUTH DAILY     atorvastatin 40 MG tablet  Commonly known as: LIPITOR  Take 1 tablet by mouth nightly     budesonide 0.5 MG/2ML nebulizer suspension  Commonly known as: PULMICORT  Take 2 mLs by nebulization in the morning and 2 mLs in the evening.     calcium carbonate 500 MG chewable tablet  Commonly known as: TUMS  Take 1 tablet by mouth 3 times daily as needed for Heartburn     calcium elemental 500 MG Tabs tablet  Commonly known as: OSCAL  Take 3 tablets by mouth 2 times daily     fluticasone 50 MCG/ACT nasal spray  Commonly known as: FLONASE  USE ONE (1) SPRAY BY EACH NOSTRIL ROUTE DAILY     formoterol 20 MCG/2ML nebulizer solution  Commonly known as: PERFOROMIST  inhale one vial (2ml) via nebulizer in the morning and one vial (2ml) in the evening     furosemide 40 MG tablet  Commonly known as: LASIX  TAKE ONE (1) TABLET BY MOUTH DAILY     gabapentin 300 MG capsule  Commonly known as: NEURONTIN  TAKE TWO (2) TABS BY MOUTH IN THE MORNING AND THREE (3) TABS BY MOUTH IN THE EVENING     glucose monitoring kit  1 kit by Does not apply route daily     HYDROcodone-acetaminophen 7.5-325 MG per tablet  Commonly known as: NORCO  Take 1 tablet by mouth every 8 hours as needed for Pain for up to 30 days. Max Daily Amount: 3 tablets     ipratropium 0.5 mg-albuterol 2.5 mg 0.5-2.5 (3) MG/3ML Soln nebulizer solution  Commonly known as: DUONEB  Inhale 3

## 2024-12-16 ENCOUNTER — TELEPHONE (OUTPATIENT)
Dept: PRIMARY CARE CLINIC | Age: 59
End: 2024-12-16

## 2024-12-16 ENCOUNTER — TELEPHONE (OUTPATIENT)
Dept: PALLATIVE CARE | Age: 59
End: 2024-12-16

## 2024-12-16 DIAGNOSIS — R06.09 DYSPNEA ON MINIMAL EXERTION: ICD-10-CM

## 2024-12-16 DIAGNOSIS — J44.1 COPD EXACERBATION (HCC): ICD-10-CM

## 2024-12-16 DIAGNOSIS — F41.9 ANXIETY: Primary | ICD-10-CM

## 2024-12-16 DIAGNOSIS — Z51.5 PALLIATIVE CARE ENCOUNTER: ICD-10-CM

## 2024-12-16 DIAGNOSIS — J44.9 END STAGE COPD (HCC): ICD-10-CM

## 2024-12-16 RX ORDER — MORPHINE SULFATE 20 MG/ML
10 SOLUTION ORAL
Qty: 30 ML | Refills: 0 | Status: SHIPPED | OUTPATIENT
Start: 2024-12-16 | End: 2024-12-21

## 2024-12-16 RX ORDER — MORPHINE SULFATE 20 MG/ML
10 SOLUTION ORAL
Qty: 30 ML | Refills: 0 | Status: SHIPPED | OUTPATIENT
Start: 2024-12-26 | End: 2024-12-31

## 2024-12-16 RX ORDER — MORPHINE SULFATE 20 MG/ML
10 SOLUTION ORAL
Qty: 30 ML | Refills: 0 | Status: SHIPPED | OUTPATIENT
Start: 2024-12-21 | End: 2024-12-26

## 2024-12-16 RX ORDER — LORAZEPAM 1 MG/1
1 TABLET ORAL EVERY 8 HOURS PRN
Qty: 90 TABLET | Refills: 0 | Status: SHIPPED | OUTPATIENT
Start: 2024-12-16 | End: 2025-01-15

## 2024-12-16 NOTE — TELEPHONE ENCOUNTER
Sunny is on pallative care and she states he isnt doing well. He doesn't want to go back to the ED but he is barely urinating roughly like a half a cup. She believes his kidneys are shutting down. She is reaching out to see what Dr. Sanchez thinks she should do for sunny or if there is anything at this point they can do.     Name of Medication(s) Requested:  Requested Prescriptions     Pending Prescriptions Disp Refills    LORazepam (ATIVAN) 1 MG tablet 90 tablet 0     Sig: Take 1 tablet by mouth every 8 hours as needed for Anxiety for up to 30 days. Max Daily Amount: 3 mg   Ativan    Medication is on current medication list Yes    Dosage and directions were verified? Yes    Quantity verified: 30 day supply     Pharmacy Verified?  Yes    Last Appointment:  11/8/2024    Future appts:  Future Appointments   Date Time Provider Department Center   12/20/2024  9:30 AM [facility] ROOM 1 [facility]   1/2/2025  1:00 PM [facility]   1/16/2025  7:45 AM [facility]ix[facility] - [facility]   1/16/2025  1:45 PM Bernice Ceja, APRN - NP [facility]   1/21/2025  1:30 PM [facility] [place] [facility] [address] [facility] [phone] - F [phone]

## 2024-12-16 NOTE — TELEPHONE ENCOUNTER
Call from Sayra over the weekend stating Dg is doing poorly. NP on call had returned call but got no answer. Left message. Today , called to check on Dg. Sayra shares that he does not wish to have Hospice or an ambulance called. Asked Sayra how she is doing taking care of Dg. Sayra shares her siblings are helping out. She feels she has what she needs. Sayra did ask for an increase in Morphine dose. Discussed with Amrik Zuñiga CNP and new order for Morphine concentrate 0.5 ml to be given every 2 hours as needed. Called to Sayra with update, she verbalizes understanding.

## 2024-12-16 NOTE — TELEPHONE ENCOUNTER
Sunny is on pallative care and she states he isnt doing well. He doesn't want to go back to the ED but he is barely urinating roughly like a half a cup. She believes his kidneys are shutting down. She is reaching out to see what Dr. Sanchez thinks she should do for sunny or if there is anything at this point they can do.     Name of Medication(s) Requested:  Requested Prescriptions      No prescriptions requested or ordered in this encounter   Ativan    Medication is on current medication list Yes    Dosage and directions were verified? Yes    Quantity verified: 30 day supply     Pharmacy Verified?  Yes    Last Appointment:  11/8/2024    Future appts:  Future Appointments   Date Time Provider Department Center   12/20/2024  9:30 AM DIONE [facility] ROOM 1 [facility] [place] [facility]   1/2/2025  1:00 PM [facility]   1/16/2025  7:45 AM Nyla Calix APRN - CNP [facility]   1/16/2025  1:45 PM Bernice Ceja APRN - NP [facility]   1/21/2025  1:30 PM Aj Marie MD [facility] ENDO [facility]        (If no appt send self scheduling link. .REFILLAPPT)  Scheduling request sent?     [] Yes  [] No    Does patient need updated?  [] Yes  [] No  [facility] - [place], [place] - [address] [facility] [phone] - F [phone]

## 2024-12-17 NOTE — PROGRESS NOTES
CLINICAL PHARMACY NOTE: MEDS TO BEDS    Total # of Prescriptions Filled: 1   The following medications were delivered to the patient:  Losartan 25 mg     Additional Documentation:

## 2024-12-18 NOTE — PROGRESS NOTES
Physician Progress Note      PATIENT:               BRITNEY GONSALES  CSN #:                  [id number]  :                       1965  ADMIT DATE:       2024 1:37 PM  DISCH DATE:        2024 3:30 PM  RESPONDING  PROVIDER #:        Ashkan Warner MD          QUERY TEXT:    Pt admitted with hypocalcemia and has acute on chronic CHF documented. If   possible, please document in progress notes and discharge summary further   specificity regarding the type of CHF:    The medical record reflects the following:  Risk Factors: CAD, HTN  Clinical Indicators: H&P-  59 year old male with PMH significant for CHF,   end-stage COPD on 6 L via nasal cannula at baseline, anxiety, HTN, HLD, and   RLS. Patient to ED from CHF clinic with increased shortness of breath, dyspnea   on exertion, increased cough, and bilateral lower extremity swelling. Weight   also up by 1 pound. . Assessment: Acute on chronic heart   failure-patient at CHF clinic today and felt to be hypervolemic.  With   shortness of breath at rest, dyspnea with exertion, nocturnal cough, lower   extremity edema and weight gain.  Patient given IV Lasix at clini  Treatment: CXR, laboratory studies, IV Lasix    Thank you,  Irena Mayers, MSN, RN  Clinical Documentation Integrity  [phone]  Options provided:  -- Acute on Chronic Systolic CHF/HFrEF  -- Acute on Chronic Diastolic CHF/HFpEF  -- Acute on Chronic Systolic and Diastolic CHF  -- Other - I will add my own diagnosis  -- Disagree - Not applicable / Not valid  -- Disagree - Clinically unable to determine / Unknown  -- Refer to Clinical Documentation Reviewer    PROVIDER RESPONSE TEXT:    This patient is in acute on chronic diastolic CHF/HFpEF.    Query created by: Irena Mayers on 2024 1:35 PM      Electronically signed by:  Ashkan Warner MD 2024 8:42 PM

## 2024-12-19 DIAGNOSIS — Z51.5 PALLIATIVE CARE ENCOUNTER: ICD-10-CM

## 2024-12-19 DIAGNOSIS — G89.4 CHRONIC PAIN SYNDROME: ICD-10-CM

## 2024-12-19 RX ORDER — HYDROCODONE BITARTRATE AND ACETAMINOPHEN 7.5; 325 MG/1; MG/1
1 TABLET ORAL EVERY 8 HOURS PRN
Qty: 90 TABLET | Refills: 0 | Status: SHIPPED | OUTPATIENT
Start: 2024-12-19 | End: 2025-01-18

## 2024-12-19 NOTE — TELEPHONE ENCOUNTER
Patient Dg's sister Sayra called for refill hydrocodone-acetaminophen 7.5-325. Community patient. [facility] [place]. Pharmacy verified on perfect serve. Routed call to SHREYA Bautista NP

## 2024-12-19 NOTE — TELEPHONE ENCOUNTER
Please see message from Claudio Colbert, requesting refill on hydrocodone-acetaminophen 7.5/3 to 5 mg.  Prescription sent to pharmacy.

## 2024-12-20 ENCOUNTER — HOSPITAL ENCOUNTER (OUTPATIENT)
Dept: OTHER | Age: 59
Setting detail: THERAPIES SERIES
Discharge: HOME | End: 2024-12-20
Payer: MEDICAID

## 2024-12-20 VITALS
HEART RATE: 70 BPM | SYSTOLIC BLOOD PRESSURE: 109 MMHG | BODY MASS INDEX: 22.31 KG/M2 | WEIGHT: 130 LBS | OXYGEN SATURATION: 96 % | DIASTOLIC BLOOD PRESSURE: 56 MMHG | RESPIRATION RATE: 18 BRPM

## 2024-12-20 LAB
ANION GAP SERPL CALCULATED.3IONS-SCNC: 10 MMOL/L (ref 7–16)
BNP SERPL-MCNC: 668 PG/ML (ref 0–125)
BUN SERPL-MCNC: 16 MG/DL (ref 6–20)
CALCIUM SERPL-MCNC: 7.2 MG/DL (ref 8.6–10.2)
CHLORIDE SERPL-SCNC: 91 MMOL/L (ref 98–107)
CO2 SERPL-SCNC: 38 MMOL/L (ref 22–29)
CREAT SERPL-MCNC: 0.8 MG/DL (ref 0.7–1.2)
GFR, ESTIMATED: >90 ML/MIN/1.73M2
GLUCOSE SERPL-MCNC: 150 MG/DL (ref 74–99)
POTASSIUM SERPL-SCNC: 3.9 MMOL/L (ref 3.5–5)
SODIUM SERPL-SCNC: 139 MMOL/L (ref 132–146)

## 2024-12-20 PROCEDURE — 36415 COLL VENOUS BLD VENIPUNCTURE: CPT

## 2024-12-20 PROCEDURE — 80048 BASIC METABOLIC PNL TOTAL CA: CPT

## 2024-12-20 PROCEDURE — 99214 OFFICE O/P EST MOD 30 MIN: CPT

## 2024-12-20 PROCEDURE — 83880 ASSAY OF NATRIURETIC PEPTIDE: CPT

## 2024-12-20 NOTE — PROGRESS NOTES
Congestive Heart Failure Clinic   [facility]      Referring Provider: Wm Zuñiga APRCELESTINA-CNP  Primary Care Physician: Jana Sanchez MD   Cardiologist:   Nephrologist: n/a      HISTORY OF PRESENT ILLNESS:   Dg Peña is a 59 y.o. (1965) male with a history of HFimpEF, most recent EF:  Lab Results   Component Value Date    LVEF 63 08/03/2023    LVEFMODE Echo 01/21/2022       He presents to the CHF clinic for ongoing evaluation and monitoring of heart failure.    In the CHF clinic today he denies any adverse symptoms except:  [] Dizziness or lightheadedness   [] Syncope or near syncope  [] Recent Fall  [x] Shortness of breath at rest  [x] Dyspnea with exertion   [] Decline in functional capacity (unable to perform activities they had previously been able to do)  [] Fatigue   [] Orthopnea  [] PND  [x] Nocturnal cough  [] Hemoptysis  [] Chest pain, pressure, or discomfort  [] Palpitations  [] Abdominal distention  [] Abdominal bloating  [] Early satiety  [] Blood in stool   [] Diarrhea  [] Constipation  [] Nausea/Vomiting  [] Decreased urinary response to oral diuretic   [] Scrotal swelling   [] Lower extremity edema  [] Used PRN doses of oral diuretic   [] Weight gain    Wt Readings from Last 3 Encounters:   12/20/24 59 kg (130 lb)   12/11/24 60.1 kg (132 lb 9.6 oz)   12/09/24 61.7 kg (136 lb)     SOCIAL HISTORY:  [x] Denies tobacco, alcohol or illicit drug abuse  [] Tobacco use:  [] ETOH use:  [] Illicit drug use:        MEDICATIONS:    Allergies   Allergen Reactions    Levofloxacin Other (See Comments) and Nausea Only     Other reaction(s): Abdominal pain    Lisinopril      Other reaction(s): COUGHING    Tramadol      Other reaction(s): SHAKING    Ibuprofen Nausea And Vomiting    Sulfa Antibiotics Nausea And Vomiting     Prior to Visit Medications    Medication Sig Taking? Authorizing Provider   HYDROcodone-acetaminophen (NORCO) 7.5-325 MG

## 2024-12-21 NOTE — PROGRESS NOTES
Physician Progress Note      PATIENT:               BRITNEY GONSALES  [facility] #:                  [id number]  :                       1965  ADMIT DATE:       2024 1:37 PM  DISCH DATE:        2024 3:30 PM  RESPONDING  PROVIDER #:        Leigh Ann Senior          QUERY TEXT:    Patient admitted with hypercalcemia. Noted documentation of acute hypercapnic   respiratory failure in H&P. No noted documentation of respiratory distress. In   order to support the diagnosis of acute respiratory failure, please include   additional clinical indicators in your documentation.  Or please document if   the diagnosis of acute respiratory failure has been ruled out after further   study.    The medical record reflects the following:  Risk Factors: COPD  Clinical Indicators: H&P- 59 year old male with PMH significant for CHF,   end-stage COPD on 6 L via nasal cannula at baseline. Patient to ED from CHF   clinic with increased shortness of breath, dyspnea on exertion, increased   cough, and bilateral lower extremity swelling. Carbon dioxide 43. ABGs showing   pH 7.4, pCO2 69.1, pO2 91.2, HCO3 42.2 with base excess 14.7.   BiPAP   initiated.  Repeat gases showing pCO2 67.7. Pulmonary/Chest: lungs diminished   with rhonchi and crackles. Assessment: Acute respiratory failure with   hypercapnia. Unsure of baseline.  Patient does not wear his B  12/10 IM PN- Pulmonary/Chest: clear to auscultation bilaterally- no wheezes,   rales or rhonchi, normal air movement, no respiratory distress. COPD appears   to be at baseline with regards to pCO2 and oxygen requirements.   VS- RR 14-18; SpO2 % 6L NC, 100% BiPAP  12/10 VS- RR 16-20; SpO2 % 5L NC, 100% BiPAP  Treatment: ABGs, BiPAP, supplemental O2    Thank you,  Irena Mayers, MSN, RN  Clinical Documentation Integrity  [phone]  Options provided:  -- Acute Respiratory Failure as evidenced by, Please document evidence.  -- Acute hypercapnic Respiratory

## 2024-12-23 RX ORDER — TAMSULOSIN HYDROCHLORIDE 0.4 MG/1
CAPSULE ORAL
Qty: 60 CAPSULE | Refills: 2 | Status: SHIPPED | OUTPATIENT
Start: 2024-12-23

## 2024-12-30 ENCOUNTER — HOSPITAL ENCOUNTER (INPATIENT)
Age: 59
LOS: 2 days | Discharge: HOME HEALTH | DRG: 194 | End: 2025-01-04
Attending: EMERGENCY MEDICINE | Admitting: STUDENT IN AN ORGANIZED HEALTH CARE EDUCATION/TRAINING PROGRAM
Payer: MEDICAID

## 2024-12-30 ENCOUNTER — APPOINTMENT (OUTPATIENT)
Dept: GENERAL RADIOLOGY | Age: 59
DRG: 194 | End: 2024-12-30
Payer: MEDICAID

## 2024-12-30 DIAGNOSIS — U07.1 ACUTE RESPIRATORY DISEASE DUE TO SEVERE ACUTE RESPIRATORY SYNDROME CORONAVIRUS 2 (SARS-COV-2): ICD-10-CM

## 2024-12-30 DIAGNOSIS — E87.6 HYPOKALEMIA: ICD-10-CM

## 2024-12-30 DIAGNOSIS — I50.9 ACUTE ON CHRONIC CONGESTIVE HEART FAILURE, UNSPECIFIED HEART FAILURE TYPE (HCC): Primary | ICD-10-CM

## 2024-12-30 DIAGNOSIS — E83.51 HYPOCALCEMIA: ICD-10-CM

## 2024-12-30 DIAGNOSIS — J44.9 CHRONIC OBSTRUCTIVE PULMONARY DISEASE, UNSPECIFIED COPD TYPE (HCC): ICD-10-CM

## 2024-12-30 DIAGNOSIS — J06.9 ACUTE RESPIRATORY DISEASE DUE TO SEVERE ACUTE RESPIRATORY SYNDROME CORONAVIRUS 2 (SARS-COV-2): ICD-10-CM

## 2024-12-30 PROBLEM — E83.42 HYPOMAGNESEMIA: Status: ACTIVE | Noted: 2024-12-30

## 2024-12-30 PROBLEM — E87.70 VOLUME OVERLOAD: Status: ACTIVE | Noted: 2024-12-30

## 2024-12-30 LAB
ANION GAP SERPL CALCULATED.3IONS-SCNC: 9 MMOL/L (ref 7–16)
B PARAP IS1001 DNA NPH QL NAA+NON-PROBE: NOT DETECTED
B PERT DNA SPEC QL NAA+PROBE: NOT DETECTED
BNP SERPL-MCNC: 728 PG/ML (ref 0–125)
BUN SERPL-MCNC: 13 MG/DL (ref 6–20)
C PNEUM DNA NPH QL NAA+NON-PROBE: NOT DETECTED
CALCIUM SERPL-MCNC: 6.4 MG/DL (ref 8.6–10.2)
CHLORIDE SERPL-SCNC: 90 MMOL/L (ref 98–107)
CO2 SERPL-SCNC: 38 MMOL/L (ref 22–29)
CREAT SERPL-MCNC: 0.7 MG/DL (ref 0.7–1.2)
EKG ATRIAL RATE: 104 BPM
EKG ATRIAL RATE: 111 BPM
EKG P AXIS: 66 DEGREES
EKG P AXIS: 69 DEGREES
EKG P-R INTERVAL: 116 MS
EKG P-R INTERVAL: 130 MS
EKG Q-T INTERVAL: 310 MS
EKG Q-T INTERVAL: 336 MS
EKG QRS DURATION: 68 MS
EKG QRS DURATION: 70 MS
EKG QTC CALCULATION (BAZETT): 421 MS
EKG QTC CALCULATION (BAZETT): 441 MS
EKG R AXIS: 77 DEGREES
EKG R AXIS: 85 DEGREES
EKG T AXIS: 64 DEGREES
EKG T AXIS: 66 DEGREES
EKG VENTRICULAR RATE: 104 BPM
EKG VENTRICULAR RATE: 111 BPM
ERYTHROCYTE [DISTWIDTH] IN BLOOD BY AUTOMATED COUNT: 14 % (ref 11.5–15)
FERRITIN SERPL-MCNC: 395 NG/ML
FLUAV RNA NPH QL NAA+NON-PROBE: NOT DETECTED
FLUBV RNA NPH QL NAA+NON-PROBE: NOT DETECTED
GFR, ESTIMATED: >90 ML/MIN/1.73M2
GLUCOSE SERPL-MCNC: 153 MG/DL (ref 74–99)
HADV DNA NPH QL NAA+NON-PROBE: NOT DETECTED
HCOV 229E RNA NPH QL NAA+NON-PROBE: NOT DETECTED
HCOV HKU1 RNA NPH QL NAA+NON-PROBE: NOT DETECTED
HCOV NL63 RNA NPH QL NAA+NON-PROBE: NOT DETECTED
HCOV OC43 RNA NPH QL NAA+NON-PROBE: NOT DETECTED
HCT VFR BLD AUTO: 29.2 % (ref 37–54)
HGB BLD-MCNC: 8.9 G/DL (ref 12.5–16.5)
HMPV RNA NPH QL NAA+NON-PROBE: NOT DETECTED
HPIV1 RNA NPH QL NAA+NON-PROBE: NOT DETECTED
HPIV2 RNA NPH QL NAA+NON-PROBE: NOT DETECTED
HPIV3 RNA NPH QL NAA+NON-PROBE: NOT DETECTED
HPIV4 RNA NPH QL NAA+NON-PROBE: NOT DETECTED
IRON SATN MFR SERPL: 7 % (ref 20–55)
IRON SERPL-MCNC: 16 UG/DL (ref 59–158)
M PNEUMO DNA NPH QL NAA+NON-PROBE: NOT DETECTED
MAGNESIUM SERPL-MCNC: 1.3 MG/DL (ref 1.6–2.6)
MCH RBC QN AUTO: 32.8 PG (ref 26–35)
MCHC RBC AUTO-ENTMCNC: 30.5 G/DL (ref 32–34.5)
MCV RBC AUTO: 107.7 FL (ref 80–99.9)
PLATELET # BLD AUTO: 168 K/UL (ref 130–450)
PMV BLD AUTO: 10.8 FL (ref 7–12)
POTASSIUM SERPL-SCNC: 4 MMOL/L (ref 3.5–5)
RBC # BLD AUTO: 2.71 M/UL (ref 3.8–5.8)
RSV RNA NPH QL NAA+NON-PROBE: NOT DETECTED
RV+EV RNA NPH QL NAA+NON-PROBE: NOT DETECTED
SARS-COV-2 RNA NPH QL NAA+NON-PROBE: DETECTED
SODIUM SERPL-SCNC: 137 MMOL/L (ref 132–146)
SPECIMEN DESCRIPTION: ABNORMAL
T4 FREE SERPL-MCNC: 1.7 NG/DL (ref 0.9–1.7)
TIBC SERPL-MCNC: 246 UG/DL (ref 250–450)
TROPONIN I SERPL HS-MCNC: 18 NG/L (ref 0–11)
TROPONIN I SERPL HS-MCNC: 24 NG/L (ref 0–11)
TSH SERPL DL<=0.05 MIU/L-ACNC: 0.3 UIU/ML (ref 0.27–4.2)
WBC OTHER # BLD: 10.5 K/UL (ref 4.5–11.5)

## 2024-12-30 PROCEDURE — 93010 ELECTROCARDIOGRAM REPORT: CPT | Performed by: INTERNAL MEDICINE

## 2024-12-30 PROCEDURE — 6370000000 HC RX 637 (ALT 250 FOR IP)

## 2024-12-30 PROCEDURE — G0378 HOSPITAL OBSERVATION PER HR: HCPCS

## 2024-12-30 PROCEDURE — 0202U NFCT DS 22 TRGT SARS-COV-2: CPT

## 2024-12-30 PROCEDURE — 6360000002 HC RX W HCPCS

## 2024-12-30 PROCEDURE — 84439 ASSAY OF FREE THYROXINE: CPT

## 2024-12-30 PROCEDURE — 96367 TX/PROPH/DG ADDL SEQ IV INF: CPT

## 2024-12-30 PROCEDURE — 96365 THER/PROPH/DIAG IV INF INIT: CPT

## 2024-12-30 PROCEDURE — 99285 EMERGENCY DEPT VISIT HI MDM: CPT

## 2024-12-30 PROCEDURE — 2500000003 HC RX 250 WO HCPCS

## 2024-12-30 PROCEDURE — 6360000002 HC RX W HCPCS: Performed by: EMERGENCY MEDICINE

## 2024-12-30 PROCEDURE — 85027 COMPLETE CBC AUTOMATED: CPT

## 2024-12-30 PROCEDURE — 96366 THER/PROPH/DIAG IV INF ADDON: CPT

## 2024-12-30 PROCEDURE — 93005 ELECTROCARDIOGRAM TRACING: CPT | Performed by: EMERGENCY MEDICINE

## 2024-12-30 PROCEDURE — 80048 BASIC METABOLIC PNL TOTAL CA: CPT

## 2024-12-30 PROCEDURE — 96375 TX/PRO/DX INJ NEW DRUG ADDON: CPT

## 2024-12-30 PROCEDURE — 94640 AIRWAY INHALATION TREATMENT: CPT

## 2024-12-30 PROCEDURE — 83880 ASSAY OF NATRIURETIC PEPTIDE: CPT

## 2024-12-30 PROCEDURE — 84443 ASSAY THYROID STIM HORMONE: CPT

## 2024-12-30 PROCEDURE — 83550 IRON BINDING TEST: CPT

## 2024-12-30 PROCEDURE — 84484 ASSAY OF TROPONIN QUANT: CPT

## 2024-12-30 PROCEDURE — 96372 THER/PROPH/DIAG INJ SC/IM: CPT

## 2024-12-30 PROCEDURE — 6370000000 HC RX 637 (ALT 250 FOR IP): Performed by: INTERNAL MEDICINE

## 2024-12-30 PROCEDURE — 82728 ASSAY OF FERRITIN: CPT

## 2024-12-30 PROCEDURE — 83540 ASSAY OF IRON: CPT

## 2024-12-30 PROCEDURE — 83735 ASSAY OF MAGNESIUM: CPT

## 2024-12-30 PROCEDURE — 71045 X-RAY EXAM CHEST 1 VIEW: CPT

## 2024-12-30 RX ORDER — ALBUTEROL SULFATE 90 UG/1
2 INHALANT RESPIRATORY (INHALATION) EVERY 4 HOURS PRN
Status: DISCONTINUED | OUTPATIENT
Start: 2024-12-30 | End: 2024-12-30 | Stop reason: CLARIF

## 2024-12-30 RX ORDER — ALBUTEROL SULFATE 0.83 MG/ML
2.5 SOLUTION RESPIRATORY (INHALATION) EVERY 4 HOURS PRN
Status: DISCONTINUED | OUTPATIENT
Start: 2024-12-30 | End: 2025-01-04 | Stop reason: HOSPADM

## 2024-12-30 RX ORDER — ASPIRIN 81 MG/1
81 TABLET, CHEWABLE ORAL DAILY
Status: DISCONTINUED | OUTPATIENT
Start: 2024-12-31 | End: 2025-01-04 | Stop reason: HOSPADM

## 2024-12-30 RX ORDER — ARIPIPRAZOLE 5 MG/1
5 TABLET ORAL DAILY
Status: DISCONTINUED | OUTPATIENT
Start: 2024-12-31 | End: 2025-01-04 | Stop reason: HOSPADM

## 2024-12-30 RX ORDER — PROMETHAZINE HYDROCHLORIDE 25 MG/1
25 TABLET ORAL EVERY 6 HOURS PRN
Status: DISCONTINUED | OUTPATIENT
Start: 2024-12-30 | End: 2025-01-04 | Stop reason: HOSPADM

## 2024-12-30 RX ORDER — BUDESONIDE 0.5 MG/2ML
0.5 INHALANT ORAL
Status: DISCONTINUED | OUTPATIENT
Start: 2024-12-30 | End: 2025-01-04 | Stop reason: HOSPADM

## 2024-12-30 RX ORDER — MAGNESIUM SULFATE IN WATER 40 MG/ML
2000 INJECTION, SOLUTION INTRAVENOUS PRN
Status: DISCONTINUED | OUTPATIENT
Start: 2024-12-30 | End: 2025-01-04 | Stop reason: HOSPADM

## 2024-12-30 RX ORDER — SODIUM CHLORIDE 0.9 % (FLUSH) 0.9 %
5-40 SYRINGE (ML) INJECTION PRN
Status: DISCONTINUED | OUTPATIENT
Start: 2024-12-30 | End: 2025-01-04 | Stop reason: HOSPADM

## 2024-12-30 RX ORDER — ALBUTEROL SULFATE 0.83 MG/ML
2.5 SOLUTION RESPIRATORY (INHALATION) 2 TIMES DAILY
Status: DISCONTINUED | OUTPATIENT
Start: 2024-12-30 | End: 2024-12-30 | Stop reason: CLARIF

## 2024-12-30 RX ORDER — IPRATROPIUM BROMIDE AND ALBUTEROL SULFATE 2.5; .5 MG/3ML; MG/3ML
1 SOLUTION RESPIRATORY (INHALATION) EVERY 4 HOURS
Status: DISCONTINUED | OUTPATIENT
Start: 2024-12-30 | End: 2025-01-04 | Stop reason: HOSPADM

## 2024-12-30 RX ORDER — ATORVASTATIN CALCIUM 40 MG/1
40 TABLET, FILM COATED ORAL NIGHTLY
Status: DISCONTINUED | OUTPATIENT
Start: 2024-12-30 | End: 2025-01-04 | Stop reason: HOSPADM

## 2024-12-30 RX ORDER — PANTOPRAZOLE SODIUM 40 MG/1
40 TABLET, DELAYED RELEASE ORAL
Status: DISCONTINUED | OUTPATIENT
Start: 2024-12-31 | End: 2025-01-04 | Stop reason: HOSPADM

## 2024-12-30 RX ORDER — ONDANSETRON 4 MG/1
4 TABLET, ORALLY DISINTEGRATING ORAL EVERY 8 HOURS PRN
Status: DISCONTINUED | OUTPATIENT
Start: 2024-12-30 | End: 2024-12-30

## 2024-12-30 RX ORDER — HYDROCODONE BITARTRATE AND ACETAMINOPHEN 5; 325 MG/1; MG/1
1 TABLET ORAL EVERY 6 HOURS PRN
Status: DISCONTINUED | OUTPATIENT
Start: 2024-12-30 | End: 2025-01-04 | Stop reason: HOSPADM

## 2024-12-30 RX ORDER — SODIUM CHLORIDE 9 MG/ML
INJECTION, SOLUTION INTRAVENOUS PRN
Status: DISCONTINUED | OUTPATIENT
Start: 2024-12-30 | End: 2025-01-04 | Stop reason: HOSPADM

## 2024-12-30 RX ORDER — CALCIUM CARBONATE 500 MG/1
500 TABLET, CHEWABLE ORAL 3 TIMES DAILY PRN
Status: DISCONTINUED | OUTPATIENT
Start: 2024-12-31 | End: 2025-01-04 | Stop reason: HOSPADM

## 2024-12-30 RX ORDER — ENOXAPARIN SODIUM 100 MG/ML
40 INJECTION SUBCUTANEOUS DAILY
Status: DISCONTINUED | OUTPATIENT
Start: 2024-12-30 | End: 2025-01-04 | Stop reason: HOSPADM

## 2024-12-30 RX ORDER — POLYETHYLENE GLYCOL 3350 17 G/17G
17 POWDER, FOR SOLUTION ORAL DAILY PRN
Status: DISCONTINUED | OUTPATIENT
Start: 2024-12-30 | End: 2025-01-04 | Stop reason: HOSPADM

## 2024-12-30 RX ORDER — CALCIUM CARBONATE 500(1250)
1500 TABLET ORAL 2 TIMES DAILY
Status: DISCONTINUED | OUTPATIENT
Start: 2024-12-31 | End: 2025-01-04 | Stop reason: HOSPADM

## 2024-12-30 RX ORDER — METOPROLOL SUCCINATE 100 MG/1
100 TABLET, EXTENDED RELEASE ORAL DAILY
Status: DISCONTINUED | OUTPATIENT
Start: 2024-12-31 | End: 2025-01-04 | Stop reason: HOSPADM

## 2024-12-30 RX ORDER — ERGOCALCIFEROL 1.25 MG/1
50000 CAPSULE, LIQUID FILLED ORAL
Status: DISCONTINUED | OUTPATIENT
Start: 2025-01-02 | End: 2025-01-04 | Stop reason: HOSPADM

## 2024-12-30 RX ORDER — ACETAMINOPHEN 650 MG/1
650 SUPPOSITORY RECTAL EVERY 6 HOURS PRN
Status: DISCONTINUED | OUTPATIENT
Start: 2024-12-30 | End: 2025-01-04 | Stop reason: HOSPADM

## 2024-12-30 RX ORDER — PRAMIPEXOLE DIHYDROCHLORIDE 0.25 MG/1
0.75 TABLET ORAL NIGHTLY
Status: DISCONTINUED | OUTPATIENT
Start: 2024-12-30 | End: 2025-01-04 | Stop reason: HOSPADM

## 2024-12-30 RX ORDER — FLUOXETINE 10 MG/1
20 TABLET, FILM COATED ORAL NIGHTLY
Status: DISCONTINUED | OUTPATIENT
Start: 2024-12-30 | End: 2025-01-03 | Stop reason: CLARIF

## 2024-12-30 RX ORDER — GABAPENTIN 300 MG/1
300 CAPSULE ORAL 2 TIMES DAILY
Status: DISCONTINUED | OUTPATIENT
Start: 2024-12-30 | End: 2024-12-31

## 2024-12-30 RX ORDER — POTASSIUM CHLORIDE 1500 MG/1
40 TABLET, EXTENDED RELEASE ORAL PRN
Status: DISCONTINUED | OUTPATIENT
Start: 2024-12-30 | End: 2025-01-04 | Stop reason: HOSPADM

## 2024-12-30 RX ORDER — TAMSULOSIN HYDROCHLORIDE 0.4 MG/1
0.4 CAPSULE ORAL DAILY
Status: DISCONTINUED | OUTPATIENT
Start: 2024-12-31 | End: 2024-12-31

## 2024-12-30 RX ORDER — CALCIUM GLUCONATE 20 MG/ML
1000 INJECTION, SOLUTION INTRAVENOUS ONCE
Status: COMPLETED | OUTPATIENT
Start: 2024-12-30 | End: 2024-12-30

## 2024-12-30 RX ORDER — POTASSIUM CHLORIDE 7.45 MG/ML
10 INJECTION INTRAVENOUS PRN
Status: DISCONTINUED | OUTPATIENT
Start: 2024-12-30 | End: 2024-12-30 | Stop reason: RX

## 2024-12-30 RX ORDER — FUROSEMIDE 10 MG/ML
40 INJECTION INTRAMUSCULAR; INTRAVENOUS 2 TIMES DAILY
Status: DISCONTINUED | OUTPATIENT
Start: 2024-12-30 | End: 2025-01-01

## 2024-12-30 RX ORDER — MAGNESIUM SULFATE IN WATER 40 MG/ML
2000 INJECTION, SOLUTION INTRAVENOUS ONCE
Status: COMPLETED | OUTPATIENT
Start: 2024-12-30 | End: 2024-12-30

## 2024-12-30 RX ORDER — ACETAMINOPHEN 325 MG/1
650 TABLET ORAL EVERY 6 HOURS PRN
Status: DISCONTINUED | OUTPATIENT
Start: 2024-12-30 | End: 2025-01-04 | Stop reason: HOSPADM

## 2024-12-30 RX ORDER — SODIUM CHLORIDE 0.9 % (FLUSH) 0.9 %
5-40 SYRINGE (ML) INJECTION EVERY 12 HOURS SCHEDULED
Status: DISCONTINUED | OUTPATIENT
Start: 2024-12-30 | End: 2025-01-04 | Stop reason: HOSPADM

## 2024-12-30 RX ORDER — ONDANSETRON 2 MG/ML
4 INJECTION INTRAMUSCULAR; INTRAVENOUS EVERY 6 HOURS PRN
Status: DISCONTINUED | OUTPATIENT
Start: 2024-12-30 | End: 2024-12-30

## 2024-12-30 RX ORDER — ARFORMOTEROL TARTRATE 15 UG/2ML
15 SOLUTION RESPIRATORY (INHALATION)
Status: DISCONTINUED | OUTPATIENT
Start: 2024-12-30 | End: 2025-01-04 | Stop reason: HOSPADM

## 2024-12-30 RX ORDER — CALCIUM GLUCONATE 94 MG/ML
1000 INJECTION, SOLUTION INTRAVENOUS ONCE
Status: DISCONTINUED | OUTPATIENT
Start: 2024-12-30 | End: 2024-12-30 | Stop reason: ALTCHOICE

## 2024-12-30 RX ORDER — FUROSEMIDE 10 MG/ML
40 INJECTION INTRAMUSCULAR; INTRAVENOUS ONCE
Status: COMPLETED | OUTPATIENT
Start: 2024-12-30 | End: 2024-12-30

## 2024-12-30 RX ADMIN — ARFORMOTEROL TARTRATE 15 MCG: 15 SOLUTION RESPIRATORY (INHALATION) at 22:09

## 2024-12-30 RX ADMIN — CALCIUM GLUCONATE 1000 MG: 20 INJECTION, SOLUTION INTRAVENOUS at 12:04

## 2024-12-30 RX ADMIN — HYDROCODONE BITARTRATE AND ACETAMINOPHEN 1 TABLET: 5; 325 TABLET ORAL at 22:30

## 2024-12-30 RX ADMIN — FUROSEMIDE 40 MG: 10 INJECTION, SOLUTION INTRAMUSCULAR; INTRAVENOUS at 19:00

## 2024-12-30 RX ADMIN — ACETAMINOPHEN 650 MG: 325 TABLET ORAL at 13:53

## 2024-12-30 RX ADMIN — MAGNESIUM SULFATE HEPTAHYDRATE 2000 MG: 40 INJECTION, SOLUTION INTRAVENOUS at 13:07

## 2024-12-30 RX ADMIN — FLUOXETINE 20 MG: 10 TABLET, FILM COATED ORAL at 22:32

## 2024-12-30 RX ADMIN — FUROSEMIDE 40 MG: 10 INJECTION, SOLUTION INTRAMUSCULAR; INTRAVENOUS at 13:07

## 2024-12-30 RX ADMIN — GABAPENTIN 300 MG: 300 CAPSULE ORAL at 22:30

## 2024-12-30 RX ADMIN — PRAMIPEXOLE DIHYDROCHLORIDE 0.75 MG: 0.25 TABLET ORAL at 22:31

## 2024-12-30 RX ADMIN — IPRATROPIUM BROMIDE AND ALBUTEROL SULFATE 1 DOSE: .5; 2.5 SOLUTION RESPIRATORY (INHALATION) at 22:08

## 2024-12-30 RX ADMIN — BUDESONIDE INHALATION 500 MCG: 0.5 SUSPENSION RESPIRATORY (INHALATION) at 22:09

## 2024-12-30 RX ADMIN — SODIUM CHLORIDE, PRESERVATIVE FREE 10 ML: 5 INJECTION INTRAVENOUS at 22:34

## 2024-12-30 RX ADMIN — ENOXAPARIN SODIUM 40 MG: 100 INJECTION SUBCUTANEOUS at 13:57

## 2024-12-30 ASSESSMENT — PAIN DESCRIPTION - DESCRIPTORS
DESCRIPTORS: ACHING
DESCRIPTORS: ACHING;THROBBING
DESCRIPTORS: SQUEEZING
DESCRIPTORS: ACHING;THROBBING

## 2024-12-30 ASSESSMENT — PAIN - FUNCTIONAL ASSESSMENT
PAIN_FUNCTIONAL_ASSESSMENT: PREVENTS OR INTERFERES SOME ACTIVE ACTIVITIES AND ADLS
PAIN_FUNCTIONAL_ASSESSMENT: PREVENTS OR INTERFERES SOME ACTIVE ACTIVITIES AND ADLS
PAIN_FUNCTIONAL_ASSESSMENT: ACTIVITIES ARE NOT PREVENTED
PAIN_FUNCTIONAL_ASSESSMENT: 0-10

## 2024-12-30 ASSESSMENT — PAIN DESCRIPTION - LOCATION
LOCATION: LEG
LOCATION: CHEST
LOCATION: CHEST;LEG

## 2024-12-30 ASSESSMENT — PAIN SCALES - GENERAL
PAINLEVEL_OUTOF10: 8
PAINLEVEL_OUTOF10: 9
PAINLEVEL_OUTOF10: 9
PAINLEVEL_OUTOF10: 3
PAINLEVEL_OUTOF10: 10

## 2024-12-30 ASSESSMENT — PAIN DESCRIPTION - ORIENTATION
ORIENTATION: RIGHT;LEFT
ORIENTATION: LEFT
ORIENTATION: RIGHT;LEFT
ORIENTATION: RIGHT;LEFT

## 2024-12-30 NOTE — H&P
[facility] Hospitalist Group   History and Physical      CHIEF COMPLAINT:  SOB, BLE edema    History of Present Illness:  59 y.o. male with a history of end stage CHF, end stage COPD on 5L NC, HTN, anxiety presents with SOB and increased BLE edema. Patient reports a weight gain of 8 lbs. Patient reports caretaker doubled lasix dose Friday-Sunday with no improvement to edema. Patient called CHF clinic today who instructed him to come to ED. Patient also reports mid sternal/right sided chest pain which he states is worsened with breathing and states it feels like a squeezing. States this has been intermittent for 3-4 days. Patient reports he took norco at home with helped these symptoms. Patient febrile at 100.4F and tachycardic in 110s in ED. CXR showed increased markings and mild cardiomegaly. Pertinent abnormal labs: sars-cov-2 positive, hgb 8.9, hct 29.2, troponin 18, , chloride 90, CO2 38, glucose 153, calcium 6.4, magnesium 1.3. ED course included 1g calcium gluconate, 2g magnesium sulfate, 40mg lasix IV.     Informant(s) for H&P:Patient and EMR     REVIEW OF SYSTEMS:  Fever, chills, sob, cp. no n/v, ha, vision/hearing changes, wt changes, hot/cold flashes, other open skin lesions, diarrhea, constipation, dysuria/hematuria unless noted in HPI. Complete ROS performed with the patient and is otherwise negative.      PMH:  Past Medical History:   Diagnosis Date    Anxiety     COPD (chronic obstructive pulmonary disease) (HCC)     Hypertension     Leg swelling     Multiple gastric ulcers     Restless leg syndrome        Surgical History:  Past Surgical History:   Procedure Laterality Date    BRONCHOSCOPY N/A 04/27/2023    BRONCHOSCOPY DIAGNOSTIC OR CELL WASH ONLY performed by Zhang Murphy MD at [facility] ENDOSCOPY    HERNIA REPAIR      HIP SURGERY      JOINT REPLACEMENT  1987    KNEE SURGERY         Medications Prior to Admission:    Prior to Admission medications    Medication Sig Start  no cranial nerve deficit and speech normal    LABS:  Recent Labs     12/30/24  0959      K 4.0   CL 90*   CO2 38*   BUN 13   CREATININE 0.7   GLUCOSE 153*   CALCIUM 6.4*       Recent Labs     12/30/24  0959   WBC 10.5   RBC 2.71*   HGB 8.9*   HCT 29.2*   .7*   MCH 32.8   MCHC 30.5*   RDW 14.0      MPV 10.8       No results for input(s): \"POCGLU\" in the last 72 hours.        Radiology: XR CHEST PORTABLE    Result Date: 12/30/2024  EXAMINATION: ONE XRAY VIEW OF THE CHEST 12/30/2024 10:29 am COMPARISON: 12/09/2024 HISTORY: ORDERING SYSTEM PROVIDED HISTORY: chest pain TECHNOLOGIST PROVIDED HISTORY: Reason for exam:->chest pain Reason for exam:->SOB DIAS FINDINGS: Is single frontal view of the chest demonstrates increased markings consistent with subsegmental atelectasis or scarring. There are atherosclerotic changes with mild cardiomegaly.  There is no evidence of a pneumothorax.  The soft tissues and osseous structures appear unremarkable.     1. Increased markings consistent with subsegmental atelectasis or scarring. 2. Atherosclerotic changes with mild cardiomegaly.       EKG:     ASSESSMENT:      Principal Problem:    Volume overload  Active Problems:    Hypocalcemia    COVID-19    End stage COPD (HCC)    Hypomagnesemia  Resolved Problems:    * No resolved hospital problems. *      PLAN:    Volume overload 2/2 acute on chronic HFpEF-EF 60-65%.  (increase from 668 12/20). Rales in bilateral lung fields. 40 lasix IV given in ED.  Lasix 40mg BID. Monitor Is&Os and daily weights.   Covid 19- Detected on respiratory panel. Droplet plus contact isolation. Febrile at 100.4F. Antipyretics PRN. Supportive care.   Tachycardia- likely 2/2 above. Monitor on telemetry.   Chest pain- mid sternal/right sided. Worsened with breathing, relieved with home norco. Likely musculoskeletal. Troponin 18, will repeat. EKG showed no ischemic changes.   End stage COPD- baseline O2 5-6L NC at home. Patient at baseline

## 2024-12-30 NOTE — ED PROVIDER NOTES
HPI:  12/30/24, Time: 9:50 AM KARMA Peña is a 59 y.o. male presenting to the ED for midsternal chest pain describes it as a pressure.  Been constant all weekend long according to the significant other.  Also reports 8 pound weight gain in the last 3 days he has had bilateral leg swelling with 3-4+ pitting edema.  History of congestive heart failure with preserved ejection fraction.  Visiting nurse saw him today who advised him to come to the emergency department for evaluation.  Significant other states she doubled up on his Lasix on Friday and Sunday with no relief of his symptoms.  Patient also on chronic oxygen for COPD.  He did bump up his oxygen demand from 4 to 5 L to 5 to 6 L nasal cannula., beginning 3 days ago.  The complaint has been persistent, severe in severity, and worsened by light exertion, deep breath, cough.  He reports reports a nonproductive cough.  Found to have a fever 100.4 Fahrenheit in triage.    Review of Systems:   A complete review of systems was performed and pertinent positives and negatives are stated within HPI, all other systems reviewed and are negative.    --------------------------------------------- PAST HISTORY ---------------------------------------------  Past Medical History:  has a past medical history of Anxiety, COPD (chronic obstructive pulmonary disease) (HCC), Hypertension, Leg swelling, Multiple gastric ulcers, and Restless leg syndrome.    Past Surgical History:  has a past surgical history that includes knee surgery; hip surgery; hernia repair; bronchoscopy (N/A, 04/27/2023); and joint replacement (1987).    Social History:  reports that he quit smoking about 4 years ago. His smoking use included cigarettes. He started smoking about 47 years ago. He has a 172.5 pack-year smoking history. He has never used smokeless tobacco. He reports that he does not drink alcohol and does not use drugs.    Family History: family history includes Cancer in  EXAM--------------------------------------      Constitutional/General: Alert and oriented x3, well appearing, non toxic in NAD  Head: Normocephalic and atraumatic  Eyes: PERRL, EOMI  Mouth: Oropharynx clear, handling secretions, no trismus  Neck: Supple, full ROM,   Pulmonary: Lungs clear to auscultation bilaterally, no wheezes, bilateral rales rales, or rhonchi.  Patient does have conversational dyspnea  Cardiovascular:  Regular rate and rhythm, no murmurs, gallops, or rubs. 2+ distal pulses  Abdomen: Soft, non tender, non distended,   Extremities: Moves all extremities x 4. Warm and well perfused, 3-4+ pitting edema compartments soft feet are warm to touch.  Skin: warm and dry without rash  Neurologic: GCS 15,  Psych: Normal Affect    ------------------------------ ED COURSE/MEDICAL DECISION MAKING----------------------  Medications   sodium chloride flush 0.9 % injection 5-40 mL (10 mLs IntraVENous Given 12/31/24 2002)   sodium chloride flush 0.9 % injection 5-40 mL (has no administration in time range)   0.9 % sodium chloride infusion (has no administration in time range)   polyethylene glycol (GLYCOLAX) packet 17 g (has no administration in time range)   acetaminophen (TYLENOL) tablet 650 mg (650 mg Oral Given 12/30/24 1353)     Or   acetaminophen (TYLENOL) suppository 650 mg ( Rectal See Alternative 12/30/24 1353)   enoxaparin (LOVENOX) injection 40 mg (40 mg SubCUTAneous Given 12/31/24 0756)   potassium chloride (KLOR-CON M) extended release tablet 40 mEq (40 mEq Oral Given 12/31/24 1156)     Or   potassium bicarb-citric acid (EFFER-K) effervescent tablet 40 mEq ( Oral See Alternative 12/31/24 1156)   magnesium sulfate 2000 mg in 50 mL IVPB premix (has no administration in time range)   furosemide (LASIX) injection 40 mg (40 mg IntraVENous Given 12/31/24 1701)   promethazine (PHENERGAN) tablet 25 mg (25 mg Oral Given 12/31/24 1257)   ARIPiprazole (ABILIFY) tablet 5 mg (5 mg Oral Given 12/31/24 6156)

## 2024-12-30 NOTE — ED NOTES
Perfect served dr sellers for home pain medication norco- waiting for new order for pain management

## 2024-12-30 NOTE — PROGRESS NOTES
Database initiated. Patient is A&O comes in from home with cousin. States he uses a walker and a wheelchair sometimes. He wears 6 liters oxygen at baseline. He has fallen recently. He has a private pay aide daily and [facility]

## 2024-12-31 LAB
ALBUMIN SERPL-MCNC: 3.4 G/DL (ref 3.5–5.2)
ALP SERPL-CCNC: 343 U/L (ref 40–129)
ALT SERPL-CCNC: 163 U/L (ref 0–40)
ANION GAP SERPL CALCULATED.3IONS-SCNC: 11 MMOL/L (ref 7–16)
AST SERPL-CCNC: 184 U/L (ref 0–39)
BILIRUB DIRECT SERPL-MCNC: 2.9 MG/DL (ref 0–0.3)
BILIRUB INDIRECT SERPL-MCNC: 0.3 MG/DL (ref 0–1)
BILIRUB SERPL-MCNC: 3.2 MG/DL (ref 0–1.2)
BUN SERPL-MCNC: 11 MG/DL (ref 6–20)
CALCIUM SERPL-MCNC: 6.4 MG/DL (ref 8.6–10.2)
CHLORIDE SERPL-SCNC: 89 MMOL/L (ref 98–107)
CHOLEST SERPL-MCNC: 134 MG/DL
CO2 SERPL-SCNC: 39 MMOL/L (ref 22–29)
CREAT SERPL-MCNC: 0.7 MG/DL (ref 0.7–1.2)
GFR, ESTIMATED: >90 ML/MIN/1.73M2
GLUCOSE SERPL-MCNC: 145 MG/DL (ref 74–99)
HDLC SERPL-MCNC: 67 MG/DL
LDLC SERPL CALC-MCNC: 56 MG/DL
MAGNESIUM SERPL-MCNC: 1.8 MG/DL (ref 1.6–2.6)
POTASSIUM SERPL-SCNC: 3 MMOL/L (ref 3.5–5)
PROT SERPL-MCNC: 7.3 G/DL (ref 6.4–8.3)
SODIUM SERPL-SCNC: 139 MMOL/L (ref 132–146)
TRIGL SERPL-MCNC: 54 MG/DL
VLDLC SERPL CALC-MCNC: 11 MG/DL

## 2024-12-31 PROCEDURE — 99233 SBSQ HOSP IP/OBS HIGH 50: CPT | Performed by: INTERNAL MEDICINE

## 2024-12-31 PROCEDURE — 83735 ASSAY OF MAGNESIUM: CPT

## 2024-12-31 PROCEDURE — 6360000002 HC RX W HCPCS

## 2024-12-31 PROCEDURE — 94640 AIRWAY INHALATION TREATMENT: CPT

## 2024-12-31 PROCEDURE — 96372 THER/PROPH/DIAG INJ SC/IM: CPT

## 2024-12-31 PROCEDURE — 96375 TX/PRO/DX INJ NEW DRUG ADDON: CPT

## 2024-12-31 PROCEDURE — 6370000000 HC RX 637 (ALT 250 FOR IP): Performed by: INTERNAL MEDICINE

## 2024-12-31 PROCEDURE — 2700000000 HC OXYGEN THERAPY PER DAY

## 2024-12-31 PROCEDURE — 6370000000 HC RX 637 (ALT 250 FOR IP): Performed by: NURSE PRACTITIONER

## 2024-12-31 PROCEDURE — 96366 THER/PROPH/DIAG IV INF ADDON: CPT

## 2024-12-31 PROCEDURE — 36415 COLL VENOUS BLD VENIPUNCTURE: CPT

## 2024-12-31 PROCEDURE — G0378 HOSPITAL OBSERVATION PER HR: HCPCS

## 2024-12-31 PROCEDURE — 97165 OT EVAL LOW COMPLEX 30 MIN: CPT

## 2024-12-31 PROCEDURE — 80053 COMPREHEN METABOLIC PANEL: CPT

## 2024-12-31 PROCEDURE — 6360000002 HC RX W HCPCS: Performed by: INTERNAL MEDICINE

## 2024-12-31 PROCEDURE — 80061 LIPID PANEL: CPT

## 2024-12-31 PROCEDURE — 6370000000 HC RX 637 (ALT 250 FOR IP)

## 2024-12-31 PROCEDURE — 97161 PT EVAL LOW COMPLEX 20 MIN: CPT

## 2024-12-31 PROCEDURE — 2500000003 HC RX 250 WO HCPCS

## 2024-12-31 PROCEDURE — 96376 TX/PRO/DX INJ SAME DRUG ADON: CPT

## 2024-12-31 PROCEDURE — 82248 BILIRUBIN DIRECT: CPT

## 2024-12-31 RX ORDER — FLUTICASONE PROPIONATE 50 MCG
1 SPRAY, SUSPENSION (ML) NASAL DAILY
Status: DISCONTINUED | OUTPATIENT
Start: 2024-12-31 | End: 2025-01-04 | Stop reason: HOSPADM

## 2024-12-31 RX ORDER — DEXAMETHASONE SODIUM PHOSPHATE 10 MG/ML
6 INJECTION INTRAMUSCULAR; INTRAVENOUS EVERY 24 HOURS
Status: DISCONTINUED | OUTPATIENT
Start: 2024-12-31 | End: 2025-01-02

## 2024-12-31 RX ORDER — MORPHINE SULFATE 10 MG/5ML
6.25 SOLUTION ORAL
Status: DISCONTINUED | OUTPATIENT
Start: 2024-12-31 | End: 2025-01-04 | Stop reason: HOSPADM

## 2024-12-31 RX ORDER — GABAPENTIN 300 MG/1
600 CAPSULE ORAL EVERY MORNING
Status: DISCONTINUED | OUTPATIENT
Start: 2025-01-01 | End: 2025-01-04 | Stop reason: HOSPADM

## 2024-12-31 RX ORDER — GABAPENTIN 300 MG/1
900 CAPSULE ORAL NIGHTLY
Status: DISCONTINUED | OUTPATIENT
Start: 2024-12-31 | End: 2025-01-04 | Stop reason: HOSPADM

## 2024-12-31 RX ORDER — LORAZEPAM 1 MG/1
1 TABLET ORAL EVERY 8 HOURS PRN
Status: DISCONTINUED | OUTPATIENT
Start: 2024-12-31 | End: 2025-01-04 | Stop reason: HOSPADM

## 2024-12-31 RX ORDER — CALCIUM GLUCONATE 20 MG/ML
1000 INJECTION, SOLUTION INTRAVENOUS ONCE
Status: COMPLETED | OUTPATIENT
Start: 2024-12-31 | End: 2024-12-31

## 2024-12-31 RX ORDER — TAMSULOSIN HYDROCHLORIDE 0.4 MG/1
0.4 CAPSULE ORAL 2 TIMES DAILY
Status: DISCONTINUED | OUTPATIENT
Start: 2024-12-31 | End: 2025-01-04 | Stop reason: HOSPADM

## 2024-12-31 RX ADMIN — SODIUM CHLORIDE, PRESERVATIVE FREE 10 ML: 5 INJECTION INTRAVENOUS at 07:57

## 2024-12-31 RX ADMIN — SALINE NASAL SPRAY 1 SPRAY: 1.5 SOLUTION NASAL at 05:34

## 2024-12-31 RX ADMIN — BUDESONIDE INHALATION 500 MCG: 0.5 SUSPENSION RESPIRATORY (INHALATION) at 08:09

## 2024-12-31 RX ADMIN — GABAPENTIN 900 MG: 300 CAPSULE ORAL at 21:09

## 2024-12-31 RX ADMIN — PROMETHAZINE HYDROCHLORIDE 25 MG: 25 TABLET ORAL at 12:57

## 2024-12-31 RX ADMIN — FUROSEMIDE 40 MG: 10 INJECTION, SOLUTION INTRAMUSCULAR; INTRAVENOUS at 17:01

## 2024-12-31 RX ADMIN — CALCIUM 1500 MG: 500 TABLET ORAL at 20:00

## 2024-12-31 RX ADMIN — CALCIUM 1500 MG: 500 TABLET ORAL at 07:56

## 2024-12-31 RX ADMIN — BUDESONIDE INHALATION 500 MCG: 0.5 SUSPENSION RESPIRATORY (INHALATION) at 22:02

## 2024-12-31 RX ADMIN — MORPHINE SULFATE 6.5 MG: 10 SOLUTION ORAL at 23:23

## 2024-12-31 RX ADMIN — FLUTICASONE PROPIONATE 1 SPRAY: 50 SPRAY, METERED NASAL at 07:56

## 2024-12-31 RX ADMIN — FLUOXETINE 20 MG: 10 TABLET, FILM COATED ORAL at 19:59

## 2024-12-31 RX ADMIN — MORPHINE SULFATE 6.5 MG: 10 SOLUTION ORAL at 18:11

## 2024-12-31 RX ADMIN — ARFORMOTEROL TARTRATE 15 MCG: 15 SOLUTION RESPIRATORY (INHALATION) at 22:02

## 2024-12-31 RX ADMIN — IPRATROPIUM BROMIDE AND ALBUTEROL SULFATE 1 DOSE: .5; 2.5 SOLUTION RESPIRATORY (INHALATION) at 08:09

## 2024-12-31 RX ADMIN — HYDROCODONE BITARTRATE AND ACETAMINOPHEN 1 TABLET: 5; 325 TABLET ORAL at 14:43

## 2024-12-31 RX ADMIN — PRAMIPEXOLE DIHYDROCHLORIDE 0.75 MG: 0.25 TABLET ORAL at 19:59

## 2024-12-31 RX ADMIN — IPRATROPIUM BROMIDE AND ALBUTEROL SULFATE 1 DOSE: .5; 2.5 SOLUTION RESPIRATORY (INHALATION) at 04:26

## 2024-12-31 RX ADMIN — PANTOPRAZOLE SODIUM 40 MG: 40 TABLET, DELAYED RELEASE ORAL at 05:19

## 2024-12-31 RX ADMIN — TAMSULOSIN HYDROCHLORIDE 0.4 MG: 0.4 CAPSULE ORAL at 21:22

## 2024-12-31 RX ADMIN — IPRATROPIUM BROMIDE AND ALBUTEROL SULFATE 1 DOSE: .5; 2.5 SOLUTION RESPIRATORY (INHALATION) at 16:40

## 2024-12-31 RX ADMIN — ARIPIPRAZOLE 5 MG: 5 TABLET ORAL at 07:56

## 2024-12-31 RX ADMIN — CALCIUM GLUCONATE 1000 MG: 20 INJECTION, SOLUTION INTRAVENOUS at 11:56

## 2024-12-31 RX ADMIN — ENOXAPARIN SODIUM 40 MG: 100 INJECTION SUBCUTANEOUS at 07:56

## 2024-12-31 RX ADMIN — ARFORMOTEROL TARTRATE 15 MCG: 15 SOLUTION RESPIRATORY (INHALATION) at 08:09

## 2024-12-31 RX ADMIN — HYDROCODONE BITARTRATE AND ACETAMINOPHEN 1 TABLET: 5; 325 TABLET ORAL at 21:10

## 2024-12-31 RX ADMIN — ASPIRIN 81 MG CHEWABLE TABLET 81 MG: 81 TABLET CHEWABLE at 07:56

## 2024-12-31 RX ADMIN — MORPHINE SULFATE 6.5 MG: 10 SOLUTION ORAL at 12:21

## 2024-12-31 RX ADMIN — IPRATROPIUM BROMIDE AND ALBUTEROL SULFATE 1 DOSE: .5; 2.5 SOLUTION RESPIRATORY (INHALATION) at 22:02

## 2024-12-31 RX ADMIN — SODIUM CHLORIDE, PRESERVATIVE FREE 10 ML: 5 INJECTION INTRAVENOUS at 20:02

## 2024-12-31 RX ADMIN — TAMSULOSIN HYDROCHLORIDE 0.4 MG: 0.4 CAPSULE ORAL at 07:56

## 2024-12-31 RX ADMIN — LORAZEPAM 1 MG: 1 TABLET ORAL at 05:20

## 2024-12-31 RX ADMIN — HYDROCODONE BITARTRATE AND ACETAMINOPHEN 1 TABLET: 5; 325 TABLET ORAL at 06:44

## 2024-12-31 RX ADMIN — IPRATROPIUM BROMIDE AND ALBUTEROL SULFATE 1 DOSE: .5; 2.5 SOLUTION RESPIRATORY (INHALATION) at 12:03

## 2024-12-31 RX ADMIN — GABAPENTIN 300 MG: 300 CAPSULE ORAL at 07:56

## 2024-12-31 RX ADMIN — DEXAMETHASONE SODIUM PHOSPHATE 6 MG: 10 INJECTION INTRAMUSCULAR; INTRAVENOUS at 11:10

## 2024-12-31 RX ADMIN — METOPROLOL SUCCINATE 100 MG: 100 TABLET, FILM COATED, EXTENDED RELEASE ORAL at 07:55

## 2024-12-31 RX ADMIN — IPRATROPIUM BROMIDE AND ALBUTEROL SULFATE 1 DOSE: .5; 2.5 SOLUTION RESPIRATORY (INHALATION) at 00:00

## 2024-12-31 RX ADMIN — FUROSEMIDE 40 MG: 10 INJECTION, SOLUTION INTRAMUSCULAR; INTRAVENOUS at 07:56

## 2024-12-31 RX ADMIN — LORAZEPAM 1 MG: 1 TABLET ORAL at 17:01

## 2024-12-31 RX ADMIN — POTASSIUM CHLORIDE 40 MEQ: 1500 TABLET, EXTENDED RELEASE ORAL at 11:56

## 2024-12-31 ASSESSMENT — PAIN - FUNCTIONAL ASSESSMENT: PAIN_FUNCTIONAL_ASSESSMENT: PREVENTS OR INTERFERES SOME ACTIVE ACTIVITIES AND ADLS

## 2024-12-31 ASSESSMENT — PAIN SCALES - GENERAL
PAINLEVEL_OUTOF10: 8
PAINLEVEL_OUTOF10: 6
PAINLEVEL_OUTOF10: 7
PAINLEVEL_OUTOF10: 8
PAINLEVEL_OUTOF10: 6

## 2024-12-31 ASSESSMENT — PAIN DESCRIPTION - LOCATION
LOCATION: LEG;BACK
LOCATION: LEG
LOCATION: LEG;HIP;BACK
LOCATION: LEG

## 2024-12-31 ASSESSMENT — PAIN DESCRIPTION - ONSET: ONSET: ON-GOING

## 2024-12-31 ASSESSMENT — PAIN DESCRIPTION - DESCRIPTORS
DESCRIPTORS: ACHING
DESCRIPTORS: ACHING;DISCOMFORT;THROBBING
DESCRIPTORS: ACHING
DESCRIPTORS: ACHING;DISCOMFORT;THROBBING
DESCRIPTORS: ACHING;DISCOMFORT
DESCRIPTORS: ACHING

## 2024-12-31 ASSESSMENT — PAIN DESCRIPTION - FREQUENCY: FREQUENCY: INTERMITTENT

## 2024-12-31 ASSESSMENT — PAIN DESCRIPTION - PAIN TYPE: TYPE: CHRONIC PAIN

## 2024-12-31 ASSESSMENT — PAIN DESCRIPTION - ORIENTATION
ORIENTATION: LEFT
ORIENTATION: RIGHT;LEFT
ORIENTATION: LEFT
ORIENTATION: LEFT
ORIENTATION: RIGHT;LEFT
ORIENTATION: RIGHT;LEFT

## 2024-12-31 NOTE — ED NOTES
ED to Inpatient Handoff Report    Notified 4w that electronic handoff available and patient ready for transport to room 406.    Safety Risks: Risk of falls    Patient in Restraints: no    Constant Observer or Patient : no    Telemetry Monitoring Ordered :Yes           Order to transfer to unit without monitor:YES    Last MEWS: 1 Time completed: 1845    Deterioration Index Score:   Predictive Model Details          35 (Caution)  Factor Value    Calculated 12/30/2024 19:26 31% Supplemental oxygen Nasal cannula    Deterioration Index Model 28% Age 59 years old     15% Respiratory rate 20     10% Pulse 107     7% Pulse oximetry 91 %     4% WBC count 10.5 k/uL     3% Hematocrit abnormal (29.2 %)     1% Sodium 137 mmol/L     1% Temperature 99.1 °F (37.3 °C)     1% Systolic 109     0% Potassium 4.0 mmol/L        Vitals:    12/30/24 1147 12/30/24 1307 12/30/24 1318 12/30/24 1845   BP:  114/68  109/65   Pulse: 99 (!) 107     Resp: 15 16  20   Temp:   100 °F (37.8 °C) 99.1 °F (37.3 °C)   TempSrc:   Oral Oral   SpO2: 100% 96%  91%   Weight:       Height:             Opportunity for questions and clarification was provided.

## 2024-12-31 NOTE — PROGRESS NOTES
Physical Therapy  Facility/Department: [address] INTERNAL MEDICINE 2  Physical Therapy Initial Assessment    Name: Dg Peña  : 1965  MRN: [id number]  Date of Service: 2024    Patient Diagnosis(es): The encounter diagnosis was Acute on chronic congestive heart failure, unspecified heart failure type (HCC).  Past Medical History:  has a past medical history of Anxiety, COPD (chronic obstructive pulmonary disease) (HCC), Hypertension, Leg swelling, Multiple gastric ulcers, and Restless leg syndrome.  Past Surgical History:  has a past surgical history that includes knee surgery; hip surgery; hernia repair; bronchoscopy (N/A, 2023); and joint replacement ().      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  LLE is shorter than RLE.  Equipment Needs:  none.  Pt owns a walker and w/c.     SUBJECTIVE:     Pt lives with his cousin in a 1st floor apt with a ramp to enter.   Pt ambulated with a ww for short distances and uses a WC for longer distances.      OBJECTIVE:   Initial Evaluation  Date:  Treatment Short Term/ Long Term   Goals   Was pt agreeable to Eval/treatment? yes     Does pt have pain? None reported     Bed Mobility  Rolling: supervision  Supine to sit: supervision  Sit to supine: supervision  Scooting: NT  independent   Transfers Sit to stand: NT  Stand to sit: NT  Stand pivot: NT  Pt decline to attempt standing.  supervision   Ambulation    NT   15+ feet with w/w SBA    Stair negotiation: ascended and descended  NT      ROM BLE:  WFL except left knee fused in extension     Strength Right LE:  grossly 4/5  Left LE:  hip 2/5, knee fused ankle 4/5   Increase LE Strength by 1/2 mm grade   Balance Sitting EOB:  Supervision  Dynamic Standing:  NT  Sitting EOB:  independent  Dynamic Standing:  SBA with w/w   AM-PAC 6 Clicks 10/24       Pt is A & O x 3  Sensation:  Pt denies numbness and tingling to extremities      Vitals:  SPO2 in the 90s on 6L when sitting EOB.      Patient education  Pt educated on PT objectives during eval and while in the hospital, benefits of mobility.    Patient response to education:   Pt verbalized understanding Pt demonstrated skill Pt requires further education in this area       yes     ASSESSMENT:    Conditions Requiring Skilled Therapeutic Intervention:    [x]Decreased strength     [x]Decreased ROM  [x]Decreased functional mobility  [x]Decreased balance   [x]Decreased endurance   []Decreased posture  []Decreased sensation  []Decreased coordination   []Decreased vision  []Decreased safety awareness   []Increased pain       Comments:  Pt found in bed.  Pt reported feeling slightly lightheaded when sitting EOB.   Pt declined to stand stating he didn't want to get SOB.    At end of eval, pt left in bed with call light in reach and bed alarm on.        Pt's/ family goals   To go home    Prognosis is good for reaching above PT goals.    Patient and or family understand(s) diagnosis, prognosis, and plan of care.  yes    PHYSICAL THERAPY PLAN OF CARE:    PT POC is established based on physician order and patient diagnosis     Diagnosis:  Volume overload [E87.70]  Acute on chronic congestive heart failure, unspecified heart failure type (HCC) [I50.9]    Current Treatment Recommendations:     [x] Strengthening to improve independence with functional mobility   [] ROM to improve independence with functional mobility   [x] Balance Training to improve static/dynamic balance and to reduce fall risk  [x] Endurance Training to improve activity tolerance during functional mobility   [x] Transfer Training to improve safety and independence with all

## 2024-12-31 NOTE — PLAN OF CARE
Problem: Skin/Tissue Integrity  Goal: Absence of new skin breakdown  Description: 1.  Monitor for areas of redness and/or skin breakdown  2.  Assess vascular access sites hourly  3.  Every 4-6 hours minimum:  Change oxygen saturation probe site  4.  Every 4-6 hours:  If on nasal continuous positive airway pressure, respiratory therapy assess nares and determine need for appliance change or resting period.  12/31/2024 1013 by Elke Brannon, RN  Outcome: Progressing  12/31/2024 0122 by Bentley Escobedo, RN  Outcome: Progressing     Problem: Safety - Adult  Goal: Free from fall injury  12/31/2024 1013 by Elke Brannon, RN  Outcome: Progressing  12/31/2024 0122 by Bentley Escobedo, RN  Outcome: Progressing

## 2024-12-31 NOTE — CONSULTS
[facility]   Inpatient CHF Nurse Navigator Consult      Cardiologist: Dr Gary Martínezarland is a 59 y.o. (1965) male with a history of HFpEF, most recent EF:  Lab Results   Component Value Date    LVEF 63 08/03/2023    LVEFMODE Echo 01/21/2022       Patient was awake and alert, laying in bed during the consultation and is agreeable to heart failure education. He was engaged and asked appropriate questions throughout the education session. He is feeling ok today but still feels bloated.    Barriers identified during consult contributing to HF Hospitalization:  [] Limited medication adherence   [] Poor health literacy, education regarding HF medications provided   [] Pill box provided to patient  [] Difficulty affording medications  [] Difficulty obtaining/ managing medications  [] Prescription assistance information given     [x] Not weighing themselves daily  [x] Weight log provided for easy monitoring  [] Scale provided     [x] Not following low sodium diet  [] Food insecurity   [x] 2 gram sodium diet education provided   [] Low sodium recipes provided  [] Sodium free seasoning provided   [] Low sodium meal delivery options given to patient  [] Dietician consulted     [] Lack of transportation to appointments     [] Depression, given chronic illness  [] Primary team notified     [] Goals of care need addressed  [] Palliative care consulted     [] CHF CHW consulted, to assist with     Chart Reviewed:  Diet: ADULT DIET; Regular; No Added Salt (3-4 gm)   Daily Weights: Patient Vitals for the past 96 hrs (Last 3 readings):   Weight   12/30/24 0921 62.4 kg (137 lb 9.6 oz)     I/O:   Intake/Output Summary (Last 24 hours) at 12/31/2024 0932  Last data filed at 12/31/2024 0423  Gross per 24 hour   Intake --   Output 600 ml   Net -600 ml       [] Nursing staff/manager notified of inaccurate parisi weights or I/O    Discharge Plan:  Above identified barriers reviewed and  total of 5 pounds or more in one week. Also, if you should have a significant weight loss of 3# or more in one day to call the doctor, they may need to decrease or hold the diuretic dose. On days you feels nauseated and not eating / drinking, having emesis or diarrhea,  informed to call the cardiologist  / doctor, they may need to decrease or hold diuretic to avoid dehydration.  Again, stressed the importance of informing their medical provider the first day of onset of any of the signs and symptoms in the \"Yellow Zone\" of the HF Zones.     The Heart Failure Booklet given to the patient with additional patient education addressing:  What is Heart Failure?  Things You Can Do to Live Well with HF  How to Take Your Medications  How to Eat Less Salt  Vilas its Salt?  Exercising Well with Heart Failure  Signs and symptoms of HF to report  Weight Yourself Each Day  Home Self Management- activity, weight tracking, taking medications as prescribed, meals /diet planning (sodium and fluid restriction), how to read food labels, keeping physician follow ups, smoking cessation, follow the “Heart Failure Zones”  The Heart Failure zones  Every Dose Every Day    Instructed to call 911 if you have any of the following symptoms:  Struggling to breathe unrelieved with rest  Having chest pain  Confusion or can’t think clearly      Patient verbalizes understanding of above.   Greater than 30 minutes was spent educating patient.        Shavon Man RN   Heart Failure Navigator

## 2024-12-31 NOTE — PROGRESS NOTES
4 Eyes Skin Assessment     NAME:  Dg Peña  YOB: 1965  MEDICAL RECORD NUMBER:  [id number]    The patient is being assessed for  Admission    I agree that at least one RN has performed a thorough Head to Toe Skin Assessment on the patient. ALL assessment sites listed below have been assessed.      Areas assessed by both nurses:    Head, Face, Ears, Shoulders, Back, Chest, Arms, Elbows, Hands, Sacrum. Buttock, Coccyx, Ischium, Legs. Feet and Heels, and Under Medical Devices         Does the Patient have a Wound? No noted wound(s)       Cristofer Prevention initiated by RN: Yes  Wound Care Orders initiated by RN: No    Pressure Injury (Stage 3,4, Unstageable, DTI, NWPT, and Complex wounds) if present, place Wound referral order by RN under : No    New Ostomies, if present place, Ostomy referral order under : No     Nurse 1 eSignature: Electronically signed by Bentley Escobedo RN on 12/31/24 at 1:39 AM EST    **SHARE this note so that the co-signing nurse can place an eSignature**    Nurse 2 eSignature: Electronically signed by Penny Wolfe RN on 12/31/24 at 6:25 AM EST

## 2024-12-31 NOTE — CARE COORDINATION
Introduced my self and provided explanation of CM role to patient.  Patient is awake, alert, and aware of current diagnosis and treatment plan including   Aerosol therapy  Diuresis  Decadron IV  Oxygen therapy  He voices he resides at home with his cousin and completes his adl's with assistance.  Patient is chronically on 6L O2; home supplier is [employer]; nebulizer in place at home.   He ambulates with a walker and has a wc. He resides in a 1st floor apt. with ramp entrance. Patient has a private pay aide daily and visiting nurse 3 days a week thru [facility] [phone], CM attempted to leave a message to confirm, message box was full.  Patient is established with Dr. Sanchez and uses [email or web address] in [place]. Discharge plan is home with [facility] when medically stable.    Patient will need followed and assisted with discharge planning as necessary.   Kirby Hurley, MSN RN  [facility] Case Management  [phone]

## 2024-12-31 NOTE — PROGRESS NOTES
Progress  Note  Chief Complaint   Patient presents with    Chest Pain     All weekend midsternal    Leg Swelling     Bilateral leg swelling, sees CHF clinic, 8 lb weight gain in three days     Historical Issues:  Current Facility-Administered Medications   Medication Dose Route Frequency Provider Last Rate Last Admin    fluticasone (FLONASE) 50 MCG/ACT nasal spray 1 spray  1 spray Each Nostril Daily Leigh Ann Senior APRN - CNP   1 spray at 12/31/24 0756    LORazepam (ATIVAN) tablet 1 mg  1 mg Oral Q8H PRN Leigh Ann Senior APRN - CNP   1 mg at 12/31/24 0520    sodium chloride (OCEAN, BABY AYR) 0.65 % nasal spray 1 spray  1 spray Nasal PRN Leigh Ann Senior APRN - CNP   1 spray at 12/31/24 0534    sodium chloride flush 0.9 % injection 5-40 mL  5-40 mL IntraVENous 2 times per day Inocencia Stewart APRN - CNP   10 mL at 12/31/24 0757    sodium chloride flush 0.9 % injection 5-40 mL  5-40 mL IntraVENous PRN Inocencia Stewart APRN - CNP        0.9 % sodium chloride infusion   IntraVENous PRN Inocencia Stewart APRN - CNP        polyethylene glycol (GLYCOLAX) packet 17 g  17 g Oral Daily PRN Inocencia Stewart APRN - CNP        acetaminophen (TYLENOL) tablet 650 mg  650 mg Oral Q6H PRN Inocencia Stewart APRN - CNP   650 mg at 12/30/24 1353    Or    acetaminophen (TYLENOL) suppository 650 mg  650 mg Rectal Q6H PRN Inocencia Stewart APRN - CNP        enoxaparin (LOVENOX) injection 40 mg  40 mg SubCUTAneous Daily Inocencia Stewart APRN - CNP   40 mg at 12/31/24 0756    potassium chloride (KLOR-CON M) extended release tablet 40 mEq  40 mEq Oral PRN Inocencia Stewart APRN - CNP        Or    potassium bicarb-citric acid (EFFER-K) effervescent tablet 40 mEq  40 mEq Oral PRN Inocencia Stewart APRN - CNP        magnesium sulfate 2000 mg in 50 mL IVPB premix  2,000 mg IntraVENous PRN Inocencia Stewart, APRN - CNP        furosemide (LASIX) injection 40 mg  40 mg IntraVENous BID Terry,  ARGELIA Parker CNP   40 mg at 12/31/24 0756    promethazine (PHENERGAN) tablet 25 mg  25 mg Oral Q6H PRN Inocencia Stewart APRN - CNP        ARIPiprazole (ABILIFY) tablet 5 mg  5 mg Oral Daily Inocencia Stewart APRN - CNP   5 mg at 12/31/24 0756    aspirin chewable tablet 81 mg  81 mg Oral Daily Inocencia Stewart APRN - CNP   81 mg at 12/31/24 0756    atorvastatin (LIPITOR) tablet 40 mg  40 mg Oral Nightly Inocencia Stewart APRN - CNP        budesonide (PULMICORT) nebulizer suspension 500 mcg  0.5 mg Nebulization BID RT Inocencia Stewart APRN - CNP   500 mcg at 12/31/24 0809    calcium carbonate (TUMS) chewable tablet 500 mg  500 mg Oral TID PRN Inocencia Stewart APRN - CNP        calcium elemental (OSCAL) tablet 1,500 mg  1,500 mg Oral BID Inocencia Stewart APRN - CNP   1,500 mg at 12/31/24 0756    Ensifentrine SUSP 2.5 mL (Patient Supplied)  2.5 mL Inhalation BID Inocencia Stewart APRN - CNP        [START ON 1/2/2025] vitamin D (ERGOCALCIFEROL) capsule 50,000 Units  50,000 Units Oral Once per day on Monday Thursday Inocencia Stewart APRN - CNP        FLUoxetine (PROZAC) tablet 20 mg  20 mg Oral Nightly Inocencia Stewart APRN - CNP   20 mg at 12/30/24 2232    gabapentin (NEURONTIN) capsule 300 mg  300 mg Oral BID Inocencia Stewart APRN - CNP   300 mg at 12/31/24 0756    metoprolol succinate (TOPROL XL) extended release tablet 100 mg  100 mg Oral Daily Inocencia Stewart APRN - CNP   100 mg at 12/31/24 0755    pantoprazole (PROTONIX) tablet 40 mg  40 mg Oral QAM AC Inocencia Stewart APRN - CNP   40 mg at 12/31/24 0519    pramipexole (MIRAPEX) tablet 0.75 mg  0.75 mg Oral Nightly Inocencia Stewart APRN - CNP   0.75 mg at 12/30/24 2231    tamsulosin (FLOMAX) capsule 0.4 mg  0.4 mg Oral Daily Inocencia Stewart APRN - CNP   0.4 mg at 12/31/24 0756    arformoterol tartrate (BROVANA) nebulizer solution 15 mcg  15 mcg Nebulization BID RT Inocencia Stewart,

## 2024-12-31 NOTE — PROGRESS NOTES
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  Frequency/Duration 1-3 days/week for 2 weeks PRN   Specific OT Treatment Interventions to Include:   * Instruction/training on adapted ADL techniques and AE recommendations to increase functional independence within precautions       * Training on energy conservation strategies, correct breathing pattern and techniques to improve independence/tolerance for self-care routine  * Functional transfer/mobility training/DME recommendations for increased independence, safety, and fall prevention  * Patient/Family education to increase follow through with safety techniques and functional independence  * Recommendation of environmental modifications for increased safety with functional transfers/mobility and ADLs  *  understanding.    Further skilled OT treatment indicated to increase patient's safety and independence with completion of ADL/IADL tasks in order to maximize patient's functional independence and quality of life.    Rehab Potential: Good for established goals.  Patient / Family Goal: to go home  Patient and/or family were instructed on functional diagnosis, prognosis/goals, and OT plan of care. Verbalized understanding.    Eval Complexity: low     Time In: 1404  Time Out: 1417  Total Treatment Time: 0 minutes      Minutes Units   OT Eval Low 79134 13 1   OT Eval Medium 73613     OT Eval High 64462     OT Re-Eval 39477     Therapeutic Ex 94743     Therapeutic Activities 12718     ADL/Self Care 50886     Orthotic Management 26326     Neuro Re-Ed 21056     Non-Billable Time N/A ---     Evaluation time includes thorough review of current medical information, gathering information on past medical history/social history and prior level of function, completion of standardized testing/informal observation of tasks, assessment of data, and education on plan of care and goals.    KEVIN Martines/L  License Number: [id number]

## 2024-12-31 NOTE — PROGRESS NOTES
Dr. Cortez sent message regarding pain medication that he takes at home per patient request.  JUDI

## 2024-12-31 NOTE — DISCHARGE INSTRUCTIONS
Please take your medications as prescribed.    Please make sure to follow-up with your primary care doctor, pulmonology, heart failure clinic.                         HEART FAILURE  / CONGESTIVE HEART FAILURE  DISCHARGE INSTRUCTIONS:  GUIDELINES TO FOLLOW AT HOME    Self- Managed Care:     MEDICATIONS:  Take your medication as directed. If you are experiencing any side effects, inform your doctor, Do not stop taking any of your medications without letting your doctor know.   Check with your doctor before taking any over-the-counter medications / herbal / or dietary supplements. They may interfere with your other medications.  Do not take ibuprofen (Advil or Motrin) and naproxen (Aleve) without talking to your doctor first. They could make your heart failure worse.         WEIGHT MONITORING:   Weigh yourself everyday (with the same scale) around the same time of the day and write it down. (you can chart them on a calendar or keep track of them on paper.   Notify your doctor of a weight gain of 3 pounds or more in 1 day   OR a total of 5 pounds or more in 1 week    Take your weight record to your doctor visits  Also, the same goes if you loose more than 3# in one day, let your heart doctor know.         DIET:   Cardiac heart healthy diet- Low saturated / low trans fat, no added salt, caffeine restricted, Low sodium diet-   No more than 2,000mg (2 grams) of salt / sodium per day (which equals to a little less than  a teaspoon of salt)  If your doctor wants you on a fluid restriction...it is usually recommended a fluid limit of 2,000cc -  Fluid restriction- 2,000 ml (milliliters) = 64 ounces = you can have 8 glasses of fluid per day (each glass 8 ounces)    Follow a low salt diet - avoid using salt at the table, avoid / limit use of canned soups, processed / packaged foods, salted snacks, olives and pickles.  Do not use a salt substitute without checking with your doctor, they may contain a high amount of potassioum.  contagious illness  Get plenty of rest   Get a flu shot each year.  Get a pneumococcal vaccine shot. If you have had one before, ask your doctor whether you need another dose.       My Goal for Self-management of Heart Failure Includes 5 steps :    1. Notice a change in symptoms ( weight gain, short of breath, leg swelling, decreased activity level, bloating....)    2. Evaluate the change: (use the Heart Failure Zones )     3. Decide to take action: decide what your options are, such as: (call your doctor for an extra visit, take a prescribed medication, such as your water pill if your doctor has given you directions to do so, CALL YOUR DOCTOR)    4. Come up with a strategy:  (now you call the doctor for advice / appointment. This is where you take action!!!  Do not wait, catch the symptom early and treat it before it worsens.    5. Evaluate the response: The next day, check your Heart Failure Zones: are you in the GREEN ZONE (safe zone)?  Worsening symptoms of YELLOW ZONE? Or have you moved to the RED ZONE and need to call 911 or go to the Emergency Room for evaluation? Call your doctor's office to update them on your symptoms of heart failure.

## 2024-12-31 NOTE — PLAN OF CARE
Problem: ABCDS Injury Assessment  Goal: Absence of physical injury  Outcome: Progressing     Problem: Skin/Tissue Integrity  Goal: Absence of new skin breakdown  Description: 1.  Monitor for areas of redness and/or skin breakdown  2.  Assess vascular access sites hourly  3.  Every 4-6 hours minimum:  Change oxygen saturation probe site  4.  Every 4-6 hours:  If on nasal continuous positive airway pressure, respiratory therapy assess nares and determine need for appliance change or resting period.  Outcome: Progressing     Problem: Discharge Planning  Goal: Discharge to home or other facility with appropriate resources  Outcome: Progressing  Flowsheets (Taken 12/30/2024 1243 by Corie Mcbride, RN)  Discharge to home or other facility with appropriate resources: Refer to discharge planning if patient needs post-hospital services based on physician order or complex needs related to functional status, cognitive ability or social support system     Problem: Safety - Adult  Goal: Free from fall injury  Outcome: Progressing     Problem: Chronic Conditions and Co-morbidities  Goal: Patient's chronic conditions and co-morbidity symptoms are monitored and maintained or improved  Outcome: Progressing     Problem: Pain  Goal: Verbalizes/displays adequate comfort level or baseline comfort level  Outcome: Progressing

## 2025-01-01 ENCOUNTER — TELEMEDICINE (OUTPATIENT)
Dept: PRIMARY CARE CLINIC | Age: 60
End: 2025-01-01
Payer: MEDICAID

## 2025-01-01 ENCOUNTER — APPOINTMENT (OUTPATIENT)
Dept: GENERAL RADIOLOGY | Age: 60
DRG: 425 | End: 2025-01-01
Payer: MEDICAID

## 2025-01-01 ENCOUNTER — HOSPITAL ENCOUNTER (INPATIENT)
Age: 60
LOS: 2 days | Discharge: HOME | DRG: 425 | End: 2025-03-24
Attending: STUDENT IN AN ORGANIZED HEALTH CARE EDUCATION/TRAINING PROGRAM | Admitting: STUDENT IN AN ORGANIZED HEALTH CARE EDUCATION/TRAINING PROGRAM
Payer: MEDICAID

## 2025-01-01 ENCOUNTER — HOSPITAL ENCOUNTER (OUTPATIENT)
Dept: OTHER | Age: 60
Setting detail: THERAPIES SERIES
End: 2025-01-01
Payer: MEDICAID

## 2025-01-01 VITALS
BODY MASS INDEX: 22.36 KG/M2 | TEMPERATURE: 97.7 F | DIASTOLIC BLOOD PRESSURE: 67 MMHG | HEIGHT: 64 IN | RESPIRATION RATE: 16 BRPM | SYSTOLIC BLOOD PRESSURE: 113 MMHG | WEIGHT: 131 LBS | HEART RATE: 63 BPM | OXYGEN SATURATION: 96 %

## 2025-01-01 VITALS
TEMPERATURE: 98.2 F | HEIGHT: 64 IN | OXYGEN SATURATION: 97 % | BODY MASS INDEX: 22.2 KG/M2 | WEIGHT: 130 LBS | DIASTOLIC BLOOD PRESSURE: 67 MMHG | SYSTOLIC BLOOD PRESSURE: 115 MMHG | RESPIRATION RATE: 20 BRPM | HEART RATE: 62 BPM

## 2025-01-01 DIAGNOSIS — E83.51 HYPOCALCEMIA: Primary | ICD-10-CM

## 2025-01-01 DIAGNOSIS — Z09 HOSPITAL DISCHARGE FOLLOW-UP: ICD-10-CM

## 2025-01-01 DIAGNOSIS — Z86.79 HISTORY OF CHF (CONGESTIVE HEART FAILURE): ICD-10-CM

## 2025-01-01 DIAGNOSIS — Z99.81 SUPPLEMENTAL OXYGEN DEPENDENT: ICD-10-CM

## 2025-01-01 DIAGNOSIS — F41.9 ANXIETY: ICD-10-CM

## 2025-01-01 PROBLEM — D63.8 ANEMIA OF CHRONIC DISEASE: Status: ACTIVE | Noted: 2025-01-01

## 2025-01-01 LAB
25(OH)D3 SERPL-MCNC: 27.3 NG/ML (ref 30–100)
ALBUMIN SERPL-MCNC: 3.2 G/DL (ref 3.5–5.2)
ALBUMIN SERPL-MCNC: 3.3 G/DL (ref 3.5–5.2)
ALBUMIN SERPL-MCNC: 3.4 G/DL (ref 3.5–5.2)
ALBUMIN SERPL-MCNC: 3.4 G/DL (ref 3.5–5.2)
ALBUMIN SERPL-MCNC: 3.7 G/DL (ref 3.5–5.2)
ALP SERPL-CCNC: 60 U/L (ref 40–129)
ALP SERPL-CCNC: 62 U/L (ref 40–129)
ALP SERPL-CCNC: 63 U/L (ref 40–129)
ALP SERPL-CCNC: 68 U/L (ref 40–129)
ALP SERPL-CCNC: 72 U/L (ref 40–129)
ALT SERPL-CCNC: 6 U/L (ref 0–40)
ALT SERPL-CCNC: 7 U/L (ref 0–40)
ALT SERPL-CCNC: 7 U/L (ref 0–40)
ALT SERPL-CCNC: 8 U/L (ref 0–40)
ALT SERPL-CCNC: 9 U/L (ref 0–40)
ANION GAP SERPL CALCULATED.3IONS-SCNC: 10 MMOL/L (ref 7–16)
ANION GAP SERPL CALCULATED.3IONS-SCNC: 11 MMOL/L (ref 7–16)
ANION GAP SERPL CALCULATED.3IONS-SCNC: 11 MMOL/L (ref 7–16)
ANION GAP SERPL CALCULATED.3IONS-SCNC: 12 MMOL/L (ref 7–16)
ANION GAP SERPL CALCULATED.3IONS-SCNC: 12 MMOL/L (ref 7–16)
ANION GAP SERPL CALCULATED.3IONS-SCNC: 15 MMOL/L (ref 7–16)
ANION GAP SERPL CALCULATED.3IONS-SCNC: 8 MMOL/L (ref 7–16)
AST SERPL-CCNC: 11 U/L (ref 0–39)
AST SERPL-CCNC: 11 U/L (ref 0–39)
AST SERPL-CCNC: 12 U/L (ref 0–39)
AST SERPL-CCNC: 12 U/L (ref 0–39)
AST SERPL-CCNC: 18 U/L (ref 0–39)
BASOPHILS # BLD: 0.01 K/UL (ref 0–0.2)
BASOPHILS # BLD: 0.02 K/UL (ref 0–0.2)
BASOPHILS # BLD: 0.02 K/UL (ref 0–0.2)
BASOPHILS # BLD: 0.08 K/UL (ref 0–0.2)
BASOPHILS NFR BLD: 0 % (ref 0–2)
BASOPHILS NFR BLD: 1 % (ref 0–2)
BILIRUB SERPL-MCNC: 0.3 MG/DL (ref 0–1.2)
BILIRUB SERPL-MCNC: 0.4 MG/DL (ref 0–1.2)
BILIRUB UR QL STRIP: NEGATIVE
BILIRUB UR QL STRIP: NEGATIVE
BNP SERPL-MCNC: 2073 PG/ML (ref 0–125)
BNP SERPL-MCNC: 955 PG/ML (ref 0–125)
BUN SERPL-MCNC: 10 MG/DL (ref 6–20)
BUN SERPL-MCNC: 10 MG/DL (ref 6–20)
BUN SERPL-MCNC: 11 MG/DL (ref 6–20)
BUN SERPL-MCNC: 14 MG/DL (ref 6–20)
BUN SERPL-MCNC: 17 MG/DL (ref 6–20)
BUN SERPL-MCNC: 18 MG/DL (ref 6–20)
BUN SERPL-MCNC: 8 MG/DL (ref 6–20)
CA-I BLD-SCNC: 0.79 MMOL/L (ref 1.15–1.33)
CA-I BLD-SCNC: 0.89 MMOL/L (ref 1.15–1.33)
CA-I BLD-SCNC: 0.89 MMOL/L (ref 1.15–1.33)
CA-I BLD-SCNC: 0.9 MMOL/L (ref 1.15–1.33)
CA-I BLD-SCNC: 0.91 MMOL/L (ref 1.15–1.33)
CA-I BLD-SCNC: 0.93 MMOL/L (ref 1.15–1.33)
CA-I BLD-SCNC: 0.97 MMOL/L (ref 1.15–1.33)
CALCIUM SERPL-MCNC: 5.9 MG/DL (ref 8.6–10.2)
CALCIUM SERPL-MCNC: 6.5 MG/DL (ref 8.6–10.2)
CALCIUM SERPL-MCNC: 6.9 MG/DL (ref 8.6–10.2)
CALCIUM SERPL-MCNC: 6.9 MG/DL (ref 8.6–10.2)
CALCIUM SERPL-MCNC: 7.4 MG/DL (ref 8.6–10.2)
CALCIUM SERPL-MCNC: 7.5 MG/DL (ref 8.6–10.2)
CALCIUM SERPL-MCNC: 7.7 MG/DL (ref 8.6–10.2)
CHLORIDE SERPL-SCNC: 87 MMOL/L (ref 98–107)
CHLORIDE SERPL-SCNC: 87 MMOL/L (ref 98–107)
CHLORIDE SERPL-SCNC: 88 MMOL/L (ref 98–107)
CHLORIDE SERPL-SCNC: 88 MMOL/L (ref 98–107)
CHLORIDE SERPL-SCNC: 89 MMOL/L (ref 98–107)
CHLORIDE SERPL-SCNC: 90 MMOL/L (ref 98–107)
CHLORIDE SERPL-SCNC: 91 MMOL/L (ref 98–107)
CLARITY UR: CLEAR
CLARITY UR: CLEAR
CO2 SERPL-SCNC: 36 MMOL/L (ref 22–29)
CO2 SERPL-SCNC: 38 MMOL/L (ref 22–29)
CO2 SERPL-SCNC: 40 MMOL/L (ref 22–29)
CO2 SERPL-SCNC: 41 MMOL/L (ref 22–29)
CO2 SERPL-SCNC: 41 MMOL/L (ref 22–29)
COLOR UR: YELLOW
COLOR UR: YELLOW
CREAT SERPL-MCNC: 0.5 MG/DL (ref 0.7–1.2)
CREAT SERPL-MCNC: 0.6 MG/DL (ref 0.7–1.2)
CREAT SERPL-MCNC: 0.6 MG/DL (ref 0.7–1.2)
CREAT SERPL-MCNC: 0.7 MG/DL (ref 0.7–1.2)
CREAT SERPL-MCNC: 0.7 MG/DL (ref 0.7–1.2)
CREAT SERPL-MCNC: 0.8 MG/DL (ref 0.7–1.2)
CREAT SERPL-MCNC: 0.8 MG/DL (ref 0.7–1.2)
CRP SERPL HS-MCNC: 146 MG/L (ref 0–5)
EKG ATRIAL RATE: 77 BPM
EKG P AXIS: 48 DEGREES
EKG P-R INTERVAL: 128 MS
EKG Q-T INTERVAL: 414 MS
EKG QRS DURATION: 70 MS
EKG QTC CALCULATION (BAZETT): 468 MS
EKG R AXIS: 69 DEGREES
EKG T AXIS: 64 DEGREES
EKG VENTRICULAR RATE: 77 BPM
EOSINOPHIL # BLD: 0 K/UL (ref 0.05–0.5)
EOSINOPHIL # BLD: 0.04 K/UL (ref 0.05–0.5)
EOSINOPHIL # BLD: 0.06 K/UL (ref 0.05–0.5)
EOSINOPHIL # BLD: 0.09 K/UL (ref 0.05–0.5)
EOSINOPHILS RELATIVE PERCENT: 0 % (ref 0–6)
EOSINOPHILS RELATIVE PERCENT: 0 % (ref 0–6)
EOSINOPHILS RELATIVE PERCENT: 1 % (ref 0–6)
EOSINOPHILS RELATIVE PERCENT: 1 % (ref 0–6)
ERYTHROCYTE [DISTWIDTH] IN BLOOD BY AUTOMATED COUNT: 15.6 % (ref 11.5–15)
ERYTHROCYTE [DISTWIDTH] IN BLOOD BY AUTOMATED COUNT: 15.7 % (ref 11.5–15)
ERYTHROCYTE [DISTWIDTH] IN BLOOD BY AUTOMATED COUNT: 15.9 % (ref 11.5–15)
ERYTHROCYTE [DISTWIDTH] IN BLOOD BY AUTOMATED COUNT: 16 % (ref 11.5–15)
ERYTHROCYTE [DISTWIDTH] IN BLOOD BY AUTOMATED COUNT: 16.2 % (ref 11.5–15)
GFR, ESTIMATED: >60 ML/MIN/1.73M2
GFR, ESTIMATED: >90 ML/MIN/1.73M2
GLUCOSE SERPL-MCNC: 112 MG/DL (ref 74–99)
GLUCOSE SERPL-MCNC: 114 MG/DL (ref 74–99)
GLUCOSE SERPL-MCNC: 118 MG/DL (ref 74–99)
GLUCOSE SERPL-MCNC: 122 MG/DL (ref 74–99)
GLUCOSE SERPL-MCNC: 147 MG/DL (ref 74–99)
GLUCOSE SERPL-MCNC: 147 MG/DL (ref 74–99)
GLUCOSE SERPL-MCNC: 94 MG/DL (ref 74–99)
GLUCOSE UR STRIP-MCNC: NEGATIVE MG/DL
GLUCOSE UR STRIP-MCNC: NEGATIVE MG/DL
HCT VFR BLD AUTO: 28.9 % (ref 37–54)
HCT VFR BLD AUTO: 29.7 % (ref 37–54)
HCT VFR BLD AUTO: 31 % (ref 37–54)
HCT VFR BLD AUTO: 31.1 % (ref 37–54)
HCT VFR BLD AUTO: 34.3 % (ref 37–54)
HGB BLD-MCNC: 8.1 G/DL (ref 12.5–16.5)
HGB BLD-MCNC: 8.2 G/DL (ref 12.5–16.5)
HGB BLD-MCNC: 8.6 G/DL (ref 12.5–16.5)
HGB BLD-MCNC: 8.8 G/DL (ref 12.5–16.5)
HGB BLD-MCNC: 9.8 G/DL (ref 12.5–16.5)
HGB UR QL STRIP.AUTO: NEGATIVE
HGB UR QL STRIP.AUTO: NEGATIVE
IMM GRANULOCYTES # BLD AUTO: 0.03 K/UL (ref 0–0.58)
IMM GRANULOCYTES # BLD AUTO: 0.06 K/UL (ref 0–0.58)
IMM GRANULOCYTES # BLD AUTO: 0.1 K/UL (ref 0–0.58)
IMM GRANULOCYTES NFR BLD: 0 % (ref 0–5)
IMM GRANULOCYTES NFR BLD: 1 % (ref 0–5)
IMM GRANULOCYTES NFR BLD: 1 % (ref 0–5)
INFLUENZA A BY PCR: NOT DETECTED
INFLUENZA B BY PCR: NOT DETECTED
KETONES UR STRIP-MCNC: ABNORMAL MG/DL
KETONES UR STRIP-MCNC: NEGATIVE MG/DL
LACTATE BLDV-SCNC: 0.6 MMOL/L (ref 0.5–1.9)
LACTATE BLDV-SCNC: 0.9 MMOL/L (ref 0.5–1.9)
LEUKOCYTE ESTERASE UR QL STRIP: NEGATIVE
LEUKOCYTE ESTERASE UR QL STRIP: NEGATIVE
LYMPHOCYTES NFR BLD: 0.69 K/UL (ref 1.5–4)
LYMPHOCYTES NFR BLD: 1 K/UL (ref 1.5–4)
LYMPHOCYTES NFR BLD: 1.01 K/UL (ref 1.5–4)
LYMPHOCYTES NFR BLD: 1.42 K/UL (ref 1.5–4)
LYMPHOCYTES RELATIVE PERCENT: 13 % (ref 20–42)
LYMPHOCYTES RELATIVE PERCENT: 18 % (ref 20–42)
LYMPHOCYTES RELATIVE PERCENT: 3 % (ref 20–42)
LYMPHOCYTES RELATIVE PERCENT: 8 % (ref 20–42)
MAGNESIUM SERPL-MCNC: 1.5 MG/DL (ref 1.6–2.6)
MAGNESIUM SERPL-MCNC: 1.6 MG/DL (ref 1.6–2.6)
MAGNESIUM SERPL-MCNC: 1.8 MG/DL (ref 1.6–2.6)
MAGNESIUM SERPL-MCNC: 1.8 MG/DL (ref 1.6–2.6)
MAGNESIUM SERPL-MCNC: 2.3 MG/DL (ref 1.6–2.6)
MCH RBC QN AUTO: 28.7 PG (ref 26–35)
MCH RBC QN AUTO: 29.1 PG (ref 26–35)
MCH RBC QN AUTO: 29.3 PG (ref 26–35)
MCH RBC QN AUTO: 29.6 PG (ref 26–35)
MCH RBC QN AUTO: 29.8 PG (ref 26–35)
MCHC RBC AUTO-ENTMCNC: 27.3 G/DL (ref 32–34.5)
MCHC RBC AUTO-ENTMCNC: 27.7 G/DL (ref 32–34.5)
MCHC RBC AUTO-ENTMCNC: 28.4 G/DL (ref 32–34.5)
MCHC RBC AUTO-ENTMCNC: 28.4 G/DL (ref 32–34.5)
MCHC RBC AUTO-ENTMCNC: 28.6 G/DL (ref 32–34.5)
MCV RBC AUTO: 101.8 FL (ref 80–99.9)
MCV RBC AUTO: 103.3 FL (ref 80–99.9)
MCV RBC AUTO: 105.1 FL (ref 80–99.9)
MCV RBC AUTO: 105.3 FL (ref 80–99.9)
MCV RBC AUTO: 106.9 FL (ref 80–99.9)
MICROORGANISM SPEC CULT: NO GROWTH
MICROORGANISM SPEC CULT: NORMAL
MONOCYTES NFR BLD: 0.56 K/UL (ref 0.1–0.95)
MONOCYTES NFR BLD: 0.71 K/UL (ref 0.1–0.95)
MONOCYTES NFR BLD: 1.03 K/UL (ref 0.1–0.95)
MONOCYTES NFR BLD: 1.32 K/UL (ref 0.1–0.95)
MONOCYTES NFR BLD: 13 % (ref 2–12)
MONOCYTES NFR BLD: 5 % (ref 2–12)
MONOCYTES NFR BLD: 6 % (ref 2–12)
MONOCYTES NFR BLD: 7 % (ref 2–12)
NEUTROPHILS NFR BLD: 68 % (ref 43–80)
NEUTROPHILS NFR BLD: 79 % (ref 43–80)
NEUTROPHILS NFR BLD: 85 % (ref 43–80)
NEUTROPHILS NFR BLD: 90 % (ref 43–80)
NEUTS SEG NFR BLD: 11.14 K/UL (ref 1.8–7.3)
NEUTS SEG NFR BLD: 18.9 K/UL (ref 1.8–7.3)
NEUTS SEG NFR BLD: 5.37 K/UL (ref 1.8–7.3)
NEUTS SEG NFR BLD: 6.01 K/UL (ref 1.8–7.3)
NITRITE UR QL STRIP: NEGATIVE
NITRITE UR QL STRIP: NEGATIVE
PH UR STRIP: 7.5 [PH] (ref 5–8)
PH UR STRIP: 7.5 [PH] (ref 5–8)
PHOSPHATE SERPL-MCNC: 4.2 MG/DL (ref 2.5–4.5)
PHOSPHATE SERPL-MCNC: 4.8 MG/DL (ref 2.5–4.5)
PHOSPHATE SERPL-MCNC: 5.7 MG/DL (ref 2.5–4.5)
PLATELET # BLD AUTO: 166 K/UL (ref 130–450)
PLATELET # BLD AUTO: 183 K/UL (ref 130–450)
PLATELET # BLD AUTO: 196 K/UL (ref 130–450)
PLATELET # BLD AUTO: 204 K/UL (ref 130–450)
PLATELET # BLD AUTO: 222 K/UL (ref 130–450)
PMV BLD AUTO: 10.8 FL (ref 7–12)
PMV BLD AUTO: 10.9 FL (ref 7–12)
PMV BLD AUTO: 11.1 FL (ref 7–12)
PMV BLD AUTO: 11.2 FL (ref 7–12)
PMV BLD AUTO: 11.3 FL (ref 7–12)
POTASSIUM SERPL-SCNC: 3.7 MMOL/L (ref 3.5–5)
POTASSIUM SERPL-SCNC: 4 MMOL/L (ref 3.5–5)
POTASSIUM SERPL-SCNC: 4.1 MMOL/L (ref 3.5–5)
POTASSIUM SERPL-SCNC: 4.2 MMOL/L (ref 3.5–5)
POTASSIUM SERPL-SCNC: 4.2 MMOL/L (ref 3.5–5)
POTASSIUM SERPL-SCNC: 4.5 MMOL/L (ref 3.5–5)
POTASSIUM SERPL-SCNC: 4.6 MMOL/L (ref 3.5–5)
PROCALCITONIN SERPL-MCNC: 0.13 NG/ML (ref 0–0.08)
PROCALCITONIN SERPL-MCNC: 0.35 NG/ML (ref 0–0.08)
PROT SERPL-MCNC: 6.6 G/DL (ref 6.4–8.3)
PROT SERPL-MCNC: 6.9 G/DL (ref 6.4–8.3)
PROT SERPL-MCNC: 7 G/DL (ref 6.4–8.3)
PROT SERPL-MCNC: 7.6 G/DL (ref 6.4–8.3)
PROT SERPL-MCNC: 8.2 G/DL (ref 6.4–8.3)
PROT UR STRIP-MCNC: ABNORMAL MG/DL
PROT UR STRIP-MCNC: ABNORMAL MG/DL
PTH-INTACT SERPL-MCNC: 63.9 PG/ML (ref 15–65)
RBC # BLD AUTO: 2.75 M/UL (ref 3.8–5.8)
RBC # BLD AUTO: 2.82 M/UL (ref 3.8–5.8)
RBC # BLD AUTO: 2.91 M/UL (ref 3.8–5.8)
RBC # BLD AUTO: 3 M/UL (ref 3.8–5.8)
RBC # BLD AUTO: 3.37 M/UL (ref 3.8–5.8)
RBC # BLD: ABNORMAL 10*6/UL
RBC #/AREA URNS HPF: ABNORMAL /HPF
RBC #/AREA URNS HPF: ABNORMAL /HPF
SARS-COV-2 RDRP RESP QL NAA+PROBE: NOT DETECTED
SERVICE CMNT-IMP: NORMAL
SODIUM SERPL-SCNC: 136 MMOL/L (ref 132–146)
SODIUM SERPL-SCNC: 136 MMOL/L (ref 132–146)
SODIUM SERPL-SCNC: 139 MMOL/L (ref 132–146)
SODIUM SERPL-SCNC: 140 MMOL/L (ref 132–146)
SODIUM SERPL-SCNC: 141 MMOL/L (ref 132–146)
SODIUM SERPL-SCNC: 141 MMOL/L (ref 132–146)
SODIUM SERPL-SCNC: 142 MMOL/L (ref 132–146)
SP GR UR STRIP: 1.01 (ref 1–1.03)
SP GR UR STRIP: 1.01 (ref 1–1.03)
SPECIMEN DESCRIPTION: NORMAL
UROBILINOGEN UR STRIP-ACNC: 1 EU/DL (ref 0–1)
UROBILINOGEN UR STRIP-ACNC: 1 EU/DL (ref 0–1)
WBC #/AREA URNS HPF: ABNORMAL /HPF
WBC #/AREA URNS HPF: ABNORMAL /HPF
WBC OTHER # BLD: 13 K/UL (ref 4.5–11.5)
WBC OTHER # BLD: 21.1 K/UL (ref 4.5–11.5)
WBC OTHER # BLD: 7.7 K/UL (ref 4.5–11.5)
WBC OTHER # BLD: 7.9 K/UL (ref 4.5–11.5)
WBC OTHER # BLD: 9.3 K/UL (ref 4.5–11.5)

## 2025-01-01 PROCEDURE — 99232 SBSQ HOSP IP/OBS MODERATE 35: CPT | Performed by: INTERNAL MEDICINE

## 2025-01-01 PROCEDURE — 6370000000 HC RX 637 (ALT 250 FOR IP)

## 2025-01-01 PROCEDURE — 6360000002 HC RX W HCPCS: Performed by: STUDENT IN AN ORGANIZED HEALTH CARE EDUCATION/TRAINING PROGRAM

## 2025-01-01 PROCEDURE — 6360000002 HC RX W HCPCS

## 2025-01-01 PROCEDURE — 85025 COMPLETE CBC W/AUTO DIFF WBC: CPT

## 2025-01-01 PROCEDURE — 2700000000 HC OXYGEN THERAPY PER DAY

## 2025-01-01 PROCEDURE — 82330 ASSAY OF CALCIUM: CPT

## 2025-01-01 PROCEDURE — 6370000000 HC RX 637 (ALT 250 FOR IP): Performed by: NURSE PRACTITIONER

## 2025-01-01 PROCEDURE — 94640 AIRWAY INHALATION TREATMENT: CPT

## 2025-01-01 PROCEDURE — 93005 ELECTROCARDIOGRAM TRACING: CPT

## 2025-01-01 PROCEDURE — 83735 ASSAY OF MAGNESIUM: CPT

## 2025-01-01 PROCEDURE — 99285 EMERGENCY DEPT VISIT HI MDM: CPT

## 2025-01-01 PROCEDURE — 6370000000 HC RX 637 (ALT 250 FOR IP): Performed by: STUDENT IN AN ORGANIZED HEALTH CARE EDUCATION/TRAINING PROGRAM

## 2025-01-01 PROCEDURE — G0378 HOSPITAL OBSERVATION PER HR: HCPCS

## 2025-01-01 PROCEDURE — 96367 TX/PROPH/DG ADDL SEQ IV INF: CPT

## 2025-01-01 PROCEDURE — 2500000003 HC RX 250 WO HCPCS: Performed by: STUDENT IN AN ORGANIZED HEALTH CARE EDUCATION/TRAINING PROGRAM

## 2025-01-01 PROCEDURE — 86140 C-REACTIVE PROTEIN: CPT

## 2025-01-01 PROCEDURE — 96366 THER/PROPH/DIAG IV INF ADDON: CPT

## 2025-01-01 PROCEDURE — 80053 COMPREHEN METABOLIC PANEL: CPT

## 2025-01-01 PROCEDURE — 99239 HOSP IP/OBS DSCHRG MGMT >30: CPT | Performed by: INTERNAL MEDICINE

## 2025-01-01 PROCEDURE — 6370000000 HC RX 637 (ALT 250 FOR IP): Performed by: INTERNAL MEDICINE

## 2025-01-01 PROCEDURE — 87086 URINE CULTURE/COLONY COUNT: CPT

## 2025-01-01 PROCEDURE — 36415 COLL VENOUS BLD VENIPUNCTURE: CPT

## 2025-01-01 PROCEDURE — 99214 OFFICE O/P EST MOD 30 MIN: CPT | Performed by: FAMILY MEDICINE

## 2025-01-01 PROCEDURE — 6360000002 HC RX W HCPCS: Performed by: INTERNAL MEDICINE

## 2025-01-01 PROCEDURE — 85027 COMPLETE CBC AUTOMATED: CPT

## 2025-01-01 PROCEDURE — 96365 THER/PROPH/DIAG IV INF INIT: CPT

## 2025-01-01 PROCEDURE — 81001 URINALYSIS AUTO W/SCOPE: CPT

## 2025-01-01 PROCEDURE — 96372 THER/PROPH/DIAG INJ SC/IM: CPT

## 2025-01-01 PROCEDURE — 6360000002 HC RX W HCPCS: Performed by: NURSE PRACTITIONER

## 2025-01-01 PROCEDURE — 96376 TX/PRO/DX INJ SAME DRUG ADON: CPT

## 2025-01-01 PROCEDURE — 80048 BASIC METABOLIC PNL TOTAL CA: CPT

## 2025-01-01 PROCEDURE — 84100 ASSAY OF PHOSPHORUS: CPT

## 2025-01-01 PROCEDURE — 83970 ASSAY OF PARATHORMONE: CPT

## 2025-01-01 PROCEDURE — 87635 SARS-COV-2 COVID-19 AMP PRB: CPT

## 2025-01-01 PROCEDURE — 82306 VITAMIN D 25 HYDROXY: CPT

## 2025-01-01 PROCEDURE — 87040 BLOOD CULTURE FOR BACTERIA: CPT

## 2025-01-01 PROCEDURE — 93010 ELECTROCARDIOGRAM REPORT: CPT | Performed by: INTERNAL MEDICINE

## 2025-01-01 PROCEDURE — 99232 SBSQ HOSP IP/OBS MODERATE 35: CPT | Performed by: STUDENT IN AN ORGANIZED HEALTH CARE EDUCATION/TRAINING PROGRAM

## 2025-01-01 PROCEDURE — 84145 PROCALCITONIN (PCT): CPT

## 2025-01-01 PROCEDURE — 99223 1ST HOSP IP/OBS HIGH 75: CPT | Performed by: STUDENT IN AN ORGANIZED HEALTH CARE EDUCATION/TRAINING PROGRAM

## 2025-01-01 PROCEDURE — 87502 INFLUENZA DNA AMP PROBE: CPT

## 2025-01-01 PROCEDURE — 2500000003 HC RX 250 WO HCPCS: Performed by: NURSE PRACTITIONER

## 2025-01-01 PROCEDURE — 2500000003 HC RX 250 WO HCPCS

## 2025-01-01 PROCEDURE — 71045 X-RAY EXAM CHEST 1 VIEW: CPT

## 2025-01-01 PROCEDURE — 1200000000 HC SEMI PRIVATE

## 2025-01-01 PROCEDURE — 83880 ASSAY OF NATRIURETIC PEPTIDE: CPT

## 2025-01-01 PROCEDURE — 2580000003 HC RX 258: Performed by: NURSE PRACTITIONER

## 2025-01-01 PROCEDURE — 83605 ASSAY OF LACTIC ACID: CPT

## 2025-01-01 RX ORDER — FAMOTIDINE 20 MG/1
20 TABLET, FILM COATED ORAL 2 TIMES DAILY
Status: DISCONTINUED | OUTPATIENT
Start: 2025-01-01 | End: 2025-01-01 | Stop reason: HOSPADM

## 2025-01-01 RX ORDER — FUROSEMIDE 20 MG/1
20 TABLET ORAL
Status: DISCONTINUED | OUTPATIENT
Start: 2025-01-02 | End: 2025-01-04 | Stop reason: HOSPADM

## 2025-01-01 RX ORDER — HYDROCODONE BITARTRATE AND ACETAMINOPHEN 5; 325 MG/1; MG/1
1 TABLET ORAL EVERY 6 HOURS PRN
Status: DISCONTINUED | OUTPATIENT
Start: 2025-01-01 | End: 2025-01-01 | Stop reason: HOSPADM

## 2025-01-01 RX ORDER — CHOLECALCIFEROL (VITAMIN D3) 50 MCG
5000 TABLET ORAL
Status: DISCONTINUED | OUTPATIENT
Start: 2025-01-01 | End: 2025-01-01 | Stop reason: HOSPADM

## 2025-01-01 RX ORDER — CALCIUM GLUCONATE 94 MG/ML
1000 INJECTION, SOLUTION INTRAVENOUS ONCE
Status: COMPLETED | OUTPATIENT
Start: 2025-01-01 | End: 2025-01-01

## 2025-01-01 RX ORDER — BUDESONIDE 0.5 MG/2ML
0.5 INHALANT ORAL
Status: DISCONTINUED | OUTPATIENT
Start: 2025-01-01 | End: 2025-01-01 | Stop reason: HOSPADM

## 2025-01-01 RX ORDER — FUROSEMIDE 40 MG/1
40 TABLET ORAL DAILY
Status: DISCONTINUED | OUTPATIENT
Start: 2025-01-02 | End: 2025-01-04 | Stop reason: HOSPADM

## 2025-01-01 RX ORDER — ASPIRIN 81 MG/1
81 TABLET, CHEWABLE ORAL DAILY
Status: DISCONTINUED | OUTPATIENT
Start: 2025-01-01 | End: 2025-01-01 | Stop reason: HOSPADM

## 2025-01-01 RX ORDER — SODIUM CHLORIDE 9 MG/ML
INJECTION, SOLUTION INTRAVENOUS PRN
Status: DISCONTINUED | OUTPATIENT
Start: 2025-01-01 | End: 2025-01-01 | Stop reason: HOSPADM

## 2025-01-01 RX ORDER — TAMSULOSIN HYDROCHLORIDE 0.4 MG/1
0.4 CAPSULE ORAL DAILY
Status: DISCONTINUED | OUTPATIENT
Start: 2025-01-01 | End: 2025-01-01 | Stop reason: HOSPADM

## 2025-01-01 RX ORDER — LANOLIN ALCOHOL/MO/W.PET/CERES
400 CREAM (GRAM) TOPICAL DAILY
Status: DISCONTINUED | OUTPATIENT
Start: 2025-01-01 | End: 2025-01-01 | Stop reason: HOSPADM

## 2025-01-01 RX ORDER — ACETAMINOPHEN 325 MG/1
650 TABLET ORAL EVERY 6 HOURS PRN
Status: DISCONTINUED | OUTPATIENT
Start: 2025-01-01 | End: 2025-01-01 | Stop reason: HOSPADM

## 2025-01-01 RX ORDER — LORAZEPAM 0.5 MG/1
0.5 TABLET ORAL EVERY 4 HOURS PRN
Status: DISCONTINUED | OUTPATIENT
Start: 2025-01-01 | End: 2025-01-01 | Stop reason: HOSPADM

## 2025-01-01 RX ORDER — GUAIFENESIN 200 MG/10ML
200 LIQUID ORAL EVERY 4 HOURS PRN
Status: DISCONTINUED | OUTPATIENT
Start: 2025-01-01 | End: 2025-01-01

## 2025-01-01 RX ORDER — MAGNESIUM SULFATE IN WATER 40 MG/ML
2000 INJECTION, SOLUTION INTRAVENOUS PRN
Status: DISCONTINUED | OUTPATIENT
Start: 2025-01-01 | End: 2025-01-01 | Stop reason: HOSPADM

## 2025-01-01 RX ORDER — CALCIUM CARBONATE 500 MG/1
1000 TABLET, CHEWABLE ORAL 2 TIMES DAILY
Status: DISCONTINUED | OUTPATIENT
Start: 2025-01-01 | End: 2025-01-01

## 2025-01-01 RX ORDER — POTASSIUM CHLORIDE 7.45 MG/ML
10 INJECTION INTRAVENOUS PRN
Status: DISCONTINUED | OUTPATIENT
Start: 2025-01-01 | End: 2025-01-01 | Stop reason: HOSPADM

## 2025-01-01 RX ORDER — LORAZEPAM 1 MG/1
1 TABLET ORAL 3 TIMES DAILY PRN
COMMUNITY
End: 2025-01-01 | Stop reason: SDUPTHER

## 2025-01-01 RX ORDER — IPRATROPIUM BROMIDE AND ALBUTEROL SULFATE 2.5; .5 MG/3ML; MG/3ML
1 SOLUTION RESPIRATORY (INHALATION) EVERY 4 HOURS PRN
Status: DISCONTINUED | OUTPATIENT
Start: 2025-01-01 | End: 2025-01-01 | Stop reason: HOSPADM

## 2025-01-01 RX ORDER — LANOLIN ALCOHOL/MO/W.PET/CERES
400 CREAM (GRAM) TOPICAL DAILY
Qty: 14 TABLET | Refills: 0 | Status: SHIPPED | OUTPATIENT
Start: 2025-01-01 | End: 2025-03-28

## 2025-01-01 RX ORDER — METOPROLOL SUCCINATE 100 MG/1
100 TABLET, EXTENDED RELEASE ORAL DAILY
Status: DISCONTINUED | OUTPATIENT
Start: 2025-01-01 | End: 2025-01-01 | Stop reason: HOSPADM

## 2025-01-01 RX ORDER — FAMOTIDINE 20 MG/1
20 TABLET, FILM COATED ORAL 2 TIMES DAILY
Qty: 60 TABLET | Refills: 3 | Status: SHIPPED | OUTPATIENT
Start: 2025-01-01 | End: 2025-03-28

## 2025-01-01 RX ORDER — SODIUM CHLORIDE 0.9 % (FLUSH) 0.9 %
5-40 SYRINGE (ML) INJECTION PRN
Status: DISCONTINUED | OUTPATIENT
Start: 2025-01-01 | End: 2025-01-01 | Stop reason: HOSPADM

## 2025-01-01 RX ORDER — POTASSIUM CHLORIDE 1500 MG/1
40 TABLET, EXTENDED RELEASE ORAL PRN
Status: DISCONTINUED | OUTPATIENT
Start: 2025-01-01 | End: 2025-01-01 | Stop reason: HOSPADM

## 2025-01-01 RX ORDER — FUROSEMIDE 10 MG/ML
40 INJECTION INTRAMUSCULAR; INTRAVENOUS 2 TIMES DAILY
Status: COMPLETED | OUTPATIENT
Start: 2025-01-01 | End: 2025-01-01

## 2025-01-01 RX ORDER — CALCIUM CARBONATE 500(1250)
500 TABLET ORAL DAILY
COMMUNITY
End: 2025-01-01

## 2025-01-01 RX ORDER — ENOXAPARIN SODIUM 100 MG/ML
40 INJECTION SUBCUTANEOUS DAILY
Status: DISCONTINUED | OUTPATIENT
Start: 2025-01-01 | End: 2025-01-01 | Stop reason: HOSPADM

## 2025-01-01 RX ORDER — MORPHINE SULFATE 30 MG/1
30 TABLET, FILM COATED, EXTENDED RELEASE ORAL 2 TIMES DAILY
Refills: 0 | Status: DISCONTINUED | OUTPATIENT
Start: 2025-01-01 | End: 2025-01-01 | Stop reason: HOSPADM

## 2025-01-01 RX ORDER — GABAPENTIN 300 MG/1
300 CAPSULE ORAL 3 TIMES DAILY
Status: DISCONTINUED | OUTPATIENT
Start: 2025-01-01 | End: 2025-01-01 | Stop reason: HOSPADM

## 2025-01-01 RX ORDER — ARFORMOTEROL TARTRATE 15 UG/2ML
15 SOLUTION RESPIRATORY (INHALATION)
Status: DISCONTINUED | OUTPATIENT
Start: 2025-01-01 | End: 2025-01-01 | Stop reason: HOSPADM

## 2025-01-01 RX ORDER — CALCIUM CARBONATE 500 MG/1
1000 TABLET, CHEWABLE ORAL 3 TIMES DAILY
Status: DISCONTINUED | OUTPATIENT
Start: 2025-01-01 | End: 2025-01-01

## 2025-01-01 RX ORDER — ALBUTEROL SULFATE 0.83 MG/ML
2.5 SOLUTION RESPIRATORY (INHALATION) EVERY 6 HOURS PRN
Status: DISCONTINUED | OUTPATIENT
Start: 2025-01-01 | End: 2025-01-01 | Stop reason: HOSPADM

## 2025-01-01 RX ORDER — PRAMIPEXOLE DIHYDROCHLORIDE 0.25 MG/1
0.75 TABLET ORAL NIGHTLY
Status: DISCONTINUED | OUTPATIENT
Start: 2025-01-01 | End: 2025-01-01 | Stop reason: HOSPADM

## 2025-01-01 RX ORDER — ARIPIPRAZOLE 5 MG/1
5 TABLET ORAL DAILY
Status: DISCONTINUED | OUTPATIENT
Start: 2025-01-01 | End: 2025-01-01 | Stop reason: HOSPADM

## 2025-01-01 RX ORDER — PANTOPRAZOLE SODIUM 40 MG/1
40 TABLET, DELAYED RELEASE ORAL
Status: DISCONTINUED | OUTPATIENT
Start: 2025-01-01 | End: 2025-01-01

## 2025-01-01 RX ORDER — ONDANSETRON 4 MG/1
4 TABLET, ORALLY DISINTEGRATING ORAL EVERY 8 HOURS PRN
Status: DISCONTINUED | OUTPATIENT
Start: 2025-01-01 | End: 2025-01-01 | Stop reason: HOSPADM

## 2025-01-01 RX ORDER — SODIUM CHLORIDE 0.9 % (FLUSH) 0.9 %
5-40 SYRINGE (ML) INJECTION EVERY 12 HOURS SCHEDULED
Status: DISCONTINUED | OUTPATIENT
Start: 2025-01-01 | End: 2025-01-01 | Stop reason: HOSPADM

## 2025-01-01 RX ORDER — CHOLECALCIFEROL (VITAMIN D3) 50 MCG
5000 TABLET ORAL
Qty: 30 TABLET | Refills: 0 | Status: SHIPPED | OUTPATIENT
Start: 2025-01-01 | End: 2025-03-28

## 2025-01-01 RX ORDER — ACETAMINOPHEN 650 MG/1
650 SUPPOSITORY RECTAL EVERY 6 HOURS PRN
Status: DISCONTINUED | OUTPATIENT
Start: 2025-01-01 | End: 2025-01-01 | Stop reason: HOSPADM

## 2025-01-01 RX ORDER — GUAIFENESIN 200 MG/10ML
400 LIQUID ORAL EVERY 4 HOURS
Status: DISCONTINUED | OUTPATIENT
Start: 2025-01-01 | End: 2025-01-04 | Stop reason: HOSPADM

## 2025-01-01 RX ORDER — LORAZEPAM 1 MG/1
1 TABLET ORAL 3 TIMES DAILY PRN
Qty: 90 TABLET | Refills: 0 | Status: SHIPPED
Start: 2025-01-01 | End: 2025-03-28

## 2025-01-01 RX ORDER — POLYETHYLENE GLYCOL 3350 17 G/17G
17 POWDER, FOR SOLUTION ORAL DAILY PRN
Status: DISCONTINUED | OUTPATIENT
Start: 2025-01-01 | End: 2025-01-01 | Stop reason: HOSPADM

## 2025-01-01 RX ORDER — CALCIUM GLUCONATE 20 MG/ML
2000 INJECTION, SOLUTION INTRAVENOUS ONCE
Status: COMPLETED | OUTPATIENT
Start: 2025-01-01 | End: 2025-01-01

## 2025-01-01 RX ORDER — ONDANSETRON 2 MG/ML
4 INJECTION INTRAMUSCULAR; INTRAVENOUS EVERY 6 HOURS PRN
Status: DISCONTINUED | OUTPATIENT
Start: 2025-01-01 | End: 2025-01-01 | Stop reason: HOSPADM

## 2025-01-01 RX ADMIN — FUROSEMIDE 60 MG: 40 TABLET ORAL at 08:24

## 2025-01-01 RX ADMIN — HYDROCODONE BITARTRATE AND ACETAMINOPHEN 1 TABLET: 5; 325 TABLET ORAL at 18:06

## 2025-01-01 RX ADMIN — ENOXAPARIN SODIUM 40 MG: 100 INJECTION SUBCUTANEOUS at 09:31

## 2025-01-01 RX ADMIN — PRAMIPEXOLE DIHYDROCHLORIDE 0.75 MG: 0.25 TABLET ORAL at 22:23

## 2025-01-01 RX ADMIN — BUDESONIDE 500 MCG: 0.5 SUSPENSION RESPIRATORY (INHALATION) at 18:04

## 2025-01-01 RX ADMIN — ALBUTEROL SULFATE 2.5 MG: 2.5 SOLUTION RESPIRATORY (INHALATION) at 05:04

## 2025-01-01 RX ADMIN — HYDROCODONE BITARTRATE AND ACETAMINOPHEN 1 TABLET: 5; 325 TABLET ORAL at 18:45

## 2025-01-01 RX ADMIN — ALBUTEROL SULFATE 2.5 MG: 2.5 SOLUTION RESPIRATORY (INHALATION) at 08:26

## 2025-01-01 RX ADMIN — METOPROLOL SUCCINATE 100 MG: 100 TABLET, EXTENDED RELEASE ORAL at 09:23

## 2025-01-01 RX ADMIN — FLUTICASONE PROPIONATE 1 SPRAY: 50 SPRAY, METERED NASAL at 10:16

## 2025-01-01 RX ADMIN — HYDROCODONE BITARTRATE AND ACETAMINOPHEN 1 TABLET: 5; 325 TABLET ORAL at 18:12

## 2025-01-01 RX ADMIN — PANTOPRAZOLE SODIUM 40 MG: 40 TABLET, DELAYED RELEASE ORAL at 06:05

## 2025-01-01 RX ADMIN — HYDROCODONE BITARTRATE AND ACETAMINOPHEN 1 TABLET: 5; 325 TABLET ORAL at 00:33

## 2025-01-01 RX ADMIN — ARFORMOTEROL TARTRATE 15 MCG: 15 SOLUTION RESPIRATORY (INHALATION) at 19:57

## 2025-01-01 RX ADMIN — CALCIUM CARBONATE 1000 MG: 500 TABLET, CHEWABLE ORAL at 08:24

## 2025-01-01 RX ADMIN — ENOXAPARIN SODIUM 40 MG: 100 INJECTION SUBCUTANEOUS at 08:42

## 2025-01-01 RX ADMIN — ENOXAPARIN SODIUM 40 MG: 100 INJECTION SUBCUTANEOUS at 08:26

## 2025-01-01 RX ADMIN — CALCIUM CARBONATE 1000 MG: 500 TABLET, CHEWABLE ORAL at 20:10

## 2025-01-01 RX ADMIN — ARFORMOTEROL TARTRATE 15 MCG: 15 SOLUTION RESPIRATORY (INHALATION) at 18:04

## 2025-01-01 RX ADMIN — GABAPENTIN 300 MG: 300 CAPSULE ORAL at 14:00

## 2025-01-01 RX ADMIN — MORPHINE SULFATE 30 MG: 30 TABLET, FILM COATED, EXTENDED RELEASE ORAL at 20:18

## 2025-01-01 RX ADMIN — HYDROCODONE BITARTRATE AND ACETAMINOPHEN 1 TABLET: 5; 325 TABLET ORAL at 18:59

## 2025-01-01 RX ADMIN — IPRATROPIUM BROMIDE AND ALBUTEROL SULFATE 1 DOSE: .5; 2.5 SOLUTION RESPIRATORY (INHALATION) at 19:57

## 2025-01-01 RX ADMIN — METOPROLOL SUCCINATE 100 MG: 100 TABLET, EXTENDED RELEASE ORAL at 09:58

## 2025-01-01 RX ADMIN — IPRATROPIUM BROMIDE AND ALBUTEROL SULFATE 1 DOSE: .5; 2.5 SOLUTION RESPIRATORY (INHALATION) at 18:04

## 2025-01-01 RX ADMIN — CALCIUM 1500 MG: 500 TABLET ORAL at 21:25

## 2025-01-01 RX ADMIN — CALCIUM CARBONATE 1000 MG: 500 TABLET, CHEWABLE ORAL at 08:10

## 2025-01-01 RX ADMIN — FUROSEMIDE 60 MG: 40 TABLET ORAL at 16:36

## 2025-01-01 RX ADMIN — CALCIUM CARBONATE 1000 MG: 500 TABLET, CHEWABLE ORAL at 15:00

## 2025-01-01 RX ADMIN — CALCIUM CARBONATE 1000 MG: 500 TABLET, CHEWABLE ORAL at 13:27

## 2025-01-01 RX ADMIN — BUDESONIDE 500 MCG: 0.5 SUSPENSION RESPIRATORY (INHALATION) at 08:24

## 2025-01-01 RX ADMIN — ARIPIPRAZOLE 5 MG: 5 TABLET ORAL at 09:31

## 2025-01-01 RX ADMIN — GABAPENTIN 300 MG: 300 CAPSULE ORAL at 08:10

## 2025-01-01 RX ADMIN — TAMSULOSIN HYDROCHLORIDE 0.4 MG: 0.4 CAPSULE ORAL at 08:43

## 2025-01-01 RX ADMIN — BUDESONIDE 500 MCG: 0.5 SUSPENSION RESPIRATORY (INHALATION) at 08:26

## 2025-01-01 RX ADMIN — PRAMIPEXOLE DIHYDROCHLORIDE 0.75 MG: 0.25 TABLET ORAL at 20:35

## 2025-01-01 RX ADMIN — BUDESONIDE 500 MCG: 0.5 SUSPENSION RESPIRATORY (INHALATION) at 19:29

## 2025-01-01 RX ADMIN — ARFORMOTEROL TARTRATE 15 MCG: 15 SOLUTION RESPIRATORY (INHALATION) at 19:38

## 2025-01-01 RX ADMIN — GABAPENTIN 300 MG: 300 CAPSULE ORAL at 22:24

## 2025-01-01 RX ADMIN — CALCIUM CARBONATE 1000 MG: 500 TABLET, CHEWABLE ORAL at 20:53

## 2025-01-01 RX ADMIN — CALCIUM CARBONATE-VITAMIN D TAB 500 MG-200 UNIT 3 TABLET: 500-200 TAB at 20:35

## 2025-01-01 RX ADMIN — ALBUTEROL SULFATE 2.5 MG: 2.5 SOLUTION RESPIRATORY (INHALATION) at 05:21

## 2025-01-01 RX ADMIN — ASPIRIN 81 MG CHEWABLE TABLET 81 MG: 81 TABLET CHEWABLE at 08:24

## 2025-01-01 RX ADMIN — FUROSEMIDE 60 MG: 40 TABLET ORAL at 09:22

## 2025-01-01 RX ADMIN — HYDROCODONE BITARTRATE AND ACETAMINOPHEN 1 TABLET: 5; 325 TABLET ORAL at 19:26

## 2025-01-01 RX ADMIN — HYDROCODONE BITARTRATE AND ACETAMINOPHEN 1 TABLET: 5; 325 TABLET ORAL at 10:07

## 2025-01-01 RX ADMIN — SODIUM CHLORIDE, PRESERVATIVE FREE 10 ML: 5 INJECTION INTRAVENOUS at 21:39

## 2025-01-01 RX ADMIN — HYDROCODONE BITARTRATE AND ACETAMINOPHEN 1 TABLET: 5; 325 TABLET ORAL at 13:26

## 2025-01-01 RX ADMIN — PRAMIPEXOLE DIHYDROCHLORIDE 0.75 MG: 0.25 TABLET ORAL at 20:18

## 2025-01-01 RX ADMIN — LORAZEPAM 1 MG: 1 TABLET ORAL at 18:09

## 2025-01-01 RX ADMIN — IPRATROPIUM BROMIDE AND ALBUTEROL SULFATE 1 DOSE: .5; 2.5 SOLUTION RESPIRATORY (INHALATION) at 07:42

## 2025-01-01 RX ADMIN — HYDROCODONE BITARTRATE AND ACETAMINOPHEN 1 TABLET: 5; 325 TABLET ORAL at 03:52

## 2025-01-01 RX ADMIN — Medication 5000 UNITS: at 12:37

## 2025-01-01 RX ADMIN — MAGNESIUM SULFATE HEPTAHYDRATE 2000 MG: 40 INJECTION, SOLUTION INTRAVENOUS at 01:15

## 2025-01-01 RX ADMIN — PANTOPRAZOLE SODIUM 40 MG: 40 TABLET, DELAYED RELEASE ORAL at 06:54

## 2025-01-01 RX ADMIN — HYDROCODONE BITARTRATE AND ACETAMINOPHEN 1 TABLET: 5; 325 TABLET ORAL at 10:35

## 2025-01-01 RX ADMIN — ASPIRIN 81 MG CHEWABLE TABLET 81 MG: 81 TABLET CHEWABLE at 08:10

## 2025-01-01 RX ADMIN — MAGNESIUM OXIDE 400 MG (241.3 MG MAGNESIUM) TABLET 400 MG: TABLET at 08:45

## 2025-01-01 RX ADMIN — ENOXAPARIN SODIUM 40 MG: 100 INJECTION SUBCUTANEOUS at 08:10

## 2025-01-01 RX ADMIN — IPRATROPIUM BROMIDE AND ALBUTEROL SULFATE 1 DOSE: .5; 2.5 SOLUTION RESPIRATORY (INHALATION) at 16:23

## 2025-01-01 RX ADMIN — HYDROCODONE BITARTRATE AND ACETAMINOPHEN 1 TABLET: 5; 325 TABLET ORAL at 10:28

## 2025-01-01 RX ADMIN — GABAPENTIN 300 MG: 300 CAPSULE ORAL at 09:31

## 2025-01-01 RX ADMIN — MORPHINE SULFATE 30 MG: 30 TABLET, FILM COATED, EXTENDED RELEASE ORAL at 09:23

## 2025-01-01 RX ADMIN — MORPHINE SULFATE 30 MG: 30 TABLET, FILM COATED, EXTENDED RELEASE ORAL at 20:35

## 2025-01-01 RX ADMIN — ALBUTEROL SULFATE 2.5 MG: 2.5 SOLUTION RESPIRATORY (INHALATION) at 19:58

## 2025-01-01 RX ADMIN — HYDROCODONE BITARTRATE AND ACETAMINOPHEN 1 TABLET: 5; 325 TABLET ORAL at 02:04

## 2025-01-01 RX ADMIN — GABAPENTIN 300 MG: 300 CAPSULE ORAL at 09:56

## 2025-01-01 RX ADMIN — HYDROCODONE BITARTRATE AND ACETAMINOPHEN 1 TABLET: 5; 325 TABLET ORAL at 12:27

## 2025-01-01 RX ADMIN — ARFORMOTEROL TARTRATE 15 MCG: 15 SOLUTION RESPIRATORY (INHALATION) at 08:26

## 2025-01-01 RX ADMIN — SODIUM CHLORIDE, PRESERVATIVE FREE 10 ML: 5 INJECTION INTRAVENOUS at 20:19

## 2025-01-01 RX ADMIN — BUDESONIDE 500 MCG: 0.5 SUSPENSION RESPIRATORY (INHALATION) at 09:29

## 2025-01-01 RX ADMIN — PANTOPRAZOLE SODIUM 40 MG: 40 TABLET, DELAYED RELEASE ORAL at 06:42

## 2025-01-01 RX ADMIN — PANTOPRAZOLE SODIUM 40 MG: 40 TABLET, DELAYED RELEASE ORAL at 06:51

## 2025-01-01 RX ADMIN — HYDROCODONE BITARTRATE AND ACETAMINOPHEN 1 TABLET: 5; 325 TABLET ORAL at 06:54

## 2025-01-01 RX ADMIN — TAMSULOSIN HYDROCHLORIDE 0.4 MG: 0.4 CAPSULE ORAL at 08:09

## 2025-01-01 RX ADMIN — SODIUM CHLORIDE, PRESERVATIVE FREE 10 ML: 5 INJECTION INTRAVENOUS at 08:45

## 2025-01-01 RX ADMIN — ARIPIPRAZOLE 5 MG: 5 TABLET ORAL at 08:45

## 2025-01-01 RX ADMIN — IPRATROPIUM BROMIDE AND ALBUTEROL SULFATE 1 DOSE: .5; 2.5 SOLUTION RESPIRATORY (INHALATION) at 09:02

## 2025-01-01 RX ADMIN — ASPIRIN 81 MG CHEWABLE TABLET 81 MG: 81 TABLET CHEWABLE at 09:31

## 2025-01-01 RX ADMIN — LORAZEPAM 0.5 MG: 0.5 TABLET ORAL at 13:08

## 2025-01-01 RX ADMIN — MORPHINE SULFATE 6.5 MG: 10 SOLUTION ORAL at 12:50

## 2025-01-01 RX ADMIN — HYDROCODONE BITARTRATE AND ACETAMINOPHEN 1 TABLET: 5; 325 TABLET ORAL at 03:51

## 2025-01-01 RX ADMIN — ARFORMOTEROL TARTRATE 15 MCG: 15 SOLUTION RESPIRATORY (INHALATION) at 08:24

## 2025-01-01 RX ADMIN — CALCIUM CARBONATE 1000 MG: 500 TABLET, CHEWABLE ORAL at 20:18

## 2025-01-01 RX ADMIN — LORAZEPAM 0.5 MG: 0.5 TABLET ORAL at 20:53

## 2025-01-01 RX ADMIN — TAMSULOSIN HYDROCHLORIDE 0.4 MG: 0.4 CAPSULE ORAL at 08:24

## 2025-01-01 RX ADMIN — FUROSEMIDE 60 MG: 40 TABLET ORAL at 17:11

## 2025-01-01 RX ADMIN — METOPROLOL SUCCINATE 100 MG: 100 TABLET, EXTENDED RELEASE ORAL at 08:44

## 2025-01-01 RX ADMIN — CALCIUM CARBONATE 1000 MG: 500 TABLET, CHEWABLE ORAL at 09:24

## 2025-01-01 RX ADMIN — SODIUM CHLORIDE, PRESERVATIVE FREE 10 ML: 5 INJECTION INTRAVENOUS at 08:11

## 2025-01-01 RX ADMIN — LORAZEPAM 0.5 MG: 0.5 TABLET ORAL at 23:58

## 2025-01-01 RX ADMIN — MORPHINE SULFATE 30 MG: 30 TABLET, FILM COATED, EXTENDED RELEASE ORAL at 09:55

## 2025-01-01 RX ADMIN — FUROSEMIDE 60 MG: 40 TABLET ORAL at 08:09

## 2025-01-01 RX ADMIN — CALCIUM GLUCONATE 3000 MG: 98 INJECTION, SOLUTION INTRAVENOUS at 17:55

## 2025-01-01 RX ADMIN — MAGNESIUM OXIDE 400 MG (241.3 MG MAGNESIUM) TABLET 400 MG: TABLET at 09:22

## 2025-01-01 RX ADMIN — FUROSEMIDE 40 MG: 10 INJECTION, SOLUTION INTRAMUSCULAR; INTRAVENOUS at 10:13

## 2025-01-01 RX ADMIN — TAMSULOSIN HYDROCHLORIDE 0.4 MG: 0.4 CAPSULE ORAL at 09:23

## 2025-01-01 RX ADMIN — CALCIUM CARBONATE 1000 MG: 500 TABLET, CHEWABLE ORAL at 22:23

## 2025-01-01 RX ADMIN — GUAIFENESIN 200 MG: 200 SOLUTION ORAL at 07:49

## 2025-01-01 RX ADMIN — GABAPENTIN 300 MG: 300 CAPSULE ORAL at 20:18

## 2025-01-01 RX ADMIN — GABAPENTIN 300 MG: 300 CAPSULE ORAL at 08:24

## 2025-01-01 RX ADMIN — MORPHINE SULFATE 30 MG: 30 TABLET, FILM COATED, EXTENDED RELEASE ORAL at 22:24

## 2025-01-01 RX ADMIN — GABAPENTIN 300 MG: 300 CAPSULE ORAL at 15:33

## 2025-01-01 RX ADMIN — GABAPENTIN 300 MG: 300 CAPSULE ORAL at 12:36

## 2025-01-01 RX ADMIN — BUDESONIDE 500 MCG: 0.5 SUSPENSION RESPIRATORY (INHALATION) at 09:04

## 2025-01-01 RX ADMIN — GABAPENTIN 900 MG: 300 CAPSULE ORAL at 21:24

## 2025-01-01 RX ADMIN — SODIUM CHLORIDE, PRESERVATIVE FREE 10 ML: 5 INJECTION INTRAVENOUS at 09:32

## 2025-01-01 RX ADMIN — SODIUM CHLORIDE, PRESERVATIVE FREE 10 ML: 5 INJECTION INTRAVENOUS at 20:10

## 2025-01-01 RX ADMIN — ASPIRIN 81 MG CHEWABLE TABLET 81 MG: 81 TABLET CHEWABLE at 09:23

## 2025-01-01 RX ADMIN — FLUOXETINE HYDROCHLORIDE 20 MG: 20 CAPSULE ORAL at 23:43

## 2025-01-01 RX ADMIN — IPRATROPIUM BROMIDE AND ALBUTEROL SULFATE 1 DOSE: .5; 2.5 SOLUTION RESPIRATORY (INHALATION) at 03:22

## 2025-01-01 RX ADMIN — ASPIRIN 81 MG CHEWABLE TABLET 81 MG: 81 TABLET CHEWABLE at 09:55

## 2025-01-01 RX ADMIN — BUDESONIDE INHALATION 500 MCG: 0.5 SUSPENSION RESPIRATORY (INHALATION) at 19:57

## 2025-01-01 RX ADMIN — LORAZEPAM 0.5 MG: 0.5 TABLET ORAL at 14:00

## 2025-01-01 RX ADMIN — MORPHINE SULFATE 6.5 MG: 10 SOLUTION ORAL at 06:49

## 2025-01-01 RX ADMIN — MORPHINE SULFATE 30 MG: 30 TABLET, FILM COATED, EXTENDED RELEASE ORAL at 08:43

## 2025-01-01 RX ADMIN — FLUOXETINE 20 MG: 10 TABLET, FILM COATED ORAL at 21:24

## 2025-01-01 RX ADMIN — IPRATROPIUM BROMIDE AND ALBUTEROL SULFATE 1 DOSE: .5; 2.5 SOLUTION RESPIRATORY (INHALATION) at 12:06

## 2025-01-01 RX ADMIN — GABAPENTIN 300 MG: 300 CAPSULE ORAL at 13:35

## 2025-01-01 RX ADMIN — FLUOXETINE HYDROCHLORIDE 20 MG: 20 CAPSULE ORAL at 20:18

## 2025-01-01 RX ADMIN — PRAMIPEXOLE DIHYDROCHLORIDE 0.75 MG: 0.25 TABLET ORAL at 20:53

## 2025-01-01 RX ADMIN — IPRATROPIUM BROMIDE AND ALBUTEROL SULFATE 1 DOSE: .5; 2.5 SOLUTION RESPIRATORY (INHALATION) at 13:12

## 2025-01-01 RX ADMIN — LORAZEPAM 0.5 MG: 0.5 TABLET ORAL at 06:10

## 2025-01-01 RX ADMIN — ARIPIPRAZOLE 5 MG: 5 TABLET ORAL at 09:55

## 2025-01-01 RX ADMIN — ARIPIPRAZOLE 5 MG: 5 TABLET ORAL at 10:10

## 2025-01-01 RX ADMIN — METOPROLOL SUCCINATE 100 MG: 100 TABLET, EXTENDED RELEASE ORAL at 08:25

## 2025-01-01 RX ADMIN — PANTOPRAZOLE SODIUM 40 MG: 40 TABLET, DELAYED RELEASE ORAL at 06:49

## 2025-01-01 RX ADMIN — MORPHINE SULFATE 30 MG: 30 TABLET, FILM COATED, EXTENDED RELEASE ORAL at 20:53

## 2025-01-01 RX ADMIN — LORAZEPAM 0.5 MG: 0.5 TABLET ORAL at 17:56

## 2025-01-01 RX ADMIN — BUDESONIDE 500 MCG: 0.5 SUSPENSION RESPIRATORY (INHALATION) at 09:02

## 2025-01-01 RX ADMIN — BUDESONIDE INHALATION 500 MCG: 0.5 SUSPENSION RESPIRATORY (INHALATION) at 07:42

## 2025-01-01 RX ADMIN — Medication 5000 UNITS: at 15:32

## 2025-01-01 RX ADMIN — ENOXAPARIN SODIUM 40 MG: 100 INJECTION SUBCUTANEOUS at 10:41

## 2025-01-01 RX ADMIN — FLUOXETINE HYDROCHLORIDE 20 MG: 20 CAPSULE ORAL at 20:10

## 2025-01-01 RX ADMIN — SODIUM CHLORIDE, PRESERVATIVE FREE 10 ML: 5 INJECTION INTRAVENOUS at 09:59

## 2025-01-01 RX ADMIN — LORAZEPAM 0.5 MG: 0.5 TABLET ORAL at 21:33

## 2025-01-01 RX ADMIN — GABAPENTIN 300 MG: 300 CAPSULE ORAL at 20:53

## 2025-01-01 RX ADMIN — GABAPENTIN 300 MG: 300 CAPSULE ORAL at 13:26

## 2025-01-01 RX ADMIN — FUROSEMIDE 60 MG: 40 TABLET ORAL at 09:31

## 2025-01-01 RX ADMIN — GABAPENTIN 300 MG: 300 CAPSULE ORAL at 20:10

## 2025-01-01 RX ADMIN — LORAZEPAM 0.5 MG: 0.5 TABLET ORAL at 08:33

## 2025-01-01 RX ADMIN — FUROSEMIDE 60 MG: 40 TABLET ORAL at 17:49

## 2025-01-01 RX ADMIN — SODIUM CHLORIDE, PRESERVATIVE FREE 10 ML: 5 INJECTION INTRAVENOUS at 23:43

## 2025-01-01 RX ADMIN — ALBUTEROL SULFATE 2.5 MG: 2.5 SOLUTION RESPIRATORY (INHALATION) at 15:36

## 2025-01-01 RX ADMIN — CALCIUM CARBONATE-VITAMIN D TAB 500 MG-200 UNIT 3 TABLET: 500-200 TAB at 10:26

## 2025-01-01 RX ADMIN — DEXAMETHASONE SODIUM PHOSPHATE 6 MG: 10 INJECTION INTRAMUSCULAR; INTRAVENOUS at 10:12

## 2025-01-01 RX ADMIN — TAMSULOSIN HYDROCHLORIDE 0.4 MG: 0.4 CAPSULE ORAL at 10:07

## 2025-01-01 RX ADMIN — HYDROCODONE BITARTRATE AND ACETAMINOPHEN 1 TABLET: 5; 325 TABLET ORAL at 04:16

## 2025-01-01 RX ADMIN — TAMSULOSIN HYDROCHLORIDE 0.4 MG: 0.4 CAPSULE ORAL at 21:25

## 2025-01-01 RX ADMIN — ARIPIPRAZOLE 5 MG: 5 TABLET ORAL at 08:26

## 2025-01-01 RX ADMIN — BUDESONIDE 500 MCG: 0.5 SUSPENSION RESPIRATORY (INHALATION) at 19:57

## 2025-01-01 RX ADMIN — PRAMIPEXOLE DIHYDROCHLORIDE 0.75 MG: 0.25 TABLET ORAL at 21:24

## 2025-01-01 RX ADMIN — FUROSEMIDE 60 MG: 40 TABLET ORAL at 09:55

## 2025-01-01 RX ADMIN — TAMSULOSIN HYDROCHLORIDE 0.4 MG: 0.4 CAPSULE ORAL at 09:55

## 2025-01-01 RX ADMIN — ARFORMOTEROL TARTRATE 15 MCG: 15 SOLUTION RESPIRATORY (INHALATION) at 09:29

## 2025-01-01 RX ADMIN — FUROSEMIDE 60 MG: 40 TABLET ORAL at 08:43

## 2025-01-01 RX ADMIN — ARFORMOTEROL TARTRATE 15 MCG: 15 SOLUTION RESPIRATORY (INHALATION) at 22:50

## 2025-01-01 RX ADMIN — FLUOXETINE HYDROCHLORIDE 20 MG: 20 CAPSULE ORAL at 20:35

## 2025-01-01 RX ADMIN — CALCIUM GLUCONATE 1000 MG: 98 INJECTION INTRAVENOUS at 22:20

## 2025-01-01 RX ADMIN — MORPHINE SULFATE 30 MG: 30 TABLET, FILM COATED, EXTENDED RELEASE ORAL at 08:09

## 2025-01-01 RX ADMIN — ENOXAPARIN SODIUM 40 MG: 100 INJECTION SUBCUTANEOUS at 09:24

## 2025-01-01 RX ADMIN — CALCIUM CARBONATE 1000 MG: 500 TABLET, CHEWABLE ORAL at 15:33

## 2025-01-01 RX ADMIN — ARFORMOTEROL TARTRATE 15 MCG: 15 SOLUTION RESPIRATORY (INHALATION) at 19:29

## 2025-01-01 RX ADMIN — PANTOPRAZOLE SODIUM 40 MG: 40 TABLET, DELAYED RELEASE ORAL at 05:53

## 2025-01-01 RX ADMIN — SODIUM CHLORIDE, PRESERVATIVE FREE 10 ML: 5 INJECTION INTRAVENOUS at 20:50

## 2025-01-01 RX ADMIN — ARFORMOTEROL TARTRATE 15 MCG: 15 SOLUTION RESPIRATORY (INHALATION) at 07:42

## 2025-01-01 RX ADMIN — LORAZEPAM 0.5 MG: 0.5 TABLET ORAL at 04:29

## 2025-01-01 RX ADMIN — HYDROCODONE BITARTRATE AND ACETAMINOPHEN 1 TABLET: 5; 325 TABLET ORAL at 23:58

## 2025-01-01 RX ADMIN — CALCIUM CARBONATE-VITAMIN D TAB 500 MG-200 UNIT 3 TABLET: 500-200 TAB at 14:00

## 2025-01-01 RX ADMIN — FUROSEMIDE 40 MG: 10 INJECTION, SOLUTION INTRAMUSCULAR; INTRAVENOUS at 18:06

## 2025-01-01 RX ADMIN — HYDROCODONE BITARTRATE AND ACETAMINOPHEN 1 TABLET: 5; 325 TABLET ORAL at 12:04

## 2025-01-01 RX ADMIN — MORPHINE SULFATE 30 MG: 30 TABLET, FILM COATED, EXTENDED RELEASE ORAL at 08:24

## 2025-01-01 RX ADMIN — CALCIUM 1500 MG: 500 TABLET ORAL at 10:11

## 2025-01-01 RX ADMIN — HYDROCODONE BITARTRATE AND ACETAMINOPHEN 1 TABLET: 5; 325 TABLET ORAL at 06:51

## 2025-01-01 RX ADMIN — MORPHINE SULFATE 30 MG: 30 TABLET, FILM COATED, EXTENDED RELEASE ORAL at 23:42

## 2025-01-01 RX ADMIN — GABAPENTIN 300 MG: 300 CAPSULE ORAL at 23:43

## 2025-01-01 RX ADMIN — IPRATROPIUM BROMIDE AND ALBUTEROL SULFATE 1 DOSE: .5; 2.5 SOLUTION RESPIRATORY (INHALATION) at 22:50

## 2025-01-01 RX ADMIN — METOPROLOL SUCCINATE 100 MG: 100 TABLET, FILM COATED, EXTENDED RELEASE ORAL at 10:09

## 2025-01-01 RX ADMIN — SODIUM CHLORIDE, PRESERVATIVE FREE 10 ML: 5 INJECTION INTRAVENOUS at 22:24

## 2025-01-01 RX ADMIN — SODIUM CHLORIDE, PRESERVATIVE FREE 10 ML: 5 INJECTION INTRAVENOUS at 09:30

## 2025-01-01 RX ADMIN — FAMOTIDINE 20 MG: 20 TABLET, FILM COATED ORAL at 12:13

## 2025-01-01 RX ADMIN — GABAPENTIN 600 MG: 300 CAPSULE ORAL at 10:06

## 2025-01-01 RX ADMIN — GABAPENTIN 300 MG: 300 CAPSULE ORAL at 20:35

## 2025-01-01 RX ADMIN — ARIPIPRAZOLE 5 MG: 5 TABLET ORAL at 09:23

## 2025-01-01 RX ADMIN — GUAIFENESIN 400 MG: 200 SOLUTION ORAL at 21:26

## 2025-01-01 RX ADMIN — SODIUM CHLORIDE, PRESERVATIVE FREE 10 ML: 5 INJECTION INTRAVENOUS at 10:13

## 2025-01-01 RX ADMIN — METOPROLOL SUCCINATE 100 MG: 100 TABLET, EXTENDED RELEASE ORAL at 09:40

## 2025-01-01 RX ADMIN — PRAMIPEXOLE DIHYDROCHLORIDE 0.75 MG: 0.25 TABLET ORAL at 20:09

## 2025-01-01 RX ADMIN — PANTOPRAZOLE SODIUM 40 MG: 40 TABLET, DELAYED RELEASE ORAL at 06:18

## 2025-01-01 RX ADMIN — IPRATROPIUM BROMIDE AND ALBUTEROL SULFATE 1 DOSE: .5; 2.5 SOLUTION RESPIRATORY (INHALATION) at 04:19

## 2025-01-01 RX ADMIN — BUDESONIDE 500 MCG: 0.5 SUSPENSION RESPIRATORY (INHALATION) at 19:38

## 2025-01-01 RX ADMIN — ACETAMINOPHEN 650 MG: 325 TABLET ORAL at 14:01

## 2025-01-01 RX ADMIN — SODIUM CHLORIDE, PRESERVATIVE FREE 10 ML: 5 INJECTION INTRAVENOUS at 20:36

## 2025-01-01 RX ADMIN — GUAIFENESIN 400 MG: 200 SOLUTION ORAL at 14:35

## 2025-01-01 RX ADMIN — ARIPIPRAZOLE 5 MG: 5 TABLET ORAL at 08:09

## 2025-01-01 RX ADMIN — MORPHINE SULFATE 30 MG: 30 TABLET, FILM COATED, EXTENDED RELEASE ORAL at 09:29

## 2025-01-01 RX ADMIN — HYDROCODONE BITARTRATE AND ACETAMINOPHEN 1 TABLET: 5; 325 TABLET ORAL at 16:46

## 2025-01-01 RX ADMIN — LORAZEPAM 0.5 MG: 0.5 TABLET ORAL at 22:24

## 2025-01-01 RX ADMIN — TAMSULOSIN HYDROCHLORIDE 0.4 MG: 0.4 CAPSULE ORAL at 09:31

## 2025-01-01 RX ADMIN — FUROSEMIDE 60 MG: 40 TABLET ORAL at 16:54

## 2025-01-01 RX ADMIN — FLUOXETINE HYDROCHLORIDE 20 MG: 20 CAPSULE ORAL at 20:53

## 2025-01-01 RX ADMIN — PRAMIPEXOLE DIHYDROCHLORIDE 0.75 MG: 0.25 TABLET ORAL at 23:43

## 2025-01-01 RX ADMIN — LORAZEPAM 0.5 MG: 0.5 TABLET ORAL at 01:11

## 2025-01-01 RX ADMIN — SODIUM CHLORIDE, PRESERVATIVE FREE 10 ML: 5 INJECTION INTRAVENOUS at 08:27

## 2025-01-01 RX ADMIN — LORAZEPAM 0.5 MG: 0.5 TABLET ORAL at 20:35

## 2025-01-01 RX ADMIN — Medication 5000 UNITS: at 12:13

## 2025-01-01 RX ADMIN — GABAPENTIN 300 MG: 300 CAPSULE ORAL at 13:51

## 2025-01-01 RX ADMIN — CALCIUM GLUCONATE 2000 MG: 20 INJECTION, SOLUTION INTRAVENOUS at 12:34

## 2025-01-01 RX ADMIN — ENOXAPARIN SODIUM 40 MG: 100 INJECTION SUBCUTANEOUS at 09:56

## 2025-01-01 RX ADMIN — CALCIUM GLUCONATE 2000 MG: 20 INJECTION, SOLUTION INTRAVENOUS at 10:55

## 2025-01-01 RX ADMIN — ASPIRIN 81 MG CHEWABLE TABLET 81 MG: 81 TABLET CHEWABLE at 08:44

## 2025-01-01 RX ADMIN — GUAIFENESIN 400 MG: 200 SOLUTION ORAL at 18:06

## 2025-01-01 RX ADMIN — HYDROCODONE BITARTRATE AND ACETAMINOPHEN 1 TABLET: 5; 325 TABLET ORAL at 12:36

## 2025-01-01 RX ADMIN — GABAPENTIN 300 MG: 300 CAPSULE ORAL at 08:44

## 2025-01-01 RX ADMIN — CALCIUM CARBONATE 1000 MG: 500 TABLET, CHEWABLE ORAL at 13:35

## 2025-01-01 RX ADMIN — FLUOXETINE HYDROCHLORIDE 20 MG: 20 CAPSULE ORAL at 22:24

## 2025-01-01 RX ADMIN — ARFORMOTEROL TARTRATE 15 MCG: 15 SOLUTION RESPIRATORY (INHALATION) at 09:04

## 2025-01-01 RX ADMIN — GABAPENTIN 300 MG: 300 CAPSULE ORAL at 09:23

## 2025-01-01 RX ADMIN — METOPROLOL SUCCINATE 100 MG: 100 TABLET, EXTENDED RELEASE ORAL at 08:10

## 2025-01-01 RX ADMIN — FUROSEMIDE 60 MG: 40 TABLET ORAL at 16:56

## 2025-01-01 RX ADMIN — ARFORMOTEROL TARTRATE 15 MCG: 15 SOLUTION RESPIRATORY (INHALATION) at 09:02

## 2025-01-01 RX ADMIN — MORPHINE SULFATE 30 MG: 30 TABLET, FILM COATED, EXTENDED RELEASE ORAL at 20:10

## 2025-01-01 RX ADMIN — BUDESONIDE 500 MCG: 0.5 SUSPENSION RESPIRATORY (INHALATION) at 22:50

## 2025-01-01 RX ADMIN — HYDROCODONE BITARTRATE AND ACETAMINOPHEN 1 TABLET: 5; 325 TABLET ORAL at 06:19

## 2025-01-01 RX ADMIN — ASPIRIN 81 MG CHEWABLE TABLET 81 MG: 81 TABLET CHEWABLE at 10:10

## 2025-01-01 ASSESSMENT — PAIN SCALES - GENERAL
PAINLEVEL_OUTOF10: 5
PAINLEVEL_OUTOF10: 9
PAINLEVEL_OUTOF10: 6
PAINLEVEL_OUTOF10: 6
PAINLEVEL_OUTOF10: 7
PAINLEVEL_OUTOF10: 7
PAINLEVEL_OUTOF10: 4
PAINLEVEL_OUTOF10: 8
PAINLEVEL_OUTOF10: 8
PAINLEVEL_OUTOF10: 9
PAINLEVEL_OUTOF10: 0
PAINLEVEL_OUTOF10: 5
PAINLEVEL_OUTOF10: 7
PAINLEVEL_OUTOF10: 7
PAINLEVEL_OUTOF10: 0
PAINLEVEL_OUTOF10: 4
PAINLEVEL_OUTOF10: 8
PAINLEVEL_OUTOF10: 10
PAINLEVEL_OUTOF10: 3
PAINLEVEL_OUTOF10: 8
PAINLEVEL_OUTOF10: 10
PAINLEVEL_OUTOF10: 8
PAINLEVEL_OUTOF10: 0
PAINLEVEL_OUTOF10: 3
PAINLEVEL_OUTOF10: 7
PAINLEVEL_OUTOF10: 8
PAINLEVEL_OUTOF10: 7
PAINLEVEL_OUTOF10: 6
PAINLEVEL_OUTOF10: 9
PAINLEVEL_OUTOF10: 6
PAINLEVEL_OUTOF10: 8
PAINLEVEL_OUTOF10: 7
PAINLEVEL_OUTOF10: 9
PAINLEVEL_OUTOF10: 8
PAINLEVEL_OUTOF10: 8
PAINLEVEL_OUTOF10: 7
PAINLEVEL_OUTOF10: 5
PAINLEVEL_OUTOF10: 7

## 2025-01-01 ASSESSMENT — PAIN DESCRIPTION - ORIENTATION
ORIENTATION: RIGHT;LEFT
ORIENTATION: LEFT
ORIENTATION: RIGHT;LEFT
ORIENTATION: LEFT
ORIENTATION: LEFT;RIGHT
ORIENTATION: RIGHT;LEFT
ORIENTATION: RIGHT;LEFT
ORIENTATION: RIGHT;LEFT;LOWER
ORIENTATION: RIGHT;LEFT
ORIENTATION: LEFT;RIGHT
ORIENTATION: RIGHT;LEFT
ORIENTATION: RIGHT;LEFT
ORIENTATION: LEFT
ORIENTATION: RIGHT;LEFT

## 2025-01-01 ASSESSMENT — PAIN DESCRIPTION - LOCATION
LOCATION: LEG
LOCATION: OTHER (COMMENT);LEG
LOCATION: HEAD
LOCATION: LEG
LOCATION: HIP;LEG;BACK
LOCATION: LEG
LOCATION: LEG
LOCATION: OTHER (COMMENT)
LOCATION: LEG
LOCATION: BACK;LEG
LOCATION: LEG
LOCATION: BACK;LEG
LOCATION: LEG
LOCATION: BACK;LEG
LOCATION: BACK;LEG
LOCATION: LEG

## 2025-01-01 ASSESSMENT — PAIN DESCRIPTION - DESCRIPTORS
DESCRIPTORS: ACHING;SORE
DESCRIPTORS: ACHING;THROBBING
DESCRIPTORS: SQUEEZING;STABBING
DESCRIPTORS: SHARP;SQUEEZING;STABBING
DESCRIPTORS: ACHING
DESCRIPTORS: ACHING;DISCOMFORT;DULL
DESCRIPTORS: ACHING;THROBBING
DESCRIPTORS: ACHING;THROBBING
DESCRIPTORS: ACHING
DESCRIPTORS: ACHING;SHARP
DESCRIPTORS: ACHING;THROBBING
DESCRIPTORS: ACHING;SHARP;SHOOTING;SORE
DESCRIPTORS: SHARP;STABBING
DESCRIPTORS: ACHING

## 2025-01-01 ASSESSMENT — ENCOUNTER SYMPTOMS
DIARRHEA: 0
VOMITING: 0
EYE REDNESS: 0
SINUS PRESSURE: 0
EYE DISCHARGE: 0
SORE THROAT: 0
NAUSEA: 0
SHORTNESS OF BREATH: 1
BACK PAIN: 0
COUGH: 1
WHEEZING: 0
ABDOMINAL PAIN: 0
EYE PAIN: 0

## 2025-01-01 ASSESSMENT — PAIN - FUNCTIONAL ASSESSMENT
PAIN_FUNCTIONAL_ASSESSMENT: ACTIVITIES ARE NOT PREVENTED
PAIN_FUNCTIONAL_ASSESSMENT: PREVENTS OR INTERFERES SOME ACTIVE ACTIVITIES AND ADLS
PAIN_FUNCTIONAL_ASSESSMENT: ACTIVITIES ARE NOT PREVENTED
PAIN_FUNCTIONAL_ASSESSMENT: PREVENTS OR INTERFERES SOME ACTIVE ACTIVITIES AND ADLS
PAIN_FUNCTIONAL_ASSESSMENT: NONE - DENIES PAIN
PAIN_FUNCTIONAL_ASSESSMENT: ACTIVITIES ARE NOT PREVENTED
PAIN_FUNCTIONAL_ASSESSMENT: PREVENTS OR INTERFERES SOME ACTIVE ACTIVITIES AND ADLS
PAIN_FUNCTIONAL_ASSESSMENT: ACTIVITIES ARE NOT PREVENTED
PAIN_FUNCTIONAL_ASSESSMENT: PREVENTS OR INTERFERES SOME ACTIVE ACTIVITIES AND ADLS
PAIN_FUNCTIONAL_ASSESSMENT: ACTIVITIES ARE NOT PREVENTED
PAIN_FUNCTIONAL_ASSESSMENT: ACTIVITIES ARE NOT PREVENTED

## 2025-01-01 ASSESSMENT — PAIN SCALES - WONG BAKER
WONGBAKER_NUMERICALRESPONSE: HURTS A LITTLE BIT
WONGBAKER_NUMERICALRESPONSE: NO HURT
WONGBAKER_NUMERICALRESPONSE: NO HURT

## 2025-01-01 ASSESSMENT — PAIN DESCRIPTION - PAIN TYPE
TYPE: CHRONIC PAIN

## 2025-01-01 ASSESSMENT — PAIN DESCRIPTION - FREQUENCY
FREQUENCY: CONTINUOUS
FREQUENCY: CONTINUOUS

## 2025-01-01 ASSESSMENT — PAIN DESCRIPTION - ONSET: ONSET: ON-GOING

## 2025-01-01 NOTE — PLAN OF CARE
Problem: ABCDS Injury Assessment  Goal: Absence of physical injury  1/1/2025 0957 by Areli Goodwin RN  Outcome: Progressing  1/1/2025 0228 by Randi Lackey RN  Outcome: Progressing     Problem: Skin/Tissue Integrity  Goal: Absence of new skin breakdown  Description: 1.  Monitor for areas of redness and/or skin breakdown  2.  Assess vascular access sites hourly  3.  Every 4-6 hours minimum:  Change oxygen saturation probe site  4.  Every 4-6 hours:  If on nasal continuous positive airway pressure, respiratory therapy assess nares and determine need for appliance change or resting period.  1/1/2025 0957 by Areli Goodwin RN  Outcome: Progressing  1/1/2025 0228 by Randi Lackey RN  Outcome: Progressing     Problem: Discharge Planning  Goal: Discharge to home or other facility with appropriate resources  1/1/2025 0957 by Areli Goodwin RN  Outcome: Progressing  1/1/2025 0228 by Randi Lackey RN  Outcome: Progressing     Problem: Safety - Adult  Goal: Free from fall injury  1/1/2025 0957 by Areli Goodwin RN  Outcome: Progressing  1/1/2025 0228 by Randi Lackey RN  Outcome: Progressing     Problem: Chronic Conditions and Co-morbidities  Goal: Patient's chronic conditions and co-morbidity symptoms are monitored and maintained or improved  1/1/2025 0957 by Areli Goodwin RN  Outcome: Progressing  1/1/2025 0228 by Randi Lackey RN  Outcome: Progressing     Problem: Pain  Goal: Verbalizes/displays adequate comfort level or baseline comfort level  1/1/2025 0957 by Areli Goodwin RN  Outcome: Progressing  1/1/2025 0228 by Randi Lackey RN  Outcome: Progressing

## 2025-01-01 NOTE — PROGRESS NOTES
[facility] Hospitalist Progress Note    Admitting Date and Time: 12/30/2024  9:23 AM  Admit Dx: Volume overload [E87.70]  Acute on chronic congestive heart failure, unspecified heart failure type (HCC) [I50.9]    Subjective:  Patient is being followed for Volume overload [E87.70]  Acute on chronic congestive heart failure, unspecified heart failure type (HCC) [I50.9]   Pt was seen and examined today. Denies any new issues    ROS: denies fever, chills, cp, sob, n/v, HA unless stated above.     guaiFENesin  400 mg Oral Q4H    furosemide  40 mg IntraVENous BID    [START ON 1/2/2025] furosemide  40 mg Oral Daily    [START ON 1/2/2025] furosemide  20 mg Oral Dinner    fluticasone  1 spray Each Nostril Daily    dexAMETHasone  6 mg IntraVENous Q24H    gabapentin  900 mg Oral Nightly    gabapentin  600 mg Oral QAM    tamsulosin  0.4 mg Oral BID    sodium chloride flush  5-40 mL IntraVENous 2 times per day    enoxaparin  40 mg SubCUTAneous Daily    ARIPiprazole  5 mg Oral Daily    aspirin  81 mg Oral Daily    atorvastatin  40 mg Oral Nightly    budesonide  0.5 mg Nebulization BID RT    calcium elemental  1,500 mg Oral BID    Ensifentrine  2.5 mL Inhalation BID    [START ON 1/2/2025] vitamin D  50,000 Units Oral Once per day on Monday Thursday    FLUoxetine  20 mg Oral Nightly    metoprolol succinate  100 mg Oral Daily    pantoprazole  40 mg Oral QAM AC    pramipexole  0.75 mg Oral Nightly    arformoterol tartrate  15 mcg Nebulization BID RT    ipratropium 0.5 mg-albuterol 2.5 mg  1 Dose Inhalation Q4H     LORazepam, 1 mg, Q8H PRN  sodium chloride, 1 spray, PRN  morphine, 6.5 mg, Q2H PRN  sodium chloride flush, 5-40 mL, PRN  sodium chloride, , PRN  polyethylene glycol, 17 g, Daily PRN  acetaminophen, 650 mg, Q6H PRN   Or  acetaminophen, 650 mg, Q6H PRN  potassium chloride, 40 mEq, PRN   Or  potassium alternative oral replacement, 40 mEq, PRN  magnesium sulfate, 2,000 mg, PRN  promethazine, 25 mg, Q6H PRN  calcium  staff/consultants/patient/family = 35 min    NOTE: This report was transcribed using voice recognition software. Every effort was made to ensure accuracy; however, inadvertent computerized transcription errors may be present.  Electronically signed by Jan Woodruff MD on 1/1/2025 at 2:03 PM

## 2025-01-01 NOTE — PROGRESS NOTES
Patient states he vomited after he ate breakfast. Patient states breakfast was cold which caused him to vomit.  Patient denies nausea at this time.

## 2025-01-01 NOTE — CONSULTS
[facility]  [address], [address]  [place] [address]    Pulmonary Consultation      Reason for Consult:  sob and cough    Requesting Physician:  Jana Sanchez MD    CHIEF COMPLAINT:  sob and cough    History Obtained From:  patient, electronic medical record    HISTORY OF PRESENT ILLNESS:                The patient is a 59 y.o. male with significant past medical history of COPD and CHF who presents with increased swelling of lower ext with sob and cough for the past week, gradually getting worse. Was recently discharged from hospital on 13th for CHF and COPD, last seen in office on 4th and claims compliant with nebs but (?) insurance denying [place]. Claims doing slightly better with breathing and cough, still with mod sputum production, on 6 li NC, reclining in bed, feels swelling in legs is less.    Past Medical History:        Diagnosis Date    Anxiety     COPD (chronic obstructive pulmonary disease) (HCC)     Hypertension     Leg swelling     Multiple gastric ulcers     Restless leg syndrome        Past Surgical History:        Procedure Laterality Date    BRONCHOSCOPY N/A 04/27/2023    BRONCHOSCOPY DIAGNOSTIC OR CELL WASH ONLY performed by Zhang Murphy MD at [facility] ENDOSCOPY    HERNIA REPAIR      HIP SURGERY      JOINT REPLACEMENT  1987    KNEE SURGERY         Current Medications:    Current Facility-Administered Medications: guaiFENesin (ROBITUSSIN) 100 MG/5ML liquid 200 mg, 200 mg, Oral, Q4H PRN  calcium gluconate 2,000 mg in sodium chloride 100 mL, 2,000 mg, IntraVENous, Once  fluticasone (FLONASE) 50 MCG/ACT nasal spray 1 spray, 1 spray, Each Nostril, Daily  LORazepam (ATIVAN) tablet 1 mg, 1 mg, Oral, Q8H PRN  sodium chloride (OCEAN, BABY AYR) 0.65 % nasal spray 1 spray, 1 spray, Nasal, PRN  dexAMETHasone (DECADRON) injection 6 mg, 6 mg, IntraVENous, Q24H  morphine 10 MG/5ML solution 6.5 mg, 6.5 mg, Oral, Q2H PRN  gabapentin  Full range of motion noted.  NEUROLOGIC:  Awake, alert, oriented to name, place and time.  Cranial nerves II-XII are grossly intact.    DATA:    CBC:   Recent Labs     12/30/24  0959   WBC 10.5   HGB 8.9*   HCT 29.2*   .7*          BMP:  Recent Labs     12/30/24  0959 12/31/24  0845 01/01/25  0356    139 136   K 4.0 3.0* 3.7   CL 90* 89* 87*   CO2 38* 39* 38*   BUN 13 11 11   CREATININE 0.7 0.7 0.7    ALB:3,BILIDIR:3,BILITOT:3,ALKPHOS:3)@    PT/INR: No results for input(s): \"PROTIME\", \"INR\" in the last 72 hours.    ABG:   No results for input(s): \"PH\", \"PO2\", \"PCO2\", \"HCO3\", \"BE\", \"O2SAT\", \"METHB\", \"O2HB\", \"COHB\", \"O2CON\", \"HHB\", \"THB\" in the last 72 hours.          Radiology Review:  CXR reviewed with slightly increased vascular markings bilateral lung fields    IMPRESSION/RECOMMENDATIONS:      COPD  Hypoxia  COVID  CHF    Check inflammatory markers, cont with steroids, elevated liver enzymes may preclude remdesivir/shane for now  Cont with nebs, observe respiratory function  Cont with oxygen, taper as tolerated, close to baseline  Watch fluid balance, diurese as per PCP  OOB to chair if tolerates      Time at the bedside, reviewing labs and radiographs, reviewing notes and consultations, discussing with staff and family was more than 55 minutes.    Thanks for letting us see this patient in consultation.  Please contact us with any questions. Office [phone] or after hours through [employer], x [phone].

## 2025-01-02 LAB
ALBUMIN SERPL-MCNC: 3.4 G/DL (ref 3.5–5.2)
ALP SERPL-CCNC: 273 U/L (ref 40–129)
ALT SERPL-CCNC: 84 U/L (ref 0–40)
ANION GAP SERPL CALCULATED.3IONS-SCNC: 10 MMOL/L (ref 7–16)
AST SERPL-CCNC: 26 U/L (ref 0–39)
BASOPHILS # BLD: 0 K/UL (ref 0–0.2)
BASOPHILS NFR BLD: 0 % (ref 0–2)
BILIRUB DIRECT SERPL-MCNC: 0.3 MG/DL (ref 0–0.3)
BILIRUB INDIRECT SERPL-MCNC: 0.4 MG/DL (ref 0–1)
BILIRUB SERPL-MCNC: 0.7 MG/DL (ref 0–1.2)
BUN SERPL-MCNC: 10 MG/DL (ref 6–20)
CALCIUM SERPL-MCNC: 7.4 MG/DL (ref 8.6–10.2)
CHLORIDE SERPL-SCNC: 90 MMOL/L (ref 98–107)
CO2 SERPL-SCNC: 37 MMOL/L (ref 22–29)
CREAT SERPL-MCNC: 0.6 MG/DL (ref 0.7–1.2)
EOSINOPHIL # BLD: 0 K/UL (ref 0.05–0.5)
EOSINOPHILS RELATIVE PERCENT: 0 % (ref 0–6)
ERYTHROCYTE [DISTWIDTH] IN BLOOD BY AUTOMATED COUNT: 14.3 % (ref 11.5–15)
GFR, ESTIMATED: >90 ML/MIN/1.73M2
GLUCOSE SERPL-MCNC: 113 MG/DL (ref 74–99)
HCT VFR BLD AUTO: 28.8 % (ref 37–54)
HGB BLD-MCNC: 8.8 G/DL (ref 12.5–16.5)
LYMPHOCYTES NFR BLD: 0.2 K/UL (ref 1.5–4)
LYMPHOCYTES RELATIVE PERCENT: 3 % (ref 20–42)
MAGNESIUM SERPL-MCNC: 1.6 MG/DL (ref 1.6–2.6)
MCH RBC QN AUTO: 32.6 PG (ref 26–35)
MCHC RBC AUTO-ENTMCNC: 30.6 G/DL (ref 32–34.5)
MCV RBC AUTO: 106.7 FL (ref 80–99.9)
MONOCYTES NFR BLD: 0.26 K/UL (ref 0.1–0.95)
MONOCYTES NFR BLD: 4 % (ref 2–12)
NEUTROPHILS NFR BLD: 94 % (ref 43–80)
NEUTS SEG NFR BLD: 7.14 K/UL (ref 1.8–7.3)
PLATELET # BLD AUTO: 195 K/UL (ref 130–450)
PMV BLD AUTO: 11.1 FL (ref 7–12)
POTASSIUM SERPL-SCNC: 3.6 MMOL/L (ref 3.5–5)
PROT SERPL-MCNC: 7.6 G/DL (ref 6.4–8.3)
RBC # BLD AUTO: 2.7 M/UL (ref 3.8–5.8)
RBC # BLD: ABNORMAL 10*6/UL
SODIUM SERPL-SCNC: 137 MMOL/L (ref 132–146)
WBC OTHER # BLD: 7.6 K/UL (ref 4.5–11.5)

## 2025-01-02 PROCEDURE — 87077 CULTURE AEROBIC IDENTIFY: CPT

## 2025-01-02 PROCEDURE — 83735 ASSAY OF MAGNESIUM: CPT

## 2025-01-02 PROCEDURE — 2500000003 HC RX 250 WO HCPCS: Performed by: STUDENT IN AN ORGANIZED HEALTH CARE EDUCATION/TRAINING PROGRAM

## 2025-01-02 PROCEDURE — 94640 AIRWAY INHALATION TREATMENT: CPT

## 2025-01-02 PROCEDURE — 6370000000 HC RX 637 (ALT 250 FOR IP): Performed by: STUDENT IN AN ORGANIZED HEALTH CARE EDUCATION/TRAINING PROGRAM

## 2025-01-02 PROCEDURE — 6360000002 HC RX W HCPCS: Performed by: STUDENT IN AN ORGANIZED HEALTH CARE EDUCATION/TRAINING PROGRAM

## 2025-01-02 PROCEDURE — 6360000002 HC RX W HCPCS: Performed by: INTERNAL MEDICINE

## 2025-01-02 PROCEDURE — 6370000000 HC RX 637 (ALT 250 FOR IP)

## 2025-01-02 PROCEDURE — 2700000000 HC OXYGEN THERAPY PER DAY

## 2025-01-02 PROCEDURE — 6370000000 HC RX 637 (ALT 250 FOR IP): Performed by: INTERNAL MEDICINE

## 2025-01-02 PROCEDURE — 2500000003 HC RX 250 WO HCPCS

## 2025-01-02 PROCEDURE — 6370000000 HC RX 637 (ALT 250 FOR IP): Performed by: NURSE PRACTITIONER

## 2025-01-02 PROCEDURE — 6360000002 HC RX W HCPCS

## 2025-01-02 PROCEDURE — 87070 CULTURE OTHR SPECIMN AEROBIC: CPT

## 2025-01-02 PROCEDURE — 87205 SMEAR GRAM STAIN: CPT

## 2025-01-02 PROCEDURE — 80053 COMPREHEN METABOLIC PANEL: CPT

## 2025-01-02 PROCEDURE — 36415 COLL VENOUS BLD VENIPUNCTURE: CPT

## 2025-01-02 PROCEDURE — 85025 COMPLETE CBC W/AUTO DIFF WBC: CPT

## 2025-01-02 PROCEDURE — 82248 BILIRUBIN DIRECT: CPT

## 2025-01-02 PROCEDURE — 96372 THER/PROPH/DIAG INJ SC/IM: CPT

## 2025-01-02 PROCEDURE — 2060000000 HC ICU INTERMEDIATE R&B

## 2025-01-02 PROCEDURE — 99232 SBSQ HOSP IP/OBS MODERATE 35: CPT | Performed by: STUDENT IN AN ORGANIZED HEALTH CARE EDUCATION/TRAINING PROGRAM

## 2025-01-02 RX ORDER — CALCIUM GLUCONATE 20 MG/ML
2000 INJECTION, SOLUTION INTRAVENOUS ONCE
Status: COMPLETED | OUTPATIENT
Start: 2025-01-02 | End: 2025-01-02

## 2025-01-02 RX ORDER — MAGNESIUM SULFATE IN WATER 40 MG/ML
2000 INJECTION, SOLUTION INTRAVENOUS ONCE
Status: COMPLETED | OUTPATIENT
Start: 2025-01-02 | End: 2025-01-02

## 2025-01-02 RX ORDER — POTASSIUM CHLORIDE 1500 MG/1
40 TABLET, EXTENDED RELEASE ORAL ONCE
Status: COMPLETED | OUTPATIENT
Start: 2025-01-02 | End: 2025-01-02

## 2025-01-02 RX ORDER — DEXAMETHASONE SODIUM PHOSPHATE 4 MG/ML
4 INJECTION, SOLUTION INTRA-ARTICULAR; INTRALESIONAL; INTRAMUSCULAR; INTRAVENOUS; SOFT TISSUE EVERY 12 HOURS SCHEDULED
Status: DISCONTINUED | OUTPATIENT
Start: 2025-01-02 | End: 2025-01-04 | Stop reason: HOSPADM

## 2025-01-02 RX ADMIN — CALCIUM GLUCONATE 2000 MG: 20 INJECTION, SOLUTION INTRAVENOUS at 14:44

## 2025-01-02 RX ADMIN — IPRATROPIUM BROMIDE AND ALBUTEROL SULFATE 1 DOSE: .5; 2.5 SOLUTION RESPIRATORY (INHALATION) at 00:21

## 2025-01-02 RX ADMIN — HYDROCODONE BITARTRATE AND ACETAMINOPHEN 1 TABLET: 5; 325 TABLET ORAL at 00:27

## 2025-01-02 RX ADMIN — ENOXAPARIN SODIUM 40 MG: 100 INJECTION SUBCUTANEOUS at 08:31

## 2025-01-02 RX ADMIN — IPRATROPIUM BROMIDE AND ALBUTEROL SULFATE 1 DOSE: .5; 2.5 SOLUTION RESPIRATORY (INHALATION) at 16:05

## 2025-01-02 RX ADMIN — HYDROCODONE BITARTRATE AND ACETAMINOPHEN 1 TABLET: 5; 325 TABLET ORAL at 16:20

## 2025-01-02 RX ADMIN — CALCIUM 1500 MG: 500 TABLET ORAL at 08:30

## 2025-01-02 RX ADMIN — GABAPENTIN 900 MG: 300 CAPSULE ORAL at 20:29

## 2025-01-02 RX ADMIN — LORAZEPAM 1 MG: 1 TABLET ORAL at 20:25

## 2025-01-02 RX ADMIN — MORPHINE SULFATE 6.5 MG: 10 SOLUTION ORAL at 18:57

## 2025-01-02 RX ADMIN — IPRATROPIUM BROMIDE AND ALBUTEROL SULFATE 1 DOSE: .5; 2.5 SOLUTION RESPIRATORY (INHALATION) at 20:15

## 2025-01-02 RX ADMIN — ARFORMOTEROL TARTRATE 15 MCG: 15 SOLUTION RESPIRATORY (INHALATION) at 20:15

## 2025-01-02 RX ADMIN — MAGNESIUM SULFATE HEPTAHYDRATE 2000 MG: 40 INJECTION, SOLUTION INTRAVENOUS at 14:49

## 2025-01-02 RX ADMIN — BUDESONIDE INHALATION 500 MCG: 0.5 SUSPENSION RESPIRATORY (INHALATION) at 20:15

## 2025-01-02 RX ADMIN — ARIPIPRAZOLE 5 MG: 5 TABLET ORAL at 08:29

## 2025-01-02 RX ADMIN — CALCIUM 1500 MG: 500 TABLET ORAL at 20:26

## 2025-01-02 RX ADMIN — PRAMIPEXOLE DIHYDROCHLORIDE 0.75 MG: 0.25 TABLET ORAL at 20:25

## 2025-01-02 RX ADMIN — FLUTICASONE PROPIONATE 1 SPRAY: 50 SPRAY, METERED NASAL at 08:33

## 2025-01-02 RX ADMIN — TAMSULOSIN HYDROCHLORIDE 0.4 MG: 0.4 CAPSULE ORAL at 20:24

## 2025-01-02 RX ADMIN — FLUOXETINE 20 MG: 10 TABLET, FILM COATED ORAL at 20:24

## 2025-01-02 RX ADMIN — DEXAMETHASONE SODIUM PHOSPHATE 4 MG: 4 INJECTION INTRA-ARTICULAR; INTRALESIONAL; INTRAMUSCULAR; INTRAVENOUS; SOFT TISSUE at 11:14

## 2025-01-02 RX ADMIN — SODIUM CHLORIDE, PRESERVATIVE FREE 10 ML: 5 INJECTION INTRAVENOUS at 11:14

## 2025-01-02 RX ADMIN — GUAIFENESIN 400 MG: 200 SOLUTION ORAL at 16:21

## 2025-01-02 RX ADMIN — IPRATROPIUM BROMIDE AND ALBUTEROL SULFATE 1 DOSE: .5; 2.5 SOLUTION RESPIRATORY (INHALATION) at 03:44

## 2025-01-02 RX ADMIN — HYDROCODONE BITARTRATE AND ACETAMINOPHEN 1 TABLET: 5; 325 TABLET ORAL at 22:50

## 2025-01-02 RX ADMIN — POTASSIUM CHLORIDE 40 MEQ: 1500 TABLET, EXTENDED RELEASE ORAL at 14:35

## 2025-01-02 RX ADMIN — IPRATROPIUM BROMIDE AND ALBUTEROL SULFATE 1 DOSE: .5; 2.5 SOLUTION RESPIRATORY (INHALATION) at 09:24

## 2025-01-02 RX ADMIN — METOPROLOL SUCCINATE 100 MG: 100 TABLET, FILM COATED, EXTENDED RELEASE ORAL at 08:29

## 2025-01-02 RX ADMIN — TAMSULOSIN HYDROCHLORIDE 0.4 MG: 0.4 CAPSULE ORAL at 08:29

## 2025-01-02 RX ADMIN — GABAPENTIN 600 MG: 300 CAPSULE ORAL at 08:29

## 2025-01-02 RX ADMIN — PANTOPRAZOLE SODIUM 40 MG: 40 TABLET, DELAYED RELEASE ORAL at 06:12

## 2025-01-02 RX ADMIN — IPRATROPIUM BROMIDE AND ALBUTEROL SULFATE 1 DOSE: .5; 2.5 SOLUTION RESPIRATORY (INHALATION) at 13:14

## 2025-01-02 RX ADMIN — ARFORMOTEROL TARTRATE 15 MCG: 15 SOLUTION RESPIRATORY (INHALATION) at 09:24

## 2025-01-02 RX ADMIN — DEXAMETHASONE SODIUM PHOSPHATE 4 MG: 4 INJECTION INTRA-ARTICULAR; INTRALESIONAL; INTRAMUSCULAR; INTRAVENOUS; SOFT TISSUE at 20:24

## 2025-01-02 RX ADMIN — FUROSEMIDE 20 MG: 20 TABLET ORAL at 18:41

## 2025-01-02 RX ADMIN — HYDROCODONE BITARTRATE AND ACETAMINOPHEN 1 TABLET: 5; 325 TABLET ORAL at 08:28

## 2025-01-02 RX ADMIN — FUROSEMIDE 40 MG: 40 TABLET ORAL at 08:29

## 2025-01-02 RX ADMIN — LORAZEPAM 1 MG: 1 TABLET ORAL at 00:27

## 2025-01-02 RX ADMIN — BUDESONIDE INHALATION 500 MCG: 0.5 SUSPENSION RESPIRATORY (INHALATION) at 09:24

## 2025-01-02 RX ADMIN — ASPIRIN 81 MG CHEWABLE TABLET 81 MG: 81 TABLET CHEWABLE at 08:29

## 2025-01-02 RX ADMIN — MORPHINE SULFATE 6.5 MG: 10 SOLUTION ORAL at 14:36

## 2025-01-02 RX ADMIN — GUAIFENESIN 400 MG: 200 SOLUTION ORAL at 11:14

## 2025-01-02 RX ADMIN — GUAIFENESIN 400 MG: 200 SOLUTION ORAL at 20:24

## 2025-01-02 RX ADMIN — SODIUM CHLORIDE, PRESERVATIVE FREE 10 ML: 5 INJECTION INTRAVENOUS at 14:38

## 2025-01-02 RX ADMIN — MORPHINE SULFATE 6.5 MG: 10 SOLUTION ORAL at 11:25

## 2025-01-02 RX ADMIN — LORAZEPAM 1 MG: 1 TABLET ORAL at 08:28

## 2025-01-02 RX ADMIN — MORPHINE SULFATE 6.5 MG: 10 SOLUTION ORAL at 20:41

## 2025-01-02 RX ADMIN — SODIUM CHLORIDE, PRESERVATIVE FREE 10 ML: 5 INJECTION INTRAVENOUS at 20:47

## 2025-01-02 RX ADMIN — SODIUM CHLORIDE, PRESERVATIVE FREE 10 ML: 5 INJECTION INTRAVENOUS at 11:15

## 2025-01-02 RX ADMIN — GUAIFENESIN 400 MG: 200 SOLUTION ORAL at 03:54

## 2025-01-02 RX ADMIN — GUAIFENESIN 400 MG: 200 SOLUTION ORAL at 08:28

## 2025-01-02 RX ADMIN — ERGOCALCIFEROL 50000 UNITS: 1.25 CAPSULE ORAL at 08:29

## 2025-01-02 RX ADMIN — ATORVASTATIN CALCIUM 40 MG: 40 TABLET, FILM COATED ORAL at 20:25

## 2025-01-02 ASSESSMENT — PAIN SCALES - GENERAL
PAINLEVEL_OUTOF10: 4
PAINLEVEL_OUTOF10: 8
PAINLEVEL_OUTOF10: 7
PAINLEVEL_OUTOF10: 10
PAINLEVEL_OUTOF10: 6
PAINLEVEL_OUTOF10: 8
PAINLEVEL_OUTOF10: 7
PAINLEVEL_OUTOF10: 6
PAINLEVEL_OUTOF10: 7

## 2025-01-02 ASSESSMENT — PAIN DESCRIPTION - ORIENTATION
ORIENTATION: LEFT;LOWER
ORIENTATION: RIGHT;LEFT
ORIENTATION: LEFT;LOWER
ORIENTATION: RIGHT;LEFT

## 2025-01-02 ASSESSMENT — PAIN DESCRIPTION - LOCATION
LOCATION: LEG
LOCATION: BACK;LEG
LOCATION: LEG;BACK
LOCATION: LEG

## 2025-01-02 ASSESSMENT — PAIN DESCRIPTION - DESCRIPTORS
DESCRIPTORS: ACHING;THROBBING
DESCRIPTORS: DISCOMFORT
DESCRIPTORS: ACHING;THROBBING

## 2025-01-02 NOTE — PROGRESS NOTES
[facility] Hospitalist Progress Note    Admitting Date and Time: 12/30/2024  9:23 AM  Admit Dx: Volume overload [E87.70]  Acute on chronic congestive heart failure, unspecified heart failure type (HCC) [I50.9]  COVID-19 [U07.1]    Subjective:  Patient is being followed for Volume overload [E87.70]  Acute on chronic congestive heart failure, unspecified heart failure type (HCC) [I50.9]  COVID-19 [U07.1]   Pt was seen and examined today. Denies any new issues    ROS: denies fever, chills, cp, sob, n/v, HA unless stated above.     dexAMETHasone  4 mg IntraVENous 2 times per day    calcium gluconate  2,000 mg IntraVENous Once    potassium chloride  40 mEq Oral Once    magnesium sulfate  2,000 mg IntraVENous Once    guaiFENesin  400 mg Oral Q4H    furosemide  40 mg Oral Daily    furosemide  20 mg Oral Dinner    fluticasone  1 spray Each Nostril Daily    gabapentin  900 mg Oral Nightly    gabapentin  600 mg Oral QAM    tamsulosin  0.4 mg Oral BID    sodium chloride flush  5-40 mL IntraVENous 2 times per day    enoxaparin  40 mg SubCUTAneous Daily    ARIPiprazole  5 mg Oral Daily    aspirin  81 mg Oral Daily    atorvastatin  40 mg Oral Nightly    budesonide  0.5 mg Nebulization BID RT    calcium elemental  1,500 mg Oral BID    Ensifentrine  2.5 mL Inhalation BID    vitamin D  50,000 Units Oral Once per day on Monday Thursday    FLUoxetine  20 mg Oral Nightly    metoprolol succinate  100 mg Oral Daily    pantoprazole  40 mg Oral QAM AC    pramipexole  0.75 mg Oral Nightly    arformoterol tartrate  15 mcg Nebulization BID RT    ipratropium 0.5 mg-albuterol 2.5 mg  1 Dose Inhalation Q4H     LORazepam, 1 mg, Q8H PRN  sodium chloride, 1 spray, PRN  morphine, 6.5 mg, Q2H PRN  sodium chloride flush, 5-40 mL, PRN  sodium chloride, , PRN  polyethylene glycol, 17 g, Daily PRN  acetaminophen, 650 mg, Q6H PRN   Or  acetaminophen, 650 mg, Q6H PRN  potassium chloride, 40 mEq, PRN   Or  potassium alternative oral replacement, 40  ppx: Lovenox/Protonix  Dispo: Continue care    Time spent reviewing chart, clinical exam, discussing case and answering questions with staff/consultants/patient/family = 35 min    NOTE: This report was transcribed using voice recognition software. Every effort was made to ensure accuracy; however, inadvertent computerized transcription errors may be present.  Electronically signed by Jan Woodruff MD on 1/2/2025 at 2:17 PM

## 2025-01-02 NOTE — CARE COORDINATION
Social Work:    Follow-up visit was made with Олег who confirmed plans for return home with HH and aide care. Олег advises that his sister will drive him home and that he has 02, and all needed DME as listed in prior  note. Олег also advised that he has home nursing already in place with [facility] (Audra [phone]) [facility], as well as aide care. Social service spoke with Audra who advised that she has been Олег's nurse since 2013 and Олег's sister Sayra is his home aide.  Audra requested a N-N update prior to discharge and faxed resumption of HHC orders.  She did not have the fax number handy but will obtain it for discharge orders.     Electronically signed by FEDERICO Lauren on 1/2/2025 at 3:22 PM

## 2025-01-02 NOTE — PROGRESS NOTES
[facility]  [address], [address]  [place] [address]      Pulmonary Progress Note      SUBJECTIVE:  claims feeling some better with breathing and cough but not back to baseline, still with large amount of sputum production    OBJECTIVE    Medications    Continuous Infusions:   sodium chloride         Scheduled Meds:   guaiFENesin  400 mg Oral Q4H    furosemide  40 mg Oral Daily    furosemide  20 mg Oral Dinner    fluticasone  1 spray Each Nostril Daily    dexAMETHasone  6 mg IntraVENous Q24H    gabapentin  900 mg Oral Nightly    gabapentin  600 mg Oral QAM    tamsulosin  0.4 mg Oral BID    sodium chloride flush  5-40 mL IntraVENous 2 times per day    enoxaparin  40 mg SubCUTAneous Daily    ARIPiprazole  5 mg Oral Daily    aspirin  81 mg Oral Daily    atorvastatin  40 mg Oral Nightly    budesonide  0.5 mg Nebulization BID RT    calcium elemental  1,500 mg Oral BID    Ensifentrine  2.5 mL Inhalation BID    vitamin D  50,000 Units Oral Once per day on Monday Thursday    FLUoxetine  20 mg Oral Nightly    metoprolol succinate  100 mg Oral Daily    pantoprazole  40 mg Oral QAM AC    pramipexole  0.75 mg Oral Nightly    arformoterol tartrate  15 mcg Nebulization BID RT    ipratropium 0.5 mg-albuterol 2.5 mg  1 Dose Inhalation Q4H       PRN Meds:LORazepam, sodium chloride, morphine, sodium chloride flush, sodium chloride, polyethylene glycol, acetaminophen **OR** acetaminophen, potassium chloride **OR** potassium alternative oral replacement **OR** [DISCONTINUED] potassium chloride, magnesium sulfate, promethazine, calcium carbonate, HYDROcodone 5 mg - acetaminophen, albuterol    Physical    VITALS:  /69   Pulse 91   Temp 97.7 °F (36.5 °C) (Oral)   Resp 18   Ht 1.626 m (5' 4\")   Wt 60.6 kg (133 lb 9.6 oz)   SpO2 100%   BMI 22.93 kg/m²     24HR INTAKE/OUTPUT:      Intake/Output Summary (Last 24 hours) at 1/2/2025 0812  Last data filed at

## 2025-01-02 NOTE — PLAN OF CARE
Problem: ABCDS Injury Assessment  Goal: Absence of physical injury  1/1/2025 2225 by Penny Wolfe RN  Outcome: Progressing  1/1/2025 0957 by Areli Goodwin RN  Outcome: Progressing     Problem: Skin/Tissue Integrity  Goal: Absence of new skin breakdown  Description: 1.  Monitor for areas of redness and/or skin breakdown  2.  Assess vascular access sites hourly  3.  Every 4-6 hours minimum:  Change oxygen saturation probe site  4.  Every 4-6 hours:  If on nasal continuous positive airway pressure, respiratory therapy assess nares and determine need for appliance change or resting period.  1/1/2025 2225 by Penny Wolfe RN  Outcome: Progressing  1/1/2025 0957 by Areli Goodwin RN  Outcome: Progressing     Problem: Discharge Planning  Goal: Discharge to home or other facility with appropriate resources  1/1/2025 2225 by Penny Wolfe RN  Outcome: Progressing  1/1/2025 0957 by Areli Goodwin RN  Outcome: Progressing

## 2025-01-02 NOTE — ACP (ADVANCE CARE PLANNING)
Advance Care Planning   Healthcare Decision Maker:    Primary Decision Maker (Active): Sayra Burt - Brother/Sister - [phone]    Primary Decision Maker: Kimberley Johnson - Brother/Sister - [phone]    Secondary Decision Maker: MarianaDarren - Child - [phone]    Click here to complete Healthcare Decision Makers including selection of the Healthcare Decision Maker Relationship (ie \"Primary\").  Today we documented Decision Maker(s) consistent with ACP documents on file.

## 2025-01-03 ENCOUNTER — APPOINTMENT (OUTPATIENT)
Dept: GENERAL RADIOLOGY | Age: 60
DRG: 194 | End: 2025-01-03
Payer: MEDICAID

## 2025-01-03 LAB
ANION GAP SERPL CALCULATED.3IONS-SCNC: 10 MMOL/L (ref 7–16)
BASOPHILS # BLD: 0 K/UL (ref 0–0.2)
BASOPHILS NFR BLD: 0 % (ref 0–2)
BNP SERPL-MCNC: 464 PG/ML (ref 0–125)
BUN SERPL-MCNC: 15 MG/DL (ref 6–20)
CALCIUM SERPL-MCNC: 7.9 MG/DL (ref 8.6–10.2)
CHLORIDE SERPL-SCNC: 91 MMOL/L (ref 98–107)
CO2 SERPL-SCNC: 35 MMOL/L (ref 22–29)
CREAT SERPL-MCNC: 0.6 MG/DL (ref 0.7–1.2)
CRP SERPL HS-MCNC: 77 MG/L (ref 0–5)
EOSINOPHIL # BLD: 0 K/UL (ref 0.05–0.5)
EOSINOPHILS RELATIVE PERCENT: 0 % (ref 0–6)
ERYTHROCYTE [DISTWIDTH] IN BLOOD BY AUTOMATED COUNT: 14.1 % (ref 11.5–15)
GFR, ESTIMATED: >90 ML/MIN/1.73M2
GLUCOSE SERPL-MCNC: 181 MG/DL (ref 74–99)
HCT VFR BLD AUTO: 31 % (ref 37–54)
HGB BLD-MCNC: 9.2 G/DL (ref 12.5–16.5)
IMM GRANULOCYTES # BLD AUTO: 0.03 K/UL (ref 0–0.58)
IMM GRANULOCYTES NFR BLD: 1 % (ref 0–5)
LYMPHOCYTES NFR BLD: 0.28 K/UL (ref 1.5–4)
LYMPHOCYTES RELATIVE PERCENT: 4 % (ref 20–42)
MAGNESIUM SERPL-MCNC: 2.1 MG/DL (ref 1.6–2.6)
MCH RBC QN AUTO: 31.9 PG (ref 26–35)
MCHC RBC AUTO-ENTMCNC: 29.7 G/DL (ref 32–34.5)
MCV RBC AUTO: 107.6 FL (ref 80–99.9)
MONOCYTES NFR BLD: 0.32 K/UL (ref 0.1–0.95)
MONOCYTES NFR BLD: 5 % (ref 2–12)
NEUTROPHILS NFR BLD: 90 % (ref 43–80)
NEUTS SEG NFR BLD: 5.88 K/UL (ref 1.8–7.3)
PLATELET # BLD AUTO: 202 K/UL (ref 130–450)
PMV BLD AUTO: 10.9 FL (ref 7–12)
POTASSIUM SERPL-SCNC: 4.7 MMOL/L (ref 3.5–5)
PROCALCITONIN SERPL-MCNC: 0.18 NG/ML (ref 0–0.08)
RBC # BLD AUTO: 2.88 M/UL (ref 3.8–5.8)
RBC # BLD: ABNORMAL 10*6/UL
SODIUM SERPL-SCNC: 136 MMOL/L (ref 132–146)
WBC OTHER # BLD: 6.5 K/UL (ref 4.5–11.5)

## 2025-01-03 PROCEDURE — 6360000002 HC RX W HCPCS

## 2025-01-03 PROCEDURE — 6370000000 HC RX 637 (ALT 250 FOR IP)

## 2025-01-03 PROCEDURE — 6370000000 HC RX 637 (ALT 250 FOR IP): Performed by: INTERNAL MEDICINE

## 2025-01-03 PROCEDURE — 99239 HOSP IP/OBS DSCHRG MGMT >30: CPT | Performed by: STUDENT IN AN ORGANIZED HEALTH CARE EDUCATION/TRAINING PROGRAM

## 2025-01-03 PROCEDURE — 6370000000 HC RX 637 (ALT 250 FOR IP): Performed by: NURSE PRACTITIONER

## 2025-01-03 PROCEDURE — 83735 ASSAY OF MAGNESIUM: CPT

## 2025-01-03 PROCEDURE — 85025 COMPLETE CBC W/AUTO DIFF WBC: CPT

## 2025-01-03 PROCEDURE — 2500000003 HC RX 250 WO HCPCS: Performed by: STUDENT IN AN ORGANIZED HEALTH CARE EDUCATION/TRAINING PROGRAM

## 2025-01-03 PROCEDURE — 6360000002 HC RX W HCPCS: Performed by: INTERNAL MEDICINE

## 2025-01-03 PROCEDURE — 84145 PROCALCITONIN (PCT): CPT

## 2025-01-03 PROCEDURE — 94640 AIRWAY INHALATION TREATMENT: CPT

## 2025-01-03 PROCEDURE — 83880 ASSAY OF NATRIURETIC PEPTIDE: CPT

## 2025-01-03 PROCEDURE — 6370000000 HC RX 637 (ALT 250 FOR IP): Performed by: STUDENT IN AN ORGANIZED HEALTH CARE EDUCATION/TRAINING PROGRAM

## 2025-01-03 PROCEDURE — 86140 C-REACTIVE PROTEIN: CPT

## 2025-01-03 PROCEDURE — 2500000003 HC RX 250 WO HCPCS

## 2025-01-03 PROCEDURE — 36415 COLL VENOUS BLD VENIPUNCTURE: CPT

## 2025-01-03 PROCEDURE — 80048 BASIC METABOLIC PNL TOTAL CA: CPT

## 2025-01-03 PROCEDURE — 2060000000 HC ICU INTERMEDIATE R&B

## 2025-01-03 PROCEDURE — 71045 X-RAY EXAM CHEST 1 VIEW: CPT

## 2025-01-03 PROCEDURE — 2700000000 HC OXYGEN THERAPY PER DAY

## 2025-01-03 RX ORDER — DEXAMETHASONE 2 MG/1
TABLET ORAL
Qty: 14 TABLET | Refills: 0 | Status: SHIPPED | OUTPATIENT
Start: 2025-01-03

## 2025-01-03 RX ORDER — BUDESONIDE 0.5 MG/2ML
0.5 INHALANT ORAL
Qty: 60 EACH | Refills: 5 | Status: SHIPPED | OUTPATIENT
Start: 2025-01-03

## 2025-01-03 RX ORDER — FUROSEMIDE 20 MG/1
20 TABLET ORAL
Qty: 90 TABLET | Refills: 0 | Status: SHIPPED | OUTPATIENT
Start: 2025-01-03

## 2025-01-03 RX ADMIN — BUDESONIDE INHALATION 500 MCG: 0.5 SUSPENSION RESPIRATORY (INHALATION) at 09:12

## 2025-01-03 RX ADMIN — FUROSEMIDE 40 MG: 40 TABLET ORAL at 07:49

## 2025-01-03 RX ADMIN — GUAIFENESIN 400 MG: 200 SOLUTION ORAL at 01:27

## 2025-01-03 RX ADMIN — TAMSULOSIN HYDROCHLORIDE 0.4 MG: 0.4 CAPSULE ORAL at 07:51

## 2025-01-03 RX ADMIN — ARFORMOTEROL TARTRATE 15 MCG: 15 SOLUTION RESPIRATORY (INHALATION) at 09:12

## 2025-01-03 RX ADMIN — LORAZEPAM 1 MG: 1 TABLET ORAL at 05:40

## 2025-01-03 RX ADMIN — HYDROCODONE BITARTRATE AND ACETAMINOPHEN 1 TABLET: 5; 325 TABLET ORAL at 14:56

## 2025-01-03 RX ADMIN — MORPHINE SULFATE 6.5 MG: 10 SOLUTION ORAL at 03:27

## 2025-01-03 RX ADMIN — MORPHINE SULFATE 6.5 MG: 10 SOLUTION ORAL at 12:02

## 2025-01-03 RX ADMIN — FLUTICASONE PROPIONATE 1 SPRAY: 50 SPRAY, METERED NASAL at 07:52

## 2025-01-03 RX ADMIN — FUROSEMIDE 20 MG: 20 TABLET ORAL at 18:13

## 2025-01-03 RX ADMIN — GABAPENTIN 600 MG: 300 CAPSULE ORAL at 07:50

## 2025-01-03 RX ADMIN — GUAIFENESIN 400 MG: 200 SOLUTION ORAL at 20:32

## 2025-01-03 RX ADMIN — IPRATROPIUM BROMIDE AND ALBUTEROL SULFATE 1 DOSE: .5; 2.5 SOLUTION RESPIRATORY (INHALATION) at 16:03

## 2025-01-03 RX ADMIN — ASPIRIN 81 MG CHEWABLE TABLET 81 MG: 81 TABLET CHEWABLE at 07:49

## 2025-01-03 RX ADMIN — CALCIUM 1500 MG: 500 TABLET ORAL at 07:51

## 2025-01-03 RX ADMIN — SODIUM CHLORIDE, PRESERVATIVE FREE 10 ML: 5 INJECTION INTRAVENOUS at 07:52

## 2025-01-03 RX ADMIN — IPRATROPIUM BROMIDE AND ALBUTEROL SULFATE 1 DOSE: .5; 2.5 SOLUTION RESPIRATORY (INHALATION) at 09:12

## 2025-01-03 RX ADMIN — TAMSULOSIN HYDROCHLORIDE 0.4 MG: 0.4 CAPSULE ORAL at 20:31

## 2025-01-03 RX ADMIN — GUAIFENESIN 400 MG: 200 SOLUTION ORAL at 16:48

## 2025-01-03 RX ADMIN — IPRATROPIUM BROMIDE AND ALBUTEROL SULFATE 1 DOSE: .5; 2.5 SOLUTION RESPIRATORY (INHALATION) at 03:29

## 2025-01-03 RX ADMIN — GABAPENTIN 900 MG: 300 CAPSULE ORAL at 20:31

## 2025-01-03 RX ADMIN — MORPHINE SULFATE 6.5 MG: 10 SOLUTION ORAL at 07:48

## 2025-01-03 RX ADMIN — ARIPIPRAZOLE 5 MG: 5 TABLET ORAL at 07:51

## 2025-01-03 RX ADMIN — GUAIFENESIN 400 MG: 200 SOLUTION ORAL at 12:01

## 2025-01-03 RX ADMIN — ARFORMOTEROL TARTRATE 15 MCG: 15 SOLUTION RESPIRATORY (INHALATION) at 19:48

## 2025-01-03 RX ADMIN — METOPROLOL SUCCINATE 100 MG: 100 TABLET, FILM COATED, EXTENDED RELEASE ORAL at 07:50

## 2025-01-03 RX ADMIN — DEXAMETHASONE SODIUM PHOSPHATE 4 MG: 4 INJECTION INTRA-ARTICULAR; INTRALESIONAL; INTRAMUSCULAR; INTRAVENOUS; SOFT TISSUE at 07:51

## 2025-01-03 RX ADMIN — GUAIFENESIN 400 MG: 200 SOLUTION ORAL at 23:46

## 2025-01-03 RX ADMIN — IPRATROPIUM BROMIDE AND ALBUTEROL SULFATE 1 DOSE: .5; 2.5 SOLUTION RESPIRATORY (INHALATION) at 19:48

## 2025-01-03 RX ADMIN — GUAIFENESIN 400 MG: 200 SOLUTION ORAL at 05:40

## 2025-01-03 RX ADMIN — ENOXAPARIN SODIUM 40 MG: 100 INJECTION SUBCUTANEOUS at 07:51

## 2025-01-03 RX ADMIN — LORAZEPAM 1 MG: 1 TABLET ORAL at 23:46

## 2025-01-03 RX ADMIN — IPRATROPIUM BROMIDE AND ALBUTEROL SULFATE 1 DOSE: .5; 2.5 SOLUTION RESPIRATORY (INHALATION) at 12:57

## 2025-01-03 RX ADMIN — FLUOXETINE HYDROCHLORIDE 20 MG: 20 CAPSULE ORAL at 20:31

## 2025-01-03 RX ADMIN — GUAIFENESIN 400 MG: 200 SOLUTION ORAL at 07:47

## 2025-01-03 RX ADMIN — MORPHINE SULFATE 6.5 MG: 10 SOLUTION ORAL at 01:27

## 2025-01-03 RX ADMIN — MORPHINE SULFATE 6.5 MG: 10 SOLUTION ORAL at 18:13

## 2025-01-03 RX ADMIN — PRAMIPEXOLE DIHYDROCHLORIDE 0.75 MG: 0.25 TABLET ORAL at 20:31

## 2025-01-03 RX ADMIN — BUDESONIDE INHALATION 500 MCG: 0.5 SUSPENSION RESPIRATORY (INHALATION) at 19:49

## 2025-01-03 RX ADMIN — HYDROCODONE BITARTRATE AND ACETAMINOPHEN 1 TABLET: 5; 325 TABLET ORAL at 20:30

## 2025-01-03 RX ADMIN — PANTOPRAZOLE SODIUM 40 MG: 40 TABLET, DELAYED RELEASE ORAL at 05:40

## 2025-01-03 RX ADMIN — HYDROCODONE BITARTRATE AND ACETAMINOPHEN 1 TABLET: 5; 325 TABLET ORAL at 05:40

## 2025-01-03 RX ADMIN — IPRATROPIUM BROMIDE AND ALBUTEROL SULFATE 1 DOSE: .5; 2.5 SOLUTION RESPIRATORY (INHALATION) at 01:08

## 2025-01-03 ASSESSMENT — PAIN DESCRIPTION - LOCATION
LOCATION: BACK;LEG
LOCATION: LEG

## 2025-01-03 ASSESSMENT — PAIN SCALES - GENERAL
PAINLEVEL_OUTOF10: 7
PAINLEVEL_OUTOF10: 4
PAINLEVEL_OUTOF10: 7
PAINLEVEL_OUTOF10: 3
PAINLEVEL_OUTOF10: 9
PAINLEVEL_OUTOF10: 3
PAINLEVEL_OUTOF10: 8
PAINLEVEL_OUTOF10: 3
PAINLEVEL_OUTOF10: 3
PAINLEVEL_OUTOF10: 8
PAINLEVEL_OUTOF10: 3
PAINLEVEL_OUTOF10: 7
PAINLEVEL_OUTOF10: 10
PAINLEVEL_OUTOF10: 4
PAINLEVEL_OUTOF10: 7

## 2025-01-03 ASSESSMENT — PAIN DESCRIPTION - DESCRIPTORS
DESCRIPTORS: ACHING;SHARP
DESCRIPTORS: ACHING;STABBING
DESCRIPTORS: ACHING;THROBBING
DESCRIPTORS: ACHING;SHARP
DESCRIPTORS: THROBBING
DESCRIPTORS: ACHING;THROBBING
DESCRIPTORS: THROBBING
DESCRIPTORS: ACHING;SHARP

## 2025-01-03 ASSESSMENT — PAIN - FUNCTIONAL ASSESSMENT
PAIN_FUNCTIONAL_ASSESSMENT: PREVENTS OR INTERFERES SOME ACTIVE ACTIVITIES AND ADLS
PAIN_FUNCTIONAL_ASSESSMENT: PREVENTS OR INTERFERES WITH MANY ACTIVE NOT PASSIVE ACTIVITIES
PAIN_FUNCTIONAL_ASSESSMENT: ACTIVITIES ARE NOT PREVENTED
PAIN_FUNCTIONAL_ASSESSMENT: PREVENTS OR INTERFERES WITH ALL ACTIVE AND SOME PASSIVE ACTIVITIES

## 2025-01-03 ASSESSMENT — PAIN DESCRIPTION - ORIENTATION
ORIENTATION: LEFT;LOWER
ORIENTATION: LEFT
ORIENTATION: RIGHT;LEFT
ORIENTATION: LEFT;LOWER

## 2025-01-03 NOTE — DISCHARGE SUMMARY
[facility] Hospitalist Physician Discharge Summary       Jana Sanchez MD  [address] [facility] [address]  [phone]    Schedule an appointment as soon as possible for a visit in 1 week(s)  Hospital Follow up    Zhang Murphy MD  [address]  [address]  [facility] [address]  [phone]    Follow up        Activity level: As tolerated     Dispo: [facility]      Condition on discharge: Stable     Patient ID:  Dg Peña  [id number]  59 y.o.  1965    Admit date: 12/30/2024    Discharge date and time:  1/3/2025  2:53 PM    Admission Diagnoses: Principal Problem:    Volume overload  Active Problems:    Hypocalcemia    COVID-19    End stage COPD (HCC)    Hypomagnesemia    Anemia of chronic disease  Resolved Problems:    * No resolved hospital problems. *      Discharge Diagnoses: Principal Problem:    Volume overload  Active Problems:    Hypocalcemia    COVID-19    End stage COPD (HCC)    Hypomagnesemia    Anemia of chronic disease  Resolved Problems:    * No resolved hospital problems. *      Consults:  IP CONSULT TO HOSPITALIST  IP CONSULT TO HEART FAILURE NURSE/COORDINATOR  IP CONSULT TO PULMONOLOGY  IP CONSULT TO VASCULAR ACCESS TEAM    Procedures:     Hospital Course:   Patient Dg Peña is a 59 y.o. with PMHx end stage CHF, end stage COPD on 5L NC, HTN, anxiety who presented with Volume overload [E87.70]  Acute on chronic congestive heart failure, unspecified heart failure type (HCC) [I50.9]  COVID-19 [U07.1]  Patient presented with increased SOB and increased BLE edema.  He was found to have COVID-19 infection.  He was also found to have acute on chronic CHF.  He received steroids and diuretics, improved.  His diuretic dose was adjusted moving forward to 40 mg of Lasix in the morning and 20 mg in the afternoon, he will follow-up with heart failure clinic.  He will be on a steroid taper on discharge.  He was discharged home with home health, in stable condition.  He will

## 2025-01-03 NOTE — PROGRESS NOTES
1/3/2025 0548  Gross per 24 hour   Intake --   Output 1650 ml   Net -1650 ml       24HR PULSE OXIMETRY RANGE:    SpO2  Av.3 %  Min: 95 %  Max: 98 %    General appearance: alert, appears stated age, and cooperative  Lungs: rhonchi bilaterally  Heart: regular rate and rhythm, S1, S2 normal, no murmur, click, rub or gallop  Abdomen: soft, non-tender; bowel sounds normal; no masses,  no organomegaly  Extremities: extremities normal, atraumatic, no cyanosis or edema  Neurologic: Mental status: Alert, oriented, thought content appropriate    Data    CBC:   Recent Labs     25  0903 25  1018   WBC 7.6 6.5   HGB 8.8* 9.2*   HCT 28.8* 31.0*   .7* 107.6*    202       BMP:  Recent Labs     25  0356 25  0903 25  1018    137 136   K 3.7 3.6 4.7   CL 87* 90* 91*   CO2 38* 37* 35*   BUN 11 10 15   CREATININE 0.7 0.6* 0.6*    ALB:3,BILIDIR:3,BILITOT:3,ALKPHOS:3)@    PT/INR: No results for input(s): \"PROTIME\", \"INR\" in the last 72 hours.    ABG:   No results for input(s): \"PH\", \"PO2\", \"PCO2\", \"HCO3\", \"BE\", \"O2SAT\", \"METHB\", \"O2HB\", \"COHB\", \"O2CON\", \"HHB\", \"THB\" in the last 72 hours.          Radiology/Other tests reviewed: CXR reviewed looking similar compared to previous    Assessment:     Principal Problem:    Volume overload  Active Problems:    Hypocalcemia    COVID-19    End stage COPD (HCC)    Hypomagnesemia    Anemia of chronic disease  Resolved Problems:    * No resolved hospital problems. *      Plan:       Cont with decadron 4 mg bid, await inflammatory markers today  Cont with oxygen, taper as tolerated  Cont with nebs, observe respiratory function and cough  OOB to chair if tolerates  Possible discharge today or tomorrow if remains stable      Time at the bedside, reviewing labs and radiographs, reviewing notes and consultations, discussing with staff and family was more than 50 minutes.      Thanks for letting us see this patient in consultation.  Please contact us  with any questions. Office [phone] or after hours through [employer], x [phone].

## 2025-01-03 NOTE — PROGRESS NOTES
Updated Dr. Woodruff that patient does not feel safe discharging tonight due to wheezing.   Okay to discharge tomorrow morning.  Patient updated

## 2025-01-03 NOTE — CARE COORDINATION
Social Work:    Notified Audra at [facility] of discharge home today. Sent order for SUNDAY with patient. RN to give Audra an update prior to discharge.     Electronically signed by FEDERICO Lauren on 1/3/2025 at 1:38 PM   Additional Progress Note...

## 2025-01-03 NOTE — DISCHARGE INSTR - COC
Continuity of Care Form    Patient Name: Dg Peña   :  1965  MRN:  [id number]    Admit date:  2024  Discharge date:  ***    Code Status Order: Full Code   Advance Directives:   Advance Care Flowsheet Documentation             Admitting Physician:  Jan Woodruff MD  PCP: Jana Sanchez MD    Discharging Nurse: ***  Discharging Hospital Unit/Room#: 0406/0406-A  Discharging Unit Phone Number: ***    Emergency Contact:   Extended Emergency Contact Information  Primary Emergency Contact: [place] [address] [facility]  Home Phone: [phone]  Relation: Brother/Sister  Secondary Emergency Contact: Kimberley Johnson  Home Phone: [phone]  Relation: Brother/Sister    Past Surgical History:  Past Surgical History:   Procedure Laterality Date    BRONCHOSCOPY N/A 2023    BRONCHOSCOPY DIAGNOSTIC OR CELL WASH ONLY performed by Zhang Murphy MD at [facility] ENDOSCOPY    HERNIA REPAIR      HIP SURGERY      JOINT REPLACEMENT      KNEE SURGERY         Immunization History:   Immunization History   Administered Date(s) Administered    COVID-19, J&J, (age 18y+), IM, 0.5 mL 2021    Influenza Virus Vaccine 10/14/2015    Influenza, FLUARIX, FLULAVAL, FLUZONE (age 6 mo+) and AFLURIA, (age 3 y+), Quadv PF, 0.5mL 10/14/2015    Influenza, FLUCELVAX, (age 6 mo+), MDCK, Quadv PF, 0.5mL 2022    Pneumococcal, PCV-13, PREVNAR 13, (age 6w+), IM, 0.5mL 2020    Pneumococcal, PCV20, PREVNAR 20, (age 6w+), IM, 0.5mL 2022    Pneumococcal, PPSV23, PNEUMOVAX 23, (age 2y+), SC/IM, 0.5mL 10/14/2015       Active Problems:  Patient Active Problem List   Diagnosis Code    Essential hypertension I10    Hypertension I10    Peripheral vascular disease (HCC) I73.9    Chronic obstructive pulmonary disease (HCC) J44.9    Tobacco use Z72.0    Raynaud's phenomenon I73.00    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, left Z89.622    S/P  failure ([facility]) I50.9    Vitamin D deficiency E55.9    Acute respiratory failure with hypercapnia J96.02    Volume overload E87.70    COVID-19 U07.1    End stage COPD (HCC) J44.9    Hypomagnesemia E83.42    Anemia of chronic disease D63.8       Isolation/Infection:   Isolation            Contact  Droplet Plus          Patient Infection Status       Infection Onset Added Last Indicated Last Indicated By Review Planned Expiration Resolved Resolved By    COVID-19 24 Respiratory Panel, Molecular, with COVID-19 (Restricted: peds pts or suitable admitted adults) 25      VRE 23 Culture, Wound Aerobic Only        CRE (Carbapenem-Resistant Enterobacteriaceae)  23 Amanda Carlos        Pseudomonas aeruginosa, Nasopharyngeal Swab from Sputum Expectorated, 2023    Resolved    Parainfluenza 23 Respiratory Panel, Molecular, with COVID-19 (Restricted: peds pts or suitable admitted adults)   23 Infection     Respiratory Syncytial Virus (RSV) 10/31/22 10/31/22 10/31/22 Rapid RSV Antigen   11/10/22 Infection     Respiratory Syncytial Virus (RSV) 10/16/22 10/16/22 10/16/22 Respiratory Panel, Molecular, with COVID-19 (Restricted: peds pts or suitable admitted adults)   10/26/22 Infection     COVID-19 21 COVID-19, Rapid   21 Infection                        Nurse Assessment:  Last Vital Signs: BP (!) 143/81   Pulse 76   Temp 97.5 °F (36.4 °C) (Axillary)   Resp 16   Ht 1.626 m (5' 4\")   Wt 60.6 kg (133 lb 9.6 oz)   SpO2 95%   BMI 22.93 kg/m²     Last documented pain score (0-10 scale): Pain Level: 7  Last Weight:   Wt Readings from Last 1 Encounters:   25 60.6 kg (133 lb 9.6 oz)     Mental Status:  {IP PT MENTAL STATUS:}    IV Access:  {[facility] IV ACCESS:[id number]}    Nursing Mobility/ADLs:  Walking   {[facility] ADLs:[id number]}  Transfer  {[facility] DME

## 2025-01-03 NOTE — PLAN OF CARE
Problem: ABCDS Injury Assessment  Goal: Absence of physical injury  1/3/2025 0008 by Areli Nunez RN  Outcome: Progressing  1/2/2025 1740 by Beharry, Phyllis, RN  Outcome: Progressing     Problem: Skin/Tissue Integrity  Goal: Absence of new skin breakdown  Description: 1.  Monitor for areas of redness and/or skin breakdown  2.  Assess vascular access sites hourly  3.  Every 4-6 hours minimum:  Change oxygen saturation probe site  4.  Every 4-6 hours:  If on nasal continuous positive airway pressure, respiratory therapy assess nares and determine need for appliance change or resting period.  1/3/2025 0008 by Areli Nunez RN  Outcome: Progressing  1/2/2025 1740 by Beharry, Phyllis, RN  Outcome: Progressing     Problem: Discharge Planning  Goal: Discharge to home or other facility with appropriate resources  1/3/2025 0008 by Areli Nunez RN  Outcome: Progressing  1/2/2025 1740 by Beharry, Phyllis, RN  Outcome: Progressing     Problem: Safety - Adult  Goal: Free from fall injury  1/3/2025 0008 by Areli Nunez RN  Outcome: Progressing  1/2/2025 1740 by Beharry, Phyllis, RN  Outcome: Progressing     Problem: Chronic Conditions and Co-morbidities  Goal: Patient's chronic conditions and co-morbidity symptoms are monitored and maintained or improved  1/3/2025 0008 by Areli Nunez RN  Outcome: Progressing  1/2/2025 1740 by Beharry, Phyllis, RN  Outcome: Progressing     Problem: Pain  Goal: Verbalizes/displays adequate comfort level or baseline comfort level  1/3/2025 0008 by Areli Nunez RN  Outcome: Progressing  1/2/2025 1740 by Beharry, Phyllis, RN  Outcome: Progressing

## 2025-01-03 NOTE — PROGRESS NOTES
Physician Progress Note      PATIENT:               BRITNEY GONSALES  CSN #:                  [id number]  :                       1965  ADMIT DATE:       2024 9:23 AM  DISCH DATE:  RESPONDING  PROVIDER #:        Jan Woodruff MD          QUERY TEXT:    Pt admitted with CHF.  Pt noted to have end stage COPD on home oxygen.. If   possible, please document in the progress notes and discharge summary if you   are evaluating and/or treating any of the following:    The medical record reflects the following:  Risk Factors: COPD  Clinical Indicators: ED \"Patient also on chronic oxygen for COPD.\"   PN \"O2 around baseline at this time.\"  Treatment: continue home oxygen  Options provided:  -- Chronic respiratory failure with hypoxia  -- Chronic respiratory failure with hypercapnia  -- Chronic respiratory failure with hypoxia and hypercapnia  -- Other - I will add my own diagnosis  -- Disagree - Not applicable / Not valid  -- Disagree - Clinically unable to determine / Unknown  -- Refer to Clinical Documentation Reviewer    PROVIDER RESPONSE TEXT:    This patient has chronic respiratory failure with hypoxia.    Query created by: Kimberley Trevino on 2025 4:23 PM      Electronically signed by:  Jan Woodruff MD 2025 8:51 PM

## 2025-01-04 VITALS
SYSTOLIC BLOOD PRESSURE: 129 MMHG | HEIGHT: 64 IN | OXYGEN SATURATION: 98 % | HEART RATE: 76 BPM | DIASTOLIC BLOOD PRESSURE: 70 MMHG | BODY MASS INDEX: 22.32 KG/M2 | RESPIRATION RATE: 22 BRPM | TEMPERATURE: 97.4 F | WEIGHT: 130.73 LBS

## 2025-01-04 PROCEDURE — 2700000000 HC OXYGEN THERAPY PER DAY

## 2025-01-04 PROCEDURE — 6370000000 HC RX 637 (ALT 250 FOR IP)

## 2025-01-04 PROCEDURE — 6370000000 HC RX 637 (ALT 250 FOR IP): Performed by: INTERNAL MEDICINE

## 2025-01-04 PROCEDURE — 6370000000 HC RX 637 (ALT 250 FOR IP): Performed by: STUDENT IN AN ORGANIZED HEALTH CARE EDUCATION/TRAINING PROGRAM

## 2025-01-04 PROCEDURE — 2500000003 HC RX 250 WO HCPCS: Performed by: STUDENT IN AN ORGANIZED HEALTH CARE EDUCATION/TRAINING PROGRAM

## 2025-01-04 PROCEDURE — 6360000002 HC RX W HCPCS

## 2025-01-04 PROCEDURE — 94640 AIRWAY INHALATION TREATMENT: CPT

## 2025-01-04 PROCEDURE — 6370000000 HC RX 637 (ALT 250 FOR IP): Performed by: NURSE PRACTITIONER

## 2025-01-04 RX ADMIN — IPRATROPIUM BROMIDE AND ALBUTEROL SULFATE 1 DOSE: .5; 2.5 SOLUTION RESPIRATORY (INHALATION) at 09:06

## 2025-01-04 RX ADMIN — MORPHINE SULFATE 6.5 MG: 10 SOLUTION ORAL at 12:54

## 2025-01-04 RX ADMIN — GABAPENTIN 600 MG: 300 CAPSULE ORAL at 09:40

## 2025-01-04 RX ADMIN — ASPIRIN 81 MG CHEWABLE TABLET 81 MG: 81 TABLET CHEWABLE at 09:40

## 2025-01-04 RX ADMIN — PANTOPRAZOLE SODIUM 40 MG: 40 TABLET, DELAYED RELEASE ORAL at 06:23

## 2025-01-04 RX ADMIN — CALCIUM 1500 MG: 500 TABLET ORAL at 09:41

## 2025-01-04 RX ADMIN — GUAIFENESIN 400 MG: 200 SOLUTION ORAL at 12:55

## 2025-01-04 RX ADMIN — FLUTICASONE PROPIONATE 1 SPRAY: 50 SPRAY, METERED NASAL at 09:42

## 2025-01-04 RX ADMIN — IPRATROPIUM BROMIDE AND ALBUTEROL SULFATE 1 DOSE: .5; 2.5 SOLUTION RESPIRATORY (INHALATION) at 11:40

## 2025-01-04 RX ADMIN — ENOXAPARIN SODIUM 40 MG: 100 INJECTION SUBCUTANEOUS at 09:40

## 2025-01-04 RX ADMIN — GUAIFENESIN 400 MG: 200 SOLUTION ORAL at 09:39

## 2025-01-04 RX ADMIN — TAMSULOSIN HYDROCHLORIDE 0.4 MG: 0.4 CAPSULE ORAL at 09:40

## 2025-01-04 RX ADMIN — METOPROLOL SUCCINATE 100 MG: 100 TABLET, FILM COATED, EXTENDED RELEASE ORAL at 09:40

## 2025-01-04 RX ADMIN — SALINE NASAL SPRAY 1 SPRAY: 1.5 SOLUTION NASAL at 09:42

## 2025-01-04 RX ADMIN — BUDESONIDE INHALATION 500 MCG: 0.5 SUSPENSION RESPIRATORY (INHALATION) at 09:05

## 2025-01-04 RX ADMIN — FUROSEMIDE 40 MG: 40 TABLET ORAL at 09:40

## 2025-01-04 RX ADMIN — ARIPIPRAZOLE 5 MG: 5 TABLET ORAL at 09:41

## 2025-01-04 RX ADMIN — LORAZEPAM 1 MG: 1 TABLET ORAL at 09:42

## 2025-01-04 RX ADMIN — ARFORMOTEROL TARTRATE 15 MCG: 15 SOLUTION RESPIRATORY (INHALATION) at 09:05

## 2025-01-04 RX ADMIN — HYDROCODONE BITARTRATE AND ACETAMINOPHEN 1 TABLET: 5; 325 TABLET ORAL at 06:23

## 2025-01-04 RX ADMIN — MORPHINE SULFATE 6.5 MG: 10 SOLUTION ORAL at 03:41

## 2025-01-04 RX ADMIN — IPRATROPIUM BROMIDE AND ALBUTEROL SULFATE 1 DOSE: .5; 2.5 SOLUTION RESPIRATORY (INHALATION) at 03:58

## 2025-01-04 ASSESSMENT — PAIN DESCRIPTION - LOCATION: LOCATION: LEG;BACK

## 2025-01-04 ASSESSMENT — PAIN SCALES - GENERAL
PAINLEVEL_OUTOF10: 8
PAINLEVEL_OUTOF10: 0

## 2025-01-04 ASSESSMENT — PAIN DESCRIPTION - DESCRIPTORS: DESCRIPTORS: ACHING;THROBBING

## 2025-01-04 ASSESSMENT — PAIN DESCRIPTION - ORIENTATION: ORIENTATION: LEFT;LOWER

## 2025-01-04 NOTE — PROGRESS NOTES
[facility]  [address], [address]  [place] [address]      Pulmonary Progress Note      SUBJECTIVE:  claims feeling some better with breathing and cough with slightly less sputum production, lying down in bed on 7 li NC saturating 98%    OBJECTIVE    Medications    Continuous Infusions:   sodium chloride         Scheduled Meds:   FLUoxetine  20 mg Oral Nightly    dexAMETHasone  4 mg IntraVENous 2 times per day    guaiFENesin  400 mg Oral Q4H    furosemide  40 mg Oral Daily    furosemide  20 mg Oral Dinner    fluticasone  1 spray Each Nostril Daily    gabapentin  900 mg Oral Nightly    gabapentin  600 mg Oral QAM    tamsulosin  0.4 mg Oral BID    sodium chloride flush  5-40 mL IntraVENous 2 times per day    enoxaparin  40 mg SubCUTAneous Daily    ARIPiprazole  5 mg Oral Daily    aspirin  81 mg Oral Daily    atorvastatin  40 mg Oral Nightly    budesonide  0.5 mg Nebulization BID RT    calcium elemental  1,500 mg Oral BID    Ensifentrine  2.5 mL Inhalation BID    vitamin D  50,000 Units Oral Once per day on Monday Thursday    metoprolol succinate  100 mg Oral Daily    pantoprazole  40 mg Oral QAM AC    pramipexole  0.75 mg Oral Nightly    arformoterol tartrate  15 mcg Nebulization BID RT    ipratropium 0.5 mg-albuterol 2.5 mg  1 Dose Inhalation Q4H       PRN Meds:LORazepam, sodium chloride, morphine, sodium chloride flush, sodium chloride, polyethylene glycol, acetaminophen **OR** acetaminophen, potassium chloride **OR** potassium alternative oral replacement **OR** [DISCONTINUED] potassium chloride, magnesium sulfate, promethazine, calcium carbonate, HYDROcodone 5 mg - acetaminophen, albuterol    Physical    VITALS:  /70   Pulse 76   Temp 97.4 °F (36.3 °C) (Temporal)   Resp 22   Ht 1.626 m (5' 4\")   Wt 59.3 kg (130 lb 11.7 oz)   SpO2 98%   BMI 22.44 kg/m²     24HR INTAKE/OUTPUT:      Intake/Output Summary (Last 24 hours) at 1/4/2025  1419  Last data filed at 2025 1153  Gross per 24 hour   Intake --   Output 2210 ml   Net -2210 ml       24HR PULSE OXIMETRY RANGE:    SpO2  Av.2 %  Min: 95 %  Max: 99 %    General appearance: alert, appears stated age, and cooperative  Lungs: rhonchi bilaterally  Heart: regular rate and rhythm, S1, S2 normal, no murmur, click, rub or gallop  Abdomen: soft, non-tender; bowel sounds normal; no masses,  no organomegaly  Extremities: extremities normal, atraumatic, no cyanosis or edema  Neurologic: Mental status: Alert, oriented, thought content appropriate    Data    CBC:   Recent Labs     25  0903 25  1018   WBC 7.6 6.5   HGB 8.8* 9.2*   HCT 28.8* 31.0*   .7* 107.6*    202       BMP:  Recent Labs     25  0903 25  1018    136   K 3.6 4.7   CL 90* 91*   CO2 37* 35*   BUN 10 15   CREATININE 0.6* 0.6*    ALB:3,BILIDIR:3,BILITOT:3,ALKPHOS:3)@    PT/INR: No results for input(s): \"PROTIME\", \"INR\" in the last 72 hours.    ABG:   No results for input(s): \"PH\", \"PO2\", \"PCO2\", \"HCO3\", \"BE\", \"O2SAT\", \"METHB\", \"O2HB\", \"COHB\", \"O2CON\", \"HHB\", \"THB\" in the last 72 hours.          Radiology/Other tests reviewed: CXR reviewed looking similar compared to previous    Assessment:     Principal Problem:    Volume overload  Active Problems:    Hypocalcemia    COVID-19    End stage COPD (HCC)    Hypomagnesemia    Anemia of chronic disease  Resolved Problems:    * No resolved hospital problems. *      Plan:       Cont with decadron 4 mg bid, taper as tolerated  Cont with oxygen, taper as tolerated, has room to come down on given good saturations  Cont with nebs, observe respiratory function and cough, can resume usual medications upon discharge  OOB to chair if tolerates  Can be discharged from pulmonary pov      Time at the bedside, reviewing labs and radiographs, reviewing notes and consultations, discussing with staff and family was more than 50 minutes.      Thanks for letting us see

## 2025-01-04 NOTE — PLAN OF CARE
Problem: ABCDS Injury Assessment  Goal: Absence of physical injury  1/4/2025 1019 by Matilde Kaye RN  Outcome: Progressing  Flowsheets (Taken 1/4/2025 1019)  Absence of Physical Injury: Implement safety measures based on patient assessment  1/4/2025 0129 by Randi Lackey, RN  Outcome: Progressing     Problem: Skin/Tissue Integrity  Goal: Absence of new skin breakdown  Description: 1.  Monitor for areas of redness and/or skin breakdown  2.  Assess vascular access sites hourly  3.  Every 4-6 hours minimum:  Change oxygen saturation probe site  4.  Every 4-6 hours:  If on nasal continuous positive airway pressure, respiratory therapy assess nares and determine need for appliance change or resting period.  1/4/2025 1019 by Matilde Kaye RN  Outcome: Progressing  1/4/2025 0129 by Randi Lackey, RN  Outcome: Progressing     Problem: Discharge Planning  Goal: Discharge to home or other facility with appropriate resources  1/4/2025 1019 by Matilde Kaye RN  Outcome: Progressing  Flowsheets (Taken 12/30/2024 1243 by Corie Mcbride, RN)  Discharge to home or other facility with appropriate resources: Refer to discharge planning if patient needs post-hospital services based on physician order or complex needs related to functional status, cognitive ability or social support system  1/4/2025 0129 by Randi Lackey, RN  Outcome: Progressing

## 2025-01-05 LAB
MICROORGANISM SPEC CULT: ABNORMAL
MICROORGANISM/AGENT SPEC: ABNORMAL
SERVICE CMNT-IMP: ABNORMAL
SPECIMEN DESCRIPTION: ABNORMAL

## 2025-01-06 ENCOUNTER — TELEPHONE (OUTPATIENT)
Dept: PRIMARY CARE CLINIC | Age: 60
End: 2025-01-06

## 2025-01-06 DIAGNOSIS — Z51.5 PALLIATIVE CARE ENCOUNTER: ICD-10-CM

## 2025-01-06 DIAGNOSIS — R06.09 DYSPNEA ON MINIMAL EXERTION: ICD-10-CM

## 2025-01-06 DIAGNOSIS — J44.9 END STAGE COPD (HCC): ICD-10-CM

## 2025-01-06 RX ORDER — MORPHINE SULFATE 20 MG/ML
10 SOLUTION ORAL
Qty: 30 ML | Refills: 0 | Status: SHIPPED | OUTPATIENT
Start: 2025-01-06 | End: 2025-01-11

## 2025-01-06 RX ORDER — MORPHINE SULFATE 20 MG/ML
10 SOLUTION ORAL
Qty: 30 ML | Refills: 0 | Status: SHIPPED | OUTPATIENT
Start: 2025-01-11 | End: 2025-01-16

## 2025-01-06 RX ORDER — MORPHINE SULFATE 20 MG/ML
10 SOLUTION ORAL
Qty: 30 ML | Refills: 0 | Status: SHIPPED | OUTPATIENT
Start: 2025-01-16 | End: 2025-01-21

## 2025-01-06 NOTE — TELEPHONE ENCOUNTER
Care Transitions Initial Follow Up Call    Outreach made within 2 business days of discharge: Yes    Patient: Dg Peña Patient : 1965   MRN: [id number]  Reason for Admission: CHF overload  Discharge Date: 25       Spoke with: Jazmin    Discharge department/facility: [facility] Interactive Patient Contact:  Was patient able to fill all prescriptions: Yes  Was patient instructed to bring all medications to the follow-up visit: Yes  Is patient taking all medications as directed in the discharge summary? Yes  Does patient understand their discharge instructions: Yes  Does patient have questions or concerns that need addressed prior to 7-14 day follow up office visit: yes - would like to virtual given the weather and has a hard time getting out.  I told them I would call or get ahold of someone to find out for them.    Additional needs identified to be addressed with provider  No needs identified             Scheduled appointment with PCP within 7-14 days    Follow Up  Future Appointments   Date Time Provider Department Center   2025  7:30 AM [facility] ROOM 2 [facility]   2025  3:00 PM Jana Sanchez MD [facility] ECC DEP   2025  7:45 AM Nyla Calix, APRN - CNP BDM CHF [facility]   2025  1:45 PM Bernice Ceja, APRN - NP AFL PULM CC [facility]   2025  1:30 PM Aj Marie MD BDM ENDO [facility]   2025 12:00 PM Amrik Zuñiga, APRN - CNP SE PALLIAT [facility]       Liseth Antonio LPN

## 2025-01-06 NOTE — TELEPHONE ENCOUNTER
Patient Dg's sister Sayra called for refill morphine 20 mg/ml soln. Next home visit 1-. [facility]. Pharmacy verified on perfect serve. Routed call to A Yogesh, NP

## 2025-01-08 NOTE — PROGRESS NOTES
CLINICAL PHARMACY NOTE: MEDS TO BEDS    Total # of Prescriptions Filled: 2   The following medications were delivered to the patient:  Dexamethasone 2 mg   Furosemide 20 mg     Additional Documentation:

## 2025-01-16 DIAGNOSIS — G89.4 CHRONIC PAIN SYNDROME: ICD-10-CM

## 2025-01-16 DIAGNOSIS — R06.09 DYSPNEA ON MINIMAL EXERTION: ICD-10-CM

## 2025-01-16 DIAGNOSIS — Z51.5 PALLIATIVE CARE ENCOUNTER: ICD-10-CM

## 2025-01-16 DIAGNOSIS — J44.9 END STAGE COPD (HCC): ICD-10-CM

## 2025-01-16 RX ORDER — MORPHINE SULFATE 20 MG/ML
10 SOLUTION ORAL
Qty: 30 ML | Refills: 0 | Status: ON HOLD | OUTPATIENT
Start: 2025-01-26 | End: 2025-01-31

## 2025-01-16 RX ORDER — HYDROCODONE BITARTRATE AND ACETAMINOPHEN 7.5; 325 MG/1; MG/1
1 TABLET ORAL EVERY 8 HOURS PRN
Qty: 90 TABLET | Refills: 0 | Status: ON HOLD | OUTPATIENT
Start: 2025-01-16 | End: 2025-02-15

## 2025-01-16 RX ORDER — OMEPRAZOLE 40 MG/1
40 CAPSULE, DELAYED RELEASE ORAL DAILY
Qty: 90 CAPSULE | Refills: 1 | Status: ON HOLD | OUTPATIENT
Start: 2025-01-16

## 2025-01-16 RX ORDER — MORPHINE SULFATE 20 MG/ML
10 SOLUTION ORAL
Qty: 30 ML | Refills: 0 | Status: ON HOLD | OUTPATIENT
Start: 2025-01-21 | End: 2025-01-26

## 2025-01-16 RX ORDER — MORPHINE SULFATE 20 MG/ML
10 SOLUTION ORAL
Qty: 30 ML | Refills: 0 | Status: ON HOLD | OUTPATIENT
Start: 2025-01-16 | End: 2025-01-21

## 2025-01-16 NOTE — TELEPHONE ENCOUNTER
Call from Sayra requesting refills for Morphine solution and [place]. Pharmacy is [email or web address]. Next kristina 1/23/25.

## 2025-01-17 ENCOUNTER — HOSPITAL ENCOUNTER (OUTPATIENT)
Dept: OTHER | Age: 60
Setting detail: THERAPIES SERIES
Discharge: HOME | End: 2025-01-17
Payer: MEDICAID

## 2025-01-17 ENCOUNTER — APPOINTMENT (OUTPATIENT)
Dept: GENERAL RADIOLOGY | Age: 60
DRG: 194 | End: 2025-01-17
Payer: MEDICAID

## 2025-01-17 ENCOUNTER — HOSPITAL ENCOUNTER (INPATIENT)
Age: 60
LOS: 6 days | Discharge: HOME HEALTH | DRG: 194 | End: 2025-01-23
Attending: STUDENT IN AN ORGANIZED HEALTH CARE EDUCATION/TRAINING PROGRAM | Admitting: STUDENT IN AN ORGANIZED HEALTH CARE EDUCATION/TRAINING PROGRAM
Payer: MEDICAID

## 2025-01-17 VITALS
HEART RATE: 80 BPM | SYSTOLIC BLOOD PRESSURE: 108 MMHG | OXYGEN SATURATION: 97 % | RESPIRATION RATE: 20 BRPM | DIASTOLIC BLOOD PRESSURE: 56 MMHG | BODY MASS INDEX: 24.37 KG/M2 | WEIGHT: 142 LBS

## 2025-01-17 DIAGNOSIS — I50.9 ACUTE ON CHRONIC CONGESTIVE HEART FAILURE, UNSPECIFIED HEART FAILURE TYPE (HCC): Primary | ICD-10-CM

## 2025-01-17 DIAGNOSIS — E83.51 HYPOCALCEMIA: ICD-10-CM

## 2025-01-17 DIAGNOSIS — M79.662 PAIN OF LEFT CALF: ICD-10-CM

## 2025-01-17 PROBLEM — J96.12 CHRONIC HYPERCAPNIC RESPIRATORY FAILURE: Status: ACTIVE | Noted: 2025-01-17

## 2025-01-17 LAB
25(OH)D3 SERPL-MCNC: 34.2 NG/ML (ref 30–100)
ALBUMIN SERPL-MCNC: 3.7 G/DL (ref 3.5–5.2)
ALP SERPL-CCNC: 106 U/L (ref 40–129)
ALT SERPL-CCNC: 18 U/L (ref 0–40)
ANION GAP SERPL CALCULATED.3IONS-SCNC: 8 MMOL/L (ref 7–16)
AST SERPL-CCNC: 16 U/L (ref 0–39)
B.E.: 11.8 MMOL/L (ref -3–3)
BASOPHILS # BLD: 0 K/UL (ref 0–0.2)
BASOPHILS NFR BLD: 0 % (ref 0–2)
BILIRUB SERPL-MCNC: 0.4 MG/DL (ref 0–1.2)
BNP SERPL-MCNC: 2782 PG/ML (ref 0–125)
BUN SERPL-MCNC: 18 MG/DL (ref 6–20)
CA-I BLD-SCNC: 0.83 MMOL/L (ref 1.15–1.33)
CALCIUM SERPL-MCNC: 6.2 MG/DL (ref 8.6–10.2)
CHLORIDE SERPL-SCNC: 92 MMOL/L (ref 98–107)
CO2 SERPL-SCNC: 42 MMOL/L (ref 22–29)
COHB: 0.8 % (ref 0–1.5)
CREAT SERPL-MCNC: 0.7 MG/DL (ref 0.7–1.2)
CRITICAL: ABNORMAL
DATE ANALYZED: ABNORMAL
DATE OF COLLECTION: ABNORMAL
EKG ATRIAL RATE: 79 BPM
EKG P AXIS: 68 DEGREES
EKG P-R INTERVAL: 124 MS
EKG Q-T INTERVAL: 410 MS
EKG QRS DURATION: 80 MS
EKG QTC CALCULATION (BAZETT): 470 MS
EKG R AXIS: 79 DEGREES
EKG T AXIS: 70 DEGREES
EKG VENTRICULAR RATE: 79 BPM
EOSINOPHIL # BLD: 0.21 K/UL (ref 0.05–0.5)
EOSINOPHILS RELATIVE PERCENT: 2 % (ref 0–6)
ERYTHROCYTE [DISTWIDTH] IN BLOOD BY AUTOMATED COUNT: 14.6 % (ref 11.5–15)
GFR, ESTIMATED: >90 ML/MIN/1.73M2
GLUCOSE SERPL-MCNC: 79 MG/DL (ref 74–99)
HCO3: 40.2 MMOL/L (ref 22–26)
HCT VFR BLD AUTO: 31.9 % (ref 37–54)
HGB BLD-MCNC: 9.1 G/DL (ref 12.5–16.5)
HHB: 4 % (ref 0–5)
INFLUENZA A BY PCR: NOT DETECTED
INFLUENZA B BY PCR: NOT DETECTED
LAB: ABNORMAL
LYMPHOCYTES NFR BLD: 0.64 K/UL (ref 1.5–4)
LYMPHOCYTES RELATIVE PERCENT: 5 % (ref 20–42)
Lab: 1544
MCH RBC QN AUTO: 32.4 PG (ref 26–35)
MCHC RBC AUTO-ENTMCNC: 28.5 G/DL (ref 32–34.5)
MCV RBC AUTO: 113.5 FL (ref 80–99.9)
METAMYELOCYTES ABSOLUTE COUNT: 0.32 K/UL (ref 0–0.12)
METAMYELOCYTES: 3 % (ref 0–1)
METHB: 0.3 % (ref 0–1.5)
MODE: ABNORMAL
MONOCYTES NFR BLD: 0.74 K/UL (ref 0.1–0.95)
MONOCYTES NFR BLD: 6 % (ref 2–12)
NEUTROPHILS NFR BLD: 84 % (ref 43–80)
NEUTS SEG NFR BLD: 10.29 K/UL (ref 1.8–7.3)
O2 CONTENT: 13.3 ML/DL
O2 SATURATION: 96 % (ref 92–98.5)
O2HB: 94.9 % (ref 94–97)
OPERATOR ID: 316
PATIENT TEMP: 37 C
PCO2: 79.6 MMHG (ref 35–45)
PH BLOOD GAS: 7.32 (ref 7.35–7.45)
PHOSPHATE SERPL-MCNC: 3.6 MG/DL (ref 2.5–4.5)
PLATELET # BLD AUTO: 176 K/UL (ref 130–450)
PMV BLD AUTO: 11 FL (ref 7–12)
PO2: 89 MMHG (ref 75–100)
POTASSIUM SERPL-SCNC: 3.8 MMOL/L (ref 3.5–5)
PROT SERPL-MCNC: 7.4 G/DL (ref 6.4–8.3)
RBC # BLD AUTO: 2.81 M/UL (ref 3.8–5.8)
RBC # BLD: ABNORMAL 10*6/UL
SARS-COV-2 RDRP RESP QL NAA+PROBE: DETECTED
SODIUM SERPL-SCNC: 142 MMOL/L (ref 132–146)
SOURCE, BLOOD GAS: ABNORMAL
SPECIMEN DESCRIPTION: ABNORMAL
THB: 9.9 G/DL (ref 11.5–16.5)
TIME ANALYZED: 1550
TROPONIN I SERPL HS-MCNC: 25 NG/L (ref 0–11)
TROPONIN I SERPL HS-MCNC: 27 NG/L (ref 0–11)
WBC OTHER # BLD: 12.2 K/UL (ref 4.5–11.5)

## 2025-01-17 PROCEDURE — 71045 X-RAY EXAM CHEST 1 VIEW: CPT

## 2025-01-17 PROCEDURE — 99285 EMERGENCY DEPT VISIT HI MDM: CPT

## 2025-01-17 PROCEDURE — 83880 ASSAY OF NATRIURETIC PEPTIDE: CPT

## 2025-01-17 PROCEDURE — 6360000002 HC RX W HCPCS: Performed by: STUDENT IN AN ORGANIZED HEALTH CARE EDUCATION/TRAINING PROGRAM

## 2025-01-17 PROCEDURE — 6370000000 HC RX 637 (ALT 250 FOR IP): Performed by: STUDENT IN AN ORGANIZED HEALTH CARE EDUCATION/TRAINING PROGRAM

## 2025-01-17 PROCEDURE — 96374 THER/PROPH/DIAG INJ IV PUSH: CPT

## 2025-01-17 PROCEDURE — 84100 ASSAY OF PHOSPHORUS: CPT

## 2025-01-17 PROCEDURE — 87635 SARS-COV-2 COVID-19 AMP PRB: CPT

## 2025-01-17 PROCEDURE — 82805 BLOOD GASES W/O2 SATURATION: CPT

## 2025-01-17 PROCEDURE — 93010 ELECTROCARDIOGRAM REPORT: CPT | Performed by: INTERNAL MEDICINE

## 2025-01-17 PROCEDURE — 82306 VITAMIN D 25 HYDROXY: CPT

## 2025-01-17 PROCEDURE — 82330 ASSAY OF CALCIUM: CPT

## 2025-01-17 PROCEDURE — 84484 ASSAY OF TROPONIN QUANT: CPT

## 2025-01-17 PROCEDURE — 80053 COMPREHEN METABOLIC PANEL: CPT

## 2025-01-17 PROCEDURE — 2060000000 HC ICU INTERMEDIATE R&B

## 2025-01-17 PROCEDURE — 6360000002 HC RX W HCPCS

## 2025-01-17 PROCEDURE — 99222 1ST HOSP IP/OBS MODERATE 55: CPT | Performed by: STUDENT IN AN ORGANIZED HEALTH CARE EDUCATION/TRAINING PROGRAM

## 2025-01-17 PROCEDURE — 85025 COMPLETE CBC W/AUTO DIFF WBC: CPT

## 2025-01-17 PROCEDURE — 94640 AIRWAY INHALATION TREATMENT: CPT

## 2025-01-17 PROCEDURE — 99215 OFFICE O/P EST HI 40 MIN: CPT

## 2025-01-17 PROCEDURE — 96375 TX/PRO/DX INJ NEW DRUG ADDON: CPT

## 2025-01-17 PROCEDURE — 87502 INFLUENZA DNA AMP PROBE: CPT

## 2025-01-17 PROCEDURE — 2700000000 HC OXYGEN THERAPY PER DAY

## 2025-01-17 PROCEDURE — 94660 CPAP INITIATION&MGMT: CPT

## 2025-01-17 PROCEDURE — 83970 ASSAY OF PARATHORMONE: CPT

## 2025-01-17 PROCEDURE — 93005 ELECTROCARDIOGRAM TRACING: CPT

## 2025-01-17 PROCEDURE — 2500000003 HC RX 250 WO HCPCS: Performed by: STUDENT IN AN ORGANIZED HEALTH CARE EDUCATION/TRAINING PROGRAM

## 2025-01-17 PROCEDURE — 5A09357 ASSISTANCE WITH RESPIRATORY VENTILATION, LESS THAN 24 CONSECUTIVE HOURS, CONTINUOUS POSITIVE AIRWAY PRESSURE: ICD-10-PCS | Performed by: STUDENT IN AN ORGANIZED HEALTH CARE EDUCATION/TRAINING PROGRAM

## 2025-01-17 RX ORDER — ACETAMINOPHEN 650 MG/1
650 SUPPOSITORY RECTAL EVERY 6 HOURS PRN
Status: DISCONTINUED | OUTPATIENT
Start: 2025-01-17 | End: 2025-01-23 | Stop reason: HOSPADM

## 2025-01-17 RX ORDER — MORPHINE SULFATE 10 MG/5ML
10 SOLUTION ORAL
Status: DISCONTINUED | OUTPATIENT
Start: 2025-01-17 | End: 2025-01-23 | Stop reason: HOSPADM

## 2025-01-17 RX ORDER — LORAZEPAM 1 MG/1
1 TABLET ORAL 3 TIMES DAILY
COMMUNITY

## 2025-01-17 RX ORDER — POTASSIUM CHLORIDE 1500 MG/1
40 TABLET, EXTENDED RELEASE ORAL PRN
Status: DISCONTINUED | OUTPATIENT
Start: 2025-01-17 | End: 2025-01-23 | Stop reason: HOSPADM

## 2025-01-17 RX ORDER — CALCIUM GLUCONATE 94 MG/ML
1000 INJECTION, SOLUTION INTRAVENOUS ONCE
Status: COMPLETED | OUTPATIENT
Start: 2025-01-17 | End: 2025-01-17

## 2025-01-17 RX ORDER — ARFORMOTEROL TARTRATE 15 UG/2ML
15 SOLUTION RESPIRATORY (INHALATION)
Status: DISCONTINUED | OUTPATIENT
Start: 2025-01-17 | End: 2025-01-23 | Stop reason: HOSPADM

## 2025-01-17 RX ORDER — PRAMIPEXOLE DIHYDROCHLORIDE 0.25 MG/1
0.75 TABLET ORAL NIGHTLY
Status: DISCONTINUED | OUTPATIENT
Start: 2025-01-17 | End: 2025-01-23 | Stop reason: HOSPADM

## 2025-01-17 RX ORDER — FLUOXETINE 10 MG/1
20 TABLET, FILM COATED ORAL NIGHTLY
Status: DISCONTINUED | OUTPATIENT
Start: 2025-01-17 | End: 2025-01-17 | Stop reason: SDUPTHER

## 2025-01-17 RX ORDER — FUROSEMIDE 10 MG/ML
40 INJECTION INTRAMUSCULAR; INTRAVENOUS ONCE
Status: COMPLETED | OUTPATIENT
Start: 2025-01-17 | End: 2025-01-17

## 2025-01-17 RX ORDER — GABAPENTIN 300 MG/1
600 CAPSULE ORAL EVERY MORNING
Status: DISCONTINUED | OUTPATIENT
Start: 2025-01-18 | End: 2025-01-23 | Stop reason: HOSPADM

## 2025-01-17 RX ORDER — FUROSEMIDE 10 MG/ML
40 INJECTION INTRAMUSCULAR; INTRAVENOUS 2 TIMES DAILY
Status: DISCONTINUED | OUTPATIENT
Start: 2025-01-18 | End: 2025-01-23 | Stop reason: HOSPADM

## 2025-01-17 RX ORDER — SODIUM CHLORIDE 9 MG/ML
INJECTION, SOLUTION INTRAVENOUS PRN
Status: DISCONTINUED | OUTPATIENT
Start: 2025-01-17 | End: 2025-01-23 | Stop reason: HOSPADM

## 2025-01-17 RX ORDER — METHYLPREDNISOLONE SODIUM SUCCINATE 125 MG/2ML
125 INJECTION INTRAMUSCULAR; INTRAVENOUS ONCE
Status: COMPLETED | OUTPATIENT
Start: 2025-01-17 | End: 2025-01-17

## 2025-01-17 RX ORDER — CALCIUM CARBONATE 500(1250)
1500 TABLET ORAL 2 TIMES DAILY
Status: DISCONTINUED | OUTPATIENT
Start: 2025-01-17 | End: 2025-01-20

## 2025-01-17 RX ORDER — ACETAMINOPHEN 325 MG/1
650 TABLET ORAL EVERY 6 HOURS PRN
Status: DISCONTINUED | OUTPATIENT
Start: 2025-01-17 | End: 2025-01-23 | Stop reason: HOSPADM

## 2025-01-17 RX ORDER — LORAZEPAM 1 MG/1
1 TABLET ORAL 3 TIMES DAILY
Status: DISCONTINUED | OUTPATIENT
Start: 2025-01-17 | End: 2025-01-23 | Stop reason: HOSPADM

## 2025-01-17 RX ORDER — ENOXAPARIN SODIUM 100 MG/ML
40 INJECTION SUBCUTANEOUS DAILY
Status: DISCONTINUED | OUTPATIENT
Start: 2025-01-17 | End: 2025-01-23 | Stop reason: HOSPADM

## 2025-01-17 RX ORDER — BUDESONIDE 0.5 MG/2ML
0.5 INHALANT ORAL
Status: DISCONTINUED | OUTPATIENT
Start: 2025-01-17 | End: 2025-01-23 | Stop reason: HOSPADM

## 2025-01-17 RX ORDER — TAMSULOSIN HYDROCHLORIDE 0.4 MG/1
0.4 CAPSULE ORAL 2 TIMES DAILY
Status: DISCONTINUED | OUTPATIENT
Start: 2025-01-17 | End: 2025-01-23 | Stop reason: HOSPADM

## 2025-01-17 RX ORDER — METOPROLOL SUCCINATE 100 MG/1
100 TABLET, EXTENDED RELEASE ORAL
Status: DISCONTINUED | OUTPATIENT
Start: 2025-01-17 | End: 2025-01-23 | Stop reason: HOSPADM

## 2025-01-17 RX ORDER — MAGNESIUM SULFATE IN WATER 40 MG/ML
2000 INJECTION, SOLUTION INTRAVENOUS PRN
Status: DISCONTINUED | OUTPATIENT
Start: 2025-01-17 | End: 2025-01-23 | Stop reason: HOSPADM

## 2025-01-17 RX ORDER — ARIPIPRAZOLE 5 MG/1
5 TABLET ORAL DAILY
Status: DISCONTINUED | OUTPATIENT
Start: 2025-01-18 | End: 2025-01-23 | Stop reason: HOSPADM

## 2025-01-17 RX ORDER — ATORVASTATIN CALCIUM 40 MG/1
40 TABLET, FILM COATED ORAL NIGHTLY
Status: DISCONTINUED | OUTPATIENT
Start: 2025-01-17 | End: 2025-01-17

## 2025-01-17 RX ORDER — ASPIRIN 81 MG/1
81 TABLET, CHEWABLE ORAL DAILY
Status: DISCONTINUED | OUTPATIENT
Start: 2025-01-18 | End: 2025-01-23 | Stop reason: HOSPADM

## 2025-01-17 RX ORDER — GABAPENTIN 300 MG/1
900 CAPSULE ORAL NIGHTLY
Status: DISCONTINUED | OUTPATIENT
Start: 2025-01-17 | End: 2025-01-23 | Stop reason: HOSPADM

## 2025-01-17 RX ORDER — SODIUM CHLORIDE 0.9 % (FLUSH) 0.9 %
5-40 SYRINGE (ML) INJECTION EVERY 12 HOURS SCHEDULED
Status: DISCONTINUED | OUTPATIENT
Start: 2025-01-17 | End: 2025-01-23 | Stop reason: HOSPADM

## 2025-01-17 RX ORDER — IPRATROPIUM BROMIDE AND ALBUTEROL SULFATE 2.5; .5 MG/3ML; MG/3ML
1 SOLUTION RESPIRATORY (INHALATION) ONCE
Status: COMPLETED | OUTPATIENT
Start: 2025-01-17 | End: 2025-01-17

## 2025-01-17 RX ORDER — POTASSIUM CHLORIDE 7.45 MG/ML
10 INJECTION INTRAVENOUS PRN
Status: DISCONTINUED | OUTPATIENT
Start: 2025-01-17 | End: 2025-01-23 | Stop reason: HOSPADM

## 2025-01-17 RX ORDER — IPRATROPIUM BROMIDE AND ALBUTEROL SULFATE 2.5; .5 MG/3ML; MG/3ML
1 SOLUTION RESPIRATORY (INHALATION) EVERY 4 HOURS
Status: DISCONTINUED | OUTPATIENT
Start: 2025-01-17 | End: 2025-01-23 | Stop reason: HOSPADM

## 2025-01-17 RX ORDER — FLUTICASONE PROPIONATE 50 MCG
1 SPRAY, SUSPENSION (ML) NASAL DAILY PRN
Status: DISCONTINUED | OUTPATIENT
Start: 2025-01-17 | End: 2025-01-23 | Stop reason: HOSPADM

## 2025-01-17 RX ORDER — HYDROCODONE BITARTRATE AND ACETAMINOPHEN 7.5; 325 MG/1; MG/1
1 TABLET ORAL EVERY 8 HOURS PRN
Status: DISCONTINUED | OUTPATIENT
Start: 2025-01-17 | End: 2025-01-21

## 2025-01-17 RX ORDER — POLYETHYLENE GLYCOL 3350 17 G/17G
17 POWDER, FOR SOLUTION ORAL DAILY PRN
Status: DISCONTINUED | OUTPATIENT
Start: 2025-01-17 | End: 2025-01-23 | Stop reason: HOSPADM

## 2025-01-17 RX ORDER — IPRATROPIUM BROMIDE AND ALBUTEROL SULFATE 2.5; .5 MG/3ML; MG/3ML
1 SOLUTION RESPIRATORY (INHALATION)
Status: DISCONTINUED | OUTPATIENT
Start: 2025-01-17 | End: 2025-01-17

## 2025-01-17 RX ORDER — PANTOPRAZOLE SODIUM 40 MG/1
40 TABLET, DELAYED RELEASE ORAL
Status: DISCONTINUED | OUTPATIENT
Start: 2025-01-18 | End: 2025-01-23 | Stop reason: HOSPADM

## 2025-01-17 RX ORDER — FENTANYL CITRATE 50 UG/ML
25 INJECTION, SOLUTION INTRAMUSCULAR; INTRAVENOUS ONCE
Status: COMPLETED | OUTPATIENT
Start: 2025-01-17 | End: 2025-01-17

## 2025-01-17 RX ORDER — SODIUM CHLORIDE 0.9 % (FLUSH) 0.9 %
5-40 SYRINGE (ML) INJECTION PRN
Status: DISCONTINUED | OUTPATIENT
Start: 2025-01-17 | End: 2025-01-23 | Stop reason: HOSPADM

## 2025-01-17 RX ADMIN — METOPROLOL SUCCINATE 100 MG: 100 TABLET, FILM COATED, EXTENDED RELEASE ORAL at 20:30

## 2025-01-17 RX ADMIN — CALCIUM GLUCONATE 1000 MG: 98 INJECTION, SOLUTION INTRAVENOUS at 15:47

## 2025-01-17 RX ADMIN — FUROSEMIDE 40 MG: 10 INJECTION, SOLUTION INTRAMUSCULAR; INTRAVENOUS at 15:56

## 2025-01-17 RX ADMIN — SODIUM CHLORIDE, PRESERVATIVE FREE 10 ML: 5 INJECTION INTRAVENOUS at 20:30

## 2025-01-17 RX ADMIN — FENTANYL CITRATE 25 MCG: 50 INJECTION INTRAMUSCULAR; INTRAVENOUS at 15:49

## 2025-01-17 RX ADMIN — PRAMIPEXOLE DIHYDROCHLORIDE 0.75 MG: 0.25 TABLET ORAL at 20:30

## 2025-01-17 RX ADMIN — Medication 3 MG: at 20:30

## 2025-01-17 RX ADMIN — CALCIUM 1500 MG: 500 TABLET ORAL at 20:30

## 2025-01-17 RX ADMIN — ARFORMOTEROL TARTRATE 15 MCG: 15 SOLUTION RESPIRATORY (INHALATION) at 19:28

## 2025-01-17 RX ADMIN — METHYLPREDNISOLONE SODIUM SUCCINATE 125 MG: 125 INJECTION INTRAMUSCULAR; INTRAVENOUS at 15:54

## 2025-01-17 RX ADMIN — CALCIUM GLUCONATE 1000 MG: 98 INJECTION, SOLUTION INTRAVENOUS at 16:37

## 2025-01-17 RX ADMIN — HYDROCODONE BITARTRATE AND ACETAMINOPHEN 1 TABLET: 7.5; 325 TABLET ORAL at 20:26

## 2025-01-17 RX ADMIN — ENOXAPARIN SODIUM 40 MG: 100 INJECTION SUBCUTANEOUS at 18:39

## 2025-01-17 RX ADMIN — GABAPENTIN 900 MG: 300 CAPSULE ORAL at 20:30

## 2025-01-17 RX ADMIN — BUDESONIDE INHALATION 500 MCG: 0.5 SUSPENSION RESPIRATORY (INHALATION) at 19:28

## 2025-01-17 RX ADMIN — IPRATROPIUM BROMIDE AND ALBUTEROL SULFATE 1 DOSE: 2.5; .5 SOLUTION RESPIRATORY (INHALATION) at 19:28

## 2025-01-17 RX ADMIN — TAMSULOSIN HYDROCHLORIDE 0.4 MG: 0.4 CAPSULE ORAL at 20:30

## 2025-01-17 RX ADMIN — LORAZEPAM 1 MG: 1 TABLET ORAL at 21:38

## 2025-01-17 RX ADMIN — FLUOXETINE HYDROCHLORIDE 20 MG: 20 CAPSULE ORAL at 20:30

## 2025-01-17 RX ADMIN — IPRATROPIUM BROMIDE AND ALBUTEROL SULFATE 1 DOSE: 2.5; .5 SOLUTION RESPIRATORY (INHALATION) at 16:09

## 2025-01-17 ASSESSMENT — PAIN DESCRIPTION - PAIN TYPE
TYPE: CHRONIC PAIN

## 2025-01-17 ASSESSMENT — PAIN DESCRIPTION - ONSET
ONSET: ON-GOING
ONSET: ON-GOING

## 2025-01-17 ASSESSMENT — PAIN - FUNCTIONAL ASSESSMENT
PAIN_FUNCTIONAL_ASSESSMENT: 0-10
PAIN_FUNCTIONAL_ASSESSMENT: PREVENTS OR INTERFERES SOME ACTIVE ACTIVITIES AND ADLS
PAIN_FUNCTIONAL_ASSESSMENT: PREVENTS OR INTERFERES SOME ACTIVE ACTIVITIES AND ADLS
PAIN_FUNCTIONAL_ASSESSMENT: PREVENTS OR INTERFERES WITH MANY ACTIVE NOT PASSIVE ACTIVITIES

## 2025-01-17 ASSESSMENT — PAIN SCALES - GENERAL
PAINLEVEL_OUTOF10: 7
PAINLEVEL_OUTOF10: 10
PAINLEVEL_OUTOF10: 5
PAINLEVEL_OUTOF10: 7
PAINLEVEL_OUTOF10: 3

## 2025-01-17 ASSESSMENT — PAIN DESCRIPTION - FREQUENCY
FREQUENCY: INTERMITTENT

## 2025-01-17 ASSESSMENT — PAIN DESCRIPTION - ORIENTATION
ORIENTATION: RIGHT;LEFT
ORIENTATION: RIGHT;LEFT
ORIENTATION: RIGHT;LOWER
ORIENTATION: RIGHT;LEFT
ORIENTATION: RIGHT;LEFT

## 2025-01-17 ASSESSMENT — PAIN SCALES - WONG BAKER: WONGBAKER_NUMERICALRESPONSE: NO HURT

## 2025-01-17 ASSESSMENT — PAIN DESCRIPTION - DESCRIPTORS
DESCRIPTORS: SHARP;STABBING
DESCRIPTORS: DISCOMFORT;DULL;GNAWING
DESCRIPTORS: DISCOMFORT;DULL;ACHING
DESCRIPTORS: ACHING
DESCRIPTORS: DULL;ACHING

## 2025-01-17 ASSESSMENT — PAIN DESCRIPTION - LOCATION
LOCATION: LEG
LOCATION: LEG
LOCATION: BACK
LOCATION: LEG
LOCATION: BACK

## 2025-01-17 NOTE — PROGRESS NOTES
4 Eyes Skin Assessment     NAME:  Dg Peña  YOB: 1965  MEDICAL RECORD NUMBER:  [id number]    The patient is being assessed for  Admission    I agree that at least one RN has performed a thorough Head to Toe Skin Assessment on the patient. ALL assessment sites listed below have been assessed.      Areas assessed by both nurses:    Head, Face, Ears, Shoulders, Back, Chest, Arms, Elbows, Hands, Sacrum. Buttock, Coccyx, Ischium, Legs. Feet and Heels, and Under Medical Devices         Does the Patient have a Wound? No noted wound(s)       Cristofer Prevention initiated by RN: Yes  Wound Care Orders initiated by RN: No    Pressure Injury (Stage 3,4, Unstageable, DTI, NWPT, and Complex wounds) if present, place Wound referral order by RN under : No    New Ostomies, if present place, Ostomy referral order under : No     Nurse 1 eSignature: Electronically signed by Carmel Goel RN on 1/17/25 at 5:26 PM EST    **SHARE this note so that the co-signing nurse can place an eSignature**    Nurse 2 eSignature: Electronically signed by Tracy Guillen RN on 1/18/25 at 2:05 AM EST

## 2025-01-17 NOTE — PROGRESS NOTES
Congestive Heart Failure Clinic   [facility]      Referring Provider: Wm Zuñiga APRCELESTINA-CNP  Primary Care Physician: Jana Sanchez MD   Cardiologist:   Nephrologist: n/a      HISTORY OF PRESENT ILLNESS:   Dg Peña is a 59 y.o. (1965) male with a history of HFimpEF, most recent EF:  Lab Results   Component Value Date    LVEF 63 08/03/2023    LVEFMODE Echo 01/21/2022       He presents to the CHF clinic for ongoing evaluation and monitoring of heart failure.    In the CHF clinic today he denies any adverse symptoms except:  [] Dizziness or lightheadedness   [] Syncope or near syncope  [] Recent Fall  [x] Shortness of breath at rest  [x] Dyspnea with exertion   [] Decline in functional capacity (unable to perform activities they had previously been able to do)  [] Fatigue   [] Orthopnea  [] PND  [x] Nocturnal cough  [] Hemoptysis  [] Chest pain, pressure, or discomfort  [] Palpitations  [x] Abdominal distention  [x] Abdominal bloating  [] Early satiety  [] Blood in stool   [] Diarrhea  [] Constipation  [] Nausea/Vomiting  [] Decreased urinary response to oral diuretic   [x] Scrotal swelling   [x] Lower extremity edema  [] Used PRN doses of oral diuretic   [x] Weight gain    Wt Readings from Last 3 Encounters:   01/17/25 64.4 kg (142 lb)   01/04/25 59.3 kg (130 lb 11.7 oz)   12/20/24 59 kg (130 lb)     SOCIAL HISTORY:  [x] Denies tobacco, alcohol or illicit drug abuse  [] Tobacco use:  [] ETOH use:  [] Illicit drug use:        MEDICATIONS:    Allergies   Allergen Reactions    Levofloxacin Other (See Comments) and Nausea Only     Other reaction(s): Abdominal pain    Lisinopril      Other reaction(s): COUGHING    Tramadol      Other reaction(s): SHAKING    Ibuprofen Nausea And Vomiting    Sulfa Antibiotics Nausea And Vomiting     Prior to Visit Medications    Medication Sig Taking? Authorizing Provider   omeprazole (PRILOSEC) 40 MG delayed

## 2025-01-17 NOTE — ED NOTES
ED to Inpatient Handoff Report    Notified NIGHAT Lopes on 6W that electronic handoff available and patient ready for transport to room 605.    Safety Risks: Risk of falls    Patient in Restraints: no    Constant Observer or Patient : no    Telemetry Monitoring Ordered: Yes     Cardiac Rhythm: Sinus rhythm    Order to transfer to unit without monitor: NO    Last MEWS: 1 Time completed: 1641    Deterioration Index: 22.13    Vitals:    01/17/25 1626 01/17/25 1638 01/17/25 1639 01/17/25 1641   BP: (!) 160/80   129/68   Pulse: 83 86  71   Resp: 20 18  15   Temp:    98.4 °F (36.9 °C)   TempSrc:    Oral   SpO2: 96% 97%  95%   Weight:   63 kg (139 lb)    Height:   1.626 m (5' 4\")        Opportunity for questions and clarification was provided.

## 2025-01-17 NOTE — ED PROVIDER NOTES
[facility] 6W MED SURG  EMERGENCY DEPARTMENT ENCOUNTER        Pt Name: Dg Peña  MRN: [id number]  Birthdate 1965  Date of evaluation: 1/17/2025  Provider: Herberth Rush DO  PCP: Jana Sanchez MD  Note Started: 1:21 PM EST 1/17/25    CHIEF COMPLAINT       Chief Complaint   Patient presents with    Shortness of Breath     On 6L normal for patient, worsening edema over last 2 weeks.        HISTORY OF PRESENT ILLNESS: 1 or more Elements   History From: PATIENT     Limitations to history : None    Dg Peña is a 59 y.o. male arriving from home with a complaint of worsening shortness of breath and leg swelling over the past 2 weeks.  Patient was discharged from this hospital on 1/4 after he was admitted for CHF.  He wears 5 L of nasal cannula baseline.  He reports he has gained 12 pound since he was discharged.  He reports compliance with his daily Lasix medication.  He was sent here from the CHF clinic.      Nursing Notes were all reviewed and agreed with or any disagreements were addressed in the HPI.    REVIEW OF SYSTEMS :      Review of Systems    POSITIVE (+): leg swelling, shortness of breath   NEGATIVE (-): fevers, chills, nausea, vomiting, diarrhea, constipation, chest pain, abdominal pain      SURGICAL HISTORY     Past Surgical History:   Procedure Laterality Date    BRONCHOSCOPY N/A 04/27/2023    BRONCHOSCOPY DIAGNOSTIC OR CELL WASH ONLY performed by Zhang Murphy MD at [facility] ENDOSCOPY    HERNIA REPAIR      HIP SURGERY      JOINT REPLACEMENT  1987    KNEE SURGERY         CURRENTMEDICATIONS       Current Discharge Medication List        CONTINUE these medications which have NOT CHANGED    Details   LORazepam (ATIVAN) 1 MG tablet Take 1 tablet by mouth in the morning, at noon, and at bedtime. Max Daily Amount: 3 mg      omeprazole (PRILOSEC) 40 MG delayed release capsule Take 1 capsule by mouth daily  Qty: 90 capsule, Refills: 1      !! Morphine Sulfate (MORPHINE 20MG/ML) SOLN  Acute on chronic congestive heart failure, unspecified heart failure type (HCC)    2. Hypocalcemia          DISPOSITION/PLAN     DISPOSITION Admitted 01/17/2025 04:17:49 PM               PATIENT REFERRED TO:  No follow-up provider specified.    DISCHARGE MEDICATIONS:  Current Discharge Medication List          DISCONTINUED MEDICATIONS:  Current Discharge Medication List        STOP taking these medications       amoxicillin-clavulanate (AUGMENTIN) 875-125 MG per tablet Comments:   Reason for Stopping:         predniSONE (DELTASONE) 10 MG tablet Comments:   Reason for Stopping:         dexAMETHasone (DECADRON) 2 MG tablet Comments:   Reason for Stopping:                      (Please note that portions of this note were completed with a voice recognition program.  Efforts were made to edit the dictations but occasionally words are mis-transcribed.)    Herberth Rush, DO PGY-2

## 2025-01-18 LAB
25(OH)D3 SERPL-MCNC: 28.2 NG/ML (ref 30–100)
ANION GAP SERPL CALCULATED.3IONS-SCNC: 8 MMOL/L (ref 7–16)
B.E.: 14.3 MMOL/L (ref -3–3)
BASOPHILS # BLD: 0 K/UL (ref 0–0.2)
BASOPHILS NFR BLD: 0 % (ref 0–2)
BUN SERPL-MCNC: 17 MG/DL (ref 6–20)
CALCIUM SERPL-MCNC: 6.9 MG/DL (ref 8.6–10.2)
CHLORIDE SERPL-SCNC: 90 MMOL/L (ref 98–107)
CO2 SERPL-SCNC: 41 MMOL/L (ref 22–29)
COHB: 1 % (ref 0–1.5)
CREAT SERPL-MCNC: 0.6 MG/DL (ref 0.7–1.2)
CRITICAL: ABNORMAL
DATE ANALYZED: ABNORMAL
DATE OF COLLECTION: ABNORMAL
EOSINOPHIL # BLD: 0 K/UL (ref 0.05–0.5)
EOSINOPHILS RELATIVE PERCENT: 0 % (ref 0–6)
ERYTHROCYTE [DISTWIDTH] IN BLOOD BY AUTOMATED COUNT: 14.5 % (ref 11.5–15)
GFR, ESTIMATED: >90 ML/MIN/1.73M2
GLUCOSE SERPL-MCNC: 196 MG/DL (ref 74–99)
HCO3: 41.6 MMOL/L (ref 22–26)
HCT VFR BLD AUTO: 31.3 % (ref 37–54)
HGB BLD-MCNC: 9 G/DL (ref 12.5–16.5)
HHB: 6.7 % (ref 0–5)
LAB: ABNORMAL
LYMPHOCYTES NFR BLD: 0.54 K/UL (ref 1.5–4)
LYMPHOCYTES RELATIVE PERCENT: 5 % (ref 20–42)
Lab: 458
MCH RBC QN AUTO: 31.5 PG (ref 26–35)
MCHC RBC AUTO-ENTMCNC: 28.8 G/DL (ref 32–34.5)
MCV RBC AUTO: 109.4 FL (ref 80–99.9)
METHB: 0.3 % (ref 0–1.5)
MODE: ABNORMAL
MONOCYTES NFR BLD: 0.18 K/UL (ref 0.1–0.95)
MONOCYTES NFR BLD: 2 % (ref 2–12)
NEUTROPHILS NFR BLD: 93 % (ref 43–80)
NEUTS SEG NFR BLD: 9.68 K/UL (ref 1.8–7.3)
O2 CONTENT: 13 ML/DL
O2 SATURATION: 93.2 % (ref 92–98.5)
O2HB: 92 % (ref 94–97)
OPERATOR ID: 7296
PATIENT TEMP: 37 C
PCO2: 70.3 MMHG (ref 35–45)
PH BLOOD GAS: 7.39 (ref 7.35–7.45)
PHOSPHATE SERPL-MCNC: 3.6 MG/DL (ref 2.5–4.5)
PLATELET # BLD AUTO: 161 K/UL (ref 130–450)
PMV BLD AUTO: 10.8 FL (ref 7–12)
PO2: 70.1 MMHG (ref 75–100)
POTASSIUM SERPL-SCNC: 3.6 MMOL/L (ref 3.5–5)
PTH-INTACT SERPL-MCNC: 48.5 PG/ML (ref 15–65)
RBC # BLD AUTO: 2.86 M/UL (ref 3.8–5.8)
RBC # BLD: ABNORMAL 10*6/UL
SODIUM SERPL-SCNC: 139 MMOL/L (ref 132–146)
SOURCE, BLOOD GAS: ABNORMAL
THB: 10 G/DL (ref 11.5–16.5)
TIME ANALYZED: 500
WBC OTHER # BLD: 10.4 K/UL (ref 4.5–11.5)

## 2025-01-18 PROCEDURE — 94640 AIRWAY INHALATION TREATMENT: CPT

## 2025-01-18 PROCEDURE — 6360000002 HC RX W HCPCS: Performed by: STUDENT IN AN ORGANIZED HEALTH CARE EDUCATION/TRAINING PROGRAM

## 2025-01-18 PROCEDURE — 6370000000 HC RX 637 (ALT 250 FOR IP): Performed by: STUDENT IN AN ORGANIZED HEALTH CARE EDUCATION/TRAINING PROGRAM

## 2025-01-18 PROCEDURE — 80048 BASIC METABOLIC PNL TOTAL CA: CPT

## 2025-01-18 PROCEDURE — 2060000000 HC ICU INTERMEDIATE R&B

## 2025-01-18 PROCEDURE — 82805 BLOOD GASES W/O2 SATURATION: CPT

## 2025-01-18 PROCEDURE — 94660 CPAP INITIATION&MGMT: CPT

## 2025-01-18 PROCEDURE — 6370000000 HC RX 637 (ALT 250 FOR IP): Performed by: INTERNAL MEDICINE

## 2025-01-18 PROCEDURE — 84100 ASSAY OF PHOSPHORUS: CPT

## 2025-01-18 PROCEDURE — 2500000003 HC RX 250 WO HCPCS: Performed by: STUDENT IN AN ORGANIZED HEALTH CARE EDUCATION/TRAINING PROGRAM

## 2025-01-18 PROCEDURE — 36415 COLL VENOUS BLD VENIPUNCTURE: CPT

## 2025-01-18 PROCEDURE — 2700000000 HC OXYGEN THERAPY PER DAY

## 2025-01-18 PROCEDURE — 85025 COMPLETE CBC W/AUTO DIFF WBC: CPT

## 2025-01-18 PROCEDURE — 99222 1ST HOSP IP/OBS MODERATE 55: CPT | Performed by: INTERNAL MEDICINE

## 2025-01-18 PROCEDURE — 82306 VITAMIN D 25 HYDROXY: CPT

## 2025-01-18 PROCEDURE — 99232 SBSQ HOSP IP/OBS MODERATE 35: CPT | Performed by: STUDENT IN AN ORGANIZED HEALTH CARE EDUCATION/TRAINING PROGRAM

## 2025-01-18 RX ORDER — ERGOCALCIFEROL 1.25 MG/1
50000 CAPSULE, LIQUID FILLED ORAL
Status: DISCONTINUED | OUTPATIENT
Start: 2025-01-18 | End: 2025-01-23 | Stop reason: HOSPADM

## 2025-01-18 RX ORDER — DEXAMETHASONE SODIUM PHOSPHATE 10 MG/ML
6 INJECTION INTRAMUSCULAR; INTRAVENOUS EVERY 24 HOURS
Status: DISCONTINUED | OUTPATIENT
Start: 2025-01-18 | End: 2025-01-18

## 2025-01-18 RX ORDER — MIDODRINE HYDROCHLORIDE 5 MG/1
5 TABLET ORAL
Status: DISCONTINUED | OUTPATIENT
Start: 2025-01-18 | End: 2025-01-23 | Stop reason: HOSPADM

## 2025-01-18 RX ADMIN — MORPHINE SULFATE 10 MG: 10 SOLUTION ORAL at 20:09

## 2025-01-18 RX ADMIN — MORPHINE SULFATE 10 MG: 10 SOLUTION ORAL at 05:18

## 2025-01-18 RX ADMIN — GABAPENTIN 600 MG: 300 CAPSULE ORAL at 09:43

## 2025-01-18 RX ADMIN — LORAZEPAM 1 MG: 1 TABLET ORAL at 13:57

## 2025-01-18 RX ADMIN — IPRATROPIUM BROMIDE AND ALBUTEROL SULFATE 1 DOSE: 2.5; .5 SOLUTION RESPIRATORY (INHALATION) at 12:51

## 2025-01-18 RX ADMIN — ARFORMOTEROL TARTRATE 15 MCG: 15 SOLUTION RESPIRATORY (INHALATION) at 09:37

## 2025-01-18 RX ADMIN — BUDESONIDE INHALATION 500 MCG: 0.5 SUSPENSION RESPIRATORY (INHALATION) at 09:37

## 2025-01-18 RX ADMIN — FUROSEMIDE 40 MG: 10 INJECTION, SOLUTION INTRAMUSCULAR; INTRAVENOUS at 17:19

## 2025-01-18 RX ADMIN — FLUOXETINE HYDROCHLORIDE 20 MG: 20 CAPSULE ORAL at 20:08

## 2025-01-18 RX ADMIN — METOPROLOL SUCCINATE 100 MG: 100 TABLET, FILM COATED, EXTENDED RELEASE ORAL at 20:08

## 2025-01-18 RX ADMIN — Medication 3 MG: at 20:09

## 2025-01-18 RX ADMIN — IPRATROPIUM BROMIDE AND ALBUTEROL SULFATE 1 DOSE: 2.5; .5 SOLUTION RESPIRATORY (INHALATION) at 04:49

## 2025-01-18 RX ADMIN — PRAMIPEXOLE DIHYDROCHLORIDE 0.75 MG: 0.25 TABLET ORAL at 20:08

## 2025-01-18 RX ADMIN — IPRATROPIUM BROMIDE AND ALBUTEROL SULFATE 1 DOSE: 2.5; .5 SOLUTION RESPIRATORY (INHALATION) at 00:34

## 2025-01-18 RX ADMIN — TAMSULOSIN HYDROCHLORIDE 0.4 MG: 0.4 CAPSULE ORAL at 09:43

## 2025-01-18 RX ADMIN — CALCIUM 1500 MG: 500 TABLET ORAL at 09:44

## 2025-01-18 RX ADMIN — IPRATROPIUM BROMIDE AND ALBUTEROL SULFATE 1 DOSE: 2.5; .5 SOLUTION RESPIRATORY (INHALATION) at 15:43

## 2025-01-18 RX ADMIN — BUDESONIDE INHALATION 500 MCG: 0.5 SUSPENSION RESPIRATORY (INHALATION) at 19:43

## 2025-01-18 RX ADMIN — GABAPENTIN 900 MG: 300 CAPSULE ORAL at 20:08

## 2025-01-18 RX ADMIN — IPRATROPIUM BROMIDE AND ALBUTEROL SULFATE 1 DOSE: 2.5; .5 SOLUTION RESPIRATORY (INHALATION) at 09:37

## 2025-01-18 RX ADMIN — PANTOPRAZOLE SODIUM 40 MG: 40 TABLET, DELAYED RELEASE ORAL at 05:20

## 2025-01-18 RX ADMIN — TAMSULOSIN HYDROCHLORIDE 0.4 MG: 0.4 CAPSULE ORAL at 20:09

## 2025-01-18 RX ADMIN — SODIUM CHLORIDE, PRESERVATIVE FREE 10 ML: 5 INJECTION INTRAVENOUS at 20:09

## 2025-01-18 RX ADMIN — MIDODRINE HYDROCHLORIDE 5 MG: 5 TABLET ORAL at 10:47

## 2025-01-18 RX ADMIN — CALCIUM 1500 MG: 500 TABLET ORAL at 20:09

## 2025-01-18 RX ADMIN — ARFORMOTEROL TARTRATE 15 MCG: 15 SOLUTION RESPIRATORY (INHALATION) at 19:43

## 2025-01-18 RX ADMIN — MIDODRINE HYDROCHLORIDE 5 MG: 5 TABLET ORAL at 17:20

## 2025-01-18 RX ADMIN — ERGOCALCIFEROL 50000 UNITS: 1.25 CAPSULE ORAL at 17:20

## 2025-01-18 RX ADMIN — HYDROCODONE BITARTRATE AND ACETAMINOPHEN 1 TABLET: 7.5; 325 TABLET ORAL at 17:19

## 2025-01-18 RX ADMIN — ASPIRIN 81 MG CHEWABLE TABLET 81 MG: 81 TABLET CHEWABLE at 09:44

## 2025-01-18 RX ADMIN — DEXAMETHASONE SODIUM PHOSPHATE 6 MG: 10 INJECTION INTRAMUSCULAR; INTRAVENOUS at 10:47

## 2025-01-18 RX ADMIN — ENOXAPARIN SODIUM 40 MG: 100 INJECTION SUBCUTANEOUS at 09:44

## 2025-01-18 RX ADMIN — IPRATROPIUM BROMIDE AND ALBUTEROL SULFATE 1 DOSE: 2.5; .5 SOLUTION RESPIRATORY (INHALATION) at 19:43

## 2025-01-18 RX ADMIN — SODIUM CHLORIDE, PRESERVATIVE FREE 10 ML: 5 INJECTION INTRAVENOUS at 09:45

## 2025-01-18 RX ADMIN — LORAZEPAM 1 MG: 1 TABLET ORAL at 09:43

## 2025-01-18 RX ADMIN — FUROSEMIDE 40 MG: 10 INJECTION, SOLUTION INTRAMUSCULAR; INTRAVENOUS at 09:44

## 2025-01-18 RX ADMIN — HYDROCODONE BITARTRATE AND ACETAMINOPHEN 1 TABLET: 7.5; 325 TABLET ORAL at 09:43

## 2025-01-18 RX ADMIN — ARIPIPRAZOLE 5 MG: 5 TABLET ORAL at 09:43

## 2025-01-18 RX ADMIN — LORAZEPAM 1 MG: 1 TABLET ORAL at 20:09

## 2025-01-18 ASSESSMENT — PAIN DESCRIPTION - ORIENTATION
ORIENTATION: RIGHT;LEFT

## 2025-01-18 ASSESSMENT — PAIN DESCRIPTION - LOCATION
LOCATION: LEG
LOCATION: BACK;LEG
LOCATION: LEG
LOCATION: BACK;LEG
LOCATION: LEG

## 2025-01-18 ASSESSMENT — PAIN SCALES - GENERAL
PAINLEVEL_OUTOF10: 4
PAINLEVEL_OUTOF10: 7
PAINLEVEL_OUTOF10: 8
PAINLEVEL_OUTOF10: 9
PAINLEVEL_OUTOF10: 7

## 2025-01-18 ASSESSMENT — PAIN DESCRIPTION - PAIN TYPE: TYPE: CHRONIC PAIN

## 2025-01-18 ASSESSMENT — PAIN DESCRIPTION - DESCRIPTORS
DESCRIPTORS: DULL;ACHING
DESCRIPTORS: DULL;ACHING

## 2025-01-18 ASSESSMENT — PAIN DESCRIPTION - FREQUENCY: FREQUENCY: INTERMITTENT

## 2025-01-18 ASSESSMENT — PAIN DESCRIPTION - ONSET: ONSET: ON-GOING

## 2025-01-18 ASSESSMENT — PAIN - FUNCTIONAL ASSESSMENT: PAIN_FUNCTIONAL_ASSESSMENT: PREVENTS OR INTERFERES SOME ACTIVE ACTIVITIES AND ADLS

## 2025-01-18 NOTE — PLAN OF CARE
Problem: Chronic Conditions and Co-morbidities  Goal: Patient's chronic conditions and co-morbidity symptoms are monitored and maintained or improved  Outcome: Progressing  Flowsheets (Taken 1/17/2025 1715 by Carmel Goel RN)  Care Plan - Patient's Chronic Conditions and Co-Morbidity Symptoms are Monitored and Maintained or Improved: Monitor and assess patient's chronic conditions and comorbid symptoms for stability, deterioration, or improvement     Problem: Discharge Planning  Goal: Discharge to home or other facility with appropriate resources  Outcome: Progressing  Flowsheets  Taken 1/17/2025 1715 by Carmel Goel RN  Discharge to home or other facility with appropriate resources:   Identify barriers to discharge with patient and caregiver   Arrange for needed discharge resources and transportation as appropriate  Taken 1/17/2025 1712 by Carmel Goel RN  Discharge to home or other facility with appropriate resources: Identify barriers to discharge with patient and caregiver     Problem: Pain  Goal: Verbalizes/displays adequate comfort level or baseline comfort level  Outcome: Progressing  Flowsheets (Taken 1/17/2025 1715 by Carmel Goel RN)  Verbalizes/displays adequate comfort level or baseline comfort level: Encourage patient to monitor pain and request assistance     Problem: Skin/Tissue Integrity  Goal: Absence of new skin breakdown  Description: 1.  Monitor for areas of redness and/or skin breakdown  2.  Assess vascular access sites hourly  3.  Every 4-6 hours minimum:  Change oxygen saturation probe site  4.  Every 4-6 hours:  If on nasal continuous positive airway pressure, respiratory therapy assess nares and determine need for appliance change or resting period.  Outcome: Progressing     Problem: ABCDS Injury Assessment  Goal: Absence of physical injury  Outcome: Progressing  Flowsheets  Taken 1/17/2025 1715 by Carmel Goel RN  Absence of Physical Injury: Implement safety measures based  on patient assessment  Taken 1/17/2025 1711 by Carmel Goel, RN  Absence of Physical Injury: Implement safety measures based on patient assessment     Problem: Safety - Adult  Goal: Free from fall injury  Outcome: Progressing  Flowsheets (Taken 1/17/2025 1715 by Carmel Goel, RN)  Free From Fall Injury:   Instruct family/caregiver on patient safety   Based on caregiver fall risk screen, instruct family/caregiver to ask for assistance with transferring infant if caregiver noted to have fall risk factors

## 2025-01-18 NOTE — CONSULTS
ENDOCRINOLOGY INITIAL CONSULTATION NOTE    Date of Admission: 1/17/2025  Date of Service: 1/18/2025  Admitting Physician: Myla Méndez MD   Primary Care Physician: Jana Sanchez MD  Consultant physician: Aj Marie MD     Reason for the consultation:  Hypocalcemia    History of Present Illness:  The history is provided by the patient. Accuracy of the patient data is excellent.    Dg Peña is a very pleasant 59 y.o. old male with PMH of congestive heart failure, COPD on home oxygen, hypertension and other listed below admitted to [facility] on 1/17/2025 because of worsening shortness of breath and lower extremity edema, endocrine service was consulted for evaluation and management of hypocalcemia.   The patient was recently admitted to the hospital with the same complaint.  Workup during the previous hospitalization was consistent with PTH independent hypocalcemia.  Blood work on this admission showed a calcium level of 6.2, albumin 3.7    Workup from the previous admission summarized in table below   Most Recent 06/07/24 05:31 06/24/24 10:52   Calcium, Ionized 1.00 (L)  11/30/24 02:40     Calcium 7.8 (L)  11/30/24 09:55 6.7 (LL) 7.5 (L)   Albumin 3.9  11/28/24 03:34     Parathyroid Hormone 68.1 (H)  11/29/24 12:05     Vit D, 25-Hydroxy 13.5 (L)  11/29/24 12:05       Repeated blood work during this admission also shows significant hypocalcemia with calcium level 6.2 albumin 3.9.    The patient is a known case of vitamin D deficiency.  Prior to admission he was on a vitamin D 50,000 once a week.  He denies any chronic diarrhea but reports a lot of GI issues.  The patient also lost weight over the past few years.       Past Medical History   Past Medical History:   Diagnosis Date    Anxiety     COPD (chronic obstructive pulmonary disease) (HCC)     Hypertension     Leg swelling     Multiple gastric ulcers     Restless leg syndrome        Past Surgical History   Past Surgical History:   Procedure Laterality

## 2025-01-18 NOTE — PROGRESS NOTES
Date: 1/17/2025    Time: 10:33 PM    Patient Placed On BIPAP/CPAP/ Non-Invasive Ventilation?  Yes    If no must comment.  Facial area red/color change? No           If YES are Blister/Lesion present?No   If yes must notify nursing staff  BIPAP/CPAP skin barrier?  Yes    Skin barrier type:mepilexlite     Comments:     01/17/25 0205   NIV Type   $NIV $Daily Charge   NIV Started/Stopped On   Equipment Type V60   Mode AVAPS   Mask Type Full face mask   Mask Size Medium   Assessment   Respirations 21   SpO2 98 %   Level of Consciousness 0   Comfort Level Good   Using Accessory Muscles No   Mask Compliance Good   Skin Assessment Clean, dry, & intact   Skin Protection for O2 Device Yes   Orientation Middle   Location Nose   Intervention(s) Skin Barrier   Settings/Measurements   PIP Observed 23 cm H20   CPAP/EPAP 8 cmH2O   IPAP Min 15 cmH2O   IPAP Max 25 cmH2O   Vt (Set, mL) 500 mL   Vt (Measured) 1094 mL   Rate Ordered 20   Insp Rise Time (%) 3 %   FiO2  40 %   I Time/ I Time % 0.85 s   Minute Volume (L/min) 23.2 Liters   Mask Leak (lpm) 52 lpm   Patient's Home Machine No   Alarm Settings   Alarms On Y   Low Pressure (cmH2O) 8 cmH2O   High Pressure (cmH2O) 35 cmH2O   RR Low (bpm) 12   RR High (bpm) 50 br/min   Patient Observation   Patient Observations red outlet, red cord in wall alarm       Jonathan Gomez RCP

## 2025-01-18 NOTE — PROGRESS NOTES
Hospitalist Progress Note      Admission Date: 2025     SYNOPSIS:60 yo male w/ hx of CHF, end stage COPD on 5L NC, HTN, anxiety who p/w several weeks of progressively worsening dyspnea and leg swelling. Notes that he gained 7-8 lbs in the last week.  Seen at the CHF clinic earlier.  She received IV diuretic and he was sent to the ER for admission for CHF exacerbation      SUBJECTIVE:    Patient states he does not feel much different from yesterday.  Still with significant shortness of breath.  Currently on 4 L nasal cannula  Denies chest pain, abdominal pain nausea or vomiting  Records reviewed.     Stable overnight. No overnight issues reported     Temp (24hrs), Av.9 °F (36.6 °C), Min:97 °F (36.1 °C), Max:98.8 °F (37.1 °C)    DIET: ADULT DIET; Regular; Low Fat/Low Chol/High Fiber/2 gm Na  CODE: Full Code    Intake/Output Summary (Last 24 hours) at 2025 1029  Last data filed at 2025 0521  Gross per 24 hour   Intake 480 ml   Output 750 ml   Net -270 ml       OBJECTIVE:    BP 98/62   Pulse 77   Temp 98.1 °F (36.7 °C) (Oral)   Resp 14   Ht 1.626 m (5' 4\")   Wt 63 kg (139 lb)   SpO2 98%   BMI 23.86 kg/m²     General appearance: No apparent distress, appears stated age and cooperative.  HEENT: Moist mucosa   Neck: Supple. No jugular venous distention. Thyroid not visible, non tender   Respiratory: Normal respiratory effort.  Speaking in full sentences.  Bilateral crackles  Cardiovascular: Regular heart beats, normal S1-S2. No M/G/R  Abdomen: Non distended, soft, non tender, no visceromegaly, no mass, normal bowel sounds   Musculoskeletal: No clubbing, cyanosis, 2+  bilateral lower extremity edema. Brisk capillary refill.   Skin:  No rashes  on visible skin  Neurologic: awake, alert and oriented. Following commands. No focal neurological deficit.        ASSESSMENT andPLAN:    Acute on chronic HFpEF. LVEF 60-65%. 8/3 2023.  Sent from CHF clinic for volume overload management.  Having

## 2025-01-18 NOTE — PROGRESS NOTES
Dg Peña was ordered Ohtuvayre inhalation susp which is a nonformulary medication.  This medication is not stocked by the pharmacy so it will need to be supplied by the patient for inpatient use.    If the medication has not been administered by 1400 on the following day from the time the order was placed, a pharmacist will follow-up with the nurse of the patient to assess the capability of the patient to bring in the medication.    Thank you

## 2025-01-19 ENCOUNTER — APPOINTMENT (OUTPATIENT)
Dept: CT IMAGING | Age: 60
DRG: 194 | End: 2025-01-19
Payer: MEDICAID

## 2025-01-19 LAB
ALBUMIN SERPL-MCNC: 3.4 G/DL (ref 3.5–5.2)
ALP SERPL-CCNC: 83 U/L (ref 40–129)
ALT SERPL-CCNC: 14 U/L (ref 0–40)
ANION GAP SERPL CALCULATED.3IONS-SCNC: 11 MMOL/L (ref 7–16)
AST SERPL-CCNC: 12 U/L (ref 0–39)
BASOPHILS # BLD: 0 K/UL (ref 0–0.2)
BASOPHILS NFR BLD: 0 % (ref 0–2)
BILIRUB SERPL-MCNC: 0.3 MG/DL (ref 0–1.2)
BUN SERPL-MCNC: 18 MG/DL (ref 6–20)
CALCIUM SERPL-MCNC: 7.2 MG/DL (ref 8.6–10.2)
CHLORIDE SERPL-SCNC: 90 MMOL/L (ref 98–107)
CO2 SERPL-SCNC: 39 MMOL/L (ref 22–29)
CREAT SERPL-MCNC: 0.7 MG/DL (ref 0.7–1.2)
EOSINOPHIL # BLD: 0.01 K/UL (ref 0.05–0.5)
EOSINOPHILS RELATIVE PERCENT: 0 % (ref 0–6)
ERYTHROCYTE [DISTWIDTH] IN BLOOD BY AUTOMATED COUNT: 14.5 % (ref 11.5–15)
GFR, ESTIMATED: >90 ML/MIN/1.73M2
GLUCOSE SERPL-MCNC: 107 MG/DL (ref 74–99)
HCT VFR BLD AUTO: 31.8 % (ref 37–54)
HGB BLD-MCNC: 9.5 G/DL (ref 12.5–16.5)
IMM GRANULOCYTES # BLD AUTO: 0.06 K/UL (ref 0–0.58)
IMM GRANULOCYTES NFR BLD: 1 % (ref 0–5)
LYMPHOCYTES NFR BLD: 0.56 K/UL (ref 1.5–4)
LYMPHOCYTES RELATIVE PERCENT: 6 % (ref 20–42)
MCH RBC QN AUTO: 32 PG (ref 26–35)
MCHC RBC AUTO-ENTMCNC: 29.9 G/DL (ref 32–34.5)
MCV RBC AUTO: 107.1 FL (ref 80–99.9)
MONOCYTES NFR BLD: 0.54 K/UL (ref 0.1–0.95)
MONOCYTES NFR BLD: 6 % (ref 2–12)
NEUTROPHILS NFR BLD: 87 % (ref 43–80)
NEUTS SEG NFR BLD: 7.64 K/UL (ref 1.8–7.3)
PLATELET # BLD AUTO: 182 K/UL (ref 130–450)
PMV BLD AUTO: 11.2 FL (ref 7–12)
POTASSIUM SERPL-SCNC: 3.9 MMOL/L (ref 3.5–5)
PROCALCITONIN SERPL-MCNC: 0.06 NG/ML (ref 0–0.08)
PROT SERPL-MCNC: 7.2 G/DL (ref 6.4–8.3)
RBC # BLD AUTO: 2.97 M/UL (ref 3.8–5.8)
RBC # BLD: ABNORMAL 10*6/UL
SODIUM SERPL-SCNC: 140 MMOL/L (ref 132–146)
WBC OTHER # BLD: 8.8 K/UL (ref 4.5–11.5)

## 2025-01-19 PROCEDURE — 6370000000 HC RX 637 (ALT 250 FOR IP): Performed by: STUDENT IN AN ORGANIZED HEALTH CARE EDUCATION/TRAINING PROGRAM

## 2025-01-19 PROCEDURE — 87081 CULTURE SCREEN ONLY: CPT

## 2025-01-19 PROCEDURE — 94660 CPAP INITIATION&MGMT: CPT

## 2025-01-19 PROCEDURE — 36415 COLL VENOUS BLD VENIPUNCTURE: CPT

## 2025-01-19 PROCEDURE — 87449 NOS EACH ORGANISM AG IA: CPT

## 2025-01-19 PROCEDURE — 6360000002 HC RX W HCPCS: Performed by: STUDENT IN AN ORGANIZED HEALTH CARE EDUCATION/TRAINING PROGRAM

## 2025-01-19 PROCEDURE — 2500000003 HC RX 250 WO HCPCS: Performed by: STUDENT IN AN ORGANIZED HEALTH CARE EDUCATION/TRAINING PROGRAM

## 2025-01-19 PROCEDURE — 87899 AGENT NOS ASSAY W/OPTIC: CPT

## 2025-01-19 PROCEDURE — 85025 COMPLETE CBC W/AUTO DIFF WBC: CPT

## 2025-01-19 PROCEDURE — 71250 CT THORAX DX C-: CPT

## 2025-01-19 PROCEDURE — 2700000000 HC OXYGEN THERAPY PER DAY

## 2025-01-19 PROCEDURE — 2060000000 HC ICU INTERMEDIATE R&B

## 2025-01-19 PROCEDURE — 2580000003 HC RX 258: Performed by: STUDENT IN AN ORGANIZED HEALTH CARE EDUCATION/TRAINING PROGRAM

## 2025-01-19 PROCEDURE — 94640 AIRWAY INHALATION TREATMENT: CPT

## 2025-01-19 PROCEDURE — 80053 COMPREHEN METABOLIC PANEL: CPT

## 2025-01-19 PROCEDURE — 99232 SBSQ HOSP IP/OBS MODERATE 35: CPT | Performed by: STUDENT IN AN ORGANIZED HEALTH CARE EDUCATION/TRAINING PROGRAM

## 2025-01-19 PROCEDURE — 84145 PROCALCITONIN (PCT): CPT

## 2025-01-19 RX ORDER — GUAIFENESIN 400 MG/1
400 TABLET ORAL 3 TIMES DAILY
Status: DISCONTINUED | OUTPATIENT
Start: 2025-01-19 | End: 2025-01-20

## 2025-01-19 RX ORDER — BENZONATATE 100 MG/1
100 CAPSULE ORAL 3 TIMES DAILY PRN
Status: DISCONTINUED | OUTPATIENT
Start: 2025-01-19 | End: 2025-01-23 | Stop reason: HOSPADM

## 2025-01-19 RX ORDER — ACETAZOLAMIDE 500 MG/5ML
250 INJECTION, POWDER, LYOPHILIZED, FOR SOLUTION INTRAVENOUS ONCE
Status: COMPLETED | OUTPATIENT
Start: 2025-01-19 | End: 2025-01-19

## 2025-01-19 RX ORDER — GUAIFENESIN 600 MG/1
600 TABLET, EXTENDED RELEASE ORAL 2 TIMES DAILY
Status: DISCONTINUED | OUTPATIENT
Start: 2025-01-19 | End: 2025-01-19 | Stop reason: CLARIF

## 2025-01-19 RX ADMIN — IPRATROPIUM BROMIDE AND ALBUTEROL SULFATE 1 DOSE: 2.5; .5 SOLUTION RESPIRATORY (INHALATION) at 00:11

## 2025-01-19 RX ADMIN — METOPROLOL SUCCINATE 100 MG: 100 TABLET, FILM COATED, EXTENDED RELEASE ORAL at 20:25

## 2025-01-19 RX ADMIN — MIDODRINE HYDROCHLORIDE 5 MG: 5 TABLET ORAL at 12:09

## 2025-01-19 RX ADMIN — ENOXAPARIN SODIUM 40 MG: 100 INJECTION SUBCUTANEOUS at 09:17

## 2025-01-19 RX ADMIN — ARIPIPRAZOLE 5 MG: 5 TABLET ORAL at 09:16

## 2025-01-19 RX ADMIN — MIDODRINE HYDROCHLORIDE 5 MG: 5 TABLET ORAL at 16:37

## 2025-01-19 RX ADMIN — ACETAZOLAMIDE SODIUM 250 MG: 500 INJECTION, POWDER, LYOPHILIZED, FOR SOLUTION INTRAVENOUS at 10:12

## 2025-01-19 RX ADMIN — TAMSULOSIN HYDROCHLORIDE 0.4 MG: 0.4 CAPSULE ORAL at 20:24

## 2025-01-19 RX ADMIN — CALCIUM 1500 MG: 500 TABLET ORAL at 20:24

## 2025-01-19 RX ADMIN — GABAPENTIN 900 MG: 300 CAPSULE ORAL at 20:24

## 2025-01-19 RX ADMIN — FLUOXETINE HYDROCHLORIDE 20 MG: 20 CAPSULE ORAL at 20:24

## 2025-01-19 RX ADMIN — FUROSEMIDE 40 MG: 10 INJECTION, SOLUTION INTRAMUSCULAR; INTRAVENOUS at 09:17

## 2025-01-19 RX ADMIN — IPRATROPIUM BROMIDE AND ALBUTEROL SULFATE 1 DOSE: 2.5; .5 SOLUTION RESPIRATORY (INHALATION) at 20:42

## 2025-01-19 RX ADMIN — IPRATROPIUM BROMIDE AND ALBUTEROL SULFATE 1 DOSE: 2.5; .5 SOLUTION RESPIRATORY (INHALATION) at 15:43

## 2025-01-19 RX ADMIN — BUDESONIDE INHALATION 500 MCG: 0.5 SUSPENSION RESPIRATORY (INHALATION) at 08:40

## 2025-01-19 RX ADMIN — SODIUM CHLORIDE, PRESERVATIVE FREE 10 ML: 5 INJECTION INTRAVENOUS at 09:17

## 2025-01-19 RX ADMIN — Medication 3 MG: at 20:25

## 2025-01-19 RX ADMIN — DOXYCYCLINE 100 MG: 100 INJECTION, POWDER, LYOPHILIZED, FOR SOLUTION INTRAVENOUS at 13:17

## 2025-01-19 RX ADMIN — DOXYCYCLINE 100 MG: 100 INJECTION, POWDER, LYOPHILIZED, FOR SOLUTION INTRAVENOUS at 23:31

## 2025-01-19 RX ADMIN — HYDROCODONE BITARTRATE AND ACETAMINOPHEN 1 TABLET: 7.5; 325 TABLET ORAL at 16:37

## 2025-01-19 RX ADMIN — GUAIFENESIN 400 MG: 400 TABLET ORAL at 13:12

## 2025-01-19 RX ADMIN — MORPHINE SULFATE 10 MG: 10 SOLUTION ORAL at 10:12

## 2025-01-19 RX ADMIN — PRAMIPEXOLE DIHYDROCHLORIDE 0.75 MG: 0.25 TABLET ORAL at 20:24

## 2025-01-19 RX ADMIN — GABAPENTIN 600 MG: 300 CAPSULE ORAL at 09:16

## 2025-01-19 RX ADMIN — SODIUM CHLORIDE, PRESERVATIVE FREE 10 ML: 5 INJECTION INTRAVENOUS at 20:25

## 2025-01-19 RX ADMIN — ASPIRIN 81 MG CHEWABLE TABLET 81 MG: 81 TABLET CHEWABLE at 09:17

## 2025-01-19 RX ADMIN — ARFORMOTEROL TARTRATE 15 MCG: 15 SOLUTION RESPIRATORY (INHALATION) at 08:40

## 2025-01-19 RX ADMIN — HYDROCODONE BITARTRATE AND ACETAMINOPHEN 1 TABLET: 7.5; 325 TABLET ORAL at 04:45

## 2025-01-19 RX ADMIN — MIDODRINE HYDROCHLORIDE 5 MG: 5 TABLET ORAL at 09:17

## 2025-01-19 RX ADMIN — BUDESONIDE INHALATION 500 MCG: 0.5 SUSPENSION RESPIRATORY (INHALATION) at 20:42

## 2025-01-19 RX ADMIN — BENZONATATE 100 MG: 100 CAPSULE ORAL at 16:38

## 2025-01-19 RX ADMIN — IPRATROPIUM BROMIDE AND ALBUTEROL SULFATE 1 DOSE: 2.5; .5 SOLUTION RESPIRATORY (INHALATION) at 04:18

## 2025-01-19 RX ADMIN — GUAIFENESIN 400 MG: 400 TABLET ORAL at 20:24

## 2025-01-19 RX ADMIN — FUROSEMIDE 40 MG: 10 INJECTION, SOLUTION INTRAMUSCULAR; INTRAVENOUS at 16:37

## 2025-01-19 RX ADMIN — LORAZEPAM 1 MG: 1 TABLET ORAL at 09:16

## 2025-01-19 RX ADMIN — ARFORMOTEROL TARTRATE 15 MCG: 15 SOLUTION RESPIRATORY (INHALATION) at 20:42

## 2025-01-19 RX ADMIN — LORAZEPAM 1 MG: 1 TABLET ORAL at 17:12

## 2025-01-19 RX ADMIN — TAMSULOSIN HYDROCHLORIDE 0.4 MG: 0.4 CAPSULE ORAL at 09:16

## 2025-01-19 RX ADMIN — WATER 1000 MG: 1 INJECTION INTRAMUSCULAR; INTRAVENOUS; SUBCUTANEOUS at 13:12

## 2025-01-19 RX ADMIN — CALCIUM 1500 MG: 500 TABLET ORAL at 09:16

## 2025-01-19 RX ADMIN — IPRATROPIUM BROMIDE AND ALBUTEROL SULFATE 1 DOSE: 2.5; .5 SOLUTION RESPIRATORY (INHALATION) at 12:06

## 2025-01-19 RX ADMIN — MORPHINE SULFATE 10 MG: 10 SOLUTION ORAL at 20:24

## 2025-01-19 RX ADMIN — IPRATROPIUM BROMIDE AND ALBUTEROL SULFATE 1 DOSE: 2.5; .5 SOLUTION RESPIRATORY (INHALATION) at 08:40

## 2025-01-19 RX ADMIN — LORAZEPAM 1 MG: 1 TABLET ORAL at 23:30

## 2025-01-19 ASSESSMENT — PAIN SCALES - GENERAL
PAINLEVEL_OUTOF10: 7

## 2025-01-19 ASSESSMENT — PAIN DESCRIPTION - ORIENTATION: ORIENTATION: RIGHT;LEFT

## 2025-01-19 ASSESSMENT — PAIN DESCRIPTION - LOCATION
LOCATION: LEG
LOCATION: LEG

## 2025-01-19 NOTE — PROGRESS NOTES
Hospitalist Progress Note      Admission Date: 2025     SYNOPSIS:58 yo male w/ hx of CHF, end stage COPD on 5L NC, HTN, anxiety who p/w several weeks of progressively worsening dyspnea and leg swelling. Notes that he gained 7-8 lbs in the last week.  Seen at the CHF clinic earlier.  She received IV diuretic and he was sent to the ER for admission for CHF exacerbation      SUBJECTIVE:    Patient states he is doing better as far as his breathing.  He has a lot of pop cans on his table.  He states he leads a little bit of each one  Having significant cough and large sputum production  Denies chest pain or back pain, abdominal pain  Denies constipation although his abdomen is distended  No urinary complaints  Records reviewed.     Stable overnight. No overnight issues reported     Temp (24hrs), Av.9 °F (36.6 °C), Min:97 °F (36.1 °C), Max:98.5 °F (36.9 °C)    DIET: ADULT DIET; Regular; Low Fat/Low Chol/High Fiber/2 gm Na  CODE: Full Code    Intake/Output Summary (Last 24 hours) at 2025 0853  Last data filed at 2025  Gross per 24 hour   Intake --   Output 3000 ml   Net -3000 ml       OBJECTIVE:    /76   Pulse 69   Temp 98.5 °F (36.9 °C) (Oral)   Resp 18   Ht 1.626 m (5' 4\")   Wt 63 kg (139 lb)   SpO2 94%   BMI 23.86 kg/m²     General appearance: No apparent distress, appears stated age and cooperative.  Nonseptic appearance  HEENT: Moist mucosa   Neck: Supple. No jugular venous distention. Thyroid not visible, non tender   Respiratory: Normal respiratory effort.  Speaking in full sentences.  Bilateral crackles and wheeze   Cardiovascular: Regular heart beats, normal S1-S2. No M/G/R  Abdomen: Non distended, soft, non tender, no visceromegaly, no mass, normal bowel sounds   Musculoskeletal: No clubbing, cyanosis, 1+  bilateral lower extremity edema. Brisk capillary refill.   Skin:  No rashes  on visible skin  Neurologic: awake, alert and oriented. Following commands. No focal     pantoprazole  40 mg Oral QAM AC    pramipexole  0.75 mg Oral Nightly    tamsulosin  0.4 mg Oral BID    gabapentin  600 mg Oral QAM    sodium chloride flush  5-40 mL IntraVENous 2 times per day    enoxaparin  40 mg SubCUTAneous Daily    FLUoxetine  20 mg Oral Nightly    LORazepam  1 mg Oral TID     PRN Meds: fluticasone, HYDROcodone-acetaminophen, sodium chloride, sodium chloride flush, sodium chloride, potassium chloride **OR** potassium alternative oral replacement **OR** potassium chloride, magnesium sulfate, polyethylene glycol, acetaminophen **OR** acetaminophen, melatonin, morphine    Labs:     Recent Labs     01/17/25  1337 01/18/25  0341 01/19/25  0451   WBC 12.2* 10.4 8.8   HGB 9.1* 9.0* 9.5*   HCT 31.9* 31.3* 31.8*    161 182       Recent Labs     01/17/25  1337 01/17/25  1845 01/18/25  0850 01/19/25  0451     --  139 140   K 3.8  --  3.6 3.9   CL 92*  --  90* 90*   CO2 42*  --  41* 39*   BUN 18  --  17 18   CREATININE 0.7  --  0.6* 0.7   CALCIUM 6.2*  --  6.9* 7.2*   PHOS  --  3.6 3.6  --        Recent Labs     01/17/25  1337 01/19/25  0451   ALKPHOS 106 83   ALT 18 14   AST 16 12   BILITOT 0.4 0.3       No results for input(s): \"INR\" in the last 72 hours.    No results for input(s): \"CKTOTAL\", \"TROPONINI\" in the last 72 hours.    Chronic labs:    Lab Results   Component Value Date    CHOL 134 12/31/2024    TRIG 54 12/31/2024    HDL 67 12/31/2024    TSH 0.30 12/30/2024    PSA 0.63 04/19/2024    INR 1.0 08/02/2023    LABA1C 5.6 11/25/2024       Radiology: REVIEWED DAILY    +++++++++++++++++++++++++++++++++++++++++++++++++  Maria Marino Milanes, MD  [place], [place]  +++++++++++++++++++++++++++++++++++++++++++++++++  NOTE: This report was transcribed using voice recognition software. Every effort was made to ensure accuracy; however, inadvertent computerized transcription errors may be present.

## 2025-01-20 ENCOUNTER — APPOINTMENT (OUTPATIENT)
Dept: ULTRASOUND IMAGING | Age: 60
DRG: 194 | End: 2025-01-20
Payer: MEDICAID

## 2025-01-20 ENCOUNTER — TELEPHONE (OUTPATIENT)
Dept: PALLATIVE CARE | Age: 60
End: 2025-01-20

## 2025-01-20 LAB
ANION GAP SERPL CALCULATED.3IONS-SCNC: 9 MMOL/L (ref 7–16)
BASOPHILS # BLD: 0.08 K/UL (ref 0–0.2)
BASOPHILS NFR BLD: 1 % (ref 0–2)
BUN SERPL-MCNC: 18 MG/DL (ref 6–20)
CALCIUM SERPL-MCNC: 7.1 MG/DL (ref 8.6–10.2)
CHLORIDE SERPL-SCNC: 90 MMOL/L (ref 98–107)
CO2 SERPL-SCNC: 35 MMOL/L (ref 22–29)
CREAT SERPL-MCNC: 0.8 MG/DL (ref 0.7–1.2)
EOSINOPHIL # BLD: 0 K/UL (ref 0.05–0.5)
EOSINOPHILS RELATIVE PERCENT: 0 % (ref 0–6)
ERYTHROCYTE [DISTWIDTH] IN BLOOD BY AUTOMATED COUNT: 14.9 % (ref 11.5–15)
GFR, ESTIMATED: >90 ML/MIN/1.73M2
GLUCOSE SERPL-MCNC: 146 MG/DL (ref 74–99)
HCT VFR BLD AUTO: 31 % (ref 37–54)
HGB BLD-MCNC: 9 G/DL (ref 12.5–16.5)
L PNEUMO1 AG UR QL IA.RAPID: NEGATIVE
LYMPHOCYTES NFR BLD: 0.42 K/UL (ref 1.5–4)
LYMPHOCYTES RELATIVE PERCENT: 4 % (ref 20–42)
MCH RBC QN AUTO: 31.3 PG (ref 26–35)
MCHC RBC AUTO-ENTMCNC: 29 G/DL (ref 32–34.5)
MCV RBC AUTO: 107.6 FL (ref 80–99.9)
MONOCYTES NFR BLD: 0.33 K/UL (ref 0.1–0.95)
MONOCYTES NFR BLD: 4 % (ref 2–12)
NEUTROPHILS NFR BLD: 91 % (ref 43–80)
NEUTS SEG NFR BLD: 8.77 K/UL (ref 1.8–7.3)
PLATELET # BLD AUTO: 157 K/UL (ref 130–450)
PMV BLD AUTO: 11.2 FL (ref 7–12)
POTASSIUM SERPL-SCNC: 3.7 MMOL/L (ref 3.5–5)
RBC # BLD AUTO: 2.88 M/UL (ref 3.8–5.8)
RBC # BLD: ABNORMAL 10*6/UL
S PNEUM AG SPEC QL: NEGATIVE
SODIUM SERPL-SCNC: 134 MMOL/L (ref 132–146)
SPECIMEN SOURCE: NORMAL
WBC OTHER # BLD: 9.6 K/UL (ref 4.5–11.5)

## 2025-01-20 PROCEDURE — 87077 CULTURE AEROBIC IDENTIFY: CPT

## 2025-01-20 PROCEDURE — 6370000000 HC RX 637 (ALT 250 FOR IP): Performed by: STUDENT IN AN ORGANIZED HEALTH CARE EDUCATION/TRAINING PROGRAM

## 2025-01-20 PROCEDURE — 87205 SMEAR GRAM STAIN: CPT

## 2025-01-20 PROCEDURE — 94640 AIRWAY INHALATION TREATMENT: CPT

## 2025-01-20 PROCEDURE — 36415 COLL VENOUS BLD VENIPUNCTURE: CPT

## 2025-01-20 PROCEDURE — 2500000003 HC RX 250 WO HCPCS: Performed by: STUDENT IN AN ORGANIZED HEALTH CARE EDUCATION/TRAINING PROGRAM

## 2025-01-20 PROCEDURE — 80048 BASIC METABOLIC PNL TOTAL CA: CPT

## 2025-01-20 PROCEDURE — 87070 CULTURE OTHR SPECIMN AEROBIC: CPT

## 2025-01-20 PROCEDURE — 85025 COMPLETE CBC W/AUTO DIFF WBC: CPT

## 2025-01-20 PROCEDURE — 6360000002 HC RX W HCPCS: Performed by: STUDENT IN AN ORGANIZED HEALTH CARE EDUCATION/TRAINING PROGRAM

## 2025-01-20 PROCEDURE — 2060000000 HC ICU INTERMEDIATE R&B

## 2025-01-20 PROCEDURE — 94660 CPAP INITIATION&MGMT: CPT

## 2025-01-20 PROCEDURE — 2580000003 HC RX 258: Performed by: STUDENT IN AN ORGANIZED HEALTH CARE EDUCATION/TRAINING PROGRAM

## 2025-01-20 PROCEDURE — 6370000000 HC RX 637 (ALT 250 FOR IP): Performed by: INTERNAL MEDICINE

## 2025-01-20 PROCEDURE — 99232 SBSQ HOSP IP/OBS MODERATE 35: CPT | Performed by: STUDENT IN AN ORGANIZED HEALTH CARE EDUCATION/TRAINING PROGRAM

## 2025-01-20 PROCEDURE — 99232 SBSQ HOSP IP/OBS MODERATE 35: CPT | Performed by: INTERNAL MEDICINE

## 2025-01-20 PROCEDURE — 2700000000 HC OXYGEN THERAPY PER DAY

## 2025-01-20 PROCEDURE — 93971 EXTREMITY STUDY: CPT

## 2025-01-20 RX ORDER — GUAIFENESIN/DEXTROMETHORPHAN 100-10MG/5
5 SYRUP ORAL EVERY 4 HOURS PRN
Status: DISCONTINUED | OUTPATIENT
Start: 2025-01-20 | End: 2025-01-23 | Stop reason: HOSPADM

## 2025-01-20 RX ORDER — CALCIUM CARBONATE 500(1250)
1500 TABLET ORAL 3 TIMES DAILY
Status: DISCONTINUED | OUTPATIENT
Start: 2025-01-20 | End: 2025-01-23 | Stop reason: HOSPADM

## 2025-01-20 RX ORDER — POTASSIUM CHLORIDE 1500 MG/1
40 TABLET, EXTENDED RELEASE ORAL ONCE
Status: COMPLETED | OUTPATIENT
Start: 2025-01-20 | End: 2025-01-20

## 2025-01-20 RX ORDER — GUAIFENESIN 400 MG/1
400 TABLET ORAL 3 TIMES DAILY
Status: DISCONTINUED | OUTPATIENT
Start: 2025-01-20 | End: 2025-01-23 | Stop reason: HOSPADM

## 2025-01-20 RX ADMIN — IPRATROPIUM BROMIDE AND ALBUTEROL SULFATE 1 DOSE: 2.5; .5 SOLUTION RESPIRATORY (INHALATION) at 15:39

## 2025-01-20 RX ADMIN — BUDESONIDE INHALATION 500 MCG: 0.5 SUSPENSION RESPIRATORY (INHALATION) at 08:13

## 2025-01-20 RX ADMIN — IPRATROPIUM BROMIDE AND ALBUTEROL SULFATE 1 DOSE: 2.5; .5 SOLUTION RESPIRATORY (INHALATION) at 12:27

## 2025-01-20 RX ADMIN — GUAIFENESIN 400 MG: 400 TABLET ORAL at 07:54

## 2025-01-20 RX ADMIN — PANTOPRAZOLE SODIUM 40 MG: 40 TABLET, DELAYED RELEASE ORAL at 05:01

## 2025-01-20 RX ADMIN — MORPHINE SULFATE 10 MG: 10 SOLUTION ORAL at 19:59

## 2025-01-20 RX ADMIN — LORAZEPAM 1 MG: 1 TABLET ORAL at 07:54

## 2025-01-20 RX ADMIN — MORPHINE SULFATE 10 MG: 10 SOLUTION ORAL at 13:26

## 2025-01-20 RX ADMIN — FUROSEMIDE 40 MG: 10 INJECTION, SOLUTION INTRAMUSCULAR; INTRAVENOUS at 16:14

## 2025-01-20 RX ADMIN — MIDODRINE HYDROCHLORIDE 5 MG: 5 TABLET ORAL at 07:54

## 2025-01-20 RX ADMIN — IPRATROPIUM BROMIDE AND ALBUTEROL SULFATE 1 DOSE: 2.5; .5 SOLUTION RESPIRATORY (INHALATION) at 19:09

## 2025-01-20 RX ADMIN — DOXYCYCLINE 100 MG: 100 INJECTION, POWDER, LYOPHILIZED, FOR SOLUTION INTRAVENOUS at 11:17

## 2025-01-20 RX ADMIN — IPRATROPIUM BROMIDE AND ALBUTEROL SULFATE 1 DOSE: 2.5; .5 SOLUTION RESPIRATORY (INHALATION) at 04:21

## 2025-01-20 RX ADMIN — WATER 1000 MG: 1 INJECTION INTRAMUSCULAR; INTRAVENOUS; SUBCUTANEOUS at 11:10

## 2025-01-20 RX ADMIN — LORAZEPAM 1 MG: 1 TABLET ORAL at 13:26

## 2025-01-20 RX ADMIN — MIDODRINE HYDROCHLORIDE 5 MG: 5 TABLET ORAL at 16:14

## 2025-01-20 RX ADMIN — GUAIFENESIN 400 MG: 400 TABLET ORAL at 20:00

## 2025-01-20 RX ADMIN — ARFORMOTEROL TARTRATE 15 MCG: 15 SOLUTION RESPIRATORY (INHALATION) at 08:13

## 2025-01-20 RX ADMIN — GUAIFENESIN 400 MG: 400 TABLET ORAL at 13:26

## 2025-01-20 RX ADMIN — CALCIUM 1500 MG: 500 TABLET ORAL at 07:54

## 2025-01-20 RX ADMIN — ARFORMOTEROL TARTRATE 15 MCG: 15 SOLUTION RESPIRATORY (INHALATION) at 19:09

## 2025-01-20 RX ADMIN — ASPIRIN 81 MG CHEWABLE TABLET 81 MG: 81 TABLET CHEWABLE at 07:54

## 2025-01-20 RX ADMIN — ERGOCALCIFEROL 50000 UNITS: 1.25 CAPSULE ORAL at 16:14

## 2025-01-20 RX ADMIN — HYDROCODONE BITARTRATE AND ACETAMINOPHEN 1 TABLET: 7.5; 325 TABLET ORAL at 04:26

## 2025-01-20 RX ADMIN — SODIUM CHLORIDE, PRESERVATIVE FREE 10 ML: 5 INJECTION INTRAVENOUS at 07:57

## 2025-01-20 RX ADMIN — BUDESONIDE INHALATION 500 MCG: 0.5 SUSPENSION RESPIRATORY (INHALATION) at 19:09

## 2025-01-20 RX ADMIN — ENOXAPARIN SODIUM 40 MG: 100 INJECTION SUBCUTANEOUS at 07:55

## 2025-01-20 RX ADMIN — SODIUM CHLORIDE, PRESERVATIVE FREE 10 ML: 5 INJECTION INTRAVENOUS at 20:01

## 2025-01-20 RX ADMIN — MORPHINE SULFATE 10 MG: 10 SOLUTION ORAL at 16:17

## 2025-01-20 RX ADMIN — HYDROCODONE BITARTRATE AND ACETAMINOPHEN 1 TABLET: 7.5; 325 TABLET ORAL at 11:09

## 2025-01-20 RX ADMIN — MORPHINE SULFATE 10 MG: 10 SOLUTION ORAL at 07:58

## 2025-01-20 RX ADMIN — HYDROCODONE BITARTRATE AND ACETAMINOPHEN 1 TABLET: 7.5; 325 TABLET ORAL at 18:53

## 2025-01-20 RX ADMIN — TAMSULOSIN HYDROCHLORIDE 0.4 MG: 0.4 CAPSULE ORAL at 07:54

## 2025-01-20 RX ADMIN — CALCIUM 1500 MG: 500 TABLET ORAL at 20:00

## 2025-01-20 RX ADMIN — MIDODRINE HYDROCHLORIDE 5 MG: 5 TABLET ORAL at 11:09

## 2025-01-20 RX ADMIN — IPRATROPIUM BROMIDE AND ALBUTEROL SULFATE 1 DOSE: 2.5; .5 SOLUTION RESPIRATORY (INHALATION) at 00:27

## 2025-01-20 RX ADMIN — LORAZEPAM 1 MG: 1 TABLET ORAL at 20:00

## 2025-01-20 RX ADMIN — GABAPENTIN 900 MG: 300 CAPSULE ORAL at 20:00

## 2025-01-20 RX ADMIN — ARIPIPRAZOLE 5 MG: 5 TABLET ORAL at 07:54

## 2025-01-20 RX ADMIN — PRAMIPEXOLE DIHYDROCHLORIDE 0.75 MG: 0.25 TABLET ORAL at 19:59

## 2025-01-20 RX ADMIN — POTASSIUM CHLORIDE 40 MEQ: 1500 TABLET, EXTENDED RELEASE ORAL at 19:59

## 2025-01-20 RX ADMIN — TAMSULOSIN HYDROCHLORIDE 0.4 MG: 0.4 CAPSULE ORAL at 20:00

## 2025-01-20 RX ADMIN — FLUTICASONE PROPIONATE 1 SPRAY: 50 SPRAY, METERED NASAL at 20:24

## 2025-01-20 RX ADMIN — METOPROLOL SUCCINATE 100 MG: 100 TABLET, FILM COATED, EXTENDED RELEASE ORAL at 20:00

## 2025-01-20 RX ADMIN — GABAPENTIN 600 MG: 300 CAPSULE ORAL at 07:54

## 2025-01-20 RX ADMIN — FUROSEMIDE 40 MG: 10 INJECTION, SOLUTION INTRAMUSCULAR; INTRAVENOUS at 07:55

## 2025-01-20 RX ADMIN — FLUOXETINE HYDROCHLORIDE 20 MG: 20 CAPSULE ORAL at 20:00

## 2025-01-20 RX ADMIN — IPRATROPIUM BROMIDE AND ALBUTEROL SULFATE 1 DOSE: 2.5; .5 SOLUTION RESPIRATORY (INHALATION) at 08:13

## 2025-01-20 ASSESSMENT — PAIN SCALES - GENERAL
PAINLEVEL_OUTOF10: 0
PAINLEVEL_OUTOF10: 3
PAINLEVEL_OUTOF10: 8
PAINLEVEL_OUTOF10: 3

## 2025-01-20 ASSESSMENT — PAIN DESCRIPTION - LOCATION
LOCATION: LEG

## 2025-01-20 ASSESSMENT — PAIN DESCRIPTION - DESCRIPTORS: DESCRIPTORS: ACHING

## 2025-01-20 ASSESSMENT — PAIN DESCRIPTION - ORIENTATION
ORIENTATION: RIGHT;LEFT
ORIENTATION: LEFT
ORIENTATION: LEFT

## 2025-01-20 NOTE — PROGRESS NOTES
ENDOCRINOLOGY PROGRESS NOTE    Date of Admission: 1/17/2025  Date of Service: 1/19/2025  Admitting Physician: Myla Méndez MD   Primary Care Physician: Jana Sanchez MD  Consultant physician: Aj Marie MD     Reason for the consultation:  Hypocalcemia    History of Present Illness:  The history is provided by the patient. Accuracy of the patient data is excellent.    Dg Peña is a very pleasant 59 y.o. old male with PMH of congestive heart failure, COPD on home oxygen, hypertension and other listed below admitted to [facility] on 1/17/2025 because of worsening shortness of breath and lower extremity edema, endocrine service was consulted for evaluation and management of hypocalcemia.     Subjective  The patient was seen and examined at bedside today, no acute events overnight.  Calcium level improving      Allergies and Drug reactions  Allergies   Allergen Reactions    Levofloxacin Other (See Comments) and Nausea Only     Other reaction(s): Abdominal pain    Lisinopril      Other reaction(s): COUGHING    Tramadol      Other reaction(s): SHAKING    Ibuprofen Nausea And Vomiting    Sulfa Antibiotics Nausea And Vomiting       Scheduled Meds:   doxycycline (VIBRAMYCIN) IV  100 mg IntraVENous Q12H    cefTRIAXone (ROCEPHIN) IV  1,000 mg IntraVENous Q24H    guaiFENesin  400 mg Oral TID    midodrine  5 mg Oral TID WC    vitamin D  50,000 Units Oral Once per day on Monday Thursday    furosemide  40 mg IntraVENous BID    ipratropium 0.5 mg-albuterol 2.5 mg  1 Dose Inhalation Q4H    arformoterol tartrate  15 mcg Nebulization BID RT    ARIPiprazole  5 mg Oral Daily    aspirin  81 mg Oral Daily    budesonide  0.5 mg Nebulization BID RT    calcium elemental  1,500 mg Oral BID    Ensifentrine  2.5 mL Inhalation BID    gabapentin  900 mg Oral Nightly    metoprolol succinate  100 mg Oral QHS    pantoprazole  40 mg Oral QAM AC    pramipexole  0.75 mg Oral Nightly    tamsulosin  0.4 mg Oral BID    gabapentin  600 mg Oral

## 2025-01-20 NOTE — PROGRESS NOTES
Spiritual Health History and Assessment/Progress Note  [facility]    Initial Encounter, Attempted Encounter,  ,  ,      Name: Dg Peña MRN: [id number]    Age: 59 y.o.     Sex: male   Language: English   Orthodox: None   Acute exacerbation of chronic heart failure (HCC)     Date: 1/20/2025                           Spiritual Assessment began in [facility] 6W MED SURG            Encounter Overview/Reason: Initial Encounter, Attempted Encounter  Service Provided For: Patient, Patient not available    Kelsey, Belief, Meaning:   Patient unable to assess at this time  Family/Friends No family/friends present      Importance and Influence:  Patient unable to assess at this time  Family/Friends No family/friends present    Community:  Patient feels well-supported. Support system includes: Extended family  Family/Friends No family/friends present    Assessment and Plan of Care:     Patient Interventions include: Other: While staff was caring for the patient silent prayer was offered for the patient at the time  Family/Friends Interventions include: No family/friends present    Patient Plan of Care: Spiritual Care available upon further referral  Family/Friends Plan of Care: No family/friends present    Electronically signed by Chaplain Eddie on 1/20/2025 at 2:46 PM

## 2025-01-20 NOTE — PROGRESS NOTES
Pt complaining of increased pain in his L leg. Leigh Ann NP notified at pts request. Pt given his prn pain medication, resting comfortably in bed with eyes open, call light within reach.

## 2025-01-20 NOTE — PLAN OF CARE
Problem: Respiratory - Adult  Goal: Achieves optimal ventilation and oxygenation  Outcome: Progressing     Problem: Cardiovascular - Adult  Goal: Maintains optimal cardiac output and hemodynamic stability  Outcome: Progressing  Goal: Absence of cardiac dysrhythmias or at baseline  Outcome: Progressing     Problem: Metabolic/Fluid and Electrolytes - Adult  Goal: Electrolytes maintained within normal limits  Outcome: Progressing  Goal: Hemodynamic stability and optimal renal function maintained  Outcome: Progressing   CHF

## 2025-01-20 NOTE — PROGRESS NOTES
15 mcg Nebulization BID RT    ARIPiprazole  5 mg Oral Daily    aspirin  81 mg Oral Daily    budesonide  0.5 mg Nebulization BID RT    calcium elemental  1,500 mg Oral BID    Ensifentrine  2.5 mL Inhalation BID    gabapentin  900 mg Oral Nightly    metoprolol succinate  100 mg Oral QHS    pantoprazole  40 mg Oral QAM AC    pramipexole  0.75 mg Oral Nightly    tamsulosin  0.4 mg Oral BID    gabapentin  600 mg Oral QAM    sodium chloride flush  5-40 mL IntraVENous 2 times per day    enoxaparin  40 mg SubCUTAneous Daily    FLUoxetine  20 mg Oral Nightly    LORazepam  1 mg Oral TID     PRN Meds: benzonatate, fluticasone, HYDROcodone-acetaminophen, sodium chloride, sodium chloride flush, sodium chloride, potassium chloride **OR** potassium alternative oral replacement **OR** potassium chloride, magnesium sulfate, polyethylene glycol, acetaminophen **OR** acetaminophen, melatonin, morphine    Labs:     Recent Labs     01/18/25  0341 01/19/25  0451 01/20/25  0518   WBC 10.4 8.8 9.6   HGB 9.0* 9.5* 9.0*   HCT 31.3* 31.8* 31.0*    182 157       Recent Labs     01/17/25  1845 01/18/25  0850 01/19/25  0451 01/20/25  0518   NA  --  139 140 134   K  --  3.6 3.9 3.7   CL  --  90* 90* 90*   CO2  --  41* 39* 35*   BUN  --  17 18 18   CREATININE  --  0.6* 0.7 0.8   CALCIUM  --  6.9* 7.2* 7.1*   PHOS 3.6 3.6  --   --        Recent Labs     01/17/25  1337 01/19/25  0451   ALKPHOS 106 83   ALT 18 14   AST 16 12   BILITOT 0.4 0.3       No results for input(s): \"INR\" in the last 72 hours.    No results for input(s): \"CKTOTAL\", \"TROPONINI\" in the last 72 hours.    Chronic labs:    Lab Results   Component Value Date    CHOL 134 12/31/2024    TRIG 54 12/31/2024    HDL 67 12/31/2024    TSH 0.30 12/30/2024    PSA 0.63 04/19/2024    INR 1.0 08/02/2023    LABA1C 5.6 11/25/2024       Radiology: REVIEWED DAILY    +++++++++++++++++++++++++++++++++++++++++++++++++  Maria Marino Milanes, MD  [facility]   [facility]

## 2025-01-20 NOTE — TELEPHONE ENCOUNTER
placed call to pt sister Sayra to follow up on pt current hospitalization. Sayra stated that pt got admitted from CHF clinic due to extreme weight gain and swelling but pt is currently diagnosed with COVID and pneumonia as well. Discussed with Sayra that pt has had several admissions over the last few months and what other things we can help put in place for pt to help eliminate so many admissions for pt. Discussed that pt is very sick but does not want to change his current code status even though it has been discussed because he is fearful of dying but also gets tired of going to the hospital and the CHF clinic. Discussed with Sayra that  will try and make a visit to pt while he is admitted to discuss future plans with him as well as a possible hospice conversation as well. Sayra was appreciative of phone call and would like an update after I speak with pt. Will follow up.      Yesi GARVIN,W  Palliative Medicine

## 2025-01-20 NOTE — CONSULTS
[facility]   Inpatient CHF Nurse Navigator Consult      Cardiologist: Dr. Gary Conte Mariana is a 59 y.o. (1965) male with a history of HFpEF, most recent EF:  Lab Results   Component Value Date    LVEF 63 08/03/2023    LVEFMODE Echo 01/21/2022       Patient was awake and alert, laying in bed during the consultation and is agreeable to heart failure education. He was engaged and asked appropriate questions throughout the education session.     Barriers identified during consult contributing to HF Hospitalization:  [] Limited medication adherence   [] Poor health literacy, education regarding HF medications provided   [] Pill box provided to patient  [] Difficulty affording medications  [] Difficulty obtaining/ managing medications  [] Prescription assistance information given     [] Not weighing themselves daily  [x] Weight log provided for easy monitoring  [] Scale provided     [] Not following low sodium diet  [] Food insecurity   [x] 2 gram sodium diet education provided   [] Low sodium recipes provided  [] Sodium free seasoning provided   [] Low sodium meal delivery options given to patient  [] Dietician consulted     [] Lack of transportation to appointments     [] Depression, given chronic illness  [] Primary team notified     [] Goals of care need addressed  [] Palliative care consulted     [] CHF CHW consulted, to assist with         Chart Reviewed:  Diet: ADULT DIET; Regular; Low Fat/Low Chol/High Fiber/2 gm Na; 1800 ml   Daily Weights: Patient Vitals for the past 96 hrs (Last 3 readings):   Weight   01/17/25 1639 63 kg (139 lb)     I/O:   Intake/Output Summary (Last 24 hours) at 1/20/2025 1019  Last data filed at 1/20/2025 0918  Gross per 24 hour   Intake --   Output 4600 ml   Net -4600 ml       [] Nursing staff/manager notified of inaccurate parisi weights or I/O        Discharge Plan:  Above identified barriers reviewed and needs  addressed    Patient/family educated on daily monitoring tools for CHF, made aware of signs and symptoms of worsening HF and to notify provider immediately of change in symptoms.     Heart Failure Home Instructions placed in patient's discharge instructions    Per AHA guidelines patient to be closely monitored following discharge with 7 day follow up appointment    Scheduling with the CHF clinic Already follows, post hospital appointment made    Future Appointments   Date Time Provider Department Center   1/23/2025 12:00 PM Amrik Zuñiga APRN - CNP [facility]   1/24/2025 11:00 AM Jana Sanchez MD [facility] [place] [facility]""THOMAS [facility] ECC [facility]   2/5/2025 12:45 PM SEB CHF ROOM 2 [facility]   2/11/2025  2:30 PM Bernice Ceja APRN - NP [facility]   3/6/2025  7:45 AM Nyla Calix APRN - CNP [facility]           Patient Education:  Self Monitoring/management:  Reviewed the introduction to Heart Failure, the HF zones, signs and symptoms to report on day 1 of onset, medications, medication compliance, the importance of obtaining daily weights, following a low sodium diet, reading food labels for the sodium content, keeping physician appointments, and smoking cessation.  Discussed writing / tracking daily weights on a calendar / log because a 5 pound gain in 1 week can sneak up if you are not tracking it.   Advised patient they can reduce the risk for Heart Failure exacerbations by modifying / controlling the risk factors.  Discussed self-managed care which includes the following: take medications as prescribed, report any intolerable side effects of medications to the medical provider (PCP or cardiologist), do not just stop taking the medication; follow a cardiac heart healthy / low sodium diet; weigh yourself daily, exercise regularly- per doctor recommendation and not to smoke or use an excess amount of alcohol.  Discussed calling the cardiologist / doctor with a weight gain of 3 pounds in one day

## 2025-01-21 ENCOUNTER — APPOINTMENT (OUTPATIENT)
Dept: GENERAL RADIOLOGY | Age: 60
DRG: 194 | End: 2025-01-21
Payer: MEDICAID

## 2025-01-21 LAB
ANION GAP SERPL CALCULATED.3IONS-SCNC: 10 MMOL/L (ref 7–16)
BASOPHILS # BLD: 0.01 K/UL (ref 0–0.2)
BASOPHILS NFR BLD: 0 % (ref 0–2)
BUN SERPL-MCNC: 20 MG/DL (ref 6–20)
CALCIUM SERPL-MCNC: 7.2 MG/DL (ref 8.6–10.2)
CHLORIDE SERPL-SCNC: 94 MMOL/L (ref 98–107)
CO2 SERPL-SCNC: 34 MMOL/L (ref 22–29)
CREAT SERPL-MCNC: 0.9 MG/DL (ref 0.7–1.2)
CRP SERPL HS-MCNC: 217 MG/L (ref 0–5)
EOSINOPHIL # BLD: 0.18 K/UL (ref 0.05–0.5)
EOSINOPHILS RELATIVE PERCENT: 3 % (ref 0–6)
ERYTHROCYTE [DISTWIDTH] IN BLOOD BY AUTOMATED COUNT: 14.7 % (ref 11.5–15)
GFR, ESTIMATED: >90 ML/MIN/1.73M2
GLUCOSE SERPL-MCNC: 92 MG/DL (ref 74–99)
HCT VFR BLD AUTO: 33.6 % (ref 37–54)
HGB BLD-MCNC: 9.7 G/DL (ref 12.5–16.5)
IMM GRANULOCYTES # BLD AUTO: 0.05 K/UL (ref 0–0.58)
IMM GRANULOCYTES NFR BLD: 1 % (ref 0–5)
LYMPHOCYTES NFR BLD: 1.12 K/UL (ref 1.5–4)
LYMPHOCYTES RELATIVE PERCENT: 20 % (ref 20–42)
MCH RBC QN AUTO: 31 PG (ref 26–35)
MCHC RBC AUTO-ENTMCNC: 28.9 G/DL (ref 32–34.5)
MCV RBC AUTO: 107.3 FL (ref 80–99.9)
MICROORGANISM SPEC CULT: NORMAL
MONOCYTES NFR BLD: 0.61 K/UL (ref 0.1–0.95)
MONOCYTES NFR BLD: 11 % (ref 2–12)
NEUTROPHILS NFR BLD: 65 % (ref 43–80)
NEUTS SEG NFR BLD: 3.73 K/UL (ref 1.8–7.3)
PLATELET, FLUORESCENCE: 150 K/UL (ref 130–450)
PMV BLD AUTO: 11.3 FL (ref 7–12)
POTASSIUM SERPL-SCNC: 4.1 MMOL/L (ref 3.5–5)
RBC # BLD AUTO: 3.13 M/UL (ref 3.8–5.8)
RBC # BLD: ABNORMAL 10*6/UL
SERVICE CMNT-IMP: NORMAL
SODIUM SERPL-SCNC: 138 MMOL/L (ref 132–146)
SPECIMEN DESCRIPTION: NORMAL
WBC OTHER # BLD: 5.7 K/UL (ref 4.5–11.5)

## 2025-01-21 PROCEDURE — 92611 MOTION FLUOROSCOPY/SWALLOW: CPT

## 2025-01-21 PROCEDURE — 99232 SBSQ HOSP IP/OBS MODERATE 35: CPT | Performed by: INTERNAL MEDICINE

## 2025-01-21 PROCEDURE — 6370000000 HC RX 637 (ALT 250 FOR IP): Performed by: STUDENT IN AN ORGANIZED HEALTH CARE EDUCATION/TRAINING PROGRAM

## 2025-01-21 PROCEDURE — 36415 COLL VENOUS BLD VENIPUNCTURE: CPT

## 2025-01-21 PROCEDURE — 99232 SBSQ HOSP IP/OBS MODERATE 35: CPT | Performed by: STUDENT IN AN ORGANIZED HEALTH CARE EDUCATION/TRAINING PROGRAM

## 2025-01-21 PROCEDURE — 86140 C-REACTIVE PROTEIN: CPT

## 2025-01-21 PROCEDURE — 2500000003 HC RX 250 WO HCPCS: Performed by: RADIOLOGY

## 2025-01-21 PROCEDURE — 2500000003 HC RX 250 WO HCPCS: Performed by: STUDENT IN AN ORGANIZED HEALTH CARE EDUCATION/TRAINING PROGRAM

## 2025-01-21 PROCEDURE — 2060000000 HC ICU INTERMEDIATE R&B

## 2025-01-21 PROCEDURE — 92526 ORAL FUNCTION THERAPY: CPT

## 2025-01-21 PROCEDURE — 6360000002 HC RX W HCPCS: Performed by: STUDENT IN AN ORGANIZED HEALTH CARE EDUCATION/TRAINING PROGRAM

## 2025-01-21 PROCEDURE — 2580000003 HC RX 258: Performed by: STUDENT IN AN ORGANIZED HEALTH CARE EDUCATION/TRAINING PROGRAM

## 2025-01-21 PROCEDURE — 80048 BASIC METABOLIC PNL TOTAL CA: CPT

## 2025-01-21 PROCEDURE — 85025 COMPLETE CBC W/AUTO DIFF WBC: CPT

## 2025-01-21 PROCEDURE — 6370000000 HC RX 637 (ALT 250 FOR IP): Performed by: INTERNAL MEDICINE

## 2025-01-21 PROCEDURE — 94660 CPAP INITIATION&MGMT: CPT

## 2025-01-21 PROCEDURE — 74230 X-RAY XM SWLNG FUNCJ C+: CPT

## 2025-01-21 PROCEDURE — 2700000000 HC OXYGEN THERAPY PER DAY

## 2025-01-21 PROCEDURE — 94640 AIRWAY INHALATION TREATMENT: CPT

## 2025-01-21 RX ORDER — HYDROCODONE BITARTRATE AND ACETAMINOPHEN 7.5; 325 MG/1; MG/1
1 TABLET ORAL EVERY 8 HOURS PRN
Status: DISCONTINUED | OUTPATIENT
Start: 2025-01-21 | End: 2025-01-23 | Stop reason: HOSPADM

## 2025-01-21 RX ORDER — DOXYCYCLINE 100 MG/1
100 CAPSULE ORAL EVERY 12 HOURS SCHEDULED
Status: DISCONTINUED | OUTPATIENT
Start: 2025-01-21 | End: 2025-01-23

## 2025-01-21 RX ADMIN — TAMSULOSIN HYDROCHLORIDE 0.4 MG: 0.4 CAPSULE ORAL at 10:30

## 2025-01-21 RX ADMIN — IPRATROPIUM BROMIDE AND ALBUTEROL SULFATE 1 DOSE: 2.5; .5 SOLUTION RESPIRATORY (INHALATION) at 14:46

## 2025-01-21 RX ADMIN — IPRATROPIUM BROMIDE AND ALBUTEROL SULFATE 1 DOSE: 2.5; .5 SOLUTION RESPIRATORY (INHALATION) at 04:36

## 2025-01-21 RX ADMIN — MORPHINE SULFATE 10 MG: 10 SOLUTION ORAL at 12:06

## 2025-01-21 RX ADMIN — FLUOXETINE HYDROCHLORIDE 20 MG: 20 CAPSULE ORAL at 20:31

## 2025-01-21 RX ADMIN — LORAZEPAM 1 MG: 1 TABLET ORAL at 20:31

## 2025-01-21 RX ADMIN — MIDODRINE HYDROCHLORIDE 5 MG: 5 TABLET ORAL at 08:22

## 2025-01-21 RX ADMIN — Medication 3 MG: at 20:31

## 2025-01-21 RX ADMIN — BARIUM SULFATE 120 ML: 400 SUSPENSION ORAL at 13:27

## 2025-01-21 RX ADMIN — HYDROCODONE BITARTRATE AND ACETAMINOPHEN 1 TABLET: 7.5; 325 TABLET ORAL at 14:56

## 2025-01-21 RX ADMIN — DOXYCYCLINE 100 MG: 100 INJECTION, POWDER, LYOPHILIZED, FOR SOLUTION INTRAVENOUS at 00:31

## 2025-01-21 RX ADMIN — BUDESONIDE INHALATION 500 MCG: 0.5 SUSPENSION RESPIRATORY (INHALATION) at 19:33

## 2025-01-21 RX ADMIN — CALCIUM 1500 MG: 500 TABLET ORAL at 20:31

## 2025-01-21 RX ADMIN — BARIUM SULFATE 10 ML: 400 PASTE ORAL at 13:27

## 2025-01-21 RX ADMIN — GABAPENTIN 600 MG: 300 CAPSULE ORAL at 08:22

## 2025-01-21 RX ADMIN — IPRATROPIUM BROMIDE AND ALBUTEROL SULFATE 1 DOSE: 2.5; .5 SOLUTION RESPIRATORY (INHALATION) at 23:47

## 2025-01-21 RX ADMIN — SODIUM CHLORIDE, PRESERVATIVE FREE 10 ML: 5 INJECTION INTRAVENOUS at 08:33

## 2025-01-21 RX ADMIN — MIDODRINE HYDROCHLORIDE 5 MG: 5 TABLET ORAL at 12:06

## 2025-01-21 RX ADMIN — ARFORMOTEROL TARTRATE 15 MCG: 15 SOLUTION RESPIRATORY (INHALATION) at 19:33

## 2025-01-21 RX ADMIN — LORAZEPAM 1 MG: 1 TABLET ORAL at 08:22

## 2025-01-21 RX ADMIN — GUAIFENESIN 400 MG: 400 TABLET ORAL at 08:21

## 2025-01-21 RX ADMIN — ARIPIPRAZOLE 5 MG: 5 TABLET ORAL at 08:22

## 2025-01-21 RX ADMIN — PRAMIPEXOLE DIHYDROCHLORIDE 0.75 MG: 0.25 TABLET ORAL at 20:31

## 2025-01-21 RX ADMIN — GABAPENTIN 900 MG: 300 CAPSULE ORAL at 20:31

## 2025-01-21 RX ADMIN — FUROSEMIDE 40 MG: 10 INJECTION, SOLUTION INTRAMUSCULAR; INTRAVENOUS at 08:21

## 2025-01-21 RX ADMIN — MORPHINE SULFATE 10 MG: 10 SOLUTION ORAL at 20:30

## 2025-01-21 RX ADMIN — CALCIUM 1500 MG: 500 TABLET ORAL at 14:04

## 2025-01-21 RX ADMIN — MORPHINE SULFATE 10 MG: 10 SOLUTION ORAL at 08:27

## 2025-01-21 RX ADMIN — DOXYCYCLINE HYCLATE 100 MG: 100 CAPSULE ORAL at 20:31

## 2025-01-21 RX ADMIN — FUROSEMIDE 40 MG: 10 INJECTION, SOLUTION INTRAMUSCULAR; INTRAVENOUS at 18:04

## 2025-01-21 RX ADMIN — PANTOPRAZOLE SODIUM 40 MG: 40 TABLET, DELAYED RELEASE ORAL at 05:37

## 2025-01-21 RX ADMIN — GUAIFENESIN 400 MG: 400 TABLET ORAL at 14:04

## 2025-01-21 RX ADMIN — MORPHINE SULFATE 10 MG: 10 SOLUTION ORAL at 04:15

## 2025-01-21 RX ADMIN — ENOXAPARIN SODIUM 40 MG: 100 INJECTION SUBCUTANEOUS at 08:22

## 2025-01-21 RX ADMIN — GUAIFENESIN 400 MG: 400 TABLET ORAL at 20:31

## 2025-01-21 RX ADMIN — LORAZEPAM 1 MG: 1 TABLET ORAL at 14:04

## 2025-01-21 RX ADMIN — METOPROLOL SUCCINATE 100 MG: 100 TABLET, FILM COATED, EXTENDED RELEASE ORAL at 20:31

## 2025-01-21 RX ADMIN — MIDODRINE HYDROCHLORIDE 5 MG: 5 TABLET ORAL at 18:04

## 2025-01-21 RX ADMIN — ASPIRIN 81 MG CHEWABLE TABLET 81 MG: 81 TABLET CHEWABLE at 08:22

## 2025-01-21 RX ADMIN — IPRATROPIUM BROMIDE AND ALBUTEROL SULFATE 1 DOSE: 2.5; .5 SOLUTION RESPIRATORY (INHALATION) at 19:33

## 2025-01-21 RX ADMIN — IPRATROPIUM BROMIDE AND ALBUTEROL SULFATE 1 DOSE: 2.5; .5 SOLUTION RESPIRATORY (INHALATION) at 01:10

## 2025-01-21 RX ADMIN — CALCIUM 1500 MG: 500 TABLET ORAL at 08:22

## 2025-01-21 RX ADMIN — DOXYCYCLINE HYCLATE 100 MG: 100 CAPSULE ORAL at 08:21

## 2025-01-21 RX ADMIN — SODIUM CHLORIDE, PRESERVATIVE FREE 10 ML: 5 INJECTION INTRAVENOUS at 20:31

## 2025-01-21 RX ADMIN — TAMSULOSIN HYDROCHLORIDE 0.4 MG: 0.4 CAPSULE ORAL at 20:31

## 2025-01-21 RX ADMIN — WATER 1000 MG: 1 INJECTION INTRAMUSCULAR; INTRAVENOUS; SUBCUTANEOUS at 12:07

## 2025-01-21 ASSESSMENT — PAIN DESCRIPTION - LOCATION
LOCATION: LEG
LOCATION: HIP;LEG;KNEE

## 2025-01-21 ASSESSMENT — PAIN DESCRIPTION - ORIENTATION
ORIENTATION: LEFT

## 2025-01-21 ASSESSMENT — PAIN SCALES - GENERAL
PAINLEVEL_OUTOF10: 7
PAINLEVEL_OUTOF10: 7
PAINLEVEL_OUTOF10: 8
PAINLEVEL_OUTOF10: 7
PAINLEVEL_OUTOF10: 8
PAINLEVEL_OUTOF10: 6

## 2025-01-21 ASSESSMENT — PAIN DESCRIPTION - DESCRIPTORS: DESCRIPTORS: ACHING;DISCOMFORT;SORE

## 2025-01-21 NOTE — PROGRESS NOTES
[facility] Hospitalist Progress Note    Admitting Date and Time: 1/17/2025 12:49 PM  Admit Dx: Hypocalcemia [E83.51]  Acute on chronic congestive heart failure, unspecified heart failure type (HCC) [I50.9]  Acute exacerbation of chronic heart failure (HCC) [I50.9]    Subjective:  Patient is being followed for Hypocalcemia [E83.51]  Acute on chronic congestive heart failure, unspecified heart failure type (HCC) [I50.9]  Acute exacerbation of chronic heart failure (HCC) [I50.9]   Pt feels nasal and chest congestion.  Per RN: no major concerns.     ROS: denies fever, chills, cp, sob, n/v, HA unless stated above.      doxycycline  100 mg Oral 2 times per day    guaiFENesin  400 mg Oral TID    calcium elemental  1,500 mg Oral TID    cefTRIAXone (ROCEPHIN) IV  1,000 mg IntraVENous Q24H    midodrine  5 mg Oral TID WC    vitamin D  50,000 Units Oral Once per day on Monday Thursday    furosemide  40 mg IntraVENous BID    ipratropium 0.5 mg-albuterol 2.5 mg  1 Dose Inhalation Q4H    arformoterol tartrate  15 mcg Nebulization BID RT    ARIPiprazole  5 mg Oral Daily    aspirin  81 mg Oral Daily    budesonide  0.5 mg Nebulization BID RT    gabapentin  900 mg Oral Nightly    metoprolol succinate  100 mg Oral QHS    pantoprazole  40 mg Oral QAM AC    pramipexole  0.75 mg Oral Nightly    tamsulosin  0.4 mg Oral BID    gabapentin  600 mg Oral QAM    sodium chloride flush  5-40 mL IntraVENous 2 times per day    enoxaparin  40 mg SubCUTAneous Daily    FLUoxetine  20 mg Oral Nightly    LORazepam  1 mg Oral TID     HYDROcodone-acetaminophen, 1 tablet, Q8H PRN  guaiFENesin-dextromethorphan, 5 mL, Q4H PRN  benzonatate, 100 mg, TID PRN  fluticasone, 1 spray, Daily PRN  sodium chloride, 1 spray, PRN  sodium chloride flush, 5-40 mL, PRN  sodium chloride, , PRN  potassium chloride, 40 mEq, PRN   Or  potassium alternative oral replacement, 40 mEq, PRN   Or  potassium chloride, 10 mEq, PRN  magnesium sulfate, 2,000 mg,  the lung base. There is new moderate confluent interstitial infiltrate in the posterior right upper lobe adjacent to the major fissure consistent with a new atypical pneumonia. The previously seen moderate linear area of density in the anterior inferior right middle lobe at the lung base has largely cleared except for mild residual nodular and linear scarring. There is stable moderate bullous disease in the apices, more prominent on the right than the left. Upper Abdomen: There is mild cholelithiasis with a 1.1 cm peripherally calcified dependent gallstone in the otherwise normal appearing gallbladder. Soft Tissues/Bones: There is no sign of any abnormality of the superficial soft tissue structures. There is stable mild anterior wedging of the T7 through T12 vertebral bodies, old mild compression fractures. There is stable mild hypertrophic spurring throughout the thoracic spine with probable fusion of the majority of the thoracic spine in anatomic alignment.,     1. New moderate right lower lobe pneumonia. 2. New moderate confluent interstitial infiltrate in the posterior right upper lobe consistent with a new atypical pneumonia. 3. The areas of resolving atelectasis as described above. 4. Stable moderate bullous disease in the apices, more prominent on the right than the left. 5. Stable severe LAD and mild LCX and RCA coronary calcification. 6. Mild cholelithiasis.       Assessment:    Principal Problem:    Acute exacerbation of chronic heart failure (HCC)  Active Problems:    Hypocalcemia    COVID-19    Chronic hypercapnic respiratory failure  Resolved Problems:    * No resolved hospital problems. *      Plan:  1.  Acute on chronic HFpEF. LVEF 60-65%. 8/3 2023.  Sent from CHF clinic for volume overload management.  Having increasing shortness of breath on 12 pound weight gain.  Started on  Lasix 40 IV BID. Strict I/O's, daily weights. Monitor volume status.  Continue Toprol-XL.  On midodrine for chronic

## 2025-01-21 NOTE — ACP (ADVANCE CARE PLANNING)
Advance Care Planning   Healthcare Decision Maker:    Primary Decision Maker (Active): Sayra Burt - Brother/Sister - [phone]    Primary Decision Maker: Kimberley Johnson - Brother/Sister - [phone]    Secondary Decision Maker: MarianaDarren - Child - [phone]    Click here to complete Healthcare Decision Makers including selection of the Healthcare Decision Maker Relationship (ie \"Primary\").  Today we documented Decision Maker(s) consistent with ACP documents on file.

## 2025-01-21 NOTE — PROGRESS NOTES
SLT's recommends video swallow study based on today's eval and patient's known history of dysphagia.

## 2025-01-21 NOTE — PLAN OF CARE
Problem: Chronic Conditions and Co-morbidities  Goal: Patient's chronic conditions and co-morbidity symptoms are monitored and maintained or improved  Outcome: Progressing     Problem: Discharge Planning  Goal: Discharge to home or other facility with appropriate resources  Outcome: Progressing     Problem: Pain  Goal: Verbalizes/displays adequate comfort level or baseline comfort level  Outcome: Progressing     Problem: Skin/Tissue Integrity  Goal: Absence of new skin breakdown  Description: 1.  Monitor for areas of redness and/or skin breakdown  2.  Assess vascular access sites hourly  3.  Every 4-6 hours minimum:  Change oxygen saturation probe site  4.  Every 4-6 hours:  If on nasal continuous positive airway pressure, respiratory therapy assess nares and determine need for appliance change or resting period.  Outcome: Progressing     Problem: ABCDS Injury Assessment  Goal: Absence of physical injury  Outcome: Progressing     Problem: Safety - Adult  Goal: Free from fall injury  Outcome: Progressing     Problem: Respiratory - Adult  Goal: Achieves optimal ventilation and oxygenation  1/20/2025 2248 by Dana Toney RN  Outcome: Progressing    Problem: Cardiovascular - Adult  Goal: Absence of cardiac dysrhythmias or at baseline  1/20/2025 2248 by Dana Toney RN  Outcome: Progressing     Problem: Metabolic/Fluid and Electrolytes - Adult  Goal: Electrolytes maintained within normal limits  1/20/2025 2248 by Dana Toney RN  Outcome: Progressing

## 2025-01-21 NOTE — PROGRESS NOTES
ENDOCRINOLOGY PROGRESS NOTE    Date of Admission: 1/17/2025  Date of Service: 1/20/2025  Admitting Physician: Myla Méndez MD   Primary Care Physician: Jana Sanchez MD  Consultant physician: Aj Marie MD     Reason for the consultation:  Hypocalcemia    History of Present Illness:  The history is provided by the patient. Accuracy of the patient data is excellent.    Dg Peña is a very pleasant 59 y.o. old male with PMH of congestive heart failure, COPD on home oxygen, hypertension and other listed below admitted to [facility] on 1/17/2025 because of worsening shortness of breath and lower extremity edema, endocrine service was consulted for evaluation and management of hypocalcemia.     Subjective  The patient was seen and examined at bedside today, no acute events overnight.  Calcium level improving      Allergies and Drug reactions  Allergies   Allergen Reactions    Levofloxacin Other (See Comments) and Nausea Only     Other reaction(s): Abdominal pain    Lisinopril      Other reaction(s): COUGHING    Tramadol      Other reaction(s): SHAKING    Ibuprofen Nausea And Vomiting    Sulfa Antibiotics Nausea And Vomiting       Scheduled Meds:   guaiFENesin  400 mg Oral TID    calcium elemental  1,500 mg Oral TID    doxycycline (VIBRAMYCIN) IV  100 mg IntraVENous Q12H    cefTRIAXone (ROCEPHIN) IV  1,000 mg IntraVENous Q24H    midodrine  5 mg Oral TID WC    vitamin D  50,000 Units Oral Once per day on Monday Thursday    furosemide  40 mg IntraVENous BID    ipratropium 0.5 mg-albuterol 2.5 mg  1 Dose Inhalation Q4H    arformoterol tartrate  15 mcg Nebulization BID RT    ARIPiprazole  5 mg Oral Daily    aspirin  81 mg Oral Daily    budesonide  0.5 mg Nebulization BID RT    gabapentin  900 mg Oral Nightly    metoprolol succinate  100 mg Oral QHS    pantoprazole  40 mg Oral QAM AC    pramipexole  0.75 mg Oral Nightly    tamsulosin  0.4 mg Oral BID    gabapentin  600 mg Oral QAM    sodium chloride flush  5-40 mL  01/18/25  0850 01/19/25  0451 01/20/25  0518    140 134   K 3.6 3.9 3.7   CL 90* 90* 90*   CO2 41* 39* 35*   BUN 17 18 18   CREATININE 0.6* 0.7 0.8   GLUCOSE 196* 107* 146*   CALCIUM 6.9* 7.2* 7.1*   BILITOT  --  0.3  --    ALKPHOS  --  83  --    AST  --  12  --    ALT  --  14  --      No results found for: \"BHYDRXBUT\"  Lab Results   Component Value Date/Time    LABA1C 5.6 11/25/2024 08:40 PM    LABA1C 5.0 04/21/2023 12:00 PM    LABA1C 6.1 06/18/2022 02:58 AM     Lab Results   Component Value Date/Time    TSH 0.30 12/30/2024 02:01 PM    T4FREE 1.7 12/30/2024 02:01 PM     Lab Results   Component Value Date/Time    LABA1C 5.6 11/25/2024 08:40 PM    GLUCOSE 146 01/20/2025 05:18 AM    GLUCOSE 85 03/03/2020 10:55 AM     Lab Results   Component Value Date/Time    TRIG 54 12/31/2024 08:45 AM    HDL 67 12/31/2024 08:45 AM    CHOL 134 12/31/2024 08:45 AM       Blood culture   Lab Results   Component Value Date/Time    BC  06/03/2024 02:32 PM      Comment:      Blood CultureBacteria Spec Anaerobe+Aerobe Cult    BC  06/03/2024 02:28 PM      Comment:      Blood CultureBacteria Spec Anaerobe+Aerobe Cult    BC  06/25/2023 09:07 AM     This organism was isolated in one set.  Susceptibility testing is not routinely done as this  organism frequently represents skin contamination.  Additional testing can be ordered by calling the  Microbiology Department.      BC  06/25/2023 09:07 AM     This organism was isolated in one set.  Susceptibility testing is not routinely done as this  organism frequently represents skin contamination.  Additional testing can be ordered by calling the  Microbiology Department.      BC 5 Days no growth 06/25/2023 02:41 AM    BC 5 Days no growth 04/23/2023 06:45 PM    BC  03/28/2023 08:20 PM      Comment:      Blood CultureBacteria Spec Anaerobe+Aerobe Cult    BC  03/28/2023 08:10 PM      Comment:      Blood CultureBacteria Spec Anaerobe+Aerobe Cult       Radiology:  Vascular duplex lower extremity

## 2025-01-21 NOTE — PROGRESS NOTES
SPEECH/LANGUAGE PATHOLOGY  VIDEOFLUOROSCOPIC STUDY OF SWALLOWING (MBS)   and PLAN OF CARE    PATIENT NAME:  Dg Peña  (male)     MRN:  [id number]    :  1965  (59 y.o.)  STATUS:  Inpatient: Room 05-A    TODAY'S DATE:  2025  ORDER DATE, DESCRIPTION AND REFERRING PROVIDER:  Dr. López: FL MODIFIED BARIUM SWALLOW W VIDEO   REASON FOR REFERRAL: Assess oropharyngeal swallow function    EVALUATING THERAPIST: Angelique Cordova, SLP      RESULTS:      DYSPHAGIA DIAGNOSIS:  Clinical indicators of moderate oropharyngeal phase dysphagia     DIET RECOMMENDATIONS:  Soft and bite size consistency solids (IDDSI level 6) with  honey consistency (moderately thick - IDDSI level 3)  liquids,   MEDICATION ADMINISTRATION, and Administer medication crushed, as able, with pudding/applesauce    FEEDING RECOMMENDATIONS:    Assistance level:  Set-up is required for all oral intake     Compensatory strategies recommended: Thorough oral care to prevent colonization of oral bacteria   Upright in bed/ chair as tolerated  Fully alert for all PO  Slow rate of intake   SINGLE cup sips  SMALL bites     Discussed recommendations with:  charge nurse via phone     Laryngeal Penetration and Aspiration:  Penetration WITH aspiration was observed in today's study with  mildly thick liquid (nectar)    SPEECH THERAPY  PLAN OF CARE   The dysphagia POC is established based on physician order and dysphagia diagnosis    Skilled SLP intervention for dysphagia management on acute care up to 5 x per week until goals met, pt plateaus in function and/or discharged from hospital      Conditions Requiring Skilled Therapeutic Intervention for dysphagia:    Patient is performing below functional baseline d/t  current acute condition, Multiple diagnoses, multiple medications, and increased dependency upon caregivers.    SPECIFIC DYSPHAGIA INTERVENTIONS TO INCLUDE:     compensatory swallowing strategies to improve airway protection and swallow

## 2025-01-21 NOTE — PLAN OF CARE
Problem: Chronic Conditions and Co-morbidities  Goal: Patient's chronic conditions and co-morbidity symptoms are monitored and maintained or improved  1/21/2025 0924 by Hany Hauser RN  Outcome: Progressing  1/20/2025 2248 by Dana Toney RN  Outcome: Progressing     Problem: Discharge Planning  Goal: Discharge to home or other facility with appropriate resources  1/21/2025 0924 by Hany Hauser RN  Outcome: Progressing  1/20/2025 2248 by Dana Toney RN  Outcome: Progressing     Problem: Pain  Goal: Verbalizes/displays adequate comfort level or baseline comfort level  1/21/2025 0924 by Hany Hauser RN  Outcome: Progressing  1/20/2025 2248 by Dana Toney RN  Outcome: Progressing     Problem: Skin/Tissue Integrity  Goal: Absence of new skin breakdown  Description: 1.  Monitor for areas of redness and/or skin breakdown  2.  Assess vascular access sites hourly  3.  Every 4-6 hours minimum:  Change oxygen saturation probe site  4.  Every 4-6 hours:  If on nasal continuous positive airway pressure, respiratory therapy assess nares and determine need for appliance change or resting period.  1/21/2025 0924 by Hany Hauser RN  Outcome: Progressing  1/20/2025 2248 by Dana Toney RN  Outcome: Progressing     Problem: ABCDS Injury Assessment  Goal: Absence of physical injury  1/21/2025 0924 by Hany Hauser RN  Outcome: Progressing  1/20/2025 2248 by Dana Toney RN  Outcome: Progressing     Problem: Safety - Adult  Goal: Free from fall injury  1/21/2025 0924 by Hany Hauser RN  Outcome: Progressing  1/20/2025 2248 by Dana Toney RN  Outcome: Progressing     Problem: Respiratory - Adult  Goal: Achieves optimal ventilation and oxygenation  1/21/2025 0924 by Hany Hauser RN  Outcome: Progressing  1/20/2025 2248 by Dana Toney RN  Outcome: Progressing     Problem: Cardiovascular - Adult  Goal: Maintains optimal cardiac output and hemodynamic

## 2025-01-21 NOTE — CARE COORDINATION
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  Introduced my self and provided explanation of CM role to patient via phone. Admitted for acute exacerbation of CHF. Chronically on 5L O2 home supplier is [facility] patient has nebulizer, walker and wc. He voices he resides in a 1st floor apt. with ramp entrance, his cousin lives with him and assists with his adl's.  Patient has a private pay aide daily and visiting nurse 3 days a week thru [facility] [phone]. SUNDAY order completed and faxed to [phone]. Patient is established with Dr. Sanchez and uses [facility] [facility] in [place]. Discharge plan is home with [facility] when medically stable. Will continue to follow.  Becky IRVINGN, RN  Case Management

## 2025-01-21 NOTE — CONSULTS
Pulmonary Consultation    Admit Date: 1/17/2025  Requesting Physician: Jana Sanchez MD    Reason for consultation:  COPD exacerbation, pneumonia          HPI:  Patient is a 59 year old male who presents to emergency room with complaints of dyspnea, peripheral edema, and 7-8 lb weight gain in 1 week. Patient was admitted for CHF exacerbation. He is on diuretics and and has diuresed more than 11 L. Patient had a CTA of the chest that showed infiltrates in right upper lung and right lower lobe. He has been receiving rocephin and doxycycline. Patient with mild leukocytosis and negative pro calcitonin. Patient respiratory culture normal. Strep and legionella negative. He does remain positive for Covid-19.     Patient is well known from inpatient and outpatient settings for very severe COPD. He does have a history of NIV use, but stopped using it and the machine was returned. Most recently after previous hospital admission he was seen in office and there was suspicion for possible aspiration. He was given Augmentin but did not complete the prescription due to the size of the pills.     Patient is seen on 5 L nasal cannula. He admits to productive cough with yellow/tan secretions. He denies fevers. He admits to swallowing difficulty, especially with pills.           PMH:    Past Medical History:   Diagnosis Date    Anxiety     COPD (chronic obstructive pulmonary disease) (HCC)     Hypertension     Leg swelling     Multiple gastric ulcers     Restless leg syndrome          PSH:   Past Surgical History:   Procedure Laterality Date    BRONCHOSCOPY N/A 04/27/2023    BRONCHOSCOPY DIAGNOSTIC OR CELL WASH ONLY performed by Zhang Murphy MD at [facility] ENDOSCOPY    HERNIA REPAIR      HIP SURGERY      JOINT REPLACEMENT  1987    KNEE SURGERY           Review of Systems:    Constitutional: As noted in the HPI.   Eyes: No visual changes or diplopia. No scleral icterus.  ENT: No headaches, hearing loss or vertigo. No nasal  available    ABG:   No results for input(s): \"PH\", \"PO2\", \"PCO2\", \"HCO3\", \"BE\", \"O2SAT\", \"METHB\", \"O2HB\", \"COHB\", \"O2CON\", \"HHB\", \"THB\" in the last 72 hours.  FiO2 : 40 %       Films:     Vascular duplex lower extremity venous left   Final Result   No evidence of DVT in the left lower extremity.         CT CHEST WO CONTRAST   Final Result   1. New moderate right lower lobe pneumonia.   2. New moderate confluent interstitial infiltrate in the posterior right   upper lobe consistent with a new atypical pneumonia.   3. The areas of resolving atelectasis as described above.   4. Stable moderate bullous disease in the apices, more prominent on the right   than the left.   5. Stable severe LAD and mild LCX and RCA coronary calcification.   6. Mild cholelithiasis.         XR CHEST PORTABLE   Final Result   No acute process.         FL MODIFIED BARIUM SWALLOW W VIDEO    (Results Pending)           Assessment:  Possible aspiration pneumonia. Right lower lobe infiltrate and right upper lobe infiltrate. Leukocytosis resolved. Pro calcitonin negligible with recent antibiotic use.    COPD with no exacerbation.   Congestive heart failure with exacerbation.   Acute on chronic hypercapnic respiratory failure.     Plan:  Video swallow study   Continue aerosols  Diuretics. -11.2 L  Continue rocephin and doxycycline for now. Patient may need antibiotic adjustment if aspiration is ruled in.   Chest vest   Supplemental oxygen with goal SpO2 88%-92%. He wears 5 L at baseline.   AVAPS at HS and as needed to decrease work of breathing   Bronchoscopy should be considered if no improvement. This was being considered outpatient.       Thanks for letting us see this patient in consultation.  Total time in reviewing the previous admissions and records, reviewing the current x-rays, labs, and discussing with clinical staff including nursing and physicians, exceeded 50 minutes.      Please contact us with any questions. Office [phone] or

## 2025-01-21 NOTE — PROGRESS NOTES
restrictive PO diet to maintain adequate nutrition/hydration     Specific instructions for next treatment:  MBSS to be completed  Patient Treatment Goals:    Short Term Goals:  Pt will participate in MBSS to fully assess oropharyngeal swallow function and to assist in determining the least restrictive PO diet to maintain adequate nutrition/hydration     Long Term Goals:   Pt will maintain adequate nutrition/hydration via PO intake of the least restrictive oral diet with implementation of safe swallow/ compensatory strategies and decrease signs/symptoms of aspiration to less than 1 x/day.   OTHER RECOMMENDATIONS:  A Video Swallow Study (MBSS) is recommended and requires a physician order      Patient/family Goal:    To be able to eat/drink     Plan of care discussed with Patient   The Patient did not demonstrate complete understanding of the diagnosis, prognosis and plan of care     Rehabilitation Potential/Prognosis: fair                    ADMITTING DIAGNOSIS: Hypocalcemia [E83.51]  Acute on chronic congestive heart failure, unspecified heart failure type (HCC) [I50.9]  Acute exacerbation of chronic heart failure (HCC) [I50.9]    VISIT DIAGNOSIS:   Visit Diagnoses         Codes    Pain of left calf     M79.662             PATIENT REPORT/COMPLAINT: difficulty swallowing pills  RN cleared patient for participation in assessment     yes     PRIOR LEVEL OF SWALLOW FUNCTION:    PAST HISTORY OF OROPHARYNGEAL DYSPHAGIA?: yes    Home diet: Diet information not available     Current Diet Order:  ADULT DIET; Regular; Low Fat/Low Chol/High Fiber/2 gm Na; 1800 ml    PROCEDURE:  Consistencies Administered During the Evaluation   Liquids: thin liquid   Solids:  Pureed  and Hard solid      Method of Intake:   cup, straw, spoon  Self fed      Position:   Sitting in bed with head elevated above 75 degrees    CLINICAL ASSESSMENT:  Oral Stage:       Decreased mastication due to:  poor/missing dentition        Pharyngeal Stage:     Wet/gurgly vocal quality was noted after presentation of thin liquid    Cognition:   Confusion noted    Oral Peripheral Examination   Generalized oral weakness    Current Respiratory Status    5 liters O2 via high flow nasal cannula     Parameters of Speech Production  Respiration:  Shortness of breath  Quality:   Within functional limits  Intensity: Within functional limits    Volitional Swallow: Present     Volitional Cough:  Present     Pain: No pain reported.    EDUCATION:   The Speech Language Pathologist (SLP) completed education regarding results of evaluation and that intervention is warranted at this time.  Learner: Patient  Education: Reviewed results and recommendations of this evaluation  Evaluation of Education:  Verbalizes understanding    This plan may be re-evaluated and revised as warranted.      Evaluation Time includes thorough review of current medical information, gathering information on past medical history/social history and prior level of function, completion of standardized testing/informal observation of tasks, assessment of data and education on plan of care and goals.    [x]The admitting diagnosis and active problem list, have been reviewed prior to initiation of this evaluation.        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

## 2025-01-22 PROCEDURE — 92526 ORAL FUNCTION THERAPY: CPT

## 2025-01-22 PROCEDURE — 6370000000 HC RX 637 (ALT 250 FOR IP): Performed by: STUDENT IN AN ORGANIZED HEALTH CARE EDUCATION/TRAINING PROGRAM

## 2025-01-22 PROCEDURE — 5A0935A ASSISTANCE WITH RESPIRATORY VENTILATION, LESS THAN 24 CONSECUTIVE HOURS, HIGH NASAL FLOW/VELOCITY: ICD-10-PCS | Performed by: STUDENT IN AN ORGANIZED HEALTH CARE EDUCATION/TRAINING PROGRAM

## 2025-01-22 PROCEDURE — 6360000002 HC RX W HCPCS: Performed by: STUDENT IN AN ORGANIZED HEALTH CARE EDUCATION/TRAINING PROGRAM

## 2025-01-22 PROCEDURE — 2500000003 HC RX 250 WO HCPCS: Performed by: STUDENT IN AN ORGANIZED HEALTH CARE EDUCATION/TRAINING PROGRAM

## 2025-01-22 PROCEDURE — 2700000000 HC OXYGEN THERAPY PER DAY

## 2025-01-22 PROCEDURE — 94640 AIRWAY INHALATION TREATMENT: CPT

## 2025-01-22 PROCEDURE — 94660 CPAP INITIATION&MGMT: CPT

## 2025-01-22 PROCEDURE — 99232 SBSQ HOSP IP/OBS MODERATE 35: CPT | Performed by: STUDENT IN AN ORGANIZED HEALTH CARE EDUCATION/TRAINING PROGRAM

## 2025-01-22 PROCEDURE — 6370000000 HC RX 637 (ALT 250 FOR IP): Performed by: INTERNAL MEDICINE

## 2025-01-22 PROCEDURE — 2060000000 HC ICU INTERMEDIATE R&B

## 2025-01-22 PROCEDURE — 97161 PT EVAL LOW COMPLEX 20 MIN: CPT

## 2025-01-22 PROCEDURE — 97165 OT EVAL LOW COMPLEX 30 MIN: CPT

## 2025-01-22 RX ADMIN — HYDROCODONE BITARTRATE AND ACETAMINOPHEN 1 TABLET: 7.5; 325 TABLET ORAL at 21:25

## 2025-01-22 RX ADMIN — ARFORMOTEROL TARTRATE 15 MCG: 15 SOLUTION RESPIRATORY (INHALATION) at 08:47

## 2025-01-22 RX ADMIN — SODIUM CHLORIDE, PRESERVATIVE FREE 10 ML: 5 INJECTION INTRAVENOUS at 08:53

## 2025-01-22 RX ADMIN — BUDESONIDE INHALATION 500 MCG: 0.5 SUSPENSION RESPIRATORY (INHALATION) at 08:47

## 2025-01-22 RX ADMIN — METOPROLOL SUCCINATE 100 MG: 100 TABLET, FILM COATED, EXTENDED RELEASE ORAL at 21:25

## 2025-01-22 RX ADMIN — TAMSULOSIN HYDROCHLORIDE 0.4 MG: 0.4 CAPSULE ORAL at 21:25

## 2025-01-22 RX ADMIN — CALCIUM 1500 MG: 500 TABLET ORAL at 14:03

## 2025-01-22 RX ADMIN — FUROSEMIDE 40 MG: 10 INJECTION, SOLUTION INTRAMUSCULAR; INTRAVENOUS at 08:53

## 2025-01-22 RX ADMIN — MORPHINE SULFATE 10 MG: 10 SOLUTION ORAL at 08:56

## 2025-01-22 RX ADMIN — PRAMIPEXOLE DIHYDROCHLORIDE 0.75 MG: 0.25 TABLET ORAL at 21:24

## 2025-01-22 RX ADMIN — BUDESONIDE INHALATION 500 MCG: 0.5 SUSPENSION RESPIRATORY (INHALATION) at 20:05

## 2025-01-22 RX ADMIN — PANTOPRAZOLE SODIUM 40 MG: 40 TABLET, DELAYED RELEASE ORAL at 05:54

## 2025-01-22 RX ADMIN — LORAZEPAM 1 MG: 1 TABLET ORAL at 14:03

## 2025-01-22 RX ADMIN — GABAPENTIN 900 MG: 300 CAPSULE ORAL at 21:25

## 2025-01-22 RX ADMIN — ENOXAPARIN SODIUM 40 MG: 100 INJECTION SUBCUTANEOUS at 08:53

## 2025-01-22 RX ADMIN — CALCIUM 1500 MG: 500 TABLET ORAL at 08:52

## 2025-01-22 RX ADMIN — GABAPENTIN 600 MG: 300 CAPSULE ORAL at 08:52

## 2025-01-22 RX ADMIN — IPRATROPIUM BROMIDE AND ALBUTEROL SULFATE 1 DOSE: 2.5; .5 SOLUTION RESPIRATORY (INHALATION) at 12:43

## 2025-01-22 RX ADMIN — IPRATROPIUM BROMIDE AND ALBUTEROL SULFATE 1 DOSE: 2.5; .5 SOLUTION RESPIRATORY (INHALATION) at 04:11

## 2025-01-22 RX ADMIN — GUAIFENESIN 400 MG: 400 TABLET ORAL at 08:52

## 2025-01-22 RX ADMIN — ARIPIPRAZOLE 5 MG: 5 TABLET ORAL at 08:53

## 2025-01-22 RX ADMIN — MIDODRINE HYDROCHLORIDE 5 MG: 5 TABLET ORAL at 19:35

## 2025-01-22 RX ADMIN — LORAZEPAM 1 MG: 1 TABLET ORAL at 21:25

## 2025-01-22 RX ADMIN — MIDODRINE HYDROCHLORIDE 5 MG: 5 TABLET ORAL at 14:30

## 2025-01-22 RX ADMIN — GUAIFENESIN 400 MG: 400 TABLET ORAL at 21:25

## 2025-01-22 RX ADMIN — GUAIFENESIN 400 MG: 400 TABLET ORAL at 14:03

## 2025-01-22 RX ADMIN — LORAZEPAM 1 MG: 1 TABLET ORAL at 08:52

## 2025-01-22 RX ADMIN — MIDODRINE HYDROCHLORIDE 5 MG: 5 TABLET ORAL at 08:52

## 2025-01-22 RX ADMIN — TAMSULOSIN HYDROCHLORIDE 0.4 MG: 0.4 CAPSULE ORAL at 08:53

## 2025-01-22 RX ADMIN — FLUOXETINE HYDROCHLORIDE 20 MG: 20 CAPSULE ORAL at 21:25

## 2025-01-22 RX ADMIN — CALCIUM 1500 MG: 500 TABLET ORAL at 21:25

## 2025-01-22 RX ADMIN — IPRATROPIUM BROMIDE AND ALBUTEROL SULFATE 1 DOSE: 2.5; .5 SOLUTION RESPIRATORY (INHALATION) at 20:05

## 2025-01-22 RX ADMIN — SODIUM CHLORIDE, PRESERVATIVE FREE 10 ML: 5 INJECTION INTRAVENOUS at 21:29

## 2025-01-22 RX ADMIN — IPRATROPIUM BROMIDE AND ALBUTEROL SULFATE 1 DOSE: 2.5; .5 SOLUTION RESPIRATORY (INHALATION) at 16:36

## 2025-01-22 RX ADMIN — LEVOFLOXACIN 750 MG: 500 TABLET, FILM COATED ORAL at 14:03

## 2025-01-22 RX ADMIN — HYDROCODONE BITARTRATE AND ACETAMINOPHEN 1 TABLET: 7.5; 325 TABLET ORAL at 04:06

## 2025-01-22 RX ADMIN — ASPIRIN 81 MG CHEWABLE TABLET 81 MG: 81 TABLET CHEWABLE at 08:52

## 2025-01-22 RX ADMIN — DOXYCYCLINE HYCLATE 100 MG: 100 CAPSULE ORAL at 08:52

## 2025-01-22 RX ADMIN — IPRATROPIUM BROMIDE AND ALBUTEROL SULFATE 1 DOSE: 2.5; .5 SOLUTION RESPIRATORY (INHALATION) at 08:47

## 2025-01-22 RX ADMIN — ARFORMOTEROL TARTRATE 15 MCG: 15 SOLUTION RESPIRATORY (INHALATION) at 20:05

## 2025-01-22 RX ADMIN — DOXYCYCLINE HYCLATE 100 MG: 100 CAPSULE ORAL at 21:25

## 2025-01-22 ASSESSMENT — PAIN DESCRIPTION - DESCRIPTORS: DESCRIPTORS: ACHING;POUNDING;SORE

## 2025-01-22 ASSESSMENT — PAIN DESCRIPTION - ORIENTATION
ORIENTATION: LEFT
ORIENTATION: RIGHT;LEFT
ORIENTATION: POSTERIOR

## 2025-01-22 ASSESSMENT — PAIN SCALES - GENERAL
PAINLEVEL_OUTOF10: 8
PAINLEVEL_OUTOF10: 7
PAINLEVEL_OUTOF10: 0
PAINLEVEL_OUTOF10: 8

## 2025-01-22 ASSESSMENT — PAIN DESCRIPTION - LOCATION
LOCATION: LEG
LOCATION: LEG;KNEE;HIP
LOCATION: LEG

## 2025-01-22 ASSESSMENT — PAIN SCALES - WONG BAKER: WONGBAKER_NUMERICALRESPONSE: NO HURT
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
## 2025-01-22 NOTE — PROGRESS NOTES
[facility] Hospitalist Progress Note    Admitting Date and Time: 1/17/2025 12:49 PM  Admit Dx: Hypocalcemia [E83.51]  Acute on chronic congestive heart failure, unspecified heart failure type (HCC) [I50.9]  Acute exacerbation of chronic heart failure (HCC) [I50.9]    Subjective:  Patient is being followed for Hypocalcemia [E83.51]  Acute on chronic congestive heart failure, unspecified heart failure type (HCC) [I50.9]  Acute exacerbation of chronic heart failure (HCC) [I50.9]   Pt feels nasal and chest congestion.  Per RN: no major concerns.     ROS: denies fever, chills, cp, sob, n/v, HA unless stated above.      doxycycline  100 mg Oral 2 times per day    guaiFENesin  400 mg Oral TID    calcium elemental  1,500 mg Oral TID    cefTRIAXone (ROCEPHIN) IV  1,000 mg IntraVENous Q24H    midodrine  5 mg Oral TID WC    vitamin D  50,000 Units Oral Once per day on Monday Thursday    furosemide  40 mg IntraVENous BID    ipratropium 0.5 mg-albuterol 2.5 mg  1 Dose Inhalation Q4H    arformoterol tartrate  15 mcg Nebulization BID RT    ARIPiprazole  5 mg Oral Daily    aspirin  81 mg Oral Daily    budesonide  0.5 mg Nebulization BID RT    gabapentin  900 mg Oral Nightly    metoprolol succinate  100 mg Oral QHS    pantoprazole  40 mg Oral QAM AC    pramipexole  0.75 mg Oral Nightly    tamsulosin  0.4 mg Oral BID    gabapentin  600 mg Oral QAM    sodium chloride flush  5-40 mL IntraVENous 2 times per day    enoxaparin  40 mg SubCUTAneous Daily    FLUoxetine  20 mg Oral Nightly    LORazepam  1 mg Oral TID     HYDROcodone-acetaminophen, 1 tablet, Q8H PRN  guaiFENesin-dextromethorphan, 5 mL, Q4H PRN  benzonatate, 100 mg, TID PRN  fluticasone, 1 spray, Daily PRN  sodium chloride, 1 spray, PRN  sodium chloride flush, 5-40 mL, PRN  sodium chloride, , PRN  potassium chloride, 40 mEq, PRN   Or  potassium alternative oral replacement, 40 mEq, PRN   Or  potassium chloride, 10 mEq, PRN  magnesium sulfate, 2,000 mg,  Baseline oxygen supplementation is 5 L nasal cannula.  Continue BiPAP qhs.  Continue DuAna Mon Pulmicort. Ohtuvayre inhalation susp (patient's supply)  3. Recent COVID-19 infection.  RVP positive on 12/30/2024. He has COVID-19 infection at least couple times before.  He was discharged on a steroid taper on 1/4.  Discontinue steroid.  Continue to monitor  4. Suspected superimposed bacterial infection.  Large amount of purulent sputum in a cup at bedside. Noted procalcitonin, CRP, MRSA nares, strep pneumonia and Legionella urinary antigens, sputum culture.  S/p Empiric ceftriaxone and doxycycline.  Checked CT chest 1/19: New moderate right lower lobe pneumonia. 2. New moderate confluent interstitial infiltrate in the posterior right upper lobe consistent with a new atypical pneumonia. Continue abx. Consulted Pulmonary, cxs growing pseudomonas change Rocephin to levaquin.  Hypocalcemia/vitamin D deficiency: Endocrine consult.  Change vitamin D 50,000 twice a week. Continue calcium goals: 1500 mg twice daily  Mild CAD.  Had LHC on 2023.  On aspirin  Former heavy smoking  Anxiety and depression  Left calf pain: LLE negative for DVT.        DVT prophylaxis Lovenox  Dispo - baseline 5L, Currently at 5L but still symptomatic.  Pulm consulted. Speech eval, pending final resp cxs.       NOTE: This report was transcribed using voice recognition software. Every effort was made to ensure accuracy; however, inadvertent computerized transcription errors may be present.  Electronically signed by Nehemiah López MD on 1/22/2025 at 12:18 PM

## 2025-01-22 NOTE — CARE COORDINATION
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 Chronically on 5L O2 home supplier is [facility] patient has nebulizer, walker and wc. Pulmonology, Endocrinology, Speech are following.  Nectar and honey thick liquid, Pureed, Soft and bite sized  solids. PT/pending OT/15. CM spoke with patient regarding HHC for PT/OT, patient states he wants to think about and will decide tomorrow. Discharge plan is home with [facility] nursing and aide when medically stable. Will continue to follow.  Becky IRVINGN, RN  Case Management

## 2025-01-22 NOTE — PROGRESS NOTES
Physical Therapy  Facility/Department: [address] MED SURG  Physical Therapy Initial Assessment    Name: Dg Peña  : 1965  MRN: [id number]  Date of Service: 2025          Patient Diagnosis(es): The primary encounter diagnosis was Acute on chronic congestive heart failure, unspecified heart failure type (HCC). Diagnoses of Hypocalcemia and Pain of left calf were also pertinent to this visit.  Past Medical History:  has a past medical history of Anxiety, COPD (chronic obstructive pulmonary disease) ([facility]), Hypertension, Leg swelling, Multiple gastric ulcers, and Restless leg syndrome.  Past Surgical History:  has a past surgical history that includes knee surgery; hip surgery; hernia repair; bronchoscopy (N/A, 2023); and joint replacement ().         Requires PT Follow-Up: Yes        Evaluating Therapist: Day Chavez PT     Referring Provider:      Myla Méndez MD       PT order : PT eval and treat     Room #: 605  DIAGNOSIS: The primary encounter diagnosis was Acute on chronic congestive heart failure, unspecified heart failure type (HCC). Diagnoses of Hypocalcemia and Pain of left calf were also pertinent to this visit.  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  LLE is shorter than RLE.   PRECAUTIONS: falls, droplet plus, O2 @ 5 LNC     Social:  Pt lives with  cousin  in a  1  floor plan  apartment with ramp  to enter.  Prior to admission pt walked with  ww for shorter distnace, w/c for longer      Initial Evaluation  Date:  2025 Treatment      Short Term/ Long Term   Goals   Was pt agreeable to Eval/treatment?  Yes      Does pt have pain?  None reported      Bed Mobility  Rolling:  SBA   Supine to sit:  min assist   Sit to supine: min assist   Scooting:  SBA in sit    S/I    Transfers Sit to stand:  min assist  Stand to sit: min assist   Stand pivot:  NT   S/I    Ambulation   3 feet  with ww CGA    50  feet with  ww  with  S/I        Stair negotiation: ascended and descended NT   N/A   LE ROM  BLE:  WFL except left knee fused in extension      LE strength  Right LE:  grossly 4/5  Left LE:  hip 2/5, knee fused ankle 4/5     AM- PAC RAW score  14/24           Pt is alert , follows directions     Balance:  min assist in stand  Fall risk due to [x]Decreased strength, [x] Decreased balance, [x] Decreased safety awareness, [x] Decreased activity tolerance.  [] Other:     Endurance: poor   Bed/Chair alarm:  yes      ASSESSMENT  Pt displays functional ability as noted in the objective portion of this evaluation.        Conditions Requiring Skilled Therapeutic Intervention:    [x]Decreased strength     [x]Decreased ROM  [x]Decreased functional mobility  [x]Decreased balance   [x]Decreased endurance   [x]Decreased posture  []Decreased sensation  []Decreased coordination   []Decreased vision  [x]Decreased safety awareness   []Increased pain         Comments:   Pt  in bed  upon arrival ; agreeable to PT. Mobility as above.  O2@ 5 LNC. SPO2 > 96% throughout session     Treatment:  Pt was instructed on the following :   -Bed mobility : including sequencing and technique  -Transfers: hand placement, controlled movement with stand to sit  - Gait: proper use of ww. Pt basically L LE NWB in stand            Pt educated on fall risk, safety with mobility         Patient response to education:   Pt verbalized understanding Pt demonstrated skill Pt requires further education in this area   x  Needs cues/assist   x       Comments:  Pt left  in bed per pt request after session, with call light in reach.      Rehab potential is Good for reaching above PT goals.    Pt’s/ family goals   1.  None stated     Patient and or family understand(s) diagnosis, prognosis, and plan of care. -     PLAN  PT care will be provided in accordance with the objectives noted above.  Whenever appropriate, clear delegation orders will be

## 2025-01-22 NOTE — PROGRESS NOTES
ENDOCRINOLOGY PROGRESS NOTE    Date of Admission: 1/17/2025  Date of Service: 1/21/2025  Admitting Physician: Myla Méndez MD   Primary Care Physician: Jana Sanchez MD  Consultant physician: Aj Marie MD     Reason for the consultation:  Hypocalcemia    History of Present Illness:  The history is provided by the patient. Accuracy of the patient data is excellent.    Dg Peña is a very pleasant 59 y.o. old male with PMH of congestive heart failure, COPD on home oxygen, hypertension and other listed below admitted to [facility] on 1/17/2025 because of worsening shortness of breath and lower extremity edema, endocrine service was consulted for evaluation and management of hypocalcemia.     Subjective  The patient was seen and examined at bedside today, no acute events overnight.  Calcium level improving      Allergies and Drug reactions  Allergies   Allergen Reactions    Levofloxacin Other (See Comments) and Nausea Only     Other reaction(s): Abdominal pain    Lisinopril      Other reaction(s): COUGHING    Tramadol      Other reaction(s): SHAKING    Ibuprofen Nausea And Vomiting    Sulfa Antibiotics Nausea And Vomiting       Scheduled Meds:   doxycycline  100 mg Oral 2 times per day    guaiFENesin  400 mg Oral TID    calcium elemental  1,500 mg Oral TID    cefTRIAXone (ROCEPHIN) IV  1,000 mg IntraVENous Q24H    midodrine  5 mg Oral TID WC    vitamin D  50,000 Units Oral Once per day on Monday Thursday    furosemide  40 mg IntraVENous BID    ipratropium 0.5 mg-albuterol 2.5 mg  1 Dose Inhalation Q4H    arformoterol tartrate  15 mcg Nebulization BID RT    ARIPiprazole  5 mg Oral Daily    aspirin  81 mg Oral Daily    budesonide  0.5 mg Nebulization BID RT    gabapentin  900 mg Oral Nightly    metoprolol succinate  100 mg Oral QHS    pantoprazole  40 mg Oral QAM AC    pramipexole  0.75 mg Oral Nightly    tamsulosin  0.4 mg Oral BID    gabapentin  600 mg Oral QAM    sodium chloride flush  5-40 mL IntraVENous 2

## 2025-01-22 NOTE — PLAN OF CARE
Problem: Chronic Conditions and Co-morbidities  Goal: Patient's chronic conditions and co-morbidity symptoms are monitored and maintained or improved  Outcome: Progressing     Problem: Discharge Planning  Goal: Discharge to home or other facility with appropriate resources  Outcome: Progressing     Problem: Pain  Goal: Verbalizes/displays adequate comfort level or baseline comfort level  Outcome: Progressing     Problem: Skin/Tissue Integrity  Goal: Absence of new skin breakdown  Description: 1.  Monitor for areas of redness and/or skin breakdown  2.  Assess vascular access sites hourly  3.  Every 4-6 hours minimum:  Change oxygen saturation probe site  4.  Every 4-6 hours:  If on nasal continuous positive airway pressure, respiratory therapy assess nares and determine need for appliance change or resting period.  Outcome: Progressing     Problem: ABCDS Injury Assessment  Goal: Absence of physical injury  Outcome: Progressing     Problem: Safety - Adult  Goal: Free from fall injury  Outcome: Progressing     Problem: Respiratory - Adult  Goal: Achieves optimal ventilation and oxygenation  Outcome: Progressing     Problem: Cardiovascular - Adult  Goal: Maintains optimal cardiac output and hemodynamic stability  Outcome: Progressing  Goal: Absence of cardiac dysrhythmias or at baseline  Outcome: Progressing     Problem: Metabolic/Fluid and Electrolytes - Adult  Goal: Electrolytes maintained within normal limits  Outcome: Progressing  Goal: Hemodynamic stability and optimal renal function maintained  Outcome: Progressing

## 2025-01-22 NOTE — PROGRESS NOTES
SPEECH LANGUAGE PATHOLOGY  DAILY PROGRESS NOTE      PATIENT NAME:  Dg Peña      :  1965          TODAY'S DATE:  2025 ROOM:  [address] [place], [place] [address]    Current Diet: ADULT DIET; Dysphagia - Soft and Bite Sized; Low Fat/Low Chol/High Fiber/2 gm Na; Moderately Thick (Honey); 1800 ml    Patient seen for ongoing dysphagia tx. Patient reported good toleration of thickened liquids, but reports disliking it overall. Patient provided handout and educated on proper thickening methods. Patient reported that his sister will thicken the liquids for him once he is discharged from the hospital. Reviewed rationale for thickened liquids at this time. Engaged patient in unstructured Q&A. All questions voiced answered at this time.     Recommendation: cont current POC       CPT code(s) 91698  dysphagia tx  Total minutes :  10 minutes    Angelique Cordova M.S. CCC-SLP/L  Speech Language Pathologist  [id number]

## 2025-01-22 NOTE — PROGRESS NOTES
[facility]  [address], [address]  [place] [address]      Pulmonary Progress Note      SUBJECTIVE:  claims feeling some better with breathing, still with cough and mod sputum production, on 5 li NC lying down in bed and claims wore NIV last night, mention of dysphagia with swallow study    OBJECTIVE    Medications    Continuous Infusions:   sodium chloride         Scheduled Meds:   doxycycline  100 mg Oral 2 times per day    guaiFENesin  400 mg Oral TID    calcium elemental  1,500 mg Oral TID    cefTRIAXone (ROCEPHIN) IV  1,000 mg IntraVENous Q24H    midodrine  5 mg Oral TID WC    vitamin D  50,000 Units Oral Once per day on Monday Thursday    furosemide  40 mg IntraVENous BID    ipratropium 0.5 mg-albuterol 2.5 mg  1 Dose Inhalation Q4H    arformoterol tartrate  15 mcg Nebulization BID RT    ARIPiprazole  5 mg Oral Daily    aspirin  81 mg Oral Daily    budesonide  0.5 mg Nebulization BID RT    gabapentin  900 mg Oral Nightly    metoprolol succinate  100 mg Oral QHS    pantoprazole  40 mg Oral QAM AC    pramipexole  0.75 mg Oral Nightly    tamsulosin  0.4 mg Oral BID    gabapentin  600 mg Oral QAM    sodium chloride flush  5-40 mL IntraVENous 2 times per day    enoxaparin  40 mg SubCUTAneous Daily    FLUoxetine  20 mg Oral Nightly    LORazepam  1 mg Oral TID       PRN Meds:HYDROcodone-acetaminophen, guaiFENesin-dextromethorphan, benzonatate, fluticasone, sodium chloride, sodium chloride flush, sodium chloride, potassium chloride **OR** potassium alternative oral replacement **OR** potassium chloride, magnesium sulfate, polyethylene glycol, acetaminophen **OR** acetaminophen, melatonin, morphine    Physical    VITALS:  /68   Pulse 67   Temp 98 °F (36.7 °C) (Oral)   Resp 18   Ht 1.626 m (5' 4\")   Wt 63 kg (139 lb)   SpO2 95%   BMI 23.86 kg/m²     24HR INTAKE/OUTPUT:      Intake/Output Summary (Last 24 hours) at 1/22/2025 0826  Last

## 2025-01-23 VITALS
DIASTOLIC BLOOD PRESSURE: 80 MMHG | WEIGHT: 135.2 LBS | SYSTOLIC BLOOD PRESSURE: 104 MMHG | HEIGHT: 64 IN | TEMPERATURE: 98 F | BODY MASS INDEX: 23.08 KG/M2 | OXYGEN SATURATION: 100 % | HEART RATE: 74 BPM | RESPIRATION RATE: 18 BRPM

## 2025-01-23 LAB
ANION GAP SERPL CALCULATED.3IONS-SCNC: 9 MMOL/L (ref 7–16)
BUN SERPL-MCNC: 21 MG/DL (ref 6–20)
CALCIUM SERPL-MCNC: 8.4 MG/DL (ref 8.6–10.2)
CHLORIDE SERPL-SCNC: 95 MMOL/L (ref 98–107)
CO2 SERPL-SCNC: 37 MMOL/L (ref 22–29)
CREAT SERPL-MCNC: 0.7 MG/DL (ref 0.7–1.2)
ERYTHROCYTE [DISTWIDTH] IN BLOOD BY AUTOMATED COUNT: 14.1 % (ref 11.5–15)
GFR, ESTIMATED: >90 ML/MIN/1.73M2
GLUCOSE SERPL-MCNC: 104 MG/DL (ref 74–99)
HCT VFR BLD AUTO: 32.9 % (ref 37–54)
HGB BLD-MCNC: 9.7 G/DL (ref 12.5–16.5)
MCH RBC QN AUTO: 31.1 PG (ref 26–35)
MCHC RBC AUTO-ENTMCNC: 29.5 G/DL (ref 32–34.5)
MCV RBC AUTO: 105.4 FL (ref 80–99.9)
MICROORGANISM SPEC CULT: ABNORMAL
MICROORGANISM/AGENT SPEC: ABNORMAL
PLATELET # BLD AUTO: 148 K/UL (ref 130–450)
PMV BLD AUTO: 11.1 FL (ref 7–12)
POTASSIUM SERPL-SCNC: 4.2 MMOL/L (ref 3.5–5)
RBC # BLD AUTO: 3.12 M/UL (ref 3.8–5.8)
SODIUM SERPL-SCNC: 141 MMOL/L (ref 132–146)
SPECIMEN DESCRIPTION: ABNORMAL
WBC OTHER # BLD: 4.7 K/UL (ref 4.5–11.5)

## 2025-01-23 PROCEDURE — 36415 COLL VENOUS BLD VENIPUNCTURE: CPT

## 2025-01-23 PROCEDURE — 2700000000 HC OXYGEN THERAPY PER DAY

## 2025-01-23 PROCEDURE — 6370000000 HC RX 637 (ALT 250 FOR IP): Performed by: STUDENT IN AN ORGANIZED HEALTH CARE EDUCATION/TRAINING PROGRAM

## 2025-01-23 PROCEDURE — 6360000002 HC RX W HCPCS: Performed by: STUDENT IN AN ORGANIZED HEALTH CARE EDUCATION/TRAINING PROGRAM

## 2025-01-23 PROCEDURE — 6370000000 HC RX 637 (ALT 250 FOR IP): Performed by: INTERNAL MEDICINE

## 2025-01-23 PROCEDURE — 99239 HOSP IP/OBS DSCHRG MGMT >30: CPT | Performed by: STUDENT IN AN ORGANIZED HEALTH CARE EDUCATION/TRAINING PROGRAM

## 2025-01-23 PROCEDURE — 85027 COMPLETE CBC AUTOMATED: CPT

## 2025-01-23 PROCEDURE — 94640 AIRWAY INHALATION TREATMENT: CPT

## 2025-01-23 PROCEDURE — 94660 CPAP INITIATION&MGMT: CPT

## 2025-01-23 PROCEDURE — 80048 BASIC METABOLIC PNL TOTAL CA: CPT

## 2025-01-23 PROCEDURE — 2500000003 HC RX 250 WO HCPCS: Performed by: STUDENT IN AN ORGANIZED HEALTH CARE EDUCATION/TRAINING PROGRAM

## 2025-01-23 RX ORDER — FUROSEMIDE 40 MG/1
40 TABLET ORAL 2 TIMES DAILY
Qty: 90 TABLET | Refills: 1 | Status: SHIPPED | OUTPATIENT
Start: 2025-01-23

## 2025-01-23 RX ORDER — DOXYCYCLINE 100 MG/1
100 CAPSULE ORAL EVERY 12 HOURS SCHEDULED
Status: DISCONTINUED | OUTPATIENT
Start: 2025-01-23 | End: 2025-01-23 | Stop reason: HOSPADM

## 2025-01-23 RX ORDER — LEVOFLOXACIN 750 MG/1
750 TABLET, FILM COATED ORAL DAILY
Qty: 3 TABLET | Refills: 0 | Status: SHIPPED | OUTPATIENT
Start: 2025-01-24 | End: 2025-01-27

## 2025-01-23 RX ORDER — DOXYCYCLINE 100 MG/1
100 CAPSULE ORAL EVERY 12 HOURS SCHEDULED
Qty: 5 CAPSULE | Refills: 0 | Status: SHIPPED | OUTPATIENT
Start: 2025-01-23 | End: 2025-01-26

## 2025-01-23 RX ORDER — MIDODRINE HYDROCHLORIDE 5 MG/1
5 TABLET ORAL
Qty: 90 TABLET | Refills: 3 | Status: SHIPPED | OUTPATIENT
Start: 2025-01-23

## 2025-01-23 RX ADMIN — ASPIRIN 81 MG CHEWABLE TABLET 81 MG: 81 TABLET CHEWABLE at 09:39

## 2025-01-23 RX ADMIN — CALCIUM 1500 MG: 500 TABLET ORAL at 09:36

## 2025-01-23 RX ADMIN — IPRATROPIUM BROMIDE AND ALBUTEROL SULFATE 1 DOSE: 2.5; .5 SOLUTION RESPIRATORY (INHALATION) at 15:34

## 2025-01-23 RX ADMIN — GUAIFENESIN 400 MG: 400 TABLET ORAL at 09:38

## 2025-01-23 RX ADMIN — DOXYCYCLINE HYCLATE 100 MG: 100 CAPSULE ORAL at 09:37

## 2025-01-23 RX ADMIN — LORAZEPAM 1 MG: 1 TABLET ORAL at 14:39

## 2025-01-23 RX ADMIN — MORPHINE SULFATE 10 MG: 10 SOLUTION ORAL at 09:50

## 2025-01-23 RX ADMIN — BUDESONIDE INHALATION 500 MCG: 0.5 SUSPENSION RESPIRATORY (INHALATION) at 08:25

## 2025-01-23 RX ADMIN — SODIUM CHLORIDE, PRESERVATIVE FREE 10 ML: 5 INJECTION INTRAVENOUS at 09:58

## 2025-01-23 RX ADMIN — HYDROCODONE BITARTRATE AND ACETAMINOPHEN 1 TABLET: 7.5; 325 TABLET ORAL at 06:23

## 2025-01-23 RX ADMIN — GABAPENTIN 600 MG: 300 CAPSULE ORAL at 09:37

## 2025-01-23 RX ADMIN — MIDODRINE HYDROCHLORIDE 5 MG: 5 TABLET ORAL at 16:00

## 2025-01-23 RX ADMIN — IPRATROPIUM BROMIDE AND ALBUTEROL SULFATE 1 DOSE: 2.5; .5 SOLUTION RESPIRATORY (INHALATION) at 08:25

## 2025-01-23 RX ADMIN — HYDROCODONE BITARTRATE AND ACETAMINOPHEN 1 TABLET: 7.5; 325 TABLET ORAL at 14:39

## 2025-01-23 RX ADMIN — ENOXAPARIN SODIUM 40 MG: 100 INJECTION SUBCUTANEOUS at 09:40

## 2025-01-23 RX ADMIN — MIDODRINE HYDROCHLORIDE 5 MG: 5 TABLET ORAL at 09:38

## 2025-01-23 RX ADMIN — CALCIUM 1500 MG: 500 TABLET ORAL at 14:39

## 2025-01-23 RX ADMIN — ARIPIPRAZOLE 5 MG: 5 TABLET ORAL at 09:39

## 2025-01-23 RX ADMIN — MORPHINE SULFATE 10 MG: 10 SOLUTION ORAL at 02:50

## 2025-01-23 RX ADMIN — IPRATROPIUM BROMIDE AND ALBUTEROL SULFATE 1 DOSE: 2.5; .5 SOLUTION RESPIRATORY (INHALATION) at 11:45

## 2025-01-23 RX ADMIN — PANTOPRAZOLE SODIUM 40 MG: 40 TABLET, DELAYED RELEASE ORAL at 06:23

## 2025-01-23 RX ADMIN — LORAZEPAM 1 MG: 1 TABLET ORAL at 09:39

## 2025-01-23 RX ADMIN — LEVOFLOXACIN 750 MG: 500 TABLET, FILM COATED ORAL at 09:37

## 2025-01-23 RX ADMIN — FUROSEMIDE 40 MG: 10 INJECTION, SOLUTION INTRAMUSCULAR; INTRAVENOUS at 09:39

## 2025-01-23 RX ADMIN — GUAIFENESIN 400 MG: 400 TABLET ORAL at 14:39

## 2025-01-23 RX ADMIN — TAMSULOSIN HYDROCHLORIDE 0.4 MG: 0.4 CAPSULE ORAL at 11:05

## 2025-01-23 RX ADMIN — ARFORMOTEROL TARTRATE 15 MCG: 15 SOLUTION RESPIRATORY (INHALATION) at 08:25

## 2025-01-23 ASSESSMENT — PAIN SCALES - GENERAL
PAINLEVEL_OUTOF10: 9
PAINLEVEL_OUTOF10: 7
PAINLEVEL_OUTOF10: 8
PAINLEVEL_OUTOF10: 8

## 2025-01-23 ASSESSMENT — PAIN DESCRIPTION - LOCATION
LOCATION: BACK;KNEE;LEG
LOCATION: BACK;KNEE
LOCATION: HIP;KNEE
LOCATION: LEG;KNEE

## 2025-01-23 ASSESSMENT — PAIN DESCRIPTION - DESCRIPTORS
DESCRIPTORS: POUNDING;PRESSURE;SORE
DESCRIPTORS: ACHING

## 2025-01-23 ASSESSMENT — PAIN DESCRIPTION - ORIENTATION
ORIENTATION: LEFT;RIGHT
ORIENTATION: RIGHT;LEFT

## 2025-01-23 NOTE — PLAN OF CARE
Problem: Chronic Conditions and Co-morbidities  Goal: Patient's chronic conditions and co-morbidity symptoms are monitored and maintained or improved  1/23/2025 0300 by Yady Vincent RN  Outcome: Progressing  1/23/2025 0300 by Yady Vincent RN  Outcome: Progressing     Problem: Discharge Planning  Goal: Discharge to home or other facility with appropriate resources  1/23/2025 0300 by Yady Vincent RN  Outcome: Progressing  1/23/2025 0300 by Yady Vincent RN  Outcome: Progressing     Problem: Pain  Goal: Verbalizes/displays adequate comfort level or baseline comfort level  1/23/2025 0300 by Yady Vincent RN  Outcome: Progressing  1/23/2025 0300 by Yady Vincent RN  Outcome: Progressing     Problem: Skin/Tissue Integrity  Goal: Absence of new skin breakdown  Description: 1.  Monitor for areas of redness and/or skin breakdown  2.  Assess vascular access sites hourly  3.  Every 4-6 hours minimum:  Change oxygen saturation probe site  4.  Every 4-6 hours:  If on nasal continuous positive airway pressure, respiratory therapy assess nares and determine need for appliance change or resting period.  1/23/2025 0300 by Yady Vincent RN  Outcome: Progressing  1/23/2025 0300 by Yady Vincent RN  Outcome: Progressing     Problem: ABCDS Injury Assessment  Goal: Absence of physical injury  1/23/2025 0300 by Yady Vincent RN  Outcome: Progressing  1/23/2025 0300 by Yady Vincent RN  Outcome: Progressing     Problem: Safety - Adult  Goal: Free from fall injury  1/23/2025 0300 by Yady Vincent RN  Outcome: Progressing  1/23/2025 0300 by Yady Vincent RN  Outcome: Progressing     Problem: Respiratory - Adult  Goal: Achieves optimal ventilation and oxygenation  1/23/2025 0300 by Yady Vincent RN  Outcome: Progressing  1/23/2025 0300 by Yady Vincent

## 2025-01-23 NOTE — PROGRESS NOTES
Comprehensive Nutrition Assessment    Type and Reason for Visit:  Initial, LOS    Nutrition Recommendations/Plan:   Continue current diet  Note fluid restriction- will add Gelatein BID to promote protein/energy intake  Will continue to monitor while inpatient     Malnutrition Assessment:  Malnutrition Status:  Insufficient data (01/23/25 1403)    Context:  Chronic Illness     Findings of the 6 clinical characteristics of malnutrition:  Energy Intake:  75% or less estimated energy requirements for 1 month or longer  Weight Loss:  Unable to assess (d/t possible fluid shifts)     Body Fat Loss:  Unable to assess (d/t droplet iso)     Muscle Mass Loss:  Unable to assess    Fluid Accumulation:  Unable to assess (d/t multifactorial)     Strength:  Not Performed    Nutrition Assessment:    Pt w/ CHF exacerbation; acute on chronic hypercapnic resp failure; possible aspiration PNA; +Covid. Multiple recent adm for same.  Hx CHF, COPD, dysphagia. SLP eval/MBSS 1/21- rec soft and bite sized w/ mod thick liquids. Note fluid restriction. Poor kristina/intake PTA; currently consuming >50% of meals. Continue current diet. Will add Gelatein BID to promote protein/energy intake. Will continue to monitor.    Nutrition Related Findings:    A&O x4, missing teeth, abd WDL, BLE +1 edema, -14 L, lasix Wound Type: None       Current Nutrition Intake & Therapies:    Average Meal Intake: 51-75%  Average Supplements Intake: None Ordered  ADULT DIET; Dysphagia - Soft and Bite Sized; Low Fat/Low Chol/High Fiber/2 gm Na; Moderately Thick (Honey); 1800 ml  ADULT ORAL NUTRITION SUPPLEMENT; Lunch, Dinner; Fortified Gelatin Oral Supplement    Anthropometric Measures:  Height: 162.6 cm (5' 4\")  Ideal Body Weight (IBW): 130 lbs (59 kg)    Admission Body Weight: 61.3 kg (135 lb 3.2 oz) (1/23 bed side scale)  Current Body Weight: 61.3 kg (135 lb 3.2 oz) (1/23), 104 % IBW. Weight Source: Other (bed side scale)  Current BMI (kg/m2): 23.2  Usual Body Weight:

## 2025-01-23 NOTE — PROGRESS NOTES
CLINICAL PHARMACY NOTE: MEDS TO BEDS    Total # of Prescriptions Filled: 2   The following medications were delivered to the patient:  Lasix 40 mg  Levofloxacin 750 mg  Doxycycline hyclate 100 mg  Midodrine 5 mg      Additional Documentation:

## 2025-01-23 NOTE — DISCHARGE SUMMARY
[facility] Hospitalist Physician Discharge Summary       No follow-up provider specified.    Activity level: As tolerated     Dispo: Home      Condition on discharge: Stable     Patient ID:  Dg Peña  [id number]  59 y.o.  1965    Admit date: 1/17/2025    Discharge date and time:  1/23/2025  1:31 PM    Admission Diagnoses: Principal Problem:    Acute exacerbation of chronic heart failure (HCC)  Active Problems:    Hypocalcemia    COVID-19    Chronic hypercapnic respiratory failure  Resolved Problems:    * No resolved hospital problems. *      Discharge Diagnoses: Principal Problem:    Acute exacerbation of chronic heart failure (HCC)  Active Problems:    Hypocalcemia    COVID-19    Chronic hypercapnic respiratory failure  Resolved Problems:    * No resolved hospital problems. *      Consults:  IP CONSULT TO VASCULAR ACCESS TEAM  IP CONSULT TO INTERNAL MEDICINE  IP CONSULT TO ENDOCRINOLOGY  IP CONSULT TO VASCULAR ACCESS TEAM  IP CONSULT TO HEART FAILURE NURSE/COORDINATOR  IP CONSULT TO PULMONOLOGY  IP CONSULT TO HOME CARE NEEDS    Hospital Course:   Patient Dg Peña is a 59 y.o. presented with Hypocalcemia [E83.51]  Acute on chronic congestive heart failure, unspecified heart failure type (HCC) [I50.9]  Acute exacerbation of chronic heart failure (HCC) [I50.9]  Patient was admitted and managed for Acute on chronic HFpEF. LVEF 60-65%. 8/3 2023.  Sent from CHF clinic for volume overload management.  Having increasing shortness of breath on 12 pound weight gain.  Started on  Lasix 40 IV BID, dc on po lasix. Strict I/O's, daily weights. Monitor volume status.  Continue Toprol-XL.  On midodrine for chronic hypotension.  Other GDMT as tolerated. So far negative fluid balance. Suspected superimposed bacterial infection.  Large amount of purulent sputum in a cup at bedside. Noted procalcitonin, CRP, MRSA nares, strep pneumonia and Legionella urinary antigens, sputum culture.  S/p Empiric  NORCO  Take 1 tablet by mouth every 8 hours as needed for Pain for up to 30 days. Max Daily Amount: 3 tablets     ipratropium 0.5 mg-albuterol 2.5 mg 0.5-2.5 (3) MG/3ML Soln nebulizer solution  Commonly known as: DUONEB  Inhale 3 mLs into the lungs every 4 hours     LORazepam 1 MG tablet  Commonly known as: ATIVAN     metoprolol succinate 100 MG extended release tablet  Commonly known as: TOPROL XL  Take 1 tablet at bedtime     * morphine 20MG/ML Soln concentrated solution  Take 0.5 mLs by mouth every 2 hours as needed for Pain for up to 5 days. Max Daily Amount: 120 mg     * morphine 20MG/ML Soln concentrated solution  Take 0.5 mLs by mouth every 2 hours as needed for Pain for up to 5 days. Max Daily Amount: 120 mg     * morphine 20MG/ML Soln concentrated solution  Take 0.5 mLs by mouth every 2 hours as needed for Pain for up to 5 days. Max Daily Amount: 120 mg  Start taking on: January 26, 2025     Ohtuvayre 3 MG/2.5ML Susp  Generic drug: Ensifentrine  Inhale 2.5 mLs into the lungs in the morning and at bedtime     pramipexole 0.75 MG tablet  Commonly known as: MIRAPEX  Take 1 tablet by mouth nightly     sodium chloride 0.65 % nasal spray  Commonly known as: OCEAN           * This list has 3 medication(s) that are the same as other medications prescribed for you. Read the directions carefully, and ask your doctor or other care provider to review them with you.                STOP taking these medications      atorvastatin 40 MG tablet  Commonly known as: LIPITOR            ASK your doctor about these medications      omeprazole 40 MG delayed release capsule  Commonly known as: PRILOSEC  Take 1 capsule by mouth daily     tamsulosin 0.4 MG capsule  Commonly known as: FLOMAX  TAKE ONE (1) CAPSULE BY MOUTH IN THE MORNING AND AT BEDTIME     Vitamin D (Ergocalciferol) 59788 units Caps  Take 50,000 Units by mouth Twice a Week On Mondays and thursdays               Where to Get Your Medications        These medications

## 2025-01-23 NOTE — PROGRESS NOTES
Palliative Medicine  Social Work Progress Note      Patient: Dg Mariana     met with pt at bedside to have follow up discussion after speaking with pt sister Sayra the previous day. Discussed with pt the potential need to transition to hospice care  from Palliative care services due to his multiple admissions in the recent weeks. Pt agreed he does not want to keep coming back to the hospital at this time, he wants to stay at home with his dog and his family and not be continuously admitted at the hospital. Discussed with him what hospice at home can look for him and how it can be beneficial in helping him. Answered all of pt questions, provided support to him during conversation. Pt inquired about his in home nurse from [facility] he has and whether he would lose her services, advised him I would look into this for him and follow up. Will follow up on a final decision with pt and sister Sayra to decide if moving forward with hospice care.      Yesi Noguera MSW,LSW  Palliative Medicine

## 2025-01-23 NOTE — PROGRESS NOTES
[facility]  [address], [address]  [place] [address]      Pulmonary Progress Note      SUBJECTIVE:  claims stable with breathing and cough with mod sputum production (?) close to baseline, on 5 li NC lying down in bed and claims wore NIV last night, (?) hasn't been getting chest vest therapy    OBJECTIVE    Medications    Continuous Infusions:   sodium chloride         Scheduled Meds:   levoFLOXacin  750 mg Oral Daily    doxycycline  100 mg Oral 2 times per day    guaiFENesin  400 mg Oral TID    calcium elemental  1,500 mg Oral TID    midodrine  5 mg Oral TID WC    vitamin D  50,000 Units Oral Once per day on Monday Thursday    furosemide  40 mg IntraVENous BID    ipratropium 0.5 mg-albuterol 2.5 mg  1 Dose Inhalation Q4H    arformoterol tartrate  15 mcg Nebulization BID RT    ARIPiprazole  5 mg Oral Daily    aspirin  81 mg Oral Daily    budesonide  0.5 mg Nebulization BID RT    gabapentin  900 mg Oral Nightly    metoprolol succinate  100 mg Oral QHS    pantoprazole  40 mg Oral QAM AC    pramipexole  0.75 mg Oral Nightly    tamsulosin  0.4 mg Oral BID    gabapentin  600 mg Oral QAM    sodium chloride flush  5-40 mL IntraVENous 2 times per day    enoxaparin  40 mg SubCUTAneous Daily    FLUoxetine  20 mg Oral Nightly    LORazepam  1 mg Oral TID       PRN Meds:HYDROcodone-acetaminophen, guaiFENesin-dextromethorphan, benzonatate, fluticasone, sodium chloride, sodium chloride flush, sodium chloride, potassium chloride **OR** potassium alternative oral replacement **OR** potassium chloride, magnesium sulfate, polyethylene glycol, acetaminophen **OR** acetaminophen, melatonin, morphine    Physical    VITALS:  /65   Pulse 72   Temp 98 °F (36.7 °C) (Oral)   Resp 18   Ht 1.626 m (5' 4\")   Wt 61.3 kg (135 lb 3.2 oz)   SpO2 98%   BMI 23.21 kg/m²     24HR INTAKE/OUTPUT:      Intake/Output Summary (Last 24 hours) at 1/23/2025 0838  Last data

## 2025-01-23 NOTE — CARE COORDINATION
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 Chronically on 5L O2 home supplier is [employer]. PT/14 OT/15 patient is agreeable to [facility] for PT/OT only, freedom of choice list provided, referral made to [facility] with [facility], await to hear back. [facility] order completed. Discharge plan is home with [facility] for PT/OT and [facility] for SN/Aide.Will continue to follow.  Becky MOREIRA, RN  Case Management     Addendum: [facility] cannot accept referral made to [facility] by Jolene via [facility], await to hear back.  Becky MOREIRA, RN  Case Management

## 2025-01-24 ENCOUNTER — TELEPHONE (OUTPATIENT)
Dept: PALLATIVE CARE | Age: 60
End: 2025-01-24

## 2025-01-24 DIAGNOSIS — I50.9 HEART FAILURE, UNSPECIFIED HF CHRONICITY, UNSPECIFIED HEART FAILURE TYPE (HCC): ICD-10-CM

## 2025-01-24 DIAGNOSIS — J44.9 END STAGE COPD (HCC): Primary | ICD-10-CM

## 2025-01-24 NOTE — TELEPHONE ENCOUNTER
Received call from pt sister Sayra stating pt has agreed to hospice care and would like to move forward with the service at this time. Faxed orders for hospice referral placed by Palliative Care CNP Amrik Zuñiga to [facility]. Informed Sayra hospice company will contact her to schedule the intake visit with pt and family. No further questions or needs at this time.       [facility]:  Phone: [phone]  Fax: [phone]      Yesi GARVIN,LSW  Palliative Medicine

## 2025-01-30 DIAGNOSIS — J96.11 CHRONIC RESPIRATORY FAILURE WITH HYPOXIA: ICD-10-CM

## 2025-01-30 RX ORDER — ALBUTEROL SULFATE 90 UG/1
INHALANT RESPIRATORY (INHALATION)
Qty: 1 EACH | Refills: 3 | Status: SHIPPED | OUTPATIENT
Start: 2025-01-30

## 2025-02-03 ENCOUNTER — HOSPITAL ENCOUNTER (INPATIENT)
Age: 60
LOS: 5 days | Discharge: HOME HEALTH | End: 2025-02-08
Attending: EMERGENCY MEDICINE | Admitting: INTERNAL MEDICINE
Payer: MEDICAID

## 2025-02-03 ENCOUNTER — APPOINTMENT (OUTPATIENT)
Dept: GENERAL RADIOLOGY | Age: 60
End: 2025-02-03
Payer: MEDICAID

## 2025-02-03 ENCOUNTER — APPOINTMENT (OUTPATIENT)
Dept: CT IMAGING | Age: 60
End: 2025-02-03
Payer: MEDICAID

## 2025-02-03 DIAGNOSIS — J96.12 CHRONIC HYPERCAPNIC RESPIRATORY FAILURE: ICD-10-CM

## 2025-02-03 DIAGNOSIS — R60.0 PERIPHERAL EDEMA: ICD-10-CM

## 2025-02-03 DIAGNOSIS — I50.9 ACUTE EXACERBATION OF CHRONIC HEART FAILURE (HCC): ICD-10-CM

## 2025-02-03 DIAGNOSIS — R06.09 DYSPNEA ON MINIMAL EXERTION: ICD-10-CM

## 2025-02-03 DIAGNOSIS — Z51.5 PALLIATIVE CARE ENCOUNTER: ICD-10-CM

## 2025-02-03 DIAGNOSIS — I50.9 ACUTE ON CHRONIC CONGESTIVE HEART FAILURE, UNSPECIFIED HEART FAILURE TYPE (HCC): Primary | ICD-10-CM

## 2025-02-03 DIAGNOSIS — J44.9 END STAGE COPD (HCC): ICD-10-CM

## 2025-02-03 LAB
ALBUMIN SERPL-MCNC: 3.8 G/DL (ref 3.5–5.2)
ALP SERPL-CCNC: 78 U/L (ref 40–129)
ALT SERPL-CCNC: 10 U/L (ref 0–40)
ANION GAP SERPL CALCULATED.3IONS-SCNC: 12 MMOL/L (ref 7–16)
AST SERPL-CCNC: 17 U/L (ref 0–39)
B PARAP IS1001 DNA NPH QL NAA+NON-PROBE: NOT DETECTED
B PERT DNA SPEC QL NAA+PROBE: NOT DETECTED
BASOPHILS # BLD: 0.03 K/UL (ref 0–0.2)
BASOPHILS NFR BLD: 1 % (ref 0–2)
BILIRUB SERPL-MCNC: 0.3 MG/DL (ref 0–1.2)
BNP SERPL-MCNC: 1041 PG/ML (ref 0–125)
BUN SERPL-MCNC: 12 MG/DL (ref 6–20)
C PNEUM DNA NPH QL NAA+NON-PROBE: NOT DETECTED
CALCIUM SERPL-MCNC: 6.5 MG/DL (ref 8.6–10.2)
CHLORIDE SERPL-SCNC: 86 MMOL/L (ref 98–107)
CO2 SERPL-SCNC: 41 MMOL/L (ref 22–29)
CREAT SERPL-MCNC: 0.8 MG/DL (ref 0.7–1.2)
EOSINOPHIL # BLD: 0.09 K/UL (ref 0.05–0.5)
EOSINOPHILS RELATIVE PERCENT: 1 % (ref 0–6)
ERYTHROCYTE [DISTWIDTH] IN BLOOD BY AUTOMATED COUNT: 14 % (ref 11.5–15)
FLUAV RNA NPH QL NAA+NON-PROBE: NOT DETECTED
FLUBV RNA NPH QL NAA+NON-PROBE: NOT DETECTED
GFR, ESTIMATED: >90 ML/MIN/1.73M2
GLUCOSE SERPL-MCNC: 117 MG/DL (ref 74–99)
HADV DNA NPH QL NAA+NON-PROBE: NOT DETECTED
HCOV 229E RNA NPH QL NAA+NON-PROBE: NOT DETECTED
HCOV HKU1 RNA NPH QL NAA+NON-PROBE: NOT DETECTED
HCOV NL63 RNA NPH QL NAA+NON-PROBE: NOT DETECTED
HCOV OC43 RNA NPH QL NAA+NON-PROBE: NOT DETECTED
HCT VFR BLD AUTO: 33.3 % (ref 37–54)
HGB BLD-MCNC: 9.7 G/DL (ref 12.5–16.5)
HMPV RNA NPH QL NAA+NON-PROBE: NOT DETECTED
HPIV1 RNA NPH QL NAA+NON-PROBE: NOT DETECTED
HPIV2 RNA NPH QL NAA+NON-PROBE: NOT DETECTED
HPIV3 RNA NPH QL NAA+NON-PROBE: NOT DETECTED
HPIV4 RNA NPH QL NAA+NON-PROBE: NOT DETECTED
IMM GRANULOCYTES # BLD AUTO: <0.03 K/UL (ref 0–0.58)
IMM GRANULOCYTES NFR BLD: 0 % (ref 0–5)
INFLUENZA A BY PCR: NOT DETECTED
INFLUENZA B BY PCR: NOT DETECTED
LYMPHOCYTES NFR BLD: 1.06 K/UL (ref 1.5–4)
LYMPHOCYTES RELATIVE PERCENT: 16 % (ref 20–42)
M PNEUMO DNA NPH QL NAA+NON-PROBE: NOT DETECTED
MCH RBC QN AUTO: 30.7 PG (ref 26–35)
MCHC RBC AUTO-ENTMCNC: 29.1 G/DL (ref 32–34.5)
MCV RBC AUTO: 105.4 FL (ref 80–99.9)
MONOCYTES NFR BLD: 0.48 K/UL (ref 0.1–0.95)
MONOCYTES NFR BLD: 7 % (ref 2–12)
NEUTROPHILS NFR BLD: 75 % (ref 43–80)
NEUTS SEG NFR BLD: 4.98 K/UL (ref 1.8–7.3)
PLATELET # BLD AUTO: 193 K/UL (ref 130–450)
PMV BLD AUTO: 11.2 FL (ref 7–12)
POTASSIUM SERPL-SCNC: 3.5 MMOL/L (ref 3.5–5)
PROT SERPL-MCNC: 7.4 G/DL (ref 6.4–8.3)
RBC # BLD AUTO: 3.16 M/UL (ref 3.8–5.8)
RSV RNA NPH QL NAA+NON-PROBE: NOT DETECTED
RV+EV RNA NPH QL NAA+NON-PROBE: NOT DETECTED
SARS-COV-2 RDRP RESP QL NAA+PROBE: NOT DETECTED
SARS-COV-2 RNA NPH QL NAA+NON-PROBE: NOT DETECTED
SODIUM SERPL-SCNC: 139 MMOL/L (ref 132–146)
SPECIMEN DESCRIPTION: NORMAL
SPECIMEN DESCRIPTION: NORMAL
TROPONIN I SERPL HS-MCNC: 26 NG/L (ref 0–11)
TROPONIN I SERPL HS-MCNC: 27 NG/L (ref 0–11)
WBC OTHER # BLD: 6.7 K/UL (ref 4.5–11.5)

## 2025-02-03 PROCEDURE — 2060000000 HC ICU INTERMEDIATE R&B

## 2025-02-03 PROCEDURE — 84484 ASSAY OF TROPONIN QUANT: CPT

## 2025-02-03 PROCEDURE — 87635 SARS-COV-2 COVID-19 AMP PRB: CPT

## 2025-02-03 PROCEDURE — 96374 THER/PROPH/DIAG INJ IV PUSH: CPT

## 2025-02-03 PROCEDURE — 6360000002 HC RX W HCPCS: Performed by: INTERNAL MEDICINE

## 2025-02-03 PROCEDURE — 71045 X-RAY EXAM CHEST 1 VIEW: CPT

## 2025-02-03 PROCEDURE — 85025 COMPLETE CBC W/AUTO DIFF WBC: CPT

## 2025-02-03 PROCEDURE — 80053 COMPREHEN METABOLIC PANEL: CPT

## 2025-02-03 PROCEDURE — 94664 DEMO&/EVAL PT USE INHALER: CPT

## 2025-02-03 PROCEDURE — 93005 ELECTROCARDIOGRAM TRACING: CPT | Performed by: EMERGENCY MEDICINE

## 2025-02-03 PROCEDURE — 83880 ASSAY OF NATRIURETIC PEPTIDE: CPT

## 2025-02-03 PROCEDURE — 0202U NFCT DS 22 TRGT SARS-COV-2: CPT

## 2025-02-03 PROCEDURE — 6370000000 HC RX 637 (ALT 250 FOR IP): Performed by: INTERNAL MEDICINE

## 2025-02-03 PROCEDURE — 99222 1ST HOSP IP/OBS MODERATE 55: CPT | Performed by: INTERNAL MEDICINE

## 2025-02-03 PROCEDURE — 99285 EMERGENCY DEPT VISIT HI MDM: CPT

## 2025-02-03 PROCEDURE — 71275 CT ANGIOGRAPHY CHEST: CPT

## 2025-02-03 PROCEDURE — 94640 AIRWAY INHALATION TREATMENT: CPT

## 2025-02-03 PROCEDURE — 6370000000 HC RX 637 (ALT 250 FOR IP): Performed by: EMERGENCY MEDICINE

## 2025-02-03 PROCEDURE — 6360000002 HC RX W HCPCS: Performed by: EMERGENCY MEDICINE

## 2025-02-03 PROCEDURE — 87502 INFLUENZA DNA AMP PROBE: CPT

## 2025-02-03 PROCEDURE — 6360000004 HC RX CONTRAST MEDICATION: Performed by: RADIOLOGY

## 2025-02-03 RX ORDER — ARIPIPRAZOLE 5 MG/1
5 TABLET ORAL DAILY
Status: DISCONTINUED | OUTPATIENT
Start: 2025-02-04 | End: 2025-02-08 | Stop reason: HOSPADM

## 2025-02-03 RX ORDER — PRAMIPEXOLE DIHYDROCHLORIDE 0.25 MG/1
0.75 TABLET ORAL NIGHTLY
Status: DISCONTINUED | OUTPATIENT
Start: 2025-02-03 | End: 2025-02-08 | Stop reason: HOSPADM

## 2025-02-03 RX ORDER — LORAZEPAM 1 MG/1
1 TABLET ORAL 3 TIMES DAILY PRN
Status: DISCONTINUED | OUTPATIENT
Start: 2025-02-03 | End: 2025-02-08 | Stop reason: HOSPADM

## 2025-02-03 RX ORDER — POTASSIUM CHLORIDE 1500 MG/1
40 TABLET, EXTENDED RELEASE ORAL PRN
Status: DISCONTINUED | OUTPATIENT
Start: 2025-02-03 | End: 2025-02-08 | Stop reason: HOSPADM

## 2025-02-03 RX ORDER — PANTOPRAZOLE SODIUM 40 MG/1
40 TABLET, DELAYED RELEASE ORAL
Status: DISCONTINUED | OUTPATIENT
Start: 2025-02-04 | End: 2025-02-08 | Stop reason: HOSPADM

## 2025-02-03 RX ORDER — HYDROCODONE BITARTRATE AND ACETAMINOPHEN 5; 325 MG/1; MG/1
1 TABLET ORAL ONCE
Status: COMPLETED | OUTPATIENT
Start: 2025-02-03 | End: 2025-02-03

## 2025-02-03 RX ORDER — TAMSULOSIN HYDROCHLORIDE 0.4 MG/1
0.4 CAPSULE ORAL DAILY
Status: DISCONTINUED | OUTPATIENT
Start: 2025-02-03 | End: 2025-02-08 | Stop reason: HOSPADM

## 2025-02-03 RX ORDER — GABAPENTIN 100 MG/1
200 CAPSULE ORAL 2 TIMES DAILY
Status: DISCONTINUED | OUTPATIENT
Start: 2025-02-03 | End: 2025-02-07

## 2025-02-03 RX ORDER — POLYETHYLENE GLYCOL 3350 17 G/17G
17 POWDER, FOR SOLUTION ORAL DAILY PRN
Status: DISCONTINUED | OUTPATIENT
Start: 2025-02-03 | End: 2025-02-08 | Stop reason: HOSPADM

## 2025-02-03 RX ORDER — FLUTICASONE PROPIONATE 50 MCG
1 SPRAY, SUSPENSION (ML) NASAL DAILY
Status: DISCONTINUED | OUTPATIENT
Start: 2025-02-03 | End: 2025-02-08 | Stop reason: HOSPADM

## 2025-02-03 RX ORDER — FUROSEMIDE 10 MG/ML
40 INJECTION INTRAMUSCULAR; INTRAVENOUS ONCE
Status: COMPLETED | OUTPATIENT
Start: 2025-02-03 | End: 2025-02-03

## 2025-02-03 RX ORDER — SODIUM CHLORIDE 0.9 % (FLUSH) 0.9 %
5-40 SYRINGE (ML) INJECTION EVERY 12 HOURS SCHEDULED
Status: DISCONTINUED | OUTPATIENT
Start: 2025-02-03 | End: 2025-02-08 | Stop reason: HOSPADM

## 2025-02-03 RX ORDER — SODIUM CHLORIDE 0.9 % (FLUSH) 0.9 %
5-40 SYRINGE (ML) INJECTION PRN
Status: DISCONTINUED | OUTPATIENT
Start: 2025-02-03 | End: 2025-02-08 | Stop reason: HOSPADM

## 2025-02-03 RX ORDER — MAGNESIUM SULFATE IN WATER 40 MG/ML
2000 INJECTION, SOLUTION INTRAVENOUS PRN
Status: DISCONTINUED | OUTPATIENT
Start: 2025-02-03 | End: 2025-02-08 | Stop reason: HOSPADM

## 2025-02-03 RX ORDER — ARFORMOTEROL TARTRATE 15 UG/2ML
15 SOLUTION RESPIRATORY (INHALATION)
Status: DISCONTINUED | OUTPATIENT
Start: 2025-02-03 | End: 2025-02-08 | Stop reason: HOSPADM

## 2025-02-03 RX ORDER — BUDESONIDE 0.5 MG/2ML
0.5 INHALANT ORAL
Status: DISCONTINUED | OUTPATIENT
Start: 2025-02-03 | End: 2025-02-08 | Stop reason: HOSPADM

## 2025-02-03 RX ORDER — IPRATROPIUM BROMIDE AND ALBUTEROL SULFATE 2.5; .5 MG/3ML; MG/3ML
1 SOLUTION RESPIRATORY (INHALATION)
Status: DISCONTINUED | OUTPATIENT
Start: 2025-02-04 | End: 2025-02-08 | Stop reason: HOSPADM

## 2025-02-03 RX ORDER — SODIUM CHLORIDE 9 MG/ML
INJECTION, SOLUTION INTRAVENOUS PRN
Status: DISCONTINUED | OUTPATIENT
Start: 2025-02-03 | End: 2025-02-08 | Stop reason: HOSPADM

## 2025-02-03 RX ORDER — CALCIUM CARBONATE 500(1250)
1500 TABLET ORAL 2 TIMES DAILY
Status: DISCONTINUED | OUTPATIENT
Start: 2025-02-03 | End: 2025-02-08 | Stop reason: HOSPADM

## 2025-02-03 RX ORDER — ONDANSETRON 4 MG/1
4 TABLET, ORALLY DISINTEGRATING ORAL EVERY 8 HOURS PRN
Status: DISCONTINUED | OUTPATIENT
Start: 2025-02-03 | End: 2025-02-08 | Stop reason: HOSPADM

## 2025-02-03 RX ORDER — ACETAMINOPHEN 650 MG/1
650 SUPPOSITORY RECTAL EVERY 6 HOURS PRN
Status: DISCONTINUED | OUTPATIENT
Start: 2025-02-03 | End: 2025-02-08 | Stop reason: HOSPADM

## 2025-02-03 RX ORDER — MORPHINE SULFATE 2 MG/ML
2 INJECTION, SOLUTION INTRAMUSCULAR; INTRAVENOUS EVERY 4 HOURS PRN
Status: DISCONTINUED | OUTPATIENT
Start: 2025-02-03 | End: 2025-02-04

## 2025-02-03 RX ORDER — MIDODRINE HYDROCHLORIDE 5 MG/1
5 TABLET ORAL
Status: DISCONTINUED | OUTPATIENT
Start: 2025-02-04 | End: 2025-02-08 | Stop reason: HOSPADM

## 2025-02-03 RX ORDER — ASPIRIN 81 MG/1
81 TABLET, CHEWABLE ORAL DAILY
Status: DISCONTINUED | OUTPATIENT
Start: 2025-02-03 | End: 2025-02-08 | Stop reason: HOSPADM

## 2025-02-03 RX ORDER — ONDANSETRON 2 MG/ML
4 INJECTION INTRAMUSCULAR; INTRAVENOUS EVERY 6 HOURS PRN
Status: DISCONTINUED | OUTPATIENT
Start: 2025-02-03 | End: 2025-02-08 | Stop reason: HOSPADM

## 2025-02-03 RX ORDER — FUROSEMIDE 10 MG/ML
40 INJECTION INTRAMUSCULAR; INTRAVENOUS 2 TIMES DAILY
Status: DISCONTINUED | OUTPATIENT
Start: 2025-02-03 | End: 2025-02-08 | Stop reason: HOSPADM

## 2025-02-03 RX ORDER — ACETAMINOPHEN 325 MG/1
650 TABLET ORAL EVERY 6 HOURS PRN
Status: DISCONTINUED | OUTPATIENT
Start: 2025-02-03 | End: 2025-02-08 | Stop reason: HOSPADM

## 2025-02-03 RX ORDER — IOPAMIDOL 755 MG/ML
75 INJECTION, SOLUTION INTRAVASCULAR
Status: COMPLETED | OUTPATIENT
Start: 2025-02-03 | End: 2025-02-03

## 2025-02-03 RX ORDER — ENOXAPARIN SODIUM 100 MG/ML
40 INJECTION SUBCUTANEOUS DAILY
Status: DISCONTINUED | OUTPATIENT
Start: 2025-02-03 | End: 2025-02-08 | Stop reason: HOSPADM

## 2025-02-03 RX ORDER — POTASSIUM CHLORIDE 7.45 MG/ML
10 INJECTION INTRAVENOUS PRN
Status: DISCONTINUED | OUTPATIENT
Start: 2025-02-03 | End: 2025-02-08 | Stop reason: HOSPADM

## 2025-02-03 RX ORDER — METOPROLOL SUCCINATE 100 MG/1
100 TABLET, EXTENDED RELEASE ORAL DAILY
Status: DISCONTINUED | OUTPATIENT
Start: 2025-02-03 | End: 2025-02-08 | Stop reason: HOSPADM

## 2025-02-03 RX ADMIN — FUROSEMIDE 40 MG: 10 INJECTION, SOLUTION INTRAMUSCULAR; INTRAVENOUS at 12:38

## 2025-02-03 RX ADMIN — HYDROCODONE BITARTRATE AND ACETAMINOPHEN 1 TABLET: 5; 325 TABLET ORAL at 15:50

## 2025-02-03 RX ADMIN — BUDESONIDE 500 MCG: 0.5 INHALANT RESPIRATORY (INHALATION) at 20:56

## 2025-02-03 RX ADMIN — IOPAMIDOL 75 ML: 755 INJECTION, SOLUTION INTRAVENOUS at 16:48

## 2025-02-03 RX ADMIN — MORPHINE SULFATE 2 MG: 2 INJECTION, SOLUTION INTRAMUSCULAR; INTRAVENOUS at 21:27

## 2025-02-03 RX ADMIN — ARFORMOTEROL TARTRATE 15 MCG: 15 SOLUTION RESPIRATORY (INHALATION) at 20:56

## 2025-02-03 RX ADMIN — GABAPENTIN 200 MG: 100 CAPSULE ORAL at 21:26

## 2025-02-03 ASSESSMENT — PAIN DESCRIPTION - LOCATION
LOCATION: LEG

## 2025-02-03 ASSESSMENT — PAIN DESCRIPTION - DESCRIPTORS
DESCRIPTORS: ACHING;DISCOMFORT
DESCRIPTORS: ACHING;DISCOMFORT
DESCRIPTORS: ACHING
DESCRIPTORS: ACHING

## 2025-02-03 ASSESSMENT — PAIN SCALES - GENERAL
PAINLEVEL_OUTOF10: 10
PAINLEVEL_OUTOF10: 8
PAINLEVEL_OUTOF10: 10

## 2025-02-03 ASSESSMENT — PAIN DESCRIPTION - ORIENTATION
ORIENTATION: LEFT;RIGHT
ORIENTATION: RIGHT;LEFT

## 2025-02-03 NOTE — ED PROVIDER NOTES
[facility] INTERMEDIATE  EMERGENCY DEPARTMENT ENCOUNTER        Pt Name: Dg Peña  MRN: [id number]  Birthdate 1965  Date of evaluation: 2/3/2025  Provider: Brittney Heaton MD  PCP: Jana Sanchez MD  Note Started: 12:15 PM EST 2/3/25    CHIEF COMPLAINT       Chief Complaint   Patient presents with    Congestive Heart Failure     Wears 5l NC at baseline     Shortness of Breath       HISTORY OF PRESENT ILLNESS: 1 or more Elements   History From: Patient    Limitations to history : None    Dg Peña is a 59 y.o. male who presents to the emergency department with increased leg edema shortness of breath dyspnea on exertion.  Patient history of CHF and is chronically on Lasix.  He is actually currently on hospice.  Hospice nurse visited today and states that a 12 pound weight gain since discharge from the hospital 10 days ago and was recommended for return to the hospital for admission for IV diuresis.  Patient stable on his baseline 5 L nasal cannula.  Denies chest pain or pleuritic pain still has wet cough but was recently treated for pneumonia on Levaquin no longer on antibiotics no fevers or chills.  No change to the cough.  No abdominal pain nausea or vomiting no headache.    Nursing Notes were all reviewed and agreed with or any disagreements were addressed in the HPI.      REVIEW OF EXTERNAL NOTE :       I reviewed hospitalist discharge note from 1/23/2025 patient had been admitted for 1 week hospital stay with acute on chronic CHF COVID respiratory failure.  Patient has a history of EF of 60 to 65% sent from CHF clinic for volume overload was had a 12 pound weight gain send Lasix 40 mg IV twice daily discharged on oral Lasix suspected superimposed bacterial infection in the lungs had been on empiric Rocephin and Doxy discharged on Levaquin.    REVIEW OF SYSTEMS :           Positives and Pertinent negatives as per HPI.     SURGICAL HISTORY     Past Surgical History:   Procedure

## 2025-02-04 LAB
ANION GAP SERPL CALCULATED.3IONS-SCNC: 13 MMOL/L (ref 7–16)
BUN SERPL-MCNC: 11 MG/DL (ref 6–20)
CALCIUM SERPL-MCNC: 6.7 MG/DL (ref 8.6–10.2)
CHLORIDE SERPL-SCNC: 88 MMOL/L (ref 98–107)
CO2 SERPL-SCNC: 41 MMOL/L (ref 22–29)
CREAT SERPL-MCNC: 0.7 MG/DL (ref 0.7–1.2)
ERYTHROCYTE [DISTWIDTH] IN BLOOD BY AUTOMATED COUNT: 14.2 % (ref 11.5–15)
GFR, ESTIMATED: >90 ML/MIN/1.73M2
GLUCOSE SERPL-MCNC: 96 MG/DL (ref 74–99)
HCT VFR BLD AUTO: 30.4 % (ref 37–54)
HGB BLD-MCNC: 9 G/DL (ref 12.5–16.5)
MCH RBC QN AUTO: 31.1 PG (ref 26–35)
MCHC RBC AUTO-ENTMCNC: 29.6 G/DL (ref 32–34.5)
MCV RBC AUTO: 105.2 FL (ref 80–99.9)
PLATELET # BLD AUTO: 164 K/UL (ref 130–450)
PMV BLD AUTO: 11.5 FL (ref 7–12)
POTASSIUM SERPL-SCNC: 3.3 MMOL/L (ref 3.5–5)
RBC # BLD AUTO: 2.89 M/UL (ref 3.8–5.8)
SODIUM SERPL-SCNC: 142 MMOL/L (ref 132–146)
WBC OTHER # BLD: 4.7 K/UL (ref 4.5–11.5)

## 2025-02-04 PROCEDURE — 36415 COLL VENOUS BLD VENIPUNCTURE: CPT

## 2025-02-04 PROCEDURE — 2500000003 HC RX 250 WO HCPCS: Performed by: INTERNAL MEDICINE

## 2025-02-04 PROCEDURE — 6360000002 HC RX W HCPCS: Performed by: INTERNAL MEDICINE

## 2025-02-04 PROCEDURE — 6370000000 HC RX 637 (ALT 250 FOR IP): Performed by: INTERNAL MEDICINE

## 2025-02-04 PROCEDURE — 94640 AIRWAY INHALATION TREATMENT: CPT

## 2025-02-04 PROCEDURE — 99232 SBSQ HOSP IP/OBS MODERATE 35: CPT | Performed by: STUDENT IN AN ORGANIZED HEALTH CARE EDUCATION/TRAINING PROGRAM

## 2025-02-04 PROCEDURE — 85027 COMPLETE CBC AUTOMATED: CPT

## 2025-02-04 PROCEDURE — 6360000002 HC RX W HCPCS

## 2025-02-04 PROCEDURE — 2060000000 HC ICU INTERMEDIATE R&B

## 2025-02-04 PROCEDURE — 80048 BASIC METABOLIC PNL TOTAL CA: CPT

## 2025-02-04 PROCEDURE — 2700000000 HC OXYGEN THERAPY PER DAY

## 2025-02-04 RX ORDER — IPRATROPIUM BROMIDE AND ALBUTEROL SULFATE 2.5; .5 MG/3ML; MG/3ML
1 SOLUTION RESPIRATORY (INHALATION) EVERY 4 HOURS PRN
Status: DISCONTINUED | OUTPATIENT
Start: 2025-02-04 | End: 2025-02-08 | Stop reason: HOSPADM

## 2025-02-04 RX ORDER — MORPHINE SULFATE 2 MG/ML
2 INJECTION, SOLUTION INTRAMUSCULAR; INTRAVENOUS
Status: DISCONTINUED | OUTPATIENT
Start: 2025-02-04 | End: 2025-02-07

## 2025-02-04 RX ADMIN — IPRATROPIUM BROMIDE AND ALBUTEROL SULFATE 1 DOSE: .5; 2.5 SOLUTION RESPIRATORY (INHALATION) at 15:28

## 2025-02-04 RX ADMIN — IPRATROPIUM BROMIDE AND ALBUTEROL SULFATE 1 DOSE: .5; 2.5 SOLUTION RESPIRATORY (INHALATION) at 08:03

## 2025-02-04 RX ADMIN — ARFORMOTEROL TARTRATE 15 MCG: 15 SOLUTION RESPIRATORY (INHALATION) at 18:06

## 2025-02-04 RX ADMIN — GABAPENTIN 200 MG: 100 CAPSULE ORAL at 21:42

## 2025-02-04 RX ADMIN — FLUTICASONE PROPIONATE 1 SPRAY: 50 SPRAY, METERED NASAL at 07:51

## 2025-02-04 RX ADMIN — METOPROLOL SUCCINATE 100 MG: 100 TABLET, FILM COATED, EXTENDED RELEASE ORAL at 07:52

## 2025-02-04 RX ADMIN — MIDODRINE HYDROCHLORIDE 5 MG: 5 TABLET ORAL at 11:49

## 2025-02-04 RX ADMIN — PRAMIPEXOLE DIHYDROCHLORIDE 0.75 MG: 0.25 TABLET ORAL at 21:42

## 2025-02-04 RX ADMIN — PRAMIPEXOLE DIHYDROCHLORIDE 0.75 MG: 0.25 TABLET ORAL at 00:23

## 2025-02-04 RX ADMIN — IPRATROPIUM BROMIDE AND ALBUTEROL SULFATE 1 DOSE: .5; 2.5 SOLUTION RESPIRATORY (INHALATION) at 18:06

## 2025-02-04 RX ADMIN — MORPHINE SULFATE 2 MG: 2 INJECTION, SOLUTION INTRAMUSCULAR; INTRAVENOUS at 18:20

## 2025-02-04 RX ADMIN — SODIUM CHLORIDE, PRESERVATIVE FREE 10 ML: 5 INJECTION INTRAVENOUS at 00:27

## 2025-02-04 RX ADMIN — MIDODRINE HYDROCHLORIDE 5 MG: 5 TABLET ORAL at 17:22

## 2025-02-04 RX ADMIN — ARFORMOTEROL TARTRATE 15 MCG: 15 SOLUTION RESPIRATORY (INHALATION) at 08:03

## 2025-02-04 RX ADMIN — SODIUM CHLORIDE, PRESERVATIVE FREE 10 ML: 5 INJECTION INTRAVENOUS at 21:41

## 2025-02-04 RX ADMIN — FLUOXETINE HYDROCHLORIDE 20 MG: 20 CAPSULE ORAL at 00:23

## 2025-02-04 RX ADMIN — ENOXAPARIN SODIUM 40 MG: 100 INJECTION SUBCUTANEOUS at 00:23

## 2025-02-04 RX ADMIN — IPRATROPIUM BROMIDE AND ALBUTEROL SULFATE 1 DOSE: .5; 2.5 SOLUTION RESPIRATORY (INHALATION) at 12:34

## 2025-02-04 RX ADMIN — ARIPIPRAZOLE 5 MG: 5 TABLET ORAL at 07:58

## 2025-02-04 RX ADMIN — MORPHINE SULFATE 2 MG: 2 INJECTION, SOLUTION INTRAMUSCULAR; INTRAVENOUS at 04:42

## 2025-02-04 RX ADMIN — LORAZEPAM 1 MG: 1 TABLET ORAL at 17:22

## 2025-02-04 RX ADMIN — MIDODRINE HYDROCHLORIDE 5 MG: 5 TABLET ORAL at 07:52

## 2025-02-04 RX ADMIN — FUROSEMIDE 40 MG: 10 INJECTION, SOLUTION INTRAMUSCULAR; INTRAVENOUS at 07:58

## 2025-02-04 RX ADMIN — CALCIUM 1500 MG: 500 TABLET ORAL at 21:42

## 2025-02-04 RX ADMIN — ASPIRIN 81 MG CHEWABLE TABLET 81 MG: 81 TABLET CHEWABLE at 00:23

## 2025-02-04 RX ADMIN — LORAZEPAM 1 MG: 1 TABLET ORAL at 07:51

## 2025-02-04 RX ADMIN — METOPROLOL SUCCINATE 100 MG: 100 TABLET, FILM COATED, EXTENDED RELEASE ORAL at 00:23

## 2025-02-04 RX ADMIN — MORPHINE SULFATE 2 MG: 2 INJECTION, SOLUTION INTRAMUSCULAR; INTRAVENOUS at 07:56

## 2025-02-04 RX ADMIN — FLUOXETINE HYDROCHLORIDE 20 MG: 20 CAPSULE ORAL at 21:42

## 2025-02-04 RX ADMIN — ASPIRIN 81 MG CHEWABLE TABLET 81 MG: 81 TABLET CHEWABLE at 07:51

## 2025-02-04 RX ADMIN — TAMSULOSIN HYDROCHLORIDE 0.4 MG: 0.4 CAPSULE ORAL at 07:51

## 2025-02-04 RX ADMIN — CALCIUM 1500 MG: 500 TABLET ORAL at 07:51

## 2025-02-04 RX ADMIN — POTASSIUM CHLORIDE 40 MEQ: 1500 TABLET, EXTENDED RELEASE ORAL at 18:23

## 2025-02-04 RX ADMIN — FUROSEMIDE 40 MG: 10 INJECTION, SOLUTION INTRAMUSCULAR; INTRAVENOUS at 00:26

## 2025-02-04 RX ADMIN — MORPHINE SULFATE 2 MG: 2 INJECTION, SOLUTION INTRAMUSCULAR; INTRAVENOUS at 00:32

## 2025-02-04 RX ADMIN — MORPHINE SULFATE 2 MG: 2 INJECTION, SOLUTION INTRAMUSCULAR; INTRAVENOUS at 14:19

## 2025-02-04 RX ADMIN — GABAPENTIN 200 MG: 100 CAPSULE ORAL at 07:52

## 2025-02-04 RX ADMIN — ENOXAPARIN SODIUM 40 MG: 100 INJECTION SUBCUTANEOUS at 07:53

## 2025-02-04 RX ADMIN — BUDESONIDE 500 MCG: 0.5 INHALANT RESPIRATORY (INHALATION) at 08:03

## 2025-02-04 RX ADMIN — PANTOPRAZOLE SODIUM 40 MG: 40 TABLET, DELAYED RELEASE ORAL at 06:09

## 2025-02-04 RX ADMIN — TAMSULOSIN HYDROCHLORIDE 0.4 MG: 0.4 CAPSULE ORAL at 00:24

## 2025-02-04 RX ADMIN — MORPHINE SULFATE 2 MG: 2 INJECTION, SOLUTION INTRAMUSCULAR; INTRAVENOUS at 21:41

## 2025-02-04 RX ADMIN — BUDESONIDE 500 MCG: 0.5 INHALANT RESPIRATORY (INHALATION) at 18:06

## 2025-02-04 ASSESSMENT — PAIN DESCRIPTION - ORIENTATION
ORIENTATION: RIGHT;LEFT
ORIENTATION: RIGHT;LEFT
ORIENTATION: LEFT;RIGHT
ORIENTATION: RIGHT;LEFT
ORIENTATION: LEFT;RIGHT

## 2025-02-04 ASSESSMENT — PAIN DESCRIPTION - LOCATION
LOCATION: LEG

## 2025-02-04 ASSESSMENT — PAIN DESCRIPTION - DESCRIPTORS
DESCRIPTORS: ACHING
DESCRIPTORS: ACHING;CRUSHING
DESCRIPTORS: ACHING;DISCOMFORT;SORE
DESCRIPTORS: ACHING;DISCOMFORT
DESCRIPTORS: ACHING

## 2025-02-04 ASSESSMENT — PAIN SCALES - GENERAL
PAINLEVEL_OUTOF10: 3
PAINLEVEL_OUTOF10: 2
PAINLEVEL_OUTOF10: 8
PAINLEVEL_OUTOF10: 7
PAINLEVEL_OUTOF10: 8
PAINLEVEL_OUTOF10: 8
PAINLEVEL_OUTOF10: 3
PAINLEVEL_OUTOF10: 8

## 2025-02-04 ASSESSMENT — PAIN DESCRIPTION - PAIN TYPE: TYPE: CHRONIC PAIN

## 2025-02-04 NOTE — CONSULTS
[facility]   Inpatient CHF Nurse Navigator Consult      Cardiologist: Dr Gary Peña is a 59 y.o. (1965) male with a history of HFpEF, most recent EF:  Lab Results   Component Value Date    LVEF 63 08/03/2023    LVEFMODE Echo 01/21/2022       Patient was awake and alert, laying in bed during the consultation and is agreeable to heart failure education. He was engaged and asked appropriate questions throughout the education session.     Barriers identified during consult contributing to HF Hospitalization:  [] Limited medication adherence   [] Poor health literacy, education regarding HF medications provided   [] Pill box provided to patient  [] Difficulty affording medications  [] Difficulty obtaining/ managing medications  [] Prescription assistance information given     [] Not weighing themselves daily  [x] Weight log provided for easy monitoring  [] Scale provided     [] Not following low sodium diet  [] Food insecurity   [x] 2 gram sodium diet education provided   [] Low sodium recipes provided  [] Sodium free seasoning provided   [] Low sodium meal delivery options given to patient  [] Dietician consulted     [] Lack of transportation to appointments     [] Depression, given chronic illness  [] Primary team notified     [] Goals of care need addressed  [] Palliative care consulted     [] CHF CHW consulted, to assist with         Chart Reviewed:  Diet: ADULT DIET; Regular; Low Fat/Low Chol/High Fiber/2 gm Na; 1800 ml   Daily Weights: Patient Vitals for the past 96 hrs (Last 3 readings):   Weight   02/03/25 1019 61.2 kg (135 lb)     I/O:   Intake/Output Summary (Last 24 hours) at 2/4/2025 1136  Last data filed at 2/4/2025 0911  Gross per 24 hour   Intake 480 ml   Output 1980 ml   Net -1500 ml       [] Nursing staff/manager notified of inaccurate parisi weights or I/O        Discharge Plan:  Above identified barriers reviewed and needs

## 2025-02-04 NOTE — H&P
[facility] Hospitalist Group History and Physical      CHIEF COMPLAINT: Leg swelling    History of Present Illness:    Patient is a 59-year-old male with history of CHF, end-stage COPD on 5 L NC, HTN, anxiety who presented due to worsening leg swelling over the past few days.  Also noted that he gained 12 pounds since discharge from the hospital 10 days ago.  In the ED, he was hemodynamically stable.  Labs unremarkable.  He is admitted for further management      Denies any fevers, chills, lightheadedness, dizziness, CP, cough, abdominal pain, nausea, vomiting, diarrhea, dysuria, hematuria, urinary frequency/hesitancy, weak stream, hematochezia, leg swelling, rashes, numbness or tingling, weight loss or weight gain, blurry or double vision, or decreased hearing.     Informant(s) for H&P: ED attending, patient, chart review    REVIEW OF SYSTEMS:  A comprehensive review of systems was negative except for: what is in the HPI      PMH:  Past Medical History:   Diagnosis Date    Anxiety     COPD (chronic obstructive pulmonary disease) (HCC)     Hypertension     Leg swelling     Multiple gastric ulcers     Restless leg syndrome        Surgical History:  Past Surgical History:   Procedure Laterality Date    BRONCHOSCOPY N/A 04/27/2023    BRONCHOSCOPY DIAGNOSTIC OR CELL WASH ONLY performed by Zhang Murphy MD at [facility] ENDOSCOPY    HERNIA REPAIR      HIP SURGERY      JOINT REPLACEMENT  1987    KNEE SURGERY         Medications Prior to Admission:    Prior to Admission medications    Medication Sig Start Date End Date Taking? Authorizing Provider   albuterol sulfate HFA (PROVENTIL;VENTOLIN;PROAIR) 108 (90 Base) MCG/ACT inhaler INHALE TWO (2) PUFFS INTO THE LUNGS EVERY FOUR (4) HOURS AS NEEDED FOR WHEEZING 1/30/25   Jana Sanchez MD   furosemide (LASIX) 40 MG tablet Take 1 tablet by mouth 2 times daily 1/23/25   Nehemiah López MD   midodrine (PROAMATINE) 5 MG tablet Take 1 tablet by mouth 3 times daily (with meals)

## 2025-02-04 NOTE — PROGRESS NOTES
[facility] Hospitalist Progress Note    Admitting Date and Time: 2/3/2025 11:53 AM  Admit Dx: NSTEMI (non-ST elevated myocardial infarction) (HCC) [I21.4]    Subjective:  Patient is being followed for NSTEMI (non-ST elevated myocardial infarction) (HCC) [I21.4]     Patient resting in bed. Patient says legs are still swelling but better. No other concerns at this time. No acute events overnight.     ROS: Pertinent findings stated above. Denies dizziness, diaphoresis, fever, chills, chest pain, palpitations, cough, shortness of breath, abdominal pain, nausea, vomiting, dysuria, hematuria, melena, hematochezia, diarrhea, or constipation.      ARIPiprazole  5 mg Oral Daily    aspirin  81 mg Oral Daily    ipratropium 0.5 mg-albuterol 2.5 mg  1 Dose Inhalation Q4H WA RT    arformoterol tartrate  15 mcg Nebulization BID RT    budesonide  0.5 mg Nebulization BID RT    calcium elemental  1,500 mg Oral BID    FLUoxetine  20 mg Oral Nightly    fluticasone  1 spray Each Nostril Daily    gabapentin  200 mg Oral BID    metoprolol succinate  100 mg Oral Daily    midodrine  5 mg Oral TID WC    pantoprazole  40 mg Oral QAM AC    pramipexole  0.75 mg Oral Nightly    tamsulosin  0.4 mg Oral Daily    sodium chloride flush  5-40 mL IntraVENous 2 times per day    enoxaparin  40 mg SubCUTAneous Daily    furosemide  40 mg IntraVENous BID     morphine, 2 mg, Q3H PRN  LORazepam, 1 mg, TID PRN  sodium chloride flush, 5-40 mL, PRN  sodium chloride, , PRN  potassium chloride, 40 mEq, PRN   Or  potassium alternative oral replacement, 40 mEq, PRN   Or  potassium chloride, 10 mEq, PRN  magnesium sulfate, 2,000 mg, PRN  ondansetron, 4 mg, Q8H PRN   Or  ondansetron, 4 mg, Q6H PRN  polyethylene glycol, 17 g, Daily PRN  acetaminophen, 650 mg, Q6H PRN   Or  acetaminophen, 650 mg, Q6H PRN         Objective:    BP (!) 100/47   Pulse 56   Temp 97.5 °F (36.4 °C) (Oral)   Resp 18   Ht 1.626 m (5' 4\")   Wt 61.2 kg (135 lb)   SpO2 100%   BMI

## 2025-02-04 NOTE — ED NOTES
ED to Inpatient Handoff Report    Notified [facility] that electronic handoff available and patient ready for transport to room 632.    Safety Risks: Risk of falls    Patient in Restraints: no    Constant Observer or Patient : no    Telemetry Monitoring Ordered: Yes          Order to transfer to unit without monitor: NO    Last MEWS: 1 Time completed: 2315    Deterioration Index: 32.53    Vitals:    02/03/25 1515 02/03/25 1550 02/03/25 1620 02/03/25 2315   BP: 129/72   122/74   Pulse: 68   73   Resp: 18 18 18 17   Temp: 98.3 °F (36.8 °C)   97.9 °F (36.6 °C)   TempSrc: Oral      SpO2: 98%   97%   Weight:       Height:           Opportunity for questions and clarification was provided.

## 2025-02-04 NOTE — ACP (ADVANCE CARE PLANNING)
Advance Care Planning   Healthcare Decision Maker:    Kimberley Johnson Brother/Sister [phone]     Darren Peña Child [phone]       Click here to complete Healthcare Decision Makers including selection of the Healthcare Decision Maker Relationship (ie \"Primary\").  Today we documented Decision Maker(s) consistent with Legal Next of Kin hierarchy.       Kimberley Johnson Brother/Sister [phone]     Darren Peña Child [phone]

## 2025-02-04 NOTE — PROGRESS NOTES
Spiritual Health History and Assessment/Progress Note  [facility]    Initial Encounter, Attempted Encounter,  ,  ,      Name: Dg Peña MRN: [id number]    Age: 59 y.o.     Sex: male   Language: English   Amish: None   NSTEMI (non-ST elevated myocardial infarction) (HCC)     Date: 2/4/2025                           Spiritual Assessment began in [facility] INTERMEDI[facility]        Referral/Consult From: Rounding   Encounter Overview/Reason: Initial Encounter, Attempted Encounter  Service Provided For: Patient, Patient not available    Kelsey, Belief, Meaning:   Patient has beliefs or practices that help with coping during difficult times  Family/Friends No family/friends present      Importance and Influence:  Patient unable to assess at this time  Family/Friends No family/friends present    Community:  Patient feels well-supported. Support system includes: Children and Extended family  Family/Friends No family/friends present    Assessment and Plan of Care:     Patient Interventions include: Other: Staff addressing the patient current and prayer offered for the patient at the time  Family/Friends Interventions include: No family/friends present    Patient Plan of Care: Spiritual Care available upon further referral  Family/Friends Plan of Care: No family/friends present    Electronically signed by Chaplain Eddie on 2/4/2025 at 4:12 PM

## 2025-02-04 NOTE — PLAN OF CARE
Problem: Safety - Adult  Goal: Free from fall injury  2/4/2025 0956 by Dana Sanchez, RN  Outcome: Progressing  2/4/2025 0035 by Aline Michael, RN  Outcome: Progressing

## 2025-02-04 NOTE — PROGRESS NOTES
4 Eyes Skin Assessment     NAME:  Dg Peña  YOB: 1965  MEDICAL RECORD NUMBER:  [id number]    The patient is being assessed for  Admission    I agree that at least one RN has performed a thorough Head to Toe Skin Assessment on the patient. ALL assessment sites listed below have been assessed.      Areas assessed by both nurses:    Head, Face, Ears, Shoulders, Back, Chest, Arms, Elbows, Hands, Sacrum. Buttock, Coccyx, Ischium, and Legs. Feet and Heels.         Does the Patient have a Wound? No noted wound(s)       Cristofer Prevention initiated by RN: Yes  Wound Care Orders initiated by RN: No    Pressure Injury (Stage 3,4, Unstageable, DTI, NWPT, and Complex wounds) if present, place Wound referral order by RN under : No    New Ostomies, if present place, Ostomy referral order under : No     Nurse 1 eSignature: Electronically signed by Aline Michael RN on 2/4/25 at 12:08 AM EST    **SHARE this note so that the co-signing nurse can place an eSignature**    Nurse 2 eSignature: Electronically signed by Leia Mar RN on 2/4/25 at 3:04 AM EST

## 2025-02-04 NOTE — PROGRESS NOTES
Patient requesting PRN morphine for pain. BP 95/58 SPO2 80% on 6L, patients nose audibly congested. Instructed patient to blow his nose so he could clear it. Patient would not follow RN instructions or take slow deep breaths. Patient began yelling and throwing hands around. SPO2 dropped to 70%, placed patient on 15L nonrebreather with recovery pulse ox of 100%. Audibly wheezing. Dr. Magallanes notified. See new orders

## 2025-02-04 NOTE — CARE COORDINATION
Social Work / Discharge Planning : RE-ADMISSION: SW met with patient and explained role.  Patient resides with family in a one floor apartment with a ramp to enter. Patient has a nebulizer, walker and wc.  Chronic 02 through [facility] at 5 liters during day and 6 liters at night . Patient is active with [facility] (ph [phone] , fax [phone])   Will need SUNDAY.Patient is established with Dr. Sanchez and uses [facility] in [place]. SW did ask about HHC due to referral made on last admit to [facility]. Patient states he has a nurse but could not recall. Message sent to [facility]. Await response. Patient states his sister will transport at discharge. SW to follow. Electronically signed by FEEDRICO Oliva on 2/4/25 at 12:26 PM EST     Addendum: [facility] verified patient is NOT active with them. Plan is HOME and resume [facility]. WIll need SUNDAY orders completed and faxed. SW to follow. .Electronically signed by FEDERICO Oliva on 2/4/25 at 12:39 PM EST s

## 2025-02-04 NOTE — PROGRESS NOTES
[facility] Quality Flow/Interdisciplinary Rounds Progress Note        Quality Flow Rounds held on February 4, 2025    Disciplines Attending:  Bedside Nurse, , , and Nursing Unit Leadership    Dg Peña was admitted on 2/3/2025 11:53 AM    Anticipated Discharge Date:       Disposition:    Cristofer Score:  Cristofer Scale Score: 15    [facility] RISK OF UNPLANNED READMISSION 2.0             35.1 Total Score        Discussed patient goal for the day, patient clinical progression, and barriers to discharge.  The following Goal(s) of the Day/Commitment(s) have been identified:   discharge planning, IV diuretics      Shon Krishnamurthy RN  February 4, 2025

## 2025-02-04 NOTE — PLAN OF CARE
Patient's chart updated to reflect:      .    - HF care plan, HF education points and HF discharge instructions.  -Orders: 2 gram sodium diet, daily weights, I/O.  -PCP or cardiology follow up appointments to be scheduled within 7 days of hospital discharge.  -CHF education session will be provided to the patient prior to hospital discharge.    Shavon Man RN   Heart Failure Navigator

## 2025-02-05 LAB
ANION GAP SERPL CALCULATED.3IONS-SCNC: 10 MMOL/L (ref 7–16)
BUN SERPL-MCNC: 9 MG/DL (ref 6–20)
CALCIUM SERPL-MCNC: 6.9 MG/DL (ref 8.6–10.2)
CHLORIDE SERPL-SCNC: 91 MMOL/L (ref 98–107)
CO2 SERPL-SCNC: 39 MMOL/L (ref 22–29)
CREAT SERPL-MCNC: 0.6 MG/DL (ref 0.7–1.2)
ERYTHROCYTE [DISTWIDTH] IN BLOOD BY AUTOMATED COUNT: 14.5 % (ref 11.5–15)
GFR, ESTIMATED: >90 ML/MIN/1.73M2
GLUCOSE SERPL-MCNC: 83 MG/DL (ref 74–99)
HCT VFR BLD AUTO: 30.9 % (ref 37–54)
HGB BLD-MCNC: 8.8 G/DL (ref 12.5–16.5)
MCH RBC QN AUTO: 30.1 PG (ref 26–35)
MCHC RBC AUTO-ENTMCNC: 28.5 G/DL (ref 32–34.5)
MCV RBC AUTO: 105.8 FL (ref 80–99.9)
PLATELET # BLD AUTO: 167 K/UL (ref 130–450)
PMV BLD AUTO: 12 FL (ref 7–12)
POTASSIUM SERPL-SCNC: 3.5 MMOL/L (ref 3.5–5)
RBC # BLD AUTO: 2.92 M/UL (ref 3.8–5.8)
SODIUM SERPL-SCNC: 140 MMOL/L (ref 132–146)
WBC OTHER # BLD: 4.8 K/UL (ref 4.5–11.5)

## 2025-02-05 PROCEDURE — 2060000000 HC ICU INTERMEDIATE R&B

## 2025-02-05 PROCEDURE — 6370000000 HC RX 637 (ALT 250 FOR IP): Performed by: INTERNAL MEDICINE

## 2025-02-05 PROCEDURE — 6360000002 HC RX W HCPCS: Performed by: INTERNAL MEDICINE

## 2025-02-05 PROCEDURE — 36415 COLL VENOUS BLD VENIPUNCTURE: CPT

## 2025-02-05 PROCEDURE — 99232 SBSQ HOSP IP/OBS MODERATE 35: CPT | Performed by: STUDENT IN AN ORGANIZED HEALTH CARE EDUCATION/TRAINING PROGRAM

## 2025-02-05 PROCEDURE — 94640 AIRWAY INHALATION TREATMENT: CPT

## 2025-02-05 PROCEDURE — 2500000003 HC RX 250 WO HCPCS: Performed by: INTERNAL MEDICINE

## 2025-02-05 PROCEDURE — 2700000000 HC OXYGEN THERAPY PER DAY

## 2025-02-05 PROCEDURE — 6360000002 HC RX W HCPCS

## 2025-02-05 PROCEDURE — 85027 COMPLETE CBC AUTOMATED: CPT

## 2025-02-05 PROCEDURE — 80048 BASIC METABOLIC PNL TOTAL CA: CPT

## 2025-02-05 RX ORDER — SODIUM CHLORIDE FOR INHALATION 3 %
4 VIAL, NEBULIZER (ML) INHALATION PRN
Status: DISCONTINUED | OUTPATIENT
Start: 2025-02-05 | End: 2025-02-08 | Stop reason: HOSPADM

## 2025-02-05 RX ADMIN — MORPHINE SULFATE 2 MG: 2 INJECTION, SOLUTION INTRAMUSCULAR; INTRAVENOUS at 16:59

## 2025-02-05 RX ADMIN — ENOXAPARIN SODIUM 40 MG: 100 INJECTION SUBCUTANEOUS at 08:53

## 2025-02-05 RX ADMIN — ARIPIPRAZOLE 5 MG: 5 TABLET ORAL at 08:52

## 2025-02-05 RX ADMIN — MORPHINE SULFATE 2 MG: 2 INJECTION, SOLUTION INTRAMUSCULAR; INTRAVENOUS at 08:58

## 2025-02-05 RX ADMIN — FUROSEMIDE 40 MG: 10 INJECTION, SOLUTION INTRAMUSCULAR; INTRAVENOUS at 18:44

## 2025-02-05 RX ADMIN — BUDESONIDE 500 MCG: 0.5 INHALANT RESPIRATORY (INHALATION) at 21:21

## 2025-02-05 RX ADMIN — MIDODRINE HYDROCHLORIDE 5 MG: 5 TABLET ORAL at 13:03

## 2025-02-05 RX ADMIN — LORAZEPAM 1 MG: 1 TABLET ORAL at 08:53

## 2025-02-05 RX ADMIN — IPRATROPIUM BROMIDE AND ALBUTEROL SULFATE 1 DOSE: .5; 2.5 SOLUTION RESPIRATORY (INHALATION) at 12:25

## 2025-02-05 RX ADMIN — MORPHINE SULFATE 2 MG: 2 INJECTION, SOLUTION INTRAMUSCULAR; INTRAVENOUS at 13:03

## 2025-02-05 RX ADMIN — IPRATROPIUM BROMIDE AND ALBUTEROL SULFATE 1 DOSE: .5; 2.5 SOLUTION RESPIRATORY (INHALATION) at 21:21

## 2025-02-05 RX ADMIN — MORPHINE SULFATE 2 MG: 2 INJECTION, SOLUTION INTRAMUSCULAR; INTRAVENOUS at 21:04

## 2025-02-05 RX ADMIN — BUDESONIDE 500 MCG: 0.5 INHALANT RESPIRATORY (INHALATION) at 06:15

## 2025-02-05 RX ADMIN — SODIUM CHLORIDE, PRESERVATIVE FREE 10 ML: 5 INJECTION INTRAVENOUS at 21:04

## 2025-02-05 RX ADMIN — PANTOPRAZOLE SODIUM 40 MG: 40 TABLET, DELAYED RELEASE ORAL at 05:29

## 2025-02-05 RX ADMIN — CALCIUM 1500 MG: 500 TABLET ORAL at 08:53

## 2025-02-05 RX ADMIN — MIDODRINE HYDROCHLORIDE 5 MG: 5 TABLET ORAL at 16:59

## 2025-02-05 RX ADMIN — LORAZEPAM 1 MG: 1 TABLET ORAL at 01:35

## 2025-02-05 RX ADMIN — ARFORMOTEROL TARTRATE 15 MCG: 15 SOLUTION RESPIRATORY (INHALATION) at 06:15

## 2025-02-05 RX ADMIN — GABAPENTIN 200 MG: 100 CAPSULE ORAL at 08:53

## 2025-02-05 RX ADMIN — GABAPENTIN 200 MG: 100 CAPSULE ORAL at 20:22

## 2025-02-05 RX ADMIN — SODIUM CHLORIDE, PRESERVATIVE FREE 10 ML: 5 INJECTION INTRAVENOUS at 08:55

## 2025-02-05 RX ADMIN — TAMSULOSIN HYDROCHLORIDE 0.4 MG: 0.4 CAPSULE ORAL at 08:53

## 2025-02-05 RX ADMIN — FLUOXETINE HYDROCHLORIDE 20 MG: 20 CAPSULE ORAL at 20:18

## 2025-02-05 RX ADMIN — PRAMIPEXOLE DIHYDROCHLORIDE 0.75 MG: 0.25 TABLET ORAL at 20:18

## 2025-02-05 RX ADMIN — FLUTICASONE PROPIONATE 1 SPRAY: 50 SPRAY, METERED NASAL at 08:53

## 2025-02-05 RX ADMIN — CALCIUM 1500 MG: 500 TABLET ORAL at 20:18

## 2025-02-05 RX ADMIN — ARFORMOTEROL TARTRATE 15 MCG: 15 SOLUTION RESPIRATORY (INHALATION) at 21:21

## 2025-02-05 RX ADMIN — LORAZEPAM 1 MG: 1 TABLET ORAL at 16:59

## 2025-02-05 RX ADMIN — IPRATROPIUM BROMIDE AND ALBUTEROL SULFATE 1 DOSE: .5; 2.5 SOLUTION RESPIRATORY (INHALATION) at 06:15

## 2025-02-05 RX ADMIN — IPRATROPIUM BROMIDE AND ALBUTEROL SULFATE 1 DOSE: .5; 2.5 SOLUTION RESPIRATORY (INHALATION) at 16:12

## 2025-02-05 RX ADMIN — ASPIRIN 81 MG CHEWABLE TABLET 81 MG: 81 TABLET CHEWABLE at 08:53

## 2025-02-05 RX ADMIN — MIDODRINE HYDROCHLORIDE 5 MG: 5 TABLET ORAL at 08:53

## 2025-02-05 RX ADMIN — MORPHINE SULFATE 2 MG: 2 INJECTION, SOLUTION INTRAMUSCULAR; INTRAVENOUS at 01:35

## 2025-02-05 RX ADMIN — FUROSEMIDE 40 MG: 10 INJECTION, SOLUTION INTRAMUSCULAR; INTRAVENOUS at 08:53

## 2025-02-05 RX ADMIN — MORPHINE SULFATE 2 MG: 2 INJECTION, SOLUTION INTRAMUSCULAR; INTRAVENOUS at 05:29

## 2025-02-05 ASSESSMENT — PAIN DESCRIPTION - PAIN TYPE
TYPE: CHRONIC PAIN
TYPE: CHRONIC PAIN

## 2025-02-05 ASSESSMENT — PAIN SCALES - GENERAL
PAINLEVEL_OUTOF10: 8
PAINLEVEL_OUTOF10: 2
PAINLEVEL_OUTOF10: 2
PAINLEVEL_OUTOF10: 8
PAINLEVEL_OUTOF10: 3
PAINLEVEL_OUTOF10: 3
PAINLEVEL_OUTOF10: 8
PAINLEVEL_OUTOF10: 1
PAINLEVEL_OUTOF10: 1
PAINLEVEL_OUTOF10: 3
PAINLEVEL_OUTOF10: 1
PAINLEVEL_OUTOF10: 8
PAINLEVEL_OUTOF10: 2
PAINLEVEL_OUTOF10: 8
PAINLEVEL_OUTOF10: 8

## 2025-02-05 ASSESSMENT — PAIN DESCRIPTION - DESCRIPTORS
DESCRIPTORS: DISCOMFORT;ACHING
DESCRIPTORS: ACHING;DISCOMFORT
DESCRIPTORS: ACHING;DISCOMFORT;BURNING
DESCRIPTORS: ACHING
DESCRIPTORS: ACHING

## 2025-02-05 ASSESSMENT — PAIN - FUNCTIONAL ASSESSMENT
PAIN_FUNCTIONAL_ASSESSMENT: PREVENTS OR INTERFERES SOME ACTIVE ACTIVITIES AND ADLS
PAIN_FUNCTIONAL_ASSESSMENT: ACTIVITIES ARE NOT PREVENTED
PAIN_FUNCTIONAL_ASSESSMENT: PREVENTS OR INTERFERES SOME ACTIVE ACTIVITIES AND ADLS
PAIN_FUNCTIONAL_ASSESSMENT: ACTIVITIES ARE NOT PREVENTED
PAIN_FUNCTIONAL_ASSESSMENT: PREVENTS OR INTERFERES SOME ACTIVE ACTIVITIES AND ADLS

## 2025-02-05 ASSESSMENT — PAIN DESCRIPTION - ORIENTATION
ORIENTATION: RIGHT;LEFT

## 2025-02-05 ASSESSMENT — PAIN DESCRIPTION - LOCATION
LOCATION: LEG

## 2025-02-05 NOTE — PROGRESS NOTES
[facility] Hospitalist Progress Note    Admitting Date and Time: 2/3/2025 11:53 AM  Admit Dx: NSTEMI (non-ST elevated myocardial infarction) ([facility]) [I21.4]    Subjective:  Patient is being followed for NSTEMI (non-ST elevated myocardial infarction) ([facility]) [I21.4]     Patient is much more alert and talkative today. He understands that he is in heart failure. Patient admits to increased fluid intake over the past week and says his swelling was very bad prior to coming in. Shortness of breath is improving.     ROS: Pertinent findings stated above. Denies dizziness, diaphoresis, fever, chills, chest pain, palpitations, cough,  abdominal pain, nausea, vomiting, dysuria, hematuria, melena, hematochezia, diarrhea, or constipation.      ARIPiprazole  5 mg Oral Daily    aspirin  81 mg Oral Daily    ipratropium 0.5 mg-albuterol 2.5 mg  1 Dose Inhalation Q4H WA RT    arformoterol tartrate  15 mcg Nebulization BID RT    budesonide  0.5 mg Nebulization BID RT    calcium elemental  1,500 mg Oral BID    FLUoxetine  20 mg Oral Nightly    fluticasone  1 spray Each Nostril Daily    gabapentin  200 mg Oral BID    metoprolol succinate  100 mg Oral Daily    midodrine  5 mg Oral TID WC    pantoprazole  40 mg Oral QAM AC    pramipexole  0.75 mg Oral Nightly    tamsulosin  0.4 mg Oral Daily    sodium chloride flush  5-40 mL IntraVENous 2 times per day    enoxaparin  40 mg SubCUTAneous Daily    furosemide  40 mg IntraVENous BID     sodium chloride (Inhalant), 4 mL, PRN  morphine, 2 mg, Q3H PRN  ipratropium 0.5 mg-albuterol 2.5 mg, 1 Dose, Q4H PRN  LORazepam, 1 mg, TID PRN  sodium chloride flush, 5-40 mL, PRN  sodium chloride, , PRN  potassium chloride, 40 mEq, PRN   Or  potassium alternative oral replacement, 40 mEq, PRN   Or  potassium chloride, 10 mEq, PRN  magnesium sulfate, 2,000 mg, PRN  ondansetron, 4 mg, Q8H PRN   Or  ondansetron, 4 mg, Q6H PRN  polyethylene glycol, 17 g, Daily PRN  acetaminophen, 650 mg, Q6H PRN

## 2025-02-05 NOTE — PLAN OF CARE
Problem: Safety - Adult  Goal: Free from fall injury  Outcome: Progressing     Problem: Chronic Conditions and Co-morbidities  Goal: Patient's chronic conditions and co-morbidity symptoms are monitored and maintained or improved  Outcome: Progressing     Problem: Skin/Tissue Integrity  Goal: Skin integrity remains intact  Description: 1.  Monitor for areas of redness and/or skin breakdown  2.  Assess vascular access sites hourly  3.  Every 4-6 hours minimum:  Change oxygen saturation probe site  4.  Every 4-6 hours:  If on nasal continuous positive airway pressure, respiratory therapy assess nares and determine need for appliance change or resting period  Outcome: Progressing  Flowsheets (Taken 2/4/2025 2140)  Skin Integrity Remains Intact: Monitor for areas of redness and/or skin breakdown     Problem: Pain  Goal: Verbalizes/displays adequate comfort level or baseline comfort level  Outcome: Progressing

## 2025-02-05 NOTE — PLAN OF CARE
Problem: Safety - Adult  Goal: Free from fall injury  2/5/2025 1208 by Dana Sanchez, RN  Outcome: Progressing  2/5/2025 0003 by Billie Lentz, RN  Outcome: Progressing

## 2025-02-05 NOTE — PROGRESS NOTES
[facility] Quality Flow/Interdisciplinary Rounds Progress Note        Quality Flow Rounds held on February 5, 2025    Disciplines Attending:  Bedside Nurse, , , and Nursing Unit Leadership    Dg Peña was admitted on 2/3/2025 11:53 AM    Anticipated Discharge Date:       Disposition:    Cristofer Score:  Cristofer Scale Score: 15    BS RISK OF UNPLANNED READMISSION 2.0             35.9 Total Score        Discussed patient goal for the day, patient clinical progression, and barriers to discharge.  The following Goal(s) of the Day/Commitment(s) have been identified:   iv lasix bid, wean oyxgen as able.       Sanam Callahan RN  February 5, 2025

## 2025-02-05 NOTE — PROGRESS NOTES
Spoke with Katharina Glover NP on the phone in regards to the patients issues with breathing, new orders placed.

## 2025-02-06 LAB
ANION GAP SERPL CALCULATED.3IONS-SCNC: 9 MMOL/L (ref 7–16)
BUN SERPL-MCNC: 10 MG/DL (ref 6–20)
CALCIUM SERPL-MCNC: 6.9 MG/DL (ref 8.6–10.2)
CHLORIDE SERPL-SCNC: 89 MMOL/L (ref 98–107)
CO2 SERPL-SCNC: 37 MMOL/L (ref 22–29)
CREAT SERPL-MCNC: 0.6 MG/DL (ref 0.7–1.2)
EKG ATRIAL RATE: 70 BPM
EKG P AXIS: 48 DEGREES
EKG P-R INTERVAL: 120 MS
EKG Q-T INTERVAL: 432 MS
EKG QRS DURATION: 76 MS
EKG QTC CALCULATION (BAZETT): 466 MS
EKG R AXIS: 72 DEGREES
EKG T AXIS: 67 DEGREES
EKG VENTRICULAR RATE: 70 BPM
ERYTHROCYTE [DISTWIDTH] IN BLOOD BY AUTOMATED COUNT: 14.5 % (ref 11.5–15)
GFR, ESTIMATED: >90 ML/MIN/1.73M2
GLUCOSE SERPL-MCNC: 98 MG/DL (ref 74–99)
HCT VFR BLD AUTO: 32 % (ref 37–54)
HGB BLD-MCNC: 9.4 G/DL (ref 12.5–16.5)
MCH RBC QN AUTO: 30.9 PG (ref 26–35)
MCHC RBC AUTO-ENTMCNC: 29.4 G/DL (ref 32–34.5)
MCV RBC AUTO: 105.3 FL (ref 80–99.9)
PLATELET # BLD AUTO: 164 K/UL (ref 130–450)
PMV BLD AUTO: 11.9 FL (ref 7–12)
POTASSIUM SERPL-SCNC: 3.5 MMOL/L (ref 3.5–5)
RBC # BLD AUTO: 3.04 M/UL (ref 3.8–5.8)
SODIUM SERPL-SCNC: 135 MMOL/L (ref 132–146)
WBC OTHER # BLD: 3.4 K/UL (ref 4.5–11.5)

## 2025-02-06 PROCEDURE — 6360000002 HC RX W HCPCS: Performed by: INTERNAL MEDICINE

## 2025-02-06 PROCEDURE — 6370000000 HC RX 637 (ALT 250 FOR IP): Performed by: INTERNAL MEDICINE

## 2025-02-06 PROCEDURE — 93010 ELECTROCARDIOGRAM REPORT: CPT | Performed by: INTERNAL MEDICINE

## 2025-02-06 PROCEDURE — 2500000003 HC RX 250 WO HCPCS: Performed by: INTERNAL MEDICINE

## 2025-02-06 PROCEDURE — 80048 BASIC METABOLIC PNL TOTAL CA: CPT

## 2025-02-06 PROCEDURE — 36415 COLL VENOUS BLD VENIPUNCTURE: CPT

## 2025-02-06 PROCEDURE — 2700000000 HC OXYGEN THERAPY PER DAY

## 2025-02-06 PROCEDURE — 2060000000 HC ICU INTERMEDIATE R&B

## 2025-02-06 PROCEDURE — 94640 AIRWAY INHALATION TREATMENT: CPT

## 2025-02-06 PROCEDURE — 85027 COMPLETE CBC AUTOMATED: CPT

## 2025-02-06 PROCEDURE — 99232 SBSQ HOSP IP/OBS MODERATE 35: CPT | Performed by: STUDENT IN AN ORGANIZED HEALTH CARE EDUCATION/TRAINING PROGRAM

## 2025-02-06 PROCEDURE — 6360000002 HC RX W HCPCS

## 2025-02-06 RX ADMIN — FUROSEMIDE 40 MG: 10 INJECTION, SOLUTION INTRAMUSCULAR; INTRAVENOUS at 17:09

## 2025-02-06 RX ADMIN — FUROSEMIDE 40 MG: 10 INJECTION, SOLUTION INTRAMUSCULAR; INTRAVENOUS at 08:42

## 2025-02-06 RX ADMIN — IPRATROPIUM BROMIDE AND ALBUTEROL SULFATE 1 DOSE: .5; 2.5 SOLUTION RESPIRATORY (INHALATION) at 20:39

## 2025-02-06 RX ADMIN — MORPHINE SULFATE 2 MG: 2 INJECTION, SOLUTION INTRAMUSCULAR; INTRAVENOUS at 11:00

## 2025-02-06 RX ADMIN — BUDESONIDE 500 MCG: 0.5 INHALANT RESPIRATORY (INHALATION) at 20:39

## 2025-02-06 RX ADMIN — GABAPENTIN 200 MG: 100 CAPSULE ORAL at 20:30

## 2025-02-06 RX ADMIN — MORPHINE SULFATE 2 MG: 2 INJECTION, SOLUTION INTRAMUSCULAR; INTRAVENOUS at 22:02

## 2025-02-06 RX ADMIN — IPRATROPIUM BROMIDE AND ALBUTEROL SULFATE 1 DOSE: .5; 2.5 SOLUTION RESPIRATORY (INHALATION) at 12:27

## 2025-02-06 RX ADMIN — CALCIUM 1500 MG: 500 TABLET ORAL at 08:32

## 2025-02-06 RX ADMIN — MIDODRINE HYDROCHLORIDE 5 MG: 5 TABLET ORAL at 17:09

## 2025-02-06 RX ADMIN — LORAZEPAM 1 MG: 1 TABLET ORAL at 00:15

## 2025-02-06 RX ADMIN — IPRATROPIUM BROMIDE AND ALBUTEROL SULFATE 1 DOSE: .5; 2.5 SOLUTION RESPIRATORY (INHALATION) at 16:28

## 2025-02-06 RX ADMIN — PANTOPRAZOLE SODIUM 40 MG: 40 TABLET, DELAYED RELEASE ORAL at 05:56

## 2025-02-06 RX ADMIN — FLUOXETINE HYDROCHLORIDE 20 MG: 20 CAPSULE ORAL at 20:30

## 2025-02-06 RX ADMIN — LORAZEPAM 1 MG: 1 TABLET ORAL at 08:35

## 2025-02-06 RX ADMIN — TAMSULOSIN HYDROCHLORIDE 0.4 MG: 0.4 CAPSULE ORAL at 08:32

## 2025-02-06 RX ADMIN — MORPHINE SULFATE 2 MG: 2 INJECTION, SOLUTION INTRAMUSCULAR; INTRAVENOUS at 00:15

## 2025-02-06 RX ADMIN — ARFORMOTEROL TARTRATE 15 MCG: 15 SOLUTION RESPIRATORY (INHALATION) at 20:39

## 2025-02-06 RX ADMIN — CALCIUM 1500 MG: 500 TABLET ORAL at 20:30

## 2025-02-06 RX ADMIN — ARFORMOTEROL TARTRATE 15 MCG: 15 SOLUTION RESPIRATORY (INHALATION) at 07:42

## 2025-02-06 RX ADMIN — MORPHINE SULFATE 2 MG: 2 INJECTION, SOLUTION INTRAMUSCULAR; INTRAVENOUS at 03:19

## 2025-02-06 RX ADMIN — MORPHINE SULFATE 2 MG: 2 INJECTION, SOLUTION INTRAMUSCULAR; INTRAVENOUS at 14:47

## 2025-02-06 RX ADMIN — MORPHINE SULFATE 2 MG: 2 INJECTION, SOLUTION INTRAMUSCULAR; INTRAVENOUS at 06:48

## 2025-02-06 RX ADMIN — FLUTICASONE PROPIONATE 1 SPRAY: 50 SPRAY, METERED NASAL at 08:42

## 2025-02-06 RX ADMIN — SODIUM CHLORIDE, PRESERVATIVE FREE 10 ML: 5 INJECTION INTRAVENOUS at 08:43

## 2025-02-06 RX ADMIN — PRAMIPEXOLE DIHYDROCHLORIDE 0.75 MG: 0.25 TABLET ORAL at 20:30

## 2025-02-06 RX ADMIN — IPRATROPIUM BROMIDE AND ALBUTEROL SULFATE 1 DOSE: .5; 2.5 SOLUTION RESPIRATORY (INHALATION) at 07:41

## 2025-02-06 RX ADMIN — POTASSIUM CHLORIDE 40 MEQ: 1500 TABLET, EXTENDED RELEASE ORAL at 14:47

## 2025-02-06 RX ADMIN — BUDESONIDE 500 MCG: 0.5 INHALANT RESPIRATORY (INHALATION) at 07:42

## 2025-02-06 RX ADMIN — GABAPENTIN 200 MG: 100 CAPSULE ORAL at 08:33

## 2025-02-06 RX ADMIN — ARIPIPRAZOLE 5 MG: 5 TABLET ORAL at 08:34

## 2025-02-06 RX ADMIN — SODIUM CHLORIDE, PRESERVATIVE FREE 10 ML: 5 INJECTION INTRAVENOUS at 20:30

## 2025-02-06 RX ADMIN — ENOXAPARIN SODIUM 40 MG: 100 INJECTION SUBCUTANEOUS at 08:33

## 2025-02-06 RX ADMIN — METOPROLOL SUCCINATE 100 MG: 100 TABLET, FILM COATED, EXTENDED RELEASE ORAL at 08:33

## 2025-02-06 RX ADMIN — MORPHINE SULFATE 2 MG: 2 INJECTION, SOLUTION INTRAMUSCULAR; INTRAVENOUS at 18:37

## 2025-02-06 RX ADMIN — MIDODRINE HYDROCHLORIDE 5 MG: 5 TABLET ORAL at 12:15

## 2025-02-06 RX ADMIN — ASPIRIN 81 MG CHEWABLE TABLET 81 MG: 81 TABLET CHEWABLE at 08:32

## 2025-02-06 RX ADMIN — LORAZEPAM 1 MG: 1 TABLET ORAL at 17:09

## 2025-02-06 RX ADMIN — MIDODRINE HYDROCHLORIDE 5 MG: 5 TABLET ORAL at 08:33

## 2025-02-06 ASSESSMENT — PAIN DESCRIPTION - LOCATION
LOCATION: LEG

## 2025-02-06 ASSESSMENT — PAIN DESCRIPTION - ORIENTATION
ORIENTATION: RIGHT;LEFT
ORIENTATION: RIGHT;LEFT
ORIENTATION: LEFT
ORIENTATION: RIGHT;LEFT

## 2025-02-06 ASSESSMENT — PAIN SCALES - GENERAL
PAINLEVEL_OUTOF10: 8
PAINLEVEL_OUTOF10: 2
PAINLEVEL_OUTOF10: 8
PAINLEVEL_OUTOF10: 3
PAINLEVEL_OUTOF10: 2
PAINLEVEL_OUTOF10: 2

## 2025-02-06 ASSESSMENT — PAIN DESCRIPTION - DESCRIPTORS
DESCRIPTORS: ACHING

## 2025-02-06 ASSESSMENT — PAIN - FUNCTIONAL ASSESSMENT
PAIN_FUNCTIONAL_ASSESSMENT: ACTIVITIES ARE NOT PREVENTED

## 2025-02-06 NOTE — PLAN OF CARE
Problem: Safety - Adult  Goal: Free from fall injury  2/6/2025 1232 by Gabriella Mota RN  Outcome: Progressing  Flowsheets (Taken 2/6/2025 0834)  Free From Fall Injury: Instruct family/caregiver on patient safety  2/6/2025 0555 by Billie Lentz RN  Outcome: Progressing     Problem: Chronic Conditions and Co-morbidities  Goal: Patient's chronic conditions and co-morbidity symptoms are monitored and maintained or improved  2/6/2025 1232 by Gabriella Mota RN  Outcome: Progressing  Flowsheets (Taken 2/6/2025 0834)  Care Plan - Patient's Chronic Conditions and Co-Morbidity Symptoms are Monitored and Maintained or Improved: Monitor and assess patient's chronic conditions and comorbid symptoms for stability, deterioration, or improvement  2/6/2025 0555 by Billie Lentz RN  Outcome: Progressing     Problem: Skin/Tissue Integrity  Goal: Skin integrity remains intact  Description: 1.  Monitor for areas of redness and/or skin breakdown  2.  Assess vascular access sites hourly  3.  Every 4-6 hours minimum:  Change oxygen saturation probe site  4.  Every 4-6 hours:  If on nasal continuous positive airway pressure, respiratory therapy assess nares and determine need for appliance change or resting period  2/6/2025 1232 by Gabriella Mota RN  Outcome: Progressing  Flowsheets (Taken 2/6/2025 0834)  Skin Integrity Remains Intact: Monitor for areas of redness and/or skin breakdown  2/6/2025 0555 by Billie Lentz RN  Outcome: Progressing     Problem: Pain  Goal: Verbalizes/displays adequate comfort level or baseline comfort level  2/6/2025 1232 by Gabriella Mota RN  Outcome: Progressing  Flowsheets (Taken 2/6/2025 0830)  Verbalizes/displays adequate comfort level or baseline comfort level: Encourage patient to monitor pain and request assistance  2/6/2025 0555 by Billie Lentz RN  Outcome: Progressing

## 2025-02-06 NOTE — CONSULTS
Palliative Care Department  [phone]  Palliative Care Initial Consult  Provider ARGELIA Parkinson - MICA    Dg Peña  [id number]  Hospital Day: 4  Date of Initial Consult: 2/6/2025  Referring Provider: Jeimy Magallanes MD  /Palliative Medicine was consulted for assistance with: Family request    HPI:   Dg Peña is a 59 y.o. with a medical history of CHF, end-stage COPD on 5 L O2 via NC, HTN and anxiety who was admitted on 2/3/2025 from home with a CHIEF COMPLAINT of worsening leg swelling.  Patient noted to gain 12 pounds since discharge from hospital 10 days ago.  In ED he was hemodynamically stable with labs unremarkable.  CTA pulmonary showing no evidence of PE.  Cardiomegaly.  CXR showing cardiomegaly with pulmonary vascular congestion.  Mild bibasilar opacities, likely atelectasis.  Patient was admitted for further medical management.  ASSESSMENT/PLAN:     Pertinent Hospital Diagnoses     Acute on chronic HFpEF  Chronic hypoxic respiratory failure on 5 L O2 via NC at baseline  Severe COPD    Palliative Care Encounter / Counseling Regarding Goals of Care  Please see detailed goals of care discussion as below  At this time, Dg Peña, Does have capacity for medical decision-making.  Capacity is time limited and situation/question specific  During encounter there was no surrogate medical decision-maker needed  Outcome of goals of care meeting:   Resumed home regimen for pain and shortness of breath  Confirmed full code status   Code status Full Code  Advanced Directives: no POA or living will in [facility]  Surrogate/Legal NOK:  Kimberley Johnson (sibling/POA) [phone]  Darren Peña (child/1st alt POA) [phone]  Sayra Burt (sibling) [phone]    Chronic pain/peripheral neuropathy:    -Norco 7.5-325 mg every 8 hours as needed   -Neurontin 600 mg twice daily   -  Severe dyspnea:   -Morphine oral concentrate 10 mg every 4 hours as needed    Spiritual assessment: no spiritual  distress identified  Bereavement and grief: to be determined  Referrals to: none today  SUBJECTIVE:     Current medical issues leading to Palliative Medicine involvement include   Active Hospital Problems    Diagnosis Date Noted    NSTEMI (non-ST elevated myocardial infarction) (HCC) [I21.4] 06/25/2023       Details of Conversation:    Chart reviewed. Update received from nursing. Case discussed with case management. Patient seen resting in bed, alert and oriented and able to hold meaningful conversation. Олег is familiar with palliative medicine as he follows with us as an outpatient. Sister, Sayra, present at bedside. Living will and healthcare power of  documents in epic. Sister, Kimberley, is healthcare power of . Son, Darren, is first alternate POA. In-depth discussion regarding current condition to include acute on chronic CHF exacerbation and severe COPD. Resumed home medications to include Norco, Neurontin and oral morphine. Patient states he would like to continue to follow with community palliative. I will arrange appointment. Discussed goals of care and CODE STATUS options. At this time Олег does confirm full CODE STATUS and ultimate goal is to return home when medically stable. Emotional support given and all questions addressed. Will continue to follow.    OBJECTIVE:   Prognosis: Guarded    Physical Exam:  /63   Pulse 62   Temp 98.1 °F (36.7 °C) (Oral)   Resp 19   Ht 1.626 m (5' 4\")   Wt 65.6 kg (144 lb 11.2 oz)   SpO2 95%   BMI 24.84 kg/m²   Constitutional: thin, NAD, awake, alert  Lungs: respirations unlabored  Heart: RRR  Abd:  non distended  Psych: non-anxious affect  Neuro:  Alert, grossly nonfocal; following commands    Objective data reviewed: labs, images, records, medication use, vitals, and chart    Discussed patient and the plan of care with the other IDT members: Palliative Medicine IDT Team, Primary Team, Floor Nurse, Patient, and

## 2025-02-06 NOTE — PROGRESS NOTES
[facility] Hospitalist Progress Note    Admitting Date and Time: 2/3/2025 11:53 AM  Admit Dx: NSTEMI (non-ST elevated myocardial infarction) (HCC) [I21.4]    Subjective:  Patient is being followed for NSTEMI (non-ST elevated myocardial infarction) (HCC) [I21.4]     Patient feeling well, notes pain in legs but otherwise feels well.     ROS: Pertinent findings stated above. Denies dizziness, diaphoresis, fever, chills, chest pain, palpitations, cough, shortness of breath, abdominal pain, nausea, vomiting, dysuria, hematuria, melena, hematochezia, diarrhea, or constipation     ARIPiprazole  5 mg Oral Daily    aspirin  81 mg Oral Daily    ipratropium 0.5 mg-albuterol 2.5 mg  1 Dose Inhalation Q4H WA RT    arformoterol tartrate  15 mcg Nebulization BID RT    budesonide  0.5 mg Nebulization BID RT    calcium elemental  1,500 mg Oral BID    FLUoxetine  20 mg Oral Nightly    fluticasone  1 spray Each Nostril Daily    gabapentin  200 mg Oral BID    metoprolol succinate  100 mg Oral Daily    midodrine  5 mg Oral TID WC    pantoprazole  40 mg Oral QAM AC    pramipexole  0.75 mg Oral Nightly    tamsulosin  0.4 mg Oral Daily    sodium chloride flush  5-40 mL IntraVENous 2 times per day    enoxaparin  40 mg SubCUTAneous Daily    furosemide  40 mg IntraVENous BID     sodium chloride (Inhalant), 4 mL, PRN  morphine, 2 mg, Q3H PRN  ipratropium 0.5 mg-albuterol 2.5 mg, 1 Dose, Q4H PRN  LORazepam, 1 mg, TID PRN  sodium chloride flush, 5-40 mL, PRN  sodium chloride, , PRN  potassium chloride, 40 mEq, PRN   Or  potassium alternative oral replacement, 40 mEq, PRN   Or  potassium chloride, 10 mEq, PRN  magnesium sulfate, 2,000 mg, PRN  ondansetron, 4 mg, Q8H PRN   Or  ondansetron, 4 mg, Q6H PRN  polyethylene glycol, 17 g, Daily PRN  acetaminophen, 650 mg, Q6H PRN   Or  acetaminophen, 650 mg, Q6H PRN         Objective:    /72   Pulse 58   Temp 98.2 °F (36.8 °C) (Oral)   Resp 18   Ht 1.626 m (5' 4\")   Wt 65.6 kg (144 lb  11.2 oz)   SpO2 100%   BMI 24.84 kg/m²     General Appearance: No acute distress. Alert and oriented to person, place and time   HEENT: normocephalic and atraumatic, extraocular movement intact, conjunctiva clear, oral mucosa moist  Pulmonary/Chest: Diminished breath sounds bilaterally, bibasilar crackles, no respiratory distress  Cardiovascular: normal rate, normal S1 and S2 and no carotid bruits  Abdomen: soft, non-tender, non-distended, normal bowel sounds, no masses or organomegaly. No rebound tenderness or guarding   Extremities: Pitting edema in B/L LE resolved , clubbing, or cyanosis   Neurologic: CN III-XII intact, alert and oriented x 3           Recent Labs     02/04/25  0512 02/05/25  0426 02/06/25  0704    140 135   K 3.3* 3.5 3.5   CL 88* 91* 89*   CO2 41* 39* 37*   BUN 11 9 10   CREATININE 0.7 0.6* 0.6*   GLUCOSE 96 83 98   CALCIUM 6.7* 6.9* 6.9*       Recent Labs     02/04/25  0512 02/05/25  0426 02/06/25  0704   WBC 4.7 4.8 3.4*   RBC 2.89* 2.92* 3.04*   HGB 9.0* 8.8* 9.4*   HCT 30.4* 30.9* 32.0*   .2* 105.8* 105.3*   MCH 31.1 30.1 30.9   MCHC 29.6* 28.5* 29.4*   RDW 14.2 14.5 14.5    167 164   MPV 11.5 12.0 11.9       Assessment:    Principal Problem:    NSTEMI (non-ST elevated myocardial infarction) ([facility])  Resolved Problems:    * No resolved hospital problems. *      Plan:  -Acute on Chronic HFpEF  Patient noted 12 pound weight gain in past 10 days. Admitted to increased p.o. fluid intake  BNP elevated at 1,041, repeat level tomorrow morning  Repeat CXR ordered   CXR shows cardiomegaly with mild pulmonary vascular congestion and bibasilar atelectasis  On Toprol XL  Continue IV lasix BID, monitor renal function  Strict I's and O's, fluid balance over past two days negative 4208L  Salt restriction <2gm/day, fluid restriction <1500mL/day  Echo 08/03//2023: LVEF of 60-65%, mild pulmonary hypertension      -NSTEMI  Type 2, demand ischemia from volume overload      -Chronic

## 2025-02-06 NOTE — PROGRESS NOTES
[facility] Quality Flow/Interdisciplinary Rounds Progress Note        Quality Flow Rounds held on February 6, 2025    Disciplines Attending:  Bedside Nurse, , , and Nursing Unit Leadership    Dg Peña was admitted on 2/3/2025 11:53 AM    Anticipated Discharge Date:       Disposition:    Cristofer Score:  Cristofer Scale Score: 15    [facility] RISK OF UNPLANNED READMISSION 2.0             35.5 Total Score        Discussed patient goal for the day, patient clinical progression, and barriers to discharge.  The following Goal(s) of the Day/Commitment(s) have been identified:   iv lasix bid, nebs, wean oxygen       Sanam Callahan RN  February 6, 2025

## 2025-02-07 ENCOUNTER — APPOINTMENT (OUTPATIENT)
Dept: GENERAL RADIOLOGY | Age: 60
End: 2025-02-07
Payer: MEDICAID

## 2025-02-07 PROBLEM — Z71.89 GOALS OF CARE, COUNSELING/DISCUSSION: Status: ACTIVE | Noted: 2025-02-07

## 2025-02-07 LAB
ANION GAP SERPL CALCULATED.3IONS-SCNC: 10 MMOL/L (ref 7–16)
BNP SERPL-MCNC: 539 PG/ML (ref 0–125)
BUN SERPL-MCNC: 11 MG/DL (ref 6–20)
CALCIUM SERPL-MCNC: 7.7 MG/DL (ref 8.6–10.2)
CHLORIDE SERPL-SCNC: 92 MMOL/L (ref 98–107)
CO2 SERPL-SCNC: 37 MMOL/L (ref 22–29)
CREAT SERPL-MCNC: 0.7 MG/DL (ref 0.7–1.2)
ERYTHROCYTE [DISTWIDTH] IN BLOOD BY AUTOMATED COUNT: 14.3 % (ref 11.5–15)
GFR, ESTIMATED: >90 ML/MIN/1.73M2
GLUCOSE SERPL-MCNC: 85 MG/DL (ref 74–99)
HCT VFR BLD AUTO: 33.8 % (ref 37–54)
HGB BLD-MCNC: 10.1 G/DL (ref 12.5–16.5)
MCH RBC QN AUTO: 30.6 PG (ref 26–35)
MCHC RBC AUTO-ENTMCNC: 29.9 G/DL (ref 32–34.5)
MCV RBC AUTO: 102.4 FL (ref 80–99.9)
PLATELET # BLD AUTO: 185 K/UL (ref 130–450)
PMV BLD AUTO: 11.3 FL (ref 7–12)
POTASSIUM SERPL-SCNC: 4.5 MMOL/L (ref 3.5–5)
RBC # BLD AUTO: 3.3 M/UL (ref 3.8–5.8)
SODIUM SERPL-SCNC: 139 MMOL/L (ref 132–146)
WBC OTHER # BLD: 4.5 K/UL (ref 4.5–11.5)

## 2025-02-07 PROCEDURE — 6360000002 HC RX W HCPCS

## 2025-02-07 PROCEDURE — 71046 X-RAY EXAM CHEST 2 VIEWS: CPT

## 2025-02-07 PROCEDURE — 6360000002 HC RX W HCPCS: Performed by: INTERNAL MEDICINE

## 2025-02-07 PROCEDURE — 94640 AIRWAY INHALATION TREATMENT: CPT

## 2025-02-07 PROCEDURE — 99223 1ST HOSP IP/OBS HIGH 75: CPT | Performed by: NURSE PRACTITIONER

## 2025-02-07 PROCEDURE — 2060000000 HC ICU INTERMEDIATE R&B

## 2025-02-07 PROCEDURE — 80048 BASIC METABOLIC PNL TOTAL CA: CPT

## 2025-02-07 PROCEDURE — 2700000000 HC OXYGEN THERAPY PER DAY

## 2025-02-07 PROCEDURE — 6370000000 HC RX 637 (ALT 250 FOR IP): Performed by: INTERNAL MEDICINE

## 2025-02-07 PROCEDURE — 2500000003 HC RX 250 WO HCPCS: Performed by: INTERNAL MEDICINE

## 2025-02-07 PROCEDURE — 85027 COMPLETE CBC AUTOMATED: CPT

## 2025-02-07 PROCEDURE — 6370000000 HC RX 637 (ALT 250 FOR IP): Performed by: NURSE PRACTITIONER

## 2025-02-07 PROCEDURE — 83880 ASSAY OF NATRIURETIC PEPTIDE: CPT

## 2025-02-07 PROCEDURE — 99232 SBSQ HOSP IP/OBS MODERATE 35: CPT | Performed by: STUDENT IN AN ORGANIZED HEALTH CARE EDUCATION/TRAINING PROGRAM

## 2025-02-07 RX ORDER — MORPHINE SULFATE 20 MG/ML
10 SOLUTION ORAL
Qty: 30 ML | Refills: 0 | Status: SHIPPED | OUTPATIENT
Start: 2025-02-07 | End: 2025-02-10 | Stop reason: SDUPTHER

## 2025-02-07 RX ORDER — HYDROCODONE BITARTRATE AND ACETAMINOPHEN 7.5; 325 MG/1; MG/1
1 TABLET ORAL EVERY 8 HOURS PRN
Status: DISCONTINUED | OUTPATIENT
Start: 2025-02-07 | End: 2025-02-08 | Stop reason: HOSPADM

## 2025-02-07 RX ORDER — HYDROCODONE BITARTRATE AND ACETAMINOPHEN 7.5; 325 MG/1; MG/1
1 TABLET ORAL EVERY 6 HOURS PRN
Status: DISCONTINUED | OUTPATIENT
Start: 2025-02-07 | End: 2025-02-07

## 2025-02-07 RX ORDER — HYDROCODONE BITARTRATE AND ACETAMINOPHEN 7.5; 325 MG/1; MG/1
1 TABLET ORAL EVERY 8 HOURS PRN
Qty: 28 TABLET | Refills: 0 | Status: SHIPPED | OUTPATIENT
Start: 2025-02-07 | End: 2025-02-21

## 2025-02-07 RX ORDER — GABAPENTIN 300 MG/1
600 CAPSULE ORAL 2 TIMES DAILY
Status: DISCONTINUED | OUTPATIENT
Start: 2025-02-07 | End: 2025-02-08 | Stop reason: HOSPADM

## 2025-02-07 RX ORDER — MORPHINE SULFATE 10 MG/5ML
10 SOLUTION ORAL
Status: DISCONTINUED | OUTPATIENT
Start: 2025-02-07 | End: 2025-02-08 | Stop reason: HOSPADM

## 2025-02-07 RX ORDER — MORPHINE SULFATE 20 MG/ML
0.5 SOLUTION ORAL
Status: DISCONTINUED | OUTPATIENT
Start: 2025-02-07 | End: 2025-02-07

## 2025-02-07 RX ADMIN — MIDODRINE HYDROCHLORIDE 5 MG: 5 TABLET ORAL at 17:36

## 2025-02-07 RX ADMIN — ASPIRIN 81 MG CHEWABLE TABLET 81 MG: 81 TABLET CHEWABLE at 08:15

## 2025-02-07 RX ADMIN — TAMSULOSIN HYDROCHLORIDE 0.4 MG: 0.4 CAPSULE ORAL at 08:15

## 2025-02-07 RX ADMIN — SODIUM CHLORIDE, PRESERVATIVE FREE 10 ML: 5 INJECTION INTRAVENOUS at 20:12

## 2025-02-07 RX ADMIN — MIDODRINE HYDROCHLORIDE 5 MG: 5 TABLET ORAL at 11:25

## 2025-02-07 RX ADMIN — ARFORMOTEROL TARTRATE 15 MCG: 15 SOLUTION RESPIRATORY (INHALATION) at 20:59

## 2025-02-07 RX ADMIN — CALCIUM 1500 MG: 500 TABLET ORAL at 20:08

## 2025-02-07 RX ADMIN — MIDODRINE HYDROCHLORIDE 5 MG: 5 TABLET ORAL at 08:15

## 2025-02-07 RX ADMIN — HYDROCODONE BITARTRATE AND ACETAMINOPHEN 1 TABLET: 7.5; 325 TABLET ORAL at 15:45

## 2025-02-07 RX ADMIN — ARIPIPRAZOLE 5 MG: 5 TABLET ORAL at 08:27

## 2025-02-07 RX ADMIN — IPRATROPIUM BROMIDE AND ALBUTEROL SULFATE 1 DOSE: .5; 2.5 SOLUTION RESPIRATORY (INHALATION) at 20:59

## 2025-02-07 RX ADMIN — MORPHINE SULFATE 2 MG: 2 INJECTION, SOLUTION INTRAMUSCULAR; INTRAVENOUS at 01:26

## 2025-02-07 RX ADMIN — FUROSEMIDE 40 MG: 10 INJECTION, SOLUTION INTRAMUSCULAR; INTRAVENOUS at 17:36

## 2025-02-07 RX ADMIN — CALCIUM 1500 MG: 500 TABLET ORAL at 08:15

## 2025-02-07 RX ADMIN — MORPHINE SULFATE 2 MG: 2 INJECTION, SOLUTION INTRAMUSCULAR; INTRAVENOUS at 04:34

## 2025-02-07 RX ADMIN — IPRATROPIUM BROMIDE AND ALBUTEROL SULFATE 1 DOSE: .5; 2.5 SOLUTION RESPIRATORY (INHALATION) at 16:27

## 2025-02-07 RX ADMIN — FUROSEMIDE 40 MG: 10 INJECTION, SOLUTION INTRAMUSCULAR; INTRAVENOUS at 08:16

## 2025-02-07 RX ADMIN — LORAZEPAM 1 MG: 1 TABLET ORAL at 00:34

## 2025-02-07 RX ADMIN — LORAZEPAM 1 MG: 1 TABLET ORAL at 11:25

## 2025-02-07 RX ADMIN — BUDESONIDE 500 MCG: 0.5 INHALANT RESPIRATORY (INHALATION) at 20:59

## 2025-02-07 RX ADMIN — LORAZEPAM 1 MG: 1 TABLET ORAL at 20:08

## 2025-02-07 RX ADMIN — IPRATROPIUM BROMIDE AND ALBUTEROL SULFATE 1 DOSE: .5; 2.5 SOLUTION RESPIRATORY (INHALATION) at 13:11

## 2025-02-07 RX ADMIN — SODIUM CHLORIDE, PRESERVATIVE FREE 10 ML: 5 INJECTION INTRAVENOUS at 08:20

## 2025-02-07 RX ADMIN — FLUOXETINE HYDROCHLORIDE 20 MG: 20 CAPSULE ORAL at 20:08

## 2025-02-07 RX ADMIN — GABAPENTIN 600 MG: 300 CAPSULE ORAL at 20:08

## 2025-02-07 RX ADMIN — GABAPENTIN 200 MG: 100 CAPSULE ORAL at 08:15

## 2025-02-07 RX ADMIN — METOPROLOL SUCCINATE 100 MG: 100 TABLET, FILM COATED, EXTENDED RELEASE ORAL at 08:15

## 2025-02-07 RX ADMIN — PANTOPRAZOLE SODIUM 40 MG: 40 TABLET, DELAYED RELEASE ORAL at 05:51

## 2025-02-07 RX ADMIN — FLUTICASONE PROPIONATE 1 SPRAY: 50 SPRAY, METERED NASAL at 08:27

## 2025-02-07 RX ADMIN — ENOXAPARIN SODIUM 40 MG: 100 INJECTION SUBCUTANEOUS at 08:15

## 2025-02-07 RX ADMIN — MORPHINE SULFATE 2 MG: 2 INJECTION, SOLUTION INTRAMUSCULAR; INTRAVENOUS at 08:16

## 2025-02-07 RX ADMIN — PRAMIPEXOLE DIHYDROCHLORIDE 0.75 MG: 0.25 TABLET ORAL at 20:08

## 2025-02-07 ASSESSMENT — PAIN SCALES - GENERAL
PAINLEVEL_OUTOF10: 8
PAINLEVEL_OUTOF10: 2
PAINLEVEL_OUTOF10: 3
PAINLEVEL_OUTOF10: 8
PAINLEVEL_OUTOF10: 8
PAINLEVEL_OUTOF10: 1

## 2025-02-07 ASSESSMENT — PAIN - FUNCTIONAL ASSESSMENT
PAIN_FUNCTIONAL_ASSESSMENT: ACTIVITIES ARE NOT PREVENTED

## 2025-02-07 ASSESSMENT — PAIN DESCRIPTION - ORIENTATION
ORIENTATION: RIGHT;LEFT

## 2025-02-07 ASSESSMENT — PAIN DESCRIPTION - LOCATION
LOCATION: LEG

## 2025-02-07 ASSESSMENT — PAIN DESCRIPTION - DESCRIPTORS
DESCRIPTORS: ACHING

## 2025-02-07 NOTE — PROGRESS NOTES
[facility] Hospitalist Progress Note    Admitting Date and Time: 2/3/2025 11:53 AM  Admit Dx: NSTEMI (non-ST elevated myocardial infarction) (HCC) [I21.4]    Subjective:  Patient is being followed for NSTEMI (non-ST elevated myocardial infarction) (HCC) [I21.4]     Patient continues to have pain in legs. Sister is at bedside. No other concerns or complaints.     ROS: Pertinent findings stated above. Denies dizziness, diaphoresis, fever, chills, chest pain, palpitations, bdominal pain, nausea, vomiting, dysuria, hematuria, melena, hematochezia, diarrhea, or constipation.      gabapentin  600 mg Oral BID    ARIPiprazole  5 mg Oral Daily    aspirin  81 mg Oral Daily    ipratropium 0.5 mg-albuterol 2.5 mg  1 Dose Inhalation Q4H WA RT    arformoterol tartrate  15 mcg Nebulization BID RT    budesonide  0.5 mg Nebulization BID RT    calcium elemental  1,500 mg Oral BID    FLUoxetine  20 mg Oral Nightly    fluticasone  1 spray Each Nostril Daily    metoprolol succinate  100 mg Oral Daily    midodrine  5 mg Oral TID WC    pantoprazole  40 mg Oral QAM AC    pramipexole  0.75 mg Oral Nightly    tamsulosin  0.4 mg Oral Daily    sodium chloride flush  5-40 mL IntraVENous 2 times per day    enoxaparin  40 mg SubCUTAneous Daily    furosemide  40 mg IntraVENous BID     HYDROcodone-acetaminophen, 1 tablet, Q8H PRN  morphine, 10 mg, Q2H PRN  sodium chloride (Inhalant), 4 mL, PRN  ipratropium 0.5 mg-albuterol 2.5 mg, 1 Dose, Q4H PRN  LORazepam, 1 mg, TID PRN  sodium chloride flush, 5-40 mL, PRN  sodium chloride, , PRN  potassium chloride, 40 mEq, PRN   Or  potassium alternative oral replacement, 40 mEq, PRN   Or  potassium chloride, 10 mEq, PRN  magnesium sulfate, 2,000 mg, PRN  ondansetron, 4 mg, Q8H PRN   Or  ondansetron, 4 mg, Q6H PRN  polyethylene glycol, 17 g, Daily PRN  acetaminophen, 650 mg, Q6H PRN   Or  acetaminophen, 650 mg, Q6H PRN         Objective:    /71   Pulse 53   Temp 97.9 °F (36.6 °C)   Resp 18

## 2025-02-07 NOTE — CARE COORDINATION
Social Work / Discharge planning: SUNDAY orders for [facility] received. SW attempted to fax [phone] but busy. Will continue to fax. [facility]HC is NOT answering phone as well. . Plan home. Family will transport. SW to follow. Electronically signed by FEDERICO Oliva on 2/7/25 at 10:35 AM EST

## 2025-02-07 NOTE — PROGRESS NOTES
[facility] Quality Flow/Interdisciplinary Rounds Progress Note        Quality Flow Rounds held on February 7, 2025    Disciplines Attending:  Bedside Nurse, , , and Nursing Unit Leadership    Dg Peña was admitted on 2/3/2025 11:53 AM    Anticipated Discharge Date:       Disposition:    Cristofer Score:  Cristofer Scale Score: 15    [facility] RISK OF UNPLANNED READMISSION 2.0             34 Total Score        Discussed patient goal for the day, patient clinical progression, and barriers to discharge.  The following Goal(s) of the Day/Commitment(s) have been identified:   iv lasix bid, nebs check for d/c       Snaam Callahan RN  February 7, 2025

## 2025-02-07 NOTE — PLAN OF CARE
Problem: Safety - Adult  Goal: Free from fall injury  Outcome: Progressing  Flowsheets (Taken 2/7/2025 0800)  Free From Fall Injury: Instruct family/caregiver on patient safety     Problem: Chronic Conditions and Co-morbidities  Goal: Patient's chronic conditions and co-morbidity symptoms are monitored and maintained or improved  Outcome: Progressing  Flowsheets (Taken 2/7/2025 0800)  Care Plan - Patient's Chronic Conditions and Co-Morbidity Symptoms are Monitored and Maintained or Improved: Monitor and assess patient's chronic conditions and comorbid symptoms for stability, deterioration, or improvement     Problem: Skin/Tissue Integrity  Goal: Skin integrity remains intact  Description: 1.  Monitor for areas of redness and/or skin breakdown  2.  Assess vascular access sites hourly  3.  Every 4-6 hours minimum:  Change oxygen saturation probe site  4.  Every 4-6 hours:  If on nasal continuous positive airway pressure, respiratory therapy assess nares and determine need for appliance change or resting period  Outcome: Progressing  Flowsheets (Taken 2/7/2025 0800)  Skin Integrity Remains Intact: Monitor for areas of redness and/or skin breakdown     Problem: Pain  Goal: Verbalizes/displays adequate comfort level or baseline comfort level  Outcome: Progressing  Flowsheets (Taken 2/7/2025 0800)  Verbalizes/displays adequate comfort level or baseline comfort level: Encourage patient to monitor pain and request assistance     Problem: ABCDS Injury Assessment  Goal: Absence of physical injury  Outcome: Progressing  Flowsheets (Taken 2/7/2025 0800)  Absence of Physical Injury: Implement safety measures based on patient assessment

## 2025-02-07 NOTE — PROGRESS NOTES
Spiritual Health History and Assessment/Progress Note  [facility]    Attempted Encounter, Palliative Care,  ,  ,  Patient was asleep.  left prayer card.    Name: Dg Peña MRN: [id number]    Age: 59 y.o.     Sex: male   Language: English   Latter day: None   NSTEMI (non-ST elevated myocardial infarction) ([facility])     Date: 2/7/2025                           Spiritual Assessment began in [facility]S INTERMEDIATE        Referral/Consult From: Palliative Care   Encounter Overview/Reason: Attempted Encounter, Palliative Care  Service Provided For: Patient (Patient was asleep.)    Kelsey, Belief, Meaning:   Patient unable to assess at this time  Family/Friends No family/friends present      Importance and Influence:  Patient unable to assess at this time  Family/Friends No family/friends present    Community:  Patient Other: N/A  Family/Friends No family/friends present    Assessment and Plan of Care:     Patient Interventions include: Other: N/A  Family/Friends Interventions include: No family/friends present    Patient Plan of Care: Spiritual Care available upon further referral  Family/Friends Plan of Care: Spiritual Care available upon further referral    Electronically signed by QUIN Espino on 2/7/2025 at 3:15 PM

## 2025-02-08 VITALS
DIASTOLIC BLOOD PRESSURE: 69 MMHG | HEIGHT: 64 IN | OXYGEN SATURATION: 95 % | SYSTOLIC BLOOD PRESSURE: 100 MMHG | WEIGHT: 135.38 LBS | BODY MASS INDEX: 23.11 KG/M2 | RESPIRATION RATE: 20 BRPM | HEART RATE: 53 BPM | TEMPERATURE: 97.2 F

## 2025-02-08 LAB
ANION GAP SERPL CALCULATED.3IONS-SCNC: 8 MMOL/L (ref 7–16)
BUN SERPL-MCNC: 17 MG/DL (ref 6–20)
CALCIUM SERPL-MCNC: 8 MG/DL (ref 8.6–10.2)
CHLORIDE SERPL-SCNC: 93 MMOL/L (ref 98–107)
CO2 SERPL-SCNC: 37 MMOL/L (ref 22–29)
CREAT SERPL-MCNC: 0.8 MG/DL (ref 0.7–1.2)
ERYTHROCYTE [DISTWIDTH] IN BLOOD BY AUTOMATED COUNT: 14 % (ref 11.5–15)
GFR, ESTIMATED: >90 ML/MIN/1.73M2
GLUCOSE SERPL-MCNC: 104 MG/DL (ref 74–99)
HCT VFR BLD AUTO: 35 % (ref 37–54)
HGB BLD-MCNC: 10.4 G/DL (ref 12.5–16.5)
MCH RBC QN AUTO: 30.7 PG (ref 26–35)
MCHC RBC AUTO-ENTMCNC: 29.7 G/DL (ref 32–34.5)
MCV RBC AUTO: 103.2 FL (ref 80–99.9)
PLATELET # BLD AUTO: 162 K/UL (ref 130–450)
PMV BLD AUTO: 11.5 FL (ref 7–12)
POTASSIUM SERPL-SCNC: 4.7 MMOL/L (ref 3.5–5)
RBC # BLD AUTO: 3.39 M/UL (ref 3.8–5.8)
SODIUM SERPL-SCNC: 138 MMOL/L (ref 132–146)
WBC OTHER # BLD: 4 K/UL (ref 4.5–11.5)

## 2025-02-08 PROCEDURE — 2700000000 HC OXYGEN THERAPY PER DAY

## 2025-02-08 PROCEDURE — 6370000000 HC RX 637 (ALT 250 FOR IP): Performed by: NURSE PRACTITIONER

## 2025-02-08 PROCEDURE — 99239 HOSP IP/OBS DSCHRG MGMT >30: CPT | Performed by: STUDENT IN AN ORGANIZED HEALTH CARE EDUCATION/TRAINING PROGRAM

## 2025-02-08 PROCEDURE — 6360000002 HC RX W HCPCS: Performed by: INTERNAL MEDICINE

## 2025-02-08 PROCEDURE — 6370000000 HC RX 637 (ALT 250 FOR IP): Performed by: INTERNAL MEDICINE

## 2025-02-08 PROCEDURE — 80048 BASIC METABOLIC PNL TOTAL CA: CPT

## 2025-02-08 PROCEDURE — 36415 COLL VENOUS BLD VENIPUNCTURE: CPT

## 2025-02-08 PROCEDURE — 2500000003 HC RX 250 WO HCPCS: Performed by: INTERNAL MEDICINE

## 2025-02-08 PROCEDURE — 6370000000 HC RX 637 (ALT 250 FOR IP): Performed by: STUDENT IN AN ORGANIZED HEALTH CARE EDUCATION/TRAINING PROGRAM

## 2025-02-08 PROCEDURE — 85027 COMPLETE CBC AUTOMATED: CPT

## 2025-02-08 PROCEDURE — 94640 AIRWAY INHALATION TREATMENT: CPT

## 2025-02-08 RX ADMIN — PANTOPRAZOLE SODIUM 40 MG: 40 TABLET, DELAYED RELEASE ORAL at 05:14

## 2025-02-08 RX ADMIN — FLUTICASONE PROPIONATE 1 SPRAY: 50 SPRAY, METERED NASAL at 08:40

## 2025-02-08 RX ADMIN — HYDROCODONE BITARTRATE AND ACETAMINOPHEN 1 TABLET: 7.5; 325 TABLET ORAL at 08:34

## 2025-02-08 RX ADMIN — CALCIUM 1500 MG: 500 TABLET ORAL at 08:37

## 2025-02-08 RX ADMIN — FUROSEMIDE 40 MG: 10 INJECTION, SOLUTION INTRAMUSCULAR; INTRAVENOUS at 08:38

## 2025-02-08 RX ADMIN — GABAPENTIN 600 MG: 300 CAPSULE ORAL at 08:37

## 2025-02-08 RX ADMIN — ASPIRIN 81 MG CHEWABLE TABLET 81 MG: 81 TABLET CHEWABLE at 08:36

## 2025-02-08 RX ADMIN — SODIUM CHLORIDE, PRESERVATIVE FREE 10 ML: 5 INJECTION INTRAVENOUS at 08:38

## 2025-02-08 RX ADMIN — TAMSULOSIN HYDROCHLORIDE 0.4 MG: 0.4 CAPSULE ORAL at 08:36

## 2025-02-08 RX ADMIN — METOPROLOL SUCCINATE 100 MG: 100 TABLET, FILM COATED, EXTENDED RELEASE ORAL at 08:37

## 2025-02-08 RX ADMIN — ENOXAPARIN SODIUM 40 MG: 100 INJECTION SUBCUTANEOUS at 08:38

## 2025-02-08 RX ADMIN — LORAZEPAM 1 MG: 1 TABLET ORAL at 08:46

## 2025-02-08 RX ADMIN — BUDESONIDE 500 MCG: 0.5 INHALANT RESPIRATORY (INHALATION) at 09:45

## 2025-02-08 RX ADMIN — ARFORMOTEROL TARTRATE 15 MCG: 15 SOLUTION RESPIRATORY (INHALATION) at 09:44

## 2025-02-08 RX ADMIN — MIDODRINE HYDROCHLORIDE 5 MG: 5 TABLET ORAL at 11:31

## 2025-02-08 RX ADMIN — ARIPIPRAZOLE 5 MG: 5 TABLET ORAL at 08:37

## 2025-02-08 RX ADMIN — MIDODRINE HYDROCHLORIDE 5 MG: 5 TABLET ORAL at 08:37

## 2025-02-08 RX ADMIN — HYDROCODONE BITARTRATE AND ACETAMINOPHEN 1 TABLET: 7.5; 325 TABLET ORAL at 00:31

## 2025-02-08 RX ADMIN — IPRATROPIUM BROMIDE AND ALBUTEROL SULFATE 1 DOSE: 2.5; .5 SOLUTION RESPIRATORY (INHALATION) at 02:59

## 2025-02-08 RX ADMIN — IPRATROPIUM BROMIDE AND ALBUTEROL SULFATE 1 DOSE: .5; 2.5 SOLUTION RESPIRATORY (INHALATION) at 09:45

## 2025-02-08 ASSESSMENT — PAIN DESCRIPTION - ORIENTATION
ORIENTATION: LEFT;RIGHT
ORIENTATION: RIGHT;LEFT

## 2025-02-08 ASSESSMENT — PAIN DESCRIPTION - LOCATION
LOCATION: LEG
LOCATION: LEG

## 2025-02-08 ASSESSMENT — PAIN SCALES - GENERAL
PAINLEVEL_OUTOF10: 9
PAINLEVEL_OUTOF10: 9

## 2025-02-08 ASSESSMENT — PAIN DESCRIPTION - DESCRIPTORS
DESCRIPTORS: SHARP
DESCRIPTORS: ACHING;DISCOMFORT

## 2025-02-08 NOTE — PLAN OF CARE
Problem: Safety - Adult  Goal: Free from fall injury  2/7/2025 2014 by Billie Lentz RN  Outcome: Progressing  2/7/2025 0959 by Gabriella Mota RN  Outcome: Progressing  Flowsheets (Taken 2/7/2025 0800)  Free From Fall Injury: Instruct family/caregiver on patient safety     Problem: Chronic Conditions and Co-morbidities  Goal: Patient's chronic conditions and co-morbidity symptoms are monitored and maintained or improved  2/7/2025 2014 by Billie Lentz RN  Outcome: Progressing  2/7/2025 0959 by Gabriella Mota RN  Outcome: Progressing  Flowsheets (Taken 2/7/2025 0800)  Care Plan - Patient's Chronic Conditions and Co-Morbidity Symptoms are Monitored and Maintained or Improved: Monitor and assess patient's chronic conditions and comorbid symptoms for stability, deterioration, or improvement     Problem: Skin/Tissue Integrity  Goal: Skin integrity remains intact  Description: 1.  Monitor for areas of redness and/or skin breakdown  2.  Assess vascular access sites hourly  3.  Every 4-6 hours minimum:  Change oxygen saturation probe site  4.  Every 4-6 hours:  If on nasal continuous positive airway pressure, respiratory therapy assess nares and determine need for appliance change or resting period  2/7/2025 2014 by Billie Lentz RN  Outcome: Progressing  Flowsheets (Taken 2/7/2025 2000)  Skin Integrity Remains Intact: Monitor for areas of redness and/or skin breakdown  2/7/2025 0959 by Gabrielal Mota RN  Outcome: Progressing  Flowsheets (Taken 2/7/2025 0800)  Skin Integrity Remains Intact: Monitor for areas of redness and/or skin breakdown     Problem: Pain  Goal: Verbalizes/displays adequate comfort level or baseline comfort level  2/7/2025 2014 by Billie Lentz RN  Outcome: Progressing  2/7/2025 0959 by Gabriella Mota RN  Outcome: Progressing  Flowsheets (Taken 2/7/2025 0800)  Verbalizes/displays adequate comfort level or baseline comfort level: Encourage patient to monitor pain and request assistance

## 2025-02-10 DIAGNOSIS — Z51.5 PALLIATIVE CARE ENCOUNTER: ICD-10-CM

## 2025-02-10 DIAGNOSIS — J44.9 END STAGE COPD (HCC): ICD-10-CM

## 2025-02-10 DIAGNOSIS — R06.09 DYSPNEA ON MINIMAL EXERTION: ICD-10-CM

## 2025-02-10 RX ORDER — MORPHINE SULFATE 20 MG/ML
10 SOLUTION ORAL
Qty: 30 ML | Refills: 0 | Status: SHIPPED | OUTPATIENT
Start: 2025-02-10 | End: 2025-02-24

## 2025-02-10 NOTE — TELEPHONE ENCOUNTER
Call from Sayra stating [facility] does not have Morphine concentrate in stock and she needs prescription sent to [facility] in [place]. Next kristina 2/21/25.  
Apixaban/Eliquis

## 2025-02-10 NOTE — DISCHARGE SUMMARY
[facility] Hospitalist Physician Discharge Summary       Jana Sanchez MD  [address] [facility] [address]  [phone]    Call        Activity level: As tolerated     Dispo: Home with [facility]      Condition on discharge: Stable     Patient ID:  Dg Peña  [id number]  59 y.o.  1965    Admit date: 2/3/2025    Discharge date and time:  2/10/2025  2:53 PM    Admission Diagnoses: Principal Problem:    NSTEMI (non-ST elevated myocardial infarction) (HCC)  Active Problems:    Goals of care, counseling/discussion  Resolved Problems:    * No resolved hospital problems. *      Discharge Diagnoses: Principal Problem:    NSTEMI (non-ST elevated myocardial infarction) (HCC)  Active Problems:    Goals of care, counseling/discussion  Resolved Problems:    * No resolved hospital problems. *      Consults:  IP CONSULT TO HOSPITALIST  IP CONSULT TO HEART FAILURE NURSE/COORDINATOR  IP CONSULT TO PALLIATIVE CARE        Hospital Course:   Patient Dg Peña is a 59 y.o. presented with NSTEMI (non-ST elevated myocardial infarction) (HCC) [I21.4]    Patient admitted for acute on chronic HFpEF. He noticed 12lb weight gain in past 10 days prior to admission. Patient did report increased fluid intake as well. BNP elevated at 1,041 on admission, initial CXR showed cardiomegaly with mild pulmonary vascular congestion and bibasilar atelectasis. Patient diuresed with IV lasix BID. Had good urine output. At time of discharge, he is medically stable.     Discharge Exam:  General Appearance: No acute distress. Alert and oriented to person, place and time   HEENT: ON NC, normocephalic and atraumatic, extraocular movement intact, conjunctiva clear, oral mucosa moist  Pulmonary/Chest: Diminished breath sounds bilaterally, bibasilar crackles- improving, no respiratory distress  Cardiovascular: normal rate, normal S1 and S2 and no carotid bruits  Abdomen: soft, non-tender, non-distended, normal bowel sounds, no masses

## 2025-02-14 ENCOUNTER — TELEMEDICINE (OUTPATIENT)
Dept: PRIMARY CARE CLINIC | Age: 60
End: 2025-02-14

## 2025-02-14 DIAGNOSIS — J44.9 COPD, SEVERE (HCC): Primary | ICD-10-CM

## 2025-02-14 DIAGNOSIS — I50.32 CHRONIC HEART FAILURE WITH PRESERVED EJECTION FRACTION (HCC): ICD-10-CM

## 2025-02-14 DIAGNOSIS — G89.29 OTHER CHRONIC PAIN: ICD-10-CM

## 2025-02-14 DIAGNOSIS — J96.11 CHRONIC RESPIRATORY FAILURE WITH HYPOXIA: ICD-10-CM

## 2025-02-14 DIAGNOSIS — F41.9 ANXIETY: ICD-10-CM

## 2025-02-14 RX ORDER — ALBUTEROL SULFATE 90 UG/1
2 INHALANT RESPIRATORY (INHALATION) EVERY 6 HOURS PRN
Qty: 1 EACH | Refills: 3 | Status: SHIPPED | OUTPATIENT
Start: 2025-02-14

## 2025-02-14 RX ORDER — LIDOCAINE 50 MG/G
OINTMENT TOPICAL
COMMUNITY
Start: 2025-01-14

## 2025-02-14 RX ORDER — KETOCONAZOLE 20 MG/G
CREAM TOPICAL
COMMUNITY
Start: 2025-01-14

## 2025-02-14 RX ORDER — NYSTATIN 100000 U/G
CREAM TOPICAL
COMMUNITY
Start: 2025-01-14

## 2025-02-14 RX ORDER — IPRATROPIUM BROMIDE AND ALBUTEROL SULFATE 2.5; .5 MG/3ML; MG/3ML
1 SOLUTION RESPIRATORY (INHALATION) EVERY 4 HOURS
Qty: 360 ML | Refills: 5 | Status: SHIPPED | OUTPATIENT
Start: 2025-02-14

## 2025-02-14 RX ORDER — LORAZEPAM 1 MG/1
1 TABLET ORAL 3 TIMES DAILY
Qty: 90 TABLET | Refills: 0 | Status: SHIPPED | OUTPATIENT
Start: 2025-02-14 | End: 2025-03-16

## 2025-02-14 ASSESSMENT — PATIENT HEALTH QUESTIONNAIRE - PHQ9
10. IF YOU CHECKED OFF ANY PROBLEMS, HOW DIFFICULT HAVE THESE PROBLEMS MADE IT FOR YOU TO DO YOUR WORK, TAKE CARE OF THINGS AT HOME, OR GET ALONG WITH OTHER PEOPLE: NOT DIFFICULT AT ALL
SUM OF ALL RESPONSES TO PHQ QUESTIONS 1-9: 6
1. LITTLE INTEREST OR PLEASURE IN DOING THINGS: NOT AT ALL
4. FEELING TIRED OR HAVING LITTLE ENERGY: NEARLY EVERY DAY
SUM OF ALL RESPONSES TO PHQ QUESTIONS 1-9: 6
2. FEELING DOWN, DEPRESSED OR HOPELESS: NOT AT ALL
SUM OF ALL RESPONSES TO PHQ QUESTIONS 1-9: 6
8. MOVING OR SPEAKING SO SLOWLY THAT OTHER PEOPLE COULD HAVE NOTICED. OR THE OPPOSITE, BEING SO FIGETY OR RESTLESS THAT YOU HAVE BEEN MOVING AROUND A LOT MORE THAN USUAL: NOT AT ALL
3. TROUBLE FALLING OR STAYING ASLEEP: NEARLY EVERY DAY
6. FEELING BAD ABOUT YOURSELF - OR THAT YOU ARE A FAILURE OR HAVE LET YOURSELF OR YOUR FAMILY DOWN: NOT AT ALL
7. TROUBLE CONCENTRATING ON THINGS, SUCH AS READING THE NEWSPAPER OR WATCHING TELEVISION: NOT AT ALL
9. THOUGHTS THAT YOU WOULD BE BETTER OFF DEAD, OR OF HURTING YOURSELF: NOT AT ALL
SUM OF ALL RESPONSES TO PHQ9 QUESTIONS 1 & 2: 0
SUM OF ALL RESPONSES TO PHQ QUESTIONS 1-9: 6
5. POOR APPETITE OR OVEREATING: NOT AT ALL

## 2025-02-14 NOTE — PROGRESS NOTES
TELEHEALTH VIDEO VISIT    Dg Peña, was evaluated through a synchronous (real-time) audio-video encounter. The patient (or guardian if applicable) is aware that this is a billable service., which includes applicable co-pays.  This virtual visit was conducted with the patient's  (and/or legal guardian's) consent.  This Virtual Visit was conducted with patient's (and/or legal guardian's) consent  Patient identification was verified, and a caregiver was present when appropriate.   Patient identification was verified, and a caregiver was present when appropriate.     The patient was located in a state where the provider was licensed to provide care.    The patient was located at Home: [address] [place] [id number]  Provider was located at Facility (Appt Dept): [address] [place], [place] [id number]       other people involved in call  Sister Sayra      Patient identification was verified at the start of the visit, including the patient's telephone number and physical location. I discussed with the patient the nature of our telehealth visits, that:     Due to the nature of an audio- video modality, the only components of a physical exam that could be done are the elements supported by direct observation.  I would evaluate the patient and recommend diagnostics and treatments based on my assessment.  If it was felt that the patient should be evaluated in clinic or an emergency room setting, then they would be directed there.  Our sessions are not being recorded and that personal health information is protected.  Our team would provide follow up care in person if/when the patient needs it.           HPI:    Dg Peña (:  1965) has requested an audio/video evaluation for the following concern(s):    Chief Complaint   Patient presents with    Follow-Up from Hospital       Patient was in the hospital February 3 through February 10 after presenting with NSTEMI.  He was admitted

## 2025-02-18 RX ORDER — OMEPRAZOLE 40 MG/1
40 CAPSULE, DELAYED RELEASE ORAL DAILY
Qty: 90 CAPSULE | Refills: 1 | Status: SHIPPED | OUTPATIENT
Start: 2025-02-18

## 2025-02-19 DIAGNOSIS — R06.09 DYSPNEA ON MINIMAL EXERTION: ICD-10-CM

## 2025-02-19 DIAGNOSIS — Z51.5 PALLIATIVE CARE ENCOUNTER: ICD-10-CM

## 2025-02-19 DIAGNOSIS — J44.9 END STAGE COPD (HCC): ICD-10-CM

## 2025-02-19 RX ORDER — MORPHINE SULFATE 20 MG/ML
10 SOLUTION ORAL
Qty: 30 ML | Refills: 0 | Status: SHIPPED | OUTPATIENT
Start: 2025-02-19 | End: 2025-02-24

## 2025-02-19 NOTE — TELEPHONE ENCOUNTER
Call from Sayra requesting a refill for Morphine solution be sent to [facility]. Sayra states that the Morphine they have will not last until Amrik  VISITS 2/21/25.

## 2025-02-20 ENCOUNTER — HOSPITAL ENCOUNTER (OUTPATIENT)
Dept: OTHER | Age: 60
Setting detail: THERAPIES SERIES
Discharge: HOME | End: 2025-02-20
Payer: MEDICAID

## 2025-02-20 VITALS
RESPIRATION RATE: 18 BRPM | BODY MASS INDEX: 23.86 KG/M2 | OXYGEN SATURATION: 97 % | HEART RATE: 71 BPM | WEIGHT: 139 LBS

## 2025-02-20 LAB
ANION GAP SERPL CALCULATED.3IONS-SCNC: 10 MMOL/L (ref 7–16)
BNP SERPL-MCNC: 1293 PG/ML (ref 0–125)
BUN SERPL-MCNC: 19 MG/DL (ref 6–20)
CALCIUM SERPL-MCNC: 7.4 MG/DL (ref 8.6–10.2)
CHLORIDE SERPL-SCNC: 95 MMOL/L (ref 98–107)
CO2 SERPL-SCNC: 39 MMOL/L (ref 22–29)
CREAT SERPL-MCNC: 0.7 MG/DL (ref 0.7–1.2)
GFR, ESTIMATED: >90 ML/MIN/1.73M2
GLUCOSE SERPL-MCNC: 108 MG/DL (ref 74–99)
POTASSIUM SERPL-SCNC: 4.4 MMOL/L (ref 3.5–5)
SODIUM SERPL-SCNC: 144 MMOL/L (ref 132–146)

## 2025-02-20 PROCEDURE — 83880 ASSAY OF NATRIURETIC PEPTIDE: CPT

## 2025-02-20 PROCEDURE — 36415 COLL VENOUS BLD VENIPUNCTURE: CPT

## 2025-02-20 PROCEDURE — 99214 OFFICE O/P EST MOD 30 MIN: CPT

## 2025-02-20 PROCEDURE — 80048 BASIC METABOLIC PNL TOTAL CA: CPT

## 2025-02-20 NOTE — PROGRESS NOTES
Congestive Heart Failure Clinic   [facility]      Referring Provider: Wm Zuñiga APRCELESTINA-CNP  Primary Care Physician: Jana Sanchez MD   Cardiologist:   Nephrologist: n/a      HISTORY OF PRESENT ILLNESS:   Dg Peña is a 59 y.o. (1965) male with a history of HFimpEF, most recent EF:  Lab Results   Component Value Date    LVEF 63 08/03/2023    LVEFMODE Echo 01/21/2022       He presents to the CHF clinic for ongoing evaluation and monitoring of heart failure.    In the CHF clinic today he denies any adverse symptoms except:  [] Dizziness or lightheadedness   [] Syncope or near syncope  [] Recent Fall  [x] Shortness of breath at rest  [x] Dyspnea with exertion   [] Decline in functional capacity (unable to perform activities they had previously been able to do)  [] Fatigue   [] Orthopnea  [] PND  [x] Nocturnal cough  [] Hemoptysis  [] Chest pain, pressure, or discomfort  [] Palpitations  [x] Abdominal distention  [x] Abdominal bloating  [] Early satiety  [] Blood in stool   [] Diarrhea  [] Constipation  [] Nausea/Vomiting  [] Decreased urinary response to oral diuretic   [] Scrotal swelling   [x] Lower extremity edema-chronic  [] Used PRN doses of oral diuretic   [] Weight gain    Wt Readings from Last 3 Encounters:   02/20/25 63 kg (139 lb)   02/11/25 59.9 kg (132 lb)   02/08/25 61.4 kg (135 lb 6 oz)     SOCIAL HISTORY:  [x] Denies tobacco, alcohol or illicit drug abuse  [] Tobacco use:  [] ETOH use:  [] Illicit drug use:        MEDICATIONS:    Allergies   Allergen Reactions    Levofloxacin Other (See Comments) and Nausea Only     Other reaction(s): Abdominal pain    Lisinopril      Other reaction(s): COUGHING    Tramadol      Other reaction(s): SHAKING    Ibuprofen Nausea And Vomiting    Sulfa Antibiotics Nausea And Vomiting     Prior to Visit Medications    Medication Sig Taking? Authorizing Provider   omeprazole (PRILOSEC) 40 MG delayed  You can access the FollowMyHealth Patient Portal offered by [facility] by registering at the following website: [email or web address]. By joining [facility]’s FollowMyHealth portal, you will also be able to view your health information using other applications (apps) compatible with our system.

## 2025-02-21 ENCOUNTER — OFFICE VISIT (OUTPATIENT)
Dept: PALLATIVE CARE | Age: 60
End: 2025-02-21

## 2025-02-21 DIAGNOSIS — I50.9 ACUTE EXACERBATION OF CHRONIC HEART FAILURE (HCC): ICD-10-CM

## 2025-02-21 DIAGNOSIS — J96.12 CHRONIC HYPERCAPNIC RESPIRATORY FAILURE (HCC): ICD-10-CM

## 2025-02-21 DIAGNOSIS — Z51.5 PALLIATIVE CARE ENCOUNTER: Primary | ICD-10-CM

## 2025-02-21 DIAGNOSIS — G89.4 CHRONIC PAIN SYNDROME: ICD-10-CM

## 2025-02-21 RX ORDER — HYDROCODONE BITARTRATE AND ACETAMINOPHEN 7.5; 325 MG/1; MG/1
1 TABLET ORAL EVERY 8 HOURS PRN
Qty: 90 TABLET | Refills: 0 | Status: SHIPPED | OUTPATIENT
Start: 2025-02-21 | End: 2025-03-23

## 2025-02-21 RX ORDER — MORPHINE SULFATE 30 MG/1
30 TABLET, FILM COATED, EXTENDED RELEASE ORAL 2 TIMES DAILY
Qty: 60 TABLET | Refills: 0 | Status: SHIPPED | OUTPATIENT
Start: 2025-02-21 | End: 2025-03-23
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
## 2025-02-27 ENCOUNTER — HOSPITAL ENCOUNTER (OUTPATIENT)
Dept: OTHER | Age: 60
Setting detail: THERAPIES SERIES
Discharge: HOME | End: 2025-02-27
Payer: MEDICAID

## 2025-02-27 ENCOUNTER — TELEPHONE (OUTPATIENT)
Dept: OTHER | Age: 60
End: 2025-02-27

## 2025-02-27 VITALS
DIASTOLIC BLOOD PRESSURE: 65 MMHG | HEART RATE: 81 BPM | BODY MASS INDEX: 23.58 KG/M2 | WEIGHT: 137.4 LBS | RESPIRATION RATE: 18 BRPM | SYSTOLIC BLOOD PRESSURE: 117 MMHG | OXYGEN SATURATION: 94 %

## 2025-02-27 LAB
ANION GAP SERPL CALCULATED.3IONS-SCNC: 13 MMOL/L (ref 7–16)
BNP SERPL-MCNC: 1408 PG/ML (ref 0–125)
BUN SERPL-MCNC: 13 MG/DL (ref 6–20)
CALCIUM SERPL-MCNC: 7.4 MG/DL (ref 8.6–10.2)
CHLORIDE SERPL-SCNC: 93 MMOL/L (ref 98–107)
CO2 SERPL-SCNC: 35 MMOL/L (ref 22–29)
CREAT SERPL-MCNC: 0.8 MG/DL (ref 0.7–1.2)
GFR, ESTIMATED: >90 ML/MIN/1.73M2
GLUCOSE SERPL-MCNC: 107 MG/DL (ref 74–99)
POTASSIUM SERPL-SCNC: 4.3 MMOL/L (ref 3.5–5)
SODIUM SERPL-SCNC: 141 MMOL/L (ref 132–146)

## 2025-02-27 PROCEDURE — 83880 ASSAY OF NATRIURETIC PEPTIDE: CPT

## 2025-02-27 PROCEDURE — 6360000002 HC RX W HCPCS

## 2025-02-27 PROCEDURE — 96374 THER/PROPH/DIAG INJ IV PUSH: CPT

## 2025-02-27 PROCEDURE — 80048 BASIC METABOLIC PNL TOTAL CA: CPT

## 2025-02-27 PROCEDURE — 99214 OFFICE O/P EST MOD 30 MIN: CPT

## 2025-02-27 PROCEDURE — 36415 COLL VENOUS BLD VENIPUNCTURE: CPT

## 2025-02-27 PROCEDURE — 2500000003 HC RX 250 WO HCPCS: Performed by: NURSE PRACTITIONER

## 2025-02-27 RX ORDER — FUROSEMIDE 40 MG/1
60 TABLET ORAL 2 TIMES DAILY
Qty: 90 TABLET | Refills: 1 | Status: SHIPPED | OUTPATIENT
Start: 2025-02-27

## 2025-02-27 RX ORDER — FUROSEMIDE 10 MG/ML
40 INJECTION INTRAMUSCULAR; INTRAVENOUS ONCE
Status: COMPLETED | OUTPATIENT
Start: 2025-02-27 | End: 2025-02-27

## 2025-02-27 RX ORDER — FUROSEMIDE 10 MG/ML
INJECTION INTRAMUSCULAR; INTRAVENOUS
Status: COMPLETED
Start: 2025-02-27 | End: 2025-02-27

## 2025-02-27 RX ORDER — SODIUM CHLORIDE 0.9 % (FLUSH) 0.9 %
5-40 SYRINGE (ML) INJECTION 2 TIMES DAILY
Status: DISCONTINUED | OUTPATIENT
Start: 2025-02-27 | End: 2025-02-28 | Stop reason: HOSPADM

## 2025-02-27 RX ADMIN — FUROSEMIDE 40 MG: 10 INJECTION, SOLUTION INTRAMUSCULAR; INTRAVENOUS at 08:33

## 2025-02-27 RX ADMIN — FUROSEMIDE 40 MG: 10 INJECTION INTRAMUSCULAR; INTRAVENOUS at 08:33

## 2025-02-27 RX ADMIN — SODIUM CHLORIDE, PRESERVATIVE FREE 10 ML: 5 INJECTION INTRAVENOUS at 08:34

## 2025-02-27 NOTE — TELEPHONE ENCOUNTER
3:41 PM  Spoke with Олег's sister Kimberley regarding labs and medication adjustment per Nyla CUEVAS      Labs and CHF clinic note reviewed  Increase Lasix to 60 mg twice daily   Follow up in CHF clinic in 1 week given high risk of readmission     Patient is scheduled for follow-up March 6th with Nyla CUEVAS      I have reviewed the provider's instructions with the patient, answering all questions to his satisfaction.    Electronically signed by Sanam Tavares RN on 2/27/2025 at 3:43 PM

## 2025-02-27 NOTE — PROGRESS NOTES
bedtime Yes Jana Sanchez MD   fluticasone (FLONASE) 50 MCG/ACT nasal spray USE ONE (1) SPRAY BY EACH NOSTRIL ROUTE DAILY Yes Jana Sanchez MD   FLUoxetine (PROZAC) 20 MG tablet Take 1 tablet by mouth daily  Patient taking differently: Take 1 tablet by mouth at bedtime Yes Amrik Zuñiga APRN - CNP   ARIPiprazole (ABILIFY) 5 MG tablet Take 1 tablet by mouth daily Yes Amrik Zuñiga APRN - CNP   arformoterol tartrate (BROVANA) 15 MCG/2ML NEBU TAKE TWO (2) MLS BY NEBULIZATION IN THE MORNING AND TWO (2) MLS IN THE EVENING. Yes Bernice Ceja APRN - NP   sodium chloride (OCEAN) 0.65 % nasal spray by Nasal route Yes Bogdan Connors MD   ASPIRIN LOW DOSE 81 MG chewable tablet TAKE ONE (1) TABLET BY MOUTH DAILY Yes Jana Sanchez MD   glucose monitoring (FREESTYLE FREEDOM) kit 1 kit by Does not apply route daily Yes Jana Sanchez MD   Morphine Sulfate (MORPHINE 20MG/ML) SOLN concentrated solution Take 0.5 mLs by mouth every 2 hours as needed (shortness of breath) for up to 5 days. Max Daily Amount: 120 mg  Amrik Zuñiga APRN - CNP         GUIDELINE DIRECTED MEDICAL THERAPY for HFmrEF / HFrEF / HFimpEF:  ARNI/ACE I/ARB: (1a indication)  No ---allergy  Hydralazine/Nitrates (1a in symptomatic AA population, 2b if unable to tolerate ACE/ARB/ARNI)  No  Beta blocker: (1a indication)  Metoprolol Succinate (Toprol)  Aldosterone antagonist: (1a indication)  NO  SGLT2i: (1a indication)  STOPPED IN HOSPITAL   If Channel inhibitor (2a indication if HR >70 on maximally tolerated beta blocker)  No  Cardiac glycoside (2b indication for symptomatic HFrEF)  No  Soluble Guanylate Cyclase (sGC) Stimulator (2b indication LVEF <45% with recent HFH, IV diuretics or elevated BNP)  No  Potassium binders (2b indication for hyperkalemia while taking RAASi)  No        HEART FAILURE FOCUSED PHYSICAL EXAMINATION:    Vitals:    02/27/25 0807   BP: 117/65   Pulse: 81   Resp: 18   SpO2: 94%

## 2025-02-27 NOTE — RESULT ENCOUNTER NOTE
Labs and CHF clinic note reviewed  Increase Lasix to 60 mg twice daily   Follow up in CHF clinic in 1 week given high risk of readmission

## 2025-03-04 DIAGNOSIS — J44.9 END STAGE COPD (HCC): ICD-10-CM

## 2025-03-04 DIAGNOSIS — R06.09 DYSPNEA ON MINIMAL EXERTION: ICD-10-CM

## 2025-03-04 DIAGNOSIS — Z51.5 PALLIATIVE CARE ENCOUNTER: ICD-10-CM

## 2025-03-04 RX ORDER — MORPHINE SULFATE 20 MG/ML
10 SOLUTION ORAL
Qty: 30 ML | Refills: 0 | Status: ON HOLD | OUTPATIENT
Start: 2025-03-04 | End: 2025-03-09

## 2025-03-04 NOTE — TELEPHONE ENCOUNTER
Patient Dg's sister Sayra called for refill morphine sulfate 20 mg/ml solution. Community patient. [facility] [place]. Pharmacy confirmed on perfect serve. Routed call to A Yogesh, NP

## 2025-03-05 ENCOUNTER — APPOINTMENT (OUTPATIENT)
Dept: GENERAL RADIOLOGY | Age: 60
DRG: 139 | End: 2025-03-05
Payer: MEDICAID

## 2025-03-05 ENCOUNTER — APPOINTMENT (OUTPATIENT)
Dept: CT IMAGING | Age: 60
DRG: 139 | End: 2025-03-05
Payer: MEDICAID

## 2025-03-05 ENCOUNTER — HOSPITAL ENCOUNTER (INPATIENT)
Age: 60
LOS: 5 days | Discharge: HOME | DRG: 139 | End: 2025-03-10
Attending: EMERGENCY MEDICINE | Admitting: STUDENT IN AN ORGANIZED HEALTH CARE EDUCATION/TRAINING PROGRAM
Payer: MEDICAID

## 2025-03-05 DIAGNOSIS — J96.02 ACUTE RESPIRATORY FAILURE WITH HYPOXIA AND HYPERCAPNIA (HCC): ICD-10-CM

## 2025-03-05 DIAGNOSIS — J18.9 PNEUMONIA OF BOTH LUNGS DUE TO INFECTIOUS ORGANISM, UNSPECIFIED PART OF LUNG: Primary | ICD-10-CM

## 2025-03-05 DIAGNOSIS — J96.01 ACUTE RESPIRATORY FAILURE WITH HYPOXIA AND HYPERCAPNIA (HCC): ICD-10-CM

## 2025-03-05 PROBLEM — J96.92 HYPERCAPNIC RESPIRATORY FAILURE: Status: ACTIVE | Noted: 2025-03-05

## 2025-03-05 LAB
ALBUMIN SERPL-MCNC: 4.3 G/DL (ref 3.5–5.2)
ALP SERPL-CCNC: 66 U/L (ref 40–129)
ALT SERPL-CCNC: 7 U/L (ref 0–40)
ANION GAP SERPL CALCULATED.3IONS-SCNC: 11 MMOL/L (ref 7–16)
AST SERPL-CCNC: 13 U/L (ref 0–39)
B.E.: 15.9 MMOL/L (ref -3–3)
B.E.: 9.4 MMOL/L (ref -3–3)
BASOPHILS # BLD: 0.01 K/UL (ref 0–0.2)
BASOPHILS NFR BLD: 0 % (ref 0–2)
BILIRUB SERPL-MCNC: 0.4 MG/DL (ref 0–1.2)
BILIRUB UR QL STRIP: NEGATIVE
BUN SERPL-MCNC: 13 MG/DL (ref 6–20)
CALCIUM SERPL-MCNC: 7.2 MG/DL (ref 8.6–10.2)
CHLORIDE SERPL-SCNC: 88 MMOL/L (ref 98–107)
CLARITY UR: CLEAR
CO2 SERPL-SCNC: 40 MMOL/L (ref 22–29)
COHB: 0.3 % (ref 0–1.5)
COHB: 0.5 % (ref 0–1.5)
COLOR UR: YELLOW
COMMENT: NORMAL
CREAT SERPL-MCNC: 0.9 MG/DL (ref 0.7–1.2)
CRITICAL: ABNORMAL
CRITICAL: ABNORMAL
CRP SERPL HS-MCNC: 25 MG/L (ref 0–5)
DATE ANALYZED: ABNORMAL
DATE ANALYZED: ABNORMAL
DATE OF COLLECTION: ABNORMAL
DATE OF COLLECTION: ABNORMAL
EOSINOPHIL # BLD: 0.03 K/UL (ref 0.05–0.5)
EOSINOPHILS RELATIVE PERCENT: 0 % (ref 0–6)
ERYTHROCYTE [DISTWIDTH] IN BLOOD BY AUTOMATED COUNT: 14.6 % (ref 11.5–15)
ERYTHROCYTE [SEDIMENTATION RATE] IN BLOOD BY WESTERGREN METHOD: 43 MM/HR (ref 0–15)
FIO2: 100 %
GFR, ESTIMATED: >90 ML/MIN/1.73M2
GLUCOSE SERPL-MCNC: 119 MG/DL (ref 74–99)
GLUCOSE UR STRIP-MCNC: NEGATIVE MG/DL
HCO3: 38.4 MMOL/L (ref 22–26)
HCO3: 46.5 MMOL/L (ref 22–26)
HCT VFR BLD AUTO: 35.4 % (ref 37–54)
HGB BLD-MCNC: 10 G/DL (ref 12.5–16.5)
HGB UR QL STRIP.AUTO: NEGATIVE
HHB: 2.5 % (ref 0–5)
HHB: 5.9 % (ref 0–5)
IMM GRANULOCYTES # BLD AUTO: <0.03 K/UL (ref 0–0.58)
IMM GRANULOCYTES NFR BLD: 0 % (ref 0–5)
INFLUENZA A BY PCR: NOT DETECTED
INFLUENZA B BY PCR: NOT DETECTED
KETONES UR STRIP-MCNC: NEGATIVE MG/DL
LAB: ABNORMAL
LAB: ABNORMAL
LACTATE BLDV-SCNC: 0.9 MMOL/L (ref 0.5–1.9)
LEUKOCYTE ESTERASE UR QL STRIP: NEGATIVE
LYMPHOCYTES NFR BLD: 0.43 K/UL (ref 1.5–4)
LYMPHOCYTES RELATIVE PERCENT: 6 % (ref 20–42)
Lab: 1708
Lab: 1939
MCH RBC QN AUTO: 29.6 PG (ref 26–35)
MCHC RBC AUTO-ENTMCNC: 28.2 G/DL (ref 32–34.5)
MCV RBC AUTO: 104.7 FL (ref 80–99.9)
METHB: 0.3 % (ref 0–1.5)
METHB: 0.3 % (ref 0–1.5)
MODE: ABNORMAL
MONOCYTES NFR BLD: 0.25 K/UL (ref 0.1–0.95)
MONOCYTES NFR BLD: 3 % (ref 2–12)
NEUTROPHILS NFR BLD: 90 % (ref 43–80)
NEUTS SEG NFR BLD: 6.54 K/UL (ref 1.8–7.3)
NITRITE UR QL STRIP: NEGATIVE
O2 CONTENT: 13.8 ML/DL
O2 CONTENT: 14.7 ML/DL
O2 SATURATION: 94.1 % (ref 92–98.5)
O2 SATURATION: 97.5 % (ref 92–98.5)
O2HB: 93.3 % (ref 94–97)
O2HB: 96.9 % (ref 94–97)
OPERATOR ID: 7296
OPERATOR ID: 8634
PATIENT TEMP: 37 C
PATIENT TEMP: 37 C
PCO2: 101.8 MMHG (ref 35–45)
PCO2: 84 MMHG (ref 35–45)
PEEP/CPAP: 5 CMH2O
PFO2: 0.83 MMHG/%
PH BLOOD GAS: 7.28 (ref 7.35–7.45)
PH BLOOD GAS: 7.28 (ref 7.35–7.45)
PH UR STRIP: 6 [PH] (ref 5–8)
PLATELET # BLD AUTO: 165 K/UL (ref 130–450)
PMV BLD AUTO: 11.1 FL (ref 7–12)
PO2: 112.7 MMHG (ref 75–100)
PO2: 82.6 MMHG (ref 75–100)
POTASSIUM SERPL-SCNC: 3.6 MMOL/L (ref 3.5–5)
PROCALCITONIN SERPL-MCNC: 0.33 NG/ML (ref 0–0.08)
PROT SERPL-MCNC: 8.2 G/DL (ref 6.4–8.3)
PROT UR STRIP-MCNC: NEGATIVE MG/DL
PS: 10 CMH20
RBC # BLD AUTO: 3.38 M/UL (ref 3.8–5.8)
RBC # BLD: ABNORMAL 10*6/UL
RI(T): 6.4
SARS-COV-2 RDRP RESP QL NAA+PROBE: NOT DETECTED
SODIUM SERPL-SCNC: 139 MMOL/L (ref 132–146)
SOURCE, BLOOD GAS: ABNORMAL
SOURCE, BLOOD GAS: ABNORMAL
SP GR UR STRIP: 1.01 (ref 1–1.03)
SPECIMEN DESCRIPTION: NORMAL
THB: 10 G/DL (ref 11.5–16.5)
THB: 11.1 G/DL (ref 11.5–16.5)
TIME ANALYZED: 1711
TIME ANALYZED: 1958
TROPONIN I SERPL HS-MCNC: 28 NG/L (ref 0–11)
TROPONIN I SERPL HS-MCNC: 31 NG/L (ref 0–11)
UROBILINOGEN UR STRIP-ACNC: 0.2 EU/DL (ref 0–1)
WBC OTHER # BLD: 7.3 K/UL (ref 4.5–11.5)

## 2025-03-05 PROCEDURE — 82805 BLOOD GASES W/O2 SATURATION: CPT

## 2025-03-05 PROCEDURE — 71275 CT ANGIOGRAPHY CHEST: CPT

## 2025-03-05 PROCEDURE — 84484 ASSAY OF TROPONIN QUANT: CPT

## 2025-03-05 PROCEDURE — 2580000003 HC RX 258: Performed by: EMERGENCY MEDICINE

## 2025-03-05 PROCEDURE — 87449 NOS EACH ORGANISM AG IA: CPT

## 2025-03-05 PROCEDURE — 85652 RBC SED RATE AUTOMATED: CPT

## 2025-03-05 PROCEDURE — 2060000000 HC ICU INTERMEDIATE R&B

## 2025-03-05 PROCEDURE — 81003 URINALYSIS AUTO W/O SCOPE: CPT

## 2025-03-05 PROCEDURE — 83605 ASSAY OF LACTIC ACID: CPT

## 2025-03-05 PROCEDURE — 87899 AGENT NOS ASSAY W/OPTIC: CPT

## 2025-03-05 PROCEDURE — 94640 AIRWAY INHALATION TREATMENT: CPT

## 2025-03-05 PROCEDURE — 6370000000 HC RX 637 (ALT 250 FOR IP)

## 2025-03-05 PROCEDURE — 94660 CPAP INITIATION&MGMT: CPT

## 2025-03-05 PROCEDURE — 96367 TX/PROPH/DG ADDL SEQ IV INF: CPT

## 2025-03-05 PROCEDURE — 6360000004 HC RX CONTRAST MEDICATION: Performed by: RADIOLOGY

## 2025-03-05 PROCEDURE — 96375 TX/PRO/DX INJ NEW DRUG ADDON: CPT

## 2025-03-05 PROCEDURE — 99285 EMERGENCY DEPT VISIT HI MDM: CPT

## 2025-03-05 PROCEDURE — 96366 THER/PROPH/DIAG IV INF ADDON: CPT

## 2025-03-05 PROCEDURE — 6360000002 HC RX W HCPCS

## 2025-03-05 PROCEDURE — 71045 X-RAY EXAM CHEST 1 VIEW: CPT

## 2025-03-05 PROCEDURE — 87635 SARS-COV-2 COVID-19 AMP PRB: CPT

## 2025-03-05 PROCEDURE — 0202U NFCT DS 22 TRGT SARS-COV-2: CPT

## 2025-03-05 PROCEDURE — 99223 1ST HOSP IP/OBS HIGH 75: CPT | Performed by: STUDENT IN AN ORGANIZED HEALTH CARE EDUCATION/TRAINING PROGRAM

## 2025-03-05 PROCEDURE — 87040 BLOOD CULTURE FOR BACTERIA: CPT

## 2025-03-05 PROCEDURE — 93005 ELECTROCARDIOGRAM TRACING: CPT

## 2025-03-05 PROCEDURE — 73620 X-RAY EXAM OF FOOT: CPT

## 2025-03-05 PROCEDURE — 2580000003 HC RX 258

## 2025-03-05 PROCEDURE — 84145 PROCALCITONIN (PCT): CPT

## 2025-03-05 PROCEDURE — 80053 COMPREHEN METABOLIC PANEL: CPT

## 2025-03-05 PROCEDURE — 85025 COMPLETE CBC W/AUTO DIFF WBC: CPT

## 2025-03-05 PROCEDURE — 87502 INFLUENZA DNA AMP PROBE: CPT

## 2025-03-05 PROCEDURE — 86140 C-REACTIVE PROTEIN: CPT

## 2025-03-05 PROCEDURE — 5A09457 ASSISTANCE WITH RESPIRATORY VENTILATION, 24-96 CONSECUTIVE HOURS, CONTINUOUS POSITIVE AIRWAY PRESSURE: ICD-10-PCS | Performed by: EMERGENCY MEDICINE

## 2025-03-05 PROCEDURE — 96365 THER/PROPH/DIAG IV INF INIT: CPT

## 2025-03-05 RX ORDER — 0.9 % SODIUM CHLORIDE 0.9 %
1000 INTRAVENOUS SOLUTION INTRAVENOUS ONCE
Status: COMPLETED | OUTPATIENT
Start: 2025-03-05 | End: 2025-03-06

## 2025-03-05 RX ORDER — IPRATROPIUM BROMIDE AND ALBUTEROL SULFATE 2.5; .5 MG/3ML; MG/3ML
1 SOLUTION RESPIRATORY (INHALATION)
Status: DISCONTINUED | OUTPATIENT
Start: 2025-03-06 | End: 2025-03-10 | Stop reason: HOSPADM

## 2025-03-05 RX ORDER — WATER 10 ML/10ML
20 INJECTION INTRAMUSCULAR; INTRAVENOUS; SUBCUTANEOUS EVERY 8 HOURS
Status: COMPLETED | OUTPATIENT
Start: 2025-03-05 | End: 2025-03-08

## 2025-03-05 RX ORDER — POTASSIUM CHLORIDE 7.45 MG/ML
10 INJECTION INTRAVENOUS PRN
Status: DISCONTINUED | OUTPATIENT
Start: 2025-03-05 | End: 2025-03-10 | Stop reason: HOSPADM

## 2025-03-05 RX ORDER — CEFEPIME HYDROCHLORIDE 2 G/1
2 INJECTION, POWDER, FOR SOLUTION INTRAVENOUS EVERY 8 HOURS
Status: COMPLETED | OUTPATIENT
Start: 2025-03-06 | End: 2025-03-08

## 2025-03-05 RX ORDER — ALBUTEROL SULFATE 0.83 MG/ML
2.5 SOLUTION RESPIRATORY (INHALATION) EVERY 6 HOURS PRN
Status: DISCONTINUED | OUTPATIENT
Start: 2025-03-05 | End: 2025-03-07

## 2025-03-05 RX ORDER — SODIUM CHLORIDE 0.9 % (FLUSH) 0.9 %
5-40 SYRINGE (ML) INJECTION EVERY 12 HOURS SCHEDULED
Status: DISCONTINUED | OUTPATIENT
Start: 2025-03-05 | End: 2025-03-10 | Stop reason: HOSPADM

## 2025-03-05 RX ORDER — LORAZEPAM 1 MG/1
1 TABLET ORAL 3 TIMES DAILY
Status: DISCONTINUED | OUTPATIENT
Start: 2025-03-05 | End: 2025-03-06

## 2025-03-05 RX ORDER — METOPROLOL SUCCINATE 100 MG/1
100 TABLET, EXTENDED RELEASE ORAL DAILY
Status: DISCONTINUED | OUTPATIENT
Start: 2025-03-06 | End: 2025-03-06

## 2025-03-05 RX ORDER — POTASSIUM CHLORIDE 1500 MG/1
40 TABLET, EXTENDED RELEASE ORAL PRN
Status: DISCONTINUED | OUTPATIENT
Start: 2025-03-05 | End: 2025-03-10 | Stop reason: HOSPADM

## 2025-03-05 RX ORDER — ONDANSETRON 4 MG/1
4 TABLET, ORALLY DISINTEGRATING ORAL EVERY 8 HOURS PRN
Status: DISCONTINUED | OUTPATIENT
Start: 2025-03-05 | End: 2025-03-10 | Stop reason: HOSPADM

## 2025-03-05 RX ORDER — ENOXAPARIN SODIUM 100 MG/ML
40 INJECTION SUBCUTANEOUS DAILY
Status: DISCONTINUED | OUTPATIENT
Start: 2025-03-06 | End: 2025-03-10 | Stop reason: HOSPADM

## 2025-03-05 RX ORDER — ACETAMINOPHEN 325 MG/1
650 TABLET ORAL EVERY 6 HOURS PRN
Status: DISCONTINUED | OUTPATIENT
Start: 2025-03-05 | End: 2025-03-10 | Stop reason: SDUPTHER

## 2025-03-05 RX ORDER — SODIUM CHLORIDE 0.9 % (FLUSH) 0.9 %
5-40 SYRINGE (ML) INJECTION PRN
Status: DISCONTINUED | OUTPATIENT
Start: 2025-03-05 | End: 2025-03-10 | Stop reason: HOSPADM

## 2025-03-05 RX ORDER — IPRATROPIUM BROMIDE AND ALBUTEROL SULFATE 2.5; .5 MG/3ML; MG/3ML
3 SOLUTION RESPIRATORY (INHALATION) ONCE
Status: COMPLETED | OUTPATIENT
Start: 2025-03-05 | End: 2025-03-05

## 2025-03-05 RX ORDER — GABAPENTIN 300 MG/1
300 CAPSULE ORAL 3 TIMES DAILY
Status: DISCONTINUED | OUTPATIENT
Start: 2025-03-05 | End: 2025-03-06

## 2025-03-05 RX ORDER — METHYLPREDNISOLONE SODIUM SUCCINATE 125 MG/2ML
125 INJECTION INTRAMUSCULAR; INTRAVENOUS DAILY
Status: DISCONTINUED | OUTPATIENT
Start: 2025-03-05 | End: 2025-03-07

## 2025-03-05 RX ORDER — SODIUM CHLORIDE 9 MG/ML
INJECTION, SOLUTION INTRAVENOUS CONTINUOUS
Status: ACTIVE | OUTPATIENT
Start: 2025-03-05 | End: 2025-03-06

## 2025-03-05 RX ORDER — MORPHINE SULFATE 30 MG/1
30 TABLET, FILM COATED, EXTENDED RELEASE ORAL 2 TIMES DAILY
Status: DISCONTINUED | OUTPATIENT
Start: 2025-03-05 | End: 2025-03-10 | Stop reason: HOSPADM

## 2025-03-05 RX ORDER — ACETAMINOPHEN 650 MG/1
650 SUPPOSITORY RECTAL EVERY 6 HOURS PRN
Status: DISCONTINUED | OUTPATIENT
Start: 2025-03-05 | End: 2025-03-10 | Stop reason: SDUPTHER

## 2025-03-05 RX ORDER — PANTOPRAZOLE SODIUM 40 MG/1
40 TABLET, DELAYED RELEASE ORAL
Status: DISCONTINUED | OUTPATIENT
Start: 2025-03-06 | End: 2025-03-10 | Stop reason: HOSPADM

## 2025-03-05 RX ORDER — PRAMIPEXOLE DIHYDROCHLORIDE 0.25 MG/1
0.75 TABLET ORAL NIGHTLY
Status: DISCONTINUED | OUTPATIENT
Start: 2025-03-05 | End: 2025-03-10 | Stop reason: HOSPADM

## 2025-03-05 RX ORDER — HYDROCODONE BITARTRATE AND ACETAMINOPHEN 7.5; 325 MG/1; MG/1
1 TABLET ORAL EVERY 8 HOURS PRN
Status: DISCONTINUED | OUTPATIENT
Start: 2025-03-05 | End: 2025-03-10 | Stop reason: HOSPADM

## 2025-03-05 RX ORDER — MAGNESIUM SULFATE IN WATER 40 MG/ML
2000 INJECTION, SOLUTION INTRAVENOUS ONCE
Status: COMPLETED | OUTPATIENT
Start: 2025-03-05 | End: 2025-03-05

## 2025-03-05 RX ORDER — ASPIRIN 81 MG/1
81 TABLET, CHEWABLE ORAL DAILY
Status: DISCONTINUED | OUTPATIENT
Start: 2025-03-06 | End: 2025-03-10 | Stop reason: HOSPADM

## 2025-03-05 RX ORDER — ARIPIPRAZOLE 5 MG/1
5 TABLET ORAL DAILY
Status: DISCONTINUED | OUTPATIENT
Start: 2025-03-06 | End: 2025-03-10 | Stop reason: HOSPADM

## 2025-03-05 RX ORDER — FLUOXETINE 10 MG/1
20 TABLET, FILM COATED ORAL NIGHTLY
Status: DISCONTINUED | OUTPATIENT
Start: 2025-03-05 | End: 2025-03-05 | Stop reason: SDUPTHER

## 2025-03-05 RX ORDER — ONDANSETRON 2 MG/ML
4 INJECTION INTRAMUSCULAR; INTRAVENOUS EVERY 6 HOURS PRN
Status: DISCONTINUED | OUTPATIENT
Start: 2025-03-05 | End: 2025-03-10 | Stop reason: HOSPADM

## 2025-03-05 RX ORDER — MAGNESIUM SULFATE IN WATER 40 MG/ML
2000 INJECTION, SOLUTION INTRAVENOUS PRN
Status: DISCONTINUED | OUTPATIENT
Start: 2025-03-05 | End: 2025-03-10 | Stop reason: HOSPADM

## 2025-03-05 RX ORDER — IOPAMIDOL 755 MG/ML
75 INJECTION, SOLUTION INTRAVASCULAR
Status: COMPLETED | OUTPATIENT
Start: 2025-03-05 | End: 2025-03-05

## 2025-03-05 RX ORDER — POLYETHYLENE GLYCOL 3350 17 G/17G
17 POWDER, FOR SOLUTION ORAL DAILY PRN
Status: DISCONTINUED | OUTPATIENT
Start: 2025-03-05 | End: 2025-03-10 | Stop reason: HOSPADM

## 2025-03-05 RX ORDER — SODIUM CHLORIDE 9 MG/ML
INJECTION, SOLUTION INTRAVENOUS PRN
Status: DISCONTINUED | OUTPATIENT
Start: 2025-03-05 | End: 2025-03-10 | Stop reason: HOSPADM

## 2025-03-05 RX ORDER — TAMSULOSIN HYDROCHLORIDE 0.4 MG/1
0.4 CAPSULE ORAL DAILY
Status: DISCONTINUED | OUTPATIENT
Start: 2025-03-06 | End: 2025-03-10 | Stop reason: HOSPADM

## 2025-03-05 RX ADMIN — CEFEPIME HYDROCHLORIDE 2000 MG: 2 INJECTION, POWDER, FOR SOLUTION INTRAVENOUS at 21:49

## 2025-03-05 RX ADMIN — IPRATROPIUM BROMIDE AND ALBUTEROL SULFATE 3 DOSE: .5; 3 SOLUTION RESPIRATORY (INHALATION) at 17:30

## 2025-03-05 RX ADMIN — VANCOMYCIN HYDROCHLORIDE 1500 MG: 10 INJECTION, POWDER, LYOPHILIZED, FOR SOLUTION INTRAVENOUS at 22:47

## 2025-03-05 RX ADMIN — MAGNESIUM SULFATE HEPTAHYDRATE 2000 MG: 40 INJECTION, SOLUTION INTRAVENOUS at 17:25

## 2025-03-05 RX ADMIN — SODIUM CHLORIDE 1000 ML: 9 INJECTION, SOLUTION INTRAVENOUS at 22:47

## 2025-03-05 RX ADMIN — IOPAMIDOL 75 ML: 755 INJECTION, SOLUTION INTRAVENOUS at 21:35

## 2025-03-05 RX ADMIN — METHYLPREDNISOLONE SODIUM SUCCINATE 125 MG: 125 INJECTION INTRAMUSCULAR; INTRAVENOUS at 17:22

## 2025-03-05 NOTE — ED PROVIDER NOTES
[facility] [place] INTERNAL MEDICINE 2  EMERGENCY DEPARTMENT ENCOUNTER        Pt Name: Dg Peña  MRN: [id number]  Birthdate 1965  Date of evaluation: 3/5/2025  Provider: Aaron Lozada DO  PCP: Jana Sanchez MD  Note Started: 5:20 PM EST 3/5/25    CHIEF COMPLAINT       Chief Complaint   Patient presents with    Shortness of Breath     Norally on 6L was 80%       HISTORY OF PRESENT ILLNESS: 1 or more Elements   History From: Patient    Dg Peña is a 59 y.o. male with a PMHx of COPD, heart failure, hypertension, PVD, GERD, CAD, NSTEMI, anemia of chronic disease, who presents shortness of breath.  Patient states shortness of breath has been worsening over the last week, but worsened enough today which brought him into the emergency department, is normally on 6 L, was found to be hypoxic at 80%.  Patient remained hypoxic on nonrebreather, was placed on BiPAP.  Patient reports some fevers and chills as well as mild chest pain with worsening shortness of breath, admits to upper respiratory symptoms including cough, congestion, rhinorrhea.  He denies any abdominal pain, nausea, vomiting, diarrhea, constipation, urinary symptoms.  Patient is a former smoker, denies alcohol use, denies illicit drug use.      Nursing Notes were all reviewed and agreed with or any disagreements were addressed in the HPI.      REVIEW OF EXTERNAL NOTES :       PDMP Monitoring:    Last PDMP Aaron as Reviewed:  Review User Review Instant Review Result   LINDA HERNANDEZ 2/21/2025  3:39 PM Reviewed PDMP [1]     Last Controlled Substance Monitoring Documentation      Flowsheet Row Office Visit from 2/21/2025 in [facility] PALL[facility]IVE   Periodic Controlled Substance Monitoring No signs of potential drug abuse or diversion identified., Possible medication side effects, risk of tolerance/dependence & alternative treatments discussed., Assessed functional status (ability to engage in work or other purposeful  tablet (1 tablet Oral Given 3/6/25 1814)   morphine (MS CONTIN) extended release tablet 30 mg (30 mg Oral Not Given 3/6/25 1116)   pantoprazole (PROTONIX) tablet 40 mg (40 mg Oral Not Given 3/6/25 1116)   pramipexole (MIRAPEX) tablet 0.75 mg (0.75 mg Oral Not Given 3/6/25 0318)   tamsulosin (FLOMAX) capsule 0.4 mg (0.4 mg Oral Not Given 3/6/25 1116)   FLUoxetine (PROZAC) capsule 20 mg (20 mg Oral Given 3/6/25 0104)   ceFEPIme (MAXIPIME) injection 2 g (2 g IntraVENous Given 3/6/25 1753)     And   sterile water injection 20 mL (20 mLs Injection Given 3/6/25 1753)   metoprolol succinate (TOPROL XL) extended release tablet 100 mg (100 mg Oral Given 3/6/25 1814)   LORazepam (ATIVAN) tablet 1 mg (1 mg Oral Given 3/6/25 1814)   gabapentin (NEURONTIN) capsule 300 mg (300 mg Oral Given 3/6/25 1814)   ipratropium 0.5 mg-albuterol 2.5 mg (DUONEB) nebulizer solution 3 Dose (3 Doses Inhalation Given 3/5/25 1730)   magnesium sulfate 2000 mg in 50 mL IVPB premix (0 mg IntraVENous Stopped 3/5/25 1905)   vancomycin (VANCOCIN) 1,500 mg in sodium chloride 0.9 % 250 mL IVPB (0 mg IntraVENous Stopped 3/6/25 0110)   ceFEPIme (MAXIPIME) 2,000 mg in sodium chloride 0.9 % 100 mL IVPB (0 mg IntraVENous Stopped 3/5/25 2247)   iopamidol (ISOVUE-370) 76 % injection 75 mL (75 mLs IntraVENous Given 3/5/25 2135)   sodium chloride 0.9 % bolus 1,000 mL (0 mLs IntraVENous Stopped 3/6/25 0025)   HYDROmorphone (DILAUDID) injection 0.5 mg (0.5 mg IntraVENous Given 3/6/25 1213)           Medical Decision Making/Differential Diagnosis:    CC/HPI Summary, Social Determinants of health, Records Reviewed, DDx, testing done/not done, ED Course, Reassessment, disposition considerations/shared decision making with patient, consults, disposition:      ED Course as of 03/06/25 2114   Wed Mar 05, 2025   2246 Spoke with Dr. López he will accept the patient for admission [MT]      ED Course User Index  [MT] Irena Cristobal DO Clarence McFarland is a 59 y.o.

## 2025-03-06 LAB
B PARAP IS1001 DNA NPH QL NAA+NON-PROBE: NOT DETECTED
B PERT DNA SPEC QL NAA+PROBE: NOT DETECTED
BASOPHILS # BLD: 0 K/UL (ref 0–0.2)
BASOPHILS NFR BLD: 0 % (ref 0–2)
C PNEUM DNA NPH QL NAA+NON-PROBE: NOT DETECTED
EKG ATRIAL RATE: 86 BPM
EKG P AXIS: 53 DEGREES
EKG P-R INTERVAL: 112 MS
EKG Q-T INTERVAL: 382 MS
EKG QRS DURATION: 80 MS
EKG QTC CALCULATION (BAZETT): 457 MS
EKG R AXIS: 79 DEGREES
EKG T AXIS: 68 DEGREES
EKG VENTRICULAR RATE: 86 BPM
EOSINOPHIL # BLD: 0 K/UL (ref 0.05–0.5)
EOSINOPHILS RELATIVE PERCENT: 0 % (ref 0–6)
ERYTHROCYTE [DISTWIDTH] IN BLOOD BY AUTOMATED COUNT: 14.7 % (ref 11.5–15)
FLUAV RNA NPH QL NAA+NON-PROBE: NOT DETECTED
FLUBV RNA NPH QL NAA+NON-PROBE: NOT DETECTED
HADV DNA NPH QL NAA+NON-PROBE: NOT DETECTED
HCOV 229E RNA NPH QL NAA+NON-PROBE: NOT DETECTED
HCOV HKU1 RNA NPH QL NAA+NON-PROBE: NOT DETECTED
HCOV NL63 RNA NPH QL NAA+NON-PROBE: NOT DETECTED
HCOV OC43 RNA NPH QL NAA+NON-PROBE: NOT DETECTED
HCT VFR BLD AUTO: 31.9 % (ref 37–54)
HGB BLD-MCNC: 8.9 G/DL (ref 12.5–16.5)
HMPV RNA NPH QL NAA+NON-PROBE: NOT DETECTED
HPIV1 RNA NPH QL NAA+NON-PROBE: NOT DETECTED
HPIV2 RNA NPH QL NAA+NON-PROBE: NOT DETECTED
HPIV3 RNA NPH QL NAA+NON-PROBE: NOT DETECTED
HPIV4 RNA NPH QL NAA+NON-PROBE: NOT DETECTED
L PNEUMO1 AG UR QL IA.RAPID: NEGATIVE
LYMPHOCYTES NFR BLD: 0.45 K/UL (ref 1.5–4)
LYMPHOCYTES RELATIVE PERCENT: 4 % (ref 20–42)
M PNEUMO DNA NPH QL NAA+NON-PROBE: NOT DETECTED
MCH RBC QN AUTO: 30.1 PG (ref 26–35)
MCHC RBC AUTO-ENTMCNC: 27.9 G/DL (ref 32–34.5)
MCV RBC AUTO: 107.8 FL (ref 80–99.9)
METAMYELOCYTES ABSOLUTE COUNT: 0.54 K/UL (ref 0–0.12)
METAMYELOCYTES: 5 % (ref 0–1)
MONOCYTES NFR BLD: 0.36 K/UL (ref 0.1–0.95)
MONOCYTES NFR BLD: 4 % (ref 2–12)
MYELOCYTES ABSOLUTE COUNT: 0.72 K/UL
MYELOCYTES: 7 %
NEUTROPHILS NFR BLD: 80 % (ref 43–80)
NEUTS SEG NFR BLD: 8.14 K/UL (ref 1.8–7.3)
PLATELET # BLD AUTO: 117 K/UL (ref 130–450)
PMV BLD AUTO: 12.3 FL (ref 7–12)
PROCALCITONIN SERPL-MCNC: 2.97 NG/ML (ref 0–0.08)
RBC # BLD AUTO: 2.96 M/UL (ref 3.8–5.8)
RBC # BLD: ABNORMAL 10*6/UL
RBC # BLD: ABNORMAL 10*6/UL
RSV RNA NPH QL NAA+NON-PROBE: NOT DETECTED
RV+EV RNA NPH QL NAA+NON-PROBE: NOT DETECTED
S PNEUM AG SPEC QL: POSITIVE
SARS-COV-2 RNA NPH QL NAA+NON-PROBE: NOT DETECTED
SPECIMEN DESCRIPTION: NORMAL
WBC OTHER # BLD: 10.2 K/UL (ref 4.5–11.5)

## 2025-03-06 PROCEDURE — 6360000002 HC RX W HCPCS

## 2025-03-06 PROCEDURE — 6360000002 HC RX W HCPCS: Performed by: STUDENT IN AN ORGANIZED HEALTH CARE EDUCATION/TRAINING PROGRAM

## 2025-03-06 PROCEDURE — 6370000000 HC RX 637 (ALT 250 FOR IP): Performed by: STUDENT IN AN ORGANIZED HEALTH CARE EDUCATION/TRAINING PROGRAM

## 2025-03-06 PROCEDURE — 6360000002 HC RX W HCPCS: Performed by: INTERNAL MEDICINE

## 2025-03-06 PROCEDURE — 2060000000 HC ICU INTERMEDIATE R&B

## 2025-03-06 PROCEDURE — 85025 COMPLETE CBC W/AUTO DIFF WBC: CPT

## 2025-03-06 PROCEDURE — 2500000003 HC RX 250 WO HCPCS: Performed by: STUDENT IN AN ORGANIZED HEALTH CARE EDUCATION/TRAINING PROGRAM

## 2025-03-06 PROCEDURE — 2580000003 HC RX 258: Performed by: STUDENT IN AN ORGANIZED HEALTH CARE EDUCATION/TRAINING PROGRAM

## 2025-03-06 PROCEDURE — 94660 CPAP INITIATION&MGMT: CPT

## 2025-03-06 PROCEDURE — 93010 ELECTROCARDIOGRAM REPORT: CPT | Performed by: INTERNAL MEDICINE

## 2025-03-06 PROCEDURE — 6370000000 HC RX 637 (ALT 250 FOR IP): Performed by: INTERNAL MEDICINE

## 2025-03-06 PROCEDURE — 94640 AIRWAY INHALATION TREATMENT: CPT

## 2025-03-06 PROCEDURE — 99232 SBSQ HOSP IP/OBS MODERATE 35: CPT | Performed by: INTERNAL MEDICINE

## 2025-03-06 RX ORDER — GABAPENTIN 300 MG/1
300 CAPSULE ORAL 3 TIMES DAILY
Status: DISCONTINUED | OUTPATIENT
Start: 2025-03-06 | End: 2025-03-10 | Stop reason: HOSPADM

## 2025-03-06 RX ORDER — METOPROLOL SUCCINATE 100 MG/1
100 TABLET, EXTENDED RELEASE ORAL DAILY
Status: DISCONTINUED | OUTPATIENT
Start: 2025-03-06 | End: 2025-03-10 | Stop reason: HOSPADM

## 2025-03-06 RX ORDER — LORAZEPAM 1 MG/1
1 TABLET ORAL 3 TIMES DAILY
Status: DISCONTINUED | OUTPATIENT
Start: 2025-03-06 | End: 2025-03-10 | Stop reason: HOSPADM

## 2025-03-06 RX ORDER — LORAZEPAM 1 MG/1
1 TABLET ORAL ONCE
Status: COMPLETED | OUTPATIENT
Start: 2025-03-07 | End: 2025-03-07

## 2025-03-06 RX ORDER — GABAPENTIN 300 MG/1
300 CAPSULE ORAL ONCE
Status: DISCONTINUED | OUTPATIENT
Start: 2025-03-07 | End: 2025-03-10 | Stop reason: HOSPADM

## 2025-03-06 RX ADMIN — VANCOMYCIN HYDROCHLORIDE 1250 MG: 500 INJECTION, POWDER, LYOPHILIZED, FOR SOLUTION INTRAVENOUS at 21:37

## 2025-03-06 RX ADMIN — LORAZEPAM 1 MG: 1 TABLET ORAL at 18:14

## 2025-03-06 RX ADMIN — CEFEPIME 2 G: 2 INJECTION, POWDER, FOR SOLUTION INTRAVENOUS at 09:57

## 2025-03-06 RX ADMIN — WATER 20 ML: 1 INJECTION INTRAMUSCULAR; INTRAVENOUS; SUBCUTANEOUS at 09:57

## 2025-03-06 RX ADMIN — METHYLPREDNISOLONE SODIUM SUCCINATE 125 MG: 125 INJECTION INTRAMUSCULAR; INTRAVENOUS at 10:01

## 2025-03-06 RX ADMIN — IPRATROPIUM BROMIDE AND ALBUTEROL SULFATE 1 DOSE: .5; 2.5 SOLUTION RESPIRATORY (INHALATION) at 14:32

## 2025-03-06 RX ADMIN — WATER 20 ML: 1 INJECTION INTRAMUSCULAR; INTRAVENOUS; SUBCUTANEOUS at 17:53

## 2025-03-06 RX ADMIN — METOPROLOL SUCCINATE 100 MG: 100 TABLET, EXTENDED RELEASE ORAL at 18:14

## 2025-03-06 RX ADMIN — CEFEPIME 2 G: 2 INJECTION, POWDER, FOR SOLUTION INTRAVENOUS at 17:53

## 2025-03-06 RX ADMIN — ENOXAPARIN SODIUM 40 MG: 100 INJECTION SUBCUTANEOUS at 10:02

## 2025-03-06 RX ADMIN — WATER 20 ML: 1 INJECTION INTRAMUSCULAR; INTRAVENOUS; SUBCUTANEOUS at 23:48

## 2025-03-06 RX ADMIN — FLUOXETINE HYDROCHLORIDE 20 MG: 20 CAPSULE ORAL at 21:30

## 2025-03-06 RX ADMIN — LORAZEPAM 1 MG: 1 TABLET ORAL at 01:04

## 2025-03-06 RX ADMIN — IPRATROPIUM BROMIDE AND ALBUTEROL SULFATE 1 DOSE: .5; 2.5 SOLUTION RESPIRATORY (INHALATION) at 04:04

## 2025-03-06 RX ADMIN — IPRATROPIUM BROMIDE AND ALBUTEROL SULFATE 1 DOSE: .5; 2.5 SOLUTION RESPIRATORY (INHALATION) at 08:12

## 2025-03-06 RX ADMIN — HYDROMORPHONE HYDROCHLORIDE 0.5 MG: 1 INJECTION, SOLUTION INTRAMUSCULAR; INTRAVENOUS; SUBCUTANEOUS at 12:13

## 2025-03-06 RX ADMIN — IPRATROPIUM BROMIDE AND ALBUTEROL SULFATE 1 DOSE: .5; 2.5 SOLUTION RESPIRATORY (INHALATION) at 20:46

## 2025-03-06 RX ADMIN — CEFEPIME 2 G: 2 INJECTION, POWDER, FOR SOLUTION INTRAVENOUS at 23:48

## 2025-03-06 RX ADMIN — SODIUM CHLORIDE, PRESERVATIVE FREE 10 ML: 5 INJECTION INTRAVENOUS at 10:00

## 2025-03-06 RX ADMIN — SODIUM CHLORIDE, PRESERVATIVE FREE 10 ML: 5 INJECTION INTRAVENOUS at 21:32

## 2025-03-06 RX ADMIN — GABAPENTIN 300 MG: 300 CAPSULE ORAL at 01:04

## 2025-03-06 RX ADMIN — FLUOXETINE HYDROCHLORIDE 20 MG: 20 CAPSULE ORAL at 01:04

## 2025-03-06 RX ADMIN — IPRATROPIUM BROMIDE AND ALBUTEROL SULFATE 1 DOSE: .5; 2.5 SOLUTION RESPIRATORY (INHALATION) at 11:23

## 2025-03-06 RX ADMIN — MORPHINE SULFATE 15 MG: 15 TABLET, FILM COATED, EXTENDED RELEASE ORAL at 01:04

## 2025-03-06 RX ADMIN — MORPHINE SULFATE 30 MG: 15 TABLET, FILM COATED, EXTENDED RELEASE ORAL at 21:30

## 2025-03-06 RX ADMIN — GABAPENTIN 300 MG: 300 CAPSULE ORAL at 18:14

## 2025-03-06 RX ADMIN — HYDROCODONE BITARTRATE AND ACETAMINOPHEN 1 TABLET: 7.5; 325 TABLET ORAL at 18:14

## 2025-03-06 RX ADMIN — PRAMIPEXOLE DIHYDROCHLORIDE 0.75 MG: 0.25 TABLET ORAL at 21:30

## 2025-03-06 RX ADMIN — IPRATROPIUM BROMIDE AND ALBUTEROL SULFATE 1 DOSE: .5; 2.5 SOLUTION RESPIRATORY (INHALATION) at 00:34

## 2025-03-06 RX ADMIN — SODIUM CHLORIDE: 9 INJECTION, SOLUTION INTRAVENOUS at 00:25

## 2025-03-06 ASSESSMENT — PAIN DESCRIPTION - DESCRIPTORS: DESCRIPTORS: ACHING

## 2025-03-06 ASSESSMENT — PAIN DESCRIPTION - ONSET
ONSET: ON-GOING
ONSET: ON-GOING

## 2025-03-06 ASSESSMENT — PAIN DESCRIPTION - PAIN TYPE
TYPE: CHRONIC PAIN
TYPE: CHRONIC PAIN

## 2025-03-06 ASSESSMENT — PAIN SCALES - GENERAL
PAINLEVEL_OUTOF10: 8
PAINLEVEL_OUTOF10: 10
PAINLEVEL_OUTOF10: 10
PAINLEVEL_OUTOF10: 8

## 2025-03-06 ASSESSMENT — PAIN DESCRIPTION - LOCATION
LOCATION: LEG;BACK
LOCATION: LEG
LOCATION: ABDOMEN

## 2025-03-06 ASSESSMENT — PAIN DESCRIPTION - FREQUENCY
FREQUENCY: CONTINUOUS
FREQUENCY: CONTINUOUS

## 2025-03-06 ASSESSMENT — PAIN - FUNCTIONAL ASSESSMENT: PAIN_FUNCTIONAL_ASSESSMENT: ACTIVITIES ARE NOT PREVENTED

## 2025-03-06 ASSESSMENT — PAIN DESCRIPTION - ORIENTATION: ORIENTATION: MID;RIGHT;LEFT

## 2025-03-06 NOTE — PROGRESS NOTES
Database initiated. Patient is A&O comes in from home with cousin. States he uses a walker and a wheelchair sometimes. He wears 5 liters oxygen at baseline. He has fallen recently. He has a private pay aide daily and [facility]

## 2025-03-06 NOTE — PROGRESS NOTES
4 Eyes Skin Assessment     NAME:  Dg Peña  YOB: 1965  MEDICAL RECORD NUMBER:  [id number]    The patient is being assessed for  Admission    I agree that at least one RN has performed a thorough Head to Toe Skin Assessment on the patient. ALL assessment sites listed below have been assessed.      Areas assessed by both nurses:    Head, Face, Ears, Shoulders, Back, Chest, Arms, Elbows, Hands, Sacrum. Buttock, Coccyx, Ischium, Legs. Feet and Heels, and Under Medical Devices         Does the Patient have a Wound? Yes wound(s) were present on assessment. LDA wound assessment was Initiated and completed by RN       Cristofer Prevention initiated by RN: Yes  Wound Care Orders initiated by RN: Yes    Pressure Injury (Stage 3,4, Unstageable, DTI, NWPT, and Complex wounds) if present, place Wound referral order by RN under : Yes    New Ostomies, if present place, Ostomy referral order under : No     Nurse 1 eSignature: Electronically signed by Mery Harris RN on 3/6/25 at 6:26 PM EST    **SHARE this note so that the co-signing nurse can place an eSignature**    Nurse 2 eSignature: Electronically signed by Navya Peterson RN on 3/6/25 at 6:28 PM EST

## 2025-03-06 NOTE — ED NOTES
Report to Hortencia DISLA   Double Island Pedicle Flap Text: The defect edges were debeveled with a #15 scalpel blade.  Given the location of the defect, shape of the defect and the proximity to free margins a double island pedicle advancement flap was deemed most appropriate.  Using a sterile surgical marker, an appropriate advancement flap was drawn incorporating the defect, outlining the appropriate donor tissue and placing the expected incisions within the relaxed skin tension lines where possible.    The area thus outlined was incised deep to adipose tissue with a #15 scalpel blade.  The skin margins were undermined to an appropriate distance in all directions around the primary defect and laterally outward around the island pedicle utilizing iris scissors.  There was minimal undermining beneath the pedicle flap.

## 2025-03-06 NOTE — H&P
[facility] Hospitalist Group History and Physical      CHIEF COMPLAINT: Shortness of breath    History of Present Illness: A 59-year-old gentleman with past medical history significant for congestive heart failure, hypertension, GERD, coronary artery disease, COPD and restless leg syndrome presents to ED with shortness of breath for last 1 week.  Shortness of breath is gradually worsening, exertional, worse today that he came to ED to get checked.  Patient generally uses oxygen by nasal cannula at 6 L.  Patient was hypoxic into his 80s on 6 L, was started on nonrebreather followed by BiPAP.  Denies any chest pain, belly pain, nausea vomiting diarrhea, no lightheadedness or dizziness, no fever or chills, no focal deficits.  Workup in ED showed bilateral pneumonia, decision to admit.    Informant(s) for H&P:    REVIEW OF SYSTEMS:  A comprehensive review of systems was negative except for: what is in the HPI      PMH:  Past Medical History:   Diagnosis Date    Anxiety     COPD (chronic obstructive pulmonary disease) (HCC)     Hypertension     Leg swelling     Multiple gastric ulcers     Restless leg syndrome        Surgical History:  Past Surgical History:   Procedure Laterality Date    BRONCHOSCOPY N/A 04/27/2023    BRONCHOSCOPY DIAGNOSTIC OR CELL WASH ONLY performed by Zhang Murphy MD at [facility] ENDOSCOPY    HERNIA REPAIR      HIP SURGERY      JOINT REPLACEMENT  1987    KNEE SURGERY         Medications Prior to Admission:    Prior to Admission medications    Medication Sig Start Date End Date Taking? Authorizing Provider   Morphine Sulfate (MORPHINE 20MG/ML) SOLN concentrated solution Take 0.5 mLs by mouth every 2 hours as needed (shortness of breath) for up to 5 days. Max Daily Amount: 120 mg 3/4/25 3/9/25  Amrik Zuñiga, APRN - CNP   furosemide (LASIX) 40 MG tablet Take 1.5 tablets by mouth 2 times daily 2/27/25   Nyla Calix, APRN - CNP   morphine (MS CONTIN) 30 MG extended release tablet Take 1

## 2025-03-06 NOTE — PLAN OF CARE
Problem: ABCDS Injury Assessment  Goal: Absence of physical injury  Outcome: Progressing     Problem: Skin/Tissue Integrity  Goal: Skin integrity remains intact  Description: 1.  Monitor for areas of redness and/or skin breakdown  2.  Assess vascular access sites hourly  3.  Every 4-6 hours minimum:  Change oxygen saturation probe site  4.  Every 4-6 hours:  If on nasal continuous positive airway pressure, respiratory therapy assess nares and determine need for appliance change or resting period  Outcome: Progressing

## 2025-03-06 NOTE — PROGRESS NOTES
Attempted to remove bipap to give morning medications patients SPO2 dropped into the 70s on 6 liters NC. Bipap placed back on.

## 2025-03-07 PROBLEM — J15.4 STREPTOCOCCAL PNEUMONIA: Status: ACTIVE | Noted: 2025-01-01

## 2025-03-07 LAB
ALBUMIN SERPL-MCNC: 3 G/DL (ref 3.5–5.2)
ALP SERPL-CCNC: 45 U/L (ref 40–129)
ALT SERPL-CCNC: 8 U/L (ref 0–40)
ANION GAP SERPL CALCULATED.3IONS-SCNC: 9 MMOL/L (ref 7–16)
AST SERPL-CCNC: 16 U/L (ref 0–39)
BASOPHILS # BLD: 0 K/UL (ref 0–0.2)
BASOPHILS NFR BLD: 0 % (ref 0–2)
BILIRUB SERPL-MCNC: 0.3 MG/DL (ref 0–1.2)
BUN SERPL-MCNC: 26 MG/DL (ref 6–20)
CALCIUM SERPL-MCNC: 6.6 MG/DL (ref 8.6–10.2)
CHLORIDE SERPL-SCNC: 101 MMOL/L (ref 98–107)
CO2 SERPL-SCNC: 30 MMOL/L (ref 22–29)
CREAT SERPL-MCNC: 0.6 MG/DL (ref 0.7–1.2)
EOSINOPHIL # BLD: 0 K/UL (ref 0.05–0.5)
EOSINOPHILS RELATIVE PERCENT: 0 % (ref 0–6)
ERYTHROCYTE [DISTWIDTH] IN BLOOD BY AUTOMATED COUNT: 15.2 % (ref 11.5–15)
GFR, ESTIMATED: >90 ML/MIN/1.73M2
GLUCOSE SERPL-MCNC: 98 MG/DL (ref 74–99)
HCT VFR BLD AUTO: 29.2 % (ref 37–54)
HGB BLD-MCNC: 8.3 G/DL (ref 12.5–16.5)
LYMPHOCYTES NFR BLD: 0.47 K/UL (ref 1.5–4)
LYMPHOCYTES RELATIVE PERCENT: 4 % (ref 20–42)
MCH RBC QN AUTO: 30.2 PG (ref 26–35)
MCHC RBC AUTO-ENTMCNC: 28.4 G/DL (ref 32–34.5)
MCV RBC AUTO: 106.2 FL (ref 80–99.9)
MICROORGANISM/AGENT SPEC: ABNORMAL
MONOCYTES NFR BLD: 0.71 K/UL (ref 0.1–0.95)
MONOCYTES NFR BLD: 5 % (ref 2–12)
NEUTROPHILS NFR BLD: 91 % (ref 43–80)
NEUTS SEG NFR BLD: 12.42 K/UL (ref 1.8–7.3)
PLATELET CONFIRMATION: NORMAL
PLATELET, FLUORESCENCE: 109 K/UL (ref 130–450)
PMV BLD AUTO: 12.7 FL (ref 7–12)
POTASSIUM SERPL-SCNC: 4.3 MMOL/L (ref 3.5–5)
PROT SERPL-MCNC: 6.6 G/DL (ref 6.4–8.3)
RBC # BLD AUTO: 2.75 M/UL (ref 3.8–5.8)
RBC # BLD: ABNORMAL 10*6/UL
SODIUM SERPL-SCNC: 140 MMOL/L (ref 132–146)
SPECIMEN DESCRIPTION: ABNORMAL
WBC # BLD: ABNORMAL 10*3/UL
WBC OTHER # BLD: 13.6 K/UL (ref 4.5–11.5)

## 2025-03-07 PROCEDURE — 6360000002 HC RX W HCPCS: Performed by: STUDENT IN AN ORGANIZED HEALTH CARE EDUCATION/TRAINING PROGRAM

## 2025-03-07 PROCEDURE — 85025 COMPLETE CBC W/AUTO DIFF WBC: CPT

## 2025-03-07 PROCEDURE — 6370000000 HC RX 637 (ALT 250 FOR IP): Performed by: INTERNAL MEDICINE

## 2025-03-07 PROCEDURE — 94660 CPAP INITIATION&MGMT: CPT

## 2025-03-07 PROCEDURE — 2580000003 HC RX 258: Performed by: INTERNAL MEDICINE

## 2025-03-07 PROCEDURE — 6370000000 HC RX 637 (ALT 250 FOR IP): Performed by: STUDENT IN AN ORGANIZED HEALTH CARE EDUCATION/TRAINING PROGRAM

## 2025-03-07 PROCEDURE — 99232 SBSQ HOSP IP/OBS MODERATE 35: CPT | Performed by: INTERNAL MEDICINE

## 2025-03-07 PROCEDURE — 87205 SMEAR GRAM STAIN: CPT

## 2025-03-07 PROCEDURE — 6370000000 HC RX 637 (ALT 250 FOR IP)

## 2025-03-07 PROCEDURE — 6360000002 HC RX W HCPCS: Performed by: INTERNAL MEDICINE

## 2025-03-07 PROCEDURE — 87081 CULTURE SCREEN ONLY: CPT

## 2025-03-07 PROCEDURE — 2060000000 HC ICU INTERMEDIATE R&B

## 2025-03-07 PROCEDURE — 2500000003 HC RX 250 WO HCPCS: Performed by: STUDENT IN AN ORGANIZED HEALTH CARE EDUCATION/TRAINING PROGRAM

## 2025-03-07 PROCEDURE — 94669 MECHANICAL CHEST WALL OSCILL: CPT

## 2025-03-07 PROCEDURE — 80053 COMPREHEN METABOLIC PANEL: CPT

## 2025-03-07 PROCEDURE — 94640 AIRWAY INHALATION TREATMENT: CPT

## 2025-03-07 PROCEDURE — 99222 1ST HOSP IP/OBS MODERATE 55: CPT | Performed by: NURSE PRACTITIONER

## 2025-03-07 PROCEDURE — 2700000000 HC OXYGEN THERAPY PER DAY

## 2025-03-07 PROCEDURE — 87070 CULTURE OTHR SPECIMN AEROBIC: CPT

## 2025-03-07 RX ORDER — SODIUM CHLORIDE FOR INHALATION 3 %
4 VIAL, NEBULIZER (ML) INHALATION PRN
Status: DISCONTINUED | OUTPATIENT
Start: 2025-03-07 | End: 2025-03-10 | Stop reason: HOSPADM

## 2025-03-07 RX ORDER — METHYLPREDNISOLONE SODIUM SUCCINATE 125 MG/2ML
80 INJECTION INTRAMUSCULAR; INTRAVENOUS EVERY 12 HOURS
Status: DISCONTINUED | OUTPATIENT
Start: 2025-03-07 | End: 2025-03-07

## 2025-03-07 RX ORDER — METHYLPREDNISOLONE SODIUM SUCCINATE 40 MG/ML
40 INJECTION INTRAMUSCULAR; INTRAVENOUS EVERY 8 HOURS
Status: DISCONTINUED | OUTPATIENT
Start: 2025-03-07 | End: 2025-03-09

## 2025-03-07 RX ORDER — FUROSEMIDE 10 MG/ML
20 INJECTION INTRAMUSCULAR; INTRAVENOUS 2 TIMES DAILY
Status: DISCONTINUED | OUTPATIENT
Start: 2025-03-07 | End: 2025-03-10 | Stop reason: HOSPADM

## 2025-03-07 RX ORDER — ARFORMOTEROL TARTRATE 15 UG/2ML
15 SOLUTION RESPIRATORY (INHALATION)
Status: DISCONTINUED | OUTPATIENT
Start: 2025-03-07 | End: 2025-03-10 | Stop reason: HOSPADM

## 2025-03-07 RX ORDER — METHYLPREDNISOLONE SODIUM SUCCINATE 125 MG/2ML
60 INJECTION INTRAMUSCULAR; INTRAVENOUS EVERY 12 HOURS
Status: DISCONTINUED | OUTPATIENT
Start: 2025-03-07 | End: 2025-03-07

## 2025-03-07 RX ADMIN — TAMSULOSIN HYDROCHLORIDE 0.4 MG: 0.4 CAPSULE ORAL at 08:28

## 2025-03-07 RX ADMIN — IPRATROPIUM BROMIDE AND ALBUTEROL SULFATE 1 DOSE: .5; 2.5 SOLUTION RESPIRATORY (INHALATION) at 14:40

## 2025-03-07 RX ADMIN — MORPHINE SULFATE 30 MG: 15 TABLET, FILM COATED, EXTENDED RELEASE ORAL at 21:11

## 2025-03-07 RX ADMIN — IPRATROPIUM BROMIDE AND ALBUTEROL SULFATE 1 DOSE: .5; 2.5 SOLUTION RESPIRATORY (INHALATION) at 13:21

## 2025-03-07 RX ADMIN — HYDROCODONE BITARTRATE AND ACETAMINOPHEN 1 TABLET: 7.5; 325 TABLET ORAL at 02:24

## 2025-03-07 RX ADMIN — GABAPENTIN 300 MG: 300 CAPSULE ORAL at 08:28

## 2025-03-07 RX ADMIN — METOPROLOL SUCCINATE 100 MG: 100 TABLET, EXTENDED RELEASE ORAL at 08:28

## 2025-03-07 RX ADMIN — LORAZEPAM 1 MG: 1 TABLET ORAL at 13:43

## 2025-03-07 RX ADMIN — IPRATROPIUM BROMIDE AND ALBUTEROL SULFATE 1 DOSE: .5; 2.5 SOLUTION RESPIRATORY (INHALATION) at 04:15

## 2025-03-07 RX ADMIN — ASPIRIN 81 MG CHEWABLE TABLET 81 MG: 81 TABLET CHEWABLE at 08:28

## 2025-03-07 RX ADMIN — CEFEPIME 2 G: 2 INJECTION, POWDER, FOR SOLUTION INTRAVENOUS at 13:43

## 2025-03-07 RX ADMIN — PRAMIPEXOLE DIHYDROCHLORIDE 0.75 MG: 0.25 TABLET ORAL at 21:11

## 2025-03-07 RX ADMIN — SODIUM CHLORIDE, PRESERVATIVE FREE 10 ML: 5 INJECTION INTRAVENOUS at 08:28

## 2025-03-07 RX ADMIN — CEFEPIME 2 G: 2 INJECTION, POWDER, FOR SOLUTION INTRAVENOUS at 06:33

## 2025-03-07 RX ADMIN — ENOXAPARIN SODIUM 40 MG: 100 INJECTION SUBCUTANEOUS at 08:28

## 2025-03-07 RX ADMIN — WATER 20 ML: 1 INJECTION INTRAMUSCULAR; INTRAVENOUS; SUBCUTANEOUS at 06:33

## 2025-03-07 RX ADMIN — IPRATROPIUM BROMIDE AND ALBUTEROL SULFATE 1 DOSE: .5; 2.5 SOLUTION RESPIRATORY (INHALATION) at 09:56

## 2025-03-07 RX ADMIN — LORAZEPAM 1 MG: 1 TABLET ORAL at 02:23

## 2025-03-07 RX ADMIN — FUROSEMIDE 20 MG: 10 INJECTION, SOLUTION INTRAMUSCULAR; INTRAVENOUS at 10:58

## 2025-03-07 RX ADMIN — GABAPENTIN 300 MG: 300 CAPSULE ORAL at 13:43

## 2025-03-07 RX ADMIN — FUROSEMIDE 20 MG: 10 INJECTION, SOLUTION INTRAMUSCULAR; INTRAVENOUS at 18:31

## 2025-03-07 RX ADMIN — MORPHINE SULFATE 30 MG: 15 TABLET, FILM COATED, EXTENDED RELEASE ORAL at 08:28

## 2025-03-07 RX ADMIN — IPRATROPIUM BROMIDE AND ALBUTEROL SULFATE 1 DOSE: .5; 2.5 SOLUTION RESPIRATORY (INHALATION) at 20:19

## 2025-03-07 RX ADMIN — ARIPIPRAZOLE 5 MG: 5 TABLET ORAL at 08:30

## 2025-03-07 RX ADMIN — LORAZEPAM 1 MG: 1 TABLET ORAL at 21:11

## 2025-03-07 RX ADMIN — FLUOXETINE HYDROCHLORIDE 20 MG: 20 CAPSULE ORAL at 21:11

## 2025-03-07 RX ADMIN — METHYLPREDNISOLONE SODIUM SUCCINATE 40 MG: 40 INJECTION INTRAMUSCULAR; INTRAVENOUS at 23:29

## 2025-03-07 RX ADMIN — CEFEPIME 2 G: 2 INJECTION, POWDER, FOR SOLUTION INTRAVENOUS at 23:29

## 2025-03-07 RX ADMIN — ARFORMOTEROL TARTRATE 15 MCG: 15 SOLUTION RESPIRATORY (INHALATION) at 20:19

## 2025-03-07 RX ADMIN — GABAPENTIN 300 MG: 300 CAPSULE ORAL at 21:11

## 2025-03-07 RX ADMIN — METHYLPREDNISOLONE SODIUM SUCCINATE 40 MG: 40 INJECTION INTRAMUSCULAR; INTRAVENOUS at 18:31

## 2025-03-07 RX ADMIN — Medication 4 ML: at 14:40

## 2025-03-07 RX ADMIN — ARFORMOTEROL TARTRATE 15 MCG: 15 SOLUTION RESPIRATORY (INHALATION) at 09:56

## 2025-03-07 RX ADMIN — METHYLPREDNISOLONE SODIUM SUCCINATE 60 MG: 125 INJECTION INTRAMUSCULAR; INTRAVENOUS at 08:26

## 2025-03-07 RX ADMIN — LORAZEPAM 1 MG: 1 TABLET ORAL at 08:28

## 2025-03-07 RX ADMIN — PANTOPRAZOLE SODIUM 40 MG: 40 TABLET, DELAYED RELEASE ORAL at 06:44

## 2025-03-07 RX ADMIN — WATER 20 ML: 1 INJECTION INTRAMUSCULAR; INTRAVENOUS; SUBCUTANEOUS at 13:43

## 2025-03-07 RX ADMIN — SODIUM CHLORIDE, PRESERVATIVE FREE 10 ML: 5 INJECTION INTRAVENOUS at 21:11

## 2025-03-07 RX ADMIN — IPRATROPIUM BROMIDE AND ALBUTEROL SULFATE 1 DOSE: .5; 2.5 SOLUTION RESPIRATORY (INHALATION) at 01:13

## 2025-03-07 RX ADMIN — WATER 20 ML: 1 INJECTION INTRAMUSCULAR; INTRAVENOUS; SUBCUTANEOUS at 23:29

## 2025-03-07 ASSESSMENT — PAIN - FUNCTIONAL ASSESSMENT: PAIN_FUNCTIONAL_ASSESSMENT: PREVENTS OR INTERFERES SOME ACTIVE ACTIVITIES AND ADLS

## 2025-03-07 ASSESSMENT — PAIN DESCRIPTION - DESCRIPTORS: DESCRIPTORS: JABBING

## 2025-03-07 ASSESSMENT — PAIN DESCRIPTION - ORIENTATION: ORIENTATION: RIGHT;LEFT

## 2025-03-07 ASSESSMENT — PAIN DESCRIPTION - LOCATION: LOCATION: LEG

## 2025-03-07 ASSESSMENT — PAIN SCALES - GENERAL
PAINLEVEL_OUTOF10: 7
PAINLEVEL_OUTOF10: 9

## 2025-03-07 ASSESSMENT — PAIN DESCRIPTION - PAIN TYPE: TYPE: CHRONIC PAIN

## 2025-03-07 NOTE — PROGRESS NOTES
[facility] Hospitalist Progress Note    Admitting Date and Time: 3/5/2025  4:46 PM  Admit Dx: Community acquired pneumonia, bilateral [J18.9]  Hypercapnic respiratory failure (HCC) [J96.92]    Subjective:  Patient is being followed for Community acquired pneumonia, bilateral [J18.9]  Hypercapnic respiratory failure (HCC) [J96.92]   Patient is seen today.  He is on BiPAP during encounter.  No acute complaints.    ROS: denies fever, chills, cp, n/v, HA unless stated above.      metoprolol succinate  100 mg Oral Daily    LORazepam  1 mg Oral TID    gabapentin  300 mg Oral TID    methylPREDNISolone  125 mg IntraVENous Daily    ipratropium 0.5 mg-albuterol 2.5 mg  1 Dose Inhalation Q4H RT    vancomycin  1,250 mg IntraVENous Q24H    sodium chloride flush  5-40 mL IntraVENous 2 times per day    enoxaparin  40 mg SubCUTAneous Daily    ARIPiprazole  5 mg Oral Daily    aspirin  81 mg Oral Daily    morphine  30 mg Oral BID    pantoprazole  40 mg Oral QAM AC    pramipexole  0.75 mg Oral Nightly    tamsulosin  0.4 mg Oral Daily    FLUoxetine  20 mg Oral Nightly    ceFEPIme  2 g IntraVENous Q8H    And    sterile water  20 mL Injection q8h     sodium chloride flush, 5-40 mL, PRN  sodium chloride, , PRN  potassium chloride, 40 mEq, PRN   Or  potassium alternative oral replacement, 40 mEq, PRN   Or  potassium chloride, 10 mEq, PRN  magnesium sulfate, 2,000 mg, PRN  ondansetron, 4 mg, Q8H PRN   Or  ondansetron, 4 mg, Q6H PRN  polyethylene glycol, 17 g, Daily PRN  acetaminophen, 650 mg, Q6H PRN   Or  acetaminophen, 650 mg, Q6H PRN  albuterol, 2.5 mg, Q6H PRN  HYDROcodone-acetaminophen, 1 tablet, Q8H PRN         Objective:    /64   Pulse 84   Temp 97.5 °F (36.4 °C) (Infrared)   Resp 28   Ht 1.626 m (5' 4\")   Wt 62.6 kg (138 lb 0.1 oz)   SpO2 100%   BMI 23.69 kg/m²     General Appearance: Thin appearing, Aox3, moderate distress due to hypoxia.  Skin: warm and dry  Head: normocephalic and atraumatic  Eyes:

## 2025-03-07 NOTE — PROGRESS NOTES
Found patient on bipap.       03/06/25 2048   NIV Type   NIV Started/Stopped On   Equipment Type V60   Mode AVAPS   Mask Type Full face mask   Mask Size Small   Settings/Measurements   PIP Observed 15 cm H20   CPAP/EPAP 5 cmH2O   IPAP Min 10 cmH2O   IPAP Max 20 cmH2O   Vt (Set, mL) 450 mL   Vt (Measured) 403 mL   Rate Ordered 14   Insp Rise Time (%) 3 %   FiO2  70 %   I Time/ I Time % 0.9 s   Minute Volume (L/min) 8.7 Liters   Mask Leak (lpm) 93 lpm   Patient's Home Machine No   Alarm Settings   Alarms On Y

## 2025-03-07 NOTE — PROGRESS NOTES
Physical Therapy  Facility/Department: [address] INTERNAL MEDICINE 2    Name: Dg Peña  : 1965  MRN: [id number]  Date of Service: 3/7/2025    Attempted to see pt for PT evaluation, pt declined.   Padmini Thorne PT [id number]

## 2025-03-07 NOTE — PROGRESS NOTES
Occupational Therapy  Date:3/7/2025  Patient Name: Dg Peña  Room: 0417/0417-A     Occupational Therapy (OT) order received, patient's medical record reviewed, and OT evaluation attempted this morning; patient declined to attempt EOB/OOB activities. OT evaluation to be re-attempted at later time/date, as able/appropriate.    Jamila Mendoza, OTR/L  License Number: [id number]

## 2025-03-07 NOTE — CARE COORDINATION
3/7/2025  Social Work Discharge Planning: Pneumonia.  This worker met with Pt to discuss  role and transition of care/discharge planning.Pt is from home with his family and states plan is to return home with [facility] [phone]  [phone] who he is active with. SUNDAY order will be needed if appropriate at discharge. Sister is in agreement. Pt currently receiving sk nurse, Pt and OT. Awaiting therapy evals. Pt is currently on 12l o2 here and uses 6l at home via [place] [facility].  Wean o2.  Pt resides in a first floor apartment with a ramp. [facility] [place].Electronically signed by FEDERICO dHz on 3/7/2025 at 2:49 PM

## 2025-03-07 NOTE — CARE COORDINATION
3/7/2025  Social Work Discharge Planning: Pneumonia.  This worker met with Pt to discuss  role and transition of care/discharge planning.Pt is from home with his family and states plan is to return home with [facility] [phone]  [phone] who he is active with. SUNDAY order will be needed if appropriate at discharge. Sister is in agreement. Pt currently receiving sk nurse, Pt and OT. Awaiting therapy evals. Pt is currently on 12l o2 here and uses 6l at home via [place] [facility].  Wean o2.  Pt resides in a first floor apartment with a ramp. [facility]acy is [facility] [place].. Electronically signed by FEDERICO Hdz on 3/7/2025 at 2:33 PM

## 2025-03-07 NOTE — PROGRESS NOTES
[facility] Hospitalist Progress Note    Admitting Date and Time: 3/5/2025  4:46 PM  Admit Dx: Community acquired pneumonia, bilateral [J18.9]  Hypercapnic respiratory failure (HCC) [J96.92]    Subjective:  Patient is being followed for Community acquired pneumonia, bilateral [J18.9]  Hypercapnic respiratory failure (HCC) [J96.92]     Patient is seen today.  He is on BiPAP during encounter.  Has no acute complaints.     ROS: denies fever, chills, cp, n/v, HA unless stated above.      arformoterol tartrate  15 mcg Nebulization BID RT    methylPREDNISolone  40 mg IntraVENous Q8H    furosemide  20 mg IntraVENous BID    metoprolol succinate  100 mg Oral Daily    LORazepam  1 mg Oral TID    gabapentin  300 mg Oral TID    gabapentin  300 mg Oral Once    ipratropium 0.5 mg-albuterol 2.5 mg  1 Dose Inhalation Q4H RT    sodium chloride flush  5-40 mL IntraVENous 2 times per day    enoxaparin  40 mg SubCUTAneous Daily    ARIPiprazole  5 mg Oral Daily    aspirin  81 mg Oral Daily    morphine  30 mg Oral BID    pantoprazole  40 mg Oral QAM AC    pramipexole  0.75 mg Oral Nightly    tamsulosin  0.4 mg Oral Daily    FLUoxetine  20 mg Oral Nightly    ceFEPIme  2 g IntraVENous Q8H    And    sterile water  20 mL Injection q8h     sodium chloride flush, 5-40 mL, PRN  sodium chloride, , PRN  potassium chloride, 40 mEq, PRN   Or  potassium alternative oral replacement, 40 mEq, PRN   Or  potassium chloride, 10 mEq, PRN  magnesium sulfate, 2,000 mg, PRN  ondansetron, 4 mg, Q8H PRN   Or  ondansetron, 4 mg, Q6H PRN  polyethylene glycol, 17 g, Daily PRN  acetaminophen, 650 mg, Q6H PRN   Or  acetaminophen, 650 mg, Q6H PRN  HYDROcodone-acetaminophen, 1 tablet, Q8H PRN         Objective:    /62   Pulse 78   Temp 97.3 °F (36.3 °C) (Axillary)   Resp 24   Ht 1.626 m (5' 4\")   Wt 62.6 kg (138 lb 0.1 oz)   SpO2 95%   BMI 23.69 kg/m²     General Appearance: Thin appearing, Aox3, moderate distress due to hypoxia.  Skin: warm and

## 2025-03-07 NOTE — CONSULTS
Palliative Care Department  [phone]  Palliative Care Initial Consult  Provider Jamila Perez, ARGELIA - CNP    Dg Peña  [id number]  Hospital Day: 3  Date of Initial Consult: 3/7/2025  Referring Provider: Herb Morris MD  Palliative Medicine was consulted for assistance with: Goals of care    HPI:   Dg Peña is a 59 y.o. with a medical history of congestive heart failure, hypertension, CAD, COPD who was admitted on 3/5/2025 from home with a CHIEF COMPLAINT of shortness of breath. Patient wears 6 L O2 via NC at baseline. In ED patient was found to be hypoxic and placed on nonrebreather followed by BiPAP.  CTA pulmonary showing no acute pulmonary embolism.  Multiple new findings of rapid development of lung parenchyma.  CXR showing interval development of focal opacity in the left lateral and right lower lateral lungs.  Findings are concerning for pneumonia.  Patient was admitted for further medical management.  ASSESSMENT/PLAN:     Pertinent Hospital Diagnoses     Bilateral pneumonia  Acute hypoxic respiratory failure  COPD  CHF    Palliative Care Encounter / Counseling Regarding Goals of Care  Please see detailed goals of care discussion as below  At this time, Dg Peña, Does have capacity for medical decision-making.  Capacity is time limited and situation/question specific  During encounter Kimberley was surrogate medical decision-maker  Outcome of goals of care meeting:   Confirmed full code status  Code status Full Code  Advanced Directives: no POA or living will in epic  Surrogate/Legal NOK:  Kimberley Goodman (sibling/POA) [phone]  Darren Martínezarlane (child/first alternate POA) [phone]    /Spiritual assessment: no /spiritual distress identified  Bereavement and grief: to be determined  Referrals to: none today  SUBJECTIVE:     Current medical issues leading to Palliative Medicine involvement include   Active Hospital Problems    Diagnosis Date Noted    Streptococcal  care with the other IDT members: Palliative Medicine IDT Team, Primary Team, Floor Nurse, Patient, and Family    Time/Communication  Greater than 50% of time spent, total 55 minutes in counseling and coordination of care at the bedside regarding goals of care and diagnosis and prognosis.    Thank you for allowing Palliative Medicine to participate in the care of Dg Peña.

## 2025-03-07 NOTE — ACP (ADVANCE CARE PLANNING)
3/7/2025  Advance Care Planning   Healthcare Decision Maker:    Primary Decision Maker: Kimberley Johnson - Brother/Sister - [phone]    Primary Decision Maker (Active): Sayra Burt - Brother/Sister - [phone]    Secondary Decision Maker: MarianaDarren - Child - [phone]    Click here to complete Healthcare Decision Makers including selection of the Healthcare Decision Maker Relationship (ie \"Primary\").

## 2025-03-07 NOTE — PLAN OF CARE
Problem: ABCDS Injury Assessment  Goal: Absence of physical injury  3/7/2025 0934 by Mery Harris, RN  Outcome: Progressing  3/6/2025 2326 by Faviola Diez  Outcome: Progressing     Problem: Skin/Tissue Integrity  Goal: Skin integrity remains intact  Description: 1.  Monitor for areas of redness and/or skin breakdown  2.  Assess vascular access sites hourly  3.  Every 4-6 hours minimum:  Change oxygen saturation probe site  4.  Every 4-6 hours:  If on nasal continuous positive airway pressure, respiratory therapy assess nares and determine need for appliance change or resting period  3/7/2025 0934 by Mery Harris, RN  Outcome: Progressing  3/6/2025 2326 by Faviola Diez  Outcome: Not Progressing     Problem: Discharge Planning  Goal: Discharge to home or other facility with appropriate resources  3/6/2025 2326 by Faviola Diez  Outcome: Not Progressing  Flowsheets (Taken 3/6/2025 1218 by Corie Mcbride, RN)  Discharge to home or other facility with appropriate resources: Refer to discharge planning if patient needs post-hospital services based on physician order or complex needs related to functional status, cognitive ability or social support system     Problem: Chronic Conditions and Co-morbidities  Goal: Patient's chronic conditions and co-morbidity symptoms are monitored and maintained or improved  3/6/2025 2326 by Faviola Diez  Outcome: Not Progressing     Problem: Pain  Goal: Verbalizes/displays adequate comfort level or baseline comfort level  3/6/2025 2326 by Faviola Diez  Outcome: Not Progressing

## 2025-03-07 NOTE — PLAN OF CARE
Problem: Skin/Tissue Integrity  Goal: Skin integrity remains intact  Description: 1.  Monitor for areas of redness and/or skin breakdown  2.  Assess vascular access sites hourly  3.  Every 4-6 hours minimum:  Change oxygen saturation probe site  4.  Every 4-6 hours:  If on nasal continuous positive airway pressure, respiratory therapy assess nares and determine need for appliance change or resting period  3/6/2025 2326 by Faviola Diez  Outcome: Not Progressing  3/6/2025 1846 by Mery Harris, RN  Outcome: Progressing     Problem: Discharge Planning  Goal: Discharge to home or other facility with appropriate resources  Outcome: Not Progressing  Flowsheets (Taken 3/6/2025 1218 by Corie Mcbride, RN)  Discharge to home or other facility with appropriate resources: Refer to discharge planning if patient needs post-hospital services based on physician order or complex needs related to functional status, cognitive ability or social support system     Problem: Chronic Conditions and Co-morbidities  Goal: Patient's chronic conditions and co-morbidity symptoms are monitored and maintained or improved  Outcome: Not Progressing     Problem: Pain  Goal: Verbalizes/displays adequate comfort level or baseline comfort level  Outcome: Not Progressing     Problem: ABCDS Injury Assessment  Goal: Absence of physical injury  3/6/2025 2326 by Faviola Diez  Outcome: Progressing  3/6/2025 1846 by Mery Harris, RN  Outcome: Progressing     Problem: Safety - Adult  Goal: Free from fall injury  Outcome: Progressing     Problem: Skin/Tissue Integrity  Goal: Skin integrity remains intact  Description: 1.  Monitor for areas of redness and/or skin breakdown  2.  Assess vascular access sites hourly  3.  Every 4-6 hours minimum:  Change oxygen saturation probe site  4.  Every 4-6 hours:  If on nasal continuous positive airway pressure, respiratory therapy assess nares and determine need for appliance change or resting  period  3/6/2025 2326 by Faviola Diez  Outcome: Not Progressing  3/6/2025 1846 by Mery Harris, RN  Outcome: Progressing     Problem: Discharge Planning  Goal: Discharge to home or other facility with appropriate resources  Outcome: Not Progressing  Flowsheets (Taken 3/6/2025 1218 by Corie Mcbride, RN)  Discharge to home or other facility with appropriate resources: Refer to discharge planning if patient needs post-hospital services based on physician order or complex needs related to functional status, cognitive ability or social support system     Problem: Chronic Conditions and Co-morbidities  Goal: Patient's chronic conditions and co-morbidity symptoms are monitored and maintained or improved  Outcome: Not Progressing     Problem: Pain  Goal: Verbalizes/displays adequate comfort level or baseline comfort level  Outcome: Not Progressing

## 2025-03-08 LAB
ALBUMIN SERPL-MCNC: 3.3 G/DL (ref 3.5–5.2)
ALP SERPL-CCNC: 46 U/L (ref 40–129)
ALT SERPL-CCNC: 7 U/L (ref 0–40)
ANION GAP SERPL CALCULATED.3IONS-SCNC: 7 MMOL/L (ref 7–16)
AST SERPL-CCNC: 9 U/L (ref 0–39)
BASOPHILS # BLD: 0 K/UL (ref 0–0.2)
BASOPHILS NFR BLD: 0 % (ref 0–2)
BILIRUB SERPL-MCNC: 0.3 MG/DL (ref 0–1.2)
BUN SERPL-MCNC: 26 MG/DL (ref 6–20)
CALCIUM SERPL-MCNC: 7 MG/DL (ref 8.6–10.2)
CHLORIDE SERPL-SCNC: 96 MMOL/L (ref 98–107)
CO2 SERPL-SCNC: 36 MMOL/L (ref 22–29)
CREAT SERPL-MCNC: 0.6 MG/DL (ref 0.7–1.2)
EOSINOPHIL # BLD: 0 K/UL (ref 0.05–0.5)
EOSINOPHILS RELATIVE PERCENT: 0 % (ref 0–6)
ERYTHROCYTE [DISTWIDTH] IN BLOOD BY AUTOMATED COUNT: 14.9 % (ref 11.5–15)
GFR, ESTIMATED: >90 ML/MIN/1.73M2
GLUCOSE SERPL-MCNC: 222 MG/DL (ref 74–99)
HCT VFR BLD AUTO: 28.5 % (ref 37–54)
HGB BLD-MCNC: 8 G/DL (ref 12.5–16.5)
LYMPHOCYTES NFR BLD: 0 K/UL (ref 1.5–4)
LYMPHOCYTES RELATIVE PERCENT: 0 % (ref 20–42)
MCH RBC QN AUTO: 29.6 PG (ref 26–35)
MCHC RBC AUTO-ENTMCNC: 28.1 G/DL (ref 32–34.5)
MCV RBC AUTO: 105.6 FL (ref 80–99.9)
MICROORGANISM SPEC CULT: NORMAL
MONOCYTES NFR BLD: 0.27 K/UL (ref 0.1–0.95)
MONOCYTES NFR BLD: 3 % (ref 2–12)
NEUTROPHILS NFR BLD: 97 % (ref 43–80)
NEUTS SEG NFR BLD: 10.13 K/UL (ref 1.8–7.3)
PLATELET # BLD AUTO: 111 K/UL (ref 130–450)
PMV BLD AUTO: 12.4 FL (ref 7–12)
POTASSIUM SERPL-SCNC: 4.3 MMOL/L (ref 3.5–5)
PROT SERPL-MCNC: 6.8 G/DL (ref 6.4–8.3)
RBC # BLD AUTO: 2.7 M/UL (ref 3.8–5.8)
RBC # BLD: ABNORMAL 10*6/UL
SODIUM SERPL-SCNC: 139 MMOL/L (ref 132–146)
SPECIMEN DESCRIPTION: NORMAL
WBC OTHER # BLD: 10.4 K/UL (ref 4.5–11.5)

## 2025-03-08 PROCEDURE — 94640 AIRWAY INHALATION TREATMENT: CPT

## 2025-03-08 PROCEDURE — 80053 COMPREHEN METABOLIC PANEL: CPT

## 2025-03-08 PROCEDURE — 6370000000 HC RX 637 (ALT 250 FOR IP): Performed by: STUDENT IN AN ORGANIZED HEALTH CARE EDUCATION/TRAINING PROGRAM

## 2025-03-08 PROCEDURE — 6360000002 HC RX W HCPCS: Performed by: INTERNAL MEDICINE

## 2025-03-08 PROCEDURE — 2500000003 HC RX 250 WO HCPCS: Performed by: STUDENT IN AN ORGANIZED HEALTH CARE EDUCATION/TRAINING PROGRAM

## 2025-03-08 PROCEDURE — 85025 COMPLETE CBC W/AUTO DIFF WBC: CPT

## 2025-03-08 PROCEDURE — 99233 SBSQ HOSP IP/OBS HIGH 50: CPT | Performed by: INTERNAL MEDICINE

## 2025-03-08 PROCEDURE — 2700000000 HC OXYGEN THERAPY PER DAY

## 2025-03-08 PROCEDURE — 94660 CPAP INITIATION&MGMT: CPT

## 2025-03-08 PROCEDURE — 6370000000 HC RX 637 (ALT 250 FOR IP): Performed by: INTERNAL MEDICINE

## 2025-03-08 PROCEDURE — 2060000000 HC ICU INTERMEDIATE R&B

## 2025-03-08 PROCEDURE — 6360000002 HC RX W HCPCS: Performed by: STUDENT IN AN ORGANIZED HEALTH CARE EDUCATION/TRAINING PROGRAM

## 2025-03-08 RX ADMIN — MORPHINE SULFATE 30 MG: 15 TABLET, FILM COATED, EXTENDED RELEASE ORAL at 20:29

## 2025-03-08 RX ADMIN — GABAPENTIN 300 MG: 300 CAPSULE ORAL at 13:57

## 2025-03-08 RX ADMIN — FLUOXETINE HYDROCHLORIDE 20 MG: 20 CAPSULE ORAL at 20:29

## 2025-03-08 RX ADMIN — METHYLPREDNISOLONE SODIUM SUCCINATE 40 MG: 40 INJECTION INTRAMUSCULAR; INTRAVENOUS at 08:27

## 2025-03-08 RX ADMIN — GABAPENTIN 300 MG: 300 CAPSULE ORAL at 20:29

## 2025-03-08 RX ADMIN — IPRATROPIUM BROMIDE AND ALBUTEROL SULFATE 1 DOSE: .5; 2.5 SOLUTION RESPIRATORY (INHALATION) at 16:39

## 2025-03-08 RX ADMIN — MORPHINE SULFATE 30 MG: 15 TABLET, FILM COATED, EXTENDED RELEASE ORAL at 08:25

## 2025-03-08 RX ADMIN — LORAZEPAM 1 MG: 1 TABLET ORAL at 08:26

## 2025-03-08 RX ADMIN — CEFEPIME 2 G: 2 INJECTION, POWDER, FOR SOLUTION INTRAVENOUS at 13:57

## 2025-03-08 RX ADMIN — IPRATROPIUM BROMIDE AND ALBUTEROL SULFATE 1 DOSE: .5; 2.5 SOLUTION RESPIRATORY (INHALATION) at 12:38

## 2025-03-08 RX ADMIN — PRAMIPEXOLE DIHYDROCHLORIDE 0.75 MG: 0.25 TABLET ORAL at 20:29

## 2025-03-08 RX ADMIN — TAMSULOSIN HYDROCHLORIDE 0.4 MG: 0.4 CAPSULE ORAL at 08:26

## 2025-03-08 RX ADMIN — IPRATROPIUM BROMIDE AND ALBUTEROL SULFATE 1 DOSE: .5; 2.5 SOLUTION RESPIRATORY (INHALATION) at 04:17

## 2025-03-08 RX ADMIN — ARFORMOTEROL TARTRATE 15 MCG: 15 SOLUTION RESPIRATORY (INHALATION) at 20:17

## 2025-03-08 RX ADMIN — CEFEPIME 2 G: 2 INJECTION, POWDER, FOR SOLUTION INTRAVENOUS at 06:30

## 2025-03-08 RX ADMIN — HYDROCODONE BITARTRATE AND ACETAMINOPHEN 1 TABLET: 7.5; 325 TABLET ORAL at 11:28

## 2025-03-08 RX ADMIN — SODIUM CHLORIDE, PRESERVATIVE FREE 10 ML: 5 INJECTION INTRAVENOUS at 08:27

## 2025-03-08 RX ADMIN — ARIPIPRAZOLE 5 MG: 5 TABLET ORAL at 08:26

## 2025-03-08 RX ADMIN — METOPROLOL SUCCINATE 100 MG: 100 TABLET, EXTENDED RELEASE ORAL at 08:26

## 2025-03-08 RX ADMIN — CEFEPIME 2 G: 2 INJECTION, POWDER, FOR SOLUTION INTRAVENOUS at 23:12

## 2025-03-08 RX ADMIN — ARFORMOTEROL TARTRATE 15 MCG: 15 SOLUTION RESPIRATORY (INHALATION) at 09:17

## 2025-03-08 RX ADMIN — FUROSEMIDE 20 MG: 10 INJECTION, SOLUTION INTRAMUSCULAR; INTRAVENOUS at 16:15

## 2025-03-08 RX ADMIN — IPRATROPIUM BROMIDE AND ALBUTEROL SULFATE 1 DOSE: .5; 2.5 SOLUTION RESPIRATORY (INHALATION) at 09:17

## 2025-03-08 RX ADMIN — IPRATROPIUM BROMIDE AND ALBUTEROL SULFATE 1 DOSE: .5; 2.5 SOLUTION RESPIRATORY (INHALATION) at 00:56

## 2025-03-08 RX ADMIN — WATER 20 ML: 1 INJECTION INTRAMUSCULAR; INTRAVENOUS; SUBCUTANEOUS at 23:12

## 2025-03-08 RX ADMIN — HYDROCODONE BITARTRATE AND ACETAMINOPHEN 1 TABLET: 7.5; 325 TABLET ORAL at 03:10

## 2025-03-08 RX ADMIN — PANTOPRAZOLE SODIUM 40 MG: 40 TABLET, DELAYED RELEASE ORAL at 06:31

## 2025-03-08 RX ADMIN — ASPIRIN 81 MG CHEWABLE TABLET 81 MG: 81 TABLET CHEWABLE at 08:26

## 2025-03-08 RX ADMIN — METHYLPREDNISOLONE SODIUM SUCCINATE 40 MG: 40 INJECTION INTRAMUSCULAR; INTRAVENOUS at 16:15

## 2025-03-08 RX ADMIN — WATER 20 ML: 1 INJECTION INTRAMUSCULAR; INTRAVENOUS; SUBCUTANEOUS at 06:30

## 2025-03-08 RX ADMIN — FUROSEMIDE 20 MG: 10 INJECTION, SOLUTION INTRAMUSCULAR; INTRAVENOUS at 08:27

## 2025-03-08 RX ADMIN — WATER 20 ML: 1 INJECTION INTRAMUSCULAR; INTRAVENOUS; SUBCUTANEOUS at 13:57

## 2025-03-08 RX ADMIN — LORAZEPAM 1 MG: 1 TABLET ORAL at 20:29

## 2025-03-08 RX ADMIN — GABAPENTIN 300 MG: 300 CAPSULE ORAL at 08:26

## 2025-03-08 RX ADMIN — IPRATROPIUM BROMIDE AND ALBUTEROL SULFATE 1 DOSE: .5; 2.5 SOLUTION RESPIRATORY (INHALATION) at 20:17

## 2025-03-08 RX ADMIN — ENOXAPARIN SODIUM 40 MG: 100 INJECTION SUBCUTANEOUS at 08:27

## 2025-03-08 RX ADMIN — METHYLPREDNISOLONE SODIUM SUCCINATE 40 MG: 40 INJECTION INTRAMUSCULAR; INTRAVENOUS at 23:12

## 2025-03-08 RX ADMIN — LORAZEPAM 1 MG: 1 TABLET ORAL at 13:57

## 2025-03-08 RX ADMIN — SODIUM CHLORIDE, PRESERVATIVE FREE 10 ML: 5 INJECTION INTRAVENOUS at 20:29

## 2025-03-08 ASSESSMENT — PAIN DESCRIPTION - PAIN TYPE: TYPE: CHRONIC PAIN

## 2025-03-08 ASSESSMENT — PAIN SCALES - GENERAL
PAINLEVEL_OUTOF10: 2
PAINLEVEL_OUTOF10: 0
PAINLEVEL_OUTOF10: 6
PAINLEVEL_OUTOF10: 0
PAINLEVEL_OUTOF10: 8
PAINLEVEL_OUTOF10: 3
PAINLEVEL_OUTOF10: 0

## 2025-03-08 ASSESSMENT — PAIN DESCRIPTION - ORIENTATION
ORIENTATION: RIGHT;LEFT
ORIENTATION: LEFT;RIGHT;MID;LOWER
ORIENTATION: LEFT;MID;LOWER

## 2025-03-08 ASSESSMENT — PAIN DESCRIPTION - DESCRIPTORS
DESCRIPTORS: ACHING;DISCOMFORT
DESCRIPTORS: ACHING;DISCOMFORT
DESCRIPTORS: ACHING;DISCOMFORT;THROBBING

## 2025-03-08 ASSESSMENT — PAIN DESCRIPTION - LOCATION
LOCATION: HIP;LEG;BACK
LOCATION: LEG;BACK
LOCATION: LEG

## 2025-03-08 ASSESSMENT — PAIN DESCRIPTION - ONSET: ONSET: ON-GOING

## 2025-03-08 ASSESSMENT — PAIN DESCRIPTION - FREQUENCY: FREQUENCY: INTERMITTENT

## 2025-03-08 NOTE — PROGRESS NOTES
SPO2 is 96% on 6 liters nasal cannula; patient is requesting oxygen be turned up. This RN provided education regarding over-oxygenation and COPD patients.

## 2025-03-08 NOTE — PLAN OF CARE
Problem: ABCDS Injury Assessment  Goal: Absence of physical injury  3/7/2025 2141 by Hawk Bryan, RN  Outcome: Progressing  3/7/2025 0934 by Mery Harris RN  Outcome: Progressing     Problem: Skin/Tissue Integrity  Goal: Skin integrity remains intact  Description: 1.  Monitor for areas of redness and/or skin breakdown  2.  Assess vascular access sites hourly  3.  Every 4-6 hours minimum:  Change oxygen saturation probe site  4.  Every 4-6 hours:  If on nasal continuous positive airway pressure, respiratory therapy assess nares and determine need for appliance change or resting period  3/7/2025 2141 by Hawk Bryan, RN  Outcome: Progressing  3/7/2025 0934 by Mery Harris RN  Outcome: Progressing     Problem: Discharge Planning  Goal: Discharge to home or other facility with appropriate resources  Outcome: Progressing     Problem: Chronic Conditions and Co-morbidities  Goal: Patient's chronic conditions and co-morbidity symptoms are monitored and maintained or improved  Outcome: Progressing     Problem: Pain  Goal: Verbalizes/displays adequate comfort level or baseline comfort level  Outcome: Progressing     Problem: Safety - Adult  Goal: Free from fall injury  Outcome: Progressing

## 2025-03-08 NOTE — PROGRESS NOTES
[facility] Hospitalist Progress Note    Admitting Date and Time: 3/5/2025  4:46 PM  Admit Dx: Community acquired pneumonia, bilateral [J18.9]  Hypercapnic respiratory failure (HCC) [J96.92]    Subjective:  Patient is being followed for Community acquired pneumonia, bilateral [J18.9]  Hypercapnic respiratory failure (HCC) [J96.92]     Patient is seen today. No acute complaints.     ROS: denies fever, chills, cp, n/v, HA unless stated above.      arformoterol tartrate  15 mcg Nebulization BID RT    methylPREDNISolone  40 mg IntraVENous Q8H    furosemide  20 mg IntraVENous BID    metoprolol succinate  100 mg Oral Daily    LORazepam  1 mg Oral TID    gabapentin  300 mg Oral TID    gabapentin  300 mg Oral Once    ipratropium 0.5 mg-albuterol 2.5 mg  1 Dose Inhalation Q4H RT    sodium chloride flush  5-40 mL IntraVENous 2 times per day    enoxaparin  40 mg SubCUTAneous Daily    ARIPiprazole  5 mg Oral Daily    aspirin  81 mg Oral Daily    morphine  30 mg Oral BID    pantoprazole  40 mg Oral QAM AC    pramipexole  0.75 mg Oral Nightly    tamsulosin  0.4 mg Oral Daily    FLUoxetine  20 mg Oral Nightly    ceFEPIme  2 g IntraVENous Q8H    And    sterile water  20 mL Injection q8h     sodium chloride (Inhalant), 4 mL, PRN  sodium chloride flush, 5-40 mL, PRN  sodium chloride, , PRN  potassium chloride, 40 mEq, PRN   Or  potassium alternative oral replacement, 40 mEq, PRN   Or  potassium chloride, 10 mEq, PRN  magnesium sulfate, 2,000 mg, PRN  ondansetron, 4 mg, Q8H PRN   Or  ondansetron, 4 mg, Q6H PRN  polyethylene glycol, 17 g, Daily PRN  acetaminophen, 650 mg, Q6H PRN   Or  acetaminophen, 650 mg, Q6H PRN  HYDROcodone-acetaminophen, 1 tablet, Q8H PRN         Objective:    /68   Pulse 73   Temp 98 °F (36.7 °C) (Infrared)   Resp 18   Ht 1.626 m (5' 4\")   Wt 62.6 kg (138 lb 0.1 oz)   SpO2 93%   BMI 23.69 kg/m²     General Appearance: Thin appearing, Aox3, moderate distress due to hypoxia.  Skin: warm and  dry  Head: normocephalic and atraumatic  Eyes: Strabismus  Neck: neck supple and non tender without mass   Pulmonary/Chest: clear to auscultation bilaterally- no wheezes, rales or rhonchi, normal air movement, no respiratory distress  Cardiovascular: normal rate, normal S1 and S2 and no carotid bruits  Abdomen: soft, non-tender, non-distended, normal bowel sounds, no masses or organomegaly  Extremities: no cyanosis, no clubbing and no edema  Neurologic: +1 BLE edema        Recent Labs     03/05/25  1729 03/07/25  0421 03/08/25  0436    140 139   K 3.6 4.3 4.3   CL 88* 101 96*   CO2 40* 30* 36*   BUN 13 26* 26*   CREATININE 0.9 0.6* 0.6*   GLUCOSE 119* 98 222*   CALCIUM 7.2* 6.6* 7.0*       Recent Labs     03/05/25  1729 03/06/25  0935 03/07/25  0421 03/08/25  0436   WBC 7.3 10.2 13.6* 10.4   RBC 3.38* 2.96* 2.75* 2.70*   HGB 10.0* 8.9* 8.3* 8.0*   HCT 35.4* 31.9* 29.2* 28.5*   .7* 107.8* 106.2* 105.6*   MCH 29.6 30.1 30.2 29.6   MCHC 28.2* 27.9* 28.4* 28.1*   RDW 14.6 14.7 15.2* 14.9    117*  --  111*   MPV 11.1 12.3* 12.7* 12.4*         Assessment:    Principal Problem:    Community acquired pneumonia, bilateral  Active Problems:    COPD, severe (HCC)    Acute respiratory failure with hypoxia and hypercapnia (HCC)    Chronic heart failure with preserved ejection fraction (HCC)    Acute exacerbation of chronic heart failure (HCC)    Hypercapnic respiratory failure (HCC)    Streptococcal pneumonia  Resolved Problems:    * No resolved hospital problems. *    3/8: O2 weaned down to 7L NC.  Electrolytes within acceptable range.  Will start to de-escalate antibiotics.    Plan:  Bilateral PNA: multiple admissions for similar infection F/u Cx.  Pro-Garry 2.97.  Continue breathing treatment, and NIV, steroids and antibiotics.  Strep pneumo PNA: resp cx +. Continue cefepime, discontinue Vanco.  Await final cultures.  Check MRSA nares.  Leukocytosis: 2/2 above.  Plan same as above.  Acute hypoxic hypercapnic

## 2025-03-09 VITALS
DIASTOLIC BLOOD PRESSURE: 73 MMHG | WEIGHT: 138.01 LBS | OXYGEN SATURATION: 95 % | BODY MASS INDEX: 23.56 KG/M2 | RESPIRATION RATE: 18 BRPM | HEART RATE: 59 BPM | SYSTOLIC BLOOD PRESSURE: 137 MMHG | HEIGHT: 64 IN | TEMPERATURE: 98.1 F

## 2025-03-09 LAB
ANION GAP SERPL CALCULATED.3IONS-SCNC: 9 MMOL/L (ref 7–16)
BASOPHILS # BLD: 0.01 K/UL (ref 0–0.2)
BASOPHILS NFR BLD: 0 % (ref 0–2)
BUN SERPL-MCNC: 22 MG/DL (ref 6–20)
CALCIUM SERPL-MCNC: 7.5 MG/DL (ref 8.6–10.2)
CHLORIDE SERPL-SCNC: 92 MMOL/L (ref 98–107)
CO2 SERPL-SCNC: 37 MMOL/L (ref 22–29)
CREAT SERPL-MCNC: 0.6 MG/DL (ref 0.7–1.2)
EOSINOPHIL # BLD: 0 K/UL (ref 0.05–0.5)
EOSINOPHILS RELATIVE PERCENT: 0 % (ref 0–6)
ERYTHROCYTE [DISTWIDTH] IN BLOOD BY AUTOMATED COUNT: 14.6 % (ref 11.5–15)
GFR, ESTIMATED: >90 ML/MIN/1.73M2
GLUCOSE SERPL-MCNC: 223 MG/DL (ref 74–99)
HCT VFR BLD AUTO: 32.4 % (ref 37–54)
HGB BLD-MCNC: 8.9 G/DL (ref 12.5–16.5)
IMM GRANULOCYTES # BLD AUTO: 0.05 K/UL (ref 0–0.58)
IMM GRANULOCYTES NFR BLD: 1 % (ref 0–5)
LYMPHOCYTES NFR BLD: 0.16 K/UL (ref 1.5–4)
LYMPHOCYTES RELATIVE PERCENT: 2 % (ref 20–42)
MCH RBC QN AUTO: 29.1 PG (ref 26–35)
MCHC RBC AUTO-ENTMCNC: 27.5 G/DL (ref 32–34.5)
MCV RBC AUTO: 105.9 FL (ref 80–99.9)
MICROORGANISM SPEC CULT: NORMAL
MICROORGANISM SPEC CULT: NORMAL
MONOCYTES NFR BLD: 0.26 K/UL (ref 0.1–0.95)
MONOCYTES NFR BLD: 4 % (ref 2–12)
NEUTROPHILS NFR BLD: 93 % (ref 43–80)
NEUTS SEG NFR BLD: 6.64 K/UL (ref 1.8–7.3)
PLATELET # BLD AUTO: 149 K/UL (ref 130–450)
PMV BLD AUTO: 12.6 FL (ref 7–12)
POTASSIUM SERPL-SCNC: 3.9 MMOL/L (ref 3.5–5)
RBC # BLD AUTO: 3.06 M/UL (ref 3.8–5.8)
RBC # BLD: ABNORMAL 10*6/UL
SERVICE CMNT-IMP: NORMAL
SERVICE CMNT-IMP: NORMAL
SODIUM SERPL-SCNC: 138 MMOL/L (ref 132–146)
SPECIMEN DESCRIPTION: NORMAL
SPECIMEN DESCRIPTION: NORMAL
WBC OTHER # BLD: 7.1 K/UL (ref 4.5–11.5)

## 2025-03-09 PROCEDURE — 6370000000 HC RX 637 (ALT 250 FOR IP): Performed by: STUDENT IN AN ORGANIZED HEALTH CARE EDUCATION/TRAINING PROGRAM

## 2025-03-09 PROCEDURE — 85025 COMPLETE CBC W/AUTO DIFF WBC: CPT

## 2025-03-09 PROCEDURE — 36415 COLL VENOUS BLD VENIPUNCTURE: CPT

## 2025-03-09 PROCEDURE — 94640 AIRWAY INHALATION TREATMENT: CPT

## 2025-03-09 PROCEDURE — 6360000002 HC RX W HCPCS: Performed by: INTERNAL MEDICINE

## 2025-03-09 PROCEDURE — 6370000000 HC RX 637 (ALT 250 FOR IP): Performed by: INTERNAL MEDICINE

## 2025-03-09 PROCEDURE — 2700000000 HC OXYGEN THERAPY PER DAY

## 2025-03-09 PROCEDURE — 80048 BASIC METABOLIC PNL TOTAL CA: CPT

## 2025-03-09 PROCEDURE — 2500000003 HC RX 250 WO HCPCS: Performed by: STUDENT IN AN ORGANIZED HEALTH CARE EDUCATION/TRAINING PROGRAM

## 2025-03-09 PROCEDURE — 6360000002 HC RX W HCPCS: Performed by: STUDENT IN AN ORGANIZED HEALTH CARE EDUCATION/TRAINING PROGRAM

## 2025-03-09 PROCEDURE — 99233 SBSQ HOSP IP/OBS HIGH 50: CPT | Performed by: INTERNAL MEDICINE

## 2025-03-09 PROCEDURE — 2060000000 HC ICU INTERMEDIATE R&B

## 2025-03-09 PROCEDURE — 94660 CPAP INITIATION&MGMT: CPT

## 2025-03-09 RX ORDER — METHYLPREDNISOLONE SODIUM SUCCINATE 40 MG/ML
40 INJECTION INTRAMUSCULAR; INTRAVENOUS EVERY 12 HOURS
Status: DISCONTINUED | OUTPATIENT
Start: 2025-03-09 | End: 2025-03-10 | Stop reason: HOSPADM

## 2025-03-09 RX ADMIN — TAMSULOSIN HYDROCHLORIDE 0.4 MG: 0.4 CAPSULE ORAL at 07:45

## 2025-03-09 RX ADMIN — FUROSEMIDE 20 MG: 10 INJECTION, SOLUTION INTRAMUSCULAR; INTRAVENOUS at 17:07

## 2025-03-09 RX ADMIN — LORAZEPAM 1 MG: 1 TABLET ORAL at 13:55

## 2025-03-09 RX ADMIN — IPRATROPIUM BROMIDE AND ALBUTEROL SULFATE 1 DOSE: .5; 2.5 SOLUTION RESPIRATORY (INHALATION) at 13:31

## 2025-03-09 RX ADMIN — PANTOPRAZOLE SODIUM 40 MG: 40 TABLET, DELAYED RELEASE ORAL at 05:41

## 2025-03-09 RX ADMIN — HYDROCODONE BITARTRATE AND ACETAMINOPHEN 1 TABLET: 7.5; 325 TABLET ORAL at 11:15

## 2025-03-09 RX ADMIN — SODIUM CHLORIDE, PRESERVATIVE FREE 10 ML: 5 INJECTION INTRAVENOUS at 07:48

## 2025-03-09 RX ADMIN — IPRATROPIUM BROMIDE AND ALBUTEROL SULFATE 1 DOSE: .5; 2.5 SOLUTION RESPIRATORY (INHALATION) at 05:26

## 2025-03-09 RX ADMIN — ARFORMOTEROL TARTRATE 15 MCG: 15 SOLUTION RESPIRATORY (INHALATION) at 09:18

## 2025-03-09 RX ADMIN — HYDROCODONE BITARTRATE AND ACETAMINOPHEN 1 TABLET: 7.5; 325 TABLET ORAL at 20:43

## 2025-03-09 RX ADMIN — IPRATROPIUM BROMIDE AND ALBUTEROL SULFATE 1 DOSE: .5; 2.5 SOLUTION RESPIRATORY (INHALATION) at 00:31

## 2025-03-09 RX ADMIN — IPRATROPIUM BROMIDE AND ALBUTEROL SULFATE 1 DOSE: .5; 2.5 SOLUTION RESPIRATORY (INHALATION) at 16:34

## 2025-03-09 RX ADMIN — MORPHINE SULFATE 30 MG: 15 TABLET, FILM COATED, EXTENDED RELEASE ORAL at 20:43

## 2025-03-09 RX ADMIN — IPRATROPIUM BROMIDE AND ALBUTEROL SULFATE 1 DOSE: .5; 2.5 SOLUTION RESPIRATORY (INHALATION) at 23:56

## 2025-03-09 RX ADMIN — ENOXAPARIN SODIUM 40 MG: 100 INJECTION SUBCUTANEOUS at 07:49

## 2025-03-09 RX ADMIN — GABAPENTIN 300 MG: 300 CAPSULE ORAL at 20:44

## 2025-03-09 RX ADMIN — ARIPIPRAZOLE 5 MG: 5 TABLET ORAL at 07:45

## 2025-03-09 RX ADMIN — LORAZEPAM 1 MG: 1 TABLET ORAL at 20:44

## 2025-03-09 RX ADMIN — GABAPENTIN 300 MG: 300 CAPSULE ORAL at 07:45

## 2025-03-09 RX ADMIN — FLUOXETINE HYDROCHLORIDE 20 MG: 20 CAPSULE ORAL at 20:44

## 2025-03-09 RX ADMIN — FUROSEMIDE 20 MG: 10 INJECTION, SOLUTION INTRAMUSCULAR; INTRAVENOUS at 07:47

## 2025-03-09 RX ADMIN — PRAMIPEXOLE DIHYDROCHLORIDE 0.75 MG: 0.25 TABLET ORAL at 20:44

## 2025-03-09 RX ADMIN — IPRATROPIUM BROMIDE AND ALBUTEROL SULFATE 1 DOSE: .5; 2.5 SOLUTION RESPIRATORY (INHALATION) at 21:02

## 2025-03-09 RX ADMIN — MORPHINE SULFATE 30 MG: 15 TABLET, FILM COATED, EXTENDED RELEASE ORAL at 07:45

## 2025-03-09 RX ADMIN — METHYLPREDNISOLONE SODIUM SUCCINATE 40 MG: 40 INJECTION INTRAMUSCULAR; INTRAVENOUS at 07:47

## 2025-03-09 RX ADMIN — SODIUM CHLORIDE, PRESERVATIVE FREE 10 ML: 5 INJECTION INTRAVENOUS at 20:45

## 2025-03-09 RX ADMIN — LORAZEPAM 1 MG: 1 TABLET ORAL at 07:45

## 2025-03-09 RX ADMIN — GABAPENTIN 300 MG: 300 CAPSULE ORAL at 13:55

## 2025-03-09 RX ADMIN — IPRATROPIUM BROMIDE AND ALBUTEROL SULFATE 1 DOSE: .5; 2.5 SOLUTION RESPIRATORY (INHALATION) at 09:18

## 2025-03-09 RX ADMIN — ARFORMOTEROL TARTRATE 15 MCG: 15 SOLUTION RESPIRATORY (INHALATION) at 21:02

## 2025-03-09 RX ADMIN — ASPIRIN 81 MG CHEWABLE TABLET 81 MG: 81 TABLET CHEWABLE at 07:45

## 2025-03-09 RX ADMIN — METHYLPREDNISOLONE SODIUM SUCCINATE 40 MG: 40 INJECTION, POWDER, FOR SOLUTION INTRAMUSCULAR; INTRAVENOUS at 20:44

## 2025-03-09 RX ADMIN — METOPROLOL SUCCINATE 100 MG: 100 TABLET, EXTENDED RELEASE ORAL at 07:45

## 2025-03-09 ASSESSMENT — PAIN SCALES - GENERAL
PAINLEVEL_OUTOF10: 0
PAINLEVEL_OUTOF10: 0
PAINLEVEL_OUTOF10: 3
PAINLEVEL_OUTOF10: 6
PAINLEVEL_OUTOF10: 0
PAINLEVEL_OUTOF10: 8
PAINLEVEL_OUTOF10: 0
PAINLEVEL_OUTOF10: 6
PAINLEVEL_OUTOF10: 9

## 2025-03-09 ASSESSMENT — PAIN DESCRIPTION - FREQUENCY
FREQUENCY: INTERMITTENT
FREQUENCY: INTERMITTENT

## 2025-03-09 ASSESSMENT — PAIN DESCRIPTION - ORIENTATION
ORIENTATION: LEFT;MID;LOWER
ORIENTATION: LEFT;LOWER;MID

## 2025-03-09 ASSESSMENT — PAIN DESCRIPTION - PAIN TYPE
TYPE: CHRONIC PAIN
TYPE: CHRONIC PAIN

## 2025-03-09 ASSESSMENT — PAIN DESCRIPTION - ONSET
ONSET: ON-GOING
ONSET: ON-GOING

## 2025-03-09 ASSESSMENT — PAIN DESCRIPTION - LOCATION
LOCATION: BACK;HIP;LEG
LOCATION: BACK;HIP;LEG

## 2025-03-09 ASSESSMENT — PAIN DESCRIPTION - DESCRIPTORS
DESCRIPTORS: ACHING;DISCOMFORT
DESCRIPTORS: ACHING;DISCOMFORT

## 2025-03-09 NOTE — PLAN OF CARE
Problem: ABCDS Injury Assessment  Goal: Absence of physical injury  3/9/2025 1300 by Porfirio Driscoll RN  Outcome: Progressing  3/8/2025 2228 by Hawk Bryan RN  Outcome: Progressing  3/8/2025 2226 by Hawk Bryan RN  Outcome: Progressing     Problem: Skin/Tissue Integrity  Goal: Skin integrity remains intact  Description: 1.  Monitor for areas of redness and/or skin breakdown  2.  Assess vascular access sites hourly  3.  Every 4-6 hours minimum:  Change oxygen saturation probe site  4.  Every 4-6 hours:  If on nasal continuous positive airway pressure, respiratory therapy assess nares and determine need for appliance change or resting period  3/9/2025 1300 by Porfirio Driscoll RN  Outcome: Progressing  3/8/2025 2228 by Hawk Bryan RN  Outcome: Progressing  3/8/2025 2226 by Hawk Bryan RN  Outcome: Progressing     Problem: Safety - Adult  Goal: Free from fall injury  3/9/2025 1300 by Porfirio Driscoll RN  Outcome: Progressing  3/8/2025 2228 by Hawk Bryan RN  Outcome: Progressing  3/8/2025 2226 by Hawk Bryan RN  Outcome: Progressing

## 2025-03-09 NOTE — PROGRESS NOTES
[facility] Hospitalist Progress Note    Admitting Date and Time: 3/5/2025  4:46 PM  Admit Dx: Community acquired pneumonia, bilateral [J18.9]  Hypercapnic respiratory failure (HCC) [J96.92]    Subjective:  Patient is being followed for Community acquired pneumonia, bilateral [J18.9]  Hypercapnic respiratory failure (HCC) [J96.92]     Patient is seen today. No acute complaints.     ROS: denies fever, chills, cp, n/v, HA unless stated above.      arformoterol tartrate  15 mcg Nebulization BID RT    methylPREDNISolone  40 mg IntraVENous Q8H    furosemide  20 mg IntraVENous BID    metoprolol succinate  100 mg Oral Daily    LORazepam  1 mg Oral TID    gabapentin  300 mg Oral TID    gabapentin  300 mg Oral Once    ipratropium 0.5 mg-albuterol 2.5 mg  1 Dose Inhalation Q4H RT    sodium chloride flush  5-40 mL IntraVENous 2 times per day    enoxaparin  40 mg SubCUTAneous Daily    ARIPiprazole  5 mg Oral Daily    aspirin  81 mg Oral Daily    morphine  30 mg Oral BID    pantoprazole  40 mg Oral QAM AC    pramipexole  0.75 mg Oral Nightly    tamsulosin  0.4 mg Oral Daily    FLUoxetine  20 mg Oral Nightly     sodium chloride (Inhalant), 4 mL, PRN  sodium chloride flush, 5-40 mL, PRN  sodium chloride, , PRN  potassium chloride, 40 mEq, PRN   Or  potassium alternative oral replacement, 40 mEq, PRN   Or  potassium chloride, 10 mEq, PRN  magnesium sulfate, 2,000 mg, PRN  ondansetron, 4 mg, Q8H PRN   Or  ondansetron, 4 mg, Q6H PRN  polyethylene glycol, 17 g, Daily PRN  acetaminophen, 650 mg, Q6H PRN   Or  acetaminophen, 650 mg, Q6H PRN  HYDROcodone-acetaminophen, 1 tablet, Q8H PRN         Objective:    BP (!) 144/77   Pulse 74   Temp 97.7 °F (36.5 °C) (Oral)   Resp 20   Ht 1.626 m (5' 4\")   Wt 62.6 kg (138 lb 0.1 oz)   SpO2 92%   BMI 23.69 kg/m²     General Appearance: Thin appearing, Aox3, moderate distress due to hypoxia.  Skin: warm and dry  Head: normocephalic and atraumatic  Eyes: Strabismus  Neck: neck supple

## 2025-03-09 NOTE — PLAN OF CARE
Problem: ABCDS Injury Assessment  Goal: Absence of physical injury  3/8/2025 2228 by Hawk Bryan RN  Outcome: Progressing  3/8/2025 2226 by Hawk Bryan RN  Outcome: Progressing     Problem: Skin/Tissue Integrity  Goal: Skin integrity remains intact  Description: 1.  Monitor for areas of redness and/or skin breakdown  2.  Assess vascular access sites hourly  3.  Every 4-6 hours minimum:  Change oxygen saturation probe site  4.  Every 4-6 hours:  If on nasal continuous positive airway pressure, respiratory therapy assess nares and determine need for appliance change or resting period  3/8/2025 2228 by Hawk Bryan RN  Outcome: Progressing  3/8/2025 2226 by Hawk Bryan RN  Outcome: Progressing     Problem: Discharge Planning  Goal: Discharge to home or other facility with appropriate resources  3/8/2025 2228 by Hawk Bryan RN  Outcome: Progressing  3/8/2025 2226 by Hawk Bryan RN  Outcome: Progressing     Problem: Chronic Conditions and Co-morbidities  Goal: Patient's chronic conditions and co-morbidity symptoms are monitored and maintained or improved  3/8/2025 2228 by Hawk Bryan RN  Outcome: Progressing  3/8/2025 2226 by Hawk Bryan RN  Outcome: Progressing     Problem: Pain  Goal: Verbalizes/displays adequate comfort level or baseline comfort level  3/8/2025 2228 by Hawk Bryan RN  Outcome: Progressing  3/8/2025 2226 by Hawk Bryan RN  Outcome: Progressing     Problem: Safety - Adult  Goal: Free from fall injury  3/8/2025 2228 by Hawk Bryan RN  Outcome: Progressing  3/8/2025 2226 by Hawk Bryan RN  Outcome: Progressing

## 2025-03-09 NOTE — PROGRESS NOTES
Patient is now refusing thickened liquids. Dr. Kong updated vi perfect serve.   Refusal waiver signed by patient and placed in soft chart. Diet order changed.

## 2025-03-09 NOTE — PROGRESS NOTES
Per patient's sister Sayra, patient was on honey thick liquids his last admission per speech therapist recommendations. Dr. Kong updated. Order changed.

## 2025-03-10 VITALS
HEIGHT: 64 IN | DIASTOLIC BLOOD PRESSURE: 87 MMHG | TEMPERATURE: 97.1 F | WEIGHT: 138.01 LBS | RESPIRATION RATE: 22 BRPM | HEART RATE: 78 BPM | SYSTOLIC BLOOD PRESSURE: 145 MMHG | BODY MASS INDEX: 23.56 KG/M2 | OXYGEN SATURATION: 95 %

## 2025-03-10 LAB
ANION GAP SERPL CALCULATED.3IONS-SCNC: 8 MMOL/L (ref 7–16)
BASOPHILS # BLD: 0.01 K/UL (ref 0–0.2)
BASOPHILS NFR BLD: 0 % (ref 0–2)
BUN SERPL-MCNC: 21 MG/DL (ref 6–20)
CALCIUM SERPL-MCNC: 7.3 MG/DL (ref 8.6–10.2)
CHLORIDE SERPL-SCNC: 90 MMOL/L (ref 98–107)
CO2 SERPL-SCNC: 41 MMOL/L (ref 22–29)
CREAT SERPL-MCNC: 0.6 MG/DL (ref 0.7–1.2)
EOSINOPHIL # BLD: 0 K/UL (ref 0.05–0.5)
EOSINOPHILS RELATIVE PERCENT: 0 % (ref 0–6)
ERYTHROCYTE [DISTWIDTH] IN BLOOD BY AUTOMATED COUNT: 14.7 % (ref 11.5–15)
GFR, ESTIMATED: >90 ML/MIN/1.73M2
GLUCOSE SERPL-MCNC: 156 MG/DL (ref 74–99)
HCT VFR BLD AUTO: 30.8 % (ref 37–54)
HGB BLD-MCNC: 8.6 G/DL (ref 12.5–16.5)
IMM GRANULOCYTES # BLD AUTO: 0.04 K/UL (ref 0–0.58)
IMM GRANULOCYTES NFR BLD: 1 % (ref 0–5)
LYMPHOCYTES NFR BLD: 0.33 K/UL (ref 1.5–4)
LYMPHOCYTES RELATIVE PERCENT: 7 % (ref 20–42)
MCH RBC QN AUTO: 28.9 PG (ref 26–35)
MCHC RBC AUTO-ENTMCNC: 27.9 G/DL (ref 32–34.5)
MCV RBC AUTO: 103.4 FL (ref 80–99.9)
MICROORGANISM SPEC CULT: NORMAL
MICROORGANISM SPEC CULT: NORMAL
MONOCYTES NFR BLD: 0.27 K/UL (ref 0.1–0.95)
MONOCYTES NFR BLD: 5 % (ref 2–12)
NEUTROPHILS NFR BLD: 87 % (ref 43–80)
NEUTS SEG NFR BLD: 4.33 K/UL (ref 1.8–7.3)
PLATELET, FLUORESCENCE: 161 K/UL (ref 130–450)
PMV BLD AUTO: 11.9 FL (ref 7–12)
POTASSIUM SERPL-SCNC: 4.2 MMOL/L (ref 3.5–5)
RBC # BLD AUTO: 2.98 M/UL (ref 3.8–5.8)
RBC # BLD: ABNORMAL 10*6/UL
SERVICE CMNT-IMP: NORMAL
SERVICE CMNT-IMP: NORMAL
SODIUM SERPL-SCNC: 139 MMOL/L (ref 132–146)
SPECIMEN DESCRIPTION: NORMAL
SPECIMEN DESCRIPTION: NORMAL
WBC OTHER # BLD: 5 K/UL (ref 4.5–11.5)

## 2025-03-10 PROCEDURE — 80048 BASIC METABOLIC PNL TOTAL CA: CPT

## 2025-03-10 PROCEDURE — 99233 SBSQ HOSP IP/OBS HIGH 50: CPT | Performed by: INTERNAL MEDICINE

## 2025-03-10 PROCEDURE — 6370000000 HC RX 637 (ALT 250 FOR IP): Performed by: INTERNAL MEDICINE

## 2025-03-10 PROCEDURE — 85025 COMPLETE CBC W/AUTO DIFF WBC: CPT

## 2025-03-10 PROCEDURE — 6370000000 HC RX 637 (ALT 250 FOR IP): Performed by: STUDENT IN AN ORGANIZED HEALTH CARE EDUCATION/TRAINING PROGRAM

## 2025-03-10 PROCEDURE — 6360000002 HC RX W HCPCS: Performed by: STUDENT IN AN ORGANIZED HEALTH CARE EDUCATION/TRAINING PROGRAM

## 2025-03-10 PROCEDURE — 94660 CPAP INITIATION&MGMT: CPT

## 2025-03-10 PROCEDURE — 94640 AIRWAY INHALATION TREATMENT: CPT

## 2025-03-10 PROCEDURE — 6360000002 HC RX W HCPCS: Performed by: INTERNAL MEDICINE

## 2025-03-10 PROCEDURE — 2700000000 HC OXYGEN THERAPY PER DAY

## 2025-03-10 PROCEDURE — 2500000003 HC RX 250 WO HCPCS: Performed by: STUDENT IN AN ORGANIZED HEALTH CARE EDUCATION/TRAINING PROGRAM

## 2025-03-10 PROCEDURE — 92610 EVALUATE SWALLOWING FUNCTION: CPT

## 2025-03-10 RX ORDER — ACETAMINOPHEN 650 MG/1
650 SUPPOSITORY RECTAL EVERY 6 HOURS PRN
Status: DISCONTINUED | OUTPATIENT
Start: 2025-03-10 | End: 2025-03-10 | Stop reason: HOSPADM

## 2025-03-10 RX ORDER — METHYLPREDNISOLONE 4 MG/1
TABLET ORAL
Qty: 1 KIT | Refills: 0 | Status: SHIPPED | OUTPATIENT
Start: 2025-03-10 | End: 2025-03-16

## 2025-03-10 RX ORDER — AMLODIPINE BESYLATE 5 MG/1
5 TABLET ORAL DAILY
Status: DISCONTINUED | OUTPATIENT
Start: 2025-03-10 | End: 2025-03-10 | Stop reason: HOSPADM

## 2025-03-10 RX ORDER — HYDRALAZINE HYDROCHLORIDE 20 MG/ML
10 INJECTION INTRAMUSCULAR; INTRAVENOUS EVERY 4 HOURS PRN
Status: DISCONTINUED | OUTPATIENT
Start: 2025-03-10 | End: 2025-03-10 | Stop reason: HOSPADM

## 2025-03-10 RX ORDER — ACETAMINOPHEN 325 MG/1
650 TABLET ORAL EVERY 6 HOURS PRN
Status: DISCONTINUED | OUTPATIENT
Start: 2025-03-10 | End: 2025-03-10 | Stop reason: HOSPADM

## 2025-03-10 RX ADMIN — ARIPIPRAZOLE 5 MG: 5 TABLET ORAL at 08:32

## 2025-03-10 RX ADMIN — IPRATROPIUM BROMIDE AND ALBUTEROL SULFATE 1 DOSE: .5; 2.5 SOLUTION RESPIRATORY (INHALATION) at 13:08

## 2025-03-10 RX ADMIN — AMLODIPINE BESYLATE 5 MG: 5 TABLET ORAL at 08:29

## 2025-03-10 RX ADMIN — AMOXICILLIN AND CLAVULANATE POTASSIUM 1 TABLET: 875; 125 TABLET, FILM COATED ORAL at 13:32

## 2025-03-10 RX ADMIN — ASPIRIN 81 MG CHEWABLE TABLET 81 MG: 81 TABLET CHEWABLE at 08:29

## 2025-03-10 RX ADMIN — TAMSULOSIN HYDROCHLORIDE 0.4 MG: 0.4 CAPSULE ORAL at 08:29

## 2025-03-10 RX ADMIN — METOPROLOL SUCCINATE 100 MG: 100 TABLET, EXTENDED RELEASE ORAL at 08:29

## 2025-03-10 RX ADMIN — PANTOPRAZOLE SODIUM 40 MG: 40 TABLET, DELAYED RELEASE ORAL at 05:49

## 2025-03-10 RX ADMIN — ARFORMOTEROL TARTRATE 15 MCG: 15 SOLUTION RESPIRATORY (INHALATION) at 09:39

## 2025-03-10 RX ADMIN — GABAPENTIN 300 MG: 300 CAPSULE ORAL at 13:31

## 2025-03-10 RX ADMIN — LORAZEPAM 1 MG: 1 TABLET ORAL at 08:28

## 2025-03-10 RX ADMIN — IPRATROPIUM BROMIDE AND ALBUTEROL SULFATE 1 DOSE: .5; 2.5 SOLUTION RESPIRATORY (INHALATION) at 16:22

## 2025-03-10 RX ADMIN — GABAPENTIN 300 MG: 300 CAPSULE ORAL at 08:28

## 2025-03-10 RX ADMIN — MORPHINE SULFATE 30 MG: 15 TABLET, FILM COATED, EXTENDED RELEASE ORAL at 08:28

## 2025-03-10 RX ADMIN — HYDROCODONE BITARTRATE AND ACETAMINOPHEN 1 TABLET: 7.5; 325 TABLET ORAL at 05:49

## 2025-03-10 RX ADMIN — FUROSEMIDE 20 MG: 10 INJECTION, SOLUTION INTRAMUSCULAR; INTRAVENOUS at 08:29

## 2025-03-10 RX ADMIN — ENOXAPARIN SODIUM 40 MG: 100 INJECTION SUBCUTANEOUS at 08:30

## 2025-03-10 RX ADMIN — IPRATROPIUM BROMIDE AND ALBUTEROL SULFATE 1 DOSE: .5; 2.5 SOLUTION RESPIRATORY (INHALATION) at 03:45

## 2025-03-10 RX ADMIN — HYDROCODONE BITARTRATE AND ACETAMINOPHEN 1 TABLET: 7.5; 325 TABLET ORAL at 16:11

## 2025-03-10 RX ADMIN — SODIUM CHLORIDE, PRESERVATIVE FREE 10 ML: 5 INJECTION INTRAVENOUS at 08:30

## 2025-03-10 RX ADMIN — IPRATROPIUM BROMIDE AND ALBUTEROL SULFATE 1 DOSE: .5; 2.5 SOLUTION RESPIRATORY (INHALATION) at 09:40

## 2025-03-10 RX ADMIN — METHYLPREDNISOLONE SODIUM SUCCINATE 40 MG: 40 INJECTION, POWDER, FOR SOLUTION INTRAMUSCULAR; INTRAVENOUS at 08:29

## 2025-03-10 RX ADMIN — LORAZEPAM 1 MG: 1 TABLET ORAL at 16:00

## 2025-03-10 ASSESSMENT — PAIN DESCRIPTION - FREQUENCY: FREQUENCY: INTERMITTENT

## 2025-03-10 ASSESSMENT — PAIN DESCRIPTION - LOCATION: LOCATION: LEG

## 2025-03-10 ASSESSMENT — PAIN DESCRIPTION - DESCRIPTORS: DESCRIPTORS: ACHING;DISCOMFORT

## 2025-03-10 ASSESSMENT — PAIN - FUNCTIONAL ASSESSMENT: PAIN_FUNCTIONAL_ASSESSMENT: PREVENTS OR INTERFERES WITH MANY ACTIVE NOT PASSIVE ACTIVITIES

## 2025-03-10 ASSESSMENT — PAIN SCALES - GENERAL
PAINLEVEL_OUTOF10: 8
PAINLEVEL_OUTOF10: 0
PAINLEVEL_OUTOF10: 5
PAINLEVEL_OUTOF10: 5
PAINLEVEL_OUTOF10: 3
PAINLEVEL_OUTOF10: 3

## 2025-03-10 ASSESSMENT — PAIN DESCRIPTION - ORIENTATION: ORIENTATION: RIGHT;LEFT

## 2025-03-10 NOTE — CONSULTS
[facility]   Inpatient CHF Nurse Navigator Consult      Cardiologist: Dr Gary Martínezarland is a 59 y.o. (1965) male with a history of HFpEF, most recent EF:  Lab Results   Component Value Date    LVEF 63 08/03/2023    LVEFMODE Echo 01/21/2022       Patient was awake and alert, laying in bed during the consultation and is agreeable to heart failure education. He was engaged and asked appropriate questions throughout the education session. He is currently on BiPap. He is familiar to me from multiple previous admissions. I will discuss HFMS when he is feeling a little better. He follows in the [place] [facility] clinic.     Barriers identified during consult contributing to HF Hospitalization:  [] Limited medication adherence   [] Poor health literacy, education regarding HF medications provided   [] Pill box provided to patient  [] Difficulty affording medications  [] Difficulty obtaining/ managing medications  [] Prescription assistance information given     [] Not weighing themselves daily  [x] Weight log provided for easy monitoring  [] Scale provided     [] Not following low sodium diet  [] Food insecurity   [x] 2 gram sodium diet education provided   [] Low sodium recipes provided  [] Sodium free seasoning provided   [] Low sodium meal delivery options given to patient  [] Dietician consulted     [] Lack of transportation to appointments     [] Depression, given chronic illness  [] Primary team notified     [] Goals of care need addressed  [] Palliative care consulted     [] CHF CHW consulted, to assist with       Chart Reviewed:  Diet: ADULT DIET; Dysphagia - Pureed   Daily Weights: Patient Vitals for the past 96 hrs (Last 3 readings):   Weight   03/06/25 1709 62.6 kg (138 lb 0.1 oz)     I/O:   Intake/Output Summary (Last 24 hours) at 3/10/2025 1007  Last data filed at 3/10/2025 0745  Gross per 24 hour   Intake 310 ml   Output 1725 ml   Net -1415 ml       []

## 2025-03-10 NOTE — PROGRESS NOTES
Chart reviewed.  Update received from nursing.  Case discussed with case management.  Per previous discussion with patient and POA/sister, Kimberley, plan is to continue full code and all aggressive medical management.  Ultimate goal is to return home when medically stable.  Goals of care and CODE STATUS have been established. There are no further PM needs at this time.  PM will now sign off.  If new PM needs arise, please re-consult.  Thank you.      Jamlia Perez, ARGELIA - CNP  Palliative Medicine

## 2025-03-10 NOTE — PROGRESS NOTES
Occupational Therapy  OT consult received to eval/treat and chart review complete. Attempted OT evaluation, patient refused. OT to re-attempt at a later date/time as able and appropriate.     Ira Hernandez OTR/L  License Number: [id number]

## 2025-03-10 NOTE — PROGRESS NOTES
CLINICAL PHARMACY NOTE: MEDS TO BEDS    Total # of Prescriptions Filled: 2   The following medications were delivered to the patient:  Medrol dose pack   Augmentin 875/125 mg     Additional Documentation:

## 2025-03-10 NOTE — DISCHARGE SUMMARY
UNIT/GM cream  Commonly known as: MYCOSTATIN               Where to Get Your Medications        These medications were sent to [address] - P [phone] - F [phone]  [address] [facility] [address]      Phone: [phone]   amoxicillin-clavulanate 875-125 MG per tablet  methylPREDNISolone 4 MG tablet           Note that more than 30 minutes was spent in preparing discharge papers, discussing discharge with patient, medication review, etc.    Signed:  Electronically signed by Herb Morris MD on 3/10/2025 at 11:39 AM

## 2025-03-10 NOTE — PROGRESS NOTES
[facility] Hospitalist Progress Note    Admitting Date and Time: 3/5/2025  4:46 PM  Admit Dx: Community acquired pneumonia, bilateral [J18.9]  Hypercapnic respiratory failure (HCC) [J96.92]    Subjective:  Patient is being followed for Community acquired pneumonia, bilateral [J18.9]  Hypercapnic respiratory failure (HCC) [J96.92]     Patient is seen today. No acute complaints.     ROS: denies fever, chills, cp, n/v, HA unless stated above.      methylPREDNISolone  40 mg IntraVENous Q12H    arformoterol tartrate  15 mcg Nebulization BID RT    furosemide  20 mg IntraVENous BID    metoprolol succinate  100 mg Oral Daily    LORazepam  1 mg Oral TID    gabapentin  300 mg Oral TID    gabapentin  300 mg Oral Once    ipratropium 0.5 mg-albuterol 2.5 mg  1 Dose Inhalation Q4H RT    sodium chloride flush  5-40 mL IntraVENous 2 times per day    enoxaparin  40 mg SubCUTAneous Daily    ARIPiprazole  5 mg Oral Daily    aspirin  81 mg Oral Daily    morphine  30 mg Oral BID    pantoprazole  40 mg Oral QAM AC    pramipexole  0.75 mg Oral Nightly    tamsulosin  0.4 mg Oral Daily    FLUoxetine  20 mg Oral Nightly     acetaminophen, 650 mg, Q6H PRN   Or  acetaminophen, 650 mg, Q6H PRN  sodium chloride (Inhalant), 4 mL, PRN  sodium chloride flush, 5-40 mL, PRN  sodium chloride, , PRN  potassium chloride, 40 mEq, PRN   Or  potassium alternative oral replacement, 40 mEq, PRN   Or  potassium chloride, 10 mEq, PRN  magnesium sulfate, 2,000 mg, PRN  ondansetron, 4 mg, Q8H PRN   Or  ondansetron, 4 mg, Q6H PRN  polyethylene glycol, 17 g, Daily PRN  HYDROcodone-acetaminophen, 1 tablet, Q8H PRN         Objective:    BP (!) 171/80   Pulse 68   Temp 98.3 °F (36.8 °C) (Temporal)   Resp 20   Ht 1.626 m (5' 4\")   Wt 62.6 kg (138 lb 0.1 oz)   SpO2 93%   BMI 23.69 kg/m²     General Appearance: Thin appearing, Aox3, moderate distress due to hypoxia.  Skin: warm and dry  Head: normocephalic and atraumatic  Eyes: Strabismus  Neck: neck

## 2025-03-10 NOTE — PLAN OF CARE
Problem: ABCDS Injury Assessment  Goal: Absence of physical injury  3/10/2025 0102 by Tacho Jacob RN  Outcome: Progressing  3/9/2025 1300 by Porfirio Driscoll RN  Outcome: Progressing     Problem: Skin/Tissue Integrity  Goal: Skin integrity remains intact  Description: 1.  Monitor for areas of redness and/or skin breakdown  2.  Assess vascular access sites hourly  3.  Every 4-6 hours minimum:  Change oxygen saturation probe site  4.  Every 4-6 hours:  If on nasal continuous positive airway pressure, respiratory therapy assess nares and determine need for appliance change or resting period  3/10/2025 0102 by Tacho Jacob RN  Outcome: Progressing  3/9/2025 1300 by Porfirio Driscoll RN  Outcome: Progressing     Problem: Discharge Planning  Goal: Discharge to home or other facility with appropriate resources  Outcome: Progressing     Problem: Chronic Conditions and Co-morbidities  Goal: Patient's chronic conditions and co-morbidity symptoms are monitored and maintained or improved  Outcome: Progressing     Problem: Pain  Goal: Verbalizes/displays adequate comfort level or baseline comfort level  Outcome: Progressing     Problem: Safety - Adult  Goal: Free from fall injury  3/10/2025 0102 by Tacho Jacob RN  Outcome: Progressing  3/9/2025 1300 by Porfirio Driscoll RN  Outcome: Progressing

## 2025-03-10 NOTE — CONSULTS
Nutrition Assessment/Consult    Type and Reason for Visit:  Initial, Consult (Cristofer Score consult)    Nutrition Recommendations/Plan:   Continue current diet and ONS, as tolerated  Continue inpatient monitoring POC     Nutrition Assessment:    Pt admit 2/2 B/L CAP, hypercapnic resp failure. Consult re: Cristofer Score. Total score is 15 but Nutrition score 3 (adequate). BLE vascular discoloration and boggy heels per flowsheet but no wound consult noted. PMHx=NSTEMI, CHF, COPD, Dysphagia. Pt previously recommended  Mod thick liquids by SLP. Pt signed waiver to refuse thickened liquids and is currently on Pureed dysphagia diet. Will add Ensure Plus HP BID ONS to optimize energy/protein intake in the setting of variable PO at meals currently and increased needs d/t respiratory status.    Nutrition Related Findings:    A&Ox4, missing teeth, +1 edema, -I/O 1L, taut round abd +BS Wound Type: Venous Stasis       Current Nutrition Intake & Therapies:    Average Meal Intake: 1-25%, 51-75%, %  Average Supplements Intake: None Ordered  ADULT DIET; Dysphagia - Pureed  ADULT ORAL NUTRITION SUPPLEMENT; Breakfast, Dinner; Standard High Calorie/High Protein Oral Supplement    Anthropometric Measures:  Height: 162.6 cm (5' 4\")  Ideal Body Weight (IBW): 130 lbs (59 kg)    Admission Body Weight: 62.6 kg (138 lb 0.1 oz) (3/6 bedscale)  Current Body Weight: 62.6 kg (138 lb 0.1 oz), 106.2 % IBW. Weight Source: Bed scale (3/6)  Current BMI (kg/m2): 23.7  Usual Body Weight: 60.4 kg (133 lb 3 oz) (11/26/2024)     % Weight Change (Calculated): 3.6                    BMI Categories: Normal Weight (BMI 18.5-24.9)      Nutrition Interventions:   Food and/or Nutrient Delivery: Continue Current Diet, Start Oral Nutrition Supplement  Nutrition Education/Counseling: No recommendation at this time  Coordination of Nutrition Care: Continue to monitor while inpatient       Goals:  Goals: PO intake 75% or greater, by next RD assessment  Type of

## 2025-03-10 NOTE — CARE COORDINATION
Discharge order noted  Met with patient at bedside who states his sister will be here to /transport him home.  He adds she will bring his portable O2 equipment.  [facility] was called,  left communicating plan for discharge at [phone]  Patient denies other home going needs.  Kirby Hurley, MSN RN  [facility] Case Management  [phone]

## 2025-03-10 NOTE — PLAN OF CARE
Problem: ABCDS Injury Assessment  Goal: Absence of physical injury  3/10/2025 0920 by Chung Barksdale RN  Outcome: Progressing  3/10/2025 0102 by Tacho Jacob RN  Outcome: Progressing     Problem: Skin/Tissue Integrity  Goal: Skin integrity remains intact  3/10/2025 0920 by Chung Barksdale RN  Outcome: Progressing  3/10/2025 0102 by Tacho Jacob RN  Outcome: Progressing     Problem: Discharge Planning  Goal: Discharge to home or other facility with appropriate resources  3/10/2025 0920 by Chung Barksdale RN  Outcome: Progressing  3/10/2025 0102 by Tacho Jacob RN  Outcome: Progressing     Problem: Chronic Conditions and Co-morbidities  Goal: Patient's chronic conditions and co-morbidity symptoms are monitored and maintained or improved  3/10/2025 0920 by Chung Barksdale RN  Outcome: Progressing  3/10/2025 0102 by Tacho Jacob RN  Outcome: Progressing     Problem: Pain  Goal: Verbalizes/displays adequate comfort level or baseline comfort level  3/10/2025 0920 by Chung Barksdale RN  Outcome: Progressing  3/10/2025 0102 by Tacho Jacob RN  Outcome: Progressing     Problem: Safety - Adult  Goal: Free from fall injury  3/10/2025 0920 by Chung Barksdale RN  Outcome: Progressing  3/10/2025 0102 by Tacho Jacbo RN  Outcome: Progressing

## 2025-03-10 NOTE — PROGRESS NOTES
Physical Therapy  Facility/Department: [address] INTERNAL MEDICINE 2      Name: Dg Peña  : 1965  MRN: [id number]  Date of Service: 3/10/2025      Attempted to see pt for PT evaluation, pt declined.   Padmini Thorne PT [id number]

## 2025-03-10 NOTE — PROGRESS NOTES
Patient discharged home with sister and family- all belongings present- discharge instructions given to sister and patient- all questions answered.  Pt discharged on 6LNC with home o2 tank and home wheelchair.

## 2025-03-10 NOTE — PROGRESS NOTES
SPEECH/LANGUAGE PATHOLOGY  CLINICAL ASSESSMENT OF SWALLOWING FUNCTION   and PLAN OF CARE      PATIENT NAME:  Dg Peña  (male)     MRN:  [id number]    :  1965  (59 y.o.)  STATUS:  Inpatient: Room 0417/0417-A    TODAY'S DATE:  3/10/2025  ORDER DATE, DESCRIPTION AND REFERRING PROVIDER: Dr. Morris  REASON FOR REFERRAL: Assess swallow function    EVALUATING THERAPIST: Angelique Cordova, SLP                 RESULTS:    DYSPHAGIA DIAGNOSIS:   Clinical indicators of moderate oropharyngeal phase dysphagia      Per chart review, patient with hx of dysphagia. Most recent MBSS completed 25 revealed moderate oropharyngeal phase dysphagia including aspiration of NTL. Discussed with patient recommendation for continued HTL. Patient reports that he \"sometimes\" utilizes HTL at home.      DIET RECOMMENDATIONS:  Pureed consistency solids (IDDSI level 4) with  honey consistency (moderately thick - IDDSI level 3)  liquids     FEEDING RECOMMENDATIONS:     Assistance level:  Set-up is required for all oral intake      Compensatory strategies recommended: Fully alert for all PO, Thorough oral care to prevent colonization of oral bacteria, Upright in bed/ chair as tolerated  Slow rate of intake   SINGLE cup sips  SMALL bites      Discussed recommendations with:  patient nurse in person    SPEECH THERAPY  PLAN OF CARE   The dysphagia POC is established based on physician order, dysphagia diagnosis and results of clinical assessment     Skilled SLP intervention for dysphagia management on acute care up to 5 x per week until goals met, pt plateaus in function and/or discharged from hospital    Conditions Requiring Skilled Therapeutic Intervention for dysphagia:    Patient is performing below functional baseline d/t  current acute condition, respiratory compromise, multiple medications, and/or increased dependency upon caregivers.  Throat clearing during PO intake   Coughing during PO intake    History of

## 2025-03-18 ENCOUNTER — HOSPITAL ENCOUNTER (OUTPATIENT)
Dept: OTHER | Age: 60
Setting detail: THERAPIES SERIES
Discharge: HOME | End: 2025-03-18
Payer: MEDICAID

## 2025-03-18 VITALS
HEART RATE: 75 BPM | RESPIRATION RATE: 18 BRPM | OXYGEN SATURATION: 90 % | SYSTOLIC BLOOD PRESSURE: 113 MMHG | DIASTOLIC BLOOD PRESSURE: 60 MMHG | BODY MASS INDEX: 23.1 KG/M2 | WEIGHT: 134.6 LBS

## 2025-03-18 LAB
ANION GAP SERPL CALCULATED.3IONS-SCNC: 8 MMOL/L (ref 7–16)
BNP SERPL-MCNC: 1665 PG/ML (ref 0–125)
BUN SERPL-MCNC: 14 MG/DL (ref 6–20)
CALCIUM SERPL-MCNC: 6 MG/DL (ref 8.6–10.2)
CHLORIDE SERPL-SCNC: 92 MMOL/L (ref 98–107)
CO2 SERPL-SCNC: 39 MMOL/L (ref 22–29)
CREAT SERPL-MCNC: 0.7 MG/DL (ref 0.7–1.2)
GFR, ESTIMATED: >90 ML/MIN/1.73M2
GLUCOSE SERPL-MCNC: 224 MG/DL (ref 74–99)
POTASSIUM SERPL-SCNC: 4.3 MMOL/L (ref 3.5–5)
SODIUM SERPL-SCNC: 139 MMOL/L (ref 132–146)

## 2025-03-18 PROCEDURE — 99214 OFFICE O/P EST MOD 30 MIN: CPT

## 2025-03-18 PROCEDURE — 80048 BASIC METABOLIC PNL TOTAL CA: CPT

## 2025-03-18 PROCEDURE — 83880 ASSAY OF NATRIURETIC PEPTIDE: CPT

## 2025-03-18 PROCEDURE — 36415 COLL VENOUS BLD VENIPUNCTURE: CPT

## 2025-03-18 ASSESSMENT — PATIENT HEALTH QUESTIONNAIRE - PHQ9
SUM OF ALL RESPONSES TO PHQ QUESTIONS 1-9: 0
2. FEELING DOWN, DEPRESSED OR HOPELESS: NOT AT ALL
1. LITTLE INTEREST OR PLEASURE IN DOING THINGS: NOT AT ALL
SUM OF ALL RESPONSES TO PHQ QUESTIONS 1-9: 0

## 2025-03-18 NOTE — ED TRIAGE NOTES
Department of Emergency Medicine  FIRST PROVIDER TRIAGE NOTE             Independent MLP           3/18/25  3:40 PM EDT    Date of Encounter: 3/18/25   MRN: [id number]      HPI: Dg Peña is a 59 y.o. male who presents to the ED for Abnormal Lab (Hypocalcemia.  Sent by CHF clinic.  Labs drawn today)       ROS: Negative for cp, Suicidal ideation, or Homicidal Ideation.    PE: Gen Appearance/Constitutional: alert  CV: regular rate     Initial Plan of Care: All treatment areas with department are currently occupied. Plan to order/Initiate the following while awaiting opening in ED: EKG.  Initiate Treatment-Testing, Proceed toTreatment Area When Bed Available for ED Attending/MLP to Continue Care    Electronically signed by ARGELIA Crandall CNP   DD: 3/18/25

## 2025-03-18 NOTE — ED PROVIDER NOTES
Department of Emergency Medicine   ED  Provider Note  Admit Date/RoomTime: 3/18/2025  5:56 PM  ED Room: 28/28              [facility] [place]     Dg Peña is a 59 y.o. male with a PMHx significant for  COPD, CHF on 6L, HTN, CAD, HLD  who presents for evaluation of abnormal outpatient labs, beginning prior to arrival. The complaint has been persistent, moderate in severity, and worsened by nothing.   The patient states that he was at the CHF clinic earlier today and they did blood work. They found his calcium to be extremely low and told him to present to the ER. Patient states that he has been feeling sick and ill the last a few days with shortness of breath.   Follows with Dr. Vega for cardiology. Low grade temp 100.1F yesterday; was just admitted for bilateral pneumonia.  no longer on abx.     Review of Systems   Constitutional:  Positive for fatigue. Negative for chills and fever.   HENT:  Positive for congestion. Negative for ear pain, sinus pressure and sore throat.    Eyes:  Negative for pain, discharge and redness.   Respiratory:  Positive for cough and shortness of breath. Negative for wheezing.    Cardiovascular:  Negative for chest pain.   Gastrointestinal:  Negative for abdominal pain, diarrhea, nausea and vomiting.   Genitourinary:  Negative for dysuria and frequency.   Musculoskeletal:  Negative for arthralgias and back pain.   Skin:  Negative for rash and wound.   Neurological:  Negative for weakness and headaches.   Hematological:  Negative for adenopathy.   All other systems reviewed and are negative.       Physical Exam  Vitals and nursing note reviewed.   Constitutional:       General: He is not in acute distress.     Appearance: Normal appearance. He is well-developed. He is not ill-appearing.   HENT:      Head: Normocephalic and atraumatic.      Comments: NC in place  Eyes:      General:         Right eye: No discharge.         Left eye: No discharge.      Extraocular Movements: Extraocular

## 2025-03-18 NOTE — PROGRESS NOTES
1:39 PM   Calcium critical 6.0 - notified Dr. Vega, I received verbal orders to advise the patient to go to the ER for evaluation. Spoke with the patients sister Kimberley, she verbalized understanding.       [x]Lab work obtained -- pro-bnp, bmp/    [x] Patient/Family Educated On:  [x] HF zones (Green, Yellow, Red) and aware of when to take action   [x] Daily weights  [] Scale provided   [x] Low sodium diet (2000 mg)  Barriers to compliance  [] Refuses to monitor diet  [] Socioeconomic difficulties  [] Unable to cook for self (use of frozen meals, can goods, etc)  [] CHF CHW consulted  [] Low sodium meal delivery options given to patient  [] Dietician consulted   [] Low sodium recipes provided  [] Sodium free seasoning provided   [x] Fluid intake (64 oz)  [] Reviewed currently prescribed medications with patient, educated on importance of compliance and answered any questions regarding their medication  [] Pill box provided to patient  [] Patient using pill packing pharmacy   [] CPAP/BiPAP use  [] Low impact exercise / cardiac rehab   [] LifeVest use  [x] Patient aware of signs and symptoms of worsening HF, CHF clinic phone number provided and made aware to call clinic for sooner if evaluation if needed     [] Difficulty affording medications  [] CHF CHW consulted  [] Prescription assistance information given   [] [facility] medication assistance program information given   [] Sample medications provided to patient to help bridge gap until affordability N/A      Scheduled to follow up in CHF clinic on:   Future Appointments   Date Time Provider Department Center   3/20/2025 11:30 AM Amrik Zuñiga APRN - CNP [facility]   3/25/2025 11:00 AM Jana Sanchez MD [place] [facility]   3/26/2025  8:00 AM [facility]   3/26/2025  2:30 PM Bernice Ceja APRN - NP [facility]   7/24/2025  1:45 PM Alonzo Vega MD [facility]

## 2025-03-19 NOTE — ED NOTES
ED to Inpatient Handoff Report    Notified [address] west that electronic handoff available and patient ready for transport to room 612.    Safety Risks: Risk of falls    Patient in Restraints: no    Constant Observer or Patient : no    Telemetry Monitoring Ordered: Yes          Order to transfer to unit without monitor: YES    Last MEWS:  Time completed: 2130    Deterioration Index: 33.18    Vitals:    03/18/25 1538 03/18/25 1540 03/18/25 2130   BP:  (!) 101/56 (!) 113/49   Pulse:  78 81   Resp:  16 14   Temp: 97.5 °F (36.4 °C) 97.5 °F (36.4 °C) 97.8 °F (36.6 °C)   SpO2:  98% 93%   Weight: 59.4 kg (131 lb)     Height: 1.626 m (5' 4\")         Opportunity for questions and clarification was provided.

## 2025-03-19 NOTE — PROGRESS NOTES
4 Eyes Skin Assessment     NAME:  Dg Peña  YOB: 1965  MEDICAL RECORD NUMBER:  [id number]    The patient is being assessed for  Admission    I agree that at least one RN has performed a thorough Head to Toe Skin Assessment on the patient. ALL assessment sites listed below have been assessed.      Areas assessed by both nurses:    Head, Face, Ears, Shoulders, Back, Chest, Arms, Elbows, Hands, Sacrum. Buttock, Coccyx, Ischium, and Legs. Feet and Heels        Does the Patient have a Wound? No noted wound(s)       Cristofer Prevention initiated by RN: Yes  Wound Care Orders initiated by RN: No    Pressure Injury (Stage 3,4, Unstageable, DTI, NWPT, and Complex wounds) if present, place Wound referral order by RN under : No    New Ostomies, if present place, Ostomy referral order under : No     Nurse 1 eSignature: Electronically signed by Tracy Guillen RN on 3/19/25 at 12:50 AM EDT    **SHARE this note so that the co-signing nurse can place an eSignature**    Nurse 2 eSignature: Electronically signed by Meredith Pavon RN on 3/19/25 at 12:51 AM EDT

## 2025-03-19 NOTE — CONSULTS
[facility]  MD Everton Yin MD Ali Hassan, MD Lisa Kniska, CNP   MAICOL Neal  Consultation  Patient's Name: Dg Peña  3:46 PM  3/19/2025    Nephrologist:  CONRAD Muse MD    Reason for Consult:  Hypocalcemia  Requesting Physician:  Brianda Ponce MD      History Obtained From: Patient, chart    History of Present Ilness:         Dg Peña is a 59-year-old gentleman that presented to the ED yesterday for evaluation of abnormal outpatient lab.  He was at the CHF clinic earlier in the day and found his calcium to be extremely low.  He was advised to go to the ED for workup.  He was recently discharged from the hospital after treatment for bilateral pneumonia.  He complains of feeling fatigue, congestion and shortness of breath.  He denied chest pain, nausea, vomiting, diarrhea, abdominal pain, lightheadedness, dizziness, fever, chills, headache, hematuria or bloody stools.  He has a past medical history that includes: Anxiety, COPD, CHF, hypertension, edema, gastric ulcers and restless leg syndrome.  Labs in the ED were significant for chloride 92, carbon dioxide 39, glucose 224, calcium 6.0, ionized ca- 0.79, pro-BNP 1665, WBCs 21.1, hemoglobin 8.1 and hematocrit 29.7.  Intact PTH was 63.9.   Urine study showed trace ketones and trace proteins.  Chest x-ray revealed no acute process.  He was given calcium gluconate IV in the ED. He was admitted for further treatment and evaluation.  Calcium today was found to be 6.5.     Past Medical History:   Diagnosis Date    Anxiety     COPD (chronic obstructive pulmonary disease) (HCC)     Hypertension     Leg swelling     Multiple gastric ulcers     Restless leg syndrome        Past Surgical History:   Procedure Laterality Date    BRONCHOSCOPY N/A 04/27/2023    BRONCHOSCOPY DIAGNOSTIC OR CELL WASH ONLY performed by Zhang Murphy MD at [facility] ENDOSCOPY    HERNIA REPAIR      HIP SURGERY      JOINT  MCHC 27.7 03/19/2025 06:50 AM    RDW 16.0 03/19/2025 06:50 AM    NRBC 0.0 07/03/2023 02:00 AM    BANDSPCT 0.0 02/10/2022 07:41 PM    METASPCT 5 03/06/2025 09:35 AM    METASPCT 4.0 02/21/2020 07:05 PM    LYMPHOPCT 8 03/19/2025 06:50 AM    MONOPCT 5 03/19/2025 06:50 AM    MYELOPCT 7 03/06/2025 09:35 AM    MYELOPCT 0.0 09/17/2021 03:10 AM    EOSPCT 1 03/19/2025 06:50 AM    BASOPCT 0 03/19/2025 06:50 AM    MONOSABS 0.71 03/19/2025 06:50 AM    LYMPHSABS 1.01 03/19/2025 06:50 AM    EOSABS 0.09 03/19/2025 06:50 AM    BASOSABS 0.02 03/19/2025 06:50 AM     CMP:    Lab Results   Component Value Date/Time     03/19/2025 06:50 AM    K 4.0 03/19/2025 06:50 AM    K 5.5 06/25/2023 02:30 AM    CL 90 03/19/2025 06:50 AM    CO2 41 03/19/2025 06:50 AM    BUN 10 03/19/2025 06:50 AM    CREATININE 0.5 03/19/2025 06:50 AM    GFRAA >60 10/17/2022 04:24 AM    AGRATIO 1.0 04/09/2023 06:47 AM    LABGLOM >90 03/19/2025 06:50 AM    LABGLOM >90 04/19/2024 12:25 PM    GLUCOSE 147 03/19/2025 06:50 AM    GLUCOSE 85 03/03/2020 10:55 AM    CALCIUM 6.5 03/19/2025 06:50 AM    BILITOT 0.3 03/19/2025 06:50 AM    ALKPHOS 63 03/19/2025 06:50 AM    AST 12 03/19/2025 06:50 AM    ALT 8 03/19/2025 06:50 AM     Ionized Calcium:  No components found for: \"IONCA\"  Magnesium:    Lab Results   Component Value Date/Time    MG 2.3 03/19/2025 06:50 AM     Phosphorus:    Lab Results   Component Value Date/Time    PHOS 4.2 03/18/2025 08:45 PM     U/A:    Lab Results   Component Value Date/Time    NITRITE neg 04/19/2024 12:32 PM    COLORU Yellow 03/19/2025 12:06 AM    COLORU Yellow 03/19/2025 12:06 AM    PHUR 7.5 03/19/2025 12:06 AM    PHUR 7.5 03/19/2025 12:06 AM    PHUR 8.0 04/19/2024 12:32 PM    PHUR 6.0 10/01/2023 03:48 PM    WBCUA 0 TO 5 03/19/2025 12:06 AM    WBCUA 0 TO 5 03/19/2025 12:06 AM    WBCUA NEGATIVE 01/25/2022 04:20 AM    RBCUA 0 TO 2 03/19/2025 12:06 AM    RBCUA 0 TO 2 03/19/2025 12:06 AM    RBCUA SMALL 01/25/2022 04:20 AM    MUCUS SMALL 10/19/2021

## 2025-03-19 NOTE — PROGRESS NOTES
[facility] Hospitalist Progress Note    Admitting Date and Time: 3/18/2025  5:56 PM  Admit Dx: Hypocalcemia [E83.51]    Subjective:  Patient is being followed for Hypocalcemia [E83.51]   Pt feels okay, cannot pee  Per RN: no additional concerns    ROS: denies fever, chills, cp, sob, n/v, HA unless otherwise noted above     sodium chloride flush  5-40 mL IntraVENous 2 times per day    enoxaparin  40 mg SubCUTAneous Daily    ARIPiprazole  5 mg Oral Daily    aspirin  81 mg Oral Daily    Ensifentrine  2.5 mL Inhalation BID    FLUoxetine  20 mg Oral Nightly    furosemide  60 mg Oral BID    gabapentin  300 mg Oral TID    metoprolol succinate  100 mg Oral Daily    morphine  30 mg Oral BID    pantoprazole  40 mg Oral QAM AC    pramipexole  0.75 mg Oral Nightly    tamsulosin  0.4 mg Oral Daily     LORazepam, 0.5 mg, Q4H PRN  HYDROcodone 5 mg - acetaminophen, 1 tablet, Q6H PRN  sodium chloride flush, 5-40 mL, PRN  sodium chloride, , PRN  potassium chloride, 40 mEq, PRN   Or  potassium alternative oral replacement, 40 mEq, PRN   Or  potassium chloride, 10 mEq, PRN  magnesium sulfate, 2,000 mg, PRN  ondansetron, 4 mg, Q8H PRN   Or  ondansetron, 4 mg, Q6H PRN  polyethylene glycol, 17 g, Daily PRN  acetaminophen, 650 mg, Q6H PRN   Or  acetaminophen, 650 mg, Q6H PRN  albuterol, 2.5 mg, Q6H PRN  ipratropium 0.5 mg-albuterol 2.5 mg, 1 Dose, Q4H PRN         Objective:  BP (!) 93/55   Pulse 66   Temp 98.1 °F (36.7 °C) (Oral)   Resp 18   Ht 1.626 m (5' 4\")   Wt 59.4 kg (131 lb)   SpO2 100%   BMI 22.49 kg/m²     General Appearance: alert and oriented to person, place and time and in NAD, sitting in bed  Skin: warm and dry  Head: normocephalic and atraumatic  Eyes: dysconjugate gaze  Neck: neck supple, trachea midline   Pulmonary/Chest: mild wheezing, 6-7L HFNC, no resp distress  Cardiovascular: RRR, no murmurs  Abdomen: soft, non-tender, non-distended, extensive midline scarring from prior surgeries  Extremities: chronic  venous stasis changes/dry skin L>R with trace BLE edema  Neurologic: no cranial nerve deficit and speech normal      Recent Labs     03/18/25  1030 03/18/25  1845 03/19/25  0650    140 141   K 4.3 4.5 4.0   CL 92* 89* 90*   CO2 39* 40* 41*   BUN 14 14 10   CREATININE 0.7 0.7 0.5*   GLUCOSE 224* 114* 147*   CALCIUM 6.0* 5.9* 6.5*       Recent Labs     03/18/25  1845 03/19/25  0650   WBC 21.1* 13.0*   RBC 2.82* 2.91*   HGB 8.1* 8.6*   HCT 29.7* 31.1*   .3* 106.9*   MCH 28.7 29.6   MCHC 27.3* 27.7*   RDW 15.6* 16.0*    196   MPV 11.2 11.1       Radiology:  XR CHEST PORTABLE   Final Result   No acute process.              Assessment:  Principal Problem:    Hypocalcemia  Resolved Problems:    * No resolved hospital problems. *      Plan:  Hypocalcemia, recurrent-total calcium low at 5.9 and ionized calcium low at 0.79.  Nephrology consulted, 1 dose of calcium gluconate given, follow labs  Leukocytosis-history of recent discharge for bilateral pneumonia, patient has been on Medrol Dosepak and suspect this as etiology of leukocytosis. Defer abx, follow cultures  Chronic anemia-hemoglobin 8.1>8.6, at baseline, transfuse for Hgb <7, trend CBC.  History of congestive heart failure-cont guideline directed therapy, follows with cardiology outpatient (presented from CHF Clinic)   History of COPD-stable, continue inhalers    -patient reports difficulty urinating, check bladder scan, appreciate Nephro a/w diuretics    Dispo: anticipate home    NOTE: Portions of this report may have been transcribed using voice recognition software. Every effort was made to ensure accuracy; however, inadvertent computerized transcription errors may be present.  Electronically signed by Brianda Ponce MD on 3/19/2025 at 10:49 AM

## 2025-03-19 NOTE — PROGRESS NOTES
Dg Peña was ordered ensifentrine (Ohtuvayre) which is a nonformulary medication. Nurse is going to check with patient to see if home supply of this medication will be brought in to the hospital for inpatient use.  A pharmacist will follow-up with the nurse of the patient to assess the capability of the patient to bring in the medication.  If it is determined that the patient cannot supply the medication and it is not available to be dispensed from the pharmacy, a call will be placed to the ordering provider to discuss alternative options.     Rohit Lindsey, PharmD  03/18/25 10:52 PM

## 2025-03-19 NOTE — H&P
[facility] Hospitalist Group History and Physical      CHIEF COMPLAINT: Abnormal labs    History of Present Illness: 59-year-old gentleman with past medical history significant for hypertension, anxiety, COPD and restless leg syndrome presents to ED with abnormal outpatient labs.  Patient was in CHF clinic earlier today where they did the blood draw and he was found to have very low calcium.  He was told to go to ER for further management.  Patient denies any chest pain, belly pain, nausea vomiting diarrhea, lightheadedness or dizziness, no fever or chills, no focal deficits  He was recently discharged from hospital after treatment for bilateral pneumonia.  Workup in ED showed calcium low at 5.9 with ionized calcium at 0.79.  His WBC count was high at 21.1.  Decision to admit.    Informant(s) for H&P:    REVIEW OF SYSTEMS:  A comprehensive review of systems was negative except for: what is in the HPI      PMH:  Past Medical History:   Diagnosis Date    Anxiety     COPD (chronic obstructive pulmonary disease) (HCC)     Hypertension     Leg swelling     Multiple gastric ulcers     Restless leg syndrome        Surgical History:  Past Surgical History:   Procedure Laterality Date    BRONCHOSCOPY N/A 04/27/2023    BRONCHOSCOPY DIAGNOSTIC OR CELL WASH ONLY performed by Zhang Murphy MD at [facility] ENDOSCOPY    HERNIA REPAIR      HIP SURGERY      JOINT REPLACEMENT  1987    KNEE SURGERY         Medications Prior to Admission:    Prior to Admission medications    Medication Sig Start Date End Date Taking? Authorizing Provider   Morphine Sulfate (MORPHINE 20MG/ML) SOLN concentrated solution Take 0.5 mLs by mouth every 2 hours as needed (shortness of breath) for up to 5 days. Max Daily Amount: 120 mg 3/4/25 3/9/25  Amrik Zuñiga, APRN - CNP   furosemide (LASIX) 40 MG tablet Take 1.5 tablets by mouth 2 times daily 2/27/25   Nyla Calix, APRN - CNP   morphine (MS CONTIN) 30 MG extended release tablet Take 1 tablet by

## 2025-03-19 NOTE — ACP (ADVANCE CARE PLANNING)
Advance Care Planning   Healthcare Decision Maker:    Primary Decision Maker: AlexKimberley - Brother/Sister - [phone]    Primary Decision Maker (Active): Sayra Burt - Brother/Sister - [phone]    Secondary Decision Maker: MarianaDarren - Child - [phone]    Click here to complete Healthcare Decision Makers including selection of the Healthcare Decision Maker Relationship (ie \"Primary\").  Today we documented Decision Maker(s) consistent with ACP documents on file.

## 2025-03-19 NOTE — CONSULTS
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 (1965) male with a history of HFpEF, most recent EF:  Lab Results   Component Value Date    LVEF 60 08/23/2023    LVEFMODE Echo 08/23/2023       Patient was awake and alert, laying in bed during the consultation and is agreeable to heart failure education. He was engaged and asked appropriate questions throughout the education session. He was sent from the CHF clinic for abnormal labs.     Barriers identified during consult contributing to HF Hospitalization:  [] Limited medication adherence   [] Poor health literacy, education regarding HF medications provided   [] Pill box provided to patient  [] Difficulty affording medications  [] Difficulty obtaining/ managing medications  [] Prescription assistance information given     [] Not weighing themselves daily  [x] Weight log provided for easy monitoring  [] Scale provided     [] Not following low sodium diet  [] Food insecurity   [x] 2 gram sodium diet education provided   [] Low sodium recipes provided  [] Sodium free seasoning provided   [] Low sodium meal delivery options given to patient  [] Dietician consulted     [] Lack of transportation to appointments     [] Depression, given chronic illness  [] Primary team notified     [] Goals of care need addressed  [] Palliative care consulted     [] CHF CHW consulted, to assist with       Chart Reviewed:  Diet: ADULT DIET; Dysphagia - Pureed; Moderately Thick (Honey)   Daily Weights: Patient Vitals for the past 96 hrs (Last 3 readings):   Weight   03/18/25 1538 59.4 kg (131 lb)     I/O: No intake or output data in the 24 hours ending 03/19/25 1122    [] Nursing staff/manager notified of inaccurate parisi weights or I/O        Discharge Plan:  Above identified barriers reviewed and needs addressed    Patient/family educated on daily monitoring tools for CHF, made  need to decrease or hold the diuretic dose. On days you feels nauseated and not eating / drinking, having emesis or diarrhea,  informed to call the cardiologist  / doctor, they may need to decrease or hold diuretic to avoid dehydration.  Again, stressed the importance of informing their medical provider the first day of onset of any of the signs and symptoms in the \"Yellow Zone\" of the HF Zones.     The Heart Failure Booklet given to the patient with additional patient education addressing:  What is Heart Failure?  Things You Can Do to Live Well with HF  How to Take Your Medications  How to Eat Less Salt  Mills its Salt?  Exercising Well with Heart Failure  Signs and symptoms of HF to report  Weight Yourself Each Day  Home Self Management- activity, weight tracking, taking medications as prescribed, meals /diet planning (sodium and fluid restriction), how to read food labels, keeping physician follow ups, smoking cessation, follow the “Heart Failure Zones”  The Heart Failure zones  Every Dose Every Day    Instructed to call 911 if you have any of the following symptoms:  Struggling to breathe unrelieved with rest  Having chest pain  Confusion or can’t think clearly      Patient verbalizes understanding of above.   Greater than 30 minutes was spent educating patient.        Shavon Man RN   Heart Failure Navigator

## 2025-03-19 NOTE — CARE COORDINATION
Patient is a readmission from home with family. Sent in by CHF Clinic for abnormal labs, hypocalcemia, leukocytosis, chronic anemia. PMHx of CHF, COPD. CHF Nurse is following. Consult or Nephrology consult pending, await plan. Currently on nebs, PO lasix, 7L O2 patient sates baseline is 6-7L O2 oxygen and nebulizer is supplied through [facility]. Patient is active with [facility] for skilled nursing only please notify when discharging. P: [phone]   F: [phone]  SUNDAY order faxed. He voices his cousin resides with him in a 1st floor apt. With a ramp entrance.He completes his adl's with some assistance. He uses a walker to ambulate. Patient has hx with [facility]. Anticipated to return home and resume care with [facility]. Will continue to follow.  Becky IRVINGN, RN  Case Management

## 2025-03-19 NOTE — FLOWSHEET NOTE
Patient refusing bladder scan at this time  has put out 500cc in [facility]

## 2025-03-19 NOTE — PROGRESS NOTES
Spiritual Health History and Assessment/Progress Note  [facility]    Initial Encounter, Attempted Encounter,  ,  ,      Name: Dg Peña MRN: [id number]    Age: 59 y.o.     Sex: male   Language: English   Congregational: None   Hypocalcemia     Date: 3/19/2025                           Spiritual Assessment began in [address] MED SURG        Referral/Consult From: Rounding   Encounter Overview/Reason: Initial Encounter, Attempted Encounter  Service Provided For: Patient, Patient not available    Kelsey, Belief, Meaning:   Patient unable to assess at this time  Family/Friends No family/friends present      Importance and Influence:  Patient unable to assess at this time  Family/Friends No family/friends present    Community:  Patient feels well-supported. Support system includes: Children and Extended family  Family/Friends No family/friends present    Assessment and Plan of Care:     Patient Interventions include: Other: Staff  addressing and caring for the patient, prayer offered for the patient at the time.  Family/Friends Interventions include: No family/friends present    Patient Plan of Care: Spiritual Care available upon further referral  Family/Friends Plan of Care: No family/friends present    Electronically signed by Chaplain Eddie on 3/19/2025 at 1:38 PM

## 2025-03-20 PROBLEM — J44.9 CHRONIC OBSTRUCTIVE PULMONARY DISEASE (HCC): Status: ACTIVE | Noted: 2025-01-01

## 2025-03-20 PROBLEM — D72.829 LEUKOCYTOSIS: Status: ACTIVE | Noted: 2025-01-01

## 2025-03-20 NOTE — PROGRESS NOTES
[facility]  MD Everton Yin, MD Torie Muse, MD Tyra Guzman, CNP   Santa Johnson, MAICOL  Progress Note    3/20/2025    SUBJECTIVE:   3/20:  Sitting up in bed.  NAD.  Oxygen on at 6L/NC.  Denies chest pain, palpitations or SOB.  Appetite I OK.     PROBLEM LIST:    Principal Problem:    Hypocalcemia  Resolved Problems:    * No resolved hospital problems. *         DIET:    ADULT DIET; Dysphagia - Pureed; Moderately Thick (Honey)     MEDS (scheduled):    calcium gluconate  3,000 mg IntraVENous Once    calcium carbonate  1,000 mg Oral TID    vitamin D  5,000 Units Oral Q MWF    arformoterol tartrate  15 mcg Nebulization BID RT    budesonide  0.5 mg Nebulization BID RT    sodium chloride flush  5-40 mL IntraVENous 2 times per day    enoxaparin  40 mg SubCUTAneous Daily    ARIPiprazole  5 mg Oral Daily    aspirin  81 mg Oral Daily    Ensifentrine  2.5 mL Inhalation BID    FLUoxetine  20 mg Oral Nightly    furosemide  60 mg Oral BID    gabapentin  300 mg Oral TID    metoprolol succinate  100 mg Oral Daily    morphine  30 mg Oral BID    pantoprazole  40 mg Oral QAM AC    pramipexole  0.75 mg Oral Nightly    tamsulosin  0.4 mg Oral Daily       MEDS (infusions):   sodium chloride         MEDS (prn):  LORazepam, HYDROcodone 5 mg - acetaminophen, sodium chloride flush, sodium chloride, potassium chloride **OR** potassium alternative oral replacement **OR** potassium chloride, magnesium sulfate, ondansetron **OR** ondansetron, polyethylene glycol, acetaminophen **OR** acetaminophen, albuterol, ipratropium 0.5 mg-albuterol 2.5 mg    PHYSICAL EXAM:     Patient Vitals for the past 24 hrs:   BP Temp Temp src Pulse Resp SpO2   03/20/25 1541 (!) 92/53 98.1 °F (36.7 °C) Oral 57 18 99 %   03/20/25 1030 129/75 98.2 °F (36.8 °C) Oral 62 18 98 %   03/20/25 0929 -- -- -- -- 16 --   03/20/25 0745 (!) 105/58 98.9 °F (37.2 °C) Oral 68 18 90 %   03/20/25 0416 -- -- -- -- 16 --   03/20/25 0347

## 2025-03-20 NOTE — PLAN OF CARE
Problem: Chronic Conditions and Co-morbidities  Goal: Patient's chronic conditions and co-morbidity symptoms are monitored and maintained or improved  3/20/2025 1257 by Mignon Stark RN  Outcome: Progressing  3/19/2025 2258 by Meredith Pavon RN  Outcome: Progressing     Problem: Discharge Planning  Goal: Discharge to home or other facility with appropriate resources  3/20/2025 1257 by Mignon Stark RN  Outcome: Progressing  3/19/2025 2258 by Meredith Pavon RN  Outcome: Progressing     Problem: Pain  Goal: Verbalizes/displays adequate comfort level or baseline comfort level  3/20/2025 1257 by Mignon Stark RN  Outcome: Progressing  3/19/2025 2258 by Meredith Pavon RN  Outcome: Progressing     Problem: Skin/Tissue Integrity  Goal: Skin integrity remains intact  3/20/2025 1257 by Mignon Stark RN  Outcome: Progressing  3/19/2025 2258 by Meredith Pavon RN  Outcome: Progressing     Problem: Safety - Adult  Goal: Free from fall injury  3/20/2025 1257 by Mignon Stark RN  Outcome: Progressing  3/19/2025 2258 by Meredith Pavon RN  Outcome: Progressing

## 2025-03-20 NOTE — PROGRESS NOTES
masses or organomegaly  Extremities: no cyanosis, no clubbing, and no edema      Recent Labs     03/18/25  1845 03/19/25  0650 03/20/25  0858    141 141   K 4.5 4.0 4.1   CL 89* 90* 88*   CO2 40* 41* 41*   BUN 14 10 10   CREATININE 0.7 0.5* 0.6*   GLUCOSE 114* 147* 122*   CALCIUM 5.9* 6.5* 6.9*       Recent Labs     03/18/25  1845 03/19/25  0650 03/20/25  0858   WBC 21.1* 13.0* 7.7   RBC 2.82* 2.91* 2.75*   HGB 8.1* 8.6* 8.2*   HCT 29.7* 31.1* 28.9*   .3* 106.9* 105.1*   MCH 28.7 29.6 29.8   MCHC 27.3* 27.7* 28.4*   RDW 15.6* 16.0* 15.9*    196 204   MPV 11.2 11.1 11.3       CBC with Differential:    Lab Results   Component Value Date/Time    WBC 7.7 03/20/2025 08:58 AM    RBC 2.75 03/20/2025 08:58 AM    HGB 8.2 03/20/2025 08:58 AM    HCT 28.9 03/20/2025 08:58 AM     03/20/2025 08:58 AM    .1 03/20/2025 08:58 AM    MCH 29.8 03/20/2025 08:58 AM    MCHC 28.4 03/20/2025 08:58 AM    RDW 15.9 03/20/2025 08:58 AM    NRBC 0.0 07/03/2023 02:00 AM    BANDSPCT 0.0 02/10/2022 07:41 PM    METASPCT 5 03/06/2025 09:35 AM    METASPCT 4.0 02/21/2020 07:05 PM    LYMPHOPCT 13 03/20/2025 08:58 AM    MONOPCT 7 03/20/2025 08:58 AM    MYELOPCT 7 03/06/2025 09:35 AM    MYELOPCT 0.0 09/17/2021 03:10 AM    EOSPCT 1 03/20/2025 08:58 AM    BASOPCT 0 03/20/2025 08:58 AM    MONOSABS 0.56 03/20/2025 08:58 AM    LYMPHSABS 1.00 03/20/2025 08:58 AM    EOSABS 0.04 03/20/2025 08:58 AM    BASOSABS 0.01 03/20/2025 08:58 AM     CMP:    Lab Results   Component Value Date/Time     03/20/2025 08:58 AM    K 4.1 03/20/2025 08:58 AM    K 5.5 06/25/2023 02:30 AM    CL 88 03/20/2025 08:58 AM    CO2 41 03/20/2025 08:58 AM    BUN 10 03/20/2025 08:58 AM    CREATININE 0.6 03/20/2025 08:58 AM    GFRAA >60 10/17/2022 04:24 AM    AGRATIO 1.0 04/09/2023 06:47 AM    LABGLOM >90 03/20/2025 08:58 AM    LABGLOM >90 04/19/2024 12:25 PM    GLUCOSE 122 03/20/2025 08:58 AM    GLUCOSE 85 03/03/2020 10:55 AM    CALCIUM 6.9 03/20/2025 08:58  AM    BILITOT 0.3 03/20/2025 08:58 AM    ALKPHOS 62 03/20/2025 08:58 AM    AST 11 03/20/2025 08:58 AM    ALT 7 03/20/2025 08:58 AM     Ionized Calcium:  No components found for: \"IONCA\"     Radiology:   XR CHEST PORTABLE   Final Result   No acute process.             Assessment:    Principal Problem:    Hypocalcemia  Resolved Problems:    * No resolved hospital problems. *      Plan:  Hypocalcemia monitor and tx  nephrology on consult  Copd continue med  Htn continue med  Leukocytosis monitor off abx improved. Possibly due to steroids    D/w nephrology      Electronically signed by Ashkan Warner, DO on 3/20/2025 at 4:26 PM

## 2025-03-20 NOTE — PROGRESS NOTES
Nephrology notified of today's labwork.    Electronically signed by Mignon Stark RN on 3/20/2025 at 12:55 PM

## 2025-03-21 NOTE — PLAN OF CARE
Problem: Chronic Conditions and Co-morbidities  Goal: Patient's chronic conditions and co-morbidity symptoms are monitored and maintained or improved  3/20/2025 2217 by Meredith Pavon RN  Outcome: Progressing  3/20/2025 1257 by Mignon Stark RN  Outcome: Progressing     Problem: Discharge Planning  Goal: Discharge to home or other facility with appropriate resources  3/20/2025 2217 by Meredith Pavon RN  Outcome: Progressing  3/20/2025 1257 by Mignon Stark RN  Outcome: Progressing     Problem: Pain  Goal: Verbalizes/displays adequate comfort level or baseline comfort level  3/20/2025 2217 by Meredith Pavon RN  Outcome: Progressing  3/20/2025 1257 by Mignon Stark RN  Outcome: Progressing     Problem: Skin/Tissue Integrity  Goal: Skin integrity remains intact  Description: 1.  Monitor for areas of redness and/or skin breakdown  2.  Assess vascular access sites hourly  3.  Every 4-6 hours minimum:  Change oxygen saturation probe site  4.  Every 4-6 hours:  If on nasal continuous positive airway pressure, respiratory therapy assess nares and determine need for appliance change or resting period  3/20/2025 2217 by Meredith Pavon RN  Outcome: Progressing  3/20/2025 1257 by Mignon Stark RN  Outcome: Progressing     Problem: Safety - Adult  Goal: Free from fall injury  3/20/2025 2217 by Meredith Pavon RN  Outcome: Progressing  3/20/2025 1257 by Mignon Stark RN  Outcome: Progressing

## 2025-03-21 NOTE — PROGRESS NOTES
[facility] Hospitalist   Progress Note    Admitting Date and Time: 3/18/2025  5:56 PM  Admit Dx: Hypocalcemia [E83.51]    Subjective:    Patient was admitted with Hypocalcemia [E83.51]. Patient denies fever, chills, cp, sob, n/v.     calcium carbonate  1,000 mg Oral TID    vitamin D  5,000 Units Oral Q MWF    arformoterol tartrate  15 mcg Nebulization BID RT    budesonide  0.5 mg Nebulization BID RT    sodium chloride flush  5-40 mL IntraVENous 2 times per day    enoxaparin  40 mg SubCUTAneous Daily    ARIPiprazole  5 mg Oral Daily    aspirin  81 mg Oral Daily    Ensifentrine  2.5 mL Inhalation BID    FLUoxetine  20 mg Oral Nightly    furosemide  60 mg Oral BID    gabapentin  300 mg Oral TID    metoprolol succinate  100 mg Oral Daily    morphine  30 mg Oral BID    pantoprazole  40 mg Oral QAM AC    pramipexole  0.75 mg Oral Nightly    tamsulosin  0.4 mg Oral Daily     LORazepam, 0.5 mg, Q4H PRN  HYDROcodone 5 mg - acetaminophen, 1 tablet, Q6H PRN  sodium chloride flush, 5-40 mL, PRN  sodium chloride, , PRN  potassium chloride, 40 mEq, PRN   Or  potassium alternative oral replacement, 40 mEq, PRN   Or  potassium chloride, 10 mEq, PRN  magnesium sulfate, 2,000 mg, PRN  ondansetron, 4 mg, Q8H PRN   Or  ondansetron, 4 mg, Q6H PRN  polyethylene glycol, 17 g, Daily PRN  acetaminophen, 650 mg, Q6H PRN   Or  acetaminophen, 650 mg, Q6H PRN  albuterol, 2.5 mg, Q6H PRN  ipratropium 0.5 mg-albuterol 2.5 mg, 1 Dose, Q4H PRN         Objective:    /68   Pulse 61   Temp 97.9 °F (36.6 °C) (Oral)   Resp 18   Ht 1.626 m (5' 4\")   Wt 59.4 kg (131 lb)   SpO2 95%   BMI 22.49 kg/m²   Skin: warm and dry, no rash or erythema  Pulmonary/Chest: clear to auscultation bilaterally- no wheezes, rales or rhonchi, normal air movement, no respiratory distress  Cardiovascular: rhythm reg at rate of 64  Abdomen: soft, non-tender, non-distended, normal bowel sounds, no masses or organomegaly  Extremities: no cyanosis, no

## 2025-03-21 NOTE — PROGRESS NOTES
[facility]  MD Everton Yin, MD Tyra Mooney, CNP   Santa Johnson, MAICOL Allen, CNP  Progress Note    3/21/2025    SUBJECTIVE:   3/20:  Sitting up in bed.  NAD.  Oxygen on at 6L/NC.  Denies chest pain, palpitations or SOB.  Appetite I OK.    3/21: Status quo.  Resting comfortably.  Denies all complaint.  Tells me his appetite is good and he is eating a lot.  Though this is not entirely true ROS unremarkable.    PROBLEM LIST:    Principal Problem:    Hypocalcemia  Active Problems:    Leukocytosis    Chronic obstructive pulmonary disease (HCC)  Resolved Problems:    * No resolved hospital problems. *         DIET:    ADULT DIET; Dysphagia - Pureed; Moderately Thick (Honey)     MEDS (scheduled):    calcium carbonate  1,000 mg Oral TID    vitamin D  5,000 Units Oral Q MWF    arformoterol tartrate  15 mcg Nebulization BID RT    budesonide  0.5 mg Nebulization BID RT    sodium chloride flush  5-40 mL IntraVENous 2 times per day    enoxaparin  40 mg SubCUTAneous Daily    ARIPiprazole  5 mg Oral Daily    aspirin  81 mg Oral Daily    Ensifentrine  2.5 mL Inhalation BID    FLUoxetine  20 mg Oral Nightly    furosemide  60 mg Oral BID    gabapentin  300 mg Oral TID    metoprolol succinate  100 mg Oral Daily    morphine  30 mg Oral BID    pantoprazole  40 mg Oral QAM AC    pramipexole  0.75 mg Oral Nightly    tamsulosin  0.4 mg Oral Daily       MEDS (infusions):   sodium chloride         MEDS (prn):  LORazepam, HYDROcodone 5 mg - acetaminophen, sodium chloride flush, sodium chloride, potassium chloride **OR** potassium alternative oral replacement **OR** potassium chloride, magnesium sulfate, ondansetron **OR** ondansetron, polyethylene glycol, acetaminophen **OR** acetaminophen, albuterol, ipratropium 0.5 mg-albuterol 2.5 mg    PHYSICAL EXAM:     Patient Vitals for the past 24 hrs:   BP Temp Temp src Pulse Resp SpO2   03/21/25 1440 124/68 97.9 °F (36.6 °C)

## 2025-03-21 NOTE — CARE COORDINATION
Discharge plan is home to resume sn with [facility] [phone] please notify upon discharge. SUNDAY order faxed [phone]. Admitted for hypocalcemia Ca 7.7, iCa 0.97, leukocytosis, chronic anemia. PMHx of CHF, COPD. Nephrology is following.  Currently on 6L O2, baseline is 6-7L O2 oxygen and nebulizer is supplied through [facility] [employer] [facility]. Will continue to follow.  Becky IRVINGN, RN  Case Management

## 2025-03-22 NOTE — PROGRESS NOTES
[facility]   Progress Note    Admitting Date and Time: 3/18/2025  5:56 PM  Admit Dx: Hypocalcemia [E83.51]    Subjective:    Patient was admitted with Hypocalcemia [E83.51]. Patient denies fever, chills, cp, sob, n/v.     calcium carbonate  1,000 mg Oral TID    vitamin D  5,000 Units Oral Q MWF    arformoterol tartrate  15 mcg Nebulization BID RT    budesonide  0.5 mg Nebulization BID RT    sodium chloride flush  5-40 mL IntraVENous 2 times per day    enoxaparin  40 mg SubCUTAneous Daily    ARIPiprazole  5 mg Oral Daily    aspirin  81 mg Oral Daily    Ensifentrine  2.5 mL Inhalation BID    FLUoxetine  20 mg Oral Nightly    furosemide  60 mg Oral BID    gabapentin  300 mg Oral TID    metoprolol succinate  100 mg Oral Daily    morphine  30 mg Oral BID    pantoprazole  40 mg Oral QAM AC    pramipexole  0.75 mg Oral Nightly    tamsulosin  0.4 mg Oral Daily     LORazepam, 0.5 mg, Q4H PRN  HYDROcodone 5 mg - acetaminophen, 1 tablet, Q6H PRN  sodium chloride flush, 5-40 mL, PRN  sodium chloride, , PRN  potassium chloride, 40 mEq, PRN   Or  potassium alternative oral replacement, 40 mEq, PRN   Or  potassium chloride, 10 mEq, PRN  magnesium sulfate, 2,000 mg, PRN  ondansetron, 4 mg, Q8H PRN   Or  ondansetron, 4 mg, Q6H PRN  polyethylene glycol, 17 g, Daily PRN  acetaminophen, 650 mg, Q6H PRN   Or  acetaminophen, 650 mg, Q6H PRN  albuterol, 2.5 mg, Q6H PRN  ipratropium 0.5 mg-albuterol 2.5 mg, 1 Dose, Q4H PRN         Objective:    BP (!) 141/82   Pulse 66   Temp 97.8 °F (36.6 °C) (Oral)   Resp 18   Ht 1.626 m (5' 4\")   Wt 59.4 kg (131 lb)   SpO2 97%   BMI 22.49 kg/m²   Skin: warm and dry, no rash or erythema  Pulmonary/Chest: clear to auscultation bilaterally- no wheezes, rales or rhonchi, normal air movement, no respiratory distress  Cardiovascular: rhythm reg at rate of 68  Abdomen: soft, non-tender, non-distended, normal bowel sounds, no masses or organomegaly  Extremities: no cyanosis,  no clubbing, and no edema      Recent Labs     03/20/25  0858 03/21/25  0418    139   K 4.1 4.2   CL 88* 91*   CO2 41* 40*   BUN 10 8   CREATININE 0.6* 0.6*   GLUCOSE 122* 112*   CALCIUM 6.9* 7.7*       Recent Labs     03/20/25  0858 03/21/25  0418   WBC 7.7 7.9   RBC 2.75* 3.00*   HGB 8.2* 8.8*   HCT 28.9* 31.0*   .1* 103.3*   MCH 29.8 29.3   MCHC 28.4* 28.4*   RDW 15.9* 16.2*    183   MPV 11.3 10.9       Ionized Calcium:  No components found for: \"IONCA\"     Radiology:   XR CHEST PORTABLE   Final Result   No acute process.             Assessment:    Principal Problem:    Hypocalcemia  Active Problems:    Leukocytosis    Chronic obstructive pulmonary disease (HCC)  Resolved Problems:    * No resolved hospital problems. *      Plan:  Hypocalcemia monitor and tx  nephrology on consult  Copd continue med  Htn continue med  Leukocytosis monitor off abx improved. Possibly due to steroids  Gerd continue ppi    Provided education to pt about the administration of calcium and timing.   Encouraged activity   Recheck labs      Electronically signed by Ashkan Warner DO on 3/22/2025 at 11:41 AM

## 2025-03-22 NOTE — PROGRESS NOTES
[facility]  MD Everton Yin, MD Tyra Mooney, CNP   Santa Johnson, ANP Jammie Allen, CNP  Progress Note    3/22/2025    SUBJECTIVE:   3/20:  Sitting up in bed.  NAD.  Oxygen on at 6L/NC.  Denies chest pain, palpitations or SOB.  Appetite I OK.    3/21: Status quo.  Resting comfortably.  Denies all complaint.  Tells me his appetite is good and he is eating a lot.  Though this is not entirely true ROS unremarkable.    3/22: Status quo.  No new complaint or issue.  Appetite seems to be pretty good.  Taking medications as prescribed.    PROBLEM LIST:    Principal Problem:    Hypocalcemia  Active Problems:    Leukocytosis    Chronic obstructive pulmonary disease (HCC)  Resolved Problems:    * No resolved hospital problems. *         DIET:    ADULT DIET; Dysphagia - Pureed; Moderately Thick (Honey)     MEDS (scheduled):    calcium carbonate  1,000 mg Oral TID    vitamin D  5,000 Units Oral Q MWF    arformoterol tartrate  15 mcg Nebulization BID RT    budesonide  0.5 mg Nebulization BID RT    sodium chloride flush  5-40 mL IntraVENous 2 times per day    enoxaparin  40 mg SubCUTAneous Daily    ARIPiprazole  5 mg Oral Daily    aspirin  81 mg Oral Daily    Ensifentrine  2.5 mL Inhalation BID    FLUoxetine  20 mg Oral Nightly    furosemide  60 mg Oral BID    gabapentin  300 mg Oral TID    metoprolol succinate  100 mg Oral Daily    morphine  30 mg Oral BID    pantoprazole  40 mg Oral QAM AC    pramipexole  0.75 mg Oral Nightly    tamsulosin  0.4 mg Oral Daily       MEDS (infusions):   sodium chloride         MEDS (prn):  LORazepam, HYDROcodone 5 mg - acetaminophen, sodium chloride flush, sodium chloride, potassium chloride **OR** potassium alternative oral replacement **OR** potassium chloride, magnesium sulfate, ondansetron **OR** ondansetron, polyethylene glycol, acetaminophen **OR** acetaminophen, albuterol, ipratropium 0.5 mg-albuterol 2.5 mg    PHYSICAL  carbonate po 1000 mg tid away from meals  Cholecalciferol 5000 Units po MWF  Encourage oral intake  Monitor labs, next BMP, phosphorus, magnesium in the morning  Monitor BP    Electronically signed by Everton Roberts MD on 3/22/2025 at 2:36 PM

## 2025-03-23 NOTE — PLAN OF CARE
Problem: Chronic Conditions and Co-morbidities  Goal: Patient's chronic conditions and co-morbidity symptoms are monitored and maintained or improved  3/22/2025 2156 by Barb Bryan RN  Outcome: Progressing  3/22/2025 2156 by Barb Bryan RN  Outcome: Progressing     Problem: Discharge Planning  Goal: Discharge to home or other facility with appropriate resources  3/22/2025 2156 by Barb Bryan, RN  Outcome: Progressing  3/22/2025 2156 by Barb Bryan RN  Outcome: Progressing     Problem: Pain  Goal: Verbalizes/displays adequate comfort level or baseline comfort level  3/22/2025 2156 by Barb Bryan RN  Outcome: Progressing  3/22/2025 2156 by Barb Bryan RN  Outcome: Progressing     Problem: Skin/Tissue Integrity  Goal: Skin integrity remains intact  Description: 1.  Monitor for areas of redness and/or skin breakdown  2.  Assess vascular access sites hourly  3.  Every 4-6 hours minimum:  Change oxygen saturation probe site  4.  Every 4-6 hours:  If on nasal continuous positive airway pressure, respiratory therapy assess nares and determine need for appliance change or resting period  3/22/2025 2156 by Barb Bryan, RN  Outcome: Progressing  3/22/2025 2156 by Barb Bryan, RN  Outcome: Progressing     Problem: Safety - Adult  Goal: Free from fall injury  3/22/2025 2156 by Barb Bryan, RN  Outcome: Progressing  3/22/2025 2156 by Barb Bryan, RN  Outcome: Progressing

## 2025-03-23 NOTE — PROGRESS NOTES
[facility]  MD Everton Yin, MD Tyra Mooney, CNP   Santa Johnson, ANP Jammie Allen, MICA  Progress Note    3/23/2025    SUBJECTIVE:   3/20:  Sitting up in bed.  NAD.  Oxygen on at 6L/NC.  Denies chest pain, palpitations or SOB.  Appetite I OK.    3/21: Status quo.  Resting comfortably.  Denies all complaint.  Tells me his appetite is good and he is eating a lot.  Though this is not entirely true ROS unremarkable.    3/22: Status quo.  No new complaint or issue.  Appetite seems to be pretty good.  Taking medications as prescribed.    3/23: Denies complaint.  ROS unremarkable    PROBLEM LIST:    Principal Problem:    Hypocalcemia  Active Problems:    Leukocytosis    Chronic obstructive pulmonary disease (HCC)  Resolved Problems:    * No resolved hospital problems. *         DIET:    ADULT DIET; Dysphagia - Pureed; Moderately Thick (Honey)     MEDS (scheduled):    magnesium oxide  400 mg Oral Daily    calcium carbonate  1,000 mg Oral TID    vitamin D  5,000 Units Oral Q MWF    arformoterol tartrate  15 mcg Nebulization BID RT    budesonide  0.5 mg Nebulization BID RT    sodium chloride flush  5-40 mL IntraVENous 2 times per day    enoxaparin  40 mg SubCUTAneous Daily    ARIPiprazole  5 mg Oral Daily    aspirin  81 mg Oral Daily    Ensifentrine  2.5 mL Inhalation BID    FLUoxetine  20 mg Oral Nightly    furosemide  60 mg Oral BID    gabapentin  300 mg Oral TID    metoprolol succinate  100 mg Oral Daily    morphine  30 mg Oral BID    pantoprazole  40 mg Oral QAM AC    pramipexole  0.75 mg Oral Nightly    tamsulosin  0.4 mg Oral Daily       MEDS (infusions):   sodium chloride         MEDS (prn):  LORazepam, HYDROcodone 5 mg - acetaminophen, sodium chloride flush, sodium chloride, potassium chloride **OR** potassium alternative oral replacement **OR** potassium chloride, magnesium sulfate, ondansetron **OR** ondansetron, polyethylene glycol, acetaminophen

## 2025-03-23 NOTE — PROGRESS NOTES
[facility]   Progress Note    Admitting Date and Time: 3/18/2025  5:56 PM  Admit Dx: Hypocalcemia [E83.51]    Subjective:    Patient was admitted with Hypocalcemia [E83.51]. Patient denies fever, chills, cp, sob, n/v.     magnesium oxide  400 mg Oral Daily    calcium carbonate  1,000 mg Oral TID    vitamin D  5,000 Units Oral Q MWF    arformoterol tartrate  15 mcg Nebulization BID RT    budesonide  0.5 mg Nebulization BID RT    sodium chloride flush  5-40 mL IntraVENous 2 times per day    enoxaparin  40 mg SubCUTAneous Daily    ARIPiprazole  5 mg Oral Daily    aspirin  81 mg Oral Daily    Ensifentrine  2.5 mL Inhalation BID    FLUoxetine  20 mg Oral Nightly    furosemide  60 mg Oral BID    gabapentin  300 mg Oral TID    metoprolol succinate  100 mg Oral Daily    morphine  30 mg Oral BID    pantoprazole  40 mg Oral QAM AC    pramipexole  0.75 mg Oral Nightly    tamsulosin  0.4 mg Oral Daily     LORazepam, 0.5 mg, Q4H PRN  HYDROcodone 5 mg - acetaminophen, 1 tablet, Q6H PRN  sodium chloride flush, 5-40 mL, PRN  sodium chloride, , PRN  potassium chloride, 40 mEq, PRN   Or  potassium alternative oral replacement, 40 mEq, PRN   Or  potassium chloride, 10 mEq, PRN  magnesium sulfate, 2,000 mg, PRN  ondansetron, 4 mg, Q8H PRN   Or  ondansetron, 4 mg, Q6H PRN  polyethylene glycol, 17 g, Daily PRN  acetaminophen, 650 mg, Q6H PRN   Or  acetaminophen, 650 mg, Q6H PRN  albuterol, 2.5 mg, Q6H PRN  ipratropium 0.5 mg-albuterol 2.5 mg, 1 Dose, Q4H PRN         Objective:    /70   Pulse 63   Temp 97.4 °F (36.3 °C) (Oral)   Resp 16   Ht 1.626 m (5' 4\")   Wt 59.4 kg (131 lb)   SpO2 100%   BMI 22.49 kg/m²   Skin: warm and dry, no rash or erythema  Pulmonary/Chest: clear to auscultation bilaterally- no wheezes, rales or rhonchi, normal air movement, no respiratory distress  Cardiovascular: rhythm reg at rate of 64  Abdomen: soft, non-tender, non-distended, normal bowel sounds, no masses or

## 2025-03-24 NOTE — PROGRESS NOTES
[facility]  MD Everton Yin, MD Torie Muse, MD Tyra Guzman, CNP   Santa Johnson, MAICOL Allen, MICA  Progress Note    3/24/2025    SUBJECTIVE:   3/20:  Sitting up in bed.  NAD.  Oxygen on at 6L/NC.  Denies chest pain, palpitations or SOB.  Appetite I OK.    3/21: Status quo.  Resting comfortably.  Denies all complaint.  Tells me his appetite is good and he is eating a lot.  Though this is not entirely true ROS unremarkable.    3/22: Status quo.  No new complaint or issue.  Appetite seems to be pretty good.  Taking medications as prescribed.    3/23: Denies complaint.  ROS unremarkable    3/24: Feeling better.  Anxious for discharge.  Denies all complaint.  Good appetite and intake.  Yesterday Tums was changed to Os-Garry plus D, dose increased from 1000 3 times daily to 1500 mg/600 IU 3 times daily    PROBLEM LIST:    Principal Problem:    Hypocalcemia  Active Problems:    Leukocytosis    Chronic obstructive pulmonary disease (HCC)  Resolved Problems:    * No resolved hospital problems. *         DIET:    ADULT DIET; Dysphagia - Pureed; Moderately Thick (Honey)     MEDS (scheduled):    famotidine  20 mg Oral BID    magnesium oxide  400 mg Oral Daily    oyster shell calcium w/D  3 tablet Oral TID    vitamin D  5,000 Units Oral Q MWF    arformoterol tartrate  15 mcg Nebulization BID RT    budesonide  0.5 mg Nebulization BID RT    sodium chloride flush  5-40 mL IntraVENous 2 times per day    enoxaparin  40 mg SubCUTAneous Daily    ARIPiprazole  5 mg Oral Daily    aspirin  81 mg Oral Daily    Ensifentrine  2.5 mL Inhalation BID    FLUoxetine  20 mg Oral Nightly    furosemide  60 mg Oral BID    gabapentin  300 mg Oral TID    metoprolol succinate  100 mg Oral Daily    morphine  30 mg Oral BID    pramipexole  0.75 mg Oral Nightly    tamsulosin  0.4 mg Oral Daily       MEDS (infusions):   sodium chloride         MEDS (prn):  LORazepam, HYDROcodone 5 mg - acetaminophen,

## 2025-03-24 NOTE — DISCHARGE SUMMARY
[facility] Hospitalist       Hospitalist Physician Discharge Summary       Jana Sanchez MD  [address] [facility] [address]  [phone]    Schedule an appointment as soon as possible for a visit in 1 week(s)      Everton Roberts MD  [address] [id number] [phone]    Schedule an appointment as soon as possible for a visit in 1 week(s)        Activity level: as keely    Diet: ADULT DIET; Dysphagia - Pureed; Moderately Thick (Honey)    Dispo:home    Condition at discharge: fair        Patient ID:  gD Peña  [id number]  59 y.o.  1965    Admit date: 3/18/2025    Discharge date and time:  3/24/2025  7:27 PM    Admission Diagnoses: Principal Problem:    Hypocalcemia  Active Problems:    Leukocytosis    Chronic obstructive pulmonary disease (HCC)  Resolved Problems:    * No resolved hospital problems. *      Discharge Diagnoses: Principal Problem:    Hypocalcemia  Active Problems:    Leukocytosis    Chronic obstructive pulmonary disease (HCC)  Resolved Problems:    * No resolved hospital problems. *    Hypocalcemia  Copd  Htn  Leukocytosis  Gerd        Consults:  IP CONSULT TO NEPHROLOGY  IP CONSULT TO HOSPITALIST  IP CONSULT TO HEART FAILURE NURSE/COORDINATOR    Procedures: none    Hospital Course: Patient was admitted with Hypocalcemia [E83.51]. Patient is a 59-year-old gentleman with past medical history significant for hypertension, anxiety, COPD and restless leg syndrome presents to ED with abnormal outpatient labs.  Patient was in CHF clinic earlier today where they did the blood draw and he was found to have very low calcium.  He was told to go to ER for further management.  Patient denies any chest pain, belly pain, nausea vomiting diarrhea, lightheadedness or dizziness, no fever or chills, no focal deficits  He was recently discharged from hospital after treatment for bilateral pneumonia.  Workup in ED showed calcium low at 5.9 with ionized calcium

## 2025-03-24 NOTE — FLOWSHEET NOTE
Complete discharge instructions given to patient and S/O. All questions and concerns addressed. Appropriate for discharge home.

## 2025-03-24 NOTE — PLAN OF CARE
Problem: Chronic Conditions and Co-morbidities  Goal: Patient's chronic conditions and co-morbidity symptoms are monitored and maintained or improved  3/24/2025 1101 by Angela Gunter RN  Outcome: Progressing     Problem: Discharge Planning  Goal: Discharge to home or other facility with appropriate resources  3/24/2025 1101 by Angela Gunter RN  Outcome: Progressing     Problem: Pain  Goal: Verbalizes/displays adequate comfort level or baseline comfort level  3/24/2025 1101 by Angela Gunter RN  Outcome: Progressing     Problem: Skin/Tissue Integrity  Goal: Skin integrity remains intact  Description: 1.  Monitor for areas of redness and/or skin breakdown  2.  Assess vascular access sites hourly  3.  Every 4-6 hours minimum:  Change oxygen saturation probe site  4.  Every 4-6 hours:  If on nasal continuous positive airway pressure, respiratory therapy assess nares and determine need for appliance change or resting period  3/24/2025 1101 by Angela Gunter RN  Outcome: Progressing     Problem: Safety - Adult  Goal: Free from fall injury  3/24/2025 1101 by Angela Gunter RN  Outcome: Progressing

## 2025-03-24 NOTE — PLAN OF CARE
Problem: Chronic Conditions and Co-morbidities  Goal: Patient's chronic conditions and co-morbidity symptoms are monitored and maintained or improved  3/24/2025 1533 by Sue Dave RN  Outcome: Adequate for Discharge  3/24/2025 1101 by Angela Gunter RN  Outcome: Progressing     Problem: Discharge Planning  Goal: Discharge to home or other facility with appropriate resources  3/24/2025 1533 by Sue Dave RN  Outcome: Adequate for Discharge  3/24/2025 1101 by Angela Gunter RN  Outcome: Progressing     Problem: Pain  Goal: Verbalizes/displays adequate comfort level or baseline comfort level  3/24/2025 1533 by Sue Dave RN  Outcome: Adequate for Discharge  3/24/2025 1101 by Angela Gunter RN  Outcome: Progressing     Problem: Skin/Tissue Integrity  Goal: Skin integrity remains intact  Description: 1.  Monitor for areas of redness and/or skin breakdown  2.  Assess vascular access sites hourly  3.  Every 4-6 hours minimum:  Change oxygen saturation probe site  4.  Every 4-6 hours:  If on nasal continuous positive airway pressure, respiratory therapy assess nares and determine need for appliance change or resting period  3/24/2025 1533 by Sue Dave RN  Outcome: Adequate for Discharge  3/24/2025 1101 by Angela Gunter RN  Outcome: Progressing     Problem: Safety - Adult  Goal: Free from fall injury  3/24/2025 1533 by Sue Dave RN  Outcome: Adequate for Discharge  3/24/2025 1101 by Angela Gunter RN  Outcome: Progressing

## 2025-03-24 NOTE — CARE COORDINATION
Discharge order noted. Plan is home to resume sn with [facility] [phone], CM called left message to notify of discharge. Patients sister will bring portable oxygen tank and transport upon discharge.  Becky MOREIRA, RN  Case Management

## 2025-03-25 NOTE — PROGRESS NOTES
reconciled in full today and updated in the chart as necessary.         Return in about 4 weeks (around 4/22/2025).      This visit was completed virtually using My Chart/Haiku/[employer]        --Jana Sanchez MD on 3/25/2025 at 11:37 AM    An electronic signature was used to authenticate this note.

## (undated) DEVICE — MASK RESP UNIV N95 4 PANEL HD STRP INDIVIDUALLY WRP LF

## (undated) DEVICE — SOLUTION IV IRRIG 500ML 0.9% SODIUM CHL 2F7123

## (undated) DEVICE — SURGICAL PROCEDURE PACK BRONCH

## (undated) DEVICE — AIRWAY PHARYNGEAL AD 10 CM INTUB